# Patient Record
Sex: MALE | Race: WHITE | NOT HISPANIC OR LATINO | Employment: OTHER | URBAN - METROPOLITAN AREA
[De-identification: names, ages, dates, MRNs, and addresses within clinical notes are randomized per-mention and may not be internally consistent; named-entity substitution may affect disease eponyms.]

---

## 2017-11-02 ENCOUNTER — OFFICE VISIT (OUTPATIENT)
Dept: FAMILY MEDICINE CLINIC | Facility: CLINIC | Age: 62
End: 2017-11-02

## 2017-11-02 VITALS
HEIGHT: 71 IN | OXYGEN SATURATION: 98 % | RESPIRATION RATE: 16 BRPM | BODY MASS INDEX: 35.74 KG/M2 | TEMPERATURE: 98 F | WEIGHT: 255.3 LBS | SYSTOLIC BLOOD PRESSURE: 150 MMHG | DIASTOLIC BLOOD PRESSURE: 82 MMHG | HEART RATE: 68 BPM

## 2017-11-02 DIAGNOSIS — K42.9 UMBILICAL HERNIA WITHOUT OBSTRUCTION AND WITHOUT GANGRENE: ICD-10-CM

## 2017-11-02 DIAGNOSIS — Z13.220 SCREENING FOR HYPERLIPIDEMIA: ICD-10-CM

## 2017-11-02 DIAGNOSIS — I10 ESSENTIAL HYPERTENSION: ICD-10-CM

## 2017-11-02 DIAGNOSIS — F32.A DEPRESSION, UNSPECIFIED DEPRESSION TYPE: ICD-10-CM

## 2017-11-02 DIAGNOSIS — Z11.59 ENCOUNTER FOR HEPATITIS C SCREENING TEST FOR LOW RISK PATIENT: ICD-10-CM

## 2017-11-02 DIAGNOSIS — Z12.11 SCREENING FOR COLON CANCER: ICD-10-CM

## 2017-11-02 DIAGNOSIS — R39.15 URINARY URGENCY: ICD-10-CM

## 2017-11-02 DIAGNOSIS — IMO0001 CLASS 2 OBESITY WITH SERIOUS COMORBIDITY AND BODY MASS INDEX (BMI) OF 35.0 TO 35.9 IN ADULT, UNSPECIFIED OBESITY TYPE: Primary | ICD-10-CM

## 2017-11-02 DIAGNOSIS — R01.1 HEART MURMUR, SYSTOLIC: ICD-10-CM

## 2017-11-02 DIAGNOSIS — Z13.1 SCREENING FOR DIABETES MELLITUS: ICD-10-CM

## 2017-11-02 PROCEDURE — 99214 OFFICE O/P EST MOD 30 MIN: CPT | Performed by: FAMILY MEDICINE

## 2017-11-02 RX ORDER — CITALOPRAM 20 MG/1
20 TABLET ORAL DAILY
Qty: 30 TABLET | Refills: 5 | Status: SHIPPED | OUTPATIENT
Start: 2017-11-02 | End: 2019-03-06 | Stop reason: SDUPTHER

## 2017-11-02 RX ORDER — ATENOLOL 50 MG/1
50 TABLET ORAL DAILY
Qty: 30 TABLET | Refills: 5 | Status: SHIPPED | OUTPATIENT
Start: 2017-11-02 | End: 2019-02-10 | Stop reason: HOSPADM

## 2017-11-02 RX ORDER — AMLODIPINE AND OLMESARTAN MEDOXOMIL 10; 40 MG/1; MG/1
1 TABLET ORAL DAILY
COMMUNITY
End: 2017-11-02 | Stop reason: SDUPTHER

## 2017-11-02 RX ORDER — QUINAPRIL 20 MG/1
20 TABLET ORAL DAILY
Qty: 30 TABLET | Refills: 0 | Status: SHIPPED | OUTPATIENT
Start: 2017-11-02 | End: 2018-01-05 | Stop reason: SDUPTHER

## 2017-11-02 RX ORDER — FUROSEMIDE 20 MG/1
TABLET ORAL
COMMUNITY
Start: 2014-04-21 | End: 2017-11-02 | Stop reason: SINTOL

## 2017-11-02 RX ORDER — CITALOPRAM 20 MG/1
TABLET ORAL
COMMUNITY
Start: 2014-04-21 | End: 2017-11-02 | Stop reason: SDUPTHER

## 2017-11-02 RX ORDER — QUINAPRIL 20 MG/1
TABLET ORAL
COMMUNITY
Start: 2014-04-21 | End: 2017-11-02

## 2017-11-02 RX ORDER — AMLODIPINE AND OLMESARTAN MEDOXOMIL 10; 40 MG/1; MG/1
1 TABLET ORAL DAILY
Qty: 30 TABLET | Refills: 5 | Status: SHIPPED | OUTPATIENT
Start: 2017-11-02 | End: 2018-01-05

## 2017-11-02 RX ORDER — ATENOLOL 50 MG/1
TABLET ORAL
COMMUNITY
Start: 2014-04-21 | End: 2017-11-02 | Stop reason: SDUPTHER

## 2017-11-02 NOTE — PROGRESS NOTES
"Subjective   Flo Manzo is a 62 y.o. male.  He presented to the office to establish care, was patient of Dr. Morales.  He presented to the office alone.  He has concerns regarding urinary urgency and is in need of refills of all of his medications.    Chief Complaint   Patient presents with   • Establish Care     New Patient   • Hypertension        History of Present Illness    Hypertension  Patient has had hypertension for years.  It has been difficult to control.  He has been on his current regimen including amlodipine 10, olmesartan 40, atenolol 50, quinapril 20, furosemide 20 for the last about 2-3 years.  He has tolerated these medications.  He does not complaining of chest pain, shortness of breath, headaches, changes in his vision.  He has been out of his medications for a couple of weeks.    Urinary urgency  This has been ongoing for the last 2-3 years.  He describes that he has the urge to urinate and will even have some leaking.  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  He does wear pads during the day because of the leaking.  He has urinary frequency overnight related to how much she's had a drink before bed.  He reports on nights where he is careful and doesn't have fluids before going to bed he may only get up once but other times he'll get up 2-3 times.    Depression  Patient reported that he has also run out of his citalopram.  Yesterday he got very upset when someone was speaking with him and he couldn't understand.  The specific situation was related to a coworker who speaks [place]ron[place] and because he could not understand with coworker was saying the patient reported he got very angry and \"blew up\" yelling at his coworker.  He feels as though his medication helps keep him more mellow so these things don't bother him.  He has been on citalopram for years and does not notice any side effects.      The following portions of the patient's history were " "reviewed and updated as appropriate: allergies, current medications, past family history, past medical history, past social history, past surgical history and problem list.          Review of Systems   Constitutional: Negative for activity change, appetite change, fatigue and unexpected weight change.   HENT: Negative for congestion, ear pain, postnasal drip, rhinorrhea, sinus pain and sore throat.    Eyes: Positive for redness. Negative for visual disturbance.   Respiratory: Negative for cough and shortness of breath.    Cardiovascular: Positive for leg swelling. Negative for chest pain and palpitations.   Gastrointestinal: Negative for abdominal pain, blood in stool, constipation, diarrhea, nausea and vomiting.   Genitourinary: Positive for difficulty urinating, frequency and urgency. Negative for decreased urine volume and dysuria.   Musculoskeletal: Negative for gait problem and joint swelling.        Right shoulder pain   Neurological: Negative for dizziness, weakness and headaches.   Psychiatric/Behavioral: Positive for dysphoric mood. Negative for sleep disturbance. The patient is not nervous/anxious.        Objective   Blood pressure 150/82, pulse 68, temperature 98 °F (36.7 °C), temperature source Oral, resp. rate 16, height 71\" (180.3 cm), weight 255 lb 4.8 oz (116 kg), SpO2 (!) 68 %.  Physical Exam   Constitutional: He is oriented to person, place, and time. He appears well-nourished. No distress.   Obese   HENT:   Right Ear: External ear normal.   Left Ear: External ear normal.   Nose: Nose normal.   Mouth/Throat: Oropharynx is clear and moist. No oropharyngeal exudate.   Bilateral ear canals and tympanic membranes are normal.   Eyes: Conjunctivae and EOM are normal. Right eye exhibits no discharge. Left eye exhibits no discharge. No scleral icterus.   Neck: Neck supple. No thyromegaly present.   Cardiovascular: Normal rate and regular rhythm.  Exam reveals no gallop and no friction rub.    Murmur " heard.  2/6 systolic murmur best heard over left upper and lower sternal border   Pulmonary/Chest: Effort normal and breath sounds normal. No respiratory distress. He has no wheezes. He has no rales.   Abdominal: Soft. Bowel sounds are normal. He exhibits no distension and no mass. There is no tenderness. There is no guarding.   Patient with 2 cm bulge at the umbilicus, reducible, no discoloration or tenderness.   Genitourinary: Prostate normal.   Musculoskeletal: He exhibits edema. He exhibits no tenderness or deformity.   Trace edema bilateral lower extremities   Lymphadenopathy:     He has no cervical adenopathy.   Neurological: He is alert and oriented to person, place, and time. He exhibits normal muscle tone. Coordination normal.   Skin: Skin is warm and dry.   Psychiatric: He has a normal mood and affect. His behavior is normal.   Vitals reviewed.        Assessment/Plan   Flo was seen today for establish care and hypertension.    Diagnoses and all orders for this visit:    Class 2 obesity with serious comorbidity and body mass index (BMI) of 35.0 to 35.9 in adult, unspecified obesity type  -     Comprehensive Metabolic Panel  -     Lipid Panel    Screening for hyperlipidemia  -     Lipid Panel    Screening for diabetes mellitus  -     Comprehensive Metabolic Panel    Encounter for hepatitis C screening test for low risk patient  -     Hepatitis C Antibody    Essential hypertension  -     Comprehensive Metabolic Panel  -     Lipid Panel    Urinary urgency    Depression, unspecified depression type    Umbilical hernia without obstruction and without gangrene  -     Ambulatory Referral to General Surgery    Heart murmur, systolic  -     Adult Transthoracic Echo Limited W/ Cont if Necessary Per Protocol; Future    Other orders  -     amlodipine-olmesartan (SULEMA) 10-40 MG per tablet; Take 1 tablet by mouth Daily.  -     atenolol (TENORMIN) 50 MG tablet; Take 1 tablet by mouth Daily.  -     citalopram (CeleXA)  20 MG tablet; Take 1 tablet by mouth Daily.  -     quinapril (ACCUPRIL) 20 MG tablet; Take 1 tablet by mouth Daily.      Heart murmur  #1 patient has never been told he has a heart murmur  #2 will evaluate with echocardiogram, agent has long-standing history of hypertension that was difficult to control giving the number of agents he's on    Hypertension  #1 patient has previously tolerated his antihypertensives, will restart all except for furosemide given his urinary complaints  #2 patient is on an ARB and an ACE inhibitor, we will reevaluate in 2 weeks for blood pressure control, we'll likely adjust medications at that time  #3 potential plan to have patient on amlodipine 10, atenolol 50, quinapril dose may increase with discontinuation of the only certain, labs today but long-term concern for kidney injury    Urinary urgency  #1 no appreciation of enlarged prostate on exam  #2 discontinue furosemide and will check for improvement of symptoms at next visit  #3 if patient still having considerable urinary urgency and complaints of affecting his lifestyle will refer to urology    Umbilical hernia  #1 gave patient precautions regarding not being able to reduce the herniated components  #2 also discussed that given his abdominal obesity he may not be a good candidate for surgery  #3 patient does do significant lifting and has some associated discomfort with the hernia and so will refer to general surgery for further discussion on repair    Patient reported he was due for colonoscopy 1 year ago so will order for a screening colonoscopy.

## 2017-11-03 LAB
ALBUMIN SERPL-MCNC: 4.5 G/DL (ref 3.5–5.2)
ALBUMIN/GLOB SERPL: 1.5 G/DL
ALP SERPL-CCNC: 101 U/L (ref 39–117)
ALT SERPL-CCNC: 18 U/L (ref 1–41)
AST SERPL-CCNC: 15 U/L (ref 1–40)
BILIRUB SERPL-MCNC: 0.7 MG/DL (ref 0.1–1.2)
BUN SERPL-MCNC: 16 MG/DL (ref 8–23)
BUN/CREAT SERPL: 20.5 (ref 7–25)
CALCIUM SERPL-MCNC: 9.9 MG/DL (ref 8.6–10.5)
CHLORIDE SERPL-SCNC: 101 MMOL/L (ref 98–107)
CHOLEST SERPL-MCNC: 145 MG/DL (ref 0–200)
CO2 SERPL-SCNC: 29 MMOL/L (ref 22–29)
CREAT SERPL-MCNC: 0.78 MG/DL (ref 0.76–1.27)
GFR SERPLBLD CREATININE-BSD FMLA CKD-EPI: 101 ML/MIN/1.73
GFR SERPLBLD CREATININE-BSD FMLA CKD-EPI: 123 ML/MIN/1.73
GLOBULIN SER CALC-MCNC: 3 GM/DL
GLUCOSE SERPL-MCNC: 80 MG/DL (ref 65–99)
HCV AB S/CO SERPL IA: 0.1 S/CO RATIO (ref 0–0.9)
HDLC SERPL-MCNC: 61 MG/DL (ref 40–60)
LDLC SERPL CALC-MCNC: 74 MG/DL (ref 0–100)
POTASSIUM SERPL-SCNC: 4.2 MMOL/L (ref 3.5–5.2)
PROT SERPL-MCNC: 7.5 G/DL (ref 6–8.5)
SODIUM SERPL-SCNC: 142 MMOL/L (ref 136–145)
TRIGL SERPL-MCNC: 50 MG/DL (ref 0–150)
VLDLC SERPL CALC-MCNC: 10 MG/DL (ref 5–40)

## 2017-11-13 ENCOUNTER — OFFICE VISIT (OUTPATIENT)
Dept: SURGERY | Facility: CLINIC | Age: 62
End: 2017-11-13

## 2017-11-13 VITALS — BODY MASS INDEX: 35.7 KG/M2 | HEIGHT: 71 IN | HEART RATE: 64 BPM | WEIGHT: 255 LBS | OXYGEN SATURATION: 97 %

## 2017-11-13 DIAGNOSIS — K42.9 UMBILICAL HERNIA WITHOUT OBSTRUCTION AND WITHOUT GANGRENE: Primary | ICD-10-CM

## 2017-11-13 PROCEDURE — 99203 OFFICE O/P NEW LOW 30 MIN: CPT | Performed by: SURGERY

## 2017-11-13 RX ORDER — CEFAZOLIN SODIUM 2 G/100ML
2 INJECTION, SOLUTION INTRAVENOUS ONCE
Status: CANCELLED | OUTPATIENT
Start: 2018-01-23 | End: 2018-01-23

## 2017-11-13 NOTE — PROGRESS NOTES
Cc: Umbilical hernia    History of presenting illness:   This is a nice, 62-year-old gentleman who says that over the past about 3 years he's noticed a slowly enlarging mass at the umbilicus which is generally partially reducible.  More recently has been associated with some mild pain which seems to be worse with straining or lifting.  It has grown larger over time.  He has never had an operation through an incision at this point.    In addition, the patient has never had a colonoscopy.  He was scheduled for one several years ago, but ended up being pushed out of town for work and never had it rescheduled.    Past Medical History: Hypertension, depression, arthritis    Past Surgical History: Appendectomy at the age of 17.  Bilateral knee surgery, eye surgery, hand surgery    Medications: Atenolol, citalopram, quinapril, amlodipine, meloxicam    Allergies: None known    Social History: Patient is employed as an  which occasionally involves some heavy lifting and straining.  He does not smoke.  He uses alcohol on a social basis.    Family History: Positive for lung cancer in her father, breast cancer in a sister    Review of Systems:  Constitutional: Negative for change in activity, fever or fatigue  Neck: no swollen glands or dysphagia or odynophagia  Respiratory: negative for SOB, cough, hemoptysis or wheezing  Cardiovascular: negative for chest pain, palpitations or peripheral edema  Gastrointestinal: Positive for occasional periumbilical abdominal pain, negative for change in bowel habits, bloody stools or acid reflux      Physical Exam:    General: alert and oriented, appropriate, no acute distress  Neck: Supple without lymphadenopathy or thyromegaly, trachea is in the midline  Respiratory: Lungs are clear bilaterally without wheezing, no use of accessory muscles is noted  Cardiovascular: Regular rate and rhythm without murmur, no peripheral edema  Gastrointestinal: Soft, benign, approximately 2-1/2 or  3 cm periumbilical hernia which is partially reducible.  Moderate tenderness to palpation.  Bowel sounds positive.    Laboratory data: None    Imaging data: None      Assessment and plan:   1.  Periumbilical ventral hernia  I recommended repair of this.  Options for repair were discussed with the patient and he has opted for a laparoscopic robotically assisted ventral hernia repair.  Attempts will be made for primary closure with preperitoneal placement of mesh.  He understands that if we are not able to accomplish the procedure this weight conversion to open may be necessary.  He understands that the risks include bleeding, infection, recurrence, pain, injury to intra-abdominal structures as well as other potential problems and is willing to proceed.    2.  Encounter for screening colonoscopy  We will plan for colonoscopy to be done prior to his hernia repair.  I think this makes the most sense in case we were to run in to a finding which would require an operation which could violate his hernia.    These will be scheduled in conjunction with patient planned for surgery on his hand for carpal tunnel syndrome.  He is going to be off work for some time it anyways at that time.      Ten Solitario MD, FACS  General, Minimally Invasive and Endoscopic Surgery  [facility]    [address], [address]  [place], [place], [address]  P: [phone]  F: [phone]

## 2017-11-15 ENCOUNTER — TELEPHONE (OUTPATIENT)
Dept: FAMILY MEDICINE CLINIC | Facility: CLINIC | Age: 62
End: 2017-11-15

## 2017-11-15 ENCOUNTER — HOSPITAL ENCOUNTER (OUTPATIENT)
Dept: CARDIOLOGY | Facility: HOSPITAL | Age: 62
Discharge: HOME | End: 2017-11-15
Admitting: FAMILY MEDICINE

## 2017-11-15 VITALS
HEIGHT: 71 IN | BODY MASS INDEX: 35.7 KG/M2 | HEART RATE: 55 BPM | SYSTOLIC BLOOD PRESSURE: 164 MMHG | DIASTOLIC BLOOD PRESSURE: 104 MMHG | WEIGHT: 255 LBS

## 2017-11-15 DIAGNOSIS — R01.1 HEART MURMUR, SYSTOLIC: ICD-10-CM

## 2017-11-15 LAB
ASCENDING AORTA: 2.6 CM
BH CV ECHO MEAS - ACS: 1.4 CM
BH CV ECHO MEAS - AI DEC SLOPE: 89.1 CM/SEC^2
BH CV ECHO MEAS - AI MAX PG: 44.1 MMHG
BH CV ECHO MEAS - AI MAX VEL: 332 CM/SEC
BH CV ECHO MEAS - AI P1/2T: 1091 MSEC
BH CV ECHO MEAS - AO MAX PG (FULL): 11.7 MMHG
BH CV ECHO MEAS - AO MAX PG: 17.8 MMHG
BH CV ECHO MEAS - AO MEAN PG (FULL): 8 MMHG
BH CV ECHO MEAS - AO MEAN PG: 11 MMHG
BH CV ECHO MEAS - AO ROOT AREA (BSA CORRECTED): 1.6
BH CV ECHO MEAS - AO ROOT AREA: 10.8 CM^2
BH CV ECHO MEAS - AO ROOT DIAM: 3.7 CM
BH CV ECHO MEAS - AO V2 MAX: 211 CM/SEC
BH CV ECHO MEAS - AO V2 MEAN: 161 CM/SEC
BH CV ECHO MEAS - AO V2 VTI: 45.9 CM
BH CV ECHO MEAS - AVA(I,A): 2.1 CM^2
BH CV ECHO MEAS - AVA(I,D): 2.1 CM^2
BH CV ECHO MEAS - AVA(V,A): 2.2 CM^2
BH CV ECHO MEAS - AVA(V,D): 2.2 CM^2
BH CV ECHO MEAS - BSA(HAYCOCK): 2.4 M^2
BH CV ECHO MEAS - BSA: 2.3 M^2
BH CV ECHO MEAS - BZI_BMI: 35.6 KILOGRAMS/M^2
BH CV ECHO MEAS - BZI_METRIC_HEIGHT: 180.3 CM
BH CV ECHO MEAS - BZI_METRIC_WEIGHT: 115.7 KG
BH CV ECHO MEAS - CONTRAST EF (2CH): 52.1 ML/M^2
BH CV ECHO MEAS - CONTRAST EF 4CH: 51.4 ML/M^2
BH CV ECHO MEAS - EDV(MOD-SP2): 142 ML
BH CV ECHO MEAS - EDV(MOD-SP4): 148 ML
BH CV ECHO MEAS - EDV(TEICH): 205.6 ML
BH CV ECHO MEAS - EF(CUBED): 63.9 %
BH CV ECHO MEAS - EF(MOD-SP2): 52.1 %
BH CV ECHO MEAS - EF(MOD-SP4): 52 %
BH CV ECHO MEAS - EF(TEICH): 54.3 %
BH CV ECHO MEAS - ESV(MOD-SP2): 68 ML
BH CV ECHO MEAS - ESV(MOD-SP4): 72 ML
BH CV ECHO MEAS - ESV(TEICH): 93.9 ML
BH CV ECHO MEAS - FS: 28.8 %
BH CV ECHO MEAS - IVS/LVPW: 1
BH CV ECHO MEAS - IVSD: 1.2 CM
BH CV ECHO MEAS - LAT PEAK E' VEL: 20 CM/SEC
BH CV ECHO MEAS - LV DIASTOLIC VOL/BSA (35-75): 63.3 ML/M^2
BH CV ECHO MEAS - LV MASS(C)D: 349.7 GRAMS
BH CV ECHO MEAS - LV MASS(C)DI: 149.5 GRAMS/M^2
BH CV ECHO MEAS - LV MAX PG: 6.2 MMHG
BH CV ECHO MEAS - LV MEAN PG: 3 MMHG
BH CV ECHO MEAS - LV SYSTOLIC VOL/BSA (12-30): 30.8 ML/M^2
BH CV ECHO MEAS - LV V1 MAX: 124 CM/SEC
BH CV ECHO MEAS - LV V1 MEAN: 84 CM/SEC
BH CV ECHO MEAS - LV V1 VTI: 25 CM
BH CV ECHO MEAS - LVIDD: 6.4 CM
BH CV ECHO MEAS - LVIDS: 4.5 CM
BH CV ECHO MEAS - LVLD AP2: 8.6 CM
BH CV ECHO MEAS - LVLD AP4: 8.5 CM
BH CV ECHO MEAS - LVLS AP2: 8 CM
BH CV ECHO MEAS - LVLS AP4: 7.4 CM
BH CV ECHO MEAS - LVOT AREA (M): 3.8 CM^2
BH CV ECHO MEAS - LVOT AREA: 3.8 CM^2
BH CV ECHO MEAS - LVOT DIAM: 2.2 CM
BH CV ECHO MEAS - LVPWD: 1.2 CM
BH CV ECHO MEAS - MED PEAK E' VEL: 22 CM/SEC
BH CV ECHO MEAS - MR MAX PG: 92.5 MMHG
BH CV ECHO MEAS - MR MAX VEL: 481 CM/SEC
BH CV ECHO MEAS - MV A DUR: 0.17 SEC
BH CV ECHO MEAS - MV A MAX VEL: 81.6 CM/SEC
BH CV ECHO MEAS - MV DEC SLOPE: 587 CM/SEC^2
BH CV ECHO MEAS - MV DEC TIME: 0.22 SEC
BH CV ECHO MEAS - MV E MAX VEL: 125 CM/SEC
BH CV ECHO MEAS - MV E/A: 1.5
BH CV ECHO MEAS - MV MAX PG: 5.2 MMHG
BH CV ECHO MEAS - MV MEAN PG: 2 MMHG
BH CV ECHO MEAS - MV P1/2T MAX VEL: 125 CM/SEC
BH CV ECHO MEAS - MV P1/2T: 62.4 MSEC
BH CV ECHO MEAS - MV V2 MAX: 114 CM/SEC
BH CV ECHO MEAS - MV V2 MEAN: 60.1 CM/SEC
BH CV ECHO MEAS - MV V2 VTI: 43.2 CM
BH CV ECHO MEAS - MVA P1/2T LCG: 1.8 CM^2
BH CV ECHO MEAS - MVA(P1/2T): 3.5 CM^2
BH CV ECHO MEAS - MVA(VTI): 2.2 CM^2
BH CV ECHO MEAS - PA MAX PG (FULL): 5.6 MMHG
BH CV ECHO MEAS - PA MAX PG: 7 MMHG
BH CV ECHO MEAS - PA V2 MAX: 132 CM/SEC
BH CV ECHO MEAS - PULM A REVS DUR: 0.08 SEC
BH CV ECHO MEAS - PULM A REVS VEL: 27.7 CM/SEC
BH CV ECHO MEAS - PULM DIAS VEL: 32.5 CM/SEC
BH CV ECHO MEAS - PULM S/D: 2
BH CV ECHO MEAS - PULM SYS VEL: 65 CM/SEC
BH CV ECHO MEAS - PVA(V,A): 1.4 CM^2
BH CV ECHO MEAS - PVA(V,D): 1.4 CM^2
BH CV ECHO MEAS - QP/QS: 0.51
BH CV ECHO MEAS - RAP SYSTOLE: 8 MMHG
BH CV ECHO MEAS - RV MAX PG: 1.3 MMHG
BH CV ECHO MEAS - RV MEAN PG: 1 MMHG
BH CV ECHO MEAS - RV V1 MAX: 57.9 CM/SEC
BH CV ECHO MEAS - RV V1 MEAN: 40.5 CM/SEC
BH CV ECHO MEAS - RV V1 VTI: 15.3 CM
BH CV ECHO MEAS - RVOT AREA: 3.1 CM^2
BH CV ECHO MEAS - RVOT DIAM: 2 CM
BH CV ECHO MEAS - RVSP: 37 MMHG
BH CV ECHO MEAS - SI(AO): 211.1 ML/M^2
BH CV ECHO MEAS - SI(CUBED): 70.3 ML/M^2
BH CV ECHO MEAS - SI(LVOT): 40.6 ML/M^2
BH CV ECHO MEAS - SI(MOD-SP2): 31.6 ML/M^2
BH CV ECHO MEAS - SI(MOD-SP4): 32.5 ML/M^2
BH CV ECHO MEAS - SI(TEICH): 47.8 ML/M^2
BH CV ECHO MEAS - SUP REN AO DIAM: 2.5 CM
BH CV ECHO MEAS - SV(AO): 493.5 ML
BH CV ECHO MEAS - SV(CUBED): 164.3 ML
BH CV ECHO MEAS - SV(LVOT): 95 ML
BH CV ECHO MEAS - SV(MOD-SP2): 74 ML
BH CV ECHO MEAS - SV(MOD-SP4): 76 ML
BH CV ECHO MEAS - SV(RVOT): 48.1 ML
BH CV ECHO MEAS - SV(TEICH): 111.7 ML
BH CV ECHO MEAS - TAPSE (>1.6): 2.8 CM2
BH CV ECHO MEAS - TR MAX VEL: 271 CM/SEC
BH CV XLRA - RV BASE: 4.1 CM
BH CV XLRA - TDI S': 12 CM/SEC
E/E' RATIO: 21
LEFT ATRIUM VOLUME INDEX: 34 ML/M2
LV EF 2D ECHO EST: 52 %
SINUS: 3.3 CM
STJ: 2.7 CM

## 2017-11-15 PROCEDURE — 93306 TTE W/DOPPLER COMPLETE: CPT

## 2017-11-15 PROCEDURE — 93306 TTE W/DOPPLER COMPLETE: CPT | Performed by: INTERNAL MEDICINE

## 2017-11-15 NOTE — TELEPHONE ENCOUNTER
Tammy from [facility] called and said that they dont have an authorization for the echo they have scheduled for this pt. Please call her at. [phone]

## 2017-12-11 PROBLEM — K42.9 UMBILICAL HERNIA WITHOUT OBSTRUCTION AND WITHOUT GANGRENE: Status: ACTIVE | Noted: 2017-12-11

## 2017-12-28 ENCOUNTER — OFFICE VISIT (OUTPATIENT)
Dept: FAMILY MEDICINE CLINIC | Facility: CLINIC | Age: 62
End: 2017-12-28

## 2017-12-28 VITALS
DIASTOLIC BLOOD PRESSURE: 80 MMHG | SYSTOLIC BLOOD PRESSURE: 130 MMHG | HEART RATE: 56 BPM | BODY MASS INDEX: 35.78 KG/M2 | HEIGHT: 71 IN | TEMPERATURE: 98.3 F | RESPIRATION RATE: 18 BRPM | WEIGHT: 255.6 LBS | OXYGEN SATURATION: 96 %

## 2017-12-28 DIAGNOSIS — B97.89 VIRAL RESPIRATORY ILLNESS: Primary | ICD-10-CM

## 2017-12-28 DIAGNOSIS — J98.8 VIRAL RESPIRATORY ILLNESS: Primary | ICD-10-CM

## 2017-12-28 PROCEDURE — 99213 OFFICE O/P EST LOW 20 MIN: CPT | Performed by: FAMILY MEDICINE

## 2017-12-28 RX ORDER — IPRATROPIUM BROMIDE 42 UG/1
2 SPRAY, METERED NASAL 4 TIMES DAILY
Qty: 15 ML | Refills: 1 | Status: SHIPPED | OUTPATIENT
Start: 2017-12-28 | End: 2019-01-24

## 2017-12-28 RX ORDER — FLUTICASONE PROPIONATE 220 UG/1
1 AEROSOL, METERED RESPIRATORY (INHALATION)
Qty: 12 G | Refills: 1 | Status: SHIPPED | OUTPATIENT
Start: 2017-12-28 | End: 2019-04-08

## 2017-12-28 NOTE — PROGRESS NOTES
Subjective   Flo Manzo is a 62 y.o. male.  Patient is presenting today with complaints of cough and nasal congestion.    Chief Complaint   Patient presents with   • Cough     since tuesday    • Nasal Congestion     since tuesday        History of Present Illness  Cough and nasal congestion  This started 4 days ago with cough, now feels like in his chest. Hurts to cough, stomach hurts to cough. Has been staying in bed. Intermittently productive of green phlegm, no blood but black flakes thinks it is from work.Taking some OTC cough syrup with benefits. Wakes a couple times at night with cough but not too bad and able to get back to sleep. No sick contacts but did have a lot of family members over for two days with holiday parties. Felt feverish but did not take temperature. Also had diarrhea at start of illness but this has resolved. Has been drinking mostly apple cider, non-alcoholic, but not really eating much. Also having nasal congestion that comes and goes no headaches but some sinus pressure. No n/v, some sore throat from coughing. Pt only smoked briefly at age 17.    Pt having colonoscopy next week, Hernia surgery after that.    The following portions of the patient's history were reviewed and updated as appropriate: allergies, current medications and problem list.      Review of Systems   Constitutional: Positive for activity change, appetite change and fever.   HENT: Positive for congestion, postnasal drip, rhinorrhea, sinus pressure and sore throat. Negative for ear pain, nosebleeds and sinus pain.    Respiratory: Positive for cough and shortness of breath.    Cardiovascular: Positive for chest pain.   Gastrointestinal: Positive for abdominal pain and diarrhea. Negative for blood in stool, nausea and vomiting.   Neurological: Negative for headaches.   Psychiatric/Behavioral: Positive for sleep disturbance.       Objective   Blood pressure 130/80, pulse 56, temperature 98.3 °F (36.8 °C), temperature  "source Oral, resp. rate 18, height 180.3 cm (71\"), weight 116 kg (255 lb 9.6 oz), SpO2 96 %.  Physical Exam   Constitutional: He is oriented to person, place, and time. He appears well-nourished. No distress.   HENT:   Right Ear: External ear normal.   Left Ear: External ear normal.   Mouth/Throat: Oropharynx is clear and moist. No oropharyngeal exudate.   Nasal discharge present, erythematous turbinates. No oropharynx erythema or exudate.   Eyes: Conjunctivae are normal. Right eye exhibits no discharge. Left eye exhibits no discharge.   Neck: Neck supple. No thyromegaly present.   Cardiovascular: Normal rate, regular rhythm and intact distal pulses.  Exam reveals no gallop and no friction rub.    Murmur heard.  Pulmonary/Chest: Effort normal and breath sounds normal. No respiratory distress. He has no wheezes. He has no rales. He exhibits tenderness.   Lymphadenopathy:     He has no cervical adenopathy.   Neurological: He is alert and oriented to person, place, and time.   Psychiatric: He has a normal mood and affect. His behavior is normal.   Vitals reviewed.      Assessment/Plan   Flo was seen today for cough and nasal congestion.    Diagnoses and all orders for this visit:    Viral respiratory illness    Other orders  -     ipratropium (ATROVENT) 0.06 % nasal spray; 2 sprays into each nostril 4 (Four) Times a Day.  -     fluticasone (FLOVENT HFA) 220 MCG/ACT inhaler; Inhale 1 puff 2 (Two) Times a Day.  -     Spacer/Aero Chamber Mouthpiece misc; Use with inhaler for each treatment      Pt to expect residual cough for weeks but should improve  If has worsening of symptoms he should call the office       "

## 2018-01-04 ENCOUNTER — HOSPITAL ENCOUNTER (OUTPATIENT)
Facility: HOSPITAL | Age: 63
Setting detail: HOSPITAL OUTPATIENT SURGERY
Discharge: HOME | End: 2018-01-04
Attending: SURGERY | Admitting: SURGERY

## 2018-01-04 ENCOUNTER — ANESTHESIA (OUTPATIENT)
Dept: GASTROENTEROLOGY | Facility: HOSPITAL | Age: 63
End: 2018-01-04

## 2018-01-04 ENCOUNTER — ANESTHESIA EVENT (OUTPATIENT)
Dept: GASTROENTEROLOGY | Facility: HOSPITAL | Age: 63
End: 2018-01-04

## 2018-01-04 VITALS
BODY MASS INDEX: 35 KG/M2 | OXYGEN SATURATION: 94 % | DIASTOLIC BLOOD PRESSURE: 115 MMHG | HEIGHT: 71 IN | HEART RATE: 59 BPM | TEMPERATURE: 97.6 F | RESPIRATION RATE: 16 BRPM | WEIGHT: 250 LBS | SYSTOLIC BLOOD PRESSURE: 169 MMHG

## 2018-01-04 DIAGNOSIS — K42.9 UMBILICAL HERNIA WITHOUT OBSTRUCTION AND WITHOUT GANGRENE: ICD-10-CM

## 2018-01-04 PROCEDURE — 25010000002 PROPOFOL 10 MG/ML EMULSION: Performed by: ANESTHESIOLOGY

## 2018-01-04 PROCEDURE — S0260 H&P FOR SURGERY: HCPCS | Performed by: SURGERY

## 2018-01-04 PROCEDURE — 45380 COLONOSCOPY AND BIOPSY: CPT | Performed by: SURGERY

## 2018-01-04 PROCEDURE — 88305 TISSUE EXAM BY PATHOLOGIST: CPT | Performed by: SURGERY

## 2018-01-04 PROCEDURE — 45385 COLONOSCOPY W/LESION REMOVAL: CPT | Performed by: SURGERY

## 2018-01-04 RX ORDER — SODIUM CHLORIDE 0.9 % (FLUSH) 0.9 %
1-10 SYRINGE (ML) INJECTION AS NEEDED
Status: DISCONTINUED | OUTPATIENT
Start: 2018-01-04 | End: 2018-01-04 | Stop reason: HOSPADM

## 2018-01-04 RX ORDER — PROPOFOL 10 MG/ML
VIAL (ML) INTRAVENOUS CONTINUOUS PRN
Status: DISCONTINUED | OUTPATIENT
Start: 2018-01-04 | End: 2018-01-04 | Stop reason: SURG

## 2018-01-04 RX ORDER — SODIUM CHLORIDE, SODIUM LACTATE, POTASSIUM CHLORIDE, CALCIUM CHLORIDE 600; 310; 30; 20 MG/100ML; MG/100ML; MG/100ML; MG/100ML
30 INJECTION, SOLUTION INTRAVENOUS CONTINUOUS PRN
Status: DISCONTINUED | OUTPATIENT
Start: 2018-01-04 | End: 2018-01-04 | Stop reason: HOSPADM

## 2018-01-04 RX ORDER — LIDOCAINE HYDROCHLORIDE 20 MG/ML
INJECTION, SOLUTION INFILTRATION; PERINEURAL AS NEEDED
Status: DISCONTINUED | OUTPATIENT
Start: 2018-01-04 | End: 2018-01-04 | Stop reason: SURG

## 2018-01-04 RX ORDER — PROPOFOL 10 MG/ML
VIAL (ML) INTRAVENOUS AS NEEDED
Status: DISCONTINUED | OUTPATIENT
Start: 2018-01-04 | End: 2018-01-04 | Stop reason: SURG

## 2018-01-04 RX ADMIN — SODIUM CHLORIDE, POTASSIUM CHLORIDE, SODIUM LACTATE AND CALCIUM CHLORIDE 30 ML/HR: 600; 310; 30; 20 INJECTION, SOLUTION INTRAVENOUS at 08:50

## 2018-01-04 RX ADMIN — SODIUM CHLORIDE, POTASSIUM CHLORIDE, SODIUM LACTATE AND CALCIUM CHLORIDE: 600; 310; 30; 20 INJECTION, SOLUTION INTRAVENOUS at 09:25

## 2018-01-04 RX ADMIN — PROPOFOL 100 MG: 10 INJECTION, EMULSION INTRAVENOUS at 09:25

## 2018-01-04 RX ADMIN — PROPOFOL 100 MCG/KG/MIN: 10 INJECTION, EMULSION INTRAVENOUS at 09:25

## 2018-01-04 RX ADMIN — LIDOCAINE HYDROCHLORIDE 60 MG: 20 INJECTION, SOLUTION INFILTRATION; PERINEURAL at 09:25

## 2018-01-04 NOTE — H&P
Cc: Encounter for screening colonoscopy     History of presenting illness:   62-year-old gentleman for screening colonoscopy.  No complaints.  No prior colonoscopy.     Past Medical History: Hypertension, depression, arthritis     Past Surgical History: Appendectomy at the age of 17.  Bilateral knee surgery, eye surgery, hand surgery     Medications: Atenolol, citalopram, quinapril, amlodipine, meloxicam     Allergies: None known     Social History: Patient is employed as an  which occasionally involves some heavy lifting and straining.  He does not smoke.  He uses alcohol on a social basis.     Family History: Positive for lung cancer in her father, breast cancer in a sister     Review of Systems:  Constitutional: Negative for change in activity, fever or fatigue  Neck: no swollen glands or dysphagia or odynophagia  Respiratory: negative for SOB, cough, hemoptysis or wheezing  Cardiovascular: negative for chest pain, palpitations or peripheral edema  Gastrointestinal: Positive for occasional periumbilical abdominal pain, negative for change in bowel habits, bloody stools or acid reflux        Physical Exam:   General: alert and oriented, appropriate, no acute distress  Neck: Supple without lymphadenopathy or thyromegaly, trachea is in the midline  Respiratory: Lungs are clear bilaterally without wheezing, no use of accessory muscles is noted  Cardiovascular: Regular rate and rhythm without murmur, no peripheral edema  Gastrointestinal: Soft, benign, approximately 2-1/2 or 3 cm periumbilical hernia which is partially reducible.  Moderate tenderness to palpation.  Bowel sounds positive.     Laboratory data: None     Imaging data: None        Assessment and plan:   Encounter for screening colonoscopy  We will plan for colonoscopy to be done prior to his hernia repair.  I think this makes the most sense in case we were to run in to a finding which would require an operation which could violate his  hernia.     These will be scheduled in conjunction with patient planned for surgery on his hand for carpal tunnel syndrome.  He is going to be off work for some time it anyways at that time.        Ten Solitario MD, FACS  General, Minimally Invasive and Endoscopic Surgery  [facility]     [address], [address]  [place], [place], [address]  P: [phone]  F: [phone]

## 2018-01-04 NOTE — OP NOTE
Operative Note :   MD Flo Ramirez SAUL Danielleremberto  1955    Procedure Date: 01/04/18    Pre-op Diagnosis:  · Screening colonoscopy    Post-op Diagnosis:  · Colon polyps    Procedure:   · Colonoscopy to cecum with cold forceps polypectomy and hot snare polypectomy    Surgeon: Ten Solitario MD      Anesthesia: MAC    Indications:  · 62 year old man for initial screening colonoscopy.  No problems.    Findings:   · Benign appearing colon polyps ×3    Recommendations:   · To be based on polyp pathology, but likely repeat colonoscopy in 3 years    Description of procedure:    After obtaining informed consent, patient was brought to the endoscopy suite and was sedated.  Digital rectal exam was undertaken and was unremarkable.  The flexible Olympus endoscope was then inserted into the rectum and passed around with minimal difficulty to the level of the cecum.  The colonoscope was then slowly withdrawn, carefully visualizing all mucosal surfaces.  The cecum was unremarkable.  The ascending colon was unremarkable.  Around the level of the hepatic flexure there was a small benign-appearing polyp which was removed with the polypectomy forceps.  Transverse colon was unremarkable.  At approximately the level of the splenic flexure there was another small polyp on a fold which was again removed with the polypectomy forceps.  Descending colon was unremarkable.  In the proximal sigmoid colon there was a slightly larger again benign-appearing polyp which was removed with the hot snare device and then suctioned through the scope.  The site appeared in good order.  The remaining sigmoid colon was unremarkable.  Rectum was normal.  There were no diverticula seen.  Scope was removed completely.    Ten Solitario MD  General and Endoscopic Surgery  [facility]    [address], [address]  [place], [place], [address]  P: [phone]  F: [phone]

## 2018-01-04 NOTE — PLAN OF CARE
Problem: Patient Care Overview (Adult)  Goal: Plan of Care Review  Outcome: Ongoing (interventions implemented as appropriate)   01/04/18 0829   Coping/Psychosocial Response Interventions   Plan Of Care Reviewed With patient     Goal: Adult Individualization and Mutuality  Outcome: Ongoing (interventions implemented as appropriate)    Goal: Discharge Needs Assessment  Outcome: Ongoing (interventions implemented as appropriate)   01/04/18 0829   Discharge Needs Assessment   Concerns To Be Addressed no discharge needs identified   Discharge Disposition home or self-care   Living Environment   Transportation Available car;family or friend will provide       Problem: GI Endoscopy (Adult)  Goal: Signs and Symptoms of Listed Potential Problems Will be Absent or Manageable (GI Endoscopy)  Outcome: Ongoing (interventions implemented as appropriate)   01/04/18 0829   GI Endoscopy   Problems Assessed (GI Endoscopy) all   Problems Present (GI Endoscopy) none

## 2018-01-04 NOTE — ANESTHESIA POSTPROCEDURE EVALUATION
"Patient: Flo Manzo    Procedure Summary     Date Anesthesia Start Anesthesia Stop Room / Location    01/04/18 0926 1003  [facility] /  [facility]       Procedure Diagnosis Surgeon Provider    COLONOSCOPY with cold polypectomy and hot snare polypectomy (N/A ) Umbilical hernia without obstruction and without gangrene  (Umbilical hernia without obstruction and without gangrene [K42.9]) MD Genaro Carmona MD          Anesthesia Type: MAC  Last vitals  BP   (!) 162/106 (01/04/18 1013)   Temp   36.4 °C (97.6 °F) (01/04/18 1003)   Pulse   59 (01/04/18 1013)   Resp   16 (01/04/18 1013)     SpO2   94 % (01/04/18 1013)     Post Anesthesia Care and Evaluation    Patient location during evaluation: PACU  Patient participation: complete - patient participated  Level of consciousness: awake  Pain score: 0  Pain management: adequate  Airway patency: patent  Anesthetic complications: No anesthetic complications    Cardiovascular status: acceptable  Respiratory status: acceptable  Hydration status: acceptable    Comments: Blood pressure (!) 162/106, pulse 59, temperature 36.4 °C (97.6 °F), temperature source Oral, resp. rate 16, height 180.3 cm (71\"), weight 113 kg (250 lb), SpO2 94 %.        "

## 2018-01-04 NOTE — ANESTHESIA PREPROCEDURE EVALUATION
Anesthesia Evaluation     Patient summary reviewed and Nursing notes reviewed   NPO Solid Status: > 8 hours  NPO Liquid Status: > 6 hours     Airway   Mallampati: I  TM distance: <3 FB  Neck ROM: full  no difficulty expected  Dental - normal exam     Pulmonary - normal exam   (+) sleep apnea,   Cardiovascular - normal exam  Exercise tolerance: poor (<4 METS)    (+) hypertension,       Neuro/Psych- negative ROS  GI/Hepatic/Renal/Endo - negative ROS     Musculoskeletal     Abdominal  - normal exam    Bowel sounds: normal.   Substance History - negative use     OB/GYN negative ob/gyn ROS         Other   (+) arthritis                                           Anesthesia Plan    ASA 3     MAC     Anesthetic plan and risks discussed with patient.

## 2018-01-05 ENCOUNTER — OFFICE VISIT (OUTPATIENT)
Dept: FAMILY MEDICINE CLINIC | Facility: CLINIC | Age: 63
End: 2018-01-05

## 2018-01-05 VITALS
HEART RATE: 70 BPM | OXYGEN SATURATION: 100 % | TEMPERATURE: 98 F | BODY MASS INDEX: 35.98 KG/M2 | HEIGHT: 71 IN | SYSTOLIC BLOOD PRESSURE: 152 MMHG | WEIGHT: 257 LBS | DIASTOLIC BLOOD PRESSURE: 88 MMHG

## 2018-01-05 DIAGNOSIS — I10 ESSENTIAL HYPERTENSION: Primary | ICD-10-CM

## 2018-01-05 DIAGNOSIS — M75.82 ROTATOR CUFF TENDINITIS, LEFT: ICD-10-CM

## 2018-01-05 PROBLEM — F32.A DEPRESSION: Status: ACTIVE | Noted: 2018-01-05

## 2018-01-05 LAB
CYTO UR: NORMAL
LAB AP CASE REPORT: NORMAL
Lab: NORMAL
PATH REPORT.FINAL DX SPEC: NORMAL
PATH REPORT.GROSS SPEC: NORMAL

## 2018-01-05 PROCEDURE — 99214 OFFICE O/P EST MOD 30 MIN: CPT | Performed by: FAMILY MEDICINE

## 2018-01-05 PROCEDURE — 20610 DRAIN/INJ JOINT/BURSA W/O US: CPT | Performed by: FAMILY MEDICINE

## 2018-01-05 RX ORDER — QUINAPRIL 40 MG/1
40 TABLET ORAL DAILY
Qty: 90 TABLET | Refills: 1 | Status: SHIPPED | OUTPATIENT
Start: 2018-01-05 | End: 2019-02-10 | Stop reason: HOSPADM

## 2018-01-05 RX ORDER — TRIAMCINOLONE ACETONIDE 40 MG/ML
40 INJECTION, SUSPENSION INTRA-ARTICULAR; INTRAMUSCULAR ONCE
Status: COMPLETED | OUTPATIENT
Start: 2018-01-05 | End: 2018-01-05

## 2018-01-05 RX ORDER — AMLODIPINE BESYLATE 10 MG/1
10 TABLET ORAL DAILY
Qty: 90 TABLET | Refills: 1 | Status: SHIPPED | OUTPATIENT
Start: 2018-01-05 | End: 2019-02-10 | Stop reason: HOSPADM

## 2018-01-05 RX ADMIN — TRIAMCINOLONE ACETONIDE 40 MG: 40 INJECTION, SUSPENSION INTRA-ARTICULAR; INTRAMUSCULAR at 15:38

## 2018-01-05 RX ADMIN — TRIAMCINOLONE ACETONIDE 40 MG: 40 INJECTION, SUSPENSION INTRA-ARTICULAR; INTRAMUSCULAR at 15:37

## 2018-01-05 NOTE — PROGRESS NOTES
"Subjective   Flo Manzo is a 62 y.o. male. Pt here to follow up on left shoulder pain. Also with elevated BP.    Chief Complaint   Patient presents with   • Follow-up     follow up on b/p and shoulder        History of Present Illness    HTN  Elevated yesterday before his colonoscopy, doctor was nervous said he needed to address this. He did not take any of his medications prior to the colonoscopy because he was supposed to be nothing by mouth. He is taking the atenolol-olmesartan 10-40 mg, atenolol 50 mg, quinapril 20 mg daily. He has been on the regimen for long time. Does not have cuff at home. Has been on current regimen for years. No diabetes, no kidney disease on lab review, 2014 and 11/2017.    Shoulder pain  Left shoulder pain without injury. Has been hurting for over one year. Now has limited range of motion related to his pain. Still has good strength. He can work as an  but with a lot of overhead work will have to figure out how to get some help holding things because the shoulder is limiting. Has not previously been evaluated or treated.      The following portions of the patient's history were reviewed and updated as appropriate: allergies, current medications, past medical history and problem list.      Review of Systems   Respiratory: Negative for shortness of breath.    Cardiovascular: Negative for chest pain.   Musculoskeletal: Positive for arthralgias. Negative for joint swelling and myalgias.   Neurological: Negative for weakness and headaches.       Objective   Blood pressure 152/88, pulse 70, temperature 98 °F (36.7 °C), temperature source Oral, height 180.3 cm (71\"), weight 117 kg (257 lb), SpO2 100 %.  Physical Exam   Constitutional: He appears well-nourished. No distress.   obese   Pulmonary/Chest: Effort normal. No respiratory distress.   Musculoskeletal:   Limited range of motion of LUE beyond 90 degrees 2/2 pain, can move to 135 degrees, and full range passively but has " pain. Can lower arm with good control. 5/5 strength BUE. Pain with internal rotation, pain on empty can and adduction but no abduction.   Vitals reviewed.      Procedure   Orthopedic Injury Treatment  Date/Time: 1/7/2018 10:52 AM  Performed by: YASMINE FALCON  Authorized by: YASMINE FALCON   Consent: Verbal consent obtained.  Risks and benefits: risks, benefits and alternatives were discussed  Consent given by: patient  Patient understanding: patient states understanding of the procedure being performed  Patient consent: the patient's understanding of the procedure matches consent given  Site marked: the operative site was marked  Injury location: shoulder  Location details: left shoulder  Pre-procedure distal perfusion: normal  Pre-procedure neurological function: normal  Pre-procedure range of motion: reduced    Anesthesia:  Local anesthesia used: no    Sedation:  Patient sedated: no  Post-procedure distal perfusion: normal  Post-procedure neurological function: normal  Post-procedure range of motion: unchanged  Patient tolerance: Patient tolerated the procedure well with no immediate complications  Comments: 3 mL of 1% lidocaine without epi and 2 mL of 40 mg/mL kenalog was injected into the glenohumoral space using the posterior approach.      The patient is asked to continue to rest the shoulder for a few more days before resuming regular activities.  It may be more painful for the first 1-2 days.  Watch for fever, or increased swelling or persistent pain in shoulder. Call or return to clinic prn if such symptoms occur or the shoulder fails to improve as anticipated.      Assessment/Plan   Flo was seen today for follow-up.    Diagnoses and all orders for this visit:    Essential hypertension    Rotator cuff tendinitis, left    Other orders  -     quinapril (ACCUPRIL) 40 MG tablet; Take 1 tablet by mouth Daily.  -     amLODIPine (NORVASC) 10 MG tablet; Take 1 tablet by mouth Daily.      Discussed with the pt  plan to discontinue the olmesartan, keep amlodipine and increase quinapril, can also continue the atenolol  Pt was able to repeat back the instructions  He was given a written prescription for a blood pressure cuff, he is to log BPs at home and call in. We can increase quinapril further, and choose an alternative beta blocker or add a diuretic in order to achieve better control  To return for nurse visit BP check in 1-2 weeks, will be in the building around that time for hand evaluation

## 2018-01-18 ENCOUNTER — APPOINTMENT (OUTPATIENT)
Dept: PREADMISSION TESTING | Facility: HOSPITAL | Age: 63
End: 2018-01-18

## 2018-01-18 VITALS
HEIGHT: 70 IN | WEIGHT: 251 LBS | OXYGEN SATURATION: 96 % | TEMPERATURE: 97.9 F | HEART RATE: 52 BPM | DIASTOLIC BLOOD PRESSURE: 94 MMHG | BODY MASS INDEX: 35.93 KG/M2 | SYSTOLIC BLOOD PRESSURE: 156 MMHG | RESPIRATION RATE: 20 BRPM

## 2018-01-18 DIAGNOSIS — K42.9 UMBILICAL HERNIA WITHOUT OBSTRUCTION AND WITHOUT GANGRENE: ICD-10-CM

## 2018-01-18 LAB
ANION GAP SERPL CALCULATED.3IONS-SCNC: 12.7 MMOL/L
BASOPHILS # BLD AUTO: 0.02 10*3/MM3 (ref 0–0.2)
BASOPHILS NFR BLD AUTO: 0.2 % (ref 0–1.5)
BUN BLD-MCNC: 15 MG/DL (ref 8–23)
BUN/CREAT SERPL: 17 (ref 7–25)
CALCIUM SPEC-SCNC: 9.6 MG/DL (ref 8.6–10.5)
CHLORIDE SERPL-SCNC: 99 MMOL/L (ref 98–107)
CO2 SERPL-SCNC: 25.3 MMOL/L (ref 22–29)
CREAT BLD-MCNC: 0.88 MG/DL (ref 0.76–1.27)
DEPRECATED RDW RBC AUTO: 45.9 FL (ref 37–54)
EOSINOPHIL # BLD AUTO: 0.1 10*3/MM3 (ref 0–0.7)
EOSINOPHIL NFR BLD AUTO: 1 % (ref 0.3–6.2)
ERYTHROCYTE [DISTWIDTH] IN BLOOD BY AUTOMATED COUNT: 14.1 % (ref 11.5–14.5)
GFR SERPL CREATININE-BSD FRML MDRD: 88 ML/MIN/1.73
GLUCOSE BLD-MCNC: 85 MG/DL (ref 65–99)
HCT VFR BLD AUTO: 47.5 % (ref 40.4–52.2)
HGB BLD-MCNC: 15.3 G/DL (ref 13.7–17.6)
IMM GRANULOCYTES # BLD: 0.02 10*3/MM3 (ref 0–0.03)
IMM GRANULOCYTES NFR BLD: 0.2 % (ref 0–0.5)
LYMPHOCYTES # BLD AUTO: 2.06 10*3/MM3 (ref 0.9–4.8)
LYMPHOCYTES NFR BLD AUTO: 19.7 % (ref 19.6–45.3)
MCH RBC QN AUTO: 28.8 PG (ref 27–32.7)
MCHC RBC AUTO-ENTMCNC: 32.2 G/DL (ref 32.6–36.4)
MCV RBC AUTO: 89.5 FL (ref 79.8–96.2)
MONOCYTES # BLD AUTO: 0.94 10*3/MM3 (ref 0.2–1.2)
MONOCYTES NFR BLD AUTO: 9 % (ref 5–12)
NEUTROPHILS # BLD AUTO: 7.33 10*3/MM3 (ref 1.9–8.1)
NEUTROPHILS NFR BLD AUTO: 69.9 % (ref 42.7–76)
PLATELET # BLD AUTO: 271 10*3/MM3 (ref 140–500)
PMV BLD AUTO: 11.1 FL (ref 6–12)
POTASSIUM BLD-SCNC: 3.9 MMOL/L (ref 3.5–5.2)
RBC # BLD AUTO: 5.31 10*6/MM3 (ref 4.6–6)
SODIUM BLD-SCNC: 137 MMOL/L (ref 136–145)
WBC NRBC COR # BLD: 10.47 10*3/MM3 (ref 4.5–10.7)

## 2018-01-18 PROCEDURE — 93010 ELECTROCARDIOGRAM REPORT: CPT | Performed by: INTERNAL MEDICINE

## 2018-01-18 PROCEDURE — 36415 COLL VENOUS BLD VENIPUNCTURE: CPT

## 2018-01-18 PROCEDURE — 80048 BASIC METABOLIC PNL TOTAL CA: CPT | Performed by: SURGERY

## 2018-01-18 PROCEDURE — 85025 COMPLETE CBC W/AUTO DIFF WBC: CPT | Performed by: SURGERY

## 2018-01-18 PROCEDURE — 93005 ELECTROCARDIOGRAM TRACING: CPT

## 2018-01-18 NOTE — DISCHARGE INSTRUCTIONS
Take the following medications the morning of surgery with a small sip of water:    CITALOPRAM, ATENOLOL AND AMLODIPINE.  BRING IN ALL OF YOUR MEDICINE MORNING OF SURGERY  ARRIVE AT 6:00    General Instructions:  • Do not eat solid food after midnight the night before surgery.  • You may drink clear liquids day of surgery but must stop at least one hour before your hospital arrival time.  • It is beneficial for you to have a clear drink that contains carbohydrates the day of surgery.  We suggest a 12 to 20 ounce bottle of Gatorade or Powerade for non-diabetic patients or a 12 to 20 ounce bottle of G2 or Powerade Zero for diabetic patients. (Pediatric patients, are not advised to drink a 12 to 20 ounce carbohydrate drink)    Clear liquids are liquids you can see through.  Nothing red in color.     Plain water                               Sports drinks  Sodas                                   Gelatin (Jell-O)  Fruit juices without pulp such as white grape juice and apple juice  Popsicles that contain no fruit or yogurt  Tea or coffee (no cream or milk added)  Gatorade / Powerade  G2 / Powerade Zero    • Infants may have breast milk up to four hours before surgery.  • Infants drinking formula may drink formula up to six hours before surgery.   • Patients who avoid smoking, chewing tobacco and alcohol for 4 weeks prior to surgery have a reduced risk of post-operative complications.  Quit smoking as many days before surgery as you can.  • Do not smoke, use chewing tobacco or drink alcohol the day of surgery.   • If applicable bring your C-PAP/ BI-PAP machine.  • Bring any papers given to you in the doctor’s office.  • Wear clean comfortable clothes and socks.  • Do not wear contact lenses or make-up.  Bring a case for your glasses.   • Bring crutches or walker if applicable.  • Remove all piercings.  Leave jewelry and any other valuables at home.  • Hair extensions with metal clips must be removed prior to  surgery.  • The Pre-Admission Testing nurse will instruct you to bring medications if unable to obtain an accurate list in Pre-Admission Testing.        If you were given a blood bank ID arm band remember to bring it with you the day of surgery.    Preventing a Surgical Site Infection:  • For 2 to 3 days before surgery, avoid shaving with a razor because the razor can irritate skin and make it easier to develop an infection.  • The night prior to surgery sleep in a clean bed with clean clothing.  Do not allow pets to sleep with you.  • Shower on the morning of surgery using a fresh bar of anti-bacterial soap (such as Dial) and clean washcloth.  Dry with a clean towel and dress in clean clothing.  • Ask your surgeon if you will be receiving antibiotics prior to surgery.  • Make sure you, your family, and all healthcare providers clean their hands with soap and water or an alcohol based hand  before caring for you or your wound.    Day of surgery:  Upon arrival, a Pre-op nurse and Anesthesiologist will review your health history, obtain vital signs, and answer questions you may have.  The only belongings needed at this time will be your home medications and if applicable your C-PAP/BI-PAP machine.  If you are staying overnight your family can leave the rest of your belongings in the car and bring them to your room later.  A Pre-op nurse will start an IV and you may receive medication in preparation for surgery, including something to help you relax.  Your family will be able to see you in the Pre-op area.  While you are in surgery your family should notify the waiting room  if they leave the waiting room area and provide a contact phone number.    Please be aware that surgery does come with discomfort.  We want to make every effort to control your discomfort so please discuss any uncontrolled symptoms with your nurse.   Your doctor will most likely have prescribed pain medications.      If you are  going home after surgery you will receive individualized written care instructions before being discharged.  A responsible adult must drive you to and from the hospital on the day of your surgery and stay with you for 24 hours.    If you are staying overnight following surgery, you will be transported to your hospital room following the recovery period.  [facility] has all private rooms.    If you have any questions please call Pre-Admission Testing at [phone].  Deductibles and co-payments are collected on the day of service. Please be prepared to pay the required co-pay, deductible or deposit on the day of service as defined by your plan.

## 2018-01-19 ENCOUNTER — CLINICAL SUPPORT (OUTPATIENT)
Dept: FAMILY MEDICINE CLINIC | Facility: CLINIC | Age: 63
End: 2018-01-19

## 2018-01-19 VITALS — SYSTOLIC BLOOD PRESSURE: 158 MMHG | DIASTOLIC BLOOD PRESSURE: 100 MMHG

## 2018-01-23 ENCOUNTER — ANESTHESIA EVENT (OUTPATIENT)
Dept: PERIOP | Facility: HOSPITAL | Age: 63
End: 2018-01-23

## 2018-01-23 ENCOUNTER — HOSPITAL ENCOUNTER (OUTPATIENT)
Facility: HOSPITAL | Age: 63
Setting detail: HOSPITAL OUTPATIENT SURGERY
Discharge: HOME | End: 2018-01-23
Attending: SURGERY | Admitting: SURGERY

## 2018-01-23 ENCOUNTER — ANESTHESIA (OUTPATIENT)
Dept: PERIOP | Facility: HOSPITAL | Age: 63
End: 2018-01-23

## 2018-01-23 VITALS
HEART RATE: 60 BPM | RESPIRATION RATE: 16 BRPM | SYSTOLIC BLOOD PRESSURE: 148 MMHG | DIASTOLIC BLOOD PRESSURE: 77 MMHG | OXYGEN SATURATION: 93 % | BODY MASS INDEX: 36.12 KG/M2 | WEIGHT: 252.31 LBS | HEIGHT: 70 IN | TEMPERATURE: 97.8 F

## 2018-01-23 DIAGNOSIS — K42.9 UMBILICAL HERNIA WITHOUT OBSTRUCTION AND WITHOUT GANGRENE: ICD-10-CM

## 2018-01-23 PROCEDURE — 25010000002 DEXAMETHASONE PER 1 MG: Performed by: NURSE ANESTHETIST, CERTIFIED REGISTERED

## 2018-01-23 PROCEDURE — 25010000002 SUCCINYLCHOLINE PER 20 MG: Performed by: ANESTHESIOLOGY

## 2018-01-23 PROCEDURE — 25010000002 ONDANSETRON PER 1 MG: Performed by: NURSE ANESTHETIST, CERTIFIED REGISTERED

## 2018-01-23 PROCEDURE — S2900 ROBOTIC SURGICAL SYSTEM: HCPCS | Performed by: SURGERY

## 2018-01-23 PROCEDURE — 49653 PR LAP, VENTRAL HERNIA REPAIR,INCARCERATED: CPT | Performed by: PHYSICIAN ASSISTANT

## 2018-01-23 PROCEDURE — 49653 PR LAP, VENTRAL HERNIA REPAIR,INCARCERATED: CPT | Performed by: SURGERY

## 2018-01-23 PROCEDURE — 25010000002 HYDROMORPHONE PER 4 MG: Performed by: ANESTHESIOLOGY

## 2018-01-23 PROCEDURE — 25010000003 CEFAZOLIN IN DEXTROSE 2-4 GM/100ML-% SOLUTION: Performed by: SURGERY

## 2018-01-23 PROCEDURE — 25010000002 FENTANYL CITRATE (PF) 100 MCG/2ML SOLUTION: Performed by: ANESTHESIOLOGY

## 2018-01-23 PROCEDURE — 25010000002 HYDROMORPHONE PER 4 MG: Performed by: NURSE ANESTHETIST, CERTIFIED REGISTERED

## 2018-01-23 PROCEDURE — 25010000002 MIDAZOLAM PER 1 MG: Performed by: ANESTHESIOLOGY

## 2018-01-23 PROCEDURE — C1781 MESH (IMPLANTABLE): HCPCS | Performed by: SURGERY

## 2018-01-23 PROCEDURE — 25010000002 PROPOFOL 10 MG/ML EMULSION: Performed by: ANESTHESIOLOGY

## 2018-01-23 DEVICE — BARD MESH
Type: IMPLANTABLE DEVICE | Site: ABDOMEN | Status: FUNCTIONAL
Brand: BARD MESH

## 2018-01-23 RX ORDER — LABETALOL HYDROCHLORIDE 5 MG/ML
5 INJECTION, SOLUTION INTRAVENOUS
Status: CANCELLED | OUTPATIENT
Start: 2018-01-23

## 2018-01-23 RX ORDER — FAMOTIDINE 10 MG/ML
20 INJECTION, SOLUTION INTRAVENOUS ONCE
Status: COMPLETED | OUTPATIENT
Start: 2018-01-23 | End: 2018-01-23

## 2018-01-23 RX ORDER — LIDOCAINE HYDROCHLORIDE 10 MG/ML
0.5 INJECTION, SOLUTION EPIDURAL; INFILTRATION; INTRACAUDAL; PERINEURAL ONCE AS NEEDED
Status: DISCONTINUED | OUTPATIENT
Start: 2018-01-23 | End: 2018-01-23 | Stop reason: HOSPADM

## 2018-01-23 RX ORDER — HYDROCODONE BITARTRATE AND ACETAMINOPHEN 7.5; 325 MG/1; MG/1
1 TABLET ORAL ONCE AS NEEDED
Status: CANCELLED | OUTPATIENT
Start: 2018-01-23

## 2018-01-23 RX ORDER — PROMETHAZINE HYDROCHLORIDE 25 MG/1
25 TABLET ORAL ONCE AS NEEDED
Status: DISCONTINUED | OUTPATIENT
Start: 2018-01-23 | End: 2018-01-23 | Stop reason: HOSPADM

## 2018-01-23 RX ORDER — SODIUM CHLORIDE 0.9 % (FLUSH) 0.9 %
1-10 SYRINGE (ML) INJECTION AS NEEDED
Status: DISCONTINUED | OUTPATIENT
Start: 2018-01-23 | End: 2018-01-23 | Stop reason: HOSPADM

## 2018-01-23 RX ORDER — FENTANYL CITRATE 50 UG/ML
100 INJECTION, SOLUTION INTRAMUSCULAR; INTRAVENOUS
Status: DISCONTINUED | OUTPATIENT
Start: 2018-01-23 | End: 2018-01-23 | Stop reason: HOSPADM

## 2018-01-23 RX ORDER — OXYCODONE HYDROCHLORIDE AND ACETAMINOPHEN 5; 325 MG/1; MG/1
2 TABLET ORAL ONCE AS NEEDED
Status: CANCELLED | OUTPATIENT
Start: 2018-01-23

## 2018-01-23 RX ORDER — SUCCINYLCHOLINE CHLORIDE 20 MG/ML
INJECTION INTRAMUSCULAR; INTRAVENOUS AS NEEDED
Status: DISCONTINUED | OUTPATIENT
Start: 2018-01-23 | End: 2018-01-23 | Stop reason: SURG

## 2018-01-23 RX ORDER — HYDROCODONE BITARTRATE AND ACETAMINOPHEN 7.5; 325 MG/1; MG/1
1 TABLET ORAL ONCE AS NEEDED
Status: COMPLETED | OUTPATIENT
Start: 2018-01-23 | End: 2018-01-23

## 2018-01-23 RX ORDER — FLUMAZENIL 0.1 MG/ML
0.2 INJECTION INTRAVENOUS AS NEEDED
Status: DISCONTINUED | OUTPATIENT
Start: 2018-01-23 | End: 2018-01-23 | Stop reason: HOSPADM

## 2018-01-23 RX ORDER — ROCURONIUM BROMIDE 10 MG/ML
INJECTION, SOLUTION INTRAVENOUS AS NEEDED
Status: DISCONTINUED | OUTPATIENT
Start: 2018-01-23 | End: 2018-01-23 | Stop reason: SURG

## 2018-01-23 RX ORDER — MIDAZOLAM HYDROCHLORIDE 1 MG/ML
2 INJECTION INTRAMUSCULAR; INTRAVENOUS
Status: DISCONTINUED | OUTPATIENT
Start: 2018-01-23 | End: 2018-01-23 | Stop reason: HOSPADM

## 2018-01-23 RX ORDER — ATROPINE SULFATE 0.4 MG/ML
AMPUL (ML) INJECTION AS NEEDED
Status: DISCONTINUED | OUTPATIENT
Start: 2018-01-23 | End: 2018-01-23 | Stop reason: SURG

## 2018-01-23 RX ORDER — HYDROMORPHONE HCL 110MG/55ML
0.5 PATIENT CONTROLLED ANALGESIA SYRINGE INTRAVENOUS
Status: DISCONTINUED | OUTPATIENT
Start: 2018-01-23 | End: 2018-01-23 | Stop reason: HOSPADM

## 2018-01-23 RX ORDER — GLYCOPYRROLATE 0.2 MG/ML
0.2 INJECTION INTRAMUSCULAR; INTRAVENOUS
Status: DISCONTINUED | OUTPATIENT
Start: 2018-01-23 | End: 2018-01-23 | Stop reason: HOSPADM

## 2018-01-23 RX ORDER — PROMETHAZINE HYDROCHLORIDE 25 MG/1
25 SUPPOSITORY RECTAL ONCE AS NEEDED
Status: DISCONTINUED | OUTPATIENT
Start: 2018-01-23 | End: 2018-01-23 | Stop reason: HOSPADM

## 2018-01-23 RX ORDER — FENTANYL CITRATE 50 UG/ML
50 INJECTION, SOLUTION INTRAMUSCULAR; INTRAVENOUS
Status: CANCELLED | OUTPATIENT
Start: 2018-01-23

## 2018-01-23 RX ORDER — PROMETHAZINE HYDROCHLORIDE 25 MG/1
12.5 TABLET ORAL ONCE AS NEEDED
Status: DISCONTINUED | OUTPATIENT
Start: 2018-01-23 | End: 2018-01-23 | Stop reason: HOSPADM

## 2018-01-23 RX ORDER — PROMETHAZINE HYDROCHLORIDE 25 MG/1
25 SUPPOSITORY RECTAL ONCE AS NEEDED
Status: CANCELLED | OUTPATIENT
Start: 2018-01-23

## 2018-01-23 RX ORDER — FENTANYL CITRATE 50 UG/ML
50 INJECTION, SOLUTION INTRAMUSCULAR; INTRAVENOUS
Status: DISCONTINUED | OUTPATIENT
Start: 2018-01-23 | End: 2018-01-23 | Stop reason: HOSPADM

## 2018-01-23 RX ORDER — PROPOFOL 10 MG/ML
VIAL (ML) INTRAVENOUS AS NEEDED
Status: DISCONTINUED | OUTPATIENT
Start: 2018-01-23 | End: 2018-01-23 | Stop reason: SURG

## 2018-01-23 RX ORDER — ONDANSETRON 2 MG/ML
INJECTION INTRAMUSCULAR; INTRAVENOUS AS NEEDED
Status: DISCONTINUED | OUTPATIENT
Start: 2018-01-23 | End: 2018-01-23 | Stop reason: SURG

## 2018-01-23 RX ORDER — FENTANYL CITRATE 50 UG/ML
INJECTION, SOLUTION INTRAMUSCULAR; INTRAVENOUS AS NEEDED
Status: DISCONTINUED | OUTPATIENT
Start: 2018-01-23 | End: 2018-01-23 | Stop reason: SURG

## 2018-01-23 RX ORDER — HYDROCODONE BITARTRATE AND ACETAMINOPHEN 7.5; 325 MG/1; MG/1
1 TABLET ORAL EVERY 6 HOURS PRN
Qty: 30 TABLET | Refills: 0 | Status: SHIPPED | OUTPATIENT
Start: 2018-01-23 | End: 2018-01-31

## 2018-01-23 RX ORDER — MIDAZOLAM HYDROCHLORIDE 1 MG/ML
1 INJECTION INTRAMUSCULAR; INTRAVENOUS
Status: DISCONTINUED | OUTPATIENT
Start: 2018-01-23 | End: 2018-01-23 | Stop reason: HOSPADM

## 2018-01-23 RX ORDER — HYDROMORPHONE HCL 110MG/55ML
PATIENT CONTROLLED ANALGESIA SYRINGE INTRAVENOUS AS NEEDED
Status: DISCONTINUED | OUTPATIENT
Start: 2018-01-23 | End: 2018-01-23 | Stop reason: SURG

## 2018-01-23 RX ORDER — NALOXONE HCL 0.4 MG/ML
0.2 VIAL (ML) INJECTION AS NEEDED
Status: DISCONTINUED | OUTPATIENT
Start: 2018-01-23 | End: 2018-01-23 | Stop reason: HOSPADM

## 2018-01-23 RX ORDER — EPHEDRINE SULFATE 50 MG/ML
5 INJECTION, SOLUTION INTRAVENOUS ONCE AS NEEDED
Status: DISCONTINUED | OUTPATIENT
Start: 2018-01-23 | End: 2018-01-23 | Stop reason: HOSPADM

## 2018-01-23 RX ORDER — OXYCODONE AND ACETAMINOPHEN 7.5; 325 MG/1; MG/1
1 TABLET ORAL ONCE AS NEEDED
Status: DISCONTINUED | OUTPATIENT
Start: 2018-01-23 | End: 2018-01-23 | Stop reason: HOSPADM

## 2018-01-23 RX ORDER — DIPHENHYDRAMINE HYDROCHLORIDE 50 MG/ML
6.25 INJECTION INTRAMUSCULAR; INTRAVENOUS
Status: CANCELLED | OUTPATIENT
Start: 2018-01-23

## 2018-01-23 RX ORDER — LABETALOL HYDROCHLORIDE 5 MG/ML
5 INJECTION, SOLUTION INTRAVENOUS
Status: DISCONTINUED | OUTPATIENT
Start: 2018-01-23 | End: 2018-01-23 | Stop reason: HOSPADM

## 2018-01-23 RX ORDER — BUPIVACAINE HYDROCHLORIDE AND EPINEPHRINE 5; 5 MG/ML; UG/ML
INJECTION, SOLUTION PERINEURAL AS NEEDED
Status: DISCONTINUED | OUTPATIENT
Start: 2018-01-23 | End: 2018-01-23 | Stop reason: HOSPADM

## 2018-01-23 RX ORDER — SODIUM CHLORIDE, SODIUM LACTATE, POTASSIUM CHLORIDE, CALCIUM CHLORIDE 600; 310; 30; 20 MG/100ML; MG/100ML; MG/100ML; MG/100ML
INJECTION, SOLUTION INTRAVENOUS CONTINUOUS PRN
Status: DISCONTINUED | OUTPATIENT
Start: 2018-01-23 | End: 2018-01-23 | Stop reason: SURG

## 2018-01-23 RX ORDER — HYDRALAZINE HYDROCHLORIDE 20 MG/ML
5 INJECTION INTRAMUSCULAR; INTRAVENOUS
Status: CANCELLED | OUTPATIENT
Start: 2018-01-23

## 2018-01-23 RX ORDER — DIPHENHYDRAMINE HYDROCHLORIDE 50 MG/ML
12.5 INJECTION INTRAMUSCULAR; INTRAVENOUS
Status: DISCONTINUED | OUTPATIENT
Start: 2018-01-23 | End: 2018-01-23 | Stop reason: HOSPADM

## 2018-01-23 RX ORDER — ONDANSETRON 2 MG/ML
4 INJECTION INTRAMUSCULAR; INTRAVENOUS ONCE AS NEEDED
Status: DISCONTINUED | OUTPATIENT
Start: 2018-01-23 | End: 2018-01-23 | Stop reason: HOSPADM

## 2018-01-23 RX ORDER — ONDANSETRON 2 MG/ML
4 INJECTION INTRAMUSCULAR; INTRAVENOUS ONCE AS NEEDED
Status: CANCELLED | OUTPATIENT
Start: 2018-01-23

## 2018-01-23 RX ORDER — PROMETHAZINE HYDROCHLORIDE 25 MG/ML
6.25 INJECTION, SOLUTION INTRAMUSCULAR; INTRAVENOUS ONCE AS NEEDED
Status: CANCELLED | OUTPATIENT
Start: 2018-01-23

## 2018-01-23 RX ORDER — MAGNESIUM HYDROXIDE 1200 MG/15ML
LIQUID ORAL AS NEEDED
Status: DISCONTINUED | OUTPATIENT
Start: 2018-01-23 | End: 2018-01-23 | Stop reason: HOSPADM

## 2018-01-23 RX ORDER — EPHEDRINE SULFATE 50 MG/ML
5 INJECTION, SOLUTION INTRAVENOUS ONCE AS NEEDED
Status: CANCELLED | OUTPATIENT
Start: 2018-01-23

## 2018-01-23 RX ORDER — HYDRALAZINE HYDROCHLORIDE 20 MG/ML
5 INJECTION INTRAMUSCULAR; INTRAVENOUS
Status: DISCONTINUED | OUTPATIENT
Start: 2018-01-23 | End: 2018-01-23 | Stop reason: HOSPADM

## 2018-01-23 RX ORDER — PROMETHAZINE HYDROCHLORIDE 25 MG/1
25 TABLET ORAL ONCE AS NEEDED
Status: CANCELLED | OUTPATIENT
Start: 2018-01-23

## 2018-01-23 RX ORDER — FLUMAZENIL 0.1 MG/ML
0.2 INJECTION INTRAVENOUS AS NEEDED
Status: CANCELLED | OUTPATIENT
Start: 2018-01-23

## 2018-01-23 RX ORDER — SODIUM CHLORIDE, SODIUM LACTATE, POTASSIUM CHLORIDE, CALCIUM CHLORIDE 600; 310; 30; 20 MG/100ML; MG/100ML; MG/100ML; MG/100ML
9 INJECTION, SOLUTION INTRAVENOUS CONTINUOUS
Status: DISCONTINUED | OUTPATIENT
Start: 2018-01-23 | End: 2018-01-23 | Stop reason: HOSPADM

## 2018-01-23 RX ORDER — CEFAZOLIN SODIUM 2 G/100ML
2 INJECTION, SOLUTION INTRAVENOUS ONCE
Status: COMPLETED | OUTPATIENT
Start: 2018-01-23 | End: 2018-01-23

## 2018-01-23 RX ORDER — PROMETHAZINE HYDROCHLORIDE 25 MG/ML
12.5 INJECTION, SOLUTION INTRAMUSCULAR; INTRAVENOUS ONCE AS NEEDED
Status: DISCONTINUED | OUTPATIENT
Start: 2018-01-23 | End: 2018-01-23 | Stop reason: HOSPADM

## 2018-01-23 RX ORDER — HYDROMORPHONE HCL 110MG/55ML
0.5 PATIENT CONTROLLED ANALGESIA SYRINGE INTRAVENOUS
Status: CANCELLED | OUTPATIENT
Start: 2018-01-23

## 2018-01-23 RX ORDER — DEXAMETHASONE SODIUM PHOSPHATE 10 MG/ML
INJECTION INTRAMUSCULAR; INTRAVENOUS AS NEEDED
Status: DISCONTINUED | OUTPATIENT
Start: 2018-01-23 | End: 2018-01-23 | Stop reason: SURG

## 2018-01-23 RX ADMIN — DEXAMETHASONE SODIUM PHOSPHATE 8 MG: 10 INJECTION INTRAMUSCULAR; INTRAVENOUS at 09:36

## 2018-01-23 RX ADMIN — ROCURONIUM BROMIDE 10 MG: 10 INJECTION INTRAVENOUS at 08:16

## 2018-01-23 RX ADMIN — HYDROMORPHONE HYDROCHLORIDE 0.5 MG: 2 INJECTION INTRAMUSCULAR; INTRAVENOUS; SUBCUTANEOUS at 10:49

## 2018-01-23 RX ADMIN — HYDROCODONE BITARTRATE AND ACETAMINOPHEN 1 TABLET: 7.5; 325 TABLET ORAL at 12:17

## 2018-01-23 RX ADMIN — FENTANYL CITRATE 50 MCG: 50 INJECTION INTRAMUSCULAR; INTRAVENOUS at 08:52

## 2018-01-23 RX ADMIN — GLYCOPYRROLATE 0.2 MG: 0.2 INJECTION, SOLUTION INTRAMUSCULAR; INTRAVENOUS at 07:36

## 2018-01-23 RX ADMIN — FENTANYL CITRATE 50 MCG: 50 INJECTION INTRAMUSCULAR; INTRAVENOUS at 09:00

## 2018-01-23 RX ADMIN — FAMOTIDINE 20 MG: 10 INJECTION, SOLUTION INTRAVENOUS at 07:36

## 2018-01-23 RX ADMIN — SUGAMMADEX 460 MG: 100 INJECTION, SOLUTION INTRAVENOUS at 10:45

## 2018-01-23 RX ADMIN — MIDAZOLAM 1 MG: 1 INJECTION INTRAMUSCULAR; INTRAVENOUS at 07:36

## 2018-01-23 RX ADMIN — SUCCINYLCHOLINE CHLORIDE 100 MG: 20 INJECTION, SOLUTION INTRAMUSCULAR; INTRAVENOUS; PARENTERAL at 08:16

## 2018-01-23 RX ADMIN — ROCURONIUM BROMIDE 20 MG: 10 INJECTION INTRAVENOUS at 09:29

## 2018-01-23 RX ADMIN — FENTANYL CITRATE 50 MCG: 50 INJECTION INTRAMUSCULAR; INTRAVENOUS at 10:47

## 2018-01-23 RX ADMIN — ATROPINE SULFATE 1 MG: 0.4 INJECTION, SOLUTION INTRAMUSCULAR; INTRAVENOUS; SUBCUTANEOUS at 08:32

## 2018-01-23 RX ADMIN — SODIUM CHLORIDE, POTASSIUM CHLORIDE, SODIUM LACTATE AND CALCIUM CHLORIDE: 600; 310; 30; 20 INJECTION, SOLUTION INTRAVENOUS at 09:41

## 2018-01-23 RX ADMIN — ROCURONIUM BROMIDE 10 MG: 10 INJECTION INTRAVENOUS at 10:12

## 2018-01-23 RX ADMIN — CEFAZOLIN SODIUM 2 G: 2 INJECTION, SOLUTION INTRAVENOUS at 08:51

## 2018-01-23 RX ADMIN — SODIUM CHLORIDE, POTASSIUM CHLORIDE, SODIUM LACTATE AND CALCIUM CHLORIDE: 600; 310; 30; 20 INJECTION, SOLUTION INTRAVENOUS at 08:15

## 2018-01-23 RX ADMIN — ONDANSETRON 4 MG: 2 INJECTION INTRAMUSCULAR; INTRAVENOUS at 10:33

## 2018-01-23 RX ADMIN — PROPOFOL 200 MG: 10 INJECTION, EMULSION INTRAVENOUS at 08:16

## 2018-01-23 RX ADMIN — HYDROMORPHONE HYDROCHLORIDE 0.5 MG: 2 INJECTION INTRAMUSCULAR; INTRAVENOUS; SUBCUTANEOUS at 11:31

## 2018-01-23 NOTE — PLAN OF CARE
Problem: Patient Care Overview (Adult)  Goal: Plan of Care Review  Outcome: Ongoing (interventions implemented as appropriate)   01/23/18 0623   Coping/Psychosocial Response Interventions   Plan Of Care Reviewed With patient   Patient Care Overview   Progress no change     Goal: Adult Individualization and Mutuality  Outcome: Ongoing (interventions implemented as appropriate)   01/23/18 0623   Individualization   Patient Specific Preferences GOES DORON     Goal: Discharge Needs Assessment  Outcome: Ongoing (interventions implemented as appropriate)   01/23/18 0623   Discharge Needs Assessment   Concerns To Be Addressed denies needs/concerns at this time       Problem: Perioperative Period (Adult)  Goal: Signs and Symptoms of Listed Potential Problems Will be Absent or Manageable (Perioperative Period)  Outcome: Ongoing (interventions implemented as appropriate)   01/23/18 0623   Perioperative Period   Problems Assessed (Perioperative Period) pain;hypoxia/hypoxemia   Problems Present (Perioperative Period) other (see comments)  (VENTRAL HERNIA )

## 2018-01-23 NOTE — ANESTHESIA PREPROCEDURE EVALUATION
Anesthesia Evaluation     Patient summary reviewed and Nursing notes reviewed   NPO Solid Status: > 8 hours       Airway   Mallampati: II  TM distance: >3 FB  Neck ROM: full  no difficulty expected  Dental - normal exam     Pulmonary - normal exam   (+) sleep apnea,   Cardiovascular - normal exam    (+) hypertension,       Neuro/Psych- negative ROS  GI/Hepatic/Renal/Endo    (+) obesity,      Musculoskeletal (-) negative ROS    Abdominal  - normal exam   Substance History - negative use     OB/GYN negative ob/gyn ROS         Other                                                Anesthesia Plan    ASA 3     general     intravenous induction   Anesthetic plan and risks discussed with patient.    Plan discussed with CRNA.

## 2018-01-23 NOTE — OP NOTE
Operative Note :   Ten Solitario MD    Flo Danielleremberto  1955    Procedure Date: 01/23/18    Pre-op Diagnosis:  Umbilical hernia with incarcerated omentum    Post-Op Diagnosis:  Same    Procedure:   · Laparoscopic umbilical hernia repair with da Mira robot assistance    Surgeon: Ten Solitario MD    Assistant: Ayana JOHNSON    Anesthesia:  General (general endotracheal tube)    EBL:   minimal    Specimens:   None    Indications:  · 62-year-old obese gentleman with a symptomatic incarcerated umbilical hernia    Findings:   · Large amount of omentum incarcerated  · Size of defect approximately 3 cm    Recommendations:   · Routine postoperative care  · No lifting over 10 pounds until released by me    Description of procedure:    After obtaining informed consent, patient brought to the operating room and was placed under a general anesthetic.  Patient's abdomen was sterilely prepped and draped after Walsh catheter had been placed.  The bed was cracked to give us a little more space in the abdomen.  I then gained access to the peritoneal cavity with a 0° scope with a 5 mm trocar in the left upper quadrant.  We inspected and saw no evidence of intra-abdominal injury.  I then placed a 12 mm trocar at about the level of the umbilicus as far lateral as I could get it.  An 8 mm robotic trocar was placed in the left lower quadrant and then the initial 5 mm trocar was exchanged for an 8 mm robotic trocar.  The robot was then brought up on the patient's right side in the perpendicular fashion and was docked.  The camera was inserted in the 30° up orientation and then the Cadiere forceps were inserted on the left and the bipolar grasper on the right.  I began peeling the omentum out of the fascial defect.  There was an extensive amount of omentum and it took a while to get this out.  Some cautery was used to take down some of the attachments between the omentum and the peritoneum.  The entirety of the omentum had been  taken down and inspected to make sure there was no bleeding and it appeared to be in good order.  The size of the defect was then measured and it was approximately 3 cm.  I then measured out approximately 5 cm from either side both medially and laterally from the hernia defect and then incised the peritoneum at this level.  I then carried a preperitoneal dissection extending proximally 6 cm superiorly and inferiorly and staying in the preperitoneal space.  I dissected around the hernia sac and then began peeling it out of the defect until the entirety of the hernia sac and been peeled down.  I then continued my dissection working my way further laterally to the contralateral side until I had dissected at least 5 cm on that side.  I then again measured the defect and measured my space and ensured we had enough space to place a nice piece of mesh.  The pressure was then taken down and then the fascia was closed with a running 0 barbed PDS suture incorporating the superficial aspect of the skin at the umbilicus to try to close some of the space that it formed where the hernia sac and been removed.  Once the fascia was closed I introduced a 13 x 13 cm Bard flat mesh and oriented it appropriately.  We had placed a Prolene suture in the middle and a suture passer was used to grasp this and lifted up and the appropriate way.  The mesh was tacked in the cardinal positions with 2-0 Vicryl suture to the posterior rectus fascia superiorly and to the transversalis fascia inferiorly.  We ensured that the mesh was well opposed.  I then closed the peritoneal defect using a running barbed 2-0 Monocryl suture.  There was a rent in the peritoneum and this was closed with an additional running barbed 2-0 Monocryl suture.  The mesh was completely covered in the preperitoneal space.  We then undocked the robot and retrieved all needles.  The 12 mm left lateral fascial defect was closed with a 0 Vicryl suture.  The skin incisions were  closed with 4-0 Monocryl.  Sterile dressings were applied.  Patient tolerated the procedure well.    Ten Solitario MD  General and Endoscopic Surgery  [facility]    [address], [address]  [place], [place], [address]  P: [phone]  F: [phone]

## 2018-01-23 NOTE — ANESTHESIA POSTPROCEDURE EVALUATION
Patient: Flo Manzo    Procedure Summary     Date Anesthesia Start Anesthesia Stop Room / Location    01/23/18 0811 1113  [facility] OR 08 /  [facility]       Procedure Diagnosis Surgeon Provider    VENTRAL HERNIA REPAIR LAPAROSCOPIC WITH DAVINCI ROBOT AND MESH (N/A Abdomen) Umbilical hernia without obstruction and without gangrene  (Umbilical hernia without obstruction and without gangrene [K42.9]) MD Bryant Carmona MD          Anesthesia Type: general  Last vitals  BP   (!) 173/103 (01/23/18 1215)   Temp   36.4 °C (97.6 °F) (01/23/18 1107)   Pulse   65 (01/23/18 1215)   Resp   20 (01/23/18 1215)     SpO2   93 % (01/23/18 1215)     Post Anesthesia Care and Evaluation    Patient location during evaluation: PACU  Patient participation: complete - patient participated  Level of consciousness: awake and alert  Pain management: adequate  Airway patency: patent  Anesthetic complications: No anesthetic complications    Cardiovascular status: acceptable  Respiratory status: acceptable  Hydration status: acceptable    Comments: ---------------------            01/23/18 1215      ---------------------   BP:     (!) 173/103   Pulse:      65        Resp:       20        Temp:                 SpO2:       93%      ---------------------

## 2018-01-23 NOTE — H&P (VIEW-ONLY)
Cc: Encounter for screening colonoscopy     History of presenting illness:   62-year-old gentleman for screening colonoscopy.  No complaints.  No prior colonoscopy.     Past Medical History: Hypertension, depression, arthritis     Past Surgical History: Appendectomy at the age of 17.  Bilateral knee surgery, eye surgery, hand surgery     Medications: Atenolol, citalopram, quinapril, amlodipine, meloxicam     Allergies: None known     Social History: Patient is employed as an  which occasionally involves some heavy lifting and straining.  He does not smoke.  He uses alcohol on a social basis.     Family History: Positive for lung cancer in her father, breast cancer in a sister     Review of Systems:  Constitutional: Negative for change in activity, fever or fatigue  Neck: no swollen glands or dysphagia or odynophagia  Respiratory: negative for SOB, cough, hemoptysis or wheezing  Cardiovascular: negative for chest pain, palpitations or peripheral edema  Gastrointestinal: Positive for occasional periumbilical abdominal pain, negative for change in bowel habits, bloody stools or acid reflux        Physical Exam:   General: alert and oriented, appropriate, no acute distress  Neck: Supple without lymphadenopathy or thyromegaly, trachea is in the midline  Respiratory: Lungs are clear bilaterally without wheezing, no use of accessory muscles is noted  Cardiovascular: Regular rate and rhythm without murmur, no peripheral edema  Gastrointestinal: Soft, benign, approximately 2-1/2 or 3 cm periumbilical hernia which is partially reducible.  Moderate tenderness to palpation.  Bowel sounds positive.     Laboratory data: None     Imaging data: None        Assessment and plan:   Encounter for screening colonoscopy  We will plan for colonoscopy to be done prior to his hernia repair.  I think this makes the most sense in case we were to run in to a finding which would require an operation which could violate his  hernia.     These will be scheduled in conjunction with patient planned for surgery on his hand for carpal tunnel syndrome.  He is going to be off work for some time it anyways at that time.        Ten Solitario MD, FACS  General, Minimally Invasive and Endoscopic Surgery  [facility]     [address], [address]  [place], [place], [address]  P: [phone]  F: [phone]

## 2018-01-23 NOTE — PLAN OF CARE
Problem: Patient Care Overview (Adult)  Goal: Plan of Care Review  Outcome: Outcome(s) achieved Date Met: 01/23/18    Goal: Adult Individualization and Mutuality  Outcome: Outcome(s) achieved Date Met: 01/23/18    Goal: Discharge Needs Assessment  Outcome: Outcome(s) achieved Date Met: 01/23/18      Problem: Perioperative Period (Adult)  Goal: Signs and Symptoms of Listed Potential Problems Will be Absent or Manageable (Perioperative Period)  Outcome: Outcome(s) achieved Date Met: 01/23/18

## 2018-01-23 NOTE — PLAN OF CARE
Problem: Perioperative Period (Adult)  Goal: Signs and Symptoms of Listed Potential Problems Will be Absent or Manageable (Perioperative Period)  Outcome: Ongoing (interventions implemented as appropriate)   01/23/18 1253   Perioperative Period   Problems Assessed (Perioperative Period) all   Problems Present (Perioperative Period) pain

## 2018-01-23 NOTE — ANESTHESIA PROCEDURE NOTES
Airway  Urgency: elective    Date/Time: 1/23/2018 8:18 AM  End Time:1/23/2018 8:22 AM  Difficult airway    General Information and Staff    Patient location during procedure: OR  Anesthesiologist: REZA CRAIN    Indications and Patient Condition  Indications for airway management: airway protection    Preoxygenated: yes  MILS not maintained throughout  Mask difficulty assessment: 1 - vent by mask    Final Airway Details  Final airway type: endotracheal airway      Successful airway: ETT  Cuffed: yes   Successful intubation technique: direct laryngoscopy  Facilitating devices/methods: Bougie and cricoid pressure  Blade: Heart  Blade size: #2  ETT size: 8.0 mm  Placement verified by: chest auscultation and capnometry   Measured from: teeth  ETT to teeth (cm): 21  Number of attempts at approach: 2    Additional Comments  Lip abrasion  Possible chip tooth   No neck flex or extension  Full beard  First pass epiglottis only  Retry with cricoid teasing able to see glottis and using blue bougie access airway

## 2018-01-31 ENCOUNTER — OFFICE VISIT (OUTPATIENT)
Dept: SURGERY | Facility: CLINIC | Age: 63
End: 2018-01-31

## 2018-01-31 VITALS — BODY MASS INDEX: 34.44 KG/M2 | WEIGHT: 240 LBS

## 2018-01-31 DIAGNOSIS — K42.9 UMBILICAL HERNIA WITHOUT OBSTRUCTION AND WITHOUT GANGRENE: Primary | ICD-10-CM

## 2018-01-31 PROCEDURE — 99024 POSTOP FOLLOW-UP VISIT: CPT | Performed by: SURGERY

## 2018-01-31 RX ORDER — HYDROCODONE BITARTRATE AND ACETAMINOPHEN 7.5; 325 MG/1; MG/1
1 TABLET ORAL EVERY 6 HOURS PRN
Qty: 30 TABLET | Refills: 0 | Status: SHIPPED | OUTPATIENT
Start: 2018-01-31 | End: 2019-01-24

## 2018-01-31 NOTE — PROGRESS NOTES
Follow-up da Mira robotic-assisted large incarcerated umbilical hernia    Subjective:  Overall doing well but complains of some burning type pain with certain movements in the left lower quadrant.  Bowels are functioning.  Eating well.    Objective:  Abdomen is soft and benign.  Umbilical site is clean and no evidence of significant seroma accumulation.  Trocar sites healing well.    Assessment and plan:  Status post da Mira robot assisted repair of large incarcerated umbilical hernia.  Continue weight restrictions.  Follow-up in 2 weeks to reassess pain.  Pain medications refilled today and care with regard to their use was discussed with the patient.    Ten Solitario MD  General and Endoscopic Surgery  [facility]    [address], [address]  [place], [place], [address]  P: [phone]  F: [phone]

## 2018-02-14 ENCOUNTER — OFFICE VISIT (OUTPATIENT)
Dept: SURGERY | Facility: CLINIC | Age: 63
End: 2018-02-14

## 2018-02-14 DIAGNOSIS — K42.9 UMBILICAL HERNIA WITHOUT OBSTRUCTION AND WITHOUT GANGRENE: Primary | ICD-10-CM

## 2018-02-14 PROCEDURE — 99024 POSTOP FOLLOW-UP VISIT: CPT | Performed by: SURGERY

## 2018-02-14 NOTE — PROGRESS NOTES
Postop visit #2 for da Mira robot assisted umbilical hernia repair    Subjective:  Patient is now about 3-1/2 weeks out from his operation and is doing considerably better.  He still has some discomfort in the left lower aspect of his hernia repair, but it is substantially improved.  There is still some mild burning there.  No evidence of hernia recurrence.  The periumbilical hernia site looks quite good.  His incisions have healed nicely.  He is eating well.    Objective:  Abdomen is soft and benign, no evidence for hernia recurrence.  Mild left lower quadrant tenderness.    Assessment and plan:  Status post umbilical hernia repair.  His comfort is gradually improving.  Return to work at about 8 weeks postop.  Follow up here as needed.    Ten Solitario MD  General and Endoscopic Surgery  [facility]    [address], [address]  [place], [place], [address]  P: [phone]  F: [phone]

## 2019-01-24 ENCOUNTER — APPOINTMENT (OUTPATIENT)
Dept: CT IMAGING | Facility: HOSPITAL | Age: 64
End: 2019-01-24

## 2019-01-24 ENCOUNTER — HOSPITAL ENCOUNTER (INPATIENT)
Facility: HOSPITAL | Age: 64
LOS: 17 days | Discharge: NURSING, REHAB OR LONG-TERM CARE | End: 2019-02-10
Attending: EMERGENCY MEDICINE | Admitting: HOSPITALIST

## 2019-01-24 DIAGNOSIS — E04.0 GOITER DIFFUSE: ICD-10-CM

## 2019-01-24 DIAGNOSIS — I20.8 ANGINA AT REST (HCC): ICD-10-CM

## 2019-01-24 DIAGNOSIS — I48.91 NEW ONSET ATRIAL FIBRILLATION (HCC): Primary | ICD-10-CM

## 2019-01-24 DIAGNOSIS — R53.1 GENERALIZED WEAKNESS: ICD-10-CM

## 2019-01-24 DIAGNOSIS — I48.91 NEW ONSET ATRIAL FIBRILLATION (HCC): ICD-10-CM

## 2019-01-24 DIAGNOSIS — I10 ESSENTIAL HYPERTENSION: ICD-10-CM

## 2019-01-24 PROBLEM — I50.9 CHF (CONGESTIVE HEART FAILURE): Status: ACTIVE | Noted: 2019-01-24

## 2019-01-24 PROBLEM — E04.9 GOITER: Status: ACTIVE | Noted: 2019-01-24

## 2019-01-24 PROBLEM — G47.30 SLEEP APNEA: Status: ACTIVE | Noted: 2019-01-24

## 2019-01-24 PROBLEM — R06.00 DYSPNEA: Status: ACTIVE | Noted: 2019-01-24

## 2019-01-24 LAB
ALBUMIN SERPL-MCNC: 3.9 G/DL (ref 3.5–5.2)
ALBUMIN/GLOB SERPL: 1.2 G/DL
ALP SERPL-CCNC: 70 U/L (ref 39–117)
ALT SERPL W P-5'-P-CCNC: 35 U/L (ref 1–41)
ANION GAP SERPL CALCULATED.3IONS-SCNC: 10.3 MMOL/L
AST SERPL-CCNC: 18 U/L (ref 1–40)
B PARAPERT DNA SPEC QL NAA+PROBE: NOT DETECTED
B PERT DNA SPEC QL NAA+PROBE: NOT DETECTED
BASOPHILS # BLD AUTO: 0.02 10*3/MM3 (ref 0–0.2)
BASOPHILS NFR BLD AUTO: 0.3 % (ref 0–1.5)
BILIRUB SERPL-MCNC: 0.5 MG/DL (ref 0.1–1.2)
BUN BLD-MCNC: 21 MG/DL (ref 8–23)
BUN/CREAT SERPL: 27.3 (ref 7–25)
C PNEUM DNA NPH QL NAA+NON-PROBE: NOT DETECTED
CALCIUM SPEC-SCNC: 9.2 MG/DL (ref 8.6–10.5)
CHLORIDE SERPL-SCNC: 104 MMOL/L (ref 98–107)
CO2 SERPL-SCNC: 26.7 MMOL/L (ref 22–29)
CREAT BLD-MCNC: 0.77 MG/DL (ref 0.76–1.27)
D DIMER PPP FEU-MCNC: 0.73 MCGFEU/ML (ref 0–0.49)
DEPRECATED RDW RBC AUTO: 44.1 FL (ref 37–54)
EOSINOPHIL # BLD AUTO: 0.13 10*3/MM3 (ref 0–0.7)
EOSINOPHIL NFR BLD AUTO: 2.1 % (ref 0.3–6.2)
ERYTHROCYTE [DISTWIDTH] IN BLOOD BY AUTOMATED COUNT: 13.8 % (ref 11.5–14.5)
FLUAV H1 2009 PAND RNA NPH QL NAA+PROBE: NOT DETECTED
FLUAV H1 HA GENE NPH QL NAA+PROBE: NOT DETECTED
FLUAV H3 RNA NPH QL NAA+PROBE: NOT DETECTED
FLUAV SUBTYP SPEC NAA+PROBE: NOT DETECTED
FLUBV RNA ISLT QL NAA+PROBE: NOT DETECTED
GFR SERPL CREATININE-BSD FRML MDRD: 102 ML/MIN/1.73
GLOBULIN UR ELPH-MCNC: 3.2 GM/DL
GLUCOSE BLD-MCNC: 102 MG/DL (ref 65–99)
HADV DNA SPEC NAA+PROBE: NOT DETECTED
HCOV 229E RNA SPEC QL NAA+PROBE: NOT DETECTED
HCOV HKU1 RNA SPEC QL NAA+PROBE: NOT DETECTED
HCOV NL63 RNA SPEC QL NAA+PROBE: NOT DETECTED
HCOV OC43 RNA SPEC QL NAA+PROBE: NOT DETECTED
HCT VFR BLD AUTO: 46.2 % (ref 40.4–52.2)
HGB BLD-MCNC: 15.1 G/DL (ref 13.7–17.6)
HMPV RNA NPH QL NAA+NON-PROBE: NOT DETECTED
HOLD SPECIMEN: NORMAL
HOLD SPECIMEN: NORMAL
HPIV1 RNA SPEC QL NAA+PROBE: NOT DETECTED
HPIV2 RNA SPEC QL NAA+PROBE: NOT DETECTED
HPIV3 RNA NPH QL NAA+PROBE: NOT DETECTED
HPIV4 P GENE NPH QL NAA+PROBE: NOT DETECTED
IMM GRANULOCYTES # BLD AUTO: 0.02 10*3/MM3 (ref 0–0.03)
IMM GRANULOCYTES NFR BLD AUTO: 0.3 % (ref 0–0.5)
LYMPHOCYTES # BLD AUTO: 1.59 10*3/MM3 (ref 0.9–4.8)
LYMPHOCYTES NFR BLD AUTO: 25.2 % (ref 19.6–45.3)
M PNEUMO IGG SER IA-ACNC: NOT DETECTED
MCH RBC QN AUTO: 28.7 PG (ref 27–32.7)
MCHC RBC AUTO-ENTMCNC: 32.7 G/DL (ref 32.6–36.4)
MCV RBC AUTO: 87.7 FL (ref 79.8–96.2)
MONOCYTES # BLD AUTO: 0.69 10*3/MM3 (ref 0.2–1.2)
MONOCYTES NFR BLD AUTO: 11 % (ref 5–12)
NEUTROPHILS # BLD AUTO: 3.87 10*3/MM3 (ref 1.9–8.1)
NEUTROPHILS NFR BLD AUTO: 61.4 % (ref 42.7–76)
NT-PROBNP SERPL-MCNC: 1246 PG/ML (ref 0–900)
PLATELET # BLD AUTO: 243 10*3/MM3 (ref 140–500)
PMV BLD AUTO: 11.1 FL (ref 6–12)
POTASSIUM BLD-SCNC: 4 MMOL/L (ref 3.5–5.2)
PROT SERPL-MCNC: 7.1 G/DL (ref 6–8.5)
RBC # BLD AUTO: 5.27 10*6/MM3 (ref 4.6–6)
RHINOVIRUS RNA SPEC NAA+PROBE: NOT DETECTED
RSV RNA NPH QL NAA+NON-PROBE: NOT DETECTED
SODIUM BLD-SCNC: 141 MMOL/L (ref 136–145)
T3FREE SERPL-MCNC: 3.56 PG/ML (ref 2–4.4)
T4 FREE SERPL-MCNC: 1.25 NG/DL (ref 0.93–1.7)
TROPONIN T SERPL-MCNC: <0.01 NG/ML (ref 0–0.03)
TSH SERPL DL<=0.05 MIU/L-ACNC: 0.27 MIU/ML (ref 0.27–4.2)
WBC NRBC COR # BLD: 6.3 10*3/MM3 (ref 4.5–10.7)
WHOLE BLOOD HOLD SPECIMEN: NORMAL
WHOLE BLOOD HOLD SPECIMEN: NORMAL

## 2019-01-24 PROCEDURE — 0 IOPAMIDOL PER 1 ML: Performed by: EMERGENCY MEDICINE

## 2019-01-24 PROCEDURE — 84484 ASSAY OF TROPONIN QUANT: CPT | Performed by: NURSE PRACTITIONER

## 2019-01-24 PROCEDURE — 94799 UNLISTED PULMONARY SVC/PX: CPT

## 2019-01-24 PROCEDURE — 87798 DETECT AGENT NOS DNA AMP: CPT | Performed by: EMERGENCY MEDICINE

## 2019-01-24 PROCEDURE — 87633 RESP VIRUS 12-25 TARGETS: CPT | Performed by: EMERGENCY MEDICINE

## 2019-01-24 PROCEDURE — 99285 EMERGENCY DEPT VISIT HI MDM: CPT

## 2019-01-24 PROCEDURE — 71275 CT ANGIOGRAPHY CHEST: CPT

## 2019-01-24 PROCEDURE — 84481 FREE ASSAY (FT-3): CPT | Performed by: EMERGENCY MEDICINE

## 2019-01-24 PROCEDURE — 84439 ASSAY OF FREE THYROXINE: CPT | Performed by: EMERGENCY MEDICINE

## 2019-01-24 PROCEDURE — G0378 HOSPITAL OBSERVATION PER HR: HCPCS

## 2019-01-24 PROCEDURE — 36415 COLL VENOUS BLD VENIPUNCTURE: CPT

## 2019-01-24 PROCEDURE — 80053 COMPREHEN METABOLIC PANEL: CPT | Performed by: NURSE PRACTITIONER

## 2019-01-24 PROCEDURE — 87581 M.PNEUMON DNA AMP PROBE: CPT | Performed by: EMERGENCY MEDICINE

## 2019-01-24 PROCEDURE — 25010000002 FUROSEMIDE PER 20 MG: Performed by: NURSE PRACTITIONER

## 2019-01-24 PROCEDURE — 87486 CHLMYD PNEUM DNA AMP PROBE: CPT | Performed by: EMERGENCY MEDICINE

## 2019-01-24 PROCEDURE — 94640 AIRWAY INHALATION TREATMENT: CPT

## 2019-01-24 PROCEDURE — 83880 ASSAY OF NATRIURETIC PEPTIDE: CPT | Performed by: NURSE PRACTITIONER

## 2019-01-24 PROCEDURE — 93010 ELECTROCARDIOGRAM REPORT: CPT | Performed by: INTERNAL MEDICINE

## 2019-01-24 PROCEDURE — 25010000002 ENOXAPARIN PER 10 MG: Performed by: INTERNAL MEDICINE

## 2019-01-24 PROCEDURE — 85025 COMPLETE CBC W/AUTO DIFF WBC: CPT | Performed by: NURSE PRACTITIONER

## 2019-01-24 PROCEDURE — 25010000002 METHYLPREDNISOLONE PER 125 MG: Performed by: NURSE PRACTITIONER

## 2019-01-24 PROCEDURE — 84443 ASSAY THYROID STIM HORMONE: CPT | Performed by: EMERGENCY MEDICINE

## 2019-01-24 PROCEDURE — 85379 FIBRIN DEGRADATION QUANT: CPT | Performed by: NURSE PRACTITIONER

## 2019-01-24 PROCEDURE — 93005 ELECTROCARDIOGRAM TRACING: CPT | Performed by: NURSE PRACTITIONER

## 2019-01-24 RX ORDER — SODIUM CHLORIDE 0.9 % (FLUSH) 0.9 %
3-10 SYRINGE (ML) INJECTION AS NEEDED
Status: DISCONTINUED | OUTPATIENT
Start: 2019-01-24 | End: 2019-02-10 | Stop reason: HOSPADM

## 2019-01-24 RX ORDER — METHYLPREDNISOLONE SODIUM SUCCINATE 125 MG/2ML
125 INJECTION, POWDER, LYOPHILIZED, FOR SOLUTION INTRAMUSCULAR; INTRAVENOUS ONCE
Status: COMPLETED | OUTPATIENT
Start: 2019-01-24 | End: 2019-01-24

## 2019-01-24 RX ORDER — ONDANSETRON 2 MG/ML
4 INJECTION INTRAMUSCULAR; INTRAVENOUS EVERY 6 HOURS PRN
Status: DISCONTINUED | OUTPATIENT
Start: 2019-01-24 | End: 2019-02-10 | Stop reason: HOSPADM

## 2019-01-24 RX ORDER — SODIUM CHLORIDE 0.9 % (FLUSH) 0.9 %
3 SYRINGE (ML) INJECTION EVERY 12 HOURS SCHEDULED
Status: DISCONTINUED | OUTPATIENT
Start: 2019-01-24 | End: 2019-02-10 | Stop reason: HOSPADM

## 2019-01-24 RX ORDER — ACETAMINOPHEN 325 MG/1
650 TABLET ORAL EVERY 4 HOURS PRN
Status: DISCONTINUED | OUTPATIENT
Start: 2019-01-24 | End: 2019-02-10 | Stop reason: HOSPADM

## 2019-01-24 RX ORDER — FUROSEMIDE 10 MG/ML
40 INJECTION INTRAMUSCULAR; INTRAVENOUS ONCE
Status: COMPLETED | OUTPATIENT
Start: 2019-01-24 | End: 2019-01-24

## 2019-01-24 RX ORDER — SODIUM CHLORIDE 0.9 % (FLUSH) 0.9 %
10 SYRINGE (ML) INJECTION AS NEEDED
Status: DISCONTINUED | OUTPATIENT
Start: 2019-01-24 | End: 2019-02-10 | Stop reason: HOSPADM

## 2019-01-24 RX ORDER — ATENOLOL 50 MG/1
50 TABLET ORAL DAILY
Status: DISCONTINUED | OUTPATIENT
Start: 2019-01-24 | End: 2019-02-01

## 2019-01-24 RX ORDER — CITALOPRAM 20 MG/1
20 TABLET ORAL DAILY
Status: DISCONTINUED | OUTPATIENT
Start: 2019-01-24 | End: 2019-02-10 | Stop reason: HOSPADM

## 2019-01-24 RX ORDER — ALBUTEROL SULFATE 2.5 MG/3ML
2.5 SOLUTION RESPIRATORY (INHALATION)
Status: COMPLETED | OUTPATIENT
Start: 2019-01-24 | End: 2019-01-24

## 2019-01-24 RX ORDER — NITROGLYCERIN 0.4 MG/1
0.4 TABLET SUBLINGUAL
Status: DISCONTINUED | OUTPATIENT
Start: 2019-01-24 | End: 2019-02-10 | Stop reason: HOSPADM

## 2019-01-24 RX ADMIN — ENOXAPARIN SODIUM 110 MG: 120 INJECTION SUBCUTANEOUS at 21:05

## 2019-01-24 RX ADMIN — IOPAMIDOL 100 ML: 755 INJECTION, SOLUTION INTRAVENOUS at 15:29

## 2019-01-24 RX ADMIN — ALBUTEROL SULFATE 2.5 MG: 2.5 SOLUTION RESPIRATORY (INHALATION) at 13:54

## 2019-01-24 RX ADMIN — METHYLPREDNISOLONE SODIUM SUCCINATE 125 MG: 125 INJECTION, POWDER, FOR SOLUTION INTRAMUSCULAR; INTRAVENOUS at 14:25

## 2019-01-24 RX ADMIN — METOPROLOL TARTRATE 5 MG: 1 INJECTION, SOLUTION INTRAVENOUS at 17:57

## 2019-01-24 RX ADMIN — FUROSEMIDE 40 MG: 10 INJECTION, SOLUTION INTRAVENOUS at 16:53

## 2019-01-24 RX ADMIN — ALBUTEROL SULFATE 2.5 MG: 2.5 SOLUTION RESPIRATORY (INHALATION) at 14:14

## 2019-01-24 RX ADMIN — SODIUM CHLORIDE, PRESERVATIVE FREE 3 ML: 5 INJECTION INTRAVENOUS at 21:06

## 2019-01-24 RX ADMIN — ATENOLOL 50 MG: 50 TABLET ORAL at 21:04

## 2019-01-24 RX ADMIN — CITALOPRAM 20 MG: 20 TABLET, FILM COATED ORAL at 21:03

## 2019-01-24 RX ADMIN — ALBUTEROL SULFATE 2.5 MG: 2.5 SOLUTION RESPIRATORY (INHALATION) at 15:05

## 2019-01-24 RX ADMIN — METOPROLOL TARTRATE 5 MG: 1 INJECTION, SOLUTION INTRAVENOUS at 16:52

## 2019-01-24 RX ADMIN — DILTIAZEM HYDROCHLORIDE 5 MG/HR: 5 INJECTION, SOLUTION INTRAVENOUS at 21:06

## 2019-01-25 ENCOUNTER — APPOINTMENT (OUTPATIENT)
Dept: CARDIOLOGY | Facility: HOSPITAL | Age: 64
End: 2019-01-25
Attending: INTERNAL MEDICINE

## 2019-01-25 DIAGNOSIS — E04.9 SUBSTERNAL GOITER: Primary | ICD-10-CM

## 2019-01-25 LAB
ALBUMIN SERPL-MCNC: 4.2 G/DL (ref 3.5–5.2)
ALBUMIN/GLOB SERPL: 1.1 G/DL
ALP SERPL-CCNC: 79 U/L (ref 39–117)
ALT SERPL W P-5'-P-CCNC: 32 U/L (ref 1–41)
ANION GAP SERPL CALCULATED.3IONS-SCNC: 18.6 MMOL/L
AORTIC DIMENSIONLESS INDEX: 0.6 (DI)
AST SERPL-CCNC: 17 U/L (ref 1–40)
BASOPHILS # BLD AUTO: 0 10*3/MM3 (ref 0–0.2)
BASOPHILS NFR BLD AUTO: 0 % (ref 0–1.5)
BH CV ECHO MEAS - ACS: 1.4 CM
BH CV ECHO MEAS - AO MAX PG: 28 MMHG
BH CV ECHO MEAS - AO MEAN PG (FULL): 13.5 MMHG
BH CV ECHO MEAS - AO MEAN PG: 16 MMHG
BH CV ECHO MEAS - AO ROOT AREA (BSA CORRECTED): 1.5
BH CV ECHO MEAS - AO ROOT AREA: 8.6 CM^2
BH CV ECHO MEAS - AO ROOT DIAM: 3.3 CM
BH CV ECHO MEAS - AO V2 MEAN: 188 CM/SEC
BH CV ECHO MEAS - AO V2 VTI: 49.4 CM
BH CV ECHO MEAS - ASC AORTA: 3.4 CM
BH CV ECHO MEAS - AVA(I,A): 1.5 CM^2
BH CV ECHO MEAS - AVA(I,D): 2.2 CM^2
BH CV ECHO MEAS - BSA(HAYCOCK): 2.3 M^2
BH CV ECHO MEAS - BSA: 2.2 M^2
BH CV ECHO MEAS - BZI_BMI: 33.9 KILOGRAMS/M^2
BH CV ECHO MEAS - BZI_METRIC_HEIGHT: 177.8 CM
BH CV ECHO MEAS - BZI_METRIC_WEIGHT: 107.1 KG
BH CV ECHO MEAS - EDV(CUBED): 195.1 ML
BH CV ECHO MEAS - EDV(MOD-SP2): 149 ML
BH CV ECHO MEAS - EDV(MOD-SP4): 166 ML
BH CV ECHO MEAS - EDV(TEICH): 166.6 ML
BH CV ECHO MEAS - EF(CUBED): 67.2 %
BH CV ECHO MEAS - EF(MOD-BP): 53 %
BH CV ECHO MEAS - EF(MOD-SP2): 52.3 %
BH CV ECHO MEAS - EF(MOD-SP4): 54.2 %
BH CV ECHO MEAS - EF(TEICH): 58 %
BH CV ECHO MEAS - ESV(CUBED): 64 ML
BH CV ECHO MEAS - ESV(MOD-SP2): 71 ML
BH CV ECHO MEAS - ESV(MOD-SP4): 76 ML
BH CV ECHO MEAS - ESV(TEICH): 70 ML
BH CV ECHO MEAS - FS: 31 %
BH CV ECHO MEAS - IVS/LVPW: 1
BH CV ECHO MEAS - IVSD: 1.3 CM
BH CV ECHO MEAS - LAT PEAK E' VEL: 7 CM/SEC
BH CV ECHO MEAS - LV DIASTOLIC VOL/BSA (35-75): 74.1 ML/M^2
BH CV ECHO MEAS - LV MASS(C)D: 331.4 GRAMS
BH CV ECHO MEAS - LV MASS(C)DI: 148 GRAMS/M^2
BH CV ECHO MEAS - LV MEAN PG: 2.5 MMHG
BH CV ECHO MEAS - LV SYSTOLIC VOL/BSA (12-30): 33.9 ML/M^2
BH CV ECHO MEAS - LV V1 MEAN: 74.4 CM/SEC
BH CV ECHO MEAS - LV V1 VTI: 19 CM
BH CV ECHO MEAS - LVIDD: 5.8 CM
BH CV ECHO MEAS - LVIDS: 4 CM
BH CV ECHO MEAS - LVLD AP2: 9.4 CM
BH CV ECHO MEAS - LVLD AP4: 9.4 CM
BH CV ECHO MEAS - LVLS AP2: 8.1 CM
BH CV ECHO MEAS - LVLS AP4: 8.3 CM
BH CV ECHO MEAS - LVOT AREA (M): 3.8 CM^2
BH CV ECHO MEAS - LVOT AREA: 3.8 CM^2
BH CV ECHO MEAS - LVOT DIAM: 2.2 CM
BH CV ECHO MEAS - LVPWD: 1.3 CM
BH CV ECHO MEAS - MED PEAK E' VEL: 7 CM/SEC
BH CV ECHO MEAS - MR MAX PG: 90.3 MMHG
BH CV ECHO MEAS - MR MAX VEL: 475 CM/SEC
BH CV ECHO MEAS - MV DEC SLOPE: 664 CM/SEC^2
BH CV ECHO MEAS - MV DEC TIME: 0.19 SEC
BH CV ECHO MEAS - MV E MAX VEL: 153 CM/SEC
BH CV ECHO MEAS - MV MEAN PG: 4 MMHG
BH CV ECHO MEAS - MV P1/2T MAX VEL: 167 CM/SEC
BH CV ECHO MEAS - MV P1/2T: 73.7 MSEC
BH CV ECHO MEAS - MV V2 MEAN: 88.2 CM/SEC
BH CV ECHO MEAS - MV V2 VTI: 41.9 CM
BH CV ECHO MEAS - MVA P1/2T LCG: 1.3 CM^2
BH CV ECHO MEAS - MVA(P1/2T): 3 CM^2
BH CV ECHO MEAS - MVA(VTI): 1.7 CM^2
BH CV ECHO MEAS - PA ACC SLOPE: 2253 CM/SEC^2
BH CV ECHO MEAS - PA ACC TIME: 0.04 SEC
BH CV ECHO MEAS - PA MAX PG: 3.3 MMHG
BH CV ECHO MEAS - PA PR(ACCEL): 61.5 MMHG
BH CV ECHO MEAS - PA V2 MAX: 90.2 CM/SEC
BH CV ECHO MEAS - RAP SYSTOLE: 8 MMHG
BH CV ECHO MEAS - RV MEAN PG: 1 MMHG
BH CV ECHO MEAS - RV V1 MEAN: 46.6 CM/SEC
BH CV ECHO MEAS - RV V1 VTI: 14.1 CM
BH CV ECHO MEAS - RVSP: 31 MMHG
BH CV ECHO MEAS - SI(AO): 188.7 ML/M^2
BH CV ECHO MEAS - SI(CUBED): 58.5 ML/M^2
BH CV ECHO MEAS - SI(LVOT): 32.2 ML/M^2
BH CV ECHO MEAS - SI(MOD-SP2): 34.8 ML/M^2
BH CV ECHO MEAS - SI(MOD-SP4): 40.2 ML/M^2
BH CV ECHO MEAS - SI(TEICH): 43.1 ML/M^2
BH CV ECHO MEAS - SV(AO): 422.5 ML
BH CV ECHO MEAS - SV(CUBED): 131.1 ML
BH CV ECHO MEAS - SV(LVOT): 72 ML
BH CV ECHO MEAS - SV(MOD-SP2): 78 ML
BH CV ECHO MEAS - SV(MOD-SP4): 90 ML
BH CV ECHO MEAS - SV(TEICH): 96.6 ML
BH CV ECHO MEAS - TAPSE (>1.6): 2.1 CM2
BH CV ECHO MEAS - TR MAX VEL: 239 CM/SEC
BH CV ECHO MEASUREMENTS AVERAGE E/E' RATIO: 21.86
BH CV VAS BP RIGHT ARM: NORMAL MMHG
BH CV XLRA - RV BASE: 4 CM
BH CV XLRA - TDI S': 11 CM/SEC
BILIRUB SERPL-MCNC: 0.3 MG/DL (ref 0.1–1.2)
BUN BLD-MCNC: 21 MG/DL (ref 8–23)
BUN/CREAT SERPL: 22.6 (ref 7–25)
CALCIUM SPEC-SCNC: 9.4 MG/DL (ref 8.6–10.5)
CHLORIDE SERPL-SCNC: 102 MMOL/L (ref 98–107)
CHOLEST SERPL-MCNC: 175 MG/DL (ref 0–200)
CO2 SERPL-SCNC: 21.4 MMOL/L (ref 22–29)
CREAT BLD-MCNC: 0.93 MG/DL (ref 0.76–1.27)
DEPRECATED RDW RBC AUTO: 44.8 FL (ref 37–54)
EOSINOPHIL # BLD AUTO: 0 10*3/MM3 (ref 0–0.7)
EOSINOPHIL NFR BLD AUTO: 0 % (ref 0.3–6.2)
ERYTHROCYTE [DISTWIDTH] IN BLOOD BY AUTOMATED COUNT: 14 % (ref 11.5–14.5)
GFR SERPL CREATININE-BSD FRML MDRD: 82 ML/MIN/1.73
GLOBULIN UR ELPH-MCNC: 3.8 GM/DL
GLUCOSE BLD-MCNC: 182 MG/DL (ref 65–99)
HCT VFR BLD AUTO: 47.9 % (ref 40.4–52.2)
HDLC SERPL-MCNC: 63 MG/DL (ref 40–60)
HGB BLD-MCNC: 15.1 G/DL (ref 13.7–17.6)
IMM GRANULOCYTES # BLD AUTO: 0.02 10*3/MM3 (ref 0–0.03)
IMM GRANULOCYTES NFR BLD AUTO: 0.3 % (ref 0–0.5)
LDLC SERPL CALC-MCNC: 105 MG/DL (ref 0–100)
LDLC/HDLC SERPL: 1.66 {RATIO}
LEFT ATRIUM VOLUME INDEX: 45 ML/M2
LV EF 2D ECHO EST: 53 %
LYMPHOCYTES # BLD AUTO: 0.97 10*3/MM3 (ref 0.9–4.8)
LYMPHOCYTES NFR BLD AUTO: 12.2 % (ref 19.6–45.3)
MAGNESIUM SERPL-MCNC: 2.2 MG/DL (ref 1.6–2.4)
MCH RBC QN AUTO: 28.1 PG (ref 27–32.7)
MCHC RBC AUTO-ENTMCNC: 31.5 G/DL (ref 32.6–36.4)
MCV RBC AUTO: 89.2 FL (ref 79.8–96.2)
MONOCYTES # BLD AUTO: 0.05 10*3/MM3 (ref 0.2–1.2)
MONOCYTES NFR BLD AUTO: 0.6 % (ref 5–12)
NEUTROPHILS # BLD AUTO: 6.93 10*3/MM3 (ref 1.9–8.1)
NEUTROPHILS NFR BLD AUTO: 86.9 % (ref 42.7–76)
PLATELET # BLD AUTO: 268 10*3/MM3 (ref 140–500)
PMV BLD AUTO: 10.5 FL (ref 6–12)
POTASSIUM BLD-SCNC: 3.9 MMOL/L (ref 3.5–5.2)
PROT SERPL-MCNC: 8 G/DL (ref 6–8.5)
RBC # BLD AUTO: 5.37 10*6/MM3 (ref 4.6–6)
SODIUM BLD-SCNC: 142 MMOL/L (ref 136–145)
TRIGL SERPL-MCNC: 36 MG/DL (ref 0–150)
TROPONIN T SERPL-MCNC: <0.01 NG/ML (ref 0–0.03)
VLDLC SERPL-MCNC: 7.2 MG/DL (ref 5–40)
WBC NRBC COR # BLD: 7.97 10*3/MM3 (ref 4.5–10.7)

## 2019-01-25 PROCEDURE — 25010000002 FUROSEMIDE PER 20 MG: Performed by: NURSE PRACTITIONER

## 2019-01-25 PROCEDURE — G0378 HOSPITAL OBSERVATION PER HR: HCPCS

## 2019-01-25 PROCEDURE — 85025 COMPLETE CBC W/AUTO DIFF WBC: CPT | Performed by: INTERNAL MEDICINE

## 2019-01-25 PROCEDURE — 80061 LIPID PANEL: CPT | Performed by: INTERNAL MEDICINE

## 2019-01-25 PROCEDURE — 90661 CCIIV3 VAC ABX FR 0.5 ML IM: CPT | Performed by: INTERNAL MEDICINE

## 2019-01-25 PROCEDURE — 93306 TTE W/DOPPLER COMPLETE: CPT | Performed by: INTERNAL MEDICINE

## 2019-01-25 PROCEDURE — 99204 OFFICE O/P NEW MOD 45 MIN: CPT | Performed by: THORACIC SURGERY (CARDIOTHORACIC VASCULAR SURGERY)

## 2019-01-25 PROCEDURE — 25010000002 INFLUENZA VAC SUBUNIT QUAD 0.5 ML SUSPENSION PREFILLED SYRINGE: Performed by: INTERNAL MEDICINE

## 2019-01-25 PROCEDURE — 25010000002 ENOXAPARIN PER 10 MG: Performed by: INTERNAL MEDICINE

## 2019-01-25 PROCEDURE — 84484 ASSAY OF TROPONIN QUANT: CPT | Performed by: INTERNAL MEDICINE

## 2019-01-25 PROCEDURE — 80053 COMPREHEN METABOLIC PANEL: CPT | Performed by: INTERNAL MEDICINE

## 2019-01-25 PROCEDURE — 99214 OFFICE O/P EST MOD 30 MIN: CPT | Performed by: NURSE PRACTITIONER

## 2019-01-25 PROCEDURE — 93306 TTE W/DOPPLER COMPLETE: CPT

## 2019-01-25 PROCEDURE — G0008 ADMIN INFLUENZA VIRUS VAC: HCPCS | Performed by: INTERNAL MEDICINE

## 2019-01-25 PROCEDURE — 83735 ASSAY OF MAGNESIUM: CPT | Performed by: NURSE PRACTITIONER

## 2019-01-25 RX ORDER — FUROSEMIDE 10 MG/ML
40 INJECTION INTRAMUSCULAR; INTRAVENOUS
Status: DISCONTINUED | OUTPATIENT
Start: 2019-01-25 | End: 2019-01-26

## 2019-01-25 RX ORDER — FUROSEMIDE 10 MG/ML
40 INJECTION INTRAMUSCULAR; INTRAVENOUS ONCE
Status: COMPLETED | OUTPATIENT
Start: 2019-01-25 | End: 2019-01-25

## 2019-01-25 RX ADMIN — ATENOLOL 50 MG: 50 TABLET ORAL at 08:54

## 2019-01-25 RX ADMIN — CITALOPRAM 20 MG: 20 TABLET, FILM COATED ORAL at 08:54

## 2019-01-25 RX ADMIN — FUROSEMIDE 40 MG: 10 INJECTION, SOLUTION INTRAMUSCULAR; INTRAVENOUS at 16:32

## 2019-01-25 RX ADMIN — ENOXAPARIN SODIUM 110 MG: 120 INJECTION SUBCUTANEOUS at 21:05

## 2019-01-25 RX ADMIN — INFLUENZA A VIRUS A/SINGAPORE/GP1908/2015 IVR-180 (H1N1) ANTIGEN (MDCK CELL DERIVED, PROPIOLACTONE INACTIVATED), INFLUENZA A VIRUS A/NORTH CAROLINA/04/2016 (H3N2) HEMAGGLUTININ ANTIGEN (MDCK CELL DERIVED, PROPIOLACTONE INACTIVATED), INFLUENZA B VIRUS B/IOWA/06/2017 HEMAGGLUTININ ANTIGEN (MDCK CELL DERIVED, PROPIOLACTONE INACTIVATED), INFLUENZA B VIRUS B/SINGAPORE/INFTT-16-0610/2016 HEMAGGLUTININ ANTIGEN (MDCK CELL DERIVED, PROPIOLACTONE INACTIVATED) 0.5 ML: 15; 15; 15; 15 INJECTION, SUSPENSION INTRAMUSCULAR at 16:34

## 2019-01-25 RX ADMIN — ENOXAPARIN SODIUM 110 MG: 120 INJECTION SUBCUTANEOUS at 08:54

## 2019-01-25 RX ADMIN — SODIUM CHLORIDE, PRESERVATIVE FREE 3 ML: 5 INJECTION INTRAVENOUS at 21:05

## 2019-01-25 RX ADMIN — SODIUM CHLORIDE, PRESERVATIVE FREE 3 ML: 5 INJECTION INTRAVENOUS at 10:30

## 2019-01-26 ENCOUNTER — APPOINTMENT (OUTPATIENT)
Dept: NUCLEAR MEDICINE | Facility: HOSPITAL | Age: 64
End: 2019-01-26

## 2019-01-26 LAB
ANION GAP SERPL CALCULATED.3IONS-SCNC: 11.3 MMOL/L
BH CV NUCLEAR PRIOR STUDY: 3
BH CV STRESS COMMENTS STAGE 1: NORMAL
BH CV STRESS DOSE REGADENOSON STAGE 1: 0.4
BH CV STRESS DURATION MIN STAGE 1: 0
BH CV STRESS DURATION SEC STAGE 1: 10
BH CV STRESS PROTOCOL 1: NORMAL
BH CV STRESS RECOVERY BP: NORMAL MMHG
BH CV STRESS RECOVERY HR: 68 BPM
BH CV STRESS STAGE 1: 1
BUN BLD-MCNC: 24 MG/DL (ref 8–23)
BUN/CREAT SERPL: 28.6 (ref 7–25)
CALCIUM SPEC-SCNC: 9.2 MG/DL (ref 8.6–10.5)
CHLORIDE SERPL-SCNC: 101 MMOL/L (ref 98–107)
CO2 SERPL-SCNC: 26.7 MMOL/L (ref 22–29)
CREAT BLD-MCNC: 0.84 MG/DL (ref 0.76–1.27)
GFR SERPL CREATININE-BSD FRML MDRD: 93 ML/MIN/1.73
GLUCOSE BLD-MCNC: 93 MG/DL (ref 65–99)
GLUCOSE BLDC GLUCOMTR-MCNC: 121 MG/DL (ref 70–130)
LV EF NUC BP: 40 %
MAGNESIUM SERPL-MCNC: 2.3 MG/DL (ref 1.6–2.4)
MAGNESIUM SERPL-MCNC: 2.4 MG/DL (ref 1.6–2.4)
MAXIMAL PREDICTED HEART RATE: 158 BPM
NT-PROBNP SERPL-MCNC: 1928 PG/ML (ref 5–900)
PERCENT MAX PREDICTED HR: 49.37 %
POTASSIUM BLD-SCNC: 3.7 MMOL/L (ref 3.5–5.2)
POTASSIUM BLD-SCNC: 4 MMOL/L (ref 3.5–5.2)
SODIUM BLD-SCNC: 139 MMOL/L (ref 136–145)
STRESS BASELINE BP: NORMAL MMHG
STRESS BASELINE HR: 63 BPM
STRESS PERCENT HR: 58 %
STRESS POST PEAK BP: NORMAL MMHG
STRESS POST PEAK HR: 78 BPM
STRESS TARGET HR: 134 BPM
TROPONIN T SERPL-MCNC: <0.01 NG/ML (ref 0–0.03)

## 2019-01-26 PROCEDURE — 78452 HT MUSCLE IMAGE SPECT MULT: CPT | Performed by: INTERNAL MEDICINE

## 2019-01-26 PROCEDURE — 82962 GLUCOSE BLOOD TEST: CPT

## 2019-01-26 PROCEDURE — 83880 ASSAY OF NATRIURETIC PEPTIDE: CPT | Performed by: NURSE PRACTITIONER

## 2019-01-26 PROCEDURE — 25010000002 REGADENOSON 0.4 MG/5ML SOLUTION: Performed by: INTERNAL MEDICINE

## 2019-01-26 PROCEDURE — 93010 ELECTROCARDIOGRAM REPORT: CPT | Performed by: INTERNAL MEDICINE

## 2019-01-26 PROCEDURE — 84132 ASSAY OF SERUM POTASSIUM: CPT | Performed by: INTERNAL MEDICINE

## 2019-01-26 PROCEDURE — 0 TECHNETIUM SESTAMIBI: Performed by: INTERNAL MEDICINE

## 2019-01-26 PROCEDURE — G0378 HOSPITAL OBSERVATION PER HR: HCPCS

## 2019-01-26 PROCEDURE — 83735 ASSAY OF MAGNESIUM: CPT | Performed by: NURSE PRACTITIONER

## 2019-01-26 PROCEDURE — 93018 CV STRESS TEST I&R ONLY: CPT | Performed by: INTERNAL MEDICINE

## 2019-01-26 PROCEDURE — A9500 TC99M SESTAMIBI: HCPCS | Performed by: INTERNAL MEDICINE

## 2019-01-26 PROCEDURE — 25010000002 ENOXAPARIN PER 10 MG: Performed by: INTERNAL MEDICINE

## 2019-01-26 PROCEDURE — 84484 ASSAY OF TROPONIN QUANT: CPT | Performed by: INTERNAL MEDICINE

## 2019-01-26 PROCEDURE — 93005 ELECTROCARDIOGRAM TRACING: CPT | Performed by: INTERNAL MEDICINE

## 2019-01-26 PROCEDURE — 93017 CV STRESS TEST TRACING ONLY: CPT

## 2019-01-26 PROCEDURE — 25010000002 FUROSEMIDE PER 20 MG: Performed by: NURSE PRACTITIONER

## 2019-01-26 PROCEDURE — 78452 HT MUSCLE IMAGE SPECT MULT: CPT

## 2019-01-26 PROCEDURE — 99213 OFFICE O/P EST LOW 20 MIN: CPT | Performed by: NURSE PRACTITIONER

## 2019-01-26 PROCEDURE — 83735 ASSAY OF MAGNESIUM: CPT | Performed by: INTERNAL MEDICINE

## 2019-01-26 PROCEDURE — 25010000002 ONDANSETRON PER 1 MG: Performed by: INTERNAL MEDICINE

## 2019-01-26 PROCEDURE — 80048 BASIC METABOLIC PNL TOTAL CA: CPT | Performed by: NURSE PRACTITIONER

## 2019-01-26 RX ORDER — FUROSEMIDE 40 MG/1
40 TABLET ORAL
Status: DISCONTINUED | OUTPATIENT
Start: 2019-01-26 | End: 2019-01-27

## 2019-01-26 RX ORDER — CLONIDINE HYDROCHLORIDE 0.1 MG/1
0.1 TABLET ORAL ONCE
Status: COMPLETED | OUTPATIENT
Start: 2019-01-26 | End: 2019-01-26

## 2019-01-26 RX ADMIN — ENOXAPARIN SODIUM 110 MG: 120 INJECTION SUBCUTANEOUS at 20:12

## 2019-01-26 RX ADMIN — SODIUM CHLORIDE, PRESERVATIVE FREE 3 ML: 5 INJECTION INTRAVENOUS at 07:55

## 2019-01-26 RX ADMIN — FUROSEMIDE 40 MG: 40 TABLET ORAL at 17:31

## 2019-01-26 RX ADMIN — FUROSEMIDE 40 MG: 10 INJECTION, SOLUTION INTRAMUSCULAR; INTRAVENOUS at 07:54

## 2019-01-26 RX ADMIN — ATENOLOL 50 MG: 50 TABLET ORAL at 07:54

## 2019-01-26 RX ADMIN — ONDANSETRON 4 MG: 2 INJECTION INTRAMUSCULAR; INTRAVENOUS at 21:21

## 2019-01-26 RX ADMIN — SODIUM CHLORIDE, PRESERVATIVE FREE 3 ML: 5 INJECTION INTRAVENOUS at 21:53

## 2019-01-26 RX ADMIN — CLONIDINE HYDROCHLORIDE 0.1 MG: 0.1 TABLET ORAL at 22:34

## 2019-01-26 RX ADMIN — TECHNETIUM TC 99M SESTAMIBI 1 DOSE: 1 INJECTION INTRAVENOUS at 08:10

## 2019-01-26 RX ADMIN — ENOXAPARIN SODIUM 110 MG: 120 INJECTION SUBCUTANEOUS at 07:53

## 2019-01-26 RX ADMIN — REGADENOSON 0.4 MG: 0.08 INJECTION, SOLUTION INTRAVENOUS at 11:45

## 2019-01-26 RX ADMIN — TECHNETIUM TC 99M SESTAMIBI 1 DOSE: 1 INJECTION INTRAVENOUS at 11:45

## 2019-01-26 RX ADMIN — CITALOPRAM 20 MG: 20 TABLET, FILM COATED ORAL at 07:53

## 2019-01-27 ENCOUNTER — APPOINTMENT (OUTPATIENT)
Dept: GENERAL RADIOLOGY | Facility: HOSPITAL | Age: 64
End: 2019-01-27

## 2019-01-27 LAB
ALBUMIN SERPL-MCNC: 3.7 G/DL (ref 3.5–5.2)
ALBUMIN/GLOB SERPL: 1.1 G/DL
ALP SERPL-CCNC: 73 U/L (ref 39–117)
ALT SERPL W P-5'-P-CCNC: 28 U/L (ref 1–41)
ANION GAP SERPL CALCULATED.3IONS-SCNC: 13.1 MMOL/L
AST SERPL-CCNC: 13 U/L (ref 1–40)
BASOPHILS # BLD AUTO: 0.01 10*3/MM3 (ref 0–0.2)
BASOPHILS NFR BLD AUTO: 0.1 % (ref 0–1.5)
BILIRUB SERPL-MCNC: 0.5 MG/DL (ref 0.1–1.2)
BUN BLD-MCNC: 26 MG/DL (ref 8–23)
BUN/CREAT SERPL: 34.2 (ref 7–25)
CALCIUM SPEC-SCNC: 8.8 MG/DL (ref 8.6–10.5)
CHLORIDE SERPL-SCNC: 102 MMOL/L (ref 98–107)
CO2 SERPL-SCNC: 25.9 MMOL/L (ref 22–29)
CREAT BLD-MCNC: 0.76 MG/DL (ref 0.76–1.27)
DEPRECATED RDW RBC AUTO: 45.8 FL (ref 37–54)
EOSINOPHIL # BLD AUTO: 0.01 10*3/MM3 (ref 0–0.7)
EOSINOPHIL NFR BLD AUTO: 0.1 % (ref 0.3–6.2)
ERYTHROCYTE [DISTWIDTH] IN BLOOD BY AUTOMATED COUNT: 14.1 % (ref 11.5–14.5)
GFR SERPL CREATININE-BSD FRML MDRD: 104 ML/MIN/1.73
GLOBULIN UR ELPH-MCNC: 3.5 GM/DL
GLUCOSE BLD-MCNC: 126 MG/DL (ref 65–99)
GLUCOSE BLDC GLUCOMTR-MCNC: 117 MG/DL (ref 70–130)
GLUCOSE BLDC GLUCOMTR-MCNC: 133 MG/DL (ref 70–130)
HCT VFR BLD AUTO: 48.8 % (ref 40.4–52.2)
HGB BLD-MCNC: 15.1 G/DL (ref 13.7–17.6)
IMM GRANULOCYTES # BLD AUTO: 0.04 10*3/MM3 (ref 0–0.03)
IMM GRANULOCYTES NFR BLD AUTO: 0.4 % (ref 0–0.5)
LYMPHOCYTES # BLD AUTO: 1.15 10*3/MM3 (ref 0.9–4.8)
LYMPHOCYTES NFR BLD AUTO: 10.5 % (ref 19.6–45.3)
MCH RBC QN AUTO: 27.9 PG (ref 27–32.7)
MCHC RBC AUTO-ENTMCNC: 30.9 G/DL (ref 32.6–36.4)
MCV RBC AUTO: 90.2 FL (ref 79.8–96.2)
MONOCYTES # BLD AUTO: 0.51 10*3/MM3 (ref 0.2–1.2)
MONOCYTES NFR BLD AUTO: 4.6 % (ref 5–12)
NEUTROPHILS # BLD AUTO: 9.25 10*3/MM3 (ref 1.9–8.1)
NEUTROPHILS NFR BLD AUTO: 84.3 % (ref 42.7–76)
PLATELET # BLD AUTO: 231 10*3/MM3 (ref 140–500)
PMV BLD AUTO: 10.4 FL (ref 6–12)
POTASSIUM BLD-SCNC: 4 MMOL/L (ref 3.5–5.2)
PROT SERPL-MCNC: 7.2 G/DL (ref 6–8.5)
RBC # BLD AUTO: 5.41 10*6/MM3 (ref 4.6–6)
SODIUM BLD-SCNC: 141 MMOL/L (ref 136–145)
TROPONIN T SERPL-MCNC: <0.01 NG/ML (ref 0–0.03)
WBC NRBC COR # BLD: 10.97 10*3/MM3 (ref 4.5–10.7)

## 2019-01-27 PROCEDURE — 74019 RADEX ABDOMEN 2 VIEWS: CPT

## 2019-01-27 PROCEDURE — 25010000002 ENOXAPARIN PER 10 MG: Performed by: INTERNAL MEDICINE

## 2019-01-27 PROCEDURE — G0378 HOSPITAL OBSERVATION PER HR: HCPCS

## 2019-01-27 PROCEDURE — 25010000002 ONDANSETRON PER 1 MG: Performed by: INTERNAL MEDICINE

## 2019-01-27 PROCEDURE — 85025 COMPLETE CBC W/AUTO DIFF WBC: CPT | Performed by: HOSPITALIST

## 2019-01-27 PROCEDURE — 84484 ASSAY OF TROPONIN QUANT: CPT | Performed by: INTERNAL MEDICINE

## 2019-01-27 PROCEDURE — 80053 COMPREHEN METABOLIC PANEL: CPT | Performed by: HOSPITALIST

## 2019-01-27 PROCEDURE — 71045 X-RAY EXAM CHEST 1 VIEW: CPT

## 2019-01-27 PROCEDURE — 82962 GLUCOSE BLOOD TEST: CPT

## 2019-01-27 RX ADMIN — ATENOLOL 50 MG: 50 TABLET ORAL at 09:35

## 2019-01-27 RX ADMIN — NITROGLYCERIN 1 INCH: 20 OINTMENT TOPICAL at 23:21

## 2019-01-27 RX ADMIN — SODIUM CHLORIDE, PRESERVATIVE FREE 3 ML: 5 INJECTION INTRAVENOUS at 22:05

## 2019-01-27 RX ADMIN — SODIUM CHLORIDE, PRESERVATIVE FREE 3 ML: 5 INJECTION INTRAVENOUS at 09:35

## 2019-01-27 RX ADMIN — CITALOPRAM 20 MG: 20 TABLET, FILM COATED ORAL at 09:35

## 2019-01-27 RX ADMIN — ENOXAPARIN SODIUM 110 MG: 120 INJECTION SUBCUTANEOUS at 07:01

## 2019-01-27 RX ADMIN — ENOXAPARIN SODIUM 100 MG: 100 INJECTION SUBCUTANEOUS at 22:05

## 2019-01-27 RX ADMIN — ONDANSETRON 4 MG: 2 INJECTION INTRAMUSCULAR; INTRAVENOUS at 07:01

## 2019-01-27 RX ADMIN — ONDANSETRON 4 MG: 2 INJECTION INTRAMUSCULAR; INTRAVENOUS at 14:10

## 2019-01-27 RX ADMIN — FUROSEMIDE 40 MG: 40 TABLET ORAL at 09:35

## 2019-01-28 ENCOUNTER — APPOINTMENT (OUTPATIENT)
Dept: GENERAL RADIOLOGY | Facility: HOSPITAL | Age: 64
End: 2019-01-28
Attending: INTERNAL MEDICINE

## 2019-01-28 LAB
ANION GAP SERPL CALCULATED.3IONS-SCNC: 8.1 MMOL/L
BASOPHILS # BLD AUTO: 0.01 10*3/MM3 (ref 0–0.2)
BASOPHILS NFR BLD AUTO: 0.1 % (ref 0–1.5)
BUN BLD-MCNC: 24 MG/DL (ref 8–23)
BUN/CREAT SERPL: 26.4 (ref 7–25)
CALCIUM SPEC-SCNC: 8.8 MG/DL (ref 8.6–10.5)
CHLORIDE SERPL-SCNC: 104 MMOL/L (ref 98–107)
CO2 SERPL-SCNC: 29.9 MMOL/L (ref 22–29)
CREAT BLD-MCNC: 0.91 MG/DL (ref 0.76–1.27)
DEPRECATED RDW RBC AUTO: 45.5 FL (ref 37–54)
EOSINOPHIL # BLD AUTO: 0.02 10*3/MM3 (ref 0–0.7)
EOSINOPHIL NFR BLD AUTO: 0.2 % (ref 0.3–6.2)
ERYTHROCYTE [DISTWIDTH] IN BLOOD BY AUTOMATED COUNT: 13.9 % (ref 11.5–14.5)
GFR SERPL CREATININE-BSD FRML MDRD: 84 ML/MIN/1.73
GLUCOSE BLD-MCNC: 103 MG/DL (ref 65–99)
HCT VFR BLD AUTO: 45.7 % (ref 40.4–52.2)
HGB BLD-MCNC: 14.7 G/DL (ref 13.7–17.6)
IMM GRANULOCYTES # BLD AUTO: 0.02 10*3/MM3 (ref 0–0.03)
IMM GRANULOCYTES NFR BLD AUTO: 0.2 % (ref 0–0.5)
LYMPHOCYTES # BLD AUTO: 1.86 10*3/MM3 (ref 0.9–4.8)
LYMPHOCYTES NFR BLD AUTO: 20.7 % (ref 19.6–45.3)
MCH RBC QN AUTO: 28.8 PG (ref 27–32.7)
MCHC RBC AUTO-ENTMCNC: 32.2 G/DL (ref 32.6–36.4)
MCV RBC AUTO: 89.6 FL (ref 79.8–96.2)
MONOCYTES # BLD AUTO: 0.81 10*3/MM3 (ref 0.2–1.2)
MONOCYTES NFR BLD AUTO: 9 % (ref 5–12)
NEUTROPHILS # BLD AUTO: 6.27 10*3/MM3 (ref 1.9–8.1)
NEUTROPHILS NFR BLD AUTO: 70 % (ref 42.7–76)
PLATELET # BLD AUTO: 271 10*3/MM3 (ref 140–500)
PMV BLD AUTO: 10.8 FL (ref 6–12)
POTASSIUM BLD-SCNC: 3.6 MMOL/L (ref 3.5–5.2)
RBC # BLD AUTO: 5.1 10*6/MM3 (ref 4.6–6)
SODIUM BLD-SCNC: 142 MMOL/L (ref 136–145)
WBC NRBC COR # BLD: 8.97 10*3/MM3 (ref 4.5–10.7)

## 2019-01-28 PROCEDURE — 93010 ELECTROCARDIOGRAM REPORT: CPT | Performed by: INTERNAL MEDICINE

## 2019-01-28 PROCEDURE — 71046 X-RAY EXAM CHEST 2 VIEWS: CPT

## 2019-01-28 PROCEDURE — 25010000002 ENOXAPARIN PER 10 MG: Performed by: INTERNAL MEDICINE

## 2019-01-28 PROCEDURE — 93005 ELECTROCARDIOGRAM TRACING: CPT | Performed by: NURSE PRACTITIONER

## 2019-01-28 PROCEDURE — 85025 COMPLETE CBC W/AUTO DIFF WBC: CPT | Performed by: INTERNAL MEDICINE

## 2019-01-28 PROCEDURE — 99232 SBSQ HOSP IP/OBS MODERATE 35: CPT | Performed by: INTERNAL MEDICINE

## 2019-01-28 PROCEDURE — 80048 BASIC METABOLIC PNL TOTAL CA: CPT | Performed by: INTERNAL MEDICINE

## 2019-01-28 PROCEDURE — 25010000002 ENOXAPARIN PER 10 MG: Performed by: NURSE PRACTITIONER

## 2019-01-28 RX ORDER — ATORVASTATIN CALCIUM 10 MG/1
10 TABLET, FILM COATED ORAL NIGHTLY
Status: DISCONTINUED | OUTPATIENT
Start: 2019-01-28 | End: 2019-02-10 | Stop reason: HOSPADM

## 2019-01-28 RX ORDER — ASPIRIN 81 MG/1
81 TABLET ORAL DAILY
Status: DISCONTINUED | OUTPATIENT
Start: 2019-01-28 | End: 2019-01-30

## 2019-01-28 RX ORDER — AMLODIPINE BESYLATE 5 MG/1
5 TABLET ORAL
Status: DISCONTINUED | OUTPATIENT
Start: 2019-01-28 | End: 2019-02-01

## 2019-01-28 RX ADMIN — SODIUM CHLORIDE, PRESERVATIVE FREE 3 ML: 5 INJECTION INTRAVENOUS at 20:02

## 2019-01-28 RX ADMIN — CITALOPRAM 20 MG: 20 TABLET, FILM COATED ORAL at 09:11

## 2019-01-28 RX ADMIN — ASPIRIN 81 MG: 81 TABLET, DELAYED RELEASE ORAL at 19:54

## 2019-01-28 RX ADMIN — ACETAMINOPHEN 650 MG: 325 TABLET, FILM COATED ORAL at 09:19

## 2019-01-28 RX ADMIN — NITROGLYCERIN 0.5 INCH: 20 OINTMENT TOPICAL at 14:15

## 2019-01-28 RX ADMIN — SODIUM CHLORIDE, PRESERVATIVE FREE 3 ML: 5 INJECTION INTRAVENOUS at 09:11

## 2019-01-28 RX ADMIN — ATENOLOL 50 MG: 50 TABLET ORAL at 09:11

## 2019-01-28 RX ADMIN — ENOXAPARIN SODIUM 100 MG: 100 INJECTION SUBCUTANEOUS at 09:11

## 2019-01-28 RX ADMIN — ACETAMINOPHEN 650 MG: 325 TABLET, FILM COATED ORAL at 23:45

## 2019-01-28 RX ADMIN — ENOXAPARIN SODIUM 100 MG: 100 INJECTION SUBCUTANEOUS at 20:02

## 2019-01-28 RX ADMIN — ACETAMINOPHEN 650 MG: 325 TABLET, FILM COATED ORAL at 15:56

## 2019-01-28 RX ADMIN — NITROGLYCERIN 0.5 INCH: 20 OINTMENT TOPICAL at 19:55

## 2019-01-28 RX ADMIN — ATORVASTATIN CALCIUM 10 MG: 10 TABLET, FILM COATED ORAL at 20:02

## 2019-01-28 RX ADMIN — AMLODIPINE BESYLATE 5 MG: 5 TABLET ORAL at 19:59

## 2019-01-29 PROBLEM — I20.89 ANGINA AT REST: Status: ACTIVE | Noted: 2019-01-24

## 2019-01-29 PROBLEM — I20.8 ANGINA AT REST (HCC): Status: ACTIVE | Noted: 2019-01-24

## 2019-01-29 LAB
ANION GAP SERPL CALCULATED.3IONS-SCNC: 11.4 MMOL/L
BUN BLD-MCNC: 25 MG/DL (ref 8–23)
BUN/CREAT SERPL: 31.3 (ref 7–25)
CALCIUM SPEC-SCNC: 9.5 MG/DL (ref 8.6–10.5)
CHLORIDE SERPL-SCNC: 100 MMOL/L (ref 98–107)
CO2 SERPL-SCNC: 28.6 MMOL/L (ref 22–29)
CREAT BLD-MCNC: 0.8 MG/DL (ref 0.76–1.27)
DEPRECATED RDW RBC AUTO: 46.4 FL (ref 37–54)
ERYTHROCYTE [DISTWIDTH] IN BLOOD BY AUTOMATED COUNT: 14.1 % (ref 11.5–14.5)
GFR SERPL CREATININE-BSD FRML MDRD: 98 ML/MIN/1.73
GLUCOSE BLD-MCNC: 86 MG/DL (ref 65–99)
HCT VFR BLD AUTO: 49 % (ref 40.4–52.2)
HGB BLD-MCNC: 15.1 G/DL (ref 13.7–17.6)
MCH RBC QN AUTO: 28.1 PG (ref 27–32.7)
MCHC RBC AUTO-ENTMCNC: 30.8 G/DL (ref 32.6–36.4)
MCV RBC AUTO: 91.2 FL (ref 79.8–96.2)
PLATELET # BLD AUTO: 256 10*3/MM3 (ref 140–500)
PMV BLD AUTO: 10.5 FL (ref 6–12)
POTASSIUM BLD-SCNC: 3.9 MMOL/L (ref 3.5–5.2)
RBC # BLD AUTO: 5.37 10*6/MM3 (ref 4.6–6)
SODIUM BLD-SCNC: 140 MMOL/L (ref 136–145)
WBC NRBC COR # BLD: 8.67 10*3/MM3 (ref 4.5–10.7)

## 2019-01-29 PROCEDURE — B2151ZZ FLUOROSCOPY OF LEFT HEART USING LOW OSMOLAR CONTRAST: ICD-10-PCS | Performed by: INTERNAL MEDICINE

## 2019-01-29 PROCEDURE — C1769 GUIDE WIRE: HCPCS | Performed by: INTERNAL MEDICINE

## 2019-01-29 PROCEDURE — 25010000002 MIDAZOLAM PER 1 MG: Performed by: INTERNAL MEDICINE

## 2019-01-29 PROCEDURE — C1894 INTRO/SHEATH, NON-LASER: HCPCS | Performed by: INTERNAL MEDICINE

## 2019-01-29 PROCEDURE — B2111ZZ FLUOROSCOPY OF MULTIPLE CORONARY ARTERIES USING LOW OSMOLAR CONTRAST: ICD-10-PCS | Performed by: INTERNAL MEDICINE

## 2019-01-29 PROCEDURE — 25010000002 ENOXAPARIN PER 10 MG: Performed by: NURSE PRACTITIONER

## 2019-01-29 PROCEDURE — 99153 MOD SED SAME PHYS/QHP EA: CPT | Performed by: INTERNAL MEDICINE

## 2019-01-29 PROCEDURE — 93458 L HRT ARTERY/VENTRICLE ANGIO: CPT | Performed by: INTERNAL MEDICINE

## 2019-01-29 PROCEDURE — 25010000002 HYDRALAZINE PER 20 MG: Performed by: INTERNAL MEDICINE

## 2019-01-29 PROCEDURE — 80048 BASIC METABOLIC PNL TOTAL CA: CPT | Performed by: INTERNAL MEDICINE

## 2019-01-29 PROCEDURE — 0 IOPAMIDOL PER 1 ML: Performed by: INTERNAL MEDICINE

## 2019-01-29 PROCEDURE — 4A023N7 MEASUREMENT OF CARDIAC SAMPLING AND PRESSURE, LEFT HEART, PERCUTANEOUS APPROACH: ICD-10-PCS | Performed by: INTERNAL MEDICINE

## 2019-01-29 PROCEDURE — 25010000002 FENTANYL CITRATE (PF) 100 MCG/2ML SOLUTION: Performed by: INTERNAL MEDICINE

## 2019-01-29 PROCEDURE — 25010000002 HEPARIN (PORCINE) PER 1000 UNITS: Performed by: INTERNAL MEDICINE

## 2019-01-29 PROCEDURE — 99152 MOD SED SAME PHYS/QHP 5/>YRS: CPT | Performed by: INTERNAL MEDICINE

## 2019-01-29 PROCEDURE — 85027 COMPLETE CBC AUTOMATED: CPT | Performed by: INTERNAL MEDICINE

## 2019-01-29 RX ORDER — FENTANYL CITRATE 50 UG/ML
INJECTION, SOLUTION INTRAMUSCULAR; INTRAVENOUS AS NEEDED
Status: DISCONTINUED | OUTPATIENT
Start: 2019-01-29 | End: 2019-01-29 | Stop reason: HOSPADM

## 2019-01-29 RX ORDER — LIDOCAINE HYDROCHLORIDE 20 MG/ML
INJECTION, SOLUTION INFILTRATION; PERINEURAL AS NEEDED
Status: DISCONTINUED | OUTPATIENT
Start: 2019-01-29 | End: 2019-01-29 | Stop reason: HOSPADM

## 2019-01-29 RX ORDER — HYDRALAZINE HYDROCHLORIDE 20 MG/ML
10 INJECTION INTRAMUSCULAR; INTRAVENOUS ONCE
Status: COMPLETED | OUTPATIENT
Start: 2019-01-29 | End: 2019-01-29

## 2019-01-29 RX ORDER — SODIUM CHLORIDE 9 MG/ML
INJECTION, SOLUTION INTRAVENOUS CONTINUOUS PRN
Status: COMPLETED | OUTPATIENT
Start: 2019-01-29 | End: 2019-01-29

## 2019-01-29 RX ORDER — MIDAZOLAM HYDROCHLORIDE 1 MG/ML
INJECTION INTRAMUSCULAR; INTRAVENOUS AS NEEDED
Status: DISCONTINUED | OUTPATIENT
Start: 2019-01-29 | End: 2019-01-29 | Stop reason: HOSPADM

## 2019-01-29 RX ORDER — BISACODYL 10 MG
10 SUPPOSITORY, RECTAL RECTAL DAILY PRN
Status: DISCONTINUED | OUTPATIENT
Start: 2019-01-29 | End: 2019-02-10 | Stop reason: HOSPADM

## 2019-01-29 RX ORDER — SODIUM CHLORIDE 9 MG/ML
250 INJECTION, SOLUTION INTRAVENOUS ONCE AS NEEDED
Status: DISCONTINUED | OUTPATIENT
Start: 2019-01-29 | End: 2019-02-10 | Stop reason: HOSPADM

## 2019-01-29 RX ORDER — SODIUM CHLORIDE 9 MG/ML
100 INJECTION, SOLUTION INTRAVENOUS CONTINUOUS
Status: DISCONTINUED | OUTPATIENT
Start: 2019-01-29 | End: 2019-01-30

## 2019-01-29 RX ADMIN — ASPIRIN 81 MG: 81 TABLET, DELAYED RELEASE ORAL at 08:21

## 2019-01-29 RX ADMIN — NITROGLYCERIN 0.5 INCH: 20 OINTMENT TOPICAL at 17:47

## 2019-01-29 RX ADMIN — SODIUM CHLORIDE, PRESERVATIVE FREE 3 ML: 5 INJECTION INTRAVENOUS at 08:30

## 2019-01-29 RX ADMIN — POLYETHYLENE GLYCOL 3350 17 G: 17 POWDER, FOR SOLUTION ORAL at 17:47

## 2019-01-29 RX ADMIN — HYDRALAZINE HYDROCHLORIDE 10 MG: 20 INJECTION INTRAMUSCULAR; INTRAVENOUS at 15:37

## 2019-01-29 RX ADMIN — CITALOPRAM 20 MG: 20 TABLET, FILM COATED ORAL at 08:20

## 2019-01-29 RX ADMIN — NITROGLYCERIN 0.5 INCH: 20 OINTMENT TOPICAL at 06:48

## 2019-01-29 RX ADMIN — ENOXAPARIN SODIUM 100 MG: 100 INJECTION SUBCUTANEOUS at 20:17

## 2019-01-29 RX ADMIN — ATENOLOL 50 MG: 50 TABLET ORAL at 08:21

## 2019-01-29 RX ADMIN — NITROGLYCERIN 0.5 INCH: 20 OINTMENT TOPICAL at 12:54

## 2019-01-29 RX ADMIN — NITROGLYCERIN 0.5 INCH: 20 OINTMENT TOPICAL at 23:14

## 2019-01-29 RX ADMIN — ACETAMINOPHEN 650 MG: 325 TABLET, FILM COATED ORAL at 23:35

## 2019-01-29 RX ADMIN — ATORVASTATIN CALCIUM 10 MG: 10 TABLET, FILM COATED ORAL at 20:17

## 2019-01-29 RX ADMIN — SODIUM CHLORIDE, PRESERVATIVE FREE 3 ML: 5 INJECTION INTRAVENOUS at 20:18

## 2019-01-29 RX ADMIN — SODIUM CHLORIDE 100 ML/HR: 9 INJECTION, SOLUTION INTRAVENOUS at 17:47

## 2019-01-29 RX ADMIN — AMLODIPINE BESYLATE 5 MG: 5 TABLET ORAL at 08:20

## 2019-01-30 ENCOUNTER — APPOINTMENT (OUTPATIENT)
Dept: MRI IMAGING | Facility: HOSPITAL | Age: 64
End: 2019-01-30

## 2019-01-30 LAB
ANION GAP SERPL CALCULATED.3IONS-SCNC: 11.2 MMOL/L
BASOPHILS # BLD AUTO: 0.01 10*3/MM3 (ref 0–0.2)
BASOPHILS NFR BLD AUTO: 0.1 % (ref 0–1.5)
BUN BLD-MCNC: 18 MG/DL (ref 8–23)
BUN/CREAT SERPL: 25.4 (ref 7–25)
CALCIUM SPEC-SCNC: 8.9 MG/DL (ref 8.6–10.5)
CHLORIDE SERPL-SCNC: 104 MMOL/L (ref 98–107)
CO2 SERPL-SCNC: 27.8 MMOL/L (ref 22–29)
CREAT BLD-MCNC: 0.71 MG/DL (ref 0.76–1.27)
DEPRECATED RDW RBC AUTO: 45.3 FL (ref 37–54)
EOSINOPHIL # BLD AUTO: 0.04 10*3/MM3 (ref 0–0.7)
EOSINOPHIL NFR BLD AUTO: 0.5 % (ref 0.3–6.2)
ERYTHROCYTE [DISTWIDTH] IN BLOOD BY AUTOMATED COUNT: 13.8 % (ref 11.5–14.5)
GFR SERPL CREATININE-BSD FRML MDRD: 112 ML/MIN/1.73
GLUCOSE BLD-MCNC: 85 MG/DL (ref 65–99)
GLUCOSE BLDC GLUCOMTR-MCNC: 100 MG/DL (ref 70–130)
GLUCOSE BLDC GLUCOMTR-MCNC: 92 MG/DL (ref 70–130)
HCT VFR BLD AUTO: 46.1 % (ref 40.4–52.2)
HGB BLD-MCNC: 14.1 G/DL (ref 13.7–17.6)
IMM GRANULOCYTES # BLD AUTO: 0.02 10*3/MM3 (ref 0–0.03)
IMM GRANULOCYTES NFR BLD AUTO: 0.3 % (ref 0–0.5)
INR PPP: 1.11 (ref 0.9–1.1)
LYMPHOCYTES # BLD AUTO: 1.95 10*3/MM3 (ref 0.9–4.8)
LYMPHOCYTES NFR BLD AUTO: 24.8 % (ref 19.6–45.3)
MCH RBC QN AUTO: 27.5 PG (ref 27–32.7)
MCHC RBC AUTO-ENTMCNC: 30.6 G/DL (ref 32.6–36.4)
MCV RBC AUTO: 90 FL (ref 79.8–96.2)
MONOCYTES # BLD AUTO: 0.77 10*3/MM3 (ref 0.2–1.2)
MONOCYTES NFR BLD AUTO: 9.8 % (ref 5–12)
NEUTROPHILS # BLD AUTO: 5.07 10*3/MM3 (ref 1.9–8.1)
NEUTROPHILS NFR BLD AUTO: 64.5 % (ref 42.7–76)
PLATELET # BLD AUTO: 248 10*3/MM3 (ref 140–500)
PMV BLD AUTO: 10.6 FL (ref 6–12)
POTASSIUM BLD-SCNC: 3.4 MMOL/L (ref 3.5–5.2)
PROTHROMBIN TIME: 14.1 SECONDS (ref 11.7–14.2)
RBC # BLD AUTO: 5.12 10*6/MM3 (ref 4.6–6)
SODIUM BLD-SCNC: 143 MMOL/L (ref 136–145)
WBC NRBC COR # BLD: 7.86 10*3/MM3 (ref 4.5–10.7)

## 2019-01-30 PROCEDURE — 80048 BASIC METABOLIC PNL TOTAL CA: CPT | Performed by: INTERNAL MEDICINE

## 2019-01-30 PROCEDURE — 82962 GLUCOSE BLOOD TEST: CPT

## 2019-01-30 PROCEDURE — 97110 THERAPEUTIC EXERCISES: CPT

## 2019-01-30 PROCEDURE — 97162 PT EVAL MOD COMPLEX 30 MIN: CPT

## 2019-01-30 PROCEDURE — A9577 INJ MULTIHANCE: HCPCS | Performed by: INTERNAL MEDICINE

## 2019-01-30 PROCEDURE — 85025 COMPLETE CBC W/AUTO DIFF WBC: CPT | Performed by: INTERNAL MEDICINE

## 2019-01-30 PROCEDURE — 70553 MRI BRAIN STEM W/O & W/DYE: CPT

## 2019-01-30 PROCEDURE — 0 GADOBENATE DIMEGLUMINE 529 MG/ML SOLUTION: Performed by: INTERNAL MEDICINE

## 2019-01-30 PROCEDURE — 85610 PROTHROMBIN TIME: CPT | Performed by: INTERNAL MEDICINE

## 2019-01-30 RX ORDER — HYDROCHLOROTHIAZIDE 12.5 MG/1
12.5 CAPSULE, GELATIN COATED ORAL DAILY
Status: DISCONTINUED | OUTPATIENT
Start: 2019-01-30 | End: 2019-01-30

## 2019-01-30 RX ORDER — POTASSIUM CHLORIDE 750 MG/1
40 CAPSULE, EXTENDED RELEASE ORAL ONCE
Status: COMPLETED | OUTPATIENT
Start: 2019-01-30 | End: 2019-01-30

## 2019-01-30 RX ORDER — LORAZEPAM 0.5 MG/1
0.5 TABLET ORAL ONCE
Status: COMPLETED | OUTPATIENT
Start: 2019-01-30 | End: 2019-01-30

## 2019-01-30 RX ADMIN — NITROGLYCERIN 0.5 INCH: 20 OINTMENT TOPICAL at 11:52

## 2019-01-30 RX ADMIN — ASPIRIN 81 MG: 81 TABLET, DELAYED RELEASE ORAL at 08:45

## 2019-01-30 RX ADMIN — APIXABAN 5 MG: 5 TABLET, FILM COATED ORAL at 11:53

## 2019-01-30 RX ADMIN — POTASSIUM CHLORIDE 40 MEQ: 750 CAPSULE, EXTENDED RELEASE ORAL at 18:19

## 2019-01-30 RX ADMIN — NITROGLYCERIN 0.5 INCH: 20 OINTMENT TOPICAL at 05:48

## 2019-01-30 RX ADMIN — SODIUM CHLORIDE 5 MG/HR: 9 INJECTION, SOLUTION INTRAVENOUS at 21:21

## 2019-01-30 RX ADMIN — LORAZEPAM 0.5 MG: 0.5 TABLET ORAL at 18:42

## 2019-01-30 RX ADMIN — ACETAMINOPHEN 650 MG: 325 TABLET, FILM COATED ORAL at 09:55

## 2019-01-30 RX ADMIN — ACETAMINOPHEN 650 MG: 325 TABLET, FILM COATED ORAL at 22:06

## 2019-01-30 RX ADMIN — SODIUM CHLORIDE 100 ML/HR: 9 INJECTION, SOLUTION INTRAVENOUS at 05:48

## 2019-01-30 RX ADMIN — ATENOLOL 50 MG: 50 TABLET ORAL at 08:45

## 2019-01-30 RX ADMIN — AMLODIPINE BESYLATE 5 MG: 5 TABLET ORAL at 08:45

## 2019-01-30 RX ADMIN — POLYETHYLENE GLYCOL 3350 17 G: 17 POWDER, FOR SOLUTION ORAL at 11:20

## 2019-01-30 RX ADMIN — NITROGLYCERIN 0.5 INCH: 20 OINTMENT TOPICAL at 18:18

## 2019-01-30 RX ADMIN — CITALOPRAM 20 MG: 20 TABLET, FILM COATED ORAL at 08:45

## 2019-01-30 RX ADMIN — GADOBENATE DIMEGLUMINE 20 ML: 529 INJECTION, SOLUTION INTRAVENOUS at 19:33

## 2019-01-31 ENCOUNTER — APPOINTMENT (OUTPATIENT)
Dept: CT IMAGING | Facility: HOSPITAL | Age: 64
End: 2019-01-31

## 2019-01-31 LAB
ANION GAP SERPL CALCULATED.3IONS-SCNC: 13.2 MMOL/L
BASOPHILS # BLD AUTO: 0.01 10*3/MM3 (ref 0–0.2)
BASOPHILS NFR BLD AUTO: 0.1 % (ref 0–1.5)
BUN BLD-MCNC: 12 MG/DL (ref 8–23)
BUN/CREAT SERPL: 18.5 (ref 7–25)
CALCIUM SPEC-SCNC: 9.7 MG/DL (ref 8.6–10.5)
CHLORIDE SERPL-SCNC: 103 MMOL/L (ref 98–107)
CO2 SERPL-SCNC: 23.8 MMOL/L (ref 22–29)
CREAT BLD-MCNC: 0.65 MG/DL (ref 0.76–1.27)
DEPRECATED RDW RBC AUTO: 44.1 FL (ref 37–54)
EOSINOPHIL # BLD AUTO: 0.08 10*3/MM3 (ref 0–0.7)
EOSINOPHIL NFR BLD AUTO: 1 % (ref 0.3–6.2)
ERYTHROCYTE [DISTWIDTH] IN BLOOD BY AUTOMATED COUNT: 13.7 % (ref 11.5–14.5)
GFR SERPL CREATININE-BSD FRML MDRD: 124 ML/MIN/1.73
GLUCOSE BLD-MCNC: 93 MG/DL (ref 65–99)
GLUCOSE BLDC GLUCOMTR-MCNC: 89 MG/DL (ref 70–130)
HCT VFR BLD AUTO: 49.1 % (ref 40.4–52.2)
HGB BLD-MCNC: 16.2 G/DL (ref 13.7–17.6)
IMM GRANULOCYTES # BLD AUTO: 0.02 10*3/MM3 (ref 0–0.03)
IMM GRANULOCYTES NFR BLD AUTO: 0.3 % (ref 0–0.5)
LYMPHOCYTES # BLD AUTO: 1.64 10*3/MM3 (ref 0.9–4.8)
LYMPHOCYTES NFR BLD AUTO: 21.1 % (ref 19.6–45.3)
MCH RBC QN AUTO: 28.9 PG (ref 27–32.7)
MCHC RBC AUTO-ENTMCNC: 33 G/DL (ref 32.6–36.4)
MCV RBC AUTO: 87.5 FL (ref 79.8–96.2)
MONOCYTES # BLD AUTO: 0.77 10*3/MM3 (ref 0.2–1.2)
MONOCYTES NFR BLD AUTO: 9.9 % (ref 5–12)
NEUTROPHILS # BLD AUTO: 5.29 10*3/MM3 (ref 1.9–8.1)
NEUTROPHILS NFR BLD AUTO: 67.9 % (ref 42.7–76)
PLATELET # BLD AUTO: 250 10*3/MM3 (ref 140–500)
PMV BLD AUTO: 10.7 FL (ref 6–12)
POTASSIUM BLD-SCNC: 3.6 MMOL/L (ref 3.5–5.2)
RBC # BLD AUTO: 5.61 10*6/MM3 (ref 4.6–6)
SODIUM BLD-SCNC: 140 MMOL/L (ref 136–145)
WBC NRBC COR # BLD: 7.79 10*3/MM3 (ref 4.5–10.7)

## 2019-01-31 PROCEDURE — 70450 CT HEAD/BRAIN W/O DYE: CPT

## 2019-01-31 PROCEDURE — 97110 THERAPEUTIC EXERCISES: CPT

## 2019-01-31 PROCEDURE — 99252 IP/OBS CONSLTJ NEW/EST SF 35: CPT | Performed by: NEUROLOGICAL SURGERY

## 2019-01-31 PROCEDURE — 99232 SBSQ HOSP IP/OBS MODERATE 35: CPT | Performed by: NURSE PRACTITIONER

## 2019-01-31 PROCEDURE — 85025 COMPLETE CBC W/AUTO DIFF WBC: CPT | Performed by: INTERNAL MEDICINE

## 2019-01-31 PROCEDURE — 82962 GLUCOSE BLOOD TEST: CPT

## 2019-01-31 PROCEDURE — 80048 BASIC METABOLIC PNL TOTAL CA: CPT | Performed by: INTERNAL MEDICINE

## 2019-01-31 RX ADMIN — ATORVASTATIN CALCIUM 10 MG: 10 TABLET, FILM COATED ORAL at 21:04

## 2019-01-31 RX ADMIN — SODIUM CHLORIDE, PRESERVATIVE FREE 3 ML: 5 INJECTION INTRAVENOUS at 00:07

## 2019-01-31 RX ADMIN — ATORVASTATIN CALCIUM 10 MG: 10 TABLET, FILM COATED ORAL at 00:07

## 2019-01-31 RX ADMIN — SODIUM CHLORIDE, PRESERVATIVE FREE 3 ML: 5 INJECTION INTRAVENOUS at 21:04

## 2019-01-31 RX ADMIN — NITROGLYCERIN 0.5 INCH: 20 OINTMENT TOPICAL at 12:36

## 2019-01-31 RX ADMIN — ATENOLOL 50 MG: 50 TABLET ORAL at 08:14

## 2019-01-31 RX ADMIN — SODIUM CHLORIDE 2.5 MG/HR: 9 INJECTION, SOLUTION INTRAVENOUS at 01:57

## 2019-01-31 RX ADMIN — CITALOPRAM 20 MG: 20 TABLET, FILM COATED ORAL at 08:14

## 2019-01-31 RX ADMIN — SODIUM CHLORIDE, PRESERVATIVE FREE 3 ML: 5 INJECTION INTRAVENOUS at 08:14

## 2019-01-31 RX ADMIN — AMLODIPINE BESYLATE 5 MG: 5 TABLET ORAL at 08:14

## 2019-01-31 RX ADMIN — NITROGLYCERIN 0.5 INCH: 20 OINTMENT TOPICAL at 22:23

## 2019-02-01 PROBLEM — I61.9 INTRAPARENCHYMAL HEMORRHAGE OF BRAIN: Status: ACTIVE | Noted: 2019-02-01

## 2019-02-01 PROBLEM — I50.32 DIASTOLIC CHF, CHRONIC (HCC): Status: ACTIVE | Noted: 2019-02-01

## 2019-02-01 PROBLEM — R06.00 DYSPNEA: Status: RESOLVED | Noted: 2019-01-24 | Resolved: 2019-02-01

## 2019-02-01 LAB
ALBUMIN SERPL-MCNC: 3.8 G/DL (ref 3.5–5.2)
ALBUMIN/GLOB SERPL: 1.2 G/DL
ALP SERPL-CCNC: 76 U/L (ref 39–117)
ALT SERPL W P-5'-P-CCNC: 37 U/L (ref 1–41)
ANION GAP SERPL CALCULATED.3IONS-SCNC: 14.8 MMOL/L
AST SERPL-CCNC: 16 U/L (ref 1–40)
BILIRUB SERPL-MCNC: 0.8 MG/DL (ref 0.1–1.2)
BUN BLD-MCNC: 23 MG/DL (ref 8–23)
BUN/CREAT SERPL: 29.1 (ref 7–25)
CALCIUM SPEC-SCNC: 9.7 MG/DL (ref 8.6–10.5)
CHLORIDE SERPL-SCNC: 102 MMOL/L (ref 98–107)
CO2 SERPL-SCNC: 24.2 MMOL/L (ref 22–29)
CREAT BLD-MCNC: 0.79 MG/DL (ref 0.76–1.27)
DEPRECATED RDW RBC AUTO: 45.1 FL (ref 37–54)
ERYTHROCYTE [DISTWIDTH] IN BLOOD BY AUTOMATED COUNT: 14 % (ref 11.5–14.5)
GFR SERPL CREATININE-BSD FRML MDRD: 99 ML/MIN/1.73
GLOBULIN UR ELPH-MCNC: 3.3 GM/DL
GLUCOSE BLD-MCNC: 89 MG/DL (ref 65–99)
HCT VFR BLD AUTO: 50.3 % (ref 40.4–52.2)
HGB BLD-MCNC: 16.1 G/DL (ref 13.7–17.6)
MAGNESIUM SERPL-MCNC: 2.4 MG/DL (ref 1.6–2.4)
MCH RBC QN AUTO: 28.6 PG (ref 27–32.7)
MCHC RBC AUTO-ENTMCNC: 32 G/DL (ref 32.6–36.4)
MCV RBC AUTO: 89.5 FL (ref 79.8–96.2)
PLATELET # BLD AUTO: 257 10*3/MM3 (ref 140–500)
PMV BLD AUTO: 10.8 FL (ref 6–12)
POTASSIUM BLD-SCNC: 3.7 MMOL/L (ref 3.5–5.2)
PROT SERPL-MCNC: 7.1 G/DL (ref 6–8.5)
RBC # BLD AUTO: 5.62 10*6/MM3 (ref 4.6–6)
SODIUM BLD-SCNC: 141 MMOL/L (ref 136–145)
WBC NRBC COR # BLD: 10.12 10*3/MM3 (ref 4.5–10.7)

## 2019-02-01 PROCEDURE — 80053 COMPREHEN METABOLIC PANEL: CPT | Performed by: INTERNAL MEDICINE

## 2019-02-01 PROCEDURE — 99232 SBSQ HOSP IP/OBS MODERATE 35: CPT | Performed by: INTERNAL MEDICINE

## 2019-02-01 PROCEDURE — 83735 ASSAY OF MAGNESIUM: CPT | Performed by: INTERNAL MEDICINE

## 2019-02-01 PROCEDURE — 97110 THERAPEUTIC EXERCISES: CPT

## 2019-02-01 PROCEDURE — 85027 COMPLETE CBC AUTOMATED: CPT | Performed by: INTERNAL MEDICINE

## 2019-02-01 RX ORDER — CARVEDILOL 6.25 MG/1
6.25 TABLET ORAL 2 TIMES DAILY WITH MEALS
Status: DISCONTINUED | OUTPATIENT
Start: 2019-02-01 | End: 2019-02-01

## 2019-02-01 RX ORDER — LISINOPRIL 20 MG/1
20 TABLET ORAL EVERY 12 HOURS SCHEDULED
Status: DISCONTINUED | OUTPATIENT
Start: 2019-02-01 | End: 2019-02-02

## 2019-02-01 RX ORDER — HYDROCHLOROTHIAZIDE 12.5 MG/1
12.5 CAPSULE, GELATIN COATED ORAL DAILY
Status: DISCONTINUED | OUTPATIENT
Start: 2019-02-01 | End: 2019-02-01

## 2019-02-01 RX ORDER — AMLODIPINE BESYLATE 10 MG/1
10 TABLET ORAL
Status: DISCONTINUED | OUTPATIENT
Start: 2019-02-01 | End: 2019-02-02

## 2019-02-01 RX ORDER — POTASSIUM CHLORIDE 750 MG/1
20 CAPSULE, EXTENDED RELEASE ORAL DAILY
Status: DISCONTINUED | OUTPATIENT
Start: 2019-02-01 | End: 2019-02-10 | Stop reason: HOSPADM

## 2019-02-01 RX ORDER — LISINOPRIL 20 MG/1
20 TABLET ORAL
Status: DISCONTINUED | OUTPATIENT
Start: 2019-02-01 | End: 2019-02-01

## 2019-02-01 RX ORDER — HYDROCHLOROTHIAZIDE 25 MG/1
25 TABLET ORAL DAILY
Status: DISCONTINUED | OUTPATIENT
Start: 2019-02-02 | End: 2019-02-01

## 2019-02-01 RX ORDER — HYDROCHLOROTHIAZIDE 12.5 MG/1
12.5 CAPSULE, GELATIN COATED ORAL ONCE
Status: DISCONTINUED | OUTPATIENT
Start: 2019-02-01 | End: 2019-02-01

## 2019-02-01 RX ORDER — FUROSEMIDE 40 MG/1
40 TABLET ORAL DAILY
Status: DISCONTINUED | OUTPATIENT
Start: 2019-02-01 | End: 2019-02-10 | Stop reason: HOSPADM

## 2019-02-01 RX ADMIN — ATENOLOL 50 MG: 50 TABLET ORAL at 11:06

## 2019-02-01 RX ADMIN — CITALOPRAM 20 MG: 20 TABLET, FILM COATED ORAL at 11:06

## 2019-02-01 RX ADMIN — ATORVASTATIN CALCIUM 10 MG: 10 TABLET, FILM COATED ORAL at 20:03

## 2019-02-01 RX ADMIN — HYDROCHLOROTHIAZIDE 12.5 MG: 12.5 CAPSULE ORAL at 16:43

## 2019-02-01 RX ADMIN — AMLODIPINE BESYLATE 10 MG: 10 TABLET ORAL at 05:02

## 2019-02-01 RX ADMIN — NITROGLYCERIN 0.5 INCH: 20 OINTMENT TOPICAL at 19:38

## 2019-02-01 RX ADMIN — LISINOPRIL 20 MG: 20 TABLET ORAL at 16:43

## 2019-02-01 RX ADMIN — LISINOPRIL 20 MG: 20 TABLET ORAL at 20:03

## 2019-02-01 RX ADMIN — NITROGLYCERIN 0.5 INCH: 20 OINTMENT TOPICAL at 11:07

## 2019-02-01 RX ADMIN — FUROSEMIDE 40 MG: 40 TABLET ORAL at 16:44

## 2019-02-01 RX ADMIN — POTASSIUM CHLORIDE 20 MEQ: 750 CAPSULE, EXTENDED RELEASE ORAL at 16:44

## 2019-02-01 RX ADMIN — SODIUM CHLORIDE, PRESERVATIVE FREE 3 ML: 5 INJECTION INTRAVENOUS at 11:11

## 2019-02-01 RX ADMIN — HYDROCHLOROTHIAZIDE 12.5 MG: 12.5 CAPSULE ORAL at 14:09

## 2019-02-01 RX ADMIN — METOPROLOL TARTRATE 25 MG: 25 TABLET ORAL at 22:15

## 2019-02-01 RX ADMIN — SODIUM CHLORIDE, PRESERVATIVE FREE 3 ML: 5 INJECTION INTRAVENOUS at 22:16

## 2019-02-01 NOTE — PLAN OF CARE
Problem: Patient Care Overview  Goal: Plan of Care Review  Outcome: Ongoing (interventions implemented as appropriate)   02/01/19 0607   Coping/Psychosocial   Plan of Care Reviewed With patient   Plan of Care Review   Progress improving   OTHER   Outcome Summary DBP remains elevated, otherwise VSS. Afib on monitor, HR 60's-80's. A&O x4. Will continue to monitor.

## 2019-02-01 NOTE — PROGRESS NOTES
"    DAILY PROGRESS NOTE  [facility]    Patient Identification:  Name: Flo Manzo  Age: 63 y.o.  Sex: male  :  1955  MRN: [id number]         Primary Care Physician: Cathleen Corona MD    Subjective:  Interval History: Continues to full much better clinically.  He denies any headache nausea vomiting chest pain or shortness of breath.    Objective: No family at bedside - discussed with RN    Scheduled Meds:    amLODIPine 10 mg Oral Q24H   atorvastatin 10 mg Oral Nightly   citalopram 20 mg Oral Daily   furosemide 40 mg Oral Daily   lisinopril 20 mg Oral Q12H   metoprolol tartrate 25 mg Oral Q12H   nitroglycerin 0.5 inch Topical Q6H   polyethylene glycol 17 g Oral Daily   potassium chloride 20 mEq Oral Daily   sodium chloride 3 mL Intravenous Q12H     Continuous Infusions:     Vital signs in last 24 hours:  Temp:  [97.9 °F (36.6 °C)-98.2 °F (36.8 °C)] 98.2 °F (36.8 °C)  Heart Rate:  [67-98] 86  Resp:  [17-18] 18  BP: (126-158)/() 147/116    Intake/Output:    Intake/Output Summary (Last 24 hours) at 2019 1510  Last data filed at 2019 0858  Gross per 24 hour   Intake 840 ml   Output 500 ml   Net 340 ml       Exam:  BP (!) 147/116   Pulse 86   Temp 98.2 °F (36.8 °C) (Oral)   Resp 18   Ht 180.3 cm (71\")   Wt 106 kg (232 lb 12.9 oz)   SpO2 96%   BMI 32.47 kg/m²     General Appearance:    Alert, cooperative, no distress, AAOx3, fluent speech, not toxic appearing                          Head:    Normocephalic, without obvious abnormality, atraumatic                        Throat:   Lips, tongue, gums normal; oral mucosa pink and moist                           Neck:   No JVD                         Lungs:    Clear to auscultation bilaterally, respirations unlabored                          Heart:    Regular rate and rhythm, S1 and S2 normal                  Abdomen:     Soft, non-tender, bowel sounds active                 Extremities:   Moving all, no cyanosis or edema          "               Pulses:   Pulses palpable in all extremities                  Neurologic:   CNII-XII intact, moving all without focal deficits noted     Data Review:  Labs in chart were reviewed.    Assessment:  Active Hospital Problems    Diagnosis Date Noted   • **Essential hypertension [I10] 01/05/2018   • Intraparenchymal hemorrhage of brain (CMS/HCC) [I61.9] 02/01/2019   • Diastolic CHF, chronic (CMS/HCC) [I50.32] 02/01/2019   • New onset atrial fibrillation (CMS/[facility]) - RVR [I48.91] 01/24/2019   • Sleep apnea [G47.30] 01/24/2019   • Goiter [E04.9] 01/24/2019   • CHF (congestive heart failure) (CMS/[facility]) [I50.9] 01/24/2019   • Angina at rest (CMS/[facility]) [I20.8] 01/24/2019      Resolved Hospital Problems    Diagnosis Date Noted Date Resolved   • Dyspnea [R06.00] 01/24/2019 02/01/2019       Plan:    HTN - BP not at goal per BARTOLO recommendations   -Will transition 50 mg of atenolol to metoprolol 25 mg twice daily   -Agree with need for diuretic but will change 12.5 mg hydrochlorothiazide to 40 mg lasix   -Agree with Norvasc   -Add lisinopril 20 mg now and make bid    -Monitor response definitely not as good as it was when patient was previously on Cardene drip   -I suspect sleep apnea is driving force for refractory hypertension   -Change diet to cardiac/low sodium   -still w/ nitropaste to chest - further need per Cards    Grade 2 diastolic dysfunction - secondary to GÓMEZ/HTN    Afib - new onset - per Cards     Goiter - no intervention per TTS    IPH secondary to HTN and Xarelto - await recs on when safe to restart but BP not controlled just yet either     Mehul Merida MD  2/1/2019  3:10 PM

## 2019-02-01 NOTE — PROGRESS NOTES
[facility]  Cardiology Progress Note    Patient Identification:  Name: Flo Manzo  Age: 63 y.o.  Sex: male  :  1955  MRN: [id number]                 Follow Up / Chief Complaint: afib    Interval History:  Troponin negative x3.  Heart cath  no obstructive CAD.  Transitioned from heparin sq to eliquis  first dose given this am. MRI has been ordered by Dr. Corey d/t slurring of speech noted by wife after patient was awoken.  MRI showed IPH and neurosurgery has seen. Cardene gtt has been weaned.  Transferred out of unit .     Subjective: denies chest pain or pressure or shoulder pain. Walked in hallway and did better today. Some dizziness remains.  No syncope or near syncope.         Objective:    BP: (126-158)/() 147/116   Flow (L/min):  [3] 3   Resp:  [17-18] 18   Heart Rate:  [67-98] 86   Temp:  [97.9 °F (36.6 °C)-98.2 °F (36.8 °C)] 98.2 °F (36.8 °C)   SpO2:  [93 %-98 %] 96 %    Past Medical History:  Past Medical History:   Diagnosis Date   • Arthritis    • Colon polyps 2018    Hepatic flexure: tubular adenoma, only low-grade dysplasia; splenic flexure: tubular adenoma, only low-grade dysplasia; sigmoid colon: tubular adenoma, multiple fragments only low-grade dysplasia   • Depression    • Hypertension    • Sleep apnea     previously diagnosed with sleep apnea, had T&A done and it has since resolved    • Ventral hernia      Past Surgical History:  Past Surgical History:   Procedure Laterality Date   • APPENDECTOMY N/A    • CARDIAC CATHETERIZATION N/A 2004    Cath left ventriculography, coronary angiography and left heart catheterization, Normal results-Dr. Vadim Mancuso   • CARDIAC CATHETERIZATION N/A 2019    Procedure: Left Heart Cath;  Surgeon: Eren Bruce MD;  Location: [facility] CATH INVASIVE LOCATION;  Service: Cardiology   • CARDIAC CATHETERIZATION N/A 2019    Procedure: Left ventriculography;  Surgeon: Eren Bruce MD;   Location:  [facility] CATH INVASIVE LOCATION;  Service: Cardiology   • CARDIAC CATHETERIZATION N/A 1/29/2019    Procedure: Coronary angiography;  Surgeon: Eren Bruce MD;  Location[facility]ATH [facility]AS[facility];  Service: Cardiology   • CARPAL TUNNEL RELEASE Right 2013   • CARPAL TUNNEL RELEASE Bilateral    • COLONOSCOPY N/A 1/4/2018    Procedure: COLONOSCOPY with cold polypectomy and hot snare polypectomy;  Surgeon: Ten Solitario MD;  [facility];  Service:    • EYE LENS IMPLANT SECONDARY Bilateral 2007, 2008   • KNEE CARTILAGE SURGERY Right 1979   • TONSILLECTOMY AND ADENOIDECTOMY Bilateral 2005   • TOTAL KNEE ARTHROPLASTY Left 03/14/2016    Left total knee arthroplasty with Amberly component, size 11 femur, G tibial baseplate with a 10 polyethylene insert and a 38 patellar button-Dr. Pablo Hairston   • TOTAL KNEE ARTHROPLASTY Right 03/02/2015    Right total knee arthroplasty with Amberly component size G femur, 11 tibial base plate with an 11 polyethylene insert and a 38 patellar button-Dr. Pablo Hairston   • UVULOPALATOPHARYNGOPLASTY N/A 2005    part of soft palate, and uvula   • VENTRAL HERNIA REPAIR N/A 1/23/2018    Procedure: VENTRAL HERNIA REPAIR LAPAROSCOPIC WITH DAVINCI ROBOT AND MESH;  Surgeon: Ten Solitario MD;  Location: [facility] MAIN OR;  Service:         Social History:   Social History     Tobacco Use   • Smoking status: Never Smoker   • Smokeless tobacco: Never Used   Substance Use Topics   • Alcohol use: Yes     Comment: social, maybe a drink a month      Family History:  Family History   Problem Relation Age of Onset   • Hypertension Mother    • Kidney failure Mother    • Coronary artery disease Father    • Lung cancer Father         positive smoker   • Heart attack Father    • Breast cancer Sister 60   • Prostate cancer Brother    • Colon polyps Brother    • Kidney nephrosis Brother    • Malig Hyperthermia Neg Hx           Allergies:  Allergies   Allergen Reactions   •  Poison Ivy Extract Hives, Itching, Rash and Other (See Comments)     ITCHY BLISTERS     Scheduled Meds:    amLODIPine 10 mg Q24H   atenolol 50 mg Daily   atorvastatin 10 mg Nightly   citalopram 20 mg Daily   Hydrochlorothiazide Oral 12.5 mg Daily   nitroglycerin 0.5 inch Q6H   polyethylene glycol 17 g Daily   sodium chloride 3 mL Q12H       I have reviewed the patient's history, home, and current medications.     INTAKE AND OUTPUT:    Intake/Output Summary (Last 24 hours) at 2/1/2019 1228  Last data filed at 2/1/2019 0858  Gross per 24 hour   Intake 840 ml   Output 500 ml   Net 340 ml         Review of Systems:   GI: denies n/v or abd pain  Cardiac: denies chest pain, or pressure; orthopnea improved;   Pulmonary: nonproductive cough but better, MCGREGOR noted but improved.     Constitutional:  Temp:  [97.9 °F (36.6 °C)-98.2 °F (36.8 °C)] 98.2 °F (36.8 °C)  Heart Rate:  [67-98] 86  Resp:  [17-18] 18  BP: (126-158)/() 147/116   SpO2:  [93 %-98 %] 96 %    Physical Exam:  General:  Appears in no acute distress, lying in bed, speech stronger today. Less lethargic.   Eyes: non icteric. No conjunctival drainage  HEENT:  No JVD. Thyroid not visibly enlarged. No mucosal pallor or cyanosis  Respiratory: Respirations regular and unlabored at rest. BBS with good air entry. No crackles, rubs or wheezes auscultated anteriorly and laterally  Cardiovascular: S1S2 irregular rate and rhythm. Soft TREMAINE LSB. no rub or gallop. No carotid bruits. DP/PT pulses  2+. No pretibial pitting edema  Gastrointestinal: Abdomen soft, obese, non tender. Bowel sounds present.  Musculoskeletal: ROCKWELL x4. No abnormal movements  Extremities: No digital clubbing or cyanosis; Right radial cath site without hematoma.   Non tender, no swelling.  Right fem site open to air with no palpable hematoma. Less TTP. No thrill, no bruit.  Radial and ulnar + 2 + and DP/PT 2+.    Skin: Color pink. Skin warm and dry to touch. No rashes    Neuro: AAO x3 CN II-XII grossly  intact; left side  improved today.   Psych: Mood and affect normal, pleasant and cooperative    Cardiographics    Telemetry:   HR trends 76-90             EC/26/19 atrial fibrillation with controlled VR        19  AFib with controlled VR.                Previous EKG 19 SR                 I have personally reviewed and interpreted ECG and telemetry.         Echocardiogram:       Interpretation Summary 19    · Left ventricular wall thickness is consistent with mild concentric hypertrophy.  · Estimated EF = 53%.  · Left ventricular systolic function is normal.  · Mild mitral valve regurgitation is present  · Left atrial cavity size is moderately dilated.  · There is moderate calcification of the aortic valve.  · Mild aortic valve stenosis is present.  · Aortic valve maximum pressure gradient is 28.0 mmHg.  · Aortic valve mean pressure gradient is 16.0 mmHg.  · Mild tricuspid valve regurgitation is present.  · Calculated right ventricular systolic pressure from tricuspid regurgitation is 31 mmHg.         Interpretation Summary echo 11/15/17     · Left ventricular wall thickness is consistent with mild concentric hypertrophy.  · Left ventricular systolic function is normal. Estimated EF = 52%.  · Left ventricular diastolic dysfunction (grade II) consistent with pseudonormalization.  · Mild tricuspid valve regurgitation is present.  · Calculated right ventricular systolic pressure from tricuspid regurgitation is 37 mmHg.               Echo 2/19/15 with Tay  Conclusions:  The study quality is good.   There is normal left ventricular systolic function.  Global left ventricular wall motion and contractility are within normal  limits.  The estimated ejection fraction is 60-65%.   Mild to moderate concentric left ventricular hypertrophy is observed.  The left atrium is moderately enlarged.  There is aortic annular calcification.  There is mild mitral annular calcification.  There is trivial to  mild mitral regurgitation observed.  There is mild tricuspid regurgitation.  The right ventricular systolic pressure is estimated to be 30-35 mmHg.         CTA of chest 1/24/19    IMPRESSION:  There is no CT evidence of pulmonary embolism or other acute  process within the chest. There is mild cardiomegaly. A moderately large  goiter extending into the superior mediastinum produces approximately  50% narrowing of the tracheal lumen.    Stress 1/26/19    Interpretation Summary     · Findings consistent with an equivocal ECG stress test.  · Left ventricular ejection fraction is mildly reduced (Calculated EF = 40%).  · Myocardial perfusion imaging indicates a normal myocardial perfusion study with no evidence of ischemia.  · Impressions are consistent with an intermediate risk study.  · Non ischemic cardiomyopathy       Interpretation Summary Nuc Stresss 1/26/19    · Findings consistent with an equivocal ECG stress test.  · Left ventricular ejection fraction is mildly reduced (Calculated EF = 40%).  · Myocardial perfusion imaging indicates a normal myocardial perfusion study with no evidence of ischemia.  · Impressions are consistent with an intermediate risk study.  · Non ischemic cardiomyopathy          Nuc 1/30/19 MRI of brain  IMPRESSION:  There are findings compatible with an acute intraparenchymal  hematoma within the medial portion of the left cerebellar hemisphere  with extension into the fourth ventricle. Intraventricular hemorrhage is  identified within the dependent aspects of the lateral ventricles. The  etiology of the hemorrhage is uncertain. There are multiple punctate  areas of susceptibility artifact identified within cortical medullary  interfaces of both cerebral hemispheres as well as within the thalami  and the left aspect of the ching. As such, this hemorrhage could be due  to amyloid angiopathy or a hypertensive hemorrhage. Of note, there are  punctate areas of diffusion-weighted hyperintensity  within the medial  portion of the right occipital lobe and the posterior aspect left  occipital lobe or sulci adjacent to the occipital cortex. An additional  similar focus is seen within the or adjacent to the cortex of the  superior aspect of the left cerebellar hemisphere. These could be due to  small foci of acute infarction in which case the hemorrhage within the  left cerebellar hemisphere could be hemorrhagic transformation of an  infarct. Alternatively, these could be representative of areas of  subarachnoid hemorrhage. Finally, I cannot exclude the possibility of an  underlying mass or vascular malformation within the left cerebellar  hemisphere. I would recommend performing a CT scan of the head at this  time to establish a baseline of this hemorrhage. These findings and  recommendations were discussed with Dr. Sebastian on 01/30/2019  approximately 8:20 p.m.       Conclusion 1/29/19       · Successful right and left coronary angiogram and LV gram  · Normal coronary arteries  · And normal LV gram           Lab Review   Results from last 7 days   Lab Units 01/27/19  0422 01/26/19  2221   TROPONIN T ng/mL <0.010 <0.010     Results from last 7 days   Lab Units 02/01/19  0331   MAGNESIUM mg/dL 2.4     Results from last 7 days   Lab Units 02/01/19  0331   SODIUM mmol/L 141   POTASSIUM mmol/L 3.7   BUN mg/dL 23   CREATININE mg/dL 0.79   CALCIUM mg/dL 9.7       Results from last 7 days   Lab Units 02/01/19  0331 01/31/19  0424 01/30/19  0508   WBC 10*3/mm3 10.12 7.79 7.86   HEMOGLOBIN g/dL 16.1 16.2 14.1   HEMATOCRIT % 50.3 49.1 46.1   PLATELETS 10*3/mm3 257 250 248     Results from last 7 days   Lab Units 01/30/19  2133   INR  1.11*            Estimated Creatinine Clearance: 120.1 mL/min (by C-G formula based on SCr of 0.79 mg/dL).      Assessment:      Afib new onset: rate is controlled in the 60-70's.  CHADs-VASc 2.   Was transitioned to Eliquis 5 mg with first dose 1/30 am. MRI 1/31/19 showed IP hemorrhage.    "Eliquis and ASA on hold. TSH WNL.  1/25/19 echo EF 53% with concentric LVH showing Mild AS, Mild TR, and Mild MR.   Continue atenolol 50 mg.      Abnormal EKG with angina:  AFib with possible Anterior MI pattern on admit.  trops neg times 3.  Hx of cath > 10 years ok \"normal\".  1/26 Nuc stress normal perfusion but intermediate study. Pt had shoulder pain, radiating 1/28.    Cath 1/29 showing normal Coronary arteries.  BMP stable post cath.      Hx of Diastolic dysfunction (grade II) on echo 11/15/17:  Elevated proBNP on admit,  appears euvolemic and able to lay semi flat comfortably.      Hx of GÓMEZ:  uvoloplasty, and pharyngeal plasty 2005.  + snorer and high risk for sleep d/o breathing. Recommend repeat outpt sleep study to reevaluate.   RVSP 31 mmHg by echo, stable from prior echos.      Elevated D Dimer: CTA of chest neg for PE. Noted goiter, patient will follow up with Dr. Burciaga after discharge.      Goiter: noted on CT.    A moderately large goiter extending into the superior mediastinum with 50% narrowing of the tracheal lumen. Thoracic surgery has been consulted.  plans on outpt f/u once CV status stabilized.     Dyslipidemia: , goal 70 or less; 10 year ASCVD 12.9%.  Is on atorvastatin.     Hypokalemia: K+ normal 1/31 and 2/1 after replacement.      HTN:   Amlodipine 5 mg started 1/28. Is also on atenolol 50 mg.   BP trending back up today and amlodipine increased to 10 mg today by IM.  We will add HCTZ to assist with BP control will add a little KCL he has had issues with hypokalemia.    Neurosurgery is recommending tight BP control.       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 neurosurgery has seen and feels likely related to HTN and AC. noted recommendations for tight BP control and holding AC for at least 48 hours. No surgical intervention recommended.  CT head 1/31/19 early am stable.  Consider watchman procedure as outpt if deemed to be a non candidate for long term " AC.  He will need to be cleared from neurologic standpoint for warfarin use for at least 45 days post watchman.     Dysarthria, brief:  MRI of brain showed para vermian IPH. Neurosurgery has seen.  Eliquis on hold for Para Vermian IPH.  Neurology following.      Hydrochlorothiazide has been added    Eren Bruce MD  Cardiology service.

## 2019-02-01 NOTE — PLAN OF CARE
Problem: Patient Care Overview  Goal: Plan of Care Review  Outcome: Ongoing (interventions implemented as appropriate)   02/01/19 9379   Coping/Psychosocial   Plan of Care Reviewed With patient   OTHER   Outcome Summary Pt demonstrates improved gait compared to previous PT session as he required less assistance with gait. PT will continue to follow to address strength, mobility, and gait.

## 2019-02-02 LAB — GLUCOSE BLDC GLUCOMTR-MCNC: 135 MG/DL (ref 70–130)

## 2019-02-02 PROCEDURE — 93010 ELECTROCARDIOGRAM REPORT: CPT | Performed by: INTERNAL MEDICINE

## 2019-02-02 PROCEDURE — 97110 THERAPEUTIC EXERCISES: CPT

## 2019-02-02 PROCEDURE — 82962 GLUCOSE BLOOD TEST: CPT

## 2019-02-02 PROCEDURE — 93005 ELECTROCARDIOGRAM TRACING: CPT | Performed by: INTERNAL MEDICINE

## 2019-02-02 PROCEDURE — 99233 SBSQ HOSP IP/OBS HIGH 50: CPT | Performed by: INTERNAL MEDICINE

## 2019-02-02 RX ORDER — LISINOPRIL 5 MG/1
5 TABLET ORAL EVERY 12 HOURS SCHEDULED
Status: DISCONTINUED | OUTPATIENT
Start: 2019-02-02 | End: 2019-02-10 | Stop reason: HOSPADM

## 2019-02-02 RX ORDER — LISINOPRIL 5 MG/1
5 TABLET ORAL
Status: DISCONTINUED | OUTPATIENT
Start: 2019-02-02 | End: 2019-02-03

## 2019-02-02 RX ADMIN — ATORVASTATIN CALCIUM 10 MG: 10 TABLET, FILM COATED ORAL at 20:05

## 2019-02-02 RX ADMIN — LISINOPRIL 5 MG: 5 TABLET ORAL at 13:13

## 2019-02-02 RX ADMIN — METOPROLOL TARTRATE 25 MG: 25 TABLET ORAL at 10:29

## 2019-02-02 RX ADMIN — NITROGLYCERIN 0.5 INCH: 20 OINTMENT TOPICAL at 05:23

## 2019-02-02 RX ADMIN — CITALOPRAM 20 MG: 20 TABLET, FILM COATED ORAL at 10:29

## 2019-02-02 RX ADMIN — SODIUM CHLORIDE, PRESERVATIVE FREE 3 ML: 5 INJECTION INTRAVENOUS at 10:29

## 2019-02-02 RX ADMIN — NITROGLYCERIN 0.5 INCH: 20 OINTMENT TOPICAL at 00:27

## 2019-02-02 RX ADMIN — POTASSIUM CHLORIDE 20 MEQ: 750 CAPSULE, EXTENDED RELEASE ORAL at 10:28

## 2019-02-02 RX ADMIN — LISINOPRIL 5 MG: 5 TABLET ORAL at 20:05

## 2019-02-02 RX ADMIN — METOPROLOL TARTRATE 25 MG: 25 TABLET ORAL at 20:05

## 2019-02-02 RX ADMIN — FUROSEMIDE 40 MG: 40 TABLET ORAL at 10:29

## 2019-02-02 RX ADMIN — SODIUM CHLORIDE, PRESERVATIVE FREE 3 ML: 5 INJECTION INTRAVENOUS at 20:07

## 2019-02-02 NOTE — THERAPY TREATMENT NOTE
Acute Care - Physical Therapy Treatment Note  [facility]     Patient Name: Flo Manzo  : 1955  MRN: [id number]  Today's Date: 2019  Onset of Illness/Injury or Date of Surgery: 19          Admit Date: 2019    Visit Dx:    ICD-10-CM ICD-9-CM   1. New onset atrial fibrillation (CMS/HCC) I48.91 427.31   2. Goiter diffuse E04.9 240.9   3. Angina at rest (CMS/HCC) I20.8 413.9   4. Essential hypertension I10 401.9   5. New onset atrial fibrillation (CMS/HCC) - RVR I48.91 427.31   6. Generalized weakness R53.1 780.79     Patient Active Problem List   Diagnosis   • Umbilical hernia without obstruction and without gangrene   • Essential hypertension   • Arthritis of left knee   • Depression   • Obesity (BMI 30-39.9)   • New onset atrial fibrillation (CMS/HCC) - RVR   • Sleep apnea   • Goiter   • CHF (congestive heart failure) (CMS/HCC)   • Angina at rest (CMS/HCC)   • Intraparenchymal hemorrhage of brain (CMS/HCC)   • Diastolic CHF, chronic (CMS/HCC)       Therapy Treatment    Rehabilitation Treatment Summary     Row Name 19 1430             Treatment Time/Intention    Discipline  physical therapy assistant  -      Document Type  therapy note (daily note)  -SM      Subjective Information  complains of;fatigue;dizziness  -SM      Mode of Treatment  physical therapy  -SM      Patient Effort  adequate  -SM      Existing Precautions/Restrictions  fall  -SM      Treatment Considerations/Comments  rapid early AM, low BP  -SM      Recorded by [SM] Padmini Heart PTA 19 1447      Row Name 19 1430             Vital Signs    Pre Systolic BP Rehab  121  -SM      Pre Treatment Diastolic BP  112  -SM      Intra Systolic BP Rehab  122  -SM      Intra Treatment Diastolic BP  108  -SM      Pre Patient Position  Sitting  -SM      Intra Patient Position  Standing  -SM      Recorded by [SM] Padmini Heart PTA 19 1447      Row Name 19 1430             Cognitive  Assessment/Intervention- PT/OT    Orientation Status (Cognition)  oriented x 4  -SM      Follows Commands (Cognition)  WNL  -SM      Personal Safety Interventions  fall prevention program maintained;gait belt;nonskid shoes/slippers when out of bed  -      Recorded by [SM] Padmini Heart, [facility] 02/02/19 1447      Row Name 02/02/19 1430             Bed Mobility Assessment/Treatment    Bed Mobility Assessment/Treatment  supine-sit;sit-supine  -SM      Supine-Sit [place] (Bed Mobility)  contact guard  -SM      Sit-Supine [place] (Bed Mobility)  contact guard  -      Assistive Device (Bed Mobility)  bed rails;head of bed elevated  -      Recorded by [SM] Padmini Heart, PTA 02/02/19 1447      Row Name 02/02/19 1430             Transfer Assessment/Treatment    Transfer Assessment/Treatment  sit-stand transfer;stand-sit transfer;toilet transfer  -      Recorded by [SM] Padmini Heart, [facility] 02/02/19 1447      Row Name 02/02/19 1430             Sit-Stand Transfer    Sit-Stand [place] (Transfers)  minimum assist (75% patient effort)  -      Assistive Device (Sit-Stand Transfers)  walker, front-wheeled  -      Recorded by [SM] Padmini Heart, PTA 02/02/19 1447      Row Name 02/02/19 1430             Stand-Sit Transfer    Stand-Sit [place] (Transfers)  contact guard  -      Assistive Device (Stand-Sit Transfers)  walker, front-wheeled  -      Recorded by [SM] Padmini Heart, PTA 02/02/19 1447      Row Name 02/02/19 1430             Toilet Transfer    Type (Toilet Transfer)  sit-stand;stand-sit  -      [place] Level (Toilet Transfer)  minimum assist (75% patient effort)  -      Assistive Device (Toilet Transfer)  commode;grab bars/safety frame  -      Recorded by [SM] Padmini Heart, PTA 02/02/19 1447      Row Name 02/02/19 1430             Gait/Stairs Assessment/Training    [place] Level (Gait)  contact guard  -      Assistive Device (Gait)   walker, front-wheeled  -SM      Distance in Feet (Gait)  25  -SM      Pattern (Gait)  step-through  -SM      Deviations/Abnormal Patterns (Gait)  davon decreased;stride length decreased  -SM      Comment (Gait/Stairs)  limited d/t BP  -SM      Recorded by [] Padmini Heart, [facility] 02/02/19 1447      Row Name 02/02/19 1430             Positioning and Restraints    Pre-Treatment Position  in bed  -SM      Post Treatment Position  bed  -SM      In Bed  supine;call light within reach;encouraged to call for assist;exit alarm on  -SM      Recorded by [] Padmini Heart, [facility] 02/02/19 1447      Row Name 02/02/19 1430             Pain Scale: Numbers Pre/Post-Treatment    Pain Scale: Numbers, Pretreatment  0/10 - no pain  -SM      Pain Scale: Numbers, Post-Treatment  0/10 - no pain  -SM      Recorded by [] Padmini Heart, [facility] 02/02/19 1447        User Key  (r) = Recorded By, (t) = Taken By, (c) = Cosigned By    Initials Name Effective Dates Discipline     Padmini Heart, [facility] 03/07/18 -  PT                   Physical Therapy Education     Title: PT OT SLP Therapies (In Progress)     Topic: Physical Therapy (In Progress)     Point: Mobility training (Done)     Learning Progress Summary           Patient Acceptance, E,TB, VU by  at 2/2/2019  2:48 PM    Acceptance, E,TB,D, VU,NR by  at 2/1/2019  4:12 PM    Acceptance, E,TB,D, VU,NR by  at 1/31/2019  4:10 PM    Acceptance, E, NR by  at 1/30/2019  2:57 PM                   Point: Body mechanics (Done)     Learning Progress Summary           Patient Acceptance, E,TB, VU by  at 2/2/2019  2:48 PM    Acceptance, E,TB,D, VU,NR by  at 2/1/2019  4:12 PM    Acceptance, E,TB,D, VU,NR by  at 1/31/2019  4:10 PM                   Point: Precautions (Done)     Learning Progress Summary           Patient Acceptance, E,TB, VU by  at 2/2/2019  2:48 PM    Acceptance, E,TB,D, VU,NR by  at 2/1/2019  4:12 PM                               User Key      Initials Effective Dates Name Provider Type Discipline     04/03/18 -  Harmony Edwards, PT Physical Therapist PT    EM 04/03/18 -  Vira Borrero PT Physical Therapist PT     03/07/18 -  Padmini Heart PTA Physical Therapy Assistant PT                PT Recommendation and Plan     Plan of Care Reviewed With: patient  Progress: improving  Outcome Summary: Pt tolerated treatment fair this date. BP taken after sitting, 122/112, then standing, 121/108. Pt c/o slight dizziness. Ambulated to BR and back to bed w/ CGA and Rw. PT will continue to address functional mobility deficits as tolerated.  Outcome Measures     Row Name 02/02/19 1400 02/01/19 1600 01/31/19 1600       How much help from another person do you currently need...    Turning from your back to your side while in flat bed without using bedrails?  4  -SM  4  -CH  4  -CH    Moving from lying on back to sitting on the side of a flat bed without bedrails?  4  -SM  4  -CH  4  -CH    Moving to and from a bed to a chair (including a wheelchair)?  3  -SM  3  -CH  3  -CH    Standing up from a chair using your arms (e.g., wheelchair, bedside chair)?  3  -SM  3  -CH  3  -CH    Climbing 3-5 steps with a railing?  2  -SM  3  -CH  3  -CH    To walk in hospital room?  3  -SM  3  -CH  3  -CH    AM-PAC 6 Clicks Score  19  -SM  20  -CH  20  -CH       Functional Assessment    Outcome Measure Options  AM-PAC 6 Clicks Basic Mobility (PT)  -  AM-PAC 6 Clicks Basic Mobility (PT)  -CH  AM-PAC 6 Clicks Basic Mobility (PT)  -CH      User Key  (r) = Recorded By, (t) = Taken By, (c) = Cosigned By    Initials Name Provider Type     Harmony Edwards, PT Physical Therapist     Padmini Heart, [facility] Physical Therapy Assistant         Time Calculation:   PT Charges     Row Name 02/02/19 1458             Time Calculation    Start Time  1428  -      Stop Time  1444  -SM      Time Calculation (min)  16 min  -      PT Received On  02/02/19  -      PT - Next  Appointment  02/03/19  -PEPPER        User Key  (r) = Recorded By, (t) = Taken By, (c) = Cosigned By    Initials Name Provider Type    Padmini Lizama PTA Physical Therapy Assistant        Therapy Suggested Charges     Code   Minutes Charges    None           Therapy Charges for Today     Code Description Service Date Service Provider Modifiers Qty    50505644583 HC PT THER PROC EA 15 MIN 2/2/2019 Padmini Heart PTA GP 1          PT G-Codes  Outcome Measure Options: AM-PAC 6 Clicks Basic Mobility (PT)  AM-PAC 6 Clicks Score: 19    Padmini Heart PTA  2/2/2019

## 2019-02-02 NOTE — CODE DOCUMENTATION
12 lead ekg shows some mild changes in V2 & V3 per RN assessment, not significant enough to call a team C. Dr. Garcia's nurse Jeanette updated with ekg changes and symptoms.   Dr. Merida updated with patient's assessment and ekg. Orders to hold all am blood pressure medications and he will come see the patient.  Yisel, RN at bedside, updated her and returned primary care to her. Encouraged to call for any changes in patient condition.

## 2019-02-02 NOTE — PLAN OF CARE
Problem: Patient Care Overview  Goal: Plan of Care Review  Outcome: Ongoing (interventions implemented as appropriate)   02/02/19 6382   Coping/Psychosocial   Plan of Care Reviewed With patient   Plan of Care Review   Progress improving   OTHER   Outcome Summary Pt tolerated treatment fair this date. BP taken after sitting, 122/112, then standing, 121/108. Pt c/o slight dizziness. Ambulated to BR and back to bed w/ CGA and Rw. PT will continue to address functional mobility deficits as tolerated.

## 2019-02-02 NOTE — PLAN OF CARE
Problem: Patient Care Overview  Goal: Plan of Care Review  Outcome: Ongoing (interventions implemented as appropriate)   02/02/19 5113   Coping/Psychosocial   Plan of Care Reviewed With patient;significant other   Plan of Care Review   Progress improving   OTHER   Outcome Summary VSS. Pt tolerating all BP meds despite episode this morning. New admin parameters noted. A&Ox4 and free of falls. Will CTM.

## 2019-02-02 NOTE — PROGRESS NOTES
"    DAILY PROGRESS NOTE  [facility]    Patient Identification:  Name: Flo Manzo  Age: 63 y.o.  Sex: male  :  1955  MRN: [id number]         Primary Care Physician: Cathleen Corona MD    Subjective:  Interval History: RR called for hypotension - d/w RR team and Dr Marquis - Nitro removed and symptoms and hypotension resolving - currently feels better - denies cp/sob      Objective:no fm     Scheduled Meds:  atorvastatin 10 mg Oral Nightly   citalopram 20 mg Oral Daily   furosemide 40 mg Oral Daily   lisinopril 5 mg Oral Q12H   lisinopril 5 mg Oral Q24H   metoprolol tartrate 25 mg Oral Q12H   polyethylene glycol 17 g Oral Daily   potassium chloride 20 mEq Oral Daily   sodium chloride 3 mL Intravenous Q12H     Continuous Infusions:     Vital signs in last 24 hours:  Temp:  [97.7 °F (36.5 °C)-98.3 °F (36.8 °C)] 97.7 °F (36.5 °C)  Heart Rate:  [60-87] 75  Resp:  [16-18] 16  BP: ()/() 126/99    Intake/Output:    Intake/Output Summary (Last 24 hours) at 2019 1032  Last data filed at 2019 0145  Gross per 24 hour   Intake 480 ml   Output 900 ml   Net -420 ml       Exam:  /99   Pulse 75   Temp 97.7 °F (36.5 °C) (Oral)   Resp 16   Ht 180.3 cm (71\")   Wt 104 kg (228 lb 14.4 oz)   SpO2 95%   BMI 31.93 kg/m²     General Appearance:    Alert, cooperative, AAOx3                          Head:    Normocephalic, without obvious abnormality, atraumatic                         Throat:   Lips, tongue, gums normal; oral mucosa pink and moist                           Neck:   No JVD                         Lungs:    Clear to auscultation bilaterally, respirations unlabored                          Heart:    Regular rate and rhythm, S1 and S2 normal                 Abdomen:     Soft, non-tender, bowel sounds active                 Extremities:   Moving all, no cyanosis or edema                        Pulses:   Pulses palpable in lower extremities                  Neurologic:   " CNII-XII intact     Data Review:  Labs in chart were reviewed.    Assessment:  Active Hospital Problems    Diagnosis Date Noted   • **Essential hypertension [I10] 01/05/2018   • Intraparenchymal hemorrhage of brain (CMS/HCC) [I61.9] 02/01/2019   • Diastolic CHF, chronic (CMS/HCC) [I50.32] 02/01/2019   • New onset atrial fibrillation (CMS/[facility]) - RVR [I48.91] 01/24/2019   • Sleep apnea [G47.30] 01/24/2019   • Goiter [E04.9] 01/24/2019   • CHF (congestive heart failure) (CMS/[facility]) [I50.9] 01/24/2019   • Angina at rest (CMS/[facility]) [I20.8] 01/24/2019      Resolved Hospital Problems    Diagnosis Date Noted Date Resolved   • Dyspnea [R06.00] 01/24/2019 02/01/2019       Plan:    RR called for N/V/Hypotension - Nitro paste removed and symptoms resolving  -d/w Dr Marquis regarding BP control       HTN - BP has not been at goal per BARTOLO recommendations but hypotensive overnight. Current regimen below written w/ parameters               -Metoprolol 25 mg twice daily              -40 mg lasix              -DC Norvasc              -change lisinopril to 5mg bid               -I suspect sleep apnea is driving force for refractory hypertension                  Grade 2 diastolic dysfunction - secondary to GÓMEZ/HTN     Afib - new onset - per Cards      Goiter - no intervention per TTS     IPH secondary to HTN and Xarelto - await recs on when safe to restart but BP not controlled just yet either     >30min coordinating care    Mehul Merida MD  2/2/2019  10:32 AM

## 2019-02-02 NOTE — PROGRESS NOTES
"    Patient Name: Flo Manzo  :1955  63 y.o.      Patient Care Team:  Cathleen Corona MD as PCP - General (Family Medicine)    Chief Complaint: AFIB    Interval History: rapid called this AM- hypotensive in chair. BP 78/33, pt diaphoretic.  Now in bed, denies CP/SOB       Objective   Vital Signs  Temp:  [97.7 °F (36.5 °C)-98.3 °F (36.8 °C)] 97.7 °F (36.5 °C)  Heart Rate:  [63-87] 70  Resp:  [16-18] 16  BP: ()/() 87/57    Intake/Output Summary (Last 24 hours) at 2019 0943  Last data filed at 2019 0145  Gross per 24 hour   Intake 480 ml   Output 900 ml   Net -420 ml     Flowsheet Rows      First Filed Value   Admission Height  177.8 cm (70\") Documented at 2019 1053   Admission Weight  112 kg (247 lb) Documented at 2019 1120          Physical Exam:   General Appearance:    Alert, cooperative, in no acute distress   Lungs:     Clear to auscultation.  Normal respiratory effort and rate.      Heart:    Regular rhythm and normal rate, normal S1 and S2, no murmurs, gallops or rubs.     Chest Wall:    No abnormalities observed   Abdomen:     Soft, nontender, positive bowel sounds.     Extremities:   no cyanosis, clubbing or edema.  No marked joint deformities.  Adequate musculoskeletal strength.       Results Review:    Results from last 7 days   Lab Units 19  0331   SODIUM mmol/L 141   POTASSIUM mmol/L 3.7   CHLORIDE mmol/L 102   CO2 mmol/L 24.2   BUN mg/dL 23   CREATININE mg/dL 0.79   GLUCOSE mg/dL 89   CALCIUM mg/dL 9.7     Results from last 7 days   Lab Units 19  0422 19  2221   TROPONIN T ng/mL <0.010 <0.010     Results from last 7 days   Lab Units 19  0331   WBC 10*3/mm3 10.12   HEMOGLOBIN g/dL 16.1   HEMATOCRIT % 50.3   PLATELETS 10*3/mm3 257     Results from last 7 days   Lab Units 19  2133   INR  1.11*     Results from last 7 days   Lab Units 19  0331   MAGNESIUM mg/dL 2.4                   Medication Review:     atorvastatin 10 mg Oral " "Nightly   citalopram 20 mg Oral Daily   furosemide 40 mg Oral Daily   lisinopril 5 mg Oral Q12H   lisinopril 5 mg Oral Q24H   metoprolol tartrate 25 mg Oral Q12H   polyethylene glycol 17 g Oral Daily   potassium chloride 20 mEq Oral Daily   sodium chloride 3 mL Intravenous Q12H             Assessment/Plan   Syncope this AM- appears secondary to hypotension.  I suspect that NTP and meds were responsible. Will d/c NTP (no longer necessary). Stop amlodipine for now and decrease dose of lisinopril.  will then follow BP. If BP rises can either increase dose of lisinopril (probably best first step) or resume amlodipine at a lower dose.    Afib new onset: rate is controlled in the 60-70's.  CHADs-VASc 2.   Was transitioned to Eliquis 5 mg with first dose 1/30 am. MRI 1/31/19 showed IP hemorrhage.   Eliquis and ASA on hold. TSH WNL.  1/25/19 echo EF 53% with concentric LVH showing Mild AS, Mild TR, and Mild MR.   On metoprolol      Abnormal EKG with angina:  AFib with possible Anterior MI pattern on admit.  trops neg times 3.  Hx of cath > 10 years ok \"normal\".  1/26 Nuc stress normal perfusion but intermediate study. Pt had shoulder pain, radiating 1/28.    Cath 1/29 showing normal Coronary arteries.  D/C NTP. BMP stable post cath.      Hx of Diastolic dysfunction (grade II) on echo 11/15/17:  Elevated proBNP on admit,  appears euvolemic and able to lay semi flat comfortably.      Hx of GÓMEZ:  uvoloplasty, and pharyngeal plasty 2005.  + snorer and high risk for sleep d/o breathing. Recommend repeat outpt sleep study to reevaluate.   RVSP 31 mmHg by echo, stable from prior echos.      Elevated D Dimer: CTA of chest neg for PE. Noted goiter, patient will follow up with Dr. Burciaga after discharge.      Goiter: noted on CT.    A moderately large goiter extending into the superior mediastinum with 50% narrowing of the tracheal lumen. Thoracic surgery has been consulted.  plans on outpt f/u once CV status " stabilized.      Dyslipidemia: , goal 70 or less; 10 year ASCVD 12.9%.  Is on atorvastatin.      Hypokalemia: K+ normal 1/31 and 2/1 after replacement.       HTN:   Hypotensive this AM with syncope as above, follow on current adjusted medical regimen.  Neurosurgery is recommending tight BP control.        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 neurosurgery has seen and feels likely related to HTN and AC. noted recommendations for tight BP control and holding AC for at least 48 hours. No surgical intervention recommended.  CT head 1/31/19 early am stable.  Consider watchman procedure as outpt if deemed to be a non candidate for long term AC.  He will need to be cleared from neurologic standpoint for warfarin use for at least 45 days post watchman.      Dysarthria, brief:  MRI of brain showed para vermian IPH. Neurosurgery has seen.  Eliquis on hold for Para Vermian IPH.  Neurology following.        Jose Marquis III, MD  [facility]  02/02/19  9:43 AM

## 2019-02-02 NOTE — PLAN OF CARE
Problem: Patient Care Overview  Goal: Plan of Care Review  Outcome: Ongoing (interventions implemented as appropriate)   02/02/19 0538   Coping/Psychosocial   Plan of Care Reviewed With patient   Plan of Care Review   Progress improving   OTHER   Outcome Summary Awake periodically all night, requested to walk during the night and slept in the chair for short periods during the night. Denies any chest pain or soa.       Problem: Fall Risk (Adult)  Goal: Absence of Fall  Outcome: Ongoing (interventions implemented as appropriate)   02/02/19 0521   Fall Risk (Adult)   Absence of Fall making progress toward outcome

## 2019-02-02 NOTE — NURSING NOTE
Pt found sitting in the chair drooped over in chair. Not responding to verbal stimuli and eyes rolled back. Transferred to the bed from the chair, blood pressure dropped. Blood sugar was 135 and pt was sweaty and clammy. Rapid called and pt became aroused and was able to answer questions.

## 2019-02-03 LAB — GLUCOSE BLDC GLUCOMTR-MCNC: 94 MG/DL (ref 70–130)

## 2019-02-03 PROCEDURE — 82962 GLUCOSE BLOOD TEST: CPT

## 2019-02-03 PROCEDURE — 97116 GAIT TRAINING THERAPY: CPT | Performed by: PHYSICAL THERAPIST

## 2019-02-03 PROCEDURE — 99233 SBSQ HOSP IP/OBS HIGH 50: CPT | Performed by: INTERNAL MEDICINE

## 2019-02-03 RX ADMIN — METOPROLOL TARTRATE 25 MG: 25 TABLET ORAL at 09:37

## 2019-02-03 RX ADMIN — POTASSIUM CHLORIDE 20 MEQ: 750 CAPSULE, EXTENDED RELEASE ORAL at 09:37

## 2019-02-03 RX ADMIN — ATORVASTATIN CALCIUM 10 MG: 10 TABLET, FILM COATED ORAL at 20:15

## 2019-02-03 RX ADMIN — FUROSEMIDE 40 MG: 40 TABLET ORAL at 09:37

## 2019-02-03 RX ADMIN — CITALOPRAM 20 MG: 20 TABLET, FILM COATED ORAL at 09:37

## 2019-02-03 RX ADMIN — SODIUM CHLORIDE, PRESERVATIVE FREE 3 ML: 5 INJECTION INTRAVENOUS at 20:16

## 2019-02-03 RX ADMIN — LISINOPRIL 5 MG: 5 TABLET ORAL at 20:15

## 2019-02-03 RX ADMIN — LISINOPRIL 5 MG: 5 TABLET ORAL at 09:37

## 2019-02-03 RX ADMIN — SODIUM CHLORIDE, PRESERVATIVE FREE 3 ML: 5 INJECTION INTRAVENOUS at 09:38

## 2019-02-03 RX ADMIN — METOPROLOL TARTRATE 25 MG: 25 TABLET ORAL at 20:15

## 2019-02-03 NOTE — THERAPY TREATMENT NOTE
Acute Care - Physical Therapy Treatment Note  [facility]     Patient Name: Flo Manzo  : 1955  MRN: [id number]  Today's Date: 2/3/2019  Onset of Illness/Injury or Date of Surgery: 19          Admit Date: 2019    Visit Dx:    ICD-10-CM ICD-9-CM   1. New onset atrial fibrillation (CMS/HCC) I48.91 427.31   2. Goiter diffuse E04.9 240.9   3. Angina at rest (CMS/HCC) I20.8 413.9   4. Essential hypertension I10 401.9   5. New onset atrial fibrillation (CMS/HCC) - RVR I48.91 427.31   6. Generalized weakness R53.1 780.79     Patient Active Problem List   Diagnosis   • Umbilical hernia without obstruction and without gangrene   • Essential hypertension   • Arthritis of left knee   • Depression   • Obesity (BMI 30-39.9)   • New onset atrial fibrillation (CMS/HCC) - RVR   • Sleep apnea   • Goiter   • CHF (congestive heart failure) (CMS/HCC)   • Angina at rest (CMS/HCC)   • Intraparenchymal hemorrhage of brain (CMS/HCC)   • Diastolic CHF, chronic (CMS/HCC)       Therapy Treatment    Rehabilitation Treatment Summary     Row Name 19 1000             Treatment Time/Intention    Discipline  physical therapist  -      Document Type  therapy note (daily note)  -      Subjective Information  complains of;weakness;fatigue  -      Mode of Treatment  physical therapy  -      Patient/Family Observations  awake, alert fowlers  -      Patient Effort  excellent  -      Existing Precautions/Restrictions  fall  -LC      Recorded by [LC] Arash Hong, PT DPT 19 1022      Row Name 19 1000             Vital Signs    O2 Delivery Pre Treatment  room air  -LC      Recorded by [LC] Arash Hong PT DPT 19 1022      Row Name 19 1000             Cognitive Assessment/Intervention- PT/OT    Orientation Status (Cognition)  oriented x 4  -LC      Follows Commands (Cognition)  WNL  -LC      Personal Safety Interventions  fall prevention program maintained;gait belt;muscle  strengthening facilitated;nonskid shoes/slippers when out of bed  -LC      Recorded by [LC] Arash Hong, PT DPT 02/03/19 1022      Row Name 02/03/19 1000             Bed Mobility Assessment/Treatment    Supine-Sit Ponce (Bed Mobility)  supervision  -LC      Sit-Supine Ponce (Bed Mobility)  supervision  -LC      Assistive Device (Bed Mobility)  bed rails;head of bed elevated  -LC      Recorded by [LC] Arash Hong, PT DPT 02/03/19 1022      Row Name 02/03/19 1000             Sit-Stand Transfer    Sit-Stand Ponce (Transfers)  contact guard  -LC      Assistive Device (Sit-Stand Transfers)  walker, front-wheeled  -LC      Recorded by [LC] Arash Hong PT DPT 02/03/19 1022      Row Name 02/03/19 1000             Stand-Sit Transfer    Stand-Sit Ponce (Transfers)  contact guard  -LC      Assistive Device (Stand-Sit Transfers)  walker, front-wheeled  -LC      Recorded by [LC] Arash Hong, PT DPT 02/03/19 1022      Row Name 02/03/19 1000             Gait/Stairs Assessment/Training    53489 - Gait Training Minutes   15  -LC      Ponce Level (Gait)  contact guard  -LC      Assistive Device (Gait)  walker, front-wheeled  -LC      Distance in Feet (Gait)  200  -LC      Pattern (Gait)  step-through  -      Deviations/Abnormal Patterns (Gait)  davon decreased;stride length decreased  -LC      Bilateral Gait Deviations  foot drop/toe drag;heel strike decreased;leans right  -LC      Recorded by [LC] Arash Hong PT DPT 02/03/19 1022      Row Name 02/03/19 1000             Positioning and Restraints    Pre-Treatment Position  in bed  -LC      Post Treatment Position  chair  -LC      In Chair  notified nsg;reclined;call light within reach;encouraged to call for assist;legs elevated  -LC      Recorded by [LC] Arash Hong, PT DPT 02/03/19 1022        User Key  (r) = Recorded By, (t) = Taken By, (c) = Cosigned By    Initials Name Effective Dates Discipline    Arash Miller  M, PT DPT 08/02/16 -  PT                   Physical Therapy Education     Title: PT OT SLP Therapies (Done)     Topic: Physical Therapy (Done)     Point: Mobility training (Done)     Learning Progress Summary           Patient Acceptance, E,D, VU,DU by  at 2/3/2019 10:22 AM    Comment:  safety during ambulation, need for assistance    Acceptance, E,TB, VU by  at 2/2/2019  2:48 PM    Acceptance, E,TB,D, VU,NR by  at 2/1/2019  4:12 PM    Acceptance, E,TB,D, VU,NR by  at 1/31/2019  4:10 PM    Acceptance, E, NR by  at 1/30/2019  2:57 PM                   Point: Home exercise program (Done)     Learning Progress Summary           Patient Acceptance, E,D, VU,DU by  at 2/3/2019 10:22 AM    Comment:  safety during ambulation, need for assistance                   Point: Body mechanics (Done)     Learning Progress Summary           Patient Acceptance, E,D, VU,DU by  at 2/3/2019 10:22 AM    Comment:  safety during ambulation, need for assistance    Acceptance, E,TB, VU by  at 2/2/2019  2:48 PM    Acceptance, E,TB,D, VU,NR by  at 2/1/2019  4:12 PM    Acceptance, E,TB,D, VU,NR by  at 1/31/2019  4:10 PM                   Point: Precautions (Done)     Learning Progress Summary           Patient Acceptance, E,D, VU,DU by  at 2/3/2019 10:22 AM    Comment:  safety during ambulation, need for assistance    Acceptance, E,TB, VU by  at 2/2/2019  2:48 PM    Acceptance, E,TB,D, VU,NR by  at 2/1/2019  4:12 PM                               User Key     Initials Effective Dates Name Provider Type Discipline     04/03/18 -  Harmony Edwards, PT Physical Therapist PT    EM 04/03/18 -  Vira Borrero, PT Physical Therapist PT     03/07/18 -  Padmini Heart PTA Physical Therapy Assistant PT     08/02/16 -  Arash Hong, PT DPT Physical Therapist PT                PT Recommendation and Plan     Plan of Care Reviewed With: patient  Progress: improving  Outcome Summary: Pt transferred supine to sit  with supervision and use of bed rails. Pt performed sit to stand with CGA x 1 and RW. Pt ambulated 200 ft with RW and CGA x 1. Pt returned to sit in recliner.  Outcome Measures     Row Name 02/03/19 1000 02/02/19 1400 02/01/19 1600       How much help from another person do you currently need...    Turning from your back to your side while in flat bed without using bedrails?  4  -LC  4  -SM  4  -CH    Moving from lying on back to sitting on the side of a flat bed without bedrails?  4  -LC  4  -SM  4  -CH    Moving to and from a bed to a chair (including a wheelchair)?  3  -LC  3  -SM  3  -CH    Standing up from a chair using your arms (e.g., wheelchair, bedside chair)?  3  -LC  3  -SM  3  -CH    Climbing 3-5 steps with a railing?  3  -LC  2  -SM  3  -CH    To walk in hospital room?  3  -LC  3  -SM  3  -CH    AM-PAC 6 Clicks Score  20  -LC  19  -SM  20  -CH       Functional Assessment    Outcome Measure Options  AM-PAC 6 Clicks Basic Mobility (PT)  -LC  AM-PAC 6 Clicks Basic Mobility (PT)  -SM  AM-PAC 6 Clicks Basic Mobility (PT)  -CH    Row Name 01/31/19 1600             How much help from another person do you currently need...    Turning from your back to your side while in flat bed without using bedrails?  4  -CH      Moving from lying on back to sitting on the side of a flat bed without bedrails?  4  -CH      Moving to and from a bed to a chair (including a wheelchair)?  3  -CH      Standing up from a chair using your arms (e.g., wheelchair, bedside chair)?  3  -CH      Climbing 3-5 steps with a railing?  3  -CH      To walk in hospital room?  3  -CH      AM-PAC 6 Clicks Score  20  -CH         Functional Assessment    Outcome Measure Options  AM-PAC 6 Clicks Basic Mobility (PT)  -CH        User Key  (r) = Recorded By, (t) = Taken By, (c) = Cosigned By    Initials Name Provider Type    CH Harmony Edwards, PT Physical Therapist    Padmini Lizama, PTA Physical Therapy Assistant    Arash Mliler, PT  DPT Physical Therapist         Time Calculation:   PT Charges     Row Name 02/03/19 1000             Time Calculation    Start Time  1000  -LC      Stop Time  1015  -LC      Time Calculation (min)  15 min  -LC      PT Received On  02/03/19  -LC      PT - Next Appointment  02/04/19  -LC         Time Calculation- PT    Total Timed Code Minutes- PT  15 minute(s)  -LC         Timed Charges    12736 - Gait Training Minutes   15  -LC        User Key  (r) = Recorded By, (t) = Taken By, (c) = Cosigned By    Initials Name Provider Type    Arash Miller, PT DPT Physical Therapist        Therapy Suggested Charges     Code   Minutes Charges    51432 (CPT®) Hc Pt Neuromusc Re Education Ea 15 Min      80833 (CPT®) Hc Pt Ther Proc Ea 15 Min      66448 (CPT®) Hc Gait Training Ea 15 Min 15 1    84096 (CPT®) Hc Pt Therapeutic Act Ea 15 Min      07013 (CPT®) Hc Pt Manual Therapy Ea 15 Min      41847 (CPT®) Hc Pt Iontophoresis Ea 15 Min      61621 (CPT®) Hc Pt Elec Stim Ea-Per 15 Min      19515 (CPT®) Hc Pt Ultrasound Ea 15 Min      12437 (CPT®) Hc Pt Self Care/Mgmt/Train Ea 15 Min      34797 (CPT®) Hc Pt Prosthetic (S) Train Initial Encounter, Each 15 Min      37464 (CPT®) Hc Pt Orthotic(S)/Prosthetic(S) Encounter, Each 15 Min      13105 (CPT®) Hc Orthotic(S) Mgmt/Train Initial Encounter, Each 15min      Total  15 1        Therapy Charges for Today     Code Description Service Date Service Provider Modifiers Qty    27199014116 HC GAIT TRAINING EA 15 MIN 2/3/2019 Arash Hong, PT DPT GP 1          PT G-Codes  Outcome Measure Options: AM-PAC 6 Clicks Basic Mobility (PT)  AM-PAC 6 Clicks Score: 20    Arash Hong PT DPT  2/3/2019

## 2019-02-03 NOTE — PLAN OF CARE
Problem: Patient Care Overview  Goal: Plan of Care Review  Outcome: Ongoing (interventions implemented as appropriate)   02/03/19 1022   Coping/Psychosocial   Plan of Care Reviewed With patient   Plan of Care Review   Progress improving   OTHER   Outcome Summary Pt transferred supine to sit with supervision and use of bed rails. Pt performed sit to stand with CGA x 1 and RW. Pt ambulated 200 ft with RW and CGA x 1. Pt returned to sit in recliner.

## 2019-02-03 NOTE — PROGRESS NOTES
"    DAILY PROGRESS NOTE  [facility]    Patient Identification:  Name: Flo Manzo  Age: 63 y.o.  Sex: male  :  1955  MRN: [id number]         Primary Care Physician: Cathleen Corona MD    Subjective:  Interval History: Feeling much better.  He claims he hasn't felt this good in some time.  No further issues with headache nausea vomiting.  He does have some dizziness with exertion but no syncope.  No chest pain or troubles breathing.    Objective: No family at bedside.  Patient sitting in chair and interactive and conversational    Scheduled Meds:  atorvastatin 10 mg Oral Nightly   citalopram 20 mg Oral Daily   furosemide 40 mg Oral Daily   lisinopril 5 mg Oral Q12H   metoprolol tartrate 25 mg Oral Q12H   polyethylene glycol 17 g Oral Daily   potassium chloride 20 mEq Oral Daily   sodium chloride 3 mL Intravenous Q12H     Continuous Infusions:     Vital signs in last 24 hours:  Temp:  [97.6 °F (36.4 °C)-98.4 °F (36.9 °C)] 98 °F (36.7 °C)  Heart Rate:  [71-96] 86  Resp:  [16] 16  BP: (104-128)/(81-96) 125/96    Intake/Output:  No intake or output data in the 24 hours ending 19 1109    Exam:  /96   Pulse 86   Temp 98 °F (36.7 °C) (Oral)   Resp 16   Ht 180.3 cm (71\")   Wt 103 kg (227 lb 1.6 oz)   SpO2 96%   BMI 31.67 kg/m²     General Appearance:    Alert, cooperative, no distress, AAOx3                          Head:    Normocephalic, without obvious abnormality, atraumatic                         Throat:   Lips, tongue, gums normal; oral mucosa pink and moist                           Neck:   Supple, symmetrical, trachea midline, no JVD                         Lungs:    Clear to auscultation bilaterally, respirations unlabored                          Heart:    Regular rate and rhythm, S1 and S2 normal                  Abdomen:     Soft, non-tender, bowel sounds active                 Extremities:   No cyanosis or edema                        Pulses:   Pulses palpable in " lower extremities                           Data Review:  Labs in chart were reviewed.    Assessment:  Active Hospital Problems    Diagnosis Date Noted   • **Essential hypertension [I10] 01/05/2018   • Intraparenchymal hemorrhage of brain (CMS/HCC) [I61.9] 02/01/2019   • Diastolic CHF, chronic (CMS/HCC) [I50.32] 02/01/2019   • New onset atrial fibrillation (CMS/HCC) - RVR [I48.91] 01/24/2019   • Sleep apnea [G47.30] 01/24/2019   • Goiter [E04.9] 01/24/2019   • CHF (congestive heart failure) (CMS/[facility]) [I50.9] 01/24/2019   • Angina at rest (CMS/[facility]) [I20.8] 01/24/2019      Resolved Hospital Problems    Diagnosis Date Noted Date Resolved   • Dyspnea [R06.00] 01/24/2019 02/01/2019       Plan:  HTN - BP now improving to reach goal per BARTOLO recommendations   Current regimen below written w/ parameters:              -Metoprolol 25 mg twice daily              -40 mg lasix - check am BMP              -DC Norvasc              -change lisinopril to 5mg bid               -I suspect sleep apnea is driving force for refractory hypertension           Grade 2 diastolic dysfunction - secondary to GÓMEZ/HTN     Afib - new onset - per Cards      Goiter - no intervention per TTS     IPH secondary to HTN and Xarelto - await recs on when safe to restart      >30min coordinating care        Mehul Merida MD  2/3/2019  11:09 AM

## 2019-02-03 NOTE — PLAN OF CARE
Problem: Patient Care Overview  Goal: Plan of Care Review  Outcome: Ongoing (interventions implemented as appropriate)   02/03/19 0606   Coping/Psychosocial   Plan of Care Reviewed With patient   Plan of Care Review   Progress improving   OTHER   Outcome Summary Pt admitted with essential HTN expectedly brought on by sleep apnea, new onset A-fib, B/P medications modified after yesterday mornings hypotensive episode and B/P closely mx and stable. Pt awake most of the night.

## 2019-02-03 NOTE — PROGRESS NOTES
"    Patient Name: Flo Manzo  :1955  63 y.o.      Patient Care Team:  Cathleen Corona MD as PCP - General (Family Medicine)    Chief Complaint: AFIB    Interval History: feels much better, nor problems overnight       Objective   Vital Signs  Temp:  [97.6 °F (36.4 °C)-98.4 °F (36.9 °C)] 98 °F (36.7 °C)  Heart Rate:  [60-96] 86  Resp:  [16] 16  BP: (100-128)/(72-99) 104/81  No intake or output data in the 24 hours ending 19 0847  Flowsheet Rows      First Filed Value   Admission Height  177.8 cm (70\") Documented at 2019 1053   Admission Weight  112 kg (247 lb) Documented at 2019 1120          Physical Exam:   General Appearance:    Alert, cooperative, in no acute distress   Lungs:     Clear to auscultation.  Normal respiratory effort and rate.      Heart:    Regular rhythm and normal rate, normal S1 and S2, no murmurs, gallops or rubs.     Chest Wall:    No abnormalities observed   Abdomen:     Soft, nontender, positive bowel sounds.     Extremities:   no cyanosis, clubbing or edema.  No marked joint deformities.  Adequate musculoskeletal strength.       Results Review:    Results from last 7 days   Lab Units 19  0331   SODIUM mmol/L 141   POTASSIUM mmol/L 3.7   CHLORIDE mmol/L 102   CO2 mmol/L 24.2   BUN mg/dL 23   CREATININE mg/dL 0.79   GLUCOSE mg/dL 89   CALCIUM mg/dL 9.7         Results from last 7 days   Lab Units 19  0331   WBC 10*3/mm3 10.12   HEMOGLOBIN g/dL 16.1   HEMATOCRIT % 50.3   PLATELETS 10*3/mm3 257     Results from last 7 days   Lab Units 19  2133   INR  1.11*     Results from last 7 days   Lab Units 19  0331   MAGNESIUM mg/dL 2.4                   Medication Review:     atorvastatin 10 mg Oral Nightly   citalopram 20 mg Oral Daily   furosemide 40 mg Oral Daily   lisinopril 5 mg Oral Q12H   metoprolol tartrate 25 mg Oral Q12H   polyethylene glycol 17 g Oral Daily   potassium chloride 20 mEq Oral Daily   sodium chloride 3 mL Intravenous Q12H    " "         Assessment/Plan   Syncope yesterday- appears secondary to hypotension.  We d/c'd his NTP and amlodipine, adjusted dose of lisinopril. Doing well on current regimen with appropriate BP control.  If BP rises can either increase dose of lisinopril (probably best first step) or resume amlodipine at a lower dose.    Afib new onset: rate is controlled in the 60-70's.  CHADs-VASc 2.   Was transitioned to Eliquis 5 mg with first dose 1/30 am. MRI 1/31/19 showed IP hemorrhage.   Eliquis and ASA on hold. TSH WNL.  1/25/19 echo EF 53% with concentric LVH showing Mild AS, Mild TR, and Mild MR.   On metoprolol      Abnormal EKG with angina:  AFib with possible Anterior MI pattern on admit.  trops neg times 3.  Hx of cath > 10 years ok \"normal\".  1/26 Nuc stress normal perfusion but intermediate study. Pt had shoulder pain, radiating 1/28.    Cath 1/29 showing normal Coronary arteries.  D/C NTP. BMP stable post cath.      Hx of Diastolic dysfunction (grade II) on echo 11/15/17:  Elevated proBNP on admit,  appears euvolemic and able to lay semi flat comfortably.      Hx of GÓMEZ:  uvoloplasty, and pharyngeal plasty 2005.  + snorer and high risk for sleep d/o breathing. Recommend repeat outpt sleep study to reevaluate.   RVSP 31 mmHg by echo, stable from prior echos.      Elevated D Dimer: CTA of chest neg for PE. Noted goiter, patient will follow up with Dr. Burciaga after discharge.      Goiter: noted on CT.    A moderately large goiter extending into the superior mediastinum with 50% narrowing of the tracheal lumen. Thoracic surgery has been consulted.  plans on outpt f/u once CV status stabilized.      Dyslipidemia: , goal 70 or less; 10 year ASCVD 12.9%.  Is on atorvastatin.      Hypokalemia: K+ normal 1/31 and 2/1 after replacement.       HTN:   Hypotensive this AM with syncope as above, follow on current adjusted medical regimen.  Neurosurgery is recommending tight BP control.        [facility] " noted on MRI 1/30 1943 IPH 2.2 x 1.3. neurosurgery has seen and feels likely related to HTN and AC. noted recommendations for tight BP control and holding AC for at least 48 hours. No surgical intervention recommended.  CT head 1/31/19 early am stable.  Consider watchman procedure as outpt if deemed to be a non candidate for long term AC.  He will need to be cleared from neurologic standpoint for warfarin use for at least 45 days post watchman.      Dysarthria, brief:  MRI of brain showed para vermian IPH. Neurosurgery has seen.  Eliquis on hold for Para Vermian IPH.  Neurology following.            Jose Marquis III, MD  [facility]  02/03/19  8:47 AM

## 2019-02-04 LAB
ANION GAP SERPL CALCULATED.3IONS-SCNC: 11.6 MMOL/L
BUN BLD-MCNC: 32 MG/DL (ref 8–23)
BUN/CREAT SERPL: 39.5 (ref 7–25)
CALCIUM SPEC-SCNC: 9.9 MG/DL (ref 8.6–10.5)
CHLORIDE SERPL-SCNC: 104 MMOL/L (ref 98–107)
CO2 SERPL-SCNC: 25.4 MMOL/L (ref 22–29)
CREAT BLD-MCNC: 0.81 MG/DL (ref 0.76–1.27)
GFR SERPL CREATININE-BSD FRML MDRD: 97 ML/MIN/1.73
GLUCOSE BLD-MCNC: 101 MG/DL (ref 65–99)
POTASSIUM BLD-SCNC: 4 MMOL/L (ref 3.5–5.2)
SODIUM BLD-SCNC: 141 MMOL/L (ref 136–145)

## 2019-02-04 PROCEDURE — 97110 THERAPEUTIC EXERCISES: CPT

## 2019-02-04 PROCEDURE — 99231 SBSQ HOSP IP/OBS SF/LOW 25: CPT | Performed by: NURSE PRACTITIONER

## 2019-02-04 PROCEDURE — 80048 BASIC METABOLIC PNL TOTAL CA: CPT | Performed by: HOSPITALIST

## 2019-02-04 RX ADMIN — ATORVASTATIN CALCIUM 10 MG: 10 TABLET, FILM COATED ORAL at 20:47

## 2019-02-04 RX ADMIN — POTASSIUM CHLORIDE 20 MEQ: 750 CAPSULE, EXTENDED RELEASE ORAL at 09:44

## 2019-02-04 RX ADMIN — LISINOPRIL 5 MG: 5 TABLET ORAL at 09:43

## 2019-02-04 RX ADMIN — METOPROLOL TARTRATE 25 MG: 25 TABLET ORAL at 09:44

## 2019-02-04 RX ADMIN — FUROSEMIDE 40 MG: 40 TABLET ORAL at 09:44

## 2019-02-04 RX ADMIN — SODIUM CHLORIDE, PRESERVATIVE FREE 3 ML: 5 INJECTION INTRAVENOUS at 09:45

## 2019-02-04 RX ADMIN — METOPROLOL TARTRATE 25 MG: 25 TABLET ORAL at 20:47

## 2019-02-04 RX ADMIN — SODIUM CHLORIDE, PRESERVATIVE FREE 3 ML: 5 INJECTION INTRAVENOUS at 20:47

## 2019-02-04 RX ADMIN — CITALOPRAM 20 MG: 20 TABLET, FILM COATED ORAL at 09:44

## 2019-02-04 RX ADMIN — LISINOPRIL 5 MG: 5 TABLET ORAL at 20:47

## 2019-02-04 NOTE — PROGRESS NOTES
"    DAILY PROGRESS NOTE  [facility]    Patient Identification:  Name: Flo Manzo  Age: 63 y.o.  Sex: male  :  1955  MRN: [id number]         Primary Care Physician: Cathleen Corona MD    Subjective:  Interval History: Gets dizzy with ambulation and had to actually stop secondary to the dizziness.  There was no confusion slurring of speech or any focal loss of function or consciousness associated with this.  He has no aspects of chest pain and clinically continues to feel better    Objective: No family at bedside.  Discussed case with RN    Scheduled Meds:  atorvastatin 10 mg Oral Nightly   citalopram 20 mg Oral Daily   furosemide 40 mg Oral Daily   lisinopril 5 mg Oral Q12H   metoprolol tartrate 25 mg Oral Q12H   polyethylene glycol 17 g Oral Daily   potassium chloride 20 mEq Oral Daily   sodium chloride 3 mL Intravenous Q12H     Continuous Infusions:     Vital signs in last 24 hours:  Temp:  [97.7 °F (36.5 °C)-98.3 °F (36.8 °C)] 98.3 °F (36.8 °C)  Heart Rate:  [78-94] 78  Resp:  [18] 18  BP: ()/(42-86) 104/76    Intake/Output:    Intake/Output Summary (Last 24 hours) at 2019 1126  Last data filed at 2/3/2019 1848  Gross per 24 hour   Intake 120 ml   Output --   Net 120 ml       Exam:  /76 (BP Location: Right arm, Patient Position: Lying)   Pulse 78   Temp 98.3 °F (36.8 °C) (Oral)   Resp 18   Ht 180.3 cm (71\")   Wt 103 kg (226 lb 10.1 oz)   SpO2 96%   BMI 31.61 kg/m²     General Appearance:    Alert, cooperative, no distress, AAOx3, continues to clinically look better                          Head:    Normocephalic, without obvious abnormality, atraumatic                           Eyes:    PERRL, conjunctiva/corneas clear, EOM's intact, both eyes                         Throat:   Lips, tongue, gums normal; oral mucosa pink and moist                           Neck:   Supple, symmetrical, trachea midline, no JVD                         Lungs:    Clear to auscultation " bilaterally, respirations unlabored                          Heart:    Regular rate and rhythm, S1 and S2 normal                  Abdomen:     Soft, non-tender, bowel sounds active                 Extremities:   No cyanosis or edema                        Pulses:   Pulses palpable in lower extremities                              Data Review:  Labs in chart were reviewed.    Assessment:  Active Hospital Problems    Diagnosis Date Noted   • **Essential hypertension [I10] 01/05/2018   • Intraparenchymal hemorrhage of brain (CMS/HCC) [I61.9] 02/01/2019   • Diastolic CHF, chronic (CMS/[facility]) [I50.32] 02/01/2019   • New onset atrial fibrillation (CMS/[facility]) - RVR [I48.91] 01/24/2019   • Sleep apnea [G47.30] 01/24/2019   • Goiter [E04.9] 01/24/2019   • CHF (congestive heart failure) (CMS/[facility]) [I50.9] 01/24/2019   • Angina at rest (CMS/[facility]) [I20.8] 01/24/2019      Resolved Hospital Problems    Diagnosis Date Noted Date Resolved   • Dyspnea [R06.00] 01/24/2019 02/01/2019       Plan:  BP continues to be controlled - patient has some associated dizziness with exertion but no syncopal episodes confusion or any loss of function.  This is probably secondary to establishing a new baseline as he has lived at high blood pressure levels for so long   -No changes to current regimen - no need to increase ACE or reinstate Norvasc    Since BP has been controlled now for the last 24-48 hours, last neurosurgery to reassess him when they feel AC can and is safe to resume    Dispo - hopeful for disposition in the next couple days.  I would prefer rehabilitation if he would qualify and CCP will help address disposition needs.    Mehul Merida MD  2/4/2019  11:26 AM

## 2019-02-04 NOTE — PROGRESS NOTES
Continued Stay Note  [facility]     Patient Name: Flo Manzo  MRN: [id number]  Today's Date: 2/4/2019    Admit Date: 1/24/2019    Discharge Plan     Row Name 02/04/19 1632       Plan    Plan  Plans are to skilled rehab.  Pt would like [facility] for rehab, they will started precert on 2/4.     Plan Comments  CCP spoke with pt @ bedside to discuss dc planning.  Pt agrees that he will need rehab.  He states he has been to [facility] in the past and would like to return there.  They have approved and will start precert. Chantal Blanco RN        Discharge Codes    No documentation.             Chantal Blanco RN

## 2019-02-04 NOTE — PROGRESS NOTES
Pt is now 4 days out from two stable scans. He is cleared to restart anticoagulants from a Neurosurgery standpoint. Obtain STAT CT and consult BARTOLO if change in mental status after restarting.

## 2019-02-04 NOTE — THERAPY TREATMENT NOTE
Acute Care - Physical Therapy Treatment Note  [facility]     Patient Name: Flo Manzo  : 1955  MRN: [id number]  Today's Date: 2019  Onset of Illness/Injury or Date of Surgery: 19          Admit Date: 2019    Visit Dx:    ICD-10-CM ICD-9-CM   1. New onset atrial fibrillation (CMS/HCC) I48.91 427.31   2. Goiter diffuse E04.9 240.9   3. Angina at rest (CMS/HCC) I20.8 413.9   4. Essential hypertension I10 401.9   5. New onset atrial fibrillation (CMS/HCC) - RVR I48.91 427.31   6. Generalized weakness R53.1 780.79     Patient Active Problem List   Diagnosis   • Umbilical hernia without obstruction and without gangrene   • Essential hypertension   • Arthritis of left knee   • Depression   • Obesity (BMI 30-39.9)   • New onset atrial fibrillation (CMS/HCC) - RVR   • Sleep apnea   • Goiter   • CHF (congestive heart failure) (CMS/HCC)   • Angina at rest (CMS/HCC)   • Intraparenchymal hemorrhage of brain (CMS/HCC)   • Diastolic CHF, chronic (CMS/HCC)       Therapy Treatment    Rehabilitation Treatment Summary     Row Name 19 1018             Treatment Time/Intention    Discipline  physical therapist  -PC      Document Type  therapy note (daily note)  -PC      Subjective Information  no complaints;fatigue;dizziness  -PC      Mode of Treatment  physical therapy  -PC      Patient/Family Observations  pt supine in bed, asleep upon arrival, aroused easily, but was lethargic throughout  -PC      Therapy Frequency (PT Clinical Impression)  daily  -PC      Patient Effort  good  -PC      Comment  c/o being lightheaded  -PC      Existing Precautions/Restrictions  fall  -PC      Recorded by [PC] Arlette Tellez, PT 19 1021      Row Name 19 1018             Cognitive Assessment/Intervention- PT/OT    Orientation Status (Cognition)  oriented x 4  -PC      Follows Commands (Cognition)  WNL  -PC      Recorded by [PC] Arlette Tellez, PT 19 1021      Row Name 19 1018              Bed Mobility Assessment/Treatment    Supine-Sit [place] (Bed Mobility)  supervision  -PC      Sit-Supine [place] (Bed Mobility)  supervision  -PC      Assistive Device (Bed Mobility)  bed rails;head of bed elevated  -PC      Recorded by [PC] Arlette Tellez, PT 02/04/19 1021      Row Name 02/04/19 1018             Sit-Stand Transfer    Sit-Stand [place] (Transfers)  contact guard  -PC      Assistive Device (Sit-Stand Transfers)  walker, front-wheeled  -PC      Recorded by [PC] Arlette Tellez, PT 02/04/19 1021      Row Name 02/04/19 1018             Stand-Sit Transfer    Stand-Sit [place] (Transfers)  contact guard  -PC      Assistive Device (Stand-Sit Transfers)  walker, front-wheeled  -PC      Recorded by [PC] Arlette Tellez, PT 02/04/19 1021      Row Name 02/04/19 1018             Gait/Stairs Assessment/Training    [place] Level (Gait)  contact guard  -PC      Assistive Device (Gait)  walker, front-wheeled  -PC      Distance in Feet (Gait)  80 ft x 2 with seated rest  -PC      Pattern (Gait)  step-through  -PC      Deviations/Abnormal Patterns (Gait)  davon decreased;stride length decreased  -PC      Bilateral Gait Deviations  forward flexed posture  -PC      Comment (Gait/Stairs)  pt needed to sit in chair in hallway due to fatigue and being light headed  -PC      Recorded by [PC] Arlette Tellez, PT 02/04/19 1021      Row Name 02/04/19 1018             Positioning and Restraints    Pre-Treatment Position  in bed  -PC      Post Treatment Position  chair  -PC      In Chair  reclined;call light within reach;encouraged to call for assist  -PC      Recorded by [PC] Arlette Tellez, PT 02/04/19 1021      Row Name 02/04/19 1018             Plan of Care Review    Plan of Care Reviewed With  patient  -PC      Recorded by [PC] Arlette Tellez, PT 02/04/19 1021        User Key  (r) = Recorded By, (t) = Taken By, (c) = Cosigned By    Initials Name Effective Dates Discipline    PC Arlette Tellez  BRENDA PT 04/03/18 -  PT                   Physical Therapy Education     Title: PT OT SLP Therapies (In Progress)     Topic: Physical Therapy (In Progress)     Point: Mobility training (In Progress)     Learning Progress Summary           Patient Acceptance, E,D, NR by PC at 2/4/2019 10:21 AM    Acceptance, E, VU by ML at 2/4/2019  4:50 AM    Acceptance, E,D, VU,DU by  at 2/3/2019 10:22 AM    Comment:  safety during ambulation, need for assistance    Acceptance, E,TB, VU by SM at 2/2/2019  2:48 PM    Acceptance, E,TB,D, VU,NR by CH at 2/1/2019  4:12 PM    Acceptance, E,TB,D, VU,NR by  at 1/31/2019  4:10 PM    Acceptance, E, NR by  at 1/30/2019  2:57 PM                   Point: Home exercise program (In Progress)     Learning Progress Summary           Patient Acceptance, E,D, NR by PC at 2/4/2019 10:21 AM    Acceptance, E, VU by ML at 2/4/2019  4:50 AM    Acceptance, E,D, VU,DU by  at 2/3/2019 10:22 AM    Comment:  safety during ambulation, need for assistance                   Point: Body mechanics (In Progress)     Learning Progress Summary           Patient Acceptance, E,D, NR by PC at 2/4/2019 10:21 AM    Acceptance, E, VU by ML at 2/4/2019  4:50 AM    Acceptance, E,D, VU,DU by  at 2/3/2019 10:22 AM    Comment:  safety during ambulation, need for assistance    Acceptance, E,TB, VU by SM at 2/2/2019  2:48 PM    Acceptance, E,TB,D, VU,NR by  at 2/1/2019  4:12 PM    Acceptance, E,TB,D, VU,NR by  at 1/31/2019  4:10 PM                   Point: Precautions (In Progress)     Learning Progress Summary           Patient Acceptance, E,D, NR by PC at 2/4/2019 10:21 AM    Acceptance, E, VU by ML at 2/4/2019  4:50 AM    Acceptance, E,D, VU,DU by LC at 2/3/2019 10:22 AM    Comment:  safety during ambulation, need for assistance    Acceptance, E,TB, VU by SM at 2/2/2019  2:48 PM    Acceptance, E,TB,D, VU,NR by  at 2/1/2019  4:12 PM                               User Key     Initials Effective Dates Name Provider  Type Discipline    CH 04/03/18 -  Harmony Edwards, PT Physical Therapist PT    PC 04/03/18 -  Arlette Tellez, PT Physical Therapist PT    EM 04/03/18 -  Vira Borrero, PT Physical Therapist PT     03/07/18 -  Padmini Heart, ZAC Physical Therapy Assistant PT    ML 06/16/16 -  Nuvia Lawson RN Registered Nurse Nurse     08/02/16 -  Arash Hong, PT DPT Physical Therapist PT                PT Recommendation and Plan  Therapy Frequency (PT Clinical Impression): daily  Plan of Care Reviewed With: patient  Outcome Summary: pt lethargic this morning, fatigued quicky requiring a seated rest, but overall quality of gait is improved, pt still relying on rwx during ambulation  Outcome Measures     Row Name 02/04/19 1000 02/03/19 1000 02/02/19 1400       How much help from another person do you currently need...    Turning from your back to your side while in flat bed without using bedrails?  4  -PC  4  -LC  4  -SM    Moving from lying on back to sitting on the side of a flat bed without bedrails?  4  -PC  4  -LC  4  -SM    Moving to and from a bed to a chair (including a wheelchair)?  3  -PC  3  -LC  3  -SM    Standing up from a chair using your arms (e.g., wheelchair, bedside chair)?  3  -PC  3  -LC  3  -SM    Climbing 3-5 steps with a railing?  3  -PC  3  -LC  2  -SM    To walk in hospital room?  3  -PC  3  -LC  3  -SM    AM-PAC 6 Clicks Score  20  -PC  20  -LC  19  -SM       Functional Assessment    Outcome Measure Options  --  AM-PAC 6 Clicks Basic Mobility (PT)  -  AM-PAC 6 Clicks Basic Mobility (PT)  -SM    Row Name 02/01/19 1600             How much help from another person do you currently need...    Turning from your back to your side while in flat bed without using bedrails?  4  -CH      Moving from lying on back to sitting on the side of a flat bed without bedrails?  4  -CH      Moving to and from a bed to a chair (including a wheelchair)?  3  -CH      Standing up from a chair using your  arms (e.g., wheelchair, bedside chair)?  3  -CH      Climbing 3-5 steps with a railing?  3  -CH      To walk in hospital room?  3  -CH      AM-PAC 6 Clicks Score  20  -CH         Functional Assessment    Outcome Measure Options  AM-PAC 6 Clicks Basic Mobility (PT)  -CH        User Key  (r) = Recorded By, (t) = Taken By, (c) = Cosigned By    Initials Name Provider Type     Harmony Edwards, PT Physical Therapist    PC Arlette Tellez, PT Physical Therapist    Padmini Lizama, PTA Physical Therapy Assistant    Arash Miller, PT DPT Physical Therapist         Time Calculation:   PT Charges     Row Name 02/04/19 1023             Time Calculation    Start Time  0936  -PC      Stop Time  0959  -PC      Time Calculation (min)  23 min  -PC      PT Received On  02/04/19  -PC      PT - Next Appointment  02/05/19  -PC        User Key  (r) = Recorded By, (t) = Taken By, (c) = Cosigned By    Initials Name Provider Type    PC Arlette Tellez, PT Physical Therapist        Therapy Suggested Charges     Code   Minutes Charges    08804 (CPT®) Hc Pt Neuromusc Re Education Ea 15 Min      98496 (CPT®) Hc Pt Ther Proc Ea 15 Min      17576 (CPT®) Hc Gait Training Ea 15 Min 15 1    76883 (CPT®) Hc Pt Therapeutic Act Ea 15 Min      62692 (CPT®) Hc Pt Manual Therapy Ea 15 Min      24296 (CPT®) Hc Pt Iontophoresis Ea 15 Min      92108 (CPT®) Hc Pt Elec Stim Ea-Per 15 Min      71649 (CPT®) Hc Pt Ultrasound Ea 15 Min      30718 (CPT®) Hc Pt Self Care/Mgmt/Train Ea 15 Min      13286 (CPT®) Hc Pt Prosthetic (S) Train Initial Encounter, Each 15 Min      56951 (CPT®) Hc Pt Orthotic(S)/Prosthetic(S) Encounter, Each 15 Min      82049 (CPT®) Hc Orthotic(S) Mgmt/Train Initial Encounter, Each 15min      Total  15 1        Therapy Charges for Today     Code Description Service Date Service Provider Modifiers Qty    20275066148 HC PT THER PROC EA 15 MIN 2/4/2019 Arlette Tellez, PT GP 2          PT G-Codes  Outcome Measure Options: AM-PAC  6 Clicks Basic Mobility (PT)  AM-PAC 6 Clicks Score: 20    Arlette Tellez, PT  2/4/2019

## 2019-02-04 NOTE — PLAN OF CARE
Problem: Patient Care Overview  Goal: Plan of Care Review  Outcome: Ongoing (interventions implemented as appropriate)   02/04/19 1021   Coping/Psychosocial   Plan of Care Reviewed With patient   OTHER   Outcome Summary pt lethargic this morning, fatigued quicky requiring a seated rest, but overall quality of gait is improved, pt still relying on rwx during ambulation

## 2019-02-04 NOTE — DISCHARGE PLACEMENT REQUEST
"Omkar Marquez (63 y.o. Male)     Date of Birth Social Security Number Address Home Phone MRN    1955  [address] [address] [phone] [id number]    [place] Marital Status          None        Admission Date Admission Type Admitting Provider Attending Provider Department, Room/Bed    1/24/19 Emergency [facility] [place] MD Masden, Troy Andrew, MD [address], [address]    Discharge Date Discharge Disposition Discharge Destination                       Attending Provider:  Mehul Merida MD    Allergies:  Poison Ivy Extract    Isolation:  None   Infection:  None   Code Status:  CPR    Ht:  180.3 cm (71\")   Wt:  103 kg (226 lb 10.1 oz)    Admission Cmt:  None   Principal Problem:  Essential hypertension [I10]                 Active Insurance as of 1/24/2019     Primary Coverage     Payor Plan Insurance Group Employer/Plan Group    [employer]UE [employer] PPO [id number]     Payor Plan Address Payor Plan Phone Number Payor Plan Fax Number Effective Dates    [address] [phone]  12/1/2014 - None Entered    [facility] [id number]       Subscriber Name Subscriber Birth Date Member ID       OMKAR MARQUEZ 1955 [id number]                 Emergency Contacts      (Rel.) Home Phone Work Phone Mobile Phone    Bhargavi Parker (Spouse) [phone] -- [phone]              "

## 2019-02-04 NOTE — PROGRESS NOTES
[facility]  Cardiology Progress Note    Patient Identification:  Name: Flo Manzo  Age: 63 y.o.  Sex: male  :  1955  MRN: [id number]                 Follow Up / Chief Complaint: afib    Interval History:  Troponin negative x3.  Heart cath  no obstructive CAD.  Transitioned from heparin sq to eliquis  first dose given this am. MRI revealed IPH thought to be related to hypertension and Xarelto.  Neurosurgery saw pt and recommended holding AC at least 24-48 hours, but did not recommend surgical intervention.     Subjective: Denies chest pain, shortness of breath, syncope and near syncope, states he is slightly dizzy at baseline, and sometimes feels palpitations.     Objective:    BP: ()/(42-86) 104/76       Resp:  [18] 18   Heart Rate:  [78-94] 78   Temp:  [97.7 °F (36.5 °C)-98.3 °F (36.8 °C)] 98.3 °F (36.8 °C)   SpO2:  [94 %-98 %] 96 %    Past Medical History:  Past Medical History:   Diagnosis Date   • Arthritis    • Colon polyps 2018    Hepatic flexure: tubular adenoma, only low-grade dysplasia; splenic flexure: tubular adenoma, only low-grade dysplasia; sigmoid colon: tubular adenoma, multiple fragments only low-grade dysplasia   • Depression    • Hypertension    • Sleep apnea     previously diagnosed with sleep apnea, had T&A done and it has since resolved    • Ventral hernia      Past Surgical History:  Past Surgical History:   Procedure Laterality Date   • APPENDECTOMY N/A    • CARDIAC CATHETERIZATION N/A 2004    Cath left ventriculography, coronary angiography and left heart catheterization, Normal results-Dr. Vadim Mancuso   • CARDIAC CATHETERIZATION N/A 2019    Procedure: Left Heart Cath;  Surgeon: Eren Bruce MD;  Location: [facility] CATH INVASIVE LOCATION;  Service: Cardiology   • CARDIAC CATHETERIZATION N/A 2019    Procedure: Left ventriculography;  Surgeon: Eren Bruce MD;  Location: [facility] CATH INVASIVE LOCATION;   Service: Cardiology   • CARDIAC CATHETERIZATION N/A 1/29/2019    Procedure: Coronary angiography;  Surgeon: Eren Bruce MD;  Location: [facility] CATH INVASIVE LOCATION;  Service: Cardiology   • CARPAL TUNNEL RELEASE Right 2013   • CARPAL TUNNEL RELEASE Bilateral    • COLONOSCOPY N/A 1/4/2018    Procedure: COLONOSCOPY with cold polypectomy and hot snare polypectomy;  Surgeon: Ten Solitario MD;  Location: [facility] ENDOSCOPY;  Service:    • EYE LENS IMPLANT SECONDARY Bilateral 2007, 2008   • KNEE CARTILAGE SURGERY Right 1979   • TONSILLECTOMY AND ADENOIDECTOMY Bilateral 2005   • TOTAL KNEE ARTHROPLASTY Left 03/14/2016    Left total knee arthroplasty with Amberly component, size 11 femur, G tibial baseplate with a 10 polyethylene insert and a 38 patellar button-Dr. Pablo Hairston   • TOTAL KNEE ARTHROPLASTY Right 03/02/2015    Right total knee arthroplasty with Amberly component size G femur, 11 tibial base plate with an 11 polyethylene insert and a 38 patellar button-Dr. Pablo Hairston   • UVULOPALATOPHARYNGOPLASTY N/A 2005    part of soft palate, and uvula   • VENTRAL HERNIA REPAIR N/A 1/23/2018    Procedure: VENTRAL HERNIA REPAIR LAPAROSCOPIC WITH DAVINCI ROBOT AND MESH;  Surgeon: Ten Solitario MD;  Location: [facility] MAIN OR;  Service:         Social History:   Social History     Tobacco Use   • Smoking status: Never Smoker   • Smokeless tobacco: Never Used   Substance Use Topics   • Alcohol use: Yes     Comment: social, maybe a drink a month      Family History:  Family History   Problem Relation Age of Onset   • Hypertension Mother    • Kidney failure Mother    • Coronary artery disease Father    • Lung cancer Father         positive smoker   • Heart attack Father    • Breast cancer Sister 60   • Prostate cancer Brother    • Colon polyps Brother    • Kidney nephrosis Brother    • Malig Hyperthermia Neg Hx           Allergies:  Allergies   Allergen Reactions   • Poison Ivy Extract Hives, Itching, Rash and  "Other (See Comments)     ITCHY BLISTERS     Scheduled Meds:    atorvastatin 10 mg Nightly   citalopram 20 mg Daily   furosemide 40 mg Daily   lisinopril 5 mg Q12H   metoprolol tartrate 25 mg Q12H   polyethylene glycol 17 g Daily   potassium chloride 20 mEq Daily   sodium chloride 3 mL Q12H       I have reviewed the patient's history, home, and current medications.     INTAKE AND OUTPUT:    Intake/Output Summary (Last 24 hours) at 2/4/2019 1220  Last data filed at 2/3/2019 1848  Gross per 24 hour   Intake 120 ml   Output --   Net 120 ml         Review of Systems:   GI: denies n/v or abd pain  Cardiac: denies chest pain, or pressure; orthopnea improved; positive for intermittent palpitations  Pulmonary: nonproductive cough but better, MCGREGOR noted but improved.   Neurological:  Denies syncope and near syncope; denies weakness; positive for mild dizziness in all positions    Constitutional:  Temp:  [97.7 °F (36.5 °C)-98.3 °F (36.8 °C)] 98.3 °F (36.8 °C)  Heart Rate:  [78-94] 78  Resp:  [18] 18  BP: ()/(42-86) 104/76   SpO2:  [94 %-98 %] 96 %           Physical Exam  BP 98/81 (BP Location: Left arm, Patient Position: Sitting)   Pulse 67   Temp 97.6 °F (36.4 °C) (Oral)   Resp 18   Ht 180.3 cm (71\")   Wt 103 kg (226 lb 10.1 oz)   SpO2 92%   BMI 31.61 kg/m²     General appearance: NAD, conversant   Eyes: anicteric sclerae, moist conjunctivae; no lid-lag; PERRLA   HENT: Atraumatic; oropharynx clear with moist mucous membranes and no mucosal ulcerations;  normal hard and soft palate   Neck: Trachea midline; FROM, supple, no thyromegaly or lymphadenopathy   Lungs: CTA, with normal respiratory effort and no intercostal retractions   CV: S1-S2 riregular, no murmurs, no rub, no gallop   Abdomen: Soft, non-tender; no masses or HSM   Extremities: No peripheral edema or extremity lymphadenopathy  Skin: Normal temperature, turgor and texture; no rash, ulcers or subcutaneous nodules   Psych: Appropriate affect, alert and " oriented to person, place and time       Cardiographics    Telemetry:      19 atrial fibrillation rate 70's          EC/2/19 atrial fibrillation rate 80's          19 atrial fibrillation with controlled VR        19  AFib with controlled VR.                Previous EKG 19 SR                 I have personally reviewed and interpreted ECG and telemetry.         Echocardiogram:       Interpretation Summary 19    · Left ventricular wall thickness is consistent with mild concentric hypertrophy.  · Estimated EF = 53%.  · Left ventricular systolic function is normal.  · Mild mitral valve regurgitation is present  · Left atrial cavity size is moderately dilated.  · There is moderate calcification of the aortic valve.  · Mild aortic valve stenosis is present.  · Aortic valve maximum pressure gradient is 28.0 mmHg.  · Aortic valve mean pressure gradient is 16.0 mmHg.  · Mild tricuspid valve regurgitation is present.  · Calculated right ventricular systolic pressure from tricuspid regurgitation is 31 mmHg.         Interpretation Summary echo 11/15/17     · Left ventricular wall thickness is consistent with mild concentric hypertrophy.  · Left ventricular systolic function is normal. Estimated EF = 52%.  · Left ventricular diastolic dysfunction (grade II) consistent with pseudonormalization.  · Mild tricuspid valve regurgitation is present.  · Calculated right ventricular systolic pressure from tricuspid regurgitation is 37 mmHg.               Echo 2/19/15 with Tay  Conclusions:  The study quality is good.   There is normal left ventricular systolic function.  Global left ventricular wall motion and contractility are within normal  limits.  The estimated ejection fraction is 60-65%.   Mild to moderate concentric left ventricular hypertrophy is observed.  The left atrium is moderately enlarged.  There is aortic annular calcification.  There is mild mitral annular calcification.  There is  trivial to mild mitral regurgitation observed.  There is mild tricuspid regurgitation.  The right ventricular systolic pressure is estimated to be 30-35 mmHg.         CTA of chest 1/24/19    IMPRESSION:  There is no CT evidence of pulmonary embolism or other acute  process within the chest. There is mild cardiomegaly. A moderately large  goiter extending into the superior mediastinum produces approximately  50% narrowing of the tracheal lumen.    Stress 1/26/19    Interpretation Summary     · Findings consistent with an equivocal ECG stress test.  · Left ventricular ejection fraction is mildly reduced (Calculated EF = 40%).  · Myocardial perfusion imaging indicates a normal myocardial perfusion study with no evidence of ischemia.  · Impressions are consistent with an intermediate risk study.  · Non ischemic cardiomyopathy       Interpretation Summary Nuc Stress 1/26/19    · Findings consistent with an equivocal ECG stress test.  · Left ventricular ejection fraction is mildly reduced (Calculated EF = 40%).  · Myocardial perfusion imaging indicates a normal myocardial perfusion study with no evidence of ischemia.  · Impressions are consistent with an intermediate risk study.  · Non ischemic cardiomyopathy          Nuc 1/30/19 MRI of brain  IMPRESSION:  There are findings compatible with an acute intraparenchymal  hematoma within the medial portion of the left cerebellar hemisphere  with extension into the fourth ventricle. Intraventricular hemorrhage is  identified within the dependent aspects of the lateral ventricles. The  etiology of the hemorrhage is uncertain. There are multiple punctate  areas of susceptibility artifact identified within cortical medullary  interfaces of both cerebral hemispheres as well as within the thalami  and the left aspect of the ching. As such, this hemorrhage could be due  to amyloid angiopathy or a hypertensive hemorrhage. Of note, there are  punctate areas of diffusion-weighted  hyperintensity within the medial  portion of the right occipital lobe and the posterior aspect left  occipital lobe or sulci adjacent to the occipital cortex. An additional  similar focus is seen within the or adjacent to the cortex of the  superior aspect of the left cerebellar hemisphere. These could be due to  small foci of acute infarction in which case the hemorrhage within the  left cerebellar hemisphere could be hemorrhagic transformation of an  infarct. Alternatively, these could be representative of areas of  subarachnoid hemorrhage. Finally, I cannot exclude the possibility of an  underlying mass or vascular malformation within the left cerebellar  hemisphere. I would recommend performing a CT scan of the head at this  time to establish a baseline of this hemorrhage. These findings and  recommendations were discussed with Dr. Sebastian on 01/30/2019  approximately 8:20 p.m.       Conclusion 1/29/19       · Successful right and left coronary angiogram and LV gram  · Normal coronary arteries  · And normal LV gram           Lab Review       Results from last 7 days   Lab Units 02/01/19  0331   MAGNESIUM mg/dL 2.4     Results from last 7 days   Lab Units 02/04/19  0540   SODIUM mmol/L 141   POTASSIUM mmol/L 4.0   BUN mg/dL 32*   CREATININE mg/dL 0.81   CALCIUM mg/dL 9.9       Results from last 7 days   Lab Units 02/01/19  0331 01/31/19  0424 01/30/19  0508   WBC 10*3/mm3 10.12 7.79 7.86   HEMOGLOBIN g/dL 16.1 16.2 14.1   HEMATOCRIT % 50.3 49.1 46.1   PLATELETS 10*3/mm3 257 250 248     Results from last 7 days   Lab Units 01/30/19  2133   INR  1.11*            Estimated Creatinine Clearance: 115.6 mL/min (by C-G formula based on SCr of 0.81 mg/dL).      Assessment/plan     Afib new onset: rate is controlled in the 60-70's.  CHADs-VASc 2.   Was transitioned to Eliquis 5 mg with first dose 1/30 am. MRI 1/31/19 showed IP hemorrhage.   Eliquis and ASA on hold. TSH WNL 1/24.  1/25/19 echo EF 53% with concentric LVH  "showing Mild AS, Mild TR, and Mild MR.   Continue atenolol 50 mg.      Abnormal EKG with angina:  AFib with possible Anterior MI pattern on admit.  trops neg times 3.  Hx of cath > 10 years ok \"normal\".  1/26 Nuc stress normal perfusion but intermediate study. Pt had shoulder pain, radiating 1/28.    Cath 1/29 showing normal Coronary arteries.  BMP stable post cath.      Hx of Diastolic dysfunction (grade II) on echo 11/15/17:  Elevated proBNP on admit,  appears euvolemic and able to lay semi flat comfortably.      Hx of GÓMEZ:  uvoloplasty, and pharyngeal plasty 2005.  + snorer and high risk for sleep d/o breathing. Recommend repeat outpt sleep study to reevaluate.   RVSP 31 mmHg by echo, stable from prior echos.      Elevated D Dimer: CTA of chest neg for PE. Noted goiter, patient will follow up with Dr. Burciaga after discharge.      Goiter: noted on CT.    A moderately large goiter extending into the superior mediastinum with 50% narrowing of the tracheal lumen. Thoracic surgery has been consulted. Dr. Burciaga plans on outpt f/u once CV status stabilized.     Dyslipidemia: , goal 70 or less; 10 year ASCVD 12.9%.  Is on atorvastatin.     Hypokalemia: K+ 4.0 2/4    HTN:   Amlodipine 5 mg started 1/28, then discontinued 2/2 d/t syncopal episode thought to be r/t hypotension. Is also on metoprolol 25 mg BID, which was started 2/1 when atenolol was d/c'd.  BP trend /42-96.  Neurosurgery is recommending tight BP control.       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 neurosurgery has seen and feels likely related to HTN and AC. noted recommendations for tight BP control and holding AC for at least 48 hours. No surgical intervention recommended.  CT head 1/31/19 early am stable.  Consider watchman procedure as outpt if deemed to be a non candidate for long term AC.   has asked neurosurgery to reassess for candidacy for AC.  He will need to be cleared from neurologic standpoint for warfarin use " for at least 45 days post watchman.  Will await recommendations of neurosurgery    Dysarthria, brief:  MRI of brain showed para vermian IPH. Neurosurgery has seen.  Eliquis on hold for Para Vermian IPH.  Neurology following.        Continue current antihypertensive regimen.  Await neurosurgery recommendations regarding AC.  Patient will require at least 45 days warfarin therapy s/p Watchman.          Eren Bruce MD  2/4/19 12:34pm

## 2019-02-04 NOTE — PLAN OF CARE
Problem: Patient Care Overview  Goal: Plan of Care Review  Outcome: Ongoing (interventions implemented as appropriate)   02/04/19 6428   Coping/Psychosocial   Plan of Care Reviewed With patient   Plan of Care Review   Progress improving   OTHER   Outcome Summary VSS with no c/o of pain or discomfort, he has slept most of the night with one bout of incontinent diarrhea, will CTM.

## 2019-02-05 PROCEDURE — 99232 SBSQ HOSP IP/OBS MODERATE 35: CPT | Performed by: NURSE PRACTITIONER

## 2019-02-05 PROCEDURE — 97110 THERAPEUTIC EXERCISES: CPT

## 2019-02-05 RX ADMIN — APIXABAN 5 MG: 5 TABLET, FILM COATED ORAL at 14:52

## 2019-02-05 RX ADMIN — LISINOPRIL 5 MG: 5 TABLET ORAL at 22:53

## 2019-02-05 RX ADMIN — ATORVASTATIN CALCIUM 10 MG: 10 TABLET, FILM COATED ORAL at 22:53

## 2019-02-05 RX ADMIN — FUROSEMIDE 40 MG: 40 TABLET ORAL at 09:42

## 2019-02-05 RX ADMIN — CITALOPRAM 20 MG: 20 TABLET, FILM COATED ORAL at 09:42

## 2019-02-05 RX ADMIN — METOPROLOL TARTRATE 25 MG: 25 TABLET ORAL at 20:55

## 2019-02-05 RX ADMIN — LISINOPRIL 5 MG: 5 TABLET ORAL at 09:42

## 2019-02-05 RX ADMIN — METOPROLOL TARTRATE 25 MG: 25 TABLET ORAL at 09:42

## 2019-02-05 RX ADMIN — SODIUM CHLORIDE, PRESERVATIVE FREE 3 ML: 5 INJECTION INTRAVENOUS at 20:55

## 2019-02-05 RX ADMIN — APIXABAN 5 MG: 5 TABLET, FILM COATED ORAL at 20:55

## 2019-02-05 RX ADMIN — POTASSIUM CHLORIDE 20 MEQ: 750 CAPSULE, EXTENDED RELEASE ORAL at 09:42

## 2019-02-05 NOTE — PLAN OF CARE
Problem: Patient Care Overview  Goal: Plan of Care Review  Outcome: Ongoing (interventions implemented as appropriate)   02/05/19 0520   Coping/Psychosocial   Plan of Care Reviewed With patient   Plan of Care Review   Progress improving   OTHER   Outcome Summary BP under control and MD anicipating to restart anticoagulants and DC today or tomorrow. Out pt thoratic surgery consult.

## 2019-02-05 NOTE — PROGRESS NOTES
"    DAILY PROGRESS NOTE  [facility]    Patient Identification:  Name: Flo Manzo  Age: 63 y.o.  Sex: male  :  1955  MRN: [id number]         Primary Care Physician: Cathleen Corona MD    Subjective:  Interval History: No new issues.  Continues to improve.  Denies headache vision changes chest pain shortness of breath nausea or vomiting.  Tolerating good oral intake. Continue w/ mild dizziness w/ exertion w/o syncope     Objective: Mother bedside.  Case discussed in multidisciplinary Rounds    Scheduled Meds:  apixaban 5 mg Oral Q12H   atorvastatin 10 mg Oral Nightly   citalopram 20 mg Oral Daily   furosemide 40 mg Oral Daily   lisinopril 5 mg Oral Q12H   metoprolol tartrate 25 mg Oral Q12H   polyethylene glycol 17 g Oral Daily   potassium chloride 20 mEq Oral Daily   sodium chloride 3 mL Intravenous Q12H     Continuous Infusions:     Vital signs in last 24 hours:  Temp:  [97.3 °F (36.3 °C)-98 °F (36.7 °C)] 97.9 °F (36.6 °C)  Heart Rate:  [69-93] 78  Resp:  [18] 18  BP: (118-137)/() 118/84    Intake/Output:    Intake/Output Summary (Last 24 hours) at 2019 1603  Last data filed at 2019 1342  Gross per 24 hour   Intake 840 ml   Output --   Net 840 ml       Exam:  /84 (BP Location: Right arm, Patient Position: Lying)   Pulse 78   Temp 97.9 °F (36.6 °C) (Oral)   Resp 18   Ht 180.3 cm (71\")   Wt 101 kg (223 lb 8 oz)   SpO2 100%   BMI 31.17 kg/m²     General Appearance:    Alert, cooperative, no distress, AAOx3, continues to clinically improve and pretty much appears near baseline                          Head:    Normocephalic, without obvious abnormality, atraumatic                           Eyes:    PERRL, conjunctiva/corneas clear, EOM's intact, both eyes                         Throat:   Lips, tongue, gums normal; oral mucosa pink and moist                         Lungs:    Clear to auscultation bilaterally, respirations unlabored                          Heart:    " Regular rate and rhythm, S1 and S2 normal                  Abdomen:     Soft, non-tender, bowel sounds active                   Extremities:   No cyanosis or edema                        Pulses:   Pulses palpable in lower extremities                  Neurologic:   CNII-XII intact, no focal deficits noted     Data Review:  Labs in chart were reviewed.    Assessment:  Active Hospital Problems    Diagnosis Date Noted   • **Essential hypertension [I10] 01/05/2018   • Intraparenchymal hemorrhage of brain (CMS/HCC) [I61.9] 02/01/2019   • Diastolic CHF, chronic (CMS/[facility]) [I50.32] 02/01/2019   • New onset atrial fibrillation (CMS/[facility]) - RVR [I48.91] 01/24/2019   • Sleep apnea [G47.30] 01/24/2019   • Goiter [E04.9] 01/24/2019   • CHF (congestive heart failure) (CMS/[facility]) [I50.9] 01/24/2019   • Angina at rest (CMS/[facility]) [I20.8] 01/24/2019      Resolved Hospital Problems    Diagnosis Date Noted Date Resolved   • Dyspnea [R06.00] 01/24/2019 02/01/2019       Plan:  BP controlled     BARTOLO ok w/ AC - Cards started Eliquis     With the initiation of anticoagulation and recent intraparenchymal hemorrhage requiring ICU, I plan to monitor this patient for the next 48 hours before ready to DC    Dispo - CCP coordinating either rehab vs HH - PT following      Mehul Merida MD  2/5/2019  4:03 PM

## 2019-02-05 NOTE — PROGRESS NOTES
[facility]  Cardiology Progress Note    Patient Identification:  Name: Flo Manzo  Age: 63 y.o.  Sex: male  :  1955  MRN: [id number]                 Follow Up / Chief Complaint: afib    Interval History:  Troponin negative x3.  Heart cath  no obstructive CAD.  Transitioned from heparin sq to eliquis  first dose given this am. MRI revealed IPH thought to be related to hypertension and Xarelto.      .  Neurosurgery has cleared for resumption of AC.  Spoke with Dr. Bruce will start back eliquis at 5 mg BID.        Subjective: Denies chest pain, shortness of breath, syncope, near syncope.  Some continued  Dizziness but is at baseline.   He denies any  palpitations.     Objective:    BP: ()/() 137/71       Resp:  [18] 18   Heart Rate:  [67-93] 89   Temp:  [97.3 °F (36.3 °C)-98 °F (36.7 °C)] 97.3 °F (36.3 °C)   SpO2:  [92 %-100 %] 100 %    Past Medical History:  Past Medical History:   Diagnosis Date   • Arthritis    • Colon polyps 2018    Hepatic flexure: tubular adenoma, only low-grade dysplasia; splenic flexure: tubular adenoma, only low-grade dysplasia; sigmoid colon: tubular adenoma, multiple fragments only low-grade dysplasia   • Depression    • Hypertension    • Sleep apnea     previously diagnosed with sleep apnea, had T&A done and it has since resolved    • Ventral hernia      Past Surgical History:  Past Surgical History:   Procedure Laterality Date   • APPENDECTOMY N/A    • CARDIAC CATHETERIZATION N/A 2004    Cath left ventriculography, coronary angiography and left heart catheterization, Normal results-Dr. Vadim Mancuso   • CARDIAC CATHETERIZATION N/A 2019    Procedure: Left Heart Cath;  Surgeon: Eren Bruce MD;  Location: [facility] CATH INVASIVE LOCATION;  Service: Cardiology   • CARDIAC CATHETERIZATION N/A 2019    Procedure: Left ventriculography;  Surgeon: Eren Bruce MD;  Location:  [facility] CATH INVASIVE  LOCATION;  Service: Cardiology   • CARDIAC CATHETERIZATION N/A 1/29/2019    Procedure: Coronary angiography;  Surgeon: Eren Bruce MD;  Location: [facility] CATH INVASIVE LOCATION;  Service: Cardiology   • CARPAL TUNNEL RELEASE Right 2013   • CARPAL TUNNEL RELEASE Bilateral    • COLONOSCOPY N/A 1/4/2018    Procedure: COLONOSCOPY with cold polypectomy and hot snare polypectomy;  Surgeon: Ten Solitario MD;  [facility];  Service:    • EYE LENS IMPLANT SECONDARY Bilateral 2007, 2008   • KNEE CARTILAGE SURGERY Right 1979   • TONSILLECTOMY AND ADENOIDECTOMY Bilateral 2005   • TOTAL KNEE ARTHROPLASTY Left 03/14/2016    Left total knee arthroplasty with Amberly component, size 11 femur, G tibial baseplate with a 10 polyethylene insert and a 38 patellar button-Dr. Pablo Hairston   • TOTAL KNEE ARTHROPLASTY Right 03/02/2015    Right total knee arthroplasty with Amberly component size G femur, 11 tibial base plate with an 11 polyethylene insert and a 38 patellar button-Dr. Pablo Hairston   • UVULOPALATOPHARYNGOPLASTY N/A 2005    part of soft palate, and uvula   • VENTRAL HERNIA REPAIR N/A 1/23/2018    Procedure: VENTRAL HERNIA REPAIR LAPAROSCOPIC WITH DAVINCI ROBOT AND MESH;  Surgeon: Ten Solitario MD;  Location: [facility] MAIN OR;  Service:         Social History:   Social History     Tobacco Use   • Smoking status: Never Smoker   • Smokeless tobacco: Never Used   Substance Use Topics   • Alcohol use: Yes     Comment: social, maybe a drink a month      Family History:  Family History   Problem Relation Age of Onset   • Hypertension Mother    • Kidney failure Mother    • Coronary artery disease Father    • Lung cancer Father         positive smoker   • Heart attack Father    • Breast cancer Sister 60   • Prostate cancer Brother    • Colon polyps Brother    • Kidney nephrosis Brother    • Malig Hyperthermia Neg Hx           Allergies:  Allergies   Allergen Reactions   • Poison Ivy Extract Hives, Itching,  Rash and Other (See Comments)     ITCHY BLISTERS     Scheduled Meds:    atorvastatin 10 mg Nightly   citalopram 20 mg Daily   furosemide 40 mg Daily   lisinopril 5 mg Q12H   metoprolol tartrate 25 mg Q12H   polyethylene glycol 17 g Daily   potassium chloride 20 mEq Daily   sodium chloride 3 mL Q12H       I have reviewed the patient's history, home, and current medications.     INTAKE AND OUTPUT:    Intake/Output Summary (Last 24 hours) at 2019 1136  Last data filed at 2019 0900  Gross per 24 hour   Intake 480 ml   Output 200 ml   Net 280 ml         Review of Systems:   GI: denies n/v or abd pain  Cardiac: denies chest pain, or pressure; orthopnea improved;   Pulmonary: nonproductive cough but better, MCGREGOR noted but improved.     Constitutional:  Temp:  [97.3 °F (36.3 °C)-98 °F (36.7 °C)] 97.3 °F (36.3 °C)  Heart Rate:  [67-93] 89  Resp:  [18] 18  BP: ()/() 137/71   SpO2:  [92 %-100 %] 100 %     Exam:   General appearance: NAD, conversant   Eyes:  Non icteric sclerae, moist conjunctivae; no lid-lag; PERRLA   HENT: Atraumatic; oropharynx clear with moist mucous membranes;  Neck: Trachea midline; supple, no  lymphadenopathy   Lungs: CTA, with normal respiratory effort and no intercostal retractions   CV: S1-S2 irregularly irregular with controlled rate. Soft Systolic murmurs LSB; no rub, no gallop   Abdomen: Soft, non-tender; no masses or HSM   Extremities: No peripheral edema or extremity lymphadenopathy  Skin: Normal temperature, turgor and texture; no rash, ulcers or subcutaneous nodules   Psych: Appropriate affect, alert and oriented to person, place and time   Neuro: CN II and XII grossly intact.      Cardiographics    Telemetry:      AFib rate controlled           AFib with 2.4 seconds while sleeping.           Afib with 2.4 second pause while sleeping.         EC/2/19 atrial fibrillation rate 80's          19 atrial fibrillation with controlled VR        19  AFib with  controlled VR.                Previous EKG 1/18/19 SR                 I have personally reviewed and interpreted ECG and telemetry.         Echocardiogram:       Interpretation Summary 1/25/19    · Left ventricular wall thickness is consistent with mild concentric hypertrophy.  · Estimated EF = 53%.  · Left ventricular systolic function is normal.  · Mild mitral valve regurgitation is present  · Left atrial cavity size is moderately dilated.  · There is moderate calcification of the aortic valve.  · Mild aortic valve stenosis is present.  · Aortic valve maximum pressure gradient is 28.0 mmHg.  · Aortic valve mean pressure gradient is 16.0 mmHg.  · Mild tricuspid valve regurgitation is present.  · Calculated right ventricular systolic pressure from tricuspid regurgitation is 31 mmHg.         Interpretation Summary echo 11/15/17     · Left ventricular wall thickness is consistent with mild concentric hypertrophy.  · Left ventricular systolic function is normal. Estimated EF = 52%.  · Left ventricular diastolic dysfunction (grade II) consistent with pseudonormalization.  · Mild tricuspid valve regurgitation is present.  · Calculated right ventricular systolic pressure from tricuspid regurgitation is 37 mmHg.               Echo 2/19/15 with Tay  Conclusions:  The study quality is good.   There is normal left ventricular systolic function.  Global left ventricular wall motion and contractility are within normal  limits.  The estimated ejection fraction is 60-65%.   Mild to moderate concentric left ventricular hypertrophy is observed.  The left atrium is moderately enlarged.  There is aortic annular calcification.  There is mild mitral annular calcification.  There is trivial to mild mitral regurgitation observed.  There is mild tricuspid regurgitation.  The right ventricular systolic pressure is estimated to be 30-35 mmHg.         CTA of chest 1/24/19    IMPRESSION:  There is no CT evidence of pulmonary embolism or  other acute  process within the chest. There is mild cardiomegaly. A moderately large  goiter extending into the superior mediastinum produces approximately  50% narrowing of the tracheal lumen.    Stress 1/26/19    Interpretation Summary     · Findings consistent with an equivocal ECG stress test.  · Left ventricular ejection fraction is mildly reduced (Calculated EF = 40%).  · Myocardial perfusion imaging indicates a normal myocardial perfusion study with no evidence of ischemia.  · Impressions are consistent with an intermediate risk study.  · Non ischemic cardiomyopathy       Interpretation Summary Nuc Stress 1/26/19    · Findings consistent with an equivocal ECG stress test.  · Left ventricular ejection fraction is mildly reduced (Calculated EF = 40%).  · Myocardial perfusion imaging indicates a normal myocardial perfusion study with no evidence of ischemia.  · Impressions are consistent with an intermediate risk study.  · Non ischemic cardiomyopathy          Nuc 1/30/19 MRI of brain  IMPRESSION:  There are findings compatible with an acute intraparenchymal  hematoma within the medial portion of the left cerebellar hemisphere  with extension into the fourth ventricle. Intraventricular hemorrhage is  identified within the dependent aspects of the lateral ventricles. The  etiology of the hemorrhage is uncertain. There are multiple punctate  areas of susceptibility artifact identified within cortical medullary  interfaces of both cerebral hemispheres as well as within the thalami  and the left aspect of the ching. As such, this hemorrhage could be due  to amyloid angiopathy or a hypertensive hemorrhage. Of note, there are  punctate areas of diffusion-weighted hyperintensity within the medial  portion of the right occipital lobe and the posterior aspect left  occipital lobe or sulci adjacent to the occipital cortex. An additional  similar focus is seen within the or adjacent to the cortex of the  superior aspect of  the left cerebellar hemisphere. These could be due to  small foci of acute infarction in which case the hemorrhage within the  left cerebellar hemisphere could be hemorrhagic transformation of an  infarct. Alternatively, these could be representative of areas of  subarachnoid hemorrhage. Finally, I cannot exclude the possibility of an  underlying mass or vascular malformation within the left cerebellar  hemisphere. I would recommend performing a CT scan of the head at this  time to establish a baseline of this hemorrhage. These findings and  recommendations were discussed with Dr. Sebastian on 01/30/2019  approximately 8:20 p.m.       Conclusion 1/29/19       · Successful right and left coronary angiogram and LV gram  · Normal coronary arteries  · And normal LV gram           Lab Review       Results from last 7 days   Lab Units 02/01/19  0331   MAGNESIUM mg/dL 2.4     Results from last 7 days   Lab Units 02/04/19  0540   SODIUM mmol/L 141   POTASSIUM mmol/L 4.0   BUN mg/dL 32*   CREATININE mg/dL 0.81   CALCIUM mg/dL 9.9       Results from last 7 days   Lab Units 02/01/19  0331 01/31/19  0424 01/30/19  0508   WBC 10*3/mm3 10.12 7.79 7.86   HEMOGLOBIN g/dL 16.1 16.2 14.1   HEMATOCRIT % 50.3 49.1 46.1   PLATELETS 10*3/mm3 257 250 248     Results from last 7 days   Lab Units 01/30/19  2133   INR  1.11*            Estimated Creatinine Clearance: 114.5 mL/min (by C-G formula based on SCr of 0.81 mg/dL).      Assessment/plan     Afib new onset: rate is controlled in the 60-70's.  TSH WNL 1/24. 2/5 Occasional pause up to 2.4 seconds while sleeping   CHADs-VASc 2.   Was transitioned to Eliquis 5 mg with first dose 1/30 am. MRI 1/31/19 showed IP hemorrhage.   Eliquis and ASA place on hold. 1/25/19 echo EF 53% with concentric LVH showing Mild AS, Mild TR, and Mild MR.   Is on metoprolol 25 mg BID.  Continue.  Recommend placement of Zios 14 day holter monitor to be placed while at rehab.  Pauses likely related to untreated sleep  "d/o breathing.     Abnormal EKG with angina:  AFib with possible Anterior MI pattern on admit.  trops neg times 3.  Hx of cath > 10 years ok \"normal\".  1/26 Nuc stress normal perfusion but intermediate study. Pt had shoulder pain, radiating 1/28.    Cath 1/29 showing normal Coronary arteries. Right groin cath site with 1 cm x 0.5 cm soft hematoma.  Mild TTP.  No bruit, no thrill.       Hx of Diastolic dysfunction (grade II) on echo 11/15/17:  Elevated proBNP on admit,  appears euvolemic and able to lay  flat comfortably.      Hx of GÓMEZ:  uvoloplasty, and pharyngeal plasty 2005 but unable to remember if follow up sleep study obtained post surgery.  + snorer and high risk for sleep d/o breathing. Recommend repeat outpt sleep study to reevaluate.   RVSP 31 mmHg by echo, stable from prior echos.      Elevated D Dimer: CTA of chest neg for PE. Noted goiter, patient will follow up with Dr. Burciaga after discharge.      Goiter: noted on CT.    A moderately large goiter extending into the superior mediastinum with 50% narrowing of the tracheal lumen. Thoracic surgery has been consulted. Dr. Burciaga plans on outpt f/u once CV status stabilized.     Dyslipidemia: , goal 70 or less; 10 year ASCVD 12.9%.  Is on atorvastatin.     Hypokalemia: K+ 4.0 2/4    HTN: Adequate control on metoprolol and lisinopril.   Amlodipine has been stopped discontinued 2/2 d/t syncopal episode thought to be r/t hypotension.  Neurosurgery is recommending tight BP control.       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 neurosurgery has seen and feels likely related to HTN and AC. noted recommendations for tight BP control.  2/5 per neurosurgery note has okd resumption of anticoagulation.  and holding AC for at least 48 hours. Consider watchman procedure as outpt if deemed to be a non candidate for long term AC candidate if he is unable to tolerate restart of AC for long term CVA prophylaxis. 2/5 " Spoke with Dr. Bruce and will start back eliquis at 5 mg.      In the setting of atrial fibrillation, I have discussed with the patient and then his wife Bhargavi via phone regarding the risks and benefits of anticoagulation therapy which include increased risk of bleeding and decreased risk of systemic embolization such as stroke. Patient and wife both verbalize understanding and agree with treatment plan.    Continue current antihypertensive regimen. Resume AC with Eliquis 5 mg BID first dose now and monitor.  Stress the importance of medication adherence.     He has follow up with Dr. Bruce 2/28/19 at 1115 at  [place] office.     Yessenia Go, APRN  2/4/19 12:34pm

## 2019-02-05 NOTE — THERAPY TREATMENT NOTE
Acute Care - Physical Therapy Treatment Note  [facility]     Patient Name: Flo Manzo  : 1955  MRN: [id number]  Today's Date: 2019  Onset of Illness/Injury or Date of Surgery: 19          Admit Date: 2019    Visit Dx:    ICD-10-CM ICD-9-CM   1. New onset atrial fibrillation (CMS/HCC) I48.91 427.31   2. Goiter diffuse E04.9 240.9   3. Angina at rest (CMS/HCC) I20.8 413.9   4. Essential hypertension I10 401.9   5. New onset atrial fibrillation (CMS/HCC) - RVR I48.91 427.31   6. Generalized weakness R53.1 780.79     Patient Active Problem List   Diagnosis   • Umbilical hernia without obstruction and without gangrene   • Essential hypertension   • Arthritis of left knee   • Depression   • Obesity (BMI 30-39.9)   • New onset atrial fibrillation (CMS/HCC) - RVR   • Sleep apnea   • Goiter   • CHF (congestive heart failure) (CMS/HCC)   • Angina at rest (CMS/HCC)   • Intraparenchymal hemorrhage of brain (CMS/HCC)   • Diastolic CHF, chronic (CMS/HCC)       Therapy Treatment    Rehabilitation Treatment Summary     Row Name 19 1400             Treatment Time/Intention    Discipline  physical therapy assistant  -      Document Type  therapy note (daily note)  -      Subjective Information  complains of being light headed  -      Mode of Treatment  physical therapy  -      Patient/Family Observations  pt sidelying left side in bed  -      Care Plan Review  patient/other agree to care plan  -      Therapy Frequency (PT Clinical Impression)  daily  -EH      Patient Effort  good  -      Existing Precautions/Restrictions  fall  -EH      Recorded by [] Dora Pete PTA 19 1412      Row Name 19 1400             Cognitive Assessment/Intervention- PT/OT    Orientation Status (Cognition)  oriented x 4  -EH      Follows Commands (Cognition)  WNL  -EH      Personal Safety Interventions  fall prevention program maintained;gait belt;nonskid shoes/slippers when out of bed  -EH       Recorded by [] Dora Pete, [facility] 02/05/19 1412      Row Name 02/05/19 1400             Bed Mobility Assessment/Treatment    Supine-Sit Williams (Bed Mobility)  independent  -EH      Sit-Supine Williams (Bed Mobility)  independent  -EH      Assistive Device (Bed Mobility)  bed rails  -EH      Recorded by [] Dora Pete, [facility] 02/05/19 1412      Row Name 02/05/19 1400             Sit-Stand Transfer    Sit-Stand Williams (Transfers)  contact guard  -      Assistive Device (Sit-Stand Transfers)  walker, front-wheeled  -EH      Recorded by [] Dora Pete [facility] 02/05/19 1412      Row Name 02/05/19 1400             Stand-Sit Transfer    Stand-Sit Williams (Transfers)  contact guard  -EH      Assistive Device (Stand-Sit Transfers)  walker, front-wheeled  -EH      Recorded by [] Dora Pete, [facility] 02/05/19 1412      Row Name 02/05/19 1400             Gait/Stairs Assessment/Training    Williams Level (Gait)  contact guard  -EH      Assistive Device (Gait)  walker, front-wheeled  -EH      Distance in Feet (Gait)  125  -EH      Pattern (Gait)  step-through  -EH      Deviations/Abnormal Patterns (Gait)  right sided deviations;stride length decreased  -EH      Bilateral Gait Deviations  forward flexed posture  -EH      Recorded by [] Dora Pete [facility] 02/05/19 1412      Row Name 02/05/19 1400             Positioning and Restraints    Pre-Treatment Position  in bed  -EH      Post Treatment Position  chair  -EH      In Chair  reclined;call light within reach;encouraged to call for assist  -EH      Recorded by [] Dora Pete, [facility] 02/05/19 1412        User Key  (r) = Recorded By, (t) = Taken By, (c) = Cosigned By    Initials Name Effective Dates Discipline     Dora Pete, [facility] 08/19/18 -  PT                   Physical Therapy Education     Title: PT OT SLP Therapies (In Progress)     Topic: Physical Therapy (In Progress)     Point: Mobility training (In Progress)     Learning Progress  Summary           Patient Acceptance, E,D, NR by PC at 2/4/2019 10:21 AM    Acceptance, E, VU by ML at 2/4/2019  4:50 AM    Acceptance, E,D, VU,DU by LC at 2/3/2019 10:22 AM    Comment:  safety during ambulation, need for assistance    Acceptance, E,TB, VU by SM at 2/2/2019  2:48 PM    Acceptance, E,TB,D, VU,NR by  at 2/1/2019  4:12 PM    Acceptance, E,TB,D, VU,NR by  at 1/31/2019  4:10 PM    Acceptance, E, NR by  at 1/30/2019  2:57 PM                   Point: Home exercise program (In Progress)     Learning Progress Summary           Patient Acceptance, E,D, NR by PC at 2/4/2019 10:21 AM    Acceptance, E, VU by ML at 2/4/2019  4:50 AM    Acceptance, E,D, VU,DU by LC at 2/3/2019 10:22 AM    Comment:  safety during ambulation, need for assistance                   Point: Body mechanics (In Progress)     Learning Progress Summary           Patient Acceptance, E,D, NR by PC at 2/4/2019 10:21 AM    Acceptance, E, VU by ML at 2/4/2019  4:50 AM    Acceptance, E,D, VU,DU by LC at 2/3/2019 10:22 AM    Comment:  safety during ambulation, need for assistance    Acceptance, E,TB, VU by  at 2/2/2019  2:48 PM    Acceptance, E,TB,D, VU,NR by  at 2/1/2019  4:12 PM    Acceptance, E,TB,D, VU,NR by  at 1/31/2019  4:10 PM                   Point: Precautions (In Progress)     Learning Progress Summary           Patient Acceptance, E,D, NR by PC at 2/4/2019 10:21 AM    Acceptance, E, VU by ML at 2/4/2019  4:50 AM    Acceptance, E,D, VU,DU by LC at 2/3/2019 10:22 AM    Comment:  safety during ambulation, need for assistance    Acceptance, E,TB, VU by  at 2/2/2019  2:48 PM    Acceptance, E,TB,D, VU,NR by  at 2/1/2019  4:12 PM                               User Key     Initials Effective Dates Name Provider Type Discipline     04/03/18 -  Harmony Edwards, PT Physical Therapist PT    PC 04/03/18 -  Arlette Tellez, PT Physical Therapist PT    EM 04/03/18 -  Vira Borrero, PT Physical Therapist PT     03/07/18  -  Padmini Heart, ZAC Physical Therapy Assistant PT     06/16/16 -  Nuvia Lawson RN Registered Nurse Nurse     08/02/16 -  Arash Hong, PT DPT Physical Therapist PT                PT Recommendation and Plan  Therapy Frequency (PT Clinical Impression): daily  Plan of Care Reviewed With: patient  Progress: improving  Outcome Summary: pt tolerated treatment with minimal c/o light headedness. Pt ambulated 150 feet with rwx, CGA. pt improving with transfers requiring CGA.   Outcome Measures     Row Name 02/05/19 1400 02/04/19 1000 02/03/19 1000       How much help from another person do you currently need...    Turning from your back to your side while in flat bed without using bedrails?  4  -  4  -PC  4  -LC    Moving from lying on back to sitting on the side of a flat bed without bedrails?  4  -  4  -PC  4  -LC    Moving to and from a bed to a chair (including a wheelchair)?  3  -  3  -PC  3  -LC    Standing up from a chair using your arms (e.g., wheelchair, bedside chair)?  3  -  3  -PC  3  -LC    Climbing 3-5 steps with a railing?  3  -  3  -PC  3  -LC    To walk in hospital room?  3  -  3  -PC  3  -LC    AM-PAC 6 Clicks Score  20  -  20  -PC  20  -LC       Functional Assessment    Outcome Measure Options  --  --  AM-PAC 6 Clicks Basic Mobility (PT)  -      User Key  (r) = Recorded By, (t) = Taken By, (c) = Cosigned By    Initials Name Provider Type    Arlette Cintron, PT Physical Therapist     Arash Hong, PT DPT Physical Therapist     Dora Pete PTA Physical Therapy Assistant         Time Calculation:   PT Charges     Row Name 02/05/19 1409             Time Calculation    Start Time  1344  -      Stop Time  1352  -      Time Calculation (min)  8 min  -      PT Received On  02/05/19  -      PT - Next Appointment  02/06/19  -         Time Calculation- PT    Total Timed Code Minutes- PT  8 minute(s)  -        User Key  (r) = Recorded By, (t) = Taken By, (c) =  Cosigned By    Initials Name Provider Type     Dora Pete PTA Physical Therapy Assistant        Therapy Suggested Charges     Code   Minutes Charges    53783 (CPT®) Hc Pt Neuromusc Re Education Ea 15 Min      27389 (CPT®) Hc Pt Ther Proc Ea 15 Min      79464 (CPT®) Hc Gait Training Ea 15 Min 15 1    45646 (CPT®) Hc Pt Therapeutic Act Ea 15 Min      47931 (CPT®) Hc Pt Manual Therapy Ea 15 Min      14443 (CPT®) Hc Pt Iontophoresis Ea 15 Min      49516 (CPT®) Hc Pt Elec Stim Ea-Per 15 Min      93933 (CPT®) Hc Pt Ultrasound Ea 15 Min      08476 (CPT®) Hc Pt Self Care/Mgmt/Train Ea 15 Min      31805 (CPT®) Hc Pt Prosthetic (S) Train Initial Encounter, Each 15 Min      94618 (CPT®) Hc Pt Orthotic(S)/Prosthetic(S) Encounter, Each 15 Min      86627 (CPT®) Hc Orthotic(S) Mgmt/Train Initial Encounter, Each 15min      Total  15 1        Therapy Charges for Today     Code Description Service Date Service Provider Modifiers Qty    [id number] HC PT THER PROC EA 15 MIN 2/5/2019 Dora Pete PTA GP 1          PT G-Codes  Outcome Measure Options: AM-PAC 6 Clicks Basic Mobility (PT)  AM-PAC 6 Clicks Score: 20    Dora Pete PTA  2/5/2019

## 2019-02-05 NOTE — PLAN OF CARE
Problem: Patient Care Overview  Goal: Plan of Care Review  Outcome: Ongoing (interventions implemented as appropriate)   02/05/19 1412   Coping/Psychosocial   Plan of Care Reviewed With patient   Plan of Care Review   Progress improving   OTHER   Outcome Summary pt tolerated treatment with minimal c/o light headedness. Pt ambulated 150 feet with rwx, CGA. pt improving with transfers requiring CGA.

## 2019-02-06 PROBLEM — I61.9 INTRAPARENCHYMAL HEMORRHAGE OF BRAIN: Status: RESOLVED | Noted: 2019-02-01 | Resolved: 2019-02-06

## 2019-02-06 PROBLEM — I20.89 ANGINA AT REST: Status: RESOLVED | Noted: 2019-01-24 | Resolved: 2019-02-06

## 2019-02-06 PROBLEM — I20.8 ANGINA AT REST: Status: RESOLVED | Noted: 2019-01-24 | Resolved: 2019-02-06

## 2019-02-06 PROCEDURE — 99231 SBSQ HOSP IP/OBS SF/LOW 25: CPT | Performed by: NURSE PRACTITIONER

## 2019-02-06 PROCEDURE — 97110 THERAPEUTIC EXERCISES: CPT

## 2019-02-06 RX ORDER — ACETAMINOPHEN 325 MG/1
650 TABLET ORAL EVERY 4 HOURS PRN
Start: 2019-02-06 | End: 2019-04-08

## 2019-02-06 RX ORDER — ATORVASTATIN CALCIUM 10 MG/1
10 TABLET, FILM COATED ORAL NIGHTLY
Start: 2019-02-06 | End: 2019-03-06 | Stop reason: SDUPTHER

## 2019-02-06 RX ORDER — LISINOPRIL 5 MG/1
5 TABLET ORAL EVERY 12 HOURS SCHEDULED
Start: 2019-02-06 | End: 2019-02-21 | Stop reason: ALTCHOICE

## 2019-02-06 RX ORDER — POTASSIUM CHLORIDE 750 MG/1
10 CAPSULE, EXTENDED RELEASE ORAL DAILY
Start: 2019-02-07 | End: 2019-03-06 | Stop reason: SDUPTHER

## 2019-02-06 RX ORDER — FUROSEMIDE 40 MG/1
40 TABLET ORAL DAILY
Start: 2019-02-07 | End: 2019-03-06 | Stop reason: SDUPTHER

## 2019-02-06 RX ADMIN — LISINOPRIL 5 MG: 5 TABLET ORAL at 20:08

## 2019-02-06 RX ADMIN — CITALOPRAM 20 MG: 20 TABLET, FILM COATED ORAL at 08:45

## 2019-02-06 RX ADMIN — ATORVASTATIN CALCIUM 10 MG: 10 TABLET, FILM COATED ORAL at 20:08

## 2019-02-06 RX ADMIN — METOPROLOL TARTRATE 25 MG: 25 TABLET ORAL at 20:08

## 2019-02-06 RX ADMIN — APIXABAN 5 MG: 5 TABLET, FILM COATED ORAL at 20:08

## 2019-02-06 RX ADMIN — POTASSIUM CHLORIDE 20 MEQ: 750 CAPSULE, EXTENDED RELEASE ORAL at 08:45

## 2019-02-06 RX ADMIN — LISINOPRIL 5 MG: 5 TABLET ORAL at 08:45

## 2019-02-06 RX ADMIN — METOPROLOL TARTRATE 25 MG: 25 TABLET ORAL at 08:45

## 2019-02-06 RX ADMIN — SODIUM CHLORIDE, PRESERVATIVE FREE 3 ML: 5 INJECTION INTRAVENOUS at 20:10

## 2019-02-06 RX ADMIN — APIXABAN 5 MG: 5 TABLET, FILM COATED ORAL at 08:45

## 2019-02-06 RX ADMIN — SODIUM CHLORIDE, PRESERVATIVE FREE 3 ML: 5 INJECTION INTRAVENOUS at 08:46

## 2019-02-06 RX ADMIN — FUROSEMIDE 40 MG: 40 TABLET ORAL at 08:45

## 2019-02-06 NOTE — THERAPY TREATMENT NOTE
Acute Care - Physical Therapy Treatment Note  [facility]     Patient Name: Flo Manzo  : 1955  MRN: [id number]  Today's Date: 2019  Onset of Illness/Injury or Date of Surgery: 19          Admit Date: 2019    Visit Dx:    ICD-10-CM ICD-9-CM   1. New onset atrial fibrillation (CMS/HCC) I48.91 427.31   2. Goiter diffuse E04.9 240.9   3. Angina at rest (CMS/HCC) I20.8 413.9   4. Essential hypertension I10 401.9   5. New onset atrial fibrillation (CMS/HCC) - RVR I48.91 427.31   6. Generalized weakness R53.1 780.79     Patient Active Problem List   Diagnosis   • Umbilical hernia without obstruction and without gangrene   • Essential hypertension   • Arthritis of left knee   • Depression   • Obesity (BMI 30-39.9)   • New onset atrial fibrillation (CMS/HCC) - RVR   • Sleep apnea   • Goiter   • CHF (congestive heart failure) (CMS/HCC)   • Diastolic CHF, chronic (CMS/HCC)       Therapy Treatment    Rehabilitation Treatment Summary     Row Name 19 1518             Treatment Time/Intention    Discipline  physical therapist  (Pended)   -RM      Document Type  therapy note (daily note)  (Pended)   -RM      Subjective Information  no complaints  (Pended)   -RM      Mode of Treatment  physical therapy  (Pended)   -RM      Patient/Family Observations  Pt sitting in chair, friend present  (Pended)   -RM      Patient Effort  good  (Pended)   -RM      Existing Precautions/Restrictions  fall  (Pended)   -RM      Recorded by [RM] Reyna Villaseñor, PT Student 19 1548      Row Name 19 1518             Cognitive Assessment/Intervention- PT/OT    Orientation Status (Cognition)  oriented x 4  (Pended)   -RM      Follows Commands (Cognition)  WNL  (Pended)   -RM      Personal Safety Interventions  fall prevention program maintained;gait belt;nonskid shoes/slippers when out of bed;supervised activity  (Pended)   -RM      Recorded by [RM] Reyna Villaseñor, PT Student 19 1548      Row Name  02/06/19 1518             Bed Mobility Assessment/Treatment    Supine-Sit [place] (Bed Mobility)  not tested  (Pended)   -RM      Sit-Supine [place] (Bed Mobility)  not tested  (Pended)   -RM      Recorded by [RM] Reyna Villaseñor, PT Student 02/06/19 1548      Row Name 02/06/19 1518             Sit-Stand Transfer    Sit-Stand [place] (Transfers)  contact guard;verbal cues  (Pended)   -RM      Assistive Device (Sit-Stand Transfers)  walker, front-wheeled  (Pended)   -RM      Recorded by [RM] Reyna Villaseñor, PT Student 02/06/19 1548      Row Name 02/06/19 1518             Stand-Sit Transfer    Stand-Sit [place] (Transfers)  contact guard;verbal cues  (Pended)   -RM      Assistive Device (Stand-Sit Transfers)  walker, front-wheeled  (Pended)   -RM      Recorded by [RM] Reyna Villaseñor, PT Student 02/06/19 1548      Row Name 02/06/19 1518             Gait/Stairs Assessment/Training    [place] Level (Gait)  contact guard;verbal cues  (Pended)   -RM      Assistive Device (Gait)  walker, front-wheeled  (Pended)   -RM      Distance in Feet (Gait)  150  (Pended)   -RM      Pattern (Gait)  step-through  (Pended)   -RM      Deviations/Abnormal Patterns (Gait)  gait speed decreased;stride length decreased  (Pended)   -RM      Bilateral Gait Deviations  forward flexed posture  (Pended)   -RM      Comment (Gait/Stairs)  Pt reports no lightheadedness with sit to stand or amb. Pt still demo decrease gait speed and step length.  (Pended)   -RM      Recorded by [RM] Reyna Villaseñor, PT Student 02/06/19 1548      Row Name 02/06/19 1518             Positioning and Restraints    Pre-Treatment Position  sitting in chair/recliner  (Pended)   -RM      Post Treatment Position  chair  (Pended)   -RM      In Chair  sitting;call light within reach;encouraged to call for assist;with family/caregiver  (Pended)   -RM      Recorded by [RM] Reyna Villaseñor, PT Student 02/06/19 1548      Row Name 02/06/19 1518              Pain Scale: Numbers Pre/Post-Treatment    Pain Scale: Numbers, Pretreatment  0/10 - no pain  (Pended)   -RM      Pain Scale: Numbers, Post-Treatment  0/10 - no pain  (Pended)   -RM      Recorded by [] Reyna Villaseñor, PT Student 02/06/19 1548      Row Name 02/06/19 1518             Plan of Care Review    Plan of Care Reviewed With  patient  (Pended)   -RM      Recorded by [] Reyna Villaseñor, PT Student 02/06/19 1548        User Key  (r) = Recorded By, (t) = Taken By, (c) = Cosigned By    Initials Name Effective Dates Discipline     Reyna Villaseñor, PT Student 01/04/19 -  PT                   Physical Therapy Education     Title: PT OT SLP Therapies (In Progress)     Topic: Physical Therapy (In Progress)     Point: Mobility training (Done)     Learning Progress Summary           Patient Acceptance, E,TB, VU by  at 2/6/2019  3:49 PM    Comment:  verbal cues for amb    Acceptance, E, VU by  at 2/6/2019  4:42 AM    Acceptance, E,D, NR by PC at 2/4/2019 10:21 AM    Acceptance, E, VU by ML at 2/4/2019  4:50 AM    Acceptance, E,D, VU,DU by TERI at 2/3/2019 10:22 AM    Comment:  safety during ambulation, need for assistance    Acceptance, E,TB, VU by  at 2/2/2019  2:48 PM    Acceptance, E,TB,D, VU,NR by  at 2/1/2019  4:12 PM    Acceptance, E,TB,D, VU,NR by  at 1/31/2019  4:10 PM    Acceptance, E, NR by EM at 1/30/2019  2:57 PM                   Point: Home exercise program (Done)     Learning Progress Summary           Patient Acceptance, E, VU by ML at 2/6/2019  4:42 AM    Acceptance, E,D, NR by PC at 2/4/2019 10:21 AM    Acceptance, E, VU by ML at 2/4/2019  4:50 AM    Acceptance, E,D, VU,DU by LC at 2/3/2019 10:22 AM    Comment:  safety during ambulation, need for assistance                   Point: Body mechanics (Done)     Learning Progress Summary           Patient Acceptance, E,TB, VU by  at 2/6/2019  3:49 PM    Comment:  verbal cues for amb    Acceptance, SHIELA GROSS, NR by PC at 2/4/2019 10:21 AM     Acceptance, E, VU by  at 2/4/2019  4:50 AM    Acceptance, E,D, VU,DU by  at 2/3/2019 10:22 AM    Comment:  safety during ambulation, need for assistance    Acceptance, E,TB, VU by  at 2/2/2019  2:48 PM    Acceptance, E,TB,D, VU,NR by  at 2/1/2019  4:12 PM    Acceptance, E,TB,D, VU,NR by  at 1/31/2019  4:10 PM                   Point: Precautions (In Progress)     Learning Progress Summary           Patient Acceptance, E,D, NR by PC at 2/4/2019 10:21 AM    Acceptance, E, VU by  at 2/4/2019  4:50 AM    Acceptance, E,D, VU,DU by  at 2/3/2019 10:22 AM    Comment:  safety during ambulation, need for assistance    Acceptance, E,TB, VU by  at 2/2/2019  2:48 PM    Acceptance, E,TB,D, VU,NR by  at 2/1/2019  4:12 PM                               User Key     Initials Effective Dates Name Provider Type Discipline     04/03/18 -  Harmony Edwards, PT Physical Therapist PT     04/03/18 -  Arlette Tellez, PT Physical Therapist PT    EM 04/03/18 -  Vira Borrero, PT Physical Therapist PT     03/07/18 -  Padmini Heart, ZAC Physical Therapy Assistant PT     06/16/16 -  Nuvia Lawson RN Registered Nurse Nurse     08/02/16 -  Arash Hong, PT DPT Physical Therapist PT     01/04/19 -  Reyna Villaseñor, CHYNA Student PT Student PT                PT Recommendation and Plan     Plan of Care Reviewed With: (P) patient  Progress: (P) improving  Outcome Summary: (P) Pt agreeable to therapy this pm. Pt reports no dizzines with standing or amb. Pt was able to increase amb distance and talk with his friend the whole time. Will continue to progress as tolerated.  Outcome Measures     Row Name 02/06/19 1500 02/05/19 1400 02/04/19 1000       How much help from another person do you currently need...    Turning from your back to your side while in flat bed without using bedrails?  4  (Pended)   -  4  -  4  -PC    Moving from lying on back to sitting on the side of a flat bed without bedrails?  4   (Pended)   -RM  4  -EH  4  -PC    Moving to and from a bed to a chair (including a wheelchair)?  3  (Pended)   -RM  3  -EH  3  -PC    Standing up from a chair using your arms (e.g., wheelchair, bedside chair)?  3  (Pended)   -RM  3  -EH  3  -PC    Climbing 3-5 steps with a railing?  3  (Pended)   -RM  3  -EH  3  -PC    To walk in hospital room?  3  (Pended)   -RM  3  -EH  3  -PC    AM-PAC 6 Clicks Score  20  (Pended)   -RM  20  -EH  20  -PC       Functional Assessment    Outcome Measure Options  AM-PAC 6 Clicks Basic Mobility (PT)  (Pended)   -RM  --  --      User Key  (r) = Recorded By, (t) = Taken By, (c) = Cosigned By    Initials Name Provider Type    PC Arlette Tellez, PT Physical Therapist     Dora Pete, PTA Physical Therapy Assistant     Reyna Villaseñor, PT Student PT Student         Time Calculation:   PT Charges     Row Name 02/06/19 1551             Time Calculation    Start Time  1519  (Pended)   -RM      Stop Time  1538  (Pended)   -RM      Time Calculation (min)  19 min  (Pended)   -RM      PT Received On  02/06/19  (Pended)   -RM      PT - Next Appointment  02/07/19  (Pended)   -RM        User Key  (r) = Recorded By, (t) = Taken By, (c) = Cosigned By    Initials Name Provider Type     Reyna Villaseñor, PT Student PT Student        Therapy Suggested Charges     Code   Minutes Charges    28897 (CPT®) Hc Pt Neuromusc Re Education Ea 15 Min      97717 (CPT®) Hc Pt Ther Proc Ea 15 Min      42152 (CPT®) Hc Gait Training Ea 15 Min 15 1    36472 (CPT®) Hc Pt Therapeutic Act Ea 15 Min      92204 (CPT®) Hc Pt Manual Therapy Ea 15 Min      86465 (CPT®) Hc Pt Iontophoresis Ea 15 Min      25129 (CPT®) Hc Pt Elec Stim Ea-Per 15 Min      69324 (CPT®) Hc Pt Ultrasound Ea 15 Min      61850 (CPT®) Hc Pt Self Care/Mgmt/Train Ea 15 Min      54596 (CPT®) Hc Pt Prosthetic (S) Train Initial Encounter, Each 15 Min      34044 (CPT®) Hc Pt Orthotic(S)/Prosthetic(S) Encounter, Each 15 Min      19940 (CPT®) Hc  Orthotic(S) Mgmt/Train Initial Encounter, Each 15min      Total  15 1        Therapy Charges for Today     Code Description Service Date Service Provider Modifiers Qty    61521874093 HC PT THER PROC EA 15 MIN 2/6/2019 Reyna Villaseñor, PT Student GP 1          PT G-Codes  Outcome Measure Options: (P) AM-PAC 6 Clicks Basic Mobility (PT)  AM-PAC 6 Clicks Score: (P) 20    Reyna Villaseñor, PT Student  2/6/2019

## 2019-02-06 NOTE — PROGRESS NOTES
Continued Stay Note  [facility]     Patient Name: Flo Manzo  MRN: [id number]  Today's Date: 2/6/2019    Admit Date: 1/24/2019    Discharge Plan     Row Name 02/06/19 1306       Plan    Plan  Plan is skilled bed at [facility] pending Catarino precert.      Plan Comments  Pt has been started on Eliquis.  He has commercial [employer] insurance.  $10 copay card given to pt.  Await Catarino sanchez for rehab at [facility].         Discharge Codes    No documentation.       Expected Discharge Date and Time     Expected Discharge Date Expected Discharge Time    Feb 7, 2019             Dona Benavidez RN

## 2019-02-06 NOTE — PLAN OF CARE
Problem: Patient Care Overview  Goal: Plan of Care Review  Outcome: Ongoing (interventions implemented as appropriate)   02/06/19 3793   Coping/Psychosocial   Plan of Care Reviewed With patient   Plan of Care Review   Progress improving   OTHER   Outcome Summary VSS with no c/o pain or discomfort, anticoagulant ELIQUIS started yesterday-Dr Merida wanting to keep pt 48 hours given recent intraparenchymal hemorrac, DC to [facility] with 2wk Zio patch, will c/o mx

## 2019-02-06 NOTE — DISCHARGE SUMMARY
[facility]    Date of Discharge:  2/6/2019    PCP: Cathleen Corona MD    Discharge Diagnosis:   Active Hospital Problems    Diagnosis Date Noted   • **New onset atrial fibrillation (CMS/[facility]) - RVR [I48.91] 01/24/2019   • Diastolic CHF, chronic (CMS/HCC) [I50.32] 02/01/2019   • Sleep apnea [G47.30] 01/24/2019   • Goiter [E04.9] 01/24/2019   • CHF (congestive heart failure) (CMS/[facility]) [I50.9] 01/24/2019   • Essential hypertension [I10] 01/05/2018      Resolved Hospital Problems    Diagnosis Date Noted Date Resolved   • Intraparenchymal hemorrhage of brain (CMS/[facility]) [I61.9] 02/01/2019 02/06/2019   • Dyspnea [R06.00] 01/24/2019 02/01/2019   • Angina at rest (CMS/[facility]) [I20.8] 01/24/2019 02/06/2019     Procedures Performed  Procedure(s):  Left Heart Cath  Left ventriculography  Coronary angiography     Consults     Date and Time Order Name Status Description    1/30/2019 2051 Inpatient Neurosurgery Consult Completed     1/30/2019 2047 Inpatient Neurosurgery Consult      1/30/2019 2023 Inpatient Pulmonology Consult Completed     1/25/2019 0628 Inpatient Thoracic Surgery Consult Completed     1/24/2019 1830 Inpatient Cardiology Consult Completed     1/24/2019 1734 [facility] (on-call MD unless specified) Completed         Hospital Course  Please see history and physical for details. Patient is a 63 y.o. male was initially admitted for shortness of breath.  Patient has had a prolonged hospitalization and I will further summarize.  Patient was found to be a new onset atrial fibrillation and cardiology was consulted.  Ultimately patient's blood pressures were quite uncontrolled and he developed nausea and vomiting as well.  He underwent cardiac catheterization which demonstrated nonobstructive coronary artery disease and no stents were placed.  He then had dizziness and ended up getting an MRI which demonstrated intracerebral bleed with intraventricular extension.  Neurosurgery  was consulted and patient was transferred to the ICU.  I appreciate the assistance of the intensivist while patient was in the ICU.  He stabilized quickly and did not require any intervention.  Anticoagulation for atrial fibrillation was put on hold until further directed by neurosurgery and given the okay.  Once transferred out of the ICU neurosurgery wanted strict blood pressure control as they felt this was the etiology for the above bleed.  We were able to get this patient's blood pressure control by changing his regimen.  His atenolol has been discontinued and he has been transitioned over to metoprolol 25 mg twice daily.  Norvasc has been discontinued and lisinopril has been started at 5 mg twice daily.  He is also been started on Lasix daily.  Ultimately I feel this patient has further underlying sleep apnea possibly compounded by his large goiter even though he has a past history of uvuloplasty in 2005.  He was seen in consultation by Dr. Burciaga with thoracic surgery who recommends outpatient follow-up and will defer to PCP to further coordinate that once the above issues quiet down.  I have recommended on many occasions for this patient follow-up this PCP and I would further suggest coordination of outpatient sleep study as he will likely need further treatment such as CPAP.  Since being transferred out of the ICU he has remained stable and there's been no neurological problems.  Anticoagulation was started with Eliquis as of 2/5/2018 and thus far the patient has had 3 doses with no aspects of headache nausea vomiting or any new neurological problems.  [facility] is coordinating additional coverage for cost coverage for Eliquis.  At this juncture he is medically stable to transition to rehabilitation.  I am hopeful that insurance will pre-approve his certification to get to rehabilitation.  I will okay discharge to rehabilitation in the a.m. if there are no new neurological issues and the patient continues to  tolerate a diet without aspects of headache or nausea or vomiting.  He still has some mild dizziness with exertion and a lot of this is secondary to his newly control blood pressure and re-equilibration to a new baseline.  He has remained alert and oriented for days interactive and pleasant with fluent speech.  If everything stays stable overnight that I feel he is safe to discharge to rehabilitation in the a.m.  I discussed case with CCP to try to ensure a smooth transition as well.  All questions answered to the patient prior to display when he is very much amenable to the above plan.          Condition on Discharge: Improved.     Temp:  [97.6 °F (36.4 °C)-98.6 °F (37 °C)] 98.6 °F (37 °C)  Heart Rate:  [69-94] 79  Resp:  [18] 18  BP: ()/() 101/87  Body mass index is 30.75 kg/m².    Physical Exam   Constitutional: He is oriented to person, place, and time. He appears well-developed and well-nourished.   HENT:   Head: Normocephalic and atraumatic.   Eyes: EOM are normal. Pupils are equal, round, and reactive to light.   Neck: Neck supple. No JVD present.   Goiter   Cardiovascular: Normal rate.   Irregularly irregular   Pulmonary/Chest: Effort normal and breath sounds normal. No respiratory distress.   Abdominal: Soft. Bowel sounds are normal. He exhibits no distension. There is no tenderness. There is no guarding.   Musculoskeletal: He exhibits no edema.   Neurological: He is alert and oriented to person, place, and time. No cranial nerve deficit. Coordination normal.   Psychiatric: He has a normal mood and affect. His behavior is normal. Thought content normal.        Discharge Medications      New Medications      Instructions Start Date   acetaminophen 325 MG tablet  Commonly known as:  TYLENOL   650 mg, Oral, Every 4 Hours PRN      apixaban 5 MG tablet tablet  Commonly known as:  ELIQUIS   5 mg, Oral, Every 12 Hours Scheduled      atorvastatin 10 MG tablet  Commonly known as:  LIPITOR   10 mg, Oral,  Nightly      furosemide 40 MG tablet  Commonly known as:  LASIX   40 mg, Oral, Daily      lisinopril 5 MG tablet  Commonly known as:  PRINIVIL,ZESTRIL   5 mg, Oral, Every 12 Hours Scheduled      metoprolol tartrate 25 MG tablet  Commonly known as:  LOPRESSOR   25 mg, Oral, Every 12 Hours Scheduled      polyethylene glycol pack packet  Commonly known as:  MIRALAX   17 g, Oral, Daily PRN      potassium chloride 10 MEQ CR capsule  Commonly known as:  MICRO-K   10 mEq, Oral, Daily         Continue These Medications      Instructions Start Date   citalopram 20 MG tablet  Commonly known as:  CeleXA   20 mg, Oral, Daily      fluticasone 220 MCG/ACT inhaler  Commonly known as:  FLOVENT HFA   1 puff, Inhalation, 2 Times Daily - RT         Stop These Medications    amLODIPine 10 MG tablet  Commonly known as:  NORVASC     atenolol 50 MG tablet  Commonly known as:  TENORMIN     quinapril 40 MG tablet  Commonly known as:  ACCUPRIL             Additional Instructions for the Follow-ups that You Need to Schedule     Discharge Follow-up with PCP   As directed       Currently Documented PCP:    Cathleen Corona MD    PCP Phone Number:    [phone]     Follow Up Details:  pcp 2-3 weeks - cards per their recs           Follow-up Information     Cathleen Corona MD .    Specialty:  Family Medicine  Why:  pcp 2-3 weeks - cards per their recs  Contact information:  [address] [facility]  [address] [place] [address]  [phone]                 Test Results Pending at Discharge     Mehul Merida MD  02/06/19  10:56 AM    Discharge time spent greater than 30 minutes.

## 2019-02-06 NOTE — PLAN OF CARE
Problem: Patient Care Overview  Goal: Plan of Care Review  Outcome: Ongoing (interventions implemented as appropriate)   02/06/19 2835   Coping/Psychosocial   Plan of Care Reviewed With patient   Plan of Care Review   Progress improving   OTHER   Outcome Summary Pt agreeable to therapy this pm. Pt reports no dizzines with standing or amb. Pt was able to increase amb distance to 150 feet CGA and RW and talk with his friend the whole time. Will continue to progress as tolerated.

## 2019-02-06 NOTE — PLAN OF CARE
Problem: Patient Care Overview  Goal: Plan of Care Review  Outcome: Ongoing (interventions implemented as appropriate)   02/06/19 1252   Coping/Psychosocial   Plan of Care Reviewed With patient   Plan of Care Review   Progress improving   OTHER   Outcome Summary plan to discharge to rehab tommorrow; eliquis restarted on 2/5 no s/s of AMS; VSS hypotensive at time scardiologist aware; heart rate irregular 60 - 70's; no c/o pain; AAOx4; voids per BRP; afib on cardiac monitor, zio patch to be placed prior to discharge       Problem: Arrhythmia/Dysrhythmia (Symptomatic) (Adult)  Goal: Signs and Symptoms of Listed Potential Problems Will be Absent, Minimized or Managed (Arrhythmia/Dysrhythmia)  Outcome: Ongoing (interventions implemented as appropriate)   02/06/19 1252   Goal/Outcome Evaluation   Problems Assessed (Arrhythmia/Dysrhythmia) all   Problems Present (Dysrhythmia) electrophysiologic conduction defect;situational response       Problem: Fall Risk (Adult)  Goal: Absence of Fall  Outcome: Ongoing (interventions implemented as appropriate)   02/06/19 1252   Fall Risk (Adult)   Absence of Fall making progress toward outcome       Problem: Cardiac: Heart Failure (Adult)  Goal: Signs and Symptoms of Listed Potential Problems Will be Absent, Minimized or Managed (Cardiac: Heart Failure)  Outcome: Ongoing (interventions implemented as appropriate)   02/06/19 1252   Goal/Outcome Evaluation   Problems Assessed (Heart Failure) all   Problems Present (Heart Failure) cardiac pump dysfunction;respiratory compromise;functional decline/self-care deficit;situational response       Problem: Skin Injury Risk (Adult)  Goal: Identify Related Risk Factors and Signs and Symptoms  Outcome: Ongoing (interventions implemented as appropriate)   02/06/19 1252   Skin Injury Risk (Adult)   Related Risk Factors (Skin Injury Risk) advanced age;mobility impaired     Goal: Skin Health and Integrity  Outcome: Ongoing (interventions implemented as  appropriate)   02/06/19 1252   Skin Injury Risk (Adult)   Skin Health and Integrity making progress toward outcome

## 2019-02-06 NOTE — PROGRESS NOTES
[facility]  Cardiology Progress Note    Patient Identification:  Name: Flo Manzo  Age: 63 y.o.  Sex: male  :  1955  MRN: [id number]                 Follow Up / Chief Complaint: afib    Interval History:  Troponin negative x3.  Heart cath  no obstructive CAD.  Transitioned from heparin sq to eliquis  first dose given this am. MRI revealed IPH thought to be related to hypertension and AC.      .-   Neurosurgery cleared for resumption of AC and he was started back on  eliquis at 5 mg BID 19 by our service.        Subjective: Denies chest pain, shortness of breath, syncope, near syncope.  Some continued  Dizziness but feels this is getting better. He denies any  palpitations.  Has been using walker.     Objective:    BP: ()/() 101/87       Resp:  [18] 18   Heart Rate:  [69-94] 79   Temp:  [97.6 °F (36.4 °C)-98.6 °F (37 °C)] 98.6 °F (37 °C)   SpO2:  [93 %-100 %] 93 %    Past Medical History:  Past Medical History:   Diagnosis Date   • Arthritis    • Colon polyps 2018    Hepatic flexure: tubular adenoma, only low-grade dysplasia; splenic flexure: tubular adenoma, only low-grade dysplasia; sigmoid colon: tubular adenoma, multiple fragments only low-grade dysplasia   • Depression    • Hypertension    • Sleep apnea     previously diagnosed with sleep apnea, had T&A done and it has since resolved    • Ventral hernia      Past Surgical History:  Past Surgical History:   Procedure Laterality Date   • APPENDECTOMY N/A    • CARDIAC CATHETERIZATION N/A 2004    Cath left ventriculography, coronary angiography and left heart catheterization, Normal results-Dr. Vadim Mancuso   • CARDIAC CATHETERIZATION N/A 2019    Procedure: Left Heart Cath;  Surgeon: Eren Bruce MD;  Location: [facility] INVASIVE LOCATION;  Service: Cardiology   • CARDIAC CATHETERIZATION N/A 2019    Procedure: Left ventriculography;  Surgeon: Eren Bruce MD;   Location:  [facility] CATH INVASIVE LOCATION;  Service: Cardiology   • CARDIAC CATHETERIZATION N/A 1/29/2019    Procedure: Coronary angiography;  Surgeon: Eren Bruce MD;  Location: [facility] CATH INVASIVE LOCATION;  Service: Cardiology   • CARPAL TUNNEL RELEASE Right 2013   • CARPAL TUNNEL RELEASE Bilateral    • COLONOSCOPY N/A 1/4/2018    Procedure: COLONOSCOPY with cold polypectomy and hot snare polypectomy;  Surgeon: Ten Solitario MD;  Location: [facility] ENDOSCOPY;  Service:    • EYE LENS IMPLANT SECONDARY Bilateral 2007, 2008   • KNEE CARTILAGE SURGERY Right 1979   • TONSILLECTOMY AND ADENOIDECTOMY Bilateral 2005   • TOTAL KNEE ARTHROPLASTY Left 03/14/2016    Left total knee arthroplasty with Amberly component, size 11 femur, G tibial baseplate with a 10 polyethylene insert and a 38 patellar button-Dr. Pablo Hairston   • TOTAL KNEE ARTHROPLASTY Right 03/02/2015    Right total knee arthroplasty with Amberly component size G femur, 11 tibial base plate with an 11 polyethylene insert and a 38 patellar button-Dr. Pablo Hairston   • UVULOPALATOPHARYNGOPLASTY N/A 2005    part of soft palate, and uvula   • VENTRAL HERNIA REPAIR N/A 1/23/2018    Procedure: VENTRAL HERNIA REPAIR LAPAROSCOPIC WITH DAVINCI ROBOT AND MESH;  Surgeon: Ten Solitario MD;  Location: [facility] MAIN OR;  Service:         Social History:   Social History     Tobacco Use   • Smoking status: Never Smoker   • Smokeless tobacco: Never Used   Substance Use Topics   • Alcohol use: Yes     Comment: social, maybe a drink a month      Family History:  Family History   Problem Relation Age of Onset   • Hypertension Mother    • Kidney failure Mother    • Coronary artery disease Father    • Lung cancer Father         positive smoker   • Heart attack Father    • Breast cancer Sister 60   • Prostate cancer Brother    • Colon polyps Brother    • Kidney nephrosis Brother    • Malig Hyperthermia Neg Hx           Allergies:  Allergies   Allergen Reactions   •  Poison Ivy Extract Hives, Itching, Rash and Other (See Comments)     ITCHY BLISTERS     Scheduled Meds:    apixaban 5 mg Q12H   atorvastatin 10 mg Nightly   citalopram 20 mg Daily   furosemide 40 mg Daily   lisinopril 5 mg Q12H   metoprolol tartrate 25 mg Q12H   polyethylene glycol 17 g Daily   potassium chloride 20 mEq Daily   sodium chloride 3 mL Q12H       I have reviewed the patient's history, home, and current medications.     INTAKE AND OUTPUT:    Intake/Output Summary (Last 24 hours) at 2019 1135  Last data filed at 2019 1758  Gross per 24 hour   Intake 840 ml   Output --   Net 840 ml         Review of Systems:   GI: denies n/v or abd pain  Cardiac: denies chest pain, or pressure; orthopnea improved;   Pulmonary: nonproductive cough but better, MCGREGOR noted but improved.     Constitutional:  Temp:  [97.6 °F (36.4 °C)-98.6 °F (37 °C)] 98.6 °F (37 °C)  Heart Rate:  [69-94] 79  Resp:  [18] 18  BP: ()/() 101/87   SpO2:  [93 %-100 %] 93 %     Exam:   General appearance: NAD, conversant   Eyes:  Non icteric sclerae, moist conjunctivae; no lid-lag; PERRLA   HENT: Atraumatic; oropharynx clear with moist mucous membranes;  Neck: Trachea midline; supple, no  lymphadenopathy   Lungs: CTA, with normal respiratory effort and no intercostal retractions   CV: S1-S2 irregularly irregular with controlled rate. Soft Systolic murmurs LSB; no rub, no gallop   Abdomen: Soft, non-tender; no masses or HSM   Extremities: No peripheral edema or extremity lymphadenopathy; right fem cath site with soft hematoma - stable. Improving. Right radial site no hematoma, bruit, or thrill.   Skin: Normal temperature, turgor and texture; no rash, ulcers or subcutaneous nodules   Psych: Appropriate affect, alert and oriented to person, place and time   Neuro: CN II and XII grossly intact.      Cardiographics    Telemetry:      AFib rate controlled         AFib with 1.8 seconds while sleeping.               EC/2/19 atrial  fibrillation rate 80's          1/26/19 atrial fibrillation with controlled VR        1/24/19  AFib with controlled VR.                Previous EKG 1/18/19 SR             I have personally reviewed and interpreted ECG and telemetry.         Echocardiogram:       Interpretation Summary 1/25/19    · Left ventricular wall thickness is consistent with mild concentric hypertrophy.  · Estimated EF = 53%.  · Left ventricular systolic function is normal.  · Mild mitral valve regurgitation is present  · Left atrial cavity size is moderately dilated.  · There is moderate calcification of the aortic valve.  · Mild aortic valve stenosis is present.  · Aortic valve maximum pressure gradient is 28.0 mmHg.  · Aortic valve mean pressure gradient is 16.0 mmHg.  · Mild tricuspid valve regurgitation is present.  · Calculated right ventricular systolic pressure from tricuspid regurgitation is 31 mmHg.         Interpretation Summary echo 11/15/17     · Left ventricular wall thickness is consistent with mild concentric hypertrophy.  · Left ventricular systolic function is normal. Estimated EF = 52%.  · Left ventricular diastolic dysfunction (grade II) consistent with pseudonormalization.  · Mild tricuspid valve regurgitation is present.  · Calculated right ventricular systolic pressure from tricuspid regurgitation is 37 mmHg.               Echo 2/19/15 with Tay  Conclusions:  The study quality is good.   There is normal left ventricular systolic function.  Global left ventricular wall motion and contractility are within normal  limits.  The estimated ejection fraction is 60-65%.   Mild to moderate concentric left ventricular hypertrophy is observed.  The left atrium is moderately enlarged.  There is aortic annular calcification.  There is mild mitral annular calcification.  There is trivial to mild mitral regurgitation observed.  There is mild tricuspid regurgitation.  The right ventricular systolic pressure is estimated to be 30-35  mmHg.         CTA of chest 1/24/19    IMPRESSION:  There is no CT evidence of pulmonary embolism or other acute  process within the chest. There is mild cardiomegaly. A moderately large  goiter extending into the superior mediastinum produces approximately  50% narrowing of the tracheal lumen.    Stress 1/26/19    Interpretation Summary     · Findings consistent with an equivocal ECG stress test.  · Left ventricular ejection fraction is mildly reduced (Calculated EF = 40%).  · Myocardial perfusion imaging indicates a normal myocardial perfusion study with no evidence of ischemia.  · Impressions are consistent with an intermediate risk study.  · Non ischemic cardiomyopathy       Interpretation Summary Nuc Stress 1/26/19    · Findings consistent with an equivocal ECG stress test.  · Left ventricular ejection fraction is mildly reduced (Calculated EF = 40%).  · Myocardial perfusion imaging indicates a normal myocardial perfusion study with no evidence of ischemia.  · Impressions are consistent with an intermediate risk study.  · Non ischemic cardiomyopathy          Nuc 1/30/19 MRI of brain  IMPRESSION:  There are findings compatible with an acute intraparenchymal  hematoma within the medial portion of the left cerebellar hemisphere  with extension into the fourth ventricle. Intraventricular hemorrhage is  identified within the dependent aspects of the lateral ventricles. The  etiology of the hemorrhage is uncertain. There are multiple punctate  areas of susceptibility artifact identified within cortical medullary  interfaces of both cerebral hemispheres as well as within the thalami  and the left aspect of the ching. As such, this hemorrhage could be due  to amyloid angiopathy or a hypertensive hemorrhage. Of note, there are  punctate areas of diffusion-weighted hyperintensity within the medial  portion of the right occipital lobe and the posterior aspect left  occipital lobe or sulci adjacent to the occipital cortex.  An additional  similar focus is seen within the or adjacent to the cortex of the  superior aspect of the left cerebellar hemisphere. These could be due to  small foci of acute infarction in which case the hemorrhage within the  left cerebellar hemisphere could be hemorrhagic transformation of an  infarct. Alternatively, these could be representative of areas of  subarachnoid hemorrhage. Finally, I cannot exclude the possibility of an  underlying mass or vascular malformation within the left cerebellar  hemisphere. I would recommend performing a CT scan of the head at this  time to establish a baseline of this hemorrhage. These findings and  recommendations were discussed with Dr. Sebastian on 01/30/2019  approximately 8:20 p.m.       Conclusion 1/29/19       · Successful right and left coronary angiogram and LV gram  · Normal coronary arteries  · And normal LV gram           Lab Review       Results from last 7 days   Lab Units 02/01/19  0331   MAGNESIUM mg/dL 2.4     Results from last 7 days   Lab Units 02/04/19  0540   SODIUM mmol/L 141   POTASSIUM mmol/L 4.0   BUN mg/dL 32*   CREATININE mg/dL 0.81   CALCIUM mg/dL 9.9       Results from last 7 days   Lab Units 02/01/19  0331 01/31/19  0424   WBC 10*3/mm3 10.12 7.79   HEMOGLOBIN g/dL 16.1 16.2   HEMATOCRIT % 50.3 49.1   PLATELETS 10*3/mm3 257 250     Results from last 7 days   Lab Units 01/30/19  2133   INR  1.11*            Estimated Creatinine Clearance: 114 mL/min (by C-G formula based on SCr of 0.81 mg/dL).      Assessment/plan     Afib new onset: rate is controlled in the 60-70's. Some brief 1.8- 2.4 sec pauses while sleeping.   TSH WNL 1/24.     CHADs-VASc 2.   Was transitioned to Eliquis 5 mg with first dose 1/30 am. MRI 1/31/19 showed IP hemorrhage.   Eliquis and ASA were placed on hold. 1/25/19 echo EF 53% with concentric LVH showing Mild AS, Mild TR, and Mild MR.   Is on metoprolol 25 mg BID.  Continue.  He was started back on Eliquis after Neurosurgery  "clearance 2/5. Prior to dc to rehab will need placement of Zios 14 day holter monitor.  Pauses likely related to untreated sleep d/o breathing, Pt otherwise has appropriate HR rate response while awake and with activity. No syncope or near syncope.    Abnormal EKG with angina:  AFib with possible Anterior MI pattern on admit.  trops neg times 3.  Hx of cath > 10 years ok \"normal\".  1/26 Nuc stress normal perfusion but intermediate study. Pt had shoulder pain, radiating 1/28.    Cath 1/29 showing normal Coronary arteries. Right groin cath site with 1 cm x 0.5 cm soft hematoma.  Mild TTP.  No bruit, no thrill.       Hx of Diastolic dysfunction (grade II) on echo 11/15/17:  Elevated proBNP on admit,  appears euvolemic and able to lay  flat comfortably.      Hx of GÓMEZ:  uvoloplasty, and pharyngeal plasty 2005 but unable to remember if follow up sleep study obtained post surgery.  + snorer and high risk for sleep d/o breathing. Recommend repeat outpt sleep study to reevaluate.   RVSP 31 mmHg by echo, stable from prior echos.      Elevated D Dimer: CTA of chest neg for PE. Noted goiter, patient will follow up with Dr. Burciaga after discharge.      Goiter: noted on CT.    A moderately large goiter extending into the superior mediastinum with 50% narrowing of the tracheal lumen. Thoracic surgery has been consulted. Dr. Burciaga plans on outpt f/u once CV status stabilized.     Dyslipidemia: , goal 70 or less; 10 year ASCVD 12.9%.  Is on atorvastatin.     Hypokalemia: 2/4 normal. K+     HTN: Adequate control on metoprolol and lisinopril.   Amlodipine has been stopped discontinued 2/2 d/t syncopal episode thought to be r/t hypotension.  Neurosurgery is recommending tight BP control.       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 neurosurgery has seen and feels likely related to HTN and AC. noted recommendations for tight BP control.  2/5 per neurosurgery note has okd " resumption of anticoagulation.  and holding AC for at least 48 hours. Consider watchman procedure as outpt if deemed to be a non candidate for long term AC candidate if he is unable to tolerate restart of AC for long term CVA prophylaxis. 2/5 Spoke with Dr. Bruce and will start back eliquis at 5 mg.      In the setting of atrial fibrillation, I have discussed with the patient and then his wife Bhargavi via phone regarding the risks and benefits of anticoagulation therapy which include increased risk of bleeding and decreased risk of systemic embolization such as stroke. Patient and wife both verbalize understanding and agree with treatment plan.    Continue current antihypertensive regimen.  Eliquis 5 mg BID resumed 2/5. STable from neuro standpoint thus far on AC.   Stress the importance of medication adherence.     He has follow up with Dr. Bruce 2/28/19 at 1115 at  [place] office to review Zios 14 day holter and to discuss candidacy for cardioversion. Appt card given to patient.     Yessenia Go, APRN  2/6/19

## 2019-02-07 PROCEDURE — 99231 SBSQ HOSP IP/OBS SF/LOW 25: CPT | Performed by: NURSE PRACTITIONER

## 2019-02-07 PROCEDURE — 97110 THERAPEUTIC EXERCISES: CPT

## 2019-02-07 RX ADMIN — ATORVASTATIN CALCIUM 10 MG: 10 TABLET, FILM COATED ORAL at 20:23

## 2019-02-07 RX ADMIN — LISINOPRIL 5 MG: 5 TABLET ORAL at 20:23

## 2019-02-07 RX ADMIN — APIXABAN 5 MG: 5 TABLET, FILM COATED ORAL at 09:53

## 2019-02-07 RX ADMIN — POTASSIUM CHLORIDE 20 MEQ: 750 CAPSULE, EXTENDED RELEASE ORAL at 09:53

## 2019-02-07 RX ADMIN — FUROSEMIDE 40 MG: 40 TABLET ORAL at 09:53

## 2019-02-07 RX ADMIN — SODIUM CHLORIDE, PRESERVATIVE FREE 3 ML: 5 INJECTION INTRAVENOUS at 09:56

## 2019-02-07 RX ADMIN — METOPROLOL TARTRATE 25 MG: 25 TABLET ORAL at 20:23

## 2019-02-07 RX ADMIN — LISINOPRIL 5 MG: 5 TABLET ORAL at 09:53

## 2019-02-07 RX ADMIN — METOPROLOL TARTRATE 25 MG: 25 TABLET ORAL at 09:53

## 2019-02-07 RX ADMIN — SODIUM CHLORIDE, PRESERVATIVE FREE 3 ML: 5 INJECTION INTRAVENOUS at 20:23

## 2019-02-07 RX ADMIN — APIXABAN 5 MG: 5 TABLET, FILM COATED ORAL at 20:23

## 2019-02-07 RX ADMIN — CITALOPRAM 20 MG: 20 TABLET, FILM COATED ORAL at 09:53

## 2019-02-07 NOTE — PLAN OF CARE
Problem: Patient Care Overview  Goal: Plan of Care Review  Outcome: Ongoing (interventions implemented as appropriate)   02/07/19 0326   Coping/Psychosocial   Plan of Care Reviewed With patient   Plan of Care Review   Progress improving   OTHER   Outcome Summary Pt VSS, BP controlled. Pt remains in rate controlled afib. Pt is up with stand by assist and walker and ambulating well. Pt has no c/o and is eager to get to rehab. Plans for d/c to [facility] today. Will need Zio Patch prior to d/c .      Goal: Individualization and Mutuality  Outcome: Ongoing (interventions implemented as appropriate)    Goal: Discharge Needs Assessment  Outcome: Ongoing (interventions implemented as appropriate)    Goal: Interprofessional Rounds/Family Conf  Outcome: Ongoing (interventions implemented as appropriate)      Problem: Arrhythmia/Dysrhythmia (Symptomatic) (Adult)  Goal: Signs and Symptoms of Listed Potential Problems Will be Absent, Minimized or Managed (Arrhythmia/Dysrhythmia)  Outcome: Ongoing (interventions implemented as appropriate)      Problem: Fall Risk (Adult)  Goal: Absence of Fall  Outcome: Ongoing (interventions implemented as appropriate)      Problem: Cardiac: Heart Failure (Adult)  Goal: Signs and Symptoms of Listed Potential Problems Will be Absent, Minimized or Managed (Cardiac: Heart Failure)  Outcome: Ongoing (interventions implemented as appropriate)      Problem: Skin Injury Risk (Adult)  Goal: Identify Related Risk Factors and Signs and Symptoms  Outcome: Ongoing (interventions implemented as appropriate)    Goal: Skin Health and Integrity  Outcome: Ongoing (interventions implemented as appropriate)

## 2019-02-07 NOTE — THERAPY TREATMENT NOTE
Acute Care - Physical Therapy Treatment Note  [facility]     Patient Name: Flo Manzo  : 1955  MRN: [id number]  Today's Date: 2019  Onset of Illness/Injury or Date of Surgery: 19          Admit Date: 2019    Visit Dx:    ICD-10-CM ICD-9-CM   1. New onset atrial fibrillation (CMS/HCC) I48.91 427.31   2. Goiter diffuse E04.9 240.9   3. Angina at rest (CMS/HCC) I20.8 413.9   4. Essential hypertension I10 401.9   5. New onset atrial fibrillation (CMS/HCC) - RVR I48.91 427.31   6. Generalized weakness R53.1 780.79     Patient Active Problem List   Diagnosis   • Umbilical hernia without obstruction and without gangrene   • Essential hypertension   • Arthritis of left knee   • Depression   • Obesity (BMI 30-39.9)   • New onset atrial fibrillation (CMS/HCC) - RVR   • Sleep apnea   • Goiter   • CHF (congestive heart failure) (CMS/HCC)   • Diastolic CHF, chronic (CMS/HCC)       Therapy Treatment    Rehabilitation Treatment Summary     Row Name 19 1625             Treatment Time/Intention    Discipline  physical therapist  -MA      Document Type  therapy note (daily note)  -MA      Subjective Information  no complaints  -MA      Mode of Treatment  physical therapy;individual therapy  -MA      Patient/Family Observations  sitting up in chair, no acute distress  -MA      Patient Effort  good  -MA      Existing Precautions/Restrictions  fall  -MA      Recorded by [MA] Jaki Alcaraz, PT 19 1627      Row Name 19 1625             Vital Signs    O2 Delivery Pre Treatment  room air  -MA      Recorded by [MA] Jaki Alcaraz, PT 19 1627      Row Name 19 1625             Cognitive Assessment/Intervention- PT/OT    Affect/Mental Status (Cognitive)  WNL  -MA      Orientation Status (Cognition)  oriented x 4  -MA      Follows Commands (Cognition)  WNL  -MA      Personal Safety Interventions  fall prevention program maintained;muscle strengthening facilitated;gait belt;nonskid  shoes/slippers when out of bed;supervised activity  -MA      Recorded by [MA] Jaki Alcaraz, PT 02/07/19 1627      Row Name 02/07/19 1625             Bed Mobility Assessment/Treatment    Comment (Bed Mobility)  NT- UIC  -MA      Recorded by [MA] Jaki Alcaraz, PT 02/07/19 1627      Row Name 02/07/19 1625             Sit-Stand Transfer    Sit-Stand [place] (Transfers)  contact guard  -MA      Assistive Device (Sit-Stand Transfers)  walker, front-wheeled  -MA      Recorded by [MA] Jaki Alcaraz, PT 02/07/19 1627      Row Name 02/07/19 1625             Stand-Sit Transfer    Stand-Sit [place] (Transfers)  minimum assist (75% patient effort) no AD  -MA      Recorded by [MA] Jaki Alcaraz, PT 02/07/19 1627      Row Name 02/07/19 1625             Gait/Stairs Assessment/Training    [place] Level (Gait)  contact guard  -MA      Assistive Device (Gait)  walker, front-wheeled  -MA      Distance in Feet (Gait)  100  -MA      Pattern (Gait)  step-through  -MA      Deviations/Abnormal Patterns (Gait)  davon decreased;stride length decreased  -MA      Bilateral Gait Deviations  forward flexed posture  -MA      Comment (Gait/Stairs)  No lightheadedness. 100ft with Rwx CGA. Progressed to no AD 50ft Alvaro (decreased L foot clearance, 3 LOB requiring min-modA to regain).   -MA      Recorded by [MA] Jaki Alcaraz, PT 02/07/19 1627      Row Name 02/07/19 1625             Positioning and Restraints    Pre-Treatment Position  sitting in chair/recliner  -MA      Post Treatment Position  chair  -MA      In Chair  reclined;call light within reach;encouraged to call for assist;with family/caregiver no alarm on when entering room   -MA      Recorded by [MA] Jaki Alcaraz, PT 02/07/19 1627      Row Name 02/07/19 1625             Pain Scale: Numbers Pre/Post-Treatment    Pain Scale: Numbers, Pretreatment  0/10 - no pain  -MA      Pain Scale: Numbers, Post-Treatment  0/10 - no pain  -MA      Recorded by [MA] Jaki Alcaraz, PT 02/07/19  1627        User Key  (r) = Recorded By, (t) = Taken By, (c) = Cosigned By    Initials Name Effective Dates Discipline    Jaki Ahumada, PT 10/19/18 -  PT               Rehab Goal Summary     Row Name 02/07/19 1600             Physical Therapy Goals    Transfer Goal Selection (PT)  transfer, PT goal 1  -MA      Gait Training Goal Selection (PT)  gait training, PT goal 1  -MA         Transfer Goal 1 (PT)    Activity/Assistive Device (Transfer Goal 1, PT)  transfers, all  -MA      [place] Level/Cues Needed (Transfer Goal 1, PT)  independent  -MA      Time Frame (Transfer Goal 1, PT)  1 week  -MA         Gait Training Goal 1 (PT)    Activity/Assistive Device (Gait Training Goal 1, PT)  gait (walking locomotion)  -MA      [place] Level (Gait Training Goal 1, PT)  independent  -MA      Distance (Gait Goal 1, PT)  250  -MA      Time Frame (Gait Training Goal 1, PT)  1 week  -MA        User Key  (r) = Recorded By, (t) = Taken By, (c) = Cosigned By    Initials Name Provider Type Discipline    Jaki Ahumada, PT Physical Therapist PT          Physical Therapy Education     Title: PT OT SLP Therapies (Done)     Topic: Physical Therapy (Done)     Point: Mobility training (Done)     Learning Progress Summary           Patient Acceptance, E, VU,NR by MA at 2/7/2019  4:27 PM    Acceptance, E,TB, VU by RM at 2/6/2019  3:49 PM    Comment:  verbal cues for amb    Acceptance, E, VU by ML at 2/6/2019  4:42 AM    Acceptance, E,D, NR by PC at 2/4/2019 10:21 AM    Acceptance, E, VU by ML at 2/4/2019  4:50 AM    Acceptance, E,D, VU,DU by LC at 2/3/2019 10:22 AM    Comment:  safety during ambulation, need for assistance    Acceptance, E,TB, VU by SM at 2/2/2019  2:48 PM    Acceptance, E,TB,D, VU,NR by CH at 2/1/2019  4:12 PM    Acceptance, E,TB,D, VU,NR by  at 1/31/2019  4:10 PM    Acceptance, E, NR by EM at 1/30/2019  2:57 PM                   Point: Home exercise program (Done)     Learning Progress Summary            Patient Acceptance, E, VU by ML at 2/6/2019  4:42 AM    Acceptance, E,D, NR by PC at 2/4/2019 10:21 AM    Acceptance, E, VU by ML at 2/4/2019  4:50 AM    Acceptance, E,D, VU,DU by  at 2/3/2019 10:22 AM    Comment:  safety during ambulation, need for assistance                   Point: Body mechanics (Done)     Learning Progress Summary           Patient Acceptance, E, VU,NR by MA at 2/7/2019  4:27 PM    Acceptance, E,TB, VU by  at 2/6/2019  3:49 PM    Comment:  verbal cues for amb    Acceptance, E,D, NR by PC at 2/4/2019 10:21 AM    Acceptance, E, VU by ML at 2/4/2019  4:50 AM    Acceptance, E,D, VU,DU by  at 2/3/2019 10:22 AM    Comment:  safety during ambulation, need for assistance    Acceptance, E,TB, VU by  at 2/2/2019  2:48 PM    Acceptance, E,TB,D, VU,NR by  at 2/1/2019  4:12 PM    Acceptance, E,TB,D, VU,NR by  at 1/31/2019  4:10 PM                   Point: Precautions (Done)     Learning Progress Summary           Patient Acceptance, E, VU,NR by MA at 2/7/2019  4:27 PM    Acceptance, E,D, NR by PC at 2/4/2019 10:21 AM    Acceptance, E, VU by  at 2/4/2019  4:50 AM    Acceptance, E,D, VU,DU by  at 2/3/2019 10:22 AM    Comment:  safety during ambulation, need for assistance    Acceptance, E,TB, VU by  at 2/2/2019  2:48 PM    Acceptance, E,TB,D, VU,NR by  at 2/1/2019  4:12 PM                               User Key     Initials Effective Dates Name Provider Type Discipline     04/03/18 -  Harmony Edwards, PT Physical Therapist PT    PC 04/03/18 -  Arlette Tellez, PT Physical Therapist PT    EM 04/03/18 -  Vira Borrero, PT Physical Therapist PT    SM 03/07/18 -  Padmini Heart, ZAC Physical Therapy Assistant PT     06/16/16 -  Nuvia Lawson, RN Registered Nurse Nurse     08/02/16 -  Arash Hong, PT DPT Physical Therapist PT    MA 10/19/18 -  Jaki Alcaraz, PT Physical Therapist PT    RM 01/04/19 -  Reyna Villaseñor, CHYNA Student PT Student PT                PT  Recommendation and Plan     Outcome Summary: Pt progressed to ambulation without AD (as he did not require a Rwx prior to admit). Pt requires Alvaro to maintain balance without Rwx. Generalized weakness (specifically L LE/foot weakness) and decreased balance noted. Will continue to progress as tolerated.    Outcome Measures     Row Name 02/07/19 1600 02/06/19 1500 02/05/19 1400       How much help from another person do you currently need...    Turning from your back to your side while in flat bed without using bedrails?  4  -MA  4  -EJ (r) RM (t) EJ (c)  4  -EH    Moving from lying on back to sitting on the side of a flat bed without bedrails?  4  -MA  4  -EJ (r) RM (t) EJ (c)  4  -EH    Moving to and from a bed to a chair (including a wheelchair)?  3  -MA  3  -EJ (r) RM (t) EJ (c)  3  -EH    Standing up from a chair using your arms (e.g., wheelchair, bedside chair)?  3  -MA  3  -EJ (r) RM (t) EJ (c)  3  -EH    Climbing 3-5 steps with a railing?  3  -MA  3  -EJ (r) RM (t) EJ (c)  3  -EH    To walk in hospital room?  3  -MA  3  -EJ (r) RM (t) EJ (c)  3  -EH    AM-PAC 6 Clicks Score  20  -MA  20  -EJ (r) RM (t)  20  -EH       Functional Assessment    Outcome Measure Options  AM-PAC 6 Clicks Basic Mobility (PT)  -MA  AM-PAC 6 Clicks Basic Mobility (PT)  -EJ (r) RM (t) EJ (c)  --      User Key  (r) = Recorded By, (t) = Taken By, (c) = Cosigned By    Initials Name Provider Type    Kayce Jeffries, PT Physical Therapist    EH Dora Pete, PTA Physical Therapy Assistant    Jaki Ahumada, PT Physical Therapist    RM Reyna Villaseñor, PT Student PT Student         Time Calculation:   PT Charges     Row Name 02/07/19 1629             Time Calculation    Start Time  1611  -MA      Stop Time  1623  -MA      Time Calculation (min)  12 min  -MA      PT Received On  02/07/19  -MA      PT - Next Appointment  02/08/19  -MA      PT Goal Re-Cert Due Date  02/14/19  -MA         Time Calculation- PT    Total Timed Code Minutes-  PT  12 minute(s)  -MA        User Key  (r) = Recorded By, (t) = Taken By, (c) = Cosigned By    Initials Name Provider Type    Jaki Ahumada, PT Physical Therapist        Therapy Suggested Charges     Code   Minutes Charges    88939 (CPT®) Hc Pt Neuromusc Re Education Ea 15 Min      75365 (CPT®) Hc Pt Ther Proc Ea 15 Min      16418 (CPT®) Hc Gait Training Ea 15 Min 15 1    76120 (CPT®) Hc Pt Therapeutic Act Ea 15 Min      20153 (CPT®) Hc Pt Manual Therapy Ea 15 Min      03468 (CPT®) Hc Pt Iontophoresis Ea 15 Min      88885 (CPT®) Hc Pt Elec Stim Ea-Per 15 Min      80181 (CPT®) Hc Pt Ultrasound Ea 15 Min      88669 (CPT®) Hc Pt Self Care/Mgmt/Train Ea 15 Min      98785 (CPT®) Hc Pt Prosthetic (S) Train Initial Encounter, Each 15 Min      51477 (CPT®) Hc Pt Orthotic(S)/Prosthetic(S) Encounter, Each 15 Min      52623 (CPT®) Hc Orthotic(S) Mgmt/Train Initial Encounter, Each 15min      Total  15 1        Therapy Charges for Today     Code Description Service Date Service Provider Modifiers Qty    27796394399 HC PT THER PROC EA 15 MIN 2/7/2019 Jaki Alcaraz, PT GP 1          PT G-Codes  Outcome Measure Options: AM-PAC 6 Clicks Basic Mobility (PT)  AM-PAC 6 Clicks Score: 20    Jaki Alcaraz, PT  2/7/2019

## 2019-02-07 NOTE — PROGRESS NOTES
[facility]  Cardiology Progress Note    Patient Identification:  Name: Flo Manzo  Age: 63 y.o.  Sex: male  :  1955  MRN: [id number]                 Follow Up / Chief Complaint: afib    Interval History:  Troponin negative x3.  Heart cath  no obstructive CAD.  Transitioned from heparin sq to eliquis  first dose given this am. MRI revealed IPH thought to be related to hypertension and AC.      .-   Neurosurgery cleared for resumption of AC and he was started back on  eliquis at 5 mg BID 19 by our service.        Subjective: Denies chest pain, shortness of breath, syncope, near syncope.   Dizziness better.  He walked with PT with Stand by assist and did well.  Denies any numbness or slurred speech.      Objective:    BP: (106-110)/(78-94) 106/78       Resp:  [16-18] 16   Heart Rate:  [] 69   Temp:  [97.8 °F (36.6 °C)-98.3 °F (36.8 °C)] 98.2 °F (36.8 °C)   SpO2:  [91 %-96 %] 94 %    Past Medical History:  Past Medical History:   Diagnosis Date   • Arthritis    • Colon polyps 2018    Hepatic flexure: tubular adenoma, only low-grade dysplasia; splenic flexure: tubular adenoma, only low-grade dysplasia; sigmoid colon: tubular adenoma, multiple fragments only low-grade dysplasia   • Depression    • Hypertension    • Sleep apnea     previously diagnosed with sleep apnea, had T&A done and it has since resolved    • Ventral hernia      Past Surgical History:  Past Surgical History:   Procedure Laterality Date   • APPENDECTOMY N/A    • CARDIAC CATHETERIZATION N/A 2004    Cath left ventriculography, coronary angiography and left heart catheterization, Normal results-Dr. Vadim Mancuso   • CARDIAC CATHETERIZATION N/A 2019    Procedure: Left Heart Cath;  Surgeon: Eren Bruce MD;  Location: [facility] CATH INVASIVE LOCATION;  Service: Cardiology   • CARDIAC CATHETERIZATION N/A 2019    Procedure: Left ventriculography;  Surgeon: Eren Bruce,  MD;  Location: [facility] CATH INVASIVE LOCATION;  Service: Cardiology   • CARDIAC CATHETERIZATION N/A 1/29/2019    Procedure: Coronary angiography;  Surgeon: Eren Bruce MD;  Location: [facility] CATH INVASIVE LOCATION;  Service: Cardiology   • CARPAL TUNNEL RELEASE Right 2013   • CARPAL TUNNEL RELEASE Bilateral    • COLONOSCOPY N/A 1/4/2018    Procedure: COLONOSCOPY with cold polypectomy and hot snare polypectomy;  Surgeon: Ten Solitario MD;  Location: [facility] ENDOSCOPY;  Service:    • EYE LENS IMPLANT SECONDARY Bilateral 2007, 2008   • KNEE CARTILAGE SURGERY Right 1979   • TONSILLECTOMY AND ADENOIDECTOMY Bilateral 2005   • TOTAL KNEE ARTHROPLASTY Left 03/14/2016    Left total knee arthroplasty with Amberly component, size 11 femur, G tibial baseplate with a 10 polyethylene insert and a 38 patellar button-Dr. Pablo Hairston   • TOTAL KNEE ARTHROPLASTY Right 03/02/2015    Right total knee arthroplasty with Amberly component size G femur, 11 tibial base plate with an 11 polyethylene insert and a 38 patellar button-Dr. Pablo Hairston   • UVULOPALATOPHARYNGOPLASTY N/A 2005    part of soft palate, and uvula   • VENTRAL HERNIA REPAIR N/A 1/23/2018    Procedure: VENTRAL HERNIA REPAIR LAPAROSCOPIC WITH DAVINCI ROBOT AND MESH;  Surgeon: Ten Solitario MD;  Location: [facility] OR;  Service:         Social History:   Social History     Tobacco Use   • Smoking status: Never Smoker   • Smokeless tobacco: Never Used   Substance Use Topics   • Alcohol use: Yes     Comment: social, maybe a drink a month      Family History:  Family History   Problem Relation Age of Onset   • Hypertension Mother    • Kidney failure Mother    • Coronary artery disease Father    • Lung cancer Father         positive smoker   • Heart attack Father    • Breast cancer Sister 60   • Prostate cancer Brother    • Colon polyps Brother    • Kidney nephrosis Brother    • Malig Hyperthermia Neg Hx           Allergies:  Allergies   Allergen Reactions    • Poison Ivy Extract Hives, Itching, Rash and Other (See Comments)     ITCHY BLISTERS     Scheduled Meds:    apixaban 5 mg Q12H   atorvastatin 10 mg Nightly   citalopram 20 mg Daily   furosemide 40 mg Daily   lisinopril 5 mg Q12H   metoprolol tartrate 25 mg Q12H   polyethylene glycol 17 g Daily   potassium chloride 20 mEq Daily   sodium chloride 3 mL Q12H       I have reviewed the patient's history, home, and current medications.     INTAKE AND OUTPUT:    Intake/Output Summary (Last 24 hours) at 2/7/2019 1647  Last data filed at 2/7/2019 1328  Gross per 24 hour   Intake 360 ml   Output --   Net 360 ml         Review of Systems:   GI: denies n/v or abd pain  Cardiac: denies chest pain, or pressure; orthopnea improved;   Pulmonary: nonproductive cough but better, MCGREGOR noted but improved.     Constitutional:  Temp:  [97.8 °F (36.6 °C)-98.3 °F (36.8 °C)] 98.2 °F (36.8 °C)  Heart Rate:  [] 69  Resp:  [16-18] 16  BP: (106-110)/(78-94) 106/78   SpO2:  [91 %-96 %] 94 %     Exam:   General appearance: NAD, conversant   Eyes:  Non icteric sclerae, moist conjunctivae; no lid-lag; PERRLA   HENT: Atraumatic; oropharynx clear with moist mucous membranes;  Neck: Trachea midline; supple, no  lymphadenopathy   Lungs: CTA, with normal respiratory effort and no intercostal retractions   CV: S1-S2 irregularly irregular with controlled rate. Soft Systolic murmurs LSB; no rub, no gallop   Abdomen: Soft, non-tender; no masses or HSM   Extremities: No peripheral edema or extremity lymphadenopathy; right fem cath site with soft hematoma - stable. Improving. Right radial site no hematoma, bruit, or thrill.   Skin: Normal temperature, turgor and texture; no rash, ulcers or subcutaneous nodules   Psych: Appropriate affect, alert and oriented to person, place and time   Neuro: CN II and XII grossly intact.      Cardiographics    Telemetry:      AFib rate controlled         AFib with 1.8 seconds while sleeping.               ECG:      2/2/19 atrial fibrillation rate 80's          1/26/19 atrial fibrillation with controlled VR        1/24/19  AFib with controlled VR.                Previous EKG 1/18/19 SR             I have personally reviewed and interpreted ECG and telemetry.         Echocardiogram:       Interpretation Summary 1/25/19    · Left ventricular wall thickness is consistent with mild concentric hypertrophy.  · Estimated EF = 53%.  · Left ventricular systolic function is normal.  · Mild mitral valve regurgitation is present  · Left atrial cavity size is moderately dilated.  · There is moderate calcification of the aortic valve.  · Mild aortic valve stenosis is present.  · Aortic valve maximum pressure gradient is 28.0 mmHg.  · Aortic valve mean pressure gradient is 16.0 mmHg.  · Mild tricuspid valve regurgitation is present.  · Calculated right ventricular systolic pressure from tricuspid regurgitation is 31 mmHg.         Interpretation Summary echo 11/15/17     · Left ventricular wall thickness is consistent with mild concentric hypertrophy.  · Left ventricular systolic function is normal. Estimated EF = 52%.  · Left ventricular diastolic dysfunction (grade II) consistent with pseudonormalization.  · Mild tricuspid valve regurgitation is present.  · Calculated right ventricular systolic pressure from tricuspid regurgitation is 37 mmHg.               Echo 2/19/15 with Tay  Conclusions:  The study quality is good.   There is normal left ventricular systolic function.  Global left ventricular wall motion and contractility are within normal  limits.  The estimated ejection fraction is 60-65%.   Mild to moderate concentric left ventricular hypertrophy is observed.  The left atrium is moderately enlarged.  There is aortic annular calcification.  There is mild mitral annular calcification.  There is trivial to mild mitral regurgitation observed.  There is mild tricuspid regurgitation.  The right ventricular systolic pressure is  estimated to be 30-35 mmHg.         CTA of chest 1/24/19    IMPRESSION:  There is no CT evidence of pulmonary embolism or other acute  process within the chest. There is mild cardiomegaly. A moderately large  goiter extending into the superior mediastinum produces approximately  50% narrowing of the tracheal lumen.    Stress 1/26/19    Interpretation Summary     · Findings consistent with an equivocal ECG stress test.  · Left ventricular ejection fraction is mildly reduced (Calculated EF = 40%).  · Myocardial perfusion imaging indicates a normal myocardial perfusion study with no evidence of ischemia.  · Impressions are consistent with an intermediate risk study.  · Non ischemic cardiomyopathy       Interpretation Summary Nuc Stress 1/26/19    · Findings consistent with an equivocal ECG stress test.  · Left ventricular ejection fraction is mildly reduced (Calculated EF = 40%).  · Myocardial perfusion imaging indicates a normal myocardial perfusion study with no evidence of ischemia.  · Impressions are consistent with an intermediate risk study.  · Non ischemic cardiomyopathy          Nuc 1/30/19 MRI of brain  IMPRESSION:  There are findings compatible with an acute intraparenchymal  hematoma within the medial portion of the left cerebellar hemisphere  with extension into the fourth ventricle. Intraventricular hemorrhage is  identified within the dependent aspects of the lateral ventricles. The  etiology of the hemorrhage is uncertain. There are multiple punctate  areas of susceptibility artifact identified within cortical medullary  interfaces of both cerebral hemispheres as well as within the thalami  and the left aspect of the ching. As such, this hemorrhage could be due  to amyloid angiopathy or a hypertensive hemorrhage. Of note, there are  punctate areas of diffusion-weighted hyperintensity within the medial  portion of the right occipital lobe and the posterior aspect left  occipital lobe or sulci adjacent to  the occipital cortex. An additional  similar focus is seen within the or adjacent to the cortex of the  superior aspect of the left cerebellar hemisphere. These could be due to  small foci of acute infarction in which case the hemorrhage within the  left cerebellar hemisphere could be hemorrhagic transformation of an  infarct. Alternatively, these could be representative of areas of  subarachnoid hemorrhage. Finally, I cannot exclude the possibility of an  underlying mass or vascular malformation within the left cerebellar  hemisphere. I would recommend performing a CT scan of the head at this  time to establish a baseline of this hemorrhage. These findings and  recommendations were discussed with Dr. Sebastian on 01/30/2019  approximately 8:20 p.m.       Conclusion 1/29/19       · Successful right and left coronary angiogram and LV gram  · Normal coronary arteries  · And normal LV gram           Lab Review       Results from last 7 days   Lab Units 02/01/19  0331   MAGNESIUM mg/dL 2.4     Results from last 7 days   Lab Units 02/04/19  0540   SODIUM mmol/L 141   POTASSIUM mmol/L 4.0   BUN mg/dL 32*   CREATININE mg/dL 0.81   CALCIUM mg/dL 9.9       Results from last 7 days   Lab Units 02/01/19  0331   WBC 10*3/mm3 10.12   HEMOGLOBIN g/dL 16.1   HEMATOCRIT % 50.3   PLATELETS 10*3/mm3 257                Estimated Creatinine Clearance: 114 mL/min (by C-G formula based on SCr of 0.81 mg/dL).      Assessment/plan     Afib new onset: rate is controlled in the 60-70's. Some brief 1.8- 2.4 sec pauses while sleeping.   TSH WNL 1/24.     CHADs-VASc 2.   Was transitioned to Eliquis 5 mg with first dose 1/30 am. MRI 1/31/19 showed IP hemorrhage.   Eliquis and ASA were placed on hold. 1/25/19 echo EF 53% with concentric LVH showing Mild AS, Mild TR, and Mild MR.   Is on metoprolol 25 mg BID.  Continue.  He was started back on Eliquis after Neurosurgery clearance 2/5. Prior to dc to rehab will need placement of Zios 14 day holter  "monitor.  Pauses likely related to untreated sleep d/o breathing, Pt otherwise has appropriate HR rate response while awake and with activity. No syncope or near syncope.    Abnormal EKG with angina:  AFib with possible Anterior MI pattern on admit.  trops neg times 3.  Hx of cath > 10 years ok \"normal\".  1/26 Nuc stress normal perfusion but intermediate study. Pt had shoulder pain, radiating 1/28.    Cath 1/29 showing normal Coronary arteries. Right groin cath site with 1 cm x 0.5 cm soft hematoma.  Mild TTP.  No bruit, no thrill.       Hx of Diastolic dysfunction (grade II) on echo 11/15/17:  Elevated proBNP on admit,  appears euvolemic and able to lay  flat comfortably.      Hx of GÓMEZ:  uvoloplasty, and pharyngeal plasty 2005 but unable to remember if follow up sleep study obtained post surgery.  + snorer and high risk for sleep d/o breathing. Recommend repeat outpt sleep study to reevaluate.   RVSP 31 mmHg by echo, stable from prior echos.      Elevated D Dimer: CTA of chest neg for PE. Noted goiter, patient will follow up with Dr. Burciaga after discharge.      Goiter: noted on CT.    A moderately large goiter extending into the superior mediastinum with 50% narrowing of the tracheal lumen. Thoracic surgery has been consulted. Dr. Burciaga plans on outpt f/u once CV status stabilized.     Dyslipidemia: , goal 70 or less; 10 year ASCVD 12.9%.  Is on atorvastatin.     Hypokalemia: 2/4 normal. K+     HTN: Adequate control on metoprolol and lisinopril.   Amlodipine has been stopped discontinued 2/2 d/t syncopal episode thought to be r/t hypotension.  Neurosurgery is recommending tight BP control.       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 neurosurgery has seen and feels likely related to HTN and AC. noted recommendations for tight BP control.  2/5 per neurosurgery note has okd resumption of anticoagulation.  and holding AC for at least 48 hours. Consider " watchman procedure as outpt if deemed to be a non candidate for long term AC candidate if he is unable to tolerate restart of AC for long term CVA prophylaxis. 2/5 Spoke with Dr. Bruce and will start back eliquis at 5 mg.     2/5  In the setting of atrial fibrillation, I  discussed with the patient and then his wife Bhargavi via phone regarding the risks and benefits of anticoagulation therapy which include increased risk of bleeding and decreased risk of systemic embolization such as stroke. Patient and wife both verbalize understanding and agree with treatment plan.    Continue current antihypertensive regimen.  Eliquis 5 mg BID resumed 2/5. STable from neuro standpoint thus far on AC.   Stress the importance of medication adherence.     He has follow up with Dr. Bruce 2/28/19 at 1115 at  [place] office to review Zios 14 day holter and to discuss candidacy for cardioversion. Appt card given to patient.     Yessenia Go, APRN  2/7/2019

## 2019-02-07 NOTE — PLAN OF CARE
Problem: Patient Care Overview  Goal: Plan of Care Review   02/07/19 3707   OTHER   Outcome Summary Pt progressed to ambulation without AD (as he did not require a Rwx prior to admit). Pt requires Alvaro to maintain balance without Rwx. Generalized weakness (specifically L LE/foot weakness) and decreased balance noted. Will continue to progress as tolerated.

## 2019-02-07 NOTE — PROGRESS NOTES
" LOS: 10 days     Name: Flo Manzo  Age: 63 y.o.  Sex: male  :  1955  MRN: [id number]         Primary Care Physician: Cathleen Corona MD    Subjective   Subjective  No new complaints today.  Anxious for discharge.    Objective   Vital Signs  Temp:  [97.8 °F (36.6 °C)-98.3 °F (36.8 °C)] 97.8 °F (36.6 °C)  Heart Rate:  [] 76  Resp:  [16-18] 16  BP: (109-112)/(81-94) 110/94  Body mass index is 30.75 kg/m².    Objective:  General Appearance:  Comfortable and in no acute distress.    Vital signs: (most recent): Blood pressure 110/94, pulse 76, temperature 97.8 °F (36.6 °C), temperature source Oral, resp. rate 16, height 180.3 cm (71\"), weight 100 kg (220 lb 7.4 oz), SpO2 96 %.    Lungs:  Normal effort and normal respiratory rate.    Heart: Normal rate.  Regular rhythm.    Abdomen: Abdomen is soft.  Bowel sounds are normal.   There is no abdominal tenderness.     Extremities: There is no dependent edema or local swelling.    Neurological: Patient is alert and oriented to person, place and time.    Skin:  Warm and dry.              Results Review:       I reviewed the patient's new clinical results.    Results from last 7 days   Lab Units 19  0331   WBC 10*3/mm3 10.12   HEMOGLOBIN g/dL 16.1   PLATELETS 10*3/mm3 257     Results from last 7 days   Lab Units 19  0540 19  0331   SODIUM mmol/L 141 141   POTASSIUM mmol/L 4.0 3.7   CHLORIDE mmol/L 104 102   CO2 mmol/L 25.4 24.2   BUN mg/dL 32* 23   CREATININE mg/dL 0.81 0.79   CALCIUM mg/dL 9.9 9.7   GLUCOSE mg/dL 101* 89                 Scheduled Meds:     apixaban 5 mg Oral Q12H   atorvastatin 10 mg Oral Nightly   citalopram 20 mg Oral Daily   furosemide 40 mg Oral Daily   lisinopril 5 mg Oral Q12H   metoprolol tartrate 25 mg Oral Q12H   polyethylene glycol 17 g Oral Daily   potassium chloride 20 mEq Oral Daily   sodium chloride 3 mL Intravenous Q12H     PRN Meds:   •  acetaminophen  •  atropine  •  bisacodyl  •  nitroglycerin  •  " ondansetron  •  [COMPLETED] Insert peripheral IV **AND** sodium chloride  •  sodium chloride  •  sodium chloride  Continuous Infusions:       Assessment/Plan   Active Hospital Problems    Diagnosis Date Noted   • **New onset atrial fibrillation (CMS/[facility]) - RVR [I48.91] 01/24/2019   • Diastolic CHF, chronic (CMS/HCC) [I50.32] 02/01/2019   • Sleep apnea [G47.30] 01/24/2019   • Goiter [E04.9] 01/24/2019   • CHF (congestive heart failure) (CMS/[facility]) [I50.9] 01/24/2019   • Essential hypertension [I10] 01/05/2018      Resolved Hospital Problems    Diagnosis Date Noted Date Resolved   • Intraparenchymal hemorrhage of brain (CMS/[facility]) [I61.9] 02/01/2019 02/06/2019   • Dyspnea [R06.00] 01/24/2019 02/01/2019   • Angina at rest (CMS/[facility]) [I20.8] 01/24/2019 02/06/2019       Assessment & Plan    - Discharge orders were written for yesterday by Dr. Merida however still awaiting precertification for rehabilitation  - He appears stable today.  He is back on anticoagulation without any new issues    Dispo  Await word from discharge planning    Flo Corey MD  [facility]  02/07/19  12:40 PM

## 2019-02-08 PROCEDURE — 99231 SBSQ HOSP IP/OBS SF/LOW 25: CPT | Performed by: NURSE PRACTITIONER

## 2019-02-08 PROCEDURE — 97110 THERAPEUTIC EXERCISES: CPT

## 2019-02-08 RX ADMIN — SODIUM CHLORIDE, PRESERVATIVE FREE 3 ML: 5 INJECTION INTRAVENOUS at 21:47

## 2019-02-08 RX ADMIN — APIXABAN 5 MG: 5 TABLET, FILM COATED ORAL at 21:47

## 2019-02-08 RX ADMIN — METOPROLOL TARTRATE 25 MG: 25 TABLET ORAL at 09:13

## 2019-02-08 RX ADMIN — CITALOPRAM 20 MG: 20 TABLET, FILM COATED ORAL at 09:13

## 2019-02-08 RX ADMIN — LISINOPRIL 5 MG: 5 TABLET ORAL at 21:54

## 2019-02-08 RX ADMIN — LISINOPRIL 5 MG: 5 TABLET ORAL at 09:13

## 2019-02-08 RX ADMIN — METOPROLOL TARTRATE 25 MG: 25 TABLET ORAL at 21:54

## 2019-02-08 RX ADMIN — FUROSEMIDE 40 MG: 40 TABLET ORAL at 09:13

## 2019-02-08 RX ADMIN — SODIUM CHLORIDE, PRESERVATIVE FREE 3 ML: 5 INJECTION INTRAVENOUS at 09:14

## 2019-02-08 RX ADMIN — ATORVASTATIN CALCIUM 10 MG: 10 TABLET, FILM COATED ORAL at 21:47

## 2019-02-08 RX ADMIN — APIXABAN 5 MG: 5 TABLET, FILM COATED ORAL at 09:13

## 2019-02-08 RX ADMIN — POTASSIUM CHLORIDE 20 MEQ: 750 CAPSULE, EXTENDED RELEASE ORAL at 09:13

## 2019-02-08 NOTE — PLAN OF CARE
Problem: Patient Care Overview  Goal: Plan of Care Review  Outcome: Ongoing (interventions implemented as appropriate)   02/08/19 1644   Coping/Psychosocial   Plan of Care Reviewed With patient   Plan of Care Review   Progress improving   OTHER   Outcome Summary Pt ambulates with walker with SB assist. Pt anxious for discharge to rehab.      Goal: Individualization and Mutuality  Outcome: Ongoing (interventions implemented as appropriate)      Problem: Arrhythmia/Dysrhythmia (Symptomatic) (Adult)  Goal: Signs and Symptoms of Listed Potential Problems Will be Absent, Minimized or Managed (Arrhythmia/Dysrhythmia)  Outcome: Ongoing (interventions implemented as appropriate)      Problem: Fall Risk (Adult)  Goal: Absence of Fall  Outcome: Ongoing (interventions implemented as appropriate)      Problem: Cardiac: Heart Failure (Adult)  Goal: Signs and Symptoms of Listed Potential Problems Will be Absent, Minimized or Managed (Cardiac: Heart Failure)  Outcome: Ongoing (interventions implemented as appropriate)      Problem: Skin Injury Risk (Adult)  Goal: Identify Related Risk Factors and Signs and Symptoms  Outcome: Outcome(s) achieved Date Met: 02/08/19    Goal: Skin Health and Integrity  Outcome: Outcome(s) achieved Date Met: 02/08/19

## 2019-02-08 NOTE — PROGRESS NOTES
Continued Stay Note  [facility]     Patient Name: Flo Manzo  MRN: [id number]  Today's Date: 2/8/2019    Admit Date: 1/24/2019    Discharge Plan     Row Name 02/08/19 1744       Plan    Plan  Plan is skilled bed at [facility] PENDING Catarino sanchez.     Plan Comments  Spoke with Mehnaz/ Sujata who states precert is still pending.  She states the facility has called multiple times.  Pt notified and he states he feels he needs to go to rehab upon DC because he still feels unsteady.  He ambulated 100 feet w/ PT today - min assist with 2 losses of balance.  He called his insurance company.  He and CCP spoke with Aide/ Catarino ([phone]) who states she will call [facility] to request clinicals.  She gave an attending auth #: [id number].  Per her request, CCP faxed clinicals (fax: [phone]).  No determination as of 1745 on Friday.        Discharge Codes    No documentation.       Expected Discharge Date and Time     Expected Discharge Date Expected Discharge Time    Feb 7, 2019             Dona Benavidez RN

## 2019-02-08 NOTE — PROGRESS NOTES
" LOS: 11 days     Name: Flo Manzo  Age: 63 y.o.  Sex: male  :  1955  MRN: [id number]         Primary Care Physician: Cathleen Corona MD    Subjective   Subjective  No new complaints    Objective   Vital Signs  Temp:  [97.2 °F (36.2 °C)-98.2 °F (36.8 °C)] 97.2 °F (36.2 °C)  Heart Rate:  [69-97] 75  Resp:  [16-18] 18  BP: (106-126)/(78-99) 122/99  Body mass index is 31.91 kg/m².    Objective:  General Appearance:  Comfortable and in no acute distress.    Vital signs: (most recent): Blood pressure 122/99, pulse 75, temperature 97.2 °F (36.2 °C), temperature source Oral, resp. rate 18, height 180.3 cm (71\"), weight 104 kg (228 lb 12.8 oz), SpO2 96 %.    Lungs:  Normal effort and normal respiratory rate.    Heart: Normal rate.  Regular rhythm.    Abdomen: Abdomen is soft.  Bowel sounds are normal.   There is no abdominal tenderness.     Extremities: There is no dependent edema or local swelling.    Neurological: Patient is alert and oriented to person, place and time.    Skin:  Warm and dry.              Results Review:       I reviewed the patient's new clinical results.        Results from last 7 days   Lab Units 19  0540   SODIUM mmol/L 141   POTASSIUM mmol/L 4.0   CHLORIDE mmol/L 104   CO2 mmol/L 25.4   BUN mg/dL 32*   CREATININE mg/dL 0.81   CALCIUM mg/dL 9.9   GLUCOSE mg/dL 101*                 Scheduled Meds:     apixaban 5 mg Oral Q12H   atorvastatin 10 mg Oral Nightly   citalopram 20 mg Oral Daily   furosemide 40 mg Oral Daily   lisinopril 5 mg Oral Q12H   metoprolol tartrate 25 mg Oral Q12H   polyethylene glycol 17 g Oral Daily   potassium chloride 20 mEq Oral Daily   sodium chloride 3 mL Intravenous Q12H     PRN Meds:   •  acetaminophen  •  atropine  •  bisacodyl  •  nitroglycerin  •  ondansetron  •  [COMPLETED] Insert peripheral IV **AND** sodium chloride  •  sodium chloride  •  sodium chloride  Continuous Infusions:       Assessment/Plan   Active Hospital Problems    Diagnosis Date Noted "   • **New onset atrial fibrillation (CMS/HCC) - RVR [I48.91] 01/24/2019   • Diastolic CHF, chronic (CMS/HCC) [I50.32] 02/01/2019   • Sleep apnea [G47.30] 01/24/2019   • Goiter [E04.9] 01/24/2019   • CHF (congestive heart failure) (CMS/HCC) [I50.9] 01/24/2019   • Essential hypertension [I10] 01/05/2018      Resolved Hospital Problems    Diagnosis Date Noted Date Resolved   • Intraparenchymal hemorrhage of brain (CMS/HCC) [I61.9] 02/01/2019 02/06/2019   • Dyspnea [R06.00] 01/24/2019 02/01/2019   • Angina at rest (CMS/HCC) [I20.8] 01/24/2019 02/06/2019       Assessment & Plan    - Awaiting precertification for rehabilitation.  If denied he will go home with home health.  - He appears stable today.  He is back on anticoagulation without any new issues     Dispo  Await word from discharge planning          Flo Corey MD  [facility]  02/08/19  11:21 AM

## 2019-02-08 NOTE — PROGRESS NOTES
[facility]  Cardiology Progress Note    Patient Identification:  Name: Flo Manzo  Age: 63 y.o.  Sex: male  :  1955  MRN: [id number]                 Follow Up / Chief Complaint: afib    Interval History:  Troponin negative x3.  Heart cath  no obstructive CAD.  Transitioned from heparin sq to eliquis  first dose given this am. MRI revealed IPH thought to be related to hypertension and AC.      .-   Neurosurgery cleared for resumption of AC and he was started back on  eliquis at 5 mg BID 19 by our service.        Subjective: Denies chest pain, shortness of breath, syncope, near syncope.   Dizziness better.    Denies any numbness or slurred speech.  Awaiting pre cert to rehab.     Objective:    BP: (111-126)/(95-99) 122/99       Resp:  [16-18] 18   Heart Rate:  [69-97] 75   Temp:  [97.2 °F (36.2 °C)-97.7 °F (36.5 °C)] 97.2 °F (36.2 °C)   SpO2:  [92 %-97 %] 96 %    Past Medical History:  Past Medical History:   Diagnosis Date   • Arthritis    • Colon polyps 2018    Hepatic flexure: tubular adenoma, only low-grade dysplasia; splenic flexure: tubular adenoma, only low-grade dysplasia; sigmoid colon: tubular adenoma, multiple fragments only low-grade dysplasia   • Depression    • Hypertension    • Sleep apnea     previously diagnosed with sleep apnea, had T&A done and it has since resolved    • Ventral hernia      Past Surgical History:  Past Surgical History:   Procedure Laterality Date   • APPENDECTOMY N/A    • CARDIAC CATHETERIZATION N/A 2004    Cath left ventriculography, coronary angiography and left heart catheterization, Normal results-Dr. Vadim Mancuso   • CARDIAC CATHETERIZATION N/A 2019    Procedure: Left Heart Cath;  Surgeon: Eren Bruce MD;  Location: [facility] INVASIVE LOCATION;  Service: Cardiology   • CARDIAC CATHETERIZATION N/A 2019    Procedure: Left ventriculography;  Surgeon: Eren Bruce MD;  Location: [facility] CATH  INVASIVE LOCATION;  Service: Cardiology   • CARDIAC CATHETERIZATION N/A 1/29/2019    Procedure: Coronary angiography;  Surgeon: Eren Bruec MD;  Location:  [facility] CATH INVASIVE LOCATION;  Service: Cardiology   • CARPAL TUNNEL RELEASE Right 2013   • CARPAL TUNNEL RELEASE Bilateral    • COLONOSCOPY N/A 1/4/2018    Procedure: COLONOSCOPY with cold polypectomy and hot snare polypectomy;  Surgeon: Ten Solitario MD;  Location: [facility] ENDOSCOPY;  Service:    • EYE LENS IMPLANT SECONDARY Bilateral 2007, 2008   • KNEE CARTILAGE SURGERY Right 1979   • TONSILLECTOMY AND ADENOIDECTOMY Bilateral 2005   • TOTAL KNEE ARTHROPLASTY Left 03/14/2016    Left total knee arthroplasty with Amberly component, size 11 femur, G tibial baseplate with a 10 polyethylene insert and a 38 patellar button-Dr. Pablo Hairston   • TOTAL KNEE ARTHROPLASTY Right 03/02/2015    Right total knee arthroplasty with Amberly component size G femur, 11 tibial base plate with an 11 polyethylene insert and a 38 patellar button-Dr. Pablo Hairston   • UVULOPALATOPHARYNGOPLASTY N/A 2005    part of soft palate, and uvula   • VENTRAL HERNIA REPAIR N/A 1/23/2018    Procedure: VENTRAL HERNIA REPAIR LAPAROSCOPIC WITH DAVINCI ROBOT AND MESH;  Surgeon: Ten Solitario MD;  Location: [facility] MAIN OR;  Service:         Social History:   Social History     Tobacco Use   • Smoking status: Never Smoker   • Smokeless tobacco: Never Used   Substance Use Topics   • Alcohol use: Yes     Comment: social, maybe a drink a month      Family History:  Family History   Problem Relation Age of Onset   • Hypertension Mother    • Kidney failure Mother    • Coronary artery disease Father    • Lung cancer Father         positive smoker   • Heart attack Father    • Breast cancer Sister 60   • Prostate cancer Brother    • Colon polyps Brother    • Kidney nephrosis Brother    • Malig Hyperthermia Neg Hx           Allergies:  Allergies   Allergen Reactions   • Poison Ivy Extract Hives,  Itching, Rash and Other (See Comments)     ITCHY BLISTERS     Scheduled Meds:    apixaban 5 mg Q12H   atorvastatin 10 mg Nightly   citalopram 20 mg Daily   furosemide 40 mg Daily   lisinopril 5 mg Q12H   metoprolol tartrate 25 mg Q12H   polyethylene glycol 17 g Daily   potassium chloride 20 mEq Daily   sodium chloride 3 mL Q12H       I have reviewed the patient's history, home, and current medications.     INTAKE AND OUTPUT:    Intake/Output Summary (Last 24 hours) at 2/8/2019 1350  Last data filed at 2/8/2019 0900  Gross per 24 hour   Intake 960 ml   Output --   Net 960 ml         Review of Systems:   GI: denies n/v or abd pain  Cardiac: denies chest pain, or pressure; orthopnea improved;   Pulmonary: nonproductive cough but better, MGCREGOR mild but improved.     Constitutional:  Temp:  [97.2 °F (36.2 °C)-97.7 °F (36.5 °C)] 97.2 °F (36.2 °C)  Heart Rate:  [69-97] 75  Resp:  [16-18] 18  BP: (111-126)/(95-99) 122/99   SpO2:  [92 %-97 %] 96 %     Exam:   General appearance: NAD, conversant   Eyes:  Non icteric sclerae, moist conjunctivae; no lid-lag; PERRLA   HENT: Atraumatic; oropharynx clear with moist mucous membranes;  Neck: Trachea midline; supple, no  lymphadenopathy   Lungs: CTA, with normal respiratory effort and no intercostal retractions   CV: S1-S2 irregularly irregular with controlled rate. Soft Systolic murmurs LSB; no rub, no gallop   Abdomen: Soft, non-tender; no masses or HSM   Extremities: No peripheral edema or extremity lymphadenopathy; right fem cath site with resolution of hematoma.  No TTP.  Right radial site no hematoma,.   Skin: Normal temperature, turgor and texture; no rash, ulcers or subcutaneous nodules   Psych: Appropriate affect, alert and oriented to person, place and time   Neuro: CN II and XII grossly intact.      Cardiographics    Telemetry:      AFib rate controlled HR trends 60-80's.           Afib 2/7 with 2.4 second pause while sleeping.           AFib 2/6 with 1.8 seconds while  sleeping.               EC/2/19 atrial fibrillation rate 80's          19 atrial fibrillation with controlled VR        19  AFib with controlled VR.                Previous EKG 19 SR             I have personally reviewed and interpreted ECG and telemetry.         Echocardiogram:       Interpretation Summary 19    · Left ventricular wall thickness is consistent with mild concentric hypertrophy.  · Estimated EF = 53%.  · Left ventricular systolic function is normal.  · Mild mitral valve regurgitation is present  · Left atrial cavity size is moderately dilated.  · There is moderate calcification of the aortic valve.  · Mild aortic valve stenosis is present.  · Aortic valve maximum pressure gradient is 28.0 mmHg.  · Aortic valve mean pressure gradient is 16.0 mmHg.  · Mild tricuspid valve regurgitation is present.  · Calculated right ventricular systolic pressure from tricuspid regurgitation is 31 mmHg.         Interpretation Summary echo 11/15/17     · Left ventricular wall thickness is consistent with mild concentric hypertrophy.  · Left ventricular systolic function is normal. Estimated EF = 52%.  · Left ventricular diastolic dysfunction (grade II) consistent with pseudonormalization.  · Mild tricuspid valve regurgitation is present.  · Calculated right ventricular systolic pressure from tricuspid regurgitation is 37 mmHg.               Echo 2/19/15 with Tay  Conclusions:  The study quality is good.   There is normal left ventricular systolic function.  Global left ventricular wall motion and contractility are within normal  limits.  The estimated ejection fraction is 60-65%.   Mild to moderate concentric left ventricular hypertrophy is observed.  The left atrium is moderately enlarged.  There is aortic annular calcification.  There is mild mitral annular calcification.  There is trivial to mild mitral regurgitation observed.  There is mild tricuspid regurgitation.  The right  ventricular systolic pressure is estimated to be 30-35 mmHg.         CTA of chest 1/24/19    IMPRESSION:  There is no CT evidence of pulmonary embolism or other acute  process within the chest. There is mild cardiomegaly. A moderately large  goiter extending into the superior mediastinum produces approximately  50% narrowing of the tracheal lumen.    Stress 1/26/19    Interpretation Summary     · Findings consistent with an equivocal ECG stress test.  · Left ventricular ejection fraction is mildly reduced (Calculated EF = 40%).  · Myocardial perfusion imaging indicates a normal myocardial perfusion study with no evidence of ischemia.  · Impressions are consistent with an intermediate risk study.  · Non ischemic cardiomyopathy       Interpretation Summary Nuc Stress 1/26/19    · Findings consistent with an equivocal ECG stress test.  · Left ventricular ejection fraction is mildly reduced (Calculated EF = 40%).  · Myocardial perfusion imaging indicates a normal myocardial perfusion study with no evidence of ischemia.  · Impressions are consistent with an intermediate risk study.  · Non ischemic cardiomyopathy          Nuc 1/30/19 MRI of brain  IMPRESSION:  There are findings compatible with an acute intraparenchymal  hematoma within the medial portion of the left cerebellar hemisphere  with extension into the fourth ventricle. Intraventricular hemorrhage is  identified within the dependent aspects of the lateral ventricles. The  etiology of the hemorrhage is uncertain. There are multiple punctate  areas of susceptibility artifact identified within cortical medullary  interfaces of both cerebral hemispheres as well as within the thalami  and the left aspect of the ching. As such, this hemorrhage could be due  to amyloid angiopathy or a hypertensive hemorrhage. Of note, there are  punctate areas of diffusion-weighted hyperintensity within the medial  portion of the right occipital lobe and the posterior aspect  left  occipital lobe or sulci adjacent to the occipital cortex. An additional  similar focus is seen within the or adjacent to the cortex of the  superior aspect of the left cerebellar hemisphere. These could be due to  small foci of acute infarction in which case the hemorrhage within the  left cerebellar hemisphere could be hemorrhagic transformation of an  infarct. Alternatively, these could be representative of areas of  subarachnoid hemorrhage. Finally, I cannot exclude the possibility of an  underlying mass or vascular malformation within the left cerebellar  hemisphere. I would recommend performing a CT scan of the head at this  time to establish a baseline of this hemorrhage. These findings and  recommendations were discussed with Dr. Sebastian on 01/30/2019  approximately 8:20 p.m.       Conclusion 1/29/19       · Successful right and left coronary angiogram and LV gram  · Normal coronary arteries  · And normal LV gram           Lab Review           Results from last 7 days   Lab Units 02/04/19  0540   SODIUM mmol/L 141   POTASSIUM mmol/L 4.0   BUN mg/dL 32*   CREATININE mg/dL 0.81   CALCIUM mg/dL 9.9                      Estimated Creatinine Clearance: 116.1 mL/min (by C-G formula based on SCr of 0.81 mg/dL).      Assessment/plan     Afib new onset: rate is controlled in the 60-70's. Some brief 1.8- 2.4 sec pauses while sleeping.   TSH WNL 1/24.     CHADs-VASc 2.   transitioned to Eliquis 5 mg with first dose 1/30 am. MRI 1/31/19 showed IP hemorrhage.   Eliquis and ASA were placed on hold. 1/25/19 echo EF 53% with concentric LVH showing Mild AS, Mild TR, and Mild MR.   Is on metoprolol 25 mg BID.  Continue.  He was started back on Eliquis after Neurosurgery clearance 2/5. Prior to dc to rehab will need placement of Zios 14 day holter monitor.  Pauses likely related to untreated sleep d/o breathing, Pt otherwise has appropriate HR rate response while awake and with activity. No syncope or near syncope.  "    Abnormal EKG with angina:  AFib with possible Anterior MI pattern on admit.  trops neg times 3.  Hx of cath > 10 years ok \"normal\".  1/26 Nuc stress normal perfusion but intermediate study. Pt had shoulder pain, radiating 1/28.    Cath 1/29 showing normal Coronary arteries. Right groin cath site  Hematoma has now resolved.       Hx of Diastolic dysfunction (grade II) on echo 11/15/17:  Elevated proBNP on admit,  appears euvolemic and able to lay  flat comfortably.      Hx of GÓMEZ:  uvoloplasty, and pharyngeal plasty 2005 but unable to remember if follow up sleep study obtained post surgery.  + snorer and high risk for sleep d/o breathing. Recommend repeat outpt sleep study to reevaluate.   RVSP 31 mmHg by echo, stable from prior echos.      Elevated D Dimer: CTA of chest neg for PE. Noted goiter, patient will follow up with Dr. Burciaga after discharge.      Goiter: noted on CT.    A moderately large goiter extending into the superior mediastinum with 50% narrowing of the tracheal lumen. Thoracic surgery has been consulted. Dr. Burciaga plans on outpt f/u once CV status stabilized.     Dyslipidemia: , goal 70 or less; 10 year ASCVD 12.9%.  Is on atorvastatin.     Hypokalemia: 2/4 normal. K+     HTN: Adequate control on metoprolol and lisinopril.   Amlodipine has been stopped discontinued 2/2 d/t syncopal episode thought to be r/t hypotension.  Neurosurgery is recommending tight BP control.       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 neurosurgery has seen and feels likely related to HTN and AC. noted recommendations for tight BP control.  2/5 per neurosurgery note has okd resumption of anticoagulation.  and holding AC for at least 48 hours. Consider watchman procedure as outpt if deemed to be a non candidate for long term AC candidate if he is unable to tolerate restart of AC for long term CVA prophylaxis. 2/5 Spoke with Dr. Bruce and will start back " eliquis at 5 mg.     2/5  In the setting of atrial fibrillation, I  discussed with the patient and then his wife Bhargavi via phone regarding the risks and benefits of anticoagulation therapy which include increased risk of bleeding and decreased risk of systemic embolization such as stroke. Patient and wife both verbalize understanding and agree with treatment plan.    Continue current antihypertensive regimen.  Eliquis 5 mg BID resumed 2/5. STable from neuro standpoint thus far on AC.   Stress the importance of medication adherence.  Zios 14 day has been ordered for placement prior to dc.      His follow up with has been changed to later date to allow for Zios holter evaluation to be completed.  His new appt with  Dr. Bruce Thursday 3/7/19 at 1030 at  [facility], [place] office to review Zios 14 day holter and to discuss candidacy for cardioversion. Appt card given to patient.     Yessenia Go, APRN  2/7/2019

## 2019-02-08 NOTE — PLAN OF CARE
Problem: Patient Care Overview  Goal: Plan of Care Review   02/08/19 0741   OTHER   Outcome Summary Session completed without walker this date. Decreased balance and increased fall risk. At current level, pt is not safe to discharge home due to high fall risk. Educated on exercises to complete on own to improve LE strength/endurance. WIll progress towards goals as tolerated.

## 2019-02-08 NOTE — THERAPY TREATMENT NOTE
Acute Care - Physical Therapy Treatment Note  [facility]     Patient Name: Flo Manzo  : 1955  MRN: [id number]  Today's Date: 2019  Onset of Illness/Injury or Date of Surgery: 19          Admit Date: 2019    Visit Dx:    ICD-10-CM ICD-9-CM   1. New onset atrial fibrillation (CMS/HCC) I48.91 427.31   2. Goiter diffuse E04.9 240.9   3. Angina at rest (CMS/HCC) I20.8 413.9   4. Essential hypertension I10 401.9   5. New onset atrial fibrillation (CMS/HCC) - RVR I48.91 427.31   6. Generalized weakness R53.1 780.79     Patient Active Problem List   Diagnosis   • Umbilical hernia without obstruction and without gangrene   • Essential hypertension   • Arthritis of left knee   • Depression   • Obesity (BMI 30-39.9)   • New onset atrial fibrillation (CMS/HCC) - RVR   • Sleep apnea   • Goiter   • CHF (congestive heart failure) (CMS/HCC)   • Diastolic CHF, chronic (CMS/HCC)       Therapy Treatment    Rehabilitation Treatment Summary     Row Name 19 1618             Treatment Time/Intention    Discipline  physical therapist  -MA      Document Type  therapy note (daily note)  -MA      Subjective Information  no complaints  -MA      Mode of Treatment  physical therapy;individual therapy  -MA      Patient/Family Observations  sitting up in chair, no acute distress  -MA      Patient Effort  good  -MA      Existing Precautions/Restrictions  fall  -MA      Recorded by [MA] Jaki Alcaraz, PT 19 1618      Row Name 19 1618             Vital Signs    O2 Delivery Pre Treatment  room air  -MA      Recorded by [MA] Jaki Alcaraz, PT 19 1618      Row Name 19 1618             Cognitive Assessment/Intervention- PT/OT    Affect/Mental Status (Cognitive)  WNL  -MA      Orientation Status (Cognition)  oriented x 4  -MA      Follows Commands (Cognition)  WNL  -MA      Personal Safety Interventions  fall prevention program maintained;gait belt;muscle strengthening facilitated;nonskid  shoes/slippers when out of bed;supervised activity  -MA      Recorded by [MA] Jaki Alcaraz, PT 02/08/19 1618      Row Name 02/08/19 1618             Sit-Stand Transfer    Sit-Stand [place] (Transfers)  contact guard  -MA      Assistive Device (Sit-Stand Transfers)  -- no AD  -MA      Recorded by [MA] Jaki Alcaraz, PT 02/08/19 1618      Row Name 02/08/19 1618             Stand-Sit Transfer    Stand-Sit [place] (Transfers)  contact guard  -MA      Assistive Device (Stand-Sit Transfers)  -- no AD  -MA      Recorded by [MA] Jaki Alcaraz, PT 02/08/19 1618      Row Name 02/08/19 1618             Gait/Stairs Assessment/Training    [place] Level (Gait)  contact guard;minimum assist (75% patient effort)  -MA      Assistive Device (Gait)  -- no AD  -MA      Distance in Feet (Gait)  100  -MA      Pattern (Gait)  step-through  -MA      Deviations/Abnormal Patterns (Gait)  davon decreased;stride length decreased  -MA      Comment (Gait/Stairs)  Cues for increased L foot clearance and increased L step length. 2 LOB requiring Alvaro to regain. 1 standing rest break. 2 episodes of LE scissoring causing minor loss of balance.    -MA      Recorded by [MA] Jaki Alcaraz, PT 02/08/19 1622      Row Name 02/08/19 1618             Motor Skills Assessment/Interventions    Additional Documentation  Therapeutic Exercise Interventions (Group)  -MA      Recorded by [MA] Jaki Alcaraz, PT 02/08/19 1622      Row Name 02/08/19 1618             Therapeutic Exercise    Lower Extremity (Therapeutic Exercise)  SLR (straight leg raise), bilateral;heel slides, bilateral  -MA      Recorded by [MA] Jaki Alcaraz, PT 02/08/19 1622      Row Name 02/08/19 1618             Positioning and Restraints    Pre-Treatment Position  sitting in chair/recliner  -MA      Post Treatment Position  chair  -MA      In Chair  reclined;call light within reach;encouraged to call for assist;notified nsg  -MA      Recorded by [MA] Jaki Alcaraz, PT 02/08/19 1622       Row Name 02/08/19 1618             Pain Scale: Numbers Pre/Post-Treatment    Pain Scale: Numbers, Pretreatment  0/10 - no pain  -MA      Pain Scale: Numbers, Post-Treatment  0/10 - no pain  -MA      Recorded by [MA] Jaki Alcaraz, PT 02/08/19 1622        User Key  (r) = Recorded By, (t) = Taken By, (c) = Cosigned By    Initials Name Effective Dates Discipline    Jaki Ahumada, PT 10/19/18 -  PT                   Physical Therapy Education     Title: PT OT SLP Therapies (Done)     Topic: Physical Therapy (Done)     Point: Mobility training (Done)     Learning Progress Summary           Patient Acceptance, E, VU,NR by MA at 2/8/2019  4:23 PM    Acceptance, E, VU,NR by MA at 2/7/2019  4:27 PM    Acceptance, E,TB, VU by  at 2/6/2019  3:49 PM    Comment:  verbal cues for amb    Acceptance, E, VU by ML at 2/6/2019  4:42 AM    Acceptance, E,D, NR by PC at 2/4/2019 10:21 AM    Acceptance, E, VU by ML at 2/4/2019  4:50 AM    Acceptance, E,D, VU,DU by TERI at 2/3/2019 10:22 AM    Comment:  safety during ambulation, need for assistance    Acceptance, E,TB, VU by  at 2/2/2019  2:48 PM    Acceptance, E,TB,D, VU,NR by  at 2/1/2019  4:12 PM    Acceptance, E,TB,D, VU,NR by CH at 1/31/2019  4:10 PM    Acceptance, E, NR by EM at 1/30/2019  2:57 PM                   Point: Home exercise program (Done)     Learning Progress Summary           Patient Acceptance, E, VU,NR by MA at 2/8/2019  4:23 PM    Acceptance, E, VU by ML at 2/6/2019  4:42 AM    Acceptance, E,D, NR by PC at 2/4/2019 10:21 AM    Acceptance, E, VU by ML at 2/4/2019  4:50 AM    Acceptance, E,D, VU,DU by LC at 2/3/2019 10:22 AM    Comment:  safety during ambulation, need for assistance                   Point: Body mechanics (Done)     Learning Progress Summary           Patient Acceptance, E, VU,NR by MA at 2/8/2019  4:23 PM    Acceptance, HELEN GROSS NR by MA at 2/7/2019  4:27 PM    Acceptance, NARINDER GROSS VU by ЕКАТЕРИНА at 2/6/2019  3:49 PM    Comment:  verbal cues for amb     Acceptance, E,D, NR by PC at 2/4/2019 10:21 AM    Acceptance, E, VU by  at 2/4/2019  4:50 AM    Acceptance, E,D, VU,DU by  at 2/3/2019 10:22 AM    Comment:  safety during ambulation, need for assistance    Acceptance, E,TB, VU by  at 2/2/2019  2:48 PM    Acceptance, E,TB,D, VU,NR by  at 2/1/2019  4:12 PM    Acceptance, E,TB,D, VU,NR by  at 1/31/2019  4:10 PM                   Point: Precautions (Done)     Learning Progress Summary           Patient Acceptance, E, VU,NR by MA at 2/8/2019  4:23 PM    Acceptance, E, VU,NR by MA at 2/7/2019  4:27 PM    Acceptance, E,D, NR by PC at 2/4/2019 10:21 AM    Acceptance, E, VU by  at 2/4/2019  4:50 AM    Acceptance, E,D, VU,DU by  at 2/3/2019 10:22 AM    Comment:  safety during ambulation, need for assistance    Acceptance, E,TB, VU by  at 2/2/2019  2:48 PM    Acceptance, E,TB,D, VU,NR by  at 2/1/2019  4:12 PM                               User Key     Initials Effective Dates Name Provider Type Discipline     04/03/18 -  Harmony Edwards, PT Physical Therapist PT    PC 04/03/18 -  Arlette Tellez, PT Physical Therapist PT    EM 04/03/18 -  Vira Borrero, PT Physical Therapist PT     03/07/18 -  Padmini Heart, PTA Physical Therapy Assistant PT     06/16/16 -  Nuvia Lawson, RN Registered Nurse Nurse     08/02/16 -  Arash Hong, PT DPT Physical Therapist PT    MA 10/19/18 -  Jaki Alcaraz, PT Physical Therapist PT     01/04/19 -  Reyna Villaseñor, PT Student PT Student PT                PT Recommendation and Plan     Outcome Summary: Session completed without walker this date. Decreased balance and increased fall risk. At current level, pt is not safe to discharge home due to high fall risk. Educated on exercises to complete on own to improve LE strength/endurance. WIll progress towards goals as tolerated.   Outcome Measures     Row Name 02/08/19 1600 02/07/19 1600 02/06/19 1500       How much help from another person do you currently  need...    Turning from your back to your side while in flat bed without using bedrails?  4  -MA  4  -MA  4  -EJ (r) RM (t) EJ (c)    Moving from lying on back to sitting on the side of a flat bed without bedrails?  4  -MA  4  -MA  4  -EJ (r) RM (t) EJ (c)    Moving to and from a bed to a chair (including a wheelchair)?  3  -MA  3  -MA  3  -EJ (r) RM (t) EJ (c)    Standing up from a chair using your arms (e.g., wheelchair, bedside chair)?  3  -MA  3  -MA  3  -EJ (r) RM (t) EJ (c)    Climbing 3-5 steps with a railing?  3  -MA  3  -MA  3  -EJ (r) RM (t) EJ (c)    To walk in hospital room?  3  -MA  3  -MA  3  -EJ (r) RM (t) EJ (c)    AM-PAC 6 Clicks Score  20  -MA  20  -MA  20  -EJ (r) RM (t)       Functional Assessment    Outcome Measure Options  AM-PAC 6 Clicks Basic Mobility (PT)  -MA  AM-PAC 6 Clicks Basic Mobility (PT)  -MA  AM-PAC 6 Clicks Basic Mobility (PT)  -EJ (r) RM (t) EJ (c)      User Key  (r) = Recorded By, (t) = Taken By, (c) = Cosigned By    Initials Name Provider Type    Kayce Jeffries, PT Physical Therapist    Jaki Ahumada, PT Physical Therapist    RM Reyna Villaseñor, PT Student PT Student         Time Calculation:   PT Charges     Row Name 02/08/19 1625             Time Calculation    Start Time  1610  -MA      Stop Time  1618  -MA      Time Calculation (min)  8 min  -MA      PT Received On  02/08/19  -MA      PT - Next Appointment  02/09/19  -MA         Time Calculation- PT    Total Timed Code Minutes- PT  8 minute(s)  -MA        User Key  (r) = Recorded By, (t) = Taken By, (c) = Cosigned By    Initials Name Provider Type    Jaki Ahumada, PT Physical Therapist        Therapy Suggested Charges     Code   Minutes Charges    96110 (CPT®) Hc Pt Neuromusc Re Education Ea 15 Min      05795 (CPT®) Hc Pt Ther Proc Ea 15 Min      00540 (CPT®) Hc Gait Training Ea 15 Min 15 1    28499 (CPT®) Hc Pt Therapeutic Act Ea 15 Min      32941 (CPT®) Hc Pt Manual Therapy Ea 15 Min      80239 (CPT®) Hc Pt  Iontophoresis Ea 15 Min      20993 (CPT®) Hc Pt Elec Stim Ea-Per 15 Min      86534 (CPT®) Hc Pt Ultrasound Ea 15 Min      15529 (CPT®) Hc Pt Self Care/Mgmt/Train Ea 15 Min      86603 (CPT®) Hc Pt Prosthetic (S) Train Initial Encounter, Each 15 Min      75331 (CPT®) Hc Pt Orthotic(S)/Prosthetic(S) Encounter, Each 15 Min      87198 (CPT®) Hc Orthotic(S) Mgmt/Train Initial Encounter, Each 15min      Total  15 1        Therapy Charges for Today     Code Description Service Date Service Provider Modifiers Qty    02067034677 HC PT THER PROC EA 15 MIN 2/7/2019 Jaki Alcaraz, PT GP 1    98980397691 HC PT THER PROC EA 15 MIN 2/8/2019 Jaki Alcaraz, PT GP 1          PT G-Codes  Outcome Measure Options: AM-PAC 6 Clicks Basic Mobility (PT)  AM-PAC 6 Clicks Score: 20    Jaki Alcaraz, PT  2/8/2019

## 2019-02-09 PROCEDURE — 97110 THERAPEUTIC EXERCISES: CPT

## 2019-02-09 RX ADMIN — LISINOPRIL 5 MG: 5 TABLET ORAL at 21:04

## 2019-02-09 RX ADMIN — APIXABAN 5 MG: 5 TABLET, FILM COATED ORAL at 21:04

## 2019-02-09 RX ADMIN — CITALOPRAM 20 MG: 20 TABLET, FILM COATED ORAL at 09:29

## 2019-02-09 RX ADMIN — FUROSEMIDE 40 MG: 40 TABLET ORAL at 09:30

## 2019-02-09 RX ADMIN — LISINOPRIL 5 MG: 5 TABLET ORAL at 09:30

## 2019-02-09 RX ADMIN — POTASSIUM CHLORIDE 20 MEQ: 750 CAPSULE, EXTENDED RELEASE ORAL at 09:29

## 2019-02-09 RX ADMIN — APIXABAN 5 MG: 5 TABLET, FILM COATED ORAL at 09:29

## 2019-02-09 RX ADMIN — METOPROLOL TARTRATE 25 MG: 25 TABLET ORAL at 21:04

## 2019-02-09 RX ADMIN — SODIUM CHLORIDE, PRESERVATIVE FREE 3 ML: 5 INJECTION INTRAVENOUS at 09:30

## 2019-02-09 RX ADMIN — METOPROLOL TARTRATE 25 MG: 25 TABLET ORAL at 09:29

## 2019-02-09 RX ADMIN — ATORVASTATIN CALCIUM 10 MG: 10 TABLET, FILM COATED ORAL at 21:04

## 2019-02-09 RX ADMIN — SODIUM CHLORIDE, PRESERVATIVE FREE 3 ML: 5 INJECTION INTRAVENOUS at 21:04

## 2019-02-09 NOTE — PROGRESS NOTES
Continued Stay Note  [facility]     Patient Name: Flo Manzo  MRN: [id number]  Today's Date: 2/9/2019    Admit Date: 1/24/2019    Discharge Plan     Row Name 02/09/19 1458       Plan    Plan  [facility]- skilled level---  Awaiting [facility] Precert     Plan Comments  Incoming call from MARCO ANTONIO Tapia stating patient would like to speak with CCP.  Spoke with patient via phone and he was inquiring if [facility] had received the approval from [facility] for rehab.  Spoke with Mehnaz with [facility] at [phone] and she states they have not received the approval from [facility] for patient's rehab yet and it will be on Monday before they will receive the authorization.  Informed MARCO ANTONIO Tapia that [facility] has not received the authorization for his rehab yet and they will not hear anything till Monday and she states she will inform patient...CCP will follow and assist as needed..... Shelbi Gary RN,CCP         Discharge Codes    No documentation.       Expected Discharge Date and Time     Expected Discharge Date Expected Discharge Time    Feb 7, 2019             Shelbi Gary RN

## 2019-02-09 NOTE — PLAN OF CARE
Problem: Patient Care Overview  Goal: Plan of Care Review  Outcome: Ongoing (interventions implemented as appropriate)   02/09/19 0531   Coping/Psychosocial   Plan of Care Reviewed With patient   Plan of Care Review   Progress no change   OTHER   Outcome Summary Cont on NIH(1) Pt denies any pain or discomfort.Pt is continent of B&B.Stand by assist going to the bathroom.Waiting for precert at [facility].       Problem: Arrhythmia/Dysrhythmia (Symptomatic) (Adult)  Goal: Signs and Symptoms of Listed Potential Problems Will be Absent, Minimized or Managed (Arrhythmia/Dysrhythmia)  Outcome: Ongoing (interventions implemented as appropriate)      Problem: Fall Risk (Adult)  Goal: Absence of Fall  Outcome: Ongoing (interventions implemented as appropriate)      Problem: Cardiac: Heart Failure (Adult)  Goal: Signs and Symptoms of Listed Potential Problems Will be Absent, Minimized or Managed (Cardiac: Heart Failure)  Outcome: Ongoing (interventions implemented as appropriate)

## 2019-02-09 NOTE — PROGRESS NOTES
" LOS: 12 days     Name: Flo Manzo  Age: 63 y.o.  Sex: male  :  1955  MRN: [id number]         Primary Care Physician: Cathleen Corona MD    Subjective   Subjective  No new complaints.  No overnight events.    Objective   Vital Signs  Temp:  [97.4 °F (36.3 °C)-97.7 °F (36.5 °C)] 97.4 °F (36.3 °C)  Heart Rate:  [84-94] 94  Resp:  [16-18] 18  BP: (106-126)/(79-99) 106/86  Body mass index is 32.13 kg/m².    Objective:  General Appearance:  Comfortable and in no acute distress.    Vital signs: (most recent): Blood pressure 106/86, pulse 94, temperature 97.4 °F (36.3 °C), temperature source Oral, resp. rate 18, height 180.3 cm (71\"), weight 105 kg (230 lb 6.4 oz), SpO2 96 %.    Lungs:  Normal effort and normal respiratory rate.    Heart: Normal rate.  Regular rhythm.    Abdomen: Abdomen is soft.  Bowel sounds are normal.   There is no abdominal tenderness.     Extremities: There is no dependent edema or local swelling.    Neurological: Patient is alert and oriented to person, place and time.    Skin:  Warm and dry.              Results Review:       I reviewed the patient's new clinical results.        Results from last 7 days   Lab Units 19  0540   SODIUM mmol/L 141   POTASSIUM mmol/L 4.0   CHLORIDE mmol/L 104   CO2 mmol/L 25.4   BUN mg/dL 32*   CREATININE mg/dL 0.81   CALCIUM mg/dL 9.9   GLUCOSE mg/dL 101*                 Scheduled Meds:     apixaban 5 mg Oral Q12H   atorvastatin 10 mg Oral Nightly   citalopram 20 mg Oral Daily   furosemide 40 mg Oral Daily   lisinopril 5 mg Oral Q12H   metoprolol tartrate 25 mg Oral Q12H   polyethylene glycol 17 g Oral Daily   potassium chloride 20 mEq Oral Daily   sodium chloride 3 mL Intravenous Q12H     PRN Meds:   •  acetaminophen  •  atropine  •  bisacodyl  •  nitroglycerin  •  ondansetron  •  [COMPLETED] Insert peripheral IV **AND** sodium chloride  •  sodium chloride  •  sodium chloride  Continuous Infusions:       Assessment/Plan   Active Hospital Problems    " Diagnosis Date Noted   • **New onset atrial fibrillation (CMS/HCC) - RVR [I48.91] 01/24/2019   • Diastolic CHF, chronic (CMS/HCC) [I50.32] 02/01/2019   • Sleep apnea [G47.30] 01/24/2019   • Goiter [E04.9] 01/24/2019   • CHF (congestive heart failure) (CMS/HCC) [I50.9] 01/24/2019   • Essential hypertension [I10] 01/05/2018      Resolved Hospital Problems    Diagnosis Date Noted Date Resolved   • Intraparenchymal hemorrhage of brain (CMS/HCC) [I61.9] 02/01/2019 02/06/2019   • Dyspnea [R06.00] 01/24/2019 02/01/2019   • Angina at rest (CMS/HCC) [I20.8] 01/24/2019 02/06/2019       Assessment & Plan    - Awaiting precertification for rehabilitation.  If denied he will go home with home health.  - He appears stable today.  He is back on anticoagulation without any new issues  - Zio patch to be placed at discharge and follow-up with Dr. Bruce on 3/7/19    Dispo  Await word from discharge planning          Flo Corey MD  [facility]  02/09/19  10:14 AM

## 2019-02-09 NOTE — PLAN OF CARE
Problem: Patient Care Overview  Goal: Plan of Care Review  Outcome: Ongoing (interventions implemented as appropriate)   02/09/19 1124   Coping/Psychosocial   Plan of Care Reviewed With patient   Plan of Care Review   Progress improving   OTHER   Outcome Summary Patient participated with skilled PT. Increased ambulation distance this visit with rolling walker. Will continue to focus on gait distance and erect posture. Will continue to progress patient towards goals.

## 2019-02-09 NOTE — THERAPY TREATMENT NOTE
Acute Care - Physical Therapy Treatment Note  [facility]     Patient Name: Flo Manzo  : 1955  MRN: [id number]  Today's Date: 2019  Onset of Illness/Injury or Date of Surgery: 19          Admit Date: 2019    Visit Dx:    ICD-10-CM ICD-9-CM   1. New onset atrial fibrillation (CMS/HCC) I48.91 427.31   2. Goiter diffuse E04.9 240.9   3. Angina at rest (CMS/HCC) I20.8 413.9   4. Essential hypertension I10 401.9   5. New onset atrial fibrillation (CMS/HCC) - RVR I48.91 427.31   6. Generalized weakness R53.1 780.79     Patient Active Problem List   Diagnosis   • Umbilical hernia without obstruction and without gangrene   • Essential hypertension   • Arthritis of left knee   • Depression   • Obesity (BMI 30-39.9)   • New onset atrial fibrillation (CMS/HCC) - RVR   • Sleep apnea   • Goiter   • CHF (congestive heart failure) (CMS/HCC)   • Diastolic CHF, chronic (CMS/HCC)       Therapy Treatment    Rehabilitation Treatment Summary     Row Name 19 1108             Treatment Time/Intention    Discipline  physical therapist  -AL      Document Type  therapy note (daily note)  -AL      Subjective Information  no complaints  -AL      Mode of Treatment  physical therapy  -AL      Patient/Family Observations  Sitting up in chair.  -AL      Therapy Frequency (PT Clinical Impression)  daily  -AL      Patient Effort  good  -AL      Existing Precautions/Restrictions  fall  -AL      Recorded by [AL] Nirmala Sanford, PT 19 1124      Row Name 19 1108             Cognitive Assessment/Intervention- PT/OT    Affect/Mental Status (Cognitive)  WFL  -AL      Orientation Status (Cognition)  oriented x 4  -AL      Follows Commands (Cognition)  WFL  -AL      Personal Safety Interventions  fall prevention program maintained;gait belt;nonskid shoes/slippers when out of bed  -AL      Recorded by [AL] Nirmala Sanford, PT 19 1124      Row Name 19 110             Cognitive Assessment Intervention-  SLP    Cognitive Function (Cognition)  WFL  -AL      Recorded by [AL] Nirmala Sanford, PT 02/09/19 1124      Row Name 02/09/19 1108             Bed Mobility Assessment/Treatment    Bed Mobility Assessment/Treatment  supine-sit;sit-supine  -AL      Supine-Sit [place] (Bed Mobility)  not tested up in chair  -AL      Sit-Supine [place] (Bed Mobility)  not tested up in chair  -AL      Recorded by [AL] Nirmala Sanford, PT 02/09/19 1124      Row Name 02/09/19 1108             Transfer Assessment/Treatment    Transfer Assessment/Treatment  sit-stand transfer;stand-sit transfer  -AL      Maintains Weight-bearing Status (Transfers)  able to maintain  -AL      Recorded by [AL] Nirmala Sanford, PT 02/09/19 1124      Row Name 02/09/19 1108             Sit-Stand Transfer    Sit-Stand [place] (Transfers)  supervision;verbal cues  -AL      Assistive Device (Sit-Stand Transfers)  walker, front-wheeled  -AL      Recorded by [AL] Nirmala Sanford, PT 02/09/19 1124      Row Name 02/09/19 1108             Stand-Sit Transfer    Stand-Sit [place] (Transfers)  supervision;verbal cues  -AL      Assistive Device (Stand-Sit Transfers)  walker, front-wheeled  -AL      Recorded by [AL] Nirmala Sanford, PT 02/09/19 1124      Row Name 02/09/19 1108             Gait/Stairs Assessment/Training    Gait/Stairs Assessment/Training  gait/ambulation independence  -AL      [place] Level (Gait)  contact guard;verbal cues;nonverbal cues (demo/gesture)  -AL      Assistive Device (Gait)  walker, front-wheeled  -AL      Distance in Feet (Gait)  150  -AL      Pattern (Gait)  step-through  -AL      Deviations/Abnormal Patterns (Gait)  davon decreased;gait speed decreased;stride length decreased  -AL      Bilateral Gait Deviations  forward flexed posture  -AL      Recorded by [AL] Nirmala Sanford, PT 02/09/19 1124      Row Name 02/09/19 1108             Positioning and Restraints    Pre-Treatment Position  sitting in chair/recliner  -AL      Post  Treatment Position  chair  -AL      In Chair  reclined;sitting;call light within reach  -AL      Recorded by [AL] Nirmala Sanford, PT 02/09/19 1124      Row Name 02/09/19 1108             Pain Assessment    Additional Documentation  Pain Scale: Numbers Pre/Post-Treatment (Group)  -AL      Recorded by [AL] Nirmala Sanford, PT 02/09/19 1124      Row Name 02/09/19 1108             Pain Scale: Numbers Pre/Post-Treatment    Pain Scale: Numbers, Pretreatment  0/10 - no pain  -AL      Pain Scale: Numbers, Post-Treatment  0/10 - no pain  -AL      Recorded by [AL] Nirmala Sanford, PT 02/09/19 1124      Row Name 02/09/19 1108             Plan of Care Review    Plan of Care Reviewed With  patient  -AL      Recorded by [AL] SanfordNirmala FELISA, PT 02/09/19 1124      Row Name 02/09/19 1108             Outcome Summary/Treatment Plan (PT)    Daily Summary of Progress (PT)  progress toward functional goals is good  -AL      Plan for Continued Treatment (PT)  Continue to focus on gait distance.  -AL      Recorded by [AL] Claribel Nirmala ROBERTO, PT 02/09/19 1124        User Key  (r) = Recorded By, (t) = Taken By, (c) = Cosigned By    Initials Name Effective Dates Discipline    AL Nirmala Sanford, PT 04/03/18 -  PT                   Physical Therapy Education     Title: PT OT SLP Therapies (Done)     Topic: Physical Therapy (Done)     Point: Mobility training (Done)     Learning Progress Summary           Patient Acceptance, E, VU by AL at 2/9/2019 11:24 AM    Acceptance, E, VU,NR by MA at 2/8/2019  4:23 PM    Acceptance, E, VU,NR by MA at 2/7/2019  4:27 PM    Acceptance, E,TB, VU by RM at 2/6/2019  3:49 PM    Comment:  verbal cues for amb    Acceptance, E, VU by ML at 2/6/2019  4:42 AM    Acceptance, E,D, NR by PC at 2/4/2019 10:21 AM    Acceptance, E, VU by ML at 2/4/2019  4:50 AM    Acceptance, E,D, VU,DU by LC at 2/3/2019 10:22 AM    Comment:  safety during ambulation, need for assistance    Acceptance, E,TB, VU by SM at 2/2/2019  2:48 PM    Acceptance,  E,TB,D, VU,NR by CH at 2/1/2019  4:12 PM    Acceptance, E,TB,D, VU,NR by CH at 1/31/2019  4:10 PM    Acceptance, E, NR by EM at 1/30/2019  2:57 PM                   Point: Home exercise program (Done)     Learning Progress Summary           Patient Acceptance, E, VU,NR by MA at 2/8/2019  4:23 PM    Acceptance, E, VU by ML at 2/6/2019  4:42 AM    Acceptance, E,D, NR by PC at 2/4/2019 10:21 AM    Acceptance, E, VU by ML at 2/4/2019  4:50 AM    Acceptance, E,D, VU,DU by LC at 2/3/2019 10:22 AM    Comment:  safety during ambulation, need for assistance                   Point: Body mechanics (Done)     Learning Progress Summary           Patient Acceptance, E, VU by AL at 2/9/2019 11:24 AM    Acceptance, E, VU,NR by MA at 2/8/2019  4:23 PM    Acceptance, E, VU,NR by MA at 2/7/2019  4:27 PM    Acceptance, E,TB, VU by  at 2/6/2019  3:49 PM    Comment:  verbal cues for amb    Acceptance, E,D, NR by PC at 2/4/2019 10:21 AM    Acceptance, E, VU by ML at 2/4/2019  4:50 AM    Acceptance, E,D, VU,DU by LC at 2/3/2019 10:22 AM    Comment:  safety during ambulation, need for assistance    Acceptance, E,TB, VU by  at 2/2/2019  2:48 PM    Acceptance, E,TB,D, VU,NR by CH at 2/1/2019  4:12 PM    Acceptance, E,TB,D, VU,NR by  at 1/31/2019  4:10 PM                   Point: Precautions (Done)     Learning Progress Summary           Patient Acceptance, E, VU by AL at 2/9/2019 11:24 AM    Acceptance, E, VU,NR by MA at 2/8/2019  4:23 PM    Acceptance, E, VU,NR by MA at 2/7/2019  4:27 PM    Acceptance, E,D, NR by PC at 2/4/2019 10:21 AM    Acceptance, E, VU by ML at 2/4/2019  4:50 AM    Acceptance, E,D, VU,DU by TERI at 2/3/2019 10:22 AM    Comment:  safety during ambulation, need for assistance    Acceptance, NARINDER GROSS VU by  at 2/2/2019  2:48 PM    Acceptance, NARINDER GROSS D, VU,NR by  at 2/1/2019  4:12 PM                               User Key     Initials Effective Dates Name Provider Type Discipline     04/03/18 -  Harmony Edwards, PT  Physical Therapist PT    PC 04/03/18 -  Arlette Tellez, PT Physical Therapist PT    AL 04/03/18 -  Nirmala Sanford, PT Physical Therapist PT    EM 04/03/18 -  Vira Borrero, PT Physical Therapist PT     03/07/18 -  Padmini Heart, PTA Physical Therapy Assistant PT    ML 06/16/16 -  Nuvia Lawson RN Registered Nurse Nurse    LC 08/02/16 -  Arash Hong, PT DPT Physical Therapist PT    MA 10/19/18 -  Jaki Alcaraz, PT Physical Therapist PT     01/04/19 -  Reyna Villaseñor, CHYNA Student PT Student PT                PT Recommendation and Plan  Therapy Frequency (PT Clinical Impression): daily  Outcome Summary/Treatment Plan (PT)  Daily Summary of Progress (PT): progress toward functional goals is good  Plan for Continued Treatment (PT): Continue to focus on gait distance.  Plan of Care Reviewed With: patient  Progress: improving  Outcome Summary: Patient participated with skilled PT. Increased ambulation distance this visit with rolling walker. Will continue to focus on gait distance and erect posture. Will continue to progress patient towards goals.  Outcome Measures     Row Name 02/09/19 1100 02/08/19 1600 02/07/19 1600       How much help from another person do you currently need...    Turning from your back to your side while in flat bed without using bedrails?  4  -AL  4  -MA  4  -MA    Moving from lying on back to sitting on the side of a flat bed without bedrails?  4  -AL  4  -MA  4  -MA    Moving to and from a bed to a chair (including a wheelchair)?  3  -AL  3  -MA  3  -MA    Standing up from a chair using your arms (e.g., wheelchair, bedside chair)?  3  -AL  3  -MA  3  -MA    Climbing 3-5 steps with a railing?  3  -AL  3  -MA  3  -MA    To walk in hospital room?  3  -AL  3  -MA  3  -MA    AM-PAC 6 Clicks Score  20  -AL  20  -MA  20  -MA       Functional Assessment    Outcome Measure Options  AM-PAC 6 Clicks Basic Mobility (PT)  -AL  AM-PAC 6 Clicks Basic Mobility (PT)  -MA  AM-PAC 6 Clicks Basic  Mobility (PT)  -MA    Row Name 02/06/19 1500             How much help from another person do you currently need...    Turning from your back to your side while in flat bed without using bedrails?  4  -EJ (r) RM (t) EJ (c)      Moving from lying on back to sitting on the side of a flat bed without bedrails?  4  -EJ (r) RM (t) EJ (c)      Moving to and from a bed to a chair (including a wheelchair)?  3  -EJ (r) RM (t) EJ (c)      Standing up from a chair using your arms (e.g., wheelchair, bedside chair)?  3  -EJ (r) RM (t) EJ (c)      Climbing 3-5 steps with a railing?  3  -EJ (r) RM (t) EJ (c)      To walk in hospital room?  3  -EJ (r) RM (t) EJ (c)      AM-PAC 6 Clicks Score  20  -EJ (r) RM (t)         Functional Assessment    Outcome Measure Options  AM-[facility] 6 Clicks Basic Mobility (PT)  -EJ (r) RM (t) EJ (c)        User Key  (r) = Recorded By, (t) = Taken By, (c) = Cosigned By    Initials Name Provider Type    Nirmala Tse, PT Physical Therapist    Kayce Jeffries, PT Physical Therapist    Jaki Ahumada, PT Physical Therapist    Reyna Alcantara, PT Student PT Student         Time Calculation:   PT Charges     Row Name 02/09/19 1126             Time Calculation    Start Time  1108  -AL      Stop Time  1118  -AL      Time Calculation (min)  10 min  -AL      PT Received On  02/09/19  -AL      PT - Next Appointment  02/10/19  -AL        User Key  (r) = Recorded By, (t) = Taken By, (c) = Cosigned By    Initials Name Provider Type    Nirmala Tse, PT Physical Therapist        Therapy Suggested Charges     Code   Minutes Charges    84448 (CPT®) Hc Pt Neuromusc Re Education Ea 15 Min      94719 (CPT®) Hc Pt Ther Proc Ea 15 Min      90764 (CPT®) Hc Gait Training Ea 15 Min 15 1    77675 (CPT®) Hc Pt Therapeutic Act Ea 15 Min      75892 (CPT®) Hc Pt Manual Therapy Ea 15 Min      18225 (CPT®) Hc Pt Iontophoresis Ea 15 Min      67614 (CPT®) Hc Pt Elec Stim Ea-Per 15 Min      19883 (CPT®) Hc Pt Ultrasound Ea  15 Min      61124 (CPT®) Hc Pt Self Care/Mgmt/Train Ea 15 Min      98285 (CPT®) Hc Pt Prosthetic (S) Train Initial Encounter, Each 15 Min      16675 (CPT®) Hc Pt Orthotic(S)/Prosthetic(S) Encounter, Each 15 Min      51761 (CPT®) Hc Orthotic(S) Mgmt/Train Initial Encounter, Each 15min      Total  15 1        Therapy Charges for Today     Code Description Service Date Service Provider Modifiers Qty    77907051486 HC PT THER PROC EA 15 MIN 2/9/2019 Nirmala Sanford, PT GP 1          PT G-Codes  Outcome Measure Options: AM-PAC 6 Clicks Basic Mobility (PT)  AM-PAC 6 Clicks Score: 20    Nirmala Sanford, PT  2/9/2019

## 2019-02-10 ENCOUNTER — APPOINTMENT (OUTPATIENT)
Dept: CARDIOLOGY | Facility: HOSPITAL | Age: 64
End: 2019-02-10

## 2019-02-10 VITALS
DIASTOLIC BLOOD PRESSURE: 92 MMHG | BODY MASS INDEX: 32.28 KG/M2 | OXYGEN SATURATION: 91 % | HEIGHT: 71 IN | WEIGHT: 230.6 LBS | TEMPERATURE: 98.1 F | SYSTOLIC BLOOD PRESSURE: 126 MMHG | HEART RATE: 69 BPM | RESPIRATION RATE: 18 BRPM

## 2019-02-10 PROCEDURE — 99232 SBSQ HOSP IP/OBS MODERATE 35: CPT | Performed by: NURSE PRACTITIONER

## 2019-02-10 PROCEDURE — 0296T HC EXT ECG > 48HR TO 21 DAY RCRD W/CONECT INTL RCRD: CPT

## 2019-02-10 PROCEDURE — 97110 THERAPEUTIC EXERCISES: CPT

## 2019-02-10 RX ADMIN — APIXABAN 5 MG: 5 TABLET, FILM COATED ORAL at 09:50

## 2019-02-10 RX ADMIN — LISINOPRIL 5 MG: 5 TABLET ORAL at 09:49

## 2019-02-10 RX ADMIN — SODIUM CHLORIDE, PRESERVATIVE FREE 3 ML: 5 INJECTION INTRAVENOUS at 09:50

## 2019-02-10 RX ADMIN — POTASSIUM CHLORIDE 20 MEQ: 750 CAPSULE, EXTENDED RELEASE ORAL at 09:50

## 2019-02-10 RX ADMIN — METOPROLOL TARTRATE 25 MG: 25 TABLET ORAL at 09:50

## 2019-02-10 RX ADMIN — FUROSEMIDE 40 MG: 40 TABLET ORAL at 09:50

## 2019-02-10 RX ADMIN — CITALOPRAM 20 MG: 20 TABLET, FILM COATED ORAL at 09:50

## 2019-02-10 NOTE — THERAPY TREATMENT NOTE
Acute Care - Physical Therapy Treatment Note  [facility]     Patient Name: Flo Manzo  : 1955  MRN: [id number]  Today's Date: 2/10/2019  Onset of Illness/Injury or Date of Surgery: 19          Admit Date: 2019    Visit Dx:    ICD-10-CM ICD-9-CM   1. New onset atrial fibrillation (CMS/HCC) I48.91 427.31   2. Goiter diffuse E04.9 240.9   3. Angina at rest (CMS/HCC) I20.8 413.9   4. Essential hypertension I10 401.9   5. New onset atrial fibrillation (CMS/HCC) - RVR I48.91 427.31   6. Generalized weakness R53.1 780.79     Patient Active Problem List   Diagnosis   • Umbilical hernia without obstruction and without gangrene   • Essential hypertension   • Arthritis of left knee   • Depression   • Obesity (BMI 30-39.9)   • New onset atrial fibrillation (CMS/HCC) - RVR   • Sleep apnea   • Goiter   • CHF (congestive heart failure) (CMS/HCC)   • Diastolic CHF, chronic (CMS/HCC)       Therapy Treatment    Rehabilitation Treatment Summary     Row Name 02/10/19 1005             Treatment Time/Intention    Discipline  physical therapist  -AL      Document Type  therapy note (daily note)  -AL      Subjective Information  no complaints  -AL      Mode of Treatment  physical therapy  -AL      Patient/Family Observations  Sitting up in chair.  -AL      Therapy Frequency (PT Clinical Impression)  daily  -AL      Patient Effort  good  -AL      Recorded by [AL] Nirmala Sanford, PT 02/10/19 1043      Row Name 02/10/19 1005             Cognitive Assessment/Intervention- PT/OT    Affect/Mental Status (Cognitive)  WFL  -AL      Orientation Status (Cognition)  oriented x 4  -AL      Follows Commands (Cognition)  WFL  -AL      Personal Safety Interventions  fall prevention program maintained;gait belt;nonskid shoes/slippers when out of bed  -AL      Recorded by [AL] Nirmala Sanford, PT 02/10/19 1043      Row Name 02/10/19 1005             Bed Mobility Assessment/Treatment    Bed Mobility Assessment/Treatment   supine-sit;sit-supine  -AL      Supine-Sit [place] (Bed Mobility)  not tested up in chair  -AL      Sit-Supine [place] (Bed Mobility)  not tested up in chair  -AL      Recorded by [AL] Nirmala Sanford, PT 02/10/19 1043      Row Name 02/10/19 1005             Transfer Assessment/Treatment    Transfer Assessment/Treatment  sit-stand transfer;stand-sit transfer  -AL      Maintains Weight-bearing Status (Transfers)  able to maintain  -AL      Recorded by [AL] Nirmala Sanford, PT 02/10/19 1043      Row Name 02/10/19 1005             Sit-Stand Transfer    Sit-Stand [place] (Transfers)  supervision  -AL      Assistive Device (Sit-Stand Transfers)  walker, front-wheeled  -AL      Recorded by [AL] Nirmala Sanford, PT 02/10/19 1043      Row Name 02/10/19 1005             Stand-Sit Transfer    Stand-Sit [place] (Transfers)  supervision  -AL      Assistive Device (Stand-Sit Transfers)  walker, front-wheeled  -AL      Recorded by [AL] Nirmala Sanford, PT 02/10/19 1043      Row Name 02/10/19 1005             Gait/Stairs Assessment/Training    Gait/Stairs Assessment/Training  gait/ambulation independence  -AL      [place] Level (Gait)  supervision;verbal cues  -AL      Assistive Device (Gait)  walker, front-wheeled  -AL      Distance in Feet (Gait)  200  -AL      Pattern (Gait)  step-through  -AL      Deviations/Abnormal Patterns (Gait)  davon decreased;gait speed decreased;stride length decreased  -AL      Recorded by [AL] Nirmala Sanford, PT 02/10/19 1043      Row Name 02/10/19 1005             Positioning and Restraints    Pre-Treatment Position  sitting in chair/recliner  -AL      Post Treatment Position  chair  -AL      In Chair  reclined;sitting;call light within reach  -AL      Recorded by [AL] Nirmala Sanford, PT 02/10/19 1043      Row Name 02/10/19 1005             Pain Assessment    Additional Documentation  Pain Scale: Numbers Pre/Post-Treatment (Group)  -AL      Recorded by [AL] Nirmala Sanford, PT 02/10/19 1043       Row Name 02/10/19 1005             Pain Scale: Numbers Pre/Post-Treatment    Pain Scale: Numbers, Pretreatment  0/10 - no pain  -AL      Pain Scale: Numbers, Post-Treatment  0/10 - no pain  -AL      Recorded by [AL] Nirmala Sanford, PT 02/10/19 1043      Row Name 02/10/19 1005             Plan of Care Review    Plan of Care Reviewed With  patient  -AL      Recorded by [AL] Nirmala Sanford, PT 02/10/19 1043      Row Name 02/10/19 1005             Outcome Summary/Treatment Plan (PT)    Daily Summary of Progress (PT)  progress toward functional goals is good  -AL      Plan for Continued Treatment (PT)  Focus on ambulation distance.  -AL      Anticipated Discharge Disposition (PT)  home with home health;home with assist  -AL      Recorded by [AL] Nirmala Sanford, PT 02/10/19 1043        User Key  (r) = Recorded By, (t) = Taken By, (c) = Cosigned By    Initials Name Effective Dates Discipline    AL Nirmala Sanford, PT 04/03/18 -  PT                   Physical Therapy Education     Title: PT OT SLP Therapies (Done)     Topic: Physical Therapy (Done)     Point: Mobility training (Done)     Learning Progress Summary           Patient Acceptance, E, VU by AL at 2/10/2019 10:43 AM    Acceptance, E, VU by AL at 2/9/2019 11:24 AM    Acceptance, E, VU,NR by MA at 2/8/2019  4:23 PM    Acceptance, E, VU,NR by MA at 2/7/2019  4:27 PM    Acceptance, E,TB, VU by  at 2/6/2019  3:49 PM    Comment:  verbal cues for amb    Acceptance, E, VU by ML at 2/6/2019  4:42 AM    Acceptance, E,D, NR by PC at 2/4/2019 10:21 AM    Acceptance, E, VU by ML at 2/4/2019  4:50 AM    Acceptance, E,D, VU,DU by LC at 2/3/2019 10:22 AM    Comment:  safety during ambulation, need for assistance    Acceptance, E,TB, VU by  at 2/2/2019  2:48 PM    Acceptance, E,TB,D, VU,NR by  at 2/1/2019  4:12 PM    Acceptance, E,TB,D, VU,NR by  at 1/31/2019  4:10 PM    Acceptance, E, NR by EM at 1/30/2019  2:57 PM                   Point: Home exercise program (Done)      Learning Progress Summary           Patient Acceptance, E, VU,NR by MA at 2/8/2019  4:23 PM    Acceptance, E, VU by ML at 2/6/2019  4:42 AM    Acceptance, E,D, NR by PC at 2/4/2019 10:21 AM    Acceptance, E, VU by ML at 2/4/2019  4:50 AM    Acceptance, E,D, VU,DU by LC at 2/3/2019 10:22 AM    Comment:  safety during ambulation, need for assistance                   Point: Body mechanics (Done)     Learning Progress Summary           Patient Acceptance, E, VU by AL at 2/10/2019 10:43 AM    Acceptance, E, VU by AL at 2/9/2019 11:24 AM    Acceptance, E, VU,NR by MA at 2/8/2019  4:23 PM    Acceptance, E, VU,NR by MA at 2/7/2019  4:27 PM    Acceptance, E,TB, VU by  at 2/6/2019  3:49 PM    Comment:  verbal cues for amb    Acceptance, E,D, NR by PC at 2/4/2019 10:21 AM    Acceptance, E, VU by ML at 2/4/2019  4:50 AM    Acceptance, E,D, VU,DU by LC at 2/3/2019 10:22 AM    Comment:  safety during ambulation, need for assistance    Acceptance, E,TB, VU by  at 2/2/2019  2:48 PM    Acceptance, E,TB,D, VU,NR by  at 2/1/2019  4:12 PM    Acceptance, E,TB,D, VU,NR by  at 1/31/2019  4:10 PM                   Point: Precautions (Done)     Learning Progress Summary           Patient Acceptance, E, VU by AL at 2/10/2019 10:43 AM    Acceptance, E, VU by AL at 2/9/2019 11:24 AM    Acceptance, E, VU,NR by MA at 2/8/2019  4:23 PM    Acceptance, E, VU,NR by MA at 2/7/2019  4:27 PM    Acceptance, E,D, NR by PC at 2/4/2019 10:21 AM    Acceptance, E, VU by ML at 2/4/2019  4:50 AM    Acceptance, E,D, VU,DU by LC at 2/3/2019 10:22 AM    Comment:  safety during ambulation, need for assistance    Acceptance, E,TB, VU by SM at 2/2/2019  2:48 PM    Acceptance, E,NARINDER,SHIELA, HELEN,NR by  at 2/1/2019  4:12 PM                               User Key     Initials Effective Dates Name Provider Type Discipline     04/03/18 -  Harmony Edwards, PT Physical Therapist PT    PC 04/03/18 -  Arlette Tellez, PT Physical Therapist PT    AL 04/03/18 -  Claribel  Nirmala ROBERTO, PT Physical Therapist PT    EM 04/03/18 -  Vira Borrero, PT Physical Therapist PT    SM 03/07/18 -  Padmini Heart, PTA Physical Therapy Assistant PT    ML 06/16/16 -  Nuvia aLwson RN Registered Nurse Nurse     08/02/16 -  Arash Hong, PT DPT Physical Therapist PT    MA 10/19/18 -  Jaki Alcaraz, PT Physical Therapist PT     01/04/19 -  Reyna Villaseñor, CHYNA Student PT Student PT                PT Recommendation and Plan  Anticipated Discharge Disposition (PT): home with home health, home with assist  Therapy Frequency (PT Clinical Impression): daily  Outcome Summary/Treatment Plan (PT)  Daily Summary of Progress (PT): progress toward functional goals is good  Plan for Continued Treatment (PT): Focus on ambulation distance.  Anticipated Discharge Disposition (PT): home with home health, home with assist  Plan of Care Reviewed With: patient  Progress: improving  Outcome Summary: Patient participated well with skilled PT. Increased ambulation distance this visit. Will continue to progress patient towards goals.  Outcome Measures     Row Name 02/10/19 1000 02/09/19 1100 02/08/19 1600       How much help from another person do you currently need...    Turning from your back to your side while in flat bed without using bedrails?  4  -AL  4  -AL  4  -MA    Moving from lying on back to sitting on the side of a flat bed without bedrails?  4  -AL  4  -AL  4  -MA    Moving to and from a bed to a chair (including a wheelchair)?  3  -AL  3  -AL  3  -MA    Standing up from a chair using your arms (e.g., wheelchair, bedside chair)?  3  -AL  3  -AL  3  -MA    Climbing 3-5 steps with a railing?  3  -AL  3  -AL  3  -MA    To walk in hospital room?  3  -AL  3  -AL  3  -MA    AM-PAC 6 Clicks Score  20  -AL  20  -AL  20  -MA       Functional Assessment    Outcome Measure Options  AM-PAC 6 Clicks Basic Mobility (PT)  -AL  AM-PAC 6 Clicks Basic Mobility (PT)  -AL  AM-PAC 6 Clicks Basic Mobility (PT)  -MA    Brown  Name 02/07/19 1600             How much help from another person do you currently need...    Turning from your back to your side while in flat bed without using bedrails?  4  -MA      Moving from lying on back to sitting on the side of a flat bed without bedrails?  4  -MA      Moving to and from a bed to a chair (including a wheelchair)?  3  -MA      Standing up from a chair using your arms (e.g., wheelchair, bedside chair)?  3  -MA      Climbing 3-5 steps with a railing?  3  -MA      To walk in hospital room?  3  -MA      AM-PAC 6 Clicks Score  20  -MA         Functional Assessment    Outcome Measure Options  AM-PAC 6 Clicks Basic Mobility (PT)  -MA        User Key  (r) = Recorded By, (t) = Taken By, (c) = Cosigned By    Initials Name Provider Type    Nirmala Tse, PT Physical Therapist    Jaki Ahumada, CHYNA Physical Therapist         Time Calculation:   PT Charges     Row Name 02/10/19 1045             Time Calculation    Start Time  1005  -AL      Stop Time  1016  -AL      Time Calculation (min)  11 min  -AL      PT Received On  02/10/19  -AL      PT - Next Appointment  02/11/19  -AL        User Key  (r) = Recorded By, (t) = Taken By, (c) = Cosigned By    Initials Name Provider Type    Nirmala Tse, PT Physical Therapist        Therapy Suggested Charges     Code   Minutes Charges    94969 (CPT®) Hc Pt Neuromusc Re Education Ea 15 Min      61591 (CPT®) Hc Pt Ther Proc Ea 15 Min      06708 (CPT®) Hc Gait Training Ea 15 Min 15 1    63036 (CPT®) Hc Pt Therapeutic Act Ea 15 Min      81646 (CPT®) Hc Pt Manual Therapy Ea 15 Min      42248 (CPT®) Hc Pt Iontophoresis Ea 15 Min      76215 (CPT®) Hc Pt Elec Stim Ea-Per 15 Min      63015 (CPT®) Hc Pt Ultrasound Ea 15 Min      80378 (CPT®) Hc Pt Self Care/Mgmt/Train Ea 15 Min      36082 (CPT®) Hc Pt Prosthetic (S) Train Initial Encounter, Each 15 Min      77386 (CPT®) Hc Pt Orthotic(S)/Prosthetic(S) Encounter, Each 15 Min      88774 (CPT®) Hc Orthotic(S) Mgmt/Train  Initial Encounter, Each 15min      Total  15 1        Therapy Charges for Today     Code Description Service Date Service Provider Modifiers Qty    61292719570 HC PT THER PROC EA 15 MIN 2/9/2019 Nirmala Sanford, PT GP 1    78527947707 HC PT THER PROC EA 15 MIN 2/10/2019 Nirmala Sanford, PT GP 1          PT G-Codes  Outcome Measure Options: AM-PAC 6 Clicks Basic Mobility (PT)  AM-PAC 6 Clicks Score: 20    Nirmala Sanford, PT  2/10/2019

## 2019-02-10 NOTE — DISCHARGE SUMMARY
Date of Admission: 1/24/2019  Date of Discharge:  2/10/2019  Primary Care Physician: Cathleen Corona MD     Discharge Diagnosis:  Active Hospital Problems    Diagnosis Date Noted   • **New onset atrial fibrillation (CMS/HCC) - RVR [I48.91] 01/24/2019   • Diastolic CHF, chronic (CMS/HCC) [I50.32] 02/01/2019   • Sleep apnea [G47.30] 01/24/2019   • Goiter [E04.9] 01/24/2019   • CHF (congestive heart failure) (CMS/HCC) [I50.9] 01/24/2019   • Essential hypertension [I10] 01/05/2018      Resolved Hospital Problems    Diagnosis Date Noted Date Resolved   • Intraparenchymal hemorrhage of brain (CMS/HCC) [I61.9] 02/01/2019 02/06/2019   • Dyspnea [R06.00] 01/24/2019 02/01/2019   • Angina at rest (CMS/[facility]) [I20.8] 01/24/2019 02/06/2019       DETAILS OF HOSPITAL STAY     Procedures Performed  Procedure(s):  Left Heart Cath  Left ventriculography  Coronary angiography    Hospital Course  Please see history and physical for details. Patient is a 63 y.o. male was initially admitted for shortness of breath.  Patient has had a prolonged hospitalization and I will further summarize.  Patient was found to be a new onset atrial fibrillation and cardiology was consulted.  Ultimately patient's blood pressures were quite uncontrolled and he developed nausea and vomiting as well.  He underwent cardiac catheterization which demonstrated nonobstructive coronary artery disease and no stents were placed.  He then had dizziness and ended up getting an MRI which demonstrated intracerebral bleed with intraventricular extension.  Neurosurgery was consulted and patient was transferred to the ICU.  I appreciate the assistance of the intensivist while patient was in the ICU.  He stabilized quickly and did not require any intervention.  Anticoagulation for atrial fibrillation was put on hold until further directed by neurosurgery and given the okay.  Once transferred out of the ICU neurosurgery wanted strict blood pressure control as they felt this  was the etiology for the above bleed.  We were able to get this patient's blood pressure control by changing his regimen.  His atenolol has been discontinued and he has been transitioned over to metoprolol 25 mg twice daily.  Norvasc has been discontinued and lisinopril has been started at 5 mg twice daily.  He is also been started on Lasix daily.  Ultimately I feel this patient has further underlying sleep apnea possibly compounded by his large goiter even though he has a past history of uvuloplasty in 2005.  He was seen in consultation by Dr. Burciaga with thoracic surgery who recommends outpatient follow-up and will defer to PCP to further coordinate that once the above issues quiet down.  I have recommended on many occasions for this patient follow-up this PCP and I would further suggest coordination of outpatient sleep study as he will likely need further treatment such as CPAP.  Since being transferred out of the ICU he has remained stable and there's been no neurological problems.  Anticoagulation was started with Eliquis as of 2/5/2018 and he has had no aspects of headache nausea vomiting or any new neurological problems.  At this juncture he is medically stable to transition to rehabilitation.  He has remained alert and oriented for days interactive and pleasant with fluent speech. All questions answered to the patient prior to display when he is very much amenable to the above plan.    Physical Exam at Discharge:  General: No acute distress, AAOx3  HEENT: EOMI, PERRL  Cardiovascular: +s1 and s2, RRR  Lungs: No rhonchi or wheezing  Abdomen: soft, nontender    Consults:   Consults     Date and Time Order Name Status Description    1/30/2019 2051 Inpatient Neurosurgery Consult Completed     1/30/2019 2047 Inpatient Neurosurgery Consult      1/30/2019 2023 Inpatient Pulmonology Consult Completed     1/25/2019 0628 Inpatient Thoracic Surgery Consult Completed     1/24/2019 1830 Inpatient Cardiology Consult  Completed     1/24/2019 1074 [facility] (on-call MD unless specified) Completed             Condition on Discharge: Stable, improved    Discharge Disposition  Skilled Nursing Facility (DC - External)    Discharge Medications     Discharge Medications      New Medications      Instructions Start Date   acetaminophen 325 MG tablet  Commonly known as:  TYLENOL   650 mg, Oral, Every 4 Hours PRN      apixaban 5 MG tablet tablet  Commonly known as:  ELIQUIS   5 mg, Oral, Every 12 Hours Scheduled      atorvastatin 10 MG tablet  Commonly known as:  LIPITOR   10 mg, Oral, Nightly      furosemide 40 MG tablet  Commonly known as:  LASIX   40 mg, Oral, Daily      lisinopril 5 MG tablet  Commonly known as:  PRINIVIL,ZESTRIL   5 mg, Oral, Every 12 Hours Scheduled      metoprolol tartrate 25 MG tablet  Commonly known as:  LOPRESSOR   25 mg, Oral, Every 12 Hours Scheduled      polyethylene glycol pack packet  Commonly known as:  MIRALAX   17 g, Oral, Daily PRN      potassium chloride 10 MEQ CR capsule  Commonly known as:  MICRO-K   10 mEq, Oral, Daily         Continue These Medications      Instructions Start Date   citalopram 20 MG tablet  Commonly known as:  CeleXA   20 mg, Oral, Daily      fluticasone 220 MCG/ACT inhaler  Commonly known as:  FLOVENT HFA   1 puff, Inhalation, 2 Times Daily - RT         Stop These Medications    amLODIPine 10 MG tablet  Commonly known as:  NORVASC     atenolol 50 MG tablet  Commonly known as:  TENORMIN     quinapril 40 MG tablet  Commonly known as:  ACCUPRIL            Discharge Diet: Regular    Activity at Discharge: as tolerated    Follow-up Appointments  Future Appointments   Date Time Provider Department Center   2/26/2019  1:45 PM Flo Burciaga III, MD [facility] None   3/7/2019 10:30 AM Eren Bruce MD [facility] None     Additional Instructions for the Follow-ups that You Need to Schedule     Discharge Follow-up with PCP   As directed       Currently Documented PCP:    Cathleen Corona  MD [facility]    PCP Phone Number:    [phone]     Follow Up Details:  pcp 2-3 weeks - cards per their recs             I have examined and discussed discharge planning with the patient today.     Flo Corey MD  02/10/19  3:35 PM    Time: Discharge greater than 30 min

## 2019-02-10 NOTE — PROGRESS NOTES
Continued Stay Note  [facility]     Patient Name: Flo Manzo  MRN: [id number]  Today's Date: 2/10/2019    Admit Date: 1/24/2019    Discharge Plan     Row Name 02/10/19 1529       Plan    Plan  [facility]- skilled level- Catarino sanchez obtained    Plan Comments  Incoming call from Mehnaz at [facility] at [phone] and she states they have received [place] precert today for patient and bed is available today.  Informed Dr. Corey that [place] precert has been recevied and bed available today at [facility] and he states he plans to discharge patient today.  Informed MARCO ANTONIO Tapia that [place] precert has been obtained for patient at [facility] and bed available today and MD plans to discharge patient today and she states she has packet for patient..... Shelbi Gary RN,[facility] [place]         Discharge Codes    No documentation.       Expected Discharge Date and Time     Expected Discharge Date Expected Discharge Time    Feb 7, 2019             Shelbi Gary RN

## 2019-02-10 NOTE — PLAN OF CARE
Problem: Patient Care Overview  Goal: Plan of Care Review  Outcome: Ongoing (interventions implemented as appropriate)   02/10/19 1043   Coping/Psychosocial   Plan of Care Reviewed With patient   Plan of Care Review   Progress improving   OTHER   Outcome Summary Patient participated well with skilled PT. Increased ambulation distance this visit. Will continue to progress patient towards goals.

## 2019-02-10 NOTE — PROGRESS NOTES
" LOS: 13 days     Name: Flo Manzo  Age: 63 y.o.  Sex: male  :  1955  MRN: [id number]         Primary Care Physician: Cathleen Corona MD    Subjective   Subjective  No new complaints today.  No overnight events.  Feels well today.  Anxious for discharge.    Objective   Vital Signs  Temp:  [97.6 °F (36.4 °C)-97.8 °F (36.6 °C)] 97.7 °F (36.5 °C)  Heart Rate:  [70-89] 89  Resp:  [18] 18  BP: (126-128)/(87-88) 128/88  Body mass index is 32.16 kg/m².    Objective:  General Appearance:  Comfortable and in no acute distress.    Vital signs: (most recent): Blood pressure 128/88, pulse 89, temperature 97.7 °F (36.5 °C), temperature source Oral, resp. rate 18, height 180.3 cm (71\"), weight 105 kg (230 lb 9.6 oz), SpO2 96 %.    Lungs:  Normal effort and normal respiratory rate.    Heart: Normal rate.  Regular rhythm.    Abdomen: Abdomen is soft.  Bowel sounds are normal.   There is no abdominal tenderness.     Extremities: There is no dependent edema or local swelling.    Neurological: Patient is alert and oriented to person, place and time.    Skin:  Warm and dry.              Results Review:       I reviewed the patient's new clinical results.        Results from last 7 days   Lab Units 19  0540   SODIUM mmol/L 141   POTASSIUM mmol/L 4.0   CHLORIDE mmol/L 104   CO2 mmol/L 25.4   BUN mg/dL 32*   CREATININE mg/dL 0.81   CALCIUM mg/dL 9.9   GLUCOSE mg/dL 101*                 Scheduled Meds:     apixaban 5 mg Oral Q12H   atorvastatin 10 mg Oral Nightly   citalopram 20 mg Oral Daily   furosemide 40 mg Oral Daily   lisinopril 5 mg Oral Q12H   metoprolol tartrate 25 mg Oral Q12H   polyethylene glycol 17 g Oral Daily   potassium chloride 20 mEq Oral Daily   sodium chloride 3 mL Intravenous Q12H     PRN Meds:   •  acetaminophen  •  atropine  •  bisacodyl  •  nitroglycerin  •  ondansetron  •  [COMPLETED] Insert peripheral IV **AND** sodium chloride  •  sodium chloride  •  sodium chloride  Continuous Infusions:   "     Assessment/Plan   Active Hospital Problems    Diagnosis Date Noted   • **New onset atrial fibrillation (CMS/[facility]) - RVR [I48.91] 01/24/2019   • Diastolic CHF, chronic (CMS/HCC) [I50.32] 02/01/2019   • Sleep apnea [G47.30] 01/24/2019   • Goiter [E04.9] 01/24/2019   • CHF (congestive heart failure) (CMS/[facility]) [I50.9] 01/24/2019   • Essential hypertension [I10] 01/05/2018      Resolved Hospital Problems    Diagnosis Date Noted Date Resolved   • Intraparenchymal hemorrhage of brain (CMS/[facility]) [I61.9] 02/01/2019 02/06/2019   • Dyspnea [R06.00] 01/24/2019 02/01/2019   • Angina at rest (CMS/[facility]) [I20.8] 01/24/2019 02/06/2019       Assessment & Plan    - Awaiting precertification for rehabilitation.  If denied he will go home with home health.  - He appears stable today.  He is back on anticoagulation without any new issues  - Zio patch to be placed at discharge and follow-up with Dr. Bruce on 3/7/19     Dispo  Await word from discharge planning.  Hopefully can be discharged tomorrow.          Flo Corey MD  [facility]  02/10/19  12:46 PM

## 2019-02-10 NOTE — PLAN OF CARE
Problem: Patient Care Overview  Goal: Plan of Care Review  Outcome: Ongoing (interventions implemented as appropriate)   02/10/19 0519   Coping/Psychosocial   Plan of Care Reviewed With patient   Plan of Care Review   Progress improving   OTHER   Outcome Summary Pt denies any pain or discomfort.VS stable.Waiting for precert.Possible d/c tomorrow.       Problem: Arrhythmia/Dysrhythmia (Symptomatic) (Adult)  Goal: Signs and Symptoms of Listed Potential Problems Will be Absent, Minimized or Managed (Arrhythmia/Dysrhythmia)  Outcome: Ongoing (interventions implemented as appropriate)      Problem: Fall Risk (Adult)  Goal: Absence of Fall  Outcome: Ongoing (interventions implemented as appropriate)      Problem: Cardiac: Heart Failure (Adult)  Goal: Signs and Symptoms of Listed Potential Problems Will be Absent, Minimized or Managed (Cardiac: Heart Failure)  Outcome: Ongoing (interventions implemented as appropriate)

## 2019-02-11 NOTE — PROGRESS NOTES
Case Management Discharge Note    Final Note: [place] was [place] [facility] [place]ination - Selection Complete      Service Provider Request Status Selected Services Address Phone Number Fax Number    [facility] Selected Skilled Nursing [phone] [phone]       Chantal Blanco RN 2/4/2019 1632    Pending precert.                  Durable Medical Equipment      No service has been selected for the patient.      Dialysis/Infusion      No service has been selected for the patient.      Home Medical Care      No service has been selected for the patient.      Community Resources      No service has been selected for the patient.        Other: Other    Final Discharge Disposition Code: 03 - skilled nursing facility (SNF)

## 2019-02-21 ENCOUNTER — OFFICE VISIT (OUTPATIENT)
Dept: CARDIOLOGY | Facility: CLINIC | Age: 64
End: 2019-02-21

## 2019-02-21 VITALS
HEART RATE: 88 BPM | WEIGHT: 242 LBS | DIASTOLIC BLOOD PRESSURE: 95 MMHG | BODY MASS INDEX: 33.88 KG/M2 | SYSTOLIC BLOOD PRESSURE: 173 MMHG | HEIGHT: 71 IN

## 2019-02-21 DIAGNOSIS — I48.91 NEW ONSET ATRIAL FIBRILLATION (HCC): Primary | ICD-10-CM

## 2019-02-21 DIAGNOSIS — I10 ESSENTIAL HYPERTENSION: ICD-10-CM

## 2019-02-21 DIAGNOSIS — E66.9 OBESITY (BMI 30-39.9): ICD-10-CM

## 2019-02-21 PROCEDURE — 99214 OFFICE O/P EST MOD 30 MIN: CPT | Performed by: INTERNAL MEDICINE

## 2019-02-21 RX ORDER — AMLODIPINE BESYLATE AND BENAZEPRIL HYDROCHLORIDE 5; 10 MG/1; MG/1
1 CAPSULE ORAL DAILY
Qty: 30 CAPSULE | Refills: 5 | Status: SHIPPED | OUTPATIENT
Start: 2019-02-21 | End: 2019-02-28 | Stop reason: DRUGHIGH

## 2019-02-25 PROCEDURE — 0298T HOLTER MONITOR - 72 HOUR UP TO 21 DAY: CPT | Performed by: INTERNAL MEDICINE

## 2019-02-26 ENCOUNTER — OFFICE VISIT (OUTPATIENT)
Dept: SURGERY | Facility: CLINIC | Age: 64
End: 2019-02-26

## 2019-02-26 ENCOUNTER — HOSPITAL ENCOUNTER (OUTPATIENT)
Dept: GENERAL RADIOLOGY | Facility: HOSPITAL | Age: 64
Discharge: HOME | End: 2019-02-26
Admitting: THORACIC SURGERY (CARDIOTHORACIC VASCULAR SURGERY)

## 2019-02-26 VITALS
DIASTOLIC BLOOD PRESSURE: 92 MMHG | HEART RATE: 68 BPM | OXYGEN SATURATION: 98 % | WEIGHT: 243 LBS | HEIGHT: 71 IN | BODY MASS INDEX: 34.02 KG/M2 | SYSTOLIC BLOOD PRESSURE: 142 MMHG

## 2019-02-26 DIAGNOSIS — E04.9 SUBSTERNAL GOITER: ICD-10-CM

## 2019-02-26 DIAGNOSIS — E04.9 SUBSTERNAL THYROID GOITER: Primary | ICD-10-CM

## 2019-02-26 PROCEDURE — 99213 OFFICE O/P EST LOW 20 MIN: CPT | Performed by: THORACIC SURGERY (CARDIOTHORACIC VASCULAR SURGERY)

## 2019-02-26 PROCEDURE — 71046 X-RAY EXAM CHEST 2 VIEWS: CPT

## 2019-02-26 NOTE — PROGRESS NOTES
Subjective   Patient ID: Flo Manzo is a 63 y.o. male is here today for follow-up.    History of Present Illness  Dear Colleague,  Flo Manzo was seen in our office today for further follow-up and evaluation of a substernal thyroid goiter.  Patient was originally seen in the hospital when he was admitted with a stroke.  He has had issues with his heart and hypertension in the past.  During that hospitalization substernal thyroid goiter was identified.  We felt that he needed to recover from his stroke before we address the goiter.  He is here today for further follow-up.    Left sided weakness is almost completely resolved.  He has arm and hand are back to normal.  He is graduated from wheelchair to walker.  Hopefully in the next few days he will progress to cane.  He does get short of breath with moderate exertion.  He reports no wheezing or stridor.    The following portions of the patient's history were reviewed and updated as appropriate: allergies, current medications, past family history, past medical history, past social history, past surgical history and problem list.  Review of Systems   Constitution: Negative.   HENT: Negative.    Eyes: Negative.    Cardiovascular: Negative.    Respiratory: Positive for shortness of breath.    Endocrine: Negative.    Hematologic/Lymphatic: Negative.    Skin: Negative.    Musculoskeletal: Negative.    Gastrointestinal: Negative.    Genitourinary: Negative.    Neurological: Negative.    Psychiatric/Behavioral: Negative.    Allergic/Immunologic: Negative.      Patient Active Problem List   Diagnosis   • Umbilical hernia without obstruction and without gangrene   • Essential hypertension   • Arthritis of left knee   • Depression   • Obesity (BMI 30-39.9)   • New onset atrial fibrillation (CMS/HCC) - RVR   • Sleep apnea   • Substernal thyroid goiter   • CHF (congestive heart failure) (CMS/HCC)   • Diastolic CHF, chronic (CMS/HCC)     Past Medical History:    Diagnosis Date   • Arthritis    • Colon polyps 01/04/2018    Hepatic flexure: tubular adenoma, only low-grade dysplasia; splenic flexure: tubular adenoma, only low-grade dysplasia; sigmoid colon: tubular adenoma, multiple fragments only low-grade dysplasia   • Depression    • Hypertension    • Sleep apnea     previously diagnosed with sleep apnea, had T&A done and it has since resolved    • Ventral hernia      Past Surgical History:   Procedure Laterality Date   • APPENDECTOMY N/A 1972   • CARDIAC CATHETERIZATION N/A 07/20/2004    Cath left ventriculography, coronary angiography and left heart catheterization, Normal results-Dr. Vadim Mancuso   • CARDIAC CATHETERIZATION N/A 1/29/2019    Procedure: Left Heart Cath;  Surgeon: Eren Bruce MD;  Location: [facility] CATH INVASIVE LOCATION;  Service: Cardiology   • CARDIAC CATHETERIZATION N/A 1/29/2019    Procedure: Left ventriculography;  Surgeon: Eren Bruce MD;  Location: [facility] CATH INVASIVE LOCATION;  Service: Cardiology   • CARDIAC CATHETERIZATION N/A 1/29/2019    Procedure: Coronary angiography;  Surgeon: Eren Bruce MD;  Location[facility] CATH [facility]VASIVE LOC[facility];  Service: Cardiology   • CARPAL TUNNEL RELEASE Right 2013   • CARPAL TUNNEL RELEASE Bilateral    • COLONOSCOPY N/A 1/4/2018    Procedure: COLONOSCOPY with cold polypectomy and hot snare polypectomy;  Surgeon: Ten Solitario MD;  [facility];  Service:    • EYE LENS IMPLANT SECONDARY Bilateral 2007, 2008   • KNEE CARTILAGE SURGERY Right 1979   • TONSILLECTOMY AND ADENOIDECTOMY Bilateral 2005   • TOTAL KNEE ARTHROPLASTY Left 03/14/2016    Left total knee arthroplasty with Amberly component, size 11 femur, G tibial baseplate with a 10 polyethylene insert and a 38 patellar button-Dr. Pablo Hairston   • TOTAL KNEE ARTHROPLASTY Right 03/02/2015    Right total knee arthroplasty with Amberly component size G femur, 11 tibial base plate with an 11 polyethylene insert and a  38 patellar button-Dr. Pablo Hairston   • UVULOPALATOPHARYNGOPLASTY N/A 2005    part of soft palate, and uvula   • VENTRAL HERNIA REPAIR N/A 1/23/2018    Procedure: VENTRAL HERNIA REPAIR LAPAROSCOPIC WITH DAVINCI ROBOT AND MESH;  Surgeon: Ten Solitario MD;  Location: [facility];  Service:      Family History   Problem Relation Age of Onset   • Hypertension Mother    • Kidney failure Mother    • Coronary artery disease Father    • Lung cancer Father         positive smoker   • Heart attack Father    • Breast cancer Sister 60   • Prostate cancer Brother    • Colon polyps Brother    • Kidney nephrosis Brother    • Malig Hyperthermia Neg Hx      Social History     Socioeconomic History   • Marital status:      Spouse name: Not on file   • Number of children: Not on file   • Years of education: Not on file   • Highest education level: Not on file   Social Needs   • Financial resource strain: Not on file   • Food insecurity - worry: Not on file   • Food insecurity - inability: Not on file   • Transportation needs - medical: Not on file   • Transportation needs - non-medical: Not on file   Occupational History   • Occupation:    Tobacco Use   • Smoking status: Never Smoker   • Smokeless tobacco: Never Used   Substance and Sexual Activity   • Alcohol use: Yes     Comment: social, maybe a drink a month   • Drug use: No     Comment: previously smoked marijuana    • Sexual activity: Defer   Other Topics Concern   • Not on file   Social History Narrative   • Not on file       Current Outpatient Medications:   •  acetaminophen (TYLENOL) 325 MG tablet, Take 2 tablets by mouth Every 4 (Four) Hours As Needed for Mild Pain ., Disp: , Rfl:   •  amLODIPine-benazepril (LOTREL) 5-10 MG per capsule, Take 1 capsule by mouth Daily., Disp: 30 capsule, Rfl: 5  •  apixaban (ELIQUIS) 5 MG tablet tablet, Take 1 tablet by mouth Every 12 (Twelve) Hours., Disp: 60 tablet, Rfl:   •  atorvastatin (LIPITOR) 10 MG tablet, Take  1 tablet by mouth Every Night., Disp: , Rfl:   •  citalopram (CeleXA) 20 MG tablet, Take 1 tablet by mouth Daily., Disp: 30 tablet, Rfl: 5  •  fluticasone (FLOVENT HFA) 220 MCG/ACT inhaler, Inhale 1 puff 2 (Two) Times a Day., Disp: 12 g, Rfl: 1  •  furosemide (LASIX) 40 MG tablet, Take 1 tablet by mouth Daily., Disp: , Rfl:   •  metoprolol tartrate (LOPRESSOR) 25 MG tablet, Take 1 tablet by mouth Every 12 (Twelve) Hours., Disp: , Rfl:   •  potassium chloride (MICRO-K) 10 MEQ CR capsule, Take 1 capsule by mouth Daily., Disp: , Rfl:   •  polyethylene glycol (MIRALAX) pack packet, Take 17 g by mouth Daily As Needed (constipation)., Disp: , Rfl:   Allergies   Allergen Reactions   • Poison Ivy Extract Hives, Itching, Rash and Other (See Comments)     ITCHY BLISTERS        Objective   Vitals:    02/26/19 1051   BP: 142/92   Pulse: 68   SpO2: 98%     Physical Exam   Constitutional: He is oriented to person, place, and time. He appears well-developed and well-nourished.   HENT:   Head: Normocephalic.   Eyes: Conjunctivae, EOM and lids are normal. Pupils are equal, round, and reactive to light.   Neck: Trachea normal and normal range of motion. Neck supple. No hepatojugular reflux and no JVD present. Carotid bruit is not present. No thyroid mass and no thyromegaly present.   Cardiovascular: Normal rate, regular rhythm, S1 normal, S2 normal, normal heart sounds and normal pulses.  No extrasystoles are present. PMI is not displaced.   Pulmonary/Chest: Effort normal and breath sounds normal.   Abdominal: Soft. Normal appearance and bowel sounds are normal. He exhibits no mass. There is no hepatosplenomegaly. There is no tenderness. No hernia.   Musculoskeletal: Normal range of motion.   Neurological: He is alert and oriented to person, place, and time. He has normal strength and normal reflexes. No cranial nerve deficit or sensory deficit. He displays a negative Romberg sign.   Skin: Skin is warm, dry and intact.   Psychiatric:  He has a normal mood and affect. His speech is normal and behavior is normal. Judgment and thought content normal. Cognition and memory are normal.     Independent Review of Radiographic Studies:    Chest x-ray performed today was independently reviewed and compared to previous x-rays and CT scan performed January 24, 2019.  There has been no significant change in the mediastinal mass.  There are no new pulmonary infiltrates nodules or masses.  There is no pleural effusion.      Assessment/Plan     Substernal thyroid goiter narrows the trachea approximately 50%.  Patient is experiencing no symptoms from this goiter.  He is continuing to recover from his stroke.  I would like for him to be completely recovered from the stroke before we attempt resecting this goiter.  I have asked him to return here in 3 months with a repeat CT of the chest.  I will be glad to see him sooner if you think it is necessary.  Thank you for allowing me to participate in the care of Mr. Manzo.    Diagnoses and all orders for this visit:    Substernal thyroid goiter  -     CT Chest With Contrast; Future

## 2019-02-28 ENCOUNTER — OFFICE VISIT (OUTPATIENT)
Dept: FAMILY MEDICINE CLINIC | Facility: CLINIC | Age: 64
End: 2019-02-28

## 2019-02-28 VITALS
HEART RATE: 98 BPM | OXYGEN SATURATION: 97 % | HEIGHT: 71 IN | TEMPERATURE: 97.9 F | DIASTOLIC BLOOD PRESSURE: 90 MMHG | BODY MASS INDEX: 33.02 KG/M2 | WEIGHT: 235.9 LBS | SYSTOLIC BLOOD PRESSURE: 152 MMHG | RESPIRATION RATE: 20 BRPM

## 2019-02-28 DIAGNOSIS — I10 ESSENTIAL HYPERTENSION: Primary | ICD-10-CM

## 2019-02-28 DIAGNOSIS — Z86.69 HISTORY OF SLEEP APNEA: ICD-10-CM

## 2019-02-28 DIAGNOSIS — I48.91 NEW ONSET ATRIAL FIBRILLATION (HCC): ICD-10-CM

## 2019-02-28 PROBLEM — I61.9 HEMORRHAGIC STROKE (HCC): Status: ACTIVE | Noted: 2019-02-28

## 2019-02-28 LAB
BUN SERPL-MCNC: 19 MG/DL (ref 8–23)
BUN/CREAT SERPL: 19.8 (ref 7–25)
CALCIUM SERPL-MCNC: 10.3 MG/DL (ref 8.6–10.5)
CHLORIDE SERPL-SCNC: 104 MMOL/L (ref 98–107)
CO2 SERPL-SCNC: 25.8 MMOL/L (ref 22–29)
CREAT SERPL-MCNC: 0.96 MG/DL (ref 0.76–1.27)
GLUCOSE SERPL-MCNC: 75 MG/DL (ref 65–99)
POTASSIUM SERPL-SCNC: 4.2 MMOL/L (ref 3.5–5.2)
SODIUM SERPL-SCNC: 143 MMOL/L (ref 136–145)

## 2019-02-28 PROCEDURE — 99214 OFFICE O/P EST MOD 30 MIN: CPT | Performed by: FAMILY MEDICINE

## 2019-02-28 RX ORDER — AMLODIPINE BESYLATE AND BENAZEPRIL HYDROCHLORIDE 5; 10 MG/1; MG/1
2 CAPSULE ORAL DAILY
COMMUNITY
End: 2019-03-06 | Stop reason: SDUPTHER

## 2019-02-28 NOTE — PROGRESS NOTES
Chief Complaint   Patient presents with   • Follow-up     after hospital encounter   • Hypertension     f/u        Subjective   Flo Manzo is a 63 y.o. male.     History of Present Illness   Patient here for hospital follow-up.  He was admitted 1/24-2/10/19 for new onset atrial fibrillation with rapid ventricular rate.  He has diastolic CHF that is chronic, sleep apnea and hypertension.  He was found to have a intraparenchymal hemorrhage.  He presented for shortness of breath had been worsening.  He was found to be in atrial fibrillation.  During this hospitalization he underwent cardiac cath that showed nonobstructive coronary artery disease.  No stenting.  Related to the development of dizziness he had MRI of the brain which showed an intracranial bleed with intraventricular extension.  ICH was thought to be related to poor blood pressure control.  He was evaluated by neurosurgery and transferred to the ICU.  Anticoagulation for the A. fib was held related to the ICH.  During hospitalization his atenolol was discontinued and he was started on metoprolol tartrate 25 mg twice daily and his amlodipine was discontinued and transition to lisinopril 5 mg twice daily.  Furosemide was started daily.  Still a big concern for underlying sleep apnea given all of these conditions treated inpatient.  He needs a sleep study as outpatient.  He was started on Eliquis prior to discharge for anticoagulation.  He was discharged to rehab and is going home in 5 days. Put him on the walker yesterday, out of the wheelchair, going to try the cane tomorrow. He still feels lightheaded most of the time this has been happening ever since he woke up from the stroke. He did have some left hemiplegia and this is getting better.     Seen by cardiology last week 2/21 and d/c lisinopril and started amlodipine-benazepril 5-10. Had to adjust his BP parameters for PT and since he is able to do more he feels a lot better. Felt like since the  "change in the BP meds he has less lightheadedness. His repeat blood pressure in the office today was the same. Was 170/90s at cardiology last week. There are no BP target range listed in his d/c summary.    Also seen for by cardiothoracic surgeon Dr. Burciaga for his substernal thyroid goiter.  His thyroid panel in the hospital was normal.  He does have a large substernal thyroid goiter with compression of the trachea.  The thyroid goiter was not thought to cause any of his shortness of breath.  Dr. Marcano wanted him to recover from his new cardiac issues and stroke prior to being seen as an outpatient to schedule for addressing the goiter surgically.  She says Paty has a follow-up appointment with Dr. Burciaga.    The following portions of the patient's history were reviewed and updated as appropriate: allergies, current medications, past family history, past medical history, past social history, past surgical history and problem list.    Review of Systems   Respiratory: Negative.    Cardiovascular: Negative.    Neurological: Positive for light-headedness.   Psychiatric/Behavioral: Negative.        Vitals:    02/28/19 1013   BP: 152/90   BP Location: Left arm   Patient Position: Sitting   Cuff Size: Adult   Pulse: 98   Resp: 20   Temp: 97.9 °F (36.6 °C)   TempSrc: Oral   SpO2: 97%   Weight: 107 kg (235 lb 14.4 oz)   Height: 180.3 cm (71\")       Objective   Physical Exam   Constitutional: He is oriented to person, place, and time. He appears well-nourished. No distress.   Eyes: Conjunctivae are normal. Right eye exhibits no discharge. Left eye exhibits no discharge. No scleral icterus.   Cardiovascular: Normal rate, normal heart sounds and intact distal pulses. Exam reveals no gallop and no friction rub.   No murmur heard.  Irregularly irregular rhythm.    Pulmonary/Chest: Effort normal and breath sounds normal. No respiratory distress. He has no wheezes.   Musculoskeletal: He exhibits no edema.   Neurological: He is " alert and oriented to person, place, and time.   Walking with walker, moving well. Independent down from the exam table.    Psychiatric: He has a normal mood and affect. His behavior is normal.   Vitals reviewed.      Assessment/Plan   Flo was seen today for follow-up and hypertension.    Diagnoses and all orders for this visit:    Essential hypertension  -     Ambulatory Referral to Sleep Medicine  -     Basic Metabolic Panel    New onset atrial fibrillation (CMS/HCC) - RVR  -     Ambulatory Referral to Sleep Medicine    History of sleep apnea  -     Ambulatory Referral to Sleep Medicine    Other orders  -     apixaban (ELIQUIS) 5 MG tablet tablet; Take 1 tablet by mouth Every 12 (Twelve) Hours.      Filled out temporary disability for his hemorrhagic stroke. Return to work date 4/1/19 but subject to change. He will follow up in 3 months. Return to rehab. Son is driving him today.     Current outpatient and discharge medications have been reconciled for the patient.  Reviewed by: Cathleen Corona MD

## 2019-03-04 ENCOUNTER — TELEPHONE (OUTPATIENT)
Dept: FAMILY MEDICINE CLINIC | Facility: CLINIC | Age: 64
End: 2019-03-04

## 2019-03-04 NOTE — TELEPHONE ENCOUNTER
Marie from [facility] called inquiring about an order from Cj for this patient to have home health. She states that he needs PT and nursing care at home.This nurse gave verbal to proceed forward. Gun informed.

## 2019-03-06 DIAGNOSIS — I20.8 ANGINA AT REST (HCC): ICD-10-CM

## 2019-03-06 DIAGNOSIS — I10 ESSENTIAL HYPERTENSION: ICD-10-CM

## 2019-03-06 RX ORDER — AMLODIPINE BESYLATE AND BENAZEPRIL HYDROCHLORIDE 5; 10 MG/1; MG/1
2 CAPSULE ORAL DAILY
Qty: 60 CAPSULE | Refills: 2 | Status: SHIPPED | OUTPATIENT
Start: 2019-03-06 | End: 2019-04-08 | Stop reason: SDUPTHER

## 2019-03-06 RX ORDER — ATORVASTATIN CALCIUM 10 MG/1
10 TABLET, FILM COATED ORAL NIGHTLY
Start: 2019-03-06 | End: 2019-03-08 | Stop reason: SDUPTHER

## 2019-03-06 RX ORDER — FUROSEMIDE 40 MG/1
40 TABLET ORAL DAILY
Start: 2019-03-06 | End: 2019-03-08 | Stop reason: SDUPTHER

## 2019-03-06 RX ORDER — POTASSIUM CHLORIDE 750 MG/1
10 CAPSULE, EXTENDED RELEASE ORAL DAILY
Start: 2019-03-06 | End: 2019-03-08 | Stop reason: SDUPTHER

## 2019-03-06 RX ORDER — CITALOPRAM 20 MG/1
20 TABLET ORAL DAILY
Qty: 30 TABLET | Refills: 5 | Status: SHIPPED | OUTPATIENT
Start: 2019-03-06 | End: 2019-04-22 | Stop reason: SDUPTHER

## 2019-03-06 NOTE — TELEPHONE ENCOUNTER
Noemi called office requesting refill on behalf this patent. She is nurse for [employer]. Med sent to pharm.

## 2019-03-08 DIAGNOSIS — I10 ESSENTIAL HYPERTENSION: ICD-10-CM

## 2019-03-08 DIAGNOSIS — I20.8 ANGINA AT REST (HCC): ICD-10-CM

## 2019-03-08 RX ORDER — ATORVASTATIN CALCIUM 10 MG/1
10 TABLET, FILM COATED ORAL NIGHTLY
Start: 2019-03-08 | End: 2019-04-08 | Stop reason: SDUPTHER

## 2019-03-08 RX ORDER — FUROSEMIDE 40 MG/1
40 TABLET ORAL DAILY
Start: 2019-03-08 | End: 2019-04-08 | Stop reason: SDUPTHER

## 2019-03-08 RX ORDER — POTASSIUM CHLORIDE 750 MG/1
10 CAPSULE, EXTENDED RELEASE ORAL DAILY
Start: 2019-03-08 | End: 2019-05-05 | Stop reason: SDUPTHER

## 2019-03-11 ENCOUNTER — TELEPHONE (OUTPATIENT)
Dept: FAMILY MEDICINE CLINIC | Facility: CLINIC | Age: 64
End: 2019-03-11

## 2019-03-11 NOTE — TELEPHONE ENCOUNTER
FYI pt wife called upset because he still didn't have 4 scripts from 3/8/19    I called them in with 1 refill. Atovorstatin, metoprolol, lasix and micro k

## 2019-03-13 NOTE — TELEPHONE ENCOUNTER
If you look at the chart from 3/8/19 all four of those were approved. Do we have the right pharmacy???

## 2019-03-21 ENCOUNTER — OFFICE VISIT (OUTPATIENT)
Dept: CARDIOLOGY | Facility: CLINIC | Age: 64
End: 2019-03-21

## 2019-03-21 VITALS
HEIGHT: 70 IN | DIASTOLIC BLOOD PRESSURE: 81 MMHG | BODY MASS INDEX: 34.07 KG/M2 | HEART RATE: 77 BPM | WEIGHT: 238 LBS | SYSTOLIC BLOOD PRESSURE: 124 MMHG

## 2019-03-21 DIAGNOSIS — I10 ESSENTIAL HYPERTENSION: Primary | ICD-10-CM

## 2019-03-21 PROCEDURE — 99212 OFFICE O/P EST SF 10 MIN: CPT | Performed by: INTERNAL MEDICINE

## 2019-03-21 PROCEDURE — 93000 ELECTROCARDIOGRAM COMPLETE: CPT | Performed by: INTERNAL MEDICINE

## 2019-03-21 NOTE — PROGRESS NOTES
Subjective:        Flo Manzo is a 63 y.o. male who here for follow up    CC  FOLLOW UP AFTER LOTREL  HPI  64-year-old male with known history of the benign essential arterial hypertension here for the follow-up after the patient started on the Lotrel, patient seems to be tolerating medication well and the blood pressures are markedly improved     Problem List Items Addressed This Visit        Cardiovascular and Mediastinum    Essential hypertension - Primary        .    The following portions of the patient's history were reviewed and updated as appropriate: allergies, current medications, past family history, past medical history, past social history, past surgical history and problem list.    Past Medical History:   Diagnosis Date   • Arthritis    • Atrial fibrillation with RVR (CMS/HCC) 01/24/2019   • Colon polyps 01/04/2018    Hepatic flexure: tubular adenoma, only low-grade dysplasia; splenic flexure: tubular adenoma, only low-grade dysplasia; sigmoid colon: tubular adenoma, multiple fragments only low-grade dysplasia   • Depression    • Hemorrhagic stroke (CMS/HCC) 01/29/2019   • Hypertension    • Sleep apnea     previously diagnosed with sleep apnea, had T&A done and it has since resolved    • Uncontrolled hypertension    • Ventral hernia      reports that he has never smoked. He has never used smokeless tobacco. He reports that he drinks alcohol. He reports that he does not use drugs.   Family History   Problem Relation Age of Onset   • Hypertension Mother    • Kidney failure Mother    • Coronary artery disease Father    • Lung cancer Father         positive smoker   • Heart attack Father    • Breast cancer Sister 60   • Prostate cancer Brother    • Colon polyps Brother    • Kidney nephrosis Brother    • Malig Hyperthermia Neg Hx        Review of Systems  Constitutional: No wt loss, fever, fatigue  Gastrointestinal: No nausea, abdominal pain  Behavioral/Psych: No insomnia or anxiety   Cardiovascular  "no chest pains or tightness in the chest  Objective:       Physical Exam  /81   Pulse 77   Ht 177.8 cm (70\")   Wt 108 kg (238 lb)   BMI 34.15 kg/m²   General appearance: No acute changes   Neck: Trachea midline; NECK, supple, no thyromegaly or lymphadenopathy   Lungs: Normal size and shape, normal breath sounds, equal distribution of air, no rales and rhonchi   CV: S1-S2 regular, no murmurs, no rub, no gallop   Abdomen: Soft, non-tender; no masses , no abnormal abdominal sounds   Extremities: No deformity , normal color , no peripheral edema   Skin: Normal temperature, turgor and texture; no rash, ulcers            ECG 12 Lead  Date/Time: 3/21/2019 11:19 AM  Performed by: Eren Bruce MD  Authorized by: Eren Bruce MD   Comparison: compared with previous ECG   Similar to previous ECG  Rhythm: atrial fibrillation    Clinical impression: abnormal EKG              Echocardiogram:        Current Outpatient Medications:   •  acetaminophen (TYLENOL) 325 MG tablet, Take 2 tablets by mouth Every 4 (Four) Hours As Needed for Mild Pain ., Disp: , Rfl:   •  amLODIPine-benazepril (LOTREL 5-10) 5-10 MG per capsule, Take 2 capsules by mouth Daily., Disp: 60 capsule, Rfl: 2  •  apixaban (ELIQUIS) 5 MG tablet tablet, Take 1 tablet by mouth Every 12 (Twelve) Hours., Disp: 60 tablet, Rfl: 2  •  atorvastatin (LIPITOR) 10 MG tablet, Take 1 tablet by mouth Every Night., Disp: , Rfl:   •  citalopram (CeleXA) 20 MG tablet, Take 1 tablet by mouth Daily., Disp: 30 tablet, Rfl: 5  •  fluticasone (FLOVENT HFA) 220 MCG/ACT inhaler, Inhale 1 puff 2 (Two) Times a Day., Disp: 12 g, Rfl: 1  •  furosemide (LASIX) 40 MG tablet, Take 1 tablet by mouth Daily., Disp: , Rfl:   •  metoprolol tartrate (LOPRESSOR) 25 MG tablet, Take 1 tablet by mouth Every 12 (Twelve) Hours., Disp: , Rfl:   •  polyethylene glycol (MIRALAX) pack packet, Take 17 g by mouth Daily As Needed (constipation)., Disp: , Rfl:   •  potassium chloride " (MICRO-K) 10 MEQ CR capsule, Take 1 capsule by mouth Daily., Disp: , Rfl:    Assessment:        Patient Active Problem List   Diagnosis   • Umbilical hernia without obstruction and without gangrene   • Essential hypertension   • Arthritis of left knee   • Depression   • Obesity (BMI 30-39.9)   • New onset atrial fibrillation (CMS/HCC) - RVR   • Sleep apnea   • Substernal thyroid goiter   • CHF (congestive heart failure) (CMS/HCC)   • Diastolic CHF, chronic (CMS/HCC)   • Hemorrhagic stroke (CMS/HCC)               Plan:            ICD-10-CM ICD-9-CM   1. Essential hypertension I10 401.9     1. Essential hypertension    BP MUCH BETTER WITH LOTREL    SEE IN 6 MONTHS  COUNSELING:    Flo Rodriguez was given to patient for the following topics: diagnostic results, risk factor reductions, impressions, risks and benefits of treatment options and importance of treatment compliance .       SMOKING COUNSELING:    Counseling given: Not Answered      Dictated using Dragon dictation

## 2019-04-05 ENCOUNTER — TELEPHONE (OUTPATIENT)
Dept: FAMILY MEDICINE CLINIC | Facility: CLINIC | Age: 64
End: 2019-04-05

## 2019-04-05 NOTE — TELEPHONE ENCOUNTER
Wife called office stating the  is sleep all the time and is concern for him. Booked him an appt on the 8th of this month to discuss this matter. That was the soonest available appt. Wife states that he has been taking her sleep aid at night and not sure if the dose is the same.

## 2019-04-08 ENCOUNTER — OFFICE VISIT (OUTPATIENT)
Dept: FAMILY MEDICINE CLINIC | Facility: CLINIC | Age: 64
End: 2019-04-08

## 2019-04-08 VITALS
TEMPERATURE: 98.4 F | SYSTOLIC BLOOD PRESSURE: 128 MMHG | HEART RATE: 69 BPM | WEIGHT: 244.4 LBS | BODY MASS INDEX: 34.99 KG/M2 | RESPIRATION RATE: 17 BRPM | DIASTOLIC BLOOD PRESSURE: 92 MMHG | OXYGEN SATURATION: 96 % | HEIGHT: 70 IN

## 2019-04-08 DIAGNOSIS — I10 ESSENTIAL HYPERTENSION: ICD-10-CM

## 2019-04-08 DIAGNOSIS — R53.83 FATIGUE, UNSPECIFIED TYPE: Primary | ICD-10-CM

## 2019-04-08 DIAGNOSIS — F32.9 REACTIVE DEPRESSION: ICD-10-CM

## 2019-04-08 DIAGNOSIS — I20.8 ANGINA AT REST (HCC): ICD-10-CM

## 2019-04-08 PROCEDURE — 99214 OFFICE O/P EST MOD 30 MIN: CPT | Performed by: FAMILY MEDICINE

## 2019-04-08 RX ORDER — ATORVASTATIN CALCIUM 10 MG/1
10 TABLET, FILM COATED ORAL NIGHTLY
Qty: 90 TABLET | Refills: 1 | Status: SHIPPED | OUTPATIENT
Start: 2019-04-08 | End: 2019-09-20 | Stop reason: SDUPTHER

## 2019-04-08 RX ORDER — AMLODIPINE BESYLATE AND BENAZEPRIL HYDROCHLORIDE 5; 10 MG/1; MG/1
2 CAPSULE ORAL DAILY
Qty: 180 CAPSULE | Refills: 1 | Status: SHIPPED | OUTPATIENT
Start: 2019-04-08 | End: 2019-09-20 | Stop reason: SDUPTHER

## 2019-04-08 RX ORDER — FUROSEMIDE 40 MG/1
40 TABLET ORAL DAILY
Qty: 90 TABLET | Refills: 1 | Status: SHIPPED | OUTPATIENT
Start: 2019-04-08 | End: 2019-09-20 | Stop reason: SDUPTHER

## 2019-04-08 NOTE — PROGRESS NOTES
Chief Complaint   Patient presents with   • Fatigue     sleeping too much   • Med Management     needs clarificaton on BP meds   • Depression     PHQ-2 score 13       Subjective   Flo Manzo is a 64 y.o. male.     History of Present Illness   Hypersomnia/fatigue  He goes to bed aruond 11:30, takes anywhere between 15 min to one hour to fall asleep. He gets up frequently at night to go to the bathroom or because he can't sleep watches TV, then goes back to bed after 30-60 min. He gets up about 3-4 times overnight to go urinate. He takes his lasix first thing in the morning. Urinates 6-8 times during the day. Usually up for the day getting up 11:30-noon since he has been back home from rehab. That is when he takes his medication but sometimes he gets up 8:30-9, weighs self, takes meds and then goes back to bed. During the day he mostly sits, watches kids or get on the computer. Feels a little lightheaded every now and again usually when sitting for a while and then getting up. But otherwise feels good, strong, walking without assistance.   Has job offer that is very close to home, less activity. Wife feels he is always sleepy, inactive, not in a good mood with her which is unlike him. She is worried about his mood.   Pt says he has job offer that will be about 2 blocks from the house and is a less active position, more overseeing.    Hx of stroke  Pt was walking over a mile at home after rehab but has since been a lot less active. Sitting most of the day. Wife says he was not even on the computer last week. Says he looks good today but sometimes looks like death warmed over.     The following portions of the patient's history were reviewed and updated as appropriate: allergies, current medications, past medical history, past social history and problem list.    Review of Systems   Constitutional: Positive for activity change, appetite change and fatigue.   Respiratory: Negative.    Cardiovascular: Negative.   "  Neurological: Positive for dizziness and light-headedness. Negative for headaches.   Psychiatric/Behavioral: Positive for sleep disturbance.       Vitals:    04/08/19 0858   BP: 128/92   BP Location: Left arm   Patient Position: Sitting   Cuff Size: Adult   Pulse: 69   Resp: 17   Temp: 98.4 °F (36.9 °C)   TempSrc: Oral   SpO2: 96%   Weight: 111 kg (244 lb 6.4 oz)   Height: 177.8 cm (70\")       Objective   Physical Exam   Constitutional: He is oriented to person, place, and time. He appears well-nourished. No distress.   Eyes: Conjunctivae are normal. Right eye exhibits no discharge. Left eye exhibits no discharge. No scleral icterus.   Cardiovascular: Normal rate, regular rhythm, normal heart sounds and intact distal pulses. Exam reveals no gallop and no friction rub.   No murmur heard.  Pulmonary/Chest: Effort normal and breath sounds normal. No respiratory distress. He has no wheezes.   Musculoskeletal: He exhibits no edema.   Neurological: He is alert and oriented to person, place, and time.   Psychiatric: He has a normal mood and affect. His behavior is normal.   Vitals reviewed.      Assessment/Plan   Flo was seen today for fatigue, med management and depression.    Diagnoses and all orders for this visit:    Fatigue, unspecified type    Angina at rest (CMS/HCC)  -     atorvastatin (LIPITOR) 10 MG tablet; Take 1 tablet by mouth Every Night.    Essential hypertension  -     atorvastatin (LIPITOR) 10 MG tablet; Take 1 tablet by mouth Every Night.    Reactive depression    Other orders  -     amLODIPine-benazepril (LOTREL 5-10) 5-10 MG per capsule; Take 2 capsules by mouth Daily.  -     furosemide (LASIX) 40 MG tablet; Take 1 tablet by mouth Daily.  -     metoprolol tartrate (LOPRESSOR) 25 MG tablet; Take 1 tablet by mouth Every 12 (Twelve) Hours.      We will put him on a two week plan with walking right now. Log walking. Take time, pay attention to strength and endurance. Will likely be able to return to " work after this time. He is also to increase his citalopram to 30 mg daily, so take 1.5 tabs of his current dose. Voiced understanding.   He has upcoming sleep appt for scheduling sleep study. He is aware.     Follow up in 2 weeks for work clearance.

## 2019-04-22 ENCOUNTER — OFFICE VISIT (OUTPATIENT)
Dept: FAMILY MEDICINE CLINIC | Facility: CLINIC | Age: 64
End: 2019-04-22

## 2019-04-22 VITALS
DIASTOLIC BLOOD PRESSURE: 86 MMHG | WEIGHT: 244.6 LBS | BODY MASS INDEX: 35.02 KG/M2 | HEART RATE: 69 BPM | HEIGHT: 70 IN | SYSTOLIC BLOOD PRESSURE: 124 MMHG | TEMPERATURE: 97.9 F | OXYGEN SATURATION: 97 % | RESPIRATION RATE: 18 BRPM

## 2019-04-22 DIAGNOSIS — I10 ESSENTIAL HYPERTENSION: ICD-10-CM

## 2019-04-22 DIAGNOSIS — F32.9 REACTIVE DEPRESSION: ICD-10-CM

## 2019-04-22 DIAGNOSIS — I61.9 HEMORRHAGIC STROKE (HCC): Primary | ICD-10-CM

## 2019-04-22 PROCEDURE — 99214 OFFICE O/P EST MOD 30 MIN: CPT | Performed by: FAMILY MEDICINE

## 2019-04-22 RX ORDER — CITALOPRAM 40 MG/1
40 TABLET ORAL DAILY
Qty: 90 TABLET | Refills: 1 | Status: SHIPPED | OUTPATIENT
Start: 2019-04-22 | End: 2019-09-20 | Stop reason: SDUPTHER

## 2019-04-22 NOTE — PROGRESS NOTES
"Chief Complaint   Patient presents with   • Fatigue     2 wk follow    • Med Refill       Subjective   Flo Manzo is a 64 y.o. male.     History of Present Illness   Breathing is good.  Mood has been down but getting better. Thinks the increase in the citalopram is getting better.   They joined a gym. Wanting to go with his wife. He has been walking 1.5 miles most day. He does the stairs in the basement. 3-4 flights per day. No CP, increased SOB or diaphoresis.   Sleep is still longer hours. Not getting up as frequently at night, about 2-3 times now. Able to get back to sleep fairly well.   As far as work goes he feels ready to return to work. This job is not as physical and not hard labor as before.   Dizziness when he gets up, time to time, he is drinking lots of water. 10-12 glasses per day. Doing less coffee and soft drinks.     The following portions of the patient's history were reviewed and updated as appropriate: allergies, current medications, past medical history, past social history and problem list.    Review of Systems   Respiratory: Negative.    Cardiovascular: Negative.    Neurological: Positive for dizziness.       Vitals:    04/22/19 1058   BP: 124/86   BP Location: Left arm   Patient Position: Sitting   Cuff Size: Adult   Pulse: 69   Resp: 18   Temp: 97.9 °F (36.6 °C)   TempSrc: Oral   SpO2: 97%   Weight: 111 kg (244 lb 9.6 oz)   Height: 177.8 cm (70\")       Objective   Physical Exam   Constitutional: He is oriented to person, place, and time. He appears well-nourished. No distress.   Eyes: Conjunctivae are normal. Right eye exhibits no discharge. Left eye exhibits no discharge. No scleral icterus.   Cardiovascular: Normal rate, regular rhythm, normal heart sounds and intact distal pulses. Exam reveals no gallop and no friction rub.   No murmur heard.  Pulmonary/Chest: Effort normal and breath sounds normal. No respiratory distress. He has no wheezes.   Musculoskeletal: He exhibits no edema. "   Neurological: He is alert and oriented to person, place, and time.   Psychiatric: He has a normal mood and affect. His behavior is normal.   Vitals reviewed.      Assessment/Plan   Flo was seen today for fatigue and med refill.    Diagnoses and all orders for this visit:    Hemorrhagic stroke (CMS/HCC)    Reactive depression    Essential hypertension  -     CBC & Differential  -     Basic Metabolic Panel    Other orders  -     citalopram (CeleXA) 40 MG tablet; Take 1 tablet by mouth Daily.

## 2019-04-23 LAB
BASOPHILS # BLD AUTO: 0.03 10*3/MM3 (ref 0–0.2)
BASOPHILS NFR BLD AUTO: 0.4 % (ref 0–1.5)
BUN SERPL-MCNC: 14 MG/DL (ref 8–23)
BUN/CREAT SERPL: 15.6 (ref 7–25)
CALCIUM SERPL-MCNC: 9.7 MG/DL (ref 8.6–10.5)
CHLORIDE SERPL-SCNC: 104 MMOL/L (ref 98–107)
CO2 SERPL-SCNC: 26.3 MMOL/L (ref 22–29)
CREAT SERPL-MCNC: 0.9 MG/DL (ref 0.76–1.27)
EOSINOPHIL # BLD AUTO: 0.13 10*3/MM3 (ref 0–0.4)
EOSINOPHIL NFR BLD AUTO: 1.6 % (ref 0.3–6.2)
ERYTHROCYTE [DISTWIDTH] IN BLOOD BY AUTOMATED COUNT: 14.7 % (ref 12.3–15.4)
GLUCOSE SERPL-MCNC: 97 MG/DL (ref 65–99)
HCT VFR BLD AUTO: 49.1 % (ref 37.5–51)
HGB BLD-MCNC: 15.1 G/DL (ref 13–17.7)
IMM GRANULOCYTES # BLD AUTO: 0.03 10*3/MM3 (ref 0–0.05)
IMM GRANULOCYTES NFR BLD AUTO: 0.4 % (ref 0–0.5)
LYMPHOCYTES # BLD AUTO: 1.92 10*3/MM3 (ref 0.7–3.1)
LYMPHOCYTES NFR BLD AUTO: 23.1 % (ref 19.6–45.3)
MCH RBC QN AUTO: 28.1 PG (ref 26.6–33)
MCHC RBC AUTO-ENTMCNC: 30.8 G/DL (ref 31.5–35.7)
MCV RBC AUTO: 91.4 FL (ref 79–97)
MONOCYTES # BLD AUTO: 0.62 10*3/MM3 (ref 0.1–0.9)
MONOCYTES NFR BLD AUTO: 7.5 % (ref 5–12)
NEUTROPHILS # BLD AUTO: 5.58 10*3/MM3 (ref 1.7–7)
NEUTROPHILS NFR BLD AUTO: 67 % (ref 42.7–76)
NRBC BLD AUTO-RTO: 0 /100 WBC (ref 0–0.2)
PLATELET # BLD AUTO: 237 10*3/MM3 (ref 140–450)
POTASSIUM SERPL-SCNC: 4.3 MMOL/L (ref 3.5–5.2)
RBC # BLD AUTO: 5.37 10*6/MM3 (ref 4.14–5.8)
SODIUM SERPL-SCNC: 143 MMOL/L (ref 136–145)
WBC # BLD AUTO: 8.31 10*3/MM3 (ref 3.4–10.8)

## 2019-04-25 ENCOUNTER — OFFICE VISIT (OUTPATIENT)
Dept: SLEEP MEDICINE | Facility: HOSPITAL | Age: 64
End: 2019-04-25

## 2019-04-25 VITALS — BODY MASS INDEX: 34.67 KG/M2 | WEIGHT: 242.2 LBS | HEIGHT: 70 IN | OXYGEN SATURATION: 97 % | HEART RATE: 59 BPM

## 2019-04-25 DIAGNOSIS — G47.10 HYPERSOMNIA DISORDER RELATED TO A KNOWN ORGANIC FACTOR: ICD-10-CM

## 2019-04-25 DIAGNOSIS — G47.30 SLEEP-DISORDERED BREATHING: Primary | ICD-10-CM

## 2019-04-25 DIAGNOSIS — Z86.69 HISTORY OF SLEEP APNEA: ICD-10-CM

## 2019-04-25 PROCEDURE — 99244 OFF/OP CNSLTJ NEW/EST MOD 40: CPT | Performed by: INTERNAL MEDICINE

## 2019-04-25 PROCEDURE — G0463 HOSPITAL OUTPT CLINIC VISIT: HCPCS

## 2019-05-05 PROBLEM — G47.10 HYPERSOMNIA DISORDER RELATED TO A KNOWN ORGANIC FACTOR: Status: ACTIVE | Noted: 2019-05-05

## 2019-05-05 PROBLEM — G47.30 SLEEP-DISORDERED BREATHING: Status: ACTIVE | Noted: 2019-05-05

## 2019-05-06 RX ORDER — POTASSIUM CHLORIDE 750 MG/1
CAPSULE, EXTENDED RELEASE ORAL
Qty: 30 CAPSULE | Refills: 0 | Status: SHIPPED | OUTPATIENT
Start: 2019-05-06 | End: 2019-06-22 | Stop reason: SDUPTHER

## 2019-05-20 ENCOUNTER — OFFICE VISIT (OUTPATIENT)
Dept: FAMILY MEDICINE CLINIC | Facility: CLINIC | Age: 64
End: 2019-05-20

## 2019-05-20 VITALS
OXYGEN SATURATION: 99 % | RESPIRATION RATE: 18 BRPM | TEMPERATURE: 98 F | HEIGHT: 70 IN | HEART RATE: 66 BPM | WEIGHT: 240.4 LBS | BODY MASS INDEX: 34.41 KG/M2 | DIASTOLIC BLOOD PRESSURE: 80 MMHG | SYSTOLIC BLOOD PRESSURE: 116 MMHG

## 2019-05-20 DIAGNOSIS — R25.2 MUSCLE CRAMPS: ICD-10-CM

## 2019-05-20 DIAGNOSIS — I61.9 HEMORRHAGIC STROKE (HCC): Primary | ICD-10-CM

## 2019-05-20 DIAGNOSIS — I95.1 ORTHOSTATIC HYPOTENSION: ICD-10-CM

## 2019-05-20 DIAGNOSIS — F32.9 REACTIVE DEPRESSION: ICD-10-CM

## 2019-05-20 PROCEDURE — 99214 OFFICE O/P EST MOD 30 MIN: CPT | Performed by: FAMILY MEDICINE

## 2019-05-20 NOTE — PROGRESS NOTES
"Chief Complaint   Patient presents with   • Cerebrovascular Accident     follow up, starting back to work wed       Subjective   Flo Manzo is a 64 y.o. male.     History of Present Illness   Mood/depression  Good days and bad days, look like he is walking around in a daze. This is from his wife's report. Pt does not feel that way. Son agrees that he looks like he is staring off. He is looking forward to going back to work. He feels his mood is good. He is going to be [employer] at [employer].    orthostasis  He says some days he is dizzy describes as standing up too quick and gets lightheaded. He drinks about 6 glasses of water per day and a couple of soft drinks coke or big red.   Needs to go to the dentist and wondering what to take about tooth pain.    Muscle cramping happens about twice per week. Intermittent. It happens when he gets into the bed.     He will return to [facility] 6/4/19 in regards to the substernal thyroid goiter.     The following portions of the patient's history were reviewed and updated as appropriate: allergies, current medications, past medical history, past social history and problem list.    Review of Systems   Respiratory: Negative.    Cardiovascular: Negative.    Neurological: Positive for light-headedness. Negative for dizziness.       Vitals:    05/20/19 1203   BP: 116/80   BP Location: Left arm   Patient Position: Sitting   Cuff Size: Adult   Pulse: 66   Resp: 18   Temp: 98 °F (36.7 °C)   TempSrc: Oral   SpO2: 99%   Weight: 109 kg (240 lb 6.4 oz)   Height: 177.8 cm (70\")       Objective   Physical Exam   Constitutional: He is oriented to person, place, and time. He appears well-nourished. No distress.   Eyes: Conjunctivae are normal. Right eye exhibits no discharge. Left eye exhibits no discharge. No scleral icterus.   Cardiovascular: Normal rate, regular rhythm, normal heart sounds and intact distal pulses. Exam reveals no gallop and no friction rub.   No murmur " heard.  Pulmonary/Chest: Effort normal and breath sounds normal. No respiratory distress. He has no wheezes.   Musculoskeletal: He exhibits no edema.   Neurological: He is alert and oriented to person, place, and time.   Psychiatric: He has a normal mood and affect. His behavior is normal.   Vitals reviewed.      Assessment/Plan   Flo was seen today for cerebrovascular accident.    Diagnoses and all orders for this visit:    Hemorrhagic stroke (CMS/HCC)    Reactive depression    Muscle cramps  -     Basic Metabolic Panel    Orthostatic hypotension      Be active, drink more water, avoid sodas and caffeine and alcohol.     He is cleared to start work this week. Encouraged him to be more active. He is most concerned about being lightheaded and not being able to tolerate the needs of the job because of this. His symptoms sound orthostatic. His BP is on the low end of normal. We reduced his furosemide to 20 mg and I encouraged him to drink 8 bottles of water instead of 6 bottles of water and to cut out the big red and coke. He voiced understanding. He will follow closely in the next week for blood pressure check with nurse visit. He will also be seeing cardiology soon. Told him to continue to take the potassium supplementation. He voiced understanding.     Sounds like he is looking forward to getting out of the house because there is issues with spending too much time around his wife and their moods effecting each other. He does not feel depressed. Feels he is overall doing well as far as his mood.

## 2019-05-21 LAB
BUN SERPL-MCNC: 20 MG/DL (ref 8–23)
BUN/CREAT SERPL: 20.6 (ref 7–25)
CALCIUM SERPL-MCNC: 10.2 MG/DL (ref 8.6–10.5)
CHLORIDE SERPL-SCNC: 105 MMOL/L (ref 98–107)
CO2 SERPL-SCNC: 25.6 MMOL/L (ref 22–29)
CREAT SERPL-MCNC: 0.97 MG/DL (ref 0.76–1.27)
GLUCOSE SERPL-MCNC: 90 MG/DL (ref 65–99)
POTASSIUM SERPL-SCNC: 4.3 MMOL/L (ref 3.5–5.2)
SODIUM SERPL-SCNC: 141 MMOL/L (ref 136–145)

## 2019-05-29 ENCOUNTER — HOSPITAL ENCOUNTER (OUTPATIENT)
Dept: CT IMAGING | Facility: HOSPITAL | Age: 64
Discharge: HOME | End: 2019-05-29
Admitting: THORACIC SURGERY (CARDIOTHORACIC VASCULAR SURGERY)

## 2019-05-29 DIAGNOSIS — E04.9 SUBSTERNAL THYROID GOITER: ICD-10-CM

## 2019-05-29 LAB — CREAT BLDA-MCNC: 0.8 MG/DL (ref 0.6–1.3)

## 2019-05-29 PROCEDURE — 82565 ASSAY OF CREATININE: CPT

## 2019-05-29 PROCEDURE — 25010000002 IOPAMIDOL 61 % SOLUTION: Performed by: THORACIC SURGERY (CARDIOTHORACIC VASCULAR SURGERY)

## 2019-05-29 PROCEDURE — 71260 CT THORAX DX C+: CPT

## 2019-05-29 RX ADMIN — IOPAMIDOL 75 ML: 612 INJECTION, SOLUTION INTRAVENOUS at 10:35

## 2019-06-04 ENCOUNTER — OFFICE VISIT (OUTPATIENT)
Dept: SURGERY | Facility: CLINIC | Age: 64
End: 2019-06-04

## 2019-06-04 VITALS
DIASTOLIC BLOOD PRESSURE: 80 MMHG | HEART RATE: 80 BPM | OXYGEN SATURATION: 98 % | SYSTOLIC BLOOD PRESSURE: 126 MMHG | WEIGHT: 240 LBS | HEIGHT: 70 IN | BODY MASS INDEX: 34.36 KG/M2

## 2019-06-04 DIAGNOSIS — E04.9 SUBSTERNAL THYROID GOITER: Primary | ICD-10-CM

## 2019-06-04 PROCEDURE — 99213 OFFICE O/P EST LOW 20 MIN: CPT | Performed by: THORACIC SURGERY (CARDIOTHORACIC VASCULAR SURGERY)

## 2019-06-04 NOTE — PROGRESS NOTES
Subjective   Patient ID: Flo Manzo is a 64 y.o. male is here today for follow-up.    History of Present Illness  Dear Colleague,  Flo Manzo was seen in our office today for further follow-up of a substernal thyroid goiter.  Since his last visit here he is returned to work.  He works as an .  They have him on some limited duties which he seems to be tolerating.  He is not allowed to climb or be on any heights.  He reports some shortness of breath with exertion.  He has no wheezing or stridor.  He has no difficulty swallowing.  He has intentionally lost some weight.    The following portions of the patient's history were reviewed and updated as appropriate: allergies, current medications, past family history, past medical history, past social history, past surgical history and problem list.  Review of Systems   Constitution: Negative.   HENT: Negative.    Eyes: Negative.    Cardiovascular: Negative.    Respiratory: Positive for cough and shortness of breath.    Endocrine: Negative.    Hematologic/Lymphatic: Negative.    Skin: Negative.    Musculoskeletal: Negative.    Gastrointestinal: Negative.    Genitourinary: Negative.    Neurological: Negative.    Psychiatric/Behavioral: Negative.    Allergic/Immunologic: Negative.      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     Past Medical History:   Diagnosis Date   • Arthritis    • Atrial fibrillation with RVR (CMS/HCC) 01/24/2019   • Colon polyps 01/04/2018    Hepatic flexure: tubular adenoma, only  low-grade dysplasia; splenic flexure: tubular adenoma, only low-grade dysplasia; sigmoid colon: tubular adenoma, multiple fragments only low-grade dysplasia   • Depression    • Hemorrhagic stroke (CMS/HCC) 01/29/2019   • Hypertension    • Sleep apnea     previously diagnosed with sleep apnea, had T&A done and it has since resolved    • Uncontrolled hypertension    • Ventral hernia      Past Surgical History:   Procedure Laterality Date   • APPENDECTOMY N/A 1972   • CARDIAC CATHETERIZATION N/A 07/20/2004    Cath left ventriculography, coronary angiography and left heart catheterization, Normal results-Dr. Vadim Mancuso   • CARDIAC CATHETERIZATION N/A 1/29/2019    Procedure: Left Heart Cath;  Surgeon: Eren Bruce MD;  Location: [facility] CATH INVASIVE LOCATION;  Service: Cardiology   • CARDIAC CATHETERIZATION N/A 1/29/2019    Procedure: Left ventriculography;  Surgeon: Eren Bruce MD;  Location: [facility] CATH INVASIVE LOCATION;  Service: Cardiology   • CARDIAC CATHETERIZATION N/A 1/29/2019    Procedure: Coronary angiography;  Surgeon: Eren Bruce MD;  Location: [facility] CATH INVASIVE LOCATION;  Service: Cardiology   • CARPAL TUNNEL RELEASE Right 2013   • CARPAL TUNNEL RELEASE Bilateral    • COLONOSCOPY N/A 1/4/2018    Procedure: COLONOSCOPY with cold polypectomy and hot snare polypectomy;  Surgeon: Ten Solitario MD;  Location: [facility] ENDOSCOPY;  Service:    • EYE LENS IMPLANT SECONDARY Bilateral 2007, 2008   • KNEE CARTILAGE SURGERY Right 1979   • TONSILLECTOMY AND ADENOIDECTOMY Bilateral 2005   • TOTAL KNEE ARTHROPLASTY Left 03/14/2016    Left total knee arthroplasty with Amberly component, size 11 femur, G tibial baseplate with a 10 polyethylene insert and a 38 patellar button-Dr. Pablo Hairston   • TOTAL KNEE ARTHROPLASTY Right 03/02/2015    Right total knee arthroplasty with Amberly component size G femur, 11 tibial base plate with an 11 polyethylene insert and a 38 patellar button-  Pablo Hairston   • UVULOPALATOPHARYNGOPLASTY N/A 2005    part of soft palate, and uvula   • VENTRAL HERNIA REPAIR N/A 1/23/2018    Procedure: VENTRAL HERNIA REPAIR LAPAROSCOPIC WITH DAVINCI ROBOT AND MESH;  Surgeon: Ten Solitario MD;  Location: [facility];  Service:      Family History   Problem Relation Age of Onset   • Hypertension Mother    • Kidney failure Mother    • Coronary artery disease Father    • Lung cancer Father         positive smoker   • Heart attack Father    • Breast cancer Sister 60   • Prostate cancer Brother    • Colon polyps Brother    • Kidney nephrosis Brother    • Malig Hyperthermia Neg Hx      Social History     Socioeconomic History   • Marital status:      Spouse name: Not on file   • Number of children: Not on file   • Years of education: Not on file   • Highest education level: Not on file   Occupational History   • Occupation:    Tobacco Use   • Smoking status: Never Smoker   • Smokeless tobacco: Never Used   Substance and Sexual Activity   • Alcohol use: Yes     Comment: social, maybe a drink a month   • Drug use: No     Comment: previously smoked marijuana    • Sexual activity: Defer       Current Outpatient Medications:   •  amLODIPine-benazepril (LOTREL 5-10) 5-10 MG per capsule, Take 2 capsules by mouth Daily., Disp: 180 capsule, Rfl: 1  •  apixaban (ELIQUIS) 5 MG tablet tablet, Take 1 tablet by mouth Every 12 (Twelve) Hours., Disp: 60 tablet, Rfl: 2  •  atorvastatin (LIPITOR) 10 MG tablet, Take 1 tablet by mouth Every Night., Disp: 90 tablet, Rfl: 1  •  citalopram (CeleXA) 40 MG tablet, Take 1 tablet by mouth Daily., Disp: 90 tablet, Rfl: 1  •  furosemide (LASIX) 40 MG tablet, Take 1 tablet by mouth Daily., Disp: 90 tablet, Rfl: 1  •  Melatonin 3 MG capsule, Take 3 mg by mouth., Disp: , Rfl:   •  metoprolol tartrate (LOPRESSOR) 25 MG tablet, Take 1 tablet by mouth Every 12 (Twelve) Hours., Disp: 180 tablet, Rfl: 1  •  polyethylene glycol (MIRALAX) pack  packet, Take 17 g by mouth Daily As Needed (constipation)., Disp: , Rfl:   •  potassium chloride (MICRO-K) 10 MEQ CR capsule, TAKE ONE CAPSULE BY MOUTH EVERY DAY, Disp: 30 capsule, Rfl: 0  Allergies   Allergen Reactions   • Poison Ivy Extract Hives, Itching, Rash and Other (See Comments)     ITCHY BLISTERS        Objective   Vitals:    06/04/19 1325   BP: 126/80   Pulse: 80   SpO2: 98%     Physical Exam   Constitutional: He is oriented to person, place, and time. He appears well-developed and well-nourished.   HENT:   Head: Normocephalic.   Eyes: Conjunctivae, EOM and lids are normal. Pupils are equal, round, and reactive to light.   Neck: Trachea normal and normal range of motion. Neck supple. No hepatojugular reflux and no JVD present. Carotid bruit is not present. No thyroid mass and no thyromegaly present.   Cardiovascular: Normal rate, regular rhythm, S1 normal, S2 normal, normal heart sounds and normal pulses.  No extrasystoles are present. PMI is not displaced.   Pulmonary/Chest: Effort normal and breath sounds normal.   Abdominal: Soft. Normal appearance and bowel sounds are normal. He exhibits no mass. There is no hepatosplenomegaly. There is no tenderness. No hernia.   Musculoskeletal: Normal range of motion.   Neurological: He is alert and oriented to person, place, and time. He has normal strength and normal reflexes. No cranial nerve deficit or sensory deficit. He displays a negative Romberg sign.   Skin: Skin is warm, dry and intact.   Psychiatric: He has a normal mood and affect. His speech is normal and behavior is normal. Judgment and thought content normal. Cognition and memory are normal.     Independent Review of Radiographic Studies:    CT of the chest performed 5/29/2019 was independently reviewed and compared to previous x-rays.  There has been no significant change in the substernal thyroid goiter.  There is some compromise of the lumen of the trachea down to about 50% of normal.  There is no  suspicious adenopathy.  The lungs are clear.    Assessment/Plan   Assessment: Substernal thyroid goiter with no increase in size and no physiologic impairment to respiratory function.  Patient still recovering from his stroke and wishes to defer any surgery for a while yet.  Feel that we are safe and just watching this since he is having no obvious airway compromise.      Plan: Patient will return to see me in the office in 6 months with a CT of the chest with contrast.  I will be glad to see him sooner if you think it is necessary.    Diagnoses and all orders for this visit:    Substernal thyroid goiter  -     CT Chest With Contrast; Future

## 2019-06-06 ENCOUNTER — HOSPITAL ENCOUNTER (OUTPATIENT)
Dept: SLEEP MEDICINE | Facility: HOSPITAL | Age: 64
Discharge: HOME | End: 2019-06-06
Admitting: INTERNAL MEDICINE

## 2019-06-06 DIAGNOSIS — G47.30 SLEEP-DISORDERED BREATHING: ICD-10-CM

## 2019-06-06 DIAGNOSIS — Z86.69 HISTORY OF SLEEP APNEA: ICD-10-CM

## 2019-06-06 PROCEDURE — 95806 SLEEP STUDY UNATT&RESP EFFT: CPT

## 2019-06-06 PROCEDURE — 95806 SLEEP STUDY UNATT&RESP EFFT: CPT | Performed by: INTERNAL MEDICINE

## 2019-06-10 ENCOUNTER — TELEPHONE (OUTPATIENT)
Dept: SLEEP MEDICINE | Facility: HOSPITAL | Age: 64
End: 2019-06-10

## 2019-06-10 NOTE — TELEPHONE ENCOUNTER
Tech contacted pt to discuss HST results.  Pt voiced understanding of all.  Pt confirmed DME choice of [facility] and compliance appt date of 9/4/2019 at 0915. [facility]. wes

## 2019-06-12 ENCOUNTER — TELEPHONE (OUTPATIENT)
Dept: FAMILY MEDICINE CLINIC | Facility: CLINIC | Age: 64
End: 2019-06-12

## 2019-06-12 NOTE — TELEPHONE ENCOUNTER
Pt states that he is having leg cramps and pain and its keeping him up at night. Please advise what he can take.

## 2019-06-13 NOTE — TELEPHONE ENCOUNTER
Make sure he is drinking eight 8 ounces of water. He should stretch his legs and soak them in the evening. He can take 250 mg of magnesium from over the counter prior to bed and let us know how he is doing. If not improved with these measures he should make an appointment. Thanks.

## 2019-06-24 RX ORDER — POTASSIUM CHLORIDE 750 MG/1
CAPSULE, EXTENDED RELEASE ORAL
Qty: 30 CAPSULE | Refills: 0 | Status: SHIPPED | OUTPATIENT
Start: 2019-06-24 | End: 2019-07-22 | Stop reason: SDUPTHER

## 2019-06-24 RX ORDER — APIXABAN 5 MG/1
TABLET, FILM COATED ORAL
Qty: 60 TABLET | Refills: 0 | Status: SHIPPED | OUTPATIENT
Start: 2019-06-24 | End: 2019-07-22 | Stop reason: SDUPTHER

## 2019-06-27 ENCOUNTER — OFFICE VISIT (OUTPATIENT)
Dept: FAMILY MEDICINE CLINIC | Facility: CLINIC | Age: 64
End: 2019-06-27

## 2019-06-27 VITALS
HEART RATE: 60 BPM | TEMPERATURE: 98.3 F | OXYGEN SATURATION: 97 % | DIASTOLIC BLOOD PRESSURE: 64 MMHG | SYSTOLIC BLOOD PRESSURE: 124 MMHG | BODY MASS INDEX: 33.76 KG/M2 | WEIGHT: 235.8 LBS | RESPIRATION RATE: 16 BRPM | HEIGHT: 70 IN

## 2019-06-27 DIAGNOSIS — R06.02 SOB (SHORTNESS OF BREATH): ICD-10-CM

## 2019-06-27 DIAGNOSIS — G47.33 OBSTRUCTIVE SLEEP APNEA SYNDROME: ICD-10-CM

## 2019-06-27 DIAGNOSIS — E04.9 SUBSTERNAL THYROID GOITER: ICD-10-CM

## 2019-06-27 DIAGNOSIS — R42 LIGHTHEADEDNESS: Primary | ICD-10-CM

## 2019-06-27 PROCEDURE — 99214 OFFICE O/P EST MOD 30 MIN: CPT | Performed by: FAMILY MEDICINE

## 2019-06-27 NOTE — PROGRESS NOTES
Chief Complaint   Patient presents with   • Dizziness     has been lightheaded since stroke this week has gotten worse, no visual disturbance       Subjective   Flo Manzo is a 64 y.o. male.     History of Present Illness   He is back at work for couple of months now.   When he gets lightheaded he feels like he is in a fog. When he bends to pick something up he has to be holding onto something.   He feels lightheaded when he bends forward and when he stands up quickly.   Drinking water. Somers bring him water bottles to him at work. Says he drinks about 7-8 16oz water bottles per day. He does put flavor in his water.   He has bent forward and just kept going, lost balance and caught himself. He has not hit his head. Some days this happens quite a bit.   He has concerns that the goiter identified is affecting his breathing. If he talks for long periods of time he has trouble with the breathing and needing to take a rest and also when doing the steps at work. He has been doing steps at work for a couple of months and would have expected to be more conditioned by now. He is walking at the end of the day as well. Doing about 1.5-2 miles per day. He does not get CP. He does not get SOB with his evening walks, slower pace. Pt does not feel wheezing. Feels like he is running out of breath and he needs to stop and take another breath.   Using his CPAP over the last week. Uses it most of the night, sometimes it slips off and then does not put back on. Often making it to 4 am with it still on. He is on 7 and feels most comfortable to go to sleep.     Using tonic water about 4 oz nighlty for the quinine for his muscle cramps    Has broken 3 teeth since been on all the new medicine.     The following portions of the patient's history were reviewed and updated as appropriate: allergies, current medications, past medical history, past social history and problem list.    Review of Systems   Neurological: Positive for  "light-headedness.       Vitals:    06/27/19 1132   BP: 124/64   BP Location: Left arm   Patient Position: Sitting   Cuff Size: Adult   Pulse: 60   Resp: 16   Temp: 98.3 °F (36.8 °C)   TempSrc: Oral   SpO2: 97%   Weight: 107 kg (235 lb 12.8 oz)   Height: 177.8 cm (70\")       Objective   Physical Exam   Constitutional: He is oriented to person, place, and time. He appears well-nourished. No distress.   Eyes: Conjunctivae are normal. Right eye exhibits no discharge. Left eye exhibits no discharge. No scleral icterus.   Cardiovascular: Normal rate, normal heart sounds and intact distal pulses. Exam reveals no gallop and no friction rub.   No murmur heard.  Irregularly, irregular   Pulmonary/Chest: Effort normal and breath sounds normal. No stridor. No respiratory distress. He has no wheezes.   He is not respiratory distress but he does seem to pause for deep breaths.    Neurological: He is alert and oriented to person, place, and time.   Psychiatric: He has a normal mood and affect. His behavior is normal.   Vitals reviewed.      Assessment/Plan   Flo was seen today for dizziness.    Diagnoses and all orders for this visit:    Lightheadedness  -     CBC (No Diff)  -     Basic Metabolic Panel  -     TSH  -     T4    SOB (shortness of breath)  -     CBC (No Diff)  -     Basic Metabolic Panel    Substernal thyroid goiter  -     TSH  -     T4    Obstructive sleep apnea syndrome    his lightheadedness is positional and he sounds to be drinking pretty good volume as far as water goes. His BP runs on low end of normal and with positional change could be demonstrating some orthostasis.    His SOB occurs with stairs, 2 flights at work, that he has been doing since he started working there end of April so he thought he would tolerate this exertion better. He also noted when he is going to explain something that requires a bit of time to explain he is SOB and pauses when speaking longer time to catch his breath. He can tolerate " his walks in the evening well. Just says it is a slower pace and that is why.   He is nervous about his substernal goiter growing and causing the SOB. He was told to look out for wheezing and he denies any wheezing occurring. He is going to see Dr. Burciaga in December with a scan prior to but is nervous to wait that long. I will send Dr. Burciaga a note but feel the conditional SOB is related to still some deconditioning, anxiety, body habitus.   We are going to focus efforts on relieving the lightheadedness he voices has been a problem since his admission for new a fib and stroke. Concern for orthostasis and low BPs. Given his issues with urinary frequency and urgency we will decrease the lasix. We will have him take 20 mg of furosemide. He will give us an update on 7/1. Pt voiced understanding.     GÓMEZ is being treated starting this month with CPAP. He is doing well tolerating it per report to start. He can fall asleep and stay asleep. When he wakes up he does take it off and does not put it back on. Encouraged to continue use it can really improve a fib.

## 2019-06-27 NOTE — PATIENT INSTRUCTIONS
Take furosemide 20 mg per day. Let me know if you can't cut the pills.   Let me know how things are going on Monday.

## 2019-06-28 LAB
BUN SERPL-MCNC: 14 MG/DL (ref 8–23)
BUN/CREAT SERPL: 16.5 (ref 7–25)
CALCIUM SERPL-MCNC: 9.9 MG/DL (ref 8.6–10.5)
CHLORIDE SERPL-SCNC: 104 MMOL/L (ref 98–107)
CO2 SERPL-SCNC: 27.2 MMOL/L (ref 22–29)
CREAT SERPL-MCNC: 0.85 MG/DL (ref 0.76–1.27)
ERYTHROCYTE [DISTWIDTH] IN BLOOD BY AUTOMATED COUNT: 13.2 % (ref 12.3–15.4)
GLUCOSE SERPL-MCNC: 84 MG/DL (ref 65–99)
HCT VFR BLD AUTO: 46.9 % (ref 37.5–51)
HGB BLD-MCNC: 14.7 G/DL (ref 13–17.7)
MCH RBC QN AUTO: 28.5 PG (ref 26.6–33)
MCHC RBC AUTO-ENTMCNC: 31.3 G/DL (ref 31.5–35.7)
MCV RBC AUTO: 90.9 FL (ref 79–97)
PLATELET # BLD AUTO: 261 10*3/MM3 (ref 140–450)
POTASSIUM SERPL-SCNC: 4.7 MMOL/L (ref 3.5–5.2)
RBC # BLD AUTO: 5.16 10*6/MM3 (ref 4.14–5.8)
SODIUM SERPL-SCNC: 141 MMOL/L (ref 136–145)
T4 SERPL-MCNC: 6.72 MCG/DL (ref 4.5–11.7)
TSH SERPL DL<=0.005 MIU/L-ACNC: <0.005 MIU/ML (ref 0.27–4.2)
WBC # BLD AUTO: 7.62 10*3/MM3 (ref 3.4–10.8)

## 2019-07-01 ENCOUNTER — TELEPHONE (OUTPATIENT)
Dept: FAMILY MEDICINE CLINIC | Facility: CLINIC | Age: 64
End: 2019-07-01

## 2019-07-01 NOTE — TELEPHONE ENCOUNTER
Please clarify. We reduced his furosemide to half tablet. Hoping this would help with his lightheadedness. Clarify he can cut them in half easily and his lightheadedness is better. Also please ask about any fluid retention, if he is having more trouble with LE swelling. Thanks.   You can let him know that I did get a message to Dr. Burciaga with regards to his SOB. I think it is related to other things and not the thyroid goiter. I think it could be related to continued conditioning, even some anxiety or nerves and feel this can improve the more he is active, at his new job, and with treating his sleep apnea. Dr. Burciaga would see him if I thought pt needed to be seen now but at this point I do not think pt needs to have a sooner appointment. Again let me know if any noisy breathing develops. Thanks.

## 2019-07-01 NOTE — TELEPHONE ENCOUNTER
Fyi..Patient calling to let you know the medication that you switch him to he is doing fine on it.

## 2019-07-02 NOTE — TELEPHONE ENCOUNTER
Spoke with patient in detail. He states that he is taking 1/2 tablet of lasix and the new combo BP is working better for him.He states lightheaded ness has decreased to a minimal and he is feeling much better.

## 2019-07-11 ENCOUNTER — OFFICE VISIT (OUTPATIENT)
Dept: FAMILY MEDICINE CLINIC | Facility: CLINIC | Age: 64
End: 2019-07-11

## 2019-07-11 VITALS
OXYGEN SATURATION: 97 % | HEART RATE: 85 BPM | DIASTOLIC BLOOD PRESSURE: 68 MMHG | WEIGHT: 233.1 LBS | BODY MASS INDEX: 33.37 KG/M2 | RESPIRATION RATE: 17 BRPM | SYSTOLIC BLOOD PRESSURE: 138 MMHG | TEMPERATURE: 97.9 F | HEIGHT: 70 IN

## 2019-07-11 DIAGNOSIS — R42 LIGHTHEADED: Primary | ICD-10-CM

## 2019-07-11 DIAGNOSIS — R25.2 LEG CRAMPS: ICD-10-CM

## 2019-07-11 DIAGNOSIS — R79.89 LOW TSH LEVEL: ICD-10-CM

## 2019-07-11 DIAGNOSIS — R26.89 BALANCE PROBLEM: ICD-10-CM

## 2019-07-11 PROCEDURE — 99214 OFFICE O/P EST MOD 30 MIN: CPT | Performed by: FAMILY MEDICINE

## 2019-07-11 NOTE — PROGRESS NOTES
"Chief Complaint   Patient presents with   • Extremity Weakness     cramping started with feet and goes up to to knee   • Dizziness     pt states that he is having both lightheadedness and dizziness       Subjective   Flo Manzo is a 64 y.o. male.     History of Present Illness   Dizzy and lightheaded.   He says it is more persistent. He is sweating a lot. He is drinking more water. Still taking 20 mg of lasix still. He had small improvement then seems like it is worse again. Taking mag, K and eating bananas and oranges for leg cramps that become intense. Could happen all day. Yesterday too much pain and he could not even put shoes on.   He did not drive today, does not feel comfortable driving with his symptoms. With his urination now he goes without being able to hold any urine at all.   This has been going on since he had the stroke, no warning or sense of control. He can't stop going once he starts to urinate.   He has worn guards prior to this but it this is worse then when he started using the guards. He has not fallen. Not exerting much at work. No spinning. He is lightheaded with getting up to quick and today lightheaded with walking. Going down hallway and out to truck and feels like he is drunk. He did not drive.      The following portions of the patient's history were reviewed and updated as appropriate: allergies, current medications, past medical history, past social history and problem list.    Review of Systems   Respiratory: Positive for cough.    Neurological: Positive for dizziness, weakness and light-headedness.     Lots of dust in the work setting. Thinks cough is related to this.     Vitals:    07/11/19 1401   BP: 138/68   BP Location: Left arm   Patient Position: Sitting   Cuff Size: Adult   Pulse: 85   Resp: 17   Temp: 97.9 °F (36.6 °C)   TempSrc: Oral   SpO2: 97%   Weight: 106 kg (233 lb 1.6 oz)   Height: 177.8 cm (70\")       Objective   Physical Exam   Constitutional: He is oriented to " person, place, and time. He appears well-nourished. No distress.   Eyes: Conjunctivae are normal. Right eye exhibits no discharge. Left eye exhibits no discharge. No scleral icterus.   Cardiovascular: Normal rate, regular rhythm, normal heart sounds and intact distal pulses. Exam reveals no gallop and no friction rub.   No murmur heard.  Pulmonary/Chest: Effort normal and breath sounds normal. No respiratory distress. He has no wheezes.   Musculoskeletal: He exhibits no edema.   Neurological: He is alert and oriented to person, place, and time.   Psychiatric: He has a normal mood and affect. His behavior is normal.   Vitals reviewed.      Assessment/Plan   Flo was seen today for extremity weakness and dizziness.    Diagnoses and all orders for this visit:    Lightheaded  -     CT Head Without Contrast; Future  -     TSH  -     T4    Balance problem  -     CT Head Without Contrast; Future  -     TSH  -     T4    Leg cramps  -     TSH  -     T4    Low TSH level  -     TSH  -     T4    Other orders  -     triamcinolone (KENALOG) 0.1 % ointment; Apply  topically to the appropriate area as directed 2 (Two) Times a Day.        He did better briefly with the change to the BP meds. Now his symptoms seem more persistent/worse, still occurring a lot with position change but now having dizziness/lightheadedness while walking around. It is affecting how much he can do at work. Says he is sleeping and eating/drinking well. He wears CPAP in bed. Pt feels he has several residual symptoms from the stroke. He has some left leg weakness, some of the dizziness has been hanging on since the stroke but seems worse now. He did have significantly low TSH on last check with normal T4, a little early for repeat but given how low the TSH was we will go ahead and check that again. At this point with more persistent symptoms will also get CT head.

## 2019-07-12 ENCOUNTER — HOSPITAL ENCOUNTER (OUTPATIENT)
Dept: CT IMAGING | Facility: HOSPITAL | Age: 64
Discharge: HOME | End: 2019-07-12
Admitting: FAMILY MEDICINE

## 2019-07-12 ENCOUNTER — TELEPHONE (OUTPATIENT)
Dept: FAMILY MEDICINE CLINIC | Facility: CLINIC | Age: 64
End: 2019-07-12

## 2019-07-12 DIAGNOSIS — R26.89 BALANCE PROBLEM: ICD-10-CM

## 2019-07-12 DIAGNOSIS — R42 LIGHTHEADED: ICD-10-CM

## 2019-07-12 LAB
T4 SERPL-MCNC: 6.78 MCG/DL (ref 4.5–11.7)
TSH SERPL DL<=0.005 MIU/L-ACNC: 0.01 MIU/ML (ref 0.27–4.2)

## 2019-07-12 PROCEDURE — 70450 CT HEAD/BRAIN W/O DYE: CPT

## 2019-07-13 ENCOUNTER — TELEPHONE (OUTPATIENT)
Dept: FAMILY MEDICINE CLINIC | Facility: CLINIC | Age: 64
End: 2019-07-13

## 2019-07-13 NOTE — TELEPHONE ENCOUNTER
Pt's personal VM left message with reassuring findings of the CT head. No new findings and the cerebellar hematoma is resolved. No new or expanding stroke on imaging. Old findings are stable. We need to discuss on Monday what to do next for evaluation/treatment. Since symptoms are from after the stroke and somewhat worsening could consider going to neurology for evaluation. Evaluation with ENT may be warranted. We could try meclizine to calm symptoms. His BP is coming back up again so no more decrease in the meds here. He did have slight improvement of the TSH though still significantly decreased from normal range with a stable and normal T4. Wouldn't anticipate that this would have impact and cause his symptoms but can look into this. Other labs have been looking good with these symptoms.

## 2019-07-17 DIAGNOSIS — R26.9 GAIT DISTURBANCE, POST-STROKE: ICD-10-CM

## 2019-07-17 DIAGNOSIS — R42 LIGHTHEADED: ICD-10-CM

## 2019-07-17 DIAGNOSIS — I61.9 HEMORRHAGIC STROKE (HCC): Primary | ICD-10-CM

## 2019-07-17 DIAGNOSIS — I69.398 GAIT DISTURBANCE, POST-STROKE: ICD-10-CM

## 2019-07-17 DIAGNOSIS — R42 DIZZINESS: ICD-10-CM

## 2019-07-17 RX ORDER — MECLIZINE HYDROCHLORIDE 25 MG/1
25 TABLET ORAL 3 TIMES DAILY PRN
Qty: 10 TABLET | Refills: 0 | Status: SHIPPED | OUTPATIENT
Start: 2019-07-17 | End: 2019-10-24

## 2019-07-17 NOTE — TELEPHONE ENCOUNTER
I had left voicemail on patient's cell phone and relayed that to the wife.  I spoke to the wife about his CT head results and reassuring findings there.  I discussed next best options and we will go ahead and put in a referral for neurology as well as try meclizine for his symptoms.  Patient can give a call for any further clarification or to turn down any of these next steps.  If the meclizine is not helpful he does not need to take it but potentially worth a trial for relief of his symptoms.  Wife voiced understanding and patient will call with further questions.

## 2019-07-22 RX ORDER — APIXABAN 5 MG/1
TABLET, FILM COATED ORAL
Qty: 60 TABLET | Refills: 0 | Status: SHIPPED | OUTPATIENT
Start: 2019-07-22 | End: 2019-08-21 | Stop reason: SDUPTHER

## 2019-07-22 RX ORDER — POTASSIUM CHLORIDE 750 MG/1
CAPSULE, EXTENDED RELEASE ORAL
Qty: 30 CAPSULE | Refills: 0 | Status: SHIPPED | OUTPATIENT
Start: 2019-07-22 | End: 2019-08-21 | Stop reason: SDUPTHER

## 2019-08-21 DIAGNOSIS — R26.9 GAIT ABNORMALITY: ICD-10-CM

## 2019-08-21 DIAGNOSIS — R42 DIZZINESS: ICD-10-CM

## 2019-08-21 DIAGNOSIS — R42 LIGHTHEADEDNESS: Primary | ICD-10-CM

## 2019-08-21 DIAGNOSIS — I61.9 HEMORRHAGIC STROKE (HCC): ICD-10-CM

## 2019-08-21 RX ORDER — POTASSIUM CHLORIDE 750 MG/1
CAPSULE, EXTENDED RELEASE ORAL
Qty: 90 CAPSULE | Refills: 0 | Status: SHIPPED | OUTPATIENT
Start: 2019-08-21 | End: 2019-10-24

## 2019-08-21 RX ORDER — APIXABAN 5 MG/1
TABLET, FILM COATED ORAL
Qty: 180 TABLET | Refills: 0 | Status: SHIPPED | OUTPATIENT
Start: 2019-08-21 | End: 2020-01-20

## 2019-08-23 ENCOUNTER — OFFICE VISIT (OUTPATIENT)
Dept: FAMILY MEDICINE CLINIC | Facility: CLINIC | Age: 64
End: 2019-08-23

## 2019-08-23 VITALS
RESPIRATION RATE: 15 BRPM | OXYGEN SATURATION: 98 % | WEIGHT: 235.4 LBS | HEART RATE: 71 BPM | SYSTOLIC BLOOD PRESSURE: 120 MMHG | DIASTOLIC BLOOD PRESSURE: 72 MMHG | TEMPERATURE: 98.3 F | BODY MASS INDEX: 33.7 KG/M2 | HEIGHT: 70 IN

## 2019-08-23 DIAGNOSIS — Z79.899 MEDICATION MANAGEMENT: ICD-10-CM

## 2019-08-23 DIAGNOSIS — F33.0 MILD EPISODE OF RECURRENT MAJOR DEPRESSIVE DISORDER (HCC): Primary | ICD-10-CM

## 2019-08-23 PROCEDURE — 99213 OFFICE O/P EST LOW 20 MIN: CPT | Performed by: FAMILY MEDICINE

## 2019-08-23 RX ORDER — DULOXETIN HYDROCHLORIDE 30 MG/1
30 CAPSULE, DELAYED RELEASE ORAL DAILY
Qty: 90 CAPSULE | Refills: 0 | Status: SHIPPED | OUTPATIENT
Start: 2019-08-23 | End: 2019-10-11

## 2019-08-29 ENCOUNTER — TELEPHONE (OUTPATIENT)
Dept: FAMILY MEDICINE CLINIC | Facility: CLINIC | Age: 64
End: 2019-08-29

## 2019-08-29 NOTE — TELEPHONE ENCOUNTER
Pt called office and stated that he was suppose to contact us if he had not heard from a doctor he was being referred out to. He though it was cardiology but per chart it is neuro.He also asked for the name of the disability dr that you knew could help him. The first part of the message I took care of and notify wife of the dr and gave number from him to schedule. Please advise.

## 2019-09-04 ENCOUNTER — OFFICE VISIT (OUTPATIENT)
Dept: SLEEP MEDICINE | Facility: HOSPITAL | Age: 64
End: 2019-09-04

## 2019-09-04 VITALS — BODY MASS INDEX: 33.07 KG/M2 | HEIGHT: 70 IN | WEIGHT: 231 LBS

## 2019-09-04 DIAGNOSIS — G47.33 OSA ON CPAP: Primary | ICD-10-CM

## 2019-09-04 DIAGNOSIS — G47.14 HYPERSOMNIA DUE TO MEDICAL CONDITION: ICD-10-CM

## 2019-09-04 DIAGNOSIS — Z99.89 OSA ON CPAP: Primary | ICD-10-CM

## 2019-09-04 PROCEDURE — 99213 OFFICE O/P EST LOW 20 MIN: CPT | Performed by: INTERNAL MEDICINE

## 2019-09-04 PROCEDURE — G0463 HOSPITAL OUTPT CLINIC VISIT: HCPCS

## 2019-09-04 NOTE — PROGRESS NOTES
"Follow Up [facility] Disorders [facility] Note     Chief Complaint:  GÓMEZ     Primary Care Physician: Cathleen Croona MD    Interval History:   I last saw the patient in April.  A home sleep study was performed 6/6/2019 and moderate severity GÓMEZ with sleep-related hypoxia noted.  Auto CPAP initiated.  Patient comes in today stating he is improved.  He will use the bathroom during the nighttime.  [place] Sleepiness Scale is within normal limits.    Review of Systems:    A complete review of systems was done and all were negative with the exception of depression    Social History:    Social History     Socioeconomic History   • Marital status:      Spouse name: Not on file   • Number of children: Not on file   • Years of education: Not on file   • Highest education level: Not on file   Occupational History   • Occupation:    Tobacco Use   • Smoking status: Never Smoker   • Smokeless tobacco: Never Used   Substance and Sexual Activity   • Alcohol use: Yes     Comment: social, maybe a drink a month   • Drug use: No     Comment: previously smoked marijuana    • Sexual activity: Defer       Allergies:  Poison ivy extract     Medication Review:  Reviewed.      Vital Signs:    Vitals:    09/04/19 0900   Weight: 105 kg (231 lb)   Height: 177.8 cm (70\")     Body mass index is 33.15 kg/m².    Physical Exam:    Constitutional:  Well developed 64 y.o. male that appears in no apparent distress.  Awake & oriented times 3.  Normal mood with normal recent and remote memory and normal judgement.  Eyes:  Conjunctivae normal.  Oropharynx:  moist mucous membranes without exudate and a borderline enlarged tongue with previous UPPP and moderate narrowing of the posterior pharyngeal opening and class II-3 MP airway     Results Review:  DME is [facility] and he uses a nasal interface.  Downloads demonstrate only 3 days of usage.  Average usage is 132 minutes.  Average AHI is mildly abnormal at 8.9 without leak.  Average AutoCPAP pressure " is 8.2 and his auto CPAP is 7-20.       Impression:   Moderate severity obstructive sleep apnea with sleep-related hypoxia nearly adequately treated with auto CPAP with somecomplaints of hypersomnolence.    Plan:  Good sleep hygiene measures should be maintained.  Weight loss would be beneficial in this patient who is obese by BMI.  The patient is benefiting from the treatment being provided.     I reviewed all with the patient.  I reviewed his home sleep study and I reviewed downloads.  The patient is to use auto CPAP every night.    The patient will call for any problems and will follow up in 6 weeks.      Dane Dixon MD  Sleep Medicine  09/04/19  9:32 AM

## 2019-09-05 DIAGNOSIS — Z79.899 MEDICATION MANAGEMENT: ICD-10-CM

## 2019-09-08 PROBLEM — Z99.89 OSA ON CPAP: Status: ACTIVE | Noted: 2019-09-08

## 2019-09-08 PROBLEM — G47.33 OSA ON CPAP: Status: ACTIVE | Noted: 2019-09-08

## 2019-09-08 PROBLEM — G47.14 HYPERSOMNIA DUE TO MEDICAL CONDITION: Status: ACTIVE | Noted: 2019-09-08

## 2019-09-20 DIAGNOSIS — I20.8 ANGINA AT REST (HCC): ICD-10-CM

## 2019-09-20 DIAGNOSIS — I10 ESSENTIAL HYPERTENSION: ICD-10-CM

## 2019-09-20 RX ORDER — FUROSEMIDE 40 MG/1
40 TABLET ORAL DAILY
Qty: 90 TABLET | Refills: 0 | Status: SHIPPED | OUTPATIENT
Start: 2019-09-20 | End: 2019-10-24

## 2019-09-20 RX ORDER — CITALOPRAM 40 MG/1
40 TABLET ORAL DAILY
Qty: 90 TABLET | Refills: 0 | Status: SHIPPED | OUTPATIENT
Start: 2019-09-20 | End: 2019-11-21 | Stop reason: DRUGHIGH

## 2019-09-20 RX ORDER — AMLODIPINE BESYLATE AND BENAZEPRIL HYDROCHLORIDE 5; 10 MG/1; MG/1
2 CAPSULE ORAL DAILY
Qty: 180 CAPSULE | Refills: 0 | Status: SHIPPED | OUTPATIENT
Start: 2019-09-20 | End: 2020-03-18

## 2019-09-20 RX ORDER — ATORVASTATIN CALCIUM 10 MG/1
10 TABLET, FILM COATED ORAL NIGHTLY
Qty: 90 TABLET | Refills: 0 | Status: SHIPPED | OUTPATIENT
Start: 2019-09-20 | End: 2020-03-18

## 2019-10-11 ENCOUNTER — OFFICE VISIT (OUTPATIENT)
Dept: FAMILY MEDICINE CLINIC | Facility: CLINIC | Age: 64
End: 2019-10-11

## 2019-10-11 VITALS
BODY MASS INDEX: 34.06 KG/M2 | WEIGHT: 237.9 LBS | DIASTOLIC BLOOD PRESSURE: 92 MMHG | HEIGHT: 70 IN | HEART RATE: 90 BPM | SYSTOLIC BLOOD PRESSURE: 132 MMHG | TEMPERATURE: 98.3 F | RESPIRATION RATE: 15 BRPM | OXYGEN SATURATION: 98 %

## 2019-10-11 DIAGNOSIS — R06.02 SOB (SHORTNESS OF BREATH): ICD-10-CM

## 2019-10-11 DIAGNOSIS — F32.9 REACTIVE DEPRESSION: Primary | ICD-10-CM

## 2019-10-11 PROCEDURE — 99214 OFFICE O/P EST MOD 30 MIN: CPT | Performed by: FAMILY MEDICINE

## 2019-10-11 RX ORDER — ALBUTEROL SULFATE 90 UG/1
2 AEROSOL, METERED RESPIRATORY (INHALATION) EVERY 4 HOURS PRN
Qty: 18 G | Refills: 1 | Status: SHIPPED | OUTPATIENT
Start: 2019-10-11 | End: 2020-06-19 | Stop reason: SDUPTHER

## 2019-10-11 RX ORDER — DULOXETIN HYDROCHLORIDE 30 MG/1
60 CAPSULE, DELAYED RELEASE ORAL DAILY
Qty: 90 CAPSULE | Refills: 0 | Status: SHIPPED | OUTPATIENT
Start: 2019-10-11 | End: 2019-10-24 | Stop reason: SDUPTHER
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
## 2019-10-14 ENCOUNTER — DOCUMENTATION (OUTPATIENT)
Dept: NEUROSURGERY | Facility: CLINIC | Age: 64
End: 2019-10-14

## 2019-10-14 DIAGNOSIS — I61.9 HEMORRHAGIC STROKE (HCC): Primary | ICD-10-CM

## 2019-10-14 NOTE — PROGRESS NOTES
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  We did see the patient back in February when he is in the hospital, he did not require any follow-up with our service.  We did review the CT scan that was done in July and the patient does not need our neurosurgery service at this time.  However he does need evaluation by a neurologist.  We waived his visit today and will send a message to his primary care provider and place a referral to see Dr. Jax Wilkins, a neurologist in [place], [place].

## 2019-10-18 ENCOUNTER — TELEPHONE (OUTPATIENT)
Dept: FAMILY MEDICINE CLINIC | Facility: CLINIC | Age: 64
End: 2019-10-18

## 2019-10-18 NOTE — TELEPHONE ENCOUNTER
PT HAVING SORE THROAT, COUGHING, CONGESTION AND TROUBLE BREATHING.    HE SAID THE INHALOR YOU GAVE HIM DOESN'T HELP MUCH.    HE WANTS TO KNOW WHAT HE CAN TAKE TO GET SOME RELIEF.  SAID YOU CAN SPEAK WITH HIS WIFE

## 2019-10-23 ENCOUNTER — OFFICE VISIT (OUTPATIENT)
Dept: SLEEP MEDICINE | Facility: HOSPITAL | Age: 64
End: 2019-10-23

## 2019-10-23 VITALS — BODY MASS INDEX: 33.5 KG/M2 | HEIGHT: 70 IN | HEART RATE: 71 BPM | OXYGEN SATURATION: 94 % | WEIGHT: 234 LBS

## 2019-10-23 DIAGNOSIS — G47.14 HYPERSOMNIA DUE TO MEDICAL CONDITION: ICD-10-CM

## 2019-10-23 DIAGNOSIS — G47.33 OSA ON CPAP: Primary | ICD-10-CM

## 2019-10-23 DIAGNOSIS — IMO0002 SLEEP-RELATED HYPOVENTILATION: ICD-10-CM

## 2019-10-23 DIAGNOSIS — Z99.89 OSA ON CPAP: Primary | ICD-10-CM

## 2019-10-23 PROCEDURE — 99213 OFFICE O/P EST LOW 20 MIN: CPT | Performed by: INTERNAL MEDICINE

## 2019-10-23 PROCEDURE — G0463 HOSPITAL OUTPT CLINIC VISIT: HCPCS

## 2019-10-23 NOTE — PROGRESS NOTES
"Follow Up Sleep Disorders Center Note     Chief Complaint:  GÓMEZ     Primary Care Physician: Cathleen Corona MD    Interval History:   I last saw the patient 9/4/2019.  He states he is doing well but he is having problems with allergies.  He goes to bed at 11:30 PM and awakens at 6 AM.  [place] Sleepiness Scale is abnormal at 9.    Review of Systems:    A complete review of systems was done and all were negative with the exception of nasal congestion, MCGREGOR, and depression    Social History:    Social History     Socioeconomic History   • Marital status:      Spouse name: Not on file   • Number of children: Not on file   • Years of education: Not on file   • Highest education level: Not on file   Occupational History   • Occupation:    Tobacco Use   • Smoking status: Never Smoker   • Smokeless tobacco: Never Used   Substance and Sexual Activity   • Alcohol use: Yes     Comment: social, maybe a drink a month   • Drug use: No     Comment: previously smoked marijuana    • Sexual activity: Defer       Allergies:  Poison ivy extract     Medication Review:  Reviewed.      Vital Signs:    Vitals:    10/23/19 0900   Pulse: 71   SpO2: 94%   Weight: 106 kg (234 lb)   Height: 177.8 cm (70\")     Body mass index is 33.58 kg/m².    Physical Exam:    Constitutional:  Well developed 64 y.o. male that appears in no apparent distress.  Awake & oriented times 3.  Normal mood with normal recent and remote memory and normal judgement.  Eyes:  Conjunctivae normal.  Oropharynx:  moist mucous membranes without exudate and a borderline enlarged tongue and previous UPPP and moderate narrowing of the posterior pharyngeal opening and class II-3 MP airway     Results Review:  DME is Naps and he uses nasal pillows.  Downloads between 26 and 10/22/2019 compliance greater than 50%.  Average usage is 2 hours and 18 minutes.  Average AHI is mildly abnormal at 8 without leak.  Average AutoCPAP pressure is 8.1 and his auto CPAP is " 7-20.       Impression:   Moderate severity obstructive sleep apnea with sleep-related hypoxia nearly adequately treated with auto CPAP with good compliance and usage and persistent complaints of hypersomnolence.    Plan:  Good sleep hygiene measures should be maintained.  Weight loss would be beneficial in this patient who is obese by BMI.  The patient is benefiting from the treatment being provided.     I again reviewed all with the patient.  CPAP pressures will be changed to 6-12.  Humidity was changed to fix where he could adjusted.    The patient will call for any problems and will follow up in 6 weeks.      Dane Dixon MD  Sleep Medicine  10/23/19  9:15 AM

## 2019-10-24 ENCOUNTER — OFFICE VISIT (OUTPATIENT)
Dept: FAMILY MEDICINE CLINIC | Facility: CLINIC | Age: 64
End: 2019-10-24

## 2019-10-24 VITALS
RESPIRATION RATE: 16 BRPM | DIASTOLIC BLOOD PRESSURE: 90 MMHG | TEMPERATURE: 98.3 F | SYSTOLIC BLOOD PRESSURE: 124 MMHG | WEIGHT: 233.3 LBS | HEIGHT: 70 IN | OXYGEN SATURATION: 94 % | HEART RATE: 82 BPM | BODY MASS INDEX: 33.4 KG/M2

## 2019-10-24 DIAGNOSIS — F32.9 REACTIVE DEPRESSION: Primary | ICD-10-CM

## 2019-10-24 PROCEDURE — 99213 OFFICE O/P EST LOW 20 MIN: CPT | Performed by: FAMILY MEDICINE

## 2019-10-24 RX ORDER — DULOXETIN HYDROCHLORIDE 60 MG/1
60 CAPSULE, DELAYED RELEASE ORAL DAILY
Qty: 90 CAPSULE | Refills: 1 | Status: SHIPPED | OUTPATIENT
Start: 2019-10-24 | End: 2020-06-10

## 2019-10-24 RX ORDER — MECLIZINE HYDROCHLORIDE 25 MG/1
25 TABLET ORAL 3 TIMES DAILY PRN
Qty: 30 TABLET | Refills: 1 | Status: SHIPPED | OUTPATIENT
Start: 2019-10-24 | End: 2021-10-22 | Stop reason: ALTCHOICE

## 2019-10-24 NOTE — PROGRESS NOTES
"Chief Complaint   Patient presents with   • Depression       Subjective   Flo Manzo is a 64 y.o. male.     History of Present Illness   Following up on increase in the duloxetine for the depression. He says she has not taken the last couple of days but thinks this has helped with the increase. His wife is back in the hospital so this is stressful.     The following portions of the patient's history were reviewed and updated as appropriate: allergies, current medications, past medical history, past social history and problem list.    Review of Systems   Respiratory: Negative.    Cardiovascular: Negative.    Neurological: Negative.        Vitals:    10/24/19 1052   BP: 124/90   BP Location: Left arm   Patient Position: Sitting   Cuff Size: Adult   Pulse: 82   Resp: 16   Temp: 98.3 °F (36.8 °C)   TempSrc: Oral   SpO2: 94%   Weight: 106 kg (233 lb 4.8 oz)   Height: 177.8 cm (70\")       Objective   Physical Exam   Constitutional: He is oriented to person, place, and time. He appears well-nourished. No distress.   Eyes: Conjunctivae are normal. Right eye exhibits no discharge. Left eye exhibits no discharge. No scleral icterus.   Cardiovascular: Normal rate, regular rhythm, normal heart sounds and intact distal pulses. Exam reveals no gallop and no friction rub.   No murmur heard.  Pulmonary/Chest: Effort normal and breath sounds normal. No respiratory distress. He has no wheezes.   Musculoskeletal: He exhibits no edema.   Neurological: He is alert and oriented to person, place, and time.   Psychiatric: He has a normal mood and affect. His behavior is normal.   Vitals reviewed.      Assessment/Plan   Flo was seen today for depression.    Diagnoses and all orders for this visit:    Reactive depression    Other orders  -     meclizine (ANTIVERT) 25 MG tablet; Take 1 tablet by mouth 3 (Three) Times a Day As Needed for dizziness.  -     DULoxetine (CYMBALTA) 60 MG capsule; Take 1 capsule by mouth Daily.    better " with the dose, did not have bottle for couple of days due to wife relocating it then she was admitted to the hospital. He has again. Will finish his 30 mg dosing with 60 mg nightly and I will send more.     He missed his neurology appointment but said he did not know when the appointment was scheduled for. Will try to get him rescheduled.

## 2019-10-26 PROBLEM — IMO0002 SLEEP-RELATED HYPOVENTILATION: Status: ACTIVE | Noted: 2019-10-26

## 2019-11-21 ENCOUNTER — OFFICE VISIT (OUTPATIENT)
Dept: FAMILY MEDICINE CLINIC | Facility: CLINIC | Age: 64
End: 2019-11-21

## 2019-11-21 VITALS
WEIGHT: 234.6 LBS | BODY MASS INDEX: 33.58 KG/M2 | HEIGHT: 70 IN | SYSTOLIC BLOOD PRESSURE: 132 MMHG | DIASTOLIC BLOOD PRESSURE: 98 MMHG | OXYGEN SATURATION: 95 % | RESPIRATION RATE: 14 BRPM | TEMPERATURE: 98 F | HEART RATE: 83 BPM

## 2019-11-21 DIAGNOSIS — F32.89 OTHER DEPRESSION: ICD-10-CM

## 2019-11-21 DIAGNOSIS — J40 BRONCHITIS: ICD-10-CM

## 2019-11-21 DIAGNOSIS — R39.15 URINARY URGENCY: Primary | ICD-10-CM

## 2019-11-21 PROCEDURE — 81003 URINALYSIS AUTO W/O SCOPE: CPT | Performed by: FAMILY MEDICINE

## 2019-11-21 PROCEDURE — 99213 OFFICE O/P EST LOW 20 MIN: CPT | Performed by: FAMILY MEDICINE

## 2019-11-21 RX ORDER — CITALOPRAM 20 MG/1
20 TABLET ORAL DAILY
COMMUNITY
End: 2019-12-20

## 2019-11-21 RX ORDER — AZITHROMYCIN 250 MG/1
TABLET, FILM COATED ORAL
Qty: 6 TABLET | Refills: 0 | Status: SHIPPED | OUTPATIENT
Start: 2019-11-21 | End: 2019-12-20

## 2019-11-21 RX ORDER — QUETIAPINE FUMARATE 25 MG/1
25 TABLET, FILM COATED ORAL NIGHTLY
Qty: 30 TABLET | Refills: 0 | Status: SHIPPED | OUTPATIENT
Start: 2019-11-21 | End: 2020-03-19 | Stop reason: SDUPTHER

## 2019-11-25 ENCOUNTER — TELEPHONE (OUTPATIENT)
Dept: FAMILY MEDICINE CLINIC | Facility: CLINIC | Age: 64
End: 2019-11-25

## 2019-11-25 LAB
BILIRUB BLD-MCNC: NEGATIVE MG/DL
CLARITY, POC: CLEAR
COLOR UR: YELLOW
GLUCOSE UR STRIP-MCNC: NEGATIVE MG/DL
KETONES UR QL: NEGATIVE
LEUKOCYTE EST, POC: NEGATIVE
NITRITE UR-MCNC: NEGATIVE MG/ML
PH UR: 7.5 [PH] (ref 5–8)
PROT UR STRIP-MCNC: ABNORMAL MG/DL
RBC # UR STRIP: NEGATIVE /UL
SP GR UR: 1.02 (ref 1–1.03)
UROBILINOGEN UR QL: NORMAL

## 2019-11-25 NOTE — TELEPHONE ENCOUNTER
YES!!! Please call this patient and adivse him to come back to office so we can test his urine. He left Friday without leaving a sample. Pt was contacted and told to come in on Monday.

## 2019-12-04 ENCOUNTER — HOSPITAL ENCOUNTER (OUTPATIENT)
Dept: CT IMAGING | Facility: HOSPITAL | Age: 64
Discharge: HOME | End: 2019-12-04
Admitting: THORACIC SURGERY (CARDIOTHORACIC VASCULAR SURGERY)

## 2019-12-04 DIAGNOSIS — E04.9 SUBSTERNAL THYROID GOITER: ICD-10-CM

## 2019-12-04 LAB — CREAT BLDA-MCNC: 1 MG/DL (ref 0.6–1.3)

## 2019-12-04 PROCEDURE — 82565 ASSAY OF CREATININE: CPT

## 2019-12-04 PROCEDURE — 71260 CT THORAX DX C+: CPT

## 2019-12-04 PROCEDURE — 25010000002 IOPAMIDOL 61 % SOLUTION: Performed by: THORACIC SURGERY (CARDIOTHORACIC VASCULAR SURGERY)

## 2019-12-04 RX ADMIN — IOPAMIDOL 75 ML: 612 INJECTION, SOLUTION INTRAVENOUS at 09:47

## 2019-12-10 ENCOUNTER — OFFICE VISIT (OUTPATIENT)
Dept: SURGERY | Facility: CLINIC | Age: 64
End: 2019-12-10

## 2019-12-10 VITALS
OXYGEN SATURATION: 98 % | DIASTOLIC BLOOD PRESSURE: 82 MMHG | HEART RATE: 62 BPM | HEIGHT: 70 IN | WEIGHT: 235 LBS | SYSTOLIC BLOOD PRESSURE: 130 MMHG | BODY MASS INDEX: 33.64 KG/M2

## 2019-12-10 DIAGNOSIS — E04.9 SUBSTERNAL THYROID GOITER: Primary | ICD-10-CM

## 2019-12-10 PROCEDURE — 99213 OFFICE O/P EST LOW 20 MIN: CPT | Performed by: THORACIC SURGERY (CARDIOTHORACIC VASCULAR SURGERY)

## 2019-12-10 NOTE — PROGRESS NOTES
Subjective   Patient ID: Flo Manzo is a 64 y.o. male is here today for follow-up.    History of Present Illness  Dear Colleague,  Flo Manzo was seen in our office today for further follow-up of substernal thyroid goiter first identified in January 2019.  Since his last visit here he has been doing well.  He is trying to get disability because of issues with dizziness.  He works as an  and cannot really climb a ladder any longer because of the dizziness.  He is experiencing shortness of breath with moderate exertion.  He has no wheezing.  He has no difficulty swallowing his food.    The following portions of the patient's history were reviewed and updated as appropriate: allergies, current medications, past family history, past medical history, past social history, past surgical history and problem list.  Review of Systems   Constitution: Negative.   HENT: Negative.    Eyes: Negative.    Cardiovascular: Negative.    Respiratory: Positive for shortness of breath.    Endocrine: Negative.    Hematologic/Lymphatic: Negative.    Skin: Negative.    Musculoskeletal: Negative.    Gastrointestinal: Negative.    Genitourinary: Negative.    Neurological: Negative.    Psychiatric/Behavioral: Negative.    Allergic/Immunologic: Negative.      Patient Active Problem List   Diagnosis   • Umbilical hernia without obstruction and without gangrene   • Essential hypertension   • Arthritis of left knee   • Depression   • Obesity (BMI 30-39.9)   • New onset atrial fibrillation (CMS/HCC) - RVR   • Substernal thyroid goiter   • CHF (congestive heart failure) (CMS/HCC)   • Diastolic CHF, chronic (CMS/HCC)   • Hemorrhagic stroke (CMS/HCC)   • GÓMEZ on auto CPAP   • Hypersomnia due to medical condition   • Sleep-related hypoxia     Past Medical History:   Diagnosis Date   • Arthritis    • Atrial fibrillation with RVR (CMS/HCC) 01/24/2019   • Colon polyps 01/04/2018    Hepatic flexure: tubular adenoma, only low-grade  dysplasia; splenic flexure: tubular adenoma, only low-grade dysplasia; sigmoid colon: tubular adenoma, multiple fragments only low-grade dysplasia   • Depression    • Hemorrhagic stroke (CMS/HCC) 01/29/2019   • Hypertension    • GÓMEZ (obstructive sleep apnea) 06/06/2019    Home sleep study with moderate severity GÓMEZ, AHI 20 events per hour.  Sleep-related hypoxia with low O2 saturation 84% for 14 minutes.   • Sleep apnea     previously diagnosed with sleep apnea, had T&A done and it has since resolved    • Uncontrolled hypertension    • Ventral hernia      Past Surgical History:   Procedure Laterality Date   • APPENDECTOMY N/A 1972   • CARDIAC CATHETERIZATION N/A 07/20/2004    Cath left ventriculography, coronary angiography and left heart catheterization, Normal results-Dr. Vadim Mancuso   • CARDIAC CATHETERIZATION N/A 1/29/2019    Procedure: Left Heart Cath;  Surgeon: Eren Bruce MD;  Location: [facility] CATH INVASIVE LOCATION;  Service: Cardiology   • CARDIAC CATHETERIZATION N/A 1/29/2019    Procedure: Left ventriculography;  Surgeon: Eren Burce MD;  Location: [facility] CATH INVASIVE LOCATION;  Service: Cardiology   • CARDIAC CATHETERIZATION N/A 1/29/2019    Procedure: Coronary angiography;  Surgeon: Eren Bruce MD;  Location: [facility] CATH INVASIVE LOCATION;  Service: Cardiology   • CARPAL TUNNEL RELEASE Right 2013   • CARPAL TUNNEL RELEASE Bilateral    • COLONOSCOPY N/A 1/4/2018    Procedure: COLONOSCOPY with cold polypectomy and hot snare polypectomy;  Surgeon: Ten Solitario MD;  Location: [facility] ENDOSCOPY;  Service:    • EYE LENS IMPLANT SECONDARY Bilateral 2007, 2008   • KNEE CARTILAGE SURGERY Right 1979   • TONSILLECTOMY AND ADENOIDECTOMY Bilateral 2005   • TOTAL KNEE ARTHROPLASTY Left 03/14/2016    Left total knee arthroplasty with Amberly component, size 11 femur, G tibial baseplate with a 10 polyethylene insert and a 38 patellar button-Dr. Pablo Hairston   • TOTAL KNEE ARTHROPLASTY  Right 03/02/2015    Right total knee arthroplasty with Amberly component size G femur, 11 tibial base plate with an 11 polyethylene insert and a 38 patellar button-Dr. Pablo Hairston   • UVULOPALATOPHARYNGOPLASTY N/A 2005    part of soft palate, and uvula   • VENTRAL HERNIA REPAIR N/A 1/23/2018    Procedure: VENTRAL HERNIA REPAIR LAPAROSCOPIC WITH DAVINCI ROBOT AND MESH;  Surgeon: Ten Solitario MD;  Location: [facility] OR;  Service:      Family History   Problem Relation Age of Onset   • Hypertension Mother    • Kidney failure Mother    • Coronary artery disease Father    • Lung cancer Father         positive smoker   • Heart attack Father    • Breast cancer Sister 60   • Prostate cancer Brother    • Colon polyps Brother    • Kidney nephrosis Brother    • Malig Hyperthermia Neg Hx      Social History     Socioeconomic History   • Marital status:      Spouse name: Not on file   • Number of children: Not on file   • Years of education: Not on file   • Highest education level: Not on file   Occupational History   • Occupation:    Tobacco Use   • Smoking status: Never Smoker   • Smokeless tobacco: Never Used   Substance and Sexual Activity   • Alcohol use: Yes     Comment: social, maybe a drink a month   • Drug use: No     Comment: previously smoked marijuana    • Sexual activity: Defer       Current Outpatient Medications:   •  albuterol sulfate  (90 Base) MCG/ACT inhaler, Inhale 2 puffs Every 4 (Four) Hours As Needed for Wheezing., Disp: 18 g, Rfl: 1  •  amLODIPine-benazepril (LOTREL 5-10) 5-10 MG per capsule, TAKE 2 CAPSULES BY MOUTH DAILY, Disp: 180 capsule, Rfl: 0  •  atorvastatin (LIPITOR) 10 MG tablet, TAKE 1 TABLET BY MOUTH EVERY NIGHT, Disp: 90 tablet, Rfl: 0  •  azithromycin (ZITHROMAX) 250 MG tablet, Take 2 tablets the first day, then 1 tablet daily for 4 days., Disp: 6 tablet, Rfl: 0  •  citalopram (CeleXA) 20 MG tablet, Take 20 mg by mouth Daily. For two weeks, Disp: , Rfl:   •   DULoxetine (CYMBALTA) 60 MG capsule, Take 1 capsule by mouth Daily., Disp: 90 capsule, Rfl: 1  •  ELIQUIS 5 MG tablet tablet, TAKE 1 TABLET BY MOUTH EVERY 12 HOURS, Disp: 180 tablet, Rfl: 0  •  meclizine (ANTIVERT) 25 MG tablet, Take 1 tablet by mouth 3 (Three) Times a Day As Needed for dizziness., Disp: 30 tablet, Rfl: 1  •  Melatonin 3 MG capsule, Take 3 mg by mouth., Disp: , Rfl:   •  metoprolol tartrate (LOPRESSOR) 25 MG tablet, TAKE 1 TABLET BY MOUTH EVERY 12 HOURS, Disp: 180 tablet, Rfl: 0  •  QUEtiapine (SEROquel) 25 MG tablet, Take 1 tablet by mouth Every Night., Disp: 30 tablet, Rfl: 0  •  triamcinolone (KENALOG) 0.1 % ointment, Apply  topically to the appropriate area as directed 2 (Two) Times a Day., Disp: 30 g, Rfl: 0  Allergies   Allergen Reactions   • Poison Ivy Extract Hives, Itching and Rash     ITCHY BLISTERS        Objective   Vitals:    12/10/19 1314   BP: 130/82   Pulse: 62   SpO2: 98%     Physical Exam   Constitutional: He is oriented to person, place, and time. He appears well-developed and well-nourished.   HENT:   Head: Normocephalic.   Eyes: Pupils are equal, round, and reactive to light. Conjunctivae, EOM and lids are normal.   Neck: Trachea normal and normal range of motion. Neck supple. No hepatojugular reflux and no JVD present. Carotid bruit is not present. Thyromegaly present. No thyroid mass present.   Cardiovascular: Normal rate, regular rhythm, S1 normal, S2 normal, normal heart sounds and normal pulses.  No extrasystoles are present. PMI is not displaced.   Pulmonary/Chest: Effort normal and breath sounds normal.   Abdominal: Soft. Normal appearance and bowel sounds are normal. He exhibits no mass. There is no hepatosplenomegaly. There is no tenderness. No hernia.   Musculoskeletal: Normal range of motion.   Neurological: He is alert and oriented to person, place, and time. He has normal strength and normal reflexes. No cranial nerve deficit or sensory deficit. He displays a  negative Romberg sign.   Skin: Skin is warm, dry and intact.   Psychiatric: He has a normal mood and affect. His speech is normal and behavior is normal. Judgment and thought content normal. Cognition and memory are normal.     Independent Review of Radiographic Studies:    CT scan of the chest performed December 4, 2019 was independently reviewed and compared to previous CT scans dating back to January 2019.  There is diffuse enlargement of the thyroid with extension into the superior mediastinum.  This is unchanged from the scan in January of this year.  There is approximately 50% narrowing of the trachea due to this thyroid enlargement.  There is a 6 mm pulmonary nodule in the right middle lobe which is also unchanged.  There is no suspicious hilar or mediastinal adenopathy.  There are no pleural effusions.      Assessment/Plan   Assessment: Stable thyroid goiter with extension into the superior mediastinum.  Patient is having no compromise of his airway or esophagus.  I discussed with him surgery for removal of the goiter.  He wishes to defer surgery at this time.      Plan: Patient will return to see me in 6 months with a CT of the chest with contrast.  I will be glad to see him sooner if you think it is necessary.    Diagnoses and all orders for this visit:    Substernal thyroid goiter  -     CT Chest With Contrast; Future

## 2019-12-11 ENCOUNTER — OFFICE VISIT (OUTPATIENT)
Dept: SLEEP MEDICINE | Facility: HOSPITAL | Age: 64
End: 2019-12-11

## 2019-12-11 VITALS — HEIGHT: 70 IN | OXYGEN SATURATION: 97 % | BODY MASS INDEX: 34.22 KG/M2 | WEIGHT: 239 LBS | HEART RATE: 88 BPM

## 2019-12-11 DIAGNOSIS — Z99.89 OSA ON CPAP: ICD-10-CM

## 2019-12-11 DIAGNOSIS — IMO0002 SLEEP-RELATED HYPOVENTILATION: ICD-10-CM

## 2019-12-11 DIAGNOSIS — G47.33 OSA ON CPAP: ICD-10-CM

## 2019-12-11 DIAGNOSIS — G47.14 HYPERSOMNIA DUE TO MEDICAL CONDITION: Primary | ICD-10-CM

## 2019-12-11 PROCEDURE — 99213 OFFICE O/P EST LOW 20 MIN: CPT | Performed by: INTERNAL MEDICINE

## 2019-12-11 PROCEDURE — G0463 HOSPITAL OUTPT CLINIC VISIT: HCPCS

## 2019-12-11 NOTE — PROGRESS NOTES
"Follow Up [facility] Note     Chief Complaint:  GÓMEZ     Primary Care Physician: Cathleen Corona MD    Interval History:   I last saw the patient in October.  Pressures were adjusted and humidity was also adjusted.  Patient states he is unchanged.  He goes to bed at 11:45 PM and awakens at 6:10 AM.  He will use the restroom.  [place] Sleepiness Scale is borderline normal at 7    Review of Systems:    A complete review of systems was done and all were negative with the exception of depression    Social History:    Social History     Socioeconomic History   • Marital status:      Spouse name: Not on file   • Number of children: Not on file   • Years of education: Not on file   • Highest education level: Not on file   Occupational History   • Occupation:    Tobacco Use   • Smoking status: Never Smoker   • Smokeless tobacco: Never Used   Substance and Sexual Activity   • Alcohol use: Yes     Comment: social, maybe a drink a month   • Drug use: No     Comment: previously smoked marijuana    • Sexual activity: Defer       Allergies:  Poison ivy extract     Medication Review:  Reviewed.      Vital Signs:    Vitals:    12/11/19 0900   Pulse: 88   SpO2: 97%   Weight: 108 kg (239 lb)   Height: 177.8 cm (70\")     Body mass index is 34.29 kg/m².    Physical Exam:    Constitutional:  Well developed 64 y.o. male that appears in no apparent distress.  Awake & oriented times 3.  Normal mood with normal recent and remote memory and normal judgement.  Eyes:  Conjunctivae normal.  Oropharynx:  moist mucous membranes without exudate and a borderline enlarged tongue and previous UPPP and moderate narrowing of the posterior pharyngeal opening and class II-3 MP airway     Results Review:  DME is [employer] and he uses nasal pillows.  Downloads between 10/23 and 12/9/2019 demonstrated compliance 34 out of 49 days.  Average usage is 3 hours and 4 minutes.  Average AHI is mildly abnormal at 11.8, mainly hypopneas and no " significant leak.  Average AutoCPAP pressure is 8.1 and his auto CPAP at 6-12.       Impression:   Moderate obstructive sleep apnea with sleep related hypoxia nearly adequately treated with auto CPAP with good compliance and suboptimal usage and some persistent no complaints of hypersomnolence.    Plan:  Good sleep hygiene measures should be maintained.  Weight loss would be beneficial in this patient who is obese by BMI.  The patient is benefiting from the treatment being provided.     I reviewed all with the patient.  He states he is doing better but his headgear is slipping and he will be going to his DME to get a different one or to have it adjusted.    The patient will call for any problems and will follow up in 3-4 months.      Dane Dixon MD  Sleep Medicine  12/11/19  9:35 AM

## 2019-12-20 ENCOUNTER — OFFICE VISIT (OUTPATIENT)
Dept: FAMILY MEDICINE CLINIC | Facility: CLINIC | Age: 64
End: 2019-12-20

## 2019-12-20 VITALS
BODY MASS INDEX: 35.13 KG/M2 | WEIGHT: 245.4 LBS | SYSTOLIC BLOOD PRESSURE: 160 MMHG | DIASTOLIC BLOOD PRESSURE: 110 MMHG | TEMPERATURE: 98.1 F | OXYGEN SATURATION: 95 % | HEART RATE: 81 BPM | HEIGHT: 70 IN

## 2019-12-20 DIAGNOSIS — F32.89 OTHER DEPRESSION: ICD-10-CM

## 2019-12-20 DIAGNOSIS — I10 ESSENTIAL HYPERTENSION: ICD-10-CM

## 2019-12-20 DIAGNOSIS — Z23 NEED FOR VACCINATION: ICD-10-CM

## 2019-12-20 DIAGNOSIS — E04.9 SUBSTERNAL THYROID GOITER: ICD-10-CM

## 2019-12-20 DIAGNOSIS — Z79.899 MEDICATION MANAGEMENT: Primary | ICD-10-CM

## 2019-12-20 PROCEDURE — 99214 OFFICE O/P EST MOD 30 MIN: CPT | Performed by: FAMILY MEDICINE

## 2019-12-20 PROCEDURE — 90471 IMMUNIZATION ADMIN: CPT | Performed by: FAMILY MEDICINE

## 2019-12-20 PROCEDURE — 90686 IIV4 VACC NO PRSV 0.5 ML IM: CPT | Performed by: FAMILY MEDICINE

## 2019-12-20 NOTE — PROGRESS NOTES
"Chief Complaint   Patient presents with   • Depression       Subjective   Flo Manzo is a 64 y.o. male.     History of Present Illness   Depression   Says he feels he has been losing his temper less. He did lose his temper twice this week. Has to defend his wife. Gets upset when people take her for granted and she won't stand up for herself.   He also is not driving anymore. Feels lightheaded and dizzy not feeling to safe about driving. He is ok sometimes during the day but not at night.   Says he is still sleeping a lot. He will get up in the AM and depending on he feels mood wise and energy level sometimes just go right back to bed and sleep potentially the whole day. He did get his disability exam last week and is going soon for his psychological evaluation.   He did wean off the citalopram. He takes most all his medications about 45 min before bed, bed time usually 11:30 pm. Says he thinks the quetiapine makes him sleepy.     HTN  Blood pressure is elevated. He is unsure but thinks his BP meds just taking one per day instead of 2 per day. Of the lotrel.     The following portions of the patient's history were reviewed and updated as appropriate: allergies, current medications, past medical history, past social history and problem list.    Review of Systems   Respiratory: Negative.    Cardiovascular: Negative.    Neurological: Positive for light-headedness.       BP (!) 160/110 (BP Location: Left arm, Patient Position: Sitting, Cuff Size: Adult)   Pulse 81   Temp 98.1 °F (36.7 °C) (Oral)   Ht 177.8 cm (70\")   Wt 111 kg (245 lb 6.4 oz)   SpO2 95%   BMI 35.21 kg/m²       Objective   Physical Exam   Constitutional: He is oriented to person, place, and time. He appears well-nourished. No distress.   Eyes: Conjunctivae are normal. Right eye exhibits no discharge. Left eye exhibits no discharge. No scleral icterus.   Cardiovascular: Normal rate. Exam reveals no gallop and no friction rub.   Murmur " heard.  Irregularly irregular   Pulmonary/Chest: Effort normal and breath sounds normal. No respiratory distress. He has no wheezes.   Neurological: He is alert and oriented to person, place, and time.   Psychiatric: He has a normal mood and affect. His behavior is normal.   Vitals reviewed.      Assessment/Plan   Flo was seen today for depression.    Diagnoses and all orders for this visit:    Medication management  -     Basic Metabolic Panel    Other depression    Substernal thyroid goiter  -     TSH  -     T4    Need for vaccination  -     Fluarix/Fluzone/Afluria/FluLaval ([id number])    Essential hypertension      We will check in on labs related to medications.   He is going to call in and tell us if our medication list matches what he is doing.  Repeat BP was 138/100. He should be taking 2 of the lotrel daily and taking the metoprolol 25 mg BID.   He was seen by CT surg Dr. Burciaga. His goiter is stable. It dose cause about 50% compression on the trachea. Did not recommend surgery at this time.   He does have wavering in his voice and a staccato type inhalation with deep inhalation, I'm wondering if he has a tremor. He does not have stridor.   Check the thyroid labs, TSH has been low but improved in the interval, check again today.   Schedule him for 3 months sooner prn  Given date and time of the neurology appt.

## 2019-12-21 LAB
BUN SERPL-MCNC: 16 MG/DL (ref 8–23)
BUN/CREAT SERPL: 17.2 (ref 7–25)
CALCIUM SERPL-MCNC: 9.8 MG/DL (ref 8.6–10.5)
CHLORIDE SERPL-SCNC: 102 MMOL/L (ref 98–107)
CO2 SERPL-SCNC: 24 MMOL/L (ref 22–29)
CREAT SERPL-MCNC: 0.93 MG/DL (ref 0.76–1.27)
GLUCOSE SERPL-MCNC: 93 MG/DL (ref 65–99)
POTASSIUM SERPL-SCNC: 4.1 MMOL/L (ref 3.5–5.2)
SODIUM SERPL-SCNC: 143 MMOL/L (ref 136–145)
T4 SERPL-MCNC: 6.25 MCG/DL (ref 4.5–11.7)
TSH SERPL DL<=0.005 MIU/L-ACNC: 0.17 UIU/ML (ref 0.27–4.2)

## 2020-01-20 RX ORDER — APIXABAN 5 MG/1
TABLET, FILM COATED ORAL
Qty: 180 TABLET | Refills: 0 | Status: SHIPPED | OUTPATIENT
Start: 2020-01-20 | End: 2020-09-18 | Stop reason: SDUPTHER

## 2020-02-17 RX ORDER — POTASSIUM CHLORIDE 750 MG/1
CAPSULE, EXTENDED RELEASE ORAL
Qty: 90 CAPSULE | Refills: 0 | OUTPATIENT
Start: 2020-02-17

## 2020-02-27 ENCOUNTER — OFFICE VISIT (OUTPATIENT)
Dept: NEUROLOGY | Facility: CLINIC | Age: 65
End: 2020-02-27

## 2020-02-27 VITALS — WEIGHT: 244 LBS | HEART RATE: 95 BPM | BODY MASS INDEX: 34.93 KG/M2 | HEIGHT: 70 IN | OXYGEN SATURATION: 98 %

## 2020-02-27 DIAGNOSIS — R42 DIZZINESS: Primary | ICD-10-CM

## 2020-02-27 DIAGNOSIS — Z86.79 HISTORY OF INTRACRANIAL HEMORRHAGE: ICD-10-CM

## 2020-02-27 DIAGNOSIS — Z86.73 HISTORY OF STROKE: ICD-10-CM

## 2020-02-27 PROCEDURE — 99244 OFF/OP CNSLTJ NEW/EST MOD 40: CPT | Performed by: PSYCHIATRY & NEUROLOGY

## 2020-02-27 RX ORDER — CITALOPRAM 40 MG/1
40 TABLET ORAL DAILY
COMMUNITY
End: 2020-03-18

## 2020-02-27 RX ORDER — FUROSEMIDE 40 MG/1
40 TABLET ORAL DAILY
COMMUNITY
End: 2020-06-10 | Stop reason: SINTOL

## 2020-02-27 NOTE — PROGRESS NOTES
Chief Complaint   Patient presents with   • Stroke       Patient ID: Flo Manzo is a 64 y.o. male.    HPI: I thank you for referring your patient Flo to see us in the neurology clinic this afternoon.  As you may know he is a 37-year-old gentleman referred to us by Dr. Corona with a history of stroke.  Apparently he had been experiencing some headaches that were progressively worsening over the course of a day or so.  He states that he had some exposure to the flu as well so he was not feeling well.  Along with that he had recently discontinued a blood pressure medication that he had been prescribed.  He went to emergent care and was found to be severely hypertensive at the time.  Arrangements were made for his admission at that time.  He had a neuro work-up because of his headaches.  He was found to have a left cerebellar intraparenchymal hemorrhage.  There are also what appeared to be multiple punctate areas of stroke in the thalami and lateral aspect of the ching.  There are also areas of diffusion-weighted hyperintensity within the medial portion of the right occipital lobe and posterior aspect of the left occipital lobe.  He did have follow-up CT scans the last one being in July of last year.  This did show interval resolution of the hematoma of the left cerebellum.  Flo states that for the past year, since his stroke, he has continued to experience symptoms of dizziness.  He describes the dizziness as a room spinning-like sensation that worsens with changes of position.  He says that it feels as if he is going in circles.  He notes that bending or other changes of position will exacerbate the symptoms.  Being still will improve the symptoms.  He denies any focal weakness or numbness with his symptoms.  They usually last about 10 to 15 minutes.  No nausea or vomiting.    The following portions of the patient's history were reviewed and updated as appropriate: allergies, current medications, past family  history, past medical history, past social history, past surgical history and problem list.    Review of Systems   Constitutional: Positive for activity change, appetite change, diaphoresis, fatigue, fever and unexpected weight change.   HENT: Positive for dental problem, drooling, hearing loss and voice change.    Eyes: Negative for photophobia, redness and visual disturbance.   Respiratory: Positive for apnea, cough and shortness of breath.    Cardiovascular: Negative for chest pain, palpitations and leg swelling.   Gastrointestinal: Negative for abdominal pain, nausea and vomiting.   Endocrine: Negative for cold intolerance, heat intolerance and polydipsia.   Musculoskeletal: Negative for back pain, gait problem and neck pain.   Skin: Negative for color change, rash and wound.   Allergic/Immunologic: Negative for environmental allergies, food allergies and immunocompromised state.   Neurological: Positive for dizziness (pt states this has been going on since his stroke. almost a year) and light-headedness. Negative for tremors, seizures, syncope, facial asymmetry, speech difficulty, weakness, numbness and headaches.   Hematological: Negative for adenopathy. Does not bruise/bleed easily.   Psychiatric/Behavioral: Positive for decreased concentration. Negative for agitation, behavioral problems, confusion, dysphoric mood, hallucinations, self-injury, sleep disturbance and suicidal ideas. The patient is not nervous/anxious and is not hyperactive.       I reviewed and agree with the above review of systems completed by the medical assistant.    Vitals:    02/27/20 0751   Pulse: 95   SpO2: 98%       Neurologic Exam     Mental Status   Oriented to person, place, and time.   Registration: recalls 3 of 3 objects. Follows 3 step commands.   Attention: normal. Concentration: normal.   Speech: speech is normal   Level of consciousness: alert  Knowledge: consistent with education (No deficits found.).   Normal comprehension.      Cranial Nerves     CN II   Visual fields full to confrontation.     CN III, IV, VI   Pupils are equal, round, and reactive to light.  Extraocular motions are normal.   CN III: no CN III palsy  CN VI: no CN VI palsy  Nystagmus: none   Diplopia: none    CN V   Facial sensation intact.     CN VII   Facial expression full, symmetric.     CN VIII   CN VIII normal.     CN IX, X   CN IX normal.   CN X normal.     CN XI   CN XI normal.     CN XII   CN XII normal.     Motor Exam   Muscle bulk: normal  Right arm tone: normal  Left arm tone: normal  Right leg tone: normal  Left leg tone: normal    Strength   Right neck flexion: 5/5  Left neck flexion: 5/5  Right neck extension: 5/5  Left neck extension: 5/5  Right deltoid: 5/5  Left deltoid: 5/5  Right biceps: 5/5  Left biceps: 5/5  Right triceps: 5/5  Left triceps: 5/5  Right wrist flexion: 5/5  Left wrist flexion: 5/5  Right wrist extension: 5/5  Left wrist extension: 5/5  Right interossei: 5/5  Left interossei: 5/5  Right abdominals: 5/5  Left abdominals: 5/5  Right iliopsoas: 5/5  Left iliopsoas: 5/5  Right quadriceps: 5/5  Left quadriceps: 5/5  Right hamstrin/5  Left hamstrin/5  Right glutei: 5/5  Left glutei: 5/5  Right anterior tibial: 5/5  Left anterior tibial: 5/5  Right posterior tibial: 5/5  Left posterior tibial: 5/5  Right peroneal: 5/5  Left peroneal: 5/5  Right gastroc: 5/5  Left gastroc: 5/5    Sensory Exam   Light touch normal.   Vibration normal.   Proprioception normal.   Pinprick normal.     Gait, Coordination, and Reflexes     Gait  Gait: normal    Coordination   Romberg: negative    Tremor   Resting tremor: absent  Intention tremor: absent    Reflexes   Right brachioradialis: 2+  Left brachioradialis: 2+  Right biceps: 2+  Left biceps: 2+  Right triceps: 2+  Left triceps: 2+  Right patellar: 2+  Left patellar: 2+  Right achilles: 2+  Left achilles: 2+  Right : 2+  Left : 2+Station is normal.       Physical Exam   Constitutional: He is  oriented to person, place, and time. He appears well-developed. No distress.   HENT:   Head: Normocephalic and atraumatic.   Eyes: Pupils are equal, round, and reactive to light. EOM are normal.   Neck: Normal range of motion.   Cardiovascular: Normal rate, regular rhythm and normal heart sounds.   Pulmonary/Chest: Effort normal and breath sounds normal. No respiratory distress.   Abdominal: Soft. Bowel sounds are normal. He exhibits no distension. There is no tenderness.   Musculoskeletal: He exhibits no edema or deformity.   Neurological: He is oriented to person, place, and time. He has a normal Romberg Test. Gait normal.   Reflex Scores:       Tricep reflexes are 2+ on the right side and 2+ on the left side.       Bicep reflexes are 2+ on the right side and 2+ on the left side.       Brachioradialis reflexes are 2+ on the right side and 2+ on the left side.       Patellar reflexes are 2+ on the right side and 2+ on the left side.       Achilles reflexes are 2+ on the right side and 2+ on the left side.  Skin: Skin is warm. No rash noted.   Psychiatric: He has a normal mood and affect. His speech is normal. Judgment normal.   Vitals reviewed.      Procedures    Assessment/Plan: Due to the dizziness and history of stroke as well as intraparenchymal hemorrhages I would like to schedule him for an MRI of his brain.  We will get this with and without contrast.  As well we will schedule him for a visit to the [facility] for a vestibular assessment.  He will return to clinic after this evaluation.       Flo was seen today for stroke.    Diagnoses and all orders for this visit:    Dizziness  -     MRI Brain With & Without Contrast; Future  -     Basic Vestibular Evaluation; Future    History of stroke  -     MRI Brain With & Without Contrast; Future    History of intracranial hemorrhage  -     MRI Brain With & Without Contrast; Future           Jax Wilkins II, MD

## 2020-03-06 ENCOUNTER — TELEPHONE (OUTPATIENT)
Dept: NEUROLOGY | Facility: CLINIC | Age: 65
End: 2020-03-06

## 2020-03-06 NOTE — TELEPHONE ENCOUNTER
PATIENT CALLED TO LET DR AVALOS KNOW THAT HE HAS HIS MRI SCHEDULED FOR 3/11/20 @ 11:00. DR AVALOS INFORMED HIM TO LET THE OFFICE KNOW WHEN HE GOT IT SCHEDULED.     [phone]

## 2020-03-18 DIAGNOSIS — I10 ESSENTIAL HYPERTENSION: ICD-10-CM

## 2020-03-18 DIAGNOSIS — I20.8 ANGINA AT REST (HCC): ICD-10-CM

## 2020-03-18 RX ORDER — AMLODIPINE BESYLATE AND BENAZEPRIL HYDROCHLORIDE 5; 10 MG/1; MG/1
1 CAPSULE ORAL DAILY
Qty: 90 CAPSULE | Refills: 1 | Status: SHIPPED | OUTPATIENT
Start: 2020-03-18 | End: 2020-06-10 | Stop reason: DRUGHIGH

## 2020-03-18 RX ORDER — ATORVASTATIN CALCIUM 10 MG/1
10 TABLET, FILM COATED ORAL NIGHTLY
Qty: 90 TABLET | Refills: 1 | Status: SHIPPED | OUTPATIENT
Start: 2020-03-18 | End: 2020-09-15

## 2020-03-18 RX ORDER — CITALOPRAM 40 MG/1
TABLET ORAL
Qty: 90 TABLET | Refills: 1 | Status: SHIPPED | OUTPATIENT
Start: 2020-03-18 | End: 2020-09-15

## 2020-03-19 ENCOUNTER — OFFICE VISIT (OUTPATIENT)
Dept: FAMILY MEDICINE CLINIC | Facility: CLINIC | Age: 65
End: 2020-03-19

## 2020-03-19 VITALS
BODY MASS INDEX: 35.19 KG/M2 | HEIGHT: 70 IN | HEART RATE: 92 BPM | DIASTOLIC BLOOD PRESSURE: 68 MMHG | OXYGEN SATURATION: 98 % | WEIGHT: 245.8 LBS | RESPIRATION RATE: 15 BRPM | TEMPERATURE: 98 F | SYSTOLIC BLOOD PRESSURE: 132 MMHG

## 2020-03-19 DIAGNOSIS — E04.9 SUBSTERNAL THYROID GOITER: ICD-10-CM

## 2020-03-19 DIAGNOSIS — I48.20 CHRONIC ATRIAL FIBRILLATION (HCC): ICD-10-CM

## 2020-03-19 DIAGNOSIS — I10 ESSENTIAL HYPERTENSION: Primary | ICD-10-CM

## 2020-03-19 DIAGNOSIS — J40 BRONCHITIS: ICD-10-CM

## 2020-03-19 PROBLEM — I48.91 NEW ONSET ATRIAL FIBRILLATION: Status: RESOLVED | Noted: 2019-01-24 | Resolved: 2020-03-19

## 2020-03-19 LAB
ALBUMIN SERPL-MCNC: 4.1 G/DL (ref 3.5–5.2)
ALBUMIN/GLOB SERPL: 1.3 G/DL
ALP SERPL-CCNC: 96 U/L (ref 39–117)
ALT SERPL-CCNC: 15 U/L (ref 1–41)
AST SERPL-CCNC: 10 U/L (ref 1–40)
BASOPHILS # BLD AUTO: 0.03 10*3/MM3 (ref 0–0.2)
BASOPHILS NFR BLD AUTO: 0.4 % (ref 0–1.5)
BILIRUB SERPL-MCNC: 0.6 MG/DL (ref 0.2–1.2)
BUN SERPL-MCNC: 17 MG/DL (ref 8–23)
BUN/CREAT SERPL: 17.7 (ref 7–25)
CALCIUM SERPL-MCNC: 9.7 MG/DL (ref 8.6–10.5)
CHLORIDE SERPL-SCNC: 102 MMOL/L (ref 98–107)
CO2 SERPL-SCNC: 28.9 MMOL/L (ref 22–29)
CREAT SERPL-MCNC: 0.96 MG/DL (ref 0.76–1.27)
EOSINOPHIL # BLD AUTO: 0.13 10*3/MM3 (ref 0–0.4)
EOSINOPHIL NFR BLD AUTO: 1.5 % (ref 0.3–6.2)
ERYTHROCYTE [DISTWIDTH] IN BLOOD BY AUTOMATED COUNT: 12.9 % (ref 12.3–15.4)
GLOBULIN SER CALC-MCNC: 3.2 GM/DL
GLUCOSE SERPL-MCNC: 96 MG/DL (ref 65–99)
HCT VFR BLD AUTO: 47.8 % (ref 37.5–51)
HGB BLD-MCNC: 15.9 G/DL (ref 13–17.7)
IMM GRANULOCYTES # BLD AUTO: 0.02 10*3/MM3 (ref 0–0.05)
IMM GRANULOCYTES NFR BLD AUTO: 0.2 % (ref 0–0.5)
LYMPHOCYTES # BLD AUTO: 2.02 10*3/MM3 (ref 0.7–3.1)
LYMPHOCYTES NFR BLD AUTO: 24 % (ref 19.6–45.3)
MCH RBC QN AUTO: 28.1 PG (ref 26.6–33)
MCHC RBC AUTO-ENTMCNC: 33.3 G/DL (ref 31.5–35.7)
MCV RBC AUTO: 84.6 FL (ref 79–97)
MONOCYTES # BLD AUTO: 0.74 10*3/MM3 (ref 0.1–0.9)
MONOCYTES NFR BLD AUTO: 8.8 % (ref 5–12)
NEUTROPHILS # BLD AUTO: 5.49 10*3/MM3 (ref 1.7–7)
NEUTROPHILS NFR BLD AUTO: 65.1 % (ref 42.7–76)
NRBC BLD AUTO-RTO: 0 /100 WBC (ref 0–0.2)
PLATELET # BLD AUTO: 243 10*3/MM3 (ref 140–450)
POTASSIUM SERPL-SCNC: 4.2 MMOL/L (ref 3.5–5.2)
PROT SERPL-MCNC: 7.3 G/DL (ref 6–8.5)
RBC # BLD AUTO: 5.65 10*6/MM3 (ref 4.14–5.8)
SODIUM SERPL-SCNC: 141 MMOL/L (ref 136–145)
T4 SERPL-MCNC: 6.35 MCG/DL (ref 4.5–11.7)
TSH SERPL DL<=0.005 MIU/L-ACNC: 0.05 UIU/ML (ref 0.27–4.2)
WBC # BLD AUTO: 8.43 10*3/MM3 (ref 3.4–10.8)

## 2020-03-19 PROCEDURE — 99214 OFFICE O/P EST MOD 30 MIN: CPT | Performed by: FAMILY MEDICINE

## 2020-03-19 RX ORDER — QUETIAPINE FUMARATE 25 MG/1
25 TABLET, FILM COATED ORAL NIGHTLY
Qty: 90 TABLET | Refills: 1 | Status: SHIPPED | OUTPATIENT
Start: 2020-03-19 | End: 2020-09-18 | Stop reason: SDUPTHER

## 2020-03-19 RX ORDER — AZITHROMYCIN 250 MG/1
TABLET, FILM COATED ORAL
Qty: 6 TABLET | Refills: 0 | Status: SHIPPED | OUTPATIENT
Start: 2020-03-19 | End: 2020-06-10

## 2020-03-19 RX ORDER — QUETIAPINE FUMARATE 25 MG/1
25 TABLET, FILM COATED ORAL NIGHTLY
Qty: 30 TABLET | Refills: 0 | Status: CANCELLED | OUTPATIENT
Start: 2020-03-19

## 2020-03-19 RX ORDER — PREDNISONE 20 MG/1
40 TABLET ORAL DAILY
Qty: 10 TABLET | Refills: 0 | Status: SHIPPED | OUTPATIENT
Start: 2020-03-19 | End: 2020-06-10

## 2020-03-19 NOTE — PROGRESS NOTES
Chief Complaint   Patient presents with   • Depression       Subjective   Flo Manzo is a 64 y.o. male.     History of Present Illness   Lightheadedness, loss of balance   Was seen by neurology. He had MRI at [facility]. He was very upset with the confinement of the MRI.  Is supposed to go to [facility] for vestibular assessment.  Working on the dizziness.  He says he has loss of balance, not as much spinning or dizziness, he can almost run into the wall     A fib  Needs an appointment with cardiology. Call his wife with the appointment.     Depression  He thinks that his mood has been overall good. He still has moodiness though at home. Stress at home, reduction in income. His wife's  was recommended to close. They are going to be seeing three children less than half as usual. Things he doesn't like include that his wife is always asking so many questions, feels like she is telling him what to do, and always seems to be upset with the way he is doing things.   Working out the money has been an obstacle. He is on disability, but his wife manages the money and so he does not have any most of the time. Has to ask for money and wife wants to know what it is for. He appreciates that she has his and their best interest in mind but still challenging to accept that he does not have any money to get things he may want.     Cough, loose, productive. SOB with this, waxes and wanes from baseline, sometimes feels the albuterol helps. Used inhaler couple of days ago but did not think he had much improvement.     The following portions of the patient's history were reviewed and updated as appropriate: allergies, current medications, past medical history, past social history and problem list.    Review of Systems   Respiratory: Negative.    Cardiovascular: Negative.    Neurological: Positive for light-headedness.       /68 (BP Location: Left arm, Patient Position: Sitting, Cuff Size: Adult)   Pulse  "92   Temp 98 °F (36.7 °C) (Oral)   Resp 15   Ht 177.8 cm (70\")   Wt 111 kg (245 lb 12.8 oz)   SpO2 98%   BMI 35.27 kg/m²       Objective   Physical Exam   Constitutional: He is oriented to person, place, and time. He appears well-nourished. No distress.   Eyes: Conjunctivae are normal. Right eye exhibits no discharge. Left eye exhibits no discharge. No scleral icterus.   Cardiovascular: Normal rate, regular rhythm, normal heart sounds and intact distal pulses. Exam reveals no gallop and no friction rub.   No murmur heard.  Pulmonary/Chest: Effort normal. No respiratory distress. He has no wheezes.   Coarse sounds in bilateral bases, no crackles and no wheeze.    Musculoskeletal: He exhibits no edema.   Neurological: He is alert and oriented to person, place, and time.   Psychiatric: He has a normal mood and affect. His behavior is normal.   Vitals reviewed.      Assessment/Plan   Flo was seen today for depression.    Diagnoses and all orders for this visit:    Essential hypertension  -     CBC & Differential  -     Comprehensive Metabolic Panel    Chronic atrial fibrillation  -     CBC & Differential  -     Comprehensive Metabolic Panel  -     T4  -     TSH    Bronchitis    Substernal thyroid goiter  -     T4  -     TSH    Other orders  -     QUEtiapine (SEROquel) 25 MG tablet; Take 1 tablet by mouth Every Night.  -     predniSONE (DELTASONE) 20 MG tablet; Take 2 tablets by mouth Daily. Take with breakfast.  -     azithromycin (ZITHROMAX) 250 MG tablet; Take 2 tablets the first day, then 1 tablet daily for 4 days.        Needs appointment with cardiology. I will send a message to the APRN he is supposed to see.     He is due for labs today. No medication changes. We discussed some communication tactics and approach to manage the money at the level of the bank that he and wife agree on.   I will request the MRI results. Not scanned in to date.   He has appointment with [facility] 4/16.    He catches his breath a " little more often today than prior, he has good O2 sats and is speaking in full sentences, but lung exam with abnormal breath sounds bilateral bases. Will treat for bronchitis. He should let me know how he is feeling and if he has any new or worsening symptoms despite treatment. He voiced understanding.

## 2020-03-25 ENCOUNTER — APPOINTMENT (OUTPATIENT)
Dept: SLEEP MEDICINE | Facility: HOSPITAL | Age: 65
End: 2020-03-25

## 2020-04-02 DIAGNOSIS — Z86.73 HISTORY OF STROKE: ICD-10-CM

## 2020-04-02 DIAGNOSIS — Z86.79 HISTORY OF INTRACRANIAL HEMORRHAGE: ICD-10-CM

## 2020-04-02 DIAGNOSIS — R42 DIZZINESS: ICD-10-CM

## 2020-06-09 ENCOUNTER — HOSPITAL ENCOUNTER (OUTPATIENT)
Dept: CT IMAGING | Facility: HOSPITAL | Age: 65
Discharge: HOME | End: 2020-06-09
Admitting: THORACIC SURGERY (CARDIOTHORACIC VASCULAR SURGERY)

## 2020-06-09 DIAGNOSIS — E04.9 SUBSTERNAL THYROID GOITER: ICD-10-CM

## 2020-06-09 LAB — CREAT BLDA-MCNC: 0.9 MG/DL (ref 0.6–1.3)

## 2020-06-09 PROCEDURE — 71260 CT THORAX DX C+: CPT

## 2020-06-09 PROCEDURE — 82565 ASSAY OF CREATININE: CPT

## 2020-06-09 PROCEDURE — 25010000002 IOPAMIDOL 61 % SOLUTION: Performed by: THORACIC SURGERY (CARDIOTHORACIC VASCULAR SURGERY)

## 2020-06-09 RX ADMIN — IOPAMIDOL 75 ML: 612 INJECTION, SOLUTION INTRAVENOUS at 13:12

## 2020-06-10 ENCOUNTER — OFFICE VISIT (OUTPATIENT)
Dept: FAMILY MEDICINE CLINIC | Facility: CLINIC | Age: 65
End: 2020-06-10

## 2020-06-10 VITALS
HEART RATE: 98 BPM | HEIGHT: 70 IN | TEMPERATURE: 98.2 F | DIASTOLIC BLOOD PRESSURE: 138 MMHG | WEIGHT: 234 LBS | BODY MASS INDEX: 33.5 KG/M2 | OXYGEN SATURATION: 98 % | SYSTOLIC BLOOD PRESSURE: 198 MMHG | RESPIRATION RATE: 18 BRPM

## 2020-06-10 DIAGNOSIS — I10 ESSENTIAL HYPERTENSION: Primary | ICD-10-CM

## 2020-06-10 PROCEDURE — 99213 OFFICE O/P EST LOW 20 MIN: CPT | Performed by: NURSE PRACTITIONER

## 2020-06-10 RX ORDER — AMLODIPINE BESYLATE AND BENAZEPRIL HYDROCHLORIDE 10; 20 MG/1; MG/1
1 CAPSULE ORAL DAILY
Qty: 30 CAPSULE | Refills: 2 | Status: SHIPPED | OUTPATIENT
Start: 2020-06-10 | End: 2020-12-11 | Stop reason: SDUPTHER

## 2020-06-17 ENCOUNTER — OFFICE VISIT (OUTPATIENT)
Dept: SURGERY | Facility: CLINIC | Age: 65
End: 2020-06-17

## 2020-06-17 DIAGNOSIS — E04.9 SUBSTERNAL THYROID GOITER: Primary | ICD-10-CM

## 2020-06-17 PROCEDURE — 99442 PR PHYS/QHP TELEPHONE EVALUATION 11-20 MIN: CPT | Performed by: NURSE PRACTITIONER

## 2020-06-17 NOTE — PROGRESS NOTES
Subjective   Patient ID: Flo Mnazo is a 65 y.o. male presents today for follow-up. You have chosen to receive care through a telephone visit. Do you consent to use a telephone visit for your medical care today? Yes    History of Present Illness  Dear Colleague,  Flo Manzo was seen in our office today for continued follow up and surveillance of a substernal goiter first identified in January 2019.  He has been stable since his last visit in our office and has been undergoing therapy for vertigo.  He denies any complaints of fever, chills, cough, hemoptysis, pleuritic chest pain, night sweats, hoarseness, or unintentional weight loss.  He complains of moderate dyspnea with exertion which is stable since he was last seen in our office.      The following portions of the patient's history were reviewed and updated as appropriate: allergies, current medications, past family history, past medical history, past social history, past surgical history and problem list.  Review of Systems   Constitution: Positive for malaise/fatigue. Negative for chills, fever and night sweats.   HENT: Negative for congestion.    Cardiovascular: Positive for dyspnea on exertion.   Respiratory: Positive for shortness of breath. Negative for hemoptysis and sleep disturbances due to breathing.    Gastrointestinal: Negative for diarrhea, dysphagia, nausea and vomiting.   Neurological: Positive for dizziness.   All other systems reviewed and are negative.    Patient Active Problem List   Diagnosis   • Umbilical hernia without obstruction and without gangrene   • Essential hypertension   • Arthritis of left knee   • Depression   • Obesity (BMI 30-39.9)   • Substernal thyroid goiter   • CHF (congestive heart failure) (CMS/HCC)   • Diastolic CHF, chronic (CMS/HCC)   • Hemorrhagic stroke (CMS/HCC)   • GÓMEZ on auto CPAP   • Hypersomnia due to medical condition   • Sleep-related hypoxia   • Chronic atrial fibrillation (CMS/HCC)     Past  Medical History:   Diagnosis Date   • Arthritis    • Atrial fibrillation with RVR (CMS/HCC) 01/24/2019   • Colon polyps 01/04/2018    Hepatic flexure: tubular adenoma, only low-grade dysplasia; splenic flexure: tubular adenoma, only low-grade dysplasia; sigmoid colon: tubular adenoma, multiple fragments only low-grade dysplasia   • Depression    • Hemorrhagic stroke (CMS/HCC) 01/29/2019   • Hypertension    • New onset atrial fibrillation (CMS/HCC) - RVR 1/24/2019   • GÓMEZ (obstructive sleep apnea) 06/06/2019    Home sleep study with moderate severity GÓMEZ, AHI 20 events per hour.  Sleep-related hypoxia with low O2 saturation 84% for 14 minutes.   • Sleep apnea     previously diagnosed with sleep apnea, had T&A done and it has since resolved    • Uncontrolled hypertension    • Ventral hernia      Past Surgical History:   Procedure Laterality Date   • APPENDECTOMY N/A 1972   • CARDIAC CATHETERIZATION N/A 07/20/2004    Cath left ventriculography, coronary angiography and left heart catheterization, Normal results-Dr. Vadim Mancuso   • CARDIAC CATHETERIZATION N/A 1/29/2019    Procedure: Left Heart Cath;  Surgeon: Eren Bruce MD;  Location: [facility] CATH INVASIVE LOCATION;  Service: Cardiology   • CARDIAC CATHETERIZATION N/A 1/29/2019    Procedure: Left ventriculography;  Surgeon: Eren Bruce MD;  Location: [facility] CATH INVASIVE LOCATION;  Service: Cardiology   • CARDIAC CATHETERIZATION N/A 1/29/2019    Procedure: Coronary angiography;  Surgeon: Eren Bruce MD;  Location: [facility] CATH INVASIVE LOCATION;  Service: Cardiology   • CARPAL TUNNEL RELEASE Right 2013   • CARPAL TUNNEL RELEASE Bilateral    • COLONOSCOPY N/A 1/4/2018    Procedure: COLONOSCOPY with cold polypectomy and hot snare polypectomy;  Surgeon: Ten Solitario MD;  Location: [facility] ENDOSCOPY;  Service:    • EYE LENS IMPLANT SECONDARY Bilateral 2007, 2008   • KNEE CARTILAGE SURGERY Right 1979   • TONSILLECTOMY AND ADENOIDECTOMY  Bilateral 2005   • TOTAL KNEE ARTHROPLASTY Left 03/14/2016    Left total knee arthroplasty with Amberly component, size 11 femur, G tibial baseplate with a 10 polyethylene insert and a 38 patellar button-Dr. Pablo Hairston   • TOTAL KNEE ARTHROPLASTY Right 03/02/2015    Right total knee arthroplasty with Amberly component size G femur, 11 tibial base plate with an 11 polyethylene insert and a 38 patellar button-Dr. Pablo Hairston   • UVULOPALATOPHARYNGOPLASTY N/A 2005    part of soft palate, and uvula   • VENTRAL HERNIA REPAIR N/A 1/23/2018    Procedure: VENTRAL HERNIA REPAIR LAPAROSCOPIC WITH DAVINCI ROBOT AND MESH;  Surgeon: Ten Solitario MD;  Location: [facility] OR;  Service:      Family History   Problem Relation Age of Onset   • Hypertension Mother    • Kidney failure Mother    • Coronary artery disease Father    • Lung cancer Father         positive smoker   • Heart attack Father    • Breast cancer Sister 60   • Prostate cancer Brother    • Colon polyps Brother    • Kidney nephrosis Brother    • Malig Hyperthermia Neg Hx      Social History     Socioeconomic History   • Marital status:      Spouse name: Not on file   • Number of children: Not on file   • Years of education: Not on file   • Highest education level: Not on file   Occupational History   • Occupation:    Tobacco Use   • Smoking status: Never Smoker   • Smokeless tobacco: Never Used   Substance and Sexual Activity   • Alcohol use: Yes     Comment: social, maybe a drink a month   • Drug use: No     Comment: previously smoked marijuana    • Sexual activity: Defer       Current Outpatient Medications:   •  albuterol sulfate  (90 Base) MCG/ACT inhaler, Inhale 2 puffs Every 4 (Four) Hours As Needed for Wheezing., Disp: 18 g, Rfl: 1  •  amLODIPine-benazepril (Lotrel) 10-20 MG per capsule, Take 1 capsule by mouth Daily., Disp: 30 capsule, Rfl: 2  •  atorvastatin (LIPITOR) 10 MG tablet, TAKE 1 TABLET BY MOUTH EVERY NIGHT, Disp:  90 tablet, Rfl: 1  •  citalopram (CeleXA) 40 MG tablet, TAKE 1 TABLET BY MOUTH DAILY, Disp: 90 tablet, Rfl: 1  •  ELIQUIS 5 MG tablet tablet, TAKE 1 TABLET BY MOUTH EVERY 12 HOURS, Disp: 180 tablet, Rfl: 0  •  meclizine (ANTIVERT) 25 MG tablet, Take 1 tablet by mouth 3 (Three) Times a Day As Needed for dizziness., Disp: 30 tablet, Rfl: 1  •  Melatonin 3 MG capsule, Take 3 mg by mouth., Disp: , Rfl:   •  metoprolol tartrate (LOPRESSOR) 25 MG tablet, TAKE 1 TABLET BY MOUTH EVERY 12 HOURS, Disp: 180 tablet, Rfl: 0  •  QUEtiapine (SEROquel) 25 MG tablet, Take 1 tablet by mouth Every Night., Disp: 90 tablet, Rfl: 1  •  triamcinolone (KENALOG) 0.1 % ointment, Apply  topically to the appropriate area as directed 2 (Two) Times a Day., Disp: 30 g, Rfl: 0  Allergies   Allergen Reactions   • Poison Ivy Extract Hives, Itching and Rash     ITCHY BLISTERS        Objective   There were no vitals filed for this visit.  Physical Exam   Vital signs not assessed and physical exam unable to be performed due to telemedicine visit.    Independent Review of Radiographic Studies: I personally reviewed the CT of the chest performed with contrast on 6/9/2020.  The enlarged thyroid gland measures approximately 7 cm transverse by 4 cm AP and extend 7 cm in height.  There is extension into the superior mediastinum greater on the left than the right with some stable tracheal narrowing.  There is no change in the size or effect of this goiter.  No mediastinal, hilar or axillary adenopathy.  There is a 4 mm pulmonary nodule that is stable within the right middle lobe along the undersurface of the minor fissure.  There is a 3 mm noncalcified nodule in the right upper lobe that is also stable.  A posterior right upper lobe pleural-based nodule measures 3 mm which is without change compared to the December imaging.    Assessment/Plan   Assessment: Mr. Manzo is doing well.  He does continue to have some moderate shortness of air which is unchanged.   He denies any difficulty with swallowing.  CT of the chest identifies the large substernal goiter which is stable from previous imaging.    Plan: We will plan to continue our surveillance with another CT of the chest and follow-up in our office in 6 months.  Of course we are happy to see the patient sooner, should the need arise.  Thank you for allowing us to follow in the care of Flo Manzo.    Diagnoses and all orders for this visit:    Substernal thyroid goiter  -     CT Chest With Contrast; Future      Approximately 15 minutes was spent on this telemedicine visit today including chart and imaging review.

## 2020-06-19 ENCOUNTER — OFFICE VISIT (OUTPATIENT)
Dept: FAMILY MEDICINE CLINIC | Facility: CLINIC | Age: 65
End: 2020-06-19

## 2020-06-19 VITALS
OXYGEN SATURATION: 97 % | HEART RATE: 82 BPM | TEMPERATURE: 97.7 F | HEIGHT: 70 IN | BODY MASS INDEX: 34.52 KG/M2 | SYSTOLIC BLOOD PRESSURE: 140 MMHG | WEIGHT: 241.1 LBS | DIASTOLIC BLOOD PRESSURE: 88 MMHG

## 2020-06-19 DIAGNOSIS — R06.02 SOB (SHORTNESS OF BREATH): Primary | ICD-10-CM

## 2020-06-19 DIAGNOSIS — R79.89 ABNORMAL TSH: ICD-10-CM

## 2020-06-19 DIAGNOSIS — E04.9 SUBSTERNAL THYROID GOITER: ICD-10-CM

## 2020-06-19 DIAGNOSIS — I10 ESSENTIAL HYPERTENSION: ICD-10-CM

## 2020-06-19 PROBLEM — R42 VERTIGO: Status: ACTIVE | Noted: 2020-06-19

## 2020-06-19 PROCEDURE — 99214 OFFICE O/P EST MOD 30 MIN: CPT | Performed by: FAMILY MEDICINE

## 2020-06-19 RX ORDER — ALBUTEROL SULFATE 90 UG/1
2 AEROSOL, METERED RESPIRATORY (INHALATION) EVERY 4 HOURS PRN
Qty: 18 G | Refills: 1 | Status: SHIPPED | OUTPATIENT
Start: 2020-06-19 | End: 2022-04-28 | Stop reason: SDUPTHER

## 2020-06-19 RX ORDER — TAMSULOSIN HYDROCHLORIDE 0.4 MG/1
1 CAPSULE ORAL NIGHTLY
Qty: 30 CAPSULE | Refills: 1 | Status: SHIPPED | OUTPATIENT
Start: 2020-06-19 | End: 2020-08-13

## 2020-06-19 NOTE — PROGRESS NOTES
Subjective   Flo Manzo is a 65 y.o. male.     Chief Complaint   Patient presents with   • Hypertension     follow up         History of Present Illness    Dizzy  Said he went to therapy yesterday and he was dizzy after and fell down.   Been trying to walk around the block at home. He was going down small grade and couldn't stop. Fell into the tree, each time tried to get up he fell and had to call his son to come drive him home. He is walking some with walker but mostly with cane.  Referred by neuro for vestibular PT. He is going to [facility]. Went well. They told him after testing that he had something wrong with his ear, vertigo. Now he is doing therapy at [facility] for the vertigo. Says he has been about 6-7 times. Says intermittently better but still has bad days.     His temper is bad right now. Had fights at home. Family dynamics are not good with people's roles at home. He is aggravated. He is going to seek counseling to discuss and hoping his wife will go with him. Their son is taking advantage of them. Son with COPD and games the wife because he can go cut grass for friends to get extra money, doesn't offer money to help the pt and his wife.     SOB  Worse when he is at PT.  Uses albuterol feels it helps  Rattling in his chest.   Coughs up dark phlegm, says does not look like blood, looks like charcoal.   He has never been a smoker.   Just had CT chest with thoracic surgery.     The following portions of the patient's history were reviewed and updated as appropriate: allergies, current medications, past medical history, past social history and problem list.    Review of Systems   Constitutional: Negative for activity change and fatigue.   Eyes: Negative for visual disturbance.   Respiratory: Negative for shortness of breath.    Cardiovascular: Negative for chest pain, palpitations and leg swelling.   Neurological: Positive for dizziness. Negative for weakness and headaches.       Objective  "  /88   Pulse 82   Temp 97.7 °F (36.5 °C) (Tympanic)   Ht 177.8 cm (70\")   Wt 109 kg (241 lb 1.6 oz)   SpO2 97%   BMI 34.59 kg/m²   Physical Exam   Constitutional: He is oriented to person, place, and time. He appears well-nourished. No distress.   Eyes: Conjunctivae are normal. Right eye exhibits no discharge. Left eye exhibits no discharge. No scleral icterus.   Cardiovascular: Normal rate, regular rhythm, normal heart sounds and intact distal pulses. Exam reveals no gallop and no friction rub.   No murmur heard.  Pulmonary/Chest: Effort normal and breath sounds normal. No respiratory distress. He has no wheezes.   Musculoskeletal: He exhibits no edema.   Neurological: He is alert and oriented to person, place, and time.   Psychiatric: He has a normal mood and affect. His behavior is normal.   Vitals reviewed.      Assessment/Plan   Flo was seen today for hypertension.    Diagnoses and all orders for this visit:    SOB (shortness of breath)  -     Full Pulmonary Function Test With Bronchodilator; Future  -     Basic Metabolic Panel  -     CBC (No Diff)  -     BNP ([facility] Only)    Essential hypertension  -     Basic Metabolic Panel  -     CBC (No Diff)    Substernal thyroid goiter  -     TSH  -     T4    Abnormal TSH  -     TSH  -     T4    Other orders  -     albuterol sulfate  (90 Base) MCG/ACT inhaler; Inhale 2 puffs Every 4 (Four) Hours As Needed for Wheezing.  -     tamsulosin (FLOMAX) 0.4 MG capsule 24 hr capsule; Take 1 capsule by mouth Every Night.        He needs follow up with cardiology and neurology  staccato breathing when coached sounded better and did better with smooth breathing and clear breath sounds.    He has had recent chest CT, reviewed.   Will test PFTs, consider referral to pulm. He does feel some improvement with bronchodilator.        "

## 2020-06-20 LAB
BNP SERPL-MCNC: 102.7 PG/ML (ref 0–100)
BUN SERPL-MCNC: 19 MG/DL (ref 8–23)
BUN/CREAT SERPL: 20 (ref 7–25)
CALCIUM SERPL-MCNC: 10.3 MG/DL (ref 8.6–10.5)
CHLORIDE SERPL-SCNC: 101 MMOL/L (ref 98–107)
CO2 SERPL-SCNC: 26.5 MMOL/L (ref 22–29)
CREAT SERPL-MCNC: 0.95 MG/DL (ref 0.76–1.27)
ERYTHROCYTE [DISTWIDTH] IN BLOOD BY AUTOMATED COUNT: 13.7 % (ref 12.3–15.4)
GLUCOSE SERPL-MCNC: 105 MG/DL (ref 65–99)
HCT VFR BLD AUTO: 48.9 % (ref 37.5–51)
HGB BLD-MCNC: 16.1 G/DL (ref 13–17.7)
MCH RBC QN AUTO: 28.3 PG (ref 26.6–33)
MCHC RBC AUTO-ENTMCNC: 32.9 G/DL (ref 31.5–35.7)
MCV RBC AUTO: 86.1 FL (ref 79–97)
PLATELET # BLD AUTO: 258 10*3/MM3 (ref 140–450)
POTASSIUM SERPL-SCNC: 4.3 MMOL/L (ref 3.5–5.2)
RBC # BLD AUTO: 5.68 10*6/MM3 (ref 4.14–5.8)
SODIUM SERPL-SCNC: 138 MMOL/L (ref 136–145)
T4 SERPL-MCNC: 6.34 MCG/DL (ref 4.5–11.7)
TSH SERPL DL<=0.005 MIU/L-ACNC: 0.09 UIU/ML (ref 0.27–4.2)
WBC # BLD AUTO: 6.63 10*3/MM3 (ref 3.4–10.8)

## 2020-06-25 ENCOUNTER — APPOINTMENT (OUTPATIENT)
Dept: SLEEP MEDICINE | Facility: HOSPITAL | Age: 65
End: 2020-06-25

## 2020-07-02 ENCOUNTER — TRANSCRIBE ORDERS (OUTPATIENT)
Dept: SLEEP MEDICINE | Facility: HOSPITAL | Age: 65
End: 2020-07-02

## 2020-07-02 DIAGNOSIS — Z01.818 OTHER SPECIFIED PRE-OPERATIVE EXAMINATION: Primary | ICD-10-CM

## 2020-07-07 ENCOUNTER — LAB (OUTPATIENT)
Dept: LAB | Facility: HOSPITAL | Age: 65
End: 2020-07-07

## 2020-07-07 DIAGNOSIS — Z01.818 OTHER SPECIFIED PRE-OPERATIVE EXAMINATION: ICD-10-CM

## 2020-07-07 PROCEDURE — C9803 HOPD COVID-19 SPEC COLLECT: HCPCS

## 2020-07-07 PROCEDURE — U0004 COV-19 TEST NON-CDC HGH THRU: HCPCS

## 2020-07-08 LAB
REF LAB TEST METHOD: NORMAL
SARS-COV-2 RNA RESP QL NAA+PROBE: NOT DETECTED

## 2020-07-09 ENCOUNTER — HOSPITAL ENCOUNTER (OUTPATIENT)
Dept: RESPIRATORY THERAPY | Facility: HOSPITAL | Age: 65
Discharge: HOME | End: 2020-07-09
Admitting: FAMILY MEDICINE

## 2020-07-09 DIAGNOSIS — R06.02 SOB (SHORTNESS OF BREATH): ICD-10-CM

## 2020-07-09 PROCEDURE — 94640 AIRWAY INHALATION TREATMENT: CPT

## 2020-07-09 PROCEDURE — 94726 PLETHYSMOGRAPHY LUNG VOLUMES: CPT

## 2020-07-09 PROCEDURE — 94060 EVALUATION OF WHEEZING: CPT

## 2020-07-09 PROCEDURE — 94729 DIFFUSING CAPACITY: CPT

## 2020-07-09 RX ORDER — ALBUTEROL SULFATE 2.5 MG/3ML
2.5 SOLUTION RESPIRATORY (INHALATION) ONCE
Status: COMPLETED | OUTPATIENT
Start: 2020-07-09 | End: 2020-07-09

## 2020-07-09 RX ADMIN — ALBUTEROL SULFATE 2.5 MG: 2.5 SOLUTION RESPIRATORY (INHALATION) at 08:26

## 2020-07-27 ENCOUNTER — OFFICE VISIT (OUTPATIENT)
Dept: CARDIOLOGY | Facility: CLINIC | Age: 65
End: 2020-07-27

## 2020-07-27 VITALS
SYSTOLIC BLOOD PRESSURE: 150 MMHG | HEART RATE: 108 BPM | BODY MASS INDEX: 36.53 KG/M2 | WEIGHT: 241 LBS | DIASTOLIC BLOOD PRESSURE: 109 MMHG | HEIGHT: 68 IN

## 2020-07-27 DIAGNOSIS — I50.32 DIASTOLIC CHF, CHRONIC (HCC): ICD-10-CM

## 2020-07-27 DIAGNOSIS — I48.20 CHRONIC ATRIAL FIBRILLATION (HCC): Primary | ICD-10-CM

## 2020-07-27 DIAGNOSIS — I10 ESSENTIAL HYPERTENSION: ICD-10-CM

## 2020-07-27 PROCEDURE — 99213 OFFICE O/P EST LOW 20 MIN: CPT | Performed by: INTERNAL MEDICINE

## 2020-07-27 PROCEDURE — 93000 ELECTROCARDIOGRAM COMPLETE: CPT | Performed by: INTERNAL MEDICINE

## 2020-08-04 NOTE — INTERVAL H&P NOTE
H&P updated. The patient was examined and the following changes are noted:  Recent colonoscopy was unremarkable.  No change in hernia.  Plan is for robotic ventral hernia repair today. Risks and benefits discussed and patient aggrees to proceed.       1

## 2020-08-13 RX ORDER — TAMSULOSIN HYDROCHLORIDE 0.4 MG/1
1 CAPSULE ORAL NIGHTLY
Qty: 90 CAPSULE | Refills: 1 | Status: SHIPPED | OUTPATIENT
Start: 2020-08-13 | End: 2020-12-11 | Stop reason: SDUPTHER

## 2020-09-12 DIAGNOSIS — I10 ESSENTIAL HYPERTENSION: ICD-10-CM

## 2020-09-12 DIAGNOSIS — I20.8 ANGINA AT REST (HCC): ICD-10-CM

## 2020-09-15 RX ORDER — CITALOPRAM 40 MG/1
TABLET ORAL
Qty: 90 TABLET | Refills: 1 | Status: SHIPPED | OUTPATIENT
Start: 2020-09-15 | End: 2021-11-09

## 2020-09-15 RX ORDER — ATORVASTATIN CALCIUM 10 MG/1
10 TABLET, FILM COATED ORAL NIGHTLY
Qty: 90 TABLET | Refills: 1 | Status: SHIPPED | OUTPATIENT
Start: 2020-09-15 | End: 2022-05-06 | Stop reason: SDUPTHER

## 2020-09-18 ENCOUNTER — OFFICE VISIT (OUTPATIENT)
Dept: FAMILY MEDICINE CLINIC | Facility: CLINIC | Age: 65
End: 2020-09-18

## 2020-09-18 VITALS
TEMPERATURE: 96.8 F | DIASTOLIC BLOOD PRESSURE: 72 MMHG | HEART RATE: 65 BPM | WEIGHT: 240.1 LBS | RESPIRATION RATE: 15 BRPM | HEIGHT: 70 IN | BODY MASS INDEX: 34.37 KG/M2 | SYSTOLIC BLOOD PRESSURE: 128 MMHG | OXYGEN SATURATION: 97 %

## 2020-09-18 DIAGNOSIS — R42 VERTIGO: ICD-10-CM

## 2020-09-18 DIAGNOSIS — I10 ESSENTIAL HYPERTENSION: Primary | ICD-10-CM

## 2020-09-18 DIAGNOSIS — F32.89 OTHER DEPRESSION: ICD-10-CM

## 2020-09-18 DIAGNOSIS — E04.9 SUBSTERNAL THYROID GOITER: ICD-10-CM

## 2020-09-18 PROCEDURE — 99214 OFFICE O/P EST MOD 30 MIN: CPT | Performed by: FAMILY MEDICINE

## 2020-09-18 PROCEDURE — 90694 VACC AIIV4 NO PRSRV 0.5ML IM: CPT | Performed by: FAMILY MEDICINE

## 2020-09-18 PROCEDURE — 90471 IMMUNIZATION ADMIN: CPT | Performed by: FAMILY MEDICINE

## 2020-09-18 RX ORDER — QUETIAPINE FUMARATE 25 MG/1
25 TABLET, FILM COATED ORAL NIGHTLY
Qty: 90 TABLET | Refills: 1 | Status: SHIPPED | OUTPATIENT
Start: 2020-09-18 | End: 2021-04-26 | Stop reason: SINTOL

## 2020-09-19 LAB
T4 SERPL-MCNC: 6.11 MCG/DL (ref 4.5–11.7)
TSH SERPL DL<=0.005 MIU/L-ACNC: 0.08 UIU/ML (ref 0.27–4.2)

## 2020-10-02 ENCOUNTER — TELEPHONE (OUTPATIENT)
Dept: FAMILY MEDICINE CLINIC | Facility: CLINIC | Age: 65
End: 2020-10-02

## 2020-10-12 ENCOUNTER — OFFICE VISIT (OUTPATIENT)
Dept: NEUROLOGY | Facility: CLINIC | Age: 65
End: 2020-10-12

## 2020-10-12 VITALS
WEIGHT: 240 LBS | OXYGEN SATURATION: 96 % | DIASTOLIC BLOOD PRESSURE: 100 MMHG | HEART RATE: 78 BPM | HEIGHT: 70 IN | BODY MASS INDEX: 34.36 KG/M2 | SYSTOLIC BLOOD PRESSURE: 138 MMHG

## 2020-10-12 DIAGNOSIS — R42 DIZZINESS: ICD-10-CM

## 2020-10-12 DIAGNOSIS — Z86.79 HISTORY OF INTRACRANIAL HEMORRHAGE: ICD-10-CM

## 2020-10-12 DIAGNOSIS — Z86.73 HISTORY OF STROKE: Primary | ICD-10-CM

## 2020-10-12 PROCEDURE — 99213 OFFICE O/P EST LOW 20 MIN: CPT | Performed by: PSYCHIATRY & NEUROLOGY

## 2020-10-12 RX ORDER — DULOXETIN HYDROCHLORIDE 60 MG/1
CAPSULE, DELAYED RELEASE ORAL
COMMUNITY
Start: 2020-09-25 | End: 2020-11-16 | Stop reason: SDUPTHER

## 2020-10-12 NOTE — PROGRESS NOTES
"Chief Complaint   Patient presents with   • Dizziness       Patient ID: Flo Manzo is a 65 y.o. male.    HPI: I had the pleasure of seeing Flo today in the neurology clinic.  As you may know he is a 65-year-old gentleman with history of stroke.  We will schedule him for a follow-up MRI of his brain.  This did show atrophy that is significantly greater than expected for age group with extensive sequelae of small vessel disease.  Also noted were areas of susceptibility consistent with old blood product deposition.  It seems to be either multiple small chronic hypertensive hemorrhages versus amyloid angiopathy or both.  They question a small subacute infarct in the right paramedian ching.  He does have a history of atrial fibrillation and has been taking Eliquis.  We did discuss the results of the MRI in their entirety today.  He also has a history of dizziness.  He was seen by the [facility] and referred for vestibular therapy however he says that when referred to the therapy clinic for vestibular therapy there was not much done and \"did not help\".  He has been scheduled for further assessment here at [facility].  No new onset focal weakness or numbness.  No slurred speech.  No vision changes.  No recent falls.    The following portions of the patient's history were reviewed and updated as appropriate: allergies, current medications, past family history, past medical history, past social history, past surgical history and problem list.    Review of Systems   Constitutional: Negative for activity change, appetite change and fatigue.   Eyes: Negative for pain, redness and itching.   Respiratory: Negative for cough, choking and shortness of breath.    Cardiovascular: Negative for chest pain and leg swelling.   Gastrointestinal: Negative for abdominal pain, nausea and vomiting.   Allergic/Immunologic: Negative for environmental allergies and food allergies.   Neurological: Positive for dizziness and " light-headedness. Negative for tremors, seizures, syncope, facial asymmetry, speech difficulty, weakness, numbness and headaches.   Psychiatric/Behavioral: Negative for agitation, behavioral problems, confusion, decreased concentration, dysphoric mood, hallucinations, self-injury, sleep disturbance and suicidal ideas. The patient is not nervous/anxious and is not hyperactive.       I have reviewed the review of systems above performed by my medical assistant.      Vitals:    10/12/20 0914   BP: 138/100   Pulse: 78   SpO2: 96%       Neurologic Exam     Mental Status   Oriented to person, place, and time.   Concentration: normal.   Level of consciousness: alert  Knowledge: consistent with education (No deficits found.).     Cranial Nerves     CN II   Visual fields full to confrontation.     CN III, IV, VI   Pupils are equal, round, and reactive to light.  Extraocular motions are normal.   CN III: no CN III palsy  CN VI: no CN VI palsy    CN V   Facial sensation intact.     CN VII   Facial expression full, symmetric.     CN VIII   CN VIII normal.     CN IX, X   CN IX normal.   CN X normal.     CN XI   CN XI normal.     CN XII   CN XII normal.     Motor Exam     Strength   Right neck flexion: 5/5  Left neck flexion: 5/5  Right neck extension: 5/5  Left neck extension: 5/5  Right deltoid: 5/5  Left deltoid: 5/5  Right biceps: 5/5  Left biceps: 5/5  Right triceps: 5/5  Left triceps: 5/5  Right wrist flexion: 5/5  Left wrist flexion: 5/5  Right wrist extension: 5/5  Left wrist extension: 5/5  Right interossei: 5/5  Left interossei: 5/5  Right abdominals: 5/5  Left abdominals: 5/5  Right iliopsoas: 5/5  Left iliopsoas: 5/5  Right quadriceps: 5/5  Left quadriceps: 5/5  Right hamstrin/5  Left hamstrin/5  Right glutei: 5/5  Left glutei: 5/5  Right anterior tibial: 5/5  Left anterior tibial: 5/5  Right posterior tibial: 5/5  Left posterior tibial: 5/5  Right peroneal: 5/5  Left peroneal: 5/5  Right gastroc: 5/5  Left  gastroc: 5/5    Sensory Exam   Light touch normal.   Vibration normal.     Gait, Coordination, and Reflexes     Gait  Gait: normal    Reflexes   Right brachioradialis: 2+  Left brachioradialis: 2+  Right biceps: 2+  Left biceps: 2+  Right triceps: 2+  Left triceps: 2+  Right patellar: 2+  Left patellar: 2+  Right achilles: 2+  Left achilles: 2+  Right : 2+  Left : 2+Station is normal.       Physical Exam  Vitals signs reviewed.   Constitutional:       Appearance: He is well-developed.   HENT:      Head: Normocephalic and atraumatic.   Eyes:      Extraocular Movements: EOM normal.      Pupils: Pupils are equal, round, and reactive to light.   Cardiovascular:      Rate and Rhythm: Normal rate and regular rhythm.   Pulmonary:      Breath sounds: Normal breath sounds.   Musculoskeletal: Normal range of motion.   Skin:     General: Skin is warm.   Neurological:      Mental Status: He is oriented to person, place, and time.      Gait: Gait is intact.      Deep Tendon Reflexes:      Reflex Scores:       Tricep reflexes are 2+ on the right side and 2+ on the left side.       Bicep reflexes are 2+ on the right side and 2+ on the left side.       Brachioradialis reflexes are 2+ on the right side and 2+ on the left side.       Patellar reflexes are 2+ on the right side and 2+ on the left side.       Achilles reflexes are 2+ on the right side and 2+ on the left side.        Procedures    Assessment/Plan: We will see him back in about 4 months or so.  We will likely schedule another follow-up MRI to gather more information about the amyloid angiopathy issue and evaluate progression of the small microhemorrhages.  We did talk about stroke symptoms.  If he is to experience acute onset of neuro change I advised him to seek emergent medical attention.  He will continue vestibular therapy and see us back in 4 months.  A total of 15 minutes was spent face-to-face with the patient today.  Of that greater than 50% of this time  was spent discussing signs and symptoms of stroke, MRI results, dizziness, patient education, plan of care and prognosis.       Flo was seen today for dizziness.    Diagnoses and all orders for this visit:    History of stroke    History of intracranial hemorrhage    Dizziness           Jax Wilkins II, MD

## 2020-11-16 RX ORDER — DULOXETIN HYDROCHLORIDE 60 MG/1
60 CAPSULE, DELAYED RELEASE ORAL DAILY
Qty: 90 CAPSULE | Refills: 1 | Status: SHIPPED | OUTPATIENT
Start: 2020-11-16 | End: 2021-08-27

## 2020-11-20 ENCOUNTER — TELEPHONE (OUTPATIENT)
Dept: FAMILY MEDICINE CLINIC | Facility: CLINIC | Age: 65
End: 2020-11-20

## 2020-11-20 RX ORDER — DULOXETIN HYDROCHLORIDE 60 MG/1
60 CAPSULE, DELAYED RELEASE ORAL DAILY
Qty: 90 CAPSULE | Refills: 1 | Status: CANCELLED | OUTPATIENT
Start: 2020-11-20

## 2020-11-20 NOTE — TELEPHONE ENCOUNTER
Spoke with the pharmacy and patient received 90 tablets of the 60 mg at the beginning of October. He is not due for a refill just. Profiled it until he is ready. Spoke with this patient and advised him of this. Voiced understanding.

## 2020-11-20 NOTE — TELEPHONE ENCOUNTER
Caller: Flo Manzo    Relationship: Self    Best call back number: [phone]     Medication needed:   Requested Prescriptions     Pending Prescriptions Disp Refills   • DULoxetine (CYMBALTA) 60 MG capsule 90 capsule 1     Sig: Take 1 capsule by mouth Daily.       When do you need the refill by: asap     What details did the patient provide when requesting the medication: patient has 1 left   Does the patient have less than a 3 day supply:  [x] Yes  [] No    What is the patient's preferred pharmacy:  [id number] - [place], [place] - [address] [address] AT [facility] [address]  - [phone] [facility] [phone]   [phone]

## 2020-12-12 RX ORDER — TAMSULOSIN HYDROCHLORIDE 0.4 MG/1
1 CAPSULE ORAL NIGHTLY
Qty: 90 CAPSULE | Refills: 1 | Status: SHIPPED | OUTPATIENT
Start: 2020-12-12 | End: 2020-12-21

## 2020-12-12 RX ORDER — AMLODIPINE BESYLATE AND BENAZEPRIL HYDROCHLORIDE 10; 20 MG/1; MG/1
1 CAPSULE ORAL DAILY
Qty: 90 CAPSULE | Refills: 1 | Status: SHIPPED | OUTPATIENT
Start: 2020-12-12 | End: 2021-06-15 | Stop reason: SDUPTHER

## 2020-12-21 ENCOUNTER — OFFICE VISIT (OUTPATIENT)
Dept: FAMILY MEDICINE CLINIC | Facility: CLINIC | Age: 65
End: 2020-12-21

## 2020-12-21 VITALS
HEIGHT: 70 IN | WEIGHT: 246.7 LBS | DIASTOLIC BLOOD PRESSURE: 92 MMHG | BODY MASS INDEX: 35.32 KG/M2 | OXYGEN SATURATION: 94 % | TEMPERATURE: 96.8 F | HEART RATE: 69 BPM | RESPIRATION RATE: 16 BRPM | SYSTOLIC BLOOD PRESSURE: 138 MMHG

## 2020-12-21 DIAGNOSIS — R42 VERTIGO: ICD-10-CM

## 2020-12-21 DIAGNOSIS — I10 ESSENTIAL HYPERTENSION: ICD-10-CM

## 2020-12-21 DIAGNOSIS — E78.5 HYPERLIPIDEMIA, UNSPECIFIED HYPERLIPIDEMIA TYPE: ICD-10-CM

## 2020-12-21 DIAGNOSIS — Z00.00 ANNUAL PHYSICAL EXAM: Primary | ICD-10-CM

## 2020-12-21 DIAGNOSIS — R79.89 LOW TSH LEVEL: ICD-10-CM

## 2020-12-21 DIAGNOSIS — E04.9 SUBSTERNAL THYROID GOITER: ICD-10-CM

## 2020-12-21 PROCEDURE — 99396 PREV VISIT EST AGE 40-64: CPT | Performed by: FAMILY MEDICINE

## 2020-12-21 RX ORDER — CETIRIZINE HYDROCHLORIDE 10 MG/1
10 TABLET ORAL NIGHTLY
COMMUNITY
End: 2021-08-27

## 2020-12-21 RX ORDER — TAMSULOSIN HYDROCHLORIDE 0.4 MG/1
2 CAPSULE ORAL NIGHTLY
Qty: 90 CAPSULE | Refills: 1
Start: 2020-12-21 | End: 2021-04-26

## 2020-12-21 NOTE — PROGRESS NOTES
"Chief Complaint   Patient presents with   • Annual Exam       Subjective   Flo Manzo is a 65 y.o. male.     History of Present Illness   Annual exam  Diet and exercise  He has not been exercising. He had a fall about three months ago. He lost balance going down the stairs, grabbed rail and spun around but didn't really fall all the way down.   He has his CPAP again and has gotten used to it again. He thought it would make him stay awake more during the day but it does not.   Colonoscopy will be due 2023.     Says he has throat sensation or something in the throat when he lies down at night. Drinking some water helps the most, albuterol helps too but it comes back. He does not usually eat too close to lying down. Says milk makes the sensation worse.   Says happens when he lies down and happened today right before I came in the room. Does during the day but almost always at night.     Mood has stayed pretty good.     Vertigo   He did not get to go to the PT. Says if he bends forward he feels like he is going to fall all the way to the ground and like the weight is pushing him down. He said he has not fallen like this.     He is taking the tamsulosin, trying not to drink late ad that helps a lot. Sometimes doesn't matter. He takes med on at bed time.   Says sometimes dribbling urine, urine is more often a problem than it isn't, an annoyance for him. Disruptive.   Can have dark urine.     The following portions of the patient's history were reviewed and updated as appropriate: allergies, current medications, past family history, past medical history, past social history, past surgical history and problem list.    Review of Systems   Respiratory: Negative.    Cardiovascular: Negative.    Neurological: Positive for headaches.       /92 (BP Location: Left arm, Patient Position: Sitting, Cuff Size: Adult)   Pulse 69   Temp 96.8 °F (36 °C) (Infrared)   Resp 16   Ht 177.8 cm (70\")   Wt 112 kg (246 lb 11.2 " oz)   SpO2 94%   BMI 35.40 kg/m²       Objective   Physical Exam  Vitals signs reviewed.   Constitutional:       General: He is not in acute distress.     Appearance: Normal appearance. He is not ill-appearing.   HENT:      Right Ear: Tympanic membrane, ear canal and external ear normal. There is no impacted cerumen.      Left Ear: Tympanic membrane, ear canal and external ear normal. There is no impacted cerumen.      Nose: Nose normal.      Mouth/Throat:      Pharynx: No oropharyngeal exudate.   Eyes:      General: No scleral icterus.        Right eye: No discharge.         Left eye: No discharge.      Conjunctiva/sclera: Conjunctivae normal.   Neck:      Musculoskeletal: Neck supple.      Thyroid: No thyromegaly.      Vascular: No carotid bruit.   Cardiovascular:      Rate and Rhythm: Normal rate and regular rhythm.      Heart sounds: Normal heart sounds. No murmur. No friction rub. No gallop.    Pulmonary:      Effort: Pulmonary effort is normal. No respiratory distress.      Breath sounds: Normal breath sounds. No wheezing or rales.   Chest:      Chest wall: No tenderness.   Abdominal:      General: Bowel sounds are normal. There is no distension.      Palpations: Abdomen is soft. There is no mass.      Tenderness: There is no abdominal tenderness. There is no guarding.   Musculoskeletal:         General: No swelling or deformity.      Right lower leg: No edema.      Left lower leg: No edema.   Lymphadenopathy:      Cervical: No cervical adenopathy.   Skin:     General: Skin is warm and dry.   Neurological:      Mental Status: He is alert and oriented to person, place, and time.      Motor: No abnormal muscle tone.      Coordination: Coordination normal.   Psychiatric:         Mood and Affect: Mood normal.         Behavior: Behavior normal.         Assessment/Plan   Diagnoses and all orders for this visit:    1. Annual physical exam (Primary)    2. Essential hypertension  -     Comprehensive Metabolic  Panel  -     CBC & Differential    3. Vertigo    4. Substernal thyroid goiter  -     TSH  -     T4    5. Low TSH level  -     TSH  -     T4    6. Hyperlipidemia, unspecified hyperlipidemia type  -     Lipid Panel    Other orders  -     tamsulosin (FLOMAX) 0.4 MG capsule 24 hr capsule; Take 2 capsules by mouth Every Night.  Dispense: 90 capsule; Refill: 1    Preventive counseling  We discussed the importance of regular physical activity.  Cardiovascular activity for the equivalent of 30 minutes 5 days per week.  We also discussed the importance of healthy diet to avoid weight gain and sugar intolerance.  I recommend predominantly filling the diet with vegetables and lean meats followed by fruits and whole grains.  I also recommend overall avoiding processed foods.    Due for labs.   Going to see CT surgery soon and have imaging for his substernal thyroid. Due for labs. They have bee stable with elevated TSH.    We are going to try a higher dose of the tamsulosin. He is going to let me know if this is helping.

## 2020-12-22 ENCOUNTER — HOSPITAL ENCOUNTER (OUTPATIENT)
Dept: CT IMAGING | Facility: HOSPITAL | Age: 65
Discharge: HOME | End: 2020-12-22
Admitting: NURSE PRACTITIONER

## 2020-12-22 DIAGNOSIS — E04.9 SUBSTERNAL THYROID GOITER: ICD-10-CM

## 2020-12-22 LAB
ALBUMIN SERPL-MCNC: 3.8 G/DL (ref 3.5–5.2)
ALBUMIN/GLOB SERPL: 1.1 G/DL
ALP SERPL-CCNC: 108 U/L (ref 39–117)
ALT SERPL-CCNC: 18 U/L (ref 1–41)
AST SERPL-CCNC: 13 U/L (ref 1–40)
BASOPHILS # BLD AUTO: 0.04 10*3/MM3 (ref 0–0.2)
BASOPHILS NFR BLD AUTO: 0.5 % (ref 0–1.5)
BILIRUB SERPL-MCNC: 0.4 MG/DL (ref 0–1.2)
BUN SERPL-MCNC: 21 MG/DL (ref 8–23)
BUN/CREAT SERPL: 15.4 (ref 7–25)
CALCIUM SERPL-MCNC: 9.6 MG/DL (ref 8.6–10.5)
CHLORIDE SERPL-SCNC: 100 MMOL/L (ref 98–107)
CHOLEST SERPL-MCNC: 125 MG/DL (ref 0–200)
CO2 SERPL-SCNC: 28.9 MMOL/L (ref 22–29)
CREAT BLDA-MCNC: 0.9 MG/DL (ref 0.6–1.3)
CREAT SERPL-MCNC: 1.36 MG/DL (ref 0.76–1.27)
EOSINOPHIL # BLD AUTO: 0.17 10*3/MM3 (ref 0–0.4)
EOSINOPHIL NFR BLD AUTO: 2 % (ref 0.3–6.2)
ERYTHROCYTE [DISTWIDTH] IN BLOOD BY AUTOMATED COUNT: 13 % (ref 12.3–15.4)
GLOBULIN SER CALC-MCNC: 3.6 GM/DL
GLUCOSE SERPL-MCNC: 90 MG/DL (ref 65–99)
HCT VFR BLD AUTO: 46.8 % (ref 37.5–51)
HDLC SERPL-MCNC: 55 MG/DL (ref 40–60)
HGB BLD-MCNC: 15.8 G/DL (ref 13–17.7)
IMM GRANULOCYTES # BLD AUTO: 0.02 10*3/MM3 (ref 0–0.05)
IMM GRANULOCYTES NFR BLD AUTO: 0.2 % (ref 0–0.5)
LDLC SERPL CALC-MCNC: 55 MG/DL (ref 0–100)
LYMPHOCYTES # BLD AUTO: 1.86 10*3/MM3 (ref 0.7–3.1)
LYMPHOCYTES NFR BLD AUTO: 22.1 % (ref 19.6–45.3)
MCH RBC QN AUTO: 29.4 PG (ref 26.6–33)
MCHC RBC AUTO-ENTMCNC: 33.8 G/DL (ref 31.5–35.7)
MCV RBC AUTO: 87.2 FL (ref 79–97)
MONOCYTES # BLD AUTO: 0.63 10*3/MM3 (ref 0.1–0.9)
MONOCYTES NFR BLD AUTO: 7.5 % (ref 5–12)
NEUTROPHILS # BLD AUTO: 5.68 10*3/MM3 (ref 1.7–7)
NEUTROPHILS NFR BLD AUTO: 67.7 % (ref 42.7–76)
NRBC BLD AUTO-RTO: 0 /100 WBC (ref 0–0.2)
PLATELET # BLD AUTO: 237 10*3/MM3 (ref 140–450)
POTASSIUM SERPL-SCNC: 4.3 MMOL/L (ref 3.5–5.2)
PROT SERPL-MCNC: 7.4 G/DL (ref 6–8.5)
RBC # BLD AUTO: 5.37 10*6/MM3 (ref 4.14–5.8)
SODIUM SERPL-SCNC: 136 MMOL/L (ref 136–145)
T4 SERPL-MCNC: 5.47 MCG/DL (ref 4.5–11.7)
TRIGL SERPL-MCNC: 77 MG/DL (ref 0–150)
TSH SERPL DL<=0.005 MIU/L-ACNC: 0.04 UIU/ML (ref 0.27–4.2)
VLDLC SERPL CALC-MCNC: 15 MG/DL (ref 5–40)
WBC # BLD AUTO: 8.4 10*3/MM3 (ref 3.4–10.8)

## 2020-12-22 PROCEDURE — 71260 CT THORAX DX C+: CPT

## 2020-12-22 PROCEDURE — 25010000002 IOPAMIDOL 61 % SOLUTION: Performed by: NURSE PRACTITIONER

## 2020-12-22 PROCEDURE — 82565 ASSAY OF CREATININE: CPT

## 2020-12-22 RX ADMIN — IOPAMIDOL 85 ML: 612 INJECTION, SOLUTION INTRAVENOUS at 10:53

## 2020-12-29 ENCOUNTER — TELEPHONE (OUTPATIENT)
Dept: FAMILY MEDICINE CLINIC | Facility: CLINIC | Age: 65
End: 2020-12-29

## 2020-12-29 NOTE — TELEPHONE ENCOUNTER
Caller: Bhargavi Parker    Relationship to patient: Emergency Contact    Best call back number: [phone]     Patient is needing: Bhargavi called in and wanted to verify the dosage instructions for tamsulosin (FLOMAX) 0.4 MG capsule 24 hr capsule  . All it says is take one capsule .  She wants to make sure and verify its correct.

## 2020-12-29 NOTE — TELEPHONE ENCOUNTER
In the chart it says that he is to take two capsules every night which does not match with what the message says.  Please clarify instructions.

## 2020-12-30 ENCOUNTER — OFFICE VISIT (OUTPATIENT)
Dept: SURGERY | Facility: CLINIC | Age: 65
End: 2020-12-30

## 2020-12-30 DIAGNOSIS — E04.9 SUBSTERNAL THYROID GOITER: Primary | ICD-10-CM

## 2020-12-30 PROCEDURE — 99441 PR PHYS/QHP TELEPHONE EVALUATION 5-10 MIN: CPT | Performed by: NURSE PRACTITIONER

## 2020-12-30 NOTE — TELEPHONE ENCOUNTER
Ok I could not find that. I have a few gentleman on two per night.   He can take two nightly for two weeks and then let us know how it is working with a call into the office thanks.

## 2020-12-30 NOTE — TELEPHONE ENCOUNTER
I don't know why it says 2 capsules. That looks like an error. Just take the one capsule in the evening. Thanks.

## 2020-12-30 NOTE — TELEPHONE ENCOUNTER
Spoke to patient and informed him to take it once at night.  He expressed understanding but he wanted me to make sure because he was certain that at his last visit you told him to take two capsules.  I informed him that I sent the previous message to you, but he wanted me to double check.  Please advise.

## 2020-12-31 NOTE — PROGRESS NOTES
Subjective   Patient ID: Flo Manzo is a 65 y.o. male presented today for follow-up. You have chosen to receive care through a telephone visit. Do you consent to use a telephone visit for your medical care today? Yes    History of Present Illness  Dear Colleague,  Flo Manzo was presented for a telemedicine visit today for continued follow up and surveillance of a substernal goiter first identified on radiographic imaging in January 2019.   denies any significant complaints today.  He reports no difficulty with swallowing, no shortness of air or cough.   He denies any complaints of fever, chills, cough, hemoptysis, pleuritic chest pain, night sweats, hoarseness, or unintentional weight loss. Underlying medical conditions including sleep apnea and atrial fibrillation remain stable.  He has no other somatic complaints or alleviating or exacerbating factors aside from those mentioned above.    The following portions of the patient's history were reviewed and updated as appropriate: allergies, current medications, past family history, past medical history, past social history, past surgical history and problem list.  Review of Systems   Constitution: Negative for chills, diaphoresis, fever, night sweats and weight loss.   HENT: Negative for odynophagia.    Cardiovascular: Negative for chest pain and dyspnea on exertion.   Respiratory: Negative for cough and shortness of breath.    Gastrointestinal: Negative for dysphagia, heartburn and nausea.   All other systems reviewed and are negative.    Patient Active Problem List   Diagnosis   • Umbilical hernia without obstruction and without gangrene   • Essential hypertension   • Arthritis of left knee   • Depression   • Obesity (BMI 30-39.9)   • Substernal thyroid goiter   • CHF (congestive heart failure) (CMS/HCC)   • Diastolic CHF, chronic (CMS/HCC)   • Hemorrhagic stroke (CMS/HCC)   • GÓMEZ on auto CPAP   • Hypersomnia due to medical condition   •  Sleep-related hypoxia   • Chronic atrial fibrillation (CMS/HCC)   • Vertigo     Past Medical History:   Diagnosis Date   • Arthritis    • Atrial fibrillation with RVR (CMS/HCC) 01/24/2019   • Colon polyps 01/04/2018    Hepatic flexure: tubular adenoma, only low-grade dysplasia; splenic flexure: tubular adenoma, only low-grade dysplasia; sigmoid colon: tubular adenoma, multiple fragments only low-grade dysplasia   • Depression    • Heart murmur    • Hemorrhagic stroke (CMS/HCC) 01/29/2019   • Hypertension    • New onset atrial fibrillation (CMS/HCC) - RVR 1/24/2019   • GÓMEZ (obstructive sleep apnea) 06/06/2019    Home sleep study with moderate severity GÓMEZ, AHI 20 events per hour.  Sleep-related hypoxia with low O2 saturation 84% for 14 minutes.   • Sleep apnea     previously diagnosed with sleep apnea, had T&A done and it has since resolved    • Uncontrolled hypertension    • Ventral hernia      Past Surgical History:   Procedure Laterality Date   • APPENDECTOMY N/A 1972   • CARDIAC CATHETERIZATION N/A 07/20/2004    Cath left ventriculography, coronary angiography and left heart catheterization, Normal results-Dr. Vadim Mancuso   • CARDIAC CATHETERIZATION N/A 1/29/2019    Procedure: Left Heart Cath;  Surgeon: Eren Bruce MD;  Location: [facility] CATH INVASIVE LOCATION;  Service: Cardiology   • CARDIAC CATHETERIZATION N/A 1/29/2019    Procedure: Left ventriculography;  Surgeon: Eren Bruce MD;  Location: [facility] CATH INVASIVE LOCATION;  Service: Cardiology   • CARDIAC CATHETERIZATION N/A 1/29/2019    Procedure: Coronary angiography;  Surgeon: Eren Bruec MD;  Location: [facility] CATH INVASIVE LOCATION;  Service: Cardiology   • CARPAL TUNNEL RELEASE Right 2013   • CARPAL TUNNEL RELEASE Bilateral    • COLONOSCOPY N/A 1/4/2018    Procedure: COLONOSCOPY with cold polypectomy and hot snare polypectomy;  Surgeon: Ten Solitario MD;  Location: [facility] ENDOSCOPY;  Service:    • EYE LENS IMPLANT  SECONDARY Bilateral 2007, 2008   • KNEE CARTILAGE SURGERY Right 1979   • TONSILLECTOMY AND ADENOIDECTOMY Bilateral 2005   • TOTAL KNEE ARTHROPLASTY Left 03/14/2016    Left total knee arthroplasty with Amberly component, size 11 femur, G tibial baseplate with a 10 polyethylene insert and a 38 patellar button-Dr. Pablo Hairston   • TOTAL KNEE ARTHROPLASTY Right 03/02/2015    Right total knee arthroplasty with Amberly component size G femur, 11 tibial base plate with an 11 polyethylene insert and a 38 patellar button-Dr. Pablo Hairston   • UVULOPALATOPHARYNGOPLASTY N/A 2005    part of soft palate, and uvula   • VENTRAL HERNIA REPAIR N/A 1/23/2018    Procedure: VENTRAL HERNIA REPAIR LAPAROSCOPIC WITH DAVINCI ROBOT AND MESH;  Surgeon: Ten Solitario MD;  Location: [facility];  Service:      Family History   Problem Relation Age of Onset   • Hypertension Mother    • Kidney failure Mother    • Coronary artery disease Father    • Lung cancer Father         positive smoker   • Heart attack Father    • Breast cancer Sister 60   • Prostate cancer Brother    • Colon polyps Brother    • Kidney nephrosis Brother    • Malig Hyperthermia Neg Hx      Social History     Socioeconomic History   • Marital status:      Spouse name: Not on file   • Number of children: Not on file   • Years of education: Not on file   • Highest education level: Not on file   Occupational History   • Occupation:    Tobacco Use   • Smoking status: Never Smoker   • Smokeless tobacco: Never Used   Substance and Sexual Activity   • Alcohol use: Yes     Comment: social, maybe a drink a month   • Drug use: No     Comment: previously smoked marijuana    • Sexual activity: Defer       Current Outpatient Medications:   •  albuterol sulfate  (90 Base) MCG/ACT inhaler, Inhale 2 puffs Every 4 (Four) Hours As Needed for Wheezing., Disp: 18 g, Rfl: 1  •  amLODIPine-benazepril (Lotrel) 10-20 MG per capsule, Take 1 capsule by mouth Daily.,  Disp: 90 capsule, Rfl: 1  •  apixaban (Eliquis) 5 MG tablet tablet, Take 1 tablet by mouth Every 12 (Twelve) Hours., Disp: 180 tablet, Rfl: 1  •  atorvastatin (LIPITOR) 10 MG tablet, TAKE 1 TABLET BY MOUTH EVERY NIGHT, Disp: 90 tablet, Rfl: 1  •  cetirizine (zyrTEC) 10 MG tablet, Take 10 mg by mouth Every Night., Disp: , Rfl:   •  citalopram (CeleXA) 40 MG tablet, TAKE 1 TABLET BY MOUTH DAILY, Disp: 90 tablet, Rfl: 1  •  DULoxetine (CYMBALTA) 60 MG capsule, Take 1 capsule by mouth Daily., Disp: 90 capsule, Rfl: 1  •  meclizine (ANTIVERT) 25 MG tablet, Take 1 tablet by mouth 3 (Three) Times a Day As Needed for dizziness., Disp: 30 tablet, Rfl: 1  •  Melatonin 3 MG capsule, Take 3 mg by mouth., Disp: , Rfl:   •  metoprolol tartrate (LOPRESSOR) 25 MG tablet, Take 1 tablet by mouth Every 12 (Twelve) Hours., Disp: 180 tablet, Rfl: 1  •  QUEtiapine (SEROquel) 25 MG tablet, Take 1 tablet by mouth Every Night., Disp: 90 tablet, Rfl: 1  •  tamsulosin (FLOMAX) 0.4 MG capsule 24 hr capsule, Take 2 capsules by mouth Every Night., Disp: 90 capsule, Rfl: 1  Allergies   Allergen Reactions   • Poison Ivy Extract Hives, Itching and Rash     ITCHY BLISTERS        Objective   There were no vitals filed for this visit.  Physical Exam   No vital signs assessed or physical examination performed secondary to telemedicine visit.  Independent Review of Radiographic Studies: I reviewed the CT of the chest performed with contrast on 12/22/2020.  The multinodular thyroid goiter is stable in size.  There is no evidence of lymphadenopathy.  There is no pleural effusion.  There is a stable micronodule in the right middle lobe near the minor fissure.  There is also a stable calcified granuloma in the left upper lobe with calcified mediastinal and left hilar lymph nodes.  Assessment/Plan     Assessment: Mr. Manzo is doing well and denies any symptoms from his substernal goiter.  He is reporting no shortness of air or difficulty swallowing or  eating.  CT of the chest is stable.    Plan: We will plan for continued our surveillance with another CT of the chest in 6 months and have him follow-up in our office at that time.  Thank you for allowing us to participate in the care of Flo Manzo.  We will continue to keep you informed of his progress.    Diagnoses and all orders for this visit:    Substernal thyroid goiter  -     CT Chest With Contrast; Future    I spent 10 minutes reviewing the patient's chart, radiographic imaging and discussing her plan of care with him by phone today.

## 2021-02-16 ENCOUNTER — OFFICE VISIT (OUTPATIENT)
Dept: NEUROLOGY | Facility: CLINIC | Age: 66
End: 2021-02-16

## 2021-02-16 DIAGNOSIS — Z86.79 HISTORY OF INTRACRANIAL HEMORRHAGE: ICD-10-CM

## 2021-02-16 DIAGNOSIS — R42 DIZZINESS: Primary | ICD-10-CM

## 2021-02-16 DIAGNOSIS — Z86.73 HISTORY OF STROKE: ICD-10-CM

## 2021-02-16 PROCEDURE — 99442 PR PHYS/QHP TELEPHONE EVALUATION 11-20 MIN: CPT | Performed by: PSYCHIATRY & NEUROLOGY

## 2021-02-16 NOTE — PROGRESS NOTES
"Chief Complaint   Patient presents with   • Dizziness   • Hx of Stroke       Patient ID: Flo Manzo is a 65 y.o. male.    HPI:You have chosen to receive care through a telephone visit. Do you consent to use a telephone visit for your medical care today?   \"Yes\".  Patient is at home and I am at my place of work.  I had the pleasure of speaking with Flo today.  As you may know he is a 65-year-old gentleman with a history of stroke.  He also has had some vestibular dysfunction.  However he says that has gotten better.  He is currently not receiving any therapy.  He denies any new stroke symptoms since his last follow-up with us.  No new onset focal weakness or numbness.  No double vision or loss of vision.  No slurred speech.  No new onset difficulty getting words.    The following portions of the patient's history were reviewed and updated as appropriate: allergies, current medications, past family history, past medical history, past social history, past surgical history and problem list.    Review of Systems   Constitutional: Negative for activity change, appetite change and fatigue.   HENT: Negative for facial swelling, hearing loss and trouble swallowing.    Musculoskeletal: Negative for back pain, gait problem and neck pain.   Neurological: Negative for dizziness, tremors, seizures, syncope, facial asymmetry, speech difficulty, weakness, light-headedness, numbness and headaches.   Hematological: Negative for adenopathy. Does not bruise/bleed easily.   Psychiatric/Behavioral: Negative for agitation, behavioral problems, confusion, decreased concentration, dysphoric mood, hallucinations, self-injury, sleep disturbance and suicidal ideas. The patient is not nervous/anxious and is not hyperactive.       I have reviewed the review of systems above performed by my medical assistant.      There were no vitals filed for this visit.    Neurologic Exam    Physical Exam    Procedures    Assessment/Plan: At this " point we will continue the Eliquis and he will see us here in 6 months or sooner if needed.  We again discussed stroke symptoms in great detail and he does state understanding about seeking emergent medical attention if any new stroke symptoms were to occur.  With respect to the vestibular dysfunction we will continue therapy for that as needed.  A total of 15 minutes was spent discussing signs and symptoms of stroke, dizziness, patient education, plan of care and prognosis.       Diagnoses and all orders for this visit:    1. Dizziness (Primary)    2. History of intracranial hemorrhage    3. History of stroke           Jax Wilkins II, MD

## 2021-04-26 ENCOUNTER — OFFICE VISIT (OUTPATIENT)
Dept: FAMILY MEDICINE CLINIC | Facility: CLINIC | Age: 66
End: 2021-04-26

## 2021-04-26 VITALS
SYSTOLIC BLOOD PRESSURE: 118 MMHG | OXYGEN SATURATION: 95 % | DIASTOLIC BLOOD PRESSURE: 84 MMHG | BODY MASS INDEX: 34.36 KG/M2 | HEART RATE: 61 BPM | HEIGHT: 70 IN | TEMPERATURE: 97.3 F | RESPIRATION RATE: 15 BRPM | WEIGHT: 240 LBS

## 2021-04-26 DIAGNOSIS — R42 VERTIGO: ICD-10-CM

## 2021-04-26 DIAGNOSIS — I10 ESSENTIAL HYPERTENSION: ICD-10-CM

## 2021-04-26 DIAGNOSIS — Z00.00 MEDICARE ANNUAL WELLNESS VISIT, INITIAL: Primary | ICD-10-CM

## 2021-04-26 DIAGNOSIS — E04.9 SUBSTERNAL THYROID GOITER: ICD-10-CM

## 2021-04-26 PROCEDURE — G0402 INITIAL PREVENTIVE EXAM: HCPCS | Performed by: FAMILY MEDICINE

## 2021-04-26 NOTE — PROGRESS NOTES
The ABCs of the Annual Wellness Visit  Subsequent Medicare Wellness Visit    Chief Complaint   Patient presents with   • Welcome To Medicare       Subjective   History of Present Illness:  Flo Manzo is a 66 y.o. male who presents for a Subsequent Medicare Wellness Visit.    HEALTH RISK ASSESSMENT    Recent Hospitalizations:  No hospitalization(s) within the last year.    Current Medical Providers:  Patient Care Team:  Cathleen Corona MD as PCP - General (Family Medicine)  Eren Bruce MD as Consulting Physician (Cardiology)    Smoking Status:  Social History     Tobacco Use   Smoking Status Never Smoker   Smokeless Tobacco Never Used       Alcohol Consumption:  Social History     Substance and Sexual Activity   Alcohol Use Not Currently    Comment: social, maybe a drink a month, not since stroke       Depression Screen:   PHQ-2/PHQ-9 Depression Screening 4/26/2021   Little interest or pleasure in doing things 0   Feeling down, depressed, or hopeless 1   Trouble falling or staying asleep, or sleeping too much -   Feeling tired or having little energy -   Poor appetite or overeating -   Feeling bad about yourself - or that you are a failure or have let yourself or your family down -   Trouble concentrating on things, such as reading the newspaper or watching television -   Moving or speaking so slowly that other people could have noticed. Or the opposite - being so fidgety or restless that you have been moving around a lot more than usual -   Thoughts that you would be better off dead, or of hurting yourself in some way -   Total Score 1   If you checked off any problems, how difficult have these problems made it for you to do your work, take care of things at home, or get along with other people? -       Fall Risk Screen:  STEADI Fall Risk Assessment was completed, and patient is at HIGH risk for falls. Assessment completed on:4/26/2021    Health Habits and Functional and Cognitive  Screening:  Functional & Cognitive Status 4/26/2021   Do you have difficulty preparing food and eating? No   Do you have difficulty bathing yourself, getting dressed or grooming yourself? No   Do you have difficulty using the toilet? No   Do you have difficulty moving around from place to place? No   Do you have trouble with steps or getting out of a bed or a chair? No   Current Diet Low Carb Diet   Dental Exam Not up to date   Eye Exam Not up to date   Exercise (times per week) 7 times per week   Current Exercise Activities Include No Regular Exercise   Do you need help using the phone?  No   Are you deaf or do you have serious difficulty hearing?  Yes   Do you need help with transportation? No   Do you need help shopping? No   Do you need help preparing meals?  No   Do you need help with housework?  No   Do you need help with laundry? No   Do you need help taking your medications? No   Do you need help managing money? No   Do you ever drive or ride in a car without wearing a seat belt? No   Have you felt unusual stress, anger or loneliness in the last month? No   Who do you live with? Spouse   If you need help, do you have trouble finding someone available to you? No   Have you been bothered in the last four weeks by sexual problems? No   Do you have difficulty concentrating, remembering or making decisions? Yes         Does the patient have evidence of cognitive impairment? No    Asprin use counseling:Does not need ASA (and currently is not on it)    Age-appropriate Screening Schedule:  Refer to the list below for future screening recommendations based on patient's age, sex and/or medical conditions. Orders for these recommended tests are listed in the plan section. The patient has been provided with a written plan.    Health Maintenance   Topic Date Due   • TDAP/TD VACCINES (2 - Tdap) 03/10/2007   • INFLUENZA VACCINE  08/01/2021   • LIPID PANEL  12/21/2021   • COLONOSCOPY  01/04/2028   • ZOSTER VACCINE   Addressed          The following portions of the patient's history were reviewed and updated as appropriate: allergies, current medications, past family history, past medical history, past social history, past surgical history and problem list.    Outpatient Medications Prior to Visit   Medication Sig Dispense Refill   • albuterol sulfate  (90 Base) MCG/ACT inhaler Inhale 2 puffs Every 4 (Four) Hours As Needed for Wheezing. 18 g 1   • amLODIPine-benazepril (Lotrel) 10-20 MG per capsule Take 1 capsule by mouth Daily. 90 capsule 1   • apixaban (Eliquis) 5 MG tablet tablet Take 1 tablet by mouth Every 12 (Twelve) Hours. 180 tablet 1   • atorvastatin (LIPITOR) 10 MG tablet TAKE 1 TABLET BY MOUTH EVERY NIGHT 90 tablet 1   • citalopram (CeleXA) 40 MG tablet TAKE 1 TABLET BY MOUTH DAILY 90 tablet 1   • DULoxetine (CYMBALTA) 60 MG capsule Take 1 capsule by mouth Daily. 90 capsule 1   • metoprolol tartrate (LOPRESSOR) 25 MG tablet Take 1 tablet by mouth Every 12 (Twelve) Hours. 180 tablet 1   • QUEtiapine (SEROquel) 25 MG tablet Take 1 tablet by mouth Every Night. 90 tablet 1   • tamsulosin (FLOMAX) 0.4 MG capsule 24 hr capsule Take 2 capsules by mouth Every Night. 90 capsule 1   • cetirizine (zyrTEC) 10 MG tablet Take 10 mg by mouth Every Night.     • meclizine (ANTIVERT) 25 MG tablet Take 1 tablet by mouth 3 (Three) Times a Day As Needed for dizziness. 30 tablet 1   • Melatonin 3 MG capsule Take 3 mg by mouth.       No facility-administered medications prior to visit.       Patient Active Problem List   Diagnosis   • Umbilical hernia without obstruction and without gangrene   • Essential hypertension   • Arthritis of left knee   • Depression   • Obesity (BMI 30-39.9)   • Substernal thyroid goiter   • CHF (congestive heart failure) (CMS/HCC)   • Diastolic CHF, chronic (CMS/HCC)   • Hemorrhagic stroke (CMS/HCC)   • GÓMEZ on auto CPAP   • Hypersomnia due to medical condition   • Sleep-related hypoxia   • Chronic atrial  "fibrillation (CMS/[facility])   • Vertigo       Advanced Care Planning:  ACP discussion was held with the patient during this visit. Patient does not have an advance directive, information provided.    Review of Systems   Cardiovascular: Negative for leg swelling.   Gastrointestinal: Negative for constipation and diarrhea.   Genitourinary: Positive for decreased urine volume.        Says he was having a lot of urinary frequency but this is better. He stopped taking the tamsulosin because read about so many problems with it. He says not drinking so much before bed and doing well.    Neurological: Positive for dizziness.        Not as bad as prior states still occurs but he is not taking his meclizine for this anymore. Would still like to do the PT.    Psychiatric/Behavioral: Positive for dysphoric mood and sleep disturbance.       Compared to one year ago, the patient feels his physical health is better.  Compared to one year ago, the patient feels his mental health is the same.    Reviewed chart for potential of high risk medication in the elderly: yes  Reviewed chart for potential of harmful drug interactions in the elderly:yes    Objective         Vitals:    04/26/21 1512   BP: 118/84   BP Location: Right arm   Patient Position: Sitting   Cuff Size: Adult   Pulse: 61   Resp: 15   Temp: 97.3 °F (36.3 °C)   TempSrc: Infrared   SpO2: 95%   Weight: 109 kg (240 lb)   Height: 177.8 cm (70\")   PainSc: 0-No pain       Body mass index is 34.44 kg/m².  Discussed the patient's BMI with him. The BMI is above average; BMI management plan is completed.    Physical Exam  Vitals reviewed.   Constitutional:       General: He is not in acute distress.  Eyes:      General: No scleral icterus.        Right eye: No discharge.         Left eye: No discharge.      Conjunctiva/sclera: Conjunctivae normal.   Cardiovascular:      Rate and Rhythm: Normal rate. Rhythm irregular.      Heart sounds: Normal heart sounds. No murmur heard.        " Comments: irreg irreg rhythm.   Pulmonary:      Effort: Pulmonary effort is normal. No respiratory distress.      Breath sounds: Normal breath sounds. No wheezing.   Musculoskeletal:      Cervical back: Neck supple. No muscular tenderness.      Right lower leg: No edema.      Left lower leg: No edema.   Lymphadenopathy:      Cervical: No cervical adenopathy.   Neurological:      Mental Status: He is oriented to person, place, and time.      Motor: No abnormal muscle tone.      Comments: Walks with cane. Takes two rocks and pushing off the wall a lot to stand from chair.    Psychiatric:         Behavior: Behavior normal.               Assessment/Plan   Medicare Risks and Personalized Health Plan  CMS Preventative Services Quick Reference  Advance Directive Discussion  Cardiovascular risk  Depression/Dysphoria  Immunizations Discussed/Encouraged (specific immunizations; Shingrix and COVID19 )    The above risks/problems have been discussed with the patient.  Pertinent information has been shared with the patient in the After Visit Summary.  Follow up plans and orders are seen below in the Assessment/Plan Section.    Diagnoses and all orders for this visit:    1. Medicare annual wellness visit, initial (Primary)    2. Essential hypertension  -     TSH  -     T4  -     Basic Metabolic Panel    3. Substernal thyroid goiter  -     TSH  -     T4    4. Vertigo  -     Ambulatory Referral to Physical Therapy Evaluate and treat, Vestibular      Follow Up:  No follow-ups on file.     An After Visit Summary and PPPS were given to the patient.

## 2021-04-27 LAB
BUN SERPL-MCNC: 19 MG/DL (ref 8–23)
BUN/CREAT SERPL: 18.1 (ref 7–25)
CALCIUM SERPL-MCNC: 10.6 MG/DL (ref 8.6–10.5)
CHLORIDE SERPL-SCNC: 103 MMOL/L (ref 98–107)
CO2 SERPL-SCNC: 30.4 MMOL/L (ref 22–29)
CREAT SERPL-MCNC: 1.05 MG/DL (ref 0.76–1.27)
GLUCOSE SERPL-MCNC: 97 MG/DL (ref 65–99)
POTASSIUM SERPL-SCNC: 4.4 MMOL/L (ref 3.5–5.2)
SODIUM SERPL-SCNC: 142 MMOL/L (ref 136–145)
T4 SERPL-MCNC: 6.56 MCG/DL (ref 4.5–11.7)
TSH SERPL DL<=0.005 MIU/L-ACNC: 0.02 UIU/ML (ref 0.27–4.2)

## 2021-05-20 RX ORDER — QUETIAPINE FUMARATE 25 MG/1
25 TABLET, FILM COATED ORAL NIGHTLY
Qty: 90 TABLET | Refills: 1 | OUTPATIENT
Start: 2021-05-20

## 2021-05-26 RX ORDER — APIXABAN 5 MG/1
TABLET, FILM COATED ORAL
Qty: 180 TABLET | Refills: 1 | Status: SHIPPED | OUTPATIENT
Start: 2021-05-26 | End: 2021-08-27 | Stop reason: SDUPTHER

## 2021-05-27 ENCOUNTER — TREATMENT (OUTPATIENT)
Dept: PHYSICAL THERAPY | Facility: CLINIC | Age: 66
End: 2021-05-27

## 2021-05-27 DIAGNOSIS — H81.12 BPPV (BENIGN PAROXYSMAL POSITIONAL VERTIGO), LEFT: Primary | ICD-10-CM

## 2021-05-27 DIAGNOSIS — R42 DIZZINESS: ICD-10-CM

## 2021-05-27 PROCEDURE — 97161 PT EVAL LOW COMPLEX 20 MIN: CPT | Performed by: PHYSICAL THERAPIST

## 2021-05-27 PROCEDURE — 95992 CANALITH REPOSITIONING PROC: CPT | Performed by: PHYSICAL THERAPIST

## 2021-05-27 NOTE — PROGRESS NOTES
Physical Therapy Initial Evaluation-  Vestibular Therapy    Patient Name: Flo Manzo       Patient MRN: [id number]  : 1955  Physician:Cathleen Corona MD  Date: 2021  Encounter Diagnoses   Name Primary?   • BPPV (benign paroxysmal positional vertigo), left Yes   • Dizziness      Objective Testing:  Positional Testing  Positional Testing: With infrared goggles  Vertebrobasilar Artery Screen - Right: Negative  Vertebrobasilar Artery Screen - Left: Negative  Ridgeway-Hallpike Right: No nystagmus  Ridgeway-Hallpike Left: No nystagmus  Horizontal Roll Test Right: Right-beating  Horizontal Roll Test Left: Left-beating (stronger)  Horizontal Roll Test Left Onset Time : immediate, duration 60 sec     Occulomotor Exam Fixation Present  Spontaneous Nystagmus: Absent  VOR with Occulomotor Exam Fixation Absent   Spontaneous Nystagmus: Absent    THERAPY ASSESSMENT: Patient is a 66 y.o. male. Patient presents to physical therapy due to complaints of episodes of dizziness and imbalance. He reports 2 falls in the past 6 months. Patient has not received treatment for symptoms in the past.  Signs and symptoms are consistent with L LC canalithiasis BPPV. Testing of balance was deferred to treat BPPV with CRP. Tolerated well. No signs of central vestibular dysfunction. Patient is at risk for falls due to vestibular dysfunction.  Patient is a good candidate for physical therapy to address the following:    Functional Limitations: Walking, Difficulty moving, Decreased ability to perform ADL's   Length of Therapy: 1 month   PT Frequency: PT 2x week   PT Interventions: Balance Training, Home Exercise Program, CRP   Patient Agrees with Plan of Care: Yes    REHAB POTENTIAL: good            SHORT TERM GOALS: To be met in 2 weeks:  1. Patient is independent with HEP.  2. Patient will report at least 30% improvement in overall condition.  LONG TERM GOALS:To be met in 4 weeks:  1. Patient will report decreased disequilibrium/dizziness  by at least 90%.  2. Patient will report no loss of balance with ADLs to demonstrate improved functional balance.  3. Patient denies dizziness with daily activity.  4. DHI score is less than 10.     Other Procedure CPT 12661 minutes 0    Timed Code Treatment: 0   Minutes     Total Treatment Time: 60      Minutes      PT SIGNATURE: Neeta Macario PT, DPT, RUMA MIRAMONTES   [place] license #[id number]  DATE TREATMENT INITIATED: 5/27/2021     Medicare Initial Certification  Certification Period: 8/25/2021  I certify that the therapy services are furnished while this patient is under my care.  The services outlined above are required by this patient, and will be reviewed every 90 days.     PHYSICIAN: Cathleen Corona MD      DATE:     Please sign and return via fax to [phone].. Thank you, [facility].

## 2021-06-01 RX ORDER — TAMSULOSIN HYDROCHLORIDE 0.4 MG/1
1 CAPSULE ORAL NIGHTLY
Qty: 90 CAPSULE | Refills: 1 | OUTPATIENT
Start: 2021-06-01

## 2021-06-04 ENCOUNTER — TELEPHONE (OUTPATIENT)
Dept: FAMILY MEDICINE CLINIC | Facility: CLINIC | Age: 66
End: 2021-06-04

## 2021-06-04 NOTE — TELEPHONE ENCOUNTER
PATIENT IS CALLING IN HE STATES HE NEEDS TO GET A REFERRAL FOR HIS SECOND SHINGLES SHOT. HE TRIED TO GET IT TODAY BUT RENATA STATES HE NEEDS TO HAVE REFERRAL FOR SECOND ONE FOR INSURANCE PURPOSES.      PLEASE ADVISE    CALLBACK NUMBER IS  [phone]

## 2021-06-11 RX ORDER — AMLODIPINE BESYLATE AND BENAZEPRIL HYDROCHLORIDE 5; 10 MG/1; MG/1
2 CAPSULE ORAL DAILY
Qty: 180 CAPSULE | Refills: 0 | OUTPATIENT
Start: 2021-06-11

## 2021-06-14 RX ORDER — AMLODIPINE BESYLATE AND BENAZEPRIL HYDROCHLORIDE 5; 10 MG/1; MG/1
1 CAPSULE ORAL DAILY
Qty: 90 CAPSULE | Refills: 1 | OUTPATIENT
Start: 2021-06-14

## 2021-06-15 ENCOUNTER — HOSPITAL ENCOUNTER (OUTPATIENT)
Dept: CT IMAGING | Facility: HOSPITAL | Age: 66
Discharge: HOME | End: 2021-06-15
Admitting: NURSE PRACTITIONER

## 2021-06-15 DIAGNOSIS — E04.9 SUBSTERNAL THYROID GOITER: ICD-10-CM

## 2021-06-15 LAB — CREAT BLDA-MCNC: 0.9 MG/DL (ref 0.6–1.3)

## 2021-06-15 PROCEDURE — 82565 ASSAY OF CREATININE: CPT

## 2021-06-15 PROCEDURE — 71260 CT THORAX DX C+: CPT

## 2021-06-15 PROCEDURE — 25010000002 IOPAMIDOL 61 % SOLUTION: Performed by: NURSE PRACTITIONER

## 2021-06-15 RX ORDER — AMLODIPINE BESYLATE AND BENAZEPRIL HYDROCHLORIDE 10; 20 MG/1; MG/1
1 CAPSULE ORAL DAILY
Qty: 90 CAPSULE | Refills: 1 | Status: SHIPPED | OUTPATIENT
Start: 2021-06-15 | End: 2021-08-27 | Stop reason: SDUPTHER

## 2021-06-15 RX ADMIN — IOPAMIDOL 75 ML: 612 INJECTION, SOLUTION INTRAVENOUS at 12:44

## 2021-06-21 ENCOUNTER — TREATMENT (OUTPATIENT)
Dept: PHYSICAL THERAPY | Facility: CLINIC | Age: 66
End: 2021-06-21

## 2021-06-21 DIAGNOSIS — H81.12 BPPV (BENIGN PAROXYSMAL POSITIONAL VERTIGO), LEFT: Primary | ICD-10-CM

## 2021-06-21 DIAGNOSIS — R42 DIZZINESS: ICD-10-CM

## 2021-06-21 PROCEDURE — 95992 CANALITH REPOSITIONING PROC: CPT | Performed by: PHYSICAL THERAPIST

## 2021-06-21 NOTE — PROGRESS NOTES
Vestibular Daily Progress Note    Visit#:2  Subjective   I think the first treatment helped but I am still having a lot of dizziness. Last visit I was too dizzy to get to therapy. I have been doing the BBQ roll for the L every morning. I still feel off balance.   Objective         Positional Testing  Positional Testing: With infrared goggles  Horizontal Roll Test Right: Right-beating  Horizontal Roll Test Left: Right-beating          PROCEDURES AND MODALITIES:  Paraffin:    Moist Heat:    Ice:    E-Stim:    Ultrasound:    Ionto:   Traction:    See Exercise, Manual, and Modality Logs for complete treatment.   Other Procedure CPT 51079 minutes 0       Timed Code Treatment: 0 Minutes  Total Treatment Time: 30 Minutes    Assessment/Plan   Patient reports compliance with HEP and little improvement. Today roll test revealed a stronger R response. Thus, R side LC was treated with CRP. Encouraged R BBQ roll at home.     Progress per Plan of Care    Neeta Macario, PT, DPT, CHT, CIDN  Physical Therapist

## 2021-06-22 ENCOUNTER — OFFICE VISIT (OUTPATIENT)
Dept: SURGERY | Facility: CLINIC | Age: 66
End: 2021-06-22

## 2021-06-22 VITALS
OXYGEN SATURATION: 97 % | SYSTOLIC BLOOD PRESSURE: 136 MMHG | DIASTOLIC BLOOD PRESSURE: 96 MMHG | HEART RATE: 82 BPM | RESPIRATION RATE: 16 BRPM

## 2021-06-22 DIAGNOSIS — E04.9 SUBSTERNAL THYROID GOITER: Primary | ICD-10-CM

## 2021-06-22 PROCEDURE — 99212 OFFICE O/P EST SF 10 MIN: CPT | Performed by: THORACIC SURGERY (CARDIOTHORACIC VASCULAR SURGERY)

## 2021-06-22 NOTE — PROGRESS NOTES
Chief Complaint  No chief complaint on file.     Substernal thyroid goiter    Subjective          Fol Manzo presents to [facility] for a 6 month CT chest follow up.  History of Present Illness     66-year-old  male returns today for further follow-up of a substernal thyroid goiter first identified in January 2019.  Since his last visit he has done well.  He has some shortness of breath with moderate exertion but this is at his baseline.  He has no wheezing.  He has no dysphagia.  He has had no unexplained weight loss.    Objective   Vital Signs:   /96 (BP Location: Right arm, Patient Position: Sitting, Cuff Size: Adult)   Pulse 82   Resp 16   SpO2 97%     Physical Exam  Constitutional:       Appearance: Normal appearance. He is well-developed.   HENT:      Head: Normocephalic.   Eyes:      General: Lids are normal.      Conjunctiva/sclera: Conjunctivae normal.      Pupils: Pupils are equal, round, and reactive to light.   Neck:      Thyroid: No thyroid mass or thyromegaly.      Vascular: No carotid bruit, hepatojugular reflux or JVD.      Trachea: Trachea normal.      Comments: No palpable masses  Cardiovascular:      Rate and Rhythm: Normal rate and regular rhythm.  No extrasystoles are present.     Chest Wall: PMI is not displaced.      Pulses: Normal pulses.      Heart sounds: Normal heart sounds, S1 normal and S2 normal.   Pulmonary:      Effort: Pulmonary effort is normal.      Breath sounds: Normal breath sounds.   Abdominal:      General: Bowel sounds are normal.      Palpations: Abdomen is soft. There is no mass.      Tenderness: There is no abdominal tenderness.      Hernia: No hernia is present.   Musculoskeletal:         General: Normal range of motion.      Cervical back: Normal range of motion and neck supple.   Skin:     General: Skin is warm and dry.   Neurological:      Mental Status: He is alert and oriented to person, place, and time.       Cranial Nerves: No cranial nerve deficit.      Sensory: No sensory deficit.      Deep Tendon Reflexes: Reflexes are normal and symmetric.   Psychiatric:         Speech: Speech normal.         Behavior: Behavior normal.         Thought Content: Thought content normal.         Judgment: Judgment normal.        Result Review :   The following data was reviewed by: Flo Burciaga III, MD on 06/22/2021:    CT scan of the chest with contrast performed Lauren 15, 2021 was independently reviewed and compared to previous CT scans.  There has been no change in the size or appearance of the multinodular goiter.  There is narrowing of the trachea.  There is a sub-6 mm nodule in the right minor fissure.  There are no new infiltrates nodules or masses.  No suspicious hilar or mediastinal adenopathy.  There are multiple calcified hilar lymph nodes.  No pleural effusions no pericardial effusions.  Upper abdomen is unremarkable.         Assessment and Plan    Diagnoses and all orders for this visit:    1. Substernal thyroid goiter (Primary)  -     CT Chest With Contrast; Future      I spent 15 minutes caring for Flo on this date of service. This time includes time spent by me in the following activities:preparing for the visit, reviewing tests, performing a medically appropriate examination and/or evaluation , counseling and educating the patient/family/caregiver and documenting information in the medical record  Follow Up     Patient is doing well.  Substernal thyroid goiter remained stable.  Once again spoke with the patient about surgical resection.  Discussed the pros and cons of resection versus watching with serial CT scans.  I have answered all of his questions.  Patient wishes to continue conservative follow-up with serial CT scans.  He will return to see me in 6 months with a CT of the chest with contrast.  I will keep you informed of his progress.  Thank you for allowing us to participate in the care of   Jovany    Return in about 6 months (around 12/22/2021) for Recheck.  Patient was given instructions and counseling regarding his condition or for health maintenance advice. Please see specific information pulled into the AVS if appropriate.

## 2021-06-24 ENCOUNTER — TREATMENT (OUTPATIENT)
Dept: PHYSICAL THERAPY | Facility: CLINIC | Age: 66
End: 2021-06-24

## 2021-06-24 DIAGNOSIS — H81.12 BPPV (BENIGN PAROXYSMAL POSITIONAL VERTIGO), LEFT: Primary | ICD-10-CM

## 2021-06-24 DIAGNOSIS — R42 DIZZINESS: ICD-10-CM

## 2021-06-24 PROCEDURE — 95992 CANALITH REPOSITIONING PROC: CPT | Performed by: PHYSICAL THERAPIST

## 2021-06-24 NOTE — PROGRESS NOTES
Vestibular Daily Progress Note    Visit#:3  Subjective   I am a little better but still having dizziness.   Objective         Positional Testing  Positional Testing: With infrared goggles  Horizontal Roll Test Right: Right-beating  Horizontal Roll Test Right Onset Time : immediate, 30 sec duration  Horizontal Roll Test Left: Right-beating  Horizontal Roll Test Left Onset Time : immediate, 30 sec duration          PROCEDURES AND MODALITIES:  Paraffin:    Moist Heat:    Ice:    E-Stim:    Ultrasound:    Ionto:   Traction:    See Exercise, Manual, and Modality Logs for complete treatment.   Other Procedure CPT 27531 minutes 0       Timed Code Treatment: 0 Minutes  Total Treatment Time: 30 Minutes    Assessment/Plan   Patient continues to present with R LC canalithiasis BPPV. BBQ was repeated today. There is not a significantly stronger response with either L or R roll test. He does not have symptoms with either test. Will continue R sided treatment unless there is no improvement.       Progress per Plan of Care    Neeta Macario, PT, DPT, CHT, CIDN  Physical Therapist

## 2021-06-29 ENCOUNTER — TELEPHONE (OUTPATIENT)
Dept: PHYSICAL THERAPY | Facility: CLINIC | Age: 66
End: 2021-06-29

## 2021-06-29 NOTE — TELEPHONE ENCOUNTER
PATIENT WANTED TO APOLOGIZE FOR MISSING HIS APPT THIS MORNING- HE GOT SICK ON [facility] R[facility] OVER THIS MORNING AND COULDN'T MAKE IT IN - I GOT HIM RESCHEDULED FOR 8/2 AND ADDED TO WAITLIST - IF SOMETHING BECOMES AVAILABLE SOONER PLEASE GIVE HIM A CALL

## 2021-07-23 ENCOUNTER — TREATMENT (OUTPATIENT)
Dept: PHYSICAL THERAPY | Facility: CLINIC | Age: 66
End: 2021-07-23

## 2021-07-23 PROCEDURE — 95992 CANALITH REPOSITIONING PROC: CPT | Performed by: PHYSICAL THERAPIST

## 2021-07-23 NOTE — PROGRESS NOTES
Vestibular Daily Progress Note    Visit#:4  Subjective   I have been feeling ok but I am feeling woozy today.   Objective         Positional Testing  Positional Testing: With infrared goggles  Horizontal Roll Test Right: Right-beating  Horizontal Roll Test Right Onset Time : immediate, 30 sec duration  Horizontal Roll Test Left: Left-beating  Horizontal Roll Test Left Onset Time : immediate, 30 sec duration          PROCEDURES AND MODALITIES:  Paraffin:    Moist Heat:    Ice:    E-Stim:    Ultrasound:    Ionto:   Traction:    See Exercise, Manual, and Modality Logs for complete treatment.   Other Procedure CPT 02774 minutes 0       Timed Code Treatment: 0 Minutes  Total Treatment Time: 30 Minutes    Assessment/Plan   Patient continues to have R LC canalithiasis BPPV. Symptoms and nystagmus during testing is minimal. Alternate treatment attempted today. Gave as HEP.    Progress per Plan of Care    Neeta Macario, PT, DPT, CHT, CIDN  Physical Therapist

## 2021-07-27 ENCOUNTER — TREATMENT (OUTPATIENT)
Dept: PHYSICAL THERAPY | Facility: CLINIC | Age: 66
End: 2021-07-27

## 2021-07-27 DIAGNOSIS — H81.12 BPPV (BENIGN PAROXYSMAL POSITIONAL VERTIGO), LEFT: Primary | ICD-10-CM

## 2021-07-27 DIAGNOSIS — R42 DIZZINESS: ICD-10-CM

## 2021-07-27 PROCEDURE — 95992 CANALITH REPOSITIONING PROC: CPT | Performed by: PHYSICAL THERAPIST

## 2021-07-27 NOTE — PROGRESS NOTES
Vestibular Daily Progress Note    Visit#:5  Subjective   I have been feeling lightheaded since yesterday. Balance is doing ok. No dizziness.  Objective         Positional Testing  Horizontal Roll Test Right: No nystagmus  Horizontal Roll Test Left: Left-beating  Horizontal Roll Test Left Onset Time : immediate, 30 sec duration          PROCEDURES AND MODALITIES:  Paraffin:    Moist Heat:    Ice:    E-Stim:    Ultrasound:    Ionto:   Traction:    See Exercise, Manual, and Modality Logs for complete treatment.   Other Procedure CPT 56282 minutes 0       Timed Code Treatment: 0 Minutes  Total Treatment Time: 30 Minutes    Assessment/Plan   Patient has had resolution of R LC canalithiasis BPPV. He tested mildly positive for R LC canalithiasis. Since last treatment helped with R used same CRP for L today and gave as HEP.  This should clear BPPV.     Progress per Plan of Care    Neeta Macario, PT, DPT, CHT, CIDN  Physical Therapist

## 2021-08-02 ENCOUNTER — TREATMENT (OUTPATIENT)
Dept: PHYSICAL THERAPY | Facility: CLINIC | Age: 66
End: 2021-08-02

## 2021-08-02 DIAGNOSIS — R42 DIZZINESS: ICD-10-CM

## 2021-08-02 DIAGNOSIS — H81.12 BPPV (BENIGN PAROXYSMAL POSITIONAL VERTIGO), LEFT: Primary | ICD-10-CM

## 2021-08-02 PROCEDURE — 95992 CANALITH REPOSITIONING PROC: CPT | Performed by: PHYSICAL THERAPIST

## 2021-08-02 NOTE — PROGRESS NOTES
Vestibular Daily Progress Note    Visit#:6  Subjective   I have been feeling good. I did not have any light headedness until Saturday. Symptoms are off and on. I am doing the new exercise because I think it worked better than the BBQ roll. No vertigo or loss of balance.   Objective         Positional Testing  Positional Testing: With infrared goggles  Arnold-Hallpike Left: No nystagmus  Horizontal Roll Test Right: No nystagmus  Horizontal Roll Test Left: No nystagmus          PROCEDURES AND MODALITIES:  Paraffin:    Moist Heat:    Ice:    E-Stim:    Ultrasound:    Ionto:   Traction:    See Exercise, Manual, and Modality Logs for complete treatment.   Other Procedure CPT 68471 minutes 0       Timed Code Treatment: 0 Minutes  Total Treatment Time: 30 Minutes    Assessment/Plan   Patient did not demonstrate nystagmus today. However he is still having intermittent lightheadedness. No symptoms of vertigo reported for the past several visits. At this time will hold chart open for 30 days before DC. Patient is to continue HEP to resolve lightheadedness. If no further issues DC from PT with all goals met.    Progress per Plan of Care    Neeta Macario, PT, DPT, CHT, CIDN  Physical Therapist

## 2021-08-22 NOTE — PROGRESS NOTES
"Subjective   Flo Manzo is a 65 y.o. male   who presents for   Chief Complaint   Patient presents with   • Hypertension     Went to rehab at [facility] for dizziness, was found to have hypertension, was unable to complete PT, recommended follow up in office  Dizziness has been ongoing for three weeks, went to audiologist at [facility], was then referred to PT, this is his second visit, states at first visit, bp was elevated, but not to this extent. Asymptomatic, denies chest pain or headaches. No shortness of breath.     Has been off lasix for at least two months, states was running to the bathroom all the time    Denies alcohol use. States quit drinking over a year ago, after his stroke.  Took his BP medication at 10,       BP (!) 198/138   Pulse 98   Temp 98.2 °F (36.8 °C)   Resp 18   Ht 177.8 cm (70\")   Wt 106 kg (234 lb)   SpO2 98%   BMI 33.58 kg/m²       History of Present Illness   Hypertension   This is a chronic problem. The current episode started more than 1 year ago. The problem has been rapidly worsening since onset. The problem is uncontrolled. Pertinent negatives include no anxiety, blurred vision, chest pain, headaches, malaise/fatigue, neck pain, orthopnea, palpitations, peripheral edema, PND, shortness of breath or sweats. There are no associated agents to hypertension. Risk factors for coronary artery disease include dyslipidemia. Past treatments include calcium channel blockers, ACE inhibitors, beta blockers and diuretics. Current antihypertension treatment includes calcium channel blockers, beta blockers and ACE inhibitors. The current treatment provides mild improvement. Compliance problems include medication side effects (stopped lasix).        The following portions of the patient's history were reviewed and updated as appropriate: allergies, current medications, past family history, past medical history, past social history, past surgical history and problem list.    Review of " Systems  Review of Systems   Constitutional: Negative for malaise/fatigue.   Eyes: Negative for blurred vision.   Respiratory: Negative for shortness of breath.    Cardiovascular: Negative for chest pain, palpitations, orthopnea and PND.   Musculoskeletal: Negative for neck pain.   Neurological: Negative for headaches.       Objective   Physical Exam  Physical Exam   Constitutional: He is oriented to person, place, and time. He appears well-developed and well-nourished. No distress.   Cardiovascular: Normal rate, regular rhythm and normal heart sounds. Exam reveals no gallop and no friction rub.   No murmur heard.  Pulmonary/Chest: Effort normal and breath sounds normal. No stridor. No respiratory distress. He has no wheezes.   Neurological: He is alert and oriented to person, place, and time.   Skin: Skin is warm and dry. He is not diaphoretic.   Psychiatric: He has a normal mood and affect.   Vitals reviewed.        Assessment/Plan   Flo was seen today for hypertension.    Diagnoses and all orders for this visit:    Essential hypertension    Other orders  -     amLODIPine-benazepril (Lotrel) 10-20 MG per capsule; Take 1 capsule by mouth Daily.    stopped lasix about two months ago due to side effects, no other bp changes at that time  Recommend increasing lotrel from 5/10 to 10/20, instructed to go home, take another 5/10 pill today  Then start new dose in am  Follow up scheduled in 9 days with PCP  Labs completed in March  He is currently asymptomatic, no shortness of breath, headache or chest pain  Instructed to monitor bp at home, for any acute changes in symptoms, go to ED or call 911  Verbalized understanding  Also discussed if bp is still > 170/110, can try clonidine as needed to help manage bp until medication takes affect     [facility]

## 2021-08-27 ENCOUNTER — OFFICE VISIT (OUTPATIENT)
Dept: FAMILY MEDICINE CLINIC | Facility: CLINIC | Age: 66
End: 2021-08-27

## 2021-08-27 VITALS
HEIGHT: 70 IN | SYSTOLIC BLOOD PRESSURE: 118 MMHG | HEART RATE: 83 BPM | WEIGHT: 243.5 LBS | RESPIRATION RATE: 17 BRPM | DIASTOLIC BLOOD PRESSURE: 64 MMHG | TEMPERATURE: 97.5 F | OXYGEN SATURATION: 98 % | BODY MASS INDEX: 34.86 KG/M2

## 2021-08-27 DIAGNOSIS — R39.198 DECREASED URINE STREAM: ICD-10-CM

## 2021-08-27 DIAGNOSIS — R05.9 COUGH: ICD-10-CM

## 2021-08-27 DIAGNOSIS — G47.33 OSA ON CPAP: ICD-10-CM

## 2021-08-27 DIAGNOSIS — G47.10 EXCESSIVE SLEEPINESS: ICD-10-CM

## 2021-08-27 DIAGNOSIS — Z99.89 OSA ON CPAP: ICD-10-CM

## 2021-08-27 DIAGNOSIS — E04.9 SUBSTERNAL THYROID GOITER: ICD-10-CM

## 2021-08-27 DIAGNOSIS — I10 ESSENTIAL HYPERTENSION: Primary | ICD-10-CM

## 2021-08-27 PROCEDURE — 90732 PPSV23 VACC 2 YRS+ SUBQ/IM: CPT | Performed by: FAMILY MEDICINE

## 2021-08-27 PROCEDURE — G0009 ADMIN PNEUMOCOCCAL VACCINE: HCPCS | Performed by: FAMILY MEDICINE

## 2021-08-27 PROCEDURE — 99214 OFFICE O/P EST MOD 30 MIN: CPT | Performed by: FAMILY MEDICINE

## 2021-08-27 RX ORDER — OMEPRAZOLE 20 MG/1
20 CAPSULE, DELAYED RELEASE ORAL DAILY
Qty: 60 CAPSULE | Refills: 0 | Status: SHIPPED | OUTPATIENT
Start: 2021-08-27 | End: 2021-10-22 | Stop reason: HOSPADM

## 2021-08-27 RX ORDER — DULOXETIN HYDROCHLORIDE 30 MG/1
60 CAPSULE, DELAYED RELEASE ORAL NIGHTLY
Qty: 90 CAPSULE | Refills: 0 | Status: SHIPPED | OUTPATIENT
Start: 2021-08-27 | End: 2021-11-08 | Stop reason: SDUPTHER

## 2021-08-27 RX ORDER — AMLODIPINE BESYLATE AND BENAZEPRIL HYDROCHLORIDE 10; 20 MG/1; MG/1
1 CAPSULE ORAL DAILY
Qty: 90 CAPSULE | Refills: 1 | Status: SHIPPED | OUTPATIENT
Start: 2021-08-27 | End: 2022-01-19 | Stop reason: SDUPTHER

## 2021-08-28 LAB
BASOPHILS # BLD AUTO: 0.04 10*3/MM3 (ref 0–0.2)
BASOPHILS NFR BLD AUTO: 0.4 % (ref 0–1.5)
BUN SERPL-MCNC: 23 MG/DL (ref 8–23)
BUN/CREAT SERPL: 25.6 (ref 7–25)
CALCIUM SERPL-MCNC: 10.2 MG/DL (ref 8.6–10.5)
CHLORIDE SERPL-SCNC: 104 MMOL/L (ref 98–107)
CO2 SERPL-SCNC: 27.9 MMOL/L (ref 22–29)
CREAT SERPL-MCNC: 0.9 MG/DL (ref 0.76–1.27)
EOSINOPHIL # BLD AUTO: 0.15 10*3/MM3 (ref 0–0.4)
EOSINOPHIL NFR BLD AUTO: 1.4 % (ref 0.3–6.2)
ERYTHROCYTE [DISTWIDTH] IN BLOOD BY AUTOMATED COUNT: 12.9 % (ref 12.3–15.4)
GLUCOSE SERPL-MCNC: 91 MG/DL (ref 65–99)
HCT VFR BLD AUTO: 51.3 % (ref 37.5–51)
HGB BLD-MCNC: 16.3 G/DL (ref 13–17.7)
IMM GRANULOCYTES # BLD AUTO: 0.06 10*3/MM3 (ref 0–0.05)
IMM GRANULOCYTES NFR BLD AUTO: 0.6 % (ref 0–0.5)
LYMPHOCYTES # BLD AUTO: 2.96 10*3/MM3 (ref 0.7–3.1)
LYMPHOCYTES NFR BLD AUTO: 27.4 % (ref 19.6–45.3)
MCH RBC QN AUTO: 28.6 PG (ref 26.6–33)
MCHC RBC AUTO-ENTMCNC: 31.8 G/DL (ref 31.5–35.7)
MCV RBC AUTO: 90 FL (ref 79–97)
MONOCYTES # BLD AUTO: 0.96 10*3/MM3 (ref 0.1–0.9)
MONOCYTES NFR BLD AUTO: 8.9 % (ref 5–12)
NEUTROPHILS # BLD AUTO: 6.64 10*3/MM3 (ref 1.7–7)
NEUTROPHILS NFR BLD AUTO: 61.3 % (ref 42.7–76)
NRBC BLD AUTO-RTO: 0.1 /100 WBC (ref 0–0.2)
PLATELET # BLD AUTO: 248 10*3/MM3 (ref 140–450)
POTASSIUM SERPL-SCNC: 4.7 MMOL/L (ref 3.5–5.2)
RBC # BLD AUTO: 5.7 10*6/MM3 (ref 4.14–5.8)
SODIUM SERPL-SCNC: 146 MMOL/L (ref 136–145)
T4 SERPL-MCNC: 7.22 MCG/DL (ref 4.5–11.7)
TSH SERPL DL<=0.005 MIU/L-ACNC: <0.005 UIU/ML (ref 0.27–4.2)
WBC # BLD AUTO: 10.81 10*3/MM3 (ref 3.4–10.8)

## 2021-10-08 ENCOUNTER — TELEPHONE (OUTPATIENT)
Dept: FAMILY MEDICINE CLINIC | Facility: CLINIC | Age: 66
End: 2021-10-08

## 2021-10-08 NOTE — TELEPHONE ENCOUNTER
DELETE AFTER REVIEWING: Telephone encounter to be sent to the clinical pool     Caller: Flo Manzo    Relationship: Self    Best call back number: [phone] (M)    What medications are you currently taking:   Current Outpatient Medications on File Prior to Visit   Medication Sig Dispense Refill   • albuterol sulfate  (90 Base) MCG/ACT inhaler Inhale 2 puffs Every 4 (Four) Hours As Needed for Wheezing. 18 g 1   • amLODIPine-benazepril (Lotrel) 10-20 MG per capsule Take 1 capsule by mouth Daily. 90 capsule 1   • apixaban (Eliquis) 5 MG tablet tablet Take 1 tablet by mouth Every 12 (Twelve) Hours. 180 tablet 1   • atorvastatin (LIPITOR) 10 MG tablet TAKE 1 TABLET BY MOUTH EVERY NIGHT 90 tablet 1   • citalopram (CeleXA) 40 MG tablet TAKE 1 TABLET BY MOUTH DAILY 90 tablet 1   • DULoxetine (CYMBALTA) 30 MG capsule Take 2 capsules by mouth Every Night. 90 capsule 0   • meclizine (ANTIVERT) 25 MG tablet Take 1 tablet by mouth 3 (Three) Times a Day As Needed for dizziness. 30 tablet 1   • metoprolol tartrate (LOPRESSOR) 25 MG tablet Take 1 tablet by mouth Every 12 (Twelve) Hours. 180 tablet 1   • omeprazole (priLOSEC) 20 MG capsule Take 1 capsule by mouth Daily. 60 capsule 0     No current facility-administered medications on file prior to visit.        Which medication are you concerned about: CRISPIN    Who prescribed you this medication: YASMINE FALCON    What are your concerns: PATIENT HAS SURGERY NEXT Friday AND HIS SURGEON TOLD YOU TO STOP TAKING YOUR ELIQUIS 3 DAYS PRIOR TO SURGERY. WANTS TO TALK TO DR FALCON ABOUT THIS MEDICATION. HAS QUESTIONS.

## 2021-10-12 NOTE — TELEPHONE ENCOUNTER
It is ok to stop taking the eliquis for the 3 days requested by the surgeon. Does he have any other questions? Thanks.

## 2021-10-22 ENCOUNTER — OFFICE VISIT (OUTPATIENT)
Dept: CARDIOLOGY | Facility: CLINIC | Age: 66
End: 2021-10-22

## 2021-10-22 VITALS
HEART RATE: 83 BPM | WEIGHT: 235 LBS | BODY MASS INDEX: 34.8 KG/M2 | DIASTOLIC BLOOD PRESSURE: 91 MMHG | SYSTOLIC BLOOD PRESSURE: 124 MMHG | HEIGHT: 69 IN

## 2021-10-22 DIAGNOSIS — R42 DIZZINESS: ICD-10-CM

## 2021-10-22 DIAGNOSIS — G47.33 OSA (OBSTRUCTIVE SLEEP APNEA): ICD-10-CM

## 2021-10-22 DIAGNOSIS — I48.21 PERMANENT ATRIAL FIBRILLATION (HCC): Primary | ICD-10-CM

## 2021-10-22 DIAGNOSIS — I10 PRIMARY HYPERTENSION: ICD-10-CM

## 2021-10-22 DIAGNOSIS — I50.32 CHRONIC DIASTOLIC CONGESTIVE HEART FAILURE (HCC): ICD-10-CM

## 2021-10-22 DIAGNOSIS — Z01.818 PRE-OP TESTING: ICD-10-CM

## 2021-10-22 PROCEDURE — 99214 OFFICE O/P EST MOD 30 MIN: CPT

## 2021-10-22 PROCEDURE — 93000 ELECTROCARDIOGRAM COMPLETE: CPT

## 2021-10-22 NOTE — PROGRESS NOTES
Subjective:        Flo Manzo is a 66 y.o. male who here for follow up    No chief complaint on file.    Hypertension, atrial fibrillation  HPI    This is a 66-year-old male, who is new to me.  He has a history of A. fib, GÓMEZ, hypertension, chronic diastolic CHF, hemorrhagic stroke due to uncontrolled hypertension, heart murmur.    Cardiac cath in February 2019 normal coronary arteries.  He had a Holter monitor in February 2019 that showed chronic A. fib with significant rapid heart rates and severe bradycardia with pauses up to 3.2 seconds.  Echocardiogram in January 2019 EF 53%, normal LV systolic function, mild AV stenosis, mild MV and TV regurg.    He follows up today for hypertension and Afib on chronic anticoagulation. He denies any chest pain.  He states that he has been seeing Dr Corona for dizziness and was diagnosed with BPPV and has been getting vestibulocochlear therapy and dizziness has resolved.       The following portions of the patient's history were reviewed and updated as appropriate: allergies, current medications, past family history, past medical history, past social history, past surgical history and problem list.    Past Medical History:   Diagnosis Date   • Arthritis    • Atrial fibrillation with RVR (CMS/[facility]) 01/24/2019   • Colon polyps 01/04/2018    Hepatic flexure: tubular adenoma, only low-grade dysplasia; splenic flexure: tubular adenoma, only low-grade dysplasia; sigmoid colon: tubular adenoma, multiple fragments only low-grade dysplasia   • Depression    • Heart murmur    • Hemorrhagic stroke (CMS/HCC) 01/29/2019   • Hypertension    • New onset atrial fibrillation (CMS/[facility]) - RVR 1/24/2019   • GÓMEZ (obstructive sleep apnea) 06/06/2019    Home sleep study with moderate severity GÓMEZ, AHI 20 events per hour.  Sleep-related hypoxia with low O2 saturation 84% for 14 minutes.   • Sleep apnea     previously diagnosed with sleep apnea, had T&A done and it has since resolved    •  Uncontrolled hypertension    • Ventral hernia          reports that he has never smoked. He has never used smokeless tobacco. He reports previous alcohol use. He reports that he does not use drugs.     Family History   Problem Relation Age of Onset   • Hypertension Mother    • Kidney failure Mother    • Coronary artery disease Father    • Lung cancer Father         positive smoker   • Heart attack Father    • Breast cancer Sister 60   • Prostate cancer Brother    • Colon polyps Brother    • Kidney nephrosis Brother    • Malig Hyperthermia Neg Hx        ROS     Review of Systems  Constitutional: No wt loss, fever, fatigue  Gastrointestinal: No nausea, abdominal pain  Behavioral/Psych: No insomnia or anxiety  Cardiovascular denies chest pain or palpitations +duzziness      Objective:           Vitals and nursing note reviewed.   Constitutional:       Appearance: Well-developed.   HENT:      Head: Normocephalic.      Right Ear: External ear normal.      Left Ear: External ear normal.   Neck:      Vascular: No JVD.   Pulmonary:      Effort: Pulmonary effort is normal. No respiratory distress.      Breath sounds: Normal breath sounds. No stridor. No rales.   Cardiovascular:      Normal rate. Irregularly irregular rhythm.      No gallop.   Pulses:     Intact distal pulses.   Abdominal:      General: Bowel sounds are normal. There is no distension.      Palpations: Abdomen is soft.      Tenderness: There is no abdominal tenderness. There is no guarding.   Musculoskeletal: Normal range of motion.         General: No tenderness.      Cervical back: Normal range of motion. Skin:     General: Skin is warm.   Neurological:      Mental Status: Alert and oriented to person, place, and time.      Deep Tendon Reflexes: Reflexes are normal and symmetric.   Psychiatric:         Judgment: Judgment normal.           ECG 12 Lead    Date/Time: 10/22/2021 10:27 AM  Performed by: Elaine Kaba APRN  Authorized by: Elaine Kaba,  APRN   Comparison: compared with previous ECG from 7/27/2020  Similar to previous ECG  Rhythm: atrial fibrillation  Rate: normal  BPM: 75    Clinical impression: abnormal EKG              Current Outpatient Medications:   •  albuterol sulfate  (90 Base) MCG/ACT inhaler, Inhale 2 puffs Every 4 (Four) Hours As Needed for Wheezing., Disp: 18 g, Rfl: 1  •  amLODIPine-benazepril (Lotrel) 10-20 MG per capsule, Take 1 capsule by mouth Daily., Disp: 90 capsule, Rfl: 1  •  apixaban (Eliquis) 5 MG tablet tablet, Take 1 tablet by mouth Every 12 (Twelve) Hours., Disp: 180 tablet, Rfl: 1  •  atorvastatin (LIPITOR) 10 MG tablet, TAKE 1 TABLET BY MOUTH EVERY NIGHT, Disp: 90 tablet, Rfl: 1  •  citalopram (CeleXA) 40 MG tablet, TAKE 1 TABLET BY MOUTH DAILY, Disp: 90 tablet, Rfl: 1  •  DULoxetine (CYMBALTA) 30 MG capsule, Take 2 capsules by mouth Every Night., Disp: 90 capsule, Rfl: 0  •  meclizine (ANTIVERT) 25 MG tablet, Take 1 tablet by mouth 3 (Three) Times a Day As Needed for dizziness., Disp: 30 tablet, Rfl: 1  •  metoprolol tartrate (LOPRESSOR) 25 MG tablet, Take 1 tablet by mouth Every 12 (Twelve) Hours., Disp: 180 tablet, Rfl: 1  •  omeprazole (priLOSEC) 20 MG capsule, Take 1 capsule by mouth Daily., Disp: 60 capsule, Rfl: 0     Assessment:        Patient Active Problem List   Diagnosis   • Umbilical hernia without obstruction and without gangrene   • Essential hypertension   • Arthritis of left knee   • Depression   • Obesity (BMI 30-39.9)   • Substernal thyroid goiter   • CHF (congestive heart failure) (HCC)   • Diastolic CHF, chronic (HCC)   • Hemorrhagic stroke (HCC)   • GÓMEZ on auto CPAP   • Hypersomnia due to medical condition   • Sleep-related hypoxia   • Chronic atrial fibrillation (HCC)   • Vertigo               Plan:   1.  Hypertension: BP controlled. Continue current treatment.    Importance of controlling hypertension and blood pressure  checkup on the regular basis has been explained. Hypertension as a  silent killer has been discussed. Risk reduction of the weight and regular exercises to control the hypertension has been explained.    2.  Atrial fibrillation: rate controlled. Continue beta blockade. Anticoagulated on eliquis.     Pros and cons as well as indication of the anticoagulation has been explained to the patient in detail. There are no obvious complications at this stage. Risk of  the bleedings has been explained. Need for the regular blood workup and adjust the dose has been explained. Need for proper follow-up on anticoagulation also has been explained.     3.  Chronic diastolic CHF: denies any new or worsening shortness of breath. No edema or rales on exam. Continue acei, beta blockade.     4.  GÓMEZ: uses CPAP at home most of the time.     5.  Dizziness: Previous abnormal Holter monitor as above.  He states that he has seen Dr. Corona recently for BPPV with vestibulocochlear therapy.  He states that has nearly resolved all of his dizziness however has had a couple episodes since.  He correlates them to bending over sometimes.  Given his abnormal Holter monitor previously.  I will repeat 2-week Holter monitor.             No diagnosis found.    There are no diagnoses linked to this encounter.    COUNSELING: Zeenat Rodriguez was given to patient for the following topics: diagnostic results, risk factor reductions, impressions, risks and benefits of treatment options and importance of treatment compliance .       SMOKING COUNSELING:denies    He will wear 2-week Holter monitor and follow-up for results.    Sincerely,   ASHANTI Trinidad  [facility]  10/22/21  10:29 EDT    EMR Dragon/Transcription disclaimer:   Much of this encounter note is an electronic transcription/translation of spoken language to printed text. The electronic translation of spoken language may permit erroneous, or at times, nonsensical words or phrases to be inadvertently transcribed; Although I have  reviewed the note for such errors, some may still exist.

## 2021-10-26 ENCOUNTER — TELEPHONE (OUTPATIENT)
Dept: CARDIOLOGY | Facility: CLINIC | Age: 66
End: 2021-10-26

## 2021-11-08 RX ORDER — DULOXETIN HYDROCHLORIDE 60 MG/1
60 CAPSULE, DELAYED RELEASE ORAL DAILY
Qty: 90 CAPSULE | Refills: 1 | OUTPATIENT
Start: 2021-11-08

## 2021-11-08 NOTE — TELEPHONE ENCOUNTER
Rx Refill Note  Requested Prescriptions     Pending Prescriptions Disp Refills   • citalopram (CeleXA) 40 MG tablet [Pharmacy Med Name: CITALOPRAM 40MG TABLETS] 90 tablet 1     Sig: TAKE 1 TABLET BY MOUTH DAILY   • DULoxetine (CYMBALTA) 30 MG capsule 90 capsule 0     Sig: Take 2 capsules by mouth Every Night.     Refused Prescriptions Disp Refills   • DULoxetine (CYMBALTA) 60 MG capsule [Pharmacy Med Name: DULOXETINE DR 60MG CAPSULES] 90 capsule 1     Sig: TAKE 1 CAPSULE BY MOUTH DAILY     Refused By: LAURA GODWIN     Reason for Refusal: Refill not appropriate      Last office visit with prescribing clinician: 8/27/2021      Next office visit with prescribing clinician: 11/30/2021            Laura Godwin LPN  11/08/21, 11:07 EST

## 2021-11-09 RX ORDER — CITALOPRAM 40 MG/1
TABLET ORAL
Qty: 90 TABLET | Refills: 1 | Status: SHIPPED | OUTPATIENT
Start: 2021-11-09 | End: 2022-10-25

## 2021-11-09 RX ORDER — DULOXETIN HYDROCHLORIDE 30 MG/1
30 CAPSULE, DELAYED RELEASE ORAL NIGHTLY
Qty: 90 CAPSULE | Refills: 0 | Status: SHIPPED | OUTPATIENT
Start: 2021-11-09 | End: 2022-07-07

## 2021-11-12 RX ORDER — OMEPRAZOLE 20 MG/1
20 CAPSULE, DELAYED RELEASE ORAL DAILY
Qty: 90 CAPSULE | Refills: 1 | Status: SHIPPED | OUTPATIENT
Start: 2021-11-12 | End: 2022-07-01 | Stop reason: SDUPTHER

## 2021-11-12 NOTE — TELEPHONE ENCOUNTER
Caller: Flo Manzo    Relationship to patient: Self    Best call back number: [phone]    Patient is needing: PT IS CALLING IN REGARDS TO THE REFILL REQUEST ON OMEPRAZOLE. HE STATED THAT HE HAS BEEN OUT OF MEDICATION SINCE TODAY

## 2021-11-24 ENCOUNTER — TELEPHONE (OUTPATIENT)
Dept: CARDIOLOGY | Facility: CLINIC | Age: 66
End: 2021-11-24

## 2021-11-30 ENCOUNTER — OFFICE VISIT (OUTPATIENT)
Dept: FAMILY MEDICINE CLINIC | Facility: CLINIC | Age: 66
End: 2021-11-30

## 2021-11-30 ENCOUNTER — OFFICE VISIT (OUTPATIENT)
Dept: CARDIOLOGY | Facility: CLINIC | Age: 66
End: 2021-11-30

## 2021-11-30 VITALS
HEIGHT: 69 IN | DIASTOLIC BLOOD PRESSURE: 68 MMHG | HEART RATE: 76 BPM | OXYGEN SATURATION: 98 % | BODY MASS INDEX: 36.58 KG/M2 | RESPIRATION RATE: 17 BRPM | WEIGHT: 247 LBS | SYSTOLIC BLOOD PRESSURE: 114 MMHG | TEMPERATURE: 97.6 F

## 2021-11-30 VITALS
BODY MASS INDEX: 36.58 KG/M2 | WEIGHT: 247 LBS | SYSTOLIC BLOOD PRESSURE: 142 MMHG | DIASTOLIC BLOOD PRESSURE: 97 MMHG | HEART RATE: 80 BPM | HEIGHT: 69 IN

## 2021-11-30 DIAGNOSIS — G47.33 OSA ON CPAP: Primary | ICD-10-CM

## 2021-11-30 DIAGNOSIS — I48.20 CHRONIC ATRIAL FIBRILLATION (HCC): ICD-10-CM

## 2021-11-30 DIAGNOSIS — I10 PRIMARY HYPERTENSION: ICD-10-CM

## 2021-11-30 DIAGNOSIS — I49.5 SSS (SICK SINUS SYNDROME) (HCC): Primary | ICD-10-CM

## 2021-11-30 DIAGNOSIS — I50.32 CHRONIC DIASTOLIC CONGESTIVE HEART FAILURE (HCC): ICD-10-CM

## 2021-11-30 DIAGNOSIS — G47.33 OSA (OBSTRUCTIVE SLEEP APNEA): ICD-10-CM

## 2021-11-30 DIAGNOSIS — Z99.89 OSA ON CPAP: Primary | ICD-10-CM

## 2021-11-30 DIAGNOSIS — F32.89 OTHER DEPRESSION: ICD-10-CM

## 2021-11-30 PROCEDURE — 99214 OFFICE O/P EST MOD 30 MIN: CPT

## 2021-11-30 PROCEDURE — 99213 OFFICE O/P EST LOW 20 MIN: CPT | Performed by: FAMILY MEDICINE

## 2021-12-06 ENCOUNTER — HOSPITAL ENCOUNTER (OUTPATIENT)
Dept: CARDIOLOGY | Facility: HOSPITAL | Age: 66
Discharge: HOME | End: 2021-12-06

## 2021-12-06 VITALS
HEART RATE: 90 BPM | DIASTOLIC BLOOD PRESSURE: 64 MMHG | BODY MASS INDEX: 36.58 KG/M2 | HEIGHT: 69 IN | SYSTOLIC BLOOD PRESSURE: 128 MMHG | WEIGHT: 247 LBS

## 2021-12-06 LAB
AORTIC ARCH: 3.5 CM
AORTIC DIMENSIONLESS INDEX: 0.4 (DI)
BH CV ECHO MEAS - ACS: 1.1 CM
BH CV ECHO MEAS - AI DEC SLOPE: 163.1 CM/SEC^2
BH CV ECHO MEAS - AI MAX PG: 34.1 MMHG
BH CV ECHO MEAS - AI MAX VEL: 291.8 CM/SEC
BH CV ECHO MEAS - AI P1/2T: 524 MSEC
BH CV ECHO MEAS - AO ARCH DIAM (PROXIMAL TRANS.): 3.5 CM
BH CV ECHO MEAS - AO MAX PG (FULL): 26.1 MMHG
BH CV ECHO MEAS - AO MAX PG: 30.1 MMHG
BH CV ECHO MEAS - AO MEAN PG (FULL): 16.2 MMHG
BH CV ECHO MEAS - AO MEAN PG: 18.6 MMHG
BH CV ECHO MEAS - AO ROOT AREA (BSA CORRECTED): 1.6
BH CV ECHO MEAS - AO ROOT AREA: 10.2 CM^2
BH CV ECHO MEAS - AO ROOT DIAM: 3.6 CM
BH CV ECHO MEAS - AO V2 MAX: 274.2 CM/SEC
BH CV ECHO MEAS - AO V2 MEAN: 203.5 CM/SEC
BH CV ECHO MEAS - AO V2 VTI: 52.1 CM
BH CV ECHO MEAS - ASC AORTA: 3.3 CM
BH CV ECHO MEAS - AVA(I,A): 1.3 CM^2
BH CV ECHO MEAS - AVA(I,D): 1.3 CM^2
BH CV ECHO MEAS - AVA(V,A): 1.3 CM^2
BH CV ECHO MEAS - AVA(V,D): 1.3 CM^2
BH CV ECHO MEAS - BSA(HAYCOCK): 2.4 M^2
BH CV ECHO MEAS - BSA: 2.3 M^2
BH CV ECHO MEAS - BZI_BMI: 36.5 KILOGRAMS/M^2
BH CV ECHO MEAS - BZI_METRIC_HEIGHT: 175.3 CM
BH CV ECHO MEAS - BZI_METRIC_WEIGHT: 112 KG
BH CV ECHO MEAS - EDV(CUBED): 125.7 ML
BH CV ECHO MEAS - EDV(MOD-SP2): 83 ML
BH CV ECHO MEAS - EDV(MOD-SP4): 87 ML
BH CV ECHO MEAS - EDV(TEICH): 118.8 ML
BH CV ECHO MEAS - EF(CUBED): 75.6 %
BH CV ECHO MEAS - EF(MOD-BP): 55.6 %
BH CV ECHO MEAS - EF(MOD-SP2): 56.6 %
BH CV ECHO MEAS - EF(MOD-SP4): 52.9 %
BH CV ECHO MEAS - EF(TEICH): 67.3 %
BH CV ECHO MEAS - ESV(CUBED): 30.7 ML
BH CV ECHO MEAS - ESV(MOD-SP2): 36 ML
BH CV ECHO MEAS - ESV(MOD-SP4): 41 ML
BH CV ECHO MEAS - ESV(TEICH): 38.8 ML
BH CV ECHO MEAS - FS: 37.5 %
BH CV ECHO MEAS - IVS/LVPW: 1.2
BH CV ECHO MEAS - IVSD: 1.2 CM
BH CV ECHO MEAS - LAT PEAK E' VEL: 10.3 CM/SEC
BH CV ECHO MEAS - LV DIASTOLIC VOL/BSA (35-75): 38.5 ML/M^2
BH CV ECHO MEAS - LV MASS(C)D: 218.6 GRAMS
BH CV ECHO MEAS - LV MASS(C)DI: 96.7 GRAMS/M^2
BH CV ECHO MEAS - LV MAX PG: 4 MMHG
BH CV ECHO MEAS - LV MEAN PG: 2.4 MMHG
BH CV ECHO MEAS - LV SYSTOLIC VOL/BSA (12-30): 18.1 ML/M^2
BH CV ECHO MEAS - LV V1 MAX: 99.4 CM/SEC
BH CV ECHO MEAS - LV V1 MEAN: 72.9 CM/SEC
BH CV ECHO MEAS - LV V1 VTI: 18.3 CM
BH CV ECHO MEAS - LVIDD: 5 CM
BH CV ECHO MEAS - LVIDS: 3.1 CM
BH CV ECHO MEAS - LVLD AP2: 7 CM
BH CV ECHO MEAS - LVLD AP4: 7.3 CM
BH CV ECHO MEAS - LVLS AP2: 6.5 CM
BH CV ECHO MEAS - LVLS AP4: 6.5 CM
BH CV ECHO MEAS - LVOT AREA (M): 3.8 CM^2
BH CV ECHO MEAS - LVOT AREA: 3.7 CM^2
BH CV ECHO MEAS - LVOT DIAM: 2.2 CM
BH CV ECHO MEAS - LVPWD: 1.1 CM
BH CV ECHO MEAS - MED PEAK E' VEL: 8.5 CM/SEC
BH CV ECHO MEAS - MR MAX PG: 34.4 MMHG
BH CV ECHO MEAS - MR MAX VEL: 293.2 CM/SEC
BH CV ECHO MEAS - MV DEC SLOPE: 570.5 CM/SEC^2
BH CV ECHO MEAS - MV DEC TIME: 166 SEC
BH CV ECHO MEAS - MV E MAX VEL: 135.8 CM/SEC
BH CV ECHO MEAS - MV MAX PG: 8.8 MMHG
BH CV ECHO MEAS - MV MEAN PG: 2.6 MMHG
BH CV ECHO MEAS - MV P1/2T MAX VEL: 153.4 CM/SEC
BH CV ECHO MEAS - MV P1/2T: 78.7 MSEC
BH CV ECHO MEAS - MV V2 MAX: 148 CM/SEC
BH CV ECHO MEAS - MV V2 MEAN: 69.5 CM/SEC
BH CV ECHO MEAS - MV V2 VTI: 36 CM
BH CV ECHO MEAS - MVA P1/2T LCG: 1.4 CM^2
BH CV ECHO MEAS - MVA(P1/2T): 2.8 CM^2
BH CV ECHO MEAS - MVA(VTI): 1.9 CM^2
BH CV ECHO MEAS - PA ACC TIME: 0.05 SEC
BH CV ECHO MEAS - PA MAX PG (FULL): 3.7 MMHG
BH CV ECHO MEAS - PA MAX PG: 5.7 MMHG
BH CV ECHO MEAS - PA PR(ACCEL): 54.6 MMHG
BH CV ECHO MEAS - PA V2 MAX: 119.4 CM/SEC
BH CV ECHO MEAS - PULM DIAS VEL: 57.4 CM/SEC
BH CV ECHO MEAS - PULM S/D: 0.54
BH CV ECHO MEAS - PULM SYS VEL: 30.8 CM/SEC
BH CV ECHO MEAS - PVA(V,A): 1.8 CM^2
BH CV ECHO MEAS - PVA(V,D): 1.8 CM^2
BH CV ECHO MEAS - QP/QS: 0.63
BH CV ECHO MEAS - RAP SYSTOLE: 3 MMHG
BH CV ECHO MEAS - RV MAX PG: 2 MMHG
BH CV ECHO MEAS - RV MEAN PG: 0.93 MMHG
BH CV ECHO MEAS - RV V1 MAX: 71.3 CM/SEC
BH CV ECHO MEAS - RV V1 MEAN: 43.8 CM/SEC
BH CV ECHO MEAS - RV V1 VTI: 14.2 CM
BH CV ECHO MEAS - RVOT AREA: 3 CM^2
BH CV ECHO MEAS - RVOT DIAM: 1.9 CM
BH CV ECHO MEAS - RVSP: 26 MMHG
BH CV ECHO MEAS - SI(AO): 234.2 ML/M^2
BH CV ECHO MEAS - SI(CUBED): 42.1 ML/M^2
BH CV ECHO MEAS - SI(LVOT): 29.6 ML/M^2
BH CV ECHO MEAS - SI(MOD-SP2): 20.8 ML/M^2
BH CV ECHO MEAS - SI(MOD-SP4): 20.4 ML/M^2
BH CV ECHO MEAS - SI(TEICH): 35.4 ML/M^2
BH CV ECHO MEAS - SV(AO): 529.2 ML
BH CV ECHO MEAS - SV(CUBED): 95 ML
BH CV ECHO MEAS - SV(LVOT): 66.8 ML
BH CV ECHO MEAS - SV(MOD-SP2): 47 ML
BH CV ECHO MEAS - SV(MOD-SP4): 46 ML
BH CV ECHO MEAS - SV(RVOT): 42 ML
BH CV ECHO MEAS - SV(TEICH): 79.9 ML
BH CV ECHO MEAS - TAPSE (>1.6): 1.9 CM
BH CV ECHO MEAS - TR MAX PG: 23 MMHG
BH CV ECHO MEAS - TR MAX VEL: 264.7 CM/SEC
BH CV ECHO MEASUREMENTS AVERAGE E/E' RATIO: 14.45
BH CV XLRA - RV BASE: 2.5 CM
BH CV XLRA - RV LENGTH: 6.1 CM
BH CV XLRA - RV MID: 2.4 CM
BH CV XLRA - TDI S': 10.9 CM/SEC
LEFT ATRIUM VOLUME INDEX: 31.2 ML/M2
MAXIMAL PREDICTED HEART RATE: 155 BPM
STRESS TARGET HR: 132 BPM

## 2021-12-06 PROCEDURE — 93306 TTE W/DOPPLER COMPLETE: CPT

## 2021-12-06 PROCEDURE — 93306 TTE W/DOPPLER COMPLETE: CPT | Performed by: INTERNAL MEDICINE

## 2021-12-07 ENCOUNTER — HOSPITAL ENCOUNTER (OUTPATIENT)
Dept: CT IMAGING | Facility: HOSPITAL | Age: 66
Discharge: HOME | End: 2021-12-07
Admitting: THORACIC SURGERY (CARDIOTHORACIC VASCULAR SURGERY)

## 2021-12-07 DIAGNOSIS — E04.9 SUBSTERNAL THYROID GOITER: ICD-10-CM

## 2021-12-07 LAB — CREAT BLDA-MCNC: 0.9 MG/DL (ref 0.6–1.3)

## 2021-12-07 PROCEDURE — 82565 ASSAY OF CREATININE: CPT

## 2021-12-07 PROCEDURE — 71260 CT THORAX DX C+: CPT

## 2021-12-07 PROCEDURE — 25010000002 IOPAMIDOL 61 % SOLUTION: Performed by: THORACIC SURGERY (CARDIOTHORACIC VASCULAR SURGERY)

## 2021-12-07 RX ADMIN — IOPAMIDOL 80 ML: 612 INJECTION, SOLUTION INTRAVENOUS at 10:18

## 2021-12-15 ENCOUNTER — OFFICE VISIT (OUTPATIENT)
Dept: SURGERY | Facility: CLINIC | Age: 66
End: 2021-12-15

## 2021-12-15 VITALS
BODY MASS INDEX: 33.64 KG/M2 | DIASTOLIC BLOOD PRESSURE: 82 MMHG | SYSTOLIC BLOOD PRESSURE: 122 MMHG | OXYGEN SATURATION: 91 % | WEIGHT: 235 LBS | HEIGHT: 70 IN | HEART RATE: 53 BPM

## 2021-12-15 DIAGNOSIS — E04.9 SUBSTERNAL THYROID GOITER: Primary | ICD-10-CM

## 2021-12-15 PROCEDURE — 99213 OFFICE O/P EST LOW 20 MIN: CPT | Performed by: NURSE PRACTITIONER

## 2021-12-15 NOTE — PROGRESS NOTES
"Chief Complaint  Substernal goiter, follow-up with CT chest    Subjective          Flo Mnazo presents to [facility] for continued follow-up and surveillance.    History of Present Illness     Flo Manzo is a pleasant 66-year-old gentleman who we have been following concerning a substernal goiter.  His first identified on imaging in January 2019. Mr. Manzo has been doing well since we last saw him in the office.  He denies any significant shortness of breath.  He is having no difficulty with swallowing, fever, chills, night sweats or unexpected weight loss.  He is scheduled to undergo pacemaker insertion by Dr. Bruce later this month.    Objective   Vital Signs:   /82 (BP Location: Right arm, Patient Position: Sitting, Cuff Size: Adult)   Pulse 53   Ht 177.8 cm (70\")   Wt 107 kg (235 lb)   SpO2 91%   BMI 33.72 kg/m²     Physical Exam  Vitals and nursing note reviewed.   Constitutional:       Appearance: He is obese.   HENT:      Head: Normocephalic and atraumatic.      Nose: Nose normal.      Mouth/Throat:      Pharynx: Oropharynx is clear.   Eyes:      General: No scleral icterus.     Conjunctiva/sclera: Conjunctivae normal.   Cardiovascular:      Rate and Rhythm: Normal rate. Rhythm irregular.      Pulses: Normal pulses.      Heart sounds: Normal heart sounds.   Pulmonary:      Effort: Pulmonary effort is normal.      Breath sounds: Normal breath sounds. No wheezing, rhonchi or rales.   Chest:      Chest wall: No tenderness.   Abdominal:      General: Bowel sounds are normal.      Palpations: Abdomen is soft.   Musculoskeletal:         General: Normal range of motion.   Skin:     General: Skin is warm.   Neurological:      General: No focal deficit present.      Mental Status: He is alert. Mental status is at baseline.   Psychiatric:         Mood and Affect: Mood normal.         Thought Content: Thought content normal.        Result Review :   The " following data was reviewed by: ASHANTI Suárez on 12/15/2021:    Data reviewed: Radiologic studies To the chest performed on 12/7/2021 was independently reviewed by me.  There is no significant change in the size appearance of the multinodular goiter with substernal extension.  There is some associated tracheal narrowing due to mass-effect from the goiter but this is unchanged compared to previous imaging.  No lymphadenopathy or pleural effusion.          Assessment and Plan      CT of the chest is stable and patient is doing well.  Continue surveillance with a follow-up CT of the chest without contrast in 6 months.  Of course we are happy to see the patient sooner, should the need arise.  Thank you for allowing us to participate in the care of Flo Manzo.  We will continue to keep you informed of his progress.    Diagnoses and all orders for this visit:    1. Substernal thyroid goiter (Primary)  -     CT Chest Without Contrast Diagnostic; Future      I spent 23 minutes caring for Flo on this date of service. This time includes time spent by me in the following activities:preparing for the visit, reviewing tests, obtaining and/or reviewing a separately obtained history, performing a medically appropriate examination and/or evaluation , counseling and educating the patient/family/caregiver, ordering medications, tests, or procedures, referring and communicating with other health care professionals , documenting information in the medical record, independently interpreting results and communicating that information with the patient/family/caregiver and care coordination.    Follow Up   Return in about 6 months (around 6/15/2022) for Next scheduled follow up with CT chest.  Patient was given instructions and counseling regarding his condition or for health maintenance advice. Please see specific information pulled into the AVS if appropriate.

## 2021-12-20 ENCOUNTER — HOSPITAL ENCOUNTER (OUTPATIENT)
Dept: CARDIOLOGY | Facility: HOSPITAL | Age: 66
Discharge: HOME | End: 2021-12-20

## 2021-12-20 VITALS — HEART RATE: 80 BPM | SYSTOLIC BLOOD PRESSURE: 120 MMHG | DIASTOLIC BLOOD PRESSURE: 60 MMHG

## 2021-12-20 LAB
BH CV REST NUCLEAR ISOTOPE DOSE: 10.9 MCI
BH CV STRESS BP STAGE 1: NORMAL
BH CV STRESS COMMENTS STAGE 1: NORMAL
BH CV STRESS DOSE REGADENOSON STAGE 1: 0.4
BH CV STRESS DURATION MIN STAGE 1: 2
BH CV STRESS DURATION SEC STAGE 1: 9
BH CV STRESS HR STAGE 1: 126
BH CV STRESS NUCLEAR ISOTOPE DOSE: 31.1 MCI
BH CV STRESS PROTOCOL 1: NORMAL
BH CV STRESS RECOVERY BP: NORMAL MMHG
BH CV STRESS RECOVERY HR: 102 BPM
BH CV STRESS STAGE 1: 1
LV EF NUC BP: 54 %
MAXIMAL PREDICTED HEART RATE: 155 BPM
PERCENT MAX PREDICTED HR: 81.29 %
STRESS BASELINE BP: NORMAL MMHG
STRESS BASELINE HR: 80 BPM
STRESS PERCENT HR: 96 %
STRESS POST ESTIMATED WORKLOAD: 1.7 METS
STRESS POST EXERCISE DUR MIN: 2 MIN
STRESS POST EXERCISE DUR SEC: 9 SEC
STRESS POST PEAK BP: NORMAL MMHG
STRESS POST PEAK HR: 126 BPM
STRESS TARGET HR: 132 BPM

## 2021-12-20 PROCEDURE — 93018 CV STRESS TEST I&R ONLY: CPT | Performed by: INTERNAL MEDICINE

## 2021-12-20 PROCEDURE — 93017 CV STRESS TEST TRACING ONLY: CPT

## 2021-12-20 PROCEDURE — 93016 CV STRESS TEST SUPVJ ONLY: CPT

## 2021-12-20 PROCEDURE — 78452 HT MUSCLE IMAGE SPECT MULT: CPT

## 2021-12-20 PROCEDURE — A9500 TC99M SESTAMIBI: HCPCS | Performed by: INTERNAL MEDICINE

## 2021-12-20 PROCEDURE — 78452 HT MUSCLE IMAGE SPECT MULT: CPT | Performed by: INTERNAL MEDICINE

## 2021-12-20 PROCEDURE — 0 TECHNETIUM SESTAMIBI: Performed by: INTERNAL MEDICINE

## 2021-12-20 PROCEDURE — 25010000002 REGADENOSON 0.4 MG/5ML SOLUTION: Performed by: INTERNAL MEDICINE

## 2021-12-20 RX ADMIN — TECHNETIUM TC 99M SESTAMIBI 1 DOSE: 1 INJECTION INTRAVENOUS at 10:26

## 2021-12-20 RX ADMIN — REGADENOSON 0.4 MG: 0.08 INJECTION, SOLUTION INTRAVENOUS at 10:26

## 2021-12-20 RX ADMIN — TECHNETIUM TC 99M SESTAMIBI 1 DOSE: 1 INJECTION INTRAVENOUS at 08:45

## 2021-12-23 ENCOUNTER — OFFICE VISIT (OUTPATIENT)
Dept: CARDIOLOGY | Facility: CLINIC | Age: 66
End: 2021-12-23

## 2021-12-23 VITALS
BODY MASS INDEX: 33.64 KG/M2 | HEART RATE: 69 BPM | SYSTOLIC BLOOD PRESSURE: 129 MMHG | DIASTOLIC BLOOD PRESSURE: 90 MMHG | WEIGHT: 235 LBS | HEIGHT: 70 IN

## 2021-12-23 DIAGNOSIS — I10 ESSENTIAL HYPERTENSION: ICD-10-CM

## 2021-12-23 DIAGNOSIS — I35.0 AORTIC STENOSIS, MODERATE: Primary | ICD-10-CM

## 2021-12-23 DIAGNOSIS — I49.5 SSS (SICK SINUS SYNDROME): ICD-10-CM

## 2021-12-23 DIAGNOSIS — I48.20 CHRONIC ATRIAL FIBRILLATION: ICD-10-CM

## 2021-12-23 PROCEDURE — 99213 OFFICE O/P EST LOW 20 MIN: CPT | Performed by: INTERNAL MEDICINE

## 2022-01-06 PROBLEM — I49.5 SSS (SICK SINUS SYNDROME): Status: ACTIVE | Noted: 2022-01-06

## 2022-01-19 RX ORDER — AMLODIPINE BESYLATE AND BENAZEPRIL HYDROCHLORIDE 10; 20 MG/1; MG/1
1 CAPSULE ORAL DAILY
Qty: 90 CAPSULE | Refills: 1 | Status: SHIPPED | OUTPATIENT
Start: 2022-01-19 | End: 2022-08-03 | Stop reason: HOSPADM

## 2022-01-19 NOTE — TELEPHONE ENCOUNTER
Rx Refill Note  Requested Prescriptions     Pending Prescriptions Disp Refills   • amLODIPine-benazepril (LOTREL) 10-20 MG per capsule [Pharmacy Med Name: AMLODIPINE-BENAZ 10/20MG CAPSULES] 90 capsule 1     Sig: TAKE 1 CAPSULE BY MOUTH DAILY      Last office visit with prescribing clinician: 11/30/2021      Next office visit with prescribing clinician: 4/28/2022            Laura Ellis LPN  01/19/22, 11:23 EST

## 2022-02-03 ENCOUNTER — OFFICE VISIT (OUTPATIENT)
Dept: CARDIOLOGY | Facility: CLINIC | Age: 67
End: 2022-02-03

## 2022-02-03 VITALS
WEIGHT: 249 LBS | HEIGHT: 70 IN | DIASTOLIC BLOOD PRESSURE: 100 MMHG | SYSTOLIC BLOOD PRESSURE: 134 MMHG | HEART RATE: 71 BPM | BODY MASS INDEX: 35.65 KG/M2

## 2022-02-03 DIAGNOSIS — I48.20 CHRONIC ATRIAL FIBRILLATION: ICD-10-CM

## 2022-02-03 DIAGNOSIS — I50.32 DIASTOLIC CHF, CHRONIC: ICD-10-CM

## 2022-02-03 DIAGNOSIS — Z51.81 ENCOUNTER FOR THERAPEUTIC DRUG MONITORING: Primary | ICD-10-CM

## 2022-02-03 DIAGNOSIS — I10 ESSENTIAL HYPERTENSION: ICD-10-CM

## 2022-02-03 DIAGNOSIS — I49.5 SSS (SICK SINUS SYNDROME): ICD-10-CM

## 2022-02-03 PROCEDURE — 93000 ELECTROCARDIOGRAM COMPLETE: CPT | Performed by: INTERNAL MEDICINE

## 2022-02-03 PROCEDURE — 99214 OFFICE O/P EST MOD 30 MIN: CPT | Performed by: INTERNAL MEDICINE

## 2022-02-03 NOTE — PROGRESS NOTES
6 WK FOLLOW UP POSSIBLE PCM    Subjective:        Flo Manzo is a 66 y.o. male who here for follow up    CC  SSS  HPI  66-year-old with chronic atrial fibrillation on chronic anticoagulation has abnormal Holter monitor continues to complains of weakness dizziness and lightheadedness     Problems Addressed this Visit        Cardiac and Vasculature    Essential hypertension    Diastolic CHF, chronic (HCC)    Chronic atrial fibrillation (HCC)    SSS (sick sinus syndrome) (HCC)    Relevant Orders    Case Request Cath Lab: Pacemaker SC new BIOTRONIK (Completed)    CBC & Differential    Basic Metabolic Panel    COVID PRE-OP / PRE-PROCEDURE SCREENING ORDER (NO ISOLATION) - Swab, Nasopharynx    aPTT    Protime-INR      Other Visit Diagnoses     Encounter for therapeutic drug monitoring    -  Primary    Relevant Orders    aPTT    Protime-INR      Diagnoses       Codes Comments    Encounter for therapeutic drug monitoring    -  Primary ICD-10-CM: Z51.81  ICD-9-CM: V58.83     SSS (sick sinus syndrome) (HCC)     ICD-10-CM: I49.5  ICD-9-CM: 427.81     Chronic atrial fibrillation (HCC)     ICD-10-CM: I48.20  ICD-9-CM: 427.31     Essential hypertension     ICD-10-CM: I10  ICD-9-CM: 401.9     Diastolic CHF, chronic (HCC)     ICD-10-CM: I50.32  ICD-9-CM: 428.32, 428.0         .    The following portions of the patient's history were reviewed and updated as appropriate: allergies, current medications, past family history, past medical history, past social history, past surgical history and problem list.    Past Medical History:   Diagnosis Date   • Arthritis    • Atrial fibrillation with RVR (HCC) 01/24/2019   • Colon polyps 01/04/2018    Hepatic flexure: tubular adenoma, only low-grade dysplasia; splenic flexure: tubular adenoma, only low-grade dysplasia; sigmoid colon: tubular adenoma, multiple fragments only low-grade dysplasia   • Depression    • Heart murmur    • Hemorrhagic stroke (HCC) 01/29/2019   • Hypertension    •  "New onset atrial fibrillation (CMS/HCC) - RVR 1/24/2019   • GÓMEZ (obstructive sleep apnea) 06/06/2019    Home sleep study with moderate severity GÓMEZ, AHI 20 events per hour.  Sleep-related hypoxia with low O2 saturation 84% for 14 minutes.   • Sleep apnea     previously diagnosed with sleep apnea, had T&A done and it has since resolved    • Uncontrolled hypertension    • Ventral hernia      reports that he has never smoked. He has never used smokeless tobacco. He reports previous alcohol use. He reports that he does not use drugs.   Family History   Problem Relation Age of Onset   • Hypertension Mother    • Kidney failure Mother    • Coronary artery disease Father    • Lung cancer Father         positive smoker   • Heart attack Father    • Breast cancer Sister 60   • Prostate cancer Brother    • Colon polyps Brother    • Kidney nephrosis Brother    • Malig Hyperthermia Neg Hx        Review of Systems  Constitutional: No wt loss, fever, fatigue  Gastrointestinal: No nausea, abdominal pain  Behavioral/Psych: No insomnia or anxiety   Cardiovascular weakness dizziness and lightheadedness  Objective:       Physical Exam  /100   Pulse 71   Ht 177.8 cm (70\")   Wt 113 kg (249 lb)   BMI 35.73 kg/m²   General appearance: No acute changes   Neck: Trachea midline; NECK, supple, no thyromegaly or lymphadenopathy   Lungs: Normal size and shape, normal breath sounds, equal distribution of air, no rales and rhonchi   CV: S1-S2 irregular, no murmurs, no rub, no gallop   Abdomen: Soft, nontender; no masses , no abnormal abdominal sounds   Extremities: No deformity , normal color , no peripheral edema   Skin: Normal temperature, turgor and texture; no rash, ulcers            ECG 12 Lead    Date/Time: 2/3/2022 10:58 AM  Performed by: Eren Bruce MD  Authorized by: Eren Bruce MD   Comparison: compared with previous ECG   Similar to previous ECG  Rhythm: atrial fibrillation    Clinical impression: abnormal " EKG              Echocardiogram:        Current Outpatient Medications:   •  albuterol sulfate  (90 Base) MCG/ACT inhaler, Inhale 2 puffs Every 4 (Four) Hours As Needed for Wheezing., Disp: 18 g, Rfl: 1  •  amLODIPine-benazepril (LOTREL) 10-20 MG per capsule, TAKE 1 CAPSULE BY MOUTH DAILY, Disp: 90 capsule, Rfl: 1  •  apixaban (Eliquis) 5 MG tablet tablet, Take 1 tablet by mouth Every 12 (Twelve) Hours., Disp: 180 tablet, Rfl: 1  •  atorvastatin (LIPITOR) 10 MG tablet, TAKE 1 TABLET BY MOUTH EVERY NIGHT, Disp: 90 tablet, Rfl: 1  •  citalopram (CeleXA) 40 MG tablet, TAKE 1 TABLET BY MOUTH DAILY, Disp: 90 tablet, Rfl: 1  •  DULoxetine (CYMBALTA) 30 MG capsule, Take 1 capsule by mouth Every Night., Disp: 90 capsule, Rfl: 0  •  metoprolol tartrate (LOPRESSOR) 25 MG tablet, Take 1 tablet by mouth Every 12 (Twelve) Hours. (Patient taking differently: Take 25 mg by mouth Every 12 (Twelve) Hours. States taking daily), Disp: 180 tablet, Rfl: 1  •  omeprazole (priLOSEC) 20 MG capsule, TAKE 1 CAPSULE BY MOUTH DAILY, Disp: 90 capsule, Rfl: 1   Assessment:        Patient Active Problem List   Diagnosis   • Umbilical hernia without obstruction and without gangrene   • Essential hypertension   • Arthritis of left knee   • Depression   • Obesity (BMI 30-39.9)   • Substernal thyroid goiter   • CHF (congestive heart failure) (HCC)   • Diastolic CHF, chronic (HCC)   • Hemorrhagic stroke (HCC)   • GÓMEZ on auto CPAP   • Hypersomnia due to medical condition   • Sleep-related hypoxia   • Chronic atrial fibrillation (HCC)   • Vertigo   • Aortic stenosis, moderate   • SSS (sick sinus syndrome) ([facility])               Plan:            ICD-10-CM ICD-9-CM   1. Encounter for therapeutic drug monitoring  Z51.81 V58.83   2. SSS (sick sinus syndrome) (HCC)  I49.5 427.81   3. Chronic atrial fibrillation (HCC)  I48.20 427.31   4. Essential hypertension  I10 401.9   5. Diastolic CHF, chronic (HCC)  I50.32 428.32     428.0     1. SSS (sick sinus  syndrome) (HCC)  Patient will need a permanent pacemaker implantation procedure risk and options have been explained  - Case Request Cath Lab: Pacemaker SC new BIOTRONIK  - CBC & Differential; Future  - Basic Metabolic Panel  - COVID PRE-OP / PRE-PROCEDURE SCREENING ORDER (NO ISOLATION) - Swab, Nasopharynx; Future  - aPTT; Future  - Protime-INR; Future    2. Encounter for therapeutic drug monitoring    - aPTT; Future  - Protime-INR; Future    3. Chronic atrial fibrillation (HCC)  Significant pauses    4. Essential hypertension  Blood pressure under control    5. Diastolic CHF, chronic (HCC)  Compensated     NEEDS SINGLE CHAMBER PACER  PROCEDURE, RISK EXPLAINED    COUNSELING:    Flo Rodriguez was given to patient for the following topics: diagnostic results, risk factor reductions, impressions, risks and benefits of treatment options and importance of treatment compliance .       SMOKING COUNSELING:    [unfilled]    Dictated using Dragon dictation

## 2022-02-09 ENCOUNTER — TRANSCRIBE ORDERS (OUTPATIENT)
Dept: ADMINISTRATIVE | Facility: HOSPITAL | Age: 67
End: 2022-02-09

## 2022-02-09 ENCOUNTER — HOSPITAL ENCOUNTER (OUTPATIENT)
Dept: CARDIOLOGY | Facility: HOSPITAL | Age: 67
Discharge: HOME | End: 2022-02-09
Admitting: UROLOGY

## 2022-02-09 DIAGNOSIS — Z01.818 PREOP TESTING: Primary | ICD-10-CM

## 2022-02-09 DIAGNOSIS — Z01.818 PREOP TESTING: ICD-10-CM

## 2022-02-09 LAB — QT INTERVAL: 405 MS

## 2022-02-09 PROCEDURE — 93010 ELECTROCARDIOGRAM REPORT: CPT | Performed by: INTERNAL MEDICINE

## 2022-02-09 PROCEDURE — 93005 ELECTROCARDIOGRAM TRACING: CPT | Performed by: UROLOGY

## 2022-03-01 ENCOUNTER — LAB (OUTPATIENT)
Dept: LAB | Facility: HOSPITAL | Age: 67
End: 2022-03-01

## 2022-03-01 DIAGNOSIS — Z51.81 ENCOUNTER FOR THERAPEUTIC DRUG MONITORING: ICD-10-CM

## 2022-03-01 DIAGNOSIS — I49.5 SSS (SICK SINUS SYNDROME): ICD-10-CM

## 2022-03-01 LAB
ANION GAP SERPL CALCULATED.3IONS-SCNC: 9.5 MMOL/L (ref 5–15)
APTT PPP: 27.9 SECONDS (ref 22.7–35.4)
BASOPHILS # BLD AUTO: 0.03 10*3/MM3 (ref 0–0.2)
BASOPHILS NFR BLD AUTO: 0.4 % (ref 0–1.5)
BUN SERPL-MCNC: 21 MG/DL (ref 8–23)
BUN/CREAT SERPL: 21.9 (ref 7–25)
CALCIUM SPEC-SCNC: 9.9 MG/DL (ref 8.6–10.5)
CHLORIDE SERPL-SCNC: 104 MMOL/L (ref 98–107)
CO2 SERPL-SCNC: 27.5 MMOL/L (ref 22–29)
CREAT SERPL-MCNC: 0.96 MG/DL (ref 0.76–1.27)
DEPRECATED RDW RBC AUTO: 41.3 FL (ref 37–54)
EGFRCR SERPLBLD CKD-EPI 2021: 87.7 ML/MIN/1.73
EOSINOPHIL # BLD AUTO: 0.14 10*3/MM3 (ref 0–0.4)
EOSINOPHIL NFR BLD AUTO: 1.7 % (ref 0.3–6.2)
ERYTHROCYTE [DISTWIDTH] IN BLOOD BY AUTOMATED COUNT: 12.9 % (ref 12.3–15.4)
GLUCOSE SERPL-MCNC: 110 MG/DL (ref 65–99)
HCT VFR BLD AUTO: 46.8 % (ref 37.5–51)
HGB BLD-MCNC: 15.1 G/DL (ref 13–17.7)
IMM GRANULOCYTES # BLD AUTO: 0.03 10*3/MM3 (ref 0–0.05)
IMM GRANULOCYTES NFR BLD AUTO: 0.4 % (ref 0–0.5)
INR PPP: 1 (ref 0.9–1.1)
LYMPHOCYTES # BLD AUTO: 2.82 10*3/MM3 (ref 0.7–3.1)
LYMPHOCYTES NFR BLD AUTO: 33.5 % (ref 19.6–45.3)
MCH RBC QN AUTO: 28.4 PG (ref 26.6–33)
MCHC RBC AUTO-ENTMCNC: 32.3 G/DL (ref 31.5–35.7)
MCV RBC AUTO: 88.1 FL (ref 79–97)
MONOCYTES # BLD AUTO: 0.76 10*3/MM3 (ref 0.1–0.9)
MONOCYTES NFR BLD AUTO: 9 % (ref 5–12)
NEUTROPHILS NFR BLD AUTO: 4.64 10*3/MM3 (ref 1.7–7)
NEUTROPHILS NFR BLD AUTO: 55 % (ref 42.7–76)
NRBC BLD AUTO-RTO: 0 /100 WBC (ref 0–0.2)
PLATELET # BLD AUTO: 239 10*3/MM3 (ref 140–450)
PMV BLD AUTO: 9.9 FL (ref 6–12)
POTASSIUM SERPL-SCNC: 4.4 MMOL/L (ref 3.5–5.2)
PROTHROMBIN TIME: 13.1 SECONDS (ref 11.7–14.2)
RBC # BLD AUTO: 5.31 10*6/MM3 (ref 4.14–5.8)
SODIUM SERPL-SCNC: 141 MMOL/L (ref 136–145)
WBC NRBC COR # BLD: 8.42 10*3/MM3 (ref 3.4–10.8)

## 2022-03-01 PROCEDURE — 85730 THROMBOPLASTIN TIME PARTIAL: CPT

## 2022-03-01 PROCEDURE — 85610 PROTHROMBIN TIME: CPT

## 2022-03-01 PROCEDURE — 36415 COLL VENOUS BLD VENIPUNCTURE: CPT | Performed by: INTERNAL MEDICINE

## 2022-03-01 PROCEDURE — 80048 BASIC METABOLIC PNL TOTAL CA: CPT | Performed by: INTERNAL MEDICINE

## 2022-03-01 PROCEDURE — 85025 COMPLETE CBC W/AUTO DIFF WBC: CPT

## 2022-03-02 ENCOUNTER — APPOINTMENT (OUTPATIENT)
Dept: VACCINE CLINIC | Facility: HOSPITAL | Age: 67
End: 2022-03-02

## 2022-03-02 ENCOUNTER — LAB (OUTPATIENT)
Dept: LAB | Facility: HOSPITAL | Age: 67
End: 2022-03-02

## 2022-03-02 DIAGNOSIS — I49.5 SSS (SICK SINUS SYNDROME): ICD-10-CM

## 2022-03-02 LAB — SARS-COV-2 ORF1AB RESP QL NAA+PROBE: NOT DETECTED

## 2022-03-02 PROCEDURE — U0004 COV-19 TEST NON-CDC HGH THRU: HCPCS

## 2022-03-02 PROCEDURE — C9803 HOPD COVID-19 SPEC COLLECT: HCPCS

## 2022-03-02 PROCEDURE — U0005 INFEC AGEN DETEC AMPLI PROBE: HCPCS

## 2022-03-04 ENCOUNTER — HOSPITAL ENCOUNTER (OUTPATIENT)
Facility: HOSPITAL | Age: 67
Discharge: HOME | End: 2022-03-06
Attending: INTERNAL MEDICINE | Admitting: INTERNAL MEDICINE

## 2022-03-04 ENCOUNTER — APPOINTMENT (OUTPATIENT)
Dept: GENERAL RADIOLOGY | Facility: HOSPITAL | Age: 67
End: 2022-03-04

## 2022-03-04 DIAGNOSIS — I49.5 SSS (SICK SINUS SYNDROME): ICD-10-CM

## 2022-03-04 LAB
ANION GAP SERPL CALCULATED.3IONS-SCNC: 9 MMOL/L (ref 5–15)
ARTERIAL PATENCY WRIST A: ABNORMAL
ARTERIAL PATENCY WRIST A: ABNORMAL
ATMOSPHERIC PRESS: 763.3 MMHG
ATMOSPHERIC PRESS: 763.7 MMHG
BASE EXCESS BLDA CALC-SCNC: -2 MMOL/L (ref 0–2)
BASE EXCESS BLDA CALC-SCNC: 0.1 MMOL/L (ref 0–2)
BASOPHILS # BLD AUTO: 0.03 10*3/MM3 (ref 0–0.2)
BASOPHILS NFR BLD AUTO: 0.3 % (ref 0–1.5)
BDY SITE: ABNORMAL
BDY SITE: ABNORMAL
BUN SERPL-MCNC: 24 MG/DL (ref 8–23)
BUN/CREAT SERPL: 23.3 (ref 7–25)
CALCIUM SPEC-SCNC: 9 MG/DL (ref 8.6–10.5)
CHLORIDE SERPL-SCNC: 106 MMOL/L (ref 98–107)
CO2 SERPL-SCNC: 26 MMOL/L (ref 22–29)
CREAT SERPL-MCNC: 1.03 MG/DL (ref 0.76–1.27)
DEPRECATED RDW RBC AUTO: 44 FL (ref 37–54)
EGFRCR SERPLBLD CKD-EPI 2021: 80.6 ML/MIN/1.73
EOSINOPHIL # BLD AUTO: 0.12 10*3/MM3 (ref 0–0.4)
EOSINOPHIL NFR BLD AUTO: 1.1 % (ref 0.3–6.2)
ERYTHROCYTE [DISTWIDTH] IN BLOOD BY AUTOMATED COUNT: 13.3 % (ref 12.3–15.4)
GAS FLOW AIRWAY: 2 LPM
GAS FLOW AIRWAY: 3 LPM
GLUCOSE BLDC GLUCOMTR-MCNC: 127 MG/DL (ref 70–130)
GLUCOSE SERPL-MCNC: 105 MG/DL (ref 65–99)
HCO3 BLDA-SCNC: 26.3 MMOL/L (ref 22–28)
HCO3 BLDA-SCNC: 26.8 MMOL/L (ref 22–28)
HCT VFR BLD AUTO: 45.3 % (ref 37.5–51)
HGB BLD-MCNC: 14.6 G/DL (ref 13–17.7)
IMM GRANULOCYTES # BLD AUTO: 0.04 10*3/MM3 (ref 0–0.05)
IMM GRANULOCYTES NFR BLD AUTO: 0.4 % (ref 0–0.5)
LYMPHOCYTES # BLD AUTO: 1.98 10*3/MM3 (ref 0.7–3.1)
LYMPHOCYTES NFR BLD AUTO: 17.5 % (ref 19.6–45.3)
MCH RBC QN AUTO: 28.8 PG (ref 26.6–33)
MCHC RBC AUTO-ENTMCNC: 32.2 G/DL (ref 31.5–35.7)
MCV RBC AUTO: 89.3 FL (ref 79–97)
MODALITY: ABNORMAL
MODALITY: ABNORMAL
MONOCYTES # BLD AUTO: 0.8 10*3/MM3 (ref 0.1–0.9)
MONOCYTES NFR BLD AUTO: 7.1 % (ref 5–12)
NEUTROPHILS NFR BLD AUTO: 73.6 % (ref 42.7–76)
NEUTROPHILS NFR BLD AUTO: 8.35 10*3/MM3 (ref 1.7–7)
NRBC BLD AUTO-RTO: 0 /100 WBC (ref 0–0.2)
PCO2 BLDA: 50 MM HG (ref 35–45)
PCO2 BLDA: 58 MM HG (ref 35–45)
PH BLDA: 7.26 PH UNITS (ref 7.35–7.45)
PH BLDA: 7.34 PH UNITS (ref 7.35–7.45)
PLATELET # BLD AUTO: 243 10*3/MM3 (ref 140–450)
PMV BLD AUTO: 10.7 FL (ref 6–12)
PO2 BLDA: 59.5 MM HG (ref 80–100)
PO2 BLDA: 65.8 MM HG (ref 80–100)
POTASSIUM SERPL-SCNC: 4.1 MMOL/L (ref 3.5–5.2)
RBC # BLD AUTO: 5.07 10*6/MM3 (ref 4.14–5.8)
SAO2 % BLDCOA: 85.7 % (ref 92–99)
SAO2 % BLDCOA: 91 % (ref 92–99)
SET MECH RESP RATE: 18
SET MECH RESP RATE: 18
SODIUM SERPL-SCNC: 141 MMOL/L (ref 136–145)
WBC NRBC COR # BLD: 11.32 10*3/MM3 (ref 3.4–10.8)

## 2022-03-04 PROCEDURE — C1898 LEAD, PMKR, OTHER THAN TRANS: HCPCS | Performed by: INTERNAL MEDICINE

## 2022-03-04 PROCEDURE — 71045 X-RAY EXAM CHEST 1 VIEW: CPT

## 2022-03-04 PROCEDURE — 25010000002 VANCOMYCIN 10 G RECONSTITUTED SOLUTION: Performed by: INTERNAL MEDICINE

## 2022-03-04 PROCEDURE — 0 LIDOCAINE 1 % SOLUTION: Performed by: INTERNAL MEDICINE

## 2022-03-04 PROCEDURE — 33207 INSERT HEART PM VENTRICULAR: CPT | Performed by: INTERNAL MEDICINE

## 2022-03-04 PROCEDURE — 85025 COMPLETE CBC W/AUTO DIFF WBC: CPT

## 2022-03-04 PROCEDURE — 25010000002 FENTANYL CITRATE (PF) 50 MCG/ML SOLUTION: Performed by: INTERNAL MEDICINE

## 2022-03-04 PROCEDURE — C1889 IMPLANT/INSERT DEVICE, NOC: HCPCS | Performed by: INTERNAL MEDICINE

## 2022-03-04 PROCEDURE — 25010000002 CEFAZOLIN IN DEXTROSE 2-4 GM/100ML-% SOLUTION

## 2022-03-04 PROCEDURE — 0 IOPAMIDOL PER 1 ML: Performed by: INTERNAL MEDICINE

## 2022-03-04 PROCEDURE — 80048 BASIC METABOLIC PNL TOTAL CA: CPT

## 2022-03-04 PROCEDURE — C1786 PMKR, SINGLE, RATE-RESP: HCPCS | Performed by: INTERNAL MEDICINE

## 2022-03-04 PROCEDURE — C1894 INTRO/SHEATH, NON-LASER: HCPCS | Performed by: INTERNAL MEDICINE

## 2022-03-04 PROCEDURE — 25010000002 FUROSEMIDE PER 20 MG: Performed by: INTERNAL MEDICINE

## 2022-03-04 PROCEDURE — 25010000002 MIDAZOLAM PER 1 MG: Performed by: INTERNAL MEDICINE

## 2022-03-04 PROCEDURE — 82803 BLOOD GASES ANY COMBINATION: CPT

## 2022-03-04 PROCEDURE — 99153 MOD SED SAME PHYS/QHP EA: CPT | Performed by: INTERNAL MEDICINE

## 2022-03-04 PROCEDURE — 82962 GLUCOSE BLOOD TEST: CPT

## 2022-03-04 PROCEDURE — 25010000002 CEFAZOLIN IN DEXTROSE 2-4 GM/100ML-% SOLUTION: Performed by: INTERNAL MEDICINE

## 2022-03-04 PROCEDURE — G0378 HOSPITAL OBSERVATION PER HR: HCPCS

## 2022-03-04 PROCEDURE — 99152 MOD SED SAME PHYS/QHP 5/>YRS: CPT | Performed by: INTERNAL MEDICINE

## 2022-03-04 PROCEDURE — 36600 WITHDRAWAL OF ARTERIAL BLOOD: CPT

## 2022-03-04 DEVICE — IMPLANTABLE DEVICE
Type: IMPLANTABLE DEVICE | Status: FUNCTIONAL
Brand: EDORA 8 SR-T

## 2022-03-04 DEVICE — LD PM SOLIA S 53: Type: IMPLANTABLE DEVICE | Status: FUNCTIONAL

## 2022-03-04 DEVICE — FLOSEAL HEMOSTATIC MATRIX, 10ML
Type: IMPLANTABLE DEVICE | Status: FUNCTIONAL
Brand: FLOSEAL HEMOSTATIC MATRIX

## 2022-03-04 RX ORDER — CEFAZOLIN SODIUM 2 G/100ML
2 INJECTION, SOLUTION INTRAVENOUS ONCE
Status: COMPLETED | OUTPATIENT
Start: 2022-03-04 | End: 2022-03-04

## 2022-03-04 RX ORDER — LIDOCAINE HYDROCHLORIDE 10 MG/ML
INJECTION, SOLUTION INFILTRATION; PERINEURAL AS NEEDED
Status: DISCONTINUED | OUTPATIENT
Start: 2022-03-04 | End: 2022-03-04 | Stop reason: HOSPADM

## 2022-03-04 RX ORDER — ALBUTEROL SULFATE 2.5 MG/3ML
2.5 SOLUTION RESPIRATORY (INHALATION) EVERY 6 HOURS PRN
Status: DISCONTINUED | OUTPATIENT
Start: 2022-03-04 | End: 2022-03-06 | Stop reason: HOSPADM

## 2022-03-04 RX ORDER — FUROSEMIDE 10 MG/ML
40 INJECTION INTRAMUSCULAR; INTRAVENOUS ONCE
Status: COMPLETED | OUTPATIENT
Start: 2022-03-04 | End: 2022-03-04

## 2022-03-04 RX ORDER — FENTANYL CITRATE 50 UG/ML
INJECTION, SOLUTION INTRAMUSCULAR; INTRAVENOUS AS NEEDED
Status: DISCONTINUED | OUTPATIENT
Start: 2022-03-04 | End: 2022-03-04 | Stop reason: HOSPADM

## 2022-03-04 RX ORDER — ATORVASTATIN CALCIUM 20 MG/1
10 TABLET, FILM COATED ORAL NIGHTLY
Status: DISCONTINUED | OUTPATIENT
Start: 2022-03-04 | End: 2022-03-06 | Stop reason: HOSPADM

## 2022-03-04 RX ORDER — SODIUM CHLORIDE 0.9 % (FLUSH) 0.9 %
10 SYRINGE (ML) INJECTION EVERY 12 HOURS SCHEDULED
Status: DISCONTINUED | OUTPATIENT
Start: 2022-03-04 | End: 2022-03-06 | Stop reason: HOSPADM

## 2022-03-04 RX ORDER — CITALOPRAM 40 MG/1
40 TABLET ORAL DAILY
Status: DISCONTINUED | OUTPATIENT
Start: 2022-03-04 | End: 2022-03-06 | Stop reason: HOSPADM

## 2022-03-04 RX ORDER — SODIUM CHLORIDE 0.9 % (FLUSH) 0.9 %
10 SYRINGE (ML) INJECTION AS NEEDED
Status: DISCONTINUED | OUTPATIENT
Start: 2022-03-04 | End: 2022-03-06 | Stop reason: HOSPADM

## 2022-03-04 RX ORDER — MIDAZOLAM HYDROCHLORIDE 1 MG/ML
INJECTION INTRAMUSCULAR; INTRAVENOUS AS NEEDED
Status: DISCONTINUED | OUTPATIENT
Start: 2022-03-04 | End: 2022-03-04 | Stop reason: HOSPADM

## 2022-03-04 RX ORDER — CEFAZOLIN SODIUM 2 G/100ML
INJECTION, SOLUTION INTRAVENOUS CONTINUOUS PRN
Status: COMPLETED | OUTPATIENT
Start: 2022-03-04 | End: 2022-03-04

## 2022-03-04 RX ORDER — SODIUM CHLORIDE 0.9 % (FLUSH) 0.9 %
10 SYRINGE (ML) INJECTION EVERY 12 HOURS SCHEDULED
Status: DISCONTINUED | OUTPATIENT
Start: 2022-03-04 | End: 2022-03-04

## 2022-03-04 RX ORDER — PANTOPRAZOLE SODIUM 40 MG/1
40 TABLET, DELAYED RELEASE ORAL EVERY MORNING
Refills: 1 | Status: DISCONTINUED | OUTPATIENT
Start: 2022-03-05 | End: 2022-03-06 | Stop reason: HOSPADM

## 2022-03-04 RX ORDER — METOPROLOL TARTRATE 5 MG/5ML
INJECTION INTRAVENOUS AS NEEDED
Status: DISCONTINUED | OUTPATIENT
Start: 2022-03-04 | End: 2022-03-04 | Stop reason: HOSPADM

## 2022-03-04 RX ORDER — SODIUM CHLORIDE 9 MG/ML
75 INJECTION, SOLUTION INTRAVENOUS CONTINUOUS
Status: DISCONTINUED | OUTPATIENT
Start: 2022-03-04 | End: 2022-03-04

## 2022-03-04 RX ORDER — NITROGLYCERIN 0.4 MG/1
0.4 TABLET SUBLINGUAL
Status: DISCONTINUED | OUTPATIENT
Start: 2022-03-04 | End: 2022-03-06 | Stop reason: HOSPADM

## 2022-03-04 RX ORDER — DULOXETIN HYDROCHLORIDE 30 MG/1
30 CAPSULE, DELAYED RELEASE ORAL NIGHTLY
Status: DISCONTINUED | OUTPATIENT
Start: 2022-03-04 | End: 2022-03-06 | Stop reason: HOSPADM

## 2022-03-04 RX ORDER — SODIUM CHLORIDE 0.9 % (FLUSH) 0.9 %
10 SYRINGE (ML) INJECTION AS NEEDED
Status: DISCONTINUED | OUTPATIENT
Start: 2022-03-04 | End: 2022-03-04

## 2022-03-04 RX ORDER — SODIUM CHLORIDE 0.9 % (FLUSH) 0.9 %
3 SYRINGE (ML) INJECTION EVERY 12 HOURS SCHEDULED
Status: DISCONTINUED | OUTPATIENT
Start: 2022-03-04 | End: 2022-03-06 | Stop reason: HOSPADM

## 2022-03-04 RX ADMIN — Medication 3 ML: at 21:10

## 2022-03-04 RX ADMIN — Medication 10 ML: at 21:10

## 2022-03-04 RX ADMIN — VANCOMYCIN HYDROCHLORIDE 1500 MG: 10 INJECTION, POWDER, LYOPHILIZED, FOR SOLUTION INTRAVENOUS at 07:54

## 2022-03-04 RX ADMIN — DULOXETINE HYDROCHLORIDE 30 MG: 30 CAPSULE, DELAYED RELEASE ORAL at 21:09

## 2022-03-04 RX ADMIN — ATORVASTATIN CALCIUM 10 MG: 20 TABLET, FILM COATED ORAL at 21:09

## 2022-03-04 RX ADMIN — FUROSEMIDE 40 MG: 10 INJECTION, SOLUTION INTRAMUSCULAR; INTRAVENOUS at 09:30

## 2022-03-04 RX ADMIN — METOPROLOL TARTRATE 25 MG: 25 TABLET, FILM COATED ORAL at 21:09

## 2022-03-04 RX ADMIN — Medication 3 ML: at 09:45

## 2022-03-04 RX ADMIN — CITALOPRAM 40 MG: 40 TABLET, FILM COATED ORAL at 18:13

## 2022-03-04 RX ADMIN — CEFAZOLIN SODIUM 2 G: 2 INJECTION, SOLUTION INTRAVENOUS at 18:13

## 2022-03-04 NOTE — CONSULTS
[facility]    Reason for consult: acute hypercapnic respiratory failure    HPI:  Mr. Villegas is a 65yo wm with pat and tracheal compression from a large goiter.  He had a pacemaker implantation today.  He had a blood gas obtained showing some hypercapnia and a repeat gas is improved.  He currently feels well, he is not sleepy and reports normal mentation.  He is not short of breath. He has a home pap to use    Past Medical History:   Diagnosis Date   • Arthritis    • Atrial fibrillation with RVR (HCC) 01/24/2019   • Colon polyps 01/04/2018    Hepatic flexure: tubular adenoma, only low-grade dysplasia; splenic flexure: tubular adenoma, only low-grade dysplasia; sigmoid colon: tubular adenoma, multiple fragments only low-grade dysplasia   • Depression    • Heart murmur    • Hemorrhagic stroke (HCC) 01/29/2019   • Hypertension    • New onset atrial fibrillation (CMS/HCC) - RVR 1/24/2019   • PAT (obstructive sleep apnea) 06/06/2019    Home sleep study with moderate severity PAT, AHI 20 events per hour.  Sleep-related hypoxia with low O2 saturation 84% for 14 minutes.   • Sleep apnea     previously diagnosed with sleep apnea, had T&A done and it has since resolved    • Stroke (HCC)    • Uncontrolled hypertension    • Ventral hernia      Social History     Socioeconomic History   • Marital status:    Tobacco Use   • Smoking status: Never Smoker   • Smokeless tobacco: Never Used   Vaping Use   • Vaping Use: Never used   Substance and Sexual Activity   • Alcohol use: Not Currently     Comment: social, maybe a drink a month, not since stroke   • Drug use: No     Comment: previously smoked marijuana    • Sexual activity: Defer     Family History   Problem Relation Age of Onset   • Hypertension Mother    • Kidney failure Mother    • Coronary artery disease Father    • Lung cancer Father         positive smoker   • Heart attack Father    • Breast cancer Sister 60   • Prostate cancer Brother    • Colon  polyps Brother    • Kidney nephrosis Brother    • Malig Hyperthermia Neg Hx      MEDS: reviewd as per chart  AlL: poison ivy extract  ROS: 12 point negative except for some mild discomfort at site of insertion    Vital Sign Min/Max for last 24 hours  Temp  Min: 96.9 °F (36.1 °C)  Max: 98.3 °F (36.8 °C)   BP  Min: 95/74  Max: 143/102   Pulse  Min: 77  Max: 114   Resp  Min: 9  Max: 24   SpO2  Min: 90 %  Max: 97 %   Flow (L/min)  Min: 2  Max: 5   Weight  Min: 107 kg (235 lb)  Max: 107 kg (235 lb)     GEN:   nad ill, obese, AxOx3  HEENT: PERRL, EOMI, no icterus, mmm, no jvd, trachea midline, neck supple  CHEST: good ae bilat, no wheezes, no crackles, no use of accessory muscles  CV: RRR, no m/g/r  ABD: soft, nt, nd +bs, no hepatosplenomegaly  EXT: no c/c/e  SKIN: no rashes, no xanthomas, nl turgor, warm, dry  LYMPH: no palpable cervical or supraclavicular lymphadenopathy  NEURO: CN 2-12 intact and symmetric bilaterally  PSYCH: nl affect, nl orientation, nl judgement, nl mood  MSK: No kyphoscoliosis, 5/5 strength ue and le bilaterally    Labs: 3/4: reviewed  Cr 1.03  bicrab 26  Wbc 11.3  hgb 14.6  plts 243  7.36/58/59 --->7.337/50/67    CXR: 3/4: reviewed no acute disease    A/P:  1. Acute hypercapnic respiratory failure -- better already suspect due to procedure with underlying GÓMEZ and tracheal compression from goiter -- no further eval needed  2. GÓMEZ _- home pap  3. 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

## 2022-03-05 LAB
ANION GAP SERPL CALCULATED.3IONS-SCNC: 8 MMOL/L (ref 5–15)
BASOPHILS # BLD AUTO: 0.03 10*3/MM3 (ref 0–0.2)
BASOPHILS NFR BLD AUTO: 0.3 % (ref 0–1.5)
BUN SERPL-MCNC: 23 MG/DL (ref 8–23)
BUN/CREAT SERPL: 22.8 (ref 7–25)
CALCIUM SPEC-SCNC: 8.8 MG/DL (ref 8.6–10.5)
CHLORIDE SERPL-SCNC: 104 MMOL/L (ref 98–107)
CO2 SERPL-SCNC: 26 MMOL/L (ref 22–29)
CREAT SERPL-MCNC: 1.01 MG/DL (ref 0.76–1.27)
DEPRECATED RDW RBC AUTO: 42.4 FL (ref 37–54)
EGFRCR SERPLBLD CKD-EPI 2021: 82.5 ML/MIN/1.73
EOSINOPHIL # BLD AUTO: 0.14 10*3/MM3 (ref 0–0.4)
EOSINOPHIL NFR BLD AUTO: 1.5 % (ref 0.3–6.2)
ERYTHROCYTE [DISTWIDTH] IN BLOOD BY AUTOMATED COUNT: 13.2 % (ref 12.3–15.4)
GLUCOSE SERPL-MCNC: 107 MG/DL (ref 65–99)
HCT VFR BLD AUTO: 43.6 % (ref 37.5–51)
HGB BLD-MCNC: 14.1 G/DL (ref 13–17.7)
IMM GRANULOCYTES # BLD AUTO: 0.03 10*3/MM3 (ref 0–0.05)
IMM GRANULOCYTES NFR BLD AUTO: 0.3 % (ref 0–0.5)
LYMPHOCYTES # BLD AUTO: 1.74 10*3/MM3 (ref 0.7–3.1)
LYMPHOCYTES NFR BLD AUTO: 18.4 % (ref 19.6–45.3)
MCH RBC QN AUTO: 28.4 PG (ref 26.6–33)
MCHC RBC AUTO-ENTMCNC: 32.3 G/DL (ref 31.5–35.7)
MCV RBC AUTO: 87.7 FL (ref 79–97)
MONOCYTES # BLD AUTO: 0.86 10*3/MM3 (ref 0.1–0.9)
MONOCYTES NFR BLD AUTO: 9.1 % (ref 5–12)
NEUTROPHILS NFR BLD AUTO: 6.65 10*3/MM3 (ref 1.7–7)
NEUTROPHILS NFR BLD AUTO: 70.4 % (ref 42.7–76)
NRBC BLD AUTO-RTO: 0 /100 WBC (ref 0–0.2)
NT-PROBNP SERPL-MCNC: 551 PG/ML (ref 0–900)
PLATELET # BLD AUTO: 227 10*3/MM3 (ref 140–450)
PMV BLD AUTO: 10.4 FL (ref 6–12)
POTASSIUM SERPL-SCNC: 4.1 MMOL/L (ref 3.5–5.2)
RBC # BLD AUTO: 4.97 10*6/MM3 (ref 4.14–5.8)
SODIUM SERPL-SCNC: 138 MMOL/L (ref 136–145)
WBC NRBC COR # BLD: 9.45 10*3/MM3 (ref 3.4–10.8)

## 2022-03-05 PROCEDURE — 83880 ASSAY OF NATRIURETIC PEPTIDE: CPT

## 2022-03-05 PROCEDURE — 80048 BASIC METABOLIC PNL TOTAL CA: CPT

## 2022-03-05 PROCEDURE — G0378 HOSPITAL OBSERVATION PER HR: HCPCS

## 2022-03-05 PROCEDURE — 85025 COMPLETE CBC W/AUTO DIFF WBC: CPT

## 2022-03-05 PROCEDURE — 99214 OFFICE O/P EST MOD 30 MIN: CPT | Performed by: NURSE PRACTITIONER

## 2022-03-05 RX ORDER — HYDROCODONE BITARTRATE AND ACETAMINOPHEN 5; 325 MG/1; MG/1
1 TABLET ORAL EVERY 6 HOURS PRN
Status: DISCONTINUED | OUTPATIENT
Start: 2022-03-05 | End: 2022-03-06 | Stop reason: HOSPADM

## 2022-03-05 RX ADMIN — HYDROCODONE BITARTRATE AND ACETAMINOPHEN 1 TABLET: 5; 325 TABLET ORAL at 13:17

## 2022-03-05 RX ADMIN — Medication 3 ML: at 09:41

## 2022-03-05 RX ADMIN — ATORVASTATIN CALCIUM 10 MG: 20 TABLET, FILM COATED ORAL at 21:28

## 2022-03-05 RX ADMIN — Medication 10 ML: at 21:28

## 2022-03-05 RX ADMIN — DULOXETINE HYDROCHLORIDE 30 MG: 30 CAPSULE, DELAYED RELEASE ORAL at 21:28

## 2022-03-05 RX ADMIN — CITALOPRAM 40 MG: 40 TABLET, FILM COATED ORAL at 09:40

## 2022-03-05 RX ADMIN — METOPROLOL TARTRATE 25 MG: 25 TABLET, FILM COATED ORAL at 09:40

## 2022-03-05 RX ADMIN — HYDROCODONE BITARTRATE AND ACETAMINOPHEN 1 TABLET: 5; 325 TABLET ORAL at 19:37

## 2022-03-05 RX ADMIN — Medication 10 ML: at 09:41

## 2022-03-05 RX ADMIN — AMLODIPINE BESYLATE: 10 TABLET ORAL at 09:40

## 2022-03-05 RX ADMIN — PANTOPRAZOLE SODIUM 40 MG: 40 TABLET, DELAYED RELEASE ORAL at 08:05

## 2022-03-05 RX ADMIN — Medication 3 ML: at 21:29

## 2022-03-05 RX ADMIN — METOPROLOL TARTRATE 25 MG: 25 TABLET, FILM COATED ORAL at 21:28

## 2022-03-05 NOTE — PROGRESS NOTES
"      [facility]         Dr Forrester Sayied   LOS: 1 day   Patient Care Team:  Cathleen Corona MD as PCP - General (Family Medicine)  Eren Bruce MD as Consulting Physician (Cardiology)    Chief Complaint: Acute respiratory failure with GÓMEZ tracheal compression due to goiter    Interval History: He sitting up in a chair complains of pain postop from his pacemaker placement.    REVIEW OF SYSTEMS:   CARDIOVASCULAR: No chest pain, chest pressure or chest discomfort. No palpitations or edema.   RESPIRATORY: No shortness of breath, cough or sputum.   GASTROINTESTINAL: No anorexia, nausea, vomiting or diarrhea. No abdominal pain or blood.   HEMATOLOGIC: No bleeding or bruising.     Ventilator/Non-Invasive Ventilation Settings (From admission, onward)            None            Vital Signs  Temp:  [97.6 °F (36.4 °C)-98 °F (36.7 °C)] 97.8 °F (36.6 °C)  Heart Rate:  [80-91] 82  Resp:  [18-20] 18  BP: (113-144)/(82-99) 116/82    Intake/Output Summary (Last 24 hours) at 3/5/2022 1332  Last data filed at 3/5/2022 1204  Gross per 24 hour   Intake 490 ml   Output 850 ml   Net -360 ml     Flowsheet Rows    Flowsheet Row First Filed Value   Admission Height 177.8 cm (70\") Documented at 03/04/2022 0741   Admission Weight 107 kg (235 lb) Documented at 03/04/2022 0741          Physical Exam:  Patient is examined using the personal protective equipment as per guidelines from infection control for this particular patient as enacted.  Hand hygiene was performed before and after patient interaction.   General Appearance:    Alert, cooperative, in no acute distress.  Following simple commands  ENT Mallampati between 3 and 4 no nasal congestion  Neck midline trachea, no thyromegaly   Lungs:     Clear to auscultation, respirations regular, even and                  unlabored    Heart:    Regular rhythm and normal rate, normal S1 and S2, no            murmur, no gallop, no rub, no click   Chest Wall:    No abnormalities " observed   Abdomen:     Normal bowel sounds, no masses, no organomegaly, soft        nontender, nondistended, no guarding, no rebound                tenderness   Extremities:   Moves all extremities well, no edema, no cyanosis, no             redness  CNS no focal neurological deficits normal sensory exam  Skin no rashes no nodules  Musculoskeletal no cyanosis no clubbing normal range of motion     Results Review:        Results from last 7 days   Lab Units 03/05/22  0343 03/04/22  1438 03/01/22  0803   SODIUM mmol/L 138 141 141   POTASSIUM mmol/L 4.1 4.1 4.4   CHLORIDE mmol/L 104 106 104   CO2 mmol/L 26.0 26.0 27.5   BUN mg/dL 23 24* 21   CREATININE mg/dL 1.01 1.03 0.96   GLUCOSE mg/dL 107* 105* 110*   CALCIUM mg/dL 8.8 9.0 9.9         Results from last 7 days   Lab Units 03/05/22  0343 03/04/22  1438 03/01/22  0803   WBC 10*3/mm3 9.45 11.32* 8.42   HEMOGLOBIN g/dL 14.1 14.6 15.1   HEMATOCRIT % 43.6 45.3 46.8   PLATELETS 10*3/mm3 227 243 239     Results from last 7 days   Lab Units 03/01/22  0803   INR  1.00   APTT seconds 27.9                 Results from last 7 days   Lab Units 03/04/22  1129   PH, ARTERIAL pH units 7.337*   PO2 ART mm Hg 65.8*   PCO2, ARTERIAL mm Hg 50.0*   HCO3 ART mmol/L 26.8       I reviewed the patient's new clinical results.  I personally viewed and interpreted the patient's chest x-ray.        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

## 2022-03-05 NOTE — PLAN OF CARE
Goal Outcome Evaluation:  Plan of Care Reviewed With: patient        Progress: no change  Outcome Summary: Drainage to dressing on left chest started as small amount bloody drainage then saturated with light clear drainage with tinge of pink. Dressing removed incision approximated with steri strips replaced dressing with gauze and tegaderm. Patient re-educated on left arm restrictions but continues to forget and using arm when entering room. HR increased with up 120's. alternates v-paced and a-fib. Monitor vital signs, labs, dressing and telemetry.

## 2022-03-05 NOTE — PROGRESS NOTES
HOSPITAL PROGRESS NOTE    Date of Service: 22  LOS:  LOS: 1 day   Patient Name: Flo Manzo  Age/Sex: 66 y.o. male  : 1955  MRN: [id number]  Primary Cardiologist: Dr. Eren Bruce    Subjective:     Chief Complaint/Follow up:   Chronic Atrial Fibrillation, Weakness, Lightheadedness, Sick Sinus Syndrome, status post pacemaker    Interval History:   POD #1.  Reports 7 out of 10 left sided chest pain.  Incision oozing red blood.  Denies chest pain, shortness of breath, lightheadedness, or palpitations.  Wearing oxygen 2 L of oxygen nasal cannula.    Objective:     Objective:  Temp:  [97.6 °F (36.4 °C)-98 °F (36.7 °C)] 97.8 °F (36.6 °C)  Heart Rate:  [80-88] 82  Resp:  [18-20] 18  BP: (116-144)/(82-99) 116/82  Body mass index is 33.72 kg/m².    Intake/Output Summary (Last 24 hours) at 3/5/2022 1355  Last data filed at 3/5/2022 1204  Gross per 24 hour   Intake 490 ml   Output 850 ml   Net -360 ml         22  0741   Weight: 107 kg (235 lb)     Weight change:     Physical Exam:   General Appearance: Alert, cooperative, in no acute distress. AAOx4.  Obese.  HEENT: Normocephalic.  Neck: Supple. No JVD. No carotid bruit. No thyromegaly  Lungs: Clear. Normal respiratory effort and rate.  Heart: Irregular.  Normal S1 and S2, no murmurs, gallops or rubs.  Abdomen: Soft, nontender, nondistended. Positive bowel sounds  Extremities: Warm, no cyanosis, or clubbing. No edema.  Pacemaker: Oozing of red blood.  Red around the site and warm to touch.  No white exudate.    Lab Review:   Results from last 7 days   Lab Units 22  0343 22  1438   SODIUM mmol/L 138 141   POTASSIUM mmol/L 4.1 4.1   CHLORIDE mmol/L 104 106   CO2 mmol/L 26.0 26.0   BUN mg/dL 23 24*   CREATININE mg/dL 1.01 1.03   GLUCOSE mg/dL 107* 105*   CALCIUM mg/dL 8.8 9.0         Results from last 7 days   Lab Units 22  0343 22  1438   WBC 10*3/mm3 9.45 11.32*   HEMOGLOBIN g/dL 14.1 14.6   HEMATOCRIT % 43.6  45.3   PLATELETS 10*3/mm3 227 243     Results from last 7 days   Lab Units 03/01/22  0803   INR  1.00   APTT seconds 27.9             Results from last 7 days   Lab Units 03/05/22  0343   PROBNP pg/mL 551.0           Results for orders placed in visit on 10/22/21    Adult Transthoracic Echo Complete W/ Cont if Necessary Per Protocol    Interpretation Summary  · Estimated right ventricular systolic pressure from tricuspid regurgitation is normal (<35 mmHg).  · Calculated left ventricular EF = 55.6% Estimated left ventricular EF was in agreement with the calculated left ventricular EF.  · Left ventricular diastolic function was normal.  · Moderate aortic valve stenosis is present.  · There is no evidence of pericardial effusion.    I reviewed the patient's new clinical results.  I personally viewed and interpreted the patient's EKG/Telemetry data/Labs/Test Results.     Current Medications:   Scheduled Meds:amLODIPine-lisinopril 10-20 mg combo dose, , Oral, Q24H  [START ON 3/7/2022] apixaban, 5 mg, Oral, Q12H  atorvastatin, 10 mg, Oral, Nightly  citalopram, 40 mg, Oral, Daily  DULoxetine, 30 mg, Oral, Nightly  metoprolol tartrate, 25 mg, Oral, Q12H  pantoprazole, 40 mg, Oral, QAM  sodium chloride, 10 mL, Intravenous, Q12H  sodium chloride, 3 mL, Intravenous, Q12H        Allergies:  Allergies   Allergen Reactions   • Poison Ivy Extract Hives, Itching and Rash     ITCHY BLISTERS       Assessment:       SSS (sick sinus syndrome) ([facility])        Plan:   Assessment/Plan       1.  Sick Sinus Syndrome: Significant pauses. Status post pacemaker placement.  Red around the site and he is having 7/10 pain.  Mild drainage.  Prescribed hydrocodone orally.  No fevers.  Watch closely.  2.  Aortic Stenosis: Moderate per echocardiogram December 2021.  3.  Permanent Atrial fibrillation: Controlled.  Warfarin on hold.  Order placed to start apixaban 5 mg twice per day on 3/7.  4.  Diastolic Heart Failure: Denies shortness of breath but  still on oxygen.  Try to wean.  5.  Hypertension: Blood pressure stable.  6.  Cardiology will continue to follow.    ADDENDUM:  Dr. Greer also went to see the pacemaker incisional site.  He said it stable.  He recommended ice and patient home tomorrow.    ASHANTI Burton  [facility]   03/05/22  13:55 EST

## 2022-03-06 ENCOUNTER — READMISSION MANAGEMENT (OUTPATIENT)
Dept: CALL CENTER | Facility: HOSPITAL | Age: 67
End: 2022-03-06

## 2022-03-06 VITALS
TEMPERATURE: 97.9 F | SYSTOLIC BLOOD PRESSURE: 119 MMHG | DIASTOLIC BLOOD PRESSURE: 86 MMHG | BODY MASS INDEX: 33.64 KG/M2 | HEART RATE: 84 BPM | HEIGHT: 70 IN | RESPIRATION RATE: 18 BRPM | OXYGEN SATURATION: 92 % | WEIGHT: 235 LBS

## 2022-03-06 PROBLEM — I45.5 SINUS PAUSE: Status: ACTIVE | Noted: 2022-03-06

## 2022-03-06 PROBLEM — Z95.810 S/P ICD (INTERNAL CARDIAC DEFIBRILLATOR) PROCEDURE: Status: ACTIVE | Noted: 2022-03-06

## 2022-03-06 LAB
ANION GAP SERPL CALCULATED.3IONS-SCNC: 7 MMOL/L (ref 5–15)
BASOPHILS # BLD AUTO: 0.03 10*3/MM3 (ref 0–0.2)
BASOPHILS NFR BLD AUTO: 0.3 % (ref 0–1.5)
BUN SERPL-MCNC: 22 MG/DL (ref 8–23)
BUN/CREAT SERPL: 26.2 (ref 7–25)
CALCIUM SPEC-SCNC: 9.4 MG/DL (ref 8.6–10.5)
CHLORIDE SERPL-SCNC: 101 MMOL/L (ref 98–107)
CO2 SERPL-SCNC: 30 MMOL/L (ref 22–29)
CREAT SERPL-MCNC: 0.84 MG/DL (ref 0.76–1.27)
DEPRECATED RDW RBC AUTO: 41.9 FL (ref 37–54)
EGFRCR SERPLBLD CKD-EPI 2021: 96.8 ML/MIN/1.73
EOSINOPHIL # BLD AUTO: 0.2 10*3/MM3 (ref 0–0.4)
EOSINOPHIL NFR BLD AUTO: 2.2 % (ref 0.3–6.2)
ERYTHROCYTE [DISTWIDTH] IN BLOOD BY AUTOMATED COUNT: 13.1 % (ref 12.3–15.4)
GLUCOSE SERPL-MCNC: 105 MG/DL (ref 65–99)
HCT VFR BLD AUTO: 45 % (ref 37.5–51)
HGB BLD-MCNC: 14.4 G/DL (ref 13–17.7)
IMM GRANULOCYTES # BLD AUTO: 0.04 10*3/MM3 (ref 0–0.05)
IMM GRANULOCYTES NFR BLD AUTO: 0.4 % (ref 0–0.5)
LYMPHOCYTES # BLD AUTO: 2.37 10*3/MM3 (ref 0.7–3.1)
LYMPHOCYTES NFR BLD AUTO: 25.9 % (ref 19.6–45.3)
MCH RBC QN AUTO: 28.1 PG (ref 26.6–33)
MCHC RBC AUTO-ENTMCNC: 32 G/DL (ref 31.5–35.7)
MCV RBC AUTO: 87.9 FL (ref 79–97)
MONOCYTES # BLD AUTO: 0.95 10*3/MM3 (ref 0.1–0.9)
MONOCYTES NFR BLD AUTO: 10.4 % (ref 5–12)
NEUTROPHILS NFR BLD AUTO: 5.56 10*3/MM3 (ref 1.7–7)
NEUTROPHILS NFR BLD AUTO: 60.8 % (ref 42.7–76)
NRBC BLD AUTO-RTO: 0 /100 WBC (ref 0–0.2)
PLATELET # BLD AUTO: 225 10*3/MM3 (ref 140–450)
PMV BLD AUTO: 10.2 FL (ref 6–12)
POTASSIUM SERPL-SCNC: 4 MMOL/L (ref 3.5–5.2)
RBC # BLD AUTO: 5.12 10*6/MM3 (ref 4.14–5.8)
SODIUM SERPL-SCNC: 138 MMOL/L (ref 136–145)
WBC NRBC COR # BLD: 9.15 10*3/MM3 (ref 3.4–10.8)

## 2022-03-06 PROCEDURE — G0378 HOSPITAL OBSERVATION PER HR: HCPCS

## 2022-03-06 PROCEDURE — 80048 BASIC METABOLIC PNL TOTAL CA: CPT

## 2022-03-06 PROCEDURE — 99217 PR OBSERVATION CARE DISCHARGE MANAGEMENT: CPT | Performed by: NURSE PRACTITIONER

## 2022-03-06 PROCEDURE — 85025 COMPLETE CBC W/AUTO DIFF WBC: CPT

## 2022-03-06 RX ORDER — METOPROLOL SUCCINATE 25 MG/1
25 TABLET, EXTENDED RELEASE ORAL DAILY
Qty: 30 TABLET | Refills: 1 | Status: SHIPPED | OUTPATIENT
Start: 2022-03-06 | End: 2022-03-06 | Stop reason: SDUPTHER

## 2022-03-06 RX ORDER — METOPROLOL SUCCINATE 25 MG/1
25 TABLET, EXTENDED RELEASE ORAL DAILY
Qty: 30 TABLET | Refills: 1 | Status: SHIPPED | OUTPATIENT
Start: 2022-03-06 | End: 2022-03-08 | Stop reason: SDUPTHER

## 2022-03-06 RX ADMIN — METOPROLOL TARTRATE 25 MG: 25 TABLET, FILM COATED ORAL at 08:16

## 2022-03-06 RX ADMIN — AMLODIPINE BESYLATE: 10 TABLET ORAL at 08:16

## 2022-03-06 RX ADMIN — CITALOPRAM 40 MG: 40 TABLET, FILM COATED ORAL at 08:16

## 2022-03-06 RX ADMIN — Medication 3 ML: at 08:17

## 2022-03-06 RX ADMIN — Medication 10 ML: at 08:17

## 2022-03-06 RX ADMIN — APIXABAN 5 MG: 5 TABLET, FILM COATED ORAL at 11:19

## 2022-03-06 RX ADMIN — HYDROCODONE BITARTRATE AND ACETAMINOPHEN 1 TABLET: 5; 325 TABLET ORAL at 08:16

## 2022-03-06 RX ADMIN — PANTOPRAZOLE SODIUM 40 MG: 40 TABLET, DELAYED RELEASE ORAL at 06:10

## 2022-03-06 NOTE — EXTERNAL PATIENT INSTRUCTIONS
Patient Education   Table of Contents       Metoprolol Extended-Release Tablets       Pacemaker Implantation, Adult, Care After       Sick Sinus Syndrome     To view videos and all your education online visit,   [email or web address]   or scan this QR code with your smartphone.                  Metoprolol Extended-Release Tablets     What is this medicine?   METOPROLOL (me TOE proe lole) is a beta blocker. It decreases the amount of work your heart has to do and helps your heart beat regularly. It treats high blood pressure and/or prevent chest pain (also called angina). It also treats heart failure.   This medicine may be used for other purposes; ask your health care provider or pharmacist if you have questions.   COMMON BRAND NAME(S): toprol, Toprol XL   What should I tell my health care provider before I take this medicine?   They need to know if you have any of these conditions:         diabetes       heart or vessel disease like slow heart rate, worsening heart failure, heart block, sick sinus syndrome or Raynaud's disease       kidney disease       liver disease       lung or breathing disease, like asthma or emphysema       pheochromocytoma       thyroid disease       an unusual or allergic reaction to metoprolol, other beta-blockers, medicines, foods, dyes, or preservatives       pregnant or trying to get pregnant       breast-feeding     How should I use this medicine?   Take this drug by mouth. Take it as directed on the prescription label at the same time every day. Take it with food. You may cut the tablet in half if it is scored (has a line in the middle of it). This may help you swallow the tablet if the whole tablet is too big. Be sure to take both halves. Do not take just one-half of the tablet. Keep taking it unless your health care provider tells you to stop.   Talk to your health care provider about the use of this drug in children. While it may be prescribed for children as young as 6  for selected conditions, precautions do apply.   Overdosage: If you think you have taken too much of this medicine contact a poison control center or emergency room at once.   NOTE: This medicine is only for you. Do not share this medicine with others.   What if I miss a dose?   If you miss a dose, take it as soon as you can. If it is almost time for your next dose, take only that dose. Do not take double or extra doses.   What may interact with this medicine?   This medicine may interact with the following medications:         certain medicines for blood pressure, heart disease, irregular heart beat       certain medicines for depression, like monoamine oxidase (MAO) inhibitors, fluoxetine, or paroxetine       clonidine       dobutamine       epinephrine       isoproterenol       reserpine     This list may not describe all possible interactions. Give your health care provider a list of all the medicines, herbs, non-prescription drugs, or dietary supplements you use. Also tell them if you smoke, drink alcohol, or use illegal drugs. Some items may interact with your medicine.   What should I watch for while using this medicine?   Visit your doctor or health care professional for regular check ups. Contact your doctor right away if your symptoms worsen. Check your blood pressure and pulse rate regularly. Ask your health care professional what your blood pressure and pulse rate should be, and when you should contact them.   You may get drowsy or dizzy. Do not drive, use machinery, or do anything that needs mental alertness until you know how this medicine affects you. Do not sit or stand up quickly, especially if you are an older patient. This reduces the risk of dizzy or fainting spells. Contact your doctor if these symptoms continue. Alcohol may interfere with the effect of this medicine. Avoid alcoholic drinks.   This medicine may increase blood sugar. Ask your healthcare provider if changes in diet or medicines  are needed if you have diabetes.   What side effects may I notice from receiving this medicine?   Side effects that you should report to your doctor or health care professional as soon as possible:         allergic reactions like skin rash, itching or hives       cold or numb hands or feet       depression       difficulty breathing       faint       fever with sore throat       irregular heartbeat, chest pain       rapid weight gain       signs and symptoms of high blood sugar such as being more thirsty or hungry or having to urinate more than normal. You may also feel very tired or have blurry vision.       swollen legs or ankles     Side effects that usually do not require medical attention (report to your doctor or health care professional if they continue or are bothersome):         anxiety or nervousness       change in sex drive or performance       dry skin       headache       nightmares or trouble sleeping       short term memory loss       stomach upset or diarrhea     This list may not describe all possible side effects. Call your doctor for medical advice about side effects. You may report side effects to FDA at 1-406-VET-0906.   Where should I keep my medicine?   Keep out of the reach of children and pets.   Store at room temperature between 20 and 25 degrees C (68 and 77 degrees F). Throw away any unused drug after the expiration date.   NOTE: This sheet is a summary. It may not cover all possible information. If you have questions about this medicine, talk to your doctor, pharmacist, or health care provider.     ? 2021 Elsevier/Gold Standard (2020-07-30 18:23:00)         Pacemaker Implantation, Adult, Care After     This sheet gives you information about how to care for yourself after your procedure. Your health care provider may also give you more specific instructions. If you have problems or questions, contact your health care provider.   What can I expect after the procedure?    After the  procedure, it is common to have:       Mild pain.       Slight bruising.       Some swelling over the incisions.       A slight bump over the skin where the device was placed (if it was implanted in the upper chest area). Sometimes, it is possible to feel the device under the skin. This is normal.     Follow these instructions at home:   Medicines         Take over-the-counter and prescription medicines only as told by your health care provider.       If you were prescribed an antibiotic medicine, take it as told by your health care provider. Do not  stop taking the antibiotic even if you start to feel better.       Ask your health care provider if the medicine prescribed to you requires you to avoid driving or using machinery.     Incision site care           Do not  remove the bandage (dressing) on your chest until you are told to do so by your health care provider.       After your dressing is removed, you may see pieces of tape called skin adhesive strips over the incision site. Let the strips fall off on their own.      Check your incision area every day for signs of infection. Check for:       More redness, swelling, or pain.       Fluid or blood.       Warmth.       Pus or a bad smell.      Do not  use lotions or ointments near the incision site unless you are told to do so.       Keep the incision area clean and dry for 2?3 days after the procedure or as told by your health care provider. It takes several weeks for the incision site to completely heal.       Women may want to place a small pad over the incision site to protect it from their bra strap.      Do not  take baths, swim, or use a hot tub for 7?10 days or until your health care provider approves. Ask your health care provider if you may take showers. You may only be allowed to take sponge baths.     Activity            If you were given a medicine to help you relax (sedative) during the procedure, it can affect you for several hours. Do not  drive  or operate machinery until your health care provider says that it is safe.       Return to your normal activities as told by your health care provider. Ask your health care provider what activities are safe for you.      Do not  lift anything that is heavier than 10 lb (4.5 kg), or the limit that you are told, until your health care provider says that it is safe.       Avoid sudden jerking, pulling, or chopping movements that pull your upper arm far away from your body. Avoid these movements for at least 6 weeks or as long as told by your health care provider.      Do not  lift your upper arm above your shoulders for at least 6 weeks or as long as told by your health care provider. This includes activities like tennis, golf, or swimming.       You may go back to work when your health care provider says it is okay.       Electricity and magnetic fields        Avoid places or objects that have a strong electric or magnetic field, including:       Airport security checkpoints. When at the airport, let officials know that you have a pacemaker. Carry your pacemaker ID card.       Metal detectors. If you must pass through a metal detector, walk through it quickly. Do not  stop under the detector or stand near it.       Power plants.       Large electrical generators.       Radiofrequency transmission towers, such as mobile phone and radio towers.      Do not  use amateur radio equipment or electric welding torches. If you are unsure of whether something is safe to use, ask your health care provider. Some devices may be safe to use if you hold them at least 1 ft (0.3 m) from your pacemaker. These devices may include power tools, lawn mowers, and speakers.       When using your mobile phone, hold it to the ear opposite the pacemaker. Do not  leave your mobile phone in a pocket over the pacemaker.     Long-term care         You may be shown how to transfer data from your pacemaker through the phone to your health care  provider.       Always let all health care providers, including dentists, know about your pacemaker before you have any medical procedures or tests.       Wear a medical ID bracelet or necklace stating that you have a pacemaker.       Carry a pacemaker ID card with you at all times.       Your pacemaker battery will last for 5?15 years. Your health care provider will do routine checks to know when the battery is starting to run down. When this happens, the pacemaker will need to be replaced.     General instructions        Do not  use any products that contain nicotine or tobacco, such as cigarettes, e-cigarettes, and chewing tobacco. These can delay incision healing after surgery. If you need help quitting, ask your health care provider.       Follow instructions from your health care provider about eating or drinking restrictions.       Weigh yourself every day. If you suddenly gain weight, fluid may be building up in your body.       Keep all follow-up visits as told by your health care provider. This is important. During follow-up visits, your pacemaker will be checked and reprogrammed if necessary.       Contact a health care provider if:         You gain weight suddenly.       Your legs or feet swell.       It feels like your heart is fluttering or skipping beats (you have heart palpitations).      You have any of these signs of infection:       More redness, swelling, or pain around an incision.       Fluid or blood coming from an incision.       Warmth coming from an incision.       Pus or a bad smell coming from an incision.       A fever or chills.     Get help right away if:         You have chest pain.       You have trouble breathing or are short of breath.       You become extremely tired.       You are light-headed or you faint.     These symptoms may represent a serious problem that is an emergency. Do not wait to see if the symptoms will go away. Get medical help right away. Call your local  emergency services (911 in the [place] Do not drive yourself to the hospital.   Summary         After the procedure, it is common to have mild pain, swelling, or bruising over the incision.       Take over-the-counter and prescription medicines only as told by your health care provider.      Do not  raise your arm above your shoulder or lift anything that is heavier than 10 lb (4.5 kg), or the limit that you are told, until your health care provider says that it is safe.       Carry a pacemaker ID card with you at all times.     This information is not intended to replace advice given to you by your health care provider. Make sure you discuss any questions you have with your health care provider.     Document Released: 07/07/2006Document Revised: 11/18/2020Document Reviewed: 11/18/2020     Elsevier Patient Education ? 2021 Else[employer] Inc.         Sick Sinus Syndrome     Sick sinus syndrome is a group of conditions that affect heart rhythm and the rate at which the heart beats (heart rate). When you have sick sinus syndrome, your heart rate may be too fast (tachycardia) or too slow (bradycardia), or it may switch between fast and slow.   What are the causes?      Your heartbeat is controlled by a structure in the heart called the sinoatrial (SA) node. Sick sinus syndrome happens when the SA node does not work properly (SA node dysfunction). Damage to the SA node is the most common cause of sick sinus syndrome.     What increases the risk?    You are more likely to develop this condition if you:       Are 65 years of age or older.       Have a family history of sick sinus syndrome.       Have had a heart attack or heart surgery.       Have sleep apnea.       Have coronary artery disease.       Have inflammation of the heart muscle (myocarditis) or the sac that surrounds the heart (pericarditis).       Use medicines such as beta blockers, some calcium channel blockers, or digoxin.       Have an abnormal level of thyroid  hormone or electrolytes.       Have high blood pressure (hypertension).       Have had infections that affect the heart, like rheumatic fever or Lyme's disease.       Were born with heart defects (congenital heart disease).     What are the signs or symptoms?    Symptoms of this condition include:       Fainting or feeling like you are going to faint.       Chest pain.       Shortness of breath.       Dizziness.       Feeling like your heart skips beats.       Feeling like your heart beats very quickly.       Tiredness.       Fatigue.       Confusion.     Some people with this condition do not have any symptoms. Most people have few symptoms or very mild symptoms.   How is this diagnosed?    This condition may be diagnosed with:       A test that measures electrical activity in the heart (electrocardiogram, orECG).       A test in which you wear a device called a Holter monitor for 1?2 days. The device will record your heart's electrical signals.       A device that records the electrical activity of your heart over a longer period of time (implantable loop recorder).       A test to look at the electrical system of your heart (electrophysiology study).       Blood tests.     How is this treated?    This condition may be treated by:       Having surgery to put a small, electrical device in your chest to correct your heartbeat (pacemaker).       Taking medicines to keep your heart from beating too quickly.       Stopping your use of certain medicines as told by your health care provider.       Having treatment for any underlying conditions.     If the syndrome is not causing any symptoms, you may not need treatment.   Follow these instructions at home:   Eating and drinking            Eat a heart-healthy diet that includes fruits, vegetables, whole grains, low-fat dairy products, and lean proteins like poultry and eggs.      Do not  drink alcohol if:       Your health care provider tells you not to drink.       You  are pregnant, may be pregnant, or are planning to become pregnant.      If you drink alcohol:      Limit how much you use to:       0?1 drink a day for women.       0?2 drinks a day for men.       Be aware of how much alcohol is in your drink. In the [place] one drink equals one 12 oz bottle of beer (355 mL), one 5 oz glass of wine (148 mL), or one 1? oz glass of hard liquor (44 mL).     General instructions         Take over-the-counter and prescription medicines only as told by your health care provider.       Stay active and stay at a healthy weight. Ask your health care provider what level of activity is safe for you.      Do not  use any products that contain nicotine or tobacco, such as cigarettes, e-cigarettes, and chewing tobacco. If you need help quitting, ask your health care provider.       Keep all follow-up visits as told by your health care provider. This is important.       Contact a health care provider if you:         Have a cough that does not go away.       Have swelling in your feet or ankles.       Feel short of breath with activity.       Feel fatigued.     Get help right away if you:         Have chest pain or difficulty breathing.       Suddenly have weakness, numbness, or loss of movement on one side of your body.       Suddenly become very confused.       Suddenly lose the ability to speak or understand speech.       Feel like your heart is skipping beats.       Feel like your heart is beating very quickly.       Faint or feel like you are going to faint.     These symptoms may represent a serious problem that is an emergency. Do not wait to see if the symptoms will go away. Get medical help right away. Call your local emergency services (911 in the [place] Do not drive yourself to the hospital.   Summary         When you have sick sinus syndrome, your heart rate may be too fast (tachycardia) or too slow (bradycardia), or it may switch between fast and slow.       Treatment for this syndrome  may include taking medicines and having a pacemaker placed in your chest.       Stay active and stay at a healthy weight. Ask your health care provider what level of activity is safe for you.       If you have chest pain or difficulty breathing, get help right away. Do not  drive yourself to the hospital.     This information is not intended to replace advice given to you by your health care provider. Make sure you discuss any questions you have with your health care provider.     Document Released: 12/06/2010Document Revised: 04/10/2020Document Reviewed: 09/12/2019     [employer] Patient Education ? 2021 [employer]

## 2022-03-06 NOTE — OUTREACH NOTE
Prep Survey    Flowsheet Row Responses   [facility] patient discharged from? [place]   Is LACE score < 7 ? Yes   Emergency Room discharge w/ pulse ox? No   Eligibility [facility]   Date of Admission 03/04/22   Date of Discharge 03/06/22   Discharge Disposition Home or Self Care   Discharge diagnosis SSS (sick sinus syndrome),   Essential hypertension,   single-chamber permanent pacemaker implantation   Does the patient have one of the following disease processes/diagnoses(primary or secondary)? Other   Does the patient have Home health ordered? No   Is there a DME ordered? No   Prep survey completed? Yes          OANH CAICEDO - Registered Nurse

## 2022-03-06 NOTE — NURSING NOTE
Discharge paperwork provided and reviewed . Pt educated on pacemaker site and restrictions  With verbalized understanding. All belonging sent home with pt ritu fox

## 2022-03-06 NOTE — DISCHARGE SUMMARY
Date of Discharge:  3/6/2022  Date of Admit: 3/4/2022    Discharge Diagnosis:  SSS (sick sinus syndrome) (HCC)    Essential hypertension    Diastolic CHF, chronic (HCC)    Chronic atrial fibrillation (HCC)    Aortic stenosis, moderate    S/P ICD (internal cardiac defibrillator) procedure    Sinus pause      Hospital Course:     Mr. Manzo is a 66-year-old male who has been diagnosed with hypertension and obstructive sleep apnea.  He is an established patient of Dr. Eren Bruce.  He has been diagnosed with chronic atrial fibrillation, aortic stenosis, and sick sinus syndrome.    In November 2021, he wore a Holter monitor for 10 days which showed atrial fibrillation predominantly and a pause of 3.6 seconds.  In December 2021, echocardiogram showed an EF of 55% and moderate aortic valve stenosis.  In December 2021, he had a normal myocardial perfusion study.    On 3/4/2022 he was electively admitted for single-chamber permanent pacemaker implantation for sick sinus syndrome.  Yesterday he had discomfort to the incision area and mild bleeding.  He was wearing oxygen.    Today he sitting up in the chair and feels great.  His oxygen has been weaned.  His incision site is clean dry and intact and he denies pain.  He denies chest pain, shortness of breath, palpitations, or lightheadedness.      Physical Exam    General Appearance: Alert, cooperative, in no acute distress. AAOx4.  Obese.  HEENT: Normocephalic.  Neck: Supple. No JVD. No carotid bruit. No thyromegaly  Lungs: Clear. Normal respiratory effort and rate.  Heart: Irregular.  Normal S1 and S2, no murmurs, gallops or rubs.  Abdomen: Soft, nontender, nondistended. Positive bowel sounds  Extremities: Warm, no cyanosis, or clubbing. No edema.  Pacemaker:  Dressing clean dry and intact.      Condition on Discharge: stable    Discharge Medications     Discharge Medications      New Medications      Instructions Start Date   metoprolol succinate XL 25 MG 24  hr tablet  Commonly known as: TOPROL-XL   25 mg, Oral, Daily         Continue These Medications      Instructions Start Date   albuterol sulfate  (90 Base) MCG/ACT inhaler  Commonly known as: PROVENTIL HFA;VENTOLIN HFA;PROAIR HFA   2 puffs, Inhalation, Every 4 Hours PRN      amLODIPine-benazepril 10-20 MG per capsule  Commonly known as: LOTREL   1 capsule, Oral, Daily      apixaban 5 MG tablet tablet  Commonly known as: Eliquis   5 mg, Oral, Every 12 Hours      atorvastatin 10 MG tablet  Commonly known as: LIPITOR   10 mg, Oral, Nightly      citalopram 40 MG tablet  Commonly known as: CeleXA   TAKE 1 TABLET BY MOUTH DAILY      DULoxetine 30 MG capsule  Commonly known as: CYMBALTA   30 mg, Oral, Nightly      omeprazole 20 MG capsule  Commonly known as: priLOSEC   20 mg, Oral, Daily         Stop These Medications    metoprolol tartrate 25 MG tablet  Commonly known as: LOPRESSOR            Discharge Diet:     Activity at Discharge:   Activity Instructions     Activity as Tolerated            Discharge disposition: home    Follow-up Appointments  Future Appointments   Date Time Provider Department Center   4/28/2022 10:15 AM Cathleen Corona MD [facility]   6/22/2022 10:45 AM [facility] 2 [facility]   6/29/2022  1:15 PM Padmini Camara APRN [facility]   12/22/2022 11:45 AM Eren Bruce MD [facility]     Additional Instructions for the Follow-ups that You Need to Schedule     Discharge Follow-up with PCP   As directed       Currently Documented PCP:    Cathleen Corona MD    PCP Phone Number:    [phone]     Follow Up Details: within 1 month - call office for appointment         Discharge Follow-up with Specified Provider: Dr. Bruce office - the office will call you for an appointment; 1 Week   As directed      To: Dr. Bruce office - the office will call you for an appointment    Follow Up: 1 Week             1.  Sick Sinus Syndrome: Status post pacemaker placement.   Dressing clean dry and intact and he denies pain.  RN will provide pacemaker care instructions upon discharge.  2.  Diastolic Heart Failure: Well compensated today.  I change his metoprolol tartrate to metoprolol succinate 25 mg 1 tablet daily.  3.  Atrial Fibrillation: Resume apixaban today.  4.  Aortic Stenosis: Moderate per echocardiogram in December 2021.  5.  Hypertension: Blood pressure normal.  6.  I told him he will receive a call from Dr. CAICEDO's office this week for an appointment.  He is to call with any concerns.      ASHANTI Burton  03/06/22  09:22 EST    Time: Discharge greater than 30 min

## 2022-03-06 NOTE — NURSING NOTE
Call placed to [facility] cardiology requesting med reconciliation to be completed for pt discharge

## 2022-03-07 ENCOUNTER — TRANSITIONAL CARE MANAGEMENT TELEPHONE ENCOUNTER (OUTPATIENT)
Dept: CALL CENTER | Facility: HOSPITAL | Age: 67
End: 2022-03-07

## 2022-03-07 NOTE — CASE MANAGEMENT/SOCIAL WORK
Case Management Discharge Note      Final Note: Discharge home with family support. GER         Selected Continued Care - Discharged on 3/6/2022 Admission date: 3/4/2022 - Discharge disposition: Home or Self Care    Destination    No services have been selected for the patient.              Durable Medical Equipment    No services have been selected for the patient.              Dialysis/Infusion    No services have been selected for the patient.              Home Medical Care    No services have been selected for the patient.              Therapy    No services have been selected for the patient.              Community Resources    No services have been selected for the patient.              Community & DME    No services have been selected for the patient.                  Transportation Services  Private: Car    Final Discharge Disposition Code: 01 - home or self-care

## 2022-03-07 NOTE — OUTREACH NOTE
Call Center TCM Note    Flowsheet Row Responses   [facility] patient discharged from? [place]   Does the patient have one of the following disease processes/diagnoses(primary or secondary)? Other   TCM attempt successful? Yes   Call start time 0912   Call end time 0915   Discharge diagnosis SSS (sick sinus syndrome),   Essential hypertension,   single-chamber permanent pacemaker implantation   Meds reviewed with patient/caregiver? Yes   Is the patient having any side effects they believe may be caused by any medication additions or changes? No   Does the patient have all medications ordered at discharge? Yes   Is the patient taking all medications as directed (includes completed medication regime)? Yes   Does the patient have a primary care provider?  Yes   Does the patient have an appointment with their PCP within 7 days of discharge? Yes   Comments regarding PCP 3/8/22 at 2:15pm   Has the patient kept scheduled appointments due by today? N/A   Has home health visited the patient within 72 hours of discharge? N/A   Psychosocial issues? No   Did the patient receive a copy of their discharge instructions? Yes   Nursing interventions Reviewed instructions with patient   What is the patient's perception of their health status since discharge? Improving   Is the patient/caregiver able to teach back signs and symptoms related to disease process for when to call PCP? Yes   Is the patient/caregiver able to teach back signs and symptoms related to disease process for when to call 911? Yes   Is the patient/caregiver able to teach back the hierarchy of who to call/visit for symptoms/problems? PCP, Specialist, Home health nurse, Urgent Care, ED, 911 Yes   If the patient is a current smoker, are they able to teach back resources for cessation? Not a smoker   TCM call completed? Yes          Lety Denney RN    3/7/2022, 09:16 EST

## 2022-03-08 ENCOUNTER — OFFICE VISIT (OUTPATIENT)
Dept: FAMILY MEDICINE CLINIC | Facility: CLINIC | Age: 67
End: 2022-03-08

## 2022-03-08 VITALS
WEIGHT: 253.1 LBS | TEMPERATURE: 97.1 F | SYSTOLIC BLOOD PRESSURE: 136 MMHG | BODY MASS INDEX: 36.23 KG/M2 | DIASTOLIC BLOOD PRESSURE: 94 MMHG | HEIGHT: 70 IN | OXYGEN SATURATION: 96 % | HEART RATE: 88 BPM

## 2022-03-08 DIAGNOSIS — I10 ESSENTIAL HYPERTENSION: ICD-10-CM

## 2022-03-08 DIAGNOSIS — I10 ESSENTIAL HYPERTENSION: Primary | ICD-10-CM

## 2022-03-08 PROCEDURE — 1111F DSCHRG MED/CURRENT MED MERGE: CPT | Performed by: NURSE PRACTITIONER

## 2022-03-08 PROCEDURE — 99496 TRANSJ CARE MGMT HIGH F2F 7D: CPT | Performed by: NURSE PRACTITIONER

## 2022-03-08 RX ORDER — METOPROLOL SUCCINATE 25 MG/1
25 TABLET, EXTENDED RELEASE ORAL DAILY
Qty: 90 TABLET | OUTPATIENT
Start: 2022-03-08

## 2022-03-08 RX ORDER — METOPROLOL SUCCINATE 25 MG/1
25 TABLET, EXTENDED RELEASE ORAL DAILY
Qty: 30 TABLET | Refills: 0 | Status: ON HOLD | OUTPATIENT
Start: 2022-03-08 | End: 2022-07-30

## 2022-03-08 NOTE — PROGRESS NOTES
"Chief Complaint  Hospital Follow Up Visit (Hospital f/u for pace maker )    Subjective          Flo Manzo presents to [facility]  History of Present Illness new pt to me.  Here for hospital d/c follow up.  Admitted on 3/4/22 and d/c on 3/6/22 for pacer placement.  Tolerated procedure well.  Taking and tolerating medications.  He has PMH chronic Afib, SSS.  Medications have been adjusted.  Pt is not having significant pain from insertion site.  Drsg intact.  He has noted some mild b/l leg swelling.  Has been on diuretics in past, not sure why he is no longer on them.  Has not worsened since leaving hospital.      He denies chest pain, palpitations, dizziness, HA, cough, worsening dyspnea, no abd pain, constipation.  Is steady to ambulate.  Urinating well.  No melena. No worsening fatigue, fever, chills.     Objective   Vital Signs:   /94   Pulse 88   Temp 97.1 °F (36.2 °C)   Ht 177.8 cm (70\")   Wt 115 kg (253 lb 1.6 oz)   SpO2 96%   BMI 36.32 kg/m²     Physical Exam  Vitals and nursing note reviewed.   Constitutional:       General: He is not in acute distress.     Appearance: He is well-developed. He is ill-appearing (chronically ill appearing). He is not diaphoretic.   HENT:      Head: Normocephalic and atraumatic.   Eyes:      General:         Right eye: No discharge.         Left eye: No discharge.      Conjunctiva/sclera: Conjunctivae normal.   Cardiovascular:      Rate and Rhythm: Normal rate. Rhythm regularly irregular.      Heart sounds: Murmur heard.   Pulmonary:      Effort: Pulmonary effort is normal.      Comments: Little decreased on right side, no adventitious BS b/l  Abdominal:      General: Bowel sounds are normal.      Palpations: Abdomen is soft.      Tenderness: There is no abdominal tenderness.   Musculoskeletal:         General: No deformity.      Right lower leg: Edema (1+) present.      Left lower leg: Edema (1+) present.      Comments: Gait " steady with cane to ambulate  Initially a little unsteady when changed from sitting to standing   Skin:     General: Skin is warm and dry.      Comments: drsg C,D, I to right upper chest with surrounding bruising   Neurological:      Mental Status: He is alert and oriented to person, place, and time.      Comments: Weak voice secondary to PMH CVA   Psychiatric:         Mood and Affect: Mood normal.         Behavior: Behavior normal.        Result Review :                 Assessment and Plan    Diagnoses and all orders for this visit:    1. Essential hypertension (Primary)  -     metoprolol succinate XL (TOPROL-XL) 25 MG 24 hr tablet; Take 1 tablet by mouth Daily.  Dispense: 30 tablet; Refill: 0    Reviewed d/c summary and upcoming appts with patient.  We have given him a list on AVS also of upcoming appts.  Medications reconciled-discussed change to metoprolol from tartrate to succinate and have asked him to make sure he does not take both.  Side effects reviewed.  He will run out prior to cardiology f/u so I have refilled for him.  He does not think he needs assistance at home.  Will have him elevate legs for now and contact cardiology with worsening leg swelling.      Has good understanding of tx plan and needed upcoming appts.               Follow Up   Return if symptoms worsen or fail to improve.  Patient was given instructions and counseling regarding his condition or for health maintenance advice. Please see specific information pulled into the AVS if appropriate.

## 2022-03-23 ENCOUNTER — CLINICAL SUPPORT NO REQUIREMENTS (OUTPATIENT)
Dept: CARDIOLOGY | Facility: CLINIC | Age: 67
End: 2022-03-23

## 2022-03-23 DIAGNOSIS — Z95.0 PACEMAKER: Primary | ICD-10-CM

## 2022-03-23 PROCEDURE — 93279 PRGRMG DEV EVAL PM/LDLS PM: CPT | Performed by: INTERNAL MEDICINE

## 2022-03-25 ENCOUNTER — OFFICE VISIT (OUTPATIENT)
Dept: CARDIOLOGY | Facility: CLINIC | Age: 67
End: 2022-03-25

## 2022-03-25 VITALS
DIASTOLIC BLOOD PRESSURE: 86 MMHG | BODY MASS INDEX: 34.79 KG/M2 | HEIGHT: 70 IN | SYSTOLIC BLOOD PRESSURE: 114 MMHG | WEIGHT: 243 LBS | HEART RATE: 90 BPM

## 2022-03-25 DIAGNOSIS — I10 PRIMARY HYPERTENSION: Primary | ICD-10-CM

## 2022-03-25 DIAGNOSIS — I35.0 AORTIC STENOSIS, MODERATE: ICD-10-CM

## 2022-03-25 DIAGNOSIS — I48.20 CHRONIC ATRIAL FIBRILLATION: ICD-10-CM

## 2022-03-25 DIAGNOSIS — I10 ESSENTIAL HYPERTENSION: ICD-10-CM

## 2022-03-25 DIAGNOSIS — I50.32 DIASTOLIC CHF, CHRONIC: ICD-10-CM

## 2022-03-25 DIAGNOSIS — G47.33 OSA (OBSTRUCTIVE SLEEP APNEA): ICD-10-CM

## 2022-03-25 PROCEDURE — 99214 OFFICE O/P EST MOD 30 MIN: CPT | Performed by: NURSE PRACTITIONER

## 2022-03-25 NOTE — PROGRESS NOTES
Subjective:        Flo Manzo is a 67 y.o. male who here for follow up    No chief complaint on file.  Is post pacemaker implant    HPI    This is a 67-year-old male, who is new to me.  He has a history to include hypertension, CAD, obesity, chronic diastolic congestive heart failure, aortic stenosis, SSS with Biotronik pacemaker and chronic atrial fibrillation.    Lipid panel on December 2020 revealed , HDL 55, LDL 55 and triglycerides 77.  Stress test December 2021 indicated a normal Jean cardial perfusion study with no evidence of ischemia.  Echo December 2021 revealed EF 55.6%, estimated RV systolic pressure from TV regurgitation is normal, LV diastolic function was normal, moderate AV stenosis noted.  No evidence of pericardial effusion.  He is Holter showed SSS with rapid heart rate and pauses.  Cath in 2019 revealed normal coronary arteries.      The following portions of the patient's history were reviewed and updated as appropriate: allergies, current medications, past family history, past medical history, past social history, past surgical history and problem list.    Past Medical History:   Diagnosis Date   • Arthritis    • Atrial fibrillation with RVR (HCC) 01/24/2019   • Colon polyps 01/04/2018    Hepatic flexure: tubular adenoma, only low-grade dysplasia; splenic flexure: tubular adenoma, only low-grade dysplasia; sigmoid colon: tubular adenoma, multiple fragments only low-grade dysplasia   • Depression    • Heart murmur    • Hemorrhagic stroke (HCC) 01/29/2019   • Hypertension    • New onset atrial fibrillation (CMS/HCC) - RVR 1/24/2019   • GÓMEZ (obstructive sleep apnea) 06/06/2019    Home sleep study with moderate severity GÓMEZ, AHI 20 events per hour.  Sleep-related hypoxia with low O2 saturation 84% for 14 minutes.   • Sleep apnea     previously diagnosed with sleep apnea, had T&A done and it has since resolved    • Stroke (HCC)    • Uncontrolled hypertension    • Ventral hernia           reports that he has never smoked. He has never used smokeless tobacco. He reports previous alcohol use. He reports that he does not use drugs.     Family History   Problem Relation Age of Onset   • Hypertension Mother    • Kidney failure Mother    • Coronary artery disease Father    • Lung cancer Father         positive smoker   • Heart attack Father    • Breast cancer Sister 60   • Prostate cancer Brother    • Colon polyps Brother    • Kidney nephrosis Brother    • Malig Hyperthermia Neg Hx        ROS     Review of Systems  Constitutional: No wt loss, fever, fatigue  Gastrointestinal: No nausea, abdominal pain  Behavioral/Psych: No insomnia or anxiety  Cardiovascular: Denies chest pain, and shortness of breath.      Objective:           Vitals and nursing note reviewed.   Constitutional:       Appearance: Well-developed.   HENT:      Head: Normocephalic.      Right Ear: External ear normal.      Left Ear: External ear normal.   Neck:      Vascular: No JVD.   Pulmonary:      Effort: Pulmonary effort is normal. No respiratory distress.      Breath sounds: Normal breath sounds. No stridor. No rales.   Cardiovascular:      Normal rate. Regularly irregular rhythm.      No gallop.      Comments: + murmur. Pacemaker site shows no signs or symptoms of infection or hematoma.  Pulses:     Intact distal pulses.   Abdominal:      General: Bowel sounds are normal. There is no distension.      Palpations: Abdomen is soft.      Tenderness: There is no abdominal tenderness. There is no guarding.   Musculoskeletal: Normal range of motion.         General: No tenderness.      Cervical back: Normal range of motion. Skin:     General: Skin is warm.   Neurological:      Mental Status: Alert and oriented to person, place, and time.      Deep Tendon Reflexes: Reflexes are normal and symmetric.   Psychiatric:         Judgment: Judgment normal.         Procedures    2/3/2022  Conclusion       · Successful single-chamber pacemaker  implantation     12/2021  Interpretation Summary       · Findings consistent with a normal ECG stress test.  · Left ventricular ejection fraction is normal. (Calculated EF = 54%).  · Myocardial perfusion imaging indicates a normal myocardial perfusion study with no evidence of ischemia.  · Impressions are consistent with a low risk study.     Asymptomatic for chest pain. ECG is negative for ischemia.   Ectopy: none  Afib through entirety of stress test  Hypotensive BP after lexiscan administration 104/66. BP at 1min recovery 90/60, BP at 2min recovery 100/74   Supervised by:  Elaine BURRELL.         Interpretation Summary    · Estimated right ventricular systolic pressure from tricuspid regurgitation is normal (<35 mmHg).  · Calculated left ventricular EF = 55.6% Estimated left ventricular EF was in agreement with the calculated left ventricular EF.  · Left ventricular diastolic function was normal.  · Moderate aortic valve stenosis is present.  · There is no evidence of pericardial effusion.       10/2021  Interpretation Summary    · Flo Manzo monitored for 10d 11h starting on 11/1/2021 Primary rhythm was AFIB. The average heart rate, excluding ectopy, was 86 BPM with a minumim of 52 BPM on Day 5 and a maximum of 196 BPM on Day 4. Atrial Fibrillation or Flutter: [place] was 100%, Longest event 10d 11h on day 1 fastest rate 196 bpm on day 1 SVE: [place] was 0% , max count per 24 hours 0 Pause: 383 event , longest pause 3.6s on day 5 PVC: burden was 0.02%, max count per 24 hours 23,1 disparate morphologies REPORT URGENT CRITERIA: New on set to AFIB, PAUSES up to 3.6 seconds on day 5  · An abnormal monitor study.  · DINA SSS, WITH RAPID HR AND PAUSES       2019  Impression:      1. Normal coronary arteries  2. Normal LV gram     Recommendations:      1. Medical management            I sincerely appreciate the opportunity to participate in your patient's care. Please feel free to contact me anytime if I can be  of assistance in this or any other way.     Eren Bruce MD  2/1/2019  9:20 AM         Current Outpatient Medications:   •  albuterol sulfate  (90 Base) MCG/ACT inhaler, Inhale 2 puffs Every 4 (Four) Hours As Needed for Wheezing., Disp: 18 g, Rfl: 1  •  amLODIPine-benazepril (LOTREL) 10-20 MG per capsule, TAKE 1 CAPSULE BY MOUTH DAILY, Disp: 90 capsule, Rfl: 1  •  apixaban (Eliquis) 5 MG tablet tablet, Take 1 tablet by mouth Every 12 (Twelve) Hours., Disp: 180 tablet, Rfl: 1  •  atorvastatin (LIPITOR) 10 MG tablet, TAKE 1 TABLET BY MOUTH EVERY NIGHT, Disp: 90 tablet, Rfl: 1  •  citalopram (CeleXA) 40 MG tablet, TAKE 1 TABLET BY MOUTH DAILY, Disp: 90 tablet, Rfl: 1  •  DULoxetine (CYMBALTA) 30 MG capsule, Take 1 capsule by mouth Every Night., Disp: 90 capsule, Rfl: 0  •  metoprolol succinate XL (TOPROL-XL) 25 MG 24 hr tablet, Take 1 tablet by mouth Daily., Disp: 30 tablet, Rfl: 0  •  omeprazole (priLOSEC) 20 MG capsule, TAKE 1 CAPSULE BY MOUTH DAILY, Disp: 90 capsule, Rfl: 1     Assessment:        Patient Active Problem List   Diagnosis   • Umbilical hernia without obstruction and without gangrene   • Essential hypertension   • Arthritis of left knee   • Depression   • Obesity (BMI 30-39.9)   • Substernal thyroid goiter   • CHF (congestive heart failure) ([facility])   • Diastolic CHF, chronic ([facility])   • Hemorrhagic stroke ([facility])   • GÓMEZ on auto CPAP   • Hypersomnia due to medical condition   • Sleep-related hypoxia   • Chronic atrial fibrillation ([facility])   • Vertigo   • Aortic stenosis, moderate   • SSS (sick sinus syndrome) ([facility])   • S/P ICD (internal cardiac defibrillator) procedure   • Sinus pause               Plan:   1.  SSS with status post single-chamber Biotronik pacemaker: Site shows no signs or symptoms of infection or hematoma noted. Pacemaker functioning normally.    2.  Chronic atrial fibrillation: Continue beta-blockade and Eliquis.  Pros and cons as well as indication of the anticoagulation has  been explained to the patient in detail. There are no obvious complications at this stage. Risk of  the bleedings has been explained. Need for the regular blood workup and adjust the dose has been explained. Need for proper follow-up on anticoagulation also has been explained.     3.  Hypertension: Today in the office his blood pressure is 114/86 and heart rate is 90.    Educated patient on exercising for at least 30 minutes a day for 2 to 3 days a week. Importance of controlling hypertension and blood pressure checkup on the regular basis has been explained. Hypertension as a silent killer has been discussed. Risk reduction of the weight and regular exercises to control the hypertension has been explained.    4.  Chronic diastolic congestive heart failure: Compensated.  Euvolemic on exam. No swelling or rales noted.    Please monitor and stay on a low sodium (<2000mg/day) diet and 2000 ml of fluid. Exercise 30 minutes a day for 2-3 times a week.  Please weigh yourself daily if you gains more than 2 pounds in 1 day or 5 pounds in 1 week. Check for swelling in your feet, ankles, legs, and stomach.  Monitor for signs of fatigue, shortness of breath or cough.  Call the office for recommendations.  He verbalized understanding.    5. GÓMEZ: cpap compliant    6. Moderate aortic stenosis: compensated. RIKY 1.3cm 2               No diagnosis found.    There are no diagnoses linked to this encounter.    COUNSELING:gerald Rodriguez was given to patient for the following topics: diagnostic results, risk factor reductions, impressions, risks and benefits of treatment options and importance of treatment compliance .       SMOKING COUNSELING: denies    He will follow up in 3 months, unless he needs to be seen sooner.     Sincerely,   ASHANTI Recinos  [facility]  03/25/22  09:02 EDT    EMR Dragon/Transcription disclaimer:   Much of this encounter note is an electronic  transcription/translation of spoken language to printed text. The electronic translation of spoken language may permit erroneous, or at times, nonsensical words or phrases to be inadvertently transcribed; Although I have reviewed the note for such errors, some may still exist.

## 2022-04-14 RX ORDER — APIXABAN 5 MG/1
TABLET, FILM COATED ORAL
Qty: 180 TABLET | Refills: 1 | Status: SHIPPED | OUTPATIENT
Start: 2022-04-14

## 2022-04-14 NOTE — TELEPHONE ENCOUNTER
Rx Refill Note  Requested Prescriptions     Pending Prescriptions Disp Refills   • Eliquis 5 MG tablet tablet [Pharmacy Med Name: ELIQUIS 5MG TABLETS] 180 tablet 1     Sig: TAKE 1 TABLET BY MOUTH EVERY 12 HOURS      Last office visit with prescribing clinician: 11/30/2021      Next office visit with prescribing clinician: 4/28/2022            Lauar Ellis LPN  04/14/22, 11:32 EDT

## 2022-04-25 PROBLEM — Z95.0 PACEMAKER: Status: ACTIVE | Noted: 2022-04-25

## 2022-04-28 ENCOUNTER — OFFICE VISIT (OUTPATIENT)
Dept: FAMILY MEDICINE CLINIC | Facility: CLINIC | Age: 67
End: 2022-04-28

## 2022-04-28 VITALS
OXYGEN SATURATION: 98 % | WEIGHT: 238 LBS | RESPIRATION RATE: 18 BRPM | SYSTOLIC BLOOD PRESSURE: 122 MMHG | HEIGHT: 70 IN | HEART RATE: 80 BPM | TEMPERATURE: 98.3 F | DIASTOLIC BLOOD PRESSURE: 86 MMHG | BODY MASS INDEX: 34.07 KG/M2

## 2022-04-28 DIAGNOSIS — Z00.00 MEDICARE ANNUAL WELLNESS VISIT, SUBSEQUENT: Primary | ICD-10-CM

## 2022-04-28 DIAGNOSIS — E04.9 SUBSTERNAL THYROID GOITER: ICD-10-CM

## 2022-04-28 DIAGNOSIS — Z23 NEED FOR VACCINATION: ICD-10-CM

## 2022-04-28 DIAGNOSIS — E78.2 MIXED HYPERLIPIDEMIA: ICD-10-CM

## 2022-04-28 DIAGNOSIS — I10 ESSENTIAL HYPERTENSION: ICD-10-CM

## 2022-04-28 DIAGNOSIS — R79.89 LOW TSH LEVEL: ICD-10-CM

## 2022-04-28 PROCEDURE — 1170F FXNL STATUS ASSESSED: CPT | Performed by: FAMILY MEDICINE

## 2022-04-28 PROCEDURE — 1160F RVW MEDS BY RX/DR IN RCRD: CPT | Performed by: FAMILY MEDICINE

## 2022-04-28 PROCEDURE — G0439 PPPS, SUBSEQ VISIT: HCPCS | Performed by: FAMILY MEDICINE

## 2022-04-28 PROCEDURE — 91305 COVID-19 (PFIZER) 12+ YRS: CPT | Performed by: FAMILY MEDICINE

## 2022-04-28 PROCEDURE — 0051A COVID-19 (PFIZER) 12+ YRS: CPT | Performed by: FAMILY MEDICINE

## 2022-04-28 RX ORDER — QUETIAPINE FUMARATE 25 MG/1
25 TABLET, FILM COATED ORAL NIGHTLY
Qty: 30 TABLET | Refills: 0 | Status: SHIPPED | OUTPATIENT
Start: 2022-04-28 | End: 2022-05-13 | Stop reason: SDUPTHER

## 2022-04-28 RX ORDER — ALBUTEROL SULFATE 90 UG/1
2 AEROSOL, METERED RESPIRATORY (INHALATION) EVERY 4 HOURS PRN
Qty: 18 G | Refills: 3 | Status: SHIPPED | OUTPATIENT
Start: 2022-04-28

## 2022-04-29 LAB
CHOLEST SERPL-MCNC: 198 MG/DL (ref 100–199)
HDLC SERPL-MCNC: 56 MG/DL
LDLC SERPL CALC-MCNC: 126 MG/DL (ref 0–99)
T4 SERPL-MCNC: 7.3 UG/DL (ref 4.5–12)
TRIGL SERPL-MCNC: 89 MG/DL (ref 0–149)
TSH SERPL DL<=0.005 MIU/L-ACNC: 0.01 UIU/ML (ref 0.45–4.5)
VLDLC SERPL CALC-MCNC: 16 MG/DL (ref 5–40)

## 2022-05-06 DIAGNOSIS — I20.8 ANGINA AT REST: ICD-10-CM

## 2022-05-06 DIAGNOSIS — I10 ESSENTIAL HYPERTENSION: ICD-10-CM

## 2022-05-06 RX ORDER — ATORVASTATIN CALCIUM 20 MG/1
20 TABLET, FILM COATED ORAL NIGHTLY
Qty: 90 TABLET | Refills: 3 | Status: SHIPPED | OUTPATIENT
Start: 2022-05-06 | End: 2022-06-23 | Stop reason: SDUPTHER

## 2022-05-10 ENCOUNTER — TELEPHONE (OUTPATIENT)
Dept: FAMILY MEDICINE CLINIC | Facility: CLINIC | Age: 67
End: 2022-05-10

## 2022-05-10 RX ORDER — QUETIAPINE FUMARATE 25 MG/1
25 TABLET, FILM COATED ORAL NIGHTLY
Qty: 30 TABLET | Refills: 0 | Status: CANCELLED | OUTPATIENT
Start: 2022-05-10

## 2022-05-10 NOTE — TELEPHONE ENCOUNTER
Caller: Flo Manzo    Relationship: Self    Best call back number: [phone]    Requested Prescriptions:   Requested Prescriptions     Pending Prescriptions Disp Refills   • QUEtiapine (SEROquel) 25 MG tablet 30 tablet 0     Sig: Take 1 tablet by mouth Every Night. Take 30 min before bed        Pharmacy where request should be sent: [id number] - [place], [place] - [address] [address] [place] [facility] [place] [address] [address]  - [phone] [facility] [phone] FX     Additional details provided by patient: PHARMACY STATED THEY NEVER RECEIVED THE MEDICATION REQUEST.    PATIENT CALLED TO GET NEW MEDICATION REQUEST.    PATIENT REQUESTING A CALL IF DR FALCON OR MA HAS TIME    Does the patient have less than a 3 day supply:  [] Yes  [] No    Zuly Butler Rep   05/10/22 12:18 EDT

## 2022-05-12 ENCOUNTER — TELEPHONE (OUTPATIENT)
Dept: FAMILY MEDICINE CLINIC | Facility: CLINIC | Age: 67
End: 2022-05-12

## 2022-05-12 NOTE — TELEPHONE ENCOUNTER
Pt called and stated the pharmacy does not have the seroquel-    Pt also had questions about lipitor rx and asked if he should take it once or twice a day    Please advise

## 2022-05-12 NOTE — TELEPHONE ENCOUNTER
Rx Refill Note  Requested Prescriptions      No prescriptions requested or ordered in this encounter      Last office visit with prescribing clinician: 4/28/2022      Next office visit with prescribing clinician: 11/1/2022            Zuly Tong MA  05/12/22, 13:27 EDT

## 2022-05-13 RX ORDER — QUETIAPINE FUMARATE 25 MG/1
25 TABLET, FILM COATED ORAL NIGHTLY
Qty: 30 TABLET | Refills: 0 | Status: ON HOLD | OUTPATIENT
Start: 2022-05-13 | End: 2022-07-30

## 2022-05-13 NOTE — TELEPHONE ENCOUNTER
I sent the seroquel again. I want him to take two 10 mg atorvastatin pills at night. When he runs out I had sent a 20 mg pill to the pharmacy so when he picks it up he will maintain the 20 mg dose but only need one pill per night. Thanks.

## 2022-06-22 ENCOUNTER — HOSPITAL ENCOUNTER (OUTPATIENT)
Dept: CT IMAGING | Facility: HOSPITAL | Age: 67
Discharge: HOME | End: 2022-06-22
Admitting: NURSE PRACTITIONER

## 2022-06-22 DIAGNOSIS — E04.9 SUBSTERNAL THYROID GOITER: ICD-10-CM

## 2022-06-22 PROCEDURE — 71250 CT THORAX DX C-: CPT

## 2022-06-23 ENCOUNTER — OFFICE VISIT (OUTPATIENT)
Dept: CARDIOLOGY | Facility: CLINIC | Age: 67
End: 2022-06-23

## 2022-06-23 VITALS
HEIGHT: 70 IN | WEIGHT: 239 LBS | SYSTOLIC BLOOD PRESSURE: 117 MMHG | BODY MASS INDEX: 34.22 KG/M2 | DIASTOLIC BLOOD PRESSURE: 85 MMHG | HEART RATE: 83 BPM

## 2022-06-23 DIAGNOSIS — Z79.01 ANTICOAGULATED: ICD-10-CM

## 2022-06-23 DIAGNOSIS — I10 ESSENTIAL HYPERTENSION: ICD-10-CM

## 2022-06-23 DIAGNOSIS — I20.8 ANGINA AT REST: ICD-10-CM

## 2022-06-23 DIAGNOSIS — Z95.0 PACEMAKER: ICD-10-CM

## 2022-06-23 DIAGNOSIS — I48.20 CHRONIC ATRIAL FIBRILLATION: ICD-10-CM

## 2022-06-23 DIAGNOSIS — I49.5 SSS (SICK SINUS SYNDROME): Primary | ICD-10-CM

## 2022-06-23 PROCEDURE — 93000 ELECTROCARDIOGRAM COMPLETE: CPT | Performed by: INTERNAL MEDICINE

## 2022-06-23 PROCEDURE — 99214 OFFICE O/P EST MOD 30 MIN: CPT | Performed by: INTERNAL MEDICINE

## 2022-06-23 RX ORDER — ATORVASTATIN CALCIUM 40 MG/1
40 TABLET, FILM COATED ORAL NIGHTLY
Qty: 90 TABLET | Refills: 3 | Status: SHIPPED | OUTPATIENT
Start: 2022-06-23

## 2022-06-23 NOTE — PROGRESS NOTES
3 MO FOLLOW UP   Subjective:        Flo Manzo is a 67 y.o. male who here for follow up    CC  Sick sinus syndrome pacemaker  HPI  67 years old male with history of sick sinus syndrome, pacemaker, benign essential arterial hypertension here for the follow-up     Problems Addressed this Visit        Cardiac and Vasculature    Essential hypertension    Chronic atrial fibrillation (HCC)    Pacemaker    RESOLVED: SSS (sick sinus syndrome) (HCC) - Primary       Coag and Thromboembolic    Anticoagulated      Diagnoses       Codes Comments    SSS (sick sinus syndrome) (HCC)    -  Primary ICD-10-CM: I49.5  ICD-9-CM: 427.81     Pacemaker     ICD-10-CM: Z95.0  ICD-9-CM: V45.01     Essential hypertension     ICD-10-CM: I10  ICD-9-CM: 401.9     Chronic atrial fibrillation (HCC)     ICD-10-CM: I48.20  ICD-9-CM: 427.31     Anticoagulated     ICD-10-CM: Z79.01  ICD-9-CM: V58.61         .    The following portions of the patient's history were reviewed and updated as appropriate: allergies, current medications, past family history, past medical history, past social history, past surgical history and problem list.    Past Medical History:   Diagnosis Date   • Arthritis    • Atrial fibrillation with RVR (HCC) 01/24/2019   • Colon polyps 01/04/2018    Hepatic flexure: tubular adenoma, only low-grade dysplasia; splenic flexure: tubular adenoma, only low-grade dysplasia; sigmoid colon: tubular adenoma, multiple fragments only low-grade dysplasia   • Depression    • Heart murmur    • Hemorrhagic stroke (HCC) 01/29/2019   • Hypertension    • New onset atrial fibrillation (CMS/HCC) - RVR 1/24/2019   • GÓMEZ (obstructive sleep apnea) 06/06/2019    Home sleep study with moderate severity GÓMEZ, AHI 20 events per hour.  Sleep-related hypoxia with low O2 saturation 84% for 14 minutes.   • Sleep apnea     previously diagnosed with sleep apnea, had T&A done and it has since resolved    • Stroke (HCC)    • Uncontrolled hypertension    • Ventral  "hernia      reports that he has never smoked. He has never used smokeless tobacco. He reports previous alcohol use. He reports that he does not use drugs.   Family History   Problem Relation Age of Onset   • Hypertension Mother    • Kidney failure Mother    • Coronary artery disease Father    • Lung cancer Father         positive smoker   • Heart attack Father    • Breast cancer Sister 60   • Prostate cancer Brother    • Colon polyps Brother    • Kidney nephrosis Brother    • Malig Hyperthermia Neg Hx        Review of Systems  Constitutional: No wt loss, fever, fatigue  Gastrointestinal: No nausea, abdominal pain  Behavioral/Psych: No insomnia or anxiety   Cardiovascular no chest pains or tightness in the chest  Objective:       Physical Exam  /85   Pulse 83   Ht 177.8 cm (70\")   Wt 108 kg (239 lb)   BMI 34.29 kg/m²   General appearance: No acute changes   Neck: Trachea midline; NECK, supple, no thyromegaly or lymphadenopathy   Lungs: Normal size and shape, normal breath sounds, equal distribution of air, no rales and rhonchi   CV: S1-S2 regular, no murmurs, no rub, no gallop   Abdomen: Soft, nontender; no masses , no abnormal abdominal sounds   Extremities: No deformity , normal color , no peripheral edema   Skin: Normal temperature, turgor and texture; no rash, ulcers            ECG 12 Lead    Date/Time: 6/23/2022 10:17 AM  Performed by: Eren Bruce MD  Authorized by: Eren Bruce MD   Comparison: compared with previous ECG   Similar to previous ECG  Rhythm: atrial fibrillation    Clinical impression: abnormal EKG              Echocardiogram:        Current Outpatient Medications:   •  albuterol sulfate  (90 Base) MCG/ACT inhaler, Inhale 2 puffs Every 4 (Four) Hours As Needed for Wheezing., Disp: 18 g, Rfl: 3  •  amLODIPine-benazepril (LOTREL) 10-20 MG per capsule, TAKE 1 CAPSULE BY MOUTH DAILY, Disp: 90 capsule, Rfl: 1  •  atorvastatin (LIPITOR) 20 MG tablet, Take 1 tablet by " mouth Every Night., Disp: 90 tablet, Rfl: 3  •  citalopram (CeleXA) 40 MG tablet, TAKE 1 TABLET BY MOUTH DAILY, Disp: 90 tablet, Rfl: 1  •  DULoxetine (CYMBALTA) 30 MG capsule, Take 1 capsule by mouth Every Night., Disp: 90 capsule, Rfl: 0  •  Eliquis 5 MG tablet tablet, TAKE 1 TABLET BY MOUTH EVERY 12 HOURS, Disp: 180 tablet, Rfl: 1  •  metoprolol succinate XL (TOPROL-XL) 25 MG 24 hr tablet, Take 1 tablet by mouth Daily., Disp: 30 tablet, Rfl: 0  •  omeprazole (priLOSEC) 20 MG capsule, TAKE 1 CAPSULE BY MOUTH DAILY, Disp: 90 capsule, Rfl: 1  •  QUEtiapine (SEROquel) 25 MG tablet, Take 1 tablet by mouth Every Night. Take 30 min before bed, Disp: 30 tablet, Rfl: 0   Assessment:        Patient Active Problem List   Diagnosis   • Umbilical hernia without obstruction and without gangrene   • Essential hypertension   • Arthritis of left knee   • Depression   • Obesity (BMI 30-39.9)   • Substernal thyroid goiter   • CHF (congestive heart failure) (HCC)   • Diastolic CHF, chronic (HCC)   • Hemorrhagic stroke ([facility])   • GÓMEZ on auto CPAP   • Hypersomnia due to medical condition   • Sleep-related hypoxia   • Chronic atrial fibrillation ([facility])   • Vertigo   • Aortic stenosis, moderate   • SSS (sick sinus syndrome) ([facility])   • S/P ICD (internal cardiac defibrillator) procedure   • Sinus pause   • Pacemaker               Plan:            ICD-10-CM ICD-9-CM   1. SSS (sick sinus syndrome) ([facility])  I49.5 427.81   2. Pacemaker  Z95.0 V45.01   3. Essential hypertension  I10 401.9   4. Chronic atrial fibrillation (HCC)  I48.20 427.31   5. Anticoagulated  Z79.01 V58.61     1. SSS (sick sinus syndrome) (HCC)  Resolved with pacemaker    2. Pacemaker  Pacemaker functioning normally    3. Essential hypertension  Blood pressure under control    4. Chronic atrial fibrillation (HCC)  Under control    5. Anticoagulated  Pros and cons as well as indication of the anticoagulation has been explained to the patient in detail    There are no obvious  complications at this stage    Risk of  the bleedings has been explained    Need for the regular blood workup and adjust the dose has been explained    Need for proper follow-up on anticoagulation also has been explained         1 YR  COUNSELING:    Flo Rodriguez was given to patient for the following topics: diagnostic results, risk factor reductions, impressions, risks and benefits of treatment options and importance of treatment compliance .       SMOKING COUNSELING:    [unfilled]    Dictated using Dragon dictation

## 2022-06-24 PROBLEM — I48.20 CHRONIC ATRIAL FIBRILLATION: Status: RESOLVED | Noted: 2020-03-19 | Resolved: 2022-06-24

## 2022-06-24 PROBLEM — Z79.01 ANTICOAGULATED: Status: ACTIVE | Noted: 2022-06-24

## 2022-06-24 PROBLEM — I49.5 SSS (SICK SINUS SYNDROME): Status: RESOLVED | Noted: 2022-01-06 | Resolved: 2022-06-24

## 2022-06-28 NOTE — PROGRESS NOTES
"You have chosen to receive care through a telephone visit. Do you consent to use a telephone visit for your medical care today? Yes    Chief Complaint  Substernal goiter, follow-up    Subjective        Flo Manzo presents to [facility] for follow-up with CT chest.    History of Present Illness    Mr. Manzo is a pleasant 67-year-old gentleman who has been following with our service on a regular basis for substernal goiter.  The patient has elected to avoid surgery and has instead allowed us to follow this with continued surveillance imaging.  He presents today for a telemedicine visit.  The patient denies any difficulty with swallowing.  He has chronic shortness of air with exertion which is stable.  He does have an occasional cough with sputum production but denies any hemoptysis change in voice or weight loss.  He recently underwent pacemaker insertion by Dr. Bruce for sick sinus syndrome.    No vital signs assessed or physical examination performed secondary to telemedicine visit.    Objective   Vital Signs:  There were no vitals taken for this visit.  Estimated body mass index is 34.29 kg/m² as calculated from the following:    Height as of 6/23/22: 177.8 cm (70\").    Weight as of 6/23/22: 108 kg (239 lb).          Physical Exam   Result Review :{Labs  Result Review  Imaging  Med Tab  Media  Procedures  :23}  The following data was reviewed by: ASHANTI Suárez on 06/29/2022:    Data reviewed: Radiologic studies CT of the chest performed without contrast on 6/22/2022 was independently reviewed.  Diffuse enlargement of the thyroid with the left lobe extending to the level of the aortic arch and right lobe extending to the level of the great vessels appear stable.  Thyromegaly is causing mass-effect with narrowing of the mid thoracic trachea at the level of the aortic arch similar to previous imaging.  Calcified and noncalcified sub-6 mm pulmonary nodules " are stable.  No new consolidations, effusions or pneumothorax.  No evidence of acute pulmonary process.  Chronic liver disease appears similar compared to previous imaging.  Small hiatal hernia with thickening of the distal esophagus is also stable.          Assessment and Plan        Diagnoses and all orders for this visit:    1. Substernal thyroid goiter (Primary)  -     CT Chest Without Contrast; Future    Stable appearance of CT of the chest with substernal goiter causing mass-effect of the trachea.  This is stable compared to previous imaging.  Patient has elected to continue surveillance and is not interested in undergoing surgical resection at this time.  Continue surveillance with repeat CT of the chest and follow-up with us in 1 year.  Patient may need to be seen sooner if he develops any change in symptoms which I discussed at length with him today.  Enck you for allowing us to participate in the care of Flo Manzo.  We will continue to follow and keep you informed of his progress.       I spent 16 minutes caring for Flo on this date of service. This time includes time spent by me in the following activities:preparing for the visit, reviewing tests, obtaining and/or reviewing a separately obtained history, counseling and educating the patient/family/caregiver, ordering medications, tests, or procedures, referring and communicating with other health care professionals , documenting information in the medical record, independently interpreting results and communicating that information with the patient/family/caregiver and care coordination  Follow Up   Return for 1 year with CT chest.  Patient was given instructions and counseling regarding his condition or for health maintenance advice. Please see specific information pulled into the AVS if appropriate.

## 2022-06-29 ENCOUNTER — TELEPHONE (OUTPATIENT)
Dept: SURGERY | Facility: CLINIC | Age: 67
End: 2022-06-29

## 2022-06-29 ENCOUNTER — OFFICE VISIT (OUTPATIENT)
Dept: SURGERY | Facility: CLINIC | Age: 67
End: 2022-06-29

## 2022-06-29 DIAGNOSIS — E04.9 SUBSTERNAL THYROID GOITER: Primary | ICD-10-CM

## 2022-06-29 PROCEDURE — 99442 PR PHYS/QHP TELEPHONE EVALUATION 11-20 MIN: CPT | Performed by: NURSE PRACTITIONER

## 2022-06-29 NOTE — TELEPHONE ENCOUNTER
6/29/22 We called and left a message for patient to call back regarding his missed appt for today.  He can have a telephone visit if he would like, or we can r/s the appt to another day.

## 2022-07-01 RX ORDER — OMEPRAZOLE 20 MG/1
20 CAPSULE, DELAYED RELEASE ORAL DAILY
Qty: 90 CAPSULE | Refills: 1 | Status: SHIPPED | OUTPATIENT
Start: 2022-07-01 | End: 2023-04-03

## 2022-07-07 RX ORDER — DULOXETIN HYDROCHLORIDE 30 MG/1
30 CAPSULE, DELAYED RELEASE ORAL NIGHTLY
Qty: 90 CAPSULE | Refills: 1 | Status: ON HOLD | OUTPATIENT
Start: 2022-07-07 | End: 2022-07-30

## 2022-07-07 NOTE — TELEPHONE ENCOUNTER
Rx Refill Note  Requested Prescriptions     Pending Prescriptions Disp Refills   • DULoxetine (CYMBALTA) 30 MG capsule [Pharmacy Med Name: DULOXETINE DR 30MG CAPSULES] 90 capsule 0     Sig: TAKE 1 CAPSULE BY MOUTH EVERY NIGHT      Last office visit with prescribing clinician: 4/28/2022      Next office visit with prescribing clinician: 11/1/2022            Ga Cordon MA  07/07/22, 09:37 EDT

## 2022-07-29 ENCOUNTER — APPOINTMENT (OUTPATIENT)
Dept: GENERAL RADIOLOGY | Facility: HOSPITAL | Age: 67
End: 2022-07-29

## 2022-07-29 ENCOUNTER — HOSPITAL ENCOUNTER (INPATIENT)
Facility: HOSPITAL | Age: 67
LOS: 3 days | Discharge: NURSING, REHAB OR LONG-TERM CARE | End: 2022-08-03
Attending: EMERGENCY MEDICINE | Admitting: INTERNAL MEDICINE

## 2022-07-29 DIAGNOSIS — R26.2 INABILITY TO WALK: ICD-10-CM

## 2022-07-29 DIAGNOSIS — R53.1 GENERALIZED WEAKNESS: Primary | ICD-10-CM

## 2022-07-29 DIAGNOSIS — I10 ELEVATED BLOOD PRESSURE READING IN OFFICE WITH DIAGNOSIS OF HYPERTENSION: ICD-10-CM

## 2022-07-29 DIAGNOSIS — I49.3 SYMPTOMATIC PVCS: ICD-10-CM

## 2022-07-29 DIAGNOSIS — I48.91 ATRIAL FIBRILLATION WITH RVR: ICD-10-CM

## 2022-07-29 LAB
ALBUMIN SERPL-MCNC: 4.4 G/DL (ref 3.5–5.2)
ALBUMIN/GLOB SERPL: 1.6 G/DL
ALP SERPL-CCNC: 123 U/L (ref 39–117)
ALT SERPL W P-5'-P-CCNC: 23 U/L (ref 1–41)
AMPHET+METHAMPHET UR QL: NEGATIVE
ANION GAP SERPL CALCULATED.3IONS-SCNC: 8.6 MMOL/L (ref 5–15)
APTT PPP: 28.8 SECONDS (ref 22.7–35.4)
AST SERPL-CCNC: 17 U/L (ref 1–40)
BACTERIA UR QL AUTO: NORMAL /HPF
BARBITURATES UR QL SCN: NEGATIVE
BASOPHILS # BLD AUTO: 0.04 10*3/MM3 (ref 0–0.2)
BASOPHILS NFR BLD AUTO: 0.4 % (ref 0–1.5)
BENZODIAZ UR QL SCN: NEGATIVE
BILIRUB SERPL-MCNC: 0.6 MG/DL (ref 0–1.2)
BILIRUB UR QL STRIP: NEGATIVE
BUN SERPL-MCNC: 14 MG/DL (ref 8–23)
BUN/CREAT SERPL: 16.1 (ref 7–25)
CALCIUM SPEC-SCNC: 9.4 MG/DL (ref 8.6–10.5)
CANNABINOIDS SERPL QL: NEGATIVE
CHLORIDE SERPL-SCNC: 103 MMOL/L (ref 98–107)
CLARITY UR: CLEAR
CO2 SERPL-SCNC: 26.4 MMOL/L (ref 22–29)
COCAINE UR QL: NEGATIVE
COLOR UR: YELLOW
CREAT SERPL-MCNC: 0.87 MG/DL (ref 0.76–1.27)
D-LACTATE SERPL-SCNC: 1.7 MMOL/L (ref 0.5–2)
DEPRECATED RDW RBC AUTO: 38.1 FL (ref 37–54)
EGFRCR SERPLBLD CKD-EPI 2021: 95.2 ML/MIN/1.73
EOSINOPHIL # BLD AUTO: 0.07 10*3/MM3 (ref 0–0.4)
EOSINOPHIL NFR BLD AUTO: 0.7 % (ref 0.3–6.2)
ERYTHROCYTE [DISTWIDTH] IN BLOOD BY AUTOMATED COUNT: 12.7 % (ref 12.3–15.4)
ETHANOL BLD-MCNC: <10 MG/DL (ref 0–10)
ETHANOL UR QL: <0.01 %
GLOBULIN UR ELPH-MCNC: 2.8 GM/DL
GLUCOSE SERPL-MCNC: 105 MG/DL (ref 65–99)
GLUCOSE UR STRIP-MCNC: NEGATIVE MG/DL
HCT VFR BLD AUTO: 47.2 % (ref 37.5–51)
HGB BLD-MCNC: 15.9 G/DL (ref 13–17.7)
HGB UR QL STRIP.AUTO: NEGATIVE
HYALINE CASTS UR QL AUTO: NORMAL /LPF
IMM GRANULOCYTES # BLD AUTO: 0.11 10*3/MM3 (ref 0–0.05)
IMM GRANULOCYTES NFR BLD AUTO: 1.1 % (ref 0–0.5)
INR PPP: 1.08 (ref 0.9–1.1)
KETONES UR QL STRIP: NEGATIVE
LEUKOCYTE ESTERASE UR QL STRIP.AUTO: NEGATIVE
LYMPHOCYTES # BLD AUTO: 1.35 10*3/MM3 (ref 0.7–3.1)
LYMPHOCYTES NFR BLD AUTO: 13.3 % (ref 19.6–45.3)
MAGNESIUM SERPL-MCNC: 2.1 MG/DL (ref 1.6–2.4)
MCH RBC QN AUTO: 28.3 PG (ref 26.6–33)
MCHC RBC AUTO-ENTMCNC: 33.7 G/DL (ref 31.5–35.7)
MCV RBC AUTO: 84.1 FL (ref 79–97)
METHADONE UR QL SCN: NEGATIVE
MONOCYTES # BLD AUTO: 0.43 10*3/MM3 (ref 0.1–0.9)
MONOCYTES NFR BLD AUTO: 4.2 % (ref 5–12)
NEUTROPHILS NFR BLD AUTO: 8.14 10*3/MM3 (ref 1.7–7)
NEUTROPHILS NFR BLD AUTO: 80.3 % (ref 42.7–76)
NITRITE UR QL STRIP: NEGATIVE
NRBC BLD AUTO-RTO: 0 /100 WBC (ref 0–0.2)
NT-PROBNP SERPL-MCNC: 1264 PG/ML (ref 0–900)
OPIATES UR QL: NEGATIVE
OXYCODONE UR QL SCN: NEGATIVE
PH UR STRIP.AUTO: 7 [PH] (ref 5–8)
PLATELET # BLD AUTO: 241 10*3/MM3 (ref 140–450)
PMV BLD AUTO: 10.1 FL (ref 6–12)
POTASSIUM SERPL-SCNC: 4 MMOL/L (ref 3.5–5.2)
PROT SERPL-MCNC: 7.2 G/DL (ref 6–8.5)
PROT UR QL STRIP: ABNORMAL
PROTHROMBIN TIME: 13.9 SECONDS (ref 11.7–14.2)
QT INTERVAL: 344 MS
RBC # BLD AUTO: 5.61 10*6/MM3 (ref 4.14–5.8)
RBC # UR STRIP: NORMAL /HPF
REF LAB TEST METHOD: NORMAL
SARS-COV-2 RNA PNL SPEC NAA+PROBE: NOT DETECTED
SODIUM SERPL-SCNC: 138 MMOL/L (ref 136–145)
SP GR UR STRIP: 1.01 (ref 1–1.03)
SQUAMOUS #/AREA URNS HPF: NORMAL /HPF
TROPONIN T SERPL-MCNC: <0.01 NG/ML (ref 0–0.03)
TROPONIN T SERPL-MCNC: <0.01 NG/ML (ref 0–0.03)
UROBILINOGEN UR QL STRIP: ABNORMAL
WBC # UR STRIP: NORMAL /HPF
WBC NRBC COR # BLD: 10.14 10*3/MM3 (ref 3.4–10.8)

## 2022-07-29 PROCEDURE — 87635 SARS-COV-2 COVID-19 AMP PRB: CPT | Performed by: EMERGENCY MEDICINE

## 2022-07-29 PROCEDURE — 82077 ASSAY SPEC XCP UR&BREATH IA: CPT | Performed by: EMERGENCY MEDICINE

## 2022-07-29 PROCEDURE — 85610 PROTHROMBIN TIME: CPT | Performed by: EMERGENCY MEDICINE

## 2022-07-29 PROCEDURE — 80307 DRUG TEST PRSMV CHEM ANLYZR: CPT | Performed by: EMERGENCY MEDICINE

## 2022-07-29 PROCEDURE — 83880 ASSAY OF NATRIURETIC PEPTIDE: CPT | Performed by: EMERGENCY MEDICINE

## 2022-07-29 PROCEDURE — 85025 COMPLETE CBC W/AUTO DIFF WBC: CPT | Performed by: EMERGENCY MEDICINE

## 2022-07-29 PROCEDURE — 99285 EMERGENCY DEPT VISIT HI MDM: CPT

## 2022-07-29 PROCEDURE — 81001 URINALYSIS AUTO W/SCOPE: CPT | Performed by: EMERGENCY MEDICINE

## 2022-07-29 PROCEDURE — 71045 X-RAY EXAM CHEST 1 VIEW: CPT

## 2022-07-29 PROCEDURE — 84484 ASSAY OF TROPONIN QUANT: CPT | Performed by: EMERGENCY MEDICINE

## 2022-07-29 PROCEDURE — 93005 ELECTROCARDIOGRAM TRACING: CPT | Performed by: EMERGENCY MEDICINE

## 2022-07-29 PROCEDURE — 93010 ELECTROCARDIOGRAM REPORT: CPT | Performed by: INTERNAL MEDICINE

## 2022-07-29 PROCEDURE — 83735 ASSAY OF MAGNESIUM: CPT | Performed by: EMERGENCY MEDICINE

## 2022-07-29 PROCEDURE — G0378 HOSPITAL OBSERVATION PER HR: HCPCS

## 2022-07-29 PROCEDURE — 83605 ASSAY OF LACTIC ACID: CPT | Performed by: EMERGENCY MEDICINE

## 2022-07-29 PROCEDURE — 85730 THROMBOPLASTIN TIME PARTIAL: CPT | Performed by: EMERGENCY MEDICINE

## 2022-07-29 PROCEDURE — 80053 COMPREHEN METABOLIC PANEL: CPT | Performed by: EMERGENCY MEDICINE

## 2022-07-29 PROCEDURE — 25010000002 AMIODARONE IN DEXTROSE 5% 360-4.14 MG/200ML-% SOLUTION: Performed by: EMERGENCY MEDICINE

## 2022-07-29 RX ORDER — ONDANSETRON 2 MG/ML
4 INJECTION INTRAMUSCULAR; INTRAVENOUS EVERY 6 HOURS PRN
Status: DISCONTINUED | OUTPATIENT
Start: 2022-07-29 | End: 2022-08-03 | Stop reason: HOSPADM

## 2022-07-29 RX ORDER — FUROSEMIDE 10 MG/ML
40 INJECTION INTRAMUSCULAR; INTRAVENOUS ONCE
Status: COMPLETED | OUTPATIENT
Start: 2022-07-30 | End: 2022-07-29

## 2022-07-29 RX ORDER — SODIUM CHLORIDE 0.9 % (FLUSH) 0.9 %
10 SYRINGE (ML) INJECTION EVERY 12 HOURS SCHEDULED
Status: DISCONTINUED | OUTPATIENT
Start: 2022-07-29 | End: 2022-08-03 | Stop reason: HOSPADM

## 2022-07-29 RX ORDER — TAMSULOSIN HYDROCHLORIDE 0.4 MG/1
1 CAPSULE ORAL DAILY
COMMUNITY
End: 2023-01-10

## 2022-07-29 RX ORDER — ACETAMINOPHEN 325 MG/1
650 TABLET ORAL EVERY 4 HOURS PRN
Status: DISCONTINUED | OUTPATIENT
Start: 2022-07-29 | End: 2022-08-03 | Stop reason: HOSPADM

## 2022-07-29 RX ORDER — ACETAMINOPHEN 650 MG/1
650 SUPPOSITORY RECTAL EVERY 4 HOURS PRN
Status: DISCONTINUED | OUTPATIENT
Start: 2022-07-29 | End: 2022-08-03 | Stop reason: HOSPADM

## 2022-07-29 RX ORDER — NITROGLYCERIN 0.4 MG/1
0.4 TABLET SUBLINGUAL
Status: DISCONTINUED | OUTPATIENT
Start: 2022-07-29 | End: 2022-08-03 | Stop reason: HOSPADM

## 2022-07-29 RX ORDER — SODIUM CHLORIDE 0.9 % (FLUSH) 0.9 %
10 SYRINGE (ML) INJECTION AS NEEDED
Status: DISCONTINUED | OUTPATIENT
Start: 2022-07-29 | End: 2022-08-03 | Stop reason: HOSPADM

## 2022-07-29 RX ORDER — ACETAMINOPHEN 160 MG/5ML
650 SOLUTION ORAL EVERY 4 HOURS PRN
Status: DISCONTINUED | OUTPATIENT
Start: 2022-07-29 | End: 2022-08-03 | Stop reason: HOSPADM

## 2022-07-29 RX ORDER — METOPROLOL TARTRATE 25 MG/1
25 TABLET, FILM COATED ORAL 2 TIMES DAILY
COMMUNITY
End: 2022-08-03 | Stop reason: HOSPADM

## 2022-07-29 RX ADMIN — METOPROLOL TARTRATE 5 MG: 1 INJECTION, SOLUTION INTRAVENOUS at 18:56

## 2022-07-29 RX ADMIN — FUROSEMIDE 40 MG: 10 INJECTION, SOLUTION INTRAMUSCULAR; INTRAVENOUS at 23:53

## 2022-07-29 RX ADMIN — AMIODARONE HYDROCHLORIDE 1 MG/MIN: 1.8 INJECTION, SOLUTION INTRAVENOUS at 16:00

## 2022-07-29 RX ADMIN — Medication 10 ML: at 23:59

## 2022-07-29 RX ADMIN — METOPROLOL TARTRATE 25 MG: 25 TABLET, FILM COATED ORAL at 18:56

## 2022-07-30 LAB
ALBUMIN SERPL-MCNC: 4.2 G/DL (ref 3.5–5.2)
ALBUMIN/GLOB SERPL: 1.3 G/DL
ALP SERPL-CCNC: 118 U/L (ref 39–117)
ALT SERPL W P-5'-P-CCNC: 21 U/L (ref 1–41)
ANION GAP SERPL CALCULATED.3IONS-SCNC: 12.9 MMOL/L (ref 5–15)
AST SERPL-CCNC: 19 U/L (ref 1–40)
BASOPHILS # BLD AUTO: 0.03 10*3/MM3 (ref 0–0.2)
BASOPHILS NFR BLD AUTO: 0.3 % (ref 0–1.5)
BILIRUB SERPL-MCNC: 0.8 MG/DL (ref 0–1.2)
BUN SERPL-MCNC: 15 MG/DL (ref 8–23)
BUN/CREAT SERPL: 19.2 (ref 7–25)
CALCIUM SPEC-SCNC: 9.6 MG/DL (ref 8.6–10.5)
CHLORIDE SERPL-SCNC: 101 MMOL/L (ref 98–107)
CK SERPL-CCNC: 233 U/L (ref 20–200)
CO2 SERPL-SCNC: 26.1 MMOL/L (ref 22–29)
CREAT SERPL-MCNC: 0.78 MG/DL (ref 0.76–1.27)
DEPRECATED RDW RBC AUTO: 38.6 FL (ref 37–54)
EGFRCR SERPLBLD CKD-EPI 2021: 98.4 ML/MIN/1.73
EOSINOPHIL # BLD AUTO: 0.08 10*3/MM3 (ref 0–0.4)
EOSINOPHIL NFR BLD AUTO: 0.8 % (ref 0.3–6.2)
ERYTHROCYTE [DISTWIDTH] IN BLOOD BY AUTOMATED COUNT: 12.8 % (ref 12.3–15.4)
GLOBULIN UR ELPH-MCNC: 3.2 GM/DL
GLUCOSE BLDC GLUCOMTR-MCNC: 111 MG/DL (ref 70–130)
GLUCOSE SERPL-MCNC: 111 MG/DL (ref 65–99)
HCT VFR BLD AUTO: 48.2 % (ref 37.5–51)
HGB BLD-MCNC: 16 G/DL (ref 13–17.7)
IMM GRANULOCYTES # BLD AUTO: 0.04 10*3/MM3 (ref 0–0.05)
IMM GRANULOCYTES NFR BLD AUTO: 0.4 % (ref 0–0.5)
INR PPP: 1.08 (ref 0.9–1.1)
LYMPHOCYTES # BLD AUTO: 1.65 10*3/MM3 (ref 0.7–3.1)
LYMPHOCYTES NFR BLD AUTO: 17.2 % (ref 19.6–45.3)
MCH RBC QN AUTO: 28.1 PG (ref 26.6–33)
MCHC RBC AUTO-ENTMCNC: 33.2 G/DL (ref 31.5–35.7)
MCV RBC AUTO: 84.7 FL (ref 79–97)
MONOCYTES # BLD AUTO: 0.68 10*3/MM3 (ref 0.1–0.9)
MONOCYTES NFR BLD AUTO: 7.1 % (ref 5–12)
NEUTROPHILS NFR BLD AUTO: 7.1 10*3/MM3 (ref 1.7–7)
NEUTROPHILS NFR BLD AUTO: 74.2 % (ref 42.7–76)
NRBC BLD AUTO-RTO: 0 /100 WBC (ref 0–0.2)
PLATELET # BLD AUTO: 215 10*3/MM3 (ref 140–450)
PMV BLD AUTO: 10.5 FL (ref 6–12)
POTASSIUM SERPL-SCNC: 3.7 MMOL/L (ref 3.5–5.2)
PROT SERPL-MCNC: 7.4 G/DL (ref 6–8.5)
PROTHROMBIN TIME: 13.9 SECONDS (ref 11.7–14.2)
RBC # BLD AUTO: 5.69 10*6/MM3 (ref 4.14–5.8)
SODIUM SERPL-SCNC: 140 MMOL/L (ref 136–145)
T4 FREE SERPL-MCNC: 1.22 NG/DL (ref 0.93–1.7)
TSH SERPL DL<=0.05 MIU/L-ACNC: 0.02 UIU/ML (ref 0.27–4.2)
WBC NRBC COR # BLD: 9.58 10*3/MM3 (ref 3.4–10.8)

## 2022-07-30 PROCEDURE — 85610 PROTHROMBIN TIME: CPT | Performed by: NURSE PRACTITIONER

## 2022-07-30 PROCEDURE — 84439 ASSAY OF FREE THYROXINE: CPT | Performed by: INTERNAL MEDICINE

## 2022-07-30 PROCEDURE — 99214 OFFICE O/P EST MOD 30 MIN: CPT | Performed by: INTERNAL MEDICINE

## 2022-07-30 PROCEDURE — 25010000002 FUROSEMIDE PER 20 MG: Performed by: INTERNAL MEDICINE

## 2022-07-30 PROCEDURE — 97530 THERAPEUTIC ACTIVITIES: CPT

## 2022-07-30 PROCEDURE — 82550 ASSAY OF CK (CPK): CPT | Performed by: INTERNAL MEDICINE

## 2022-07-30 PROCEDURE — 82962 GLUCOSE BLOOD TEST: CPT

## 2022-07-30 PROCEDURE — G0378 HOSPITAL OBSERVATION PER HR: HCPCS

## 2022-07-30 PROCEDURE — 94799 UNLISTED PULMONARY SVC/PX: CPT

## 2022-07-30 PROCEDURE — 36415 COLL VENOUS BLD VENIPUNCTURE: CPT | Performed by: NURSE PRACTITIONER

## 2022-07-30 PROCEDURE — 85025 COMPLETE CBC W/AUTO DIFF WBC: CPT | Performed by: NURSE PRACTITIONER

## 2022-07-30 PROCEDURE — 97166 OT EVAL MOD COMPLEX 45 MIN: CPT | Performed by: OCCUPATIONAL THERAPIST

## 2022-07-30 PROCEDURE — 84443 ASSAY THYROID STIM HORMONE: CPT | Performed by: INTERNAL MEDICINE

## 2022-07-30 PROCEDURE — 97162 PT EVAL MOD COMPLEX 30 MIN: CPT

## 2022-07-30 PROCEDURE — 80053 COMPREHEN METABOLIC PANEL: CPT | Performed by: INTERNAL MEDICINE

## 2022-07-30 PROCEDURE — 87040 BLOOD CULTURE FOR BACTERIA: CPT | Performed by: INTERNAL MEDICINE

## 2022-07-30 RX ORDER — ALBUTEROL SULFATE 2.5 MG/3ML
2.5 SOLUTION RESPIRATORY (INHALATION) EVERY 6 HOURS PRN
Refills: 3 | Status: DISCONTINUED | OUTPATIENT
Start: 2022-07-30 | End: 2022-08-03 | Stop reason: HOSPADM

## 2022-07-30 RX ORDER — PANTOPRAZOLE SODIUM 40 MG/1
40 TABLET, DELAYED RELEASE ORAL EVERY MORNING
Refills: 1 | Status: DISCONTINUED | OUTPATIENT
Start: 2022-07-30 | End: 2022-08-03 | Stop reason: HOSPADM

## 2022-07-30 RX ORDER — ATORVASTATIN CALCIUM 20 MG/1
40 TABLET, FILM COATED ORAL NIGHTLY
Status: DISCONTINUED | OUTPATIENT
Start: 2022-07-30 | End: 2022-08-03 | Stop reason: HOSPADM

## 2022-07-30 RX ORDER — TAMSULOSIN HYDROCHLORIDE 0.4 MG/1
0.4 CAPSULE ORAL DAILY
Status: DISCONTINUED | OUTPATIENT
Start: 2022-07-30 | End: 2022-08-03 | Stop reason: HOSPADM

## 2022-07-30 RX ADMIN — TAMSULOSIN HYDROCHLORIDE 0.4 MG: 0.4 CAPSULE ORAL at 08:20

## 2022-07-30 RX ADMIN — APIXABAN 5 MG: 5 TABLET, FILM COATED ORAL at 08:20

## 2022-07-30 RX ADMIN — PANTOPRAZOLE SODIUM 40 MG: 40 TABLET, DELAYED RELEASE ORAL at 08:20

## 2022-07-30 RX ADMIN — APIXABAN 5 MG: 5 TABLET, FILM COATED ORAL at 20:47

## 2022-07-30 RX ADMIN — ATORVASTATIN CALCIUM 40 MG: 20 TABLET, FILM COATED ORAL at 20:47

## 2022-07-30 RX ADMIN — METOPROLOL TARTRATE 25 MG: 25 TABLET, FILM COATED ORAL at 08:20

## 2022-07-30 RX ADMIN — Medication 10 ML: at 08:20

## 2022-07-30 RX ADMIN — METOPROLOL TARTRATE 25 MG: 25 TABLET, FILM COATED ORAL at 20:47

## 2022-07-31 ENCOUNTER — APPOINTMENT (OUTPATIENT)
Dept: CARDIOLOGY | Facility: HOSPITAL | Age: 67
End: 2022-07-31

## 2022-07-31 LAB
ANION GAP SERPL CALCULATED.3IONS-SCNC: 12.4 MMOL/L (ref 5–15)
AORTIC DIMENSIONLESS INDEX: 0.4 (DI)
BH CV ECHO MEAS - ACS: 1.31 CM
BH CV ECHO MEAS - AO MAX PG: 31.3 MMHG
BH CV ECHO MEAS - AO MEAN PG: 18.9 MMHG
BH CV ECHO MEAS - AO V2 MAX: 279.6 CM/SEC
BH CV ECHO MEAS - AO V2 VTI: 43.7 CM
BH CV ECHO MEAS - AVA(I,D): 1.4 CM2
BH CV ECHO MEAS - CONTRAST EF 4CH: 55 CM2
BH CV ECHO MEAS - EDV(CUBED): 105.5 ML
BH CV ECHO MEAS - EDV(MOD-SP2): 91 ML
BH CV ECHO MEAS - EDV(MOD-SP4): 94 ML
BH CV ECHO MEAS - EF(MOD-BP): 54.7 %
BH CV ECHO MEAS - EF(MOD-SP2): 53.8 %
BH CV ECHO MEAS - EF(MOD-SP4): 55.3 %
BH CV ECHO MEAS - ESV(CUBED): 54.7 ML
BH CV ECHO MEAS - ESV(MOD-SP2): 42 ML
BH CV ECHO MEAS - ESV(MOD-SP4): 42 ML
BH CV ECHO MEAS - FS: 19.7 %
BH CV ECHO MEAS - IVS/LVPW: 0.98 CM
BH CV ECHO MEAS - IVSD: 1.46 CM
BH CV ECHO MEAS - LAT PEAK E' VEL: 10.2 CM/SEC
BH CV ECHO MEAS - LV DIASTOLIC VOL/BSA (35-75): 42.4 CM2
BH CV ECHO MEAS - LV MASS(C)D: 286.9 GRAMS
BH CV ECHO MEAS - LV MAX PG: 4.3 MMHG
BH CV ECHO MEAS - LV MEAN PG: 2.21 MMHG
BH CV ECHO MEAS - LV SYSTOLIC VOL/BSA (12-30): 19 CM2
BH CV ECHO MEAS - LV V1 MAX: 103.4 CM/SEC
BH CV ECHO MEAS - LV V1 VTI: 17.1 CM
BH CV ECHO MEAS - LVIDD: 4.7 CM
BH CV ECHO MEAS - LVIDS: 3.8 CM
BH CV ECHO MEAS - LVOT AREA: 4.2 CM2
BH CV ECHO MEAS - LVOT DIAM: 2.3 CM
BH CV ECHO MEAS - LVPWD: 1.48 CM
BH CV ECHO MEAS - MED PEAK E' VEL: 9.9 CM/SEC
BH CV ECHO MEAS - MV DEC SLOPE: 412.2 CM/SEC2
BH CV ECHO MEAS - MV DEC TIME: 280 MSEC
BH CV ECHO MEAS - MV E MAX VEL: 103.4 CM/SEC
BH CV ECHO MEAS - MV MAX PG: 4.5 MMHG
BH CV ECHO MEAS - MV MEAN PG: 1.78 MMHG
BH CV ECHO MEAS - MV P1/2T: 76.2 MSEC
BH CV ECHO MEAS - MV V2 VTI: 23.8 CM
BH CV ECHO MEAS - MVA(P1/2T): 2.9 CM2
BH CV ECHO MEAS - MVA(VTI): 3 CM2
BH CV ECHO MEAS - PA ACC TIME: 0.07 SEC
BH CV ECHO MEAS - PA PR(ACCEL): 46.9 MMHG
BH CV ECHO MEAS - PA V2 MAX: 92.3 CM/SEC
BH CV ECHO MEAS - PULM DIAS VEL: 30.3 CM/SEC
BH CV ECHO MEAS - PULM S/D: 0.82
BH CV ECHO MEAS - PULM SYS VEL: 24.9 CM/SEC
BH CV ECHO MEAS - RAP SYSTOLE: 3 MMHG
BH CV ECHO MEAS - RV MAX PG: 1.54 MMHG
BH CV ECHO MEAS - RV V1 MAX: 62.1 CM/SEC
BH CV ECHO MEAS - RV V1 VTI: 9.2 CM
BH CV ECHO MEAS - RVSP: 27 MMHG
BH CV ECHO MEAS - SI(MOD-SP2): 22.1 ML/M2
BH CV ECHO MEAS - SI(MOD-SP4): 23.5 ML/M2
BH CV ECHO MEAS - SV(LVOT): 70.9 ML
BH CV ECHO MEAS - SV(MOD-SP2): 49 ML
BH CV ECHO MEAS - SV(MOD-SP4): 52 ML
BH CV ECHO MEAS - TAPSE (>1.6): 1.63 CM
BH CV ECHO MEAS - TR MAX PG: 24.1 MMHG
BH CV ECHO MEAS - TR MAX VEL: 245.7 CM/SEC
BH CV ECHO MEASUREMENTS AVERAGE E/E' RATIO: 10.29
BH CV XLRA - RV BASE: 3 CM
BH CV XLRA - RV LENGTH: 7.9 CM
BH CV XLRA - RV MID: 2.28 CM
BH CV XLRA - TDI S': 10.7 CM/SEC
BUN SERPL-MCNC: 19 MG/DL (ref 8–23)
BUN/CREAT SERPL: 20.7 (ref 7–25)
CALCIUM SPEC-SCNC: 9.5 MG/DL (ref 8.6–10.5)
CHLORIDE SERPL-SCNC: 101 MMOL/L (ref 98–107)
CO2 SERPL-SCNC: 23.6 MMOL/L (ref 22–29)
CREAT SERPL-MCNC: 0.92 MG/DL (ref 0.76–1.27)
DEPRECATED RDW RBC AUTO: 40.9 FL (ref 37–54)
EGFRCR SERPLBLD CKD-EPI 2021: 91.7 ML/MIN/1.73
ERYTHROCYTE [DISTWIDTH] IN BLOOD BY AUTOMATED COUNT: 13 % (ref 12.3–15.4)
GLUCOSE SERPL-MCNC: 137 MG/DL (ref 65–99)
HBA1C MFR BLD: 5.7 % (ref 4.8–5.6)
HCT VFR BLD AUTO: 45.8 % (ref 37.5–51)
HGB BLD-MCNC: 14.9 G/DL (ref 13–17.7)
LEFT ATRIUM VOLUME INDEX: 34.5 ML/M2
LV EF 2D ECHO EST: 55 %
MAGNESIUM SERPL-MCNC: 2.2 MG/DL (ref 1.6–2.4)
MAXIMAL PREDICTED HEART RATE: 154 BPM
MCH RBC QN AUTO: 28.2 PG (ref 26.6–33)
MCHC RBC AUTO-ENTMCNC: 32.5 G/DL (ref 31.5–35.7)
MCV RBC AUTO: 86.6 FL (ref 79–97)
PLATELET # BLD AUTO: 241 10*3/MM3 (ref 140–450)
PMV BLD AUTO: 10.1 FL (ref 6–12)
POTASSIUM SERPL-SCNC: 3.6 MMOL/L (ref 3.5–5.2)
RBC # BLD AUTO: 5.29 10*6/MM3 (ref 4.14–5.8)
SINUS: 3.3 CM
SODIUM SERPL-SCNC: 137 MMOL/L (ref 136–145)
STRESS TARGET HR: 131 BPM
WBC NRBC COR # BLD: 9.51 10*3/MM3 (ref 3.4–10.8)

## 2022-07-31 PROCEDURE — 83036 HEMOGLOBIN GLYCOSYLATED A1C: CPT | Performed by: INTERNAL MEDICINE

## 2022-07-31 PROCEDURE — 99222 1ST HOSP IP/OBS MODERATE 55: CPT | Performed by: PSYCHIATRY & NEUROLOGY

## 2022-07-31 PROCEDURE — 25010000002 PERFLUTREN (DEFINITY) 8.476 MG IN SODIUM CHLORIDE (PF) 0.9 % 10 ML INJECTION: Performed by: INTERNAL MEDICINE

## 2022-07-31 PROCEDURE — 93306 TTE W/DOPPLER COMPLETE: CPT

## 2022-07-31 PROCEDURE — 83735 ASSAY OF MAGNESIUM: CPT | Performed by: INTERNAL MEDICINE

## 2022-07-31 PROCEDURE — 85027 COMPLETE CBC AUTOMATED: CPT | Performed by: STUDENT IN AN ORGANIZED HEALTH CARE EDUCATION/TRAINING PROGRAM

## 2022-07-31 PROCEDURE — 99232 SBSQ HOSP IP/OBS MODERATE 35: CPT | Performed by: NURSE PRACTITIONER

## 2022-07-31 PROCEDURE — 93306 TTE W/DOPPLER COMPLETE: CPT | Performed by: INTERNAL MEDICINE

## 2022-07-31 PROCEDURE — 80048 BASIC METABOLIC PNL TOTAL CA: CPT | Performed by: STUDENT IN AN ORGANIZED HEALTH CARE EDUCATION/TRAINING PROGRAM

## 2022-07-31 RX ORDER — AMLODIPINE BESYLATE 5 MG/1
10 TABLET ORAL
Status: DISCONTINUED | OUTPATIENT
Start: 2022-07-31 | End: 2022-08-03 | Stop reason: HOSPADM

## 2022-07-31 RX ORDER — LISINOPRIL 20 MG/1
20 TABLET ORAL
Status: DISCONTINUED | OUTPATIENT
Start: 2022-07-31 | End: 2022-08-03 | Stop reason: HOSPADM

## 2022-07-31 RX ADMIN — METOPROLOL TARTRATE 5 MG: 1 INJECTION, SOLUTION INTRAVENOUS at 18:54

## 2022-07-31 RX ADMIN — LISINOPRIL 20 MG: 20 TABLET ORAL at 12:00

## 2022-07-31 RX ADMIN — Medication 10 ML: at 08:37

## 2022-07-31 RX ADMIN — ATORVASTATIN CALCIUM 40 MG: 20 TABLET, FILM COATED ORAL at 20:38

## 2022-07-31 RX ADMIN — PANTOPRAZOLE SODIUM 40 MG: 40 TABLET, DELAYED RELEASE ORAL at 06:38

## 2022-07-31 RX ADMIN — METOPROLOL TARTRATE 25 MG: 25 TABLET, FILM COATED ORAL at 20:38

## 2022-07-31 RX ADMIN — APIXABAN 5 MG: 5 TABLET, FILM COATED ORAL at 20:38

## 2022-07-31 RX ADMIN — AMLODIPINE BESYLATE 10 MG: 5 TABLET ORAL at 12:00

## 2022-07-31 RX ADMIN — TAMSULOSIN HYDROCHLORIDE 0.4 MG: 0.4 CAPSULE ORAL at 08:37

## 2022-07-31 RX ADMIN — APIXABAN 5 MG: 5 TABLET, FILM COATED ORAL at 08:37

## 2022-07-31 RX ADMIN — METOPROLOL TARTRATE 25 MG: 25 TABLET, FILM COATED ORAL at 08:37

## 2022-07-31 RX ADMIN — PERFLUTREN 2 ML: 6.52 INJECTION, SUSPENSION INTRAVENOUS at 09:22

## 2022-07-31 RX ADMIN — Medication 10 ML: at 20:32

## 2022-08-01 LAB
ANION GAP SERPL CALCULATED.3IONS-SCNC: 11.3 MMOL/L (ref 5–15)
BASOPHILS # BLD AUTO: 0.03 10*3/MM3 (ref 0–0.2)
BASOPHILS NFR BLD AUTO: 0.3 % (ref 0–1.5)
BUN SERPL-MCNC: 19 MG/DL (ref 8–23)
BUN/CREAT SERPL: 21.1 (ref 7–25)
CALCIUM SPEC-SCNC: 9.3 MG/DL (ref 8.6–10.5)
CHLORIDE SERPL-SCNC: 100 MMOL/L (ref 98–107)
CO2 SERPL-SCNC: 24.7 MMOL/L (ref 22–29)
CREAT SERPL-MCNC: 0.9 MG/DL (ref 0.76–1.27)
DEPRECATED RDW RBC AUTO: 39.4 FL (ref 37–54)
EGFRCR SERPLBLD CKD-EPI 2021: 94.2 ML/MIN/1.73
EOSINOPHIL # BLD AUTO: 0.2 10*3/MM3 (ref 0–0.4)
EOSINOPHIL NFR BLD AUTO: 2.3 % (ref 0.3–6.2)
ERYTHROCYTE [DISTWIDTH] IN BLOOD BY AUTOMATED COUNT: 12.8 % (ref 12.3–15.4)
GLUCOSE SERPL-MCNC: 99 MG/DL (ref 65–99)
HCT VFR BLD AUTO: 43.6 % (ref 37.5–51)
HGB BLD-MCNC: 14.5 G/DL (ref 13–17.7)
IMM GRANULOCYTES # BLD AUTO: 0.03 10*3/MM3 (ref 0–0.05)
IMM GRANULOCYTES NFR BLD AUTO: 0.3 % (ref 0–0.5)
LYMPHOCYTES # BLD AUTO: 2.26 10*3/MM3 (ref 0.7–3.1)
LYMPHOCYTES NFR BLD AUTO: 26.1 % (ref 19.6–45.3)
MCH RBC QN AUTO: 28.6 PG (ref 26.6–33)
MCHC RBC AUTO-ENTMCNC: 33.3 G/DL (ref 31.5–35.7)
MCV RBC AUTO: 86 FL (ref 79–97)
MONOCYTES # BLD AUTO: 0.84 10*3/MM3 (ref 0.1–0.9)
MONOCYTES NFR BLD AUTO: 9.7 % (ref 5–12)
NEUTROPHILS NFR BLD AUTO: 5.29 10*3/MM3 (ref 1.7–7)
NEUTROPHILS NFR BLD AUTO: 61.3 % (ref 42.7–76)
NRBC BLD AUTO-RTO: 0 /100 WBC (ref 0–0.2)
PLATELET # BLD AUTO: 218 10*3/MM3 (ref 140–450)
PMV BLD AUTO: 10 FL (ref 6–12)
POTASSIUM SERPL-SCNC: 3.6 MMOL/L (ref 3.5–5.2)
RBC # BLD AUTO: 5.07 10*6/MM3 (ref 4.14–5.8)
SODIUM SERPL-SCNC: 136 MMOL/L (ref 136–145)
WBC NRBC COR # BLD: 8.65 10*3/MM3 (ref 3.4–10.8)

## 2022-08-01 PROCEDURE — 99232 SBSQ HOSP IP/OBS MODERATE 35: CPT | Performed by: INTERNAL MEDICINE

## 2022-08-01 PROCEDURE — 85025 COMPLETE CBC W/AUTO DIFF WBC: CPT | Performed by: INTERNAL MEDICINE

## 2022-08-01 PROCEDURE — 84481 FREE ASSAY (FT-3): CPT | Performed by: INTERNAL MEDICINE

## 2022-08-01 PROCEDURE — 97530 THERAPEUTIC ACTIVITIES: CPT

## 2022-08-01 PROCEDURE — 80048 BASIC METABOLIC PNL TOTAL CA: CPT | Performed by: INTERNAL MEDICINE

## 2022-08-01 RX ADMIN — LISINOPRIL 20 MG: 20 TABLET ORAL at 10:30

## 2022-08-01 RX ADMIN — APIXABAN 5 MG: 5 TABLET, FILM COATED ORAL at 21:40

## 2022-08-01 RX ADMIN — PANTOPRAZOLE SODIUM 40 MG: 40 TABLET, DELAYED RELEASE ORAL at 06:50

## 2022-08-01 RX ADMIN — APIXABAN 5 MG: 5 TABLET, FILM COATED ORAL at 10:30

## 2022-08-01 RX ADMIN — ATORVASTATIN CALCIUM 40 MG: 20 TABLET, FILM COATED ORAL at 21:40

## 2022-08-01 RX ADMIN — AMLODIPINE BESYLATE 10 MG: 5 TABLET ORAL at 12:23

## 2022-08-01 RX ADMIN — ACETAMINOPHEN 650 MG: 325 TABLET, FILM COATED ORAL at 04:31

## 2022-08-01 RX ADMIN — METOPROLOL TARTRATE 25 MG: 25 TABLET, FILM COATED ORAL at 10:31

## 2022-08-01 RX ADMIN — METOPROLOL TARTRATE 25 MG: 25 TABLET, FILM COATED ORAL at 21:40

## 2022-08-01 RX ADMIN — TAMSULOSIN HYDROCHLORIDE 0.4 MG: 0.4 CAPSULE ORAL at 10:30

## 2022-08-01 RX ADMIN — Medication 10 ML: at 10:31

## 2022-08-01 NOTE — PLAN OF CARE
Goal Outcome Evaluation:  Plan of Care Reviewed With: patient           Outcome Evaluation: Pt has had no complaints of pain orany discomforts. Tolerating all meds. Pt remains in a-fib, but controlled and pacemaker in place. Pacemaker checked today at bedside. Pt heavy assist x's 2 with gait belt and walker to stand side of bed. Q2 turns. VSS. Will continue to monitor.

## 2022-08-01 NOTE — DISCHARGE PLACEMENT REQUEST
"Omkar Marquez (67 y.o. Male)             Date of Birth   1955    Social Security Number       Address   [address] [place] [address]    Home Phone   [phone]    MRN   [id number]       Yarsanism   Patient Refused    Marital Status                               Admission Date   7/29/22    Admission Type   Emergency    Admitting Provider   Irineo Medina MD    Attending Provider   Serena Patel MD    Department, Room/Bed   [address], S615/1       Discharge Date       Discharge Disposition       Discharge Destination                               Attending Provider: Serena Patel MD    Allergies: Poison Ivy Extract    Isolation: None   Infection: None   Code Status: CPR   Advance Care Planning Activity    Ht: 177.8 cm (70\")   Wt: 104 kg (230 lb)    Admission Cmt: None   Principal Problem: Atrial fibrillation with RVR (HCC) [I48.91]                 Active Insurance as of 7/29/2022     Primary Coverage     Payor Plan Insurance Group Employer/Plan Group    MEDICARE MEDICARE A & B      Payor Plan Address Payor Plan Phone Number Payor Plan Fax Number Effective Dates    [address] [phone]  3/1/2021 - None Entered    [facility] [address]       Subscriber Name Subscriber Birth Date Member ID       OMKAR MARQUEZ 1955 [id number]           Secondary Coverage     Payor Plan Insurance Group Employer/Plan Group    [employer]HEM BLUE CROSS ANTHEM BLUE CROSS BLUE SHIELD PPO [id number]     Payor Plan Address Payor Plan Phone Number Payor Plan Fax Number Effective Dates    [address] [phone]  3/1/2021 - None Entered    [facility] [id number]       Subscriber Name Subscriber Birth Date Member ID       OMKAR MARQUEZ 1955 [id number]                 Emergency Contacts      (Rel.) Home Phone Work Phone Mobile Phone    Bhargavi Parker (Spouse) [phone] -- [phone]        "

## 2022-08-01 NOTE — PLAN OF CARE
Goal Outcome Evaluation:      Patient alert and oriented, calls out regularly with requests. Assist to turn and also weight shifting. Tylenol given for back pain. [facility] notified for hypertension, orders given to continue to monitor. All needs met.

## 2022-08-01 NOTE — THERAPY TREATMENT NOTE
Patient Name: Flo Manzo  : 1955    MRN: [id number]                              Today's Date: 2022       Admit Date: 2022    Visit Dx:     ICD-10-CM ICD-9-CM   1. Generalized weakness  R53.1 780.79   2. Inability to walk  R26.2 719.7   3. Symptomatic PVCs  I49.3 427.69   4. Atrial fibrillation with RVR (HCC)  I48.91 427.31   5. Elevated blood pressure reading in office with diagnosis of hypertension  I10 401.9     Patient Active Problem List   Diagnosis   • Umbilical hernia without obstruction and without gangrene   • Essential hypertension   • Arthritis of left knee   • Depression   • Obesity (BMI 30-39.9)   • Atrial fibrillation with RVR (HCC)   • Substernal thyroid goiter   • CHF (congestive heart failure) (HCC)   • Diastolic CHF, chronic (HCC)   • Hemorrhagic stroke (HCC)   • GÓMEZ on auto CPAP   • Hypersomnia due to medical condition   • Sleep-related hypoxia   • Chronic atrial fibrillation (HCC)   • Vertigo   • Aortic stenosis, moderate   • S/P ICD (internal cardiac defibrillator) procedure   • Sinus pause   • Pacemaker   • Anticoagulated   • Generalized weakness     Past Medical History:   Diagnosis Date   • Arthritis    • Atrial fibrillation with RVR (HCC) 2019   • Colon polyps 2018    Hepatic flexure: tubular adenoma, only low-grade dysplasia; splenic flexure: tubular adenoma, only low-grade dysplasia; sigmoid colon: tubular adenoma, multiple fragments only low-grade dysplasia   • Depression    • Heart murmur    • Hemorrhagic stroke (HCC) 2019   • Hypertension    • New onset atrial fibrillation (CMS/HCC) - RVR 2019   • GÓMEZ (obstructive sleep apnea) 2019    Home sleep study with moderate severity GÓMEZ, AHI 20 events per hour.  Sleep-related hypoxia with low O2 saturation 84% for 14 minutes.   • Sleep apnea     previously diagnosed with sleep apnea, had T&A done and it has since resolved    • Stroke (HCC)    • Uncontrolled hypertension    • Ventral hernia       Past Surgical History:   Procedure Laterality Date   • APPENDECTOMY N/A 1972   • BACK SURGERY      BLADDER STIMULATOR IMPLANT L LOWER BACK   • CARDIAC CATHETERIZATION N/A 07/20/2004    Cath left ventriculography, coronary angiography and left heart catheterization, Normal results-Dr. Vadim Mancuso   • CARDIAC CATHETERIZATION N/A 1/29/2019    Procedure: Left Heart Cath;  Surgeon: Eren Bruce MD;  Location: [facility] CATH INVASIVE LOCATION;  Service: Cardiology   • CARDIAC CATHETERIZATION N/A 1/29/2019    Procedure: Left ventriculography;  Surgeon: Eren Bruce MD;  Location: [facility] CATH INVASIVE LOCATION;  Service: Cardiology   • CARDIAC CATHETERIZATION N/A 1/29/2019    Procedure: Coronary angiography;  Surgeon: Eren Bruce MD;  Location:  [facility] CATH INVASIVE LOCATION;  Service: Cardiology   • CARDIAC ELECTROPHYSIOLOGY PROCEDURE N/A 3/4/2022    Procedure: Pacemaker SC new BIOTRONIK;  Surgeon: Eren Bruce MD;  Location: [facility] CATH INVASIVE LOCATION;  Service: Cardiology;  Laterality: N/A;   • CARPAL TUNNEL RELEASE Right 2013   • CARPAL TUNNEL RELEASE Left    • COLONOSCOPY N/A 1/4/2018    Procedure: COLONOSCOPY with cold polypectomy and hot snare polypectomy;  Surgeon: Ten Solitario MD;  Location: [facility] ENDOSCOPY;  Service:    • EYE LENS IMPLANT SECONDARY Bilateral 2007, 2008   • KNEE CARTILAGE SURGERY Right 1979   • TONSILLECTOMY AND ADENOIDECTOMY Bilateral 2005   • TOTAL KNEE ARTHROPLASTY Left 03/14/2016    Left total knee arthroplasty with Amberly component, size 11 femur, G tibial baseplate with a 10 polyethylene insert and a 38 patellar button-Dr. Pablo Hairston   • TOTAL KNEE ARTHROPLASTY Right 03/02/2015    Right total knee arthroplasty with Amberly component size G femur, 11 tibial base plate with an 11 polyethylene insert and a 38 patellar button-Dr. Pablo Hairston   • UVULOPALATOPHARYNGOPLASTY N/A 2005    part of soft palate, and uvula   • VENTRAL HERNIA REPAIR N/A  1/23/2018    Procedure: VENTRAL HERNIA REPAIR LAPAROSCOPIC WITH DAVINCI ROBOT AND MESH;  Surgeon: Ten Solitario MD;  Location: [facility] OR;  Service:       General Information     Row Name 08/01/22 0927          Physical Therapy Time and Intention    Document Type therapy note (daily note)  -PC (r) GS (t) PC (c)     Mode of Treatment individual therapy;physical therapy  -PC (r) GS (t) PC (c)     Row Name 08/01/22 0927          General Information    Patient Profile Reviewed yes  -PC (r) GS (t) PC (c)     Existing Precautions/Restrictions fall  -PC (r) GS (t) PC (c)     Row Name 08/01/22 0927          Cognition    Orientation Status (Cognition) oriented to;person  -PC (r) GS (t) PC (c)     Row Name 08/01/22 0927          Safety Issues, Functional Mobility    Safety Issues Affecting Function (Mobility) judgment;safety precaution awareness;insight into deficits/self-awareness  -PC (r) GS (t) PC (c)     Impairments Affecting Function (Mobility) balance;endurance/activity tolerance;range of motion (ROM);strength;pain;postural/trunk control  -PC (r) GS (t) PC (c)           User Key  (r) = Recorded By, (t) = Taken By, (c) = Cosigned By    Initials Name Provider Type    PC Arlette Tellez, PT Physical Therapist    Bk Ellington, PT Student PT Student               Mobility     Row Name 08/01/22 0928          Bed Mobility    Bed Mobility scooting/bridging;supine-sit;sit-supine  -PC (r) GS (t) PC (c)     Scooting/Bridging Terrell (Bed Mobility) maximum assist (25% patient effort);2 person assist;verbal cues;nonverbal cues (demo/gesture)  -PC (r) GS (t) PC (c)     Supine-Sit Terrell (Bed Mobility) maximum assist (25% patient effort);2 person assist;verbal cues;nonverbal cues (demo/gesture)  -PC (r) GS (t) PC (c)     Sit-Supine Terrell (Bed Mobility) maximum assist (25% patient effort);2 person assist;verbal cues;nonverbal cues (demo/gesture)  -PC (r) GS (t) PC (c)     Assistive Device (Bed Mobility)  bed rails;draw sheet;head of bed elevated  -PC (r) GS (t) PC (c)     Row Name 08/01/22 0928          Transfers    Comment, (Transfers) Due to balance deficits and patient being sleepy, PT declares unsafe to perform.  -PC (r) GS (t) PC (c)           User Key  (r) = Recorded By, (t) = Taken By, (c) = Cosigned By    Initials Name Provider Type    PC Arlette Tellez, PT Physical Therapist    Bk Ellington, PT Student PT Student               Obj/Interventions     Row Name 08/01/22 0930          Motor Skills    Therapeutic Exercise knee  LAQ: 6 reps BL  -PC (r) GS (t) PC (c)     Row Name 08/01/22 0930          Balance    Balance Assessment sitting static balance;sitting dynamic balance  -PC (r) GS (t) PC (c)     Static Sitting Balance moderate assist;maximum assist;2-person assist  -PC (r) GS (t) PC (c)     Dynamic Sitting Balance moderate assist;maximum assist  -PC (r) GS (t) PC (c)     Position, Sitting Balance unsupported;sitting edge of bed  -PC (r) GS (t) PC (c)     Comment, Balance Pt displays poterior/ R lateral Lean in sitting at EOB. Pt able to tolerate sitting for approx 5 min. Pt needing stimulus to remain alert.  -PC (r) GS (t) PC (c)           User Key  (r) = Recorded By, (t) = Taken By, (c) = Cosigned By    Initials Name Provider Type    PC Arlette Tellez, PT Physical Therapist    Bk Ellington, PT Student PT Student               Goals/Plan    No documentation.                Clinical Impression     Row Name 08/01/22 0932          Plan of Care Review    Plan of Care Reviewed With patient  -PC (r) GS (t) PC (c)     Progress no change  -PC (r) GS (t) PC (c)     Outcome Evaluation Pt seen this AM. Pt appears very sleepy and only oreinted to self. Pt completed all bed mobility with max A of 2. Attempted ther ex on EOB, but pt could not remain awake and alert without stimulus. Pt displays posterior/R lateral lean with sitting balance that requires Mod-Max A for correction. PT recommending SNF upon d/c.   -PC (r) GS (t) PC (c)     Row Name 08/01/22 0932          Vital Signs    O2 Delivery Pre Treatment nasal cannula  2 L  -PC (r) GS (t) PC (c)     O2 Delivery Intra Treatment nasal cannula  -PC (r) GS (t) PC (c)     O2 Delivery Post Treatment nasal cannula  -PC (r) GS (t) PC (c)     Row Name 08/01/22 0932          Positioning and Restraints    Pre-Treatment Position in bed  -PC (r) GS (t) PC (c)     Post Treatment Position bed  -PC (r) GS (t) PC (c)     In Bed notified nsg;fowlers;call light within reach;encouraged to call for assist;exit alarm on;legs elevated  -PC (r) GS (t) PC (c)           User Key  (r) = Recorded By, (t) = Taken By, (c) = Cosigned By    Initials Name Provider Type    PC Arlette Tellez, PT Physical Therapist    Bk Ellington, CHYNA Student PT Student               Outcome Measures     Row Name 08/01/22 0937          How much help from another person do you currently need...    Turning from your back to your side while in flat bed without using bedrails? 2  -PC (r) GS (t) PC (c)     Moving from lying on back to sitting on the side of a flat bed without bedrails? 2  -PC (r) GS (t) PC (c)     Moving to and from a bed to a chair (including a wheelchair)? 1  -PC (r) GS (t) PC (c)     Standing up from a chair using your arms (e.g., wheelchair, bedside chair)? 2  -PC (r) GS (t) PC (c)     Climbing 3-5 steps with a railing? 1  -PC (r) GS (t) PC (c)     To walk in hospital room? 1  -PC (r) GS (t) PC (c)     AM-PAC 6 Clicks Score (PT) 9  -PC (r) GS (t)     Highest level of mobility 3 --> Sat at edge of bed  -PC (r) GS (t)     Row Name 08/01/22 0937          Functional Assessment    Outcome Measure Options AM-PAC 6 Clicks Basic Mobility (PT)  -PC (r) GS (t) PC (c)           User Key  (r) = Recorded By, (t) = Taken By, (c) = Cosigned By    Initials Name Provider Type    Arlette Cintron, PT Physical Therapist    Bk Ellington, PT Student PT Student                             Physical Therapy Education                  Title: PT OT SLP Therapies (In Progress)     Topic: Physical Therapy (In Progress)     Point: Mobility training (In Progress)     Learning Progress Summary           Patient Acceptance, E, NR by  at 8/1/2022 0937    Acceptance, E, VU,NR by  at 7/30/2022 1206                   Point: Home exercise program (In Progress)     Learning Progress Summary           Patient Acceptance, E, NR by  at 8/1/2022 0937                   Point: Body mechanics (In Progress)     Learning Progress Summary           Patient Acceptance, E, NR by  at 8/1/2022 0937    Acceptance, E, VU,NR by DJ at 7/30/2022 1206                   Point: Precautions (In Progress)     Learning Progress Summary           Patient Acceptance, E, NR by  at 8/1/2022 0937    Acceptance, E, VU,NR by YANG at 7/30/2022 1206                               User Key     Initials Effective Dates Name Provider Type Discipline     10/25/19 -  Jazzmine Diaz, PT Physical Therapist PT     07/01/22 -  Bk Porter, PT Student PT Student PT              PT Recommendation and Plan     Plan of Care Reviewed With: patient  Progress: no change  Outcome Evaluation: Pt seen this AM. Pt appears very sleepy and only oreinted to self. Pt completed all bed mobility with max A of 2. Attempted ther ex on EOB, but pt could not remain awake and alert without stimulus. Pt displays posterior/R lateral lean with sitting balance that requires Mod-Max A for correction. PT recommending SNF upon d/c.     Time Calculation:    PT Charges     Row Name 08/01/22 0938             Time Calculation    Start Time 0914  -PC (r) GS (t) PC (c)      Stop Time 0924  -PC (r) GS (t) PC (c)      Time Calculation (min) 10 min  -PC (r) GS (t)      PT Received On 08/01/22  -PC (r) GS (t) PC (c)      PT - Next Appointment 08/02/22  -PC (r) GS (t) PC (c)              Timed Charges    54942 - PT Therapeutic Activity Minutes 10  -PC (r) GS (t) PC (c)              Total Minutes    Timed Charges  Total Minutes 10  -PC (r) GS (t)       Total Minutes 10  -PC (r) GS (t)            User Key  (r) = Recorded By, (t) = Taken By, (c) = Cosigned By    Initials Name Provider Type    Arlette Cintron, PT Physical Therapist    Bk Ellington PT Student PT Student              Therapy Charges for Today     Code Description Service Date Service Provider Modifiers Qty    18021452916 HC PT THERAPEUTIC ACT EA 15 MIN 8/1/2022 Bk Porter, PT Student GP 1    33596094837 HC PT THER SUPP EA 15 MIN 8/1/2022 Bk Porter, PT Student GP 1          PT G-Codes  Outcome Measure Options: AM-PAC 6 Clicks Basic Mobility (PT)  AM-PAC 6 Clicks Score (PT): 9  AM-PAC 6 Clicks Score (OT): 13    Bk Porter PT Student  8/1/2022

## 2022-08-01 NOTE — CASE MANAGEMENT/SOCIAL WORK
Discharge Planning Assessment  Psychiatric     Patient Name: Flo Manzo  MRN: [id number]  Today's Date: 8/1/2022    Admit Date: 7/29/2022     Discharge Needs Assessment     Row Name 08/01/22 1313       Living Environment    People in Home child(roland), adult;spouse    Name(s) of People in Home Bhargavi    Current Living Arrangements home    Primary Care Provided by self;spouse/significant other    Provides Primary Care For no one    Family Caregiver if Needed spouse;child(roland), adult    Family Caregiver Names Bhargavi [phone]    Quality of Family Relationships helpful;involved    Able to Return to Prior Arrangements yes       Resource/Environmental Concerns    Resource/Environmental Concerns none    Transportation Concerns none       Transition Planning    Patient/Family Anticipates Transition to home with family    Patient/Family Anticipated Services at Transition skilled nursing    Transportation Anticipated family or friend will provide       Discharge Needs Assessment    Readmission Within the Last 30 Days no previous admission in last 30 days    Equipment Currently Used at Home cpap;walker, standard    Concerns to be Addressed discharge planning    Anticipated Changes Related to Illness inability to care for self    Equipment Needed After Discharge none    Discharge Facility/Level of Care Needs nursing facility, skilled               Discharge Plan     Row Name 08/01/22 6875       Plan    Plan Therapy recommending SNF. Referral to [facility]- pending.    Patient/Family in Agreement with Plan yes    Plan Comments IMM checked. Met with patient at bedside, introduced self and explained CCP role. Verified facesheet and PCP. Patient lives at home with spouse-Bhargavi and son. Patient wishes to go to SNF at [place] and requested referral be sent to [facility] as he has been there in the past. Pt denies H/o HH. Pt has CPAP (did not know which company provides) and a walker. Patient uses [employer] [facility] in  [place]. Stated family could transport pending functional mobility. Referral placed in Epic to [facility] and [facility] called-Referral pending. CCP to follow. Chicho MCKOY              Continued Care and Services - Admitted Since 7/29/2022     Destination     Service Provider Request Status Selected Services Address Phone Fax Patient Preferred    [facility]  Pending - Request Sent N/A [id number] [id number] [phone] --              Expected Discharge Date and Time     Expected Discharge Date Expected Discharge Time    Aug 5, 2022          Demographic Summary     Row Name 08/01/22 1312       General Information    Admission Type inpatient    Referral Source admission list    Preferred Language English               Functional Status     Row Name 08/01/22 1312       Functional Status    Usual Activity Tolerance moderate    Current Activity Tolerance moderate       Functional Status, IADL    Medications independent    Meal Preparation assistive person    Housekeeping assistive person    Laundry assistive person    Shopping assistive person       Mental Status Summary    Recent Changes in Mental Status/Cognitive Functioning no changes               Psychosocial    No documentation.                Abuse/Neglect    No documentation.                Legal    No documentation.                Substance Abuse    No documentation.                Patient Forms    No documentation.                   Chicho Naylor RN

## 2022-08-01 NOTE — PROGRESS NOTES
Name: Flo Manzo ADMIT: 2022   : 1955  PCP: Cathleen Corona MD    MRN: [id number] LOS: 1 days   AGE/SEX: 67 y.o. male  ROOM: [facility]     Subjective   Subjective   No shortness of breath.  No chest pain.  No palpitation.  No PND orthopnea.  No cough.  No wheeze.  No hemoptysis.  No ankle edema.  Continues to have fatigue and weakness.      Review of Systems  GI.  No abdominal pain or nausea or vomiting.  .  No dysuria or hematuria.     Objective   Objective   Vital Signs  Temp:  [98 °F (36.7 °C)-98.5 °F (36.9 °C)] 98.5 °F (36.9 °C)  Heart Rate:  [] 91  Resp:  [18-20] 18  BP: (114-126)/() 120/86  SpO2:  [92 %-95 %] 92 %  on  Flow (L/min):  [2] 2;   Device (Oxygen Therapy): nasal cannula    Intake/Output Summary (Last 24 hours) at 2022 1636  Last data filed at 2022 0722  Gross per 24 hour   Intake --   Output 0 ml   Net 0 ml     Body mass index is 33 kg/m².      22  2231 22  0848   Weight: 105 kg (230 lb 13.2 oz) 104 kg (230 lb)     Physical Exam    General.  Middle-aged gentleman.  Appears older than stated age.  He is alert and oriented x3.  In no respiratory distress or pain.  Eyes.  Pupils equal round and reactive.  Intact extraocular musculature.  No pallor or jaundice.  Normal trajectory and lids.  Oral cavity.  Moist mucous membrane.  Neck.  Supple.  No JVD.  No lymphadenopathy or thyromegaly.  Cardiovascular.  Regular rate and rhythm.  Grade 3 systolic murmur.  Occasional ectopic beats  Chest.  Poor bilateral air entry with no added sounds.  Abdomen.  Soft lax.  No tenderness.  No organomegaly.  No guarding or rebound.  Extremities.  No clubbing cyanosis or edema.  CNS.  No acute focal neurological deficits.    Results Review:      Results from last 7 days   Lab Units 22  0956 22  1110 22  0727 22  1529   SODIUM mmol/L 136 137 140 138   POTASSIUM mmol/L 3.6 3.6 3.7 4.0   CHLORIDE mmol/L 100 101 101 103   CO2 mmol/L 24.7 23.6 26.1  26.4   BUN mg/dL 19 19 15 14   CREATININE mg/dL 0.90 0.92 0.78 0.87   GLUCOSE mg/dL 99 137* 111* 105*   CALCIUM mg/dL 9.3 9.5 9.6 9.4   AST (SGOT) U/L  --   --  19 17   ALT (SGPT) U/L  --   --  21 23     Estimated Creatinine Clearance: 97.5 mL/min (by C-G formula based on SCr of 0.9 mg/dL).  Results from last 7 days   Lab Units 07/31/22  1110   HEMOGLOBIN A1C % 5.70*     Results from last 7 days   Lab Units 07/30/22  2014   GLUCOSE mg/dL 111     Results from last 7 days   Lab Units 07/30/22  0727 07/29/22  1935 07/29/22  1529   CK TOTAL U/L 233*  --   --    TROPONIN T ng/mL  --  <0.010 <0.010     Results from last 7 days   Lab Units 07/29/22  1529   PROBNP pg/mL 1,264.0*     Results from last 7 days   Lab Units 07/30/22  0727   TSH uIU/mL 0.023*     Results from last 7 days   Lab Units 07/31/22  1110 07/29/22  1529   MAGNESIUM mg/dL 2.2 2.1           Invalid input(s): LDLCALC  Results from last 7 days   Lab Units 08/01/22  0956 07/31/22  1110 07/30/22  0727 07/29/22  1529   WBC 10*3/mm3 8.65 9.51 9.58 10.14   HEMOGLOBIN g/dL 14.5 14.9 16.0 15.9   HEMATOCRIT % 43.6 45.8 48.2 47.2   PLATELETS 10*3/mm3 218 241 215 241   MCV fL 86.0 86.6 84.7 84.1   MCH pg 28.6 28.2 28.1 28.3   MCHC g/dL 33.3 32.5 33.2 33.7   RDW % 12.8 13.0 12.8 12.7   RDW-SD fl 39.4 40.9 38.6 38.1   MPV fL 10.0 10.1 10.5 10.1   NEUTROPHIL % % 61.3  --  74.2 80.3*   LYMPHOCYTE % % 26.1  --  17.2* 13.3*   MONOCYTES % % 9.7  --  7.1 4.2*   EOSINOPHIL % % 2.3  --  0.8 0.7   BASOPHIL % % 0.3  --  0.3 0.4   IMM GRAN % % 0.3  --  0.4 1.1*   NEUTROS ABS 10*3/mm3 5.29  --  7.10* 8.14*   LYMPHS ABS 10*3/mm3 2.26  --  1.65 1.35   MONOS ABS 10*3/mm3 0.84  --  0.68 0.43   EOS ABS 10*3/mm3 0.20  --  0.08 0.07   BASOS ABS 10*3/mm3 0.03  --  0.03 0.04   IMMATURE GRANS (ABS) 10*3/mm3 0.03  --  0.04 0.11*   NRBC /100 WBC 0.0  --  0.0 0.0     Results from last 7 days   Lab Units 07/30/22  0727 07/29/22  1529   INR  1.08 1.08   APTT seconds  --  28.8         Results from  last 7 days   Lab Units 07/29/22  1529   LACTATE mmol/L 1.7             Results from last 7 days   Lab Units 07/30/22  2151   BLOODCX  No growth at 24 hours  No growth at 24 hours         Results from last 7 days   Lab Units 07/29/22  1623   NITRITE UA  Negative   WBC UA /HPF 0-2   BACTERIA UA /HPF None Seen   SQUAM EPITHEL UA /HPF 0-2           Imaging:  Imaging Results (Last 24 Hours)     ** No results found for the last 24 hours. **             I reviewed the patient's new clinical results / labs / tests / procedures      Assessment/Plan     Active Hospital Problems    Diagnosis  POA   • **Atrial fibrillation with RVR (HCC) [I48.91]  Yes   • Generalized weakness [R53.1]  Yes   • S/P ICD (internal cardiac defibrillator) procedure [Z95.810]  Yes   • GÓMEZ on auto CPAP [G47.33, Z99.89]  Not Applicable   • Diastolic CHF, chronic (HCC) [I50.32]  Yes   • Essential hypertension [I10]  Yes      Resolved Hospital Problems   No resolved problems to display.           · Intermittent rapid atrial fibrillation with acute on chronic diastolic congestive heart failure in a patient with a history of sick sinus syndrome/moderate AS/chronic diastolic congestive heart failure/hypertension.  Improved symptomatology.  Status post diuresis and amiodarone IV.  Rate and blood pressure is much better controlled at this time.  There is no clinical evidence of congestive heart failure at this time.  No angina.  We will continue the patient on Lopressor/Lotrel and Eliquis.  Status post cardiology consult will monitor.  · Elevated TSH.  Normal free T4.  Awaiting T3.  Follow-up as outpatient.  · Glucose intolerance A1c mildly elevated at 5.7  · History of CVA.  No acute CNS deficits.  Continue Lipitor and anticoagulation.  · Obstructive sleep apnea.      · Discussed my findings and plan of treatment with the patient.  · Disposition.  Physical therapy suggested SNF.  We will ask  to evaluate.      Serena Patel MD  [facility]  08/01/22  16:36 EDT

## 2022-08-01 NOTE — PLAN OF CARE
Goal Outcome Evaluation:  Plan of Care Reviewed With: (P) patient        Progress: (P) no change  Outcome Evaluation: (P) Pt seen this AM. Pt appears very sleepy and only oreinted to self. Pt completed all bed mobility with max A of 2. Attempted ther ex on EOB, but pt could not remain awake and alert without stimulus. Pt displays posterior/R lateral lean with sitting balance that requires Mod-Max A for correction. PT recommending SNF upon d/c.

## 2022-08-01 NOTE — PROGRESS NOTES
[facility]  Cardiology Progress Note    Patient Identification:  Name: Flo Manzo  Age: 67 y.o.  Sex: male  :  1955  MRN: [id number]                 Follow Up / Chief Complaint: atrial fibrillation with rve    Interval History: Per neuro refusing of MRI to rule out stroke.  Remains on 2 L oxygen        Subjective: He states he is still weak.  He denies any chest pain or palpitations.      Objective:    Past Medical History:  Past Medical History:   Diagnosis Date    Arthritis     Atrial fibrillation with RVR (HCC) 2019    Colon polyps 2018    Hepatic flexure: tubular adenoma, only low-grade dysplasia; splenic flexure: tubular adenoma, only low-grade dysplasia; sigmoid colon: tubular adenoma, multiple fragments only low-grade dysplasia    Depression     Heart murmur     Hemorrhagic stroke (HCC) 2019    Hypertension     New onset atrial fibrillation (CMS/HCC) - RVR 2019    GÓMEZ (obstructive sleep apnea) 2019    Home sleep study with moderate severity GÓMEZ, AHI 20 events per hour.  Sleep-related hypoxia with low O2 saturation 84% for 14 minutes.    Sleep apnea     previously diagnosed with sleep apnea, had T&A done and it has since resolved     Stroke (HCC)     Uncontrolled hypertension     Ventral hernia      Past Surgical History:  Past Surgical History:   Procedure Laterality Date    APPENDECTOMY N/A     BACK SURGERY      BLADDER STIMULATOR IMPLANT L LOWER BACK    CARDIAC CATHETERIZATION N/A 2004    Cath left ventriculography, coronary angiography and left heart catheterization, Normal results-Dr. Fierro     CARDIAC CATHETERIZATION N/A 2019    Procedure: Left Heart Cath;  Surgeon: Eren Bruce MD;  Location: [facility] CATH INVASIVE LOCATION;  Service: Cardiology    CARDIAC CATHETERIZATION N/A 2019    Procedure: Left ventriculography;  Surgeon: Eren Bruce MD;  Location: [facility] CATH INVASIVE LOCATION;  Service: Cardiology     CARDIAC CATHETERIZATION N/A 1/29/2019    Procedure: Coronary angiography;  Surgeon: Eren Bruce MD;  Location:  [facility] CATH INVASIVE LOCATION;  Service: Cardiology    CARDIAC ELECTROPHYSIOLOGY PROCEDURE N/A 3/4/2022    Procedure: Pacemaker SC new BIOTRONIK;  Surgeon: Eren Bruce MD;  Location:  [facility] CATH INVASIVE LOCATION;  Service: Cardiology;  Laterality: N/A;    CARPAL TUNNEL RELEASE Right 2013    CARPAL TUNNEL RELEASE Left     COLONOSCOPY N/A 1/4/2018    Procedure: COLONOSCOPY with cold polypectomy and hot snare polypectomy;  Surgeon: Ten Solitario MD;  Location: [facility] ENDOSCOPY;  Service:     EYE LENS IMPLANT SECONDARY Bilateral 2007, 2008    KNEE CARTILAGE SURGERY Right 1979    TONSILLECTOMY AND ADENOIDECTOMY Bilateral 2005    TOTAL KNEE ARTHROPLASTY Left 03/14/2016    Left total knee arthroplasty with Amberly component, size 11 femur, G tibial baseplate with a 10 polyethylene insert and a 38 patellar button-Dr. Pablo Hairston    TOTAL KNEE ARTHROPLASTY Right 03/02/2015    Right total knee arthroplasty with Amberly component size G femur, 11 tibial base plate with an 11 polyethylene insert and a 38 patellar button-Dr. Pablo Hairston    UVULOPALATOPHARYNGOPLASTY N/A 2005    part of soft palate, and uvula    VENTRAL HERNIA REPAIR N/A 1/23/2018    Procedure: VENTRAL HERNIA REPAIR LAPAROSCOPIC WITH DAVINCI ROBOT AND MESH;  Surgeon: Ten Solitario MD;  Location: [facility] MAIN OR;  Service:         Social History:   Social History     Tobacco Use    Smoking status: Never Smoker    Smokeless tobacco: Never Used   Substance Use Topics    Alcohol use: Not Currently     Comment: social, maybe a drink a month, not since stroke      Family History:  Family History   Problem Relation Age of Onset    Hypertension Mother     Kidney failure Mother     Coronary artery disease Father     Lung cancer Father         positive smoker    Heart attack Father     Breast cancer Sister 60    Prostate cancer  "Brother     Colon polyps Brother     Kidney nephrosis Brother     Malig Hyperthermia Neg Hx           Allergies:  Allergies   Allergen Reactions    Poison Ivy Extract Hives, Itching and Rash     ITCHY BLISTERS     Scheduled Meds:  amLODIPine, 10 mg, Q24H  apixaban, 5 mg, Q12H  atorvastatin, 40 mg, Nightly  lisinopril, 20 mg, Q24H  metoprolol tartrate, 25 mg, BID  pantoprazole, 40 mg, QAM  sodium chloride, 10 mL, Q12H  tamsulosin, 0.4 mg, Daily            INTAKE AND OUTPUT:    Intake/Output Summary (Last 24 hours) at 8/1/2022 1146  Last data filed at 8/1/2022 0722  Gross per 24 hour   Intake --   Output 0 ml   Net 0 ml     ROS  Constitutional: Awake and alert, no fever. No nosebleeds  Abdomen           no abdominal pain   Cardiac              no chest pain  Pulmonary          no shortness of breath      /86 (BP Location: Right arm, Patient Position: Sitting)   Pulse 91   Temp 98.5 °F (36.9 °C) (Oral)   Resp 18   Ht 177.8 cm (70\")   Wt 104 kg (230 lb)   SpO2 92%   BMI 33.00 kg/m²   General appearance: No acute changes   Neck: Trachea midline; NECK, supple, no thyromegaly or lymphadenopathy   Lungs: Normal size and shape, normal breath sounds, equal distribution of air, no rales or rhonchi   CV: S1-S2 regular, no murmurs, no rub, no gallop   Abdomen: Soft, nontender; no masses , no abnormal abdominal sounds   Extremities: No deformity , normal color , no peripheral edema   Skin: Normal temperature, turgor and texture; no rash, ulcers            I reviewed the patient's new clinical results, and personally reviewed and interpreted the patient's ECG and telemetry data from the last 24 hours    Cardiographics  Telemetry:   Paced. afib    ECG:     Echocardiogram:   Interpretation Summary    Calculated left ventricular EF = 54.7% Estimated left ventricular EF = 55% Estimated left ventricular EF was in agreement with the calculated left ventricular EF. Left ventricular systolic function is normal. Normal left " ventricular cavity size noted. Left ventricular wall thickness is consistent with moderate concentric hypertrophy. All left ventricular wall segments contract normally. Left ventricular diastolic function was indeterminate.  Left atrial volume is mildly increased.  An electronic lead is present in the right atrium.  The aortic valve is abnormal in structure. There is severe calcification of the aortic valve. Mild aortic valve regurgitation is present. Mild aortic valve stenosis is present. Aortic valve area is 1.4 cm2. Aortic valve mean pressure gradient is 18.9 mmHg. Aortic valve dimensionless index is 0.40 .  Severe mitral annular calcification is present. Trace mitral valve regurgitation is present.  Trace tricuspid valve regurgitation is present. Estimated right ventricular systolic pressure from tricuspid regurgitation is normal (<35 mmHg). Calculated right ventricular systolic pressure from tricuspid regurgitation is 27 mmHg.      2021  Interpretation Summary       Findings consistent with a normal ECG stress test.  Left ventricular ejection fraction is normal. (Calculated EF = 54%).  Myocardial perfusion imaging indicates a normal myocardial perfusion study with no evidence of ischemia.  Impressions are consistent with a low risk study.     Asymptomatic for chest pain. ECG is negative for ischemia.   Ectopy: none  Afib through entirety of stress test  Hypotensive BP after lexiscan administration 104/66. BP at 1min recovery 90/60, BP at 2min recovery 100/74   Supervised by:  Elaine BURRELL.       2022    Conclusion       Successful single-chamber pacemaker implantation       2019  Impression:      Normal coronary arteries  Normal LV gram     Recommendations:      Medical management            I sincerely appreciate the opportunity to participate in your patient's care. Please feel free to contact me anytime if I can be of assistance in this or any other way.     Eren Bruce MD  2/1/2019  9:20  AM           Lab Review   Results from last 7 days   Lab Units 07/30/22  0727 07/29/22  1935 07/29/22  1529   CK TOTAL U/L 233*  --   --    TROPONIN T ng/mL  --  <0.010 <0.010     Results from last 7 days   Lab Units 07/31/22  1110   MAGNESIUM mg/dL 2.2     Results from last 7 days   Lab Units 08/01/22  0956   SODIUM mmol/L 136   POTASSIUM mmol/L 3.6   BUN mg/dL 19   CREATININE mg/dL 0.90   CALCIUM mg/dL 9.3        Results from last 7 days   Lab Units 08/01/22  0956 07/31/22  1110 07/30/22  0727   WBC 10*3/mm3 8.65 9.51 9.58   HEMOGLOBIN g/dL 14.5 14.9 16.0   HEMATOCRIT % 43.6 45.8 48.2   PLATELETS 10*3/mm3 218 241 215     Results from last 7 days   Lab Units 07/30/22  0727 07/29/22  1529   INR  1.08 1.08   APTT seconds  --  28.8       CHADS-VASc Risk Assessment              5 Total Score    1 CHF    1 Hypertension    2 PRIOR STROKE/TIA/THROMBO    1 Age 65-74        Criteria that do not apply:    Age >/= 75    DM    Vascular Disease    Sex: Female          The following medical decision was discussed in detail with Dr. Bruce      Assessment:  1. Atrial fibrillation with RVR: HR controlled.  TSH 0.023, defer to attending.  Continue anticoagulation and beta-blockade.  2.  Essential hypertension: Controlled.  Continue amlodipine, lisinopril and beta-blockade.  3.  Diastolic congestive heart failure, chronic: At home on Lotrel.  Started on lisinopril yesterday.  Euvolemic on exam.  Continue Lisinopril, and beta-blockade.  Creatinine and GFR stable.  4. GÓMEZ on CPAP  5. SSS with ICD  6. Weakness  7.  Nonrheumatic aortic valve: As seen on previous echo with a EF of 55%.  See full report as above.     Plan:  Blood pressure better controlled.  Heart rate controlled with underlying rhythm on the monitor atrial fibrillation.  No events noted overnight.  Echo revealed EF 55% with near mitral annular calcification, severe calcification of the aortic valve with mild aortic valve regurgitation.  RIKY 1.4 cm 2.  Noted  "possibly awaiting bed for SNF.  No further work up needed at this time. Continue Eliquis, beta-blockade, lisinopril and amlodipine.      Labs/tests ordered for am: bmp, mag    )8/1/2022  ASHANTI Recinos      Patient personally interviewed and above subjective findings personally confirmed during a face to face contact with patient today  All findings of physical examination confirmed  All pertinent and performed labs, cardiac procedures ,  radiographs of the last 24 hours personally reviewed  Impression and plans discussed/elaborated and implemented jointly as described above     Eren Bruce MD        EMR Dragon/Transcription:   \"Dictated utilizing Dragon dictation\".     "

## 2022-08-02 LAB
ANION GAP SERPL CALCULATED.3IONS-SCNC: 9 MMOL/L (ref 5–15)
BASOPHILS # BLD AUTO: 0.04 10*3/MM3 (ref 0–0.2)
BASOPHILS NFR BLD AUTO: 0.4 % (ref 0–1.5)
BUN SERPL-MCNC: 21 MG/DL (ref 8–23)
BUN/CREAT SERPL: 24.4 (ref 7–25)
CALCIUM SPEC-SCNC: 9.4 MG/DL (ref 8.6–10.5)
CHLORIDE SERPL-SCNC: 101 MMOL/L (ref 98–107)
CO2 SERPL-SCNC: 26 MMOL/L (ref 22–29)
CREAT SERPL-MCNC: 0.86 MG/DL (ref 0.76–1.27)
DEPRECATED RDW RBC AUTO: 40.6 FL (ref 37–54)
EGFRCR SERPLBLD CKD-EPI 2021: 95.5 ML/MIN/1.73
EOSINOPHIL # BLD AUTO: 0.32 10*3/MM3 (ref 0–0.4)
EOSINOPHIL NFR BLD AUTO: 3.5 % (ref 0.3–6.2)
ERYTHROCYTE [DISTWIDTH] IN BLOOD BY AUTOMATED COUNT: 12.8 % (ref 12.3–15.4)
GLUCOSE SERPL-MCNC: 115 MG/DL (ref 65–99)
HCT VFR BLD AUTO: 46.2 % (ref 37.5–51)
HGB BLD-MCNC: 14.7 G/DL (ref 13–17.7)
IMM GRANULOCYTES # BLD AUTO: 0.03 10*3/MM3 (ref 0–0.05)
IMM GRANULOCYTES NFR BLD AUTO: 0.3 % (ref 0–0.5)
LYMPHOCYTES # BLD AUTO: 1.72 10*3/MM3 (ref 0.7–3.1)
LYMPHOCYTES NFR BLD AUTO: 18.8 % (ref 19.6–45.3)
MCH RBC QN AUTO: 27.8 PG (ref 26.6–33)
MCHC RBC AUTO-ENTMCNC: 31.8 G/DL (ref 31.5–35.7)
MCV RBC AUTO: 87.5 FL (ref 79–97)
MONOCYTES # BLD AUTO: 0.74 10*3/MM3 (ref 0.1–0.9)
MONOCYTES NFR BLD AUTO: 8.1 % (ref 5–12)
NEUTROPHILS NFR BLD AUTO: 6.29 10*3/MM3 (ref 1.7–7)
NEUTROPHILS NFR BLD AUTO: 68.9 % (ref 42.7–76)
NRBC BLD AUTO-RTO: 0 /100 WBC (ref 0–0.2)
PLATELET # BLD AUTO: 228 10*3/MM3 (ref 140–450)
PMV BLD AUTO: 9.9 FL (ref 6–12)
POTASSIUM SERPL-SCNC: 4 MMOL/L (ref 3.5–5.2)
RBC # BLD AUTO: 5.28 10*6/MM3 (ref 4.14–5.8)
SODIUM SERPL-SCNC: 136 MMOL/L (ref 136–145)
T3 SERPL-MCNC: 80 NG/DL (ref 71–180)
T3FREE SERPL-MCNC: 2.7 PG/ML (ref 2–4.4)
WBC NRBC COR # BLD: 9.14 10*3/MM3 (ref 3.4–10.8)

## 2022-08-02 PROCEDURE — 99232 SBSQ HOSP IP/OBS MODERATE 35: CPT | Performed by: NURSE PRACTITIONER

## 2022-08-02 PROCEDURE — 80048 BASIC METABOLIC PNL TOTAL CA: CPT | Performed by: INTERNAL MEDICINE

## 2022-08-02 PROCEDURE — 85025 COMPLETE CBC W/AUTO DIFF WBC: CPT | Performed by: INTERNAL MEDICINE

## 2022-08-02 RX ADMIN — Medication 10 ML: at 23:56

## 2022-08-02 RX ADMIN — APIXABAN 5 MG: 5 TABLET, FILM COATED ORAL at 20:38

## 2022-08-02 RX ADMIN — ATORVASTATIN CALCIUM 40 MG: 20 TABLET, FILM COATED ORAL at 20:38

## 2022-08-02 RX ADMIN — METOPROLOL TARTRATE 25 MG: 25 TABLET, FILM COATED ORAL at 20:38

## 2022-08-02 RX ADMIN — AMLODIPINE BESYLATE 10 MG: 5 TABLET ORAL at 09:44

## 2022-08-02 RX ADMIN — Medication 10 ML: at 09:44

## 2022-08-02 RX ADMIN — APIXABAN 5 MG: 5 TABLET, FILM COATED ORAL at 09:44

## 2022-08-02 RX ADMIN — LISINOPRIL 20 MG: 20 TABLET ORAL at 09:44

## 2022-08-02 RX ADMIN — METOPROLOL TARTRATE 25 MG: 25 TABLET, FILM COATED ORAL at 09:44

## 2022-08-02 RX ADMIN — TAMSULOSIN HYDROCHLORIDE 0.4 MG: 0.4 CAPSULE ORAL at 09:44

## 2022-08-02 RX ADMIN — PANTOPRAZOLE SODIUM 40 MG: 40 TABLET, DELAYED RELEASE ORAL at 06:21

## 2022-08-02 NOTE — PROGRESS NOTES
Name: Flo Manzo ADMIT: 2022   : 1955  PCP: Cathleen Corona MD    MRN: [id number] LOS: 2 days   AGE/SEX: 67 y.o. male  ROOM: [facility]     Subjective   Subjective   No shortness of breath.  No chest pain.  No palpitation.  No PND orthopnea.    No wheeze.  No hemoptysis.  No ankle edema.  Positive occasional dry cough.Continues to have fatigue and weakness.      Review of Systems  GI.  No abdominal pain or nausea or vomiting.  .  No dysuria or hematuria.     Objective   Objective   Vital Signs  Temp:  [98.1 °F (36.7 °C)-98.5 °F (36.9 °C)] 98.5 °F (36.9 °C)  Heart Rate:  [] 80  Resp:  [16-20] 16  BP: (104-121)/(84-89) 109/85  SpO2:  [91 %-95 %] 91 %  on  Flow (L/min):  [2-3] 3;   Device (Oxygen Therapy): nasal cannula    Intake/Output Summary (Last 24 hours) at 2022 0953  Last data filed at 2022 0924  Gross per 24 hour   Intake 210 ml   Output 1 ml   Net 209 ml     Body mass index is 33 kg/m².      22  2231 22  0848   Weight: 105 kg (230 lb 13.2 oz) 104 kg (230 lb)     Physical Exam    General.  Middle-aged gentleman.  Appears older than stated age.  He is alert and oriented x3.  In no respiratory distress or pain.  Eyes.  Pupils equal round and reactive.  Intact extraocular musculature.  No pallor or jaundice.  Normal trajectory and lids.  Oral cavity.  Moist mucous membrane.  Neck.  Supple.  No JVD.  No lymphadenopathy or thyromegaly.  Cardiovascular.  Regular rate and rhythm.  Grade 3 systolic murmur.  Occasional ectopic beats  Chest.  Poor bilateral air entry with no added sounds.  Abdomen.  Soft lax.  No tenderness.  No organomegaly.  No guarding or rebound.  Extremities.  No clubbing cyanosis or edema.  CNS.  No acute focal neurological deficits.    Results Review:      Results from last 7 days   Lab Units 22  0956 22  1110 22  0727 22  1529   SODIUM mmol/L 136 137 140 138   POTASSIUM mmol/L 3.6 3.6 3.7 4.0   CHLORIDE mmol/L 100 101 101 103    CO2 mmol/L 24.7 23.6 26.1 26.4   BUN mg/dL 19 19 15 14   CREATININE mg/dL 0.90 0.92 0.78 0.87   GLUCOSE mg/dL 99 137* 111* 105*   CALCIUM mg/dL 9.3 9.5 9.6 9.4   AST (SGOT) U/L  --   --  19 17   ALT (SGPT) U/L  --   --  21 23     Estimated Creatinine Clearance: 97.5 mL/min (by C-G formula based on SCr of 0.9 mg/dL).  Results from last 7 days   Lab Units 07/31/22  1110   HEMOGLOBIN A1C % 5.70*     Results from last 7 days   Lab Units 07/30/22  2014   GLUCOSE mg/dL 111     Results from last 7 days   Lab Units 07/30/22  0727 07/29/22  1935 07/29/22  1529   CK TOTAL U/L 233*  --   --    TROPONIN T ng/mL  --  <0.010 <0.010     Results from last 7 days   Lab Units 07/29/22  1529   PROBNP pg/mL 1,264.0*     Results from last 7 days   Lab Units 07/30/22  0727   TSH uIU/mL 0.023*     Results from last 7 days   Lab Units 07/31/22  1110 07/29/22  1529   MAGNESIUM mg/dL 2.2 2.1           Invalid input(s): LDLCALC  Results from last 7 days   Lab Units 08/01/22  0956 07/31/22  1110 07/30/22  0727 07/29/22  1529   WBC 10*3/mm3 8.65 9.51 9.58 10.14   HEMOGLOBIN g/dL 14.5 14.9 16.0 15.9   HEMATOCRIT % 43.6 45.8 48.2 47.2   PLATELETS 10*3/mm3 218 241 215 241   MCV fL 86.0 86.6 84.7 84.1   MCH pg 28.6 28.2 28.1 28.3   MCHC g/dL 33.3 32.5 33.2 33.7   RDW % 12.8 13.0 12.8 12.7   RDW-SD fl 39.4 40.9 38.6 38.1   MPV fL 10.0 10.1 10.5 10.1   NEUTROPHIL % % 61.3  --  74.2 80.3*   LYMPHOCYTE % % 26.1  --  17.2* 13.3*   MONOCYTES % % 9.7  --  7.1 4.2*   EOSINOPHIL % % 2.3  --  0.8 0.7   BASOPHIL % % 0.3  --  0.3 0.4   IMM GRAN % % 0.3  --  0.4 1.1*   NEUTROS ABS 10*3/mm3 5.29  --  7.10* 8.14*   LYMPHS ABS 10*3/mm3 2.26  --  1.65 1.35   MONOS ABS 10*3/mm3 0.84  --  0.68 0.43   EOS ABS 10*3/mm3 0.20  --  0.08 0.07   BASOS ABS 10*3/mm3 0.03  --  0.03 0.04   IMMATURE GRANS (ABS) 10*3/mm3 0.03  --  0.04 0.11*   NRBC /100 WBC 0.0  --  0.0 0.0     Results from last 7 days   Lab Units 07/30/22  0727 07/29/22  1529   INR  1.08 1.08   APTT seconds   --  28.8         Results from last 7 days   Lab Units 07/29/22  1529   LACTATE mmol/L 1.7             Results from last 7 days   Lab Units 07/30/22  2151   BLOODCX  No growth at 2 days  No growth at 2 days         Results from last 7 days   Lab Units 07/29/22  1623   NITRITE UA  Negative   WBC UA /HPF 0-2   BACTERIA UA /HPF None Seen   SQUAM EPITHEL UA /HPF 0-2           Imaging:  Imaging Results (Last 24 Hours)     ** No results found for the last 24 hours. **             I reviewed the patient's new clinical results / labs / tests / procedures      Assessment/Plan     Active Hospital Problems    Diagnosis  POA   • **Atrial fibrillation with RVR (HCC) [I48.91]  Yes   • Generalized weakness [R53.1]  Yes   • S/P ICD (internal cardiac defibrillator) procedure [Z95.810]  Yes   • GÓMEZ on auto CPAP [G47.33, Z99.89]  Not Applicable   • Diastolic CHF, chronic (HCC) [I50.32]  Yes   • Essential hypertension [I10]  Yes      Resolved Hospital Problems   No resolved problems to display.           · Intermittent rapid atrial fibrillation with acute on chronic diastolic congestive heart failure in a patient with a history of sick sinus syndrome/moderate AS/chronic diastolic congestive heart failure/hypertension.  Improved symptomatology.  Status post diuresis and amiodarone IV.  Rate and blood pressure well controlled at this time.  There is no clinical evidence of congestive heart failure at this time.  No angina.  We will continue the patient on Lopressor/Lotrel and Eliquis.  Status post cardiology consult will monitor.  · Elevated TSH.  Normal free T4 and total T3.  Possibly sick thyroid syndrome.  Follow-up as outpatient.  · Glucose intolerance A1c mildly elevated at 5.7  · History of CVA.  No acute CNS deficits.  Continue Lipitor and anticoagulation.  · Obstructive sleep apnea.      · Discussed my findings and plan of treatment with the patient/nurse.  · Disposition.  Physical therapy suggested SNF.  Awaiting placement in  SNF      Serena Patel MD  [facility]  08/02/22  09:53 EDT

## 2022-08-02 NOTE — PLAN OF CARE
Goal Outcome Evaluation:         VSS, no complaints of pain, q2 turns, incontinence care provided. 2L NC worn throughout shift. All needs met.

## 2022-08-02 NOTE — CASE MANAGEMENT/SOCIAL WORK
Continued Stay Note  [facility]     Patient Name: Flo Manzo  MRN: [id number]  Today's Date: 8/2/2022    Admit Date: 7/29/2022     Discharge Plan     Row Name 08/02/22 1303       Plan    Plan [facility]    Patient/Family in Agreement with Plan yes    Plan Comments Anticipate dc tomorrow.  Spoke with Mehnaz and [facility] will have a bed tomorrow.  Spoke with patient at bedside and he is agreeable. [facility] [place] booked for 8/3 @ 1500 ( confirmation number [id number]).  Wife, Bhargavi, updated by telephone.  She is aware that patient will likely dc tomorrow and is agreeable to [facility].  She would like to speak with MD prior to dc.  Sticky note left. Ama Bermudez RN               Discharge Codes    No documentation.               Expected Discharge Date and Time     Expected Discharge Date Expected Discharge Time    Aug 3, 2022             Ama Bermudez RN

## 2022-08-02 NOTE — PROGRESS NOTES
[facility]  Cardiology Progress Note    Patient Identification:  Name: Flo Manzo  Age: 67 y.o.  Sex: male  :  1955  MRN: [id number]                 Follow Up / Chief Complaint: atrial fibrillation with rvr    Interval History: Per neuro refusing of MRI to rule out stroke.   Rate controlled on monitor. Blood pressure stable.  Awaiting bed availability.     Subjective: He states his weakness has improved. He denies any chest pain or palpitations.        Objective:    Past Medical History:  Past Medical History:   Diagnosis Date   • Arthritis    • Atrial fibrillation with RVR (HCC) 2019   • Colon polyps 2018    Hepatic flexure: tubular adenoma, only low-grade dysplasia; splenic flexure: tubular adenoma, only low-grade dysplasia; sigmoid colon: tubular adenoma, multiple fragments only low-grade dysplasia   • Depression    • Heart murmur    • Hemorrhagic stroke (HCC) 2019   • Hypertension    • New onset atrial fibrillation (CMS/HCC) - RVR 2019   • GÓMEZ (obstructive sleep apnea) 2019    Home sleep study with moderate severity GÓMEZ, AHI 20 events per hour.  Sleep-related hypoxia with low O2 saturation 84% for 14 minutes.   • Sleep apnea     previously diagnosed with sleep apnea, had T&A done and it has since resolved    • Stroke (HCC)    • Uncontrolled hypertension    • Ventral hernia      Past Surgical History:  Past Surgical History:   Procedure Laterality Date   • APPENDECTOMY N/A    • BACK SURGERY      BLADDER STIMULATOR IMPLANT L LOWER BACK   • CARDIAC CATHETERIZATION N/A 2004    Cath left ventriculography, coronary angiography and left heart catheterization, Normal results-Dr. Vadim Mancuso   • CARDIAC CATHETERIZATION N/A 2019    Procedure: Left Heart Cath;  Surgeon: Eren Bruce MD;  Location: [facility] CATH INVASIVE LOCATION;  Service: Cardiology   • CARDIAC CATHETERIZATION N/A 2019    Procedure: Left ventriculography;  Surgeon:  Eren Bruce MD;  Location:  [facility] CATH INVASIVE LOCATION;  Service: Cardiology   • CARDIAC CATHETERIZATION N/A 1/29/2019    Procedure: Coronary angiography;  Surgeon: Eren Bruce MD;  [facility];  Service: Cardiology   • CARDIAC ELECTROPHYSIOLOGY PROCEDURE N/A 3/4/2022    Procedure: Pacemaker SC new BIOTRONIK;  Surgeon: Eren Bruce MD;  Location:  [facility] CATH INVASIVE LOCATION;  Service: Cardiology;  Laterality: N/A;   • CARPAL TUNNEL RELEASE Right 2013   • CARPAL TUNNEL RELEASE Left    • COLONOSCOPY N/A 1/4/2018    Procedure: COLONOSCOPY with cold polypectomy and hot snare polypectomy;  Surgeon: Ten Solitario MD;  Location: [facility] ENDOSCOPY;  Service:    • EYE LENS IMPLANT SECONDARY Bilateral 2007, 2008   • KNEE CARTILAGE SURGERY Right 1979   • TONSILLECTOMY AND ADENOIDECTOMY Bilateral 2005   • TOTAL KNEE ARTHROPLASTY Left 03/14/2016    Left total knee arthroplasty with Amberly component, size 11 femur, G tibial baseplate with a 10 polyethylene insert and a 38 patellar button-Dr. Pablo Hairston   • TOTAL KNEE ARTHROPLASTY Right 03/02/2015    Right total knee arthroplasty with Amberly component size G femur, 11 tibial base plate with an 11 polyethylene insert and a 38 patellar button-Dr. Pablo Hairston   • UVULOPALATOPHARYNGOPLASTY N/A 2005    part of soft palate, and uvula   • VENTRAL HERNIA REPAIR N/A 1/23/2018    Procedure: VENTRAL HERNIA REPAIR LAPAROSCOPIC WITH DAVINCI ROBOT AND MESH;  Surgeon: Ten Solitario MD;  Location: [facility] MAIN OR;  Service:         Social History:   Social History     Tobacco Use   • Smoking status: Never Smoker   • Smokeless tobacco: Never Used   Substance Use Topics   • Alcohol use: Not Currently     Comment: social, maybe a drink a month, not since stroke      Family History:  Family History   Problem Relation Age of Onset   • Hypertension Mother    • Kidney failure Mother    • Coronary artery disease Father    • Lung  "cancer Father         positive smoker   • Heart attack Father    • Breast cancer Sister 60   • Prostate cancer Brother    • Colon polyps Brother    • Kidney nephrosis Brother    • Malig Hyperthermia Neg Hx           Allergies:  Allergies   Allergen Reactions   • Poison Ivy Extract Hives, Itching and Rash     ITCHY BLISTERS     Scheduled Meds:  amLODIPine, 10 mg, Q24H  apixaban, 5 mg, Q12H  atorvastatin, 40 mg, Nightly  lisinopril, 20 mg, Q24H  metoprolol tartrate, 25 mg, BID  pantoprazole, 40 mg, QAM  sodium chloride, 10 mL, Q12H  tamsulosin, 0.4 mg, Daily            INTAKE AND OUTPUT:    Intake/Output Summary (Last 24 hours) at 8/2/2022 0916  Last data filed at 8/1/2022 1700  Gross per 24 hour   Intake --   Output 1 ml   Net -1 ml     ROS  Constitutional: awake and alert, no fever.  No nosebleeds  Abdomen        no abdominal pain  Cardiac           no chest pain  Pulmonary      no shortness of breath      /85 (BP Location: Right arm, Patient Position: Lying)   Pulse 80   Temp 98.5 °F (36.9 °C) (Oral)   Resp 16   Ht 177.8 cm (70\")   Wt 104 kg (230 lb)   SpO2 91%   BMI 33.00 kg/m²           Physical Exam:  General:  Appears in no acute distress, resting in bed eating breakfast.  Eyes: eom normal and no conjunctival drainage  HEENT:  No JVD. Thyroid not visibly enlarged. No mucosal pallor or cyanosis  Respiratory: Respirations regular and unlabored at rest. BBS with good air entry in all fields. No crackles, rubs or wheezes auscultated  Cardiovascular: S1S2 Regular rate and rhythm. No murmur, rub or gallop. No carotid bruits. DP/PT pulses     . No pretibial pitting edema  Gastrointestinal: Abdomen soft, flat, non tender. Bowel sounds present. No hepatosplenomegaly. No ascites  Skin:   Skin warm and dry to touch. No rashes    Neuro: AAO x3 CN II-XII grossly intact  Psych: Mood and affect normal, pleasant and cooperative          I reviewed the patient's new clinical results, and personally reviewed and " interpreted the patient's ECG and telemetry data from the last 24 hours        Cardiographics  Telemetry:     paced      Paced. afib    ECG:     Echocardiogram:   Interpretation Summary    · Calculated left ventricular EF = 54.7% Estimated left ventricular EF = 55% Estimated left ventricular EF was in agreement with the calculated left ventricular EF. Left ventricular systolic function is normal. Normal left ventricular cavity size noted. Left ventricular wall thickness is consistent with moderate concentric hypertrophy. All left ventricular wall segments contract normally. Left ventricular diastolic function was indeterminate.  · Left atrial volume is mildly increased.  · An electronic lead is present in the right atrium.  · The aortic valve is abnormal in structure. There is severe calcification of the aortic valve. Mild aortic valve regurgitation is present. Mild aortic valve stenosis is present. Aortic valve area is 1.4 cm2. Aortic valve mean pressure gradient is 18.9 mmHg. Aortic valve dimensionless index is 0.40 .  · Severe mitral annular calcification is present. Trace mitral valve regurgitation is present.  · Trace tricuspid valve regurgitation is present. Estimated right ventricular systolic pressure from tricuspid regurgitation is normal (<35 mmHg). Calculated right ventricular systolic pressure from tricuspid regurgitation is 27 mmHg.      2021  Interpretation Summary       · Findings consistent with a normal ECG stress test.  · Left ventricular ejection fraction is normal. (Calculated EF = 54%).  · Myocardial perfusion imaging indicates a normal myocardial perfusion study with no evidence of ischemia.  · Impressions are consistent with a low risk study.     Asymptomatic for chest pain. ECG is negative for ischemia.   Ectopy: none  Afib through entirety of stress test  Hypotensive BP after lexiscan administration 104/66. BP at 1min recovery 90/60, BP at 2min recovery 100/74   Supervised by:  Elaine Kaba  APRN.       2022    Conclusion       · Successful single-chamber pacemaker implantation       2019  Impression:      1. Normal coronary arteries  2. Normal LV gram     Recommendations:      1. Medical management            I sincerely appreciate the opportunity to participate in your patient's care. Please feel free to contact me anytime if I can be of assistance in this or any other way.     Eren Bruce MD  2/1/2019  9:20 AM           Lab Review   Results from last 7 days   Lab Units 07/30/22  0727 07/29/22  1935 07/29/22  1529   CK TOTAL U/L 233*  --   --    TROPONIN T ng/mL  --  <0.010 <0.010     Results from last 7 days   Lab Units 07/31/22  1110   MAGNESIUM mg/dL 2.2     Results from last 7 days   Lab Units 08/01/22  0956   SODIUM mmol/L 136   POTASSIUM mmol/L 3.6   BUN mg/dL 19   CREATININE mg/dL 0.90   CALCIUM mg/dL 9.3        Results from last 7 days   Lab Units 08/01/22  0956 07/31/22  1110 07/30/22  0727   WBC 10*3/mm3 8.65 9.51 9.58   HEMOGLOBIN g/dL 14.5 14.9 16.0   HEMATOCRIT % 43.6 45.8 48.2   PLATELETS 10*3/mm3 218 241 215     Results from last 7 days   Lab Units 07/30/22  0727 07/29/22  1529   INR  1.08 1.08   APTT seconds  --  28.8       CHADS-VASc Risk Assessment              5 Total Score    1 CHF    1 Hypertension    2 PRIOR STROKE/TIA/THROMBO    1 Age 65-74        Criteria that do not apply:    Age >/= 75    DM    Vascular Disease    Sex: Female          The following medical decision was discussed in detail with Dr. Bruce      Assessment:  1. Chronic atrial fibrillation: Noted with RVR on admission: Now with controlled rate.   TSH 0.023, defer to attending.  Continue anticoagulation and beta-blockade.  2.  Essential hypertension: Better controlled.  Continue amlodipine, lisinopril and beta-blockade.  3.  Diastolic congestive heart failure, chronic: At home on Lotrel.  Continue lisinopril and beta-blockade.  Euvolemic on exam.    Creatinine and GFR stable.    4. GÓMEZ on CPAP  5.  "SSS with ICD  6. Weakness  7.  Nonrheumatic aortic valve: EF 55%, see full report as above.      Plan:  Blood pressure stable.  Heart rate controlled.  Echo revealed EF 55%, see full report as above.  No further work up at this time. Discussed with nurse. Continue Eliquis, statin, beta-blockade, lisinopril and amlodipine.    Labs/tests ordered for am: BMP and mag    )8/2/2022  ASHANTI Recinos/Transcription:   \"Dictated utilizing Dragon dictation\".     "

## 2022-08-03 VITALS
DIASTOLIC BLOOD PRESSURE: 79 MMHG | TEMPERATURE: 98.4 F | RESPIRATION RATE: 20 BRPM | HEART RATE: 79 BPM | HEIGHT: 70 IN | WEIGHT: 230 LBS | OXYGEN SATURATION: 95 % | BODY MASS INDEX: 32.93 KG/M2 | SYSTOLIC BLOOD PRESSURE: 113 MMHG

## 2022-08-03 LAB
ANION GAP SERPL CALCULATED.3IONS-SCNC: 9 MMOL/L (ref 5–15)
BASOPHILS # BLD AUTO: 0.04 10*3/MM3 (ref 0–0.2)
BASOPHILS NFR BLD AUTO: 0.4 % (ref 0–1.5)
BUN SERPL-MCNC: 20 MG/DL (ref 8–23)
BUN/CREAT SERPL: 24.4 (ref 7–25)
CALCIUM SPEC-SCNC: 9.3 MG/DL (ref 8.6–10.5)
CHLORIDE SERPL-SCNC: 100 MMOL/L (ref 98–107)
CO2 SERPL-SCNC: 27 MMOL/L (ref 22–29)
CREAT SERPL-MCNC: 0.82 MG/DL (ref 0.76–1.27)
DEPRECATED RDW RBC AUTO: 41 FL (ref 37–54)
EGFRCR SERPLBLD CKD-EPI 2021: 96.9 ML/MIN/1.73
EOSINOPHIL # BLD AUTO: 0.29 10*3/MM3 (ref 0–0.4)
EOSINOPHIL NFR BLD AUTO: 3.2 % (ref 0.3–6.2)
ERYTHROCYTE [DISTWIDTH] IN BLOOD BY AUTOMATED COUNT: 12.7 % (ref 12.3–15.4)
GLUCOSE SERPL-MCNC: 86 MG/DL (ref 65–99)
HCT VFR BLD AUTO: 46.8 % (ref 37.5–51)
HGB BLD-MCNC: 15 G/DL (ref 13–17.7)
IMM GRANULOCYTES # BLD AUTO: 0.03 10*3/MM3 (ref 0–0.05)
IMM GRANULOCYTES NFR BLD AUTO: 0.3 % (ref 0–0.5)
LYMPHOCYTES # BLD AUTO: 2 10*3/MM3 (ref 0.7–3.1)
LYMPHOCYTES NFR BLD AUTO: 22.3 % (ref 19.6–45.3)
MAGNESIUM SERPL-MCNC: 2.7 MG/DL (ref 1.6–2.4)
MCH RBC QN AUTO: 28.2 PG (ref 26.6–33)
MCHC RBC AUTO-ENTMCNC: 32.1 G/DL (ref 31.5–35.7)
MCV RBC AUTO: 88 FL (ref 79–97)
MONOCYTES # BLD AUTO: 0.94 10*3/MM3 (ref 0.1–0.9)
MONOCYTES NFR BLD AUTO: 10.5 % (ref 5–12)
NEUTROPHILS NFR BLD AUTO: 5.67 10*3/MM3 (ref 1.7–7)
NEUTROPHILS NFR BLD AUTO: 63.3 % (ref 42.7–76)
NRBC BLD AUTO-RTO: 0 /100 WBC (ref 0–0.2)
PLATELET # BLD AUTO: 200 10*3/MM3 (ref 140–450)
PMV BLD AUTO: 10.5 FL (ref 6–12)
POTASSIUM SERPL-SCNC: 3.6 MMOL/L (ref 3.5–5.2)
RBC # BLD AUTO: 5.32 10*6/MM3 (ref 4.14–5.8)
SODIUM SERPL-SCNC: 136 MMOL/L (ref 136–145)
WBC NRBC COR # BLD: 8.97 10*3/MM3 (ref 3.4–10.8)

## 2022-08-03 PROCEDURE — 80048 BASIC METABOLIC PNL TOTAL CA: CPT | Performed by: INTERNAL MEDICINE

## 2022-08-03 PROCEDURE — 99232 SBSQ HOSP IP/OBS MODERATE 35: CPT | Performed by: NURSE PRACTITIONER

## 2022-08-03 PROCEDURE — 83735 ASSAY OF MAGNESIUM: CPT | Performed by: NURSE PRACTITIONER

## 2022-08-03 PROCEDURE — 85025 COMPLETE CBC W/AUTO DIFF WBC: CPT | Performed by: INTERNAL MEDICINE

## 2022-08-03 RX ORDER — LISINOPRIL 20 MG/1
20 TABLET ORAL
Qty: 30 TABLET | Refills: 3 | Status: SHIPPED | OUTPATIENT
Start: 2022-08-04 | End: 2022-10-21 | Stop reason: SDUPTHER

## 2022-08-03 RX ORDER — AMLODIPINE BESYLATE 10 MG/1
10 TABLET ORAL
Qty: 30 TABLET | Refills: 3 | Status: SHIPPED | OUTPATIENT
Start: 2022-08-04 | End: 2022-10-21 | Stop reason: SDUPTHER

## 2022-08-03 RX ORDER — ACETAMINOPHEN 325 MG/1
650 TABLET ORAL EVERY 4 HOURS PRN
Qty: 120 TABLET | Refills: 3 | Status: SHIPPED | OUTPATIENT
Start: 2022-08-03

## 2022-08-03 RX ADMIN — AMLODIPINE BESYLATE 10 MG: 5 TABLET ORAL at 08:54

## 2022-08-03 RX ADMIN — Medication 10 ML: at 08:54

## 2022-08-03 RX ADMIN — PANTOPRAZOLE SODIUM 40 MG: 40 TABLET, DELAYED RELEASE ORAL at 06:08

## 2022-08-03 RX ADMIN — LISINOPRIL 20 MG: 20 TABLET ORAL at 08:54

## 2022-08-03 RX ADMIN — APIXABAN 5 MG: 5 TABLET, FILM COATED ORAL at 08:54

## 2022-08-03 RX ADMIN — METOPROLOL TARTRATE 25 MG: 25 TABLET, FILM COATED ORAL at 08:53

## 2022-08-03 RX ADMIN — TAMSULOSIN HYDROCHLORIDE 0.4 MG: 0.4 CAPSULE ORAL at 08:54

## 2022-08-03 NOTE — PLAN OF CARE
Goal Outcome Evaluation:  Plan of Care Reviewed With: patient        Progress: improving  Outcome Evaluation: Pt has had no complaints of pain or any discomforts. Tolerating all oral meds. Remains on RA during the day. O2 when sleeping. Q2 turns. VSS. D/c today to rehab, W/C van at 3pm. Will continue to monitor.

## 2022-08-03 NOTE — PLAN OF CARE
Goal Outcome Evaluation:         Patient alert and oriented, q2 turns, no complaints of pain, 1L NC worn at night. Moderate urine output and one bowel movement. All needs met.

## 2022-08-03 NOTE — CASE MANAGEMENT/SOCIAL WORK
"Case Management Discharge Note      Final Note: DC\"d [place] skilled bed [place] [facility]. Ama Bermudez RN         Selected Continued Care - Discharged on 8/3/2022 Admission date: 7/29/2022 - Discharge disposition: Skilled Nursing Facility (DC - External)    Destination Coordination complete.    Service Provider Selected Services Address Phone Fax Patient Preferred    [facility]  Skilled Nursing [address] [address] [phone] --          Durable Medical Equipment    No services have been selected for the patient.              Dialysis/Infusion    No services have been selected for the patient.              Home Medical Care    No services have been selected for the patient.              Therapy    No services have been selected for the patient.              Community Resources    No services have been selected for the patient.              Community & DME    No services have been selected for the patient.                  Transportation Services  [facility]    Final Discharge Disposition Code: 03 - skilled nursing facility (SNF)  "

## 2022-08-03 NOTE — CASE MANAGEMENT/SOCIAL WORK
Continued Stay Note  [facility]     Patient Name: Flo Manzo  MRN: [id number]  Today's Date: 8/3/2022    Admit Date: 7/29/2022     Discharge Plan     Row Name 08/03/22 1229       Plan    Plan [facility]    Patient/Family in Agreement with Plan yes    Plan Comments DC orders in [facility].  [place] [facility] and they can accept today.  Transfer packet updated and dc summary faxed.  Patient updated at bedside and wife updated by telephone.  [employer] stretcher arranged for 1500.  Patient will dc to skilled bed at [facility] via [facility] stretch. Ama Bermudez RN               Discharge Codes    No documentation.               Expected Discharge Date and Time     Expected Discharge Date Expected Discharge Time    Aug 3, 2022             Ama Bermudez RN

## 2022-08-03 NOTE — DISCHARGE SUMMARY
Patient Name: Flo Manzo  : 1955  MRN: [id number]    Date of Admission: 2022  Date of Discharge:  8/3/2022  Primary Care Physician: Cathleen Corona MD      Discharge Diagnoses     Active Hospital Problems    Diagnosis  POA   • **Atrial fibrillation with RVR (HCC) [I48.91]  Yes   • Generalized weakness [R53.1]  Yes   • S/P ICD (internal cardiac defibrillator) procedure [Z95.810]  Yes   • GÓMEZ on auto CPAP [G47.33, Z99.89]  Not Applicable   • Diastolic CHF, chronic (HCC) [I50.32]  Yes   • Essential hypertension [I10]  Yes      Resolved Hospital Problems   No resolved problems to display.        Hospital Course     Brief admission history and physical.  Please refer to the H&P for full details.  A pleasant 67 years old white gentleman with a past history of atrial fibrillation/sick sinus syndrome s/p pacer/aortic stenosis/chronic diastolic congestive heart failure who presented to the hospital with progressive weakness and fatigue.  He did describe history of shortness of breath otherwise no other symptomatology.  Physical examination on admission significant for a temperature of 98.4 a pulse of 74 respiratory rate of 20 and blood pressure 147/114 and O2 sats of 95% on 2 L the rest of the examination is remarkable for obesity/chronically ill-appearing gentleman/irregular heart rate and tachycardia/poor bilateral air entry with scattered bilateral rhonchi  Hospital course.  She will ER evaluation included CBC that was normal.  proBNP was elevated at 1264.  Troponin was negative.  Lactic acid was normal.  Ethanol level was negative.  Magnesium was normal.  PTT was normal.  INR was normal.  CMP normal except an alkaline phosphatase of 123 and random blood sugar of 105.  COVID was negative.  UA without UTI.  Urine tox screen negative.  CK mildly elevated at 233.  TSH was low at 0.023.  Chest x-ray with cardiomegaly and no acute disease.  EKG revealed atrial fibrillation with an old inferior MI (no  acute changes).  In the emergency department the patient was found to be in rapid A. fib with frequent PVCs.  Patient was admitted with progressive weakness and fatigue and dyspnea secondary to intermittent rapid atrial fibrillation and an acute on chronic diastolic congestive heart failure in a patient with a history of sick sinus syndrome status post pacer, moderate AAS, chronic diastolic congestive heart failure, hypertension.  The patient was started on IV amiodarone and IV diuresis.  His A. fib rate has improved and it was controlled by the time of discharge and his IV amiodarone has been stopped and his IV Lasix has stopped after improvement of his shortness of breath.  A 2D echo revealed a normal ejection fraction and a moderate AS.  Cardiology was consulted.  Patient medications were adjusted.  At the time discharge she was on Lopressor/Lotrel/Eliquis with good blood pressure and rate control.  His TSH was noted to be low and T3 and T4 were normal this is probably sick sinus syndrome and he will be referred to endocrinology as an outpatient.  He was noted to have mildly elevated A1c at 5.7 with mild hyperglycemia and diagnosed with glucose intolerance.  He had a previous history of CVA there was no focal neurological deficits and we continued his Lipitor and anticoagulation.  He has a history of obstructive sleep apnea that also contribute to his weakness and he will be referred to pulmonary as an outpatient can use oxygen as needed meanwhile.  As far as his weakness his magnesium was normal his potassium was normal CK was mildly elevated does not indicate rhabdo.  Given the normal T3 and T4 I do not think that thyroid disease as a cause of his weakness.  He was diagnosed with deconditioning.  Physical therapy saw the patient and recommended skilled facility where the patient is being discharged home.  At the time of discharge he was hemodynamically stable he will be following up with his primary care  physician/cardiology/pulmonary/endocrinology.  I have tried to call the wife to update her about the patient medical condition prior to discharge however she was not available and I have left a message and will try to call her back tonight  Consultants     Consult Orders (all) (From admission, onward)     Start     Ordered    07/31/22 1412  Inpatient Case Management  Consult  Once        Provider:  (Not yet assigned)    07/31/22 1412    07/30/22 2036  Inpatient Neurology Consult Stroke  Once        Specialty:  Neurology  Provider:  Gaetano Angel MD    07/30/22 2037 07/30/22 0021  Inpatient Cardiology Consult  Once        Specialty:  Cardiology  Provider:  Eren Bruce MD    07/30/22 0021    07/29/22 2241  Inpatient Case Management  Consult  Once        Provider:  (Not yet assigned)    07/29/22 2241 07/29/22 1845  [facility] (on-call MD unless specified) Details  Once        Specialty:  Hospitalist  Provider:  (Not yet assigned)    07/29/22 1844    07/29/22 1540  Cardiology (on-call MD unless specified)  Once        Specialty:  Cardiology  Provider:  Eren Bruce MD    07/29/22 1539              Procedures     Imaging Results (All)     Procedure Component Value Units Date/Time    XR Chest 1 View [[id number]] Collected: 07/29/22 1552     Updated: 07/29/22 1556    Narrative:      XR CHEST 1 VW-     Clinical: Weakness     COMPARISON CT chest 6/22/2022, chest radiograph 3/4/2022     FINDINGS: There is cardiac enlargement as before. Enlargement of the  thyroid, unchanged. No pleural effusion, vascular congestion nor acute  airspace disease is demonstrated. Transvenous pacemaker in place as  before.     CONCLUSION: Cardiomegaly, thyromegaly, no acute pulmonary process is  demonstrated.     This report was finalized on 7/29/2022 3:53 PM by Dr. Bandar Fuentes M.D.             Pertinent Labs     Results from last 7 days   Lab Units 08/03/22  0726 08/02/22  1427  08/01/22  0956 07/31/22  1110   WBC 10*3/mm3 8.97 9.14 8.65 9.51   HEMOGLOBIN g/dL 15.0 14.7 14.5 14.9   PLATELETS 10*3/mm3 200 228 218 241     Results from last 7 days   Lab Units 08/03/22  0726 08/02/22  1425 08/01/22  0956 07/31/22  1110   SODIUM mmol/L 136 136 136 137   POTASSIUM mmol/L 3.6 4.0 3.6 3.6   CHLORIDE mmol/L 100 101 100 101   CO2 mmol/L 27.0 26.0 24.7 23.6   BUN mg/dL 20 21 19 19   CREATININE mg/dL 0.82 0.86 0.90 0.92   GLUCOSE mg/dL 86 115* 99 137*   Estimated Creatinine Clearance: 107 mL/min (by C-G formula based on SCr of 0.82 mg/dL).  Results from last 7 days   Lab Units 07/30/22  0727 07/29/22  1529   ALBUMIN g/dL 4.20 4.40   BILIRUBIN mg/dL 0.8 0.6   ALK PHOS U/L 118* 123*   AST (SGOT) U/L 19 17   ALT (SGPT) U/L 21 23     Results from last 7 days   Lab Units 08/03/22  0726 08/02/22  1425 08/01/22  0956 07/31/22  1110 07/30/22  0727 07/29/22  1529   CALCIUM mg/dL 9.3 9.4 9.3 9.5 9.6 9.4   ALBUMIN g/dL  --   --   --   --  4.20 4.40   MAGNESIUM mg/dL 2.7*  --   --  2.2  --  2.1       Results from last 7 days   Lab Units 07/30/22  0727 07/29/22  1935 07/29/22  1529   CK TOTAL U/L 233*  --   --    TROPONIN T ng/mL  --  <0.010 <0.010   PROBNP pg/mL  --   --  1,264.0*           Invalid input(s): LDLCALC  Results from last 7 days   Lab Units 07/30/22  2151   BLOODCX  No growth at 3 days  No growth at 3 days     Imaging Results (Last 24 Hours)     ** No results found for the last 24 hours. **          Test Results Pending at Discharge     Pending Labs     Order Current Status    Blood Culture - Blood, Arm, Right Preliminary result    Blood Culture - Blood, Hand, Right Preliminary result            Discharge Exam   Physical Exam  Vitals.  Temperature 97.5 a pulse of 80 respiratory rate of 20 and blood pressure 113/93 and O2 sats of 93% on room air.  General.  Middle-aged gentleman.  Appears older than stated age.  He is alert and oriented x3.  In no respiratory distress or pain.  Obese.  Eyes.  Pupils  equal round and reactive.  Intact extraocular musculature.  No pallor or jaundice.  Normal trajectory and lids.  Oral cavity.  Moist mucous membrane.  Neck.  Supple.  No JVD.  No lymphadenopathy or thyromegaly.  Cardiovascular.  Controlled rate atrial fibrillation.  Grade 3 systolic murmur.    Chest.  Poor bilateral air entry with no added sounds.  Abdomen.  Soft lax.  No tenderness.  No organomegaly.  No guarding or rebound.  Extremities.  No clubbing cyanosis or edema.  CNS.  No acute focal neurological deficits.     Discharge Details        Discharge Medications      New Medications      Instructions Start Date   acetaminophen 325 MG tablet  Commonly known as: TYLENOL   650 mg, Oral, Every 4 Hours PRN      amLODIPine 10 MG tablet  Commonly known as: NORVASC   10 mg, Oral, Every 24 Hours Scheduled   Start Date: August 4, 2022     lisinopril 20 MG tablet  Commonly known as: PRINIVIL,ZESTRIL   20 mg, Oral, Every 24 Hours Scheduled   Start Date: August 4, 2022        Changes to Medications      Instructions Start Date   metoprolol tartrate 25 MG tablet  Commonly known as: LOPRESSOR  What changed: medication strength   25 mg, Oral, 2 Times Daily         Continue These Medications      Instructions Start Date   albuterol sulfate  (90 Base) MCG/ACT inhaler  Commonly known as: PROVENTIL HFA;VENTOLIN HFA;PROAIR HFA   2 puffs, Inhalation, Every 4 Hours PRN      atorvastatin 40 MG tablet  Commonly known as: LIPITOR   40 mg, Oral, Nightly      citalopram 40 MG tablet  Commonly known as: CeleXA   TAKE 1 TABLET BY MOUTH DAILY      Eliquis 5 MG tablet tablet  Generic drug: apixaban   TAKE 1 TABLET BY MOUTH EVERY 12 HOURS      omeprazole 20 MG capsule  Commonly known as: priLOSEC   20 mg, Oral, Daily      tamsulosin 0.4 MG capsule 24 hr capsule  Commonly known as: FLOMAX   1 capsule, Oral, Daily         Stop These Medications    amLODIPine-benazepril 10-20 MG per capsule  Commonly known as: LOTREL            Allergies    Allergen Reactions   • Poison Ivy Extract Hives, Itching and Rash     ITCHY BLISTERS         Discharge Disposition:  Condition: Stable    Diet:   Diet Order   Procedures   • Diet Regular; Cardiac       Activity:   Activity Instructions     Activity as Tolerated      Other Activity Instructions      Activity Instructions: PT/OT to evaluate and treat    Up WIth Assist            Counseling : No nonsteroidal anti-inflammatory medications    CODE STATUS:    Code Status and Medical Interventions:   Ordered at: 07/29/22 2152     Code Status (Patient has no pulse and is not breathing):    CPR (Attempt to Resuscitate)     Medical Interventions (Patient has pulse or is breathing):    Full Support       Future Appointments   Date Time Provider Department Center   9/28/2022  9:00 AM [facility] S[facility] DEVICE CHECK [facility]   11/1/2022 11:15 AM Cathleen Corona MD [facility]   5/2/2023 12:15 PM Cathleen Corona MD [facility]   6/20/2023  9:30 AM Elaine Kaba APRN [facility]     Additional Instructions for the Follow-ups that You Need to Schedule     Call MD With Problems / Concerns   As directed      Instructions: Call MD or return to ER if chest pain/palpitations/dizziness/loss of consciousness/focal neurological symptoms/dyspnea/fever or chills    Order Comments: Instructions: Call MD or return to ER if chest pain/palpitations/dizziness/loss of consciousness/focal neurological symptoms/dyspnea/fever or chills          Discharge Follow-up with PCP   As directed       Currently Documented PCP:    Cathleen Corona MD    PCP Phone Number:    [phone]     Follow Up Details: Primary MD at the [facility] facility in 3 days for sick sinus syndrome/A. fib/acute on chronic diastolic congestive heart failure/AAS/hypertension/abnormal thyroid function test/history of CVA/obstructive sleep apnea         Discharge Follow-up with Specified Provider: Cardiology; 2 Weeks   As directed      To: Cardiology    Follow  Up: 2 Weeks    Follow Up Details: A. fib/sick sinus syndrome/acute on chronic diastolic congestive heart failure/moderate AAS/hypertension         Discharge Follow-up with Specified Provider: Endocrinology; 2 Weeks   As directed      To: Endocrinology    Follow Up: 2 Weeks    Follow Up Details: Abnormal thyroid function test probably sick thyroid syndrome         Discharge Follow-up with Specified Provider: Pulmonary; 2 Weeks   As directed      To: Pulmonary    Follow Up: 2 Weeks    Follow Up Details: Obstructive sleep apnea            Contact information for follow-up providers     Cathleen Corona MD .    Specialty: Family Medicine  Why: Primary MD at the skilled facility in 3 days for sick sinus syndrome/A. fib/acute on chronic diastolic congestive heart failure/AAS/hypertension/abnormal thyroid function test/history of CVA/obstructive sleep apnea  Contact information:  [address]  [address]  [facility] [id number] [phone]                   Contact information for after-discharge care     Destination     [facility] .    Service: Skilled Nursing  Contact information:  [address] [facility] [address] [phone]                               Time Spent on Discharge:  Greater than 30 minutes      Serena Patel MD  [facility]  08/03/22  11:28 EDT

## 2022-08-04 LAB
BACTERIA SPEC AEROBE CULT: NORMAL
BACTERIA SPEC AEROBE CULT: NORMAL

## 2022-08-29 NOTE — PROGRESS NOTES
Subjective:        Flo Manzo is a 67 y.o. male who here for follow up    No chief complaint on file.    Hospital follow up    HPI    This is a 67-year-old male, who is well-known to our service.  He has a history to include hypertension, atrial fib with RVR, chronic diastolic CHF,  moderate aortic stenosis, SSS status post ICD, and obesity.  He is here today due to recent hospitalization for atrial fibrillation with RVR, status post ICD, GÓMEZ, hypertension and diastolic CHF.  He presented to [facility] with ongoing progressive weakness, shortness of breath, and fatigue.  His EKG on admission showed atrial fibrillation.  He was started on IV amiodarone and IV Lasix.  It was determined he did not any further work-up.  Amiodarone was stopped and he diuresed well.  His echo revealed normal ejection fraction and moderate AS.  He was hemodynamically stable at discharge.    the following portions of the patient's history were reviewed and updated as appropriate: allergies, current medications, past family history, past medical history, past social history, past surgical history and problem list.    Past Medical History:   Diagnosis Date   • Arthritis    • Atrial fibrillation with RVR (HCC) 01/24/2019   • Colon polyps 01/04/2018    Hepatic flexure: tubular adenoma, only low-grade dysplasia; splenic flexure: tubular adenoma, only low-grade dysplasia; sigmoid colon: tubular adenoma, multiple fragments only low-grade dysplasia   • Depression    • Heart murmur    • Hemorrhagic stroke (HCC) 01/29/2019   • Hypertension    • New onset atrial fibrillation (CMS/HCC) - RVR 1/24/2019   • GÓMEZ (obstructive sleep apnea) 06/06/2019    Home sleep study with moderate severity GÓMEZ, AHI 20 events per hour.  Sleep-related hypoxia with low O2 saturation 84% for 14 minutes.   • Sleep apnea     previously diagnosed with sleep apnea, had T&A done and it has since resolved    • Stroke (HCC)    • Uncontrolled hypertension     • Ventral hernia          reports that he has never smoked. He has never used smokeless tobacco. He reports previous alcohol use. He reports that he does not use drugs.     Family History   Problem Relation Age of Onset   • Hypertension Mother    • Kidney failure Mother    • Coronary artery disease Father    • Lung cancer Father         positive smoker   • Heart attack Father    • Breast cancer Sister 60   • Prostate cancer Brother    • Colon polyps Brother    • Kidney nephrosis Brother    • Malig Hyperthermia Neg Hx        ROS     Review of Systems  Constitutional: No wt loss, fever, fatigue  Gastrointestinal: No nausea, abdominal pain  Behavioral/Psych: No insomnia or anxiety  Cardiovascular: Denies chest pain and shortness of breath      Objective:           Vitals and nursing note reviewed.   Constitutional:       Appearance: Well-developed.   HENT:      Head: Normocephalic.      Right Ear: External ear normal.      Left Ear: External ear normal.   Neck:      Vascular: No JVD.   Pulmonary:      Effort: Pulmonary effort is normal. No respiratory distress.      Breath sounds: Normal breath sounds. No stridor. No rales.   Cardiovascular:      Normal rate. Regular rhythm.      No gallop.   Pulses:     Intact distal pulses.   Abdominal:      General: Bowel sounds are normal. There is no distension.      Palpations: Abdomen is soft.      Tenderness: There is no abdominal tenderness. There is no guarding.   Musculoskeletal: Normal range of motion.         General: No tenderness.      Cervical back: Normal range of motion. Skin:     General: Skin is warm.   Neurological:      Mental Status: Alert and oriented to person, place, and time.      Deep Tendon Reflexes: Reflexes are normal and symmetric.   Psychiatric:         Judgment: Judgment normal.           ECG 12 Lead    Date/Time: 8/31/2022 9:38 AM  Performed by: Vira Lynch APRN  Authorized by: Vira Lynch APRN   Comparison: compared with previous  ECG from 7/29/2022  Similar to previous ECG  Rhythm: atrial fibrillation  Rate: normal  BPM: 74    Clinical impression: abnormal EKG          7/2022  Interpretation Summary    · Calculated left ventricular EF = 54.7% Estimated left ventricular EF = 55% Estimated left ventricular EF was in agreement with the calculated left ventricular EF. Left ventricular systolic function is normal. Normal left ventricular cavity size noted. Left ventricular wall thickness is consistent with moderate concentric hypertrophy. All left ventricular wall segments contract normally. Left ventricular diastolic function was indeterminate.  · Left atrial volume is mildly increased.  · An electronic lead is present in the right atrium.  · The aortic valve is abnormal in structure. There is severe calcification of the aortic valve. Mild aortic valve regurgitation is present. Mild aortic valve stenosis is present. Aortic valve area is 1.4 cm2. Aortic valve mean pressure gradient is 18.9 mmHg. Aortic valve dimensionless index is 0.40 .  · Severe mitral annular calcification is present. Trace mitral valve regurgitation is present.  · Trace tricuspid valve regurgitation is present. Estimated right ventricular systolic pressure from tricuspid regurgitation is normal (<35 mmHg). Calculated right ventricular systolic pressure from tricuspid regurgitation is 27 mmHg.        Current Outpatient Medications:   •  acetaminophen (TYLENOL) 325 MG tablet, Take 2 tablets by mouth Every 4 (Four) Hours As Needed for Mild Pain ., Disp: 120 tablet, Rfl: 3  •  albuterol sulfate  (90 Base) MCG/ACT inhaler, Inhale 2 puffs Every 4 (Four) Hours As Needed for Wheezing., Disp: 18 g, Rfl: 3  •  amLODIPine (NORVASC) 10 MG tablet, Take 1 tablet by mouth Daily., Disp: 30 tablet, Rfl: 3  •  atorvastatin (LIPITOR) 40 MG tablet, Take 1 tablet by mouth Every Night., Disp: 90 tablet, Rfl: 3  •  citalopram (CeleXA) 40 MG tablet, TAKE 1 TABLET BY MOUTH DAILY, Disp: 90  tablet, Rfl: 1  •  Eliquis 5 MG tablet tablet, TAKE 1 TABLET BY MOUTH EVERY 12 HOURS, Disp: 180 tablet, Rfl: 1  •  lisinopril (PRINIVIL,ZESTRIL) 20 MG tablet, Take 1 tablet by mouth Daily., Disp: 30 tablet, Rfl: 3  •  metoprolol tartrate (LOPRESSOR) 25 MG tablet, Take 1 tablet by mouth 2 (Two) Times a Day., Disp: 60 tablet, Rfl: 3  •  omeprazole (priLOSEC) 20 MG capsule, TAKE 1 CAPSULE BY MOUTH DAILY, Disp: 90 capsule, Rfl: 1  •  tamsulosin (FLOMAX) 0.4 MG capsule 24 hr capsule, Take 1 capsule by mouth Daily., Disp: , Rfl:      Assessment:        Patient Active Problem List   Diagnosis   • Umbilical hernia without obstruction and without gangrene   • Essential hypertension   • Arthritis of left knee   • Depression   • Obesity (BMI 30-39.9)   • Atrial fibrillation with RVR (HCC)   • Substernal thyroid goiter   • CHF (congestive heart failure) (HCC)   • Diastolic CHF, chronic (HCC)   • Hemorrhagic stroke (HCC)   • GÓMEZ on auto CPAP   • Hypersomnia due to medical condition   • Sleep-related hypoxia   • Chronic atrial fibrillation (HCC)   • Vertigo   • Aortic stenosis, moderate   • S/P ICD (internal cardiac defibrillator) procedure   • Sinus pause   • Pacemaker   • Anticoagulated   • Generalized weakness               Plan:   1.  Hospital follow-up for chronic atrial fibrillation and congestive heart failure.  EKG shows A. fib flutter.  He was started on IV amiodarone and IV Lasix.  Amiodarone was discontinued while in the hospital.    2. Hypertension: Today his blood pressure is 116/83 and his HR is 84. He will continue current medications.     Educated patient on exercising for at least 30 minutes a day for 2 to 3 days a week. Importance of controlling hypertension and blood pressure checkup on the regular basis has been explained. Hypertension as a silent killer has been discussed. Risk reduction of the weight and regular exercises to control the hypertension has been explained.    3. Moderate AS: monitor. He denies  shortness of breath and dizziness.    4.  chronic diastolic CHF: He denies shortness of breath. He is euvolemic on exam.    Please monitor and stay on a low sodium (<2000mg/day) diet and 2000ml of fluid. Exercise 30 minutes a day for 2-3 times a week.  Please weigh yourself daily if you gains more than 2 pounds in 1 day or 5 pounds in 1 week. Check for swelling in your feet, ankles, legs, and stomach.  Monitor for signs of fatigue, shortness of breath or cough.  Call the office for recommendations.      5. SSS with pacemaker: functioning    6. GÓMEZ: CPAP compliant      7. Obesity: BMI 31.85    Counseled on need to work toward healthy BMI, diet and exercise modifications and strategies that may work for him.  Health consequences of high BMI discussed. Low fat, lower carb diet reviewed.  I recommended 30 minutes of aerobic exercise 5 times a week.   I recommended healthy diet with lower unhealthy fats, higher healthy fats and lower in carbs and higher in protein with adequate hydration           No diagnosis found.    There are no diagnoses linked to this encounter.    COUNSELING: gerald Rodriguez was given to patient for the following topics: diagnostic results, risk factor reductions, impressions, risks and benefits of treatment options and importance of treatment compliance .       SMOKING COUNSELING:    He odessa follow up in 6 months with ilya briseno and cmp.    Sincerely,   ASHANTI Recinos  [facility]  08/31/22  09:33 EDT    EMR Dragon/Transcription disclaimer:   Much of this encounter note is an electronic transcription/translation of spoken language to printed text. The electronic translation of spoken language may permit erroneous, or at times, nonsensical words or phrases to be inadvertently transcribed; Although I have reviewed the note for such errors, some may still exist.

## 2022-08-31 ENCOUNTER — OFFICE VISIT (OUTPATIENT)
Dept: CARDIOLOGY | Facility: CLINIC | Age: 67
End: 2022-08-31

## 2022-08-31 VITALS
HEIGHT: 70 IN | BODY MASS INDEX: 31.78 KG/M2 | SYSTOLIC BLOOD PRESSURE: 116 MMHG | WEIGHT: 222 LBS | HEART RATE: 84 BPM | DIASTOLIC BLOOD PRESSURE: 83 MMHG

## 2022-08-31 DIAGNOSIS — I10 ESSENTIAL HYPERTENSION: ICD-10-CM

## 2022-08-31 DIAGNOSIS — I49.5 SSS (SICK SINUS SYNDROME): ICD-10-CM

## 2022-08-31 DIAGNOSIS — I35.0 AORTIC STENOSIS, MODERATE: ICD-10-CM

## 2022-08-31 DIAGNOSIS — I50.32 DIASTOLIC CHF, CHRONIC: Primary | ICD-10-CM

## 2022-08-31 DIAGNOSIS — Z95.0 PACEMAKER: ICD-10-CM

## 2022-08-31 PROCEDURE — 99214 OFFICE O/P EST MOD 30 MIN: CPT | Performed by: NURSE PRACTITIONER

## 2022-08-31 PROCEDURE — 93000 ELECTROCARDIOGRAM COMPLETE: CPT | Performed by: NURSE PRACTITIONER

## 2022-10-19 ENCOUNTER — TELEPHONE (OUTPATIENT)
Dept: FAMILY MEDICINE CLINIC | Facility: CLINIC | Age: 67
End: 2022-10-19

## 2022-10-20 NOTE — TELEPHONE ENCOUNTER
I would just call the pt and if he gives verbal that it is ok to discuss his medications and care with the friend that is good. I would not just call her.

## 2022-10-21 RX ORDER — LISINOPRIL 20 MG/1
20 TABLET ORAL
Qty: 30 TABLET | Refills: 0 | Status: SHIPPED | OUTPATIENT
Start: 2022-10-21 | End: 2022-10-25

## 2022-10-21 RX ORDER — AMLODIPINE BESYLATE 10 MG/1
10 TABLET ORAL
Qty: 30 TABLET | Refills: 0 | Status: SHIPPED | OUTPATIENT
Start: 2022-10-21 | End: 2022-10-25

## 2022-10-21 NOTE — TELEPHONE ENCOUNTER
Called patient to see if we could get a verbal to speak with the friend about his medical chart. Was not able to speak with this patient. Left him detailed VM asking him to call the office back.

## 2022-10-24 NOTE — TELEPHONE ENCOUNTER
Rx Refill Note  Requested Prescriptions     Pending Prescriptions Disp Refills   • lisinopril (PRINIVIL,ZESTRIL) 20 MG tablet [Pharmacy Med Name: LISINOPRIL 20MG TABLETS] 90 tablet      Sig: TAKE 1 TABLET BY MOUTH DAILY   • amLODIPine (NORVASC) 10 MG tablet [Pharmacy Med Name: AMLODIPINE BESYLATE 10MG TABLETS] 90 tablet      Sig: TAKE 1 TABLET BY MOUTH DAILY      Last office visit with prescribing clinician: Visit date not found      Next office visit with prescribing clinician: Visit date not found            Mariana Desir MA  10/24/22, 12:10 EDT

## 2022-10-25 RX ORDER — CITALOPRAM 20 MG/1
30 TABLET ORAL DAILY
Qty: 135 TABLET | Refills: 1 | Status: SHIPPED | OUTPATIENT
Start: 2022-10-25 | End: 2023-03-27 | Stop reason: SDUPTHER

## 2022-10-25 RX ORDER — LISINOPRIL 20 MG/1
20 TABLET ORAL
Qty: 90 TABLET | Refills: 1 | Status: SHIPPED | OUTPATIENT
Start: 2022-10-25

## 2022-10-25 RX ORDER — BUSPIRONE HYDROCHLORIDE 10 MG/1
10 TABLET ORAL 2 TIMES DAILY
Qty: 180 TABLET | Refills: 1 | Status: SHIPPED | OUTPATIENT
Start: 2022-10-25 | End: 2023-03-27 | Stop reason: SDUPTHER

## 2022-10-25 RX ORDER — AMLODIPINE BESYLATE 10 MG/1
10 TABLET ORAL
Qty: 90 TABLET | Refills: 1 | Status: SHIPPED | OUTPATIENT
Start: 2022-10-25 | End: 2022-12-27

## 2022-10-25 NOTE — TELEPHONE ENCOUNTER
Things often change in the acute care setting hospital and rehab. I agree with the 30 mg of citalopram and we can continue the buspirone. I think these are both good medication changes. I can fill most meds as primary care doctor even if another specialty filled before like cardiology. I would have him continue off the seroquel and duloxetine.

## 2022-10-28 PROCEDURE — 93296 REM INTERROG EVL PM/IDS: CPT | Performed by: INTERNAL MEDICINE

## 2022-10-28 PROCEDURE — 93294 REM INTERROG EVL PM/LDLS PM: CPT | Performed by: INTERNAL MEDICINE

## 2022-11-01 ENCOUNTER — OFFICE VISIT (OUTPATIENT)
Dept: FAMILY MEDICINE CLINIC | Facility: CLINIC | Age: 67
End: 2022-11-01

## 2022-11-01 VITALS
SYSTOLIC BLOOD PRESSURE: 102 MMHG | DIASTOLIC BLOOD PRESSURE: 82 MMHG | TEMPERATURE: 98 F | HEART RATE: 103 BPM | HEIGHT: 70 IN | OXYGEN SATURATION: 94 % | WEIGHT: 227.8 LBS | BODY MASS INDEX: 32.61 KG/M2 | RESPIRATION RATE: 22 BRPM

## 2022-11-01 DIAGNOSIS — R19.7 DIARRHEA OF PRESUMED INFECTIOUS ORIGIN: ICD-10-CM

## 2022-11-01 DIAGNOSIS — Z86.73 HISTORY OF STROKE: ICD-10-CM

## 2022-11-01 DIAGNOSIS — M15.9 PRIMARY OSTEOARTHRITIS INVOLVING MULTIPLE JOINTS: ICD-10-CM

## 2022-11-01 DIAGNOSIS — G47.33 OSA ON CPAP: ICD-10-CM

## 2022-11-01 DIAGNOSIS — R53.1 GENERALIZED WEAKNESS: ICD-10-CM

## 2022-11-01 DIAGNOSIS — I10 ESSENTIAL HYPERTENSION: Primary | ICD-10-CM

## 2022-11-01 DIAGNOSIS — R53.81 PHYSICAL DECONDITIONING: ICD-10-CM

## 2022-11-01 DIAGNOSIS — Z99.89 OSA ON CPAP: ICD-10-CM

## 2022-11-01 PROCEDURE — G0008 ADMIN INFLUENZA VIRUS VAC: HCPCS | Performed by: FAMILY MEDICINE

## 2022-11-01 PROCEDURE — 99214 OFFICE O/P EST MOD 30 MIN: CPT | Performed by: FAMILY MEDICINE

## 2022-11-01 PROCEDURE — 90662 IIV NO PRSV INCREASED AG IM: CPT | Performed by: FAMILY MEDICINE

## 2022-11-01 NOTE — PROGRESS NOTES
"Chief Complaint  Hypertension (6 MFU) and Balance Issues    Subjective        Flo Manzo presents to [facility] PRIMARY CARE  History of Present Illness  He was admitted to hospital end of July and from there was discharged to subacute rehab.  He presented with mild shortness of air and on exam he had irregular heart rhythm, tachycardia, abnormal lung exam.  He was found to be in A. fib with RVR.  He had developed progressive weakness, fatigue, and shortness of breath associated with his A. fib with RVR and developed acute on chronic diastolic CHF exacerbation.  Says breathing has been pretty good but sometimes he has harder breathing when he gets up and moves around but not every time.   D/c summary says he should be on oxygen as an outpatient prn. Says he was supposed to have a CPAP but he could not use related to contamination. He has a new CPAP machine. He just got this yesterday.   He has not used it.   Prior to the hospital he was walking well with a cane. Now says he is so-so with the walker. Says he gets by with it in the house.   He came in today on his walker to the office. Said it went ok.   Step son tells me pt has lots of diarrhea since being home and goes a number of times per day. The smell is bad.     Objective   Vital Signs:  /82 (BP Location: Right arm, Patient Position: Sitting, Cuff Size: Adult)   Pulse 103   Temp 98 °F (36.7 °C) (Infrared)   Resp 22   Ht 177.8 cm (70\")   Wt 103 kg (227 lb 12.8 oz)   SpO2 94%   BMI 32.69 kg/m²   Estimated body mass index is 32.69 kg/m² as calculated from the following:    Height as of this encounter: 177.8 cm (70\").    Weight as of this encounter: 103 kg (227 lb 12.8 oz).    BMI is >= 30 and <35. (Class 1 Obesity). The following options were offered after discussion;: exercise counseling/recommendations      Physical Exam  Vitals reviewed.   Constitutional:       General: He is not in acute distress.     Appearance: Normal " appearance. He is not ill-appearing or toxic-appearing.   Eyes:      General: No scleral icterus.        Right eye: No discharge.         Left eye: No discharge.      Conjunctiva/sclera: Conjunctivae normal.   Cardiovascular:      Rate and Rhythm: Normal rate. Rhythm irregular.      Heart sounds: Normal heart sounds. No murmur heard.  Pulmonary:      Effort: Pulmonary effort is normal. No respiratory distress.      Breath sounds: Normal breath sounds. No wheezing.      Comments: Stable intermittent exaggerated breaths.   Musculoskeletal:      Cervical back: Neck supple. No muscular tenderness.      Right lower leg: No edema.      Left lower leg: No edema.   Lymphadenopathy:      Cervical: No cervical adenopathy.   Neurological:      Mental Status: He is alert and oriented to person, place, and time.      Motor: No abnormal muscle tone.   Psychiatric:         Behavior: Behavior normal.        Result Review :                Assessment and Plan   Diagnoses and all orders for this visit:    1. Essential hypertension (Primary)  -     Cancel: CBC & Differential  -     Cancel: Comprehensive Metabolic Panel  -     CBC & Differential  -     Comprehensive Metabolic Panel    2. GÓMEZ on auto CPAP  -     Cancel: CBC & Differential  -     Cancel: Comprehensive Metabolic Panel  -     CBC & Differential    3. Generalized weakness  -     Ambulatory Referral to Physical Therapy Evaluate and treat    4. Diarrhea of presumed infectious origin  -     Cancel: CBC & Differential  -     Cancel: Comprehensive Metabolic Panel  -     Cancel: Clostridioides difficile Toxin, PCR - Stool, Per Rectum  -     CBC & Differential  -     Comprehensive Metabolic Panel  -     Clostridioides difficile Toxin, PCR - Stool, Per Rectum; Future    5. Primary osteoarthritis involving multiple joints  -     Ambulatory Referral to Physical Therapy Evaluate and treat    6. History of stroke  -     Ambulatory Referral to Physical Therapy Evaluate and treat    7.  Physical deconditioning  -     Ambulatory Referral to Physical Therapy Evaluate and treat    Other orders  -     Fluzone High-Dose 65+yrs ([id number])             Follow Up   No follow-ups on file.  Patient was given instructions and counseling regarding his condition or for health maintenance advice. Please see specific information pulled into the AVS if appropriate.     I reviewed the procedure notes, notes, reports, labs, and imaging studies from pt's recent admission.   Current outpatient and discharge medications have been reconciled for the patient.  Reviewed by: Cathleen Corona MD   Discussed the amlodipine and lisinopril. He is taking. Discussed the metoprolol. Change in dose of citalopram, lower, this is good.   He is taking.     Recheck on labs recommended.   He is coming out of rehab this past week after admission for a fib with RVR and this was thought to be the cause of his fatigue and generalized weakness. He has improved. Energy some better. Moving around a little better but still with some weakness and I think he will benefit from out patient PT to keep him strengthening and working on gait.   Related to low TSH recommended to follow up with endocrinology. This has been stable with normal thyroid hormone levels.   Referred to pulm for his GÓMEZ, thought to also be contributing. He has CPAP.

## 2022-11-02 LAB
ALBUMIN SERPL-MCNC: 4.6 G/DL (ref 3.5–5.2)
ALBUMIN/GLOB SERPL: 1.8 G/DL
ALP SERPL-CCNC: 123 U/L (ref 39–117)
ALT SERPL-CCNC: 14 U/L (ref 1–41)
AST SERPL-CCNC: 13 U/L (ref 1–40)
BASOPHILS # BLD AUTO: 0.04 10*3/MM3 (ref 0–0.2)
BASOPHILS NFR BLD AUTO: 0.5 % (ref 0–1.5)
BILIRUB SERPL-MCNC: 0.5 MG/DL (ref 0–1.2)
BUN SERPL-MCNC: 17 MG/DL (ref 8–23)
BUN/CREAT SERPL: 18.1 (ref 7–25)
CALCIUM SERPL-MCNC: 10.1 MG/DL (ref 8.6–10.5)
CHLORIDE SERPL-SCNC: 104 MMOL/L (ref 98–107)
CO2 SERPL-SCNC: 28 MMOL/L (ref 22–29)
CREAT SERPL-MCNC: 0.94 MG/DL (ref 0.76–1.27)
EGFRCR SERPLBLD CKD-EPI 2021: 89.4 ML/MIN/1.73
EOSINOPHIL # BLD AUTO: 0.18 10*3/MM3 (ref 0–0.4)
EOSINOPHIL NFR BLD AUTO: 2.1 % (ref 0.3–6.2)
ERYTHROCYTE [DISTWIDTH] IN BLOOD BY AUTOMATED COUNT: 12.9 % (ref 12.3–15.4)
GLOBULIN SER CALC-MCNC: 2.6 GM/DL
GLUCOSE SERPL-MCNC: 94 MG/DL (ref 65–99)
HCT VFR BLD AUTO: 46.4 % (ref 37.5–51)
HGB BLD-MCNC: 15 G/DL (ref 13–17.7)
IMM GRANULOCYTES # BLD AUTO: 0.02 10*3/MM3 (ref 0–0.05)
IMM GRANULOCYTES NFR BLD AUTO: 0.2 % (ref 0–0.5)
LYMPHOCYTES # BLD AUTO: 2.52 10*3/MM3 (ref 0.7–3.1)
LYMPHOCYTES NFR BLD AUTO: 29.6 % (ref 19.6–45.3)
MCH RBC QN AUTO: 28.2 PG (ref 26.6–33)
MCHC RBC AUTO-ENTMCNC: 32.3 G/DL (ref 31.5–35.7)
MCV RBC AUTO: 87.4 FL (ref 79–97)
MONOCYTES # BLD AUTO: 0.79 10*3/MM3 (ref 0.1–0.9)
MONOCYTES NFR BLD AUTO: 9.3 % (ref 5–12)
NEUTROPHILS # BLD AUTO: 4.96 10*3/MM3 (ref 1.7–7)
NEUTROPHILS NFR BLD AUTO: 58.3 % (ref 42.7–76)
NRBC BLD AUTO-RTO: 0 /100 WBC (ref 0–0.2)
PLATELET # BLD AUTO: 223 10*3/MM3 (ref 140–450)
POTASSIUM SERPL-SCNC: 4.1 MMOL/L (ref 3.5–5.2)
PROT SERPL-MCNC: 7.2 G/DL (ref 6–8.5)
RBC # BLD AUTO: 5.31 10*6/MM3 (ref 4.14–5.8)
SODIUM SERPL-SCNC: 143 MMOL/L (ref 136–145)
WBC # BLD AUTO: 8.51 10*3/MM3 (ref 3.4–10.8)

## 2022-12-05 ENCOUNTER — HOSPITAL ENCOUNTER (OUTPATIENT)
Dept: PHYSICAL THERAPY | Facility: HOSPITAL | Age: 67
Setting detail: THERAPIES SERIES
Discharge: HOME | End: 2022-12-05

## 2022-12-05 DIAGNOSIS — R26.89 BALANCE PROBLEM: ICD-10-CM

## 2022-12-05 DIAGNOSIS — R53.1 GENERAL WEAKNESS: Primary | ICD-10-CM

## 2022-12-05 DIAGNOSIS — M25.511 ACUTE PAIN OF RIGHT SHOULDER: ICD-10-CM

## 2022-12-05 DIAGNOSIS — R53.81 PHYSICAL DECONDITIONING: ICD-10-CM

## 2022-12-05 DIAGNOSIS — R26.9 GAIT DIFFICULTY: ICD-10-CM

## 2022-12-05 PROCEDURE — 97110 THERAPEUTIC EXERCISES: CPT

## 2022-12-05 PROCEDURE — 97161 PT EVAL LOW COMPLEX 20 MIN: CPT
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
## 2022-12-07 ENCOUNTER — HOSPITAL ENCOUNTER (OUTPATIENT)
Dept: PHYSICAL THERAPY | Facility: HOSPITAL | Age: 67
Setting detail: THERAPIES SERIES
Discharge: HOME | End: 2022-12-07

## 2022-12-07 DIAGNOSIS — R26.89 BALANCE PROBLEM: ICD-10-CM

## 2022-12-07 DIAGNOSIS — R26.9 GAIT DIFFICULTY: ICD-10-CM

## 2022-12-07 DIAGNOSIS — R53.81 PHYSICAL DECONDITIONING: ICD-10-CM

## 2022-12-07 DIAGNOSIS — R53.1 GENERAL WEAKNESS: Primary | ICD-10-CM

## 2022-12-07 PROCEDURE — 97110 THERAPEUTIC EXERCISES: CPT

## 2022-12-07 NOTE — THERAPY TREATMENT NOTE
Outpatient Physical Therapy Ortho Treatment Note  [facility]     Patient Name: Flo Manzo  : 1955  MRN: [id number]  Today's Date: 2022      Visit Date: 2022    Visit Dx:    ICD-10-CM ICD-9-CM   1. General weakness  R53.1 780.79   2. Balance problem  R26.89 781.99   3. Gait difficulty  R26.9 781.2   4. Physical deconditioning  R53.81 799.3       Patient Active Problem List   Diagnosis   • Umbilical hernia without obstruction and without gangrene   • Essential hypertension   • Arthritis of left knee   • Depression   • Obesity (BMI 30-39.9)   • Atrial fibrillation with RVR (HCC)   • Substernal thyroid goiter   • CHF (congestive heart failure) (HCC)   • Diastolic CHF, chronic (HCC)   • Hemorrhagic stroke (HCC)   • GÓMEZ on auto CPAP   • Hypersomnia due to medical condition   • Sleep-related hypoxia   • Chronic atrial fibrillation (HCC)   • Vertigo   • Aortic stenosis, moderate   • S/P ICD (internal cardiac defibrillator) procedure   • Sinus pause   • Pacemaker   • Anticoagulated   • Generalized weakness        Past Medical History:   Diagnosis Date   • Arthritis    • Atrial fibrillation with RVR (HCC) 2019   • Colon polyps 2018    Hepatic flexure: tubular adenoma, only low-grade dysplasia; splenic flexure: tubular adenoma, only low-grade dysplasia; sigmoid colon: tubular adenoma, multiple fragments only low-grade dysplasia   • Depression    • Heart murmur    • Hemorrhagic stroke (HCC) 2019   • Hypertension    • New onset atrial fibrillation (CMS/HCC) - RVR 2019   • GÓMEZ (obstructive sleep apnea) 2019    Home sleep study with moderate severity GÓMEZ, AHI 20 events per hour.  Sleep-related hypoxia with low O2 saturation 84% for 14 minutes.   • Sleep apnea     previously diagnosed with sleep apnea, had T&A done and it has since resolved    • Stroke (HCC)    • Uncontrolled hypertension    • Ventral hernia         Past Surgical History:   Procedure Laterality Date   •  APPENDECTOMY N/A 1972   • BACK SURGERY      BLADDER STIMULATOR IMPLANT L LOWER BACK   • CARDIAC CATHETERIZATION N/A 07/20/2004    Cath left ventriculography, coronary angiography and left heart catheterization, Normal results-Dr. Vadim Mancuso   • CARDIAC CATHETERIZATION N/A 1/29/2019    Procedure: Left Heart Cath;  Surgeon: Eren Bruce MD;  Location: [facility] CATH INVASIVE LOCATION;  Service: Cardiology   • CARDIAC CATHETERIZATION N/A 1/29/2019    Procedure: Left ventriculography;  Surgeon: Eren Bruce MD;  Location:  [facility] CATH INVASIVE LOCATION;  Service: Cardiology   • CARDIAC CATHETERIZATION N/A 1/29/2019    Procedure: Coronary angiography;  Surgeon: Eren Bruce MD;  Location:  [facility] CATH INVASIVE LOCATION;  Service: Cardiology   • CARDIAC ELECTROPHYSIOLOGY PROCEDURE N/A 3/4/2022    Procedure: Pacemaker SC new BIOTRONIK;  Surgeon: Eren Bruce MD;  Location:  [facility] CATH INVASIVE LOCATION;  Service: Cardiology;  Laterality: N/A;   • CARPAL TUNNEL RELEASE Right 2013   • CARPAL TUNNEL RELEASE Left    • COLONOSCOPY N/A 1/4/2018    Procedure: COLONOSCOPY with cold polypectomy and hot snare polypectomy;  Surgeon: Ten Solitario MD;  Location: [facility] ENDOSCOPY;  Service:    • EYE LENS IMPLANT SECONDARY Bilateral 2007, 2008   • KNEE CARTILAGE SURGERY Right 1979   • TONSILLECTOMY AND ADENOIDECTOMY Bilateral 2005   • TOTAL KNEE ARTHROPLASTY Left 03/14/2016    Left total knee arthroplasty with Amberly component, size 11 femur, G tibial baseplate with a 10 polyethylene insert and a 38 patellar button-Dr. Pablo Hairston   • TOTAL KNEE ARTHROPLASTY Right 03/02/2015    Right total knee arthroplasty with Amberly component size G femur, 11 tibial base plate with an 11 polyethylene insert and a 38 patellar button-Dr. Pablo Hairston   • UVULOPALATOPHARYNGOPLASTY N/A 2005    part of soft palate, and uvula   • VENTRAL HERNIA REPAIR N/A 1/23/2018    Procedure: VENTRAL HERNIA REPAIR LAPAROSCOPIC  WITH DAVINCI ROBOT AND MESH;  Surgeon: Ten Solitario MD;  Location: [facility];  Service:                         PT Assessment/Plan     Row Name 12/07/22 0800          PT Assessment    Assessment Comments Mr. Manzo returns for first follow up session after initial eval reporting no new changes. Continued with current therex focused on improved LE strength aand functional mobility. Added HL clamshell, HL glut set, nustep,  heel raise,review of log roll and increased repetition during STS, side steps. he requires cues for technique during Sts, specifically to avoid strong posterior lean/push back on table with LE and requires RW in front for safety. Encouraged continued compliance with HEP and provided copy of PT schedule, pt reports understanding.  -RS        PT Plan    PT Plan Comments Consider rows, wall wash, marching  -RS           User Key  (r) = Recorded By, (t) = Taken By, (c) = Cosigned By    Initials Name Provider Type    RS Doris Gonzalez, PT Physical Therapist                   OP Exercises     Row Name 12/07/22 0800             Subjective Comments    Subjective Comments pt reports no pain currently, no new changes since eval  -RS         Total Minutes    84247 - PT Therapeutic Exercise Minutes 41  -RS         Exercise 1    Exercise Name 1 nustep  -RS      Time 1 5 min  -RS         Exercise 2    Exercise Name 2 LAQ  -RS      Sets 2 2  -RS      Reps 2 10  -RS      Additional Comments 2#  -RS         Exercise 3    Exercise Name 3 seated march  -RS      Sets 3 2  -RS      Reps 3 10  -RS         Exercise 4    Exercise Name 4 STS with UEs, scoot to the edge of chair  -RS      Sets 4 2  -RS      Reps 4 5  -RS         Exercise 5    Exercise Name 5 side stepping at counter  -RS      Reps 5 5 laps of 5'  -RS         Exercise 6    Exercise Name 6 post shoulder roll  -RS      Reps 6 15  -RS         Exercise 7    Exercise Name 7 HL glut set  -RS      Cueing 7 Verbal;Demo  -RS      Reps 7 15  -RS       Time 7 3s  -RS         Exercise 8    Exercise Name 8 HL clamshell  -RS      Cueing 8 Verbal;Demo  -RS      Reps 8 10ea  -RS      Time 8 B, uni  -RS      Additional Comments RTB  -RS         Exercise 9    Exercise Name 9 review of log roll  -RS      Time 9 2 min  -RS         Exercise 10    Exercise Name 10 heel raise  -RS      Cueing 10 Verbal;Demo  -RS      Reps 10 15  -RS            User Key  (r) = Recorded By, (t) = Taken By, (c) = Cosigned By    Initials Name Provider Type    RS Doris Gonzalez PT Physical Therapist                              PT OP Goals     Row Name 12/07/22 0800          PT Short Term Goals    STG Date to Achieve 01/05/23  -RS     STG 1 Pt will be independent with initial HEP.  -RS     STG 1 Progress Ongoing  -RS     STG 2 Pt will ambulate 150' with normal heel strike to toe off with symmetrical stride and davon with his RWX without momentary LOB.  -RS     STG 2 Progress Ongoing  -RS     STG 3 Pt will demonstrate FTSS in 15 seconds or less indicating reduced risk of falls.  -RS     STG 3 Progress Ongoing  -RS        Long Term Goals    LTG Date to Achieve 02/04/23  -RS     LTG 1 Pt will be independent with advanced HEP for strength and mobility.  -RS     LTG 1 Progress Ongoing  -RS     LTG 2 Pt will tolerate 30 minutes of activity with 3 brief rest breaks demonstrating improved functional strength.  -RS     LTG 2 Progress Ongoing  -RS     LTG 3 Pt will demonstrate increased strength LEs to 4/5 or better.  -RS     LTG 3 Progress Ongoing  -RS     LTG 4 Pt will ambulate 500' or more with normalized gait without momentary LOB with least restrictive assistive device.  -RS     LTG 4 Progress Ongoing  -RS           User Key  (r) = Recorded By, (t) = Taken By, (c) = Cosigned By    Initials Name Provider Type    RS Doris Gonzalez PT Physical Therapist                Therapy Education  Given: HEP, Posture/body mechanics, Fall prevention and home safety  Program: Reinforced  How Provided:  Verbal, Demonstration  Provided to: Patient  Level of Understanding: Verbalized              Time Calculation:   Start Time: 0830  Stop Time: 0913  Time Calculation (min): 43 min  Timed Charges  29900 - PT Therapeutic Exercise Minutes: 41  Total Minutes  Timed Charges Total Minutes: 41   Total Minutes: 41  Therapy Charges for Today     Code Description Service Date Service Provider Modifiers Qty    78336224037 HC PT THER PROC EA 15 MIN 12/7/2022 Doris Gonzalez, PT GP 3                    Doris Gonzalez, PT  12/7/2022

## 2022-12-14 ENCOUNTER — HOSPITAL ENCOUNTER (OUTPATIENT)
Dept: PHYSICAL THERAPY | Facility: HOSPITAL | Age: 67
Setting detail: THERAPIES SERIES
Discharge: HOME | End: 2022-12-14

## 2022-12-14 DIAGNOSIS — R53.1 GENERAL WEAKNESS: Primary | ICD-10-CM

## 2022-12-14 DIAGNOSIS — R53.81 PHYSICAL DECONDITIONING: ICD-10-CM

## 2022-12-14 DIAGNOSIS — R26.9 GAIT DIFFICULTY: ICD-10-CM

## 2022-12-14 DIAGNOSIS — R26.89 BALANCE PROBLEM: ICD-10-CM

## 2022-12-14 PROCEDURE — 97110 THERAPEUTIC EXERCISES: CPT

## 2022-12-14 NOTE — THERAPY TREATMENT NOTE
Outpatient Physical Therapy Ortho Treatment Note  [facility]     Patient Name: Flo Manzo  : 1955  MRN: [id number]  Today's Date: 2022      Visit Date: 2022    Visit Dx:    ICD-10-CM ICD-9-CM   1. General weakness  R53.1 780.79   2. Gait difficulty  R26.9 781.2   3. Balance problem  R26.89 781.99   4. Physical deconditioning  R53.81 799.3       Patient Active Problem List   Diagnosis   • Umbilical hernia without obstruction and without gangrene   • Essential hypertension   • Arthritis of left knee   • Depression   • Obesity (BMI 30-39.9)   • Atrial fibrillation with RVR (HCC)   • Substernal thyroid goiter   • CHF (congestive heart failure) (HCC)   • Diastolic CHF, chronic (HCC)   • Hemorrhagic stroke (HCC)   • GÓMEZ on auto CPAP   • Hypersomnia due to medical condition   • Sleep-related hypoxia   • Chronic atrial fibrillation (HCC)   • Vertigo   • Aortic stenosis, moderate   • S/P ICD (internal cardiac defibrillator) procedure   • Sinus pause   • Pacemaker   • Anticoagulated   • Generalized weakness        Past Medical History:   Diagnosis Date   • Arthritis    • Atrial fibrillation with RVR (HCC) 2019   • Colon polyps 2018    Hepatic flexure: tubular adenoma, only low-grade dysplasia; splenic flexure: tubular adenoma, only low-grade dysplasia; sigmoid colon: tubular adenoma, multiple fragments only low-grade dysplasia   • Depression    • Heart murmur    • Hemorrhagic stroke (HCC) 2019   • Hypertension    • New onset atrial fibrillation (CMS/HCC) - RVR 2019   • GÓMEZ (obstructive sleep apnea) 2019    Home sleep study with moderate severity GÓMEZ, AHI 20 events per hour.  Sleep-related hypoxia with low O2 saturation 84% for 14 minutes.   • Sleep apnea     previously diagnosed with sleep apnea, had T&A done and it has since resolved    • Stroke (HCC)    • Uncontrolled hypertension    • Ventral hernia         Past Surgical History:   Procedure Laterality Date   •  APPENDECTOMY N/A 1972   • BACK SURGERY      BLADDER STIMULATOR IMPLANT L LOWER BACK   • CARDIAC CATHETERIZATION N/A 07/20/2004    Cath left ventriculography, coronary angiography and left heart catheterization, Normal results-Dr. Vadim Mancuso   • CARDIAC CATHETERIZATION N/A 1/29/2019    Procedure: Left Heart Cath;  Surgeon: Eren Bruce MD;  Location: [facility] CATH INVASIVE LOCATION;  Service: Cardiology   • CARDIAC CATHETERIZATION N/A 1/29/2019    Procedure: Left ventriculography;  Surgeon: Eren Bruce MD;  Location:  [facility] CATH INVASIVE LOCATION;  Service: Cardiology   • CARDIAC CATHETERIZATION N/A 1/29/2019    Procedure: Coronary angiography;  Surgeon: Eren Bruce MD;  Location:  [facility] CATH INVASIVE LOCATION;  Service: Cardiology   • CARDIAC ELECTROPHYSIOLOGY PROCEDURE N/A 3/4/2022    Procedure: Pacemaker SC new BIOTRONIK;  Surgeon: Eren Bruce MD;  Location:  [facility] CATH INVASIVE LOCATION;  Service: Cardiology;  Laterality: N/A;   • CARPAL TUNNEL RELEASE Right 2013   • CARPAL TUNNEL RELEASE Left    • COLONOSCOPY N/A 1/4/2018    Procedure: COLONOSCOPY with cold polypectomy and hot snare polypectomy;  Surgeon: Ten Solitario MD;  Location: [facility] ENDOSCOPY;  Service:    • EYE LENS IMPLANT SECONDARY Bilateral 2007, 2008   • KNEE CARTILAGE SURGERY Right 1979   • TONSILLECTOMY AND ADENOIDECTOMY Bilateral 2005   • TOTAL KNEE ARTHROPLASTY Left 03/14/2016    Left total knee arthroplasty with Amberly component, size 11 femur, G tibial baseplate with a 10 polyethylene insert and a 38 patellar button-Dr. Pablo Hairston   • TOTAL KNEE ARTHROPLASTY Right 03/02/2015    Right total knee arthroplasty with Amberly component size G femur, 11 tibial base plate with an 11 polyethylene insert and a 38 patellar button-Dr. Pablo Hairston   • UVULOPALATOPHARYNGOPLASTY N/A 2005    part of soft palate, and uvula   • VENTRAL HERNIA REPAIR N/A 1/23/2018    Procedure: VENTRAL HERNIA REPAIR LAPAROSCOPIC  WITH DAVINCI ROBOT AND MESH;  Surgeon: Ten Solitario MD;  Location: [facility];  Service:                         PT Assessment/Plan     Row Name 12/14/22 1500          PT Assessment    Assessment Comments Mr. Manzo reports good tolerance for previous session with poor compliance with HEP due to a pipe breaking at home. Continued with current program and progressed to include standing marching and standing rows, pt requires close guard for safety and cues for upright posture throughout. Updated HEP to include heel raise with his walker or aat the counter and glut set in hooklying. pt reports understanding of HEP and remains appropriate for skilled PT.  -RS        PT Plan    PT Plan Comments Consider wall wash, continue gait train  -RS           User Key  (r) = Recorded By, (t) = Taken By, (c) = Cosigned By    Initials Name Provider Type    RS Doris Gonzalez, PT Physical Therapist                   OP Exercises     Row Name 12/14/22 1400             Subjective Comments    Subjective Comments Pt reports no soreness after last session, he did not do his HEP because his main  pipe broke, he denies any pain currently. Denies any recent falls.  -RS         Total Minutes    52374 - PT Therapeutic Exercise Minutes 40  -RS         Exercise 1    Exercise Name 1 nustep  -RS      Time 1 5 min  -RS         Exercise 2    Exercise Name 2 LAQ  -RS      Sets 2 2  -RS      Reps 2 10  -RS      Additional Comments 3#  -RS         Exercise 3    Exercise Name 3 seated march  -RS      Sets 3 2  -RS      Reps 3 10  -RS      Additional Comments cues for chest up  -RS         Exercise 4    Exercise Name 4 STS with UEs, scoot to the edge of chair  -RS      Sets 4 2  -RS      Reps 4 6  -RS         Exercise 5    Exercise Name 5 side stepping at counter  -RS      Reps 5 3 laps // bar  -RS         Exercise 6    Exercise Name 6 marching  -RS      Cueing 6 Verbal;Demo  -RS      Reps 6 2 laps  -RS      Time 6 fwd- normal gait retro   -RS         Exercise 7    Exercise Name 7 HL glut set  -RS      Cueing 7 Verbal;Demo  -RS      Sets 7 2  -RS      Reps 7 10  -RS      Time 7 3s  -RS      Additional Comments small lift  -RS         Exercise 8    Exercise Name 8 HL clamshell  -RS      Cueing 8 Verbal;Demo  -RS      Reps 8 20  -RS      Time 8 GTB  -RS         Exercise 9    Exercise Name 9 review of log roll  -RS      Time 9 2 min  -RS         Exercise 10    Exercise Name 10 heel raise  -RS      Cueing 10 Verbal;Demo  -RS      Reps 10 15  -RS      Additional Comments standing  -RS         Exercise 11    Exercise Name 11 rows  -RS      Sets 11 15  -RS      Reps 11 RTB  -RS      Time 11 chair behind walker in front  -RS         Exercise 12    Exercise Name 12 gait in clinic- cues for RW navigation  -RS            User Key  (r) = Recorded By, (t) = Taken By, (c) = Cosigned By    Initials Name Provider Type    RS Doris Gonzalez, PT Physical Therapist                                 Therapy Education  Given: HEP  Program: Reinforced, Progressed, Modified  How Provided: Verbal, Demonstration, Written  Provided to: Patient  Level of Understanding: Verbalized              Time Calculation:   Start Time: 1442  Stop Time: 1524  Time Calculation (min): 42 min  Timed Charges  66355 - PT Therapeutic Exercise Minutes: 40  Total Minutes  Timed Charges Total Minutes: 40   Total Minutes: 40  Therapy Charges for Today     Code Description Service Date Service Provider Modifiers Qty    11255446357 HC PT THER PROC EA 15 MIN 12/14/2022 Doris Gonzalez, PT GP 3                    Doris Gonzalez, PT  12/14/2022

## 2022-12-21 ENCOUNTER — HOSPITAL ENCOUNTER (OUTPATIENT)
Dept: PHYSICAL THERAPY | Facility: HOSPITAL | Age: 67
Setting detail: THERAPIES SERIES
Discharge: HOME | End: 2022-12-21

## 2022-12-21 DIAGNOSIS — R26.9 GAIT DIFFICULTY: ICD-10-CM

## 2022-12-21 DIAGNOSIS — R53.1 GENERAL WEAKNESS: Primary | ICD-10-CM

## 2022-12-21 DIAGNOSIS — R26.89 BALANCE PROBLEM: ICD-10-CM

## 2022-12-21 DIAGNOSIS — R53.81 PHYSICAL DECONDITIONING: ICD-10-CM

## 2022-12-21 PROCEDURE — 97110 THERAPEUTIC EXERCISES: CPT

## 2022-12-21 NOTE — THERAPY TREATMENT NOTE
Outpatient Physical Therapy Ortho Treatment Note  [facility]     Patient Name: Flo Manzo  : 1955  MRN: [id number]  Today's Date: 2022      Visit Date: 2022    Visit Dx:    ICD-10-CM ICD-9-CM   1. General weakness  R53.1 780.79   2. Gait difficulty  R26.9 781.2   3. Balance problem  R26.89 781.99   4. Physical deconditioning  R53.81 799.3       Patient Active Problem List   Diagnosis   • Umbilical hernia without obstruction and without gangrene   • Essential hypertension   • Arthritis of left knee   • Depression   • Obesity (BMI 30-39.9)   • Atrial fibrillation with RVR (HCC)   • Substernal thyroid goiter   • CHF (congestive heart failure) (HCC)   • Diastolic CHF, chronic (HCC)   • Hemorrhagic stroke (HCC)   • GÓMEZ on auto CPAP   • Hypersomnia due to medical condition   • Sleep-related hypoxia   • Chronic atrial fibrillation (HCC)   • Vertigo   • Aortic stenosis, moderate   • S/P ICD (internal cardiac defibrillator) procedure   • Sinus pause   • Pacemaker   • Anticoagulated   • Generalized weakness        Past Medical History:   Diagnosis Date   • Arthritis    • Atrial fibrillation with RVR (HCC) 2019   • Colon polyps 2018    Hepatic flexure: tubular adenoma, only low-grade dysplasia; splenic flexure: tubular adenoma, only low-grade dysplasia; sigmoid colon: tubular adenoma, multiple fragments only low-grade dysplasia   • Depression    • Heart murmur    • Hemorrhagic stroke (HCC) 2019   • Hypertension    • New onset atrial fibrillation (CMS/HCC) - RVR 2019   • GÓMEZ (obstructive sleep apnea) 2019    Home sleep study with moderate severity GÓMEZ, AHI 20 events per hour.  Sleep-related hypoxia with low O2 saturation 84% for 14 minutes.   • Sleep apnea     previously diagnosed with sleep apnea, had T&A done and it has since resolved    • Stroke (HCC)    • Uncontrolled hypertension    • Ventral hernia         Past Surgical History:   Procedure Laterality Date   •  APPENDECTOMY N/A 1972   • BACK SURGERY      BLADDER STIMULATOR IMPLANT L LOWER BACK   • CARDIAC CATHETERIZATION N/A 07/20/2004    Cath left ventriculography, coronary angiography and left heart catheterization, Normal results-Dr. Vadim Mancuso   • CARDIAC CATHETERIZATION N/A 1/29/2019    Procedure: Left Heart Cath;  Surgeon: Eren Bruce MD;  Location: [facility] CATH INVASIVE LOCATION;  Service: Cardiology   • CARDIAC CATHETERIZATION N/A 1/29/2019    Procedure: Left ventriculography;  Surgeon: Eren Bruce MD;  Location:  [facility] CATH INVASIVE LOCATION;  Service: Cardiology   • CARDIAC CATHETERIZATION N/A 1/29/2019    Procedure: Coronary angiography;  Surgeon: Eren Bruce MD;  Location:  [facility] CATH INVASIVE LOCATION;  Service: Cardiology   • CARDIAC ELECTROPHYSIOLOGY PROCEDURE N/A 3/4/2022    Procedure: Pacemaker SC new BIOTRONIK;  Surgeon: Eren Bruce MD;  Location:  [facility] CATH INVASIVE LOCATION;  Service: Cardiology;  Laterality: N/A;   • CARPAL TUNNEL RELEASE Right 2013   • CARPAL TUNNEL RELEASE Left    • COLONOSCOPY N/A 1/4/2018    Procedure: COLONOSCOPY with cold polypectomy and hot snare polypectomy;  Surgeon: Ten Solitario MD;  Location: [facility] ENDOSCOPY;  Service:    • EYE LENS IMPLANT SECONDARY Bilateral 2007, 2008   • KNEE CARTILAGE SURGERY Right 1979   • TONSILLECTOMY AND ADENOIDECTOMY Bilateral 2005   • TOTAL KNEE ARTHROPLASTY Left 03/14/2016    Left total knee arthroplasty with Amberly component, size 11 femur, G tibial baseplate with a 10 polyethylene insert and a 38 patellar button-Dr. Pablo Hairston   • TOTAL KNEE ARTHROPLASTY Right 03/02/2015    Right total knee arthroplasty with Amberly component size G femur, 11 tibial base plate with an 11 polyethylene insert and a 38 patellar button-Dr. Pablo Hairston   • UVULOPALATOPHARYNGOPLASTY N/A 2005    part of soft palate, and uvula   • VENTRAL HERNIA REPAIR N/A 1/23/2018    Procedure: VENTRAL HERNIA REPAIR LAPAROSCOPIC  WITH DAVINCI ROBOT AND MESH;  Surgeon: Ten Solitario MD;  Location: [facility];  Service:                         PT Assessment/Plan     Row Name 12/21/22 1400          PT Assessment    Assessment Comments Mr. Manzo reports improvement in compliance with HEP, he has done 1x per day since last session. He denies any pain or recent falls. Continued with current program focused on improved LE strength and standing activity tolerance. Added gait in clinic between seated/standing activities, pt requires 1 short seated rest break due to fatigue following STS and ambulation however tolerance for standing activities is improved compared to first session. Cues for appropriate gait pattern and foot clearance were provided thorughout session. He remains appropriate for PT to address limitations in functional activity tolerance and strength.  -RS        PT Plan    PT Plan Comments consier circuit style training with mix of standing and seated activities.  -RS           User Key  (r) = Recorded By, (t) = Taken By, (c) = Cosigned By    Initials Name Provider Type    RS Doris Gonzalez, PT Physical Therapist                   OP Exercises     Row Name 12/21/22 1400             Subjective Comments    Subjective Comments Pt reports no pain, he has done his HEP daily  -RS         Total Minutes    48888 - PT Therapeutic Exercise Minutes 45  -RS         Exercise 1    Exercise Name 1 nustep  -RS      Time 1 5 min  -RS         Exercise 2    Exercise Name 2 LAQ  -RS      Sets 2 2  -RS      Reps 2 10  -RS      Additional Comments 3#  -RS         Exercise 3    Exercise Name 3 seated march  -RS      Sets 3 2  -RS      Reps 3 10  -RS      Additional Comments 3#- chest up  -RS         Exercise 4    Exercise Name 4 STS with UEs  -RS      Sets 4 2  -RS      Reps 4 7  -RS      Additional Comments foam  -RS         Exercise 5    Exercise Name 5 side stepping at counter  -RS      Reps 5 2 laps // bar  -RS         Exercise 6    Exercise  Name 6 marching  -RS      Cueing 6 Verbal;Demo  -RS      Reps 6 2 laps  -RS      Time 6 --  -RS         Exercise 7    Exercise Name 7 small bridge with AD ball  -RS      Cueing 7 Verbal;Demo  -RS      Sets 7 2  -RS      Reps 7 10  -RS      Time 7 3s  -RS         Exercise 8    Exercise Name 8 HL clamshell  -RS      Cueing 8 Verbal;Demo  -RS      Sets 8 2  -RS      Reps 8 15  -RS      Time 8 GTB  -RS         Exercise 9    Exercise Name 9 review of log roll  -RS      Time 9 during transitions  -RS         Exercise 10    Exercise Name 10 heel raise  -RS      Cueing 10 Verbal;Demo  -RS      Reps 10 15  -RS         Exercise 11    Exercise Name 11 1 long hallway lap- cues for chest up, walker close  -RS      Sets 11 --  -RS      Reps 11 --  -RS      Time 11 --  -RS         Exercise 12    Exercise Name 12 --  -RS            User Key  (r) = Recorded By, (t) = Taken By, (c) = Cosigned By    Initials Name Provider Type    RS Doris Gonzalez, PT Physical Therapist                              PT OP Goals     Row Name 12/21/22 1400          PT Short Term Goals    STG Date to Achieve 01/05/23  -RS     STG 1 Pt will be independent with initial HEP.  -RS     STG 1 Progress Met  -RS     STG 2 Pt will ambulate 150' with normal heel strike to toe off with symmetrical stride and davon with his RWX without momentary LOB.  -RS     STG 2 Progress Ongoing  -RS     STG 3 Pt will demonstrate FTSS in 15 seconds or less indicating reduced risk of falls.  -RS     STG 3 Progress Ongoing  -RS        Long Term Goals    LTG Date to Achieve 02/04/23  -RS     LTG 1 Pt will be independent with advanced HEP for strength and mobility.  -RS     LTG 1 Progress Ongoing  -RS     LTG 2 Pt will tolerate 30 minutes of activity with 3 brief rest breaks demonstrating improved functional strength.  -RS     LTG 2 Progress Ongoing  -RS     LTG 3 Pt will demonstrate increased strength LEs to 4/5 or better.  -RS     LTG 3 Progress Ongoing  -RS     LTG 4 Pt will  ambulate 500' or more with normalized gait without momentary LOB with least restrictive assistive device.  -RS     LTG 4 Progress Ongoing  -RS           User Key  (r) = Recorded By, (t) = Taken By, (c) = Cosigned By    Initials Name Provider Type    Doirs Rodriguez, PT Physical Therapist                Therapy Education  Given: HEP, Mobility training, Fall prevention and home safety  Program: Reinforced  How Provided: Verbal, Demonstration  Provided to: Patient  Level of Understanding: Verbalized              Time Calculation:   Start Time: 1400  Stop Time: 1448  Time Calculation (min): 48 min  Timed Charges  64161 - PT Therapeutic Exercise Minutes: 45  Total Minutes  Timed Charges Total Minutes: 45   Total Minutes: 45  Therapy Charges for Today     Code Description Service Date Service Provider Modifiers Qty    44952917828 HC PT THER PROC EA 15 MIN 12/21/2022 Doris Gonzalez, PT GP 3                    Doris Gonzalez PT  12/21/2022

## 2022-12-27 RX ORDER — AMLODIPINE BESYLATE 10 MG/1
10 TABLET ORAL
Qty: 90 TABLET | Refills: 1 | Status: SHIPPED | OUTPATIENT
Start: 2022-12-27

## 2023-01-04 ENCOUNTER — HOSPITAL ENCOUNTER (OUTPATIENT)
Dept: PHYSICAL THERAPY | Facility: HOSPITAL | Age: 68
Setting detail: THERAPIES SERIES
Discharge: HOME | End: 2023-01-04
Payer: MEDICARE

## 2023-01-04 DIAGNOSIS — R53.81 PHYSICAL DECONDITIONING: ICD-10-CM

## 2023-01-04 DIAGNOSIS — R53.1 GENERAL WEAKNESS: Primary | ICD-10-CM

## 2023-01-04 DIAGNOSIS — R26.89 BALANCE PROBLEM: ICD-10-CM

## 2023-01-04 DIAGNOSIS — R26.9 GAIT DIFFICULTY: ICD-10-CM

## 2023-01-04 DIAGNOSIS — M25.511 ACUTE PAIN OF RIGHT SHOULDER: ICD-10-CM

## 2023-01-04 PROCEDURE — 97110 THERAPEUTIC EXERCISES: CPT

## 2023-01-04 NOTE — THERAPY PROGRESS REPORT/RE-CERT
Outpatient Physical Therapy Ortho Progress Note  [facility]     Patient Name: Flo Manzo  : 1955  MRN: [id number]  Today's Date: 2023      Visit Date: 2023    Visit Dx:    ICD-10-CM ICD-9-CM   1. General weakness  R53.1 780.79   2. Gait difficulty  R26.9 781.2   3. Balance problem  R26.89 781.99   4. Physical deconditioning  R53.81 799.3   5. Acute pain of right shoulder  M25.511 719.41       Patient Active Problem List   Diagnosis   • Umbilical hernia without obstruction and without gangrene   • Essential hypertension   • Arthritis of left knee   • Depression   • Obesity (BMI 30-39.9)   • Atrial fibrillation with RVR (HCC)   • Substernal thyroid goiter   • CHF (congestive heart failure) (HCC)   • Diastolic CHF, chronic (HCC)   • Hemorrhagic stroke (HCC)   • GÓMEZ on auto CPAP   • Hypersomnia due to medical condition   • Sleep-related hypoxia   • Chronic atrial fibrillation (HCC)   • Vertigo   • Aortic stenosis, moderate   • S/P ICD (internal cardiac defibrillator) procedure   • Sinus pause   • Pacemaker   • Anticoagulated   • Generalized weakness        Past Medical History:   Diagnosis Date   • Arthritis    • Atrial fibrillation with RVR (HCC) 2019   • Colon polyps 2018    Hepatic flexure: tubular adenoma, only low-grade dysplasia; splenic flexure: tubular adenoma, only low-grade dysplasia; sigmoid colon: tubular adenoma, multiple fragments only low-grade dysplasia   • Depression    • Heart murmur    • Hemorrhagic stroke (HCC) 2019   • Hypertension    • New onset atrial fibrillation (CMS/HCC) - RVR 2019   • GÓMEZ (obstructive sleep apnea) 2019    Home sleep study with moderate severity GÓMEZ, AHI 20 events per hour.  Sleep-related hypoxia with low O2 saturation 84% for 14 minutes.   • Sleep apnea     previously diagnosed with sleep apnea, had T&A done and it has since resolved    • Stroke (HCC)    • Uncontrolled hypertension    • Ventral hernia         Past  Surgical History:   Procedure Laterality Date   • APPENDECTOMY N/A 1972   • BACK SURGERY      BLADDER STIMULATOR IMPLANT L LOWER BACK   • CARDIAC CATHETERIZATION N/A 07/20/2004    Cath left ventriculography, coronary angiography and left heart catheterization, Normal results-Dr. Vadim Mancuso   • CARDIAC CATHETERIZATION N/A 1/29/2019    Procedure: Left Heart Cath;  Surgeon: Eren Bruce MD;  Location: [facility] CATH INVASIVE LOCATION;  Service: Cardiology   • CARDIAC CATHETERIZATION N/A 1/29/2019    Procedure: Left ventriculography;  Surgeon: Eren Bruce MD;  Location:  [facility] CATH INVASIVE LOCATION;  Service: Cardiology   • CARDIAC CATHETERIZATION N/A 1/29/2019    Procedure: Coronary angiography;  Surgeon: Eren Bruce MD;  Location:  [facility] CATH INVASIVE LOCATION;  Service: Cardiology   • CARDIAC ELECTROPHYSIOLOGY PROCEDURE N/A 3/4/2022    Procedure: Pacemaker SC new BIOTRONIK;  Surgeon: Eren Bruce MD;  [facility];  Service: Cardiology;  Laterality: N/A;   • CARPAL TUNNEL RELEASE Right 2013   • CARPAL TUNNEL RELEASE Left    • COLONOSCOPY N/A 1/4/2018    Procedure: COLONOSCOPY with cold polypectomy and hot snare polypectomy;  Surgeon: Ten Solitario MD;  Location: [facility] ENDOSCOPY;  Service:    • EYE LENS IMPLANT SECONDARY Bilateral 2007, 2008   • KNEE CARTILAGE SURGERY Right 1979   • TONSILLECTOMY AND ADENOIDECTOMY Bilateral 2005   • TOTAL KNEE ARTHROPLASTY Left 03/14/2016    Left total knee arthroplasty with Amberly component, size 11 femur, G tibial baseplate with a 10 polyethylene insert and a 38 patellar button-Dr. Pablo Hairston   • TOTAL KNEE ARTHROPLASTY Right 03/02/2015    Right total knee arthroplasty with Amberly component size G femur, 11 tibial base plate with an 11 polyethylene insert and a 38 patellar button-Dr. Pablo Hairston   • UVULOPALATOPHARYNGOPLASTY N/A 2005    part of soft palate, and uvula   • VENTRAL HERNIA REPAIR N/A 1/23/2018     Procedure: VENTRAL HERNIA REPAIR LAPAROSCOPIC WITH DAVINCI ROBOT AND MESH;  Surgeon: Ten Solitario MD;  Location: [facility] OR;  Service:         PT Ortho     Row Name 01/04/23 1000       Myotomal Screen- Lower Quarter Clearing    Hip flexion (L2) Right:;4- (Good -);Left:;3+ (Fair +)  -JA    Knee extension (L3) Right:;3+ (Fair +);Left:;4- (Good -)  -JA    Ankle DF (L4) Bilateral:;3+ (Fair +);4- (Good -)  -JA    Ankle PF (S1) Bilateral:;3+ (Fair +);4 (Good)  -JA    Knee flexion (S2) Right:;4- (Good -);Left:;3+ (Fair +)  -MAXINE          User Key  (r) = Recorded By, (t) = Taken By, (c) = Cosigned By    Initials Name Provider Type    Christiana Cardona, PT Physical Therapist                             PT Assessment/Plan     Row Name 01/04/23 1500          PT Assessment    Assessment Comments Flo Manzo has been seen for 5 visits for treatment of generalized weakness, OA in multiple joints, physical deconditioning, hx of CVA begun a month ago. He demonstrated decreased ROM LE/trunk, weakness mukund LE, difficulty with transitional movements and impaired functional mobility. He also noted 2 falls between his discharge from rehab facility and initial outpatient PT appt. Currently he demonstrates some improvement with LE strength and he has met 1/3 STGs, remaining STG/LTGs ongoing. Recommend continuing skilled therapy services to work on gait and balance now that strength has begun to increase.  -MAXINE        PT Plan    PT Plan Comments assess response to last visit (progressed gait, balance ex's and had difficulty with stepping/jessica-jessica coordination)  -MAXINE           User Key  (r) = Recorded By, (t) = Taken By, (c) = Cosigned By    Initials Name Provider Type    Christiana Cardona, PT Physical Therapist                   OP Exercises     Row Name 01/04/23 0900             Subjective Comments    Subjective Comments R shoulder hurts mainly at night and tries to lie on R side, sometimes when leaning on it in the  recliner.  -JA         Total Minutes    11138 - PT Therapeutic Exercise Minutes 44  -JA         Exercise 1    Exercise Name 1 nustep  -JA      Time 1 5 min  -JA         Exercise 2    Exercise Name 2 LAQ  -JA      Sets 2 2  -JA      Reps 2 10  -JA      Additional Comments 3#  -JA         Exercise 3    Exercise Name 3 seated march  -JA      Sets 3 2  -JA      Reps 3 10  -JA      Additional Comments 3#, didn't require posture cues today  -JA         Exercise 4    Exercise Name 4 STS with UEs  -JA      Sets 4 2  -JA      Reps 4 7  -JA         Exercise 5    Exercise Name 5 side stepping at counter  -JA      Reps 5 2 laps // bar  -JA         Exercise 6    Exercise Name 6 marching  -JA      Cueing 6 Verbal;Demo  -JA      Reps 6 2 laps  -JA         Exercise 7    Exercise Name 7 small bridge with AD ball  -JA      Cueing 7 Verbal;Demo  -JA      Sets 7 2  -JA      Reps 7 10  -JA      Time 7 3s  -JA         Exercise 8    Exercise Name 8 HL clamshell  -JA      Cueing 8 Verbal;Demo  -JA      Sets 8 2  -JA      Reps 8 15  -JA      Time 8 GTB  -JA         Exercise 9    Exercise Name 9 practiced log roll  -JA      Reps 9 2  -JA      Time 9 --  -JA      Additional Comments pt notes [place] has [place] size bed that was formerly a water bed and now has a mattress however the rails of the bed are slightly higher than the mattress which makes getting out especially challenging.  -JA         Exercise 10    Exercise Name 10 heel raise  -JA      Cueing 10 Verbal;Demo  -JA      Reps 10 15  -JA         Exercise 11    Exercise Name 11 175' with RWX  -JA      Time 11 did not require cues for posture today; he does lean more heavily on L side of walker and shifts left during gait, not normalized heel strike however stride is improving and davon is steadier  -JA         Exercise 12    Exercise Name 12 FW/BW gait in //bars  -JA      Cueing 12 Verbal;Demo  -JA      Time 12 3 laps  -JA         Exercise 13    Exercise Name 13 attempted  jessica-jessica type mobility work however pt had difficulty with coordination, particularly when adding reach  -MAXINE      Reps 13 10 ea  -MAXINE      Additional Comments try side step/lunge with reach?  -MAXINE            User Key  (r) = Recorded By, (t) = Taken By, (c) = Cosigned By    Initials Name Provider Type    Christiana Cardona, PT Physical Therapist                              PT OP Goals     Row Name 01/04/23 1000          PT Short Term Goals    STG Date to Achieve 01/05/23  -MAXINE     STG 1 Pt will be independent with initial HEP.  -MAXINE     STG 1 Progress Met  -MAXINE     STG 2 Pt will ambulate 150' with normal heel strike to toe off with symmetrical stride and davon with his RWX without momentary LOB.  -MAXINE     STG 2 Progress Progressing  -MAXINE     STG 2 Progress Comments davon and stride improving however he leans heavily L on walker and has lateral weight shift L  -MAXINE     STG 3 Pt will demonstrate FTSS in 15 seconds or less indicating reduced risk of falls.  -MAXINE     STG 3 Progress Ongoing  -MAXINE     STG 3 Progress Comments 35 sec  -MAXINE        Long Term Goals    LTG Date to Achieve 02/04/23  -MAXINE     LTG 1 Pt will be independent with advanced HEP for strength and mobility.  -MAXINE     LTG 1 Progress Ongoing  -MAXINE     LTG 2 Pt will tolerate 30 minutes of activity with 3 brief rest breaks demonstrating improved functional strength.  -MAXINE     LTG 2 Progress Ongoing  -MAXINE     LTG 3 Pt will demonstrate increased strength LEs to 4/5 or better.  -MAXINE     LTG 3 Progress Ongoing  -MAXINE     LTG 4 Pt will ambulate 500' or more with normalized gait without momentary LOB with least restrictive assistive device.  -MAXINE     LTG 4 Progress Ongoing  -MAXINE           User Key  (r) = Recorded By, (t) = Taken By, (c) = Cosigned By    Initials Name Provider Type    Christiana Cardona, PT Physical Therapist                Therapy Education  Education Details: Discussed continuing PT to further improve gait/balance now that his strength is starting to  increase.    Outcome Measure Options: Lower Extremity Functional Scale (LEFS)         Time Calculation:   Start Time: 0935  Stop Time: 1020  Time Calculation (min): 45 min  Timed Charges  10892 - PT Therapeutic Exercise Minutes: 44  Total Minutes  Timed Charges Total Minutes: 44   Total Minutes: 44  Therapy Charges for Today     Code Description Service Date Service Provider Modifiers Qty    01700527862  PT THER PROC EA 15 MIN 1/4/2023 Christiana Mathew, PT GP 3          PT G-Codes  Outcome Measure Options: Lower Extremity Functional Scale (LEFS)         Christiana Mathew, PT  1/4/2023

## 2023-01-06 ENCOUNTER — HOSPITAL ENCOUNTER (OUTPATIENT)
Dept: PHYSICAL THERAPY | Facility: HOSPITAL | Age: 68
Setting detail: THERAPIES SERIES
Discharge: HOME | End: 2023-01-06
Payer: MEDICARE

## 2023-01-06 DIAGNOSIS — R53.1 GENERAL WEAKNESS: Primary | ICD-10-CM

## 2023-01-06 DIAGNOSIS — R26.9 GAIT DIFFICULTY: ICD-10-CM

## 2023-01-06 DIAGNOSIS — R26.89 BALANCE PROBLEM: ICD-10-CM

## 2023-01-06 DIAGNOSIS — R53.81 PHYSICAL DECONDITIONING: ICD-10-CM

## 2023-01-06 PROCEDURE — 97110 THERAPEUTIC EXERCISES: CPT

## 2023-01-06 NOTE — THERAPY TREATMENT NOTE
Outpatient Physical Therapy Ortho Treatment Note  [facility]     Patient Name: Flo Manzo  : 1955  MRN: [id number]  Today's Date: 2023      Visit Date: 2023    Visit Dx:    ICD-10-CM ICD-9-CM   1. General weakness  R53.1 780.79   2. Gait difficulty  R26.9 781.2   3. Balance problem  R26.89 781.99   4. Physical deconditioning  R53.81 799.3       Patient Active Problem List   Diagnosis   • Umbilical hernia without obstruction and without gangrene   • Essential hypertension   • Arthritis of left knee   • Depression   • Obesity (BMI 30-39.9)   • Atrial fibrillation with RVR (HCC)   • Substernal thyroid goiter   • CHF (congestive heart failure) (HCC)   • Diastolic CHF, chronic (HCC)   • Hemorrhagic stroke (HCC)   • GÓMEZ on auto CPAP   • Hypersomnia due to medical condition   • Sleep-related hypoxia   • Chronic atrial fibrillation (HCC)   • Vertigo   • Aortic stenosis, moderate   • S/P ICD (internal cardiac defibrillator) procedure   • Sinus pause   • Pacemaker   • Anticoagulated   • Generalized weakness        Past Medical History:   Diagnosis Date   • Arthritis    • Atrial fibrillation with RVR (HCC) 2019   • Colon polyps 2018    Hepatic flexure: tubular adenoma, only low-grade dysplasia; splenic flexure: tubular adenoma, only low-grade dysplasia; sigmoid colon: tubular adenoma, multiple fragments only low-grade dysplasia   • Depression    • Heart murmur    • Hemorrhagic stroke (HCC) 2019   • Hypertension    • New onset atrial fibrillation (CMS/HCC) - RVR 2019   • GÓMEZ (obstructive sleep apnea) 2019    Home sleep study with moderate severity GÓMEZ, AHI 20 events per hour.  Sleep-related hypoxia with low O2 saturation 84% for 14 minutes.   • Sleep apnea     previously diagnosed with sleep apnea, had T&A done and it has since resolved    • Stroke (HCC)    • Uncontrolled hypertension    • Ventral hernia         Past Surgical History:   Procedure Laterality Date   •  APPENDECTOMY N/A 1972   • BACK SURGERY      BLADDER STIMULATOR IMPLANT L LOWER BACK   • CARDIAC CATHETERIZATION N/A 07/20/2004    Cath left ventriculography, coronary angiography and left heart catheterization, Normal results-Dr. Vadim Mancuso   • CARDIAC CATHETERIZATION N/A 1/29/2019    Procedure: Left Heart Cath;  Surgeon: Eren Bruce MD;  Location: [facility] CATH INVASIVE LOCATION;  Service: Cardiology   • CARDIAC CATHETERIZATION N/A 1/29/2019    Procedure: Left ventriculography;  Surgeon: Eren Bruce MD;  Location:  [facility] CATH INVASIVE LOCATION;  Service: Cardiology   • CARDIAC CATHETERIZATION N/A 1/29/2019    Procedure: Coronary angiography;  Surgeon: Eren Bruce MD;  Location:  [facility] CATH INVASIVE LOCATION;  Service: Cardiology   • CARDIAC ELECTROPHYSIOLOGY PROCEDURE N/A 3/4/2022    Procedure: Pacemaker SC new BIOTRONIK;  Surgeon: Eren Bruce MD;  Location:  [facility] CATH INVASIVE LOCATION;  Service: Cardiology;  Laterality: N/A;   • CARPAL TUNNEL RELEASE Right 2013   • CARPAL TUNNEL RELEASE Left    • COLONOSCOPY N/A 1/4/2018    Procedure: COLONOSCOPY with cold polypectomy and hot snare polypectomy;  Surgeon: Ten Solitario MD;  Location: [facility] ENDOSCOPY;  Service:    • EYE LENS IMPLANT SECONDARY Bilateral 2007, 2008   • KNEE CARTILAGE SURGERY Right 1979   • TONSILLECTOMY AND ADENOIDECTOMY Bilateral 2005   • TOTAL KNEE ARTHROPLASTY Left 03/14/2016    Left total knee arthroplasty with Amberly component, size 11 femur, G tibial baseplate with a 10 polyethylene insert and a 38 patellar button-Dr. Pablo Hairston   • TOTAL KNEE ARTHROPLASTY Right 03/02/2015    Right total knee arthroplasty with Amberly component size G femur, 11 tibial base plate with an 11 polyethylene insert and a 38 patellar button-Dr. Pablo Hairston   • UVULOPALATOPHARYNGOPLASTY N/A 2005    part of soft palate, and uvula   • VENTRAL HERNIA REPAIR N/A 1/23/2018    Procedure: VENTRAL HERNIA REPAIR LAPAROSCOPIC  WITH DAVINCI ROBOT AND MESH;  Surgeon: Ten Solitario MD;  Location: [facility];  Service:                         PT Assessment/Plan     Row Name 01/06/23 1300          PT Assessment    Assessment Comments Mr. Manzo reports he has noticed improvement in strength since starting PT and he has improved compliance with HEP. He denies any falls sine starting PT. Continued to focus on functional activity tolerance, increased ambulation distance with RW nd pt reqiored min to mod verbal cues for RW navigation. Pt with one LOB during today's session while attempting STS, he recovered by sitting back in the chair and education was provided regarding safety.Provided pt with updated schedule and providd LYFT info/pt signed consent. He remains appropriate for skilled PT and is motivated to participate.  -RS        PT Plan    PT Plan Comments Continue focus on standing activities, next session progress to circuit style (2 standing and 1 sitting activivty)  -RS           User Key  (r) = Recorded By, (t) = Taken By, (c) = Cosigned By    Initials Name Provider Type    RS Doris Gonzalez, PT Physical Therapist                   OP Exercises     Row Name 01/06/23 1300             Subjective Comments    Subjective Comments Pt feels he is geetting stronger overall and has been doing his HEP  -RS         Subjective Pain    Able to rate subjective pain? yes  -RS      Pre-Treatment Pain Level 0  -RS      Subjective Pain Comment pt arrival time 1245, appt time 1230  -RS         Total Minutes    94395 - PT Therapeutic Exercise Minutes 40  -RS         Exercise 1    Exercise Name 1 nustep  -RS      Time 1 5 min  -RS         Exercise 2    Exercise Name 2 LAQ  -RS      Sets 2 2  -RS      Reps 2 10  -RS      Additional Comments 3#  -RS         Exercise 3    Exercise Name 3 seated march  -RS      Sets 3 2  -RS      Reps 3 10  -RS      Additional Comments 3#  -RS         Exercise 4    Exercise Name 4 STS with UEs  -RS      Sets 4 2  -RS       Reps 4 7  -RS         Exercise 5    Exercise Name 5 side stepping at counter  -RS      Reps 5 2 laps // bar  -RS         Exercise 6    Exercise Name 6 marching  -RS      Cueing 6 Verbal;Demo  -RS      Reps 6 2 laps  -RS         Exercise 7    Exercise Name 7 small bridge with AD ball  -RS      Cueing 7 Verbal;Demo  -RS      Sets 7 2  -RS      Reps 7 10  -RS      Time 7 3s  -RS         Exercise 8    Exercise Name 8 HL clamshell  -RS      Cueing 8 Verbal;Demo  -RS      Sets 8 2  -RS      Reps 8 15  -RS      Time 8 BTB  -RS         Exercise 9    Exercise Name 9 practiced log roll  -RS      Reps 9 1  -RS         Exercise 10    Exercise Name 10 heel raise  -RS      Cueing 10 Verbal;Demo  -RS      Reps 10 15  -RS         Exercise 11    Exercise Name 11 232' with RWX  -RS      Time 11 cues for attention to L side  -RS         Exercise 12    Exercise Name 12 --  -RS      Cueing 12 --  -RS      Time 12 --  -RS         Exercise 13    Exercise Name 13 --  -RS      Reps 13 --  -RS            User Key  (r) = Recorded By, (t) = Taken By, (c) = Cosigned By    Initials Name Provider Type    RS Doris Gonzalez, PT Physical Therapist                                 Therapy Education  Education Details: LYFT, HEP, safety during STS  Given: HEP  Program: Reinforced  How Provided: Verbal, Demonstration  Provided to: Patient  Level of Understanding: Verbalized              Time Calculation:   Start Time: 1246  Stop Time: 1329  Time Calculation (min): 43 min  Timed Charges  32852 - PT Therapeutic Exercise Minutes: 40  Total Minutes  Timed Charges Total Minutes: 40   Total Minutes: 40  Therapy Charges for Today     Code Description Service Date Service Provider Modifiers Qty    45802567604 HC PT THER PROC EA 15 MIN 1/6/2023 Doris Gonzalez, PT GP 3                    Doris Gonzalez PT  1/6/2023

## 2023-01-09 ENCOUNTER — HOSPITAL ENCOUNTER (OUTPATIENT)
Dept: PHYSICAL THERAPY | Facility: HOSPITAL | Age: 68
Setting detail: THERAPIES SERIES
Discharge: HOME | End: 2023-01-09
Payer: MEDICARE

## 2023-01-09 DIAGNOSIS — R53.1 GENERAL WEAKNESS: Primary | ICD-10-CM

## 2023-01-09 DIAGNOSIS — R26.9 GAIT DIFFICULTY: ICD-10-CM

## 2023-01-09 DIAGNOSIS — R53.81 PHYSICAL DECONDITIONING: ICD-10-CM

## 2023-01-09 DIAGNOSIS — R26.89 BALANCE PROBLEM: ICD-10-CM

## 2023-01-09 PROCEDURE — 97110 THERAPEUTIC EXERCISES: CPT

## 2023-01-09 NOTE — THERAPY TREATMENT NOTE
Outpatient Physical Therapy Ortho Treatment Note  [facility]     Patient Name: Flo Manzo  : 1955  MRN: [id number]  Today's Date: 2023      Visit Date: 2023    Visit Dx:    ICD-10-CM ICD-9-CM   1. General weakness  R53.1 780.79   2. Gait difficulty  R26.9 781.2   3. Balance problem  R26.89 781.99   4. Physical deconditioning  R53.81 799.3       Patient Active Problem List   Diagnosis   • Umbilical hernia without obstruction and without gangrene   • Essential hypertension   • Arthritis of left knee   • Depression   • Obesity (BMI 30-39.9)   • Atrial fibrillation with RVR (HCC)   • Substernal thyroid goiter   • CHF (congestive heart failure) (HCC)   • Diastolic CHF, chronic (HCC)   • Hemorrhagic stroke (HCC)   • GÓMEZ on auto CPAP   • Hypersomnia due to medical condition   • Sleep-related hypoxia   • Chronic atrial fibrillation (HCC)   • Vertigo   • Aortic stenosis, moderate   • S/P ICD (internal cardiac defibrillator) procedure   • Sinus pause   • Pacemaker   • Anticoagulated   • Generalized weakness        Past Medical History:   Diagnosis Date   • Arthritis    • Atrial fibrillation with RVR (HCC) 2019   • Colon polyps 2018    Hepatic flexure: tubular adenoma, only low-grade dysplasia; splenic flexure: tubular adenoma, only low-grade dysplasia; sigmoid colon: tubular adenoma, multiple fragments only low-grade dysplasia   • Depression    • Heart murmur    • Hemorrhagic stroke (HCC) 2019   • Hypertension    • New onset atrial fibrillation (CMS/HCC) - RVR 2019   • GÓMEZ (obstructive sleep apnea) 2019    Home sleep study with moderate severity GÓMEZ, AHI 20 events per hour.  Sleep-related hypoxia with low O2 saturation 84% for 14 minutes.   • Sleep apnea     previously diagnosed with sleep apnea, had T&A done and it has since resolved    • Stroke (HCC)    • Uncontrolled hypertension    • Ventral hernia         Past Surgical History:   Procedure Laterality Date   •  APPENDECTOMY N/A 1972   • BACK SURGERY      BLADDER STIMULATOR IMPLANT L LOWER BACK   • CARDIAC CATHETERIZATION N/A 07/20/2004    Cath left ventriculography, coronary angiography and left heart catheterization, Normal results-Dr. Vadim Mancuso   • CARDIAC CATHETERIZATION N/A 1/29/2019    Procedure: Left Heart Cath;  Surgeon: Eren Bruce MD;  Location: [facility] CATH INVASIVE LOCATION;  Service: Cardiology   • CARDIAC CATHETERIZATION N/A 1/29/2019    Procedure: Left ventriculography;  Surgeon: Eren Bruce MD;  Location:  [facility] CATH INVASIVE LOCATION;  Service: Cardiology   • CARDIAC CATHETERIZATION N/A 1/29/2019    Procedure: Coronary angiography;  Surgeon: Eren Bruce MD;  Location:  [facility] CATH INVASIVE LOCATION;  Service: Cardiology   • CARDIAC ELECTROPHYSIOLOGY PROCEDURE N/A 3/4/2022    Procedure: Pacemaker SC new BIOTRONIK;  Surgeon: Eren Bruce MD;  Location:  [facility] CATH INVASIVE LOCATION;  Service: Cardiology;  Laterality: N/A;   • CARPAL TUNNEL RELEASE Right 2013   • CARPAL TUNNEL RELEASE Left    • COLONOSCOPY N/A 1/4/2018    Procedure: COLONOSCOPY with cold polypectomy and hot snare polypectomy;  Surgeon: Ten Solitario MD;  Location: [facility] ENDOSCOPY;  Service:    • EYE LENS IMPLANT SECONDARY Bilateral 2007, 2008   • KNEE CARTILAGE SURGERY Right 1979   • TONSILLECTOMY AND ADENOIDECTOMY Bilateral 2005   • TOTAL KNEE ARTHROPLASTY Left 03/14/2016    Left total knee arthroplasty with Amberly component, size 11 femur, G tibial baseplate with a 10 polyethylene insert and a 38 patellar button-Dr. Pablo Hairston   • TOTAL KNEE ARTHROPLASTY Right 03/02/2015    Right total knee arthroplasty with Amberly component size G femur, 11 tibial base plate with an 11 polyethylene insert and a 38 patellar button-Dr. Pablo Hairston   • UVULOPALATOPHARYNGOPLASTY N/A 2005    part of soft palate, and uvula   • VENTRAL HERNIA REPAIR N/A 1/23/2018    Procedure: VENTRAL HERNIA REPAIR LAPAROSCOPIC  WITH DAVINCI ROBOT AND MESH;  Surgeon: Ten Solitario MD;  Location: [facility] OR;  Service:                         PT Assessment/Plan     Row Name 01/09/23 1500          PT Assessment    Assessment Comments Daynel reports small improvement in ease of getting out of bed. Tolerated change to circuit-type strength and mobility rehab. Stand-by supervision with intermittent CGA with more challenging small side lunges at [place]. Pt had one LOB during STS and had to sit quickly. He will benefit from continuing skilled therapy services.  -JA        PT Plan    PT Plan Comments cont circuit training with format of 2:1 standing:sitting activiity  -JA           User Key  (r) = Recorded By, (t) = Taken By, (c) = Cosigned By    Initials Name Provider Type    Christiana Cardona, PT Physical Therapist                   OP Exercises     Row Name 01/09/23 0900             Subjective Comments    Subjective Comments It's getting a little easier to get out of bed.  -JA         Subjective Pain    Able to rate subjective pain? yes  -JA      Pre-Treatment Pain Level 0  -JA         Total Minutes    16420 - PT Therapeutic Exercise Minutes 40  -JA         Exercise 1    Exercise Name 1 nustep  -JA      Time 1 5 min  -JA      Additional Comments L5, seat 9  -JA         Exercise 2    Exercise Name 2 LTR w/arms at 90 abd  -JA      Cueing 2 Verbal;Tactile  -JA      Sets 2 1 ea  -JA      Reps 2 10  -JA      Time 2 3 sec  -JA      Additional Comments stretch warm up  -JA         Exercise 3    Exercise Name 3 Bridge  -JA      Cueing 3 Verbal;Tactile  -JA      Sets 3 2  -JA      Reps 3 10  -JA      Additional Comments supine strength  -JA         Ex[facility]    [facility]      [facility] 15  -JA      Time 4 w/3#  -JA      Additional Comments Circuit 1  -JA         Exercise 5    Ex[facility]      Reps 5 10  -JA      Time 5 w/3#  -JA      Additional Comments Circuit 1  -JA         Exercise 6     Exercise Name 6 LAQ  -JA      Cueing 6 Verbal;Demo  -JA      Reps 6 15 ea  -JA      Time 6 w/3#  -JA      Additional Comments Circuit 1: repeat Circuit  -JA         Exercise 7    Exercise Name 7 side step or side lunge with reach  -JA      Reps 7 10  -JA      Additional Comments Circuit 2  -JA         Exercise 8    Exercise Name 8 jessica-jessica  -JA      Reps 8 10 ea  -JA      Additional Comments Circuit 2  -JA         Exercise 9    Exercise Name 9 STS from elevated seat with mukund shld flexion  -JA      Reps 9 5  -JA      Additional Comments Circuit 2: no repeat for Circuit 2 yet  -JA            User Key  (r) = Recorded By, (t) = Taken By, (c) = Cosigned By    Initials Name Provider Type    Christiana Cardona, PT Physical Therapist                                                Time Calculation:   Start Time: 0930  Stop Time: 1015  Time Calculation (min): 45 min  Timed Charges  39942 - PT Therapeutic Exercise Minutes: 40  Total Minutes  Timed Charges Total Minutes: 40   Total Minutes: 40  Therapy Charges for Today     Code Description Service Date Service Provider Modifiers Qty    13042995959 HC PT THER PROC EA 15 MIN 1/9/2023 Christiana Mathew, PT GP 3                    Christiana Mathew PT  1/9/2023

## 2023-01-10 RX ORDER — TAMSULOSIN HYDROCHLORIDE 0.4 MG/1
CAPSULE ORAL
Qty: 90 CAPSULE | Refills: 1 | Status: SHIPPED | OUTPATIENT
Start: 2023-01-10 | End: 2023-03-27 | Stop reason: SDUPTHER

## 2023-01-11 ENCOUNTER — HOSPITAL ENCOUNTER (OUTPATIENT)
Dept: PHYSICAL THERAPY | Facility: HOSPITAL | Age: 68
Setting detail: THERAPIES SERIES
Discharge: HOME | End: 2023-01-11
Payer: MEDICARE

## 2023-01-11 DIAGNOSIS — R26.9 GAIT DIFFICULTY: ICD-10-CM

## 2023-01-11 DIAGNOSIS — R26.89 BALANCE PROBLEM: ICD-10-CM

## 2023-01-11 DIAGNOSIS — R53.81 PHYSICAL DECONDITIONING: ICD-10-CM

## 2023-01-11 DIAGNOSIS — R53.1 GENERAL WEAKNESS: Primary | ICD-10-CM

## 2023-01-11 PROCEDURE — 97110 THERAPEUTIC EXERCISES: CPT

## 2023-01-11 NOTE — THERAPY TREATMENT NOTE
Outpatient Physical Therapy Ortho Treatment Note  [facility]     Patient Name: Flo Manzo  : 1955  MRN: [id number]  Today's Date: 2023      Visit Date: 2023    Visit Dx:    ICD-10-CM ICD-9-CM   1. General weakness  R53.1 780.79   2. Gait difficulty  R26.9 781.2   3. Balance problem  R26.89 781.99   4. Physical deconditioning  R53.81 799.3       Patient Active Problem List   Diagnosis   • Umbilical hernia without obstruction and without gangrene   • Essential hypertension   • Arthritis of left knee   • Depression   • Obesity (BMI 30-39.9)   • Atrial fibrillation with RVR (HCC)   • Substernal thyroid goiter   • CHF (congestive heart failure) (HCC)   • Diastolic CHF, chronic (HCC)   • Hemorrhagic stroke (HCC)   • GÓMEZ on auto CPAP   • Hypersomnia due to medical condition   • Sleep-related hypoxia   • Chronic atrial fibrillation (HCC)   • Vertigo   • Aortic stenosis, moderate   • S/P ICD (internal cardiac defibrillator) procedure   • Sinus pause   • Pacemaker   • Anticoagulated   • Generalized weakness        Past Medical History:   Diagnosis Date   • Arthritis    • Atrial fibrillation with RVR (HCC) 2019   • Colon polyps 2018    Hepatic flexure: tubular adenoma, only low-grade dysplasia; splenic flexure: tubular adenoma, only low-grade dysplasia; sigmoid colon: tubular adenoma, multiple fragments only low-grade dysplasia   • Depression    • Heart murmur    • Hemorrhagic stroke (HCC) 2019   • Hypertension    • New onset atrial fibrillation (CMS/HCC) - RVR 2019   • GÓMEZ (obstructive sleep apnea) 2019    Home sleep study with moderate severity GÓMEZ, AHI 20 events per hour.  Sleep-related hypoxia with low O2 saturation 84% for 14 minutes.   • Sleep apnea     previously diagnosed with sleep apnea, had T&A done and it has since resolved    • Stroke (HCC)    • Uncontrolled hypertension    • Ventral hernia         Past Surgical History:   Procedure Laterality Date   •  APPENDECTOMY N/A 1972   • BACK SURGERY      BLADDER STIMULATOR IMPLANT L LOWER BACK   • CARDIAC CATHETERIZATION N/A 07/20/2004    Cath left ventriculography, coronary angiography and left heart catheterization, Normal results-Dr. Vadim Mancuso   • CARDIAC CATHETERIZATION N/A 1/29/2019    Procedure: Left Heart Cath;  Surgeon: Eren Bruce MD;  Location: [facility] CATH INVASIVE LOCATION;  Service: Cardiology   • CARDIAC CATHETERIZATION N/A 1/29/2019    Procedure: Left ventriculography;  Surgeon: Eren Bruce MD;  Location:  [facility] CATH INVASIVE LOCATION;  Service: Cardiology   • CARDIAC CATHETERIZATION N/A 1/29/2019    Procedure: Coronary angiography;  Surgeon: Eren Bruce MD;  Location:  [facility] CATH INVASIVE LOCATION;  Service: Cardiology   • CARDIAC ELECTROPHYSIOLOGY PROCEDURE N/A 3/4/2022    Procedure: Pacemaker SC new BIOTRONIK;  Surgeon: Eren Bruce MD;  Location:  [facility] CATH INVASIVE LOCATION;  Service: Cardiology;  Laterality: N/A;   • CARPAL TUNNEL RELEASE Right 2013   • CARPAL TUNNEL RELEASE Left    • COLONOSCOPY N/A 1/4/2018    Procedure: COLONOSCOPY with cold polypectomy and hot snare polypectomy;  Surgeon: Ten Solitario MD;  Location: [facility] ENDOSCOPY;  Service:    • EYE LENS IMPLANT SECONDARY Bilateral 2007, 2008   • KNEE CARTILAGE SURGERY Right 1979   • TONSILLECTOMY AND ADENOIDECTOMY Bilateral 2005   • TOTAL KNEE ARTHROPLASTY Left 03/14/2016    Left total knee arthroplasty with Amberly component, size 11 femur, G tibial baseplate with a 10 polyethylene insert and a 38 patellar button-Dr. Pablo Hairston   • TOTAL KNEE ARTHROPLASTY Right 03/02/2015    Right total knee arthroplasty with Amberly component size G femur, 11 tibial base plate with an 11 polyethylene insert and a 38 patellar button-Dr. Pablo Hairston   • UVULOPALATOPHARYNGOPLASTY N/A 2005    part of soft palate, and uvula   • VENTRAL HERNIA REPAIR N/A 1/23/2018    Procedure: VENTRAL HERNIA REPAIR LAPAROSCOPIC  WITH DAVINCI ROBOT AND MESH;  Surgeon: Ten Solitario MD;  Location: [facility];  Service:                         PT Assessment/Plan     Row Name 01/11/23 1000          PT Assessment    Assessment Comments Danyel returns to clinic with no new complaints, he continues to benefit from intermittent cueing to maintain feet inside walker. Continued with circuit style treatment with increased repetitions on STS. He did not require any formal seated rest break but observed appropriate fatigue throughout session. He will continue to benefit from skilled PT.  -        PT Plan    PT Plan Comments increase circuit 2 to 2 rounds  -           User Key  (r) = Recorded By, (t) = Taken By, (c) = Cosigned By    Initials Name Provider Type     Sylvia Santana, PT Physical Therapist                   OP Exercises     Row Name 01/11/23 0900             Subjective Comments    Subjective Comments no new complaints  -         Subjective Pain    Subjective Pain Comment arrival time 930 and requires increased time to enter into clinic  -         Total Minutes    88386 - PT Therapeutic Exercise Minutes 38  -MH         Exercise 1    Exercise Name 1 nustep  -      Cueing 1 Verbal  -      Time 1 5 min  -         Exercise 4    Exercise Name 4 HR at [facility]  -      Cueing 4 Verbal  -MH      Sets 4 2  -MH      Reps 4 20  -MH      Time 4 w/3#  -MH      Additional Comments circuit 1  -         Exercise 5    Exercise Name 5 March in place at [facility]  [facility]      Cueing 5 Verbal  -MH      Sets 5 2  -MH      Reps 5 10  -MH      Time 5 w/3#  -MH      Additional Comments circuit 1  -         Exercise 6    Exercise Name 6 LAQ  -MH      Cueing 6 Verbal;Demo  -MH      Sets 6 2  -MH      Reps 6 10ea  -MH      Time 6 w/3#  -      Additional Comments circuit 1  -         Exercise 7    Exercise Name 7 side steps  -MH      Cueing 7 Verbal;Demo  -MH      Reps 7 1 laps // bars  -MH      Time 7 circuit 2  -MH         Exercise 8    Exercise  Name 8 jessica-jessica  -      Cueing 8 Verbal;Demo  -      Reps 8 10 ea  -      Additional Comments circuit 2  -         Exercise 9    Exercise Name 9 STS from elevated seat with mukund shld flexion  -      Cueing 9 Verbal  -      Reps 9 10  -      Time 9 circuit 2  -            User Key  (r) = Recorded By, (t) = Taken By, (c) = Cosigned By    Initials Name Provider Type     Sylvia Santana, PT Physical Therapist                              PT OP Goals     Row Name 01/11/23 1000          PT Short Term Goals    STG Date to Achieve 01/05/23  -     STG 1 Pt will be independent with initial HEP.  -     STG 1 Progress Met  -     STG 2 Pt will ambulate 150' with normal heel strike to toe off with symmetrical stride and davon with his RWX without momentary LOB.  -     STG 2 Progress Progressing  -     STG 3 Pt will demonstrate FTSS in 15 seconds or less indicating reduced risk of falls.  -     STG 3 Progress Ongoing  -        Long Term Goals    LTG Date to Achieve 02/04/23  -     LTG 1 Pt will be independent with advanced HEP for strength and mobility.  -     LTG 1 Progress Ongoing  -     LTG 2 Pt will tolerate 30 minutes of activity with 3 brief rest breaks demonstrating improved functional strength.  -     LTG 2 Progress Ongoing  -     LTG 3 Pt will demonstrate increased strength LEs to 4/5 or better.  -     LTG 3 Progress Ongoing  -     LTG 4 Pt will ambulate 500' or more with normalized gait without momentary LOB with least restrictive assistive device.  -     LTG 4 Progress Ongoing  -           User Key  (r) = Recorded By, (t) = Taken By, (c) = Cosigned By    Initials Name Provider Type    Sylvia Luis, PT Physical Therapist                Therapy Education  Given: Symptoms/condition management  Program: Reinforced  How Provided: Verbal  Provided to: Patient  Level of Understanding: Verbalized              Time Calculation:   Start Time: 0920  Stop Time: 0959  Time  Calculation (min): 39 min  Total Timed Code Minutes- PT: 38 minute(s)  Timed Charges  38385 - PT Therapeutic Exercise Minutes: 38  Total Minutes  Timed Charges Total Minutes: 38   Total Minutes: 38  Therapy Charges for Today     Code Description Service Date Service Provider Modifiers Qty    30419445995 HC PT THER PROC EA 15 MIN 1/11/2023 Sylvia Santana, PT GP 3                    Sylvia Santana, PT  1/11/2023

## 2023-01-16 ENCOUNTER — HOSPITAL ENCOUNTER (OUTPATIENT)
Dept: PHYSICAL THERAPY | Facility: HOSPITAL | Age: 68
Setting detail: THERAPIES SERIES
Discharge: HOME | End: 2023-01-16
Payer: MEDICARE

## 2023-01-16 DIAGNOSIS — R26.9 GAIT DIFFICULTY: ICD-10-CM

## 2023-01-16 DIAGNOSIS — R26.89 BALANCE PROBLEM: ICD-10-CM

## 2023-01-16 DIAGNOSIS — R53.81 PHYSICAL DECONDITIONING: ICD-10-CM

## 2023-01-16 DIAGNOSIS — R53.1 GENERAL WEAKNESS: Primary | ICD-10-CM

## 2023-01-16 PROCEDURE — 97110 THERAPEUTIC EXERCISES: CPT

## 2023-01-16 NOTE — THERAPY TREATMENT NOTE
Outpatient Physical Therapy Ortho Treatment Note  [facility]     Patient Name: Flo Manzo  : 1955  MRN: [id number]  Today's Date: 2023      Visit Date: 2023    Visit Dx:    ICD-10-CM ICD-9-CM   1. General weakness  R53.1 780.79   2. Gait difficulty  R26.9 781.2   3. Balance problem  R26.89 781.99   4. Physical deconditioning  R53.81 799.3       Patient Active Problem List   Diagnosis   • Umbilical hernia without obstruction and without gangrene   • Essential hypertension   • Arthritis of left knee   • Depression   • Obesity (BMI 30-39.9)   • Atrial fibrillation with RVR (HCC)   • Substernal thyroid goiter   • CHF (congestive heart failure) (HCC)   • Diastolic CHF, chronic (HCC)   • Hemorrhagic stroke (HCC)   • GÓMEZ on auto CPAP   • Hypersomnia due to medical condition   • Sleep-related hypoxia   • Chronic atrial fibrillation (HCC)   • Vertigo   • Aortic stenosis, moderate   • S/P ICD (internal cardiac defibrillator) procedure   • Sinus pause   • Pacemaker   • Anticoagulated   • Generalized weakness        Past Medical History:   Diagnosis Date   • Arthritis    • Atrial fibrillation with RVR (HCC) 2019   • Colon polyps 2018    Hepatic flexure: tubular adenoma, only low-grade dysplasia; splenic flexure: tubular adenoma, only low-grade dysplasia; sigmoid colon: tubular adenoma, multiple fragments only low-grade dysplasia   • Depression    • Heart murmur    • Hemorrhagic stroke (HCC) 2019   • Hypertension    • New onset atrial fibrillation (CMS/HCC) - RVR 2019   • GÓMEZ (obstructive sleep apnea) 2019    Home sleep study with moderate severity GÓMEZ, AHI 20 events per hour.  Sleep-related hypoxia with low O2 saturation 84% for 14 minutes.   • Sleep apnea     previously diagnosed with sleep apnea, had T&A done and it has since resolved    • Stroke (HCC)    • Uncontrolled hypertension    • Ventral hernia         Past Surgical History:   Procedure Laterality Date   •  APPENDECTOMY N/A 1972   • BACK SURGERY      BLADDER STIMULATOR IMPLANT L LOWER BACK   • CARDIAC CATHETERIZATION N/A 07/20/2004    Cath left ventriculography, coronary angiography and left heart catheterization, Normal results-Dr. Vadim Mancuso   • CARDIAC CATHETERIZATION N/A 1/29/2019    Procedure: Left Heart Cath;  Surgeon: Eren Bruce MD;  Location: [facility] CATH INVASIVE LOCATION;  Service: Cardiology   • CARDIAC CATHETERIZATION N/A 1/29/2019    Procedure: Left ventriculography;  Surgeon: Eren Bruce MD;  Location:  [facility] CATH INVASIVE LOCATION;  Service: Cardiology   • CARDIAC CATHETERIZATION N/A 1/29/2019    Procedure: Coronary angiography;  Surgeon: Eren Bruce MD;  Location:  [facility] CATH INVASIVE LOCATION;  Service: Cardiology   • CARDIAC ELECTROPHYSIOLOGY PROCEDURE N/A 3/4/2022    Procedure: Pacemaker SC new BIOTRONIK;  Surgeon: Eren Bruce MD;  Location:  [facility] CATH INVASIVE LOCATION;  Service: Cardiology;  Laterality: N/A;   • CARPAL TUNNEL RELEASE Right 2013   • CARPAL TUNNEL RELEASE Left    • COLONOSCOPY N/A 1/4/2018    Procedure: COLONOSCOPY with cold polypectomy and hot snare polypectomy;  Surgeon: Ten Solitario MD;  Location: [facility] ENDOSCOPY;  Service:    • EYE LENS IMPLANT SECONDARY Bilateral 2007, 2008   • KNEE CARTILAGE SURGERY Right 1979   • TONSILLECTOMY AND ADENOIDECTOMY Bilateral 2005   • TOTAL KNEE ARTHROPLASTY Left 03/14/2016    Left total knee arthroplasty with Amberly component, size 11 femur, G tibial baseplate with a 10 polyethylene insert and a 38 patellar button-Dr. Pablo Hairston   • TOTAL KNEE ARTHROPLASTY Right 03/02/2015    Right total knee arthroplasty with Amberly component size G femur, 11 tibial base plate with an 11 polyethylene insert and a 38 patellar button-Dr. Pablo Hairston   • UVULOPALATOPHARYNGOPLASTY N/A 2005    part of soft palate, and uvula   • VENTRAL HERNIA REPAIR N/A 1/23/2018    Procedure: VENTRAL HERNIA REPAIR LAPAROSCOPIC  WITH DAVINCI ROBOT AND MESH;  Surgeon: Ten Solitario MD;  Location: [facility];  Service:                         PT Assessment/Plan     Row Name 01/16/23 0900          PT Assessment    Assessment Comments Danyel reports improving ease of getting in/out of bed at home. Ana with gait is less halting however he continues to require mukund UE support of RWX. His mobility in general is slow and that factored into inability to repeat Circuit 2 today. Plan to reduce reps to 15 to work on repeating eaach twice. No improvement in STS time yet. He will benefti from [facility] skilled therapy services.  -JA        PT Plan    PT Plan Comments decrease repa  -JA           User Key  (r) = Recorded By, (t) = Taken By, (c) = Cosigned By    Initials Name Provider Type    Christiana Cardona, PT Physical Therapist                   OP Exercises     Row Name 01/16/23 0900             Subjective Comments    Subjective Comments No c/o.  -JA         Subjective Pain    Able to rate subjective pain? yes  -JA      Pre-Treatment Pain Level 0  -JA         Total Minutes    30948 - PT Therapeutic Exercise Minutes 44  -JA         Exercise 1    Exercise Name 1 nustep  -JA      Cueing 1 Verbal  -JA      Time 1 5 min  -JA         Exercise 2    Exercise Name 2 LTR w/arms at 90 abd  -JA      Cueing 2 Verbal;Tactile  -JA      Sets 2 1 ea  -JA      Reps 2 10  -JA      Time 2 3 sec  -JA      Additional Comments stretch warm up  -JA         Exercise 3    Exercise Name 3 Bridge  -JA      Cueing 3 Verbal;Tactile  -JA      Sets 3 2  -JA      Reps 3 10  -JA         Exercise 4    Exercise Name 4 HR at barre  -JA      Cueing 4 Verbal  -JA      Sets 4 2  -JA      Reps 4 20  -JA      Time 4 w/3#  -JA      Additional Comments Circuit 1  -JA         Exercise 5    Exercise Name 5 March in place at barre  -JA      Cueing 5 Verbal  -JA      Sets 5 2  -JA      Reps 5 10  -JA      Time 5 w/3#  -JA      Additional Comments Circuit 1  -JA         Exercise 6     Exercise Name 6 LAQ  -JA      Cueing 6 Verbal;Demo  -JA      Sets 6 2  -JA      Reps 6 10ea  -JA      Time 6 w/3#  -JA      Additional Comments Circuit 1: repeat Circuit  -JA         Exercise 7    Exercise Name 7 side steps  -JA      Cueing 7 Verbal;Demo  -JA      Reps 7 2 laps // bars  -JA      Time 7 Circuit 2  -JA         Exercise 8    Exercise Name 8 jessica-jessica  -JA      Cueing 8 Verbal;Demo  -JA      Reps 8 10 ea  -JA      Time 8 Circuit 2  -JA         Exercise 9    Exercise Name 9 STS from elevated seat with muknud shld flexion  -JA      Cueing 9 Verbal  -JA      Reps 9 10  -JA      Time 9 Circuit 2: unable to repeat Circuit due to slow mobility  -            User Key  (r) = Recorded By, (t) = Taken By, (c) = Cosigned By    Initials Name Provider Type    Christiana Cardona, PT Physical Therapist                              PT OP Goals     Row Name 01/16/23 0900          PT Short Term Goals    STG Date to Achieve 01/05/23  -MAXINE     STG 1 Pt will be independent with initial HEP.  -     STG 1 Progress Met  -     STG 2 Pt will ambulate 150' with normal heel strike to toe off with symmetrical stride and davon with his RWX without momentary LOB.  -     STG 2 Progress Progressing  -     STG 3 Pt will demonstrate FTSS in 15 seconds or less indicating reduced risk of falls.  -     STG 3 Progress Ongoing  -     STG 3 Progress Comments 37 sec (he was more distracted today)  -        Long Term Goals    LTG Date to Achieve 02/04/23  -MAXINE     LTG 1 Pt will be independent with advanced HEP for strength and mobility.  -     LTG 1 Progress Progressing  -     LTG 2 Pt will tolerate 30 minutes of activity with 3 brief rest breaks demonstrating improved functional strength.  -     LTG 2 Progress Progressing  -     LTG 3 Pt will demonstrate increased strength LEs to 4/5 or better.  -     LTG 3 Progress Ongoing  -     LTG 4 Pt will ambulate 500' or more with normalized gait without momentary LOB with  least restrictive assistive device.  -MAXINE     LTG 4 Progress Ongoing  -MAXINE           User Key  (r) = Recorded By, (t) = Taken By, (c) = Cosigned By    Initials Name Provider Type    Christiana Cardona, PT Physical Therapist                               Time Calculation:   Start Time: 0930  Stop Time: 1014  Time Calculation (min): 44 min  Timed Charges  38609 - PT Therapeutic Exercise Minutes: 44  Total Minutes  Timed Charges Total Minutes: 44   Total Minutes: 44  Therapy Charges for Today     Code Description Service Date Service Provider Modifiers Qty    05613758233  PT THER PROC EA 15 MIN 1/16/2023 Christiana Mathew, PT GP 3                    Christiana Mathew PT  1/16/2023

## 2023-01-18 ENCOUNTER — HOSPITAL ENCOUNTER (OUTPATIENT)
Dept: PHYSICAL THERAPY | Facility: HOSPITAL | Age: 68
Setting detail: THERAPIES SERIES
Discharge: HOME | End: 2023-01-18
Payer: MEDICARE

## 2023-01-18 DIAGNOSIS — R26.89 BALANCE PROBLEM: ICD-10-CM

## 2023-01-18 DIAGNOSIS — R53.1 GENERAL WEAKNESS: Primary | ICD-10-CM

## 2023-01-18 DIAGNOSIS — R26.9 GAIT DIFFICULTY: ICD-10-CM

## 2023-01-18 DIAGNOSIS — R53.81 PHYSICAL DECONDITIONING: ICD-10-CM

## 2023-01-18 PROCEDURE — 97110 THERAPEUTIC EXERCISES: CPT

## 2023-01-18 NOTE — THERAPY TREATMENT NOTE
Outpatient Physical Therapy Ortho Treatment Note  [facility]     Patient Name: Flo Manzo  : 1955  MRN: [id number]  Today's Date: 2023      Visit Date: 2023    Visit Dx:    ICD-10-CM ICD-9-CM   1. General weakness  R53.1 780.79   2. Gait difficulty  R26.9 781.2   3. Balance problem  R26.89 781.99   4. Physical deconditioning  R53.81 799.3       Patient Active Problem List   Diagnosis   • Umbilical hernia without obstruction and without gangrene   • Essential hypertension   • Arthritis of left knee   • Depression   • Obesity (BMI 30-39.9)   • Atrial fibrillation with RVR (HCC)   • Substernal thyroid goiter   • CHF (congestive heart failure) (HCC)   • Diastolic CHF, chronic (HCC)   • Hemorrhagic stroke (HCC)   • GÓMEZ on auto CPAP   • Hypersomnia due to medical condition   • Sleep-related hypoxia   • Chronic atrial fibrillation (HCC)   • Vertigo   • Aortic stenosis, moderate   • S/P ICD (internal cardiac defibrillator) procedure   • Sinus pause   • Pacemaker   • Anticoagulated   • Generalized weakness        Past Medical History:   Diagnosis Date   • Arthritis    • Atrial fibrillation with RVR (HCC) 2019   • Colon polyps 2018    Hepatic flexure: tubular adenoma, only low-grade dysplasia; splenic flexure: tubular adenoma, only low-grade dysplasia; sigmoid colon: tubular adenoma, multiple fragments only low-grade dysplasia   • Depression    • Heart murmur    • Hemorrhagic stroke (HCC) 2019   • Hypertension    • New onset atrial fibrillation (CMS/HCC) - RVR 2019   • GÓMEZ (obstructive sleep apnea) 2019    Home sleep study with moderate severity GÓMEZ, AHI 20 events per hour.  Sleep-related hypoxia with low O2 saturation 84% for 14 minutes.   • Sleep apnea     previously diagnosed with sleep apnea, had T&A done and it has since resolved    • Stroke (HCC)    • Uncontrolled hypertension    • Ventral hernia         Past Surgical History:   Procedure Laterality Date   •  APPENDECTOMY N/A 1972   • BACK SURGERY      BLADDER STIMULATOR IMPLANT L LOWER BACK   • CARDIAC CATHETERIZATION N/A 07/20/2004    Cath left ventriculography, coronary angiography and left heart catheterization, Normal results-Dr. Vadim Mancuso   • CARDIAC CATHETERIZATION N/A 1/29/2019    Procedure: Left Heart Cath;  Surgeon: Eren Bruce MD;  Location: [facility] CATH INVASIVE LOCATION;  Service: Cardiology   • CARDIAC CATHETERIZATION N/A 1/29/2019    Procedure: Left ventriculography;  Surgeon: Eren Bruce MD;  Location:  [facility] CATH INVASIVE LOCATION;  Service: Cardiology   • CARDIAC CATHETERIZATION N/A 1/29/2019    Procedure: Coronary angiography;  Surgeon: Eren Bruce MD;  Location:  [facility] CATH INVASIVE LOCATION;  Service: Cardiology   • CARDIAC ELECTROPHYSIOLOGY PROCEDURE N/A 3/4/2022    Procedure: Pacemaker SC new BIOTRONIK;  Surgeon: Eren Bruce MD;  Location:  [facility] CATH INVASIVE LOCATION;  Service: Cardiology;  Laterality: N/A;   • CARPAL TUNNEL RELEASE Right 2013   • CARPAL TUNNEL RELEASE Left    • COLONOSCOPY N/A 1/4/2018    Procedure: COLONOSCOPY with cold polypectomy and hot snare polypectomy;  Surgeon: Ten Solitario MD;  Location: [facility] ENDOSCOPY;  Service:    • EYE LENS IMPLANT SECONDARY Bilateral 2007, 2008   • KNEE CARTILAGE SURGERY Right 1979   • TONSILLECTOMY AND ADENOIDECTOMY Bilateral 2005   • TOTAL KNEE ARTHROPLASTY Left 03/14/2016    Left total knee arthroplasty with Amberly component, size 11 femur, G tibial baseplate with a 10 polyethylene insert and a 38 patellar button-Dr. Pablo Hairston   • TOTAL KNEE ARTHROPLASTY Right 03/02/2015    Right total knee arthroplasty with Amberly component size G femur, 11 tibial base plate with an 11 polyethylene insert and a 38 patellar button-Dr. Pablo Hairston   • UVULOPALATOPHARYNGOPLASTY N/A 2005    part of soft palate, and uvula   • VENTRAL HERNIA REPAIR N/A 1/23/2018    Procedure: VENTRAL HERNIA REPAIR LAPAROSCOPIC  WITH DAVINCI ROBOT AND MESH;  Surgeon: Ten Solitario MD;  Location: [facility] OR;  Service:                         PT Assessment/Plan     Row Name 01/18/23 0900          PT Assessment    Assessment Comments Danyel arrives to clinic with no new complaints, seems to have improved pacing and appropriate distancing from walker when ambulating into clinic. Started session with circuit ther ex with increased resistance to 4# with marches and HR. Able to complete 2 rounds of circuit 2 but did demonstrate fatigue at end with 1 LOB secondary to foot catching but pt. was able to self recover without assist.  -        PT Plan    PT Plan Comments try beginning circuit 3: step taps, wall wash? update HEP  -           User Key  (r) = Recorded By, (t) = Taken By, (c) = Cosigned By    Initials Name Provider Type     Sylvia Santana, PT Physical Therapist                   OP Exercises     Row Name 01/18/23 0900             Subjective Comments    Subjective Comments no complaints  -         Subjective Pain    Able to rate subjective pain? yes  -      Pre-Treatment Pain Level 0  -         Total Minutes    58181 - PT Therapeutic Exercise Minutes 39  -MH         Exercise 1    Exercise Name 1 nustep  -      Additional Comments all occupied  -MH         Exercise 4    Exercise Name 4 HR at barre  -      Cueing 4 Verbal  -MH      Sets 4 2  -MH      Reps 4 20  -MH      Time 4 w/ 4#  -MH      Additional Comments circuit 1  -MH         Exercise 5    Exercise Name 5 March in place at barre  -      Cueing 5 Verbal  -MH      Sets 5 2  -MH      Reps 5 10  -MH      Time 5 w/ 4#  -MH      Additional Comments circuit 1  -MH         Exercise 6    Exercise Name 6 LAQ  -MH      Cueing 6 Verbal;Demo  -MH      Sets 6 2  -MH      Reps 6 10ea  -MH      Time 6 w/ 4#  -MH      Additional Comments circuit 1: repeat x2  -MH         Exercise 7    Exercise Name 7 side steps  -MH      Cueing 7 Verbal;Demo  -MH      Sets 7 2  -MH      Reps 7  2 laps // bars  -MH      Time 7 Circuit 2  -MH         Exercise 8    Exercise Name 8 jessica-jessica  -MH      Cueing 8 Verbal;Demo  -MH      Sets 8 2  -MH      Reps 8 10 ea  -MH      Time 8 Circuit 2  -MH         Exercise 9    Exercise Name 9 STS from chair with 1 UE assist on bar  -MH      Cueing 9 Verbal  -MH      Sets 9 2  -MH      Reps 9 10  -MH      Time 9 Circuit 2: repeat x2  -MH         Exercise 10    Exercise Name 10 seated rest after first round of circuit 2  -MH         Exercise 11    Exercise Name 11 seated HS stretch  -MH      Cueing 11 Verbal;Demo  -MH      Reps 11 3ea  -MH      Time 11 20sec  -            User Key  (r) = Recorded By, (t) = Taken By, (c) = Cosigned By    Initials Name Provider Type    Sylvia Luis, PT Physical Therapist                              PT OP Goals     Row Name 01/18/23 0900          PT Short Term Goals    STG Date to Achieve 01/05/23  -     STG 1 Pt will be independent with initial HEP.  -     STG 1 Progress Met  -     STG 2 Pt will ambulate 150' with normal heel strike to toe off with symmetrical stride and davon with his RWX without momentary LOB.  -     STG 2 Progress Progressing  -     STG 3 Pt will demonstrate FTSS in 15 seconds or less indicating reduced risk of falls.  -     STG 3 Progress Ongoing  -        Long Term Goals    LTG Date to Achieve 02/04/23  -     LTG 1 Pt will be independent with advanced HEP for strength and mobility.  -     LTG 1 Progress Progressing  -     LTG 2 Pt will tolerate 30 minutes of activity with 3 brief rest breaks demonstrating improved functional strength.  -     LTG 2 Progress Progressing  -     LTG 3 Pt will demonstrate increased strength LEs to 4/5 or better.  -     LTG 3 Progress Ongoing  -     LTG 4 Pt will ambulate 500' or more with normalized gait without momentary LOB with least restrictive assistive device.  -     LTG 4 Progress Ongoing  -           User Key  (r) = Recorded By, (t) = Taken  By, (c) = Cosigned By    Initials Name Provider Type     Sylvia Santana, PT Physical Therapist                Therapy Education  Given: Symptoms/condition management  How Provided: Verbal  Provided to: Patient  Level of Understanding: Verbalized              Time Calculation:   Start Time: 0915  Stop Time: 0954  Time Calculation (min): 39 min  Total Timed Code Minutes- PT: 39 minute(s)  Timed Charges  45616 - PT Therapeutic Exercise Minutes: 39  Total Minutes  Timed Charges Total Minutes: 39   Total Minutes: 39  Therapy Charges for Today     Code Description Service Date Service Provider Modifiers Qty    92445162425 HC PT THER PROC EA 15 MIN 1/18/2023 Sylvia Santana, PT GP 3                    Sylvia Santana PT  1/18/2023

## 2023-01-27 PROCEDURE — 93296 REM INTERROG EVL PM/IDS: CPT | Performed by: INTERNAL MEDICINE

## 2023-01-27 PROCEDURE — 93294 REM INTERROG EVL PM/LDLS PM: CPT | Performed by: INTERNAL MEDICINE

## 2023-02-01 ENCOUNTER — HOSPITAL ENCOUNTER (OUTPATIENT)
Dept: PHYSICAL THERAPY | Facility: HOSPITAL | Age: 68
Setting detail: THERAPIES SERIES
Discharge: HOME | End: 2023-02-01
Payer: MEDICARE

## 2023-02-01 DIAGNOSIS — R26.9 GAIT DIFFICULTY: ICD-10-CM

## 2023-02-01 DIAGNOSIS — R53.1 GENERAL WEAKNESS: Primary | ICD-10-CM

## 2023-02-01 DIAGNOSIS — R53.81 PHYSICAL DECONDITIONING: ICD-10-CM

## 2023-02-01 DIAGNOSIS — R26.89 BALANCE PROBLEM: ICD-10-CM

## 2023-02-01 PROCEDURE — 97110 THERAPEUTIC EXERCISES: CPT

## 2023-02-01 NOTE — THERAPY TREATMENT NOTE
Outpatient Physical Therapy Ortho Treatment Note  [facility]     Patient Name: Flo Manzo  : 1955  MRN: [id number]  Today's Date: 2023      Visit Date: 2023    Visit Dx:    ICD-10-CM ICD-9-CM   1. General weakness  R53.1 780.79   2. Gait difficulty  R26.9 781.2   3. Balance problem  R26.89 781.99   4. Physical deconditioning  R53.81 799.3       Patient Active Problem List   Diagnosis   • Umbilical hernia without obstruction and without gangrene   • Essential hypertension   • Arthritis of left knee   • Depression   • Obesity (BMI 30-39.9)   • Atrial fibrillation with RVR (HCC)   • Substernal thyroid goiter   • CHF (congestive heart failure) (HCC)   • Diastolic CHF, chronic (HCC)   • Hemorrhagic stroke (HCC)   • GÓMEZ on auto CPAP   • Hypersomnia due to medical condition   • Sleep-related hypoxia   • Chronic atrial fibrillation (HCC)   • Vertigo   • Aortic stenosis, moderate   • S/P ICD (internal cardiac defibrillator) procedure   • Sinus pause   • Pacemaker   • Anticoagulated   • Generalized weakness        Past Medical History:   Diagnosis Date   • Arthritis    • Atrial fibrillation with RVR (HCC) 2019   • Colon polyps 2018    Hepatic flexure: tubular adenoma, only low-grade dysplasia; splenic flexure: tubular adenoma, only low-grade dysplasia; sigmoid colon: tubular adenoma, multiple fragments only low-grade dysplasia   • Depression    • Heart murmur    • Hemorrhagic stroke (HCC) 2019   • Hypertension    • New onset atrial fibrillation (CMS/HCC) - RVR 2019   • GÓMEZ (obstructive sleep apnea) 2019    Home sleep study with moderate severity GÓMEZ, AHI 20 events per hour.  Sleep-related hypoxia with low O2 saturation 84% for 14 minutes.   • Sleep apnea     previously diagnosed with sleep apnea, had T&A done and it has since resolved    • Stroke (HCC)    • Uncontrolled hypertension    • Ventral hernia         Past Surgical History:   Procedure Laterality Date   •  APPENDECTOMY N/A 1972   • BACK SURGERY      BLADDER STIMULATOR IMPLANT L LOWER BACK   • CARDIAC CATHETERIZATION N/A 07/20/2004    Cath left ventriculography, coronary angiography and left heart catheterization, Normal results-Dr. Vadim Mancuso   • CARDIAC CATHETERIZATION N/A 1/29/2019    Procedure: Left Heart Cath;  Surgeon: Eren Bruce MD;  Location: [facility] CATH INVASIVE LOCATION;  Service: Cardiology   • CARDIAC CATHETERIZATION N/A 1/29/2019    Procedure: Left ventriculography;  Surgeon: Eren Bruce MD;  Location:  [facility] CATH INVASIVE LOCATION;  Service: Cardiology   • CARDIAC CATHETERIZATION N/A 1/29/2019    Procedure: Coronary angiography;  Surgeon: Eren Bruce MD;  Location:  [facility] CATH INVASIVE LOCATION;  Service: Cardiology   • CARDIAC ELECTROPHYSIOLOGY PROCEDURE N/A 3/4/2022    Procedure: Pacemaker SC new BIOTRONIK;  Surgeon: Eren Bruce MD;  Location:  [facility] CATH INVASIVE LOCATION;  Service: Cardiology;  Laterality: N/A;   • CARPAL TUNNEL RELEASE Right 2013   • CARPAL TUNNEL RELEASE Left    • COLONOSCOPY N/A 1/4/2018    Procedure: COLONOSCOPY with cold polypectomy and hot snare polypectomy;  Surgeon: Ten Solitario MD;  Location: [facility] ENDOSCOPY;  Service:    • EYE LENS IMPLANT SECONDARY Bilateral 2007, 2008   • KNEE CARTILAGE SURGERY Right 1979   • TONSILLECTOMY AND ADENOIDECTOMY Bilateral 2005   • TOTAL KNEE ARTHROPLASTY Left 03/14/2016    Left total knee arthroplasty with Amberly component, size 11 femur, G tibial baseplate with a 10 polyethylene insert and a 38 patellar button-Dr. Pablo Hairston   • TOTAL KNEE ARTHROPLASTY Right 03/02/2015    Right total knee arthroplasty with Amberly component size G femur, 11 tibial base plate with an 11 polyethylene insert and a 38 patellar button-Dr. Pablo Hairston   • UVULOPALATOPHARYNGOPLASTY N/A 2005    part of soft palate, and uvula   • VENTRAL HERNIA REPAIR N/A 1/23/2018    Procedure: VENTRAL HERNIA REPAIR LAPAROSCOPIC  WITH DAVINCI ROBOT AND MESH;  Surgeon: Ten Solitario MD;  Location: [facility] OR;  Service:                         PT Assessment/Plan     Row Name 02/01/23 1400          PT Assessment    Assessment Comments Danyel returns to clinic without complaint, continued to work with circuit style session. Noted 2 instances of posterior LOB and unable to maintain eccentric control with return to sit. Began integrateing balance challenges with high knee march for SLS stability and semi-tandem stance with close CGA-supervision. He will continue to benefit from skilled PT.  -        PT Plan    PT Plan Comments step taps and wall wash?  -           User Key  (r) = Recorded By, (t) = Taken By, (c) = Cosigned By    Initials Name Provider Type     Sylvia Santana, PT Physical Therapist                   OP Exercises     Row Name 02/01/23 1300             Subjective Comments    Subjective Comments no complaints  -         Subjective Pain    Able to rate subjective pain? yes  -      Pre-Treatment Pain Level 0  -MH         Total Minutes    87847 - PT Therapeutic Exercise Minutes 38  -MH         Exercise 1    Exercise Name 1 nustep  -MH      Cueing 1 Verbal  -MH      Time 1 5 min  -MH         Exercise 7    Exercise Name 7 LAQ  -MH      Cueing 7 Verbal;Demo  -MH      Sets 7 2  -MH      Reps 7 10ea  -MH      Time 7 Circuit 2  -MH         Exercise 8    Exercise Name 8 HR  -MH      Cueing 8 Verbal;Demo  -MH      Sets 8 2  -MH      Reps 8 20  -MH      Time 8 Circuit 2  -MH         Exercise 9    Exercise Name 9 STS from chair with B UE  -MH      Cueing 9 Verbal  -MH      Sets 9 2  -MH      Reps 9 10  -MH      Time 9 Circuit 2: repeat x2  -MH      Additional Comments 2 instances of posterior loss of balance and inability to control eccentric lower  -MH         Exercise 10    Exercise Name 10 side-steps  -MH      Cueing 10 Verbal;Demo  -MH      Sets 10 2  -MH      Reps 10 2 laps // bars  -MH      Additional Comments circuit 3  -MH          Exercise 11    Exercise Name 11 semi-tandem stance  -      Cueing 11 Verbal;Demo  -      Sets 11 2  -      Reps 11 1ea  -      Time 11 20sec  -      Additional Comments circuit 3  -         Exercise 12    Exercise Name 12 marches // bars  -      Cueing 12 Verbal;Demo  -MH      Reps 12 2  -MH      Time 12 2 laps  -      Additional Comments circuit 3  -         Exercise 13    Exercise Name 13 walking in clinic  -      Additional Comments cues for heel strike and knee flexion  -            User Key  (r) = Recorded By, (t) = Taken By, (c) = Cosigned By    Initials Name Provider Type     Sylvia Santana, PT Physical Therapist                              PT OP Goals     Row Name 02/01/23 1400          PT Short Term Goals    STG Date to Achieve 01/05/23  -     STG 1 Pt will be independent with initial HEP.  -     STG 1 Progress Met  -     STG 2 Pt will ambulate 150' with normal heel strike to toe off with symmetrical stride and davon with his RWX without momentary LOB.  -     STG 2 Progress Progressing  -     STG 3 Pt will demonstrate FTSS in 15 seconds or less indicating reduced risk of falls.  -     STG 3 Progress Ongoing  -        Long Term Goals    LTG Date to Achieve 02/04/23  -     LTG 1 Pt will be independent with advanced HEP for strength and mobility.  -     LTG 1 Progress Progressing  -     LTG 2 Pt will tolerate 30 minutes of activity with 3 brief rest breaks demonstrating improved functional strength.  -     LTG 2 Progress Progressing  -     LTG 3 Pt will demonstrate increased strength LEs to 4/5 or better.  -     LTG 3 Progress Ongoing  -     LTG 4 Pt will ambulate 500' or more with normalized gait without momentary LOB with least restrictive assistive device.  -     LTG 4 Progress Ongoing  -           User Key  (r) = Recorded By, (t) = Taken By, (c) = Cosigned By    Initials Name Provider Type    Sylvia Luis, PT Physical Therapist                 Therapy Education  Given: Symptoms/condition management  Program: Reinforced  How Provided: Verbal  Provided to: Patient  Level of Understanding: Verbalized              Time Calculation:   Start Time: 1400  Stop Time: 1438  Time Calculation (min): 38 min  Total Timed Code Minutes- PT: 38 minute(s)  Timed Charges  84984 - PT Therapeutic Exercise Minutes: 38  Total Minutes  Timed Charges Total Minutes: 38   Total Minutes: 38  Therapy Charges for Today     Code Description Service Date Service Provider Modifiers Qty    94643294018 HC PT THER PROC EA 15 MIN 2/1/2023 Sylvia Santana, PT GP 3                    Sylvia Santana, PT  2/1/2023

## 2023-02-03 ENCOUNTER — OFFICE VISIT (OUTPATIENT)
Dept: FAMILY MEDICINE CLINIC | Facility: CLINIC | Age: 68
End: 2023-02-03
Payer: MEDICARE

## 2023-02-03 ENCOUNTER — APPOINTMENT (OUTPATIENT)
Dept: PHYSICAL THERAPY | Facility: HOSPITAL | Age: 68
End: 2023-02-03
Payer: MEDICARE

## 2023-02-03 ENCOUNTER — TELEPHONE (OUTPATIENT)
Dept: FAMILY MEDICINE CLINIC | Facility: CLINIC | Age: 68
End: 2023-02-03

## 2023-02-03 VITALS
WEIGHT: 227 LBS | DIASTOLIC BLOOD PRESSURE: 86 MMHG | RESPIRATION RATE: 19 BRPM | TEMPERATURE: 97.7 F | OXYGEN SATURATION: 94 % | SYSTOLIC BLOOD PRESSURE: 118 MMHG | HEART RATE: 90 BPM | HEIGHT: 70 IN | BODY MASS INDEX: 32.5 KG/M2

## 2023-02-03 DIAGNOSIS — H10.9 CONJUNCTIVITIS OF BOTH EYES, UNSPECIFIED CONJUNCTIVITIS TYPE: ICD-10-CM

## 2023-02-03 DIAGNOSIS — L60.2 LONG TOENAIL: ICD-10-CM

## 2023-02-03 DIAGNOSIS — I10 ESSENTIAL HYPERTENSION: Primary | ICD-10-CM

## 2023-02-03 DIAGNOSIS — G47.33 OSA ON CPAP: ICD-10-CM

## 2023-02-03 DIAGNOSIS — R73.03 PREDIABETES: ICD-10-CM

## 2023-02-03 DIAGNOSIS — E04.9 SUBSTERNAL THYROID GOITER: ICD-10-CM

## 2023-02-03 DIAGNOSIS — M25.511 ACUTE PAIN OF RIGHT SHOULDER: ICD-10-CM

## 2023-02-03 DIAGNOSIS — F34.1 PERSISTENT DEPRESSIVE DISORDER: ICD-10-CM

## 2023-02-03 DIAGNOSIS — Z99.89 OSA ON CPAP: ICD-10-CM

## 2023-02-03 PROCEDURE — G0009 ADMIN PNEUMOCOCCAL VACCINE: HCPCS | Performed by: FAMILY MEDICINE

## 2023-02-03 PROCEDURE — 90677 PCV20 VACCINE IM: CPT | Performed by: FAMILY MEDICINE

## 2023-02-03 PROCEDURE — 99214 OFFICE O/P EST MOD 30 MIN: CPT | Performed by: FAMILY MEDICINE

## 2023-02-03 RX ORDER — POLYMYXIN B SULFATE AND TRIMETHOPRIM 1; 10000 MG/ML; [USP'U]/ML
1 SOLUTION OPHTHALMIC EVERY 6 HOURS
Qty: 10 ML | Refills: 0 | Status: SHIPPED | OUTPATIENT
Start: 2023-02-03

## 2023-02-03 NOTE — TELEPHONE ENCOUNTER
Caller: Flo Manzo    Relationship: Self    Best call back number: [phone]    What is the best time to reach you: ANY     Who are you requesting to speak with (clinical staff, provider,  specific staff member): DR FALCON     What was the call regarding: JUST SAW DR FALCON AND WAS TO CALL BACK WITH MEDICATIONS HE DID NOT KNOW THE NAME OF.  ASTROVASTATIN 40 MG, TAMSULOSIN     Do you require a callback: YES

## 2023-02-03 NOTE — PROGRESS NOTES
"Chief Complaint  Hypertension (3 MFU)    Subjective        Flo Manzo presents to [facility]  History of Present Illness  Right shoulder pain  Injured a couple of weeks ago.   Bothered him more about a week later.   Says bothers him when he goes to sleep.   Says he usually sleeps on the right.   He was falling and grabbed door frame and held himself up. Didn't fall but pulled his arm.  He is not having any trouble with strength.   He can lift things.   He is walking ok with his walker to put pressure on his right hand and through his arm.     HTN  Well controlled.   He keeps his pills in bottles. He has them lined up and then uses their location on the counter to help him organize when he takes each.   Less active, not going up and down the stairs at home anymore.   He is doing vertigo PT.   He is wearing his CPAP at night. Has new machine and says can't figure out how to do the ramp.     Depression  Says things are not going too bad.     Has crust under the right eye for the last three weeks. He says just overnight not during the day.   No eye pain and no change in vision.     He says he takes two medications at night. One is the atorvastatin and not sure the other one.   He is having some urinary incontinence. Has bladder stim but not sure it is on. It had to be turned off when in the rehab.   Last time he was with urology said it was off. Said NP did something and things improved. But then with rehab they did not want him to have it on said risk of fire with his remote.   Going to contact urology on this. Says he is able to do this.   Objective   Vital Signs:  /86 (BP Location: Right arm, Patient Position: Sitting, Cuff Size: Adult)   Pulse 90   Temp 97.7 °F (36.5 °C) (Infrared)   Resp 19   Ht 177.8 cm (70\")   Wt 103 kg (227 lb)   SpO2 94%   BMI 32.57 kg/m²   Estimated body mass index is 32.57 kg/m² as calculated from the following:    Height as of this encounter: " "177.8 cm (70\").    Weight as of this encounter: 103 kg (227 lb).         Physical Exam  Vitals reviewed.   Constitutional:       General: He is not in acute distress.     Appearance: Normal appearance. He is not ill-appearing or toxic-appearing.   Eyes:      General: No scleral icterus.        Right eye: No discharge.         Left eye: No discharge.      Conjunctiva/sclera: Conjunctivae normal.   Neck:      Thyroid: No thyroid mass, thyromegaly or thyroid tenderness.   Cardiovascular:      Rate and Rhythm: Normal rate and regular rhythm.      Heart sounds: Normal heart sounds. No murmur heard.  Pulmonary:      Effort: Pulmonary effort is normal. No respiratory distress.      Breath sounds: Normal breath sounds. No wheezing.      Comments: Breathing at baseline. Lungs are clear but has some staccato, stuttered breathing periodically.   Musculoskeletal:         General: No tenderness or deformity.      Cervical back: Neck supple. No muscular tenderness.      Right lower leg: No edema.      Left lower leg: No edema.      Comments: He has equal and normal BUE ROM.   There is no pain or weakness with empty can, abduction or adduction. He does have positive scarf on the right. No biceps tendon tenderness.    Lymphadenopathy:      Cervical: No cervical adenopathy.   Neurological:      Mental Status: He is alert and oriented to person, place, and time.      Motor: No abnormal muscle tone.   Psychiatric:         Behavior: Behavior normal.        Result Review :                   Assessment and Plan   Diagnoses and all orders for this visit:    1. Essential hypertension (Primary)  -     Basic Metabolic Panel  -     CBC (No Diff)    2. GÓMEZ on auto CPAP    3. Persistent depressive disorder    4. Acute pain of right shoulder  -     Ambulatory Referral to Physical Therapy Evaluate and treat    5. Prediabetes  -     Hemoglobin A1c    6. Substernal thyroid goiter  -     TSH  -     T4    7. Long toenail  -     Ambulatory Referral to " Podiatry    8. Conjunctivitis of both eyes, unspecified conjunctivitis type    Other orders  -     Pneumococcal Conjugate Vaccine 20-Valent (PCV20)  -     Diclofenac Sodium (Voltaren) 1 % gel gel; Apply 4 g topically to the appropriate area as directed 4 (Four) Times a Day.  Dispense: 50 g; Refill: 0  -     trimethoprim-polymyxin b (POLYTRIM) 38138-9.1 UNIT/ML-% ophthalmic solution; Administer 1 drop to both eyes Every 6 (Six) Hours.  Dispense: 10 mL; Refill: 0             Follow Up   No follow-ups on file.  Patient was given instructions and counseling regarding his condition or for health maintenance advice. Please see specific information pulled into the AVS if appropriate.     His BP is well controlled due for labs.     Prediabetes the A1c is due.   He is due for pneumonia booster and will do today    Seems to have some kind of conjunctivitis, no concerning symptoms. Drops today. His lids are a little swollen L > R, instructed to do cold compress. No pain with EOM.     Told him to look on the back of his CPAP for company number to call to get directions for changing settings.     Shoulder injury but two weeks ago. He had ROM and normal strength, or equal strength bilaterally. Will do PT.

## 2023-02-04 LAB
BUN SERPL-MCNC: 13 MG/DL (ref 8–23)
BUN/CREAT SERPL: 13.8 (ref 7–25)
CALCIUM SERPL-MCNC: 9.9 MG/DL (ref 8.6–10.5)
CHLORIDE SERPL-SCNC: 105 MMOL/L (ref 98–107)
CO2 SERPL-SCNC: 29 MMOL/L (ref 22–29)
CREAT SERPL-MCNC: 0.94 MG/DL (ref 0.76–1.27)
EGFRCR SERPLBLD CKD-EPI 2021: 89.4 ML/MIN/1.73
ERYTHROCYTE [DISTWIDTH] IN BLOOD BY AUTOMATED COUNT: 12.8 % (ref 12.3–15.4)
GLUCOSE SERPL-MCNC: 88 MG/DL (ref 65–99)
HBA1C MFR BLD: 5.6 % (ref 4.8–5.6)
HCT VFR BLD AUTO: 48.2 % (ref 37.5–51)
HGB BLD-MCNC: 15.5 G/DL (ref 13–17.7)
MCH RBC QN AUTO: 28.1 PG (ref 26.6–33)
MCHC RBC AUTO-ENTMCNC: 32.2 G/DL (ref 31.5–35.7)
MCV RBC AUTO: 87.3 FL (ref 79–97)
PLATELET # BLD AUTO: 235 10*3/MM3 (ref 140–450)
POTASSIUM SERPL-SCNC: 4.1 MMOL/L (ref 3.5–5.2)
RBC # BLD AUTO: 5.52 10*6/MM3 (ref 4.14–5.8)
SODIUM SERPL-SCNC: 144 MMOL/L (ref 136–145)
T4 SERPL-MCNC: 6.51 MCG/DL (ref 4.5–11.7)
TSH SERPL DL<=0.005 MIU/L-ACNC: 0.01 UIU/ML (ref 0.27–4.2)
WBC # BLD AUTO: 8.65 10*3/MM3 (ref 3.4–10.8)

## 2023-02-08 ENCOUNTER — HOSPITAL ENCOUNTER (OUTPATIENT)
Dept: PHYSICAL THERAPY | Facility: HOSPITAL | Age: 68
Setting detail: THERAPIES SERIES
Discharge: HOME | End: 2023-02-08
Payer: MEDICARE

## 2023-02-08 DIAGNOSIS — R26.9 GAIT DIFFICULTY: ICD-10-CM

## 2023-02-08 DIAGNOSIS — R53.1 GENERAL WEAKNESS: Primary | ICD-10-CM

## 2023-02-08 DIAGNOSIS — R26.89 BALANCE PROBLEM: ICD-10-CM

## 2023-02-08 DIAGNOSIS — R53.81 PHYSICAL DECONDITIONING: ICD-10-CM

## 2023-02-08 PROCEDURE — 97110 THERAPEUTIC EXERCISES: CPT

## 2023-02-08 NOTE — THERAPY PROGRESS REPORT/RE-CERT
Outpatient Physical Therapy Ortho Progress Note  [facility]     Patient Name: Flo Manzo  : 1955  MRN: [id number]  Today's Date: 2023      Visit Date: 2023    Patient Active Problem List   Diagnosis   • Umbilical hernia without obstruction and without gangrene   • Essential hypertension   • Arthritis of left knee   • Depression   • Obesity (BMI 30-39.9)   • Atrial fibrillation with RVR (HCC)   • Substernal thyroid goiter   • CHF (congestive heart failure) (HCC)   • Diastolic CHF, chronic (HCC)   • Hemorrhagic stroke (HCC)   • GÓMEZ on auto CPAP   • Hypersomnia due to medical condition   • Sleep-related hypoxia   • Chronic atrial fibrillation (HCC)   • Vertigo   • Aortic stenosis, moderate   • S/P ICD (internal cardiac defibrillator) procedure   • Sinus pause   • Pacemaker   • Anticoagulated   • Generalized weakness   • Prediabetes        Past Medical History:   Diagnosis Date   • Arthritis    • Atrial fibrillation with RVR (HCC) 2019   • Colon polyps 2018    Hepatic flexure: tubular adenoma, only low-grade dysplasia; splenic flexure: tubular adenoma, only low-grade dysplasia; sigmoid colon: tubular adenoma, multiple fragments only low-grade dysplasia   • Depression    • Heart murmur    • Hemorrhagic stroke (HCC) 2019   • Hypertension    • New onset atrial fibrillation (CMS/HCC) - RVR 2019   • GÓMEZ (obstructive sleep apnea) 2019    Home sleep study with moderate severity GÓMEZ, AHI 20 events per hour.  Sleep-related hypoxia with low O2 saturation 84% for 14 minutes.   • Sleep apnea     previously diagnosed with sleep apnea, had T&A done and it has since resolved    • Stroke (HCC)    • Uncontrolled hypertension    • Ventral hernia         Past Surgical History:   Procedure Laterality Date   • APPENDECTOMY N/A    • BACK SURGERY      BLADDER STIMULATOR IMPLANT L LOWER BACK   • CARDIAC CATHETERIZATION N/A 2004    Cath left ventriculography, coronary  angiography and left heart catheterization, Normal results-Dr. Vadim Mancuso   • CARDIAC CATHETERIZATION N/A 1/29/2019    Procedure: Left Heart Cath;  Surgeon: Eren Bruce MD;  Location: [facility] CATH INVASIVE LOCATION;  Service: Cardiology   • CARDIAC CATHETERIZATION N/A 1/29/2019    Procedure: Left ventriculography;  Surgeon: rEen Bruce MD;  Location: [facility] CATH INVASIVE LOCATION;  Service: Cardiology   • CARDIAC CATHETERIZATION N/A 1/29/2019    Procedure: Coronary angiography;  Surgeon: Eren Bruce MD;  Location: [facility] CATH INVASIVE LOCATION;  Service: Cardiology   • CARDIAC ELECTROPHYSIOLOGY PROCEDURE N/A 3/4/2022    Procedure: Pacemaker SC new BIOTRONIK;  Surgeon: Eren Bruce MD;  Location: [facility] CATH INVASIVE LOCATION;  Service: Cardiology;  Laterality: N/A;   • CARPAL TUNNEL RELEASE Right 2013   • CARPAL TUNNEL RELEASE Left    • COLONOSCOPY N/A 1/4/2018    Procedure: COLONOSCOPY with cold polypectomy and hot snare polypectomy;  Surgeon: Ten Solitario MD;  Location: [facility] ENDOSCOPY;  Service:    • EYE LENS IMPLANT SECONDARY Bilateral 2007, 2008   • KNEE CARTILAGE SURGERY Right 1979   • TONSILLECTOMY AND ADENOIDECTOMY Bilateral 2005   • TOTAL KNEE ARTHROPLASTY Left 03/14/2016    Left total knee arthroplasty with Amberly component, size 11 femur, G tibial baseplate with a 10 polyethylene insert and a 38 patellar button-Dr. Pablo Hairston   • TOTAL KNEE ARTHROPLASTY Right 03/02/2015    Right total knee arthroplasty with Amberly component size G femur, 11 tibial base plate with an 11 polyethylene insert and a 38 patellar button-Dr. Pablo Hairston   • UVULOPALATOPHARYNGOPLASTY N/A 2005    part of soft palate, and uvula   • VENTRAL HERNIA REPAIR N/A 1/23/2018    Procedure: VENTRAL HERNIA REPAIR LAPAROSCOPIC WITH DAVINCI ROBOT AND MESH;  Surgeon: Ten Solitario MD;  Location: [facility] MAIN OR;  Service:        Visit Dx:     ICD-10-CM ICD-9-CM   1. General weakness  R53.1  780.79   2. Gait difficulty  R26.9 781.2   3. Balance problem  R26.89 781.99   4. Physical deconditioning  R53.81 799.3              PT Ortho     Row Name 02/08/23 1000       Myotomal Screen- Lower Quarter Clearing    Hip flexion (L2) Left:;4- (Good -);Right:;4 (Good)  -JA    Knee extension (L3) Left:;4 (Good);Right:;4- (Good -)  -JA    Ankle DF (L4) Bilateral:;4- (Good -);4 (Good)  -JA    Ankle PF (S1) 4 (Good);4+ (Good +)  -JA    Knee flexion (S2) Left:;4+ (Good +);Right:;4 (Good)  -MAXINE          User Key  (r) = Recorded By, (t) = Taken By, (c) = Cosigned By    Initials Name Provider Type    Christiana Cardona, PT Physical Therapist                                   PT OP Goals     Row Name 02/08/23 1000          PT Short Term Goals    STG Date to Achieve 01/05/23  -     STG 1 Pt will be independent with initial HEP.  -     STG 1 Progress Met  -     STG 2 Pt will ambulate 150' with normal heel strike to toe off with symmetrical stride and davon with his RWX without momentary LOB.  -     STG 2 Progress Progressing  -     STG 2 Progress Comments demonstrates some improvment in symmetry of stride and increasing davon  -     STG 3 Pt will demonstrate FTSS in 15 seconds or less indicating reduced risk of falls.  -     STG 3 Progress Ongoing  -     STG 3 Progress Comments 35 sec from low blue chair with UE assist  -        Long Term Goals    LTG Date to Achieve 02/04/23  -     LTG 1 Pt will be independent with advanced HEP for strength and mobility.  -     LTG 1 Progress Progressing  -     LTG 2 Pt will tolerate 30 minutes of activity with 3 brief rest breaks demonstrating improved functional strength.  -     LTG 2 Progress Progressing  -     LTG 3 Pt will demonstrate increased strength LEs to 4/5 or better.  -     LTG 3 Progress Partially Met  -     LTG 3 Progress Comments mukund LE strength increasing from 3+/5 - 4-/5 to 4-/5 - 4/5  -     LTG 4 Pt will ambulate 500' or more with  normalized gait without momentary LOB with least restrictive assistive device.  -MAXINE     LTG 4 Progress Progressing  -MAXINE           User Key  (r) = Recorded By, (t) = Taken By, (c) = Cosigned By    Initials Name Provider Type    Christiana Cardona, PT Physical Therapist                 PT Assessment/Plan     Row Name 02/08/23 1300          PT Assessment    Assessment Comments Flo Lauren has been seen for a total of 12 visits evaluation and treatment of generalized weakness, OA in multiple joints, physical deconditioning, hx of CVA. He reports improving ease of mobility and demonstrates increasing LE strength. Progress is slow with 1/3 STGs met, 1/4 LTGs partially met. He presents today with new order to address R shoulder pain and he will be evaluated for that next visit then will be seen for both or alternating between issues going forward if appropriate. He will benefit from continuing skilled therapy services.  -MAXINE        PT Plan    PT Plan Comments eval R shoulder pain; consider addressing each issue on alternating visits unles appropriate to perform both - for example UE/UB strengthening along with LE/Lower body-it could allow for more progress in LEs  -MAXINE           User Key  (r) = Recorded By, (t) = Taken By, (c) = Cosigned By    Initials Name Provider Type    Christiana Cardona, PT Physical Therapist                   OP Exercises     Row Name 02/08/23 1000             Subjective Comments    Subjective Comments It's easier to get up now.  -JA         Subjective Pain    Able to rate subjective pain? yes  -JA      Pre-Treatment Pain Level 0  -JA         Total Minutes    43569 - PT Therapeutic Exercise Minutes 40  -JA         Exercise 1    Exercise Name 1 nustep  -MAXINE      Cueing 1 Verbal  -JA      Time 1 5 min  -JA         Exercise 7    Exercise Name 7 LAQ  -JA      Cueing 7 Verbal;Demo  -JA      Sets 7 2  -JA      Reps 7 10ea  -JA      Time 7 Circuit 2  -JA         Exercise 8    Exercise Name 8 HR   -JA      Cueing 8 Verbal;Demo  -JA      Sets 8 2  -JA      Reps 8 20  -JA      Time 8 Circuit 2  -JA         Exercise 9    Exercise Name 9 STS from chair with B UE  -JA      Cueing 9 Verbal  -JA      Sets 9 2  -JA      Reps 9 10  -JA      Time 9 Circuit 2: repeat x2  -JA         Exercise 10    Exercise Name 10 side-steps  -JA      Cueing 10 Verbal;Demo  -JA      Sets 10 2  -JA      Reps 10 2 laps // bars  -JA      Additional Comments circuit 3 x 2  -JA         Exercise 11    Exercise Name 11 semi-tandem stance  -JA      Cueing 11 Verbal;Demo  -JA      Sets 11 2  -JA      Reps 11 1ea  -JA      Time 11 20sec  -JA      Additional Comments circuit 3 x 2  -JA         Exercise 12    Exercise Name 12 marches // bars  -JA      Cueing 12 Verbal;Demo  -JA      Reps 12 2  -JA      Time 12 2 laps  -JA      Additional Comments circuit 3 x 2  -JA         Exercise 13    Exercise Name 13 walking in clinic  -JA      Reps 13 175', slow davon, no LOB  -JA            User Key  (r) = Recorded By, (t) = Taken By, (c) = Cosigned By    Initials Name Provider Type    Christiana Cardona, PT Physical Therapist                              Outcome Measure Options: 5x Sit to Stand  5 Times Sit to Stand  5 Times Sit to Stand (seconds): 35 seconds  5 Times Sit to Stand Comments: low blue hair, mukund UEs on armrests         Time Calculation:     Start Time: 1020  Stop Time: 1105  Time Calculation (min): 45 min  Timed Charges  79055 - PT Therapeutic Exercise Minutes: 40  Total Minutes  Timed Charges Total Minutes: 40   Total Minutes: 40     Therapy Charges for Today     Code Description Service Date Service Provider Modifiers Qty    49785673336 HC PT THER PROC EA 15 MIN 2/8/2023 Christiana Mathew, PT GP 3          PT G-Codes  Outcome Measure Options: 5x Sit to Stand         Christiana Mathew PT  2/8/2023

## 2023-02-10 ENCOUNTER — HOSPITAL ENCOUNTER (OUTPATIENT)
Dept: PHYSICAL THERAPY | Facility: HOSPITAL | Age: 68
Setting detail: THERAPIES SERIES
Discharge: HOME | End: 2023-02-10
Payer: MEDICARE

## 2023-02-10 DIAGNOSIS — R26.89 BALANCE PROBLEM: ICD-10-CM

## 2023-02-10 DIAGNOSIS — M25.611 IMPAIRED RANGE OF MOTION OF RIGHT SHOULDER: ICD-10-CM

## 2023-02-10 DIAGNOSIS — M25.511 CHRONIC RIGHT SHOULDER PAIN: ICD-10-CM

## 2023-02-10 DIAGNOSIS — R26.9 GAIT DIFFICULTY: ICD-10-CM

## 2023-02-10 DIAGNOSIS — R53.1 GENERAL WEAKNESS: Primary | ICD-10-CM

## 2023-02-10 DIAGNOSIS — R53.81 PHYSICAL DECONDITIONING: ICD-10-CM

## 2023-02-10 DIAGNOSIS — G89.29 CHRONIC RIGHT SHOULDER PAIN: ICD-10-CM

## 2023-02-10 PROCEDURE — 97164 PT RE-EVAL EST PLAN CARE: CPT

## 2023-02-10 PROCEDURE — 97110 THERAPEUTIC EXERCISES: CPT

## 2023-02-10 NOTE — THERAPY RE-EVALUATION
Outpatient Physical Therapy Ortho Re-Evaluation  [facility]     Patient Name: Flo Manzo  : 1955  MRN: [id number]  Today's Date: 2/10/2023      Visit Date: 02/10/2023    Patient Active Problem List   Diagnosis   • Umbilical hernia without obstruction and without gangrene   • Essential hypertension   • Arthritis of left knee   • Depression   • Obesity (BMI 30-39.9)   • Atrial fibrillation with RVR (HCC)   • Substernal thyroid goiter   • CHF (congestive heart failure) (HCC)   • Diastolic CHF, chronic (HCC)   • Hemorrhagic stroke (HCC)   • GÓMEZ on auto CPAP   • Hypersomnia due to medical condition   • Sleep-related hypoxia   • Chronic atrial fibrillation (HCC)   • Vertigo   • Aortic stenosis, moderate   • S/P ICD (internal cardiac defibrillator) procedure   • Sinus pause   • Pacemaker   • Anticoagulated   • Generalized weakness   • Prediabetes        Past Medical History:   Diagnosis Date   • Arthritis    • Atrial fibrillation with RVR (HCC) 2019   • Colon polyps 2018    Hepatic flexure: tubular adenoma, only low-grade dysplasia; splenic flexure: tubular adenoma, only low-grade dysplasia; sigmoid colon: tubular adenoma, multiple fragments only low-grade dysplasia   • Depression    • Heart murmur    • Hemorrhagic stroke (HCC) 2019   • Hypertension    • New onset atrial fibrillation (CMS/HCC) - RVR 2019   • GÓMEZ (obstructive sleep apnea) 2019    Home sleep study with moderate severity GÓMEZ, AHI 20 events per hour.  Sleep-related hypoxia with low O2 saturation 84% for 14 minutes.   • Sleep apnea     previously diagnosed with sleep apnea, had T&A done and it has since resolved    • Stroke (HCC)    • Uncontrolled hypertension    • Ventral hernia         Past Surgical History:   Procedure Laterality Date   • APPENDECTOMY N/A    • BACK SURGERY      BLADDER STIMULATOR IMPLANT L LOWER BACK   • CARDIAC CATHETERIZATION N/A 2004    Cath left ventriculography, coronary  angiography and left heart catheterization, Normal results-Dr. Vadim Mancuso   • CARDIAC CATHETERIZATION N/A 1/29/2019    Procedure: Left Heart Cath;  Surgeon: Eren Bruce MD;  Location: [facility] CATH INVASIVE LOCATION;  Service: Cardiology   • CARDIAC CATHETERIZATION N/A 1/29/2019    Procedure: Left ventriculography;  Surgeon: Eren Bruce MD;  Location: [facility] CATH INVASIVE LOCATION;  Service: Cardiology   • CARDIAC CATHETERIZATION N/A 1/29/2019    Procedure: Coronary angiography;  Surgeon: Eren Bruce MD;  Location: [facility] CATH INVASIVE LOCATION;  Service: Cardiology   • CARDIAC ELECTROPHYSIOLOGY PROCEDURE N/A 3/4/2022    Procedure: Pacemaker SC new BIOTRONIK;  Surgeon: Eren Bruce MD;  Location: [facility] CATH INVASIVE LOCATION;  Service: Cardiology;  Laterality: N/A;   • CARPAL TUNNEL RELEASE Right 2013   • CARPAL TUNNEL RELEASE Left    • COLONOSCOPY N/A 1/4/2018    Procedure: COLONOSCOPY with cold polypectomy and hot snare polypectomy;  Surgeon: Ten Solitario MD;  Location: [facility] ENDOSCOPY;  Service:    • EYE LENS IMPLANT SECONDARY Bilateral 2007, 2008   • KNEE CARTILAGE SURGERY Right 1979   • TONSILLECTOMY AND ADENOIDECTOMY Bilateral 2005   • TOTAL KNEE ARTHROPLASTY Left 03/14/2016    Left total knee arthroplasty with Amberly component, size 11 femur, G tibial baseplate with a 10 polyethylene insert and a 38 patellar button-Dr. Pablo Hairston   • TOTAL KNEE ARTHROPLASTY Right 03/02/2015    Right total knee arthroplasty with Amberly component size G femur, 11 tibial base plate with an 11 polyethylene insert and a 38 patellar button-Dr. Pablo Hairston   • UVULOPALATOPHARYNGOPLASTY N/A 2005    part of soft palate, and uvula   • VENTRAL HERNIA REPAIR N/A 1/23/2018    Procedure: VENTRAL HERNIA REPAIR LAPAROSCOPIC WITH DAVINCI ROBOT AND MESH;  Surgeon: Ten Solitario MD;  Location: [facility] MAIN OR;  Service:        Visit Dx:     ICD-10-CM ICD-9-CM   1. General weakness  R53.1  780.79   2. Gait difficulty  R26.9 781.2   3. Balance problem  R26.89 781.99   4. Physical deconditioning  R53.81 799.3   5. Chronic right shoulder pain  M25.511 719.41    G89.29 338.29   6. Impaired range of motion of right shoulder  M25.611 719.51          Patient History     Row Name 02/10/23 1000             History    Chief Complaint Pain  -      Type of Pain Shoulder pain  -      Date Current Problem(s) Began 01/20/23  ~3 weeks ago  -      Brief Description of Current Complaint Pt. presents this date for evaluation and treatment of R shoulder pain. He is current in clinic getting treatment for generalized weakness and deconditioning. He continues to ambulate with rwx and demmonstrate impaired gait and mobility though is improving. He reports the shoulder pain when he lost his balance going down the steps and started to fall. He reached and grabbed onto the door frame to prevent falling down. The pain is primarily at night when he is trying to sleep. No aggravating factors. Not using any ice/heat before bed. He does have cream to put on shoulder which has been helpful. During the day he denies any pain, can raise and lift his shoulder. His only complaint is pain at night.  -      Hand Dominance right-handed  -         Pain     Is your sleep disturbed? Yes  -      What position do you sleep in? Right sidelying;Left sidelying  prefers R but can't due to pain  -         Fall Risk Assessment    Any falls in the past year: Yes  -      Number of falls reported in the last 12 months 2  -      Factors that contributed to the fall: Lost balance  -      Does patient have a fear of falling Yes (comment)  almost fell down steps injuring his shouler  -         Daily Activities    Primary Language English  -      Are you able to read Yes  -      Are you able to write Yes  -      How does patient learn best? Listening;Reading;Demonstration  -      Does patient have problems with the following?  None  -      Pt Participated in POC and Goals Yes  -            User Key  (r) = Recorded By, (t) = Taken By, (c) = Cosigned By    Initials Name Provider Type    Sylvia Luis, CHYNA Physical Therapist                 PT Ortho     Row Name 02/10/23 1000       Posture/Observations    Forward Head Moderate;Increased  -       Special Tests/Palpation    Special Tests/Palpation Shoulder  -       Shoulder Impingement/Rotator Cuff Special Tests    Bermudez-Edgardo Test (RC Lesion vs. Bursitis) Right:;Positive  -    Neer Impingement Test (RC Lesion vs. Bursitis) Right:;Positive  -    Empty Can Test (RC Lesion) Right:;Negative  very painful, grimacing  -    Drop Arm Test (Full Thickness RC Lesion) Right:;Negative  -       Shoulder Girdle Palpation    Shoulder Girdle Palpation? Yes  -    Supraspinatus Insertion Right:;Tender  -    Deltoid Right:;Tender  -       General ROM    RT Upper Ext Rt Shoulder ABduction;Rt Shoulder Flexion;Rt Shoulder Internal Rotation;Rt Shoulder External Rotation  -    LT Upper Ext Lt Shoulder ABduction;Lt Shoulder Flexion;Lt Shoulder External Rotation;Lt Shoulder Internal Rotation  -       Right Upper Ext    Rt Shoulder Abduction AROM 0-125  -    Rt Shoulder Flexion AROM 0-125  -    Rt Shoulder External Rotation AROM ANDREA to C7  -    Rt Shoulder Internal Rotation AROM FIR to L5  -MH    Rt Upper Extremity Comments  AROM seated, pain with flexion  -       Left Upper Ext    Lt Shoulder Abduction AROM 0-140  -    Lt Shoulder Flexion AROM 0-150  -MH    Lt Shoulder External Rotation AROM ANDREA to C7  -    Lt Shoulder Internal Rotation AROM FIR to L5  -MH    Lt Upper Extremity Comments  AROM seated  -       MMT (Manual Muscle Testing)    Rt Upper Ext Rt Shoulder Flexion;Rt Shoulder ABduction;Rt Shoulder Internal Rotation;Rt Shoulder External Rotation;Rt Elbow Flexion;Rt Elbow Extension  -    Lt Upper Ext Lt Shoulder Flexion;Lt Shoulder ABduction;Lt Shoulder Internal  Rotation;Lt Shoulder External Rotation;Lt Elbow Extension;Lt Elbow Flexion  -MH       MMT Right Upper Ext    Rt Shoulder Flexion MMT, Gross Movement (3+/5) fair plus  -MH    Rt Shoulder ABduction MMT, Gross Movement (3/5) fair  -MH    Rt Shoulder Internal Rotation MMT, Gross Movement (4/5) good  -MH    Rt Shoulder External Rotation MMT, Gross Movement (4-/5) good minus  -MH    Rt Elbow Flexion MMT, Gross Movement: (4+/5) good plus  -MH    Rt Elbow Extension MMT, Gross Movement: (4+/5) good plus  -MH       MMT Left Upper Ext    Lt Shoulder Flexion MMT, Gross Movement (4/5) good  -MH    Lt Shoulder ABduction MMT, Gross Movement (4/5) good  -MH    Lt Shoulder Internal Rotation MMT, Gross Movement (4/5) good  -MH    Lt Shoulder External Rotation MMT, Gross Movement (4-/5) good minus  -MH    Lt Elbow Flexion MMT, Gross Movement (4+/5) good plus  -MH    Lt Elbow Extension MMT, Gross Movement (4+/5) good plus  -MH       Sensation    Sensation WNL? WNL  -          User Key  (r) = Recorded By, (t) = Taken By, (c) = Cosigned By    Initials Name Provider Type     Sylvia Santana, PT Physical Therapist                            Therapy Education  Education Details: Educated on PT role and POC; discussed expectations/timeframe for healing. Objective findings. Provided HEP, Access Code: [email or web address]  Given: HEP, Symptoms/condition management, Posture/body mechanics  Program: New  How Provided: Verbal, Demonstration, Written  Provided to: Patient  Level of Understanding: Verbalized, Demonstrated      PT OP Goals     Row Name 02/10/23 1100          PT Short Term Goals    STG Date to Achieve 01/05/23  -     STG 1 Pt will be independent with initial HEP.  -     STG 1 Progress Met  -     STG 2 Pt will ambulate 150' with normal heel strike to toe off with symmetrical stride and davon with his RWX without momentary LOB.  -     STG 2 Progress Progressing  -     STG 3 Pt will demonstrate FTSS in 15 seconds or less indicating  reduced risk of falls.  -     STG 3 Progress Ongoing  -     STG 4 Pt. will report 50% improvement in ability to fall asleep to improve QOL.  -     STG 4 Progress New  -        Long Term Goals    LTG Date to Achieve 02/04/23  -     LTG 1 Pt will be independent with advanced HEP for strength and mobility.  -     LTG 1 Progress Progressing  -     LTG 2 Pt will tolerate 30 minutes of activity with 3 brief rest breaks demonstrating improved functional strength.  -     LTG 2 Progress Progressing  -     LTG 3 Pt will demonstrate increased strength LEs to 4/5 or better.  -     LTG 3 Progress Partially Met  -     LTG 4 Pt will ambulate 500' or more with normalized gait without momentary LOB with least restrictive assistive device.  -     LTG 4 Progress Progressing  -     LTG 5 Pt. will report ability to fall asleep on R side without exacerbation of pain to improve sleep quality.  -     LTG 5 Progress New  -     LTG 6 Pt. will improve R shoulder flexion/abduction MMT >/= 4/5 to improve ability to perform household chores.  -     LTG 6 Progress New  -     LTG 7 Pt. Will score </= 20 on QuickDASH from 36.36 at initial evaluation to indicate reduced perception of disability.  -     LTG 7 Progress New  -           User Key  (r) = Recorded By, (t) = Taken By, (c) = Cosigned By    Initials Name Provider Type     Sylvia Santana, PT Physical Therapist                 PT Assessment/Plan     Row Name 02/10/23 2330          PT Assessment    Functional Limitations Decreased safety during functional activities;Impaired gait;Limitation in home management;Limitations in community activities;Limitations in functional capacity and performance;Performance in leisure activities;Performance in self-care ADL  -     Impairments Muscle strength;Range of motion;Posture;Poor body mechanics;Pain;Balance;Gait;Endurance  -     Assessment Comments Flo Manzo has been seen for physical therapy for  generalized weakness, OA in multiple joints, physical deconditioning, hx of CVA. While he reports his overall mobility is improving he has more recently had complaints of R shoulder pain after catching himself to prevent falling down steps. He arrives today with referral for evaluation and treatment of R shoulder pain. Progress may be impacted secondary to remaining deficits from CVA, he does recall some mild pain in R shoulder following stroke. He demonstrated slight limitations in R shoulder AROM with pain into flexion and generally more difficulty with moving through range compared to L, decreased strength R shoulder into flexion and abduction, (+) Bermudez Edgardo and Neers, (-) empty can and drop arm test though painful with resistance to empty can, and L shoulder elevated in sitting/standing. Signs and symptoms are consistent with referring diagnosis. He is appropriate for skilled therapy services at this time to address deficits and improve ease with ADLs and sleep quality.  -     Please refer to paper survey for additional self-reported information No  -MH     Rehab Potential Good  -     Patient/caregiver participated in establishment of treatment plan and goals Yes  -     Patient would benefit from skilled therapy intervention Yes  -MH        PT Plan    PT Frequency 2x/week;1x/week  -     Predicted Duration of Therapy Intervention (PT) 10 visits addressing shoulder and general mobility  -     Planned CPT's? PT RE-EVAL: 92694;PT THER PROC EA 15 MIN: 06146;PT THER ACT EA 15 MIN: 98285;PT MANUAL THERAPY EA 15 MIN: 73258;PT NEUROMUSC RE-EDUCATION EA 15 MIN: 38544;PT GAIT TRAINING EA 15 MIN: 50282;PT SELF CARE/HOME MGMT/TRAIN EA 15: 27450;PT HOT OR COLD PACK TREAT MCARE  -     PT Plan Comments begin treatment for both shoulder and continue with B LE strength/endurance. For shoulder consider isometrics, rhythmic stabilization, rows, shoulder ext? maybe set up circuit for 1-2 shoulder and 1-2 LE?  -            User Key  (r) = Recorded By, (t) = Taken By, (c) = Cosigned By    Initials Name Provider Type     Sylvia Santana, PT Physical Therapist                   OP Exercises     Row Name 02/10/23 1100             Total Minutes    71474 - PT Therapeutic Exercise Minutes 26  -MH         Exercise 2    Exercise Name 2 pulleys  -MH      Cueing 2 Verbal;Demo  -MH      Time 2 3 min  -MH         Exercise 3    Exercise Name 3 posterior shoulder rolls  -MH      Cueing 3 Verbal;Demo  -MH      Reps 3 20  -MH         Exercise 4    Exercise Name 4 seated scap retract  -MH      Cueing 4 Verbal;Demo;Tactile  -MH      Reps 4 10  -MH      Time 4 5  -MH      Additional Comments heavy cueing  -MH         Exercise 5    Exercise Name 5 supine flex AAROM  -      Cueing 5 Verbal;Demo  -MH      Reps 5 10  -MH      Time 5 5sec  -MH      Additional Comments hands clasped  -MH         Exercise 6    Exercise Name 6 supine ABC  -MH      Cueing 6 Verbal;Demo  -MH      Sets 6 1  -MH      Reps 6 A-Z  -MH         Exercise 7    Exercise Name 7 supine beach chair pose  -MH      Cueing 7 Verbal;Demo  -MH      Reps 7 2  -MH      Time 7 20sec  -MH            User Key  (r) = Recorded By, (t) = Taken By, (c) = Cosigned By    Initials Name Provider Type     Sylvia Santana, PT Physical Therapist                              Outcome Measure Options: Quick DASH  Quick DASH  Open a tight or new jar.: Mild Difficulty  Do heavy household chores (e.g., wash walls, wash floors): Moderate Difficulty  Carry a shopping bag or briefcase: Moderate Difficulty  Wash your back: Moderate Difficulty  Use a knife to cut food: No Difficulty  Recreational activities in which you take some force or impact through your arm, should or hand (e.g. golf, hammering, tennis, etc.): Severe Difficulty  During the past week, to what extent has your arm, shoulder, or hand problem interfered with your normal social activites with family, friends, neighbors or groups?:  Slightly  During the past week, were you limited in your work or other regular daily activities as a result of your arm, shoulder or hand problem?: Slightly Limited  Arm, Shoulder, or hand pain: Moderate  Tingling (pins and needles) in your arm, shoulder, or hand: None  During the past week, how much difficulty have you had sleeping because of the pain in your arm, shoulder or hand?: Moderate Difficiculty  Number of Questions Answered: 11  Quick DASH Score: 36.36         Time Calculation:     Start Time: 1048  Stop Time: 1130  Time Calculation (min): 42 min  Total Timed Code Minutes- PT: 26 minute(s)  Timed Charges  75578 - PT Therapeutic Exercise Minutes: 26  Untimed Charges  PT Eval/Re-eval Minutes: 16  Total Minutes  Timed Charges Total Minutes: 26  Untimed Charges Total Minutes: 16   Total Minutes: 42     Therapy Charges for Today     Code Description Service Date Service Provider Modifiers Qty    10089617502 HC PT THER PROC EA 15 MIN 2/10/2023 Sylvia Santana, PT GP 2    88411510736 HC PT RE-EVAL ESTABLISHED PLAN 2 2/10/2023 Sylvia Santana, PT GP 1          PT G-Codes  Outcome Measure Options: Quick DASH  Quick DASH Score: 36.36         Sylvia Santana PT  2/10/2023

## 2023-02-20 ENCOUNTER — APPOINTMENT (OUTPATIENT)
Dept: PHYSICAL THERAPY | Facility: HOSPITAL | Age: 68
End: 2023-02-20
Payer: MEDICARE

## 2023-02-27 ENCOUNTER — TELEPHONE (OUTPATIENT)
Dept: SURGERY | Facility: CLINIC | Age: 68
End: 2023-02-27

## 2023-02-27 NOTE — TELEPHONE ENCOUNTER
Caller: Flo Manoz    Relationship to patient: Self    Best call back number: [phone]    Chief complaint: RECEIVED A LETTER THAT DR. LUX IS RETIRED     Type of visit: FOLLOW UP     Requested date: NEXT AVAILABLE     Additional notes: PATIENT WOULD LIKE A CALL BACK .

## 2023-03-06 ENCOUNTER — HOSPITAL ENCOUNTER (OUTPATIENT)
Dept: PHYSICAL THERAPY | Facility: HOSPITAL | Age: 68
Setting detail: THERAPIES SERIES
Discharge: HOME | End: 2023-03-06
Payer: MEDICARE

## 2023-03-06 DIAGNOSIS — R53.81 PHYSICAL DECONDITIONING: ICD-10-CM

## 2023-03-06 DIAGNOSIS — R26.89 BALANCE PROBLEM: ICD-10-CM

## 2023-03-06 DIAGNOSIS — M25.511 CHRONIC RIGHT SHOULDER PAIN: ICD-10-CM

## 2023-03-06 DIAGNOSIS — R26.9 GAIT DIFFICULTY: ICD-10-CM

## 2023-03-06 DIAGNOSIS — M25.611 IMPAIRED RANGE OF MOTION OF RIGHT SHOULDER: ICD-10-CM

## 2023-03-06 DIAGNOSIS — R53.1 GENERAL WEAKNESS: Primary | ICD-10-CM

## 2023-03-06 DIAGNOSIS — G89.29 CHRONIC RIGHT SHOULDER PAIN: ICD-10-CM

## 2023-03-06 PROCEDURE — 97110 THERAPEUTIC EXERCISES: CPT | Performed by: PHYSICAL THERAPIST

## 2023-03-06 NOTE — THERAPY TREATMENT NOTE
Outpatient Physical Therapy Ortho Treatment Note  [facility]     Patient Name: Flo Manzo  : 1955  MRN: [id number]  Today's Date: 3/6/2023      Visit Date: 2023    Visit Dx:    ICD-10-CM ICD-9-CM   1. General weakness  R53.1 780.79   2. Gait difficulty  R26.9 781.2   3. Balance problem  R26.89 781.99   4. Physical deconditioning  R53.81 799.3   5. Chronic right shoulder pain  M25.511 719.41    G89.29 338.29   6. Impaired range of motion of right shoulder  M25.611 719.51       Patient Active Problem List   Diagnosis   • Umbilical hernia without obstruction and without gangrene   • Essential hypertension   • Arthritis of left knee   • Depression   • Obesity (BMI 30-39.9)   • Atrial fibrillation with RVR (HCC)   • Substernal thyroid goiter   • CHF (congestive heart failure) (HCC)   • Diastolic CHF, chronic (HCC)   • Hemorrhagic stroke (HCC)   • GÓMEZ on auto CPAP   • Hypersomnia due to medical condition   • Sleep-related hypoxia   • Chronic atrial fibrillation (HCC)   • Vertigo   • Aortic stenosis, moderate   • S/P ICD (internal cardiac defibrillator) procedure   • Sinus pause   • Pacemaker   • Anticoagulated   • Generalized weakness   • Prediabetes        Past Medical History:   Diagnosis Date   • Arthritis    • Atrial fibrillation with RVR (HCC) 2019   • Colon polyps 2018    Hepatic flexure: tubular adenoma, only low-grade dysplasia; splenic flexure: tubular adenoma, only low-grade dysplasia; sigmoid colon: tubular adenoma, multiple fragments only low-grade dysplasia   • Depression    • Heart murmur    • Hemorrhagic stroke (HCC) 2019   • Hypertension    • New onset atrial fibrillation (CMS/HCC) - RVR 2019   • GÓMEZ (obstructive sleep apnea) 2019    Home sleep study with moderate severity GÓMEZ, AHI 20 events per hour.  Sleep-related hypoxia with low O2 saturation 84% for 14 minutes.   • Sleep apnea     previously diagnosed with sleep apnea, had T&A done and it has  since resolved    • Stroke (HCC)    • Uncontrolled hypertension    • Ventral hernia         Past Surgical History:   Procedure Laterality Date   • APPENDECTOMY N/A 1972   • BACK SURGERY      BLADDER STIMULATOR IMPLANT L LOWER BACK   • CARDIAC CATHETERIZATION N/A 07/20/2004    Cath left ventriculography, coronary angiography and left heart catheterization, Normal results-Dr. Vadim Mancuso   • CARDIAC CATHETERIZATION N/A 1/29/2019    Procedure: Left Heart Cath;  Surgeon: Eren Bruce MD;  Location: [facility] CATH INVASIVE LOCATION;  Service: Cardiology   • CARDIAC CATHETERIZATION N/A 1/29/2019    Procedure: Left ventriculography;  Surgeon: Eren Bruce MD;  Location:  [facility] CATH INVASIVE LOCATION;  Service: Cardiology   • CARDIAC CATHETERIZATION N/A 1/29/2019    Procedure: Coronary angiography;  Surgeon: Eren Bruce MD;  Location:  [facility] CATH INVASIVE LOCATION;  Service: Cardiology   • CARDIAC ELECTROPHYSIOLOGY PROCEDURE N/A 3/4/2022    Procedure: Pacemaker SC new BIOTRONIK;  Surgeon: Eren Bruce MD;  Location: [facility] CATH INVASIVE LOCATION;  Service: Cardiology;  Laterality: N/A;   • CARPAL TUNNEL RELEASE Right 2013   • CARPAL TUNNEL RELEASE Left    • COLONOSCOPY N/A 1/4/2018    Procedure: COLONOSCOPY with cold polypectomy and hot snare polypectomy;  Surgeon: Ten Solitario MD;  Location: [facility] ENDOSCOPY;  Service:    • EYE LENS IMPLANT SECONDARY Bilateral 2007, 2008   • KNEE CARTILAGE SURGERY Right 1979   • TONSILLECTOMY AND ADENOIDECTOMY Bilateral 2005   • TOTAL KNEE ARTHROPLASTY Left 03/14/2016    Left total knee arthroplasty with Amberly component, size 11 femur, G tibial baseplate with a 10 polyethylene insert and a 38 patellar button-Dr. Pablo Hairston   • TOTAL KNEE ARTHROPLASTY Right 03/02/2015    Right total knee arthroplasty with Amberly component size G femur, 11 tibial base plate with an 11 polyethylene insert and a 38 patellar button-Dr. Pablo Hairston   •  UVULOPALATOPHARYNGOPLASTY N/A 2005    part of soft palate, and uvula   • VENTRAL HERNIA REPAIR N/A 1/23/2018    Procedure: VENTRAL HERNIA REPAIR LAPAROSCOPIC WITH DAVINCI ROBOT AND MESH;  Surgeon: Ten Solitario MD;  Location: [facility];  Service:                         PT Assessment/Plan     Row Name 03/06/23 1145          PT Assessment    Assessment Comments Pt presents to PT ambulating with rolling walker, arriving 15 min late though able to accomodate pt with full appointment. Treatment performed as a circuit, incorporating R shoulder exercises and LE strengthening & balance. Each circuit performed 2 times. Walking for endurance at end of treatment session, with cueing for heel toe gait pattern and symmtetrical step length.  -JS        PT Plan    PT Plan Comments Continue treatment focusing on R shoulder & LE strength/gait/balance. Resume standing balance/gait exercises in // bars next visit.  -JS           User Key  (r) = Recorded By, (t) = Taken By, (c) = Cosigned By    Initials Name Provider Type    Tawana Hernandez, PT Physical Therapist                   OP Exercises     Row Name 03/06/23 114             Subjective Comments    Subjective Comments R shoulder pain at night. Reports compliance with use of walker.  -JS         Subjective Pain    Able to rate subjective pain? yes  -JS      Pre-Treatment Pain Level 0  -JS         Total Minutes    28285 - PT Therapeutic Exercise Minutes 45  -JS         Exercise 2    Exercise Name 2 pulleys  -JS      Cueing 2 Verbal;Demo  -JS      Time 2 3 min  -JS         Exercise 3    Exercise Name 3 posterior shoulder rolls  -JS      Cueing 3 Verbal;Demo  -JS      Sets 3 --  -JS      Reps 3 20  -JS      Additional Comments Circuit 1x2  -JS         Exercise 4    Exercise Name 4 Rows  -JS      Cueing 4 Verbal;Demo;Tactile  -JS      Reps 4 10  -JS      Time 4 RTB  -JS      Additional Comments Circuit 1x2  -JS         Exercise 5    Exercise Name 5 Standing marching  -JS       Cueing 5 Verbal;Demo  -JS      Reps 5 10  -JS      Time 5 at barre  -JS      Additional Comments Circuit 1x2  -JS         Exercise 6    Exercise Name 6 Seated scapular retraction  -JS      Cueing 6 Verbal;Demo  -JS      Sets 6 1  -JS      Reps 6 10  -JS      Time 6 cues  -JS      Additional Comments Circuit 2x2  -JS         Exercise 7    Exercise Name 7 Sit to Stand from chair  -JS      Cueing 7 Verbal;Demo  -JS      Reps 7 5  -JS      Time 7 B UE support at arm rests- walker placed in front  -JS      Additional Comments Circuit 2x2  -JS         Exercise 8    Exercise Name 8 HR  -JS      Cueing 8 Verbal;Demo  -JS      Reps 8 1  -JS      Time 8 10  -JS      Additional Comments Circuit 2x2  -JS         Exercise 9    Exercise Name 9 Supine shoulder flex- arms clasped  -JS      Cueing 9 Verbal  -JS      Sets 9 1  -JS      Reps 9 10  -JS      Time 9 5 sec  -JS      Additional Comments Circuit 3x2  -JS         Exercise 10    Exercise Name 10 Supine ABC  -JS      Cueing 10 Verbal;Demo  -JS      Sets 10 1  -JS      Reps 10 10  -JS      Time 10 2#  -JS      Additional Comments Circuit 3x2  -JS         Exercise 11    Exercise Name 11 Supine bridges  -JS      Cueing 11 Verbal;Demo  -JS      Sets 11 1  -JS      Reps 11 10  -JS      Time 11 3 sec hold  -JS      Additional Comments Circuit 3x2  -JS         Exercise 12    Exercise Name 12 Supine butterfly stretch  -JS      Cueing 12 Verbal;Demo  -JS      Reps 12 10  -JS      Time 12 5 sec  -JS      Additional Comments Circuit 3  -JS         Exercise 13    Exercise Name 13 walking in clinic  -JS      Reps 13 1 lap, slow davno with rolling walker  -JS      Additional Comments cues for heel toe gait, symmetrical  step length  -JS         Exercise 14    Exercise Name 14 Sidestepping  -JS      Time 14 Resume as Circuit 4  -JS         Exercise 15    Exercise Name 15 Marching in // bars  -JS      Time 15 Resume as Circuit 4  -JS         Exercise 16    Exercise Name 16 Semi-Tandem  stance  -JS      Time 16 Resume as Circuit 4  -JS            User Key  (r) = Recorded By, (t) = Taken By, (c) = Cosigned By    Initials Name Provider Type    Tawana Hernandez, PT Physical Therapist                              PT OP Goals     Row Name 03/06/23 1145          PT Short Term Goals    STG Date to Achieve 01/05/23  -JS     STG 1 Pt will be independent with initial HEP.  -JS     STG 1 Progress Met  -JS     STG 2 Pt will ambulate 150' with normal heel strike to toe off with symmetrical stride and davon with his RWX without momentary LOB.  -JS     STG 2 Progress Progressing  -JS     STG 3 Pt will demonstrate FTSS in 15 seconds or less indicating reduced risk of falls.  -JS     STG 3 Progress Ongoing  -JS     STG 4 Pt. will report 50% improvement in ability to fall asleep to improve QOL.  -JS     STG 4 Progress Ongoing  -JS        Long Term Goals    LTG Date to Achieve 02/04/23  -JS     LTG 1 Pt will be independent with advanced HEP for strength and mobility.  -JS     LTG 1 Progress Progressing  -JS     LTG 2 Pt will tolerate 30 minutes of activity with 3 brief rest breaks demonstrating improved functional strength.  -JS     LTG 2 Progress Progressing  -JS     LTG 3 Pt will demonstrate increased strength LEs to 4/5 or better.  -JS     LTG 3 Progress Partially Met  -JS     LTG 4 Pt will ambulate 500' or more with normalized gait without momentary LOB with least restrictive assistive device.  -JS     LTG 4 Progress Progressing  -JS     LTG 5 Pt. will report ability to fall asleep on R side without exacerbation of pain to improve sleep quality.  -JS     LTG 5 Progress Ongoing  -JS     LTG 6 Pt. will improve R shoulder flexion/abduction MMT >/= 4/5 to improve ability to perform household chores.  -JS     LTG 6 Progress Ongoing  -JS     LTG 7 Pt. Will score </= 20 on QuickDASH from 36.36 at initial evaluation to indicate reduced perception of disability.  -JS     LTG 7 Progress Ongoing  -JS           User Key  (r)  = Recorded By, (t) = Taken By, (c) = Cosigned By    Initials Name Provider Type    JS Tawana Roberts, CHYNA Physical Therapist                               Time Calculation:   Start Time: 1145  Stop Time: 1230  Time Calculation (min): 45 min  Timed Charges  63474 - PT Therapeutic Exercise Minutes: 45  Total Minutes  Timed Charges Total Minutes: 45   Total Minutes: 45  Therapy Charges for Today     Code Description Service Date Service Provider Modifiers Qty    27618117001 HC PT THER PROC EA 15 MIN 3/6/2023 Tawana Roberts, CHYNA GP 3                    Tawana Roberts PT  3/6/2023

## 2023-03-10 ENCOUNTER — HOSPITAL ENCOUNTER (OUTPATIENT)
Dept: PHYSICAL THERAPY | Facility: HOSPITAL | Age: 68
Setting detail: THERAPIES SERIES
Discharge: HOME | End: 2023-03-10
Payer: MEDICARE

## 2023-03-10 DIAGNOSIS — R26.89 BALANCE PROBLEM: ICD-10-CM

## 2023-03-10 DIAGNOSIS — R53.81 PHYSICAL DECONDITIONING: ICD-10-CM

## 2023-03-10 DIAGNOSIS — M25.611 IMPAIRED RANGE OF MOTION OF RIGHT SHOULDER: ICD-10-CM

## 2023-03-10 DIAGNOSIS — M25.511 CHRONIC RIGHT SHOULDER PAIN: ICD-10-CM

## 2023-03-10 DIAGNOSIS — R26.9 GAIT DIFFICULTY: ICD-10-CM

## 2023-03-10 DIAGNOSIS — M25.511 ACUTE PAIN OF RIGHT SHOULDER: ICD-10-CM

## 2023-03-10 DIAGNOSIS — R53.1 GENERAL WEAKNESS: Primary | ICD-10-CM

## 2023-03-10 DIAGNOSIS — G89.29 CHRONIC RIGHT SHOULDER PAIN: ICD-10-CM

## 2023-03-10 PROCEDURE — 97110 THERAPEUTIC EXERCISES: CPT

## 2023-03-10 NOTE — THERAPY TREATMENT NOTE
Outpatient Physical Therapy Ortho Treatment Note  [facility]     Patient Name: Flo Manzo  : 1955  MRN: [id number]  Today's Date: 3/10/2023      Visit Date: 03/10/2023    Visit Dx:    ICD-10-CM ICD-9-CM   1. General weakness  R53.1 780.79   2. Gait difficulty  R26.9 781.2   3. Balance problem  R26.89 781.99   4. Physical deconditioning  R53.81 799.3   5. Chronic right shoulder pain  M25.511 719.41    G89.29 338.29   6. Impaired range of motion of right shoulder  M25.611 719.51   7. Acute pain of right shoulder  M25.511 719.41       Patient Active Problem List   Diagnosis   • Umbilical hernia without obstruction and without gangrene   • Essential hypertension   • Arthritis of left knee   • Depression   • Obesity (BMI 30-39.9)   • Atrial fibrillation with RVR (HCC)   • Substernal thyroid goiter   • CHF (congestive heart failure) (HCC)   • Diastolic CHF, chronic (HCC)   • Hemorrhagic stroke (HCC)   • GÓMEZ on auto CPAP   • Hypersomnia due to medical condition   • Sleep-related hypoxia   • Chronic atrial fibrillation (HCC)   • Vertigo   • Aortic stenosis, moderate   • S/P ICD (internal cardiac defibrillator) procedure   • Sinus pause   • Pacemaker   • Anticoagulated   • Generalized weakness   • Prediabetes        Past Medical History:   Diagnosis Date   • Arthritis    • Atrial fibrillation with RVR (HCC) 2019   • Colon polyps 2018    Hepatic flexure: tubular adenoma, only low-grade dysplasia; splenic flexure: tubular adenoma, only low-grade dysplasia; sigmoid colon: tubular adenoma, multiple fragments only low-grade dysplasia   • Depression    • Heart murmur    • Hemorrhagic stroke (HCC) 2019   • Hypertension    • New onset atrial fibrillation (CMS/HCC) - RVR 2019   • GÓMEZ (obstructive sleep apnea) 2019    Home sleep study with moderate severity GÓMEZ, AHI 20 events per hour.  Sleep-related hypoxia with low O2 saturation 84% for 14 minutes.   • Sleep apnea     previously  diagnosed with sleep apnea, had T&A done and it has since resolved    • Stroke (HCC)    • Uncontrolled hypertension    • Ventral hernia         Past Surgical History:   Procedure Laterality Date   • APPENDECTOMY N/A 1972   • BACK SURGERY      BLADDER STIMULATOR IMPLANT L LOWER BACK   • CARDIAC CATHETERIZATION N/A 07/20/2004    Cath left ventriculography, coronary angiography and left heart catheterization, Normal results-Dr. Vadim Mancuso   • CARDIAC CATHETERIZATION N/A 1/29/2019    Procedure: Left Heart Cath;  Surgeon: Eren Bruce MD;  Location: [facility] CATH INVASIVE LOCATION;  Service: Cardiology   • CARDIAC CATHETERIZATION N/A 1/29/2019    Procedure: Left ventriculography;  Surgeon: Eren Bruce MD;  Location: [facility] CATH INVASIVE LOCATION;  Service: Cardiology   • CARDIAC CATHETERIZATION N/A 1/29/2019    Procedure: Coronary angiography;  Surgeon: Eren Bruce MD;  Location: [facility] CATH INVASIVE LOCATION;  Service: Cardiology   • CARDIAC ELECTROPHYSIOLOGY PROCEDURE N/A 3/4/2022    Procedure: Pacemaker SC new BIOTRONIK;  Surgeon: Eren Bruce MD;  Location: [facility] CATH INVASIVE LOCATION;  Service: Cardiology;  Laterality: N/A;   • CARPAL TUNNEL RELEASE Right 2013   • CARPAL TUNNEL RELEASE Left    • COLONOSCOPY N/A 1/4/2018    Procedure: COLONOSCOPY with cold polypectomy and hot snare polypectomy;  Surgeon: Ten Solitario MD;  Location: [facility] ENDOSCOPY;  Service:    • EYE LENS IMPLANT SECONDARY Bilateral 2007, 2008   • KNEE CARTILAGE SURGERY Right 1979   • TONSILLECTOMY AND ADENOIDECTOMY Bilateral 2005   • TOTAL KNEE ARTHROPLASTY Left 03/14/2016    Left total knee arthroplasty with Amberly component, size 11 femur, G tibial baseplate with a 10 polyethylene insert and a 38 patellar button-Dr. Pablo Hairston   • TOTAL KNEE ARTHROPLASTY Right 03/02/2015    Right total knee arthroplasty with Amberly component size G femur, 11 tibial base plate with an 11 polyethylene insert and a 38  patellar button-Dr. Pablo Hairston   • UVULOPALATOPHARYNGOPLASTY N/A 2005    part of soft palate, and uvula   • VENTRAL HERNIA REPAIR N/A 1/23/2018    Procedure: VENTRAL HERNIA REPAIR LAPAROSCOPIC WITH DAVINCI ROBOT AND MESH;  Surgeon: Ten Solitario MD;  Location: [facility] OR;  Service:                         PT Assessment/Plan     Row Name 03/10/23 1300          PT Assessment    Assessment Comments Danyel reports no significant r shoulder pain with dressing ADLs however he has pain at night in bed that disrupts his sleep. Added gentle RC strengthening without increased pain. Continued LE/trunk strengtening/stability and balance work. He will benefit from continuing skilled therapy services.  -JA        PT Plan    PT Plan Comments assess response to resistaed mukund ER, cont with R shoulder ROM/strength and LE/trunk strength/stbility/balance trainng  -MAIXNE           User Key  (r) = Recorded By, (t) = Taken By, (c) = Cosigned By    Initials Name Provider Type    Christiana Cardona, PT Physical Therapist                   OP Exercises     Row Name 03/10/23 1100             Subjective Comments    Subjective Comments My shoulder pain wakes me at night. No pain getting dressed.  -JA         Subjective Pain    Able to rate subjective pain? yes  -JA      Pre-Treatment Pain Level 0  -JA         Total Minutes    43616 - PT Therapeutic Exercise Minutes 40  -JA         Exercise 2    Exercise Name 2 pulleys  -JA      Cueing 2 Verbal;Demo  -JA      Time 2 3 min  -JA         Exercise 3    Exercise Name 3 posterior shoulder rolls  -JA      Cueing 3 Verbal;Demo  -JA      Reps 3 20  -JA      Additional Comments Circuit 1x2  -JA         Exercise 4    Exercise Name 4 Rows  -JA      Cueing 4 Verbal;Demo;Tactile  -JA      Reps 4 10  -JA      Time 4 RTB  -JA      Additional Comments Circuit 1x2  -JA         Exercise 5    Exercise Name 5 Standing marching  -JA      Cueing 5 Verbal;Demo  -JA      Reps 5 10  -JA      Time 5 at barre  -JA       Additional Comments Circuit 1x2  -JA         Exercise 6    Exercise Name 6 Seated scapular retraction  -JA      Cueing 6 Verbal;Demo  -JA      Sets 6 1  -JA      Reps 6 10  -JA      Time 6 cues  -JA      Additional Comments Circuit 2x2  -JA         Exercise 7    Exercise Name 7 Sit to Stand from chair  -JA      Cueing 7 Verbal;Demo  -JA      Reps 7 5  -JA      Time 7 B UE support at arm rests- walker placed in front  -JA      Additional Comments Circuit 2x2  -JA         Exercise 8    Exercise Name 8 HR  -JA      Cueing 8 Verbal;Demo  -JA      Reps 8 1  -JA      Time 8 10  -JA      Additional Comments Circuit 2x2  -JA         Exercise 9    Exercise Name 9 Supine shoulder flex- arms clasped  -JA      Cueing 9 Verbal  -JA      Sets 9 1  -JA      Reps 9 10  -JA      Time 9 5 sec  -JA      Additional Comments Circuit 3x2  -JA         Exercise 10    Exercise Name 10 Supine ABC  -JA      Cueing 10 Verbal;Demo  -JA      Sets 10 1  -JA      Reps 10 10  -JA      Time 10 2#  -JA      Additional Comments Circuit 3  -JA         Exercise 11    Exercise Name 11 Supine bridges  -JA      Cueing 11 Verbal;Demo  -JA      Sets 11 1  -JA      Reps 11 10  -JA      Time 11 3 sec hold  -JA      Additional Comments Circuit 3  -JA         Exercise 12    Exercise Name 12 Supine butterfly stretch  -JA      Cueing 12 Verbal;Demo  -JA      Reps 12 10  -JA      Time 12 5 sec  -JA      Additional Comments Circuit 3  -JA         Exercise 13    Exercise Name 13 walking in clinic  -JA      Reps 13 1 lap, slow davon with rolling walker  -JA      Time 13 1 lap  -JA         Exercise 14    Exercise Name 14 Sidestepping  -JA      Reps 14 3 laps  -JA      Time 14 no resistance, may add next visit?  -JA         Exercise 15    Exercise Name 15 Marching in // bars  -JA      Time 15 3 laps  -JA         Exercise 16    Exercise Name 16 Semi-Tandem stance  -JA      Time 16 Resume as Circuit 4  -JA         Exercise 17    Exercise Name 17 seated Alex shld ER  w/YTB  -MAXINE      Reps 17 10  -JA      Time 17 stay in comfortable range  -MAXINE            User Key  (r) = Recorded By, (t) = Taken By, (c) = Cosigned By    Initials Name Provider Type    Christiana Cardona, PT Physical Therapist                                 Therapy Education  Education Details: Added mukund ER w/resistance.  Given: Symptoms/condition management, Pain management, Posture/body mechanics, Mobility training  Program: Reinforced, Progressed  How Provided: Verbal, Demonstration  Provided to: Patient  Level of Understanding: Verbalized, Demonstrated              Time Calculation:   Start Time: 1106  Stop Time: 1148  Time Calculation (min): 42 min  Timed Charges  98886 - PT Therapeutic Exercise Minutes: 40  Total Minutes  Timed Charges Total Minutes: 40   Total Minutes: 40  Therapy Charges for Today     Code Description Service Date Service Provider Modifiers Qty    01369360053 HC PT THER PROC EA 15 MIN 3/10/2023 Christiana Mathew, PT GP 3                    Christiana Mathew PT  3/10/2023

## 2023-03-14 ENCOUNTER — HOSPITAL ENCOUNTER (OUTPATIENT)
Dept: PHYSICAL THERAPY | Facility: HOSPITAL | Age: 68
Setting detail: THERAPIES SERIES
Discharge: HOME | End: 2023-03-14
Payer: MEDICARE

## 2023-03-14 DIAGNOSIS — R53.1 GENERAL WEAKNESS: Primary | ICD-10-CM

## 2023-03-14 DIAGNOSIS — M25.511 ACUTE PAIN OF RIGHT SHOULDER: ICD-10-CM

## 2023-03-14 DIAGNOSIS — R26.89 BALANCE PROBLEM: ICD-10-CM

## 2023-03-14 DIAGNOSIS — G89.29 CHRONIC RIGHT SHOULDER PAIN: ICD-10-CM

## 2023-03-14 DIAGNOSIS — M25.511 CHRONIC RIGHT SHOULDER PAIN: ICD-10-CM

## 2023-03-14 DIAGNOSIS — R26.9 GAIT DIFFICULTY: ICD-10-CM

## 2023-03-14 DIAGNOSIS — R53.81 PHYSICAL DECONDITIONING: ICD-10-CM

## 2023-03-14 DIAGNOSIS — M25.611 IMPAIRED RANGE OF MOTION OF RIGHT SHOULDER: ICD-10-CM

## 2023-03-14 PROCEDURE — 97110 THERAPEUTIC EXERCISES: CPT

## 2023-03-14 NOTE — THERAPY PROGRESS REPORT/RE-CERT
Outpatient Physical Therapy Ortho Treatment Note  [facility]     Patient Name: Flo Manzo  : 1955  MRN: [id number]  Today's Date: 3/14/2023      Visit Date: 2023    Visit Dx:    ICD-10-CM ICD-9-CM   1. General weakness  R53.1 780.79   2. Balance problem  R26.89 781.99   3. Gait difficulty  R26.9 781.2   4. Physical deconditioning  R53.81 799.3   5. Chronic right shoulder pain  M25.511 719.41    G89.29 338.29   6. Impaired range of motion of right shoulder  M25.611 719.51   7. Acute pain of right shoulder  M25.511 719.41       Patient Active Problem List   Diagnosis   • Umbilical hernia without obstruction and without gangrene   • Essential hypertension   • Arthritis of left knee   • Depression   • Obesity (BMI 30-39.9)   • Atrial fibrillation with RVR (HCC)   • Substernal thyroid goiter   • CHF (congestive heart failure) (HCC)   • Diastolic CHF, chronic (HCC)   • Hemorrhagic stroke (HCC)   • GÓMEZ on auto CPAP   • Hypersomnia due to medical condition   • Sleep-related hypoxia   • Chronic atrial fibrillation (HCC)   • Vertigo   • Aortic stenosis, moderate   • S/P ICD (internal cardiac defibrillator) procedure   • Sinus pause   • Pacemaker   • Anticoagulated   • Generalized weakness   • Prediabetes        Past Medical History:   Diagnosis Date   • Arthritis    • Atrial fibrillation with RVR (HCC) 2019   • Colon polyps 2018    Hepatic flexure: tubular adenoma, only low-grade dysplasia; splenic flexure: tubular adenoma, only low-grade dysplasia; sigmoid colon: tubular adenoma, multiple fragments only low-grade dysplasia   • Depression    • Heart murmur    • Hemorrhagic stroke (HCC) 2019   • Hypertension    • New onset atrial fibrillation (CMS/HCC) - RVR 2019   • GÓMEZ (obstructive sleep apnea) 2019    Home sleep study with moderate severity GÓMEZ, AHI 20 events per hour.  Sleep-related hypoxia with low O2 saturation 84% for 14 minutes.   • Sleep apnea     previously  diagnosed with sleep apnea, had T&A done and it has since resolved    • Stroke (HCC)    • Uncontrolled hypertension    • Ventral hernia         Past Surgical History:   Procedure Laterality Date   • APPENDECTOMY N/A 1972   • BACK SURGERY      BLADDER STIMULATOR IMPLANT L LOWER BACK   • CARDIAC CATHETERIZATION N/A 07/20/2004    Cath left ventriculography, coronary angiography and left heart catheterization, Normal results-Dr. Vadim Mancuso   • CARDIAC CATHETERIZATION N/A 1/29/2019    Procedure: Left Heart Cath;  Surgeon: Eren Bruce MD;  Location: [facility] CATH INVASIVE LOCATION;  Service: Cardiology   • CARDIAC CATHETERIZATION N/A 1/29/2019    Procedure: Left ventriculography;  Surgeon: Eren Bruce MD;  Location: [facility] CATH INVASIVE LOCATION;  Service: Cardiology   • CARDIAC CATHETERIZATION N/A 1/29/2019    Procedure: Coronary angiography;  Surgeon: Eren Bruce MD;  Location: [facility] CATH INVASIVE LOCATION;  Service: Cardiology   • CARDIAC ELECTROPHYSIOLOGY PROCEDURE N/A 3/4/2022    Procedure: Pacemaker SC new BIOTRONIK;  Surgeon: Eren Bruce MD;  Location: [facility] CATH INVASIVE LOCATION;  Service: Cardiology;  Laterality: N/A;   • CARPAL TUNNEL RELEASE Right 2013   • CARPAL TUNNEL RELEASE Left    • COLONOSCOPY N/A 1/4/2018    Procedure: COLONOSCOPY with cold polypectomy and hot snare polypectomy;  Surgeon: Ten Solitario MD;  Location: [facility] ENDOSCOPY;  Service:    • EYE LENS IMPLANT SECONDARY Bilateral 2007, 2008   • KNEE CARTILAGE SURGERY Right 1979   • TONSILLECTOMY AND ADENOIDECTOMY Bilateral 2005   • TOTAL KNEE ARTHROPLASTY Left 03/14/2016    Left total knee arthroplasty with Amberly component, size 11 femur, G tibial baseplate with a 10 polyethylene insert and a 38 patellar button-Dr. Pablo Hairston   • TOTAL KNEE ARTHROPLASTY Right 03/02/2015    Right total knee arthroplasty with Amberly component size G femur, 11 tibial base plate with an 11 polyethylene insert and a 38  patellar button-Dr. Pablo Hairston   • UVULOPALATOPHARYNGOPLASTY N/A 2005    part of soft palate, and uvula   • VENTRAL HERNIA REPAIR N/A 1/23/2018    Procedure: VENTRAL HERNIA REPAIR LAPAROSCOPIC WITH DAVINCI ROBOT AND MESH;  Surgeon: Ten Solitario MD;  Location: [facility] OR;  Service:         PT Ortho     Row Name 03/14/23 1100       Myotomal Screen- Lower Quarter Clearing    Hip flexion (L2) Right:;4+ (Good +);Left:;4 (Good)  -MH    Knee extension (L3) Bilateral:;4 (Good)  -MH    Knee flexion (S2) Bilateral:;4+ (Good +)  -MH       MMT Right Upper Ext    Rt Shoulder Flexion MMT, Gross Movement (4-/5) good minus  -MH    Rt Shoulder ABduction MMT, Gross Movement (4-/5) good minus  -MH          User Key  (r) = Recorded By, (t) = Taken By, (c) = Cosigned By    Initials Name Provider Type     Sylvia Santana, PT Physical Therapist                             PT Assessment/Plan     Row Name 03/14/23 1100          PT Assessment    Functional Limitations Decreased safety during functional activities;Impaired gait;Limitation in home management;Limitations in community activities;Limitations in functional capacity and performance;Performance in leisure activities;Performance in self-care ADL  -     Impairments Muscle strength;Range of motion;Posture;Poor body mechanics;Pain;Balance;Gait;Endurance  -     Assessment Comments Flo Manzo has been seen for 4 physical therapy sessions since re-evaluation for R shoulder pain, previously being seen for generalized weakness, OA in multiple joints, physical deconditioning, hx of CVA. He has continued to improve in his overall mobility and reports 50% improvement in ability to fall asleep since beginning therapy for shoulder. Working on both B UE/LE strength and mobility with circuit style sessoions. Treatment has included therapeutic exercise, therapeutic activity, neuro-muscular retraining  and patient education with home exercise program . Progress to physical  therapy goals is fair as pt. Has met 3/4 STGs, and 1/7 LTGs. He reports improved ability to dress without pain but nighttime pain persists. He demonstrates improving R UE strength 4-/5 into flexion and abduction as well as improving strength in B hip flexion, knee flexion/extension MMT. He will benefit from continued skilled physical therapy to address remaining impairments and functional limitations.  -     Please refer to paper survey for additional self-reported information No  -     Rehab Potential Fair  -     Patient/caregiver participated in establishment of treatment plan and goals Yes  -     Patient would benefit from skilled therapy intervention Yes  -        PT Plan    PT Frequency 1x/week;2x/week  -     Predicted Duration of Therapy Intervention (PT) 6 visits  -     Planned CPT's? PT RE-EVAL: 26398;PT THER PROC EA 15 MIN: 65774;PT MANUAL THERAPY EA 15 MIN: 99120;PT THER ACT EA 15 MIN: 77460;PT NEUROMUSC RE-EDUCATION EA 15 MIN: 53664;PT GAIT TRAINING EA 15 MIN: 53974;PT SELF CARE/HOME MGMT/TRAIN EA 15: 89616;PT HOT OR COLD PACK TREAT MCARE  -     PT Plan Comments consider H-A?  -           User Key  (r) = Recorded By, (t) = Taken By, (c) = Cosigned By    Initials Name Provider Type     Sylvia Santana, PT Physical Therapist                   OP Exercises     Row Name 03/14/23 1100             Subjective Comments    Subjective Comments Im doing good  -         Total Minutes    82782 - PT Therapeutic Exercise Minutes 41  -MH         Exercise 1    Exercise Name 1 nustep  -      Cueing 1 Verbal  -      Time 1 5 min  -         Exercise 2    Exercise Name 2 review of outcome measures/objective update  -         Exercise 6    Exercise Name 6 Seated scapular retraction  -      Cueing 6 Verbal;Demo  -MH      Sets 6 1  -MH      Reps 6 10  -MH      Time 6 RTB  -      Additional Comments circuit 2x2  -         Exercise 7    Exercise Name 7 Sit to Stand from mat  -      Cueing 7  Verbal;Demo  -MH      Reps 7 10  -MH      Time 7 elevated mat so no UE  -MH      Additional Comments circuit 2x2  -MH         Exercise 8    Exercise Name 8 HR  -MH      Cueing 8 Verbal;Demo  -MH      Reps 8 1  -MH      Time 8 20  -MH      Additional Comments circuit 2x2  -MH         Exercise 9    Exercise Name 9 Supine shoulder flex- arms clasped  -MH      Cueing 9 Verbal  -MH      Sets 9 1  -MH      Reps 9 10  -MH      Time 9 5 sec  -MH      Additional Comments Circuit 3  -MH         Exercise 10    Exercise Name 10 Supine ABC  -MH      Cueing 10 Verbal;Demo  -MH      Sets 10 1  -MH      Reps 10 10  -MH      Time 10 2#  -MH      Additional Comments Circuit 3  -MH         Exercise 11    Exercise Name 11 Supine bridges  -MH      Cueing 11 Verbal;Demo  -MH      Sets 11 1  -MH      Reps 11 10  -MH      Time 11 3 sec hold  -MH      Additional Comments Circuit 3x  -MH            User Key  (r) = Recorded By, (t) = Taken By, (c) = Cosigned By    Initials Name Provider Type     Sylvia Santana, PT Physical Therapist                              PT OP Goals     Row Name 03/14/23 1100          PT Short Term Goals    STG Date to Achieve 01/05/23  -     STG 1 Pt will be independent with initial HEP.  -     STG 1 Progress Met  -     STG 2 Pt will ambulate 150' with normal heel strike to toe off with symmetrical stride and davon with his RWX without momentary LOB.  -     STG 2 Progress Met  -     STG 2 Progress Comments using rwx, gait remains impaired but liekly at baseline, improving stride length with sanjuana through gait pattern  -     STG 3 Pt will demonstrate FTSS in 15 seconds or less indicating reduced risk of falls.  -     STG 3 Progress Ongoing  -     STG 4 Pt. will report 50% improvement in ability to fall asleep to improve QOL.  -     STG 4 Progress Met  -     STG 4 Progress Comments 50% better  -        Long Term Goals    LTG Date to Achieve 02/04/23  -     LTG 1 Pt will be independent with  advanced HEP for strength and mobility.  -     LTG 1 Progress Met  -     LTG 1 Progress Comments updating as appropriate over course of therapy  -     LTG 2 Pt will tolerate 30 minutes of activity with 3 brief rest breaks demonstrating improved functional strength.  -     LTG 2 Progress Progressing  -     LTG 3 Pt will demonstrate increased strength LEs to 4/5 or better.  -     LTG 3 Progress Partially Met  -     LTG 3 Progress Comments see ortho  -     LTG 4 Pt will ambulate 500' or more with normalized gait without momentary LOB with least restrictive assistive device.  -     LTG 4 Progress Progressing  -     LTG 5 Pt. will report ability to fall asleep on R side without exacerbation of pain to improve sleep quality.  -     LTG 5 Progress Ongoing  -     LTG 5 Progress Comments nighttime pain still present  -     LTG 6 Pt. will improve R shoulder flexion/abduction MMT >/= 4/5 to improve ability to perform household chores.  -     LTG 6 Progress Ongoing;[facility] Comments see [facility]  -     LTG 7 Pt. Will score </= 20 on QuickDASH from 36.36 at initial evaluation to indicate reduced perception of disability.  -     LTG 7 Progress Ongoing  -     LTG 7 Progress Comments 29.55  -           User Key  (r) = Recorded By, (t) = Taken By, (c) = Cosigned By    Initials Name Provider Type    Sylvia Luis, PT Physical Therapist                Therapy Education  Given: Symptoms/condition management  Program: Reinforced  How Provided: Verbal  Provided to: Patient  Level of Understanding: Verbalized    Outcome Measure Options: Quick DASH  Quick DASH  Open a tight or new jar.: Moderate Difficulty  Do heavy household chores (e.g., wash walls, wash floors): Moderate Difficulty  Carry a shopping bag or briefcase: Mild Difficulty  Wash your back: Severe Difficulty  Use a knife to cut food: No Difficulty  Recreational activities in which you take some force or impact  through your arm, should or hand (e.g. golf, hammering, tennis, etc.): No Difficulty  During the past week, to what extent has your arm, shoulder, or hand problem interfered with your normal social activites with family, friends, neighbors or groups?: Slightly  During the past week, were you limited in your work or other regular daily activities as a result of your arm, shoulder or hand problem?: Slightly Limited  Arm, Shoulder, or hand pain: Moderate  Tingling (pins and needles) in your arm, shoulder, or hand: None  During the past week, how much difficulty have you had sleeping because of the pain in your arm, shoulder or hand?: Mild Difficulty  Number of Questions Answered: 11  Quick DASH Score: 29.55         Time Calculation:   Start Time: 1115  Stop Time: 1156  Time Calculation (min): 41 min  Total Timed Code Minutes- PT: 41 minute(s)  Timed Charges  41914 - PT Therapeutic Exercise Minutes: 41  Total Minutes  Timed Charges Total Minutes: 41   Total Minutes: 41  Therapy Charges for Today     Code Description Service Date Service Provider Modifiers Qty    43036295743 HC PT THER PROC EA 15 MIN 3/14/2023 Sylvia Santana, PT GP 3          PT G-Codes  Outcome Measure Options: Quick DASH  Quick DASH Score: 29.55         Sylvia Santana, PT  3/14/2023

## 2023-03-17 ENCOUNTER — TELEPHONE (OUTPATIENT)
Dept: SURGERY | Facility: CLINIC | Age: 68
End: 2023-03-17
Payer: MEDICARE

## 2023-03-17 ENCOUNTER — HOSPITAL ENCOUNTER (OUTPATIENT)
Dept: CT IMAGING | Facility: HOSPITAL | Age: 68
Discharge: HOME | End: 2023-03-17
Admitting: NURSE PRACTITIONER
Payer: MEDICARE

## 2023-03-17 DIAGNOSIS — E04.9 SUBSTERNAL THYROID GOITER: ICD-10-CM

## 2023-03-17 PROCEDURE — 71250 CT THORAX DX C-: CPT

## 2023-03-21 ENCOUNTER — HOSPITAL ENCOUNTER (OUTPATIENT)
Dept: PHYSICAL THERAPY | Facility: HOSPITAL | Age: 68
Setting detail: THERAPIES SERIES
Discharge: HOME | End: 2023-03-21
Payer: MEDICARE

## 2023-03-21 DIAGNOSIS — R53.81 PHYSICAL DECONDITIONING: ICD-10-CM

## 2023-03-21 DIAGNOSIS — R26.9 GAIT DIFFICULTY: ICD-10-CM

## 2023-03-21 DIAGNOSIS — R53.1 GENERAL WEAKNESS: Primary | ICD-10-CM

## 2023-03-21 DIAGNOSIS — R26.89 BALANCE PROBLEM: ICD-10-CM

## 2023-03-21 PROCEDURE — 97110 THERAPEUTIC EXERCISES: CPT

## 2023-03-21 NOTE — PROGRESS NOTES
"Chief Complaint  Substernal goiter, follow-up with CT chest      Subjective        Flo Manzo presents to [facility] THORACIC [facility] for continued follow-up and surveillance.    History of Present Illness    Danyel Manzo is a pleasant 68-year-old gentleman who presents to our office today for continued follow-up and surveillance.  He has been following with our service regularly for a large substernal goiter.  The patient has elected to avoid surgery and instead has allowed us to follow with continued surveillance imaging.  The patient denies any difficulty with swallowing.  He has chronic shortness of air with exertion which is stable.  He has an occasional cough but denies hemoptysis.  He has had no voice changes or unexplained weight loss.  The patient presents utilizing a walker which he has needed since he suffered a stroke last year.  He is otherwise stable and has no new complaints.      Objective   Vital Signs:  /82 (BP Location: Left arm, Patient Position: Sitting, Cuff Size: Adult)   Pulse 91   Ht 177.8 cm (70\")   Wt 103 kg (227 lb 1.2 oz)   SpO2 95%   BMI 32.58 kg/m²   Estimated body mass index is 32.58 kg/m² as calculated from the following:    Height as of this encounter: 177.8 cm (70\").    Weight as of this encounter: 103 kg (227 lb 1.2 oz).             Physical Exam  Vitals and nursing note reviewed.   Constitutional:       Appearance: Normal appearance.      Comments: Uses a walker for ambulation   HENT:      Head: Normocephalic and atraumatic.   Cardiovascular:      Rate and Rhythm: Normal rate and regular rhythm.      Pulses: Normal pulses.      Heart sounds: Normal heart sounds.   Pulmonary:      Effort: Pulmonary effort is normal.      Breath sounds: Normal breath sounds. No wheezing, rhonchi or rales.   Chest:      Chest wall: No tenderness.   Abdominal:      General: Bowel sounds are normal.      Palpations: Abdomen is soft.   Musculoskeletal:      Cervical " back: Neck supple. No tenderness.   Skin:     General: Skin is warm and dry.   Neurological:      General: No focal deficit present.      Mental Status: He is alert. Mental status is at baseline.   Psychiatric:         Behavior: Behavior is cooperative.        Result Review :  The following data was reviewed by: ASHANTI Suárez on 03/22/2023:    Data reviewed: Radiologic studies CT of the chest performed on 3/17/2023 was independently reviewed.  The enlarged goiter with bilateral substernal extension and some associated mass effect appears stable in appearance compared to previous imaging.  There is stable calcified lymph nodes.  There is calcified coronary artery atherosclerosis.  Dilated appearance of both ascending and descending aorta appear similar but is difficult to fully assess due to motion artifact.  No evidence of significant pulmonary consolidation.  Small pulmonary nodules bilaterally are unchanged.             Assessment and Plan   Diagnoses and all orders for this visit:    1. Substernal thyroid goiter (Primary)  -     CT Chest Without Contrast Diagnostic; Future    Danyel very pleasant 68-year-old gentleman who presents for continued surveillance of a large substernal goiter.  Patient reports he is doing well with no issues with swallowing or breathing other than his chronic dyspnea with exertion.  Given his multiple comorbidities, he would not be a great candidate for surgical resection so we will plan to continue surveillance.  He can follow-up to see us in 1 year with another CT without contrast.    Of course, we are happy to see Mr. Manzo at any time, should the need arise.  Thank you for allowing us to participate in the care of Danyel Manzo.  We will continue to keep you informed of his progress.       I spent 31 minutes caring for Flo on this date of service. This time includes time spent by me in the following activities:preparing for the visit, reviewing tests, obtaining and/or  reviewing a separately obtained history, performing a medically appropriate examination and/or evaluation , counseling and educating the patient/family/caregiver, ordering medications, tests, or procedures, referring and communicating with other health care professionals , documenting information in the medical record, independently interpreting results and communicating that information with the patient/family/caregiver and care coordination  Follow Up   Return for Next scheduled follow up with CT chest.  Patient was given instructions and counseling regarding his condition or for health maintenance advice. Please see specific information pulled into the AVS if appropriate.

## 2023-03-22 ENCOUNTER — OFFICE VISIT (OUTPATIENT)
Dept: SURGERY | Facility: CLINIC | Age: 68
End: 2023-03-22
Payer: MEDICARE

## 2023-03-22 VITALS
DIASTOLIC BLOOD PRESSURE: 82 MMHG | BODY MASS INDEX: 32.51 KG/M2 | WEIGHT: 227.07 LBS | HEART RATE: 91 BPM | HEIGHT: 70 IN | OXYGEN SATURATION: 95 % | SYSTOLIC BLOOD PRESSURE: 124 MMHG

## 2023-03-22 DIAGNOSIS — E04.9 SUBSTERNAL THYROID GOITER: Primary | ICD-10-CM

## 2023-03-22 PROCEDURE — 99214 OFFICE O/P EST MOD 30 MIN: CPT | Performed by: NURSE PRACTITIONER

## 2023-03-22 PROCEDURE — 1159F MED LIST DOCD IN RCRD: CPT | Performed by: NURSE PRACTITIONER

## 2023-03-22 PROCEDURE — 3074F SYST BP LT 130 MM HG: CPT | Performed by: NURSE PRACTITIONER

## 2023-03-22 PROCEDURE — 3079F DIAST BP 80-89 MM HG: CPT | Performed by: NURSE PRACTITIONER

## 2023-03-22 PROCEDURE — 1160F RVW MEDS BY RX/DR IN RCRD: CPT | Performed by: NURSE PRACTITIONER

## 2023-03-22 NOTE — THERAPY TREATMENT NOTE
Outpatient Physical Therapy Ortho Treatment Note  [facility]     Patient Name: Flo Manzo  : 1955  MRN: [id number]  Today's Date: 3/21/2023      Visit Date: 2023    Visit Dx:    ICD-10-CM ICD-9-CM   1. General weakness  R53.1 780.79   2. Balance problem  R26.89 781.99   3. Gait difficulty  R26.9 781.2   4. Physical deconditioning  R53.81 799.3       Patient Active Problem List   Diagnosis   • Umbilical hernia without obstruction and without gangrene   • Essential hypertension   • Arthritis of left knee   • Depression   • Obesity (BMI 30-39.9)   • Atrial fibrillation with RVR (HCC)   • Substernal thyroid goiter   • CHF (congestive heart failure) (HCC)   • Diastolic CHF, chronic (HCC)   • Hemorrhagic stroke (HCC)   • GÓMEZ on auto CPAP   • Hypersomnia due to medical condition   • Sleep-related hypoxia   • Chronic atrial fibrillation (HCC)   • Vertigo   • Aortic stenosis, moderate   • S/P ICD (internal cardiac defibrillator) procedure   • Sinus pause   • Pacemaker   • Anticoagulated   • Generalized weakness   • Prediabetes        Past Medical History:   Diagnosis Date   • Arthritis    • Atrial fibrillation with RVR (HCC) 2019   • Colon polyps 2018    Hepatic flexure: tubular adenoma, only low-grade dysplasia; splenic flexure: tubular adenoma, only low-grade dysplasia; sigmoid colon: tubular adenoma, multiple fragments only low-grade dysplasia   • Depression    • Heart murmur    • Hemorrhagic stroke (HCC) 2019   • Hypertension    • New onset atrial fibrillation (CMS/HCC) - RVR 2019   • GÓMEZ (obstructive sleep apnea) 2019    Home sleep study with moderate severity GÓMEZ, AHI 20 events per hour.  Sleep-related hypoxia with low O2 saturation 84% for 14 minutes.   • Sleep apnea     previously diagnosed with sleep apnea, had T&A done and it has since resolved    • Stroke (HCC)    • Uncontrolled hypertension    • Ventral hernia         Past Surgical History:   Procedure  Laterality Date   • APPENDECTOMY N/A 1972   • BACK SURGERY      BLADDER STIMULATOR IMPLANT L LOWER BACK   • CARDIAC CATHETERIZATION N/A 07/20/2004    Cath left ventriculography, coronary angiography and left heart catheterization, Normal results-Dr. Vadim Mancuso   • CARDIAC CATHETERIZATION N/A 1/29/2019    Procedure: Left Heart Cath;  Surgeon: Eren Bruce MD;  Location:  [facility] CATH INVASIVE LOCATION;  Service: Cardiology   • CARDIAC CATHETERIZATION N/A 1/29/2019    Procedure: Left ventriculography;  Surgeon: Eren Bruce MD;  Location:  [facility] CATH INVASIVE LOCATION;  Service: Cardiology   • CARDIAC CATHETERIZATION N/A 1/29/2019    Procedure: Coronary angiography;  Surgeon: Eren Bruce MD;  Location:  [facility] CATH INVASIVE LOCATION;  Service: Cardiology   • CARDIAC ELECTROPHYSIOLOGY PROCEDURE N/A 3/4/2022    Procedure: Pacemaker SC new BIOTRONIK;  Surgeon: Eren Bruce MD;  Location:  [facility] CATH INVASIVE LOCATION;  Service: Cardiology;  Laterality: N/A;   • CARPAL TUNNEL RELEASE Right 2013   • CARPAL TUNNEL RELEASE Left    • COLONOSCOPY N/A 1/4/2018    Procedure: COLONOSCOPY with cold polypectomy and hot snare polypectomy;  Surgeon: Ten Solitario MD;  Location: [facility] ENDOSCOPY;  Service:    • EYE LENS IMPLANT SECONDARY Bilateral 2007, 2008   • KNEE CARTILAGE SURGERY Right 1979   • TONSILLECTOMY AND ADENOIDECTOMY Bilateral 2005   • TOTAL KNEE ARTHROPLASTY Left 03/14/2016    Left total knee arthroplasty with Amberly component, size 11 femur, G tibial baseplate with a 10 polyethylene insert and a 38 patellar button-Dr. Pablo Hairston   • TOTAL KNEE ARTHROPLASTY Right 03/02/2015    Right total knee arthroplasty with Amberly component size G femur, 11 tibial base plate with an 11 polyethylene insert and a 38 patellar button-Dr. Pablo Hairston   • UVULOPALATOPHARYNGOPLASTY N/A 2005    part of soft palate, and uvula   • VENTRAL HERNIA REPAIR N/A 1/23/2018    Procedure: VENTRAL HERNIA  REPAIR LAPAROSCOPIC WITH DAVINCI ROBOT AND MESH;  Surgeon: Ten Solitario MD;  Location: [facility];  Service:                         PT Assessment/Plan     Row Name 03/21/23 1600          PT Assessment    Assessment Comments Danyel reports his right shoulder is a little less painful now. Continued with circuit-style strength and mobility work with good response. He demonstrates improving control of transitional movements as well as his stability when not holding assistive device or bars. He has difficulty with taking a step without some UE support. He will benefit from continuing skilled therapy services.  -JA        PT Plan    PT Plan Comments how is functional use of R shoulder with ADLs, add supine HA  -JA           User Key  (r) = Recorded By, (t) = Taken By, (c) = Cosigned By    Initials Name Provider Type    Christiana Cardona, PT Physical Therapist                   OP Exercises     Row Name 03/21/23 1100             Subjective Comments    Subjective Comments I think I'm improving.  -JA         Subjective Pain    Able to rate subjective pain? yes  -JA      Pre-Treatment Pain Level 3  -JA      Subjective Pain Comment 2-3/10 pain in shoulder  -JA         Total Minutes    36404 - PT Therapeutic Exercise Minutes 40  -JA         Exercise 1    Exercise Name 1 nustep  -JA      Cueing 1 Verbal  -JA      Time 1 5 min  -JA         Exercise 2    Exercise Name 2 pulleys  -JA      Cueing 2 Verbal;Demo  -JA      Time 2 3 min  -JA         Exercise 6    Exercise Name 6 Seated scapular retraction  -JA      Cueing 6 Verbal;Demo  -JA      Sets 6 1  -JA      Reps 6 15  -JA      Time 6 RTB, copious cues to reduce UT substitution  -JA      Additional Comments Circuit 2 repeated  -JA         Exercise 7    Exercise Name 7 Sit to Stand from chair  -JA      Cueing 7 Verbal;Demo  -JA      Reps 7 5  -JA      Time 7 B UE support at arm rests- walker placed in front  -JA      Additional Comments Circuit 2 repeated  -JA          Exercise 8    Exercise Name 8 HR  -JA      Cueing 8 Verbal;Demo  -JA      Reps 8 1  -JA      Time 8 10  -JA      Additional Comments Circuit 2 repeated  -JA         Exercise 9    Exercise Name 9 Supine shoulder flex- arms clasped  -JA      Cueing 9 Verbal  -JA      Sets 9 1  -JA      Reps 9 10  -JA      Time 9 5 sec  -JA      Additional Comments Circuit 3 repeated  -JA         Exercise 10    Exercise Name 10 Supine ABC  -JA      Cueing 10 Verbal;Demo  -JA      Sets 10 1  -JA      Reps 10 10  -JA      Time 10 2#  -JA      Additional Comments Circuit 3 repeated  -JA         Exercise 11    Exercise Name 11 Supine bridges  -JA      Cueing 11 Verbal;Demo  -JA      Sets 11 1  -JA      Reps 11 10  -JA      Time 11 3 sec hold  -JA      Additional Comments Circuit 3 repeated  -JA         Exercise 12    Exercise Name 12 Supine butterfly stretch  -JA      Cueing 12 Verbal;Demo  -JA      Reps 12 10  -JA      Time 12 5 sec  -JA         Exercise 13    Exercise Name 13 walking in clinic  -JA      Reps 13 1 lap, slow davon with rolling walker  -JA      Time 13 1 lap  -JA         Exercise 14    Exercise Name 14 Sidestepping  -JA      Reps 14 3 laps  -JA      Time 14 no resistance, may add next visit?  -JA         Exercise 15    Exercise Name 15 Marching in // bars  -JA      Time 15 3 laps  -JA         Exercise 16    Exercise Name 16 Semi-Tandem stance  -JA      Time 16 Resume as Circuit 4  -JA         Exercise 17    Exercise Name 17 seated Alex shld ER w/YTB  -JA      Reps 17 10  -JA      Time 17 stay in comfortable range  -JA            User Key  (r) = Recorded By, (t) = Taken By, (c) = Cosigned By    Initials Name Provider Type    Christiana Cardona, PT Physical Therapist                                 Therapy Education  Education Details: Copious cuing for engaging muscles of scap ret v shld extensors and pt demonstrated good understanding.              Time Calculation:   Start Time: 1102  Stop Time: 1145  Time Calculation  (min): 43 min  Timed Charges  53642 - PT Therapeutic Exercise Minutes: 40  Total Minutes  Timed Charges Total Minutes: 40   Total Minutes: 40  Therapy Charges for Today     Code Description Service Date Service Provider Modifiers Qty    [id number]  PT THER PROC EA 15 MIN 3/21/2023 Christiana Mathew, PT GP 3                    Christiana Mathew, PT  3/21/2023

## 2023-03-24 ENCOUNTER — HOSPITAL ENCOUNTER (OUTPATIENT)
Dept: PHYSICAL THERAPY | Facility: HOSPITAL | Age: 68
Setting detail: THERAPIES SERIES
Discharge: HOME | End: 2023-03-24
Payer: MEDICARE

## 2023-03-24 DIAGNOSIS — M25.511 ACUTE PAIN OF RIGHT SHOULDER: ICD-10-CM

## 2023-03-24 DIAGNOSIS — G89.29 CHRONIC RIGHT SHOULDER PAIN: ICD-10-CM

## 2023-03-24 DIAGNOSIS — R53.81 PHYSICAL DECONDITIONING: ICD-10-CM

## 2023-03-24 DIAGNOSIS — M25.611 IMPAIRED RANGE OF MOTION OF RIGHT SHOULDER: ICD-10-CM

## 2023-03-24 DIAGNOSIS — R53.1 GENERAL WEAKNESS: Primary | ICD-10-CM

## 2023-03-24 DIAGNOSIS — R26.9 GAIT DIFFICULTY: ICD-10-CM

## 2023-03-24 DIAGNOSIS — R26.89 BALANCE PROBLEM: ICD-10-CM

## 2023-03-24 DIAGNOSIS — M25.511 CHRONIC RIGHT SHOULDER PAIN: ICD-10-CM

## 2023-03-24 PROCEDURE — 97110 THERAPEUTIC EXERCISES: CPT

## 2023-03-24 NOTE — THERAPY TREATMENT NOTE
Outpatient Physical Therapy Ortho Treatment Note  [facility]     Patient Name: Flo Manzo  : 1955  MRN: [id number]  Today's Date: 3/24/2023      Visit Date: 2023    Visit Dx:    ICD-10-CM ICD-9-CM   1. General weakness  R53.1 780.79   2. Balance problem  R26.89 781.99   3. Gait difficulty  R26.9 781.2   4. Physical deconditioning  R53.81 799.3   5. Chronic right shoulder pain  M25.511 719.41    G89.29 338.29   6. Impaired range of motion of right shoulder  M25.611 719.51   7. Acute pain of right shoulder  M25.511 719.41       Patient Active Problem List   Diagnosis   • Umbilical hernia without obstruction and without gangrene   • Essential hypertension   • Arthritis of left knee   • Depression   • Obesity (BMI 30-39.9)   • Atrial fibrillation with RVR (HCC)   • Substernal thyroid goiter   • CHF (congestive heart failure) (HCC)   • Diastolic CHF, chronic (HCC)   • Hemorrhagic stroke (HCC)   • GÓMEZ on auto CPAP   • Hypersomnia due to medical condition   • Sleep-related hypoxia   • Chronic atrial fibrillation (HCC)   • Vertigo   • Aortic stenosis, moderate   • S/P ICD (internal cardiac defibrillator) procedure   • Sinus pause   • Pacemaker   • Anticoagulated   • Generalized weakness   • Prediabetes        Past Medical History:   Diagnosis Date   • Arthritis    • Atrial fibrillation with RVR (HCC) 2019   • Colon polyps 2018    Hepatic flexure: tubular adenoma, only low-grade dysplasia; splenic flexure: tubular adenoma, only low-grade dysplasia; sigmoid colon: tubular adenoma, multiple fragments only low-grade dysplasia   • Depression    • Heart murmur    • Hemorrhagic stroke (HCC) 2019   • Hypertension    • New onset atrial fibrillation (CMS/HCC) - RVR 2019   • GÓMEZ (obstructive sleep apnea) 2019    Home sleep study with moderate severity GÓMEZ, AHI 20 events per hour.  Sleep-related hypoxia with low O2 saturation 84% for 14 minutes.   • Sleep apnea     previously  diagnosed with sleep apnea, had T&A done and it has since resolved    • Stroke (HCC)    • Uncontrolled hypertension    • Ventral hernia         Past Surgical History:   Procedure Laterality Date   • APPENDECTOMY N/A 1972   • BACK SURGERY      BLADDER STIMULATOR IMPLANT L LOWER BACK   • CARDIAC CATHETERIZATION N/A 07/20/2004    Cath left ventriculography, coronary angiography and left heart catheterization, Normal results-Dr. Vadim Mancuso   • CARDIAC CATHETERIZATION N/A 1/29/2019    Procedure: Left Heart Cath;  Surgeon: Eren Bruce MD;  Location: [facility] CATH INVASIVE LOCATION;  Service: Cardiology   • CARDIAC CATHETERIZATION N/A 1/29/2019    Procedure: Left ventriculography;  Surgeon: Eren Bruce MD;  Location: [facility] CATH INVASIVE LOCATION;  Service: Cardiology   • CARDIAC CATHETERIZATION N/A 1/29/2019    Procedure: Coronary angiography;  Surgeon: Eren Bruce MD;  Location: [facility] CATH INVASIVE LOCATION;  Service: Cardiology   • CARDIAC ELECTROPHYSIOLOGY PROCEDURE N/A 3/4/2022    Procedure: Pacemaker SC new BIOTRONIK;  Surgeon: Eren Bruce MD;  Location: [facility] CATH INVASIVE LOCATION;  Service: Cardiology;  Laterality: N/A;   • CARPAL TUNNEL RELEASE Right 2013   • CARPAL TUNNEL RELEASE Left    • COLONOSCOPY N/A 1/4/2018    Procedure: COLONOSCOPY with cold polypectomy and hot snare polypectomy;  Surgeon: Ten Solitario MD;  Location: [facility] ENDOSCOPY;  Service:    • EYE LENS IMPLANT SECONDARY Bilateral 2007, 2008   • KNEE CARTILAGE SURGERY Right 1979   • TONSILLECTOMY AND ADENOIDECTOMY Bilateral 2005   • TOTAL KNEE ARTHROPLASTY Left 03/14/2016    Left total knee arthroplasty with Amberly component, size 11 femur, G tibial baseplate with a 10 polyethylene insert and a 38 patellar button-Dr. Pablo Hairston   • TOTAL KNEE ARTHROPLASTY Right 03/02/2015    Right total knee arthroplasty with Amberly component size G femur, 11 tibial base plate with an 11 polyethylene insert and a 38  patellar button-Dr. Pablo Hairston   • UVULOPALATOPHARYNGOPLASTY N/A 2005    part of soft palate, and uvula   • VENTRAL HERNIA REPAIR N/A 1/23/2018    Procedure: VENTRAL HERNIA REPAIR LAPAROSCOPIC WITH DAVINCI ROBOT AND MESH;  Surgeon: Ten Solitario MD;  Location: [facility] OR;  Service:                         PT Assessment/Plan     Row Name 03/24/23 0900          PT Assessment    Assessment Comments Danyel arrives with primary complaint remaining shoulder pain at night but otherwise daily mobility improved. Focused on increased attention to shoulder trength with added band to supine flex from previous AAROM. He requires less dependance on UE to rise from chair and decreased ceuing on rows. Pt. remains good candidate for skilled PT.  -        PT Plan    PT Plan Comments circuit:  side steps, wall wash, AROM flexion to 90  -MH           User Key  (r) = Recorded By, (t) = Taken By, (c) = Cosigned By    Initials Name Provider Type     Sylvia Santana, PT Physical Therapist                   OP Exercises     Row Name 03/24/23 0900             Subjective Comments    Subjective Comments Shoulder is okay, just still hurts at night  -         Total Minutes    20368 - PT Therapeutic Exercise Minutes 40  -MH         Exercise 1    Exercise Name 1 nustep  -      Cueing 1 Verbal  -      Time 1 5 min  -MH         Exercise 2    Exercise Name 2 pulleys  -      Cueing 2 Verbal;Demo  -MH      Time 2 3 min  -MH         Exercise 6    Exercise Name 6 Seated scapular retraction  -      Cueing 6 Verbal;Demo  -MH      Sets 6 1  -MH      Reps 6 15  -MH      Time 6 RTB, copious cues to reduce UT substitution  -      Additional Comments circuit 2x2  -MH         Exercise 7    Exercise Name 7 Sit to Stand from chair  -      Cueing 7 Verbal;Demo  -MH      Reps 7 5  -MH      Time 7 B UE support at arm rests- walker placed in front  -      Additional Comments circuit 2x2  -MH         Exercise 8    Exercise Name 8 HR  -MH       Cueing 8 Verbal;Demo  -MH      Sets 8 2  -MH      Reps 8 10  -MH      Time 8 circuit 2x2  -MH         Exercise 9    Exercise Name 9 Supine shoulder flex-  -MH      Cueing 9 Verbal  -MH      Sets 9 1  -MH      Reps 9 10  -MH      Time 9 5 sec  -MH      Additional Comments Circuit 3x2  -MH         Exercise 10    Exercise Name 10 Supine ABC  -MH      Cueing 10 Verbal;Demo  -MH      Sets 10 1  -MH      Reps 10 A-Z  -MH      Time 10 2#  -MH      Additional Comments Circuit 3x2  -MH         Exercise 11    Exercise Name 11 Supine bridges  -MH      Cueing 11 Verbal;Demo  -MH      Sets 11 1  -MH      Reps 11 10  -MH      Time 11 3 sec hold  -MH      Additional Comments Circuit 3x2  -MH            User Key  (r) = Recorded By, (t) = Taken By, (c) = Cosigned By    Initials Name Provider Type     Sylvia Santana, PT Physical Therapist                              PT OP Goals     Row Name 03/24/23 0900          PT Short Term Goals    STG Date to Achieve 01/05/23  -     STG 1 Pt will be independent with initial HEP.  -     STG 1 Progress Met  -     STG 2 Pt will ambulate 150' with normal heel strike to toe off with symmetrical stride and davon with his RWX without momentary LOB.  -     STG 2 Progress Met  [facility]     STG 3 Pt will demonstrate FTSS in 15 seconds or less indicating reduced risk of falls.  -     STG 3 Progress Ongoing  -     STG 4 Pt. will report 50% improvement in ability to fall asleep to improve QOL.  -     STG 4 Progress Met  -        Long Term Goals    LTG Date to Achieve 02/04/23  -     LTG 1 Pt will be independent with advanced HEP for strength and mobility.  -     LTG 1 Progress Met  -     LTG 2 Pt will tolerate 30 minutes of activity with 3 brief rest breaks demonstrating improved functional strength.  -     LTG 2 Progress Progressing  -     LTG 3 Pt will demonstrate increased strength LEs to 4/5 or better.  -     LTG 3 Progress Partially Met  -     LTG 4 Pt will ambulate  500' or more with normalized gait without momentary LOB with least restrictive assistive device.  -     LTG 4 Progress Progressing  -     LTG 5 Pt. will report ability to fall asleep on R side without exacerbation of pain to improve sleep quality.  -     LTG 5 Progress Ongoing  -     LTG 6 Pt. will improve R shoulder flexion/abduction MMT >/= 4/5 to improve ability to perform household chores.  -     LTG 6 Progress Ongoing;Progressing  -     LTG 7 Pt. Will score </= 20 on QuickDASH from 36.36 at initial evaluation to indicate reduced perception of disability.  -     LTG 7 Progress Ongoing  -           User Key  (r) = Recorded By, (t) = Taken By, (c) = Cosigned By    Initials Name Provider Type     Sylvia Santana PT Physical Therapist                Therapy Education  Given: Symptoms/condition management  Program: Reinforced  How Provided: Verbal  Provided to: Patient  Level of Understanding: Verbalized              Time Calculation:   Start Time: 0919  Stop Time: 1001  Time Calculation (min): 42 min  Total Timed Code Minutes- PT: 40 minute(s)  Timed Charges  02638 - PT Therapeutic Exercise Minutes: 40  Total Minutes  Timed Charges Total Minutes: 40   Total Minutes: 40  Therapy Charges for Today     Code Description Service Date Service Provider Modifiers Qty    70763459455 HC PT THER PROC EA 15 MIN 3/24/2023 Sylvia Santana, PT GP 3                    Sylvia Santana PT  3/24/2023

## 2023-03-27 ENCOUNTER — OFFICE VISIT (OUTPATIENT)
Dept: INTERNAL MEDICINE | Age: 68
End: 2023-03-27
Payer: MEDICARE

## 2023-03-27 VITALS
TEMPERATURE: 97.3 F | SYSTOLIC BLOOD PRESSURE: 116 MMHG | BODY MASS INDEX: 32.73 KG/M2 | HEIGHT: 70 IN | HEART RATE: 86 BPM | WEIGHT: 228.6 LBS | DIASTOLIC BLOOD PRESSURE: 84 MMHG | OXYGEN SATURATION: 94 %

## 2023-03-27 DIAGNOSIS — E66.9 OBESITY (BMI 30-39.9): ICD-10-CM

## 2023-03-27 DIAGNOSIS — I50.32 DIASTOLIC CHF, CHRONIC: Primary | ICD-10-CM

## 2023-03-27 DIAGNOSIS — I10 ESSENTIAL HYPERTENSION: ICD-10-CM

## 2023-03-27 DIAGNOSIS — I61.9 HEMORRHAGIC STROKE: ICD-10-CM

## 2023-03-27 RX ORDER — CITALOPRAM 20 MG/1
20 TABLET ORAL DAILY
Qty: 135 TABLET | Refills: 1 | Status: SHIPPED | OUTPATIENT
Start: 2023-03-27

## 2023-03-27 RX ORDER — BUSPIRONE HYDROCHLORIDE 10 MG/1
10 TABLET ORAL 2 TIMES DAILY
Qty: 180 TABLET | Refills: 1 | Status: SHIPPED | OUTPATIENT
Start: 2023-03-27

## 2023-03-27 RX ORDER — TAMSULOSIN HYDROCHLORIDE 0.4 MG/1
1 CAPSULE ORAL NIGHTLY
Qty: 90 CAPSULE | Refills: 1 | Status: SHIPPED | OUTPATIENT
Start: 2023-03-27

## 2023-03-27 NOTE — PROGRESS NOTES
I N T E R N A L  M E D I C I N E  Joseline Bentley, ASHANTI    ENCOUNTER DATE:  03/27/2023    Flo Danielleremberto / 68 y.o. / male      CHIEF COMPLAINT / REASON FOR OFFICE VISIT     Establish Care      ASSESSMENT & PLAN     Diagnoses and all orders for this visit:    1. Diastolic CHF, chronic (HCC) (Primary)  Assessment & Plan:  Congestive heart failure is stable. Continues on metoprolol tartrate 25 mg twice daily. Weight remains stable at 228. Patient instructed on to continue weight loss measures, healthy food choices, daily weights and to report weight gain of 3 lbs or greater, instructed on low sodium diet, and exercise.       2. Essential hypertension  Assessment & Plan:  Hypertension is improving with treatment.  Continue current treatment regimen.  Dietary sodium restriction.  Weight loss.  Regular aerobic exercise.  Blood pressure will be reassessed at the next regular appointment. Continue Amlodipine 10 mg dailyh, Lisinopril 20 mg daily.       3. Obesity (BMI 30-39.9)  Assessment & Plan:  Patient's (Body mass index is 32.8 kg/m².) indicates that they are obese (BMI >30) with health conditions that include obstructive sleep apnea, hypertension, coronary heart disease and dyslipidemias . Weight is improving with treatment. BMI  is above average; BMI management plan is completed. We discussed low calorie, low carb based diet program, portion control, increasing exercise, joining a fitness center or start home based exercise program and Weight Watchers or other Commercial based weight reduction program.       4. Hemorrhagic stroke (HCC)  Assessment & Plan:  Stable. Continues on Eliquis 5 mg twice daily . Patient dependent ambulation with rollator walker- discussion on safety measures with walker. Patient current with Physical therapy      Other orders  -     Tdap Vaccine Greater Than or Equal To 6yo IM  -     Diclofenac Sodium (Voltaren) 1 % gel gel; Apply 4 g topically to the appropriate area as directed 4 (Four)  "Times a Day.  Dispense: 50 g; Refill: 0  -     metoprolol tartrate (LOPRESSOR) 25 MG tablet; Take 1 tablet by mouth 2 (Two) Times a Day.  Dispense: 60 tablet; Refill: 3  -     tamsulosin (FLOMAX) 0.4 MG capsule 24 hr capsule; Take 1 capsule by mouth Every Night.  Dispense: 90 capsule; Refill: 1  -     busPIRone (BUSPAR) 10 MG tablet; Take 1 tablet by mouth 2 (Two) Times a Day.  Dispense: 180 tablet; Refill: 1  -     citalopram (CeleXA) 20 MG tablet; Take 1 tablet by mouth Daily.  Dispense: 135 tablet; Refill: 1       SUMMARY/DISCUSSION  • Followed by Dr. Iraj Kerns, Thoracic surgery  • Followed by Dr Bruce, Cardiology  • Followed by Podiatry, Dr Kraus    Return in about 6 weeks (around 5/8/2023) for Next scheduled follow up.      VITAL SIGNS     Visit Vitals  /84 (BP Location: Right arm, Patient Position: Sitting, Cuff Size: Adult)   Pulse 86   Temp 97.3 °F (36.3 °C) (Temporal)   Ht 177.8 cm (70\")   Wt 104 kg (228 lb 9.6 oz)   SpO2 94%   BMI 32.80 kg/m²           BP Readings from Last 3 Encounters:   03/27/23 116/84   03/22/23 124/82   02/03/23 118/86     Wt Readings from Last 3 Encounters:   03/27/23 104 kg (228 lb 9.6 oz)   03/22/23 103 kg (227 lb 1.2 oz)   02/03/23 103 kg (227 lb)     Body mass index is 32.8 kg/m².    Blood pressure readings recorded on patient flowsheet:  No flowsheet data found.          MEDICATIONS AT THE TIME OF OFFICE VISIT     Current Outpatient Medications on File Prior to Visit   Medication Sig Dispense Refill   • albuterol sulfate  (90 Base) MCG/ACT inhaler Inhale 2 puffs Every 4 (Four) Hours As Needed for Wheezing. 18 g 3   • amLODIPine (NORVASC) 10 MG tablet TAKE 1 TABLET BY MOUTH DAILY 90 tablet 1   • atorvastatin (LIPITOR) 40 MG tablet Take 1 tablet by mouth Every Night. 90 tablet 3   • Eliquis 5 MG tablet tablet TAKE 1 TABLET BY MOUTH EVERY 12 HOURS 180 tablet 1   • lisinopril (PRINIVIL,ZESTRIL) 20 MG tablet TAKE 1 TABLET BY MOUTH DAILY 90 tablet 1   • omeprazole " (priLOSEC) 20 MG capsule TAKE 1 CAPSULE BY MOUTH DAILY 90 capsule 1   • trimethoprim-polymyxin b (POLYTRIM) 75820-8.1 UNIT/ML-% ophthalmic solution Administer 1 drop to both eyes Every 6 (Six) Hours. 10 mL 0   • [DISCONTINUED] busPIRone (BUSPAR) 10 MG tablet Take 1 tablet by mouth 2 (Two) Times a Day. 180 tablet 1   • [DISCONTINUED] citalopram (CeleXA) 20 MG tablet Take 1.5 tablets by mouth Daily. 135 tablet 1   • [DISCONTINUED] Diclofenac Sodium (Voltaren) 1 % gel gel Apply 4 g topically to the appropriate area as directed 4 (Four) Times a Day. 50 g 0   • [DISCONTINUED] metoprolol tartrate (LOPRESSOR) 25 MG tablet Take 1 tablet by mouth 2 (Two) Times a Day. 60 tablet 3   • acetaminophen (TYLENOL) 325 MG tablet Take 2 tablets by mouth Every 4 (Four) Hours As Needed for Mild Pain . (Patient not taking: Reported on 3/27/2023) 120 tablet 3   • [DISCONTINUED] DULoxetine (CYMBALTA) 30 MG capsule TAKE 1 CAPSULE BY MOUTH EVERY NIGHT 90 capsule 1   • [DISCONTINUED] metoprolol succinate XL (TOPROL-XL) 25 MG 24 hr tablet Take 1 tablet by mouth Daily. 30 tablet 0   • [DISCONTINUED] QUEtiapine (SEROquel) 25 MG tablet Take 1 tablet by mouth Every Night. Take 30 min before bed 30 tablet 0   • [DISCONTINUED] tamsulosin (FLOMAX) 0.4 MG capsule 24 hr capsule TAKE 1 CAPSULE BY MOUTH EVERY NIGHT (Patient not taking: Reported on 3/27/2023) 90 capsule 1     No current facility-administered medications on file prior to visit.        HISTORY OF PRESENT ILLNESS     68 year old male being seen today to establish care with new PCP, former patient of Dr Cathleen Corona.  PMH includes Substernal thyroid goiter, Essential hypertension, Atrial Fibrillation with Pacemaker, Diastolic CHF, GÓMEZ on auto Cpap, Depressive disorder, Prediabetes, History of Hemorrhagic stroke, Physical deconditioning and working with PT.     Diastolic CHF/A Fib: patient states has a pacemaker. States experiences shortness of breath with moderate exertion. States currently  working with Physical therapy at [facility]. Patient states that he continues to work on weight loss, low sodium diet, and making healthy food choices.     Hypertension: Patient blood pressure controlled with current medications. Patient states that he keeps his pills in bottles. He has them lined up and then uses their location on the counter to help him organize when he takes each. Patient states that he is less active, not going up and down the stairs at home anymore.   He is doing vertigo PT.   He is wearing his CPAP at night. Has new machine and states working fine.      Substernal thyroid goiter: Patient reports he is doing well with no issues.    Health Maintenance: patient is up to date on all of his vaccinations except COVID booster #4.     Patient Care Team:  Joseline Bentley APRN as PCP - General (Family Medicine)  Eren Bruce MD as Consulting Physician (Cardiology)    REVIEW OF SYSTEMS     Review of Systems   Constitutional: Negative for appetite change, chills, fatigue and fever.   HENT: Negative.    Eyes:        Wears glasses   Respiratory: Positive for shortness of breath. Negative for cough and wheezing.    Cardiovascular: Negative for chest pain and palpitations.   Gastrointestinal: Negative for abdominal distention, abdominal pain, constipation, diarrhea, nausea and vomiting.   Musculoskeletal: Positive for arthralgias.        Dependent ambulation with Rolator walker   Skin: Negative.    Neurological: Negative for dizziness and headaches.   Psychiatric/Behavioral: Negative.           PHYSICAL EXAMINATION     Physical Exam  Constitutional:       General: He is not in acute distress.  Cardiovascular:      Rate and Rhythm: Normal rate and regular rhythm.      Pulses: Normal pulses.      Heart sounds: Murmur heard.   Pulmonary:      Effort: Pulmonary effort is normal. No respiratory distress.      Breath sounds: Normal breath sounds. No wheezing, rhonchi or rales.   Chest:      Chest wall: No  tenderness.   Abdominal:      General: Bowel sounds are normal. There is no distension.      Palpations: Abdomen is soft.      Tenderness: There is no abdominal tenderness.   Musculoskeletal:         General: Normal range of motion.   Skin:     General: Skin is warm and dry.   Neurological:      General: No focal deficit present.      Mental Status: He is alert and oriented to person, place, and time. Mental status is at baseline.      Cranial Nerves: No cranial nerve deficit.      Sensory: No sensory deficit.      Gait: Gait normal.   Psychiatric:         Mood and Affect: Mood normal.         Behavior: Behavior normal.         Thought Content: Thought content normal.         Judgment: Judgment normal.             REVIEWED DATA     Labs:     Lab Results   Component Value Date     02/03/2023    K 4.1 02/03/2023    CALCIUM 9.9 02/03/2023    AST 13 11/01/2022    ALT 14 11/01/2022    BUN 13 02/03/2023    CREATININE 0.94 02/03/2023    CREATININE 0.94 11/01/2022    CREATININE 0.82 08/03/2022    EGFRIFNONA 85 08/27/2021    EGFRIFAFRI 103 08/27/2021       Lab Results   Component Value Date    HGBA1C 5.60 02/03/2023    HGBA1C 5.70 (H) 07/31/2022       Lab Results   Component Value Date     (H) 04/28/2022    LDL 55 12/21/2020     (H) 01/25/2019    HDL 56 04/28/2022    HDL 55 12/21/2020    TRIG 89 04/28/2022    TRIG 77 12/21/2020       Lab Results   Component Value Date    TSH 0.012 (L) 02/03/2023    TSH 0.023 (L) 07/30/2022    TSH 0.007 (L) 04/28/2022    FREET4 1.22 07/30/2022    FREET4 1.25 01/24/2019       Lab Results   Component Value Date    WBC 8.65 02/03/2023    HGB 15.5 02/03/2023     02/03/2023       No results found for: MALBCRERATIO       Imaging:           Medical Tests:           Summary of old records / correspondence / consultant report:           Request outside records:           ASHANTI Baez

## 2023-03-28 NOTE — ASSESSMENT & PLAN NOTE
Stable. Continues on Eliquis 5 mg twice daily . Patient dependent ambulation with rollator walker- discussion on safety measures with walker. Patient current with Physical therapy

## 2023-03-28 NOTE — ASSESSMENT & PLAN NOTE
Congestive heart failure is stable. Continues on metoprolol tartrate 25 mg twice daily. Weight remains stable at 228. Patient instructed on to continue weight loss measures, healthy food choices, daily weights and to report weight gain of 3 lbs or greater, instructed on low sodium diet, and exercise.

## 2023-03-28 NOTE — ASSESSMENT & PLAN NOTE
Hypertension is improving with treatment.  Continue current treatment regimen.  Dietary sodium restriction.  Weight loss.  Regular aerobic exercise.  Blood pressure will be reassessed at the next regular appointment. Continue Amlodipine 10 mg dailyh, Lisinopril 20 mg daily.

## 2023-03-28 NOTE — ASSESSMENT & PLAN NOTE
Patient's (Body mass index is 32.8 kg/m².) indicates that they are obese (BMI >30) with health conditions that include obstructive sleep apnea, hypertension, coronary heart disease and dyslipidemias . Weight is improving with treatment. BMI  is above average; BMI management plan is completed. We discussed low calorie, low carb based diet program, portion control, increasing exercise, joining a fitness center or start home based exercise program and Weight Watchers or other Commercial based weight reduction program.

## 2023-04-03 RX ORDER — OMEPRAZOLE 20 MG/1
20 CAPSULE, DELAYED RELEASE ORAL DAILY
Qty: 90 CAPSULE | Refills: 1 | Status: SHIPPED | OUTPATIENT
Start: 2023-04-03

## 2023-04-04 ENCOUNTER — HOSPITAL ENCOUNTER (OUTPATIENT)
Dept: PHYSICAL THERAPY | Facility: HOSPITAL | Age: 68
Setting detail: THERAPIES SERIES
Discharge: HOME | End: 2023-04-04
Payer: MEDICARE

## 2023-04-04 DIAGNOSIS — M25.611 IMPAIRED RANGE OF MOTION OF RIGHT SHOULDER: ICD-10-CM

## 2023-04-04 DIAGNOSIS — R53.1 GENERAL WEAKNESS: Primary | ICD-10-CM

## 2023-04-04 DIAGNOSIS — G89.29 CHRONIC RIGHT SHOULDER PAIN: ICD-10-CM

## 2023-04-04 DIAGNOSIS — R53.81 PHYSICAL DECONDITIONING: ICD-10-CM

## 2023-04-04 DIAGNOSIS — R26.9 GAIT DIFFICULTY: ICD-10-CM

## 2023-04-04 DIAGNOSIS — M25.511 ACUTE PAIN OF RIGHT SHOULDER: ICD-10-CM

## 2023-04-04 DIAGNOSIS — M25.511 CHRONIC RIGHT SHOULDER PAIN: ICD-10-CM

## 2023-04-04 DIAGNOSIS — R26.89 BALANCE PROBLEM: ICD-10-CM

## 2023-04-04 PROCEDURE — 97110 THERAPEUTIC EXERCISES: CPT

## 2023-04-05 NOTE — THERAPY TREATMENT NOTE
Outpatient Physical Therapy Ortho Treatment Note  [facility]     Patient Name: Flo Manzo  : 1955  MRN: [id number]  Today's Date: 2023      Visit Date: 2023    Visit Dx:    ICD-10-CM ICD-9-CM   1. General weakness  R53.1 780.79   2. Balance problem  R26.89 781.99   3. Gait difficulty  R26.9 781.2   4. Physical deconditioning  R53.81 799.3   5. Chronic right shoulder pain  M25.511 719.41    G89.29 338.29   6. Impaired range of motion of right shoulder  M25.611 719.51   7. Acute pain of right shoulder  M25.511 719.41       Patient Active Problem List   Diagnosis   • Umbilical hernia without obstruction and without gangrene   • Essential hypertension   • Arthritis of left knee   • Depression   • Obesity (BMI 30-39.9)   • Atrial fibrillation with RVR (HCC)   • Substernal thyroid goiter   • CHF (congestive heart failure)   • Diastolic CHF, chronic (HCC)   • Hemorrhagic stroke   • GÓMEZ on auto CPAP   • Hypersomnia due to medical condition   • Sleep-related hypoxia   • Chronic atrial fibrillation   • Vertigo   • Aortic stenosis, moderate   • S/P ICD (internal cardiac defibrillator) procedure   • Sinus pause   • Pacemaker   • Anticoagulated   • Generalized weakness   • Prediabetes        Past Medical History:   Diagnosis Date   • Arthritis    • Atrial fibrillation with RVR 2019   • Colon polyps 2018    Hepatic flexure: tubular adenoma, only low-grade dysplasia; splenic flexure: tubular adenoma, only low-grade dysplasia; sigmoid colon: tubular adenoma, multiple fragments only low-grade dysplasia   • Depression    • Heart murmur    • Hemorrhagic stroke 2019   • Hypertension    • New onset atrial fibrillation (CMS/HCC) - RVR 2019   • GÓMEZ (obstructive sleep apnea) 2019    Home sleep study with moderate severity GÓMEZ, AHI 20 events per hour.  Sleep-related hypoxia with low O2 saturation 84% for 14 minutes.   • Sleep apnea     previously diagnosed with sleep apnea, had T&A  done and it has since resolved    • Stroke    • Uncontrolled hypertension    • Ventral hernia         Past Surgical History:   Procedure Laterality Date   • APPENDECTOMY N/A 1972   • BACK SURGERY      BLADDER STIMULATOR IMPLANT L LOWER BACK   • CARDIAC CATHETERIZATION N/A 07/20/2004    Cath left ventriculography, coronary angiography and left heart catheterization, Normal results-Dr. Vadim Mancuso   • CARDIAC CATHETERIZATION N/A 1/29/2019    Procedure: Left Heart Cath;  Surgeon: Eren Bruce MD;  Location: [facility] CATH INVASIVE LOCATION;  Service: Cardiology   • CARDIAC CATHETERIZATION N/A 1/29/2019    Procedure: Left ventriculography;  Surgeon: Eren Bruce MD;  Location:  [facility] CATH INVASIVE LOCATION;  Service: Cardiology   • CARDIAC CATHETERIZATION N/A 1/29/2019    Procedure: Coronary angiography;  Surgeon: Eren Bruce MD;  Location:  [facility] CATH INVASIVE LOCATION;  Service: Cardiology   • CARDIAC ELECTROPHYSIOLOGY PROCEDURE N/A 3/4/2022    Procedure: Pacemaker SC new BIOTRONIK;  Surgeon: Eren Bruce MD;  [facility];  Service: Cardiology;  Laterality: N/A;   • CARPAL TUNNEL RELEASE Right 2013   • CARPAL TUNNEL RELEASE Left    • COLONOSCOPY N/A 1/4/2018    Procedure: COLONOSCOPY with cold polypectomy and hot snare polypectomy;  Surgeon: Ten Solitario MD;  Location: [facility] ENDOSCOPY;  Service:    • EYE LENS IMPLANT SECONDARY Bilateral 2007, 2008   • KNEE CARTILAGE SURGERY Right 1979   • TONSILLECTOMY AND ADENOIDECTOMY Bilateral 2005   • TOTAL KNEE ARTHROPLASTY Left 03/14/2016    Left total knee arthroplasty with Amberly component, size 11 femur, G tibial baseplate with a 10 polyethylene insert and a 38 patellar button-Dr. Pablo Hairston   • TOTAL KNEE ARTHROPLASTY Right 03/02/2015    Right total knee arthroplasty with Amberly component size G femur, 11 tibial base plate with an 11 polyethylene insert and a 38 patellar button-Dr. Pablo Hairston   •  UVULOPALATOPHARYNGOPLASTY N/A 2005    part of soft palate, and uvula   • VENTRAL HERNIA REPAIR N/A 1/23/2018    Procedure: VENTRAL HERNIA REPAIR LAPAROSCOPIC WITH DAVINCI ROBOT AND MESH;  Surgeon: Ten Solitario MD;  Location: [facility] OR;  Service:              04/04/23 1000   Subjective Comments   Subjective Comments Shoulder is doing some better, but hurts at night.   Total Minutes   65019 - PT Therapeutic Exercise Minutes 40   Exercise 1   Exercise Name 1 nustep   Cueing 1 Verbal   Time 1 5 min   Exercise 2   Exercise Name 2 pulleys   Cueing 2 Verbal;Demo   Time 2 3 min   Exercise 6   Exercise Name 6 Seated scapular retraction   Cueing 6 Verbal;Demo   Sets 6 1   Reps 6 15   Time 6 RTB, copious cues to reduce UT substitution   Additional Comments circuit 1 x 2   Exercise 7   Exercise Name 7 Sit to Stand from chair   Cueing 7 Verbal;Demo   Reps 7 5   Time 7 B UE support at arm rests- walker placed in front   Additional Comments circuit 1 x 2   Exercise 8   Exercise Name 8 HR   Cueing 8 Verbal;Demo   Sets 8 2   Reps 8 10   Time 8 circuit 2x2   Additional Comments circuit 1 x 2   Exercise 9   Exercise Name 9 supine mukund shld w/loop RTB around wrists   Cueing 9 Verbal   Sets 9 1   Reps 9 10   Time 9 5 sec   Additional Comments circuit 2 x 2   Exercise 10   Exercise Name 10 Supine ABC   Cueing 10 Verbal;Demo   Sets 10 1   Reps 10 A-Z   Time 10 2#   Additional Comments circuit 2 x 2   Exercise 11   Exercise Name 11 Supine bridges   Cueing 11 Verbal;Demo   Sets 11 1   Reps 11 10   Time 11 3 sec hold   Additional Comments circuit 2 x 2   Exercise 12   Exercise Name 12 side step   Cueing 12 Verbal;Demo   Reps 12 10' x2   Additional Comments circuit 3 1x   Exercise 13   Exercise Name 13 wall wash   Cueing 13 Verbal;Demo   Reps 13 10   Additional Comments circuit 3 1x   Exercise 14   Exercise Name 14 AROM flex 0-90   Cueing 14 Verbal;Demo   Reps 14 10   Additional Comments circuit 3 1x                  PT  Assessment/Plan     Row Name 04/04/23 1200          PT Assessment    Assessment Comments Worked on taking 1-2 steps forward with 1 UE support when moving from standing at chair to the barre instead of flexing forward and holding barre in front and taking small steps. He reports decreasing shoulder pain. Added a third circuit without repeat, no immediate adverse response.  -MAXINE        PT Plan    PT Plan Comments assess response to last visit  -MAXINE           User Key  (r) = Recorded By, (t) = Taken By, (c) = Cosigned By    Initials Name Provider Type    Christiana Cardona, PT Physical Therapist                                                      Time Calculation:   Timed Charges  75687 - PT Therapeutic Exercise Minutes: 40  Total Minutes  Timed Charges Total Minutes: 40   Total Minutes: 40                Christiana Mathew PT  4/4/2023

## 2023-04-06 ENCOUNTER — HOSPITAL ENCOUNTER (OUTPATIENT)
Dept: PHYSICAL THERAPY | Facility: HOSPITAL | Age: 68
Setting detail: THERAPIES SERIES
Discharge: HOME | End: 2023-04-06
Payer: MEDICARE

## 2023-04-06 DIAGNOSIS — R26.9 GAIT DIFFICULTY: ICD-10-CM

## 2023-04-06 DIAGNOSIS — R53.81 PHYSICAL DECONDITIONING: ICD-10-CM

## 2023-04-06 DIAGNOSIS — R53.1 GENERAL WEAKNESS: Primary | ICD-10-CM

## 2023-04-06 DIAGNOSIS — R26.89 BALANCE PROBLEM: ICD-10-CM

## 2023-04-06 PROCEDURE — 97110 THERAPEUTIC EXERCISES: CPT

## 2023-04-06 NOTE — THERAPY TREATMENT NOTE
Outpatient Physical Therapy Ortho Treatment Note  [facility]     Patient Name: Flo Manzo  : 1955  MRN: [id number]  Today's Date: 2023      Visit Date: 2023    Visit Dx:    ICD-10-CM ICD-9-CM   1. General weakness  R53.1 780.79   2. Balance problem  R26.89 781.99   3. Gait difficulty  R26.9 781.2   4. Physical deconditioning  R53.81 799.3       Patient Active Problem List   Diagnosis   • Umbilical hernia without obstruction and without gangrene   • Essential hypertension   • Arthritis of left knee   • Depression   • Obesity (BMI 30-39.9)   • Atrial fibrillation with RVR (HCC)   • Substernal thyroid goiter   • CHF (congestive heart failure)   • Diastolic CHF, chronic (HCC)   • Hemorrhagic stroke   • GÓMEZ on auto CPAP   • Hypersomnia due to medical condition   • Sleep-related hypoxia   • Chronic atrial fibrillation   • Vertigo   • Aortic stenosis, moderate   • S/P ICD (internal cardiac defibrillator) procedure   • Sinus pause   • Pacemaker   • Anticoagulated   • Generalized weakness   • Prediabetes        Past Medical History:   Diagnosis Date   • Arthritis    • Atrial fibrillation with RVR 2019   • Colon polyps 2018    Hepatic flexure: tubular adenoma, only low-grade dysplasia; splenic flexure: tubular adenoma, only low-grade dysplasia; sigmoid colon: tubular adenoma, multiple fragments only low-grade dysplasia   • Depression    • Heart murmur    • Hemorrhagic stroke 2019   • Hypertension    • New onset atrial fibrillation (CMS/HCC) - RVR 2019   • GÓMEZ (obstructive sleep apnea) 2019    Home sleep study with moderate severity GÓMEZ, AHI 20 events per hour.  Sleep-related hypoxia with low O2 saturation 84% for 14 minutes.   • Sleep apnea     previously diagnosed with sleep apnea, had T&A done and it has since resolved    • Stroke    • Uncontrolled hypertension    • Ventral hernia         Past Surgical History:   Procedure Laterality Date   • APPENDECTOMY N/A     • BACK SURGERY      BLADDER STIMULATOR IMPLANT L LOWER BACK   • CARDIAC CATHETERIZATION N/A 07/20/2004    Cath left ventriculography, coronary angiography and left heart catheterization, Normal results-Dr. Vadim Mancuso   • CARDIAC CATHETERIZATION N/A 1/29/2019    Procedure: Left Heart Cath;  Surgeon: Eren Bruce MD;  Location: [facility] CATH INVASIVE LOCATION;  Service: Cardiology   • CARDIAC CATHETERIZATION N/A 1/29/2019    Procedure: Left ventriculography;  Surgeon: Eren Bruce MD;  Location: [facility] CATH INVASIVE LOCATION;  Service: Cardiology   • CARDIAC CATHETERIZATION N/A 1/29/2019    Procedure: Coronary angiography;  Surgeon: Eren Bruce MD;  Location:  [facility] CATH INVASIVE LOCATION;  Service: Cardiology   • CARDIAC ELECTROPHYSIOLOGY PROCEDURE N/A 3/4/2022    Procedure: Pacemaker SC new BIOTRONIK;  Surgeon: Eren Bruce MD;  Location: [facility] CATH INVASIVE LOCATION;  Service: Cardiology;  Laterality: N/A;   • CARPAL TUNNEL RELEASE Right 2013   • CARPAL TUNNEL RELEASE Left    • COLONOSCOPY N/A 1/4/2018    Procedure: COLONOSCOPY with cold polypectomy and hot snare polypectomy;  Surgeon: Ten Solitario MD;  Location: [facility] ENDOSCOPY;  Service:    • EYE LENS IMPLANT SECONDARY Bilateral 2007, 2008   • KNEE CARTILAGE SURGERY Right 1979   • TONSILLECTOMY AND ADENOIDECTOMY Bilateral 2005   • TOTAL KNEE ARTHROPLASTY Left 03/14/2016    Left total knee arthroplasty with Amberly component, size 11 femur, G tibial baseplate with a 10 polyethylene insert and a 38 patellar button-Dr. Pablo Hairston   • TOTAL KNEE ARTHROPLASTY Right 03/02/2015    Right total knee arthroplasty with Amberly component size G femur, 11 tibial base plate with an 11 polyethylene insert and a 38 patellar button-Dr. Pablo Hairston   • UVULOPALATOPHARYNGOPLASTY N/A 2005    part of soft palate, and uvula   • VENTRAL HERNIA REPAIR N/A 1/23/2018    Procedure: VENTRAL HERNIA REPAIR LAPAROSCOPIC WITH [employer] ROBOT AND  MESH;  Surgeon: Ten Solitario MD;  Location: [facility] MAIN OR;  Service:                         PT Assessment/Plan     Row Name 04/06/23 1300          PT Assessment    Assessment Comments Danyel reports R shoulder pain is decreasing, lesss pain last night. No increased pain during session today. No adverse response to last visit. Worked on turning or small step forward during transitions without UE support from one exercise to another requiring some CGA. REcommended continuing skilled therapy for 3-4 more visits then D/C to HEP.  -JA        PT Plan    PT Plan Comments assess R shoulder pain, ROM, MMT, remaining goals, discuss upcoming transition to HEP at D/C  -JA           User Key  (r) = Recorded By, (t) = Taken By, (c) = Cosigned By    Initials Name Provider Type    Christiana Cardona, PT Physical Therapist                   OP Exercises     Row Name 04/06/23 1000             Subjective Comments    Subjective Comments Shoulder is doing pretty good this morning. Not bad last night, my hand bothered me more because I hit it on something yesterday.  -JA         Subjective Pain    Able to rate subjective pain? yes  -JA      Pre-Treatment Pain Level 2  -JA         Total Minutes    76066 - PT Therapeutic Exercise Minutes 40  -JA         Exercise 1    Exercise Name 1 nustep  -JA      Cueing 1 Verbal  -JA      Time 1 5 min  -JA         Exercise 2    Exercise Name 2 pulleys  -JA      Cueing 2 Verbal;Demo  -JA      Time 2 3 min  -JA         Exercise 6    Exercise Name 6 Seated scapular retraction  -JA      Cueing 6 Verbal;Demo  -JA      Sets 6 1  -JA      Reps 6 15  -JA      Time 6 RTB, copious cues to reduce UT substitution  -JA      Additional Comments circuit 1 x 2  -JA         Exercise 7    Exercise Name 7 Sit to Stand from chair  -JA      Cueing 7 Verbal;Demo  -JA      Reps 7 5  -JA      Time 7 hands on knees  -JA      Additional Comments circuit 1 x 2  -JA         Exercise 8    Exercise Name 8 HR  -JA      Cueing  8 Verbal;Demo  -JA      Sets 8 2  -JA      Reps 8 10  -JA      Time 8 circuit 2x2  -JA      Additional Comments circuit 1 x 2  -JA         Exercise 9    Exercise Name 9 supine mukund shld w/loop RTB around wrists  -JA      Cueing 9 Verbal  -JA      Sets 9 1  -JA      Reps 9 10  -JA      Time 9 RTB loop  -JA      Additional Comments circuit 2 x 2  -JA         Exercise 10    Exercise Name 10 Supine ABC  -JA      Cueing 10 Verbal;Demo  -JA      Sets 10 1  -JA      Reps 10 A-Z  -JA      Time 10 2#  -JA      Additional Comments circuit 2 x 2  -JA         Exercise 11    Exercise Name 11 Supine bridges  -JA      Cueing 11 Verbal;Demo  -JA      Sets 11 1  -JA      Reps 11 10  -JA      Time 11 3 sec hold  -JA      Additional Comments circuit 2 x 2  -JA         Exercise 12    Exercise Name 12 side step  -JA      Cueing 12 Verbal;Demo  -JA      Reps 12 10' x2  -JA      Additional Comments circuit 3 x1  -JA         Exercise 13    Exercise Name 13 wall slide w/small forward step, CGA  -JA      Cueing 13 Verbal;Demo  -JA      Reps 13 10  -JA      Time 13 CGA  -JA      Additional Comments circuit 3 x1  -JA         Exercise 14    Exercise Name 14 AROM flex 0-90  -JA      Cueing 14 Verbal;Demo  -JA      Reps 14 10  -JA      Additional Comments circuit 3 x1  -JA            User Key  (r) = Recorded By, (t) = Taken By, (c) = Cosigned By    Initials Name Provider Type    Christiana Cardona, PT Physical Therapist                              PT OP Goals     Row Name 04/06/23 1300          PT Short Term Goals    STG Date to Achieve 01/05/23  -MAXINE     STG 1 Pt will be independent with initial HEP.  -MAXINE     STG 1 Progress Met  -JA     STG 2 Pt will ambulate 150' with normal heel strike to toe off with symmetrical stride and davon with his RWX without momentary LOB.  -MAXINE     STG 2 Progress Met  -MAXINE     STG 3 Pt will demonstrate FTSS in 15 seconds or less indicating reduced risk of falls.  -JA     STG 3 Progress Ongoing  -JA     STG 4 Pt.  will report 50% improvement in ability to fall asleep to improve QOL.  -MAXINE     STG 4 Progress Met  -MAXINE        Long Term Goals    LTG Date to Achieve 02/04/23  -MAXINE     LTG 1 Pt will be independent with advanced HEP for strength and mobility.  -JA     LTG 1 Progress Met  -MAXINE     LTG 2 Pt will tolerate 30 minutes of activity with 3 brief rest breaks demonstrating improved functional strength.  -JA     LTG 2 Progress Progressing  -     LTG 3 Pt will demonstrate increased strength LEs to 4/5 or better.  -JA     LTG 3 Progress Partially Met  -JA     LTG 4 Pt will ambulate 500' or more with normalized gait without momentary LOB with least restrictive assistive device.  -JA     LTG 4 Progress Progressing  -     LTG 5 Pt. will report ability to fall asleep on R side without exacerbation of pain to improve sleep quality.  -     LTG 5 Progress Progressing  -     LTG 6 Pt. will improve R shoulder flexion/abduction MMT >/= 4/5 to improve ability to perform household chores.  -     LTG 6 Progress Ongoing;Progressing  -     LTG 7 Pt. Will score </= 20 on QuickDASH from 36.36 at initial evaluation to indicate reduced perception of disability.  -     LTG 7 Progress Ongoing  -MAXINE           User Key  (r) = Recorded By, (t) = Taken By, (c) = Cosigned By    Initials Name Provider Type    Christiana Cardona, PT Physical Therapist                               Time Calculation:   Start Time: 1015  Stop Time: 1100  Time Calculation (min): 45 min  Timed Charges  98012 - PT Therapeutic Exercise Minutes: 40  Total Minutes  Timed Charges Total Minutes: 40   Total Minutes: 40  Therapy Charges for Today     Code Description Service Date Service Provider Modifiers Qty    91911037016 HC PT THER PROC EA 15 MIN 4/6/2023 Christiana Mathew PT GP 3                    Christiana Mathew PT  4/6/2023

## 2023-04-13 ENCOUNTER — HOSPITAL ENCOUNTER (OUTPATIENT)
Dept: PHYSICAL THERAPY | Facility: HOSPITAL | Age: 68
Setting detail: THERAPIES SERIES
Discharge: HOME | End: 2023-04-13
Payer: MEDICARE

## 2023-04-13 DIAGNOSIS — G89.29 CHRONIC RIGHT SHOULDER PAIN: ICD-10-CM

## 2023-04-13 DIAGNOSIS — R26.9 GAIT DIFFICULTY: ICD-10-CM

## 2023-04-13 DIAGNOSIS — M25.511 ACUTE PAIN OF RIGHT SHOULDER: ICD-10-CM

## 2023-04-13 DIAGNOSIS — R53.1 GENERAL WEAKNESS: Primary | ICD-10-CM

## 2023-04-13 DIAGNOSIS — R53.81 PHYSICAL DECONDITIONING: ICD-10-CM

## 2023-04-13 DIAGNOSIS — M25.511 CHRONIC RIGHT SHOULDER PAIN: ICD-10-CM

## 2023-04-13 DIAGNOSIS — R26.89 BALANCE PROBLEM: ICD-10-CM

## 2023-04-13 DIAGNOSIS — M25.611 IMPAIRED RANGE OF MOTION OF RIGHT SHOULDER: ICD-10-CM

## 2023-04-13 PROCEDURE — 97110 THERAPEUTIC EXERCISES: CPT

## 2023-04-13 NOTE — THERAPY PROGRESS REPORT/RE-CERT
Outpatient Physical Therapy Ortho Progress Note  [facility]     Patient Name: Flo Manzo  : 1955  MRN: [id number]  Today's Date: 2023      Visit Date: 2023    Patient Active Problem List   Diagnosis   • Umbilical hernia without obstruction and without gangrene   • Essential hypertension   • Arthritis of left knee   • Depression   • Obesity (BMI 30-39.9)   • Atrial fibrillation with RVR (HCC)   • Substernal thyroid goiter   • CHF (congestive heart failure)   • Diastolic CHF, chronic (HCC)   • Hemorrhagic stroke   • GÓMEZ on auto CPAP   • Hypersomnia due to medical condition   • Sleep-related hypoxia   • Chronic atrial fibrillation   • Vertigo   • Aortic stenosis, moderate   • S/P ICD (internal cardiac defibrillator) procedure   • Sinus pause   • Pacemaker   • Anticoagulated   • Generalized weakness   • Prediabetes        Past Medical History:   Diagnosis Date   • Arthritis    • Atrial fibrillation with RVR 2019   • Colon polyps 2018    Hepatic flexure: tubular adenoma, only low-grade dysplasia; splenic flexure: tubular adenoma, only low-grade dysplasia; sigmoid colon: tubular adenoma, multiple fragments only low-grade dysplasia   • Depression    • Heart murmur    • Hemorrhagic stroke 2019   • Hypertension    • New onset atrial fibrillation (CMS/HCC) - RVR 2019   • GÓMEZ (obstructive sleep apnea) 2019    Home sleep study with moderate severity GÓMEZ, AHI 20 events per hour.  Sleep-related hypoxia with low O2 saturation 84% for 14 minutes.   • Sleep apnea     previously diagnosed with sleep apnea, had T&A done and it has since resolved    • Stroke    • Uncontrolled hypertension    • Ventral hernia         Past Surgical History:   Procedure Laterality Date   • APPENDECTOMY N/A    • BACK SURGERY      BLADDER STIMULATOR IMPLANT L LOWER BACK   • CARDIAC CATHETERIZATION N/A 2004    Cath left ventriculography, coronary angiography and left heart  catheterization, Normal results-Dr. Vadim Mancuso   • CARDIAC CATHETERIZATION N/A 1/29/2019    Procedure: Left Heart Cath;  Surgeon: Eren Bruce MD;  Location: [facility] CATH INVASIVE LOCATION;  Service: Cardiology   • CARDIAC CATHETERIZATION N/A 1/29/2019    Procedure: Left ventriculography;  Surgeon: Eren Bruce MD;  Location: [facility] CATH INVASIVE LOCATION;  Service: Cardiology   • CARDIAC CATHETERIZATION N/A 1/29/2019    Procedure: Coronary angiography;  Surgeon: Eren Bruce MD;  Location: [facility] CATH INVASIVE LOCATION;  Service: Cardiology   • CARDIAC ELECTROPHYSIOLOGY PROCEDURE N/A 3/4/2022    Procedure: Pacemaker SC new BIOTRONIK;  Surgeon: Eren Bruce MD;  Location: [facility] CATH INVASIVE LOCATION;  Service: Cardiology;  Laterality: N/A;   • CARPAL TUNNEL RELEASE Right 2013   • CARPAL TUNNEL RELEASE Left    • COLONOSCOPY N/A 1/4/2018    Procedure: COLONOSCOPY with cold polypectomy and hot snare polypectomy;  Surgeon: Ten Solitario MD;  Location: [facility] ENDOSCOPY;  Service:    • EYE LENS IMPLANT SECONDARY Bilateral 2007, 2008   • KNEE CARTILAGE SURGERY Right 1979   • TONSILLECTOMY AND ADENOIDECTOMY Bilateral 2005   • TOTAL KNEE ARTHROPLASTY Left 03/14/2016    Left total knee arthroplasty with Amberly component, size 11 femur, G tibial baseplate with a 10 polyethylene insert and a 38 patellar button-Dr. Pablo Hairston   • TOTAL KNEE ARTHROPLASTY Right 03/02/2015    Right total knee arthroplasty with Amberly component size G femur, 11 tibial base plate with an 11 polyethylene insert and a 38 patellar button-Dr. Pablo Hairston   • UVULOPALATOPHARYNGOPLASTY N/A 2005    part of soft palate, and uvula   • VENTRAL HERNIA REPAIR N/A 1/23/2018    Procedure: VENTRAL HERNIA REPAIR LAPAROSCOPIC WITH [employer] ROBOT AND MESH;  Surgeon: Ten Solitario MD;  Location: [facility] MAIN OR;  Service:        Visit Dx:     ICD-10-CM ICD-9-CM   1. General weakness  R53.1 780.79   2. Balance problem   R26.89 781.99   3. Gait difficulty  R26.9 781.2   4. Physical deconditioning  R53.81 799.3   5. Chronic right shoulder pain  M25.511 719.41    G89.29 338.29   6. Impaired range of motion of right shoulder  M25.611 719.51   7. Acute pain of right shoulder  M25.511 719.41              PT Ortho     Row Name 04/13/23 1700       Right Upper Ext    Rt Shoulder Abduction AROM 140  -JA    Rt Shoulder Flexion AROM 140  -JA    Rt Upper Extremity Comments  c/o popping but no pain  -JA       MMT Right Upper Ext    Rt Shoulder Flexion MMT, Gross Movement (4/5) good  -JA    Rt Shoulder ABduction MMT, Gross Movement (4+/5) good plus  -JA    Rt Shoulder Internal Rotation MMT, Gross Movement (4/5) good  -JA    Rt Shoulder External Rotation MMT, Gross Movement (4/5) good  -JA    Rt Elbow Flexion MMT, Gross Movement: (4+/5) good plus  -JA    Rt Elbow Extension MMT, Gross Movement: (4+/5) good plus  -JA          User Key  (r) = Recorded By, (t) = Taken By, (c) = Cosigned By    Initials Name Provider Type    Christiana Cardona, PT Physical Therapist                                   PT OP Goals     Row Name 04/13/23 1700          PT Short Term Goals    STG Date to Achieve 01/05/23  -MAXINE     STG 1 Pt will be independent with initial HEP.  -     STG 1 Progress Met  -     STG 2 Pt will ambulate 150' with normal heel strike to toe off with symmetrical stride and davon with his RWX without momentary LOB.  -     STG 2 Progress Met  -     STG 3 Pt will demonstrate FTSS in 15 seconds or less indicating reduced risk of falls.  -     STG 3 Progress Ongoing  -     STG 3 Progress Comments not reassessed today  -MAXINE     STG 4 Pt. will report 50% improvement in ability to fall asleep to improve QOL.  -MAXINE     STG 4 Progress Met  -        Long Term Goals    LTG Date to Achieve 02/04/23  -MAXINE     LTG 1 Pt will be independent with advanced HEP for strength and mobility.  -     LTG 1 Progress Met  -     LTG 2 Pt will tolerate 30  minutes of activity with 3 brief rest breaks demonstrating improved functional strength.  -JA     LTG 2 Progress Partially Met  -JA     LTG 3 Pt will demonstrate increased strength LEs to 4/5 or better.  -JA     LTG 3 Progress Partially Met  -JA     LTG 4 Pt will ambulate 500' or more with normalized gait without momentary LOB with least restrictive assistive device.  -JA     LTG 4 Progress Progressing  -JA     LTG 4 Progress Comments not reassessed today  -JA     LTG 5 Pt. will report ability to fall asleep on R side without exacerbation of pain to improve sleep quality.  -JA     LTG 5 Progress Met  -JA     LTG 5 Progress Comments improved  -JA     LTG 6 Pt. will improve R shoulder flexion/abduction MMT >/= 4/5 to improve ability to perform household chores.  -JA     LTG 6 Progress Partially Met  -JA     LTG 7 Pt. Will score </= 20 on QuickDASH from 36.36 at initial evaluation to indicate reduced perception of disability.  -MAXINE     LTG 7 Progress Ongoing  -           User Key  (r) = Recorded By, (t) = Taken By, (c) = Cosigned By    Initials Name Provider Type    Christiana Cardona, PT Physical Therapist                 PT Assessment/Plan     Row Name 04/13/23 1700          PT Assessment    Assessment Comments Flo Manzo has been seen for 8 visits for R shoulder pain, he has also been seen for generalized weakness, OA in multiple joints, physical deconditioning, hx of CVA. Danyel reports no longer having R shoulder pain at night however a couple days ago he experienced increased pain in his shoulder while sitting and not during any activity. We discussed shoulder-scapular positioning and how subtle strain from poor position can causes increased pain. He demonstrates good increase in AROM flexion without increased pain and increased strength; observed that he leaned R during some ex's but was able to correct with cue. Progress toward goals 3/4 STGs met, 4/6 LTGs met/partially met. We discussed plan to move  toward D/C in 3-4 visits, finalizing HEP that he will continue on his own after discharge.  -JA        PT Plan    PT Plan Comments assess response to last visit and how his R shoulder has been, reassess FTSS and assess 500' gait quality/tolerance  -JA           User Key  (r) = Recorded By, (t) = Taken By, (c) = Cosigned By    Initials Name Provider Type    Christiana Cardona, PT Physical Therapist                   OP Exercises     Row Name 04/13/23 1500             Subjective Comments    Subjective Comments I was on the waitlist and they called about today. My shoulder was doing good until the other day when it started hurting for no reason while I was watching TV.  -JA         Subjective Pain    Able to rate subjective pain? yes  -JA      Pre-Treatment Pain Level 0  -JA         Total Minutes    91675 - PT Therapeutic Exercise Minutes 43  -JA         Exercise 1    Exercise Name 1 nustep  -JA      Cueing 1 Verbal  -JA      Time 1 5 min  -JA         Exercise 2    Exercise Name 2 pulleys  -JA      Cueing 2 Verbal;Demo  -JA      Time 2 3 min  -JA         Exercise 6    Exercise Name 6 Seated scapular retraction  -JA      Cueing 6 Verbal;Demo  -JA      Sets 6 1  -JA      Reps 6 15  -JA      Time 6 RTB, minimal cuing req  -JA      Additional Comments circuit 1 x 2  -JA         Exercise 7    Exercise Name 7 Sit to Stand from chair  -JA      Cueing 7 Verbal;Demo  -JA      Reps 7 5  -JA      Time 7 hands on knees  -JA      Additional Comments circuit 1 x 2  -JA         Exercise 8    Exercise Name 8 HR  -JA      Cueing 8 Verbal;Demo  -JA      Sets 8 2  -JA      Reps 8 10  -JA      Time 8 circuit 2x2  -JA      Additional Comments circuit 1 x 2  -JA         Exercise 9    Exercise Name 9 supine mukund shld w/loop RTB around wrists  -JA      Cueing 9 Verbal  -JA      Sets 9 1  -JA      Reps 9 10  -JA      Time 9 RTB loop  -JA      Additional Comments circuit 2 x 2  -JA         Exercise 10    Exercise Name 10 Supine ABC  -JA       Cueing 10 Verbal;Demo  -JA      Sets 10 1  -JA      Reps 10 A-Z  -JA      Time 10 3#  -JA      Additional Comments 2X1  -JA         Exercise 11    Exercise Name 11 Supine bridges  -JA      Cueing 11 Verbal;Demo  -JA      Sets 11 1  -JA      Reps 11 10  -JA      Time 11 3 sec hold  -JA      Additional Comments 2X2  -JA         Exercise 12    Exercise Name 12 SUPINE ER UNI/HAKEEM  -JA      Cueing 12 Verbal;Demo  -JA      Reps 12 10 EA  -JA      Time 12 RTB  -JA      Additional Comments circuit 3 x1  -JA         Exercise 13    Exercise Name 13 side step at counter  -JA      Cueing 13 Verbal;Demo  -JA      Reps 13 10' X 2  -JA      Time 13 RTB  -JA      Additional Comments circuit 3 x2  -JA         Exercise 14    Exercise Name 14 AROM flex 0-140  -JA      Cueing 14 Verbal;Demo  -JA      Reps 14 10  -JA      Additional Comments circuit 3 x2  -JA         Exercise 15    Exercise Name 15 inbetween seated and standing ex's had pt walk w/o wx with SBA 10'  -JA      Reps 15 4  -JA            User Key  (r) = Recorded By, (t) = Taken By, (c) = Cosigned By    Initials Name Provider Type    Christiana Cardona, PT Physical Therapist                                        Time Calculation:     Start Time: 1500  Stop Time: 1545  Time Calculation (min): 45 min  Timed Charges  70849 - PT Therapeutic Exercise Minutes: 43  Total Minutes  Timed Charges Total Minutes: 43   Total Minutes: 43     Therapy Charges for Today     Code Description Service Date Service Provider Modifiers Qty    95971724229 HC PT THER PROC EA 15 MIN 4/13/2023 Christiana Mathew, PT GP 3                    Christiana Mathew PT  4/13/2023       no

## 2023-04-20 ENCOUNTER — HOSPITAL ENCOUNTER (OUTPATIENT)
Dept: PHYSICAL THERAPY | Facility: HOSPITAL | Age: 68
Setting detail: THERAPIES SERIES
Discharge: HOME | End: 2023-04-20
Payer: MEDICARE

## 2023-04-20 DIAGNOSIS — M25.611 IMPAIRED RANGE OF MOTION OF RIGHT SHOULDER: ICD-10-CM

## 2023-04-20 DIAGNOSIS — R53.1 GENERAL WEAKNESS: Primary | ICD-10-CM

## 2023-04-20 DIAGNOSIS — R53.81 PHYSICAL DECONDITIONING: ICD-10-CM

## 2023-04-20 DIAGNOSIS — R26.89 BALANCE PROBLEM: ICD-10-CM

## 2023-04-20 DIAGNOSIS — M25.511 CHRONIC RIGHT SHOULDER PAIN: ICD-10-CM

## 2023-04-20 DIAGNOSIS — M25.511 ACUTE PAIN OF RIGHT SHOULDER: ICD-10-CM

## 2023-04-20 DIAGNOSIS — R26.9 GAIT DIFFICULTY: ICD-10-CM

## 2023-04-20 DIAGNOSIS — G89.29 CHRONIC RIGHT SHOULDER PAIN: ICD-10-CM

## 2023-04-20 PROCEDURE — 97110 THERAPEUTIC EXERCISES: CPT

## 2023-04-20 NOTE — THERAPY TREATMENT NOTE
Outpatient Physical Therapy Ortho Treatment Note  [facility]     Patient Name: Flo Manzo  : 1955  MRN: [id number]  Today's Date: 2023      Visit Date: 2023    Visit Dx:    ICD-10-CM ICD-9-CM   1. General weakness  R53.1 780.79   2. Balance problem  R26.89 781.99   3. Gait difficulty  R26.9 781.2   4. Physical deconditioning  R53.81 799.3   5. Chronic right shoulder pain  M25.511 719.41    G89.29 338.29   6. Impaired range of motion of right shoulder  M25.611 719.51   7. Acute pain of right shoulder  M25.511 719.41       Patient Active Problem List   Diagnosis   • Umbilical hernia without obstruction and without gangrene   • Essential hypertension   • Arthritis of left knee   • Depression   • Obesity (BMI 30-39.9)   • Atrial fibrillation with RVR (HCC)   • Substernal thyroid goiter   • CHF (congestive heart failure)   • Diastolic CHF, chronic (HCC)   • Hemorrhagic stroke   • GÓMEZ on auto CPAP   • Hypersomnia due to medical condition   • Sleep-related hypoxia   • Chronic atrial fibrillation   • Vertigo   • Aortic stenosis, moderate   • S/P ICD (internal cardiac defibrillator) procedure   • Sinus pause   • Pacemaker   • Anticoagulated   • Generalized weakness   • Prediabetes        Past Medical History:   Diagnosis Date   • Arthritis    • Atrial fibrillation with RVR 2019   • Colon polyps 2018    Hepatic flexure: tubular adenoma, only low-grade dysplasia; splenic flexure: tubular adenoma, only low-grade dysplasia; sigmoid colon: tubular adenoma, multiple fragments only low-grade dysplasia   • Depression    • Heart murmur    • Hemorrhagic stroke 2019   • Hypertension    • New onset atrial fibrillation (CMS/HCC) - RVR 2019   • GÓMEZ (obstructive sleep apnea) 2019    Home sleep study with moderate severity GÓMEZ, AHI 20 events per hour.  Sleep-related hypoxia with low O2 saturation 84% for 14 minutes.   • Sleep apnea     previously diagnosed with sleep apnea, had  T&A done and it has since resolved    • Stroke    • Uncontrolled hypertension    • Ventral hernia         Past Surgical History:   Procedure Laterality Date   • APPENDECTOMY N/A 1972   • BACK SURGERY      BLADDER STIMULATOR IMPLANT L LOWER BACK   • CARDIAC CATHETERIZATION N/A 07/20/2004    Cath left ventriculography, coronary angiography and left heart catheterization, Normal results-Dr. Vadim Mancuso   • CARDIAC CATHETERIZATION N/A 1/29/2019    Procedure: Left Heart Cath;  Surgeon: Eren Bruce MD;  Location: [facility] CATH INVASIVE LOCATION;  Service: Cardiology   • CARDIAC CATHETERIZATION N/A 1/29/2019    Procedure: Left ventriculography;  Surgeon: Eren Bruce MD;  Location: [facility] CATH INVASIVE LOCATION;  Service: Cardiology   • CARDIAC CATHETERIZATION N/A 1/29/2019    Procedure: Coronary angiography;  Surgeon: Eren Bruce MD;  [facility];  Service: Cardiology   • CARDIAC ELECTROPHYSIOLOGY PROCEDURE N/A 3/4/2022    Procedure: Pacemaker SC new BIOTRONIK;  Surgeon: Eren Bruce MD;  Location: [facility] CATH INVASIVE LOCATION;  Service: Cardiology;  Laterality: N/A;   • CARPAL TUNNEL RELEASE Right 2013   • CARPAL TUNNEL RELEASE Left    • COLONOSCOPY N/A 1/4/2018    Procedure: COLONOSCOPY with cold polypectomy and hot snare polypectomy;  Surgeon: Ten Solitario MD;  Location: [facility] ENDOSCOPY;  Service:    • EYE LENS IMPLANT SECONDARY Bilateral 2007, 2008   • KNEE CARTILAGE SURGERY Right 1979   • TONSILLECTOMY AND ADENOIDECTOMY Bilateral 2005   • TOTAL KNEE ARTHROPLASTY Left 03/14/2016    Left total knee arthroplasty with Amberly component, size 11 femur, G tibial baseplate with a 10 polyethylene insert and a 38 patellar button-Dr. Pablo Hairston   • TOTAL KNEE ARTHROPLASTY Right 03/02/2015    Right total knee arthroplasty with Amberly component size G femur, 11 tibial base plate with an 11 polyethylene insert and a 38 patellar button-Dr. Pablo Hairston   •  UVULOPALATOPHARYNGOPLASTY N/A 2005    part of soft palate, and uvula   • VENTRAL HERNIA REPAIR N/A 1/23/2018    Procedure: VENTRAL HERNIA REPAIR LAPAROSCOPIC WITH DAVINCI ROBOT AND MESH;  Surgeon: Ten Solitario MD;  Location: [facility] OR;  Service:                         PT Assessment/Plan     Row Name 04/20/23 1219          PT Assessment    Assessment Comments Pt reporting soreness following last visit for several hours the next day. Continued with current circuit training with addition of step and reach with 1 UE and close guarding. Pt continues to be good candidate for skilled PT and plan to finalize HEP in next several visits.  -CN        PT Plan    PT Plan Comments Assess response to current circuit program and R shoulder soreness. Continue with dynamic stability with goal to D/C to I management in next 3-4 visits.  -CN           User Key  (r) = Recorded By, (t) = Taken By, (c) = Cosigned By    Initials Name Provider Type    Maryjane Hodge, PT Physical Therapist                   OP Exercises     Row Name 04/20/23 1100             Subjective Comments    Subjective Comments My shoulder is bothering me a little bit. It was sore for a few hours the day after I was here last time.  -CN         Subjective Pain    Able to rate subjective pain? yes  -CN         Total Minutes    22121 - PT Therapeutic Exercise Minutes 40  -CN         Exercise 1    Exercise Name 1 nustep  -CN      Cueing 1 Verbal  -CN      Time 1 5 min  -CN         Exercise 2    Exercise Name 2 pulleys  -CN      Cueing 2 Verbal;Demo  -CN      Time 2 3 min  -CN         Exercise 6    Exercise Name 6 Seated scapular retraction  -CN      Cueing 6 Verbal;Demo  -CN      Sets 6 1  -CN      Reps 6 15  -CN      Time 6 RTB, minimal cuing req  -CN      Additional Comments circuit 1 x 2  -CN         Exercise 7    Exercise Name 7 Sit to Stand from chair  -CN      Cueing 7 Verbal;Demo  -CN      Reps 7 5  -CN      Time 7 unable to perform hands on  knees, pushed from arm rest instead  -CN      Additional Comments circuit 1 x 2  -CN         Exercise 8    Exercise Name 8 HR  -CN      Cueing 8 Verbal;Demo  -CN      Sets 8 2  -CN      Reps 8 10  -CN      Time 8 circuit 2x2  -CN      Additional Comments circuit 1 x 2  -CN         Exercise 9    Exercise Name 9 supine hakeem shld w/loop RTB around wrists  -CN      Cueing 9 Verbal  -CN      Sets 9 1  -CN      Reps 9 10  -CN      Time 9 RTB loop  -CN      Additional Comments circuit 2 x 2  -CN         Exercise 10    Exercise Name 10 Supine ABC  -CN      Cueing 10 Verbal;Demo  -CN      Sets 10 1  -CN      Reps 10 A-Z  -CN      Time 10 3#  -CN      Additional Comments circuit 2 x 2  -CN         Exercise 11    Exercise Name 11 Supine bridges  -CN      Cueing 11 Verbal;Demo  -CN      Sets 11 1  -CN      Reps 11 10  -CN      Time 11 3 sec hold  -CN      Additional Comments 2X2  -CN         Exercise 12    Exercise Name 12 SUPINE ER UNI/HAKEEM  -CN      Cueing 12 Verbal;Demo  -CN      Reps 12 10 EA  -CN      Time 12 RTB  -CN      Additional Comments circuit 3 x1  -CN         Exercise 13    Exercise Name 13 side step at counter  -CN      Cueing 13 Verbal;Demo  -CN      Reps 13 10' X 2  -CN      Time 13 RTB  -CN      Additional Comments circuit 3 x2  -CN         Exercise 14    Exercise Name 14 AROM flex 0-140  -CN      Cueing 14 Verbal;Demo  -CN      Reps 14 10  -CN      Additional Comments circuit 3 x2  -CN         Exercise 16    Exercise Name 16 lateral step and reach at ballet bar  -CN      Cueing 16 Verbal;Demo  -CN      Reps 16 10 B  -CN      Additional Comments close guarding, 1 HR  -CN            User Key  (r) = Recorded By, (t) = Taken By, (c) = Cosigned By    Initials Name Provider Type    CN Maryjane Edge, PT Physical Therapist                              PT OP Goals     Row Name 04/20/23 1200          PT Short Term Goals    STG Date to Achieve 01/05/23  -CN     STG 1 Pt will be independent with initial HEP.  -CN      STG 1 Progress Met  -CN     STG 2 Pt will ambulate 150' with normal heel strike to toe off with symmetrical stride and davon with his RWX without momentary LOB.  -CN     STG 2 Progress Met  -CN     STG 3 Pt will demonstrate FTSS in 15 seconds or less indicating reduced risk of falls.  -CN     STG 3 Progress Ongoing  -CN     STG 4 Pt. will report 50% improvement in ability to fall asleep to improve QOL.  -CN     STG 4 Progress Met  -CN        Long Term Goals    LTG Date to Achieve 02/04/23  -CN     LTG 1 Pt will be independent with advanced HEP for strength and mobility.  -CN     LTG 1 Progress Met  -CN     LTG 2 Pt will tolerate 30 minutes of activity with 3 brief rest breaks demonstrating improved functional strength.  -CN     LTG 2 Progress Partially Met  -CN     LTG 3 Pt will demonstrate increased strength LEs to 4/5 or better.  -CN     LTG 3 Progress Partially Met  -CN     LTG 4 Pt will ambulate 500' or more with normalized gait without momentary LOB with least restrictive assistive device.  -CN     LTG 4 Progress Progressing  -CN     LTG 5 Pt. will report ability to fall asleep on R side without exacerbation of pain to improve sleep quality.  -CN     LTG 5 Progress Met  -CN     LTG 6 Pt. will improve R shoulder flexion/abduction MMT >/= 4/5 to improve ability to perform household chores.  -CN     LTG 6 Progress Partially Met  -CN     LTG 7 Pt. Will score </= 20 on QuickDASH from 36.36 at initial evaluation to indicate reduced perception of disability.  -CN     LTG 7 Progress Ongoing  -CN           User Key  (r) = Recorded By, (t) = Taken By, (c) = Cosigned By    Initials Name Provider Type    Maryjane Hodge, PT Physical Therapist                Therapy Education  Given: Symptoms/condition management  Program: Reinforced  How Provided: Verbal  Provided to: Patient  Level of Understanding: Verbalized              Time Calculation:   Start Time: 1132  Stop Time: 1212  Time Calculation (min): 40  min  Timed Charges  67517 - PT Therapeutic Exercise Minutes: 40  Total Minutes  Timed Charges Total Minutes: 40   Total Minutes: 40  Therapy Charges for Today     Code Description Service Date Service Provider Modifiers Qty    [id number]  PT THER PROC EA 15 MIN 4/20/2023 Maryjane Edge, PT GP 3                    Maryjane Edge, PT  4/20/2023

## 2023-05-01 ENCOUNTER — TELEPHONE (OUTPATIENT)
Dept: PHYSICAL THERAPY | Facility: HOSPITAL | Age: 68
End: 2023-05-01
Payer: COMMERCIAL

## 2023-05-01 DIAGNOSIS — R26.89 BALANCE PROBLEM: ICD-10-CM

## 2023-05-01 DIAGNOSIS — M25.511 ACUTE PAIN OF RIGHT SHOULDER: ICD-10-CM

## 2023-05-01 DIAGNOSIS — G89.29 CHRONIC RIGHT SHOULDER PAIN: ICD-10-CM

## 2023-05-01 DIAGNOSIS — M25.611 IMPAIRED RANGE OF MOTION OF RIGHT SHOULDER: ICD-10-CM

## 2023-05-01 DIAGNOSIS — R26.9 GAIT DIFFICULTY: ICD-10-CM

## 2023-05-01 DIAGNOSIS — R53.81 PHYSICAL DECONDITIONING: ICD-10-CM

## 2023-05-01 DIAGNOSIS — R53.1 GENERAL WEAKNESS: Primary | ICD-10-CM

## 2023-05-01 DIAGNOSIS — M25.511 CHRONIC RIGHT SHOULDER PAIN: ICD-10-CM

## 2023-05-01 NOTE — TELEPHONE ENCOUNTER
Spoke with patient re: no show, reports he had another appointment and was apologetic. Confirmed next visit 5/10 at 11:00.

## 2023-05-08 ENCOUNTER — OFFICE VISIT (OUTPATIENT)
Dept: INTERNAL MEDICINE | Age: 68
End: 2023-05-08
Payer: MEDICARE

## 2023-05-08 VITALS
BODY MASS INDEX: 33.47 KG/M2 | HEART RATE: 105 BPM | SYSTOLIC BLOOD PRESSURE: 128 MMHG | WEIGHT: 233.8 LBS | DIASTOLIC BLOOD PRESSURE: 88 MMHG | TEMPERATURE: 97.7 F | OXYGEN SATURATION: 93 % | HEIGHT: 70 IN

## 2023-05-08 DIAGNOSIS — E04.9 SUBSTERNAL THYROID GOITER: ICD-10-CM

## 2023-05-08 DIAGNOSIS — Z23 NEED FOR COVID-19 VACCINE: ICD-10-CM

## 2023-05-08 DIAGNOSIS — E78.00 PURE HYPERCHOLESTEROLEMIA: ICD-10-CM

## 2023-05-08 DIAGNOSIS — I10 ESSENTIAL HYPERTENSION: Primary | ICD-10-CM

## 2023-05-08 DIAGNOSIS — I50.33 ACUTE ON CHRONIC DIASTOLIC CONGESTIVE HEART FAILURE: ICD-10-CM

## 2023-05-08 NOTE — ASSESSMENT & PLAN NOTE
Continue Atorvastatin 40 mg daily.     Most recent labs:   Lab Results   Component Value Date     (H) 04/28/2022    LDL 55 12/21/2020     (H) 01/25/2019    HDL 56 04/28/2022    HDL 55 12/21/2020    HDL 63 (H) 01/25/2019    TRIG 89 04/28/2022

## 2023-05-08 NOTE — ASSESSMENT & PLAN NOTE
Congestive heart failure is stable. Weight up a little since last visit, 5 lbs, but patient denies any increased shortness of breath. Remains on Albuterol inhalers every 4 hrs as needed

## 2023-05-08 NOTE — PROGRESS NOTES
I N T E R N A L  M E D I C I N E  Joseline Bentley APRN    ENCOUNTER DATE:  05/08/2023    Flo Danielleremberto / 68 y.o. / male      CHIEF COMPLAINT / REASON FOR OFFICE VISIT     Follow-up chronic conditions       ASSESSMENT & PLAN     Diagnoses and all orders for this visit:    1. Essential hypertension (Primary)  Assessment & Plan:  Hypertension is stable. Continue Amlodipine 10 mg daily, Lisinopril 20 mg daily, Metoprolol tartrate 25 mg twice daily. Patient states that he is compliant with his medications, but spouse states that he is not.   Continue to loose weight, exercise 30 minutes 3 days a week, and make healthy food choices.   Pacemaker patent and continues on Eliquis 5 mg bid       2. Substernal thyroid goiter  Assessment & Plan:  Labs ordered today. Last TSH 0.012. Patient not on medications at this time.     Orders:  -     TSH+Free T4    3. Acute on chronic diastolic congestive heart failure  Assessment & Plan:  Congestive heart failure is stable. Weight up a little since last visit, 5 lbs, but patient denies any increased shortness of breath. Remains on Albuterol inhalers every 4 hrs as needed    Orders:  -     Ambulatory Referral to Home Health    4. Pure hypercholesterolemia  Assessment & Plan:  Continue Atorvastatin 40 mg daily.     Most recent labs:   Lab Results   Component Value Date     (H) 04/28/2022    LDL 55 12/21/2020     (H) 01/25/2019    HDL 56 04/28/2022    HDL 55 12/21/2020    HDL 63 (H) 01/25/2019    TRIG 89 04/28/2022         Orders:  -     Lipid Panel With LDL/HDL Ratio    5. Need for COVID-19 vaccine  -     COVID-19 (Pfizer) Bivalent 12+yrs       SUMMARY/DISCUSSION  • Follow up Cardiology, ASHANTI Haywood scheduled 6/20/23  • Follow up Thoracic Surgery, Dr Kerns scheduled 3/28/24    Return in about 3 months (around 8/8/2023) for Medicare Wellness.      VITAL SIGNS     Visit Vitals  /88 (BP Location: Left arm, Patient Position: Sitting, Cuff Size: Adult)   Pulse 105  "  Temp 97.7 °F (36.5 °C) (Temporal)   Ht 177.8 cm (70\")   Wt 106 kg (233 lb 12.8 oz)   SpO2 93%   BMI 33.55 kg/m²           BP Readings from Last 3 Encounters:   05/08/23 128/88   03/27/23 116/84   03/22/23 124/82     Wt Readings from Last 3 Encounters:   05/08/23 106 kg (233 lb 12.8 oz)   03/27/23 104 kg (228 lb 9.6 oz)   03/22/23 103 kg (227 lb 1.2 oz)     Body mass index is 33.55 kg/m².    Blood pressure readings recorded on patient flowsheet:       View : No data to display.                     MEDICATIONS AT THE TIME OF OFFICE VISIT     Current Outpatient Medications on File Prior to Visit   Medication Sig Dispense Refill   • acetaminophen (TYLENOL) 325 MG tablet Take 2 tablets by mouth Every 4 (Four) Hours As Needed for Mild Pain . 120 tablet 3   • albuterol sulfate  (90 Base) MCG/ACT inhaler Inhale 2 puffs Every 4 (Four) Hours As Needed for Wheezing. 18 g 3   • amLODIPine (NORVASC) 10 MG tablet TAKE 1 TABLET BY MOUTH DAILY 90 tablet 1   • atorvastatin (LIPITOR) 40 MG tablet Take 1 tablet by mouth Every Night. 90 tablet 3   • busPIRone (BUSPAR) 10 MG tablet Take 1 tablet by mouth 2 (Two) Times a Day. 180 tablet 1   • citalopram (CeleXA) 20 MG tablet Take 1 tablet by mouth Daily. 135 tablet 1   • Diclofenac Sodium (Voltaren) 1 % gel gel Apply 4 g topically to the appropriate area as directed 4 (Four) Times a Day. 50 g 0   • Eliquis 5 MG tablet tablet TAKE 1 TABLET BY MOUTH EVERY 12 HOURS 180 tablet 1   • lisinopril (PRINIVIL,ZESTRIL) 20 MG tablet TAKE 1 TABLET BY MOUTH DAILY 90 tablet 1   • metoprolol tartrate (LOPRESSOR) 25 MG tablet Take 1 tablet by mouth 2 (Two) Times a Day. 60 tablet 3   • omeprazole (priLOSEC) 20 MG capsule TAKE 1 CAPSULE BY MOUTH DAILY 90 capsule 1   • tamsulosin (FLOMAX) 0.4 MG capsule 24 hr capsule Take 1 capsule by mouth Every Night. 90 capsule 1   • trimethoprim-polymyxin b (POLYTRIM) 93068-3.1 UNIT/ML-% ophthalmic solution Administer 1 drop to both eyes Every 6 (Six) Hours. 10 " mL 0   • [DISCONTINUED] DULoxetine (CYMBALTA) 30 MG capsule TAKE 1 CAPSULE BY MOUTH EVERY NIGHT 90 capsule 1   • [DISCONTINUED] metoprolol succinate XL (TOPROL-XL) 25 MG 24 hr tablet Take 1 tablet by mouth Daily. 30 tablet 0   • [DISCONTINUED] QUEtiapine (SEROquel) 25 MG tablet Take 1 tablet by mouth Every Night. Take 30 min before bed 30 tablet 0     No current facility-administered medications on file prior to visit.        HISTORY OF PRESENT ILLNESS   68 year old male being seen today for follow up chronic conditions.   Patient states no longer doing physical therapy. Spouse concerned about compliance with medications- will have Home health SN evaluate compliance.   Hypertension controlled on current medications regimen. Patient states not having any issues with dizziness and no recent falls.   Patient reports compliant with his Atorvastatin without any side effects. Patient was surprised that his LDL went from 55 to 126- patient states does not eat as healthy as he should. Patient states that because he has to use his walker wherever he goes he does not walk as much as he used to.   CHF stable with out any shortness of breath per patient .        Patient Care Team:  Joseline Bentley APRN as PCP - General (Family Medicine)  Eren Bruce MD as Consulting Physician (Cardiology)    REVIEW OF SYSTEMS     Review of Systems   Constitutional: Negative for activity change, appetite change, chills, fatigue and fever.   HENT: Negative.    Respiratory: Negative for cough, chest tightness and shortness of breath.    Cardiovascular: Negative for chest pain, palpitations and leg swelling.   Gastrointestinal: Negative for abdominal distention, abdominal pain, constipation, diarrhea, nausea and vomiting.   Musculoskeletal: Negative for arthralgias.   Skin: Negative.    Neurological: Negative for dizziness and headaches.   Psychiatric/Behavioral: Negative.  Negative for self-injury, sleep disturbance and suicidal ideas.           PHYSICAL EXAMINATION     Physical Exam  Constitutional:       General: He is not in acute distress.  Cardiovascular:      Rate and Rhythm: Normal rate and regular rhythm.      Pulses: Normal pulses.      Heart sounds: Normal heart sounds.      Comments: Pacemaker patent  Pulmonary:      Effort: Pulmonary effort is normal. No respiratory distress.      Breath sounds: Normal breath sounds.   Abdominal:      General: Bowel sounds are normal. There is no distension.      Palpations: Abdomen is soft.      Tenderness: There is no abdominal tenderness.   Musculoskeletal:         General: No swelling or tenderness. Normal range of motion.      Right lower leg: No edema.      Left lower leg: No edema.   Skin:     General: Skin is warm and dry.   Neurological:      General: No focal deficit present.      Mental Status: He is alert and oriented to person, place, and time. Mental status is at baseline.   Psychiatric:         Mood and Affect: Mood normal.         Behavior: Behavior normal.         Thought Content: Thought content normal.         Judgment: Judgment normal.             REVIEWED DATA     Labs:     Lab Results   Component Value Date     02/03/2023    K 4.1 02/03/2023    CALCIUM 9.9 02/03/2023    AST 13 11/01/2022    ALT 14 11/01/2022    BUN 13 02/03/2023    CREATININE 0.94 02/03/2023    CREATININE 0.94 11/01/2022    CREATININE 0.82 08/03/2022    EGFRIFNONA 85 08/27/2021    EGFRIFAFRI 103 08/27/2021       Lab Results   Component Value Date    HGBA1C 5.60 02/03/2023    HGBA1C 5.70 (H) 07/31/2022       Lab Results   Component Value Date     (H) 04/28/2022    LDL 55 12/21/2020     (H) 01/25/2019    HDL 56 04/28/2022    HDL 55 12/21/2020    TRIG 89 04/28/2022    TRIG 77 12/21/2020       Lab Results   Component Value Date    TSH 0.012 (L) 02/03/2023    TSH 0.023 (L) 07/30/2022    TSH 0.007 (L) 04/28/2022    FREET4 1.22 07/30/2022    FREET4 1.25 01/24/2019       Lab Results   Component Value  Date    WBC 8.65 02/03/2023    HGB 15.5 02/03/2023     02/03/2023       No results found for: MALBCRERATIO         Imaging:           Medical Tests:           Summary of old records / correspondence / consultant report:           Request outside records:           ASHANTI Baez

## 2023-05-08 NOTE — ASSESSMENT & PLAN NOTE
Hypertension is stable. Continue Amlodipine 10 mg daily, Lisinopril 20 mg daily, Metoprolol tartrate 25 mg twice daily. Patient states that he is compliant with his medications, but spouse states that he is not.   Continue to loose weight, exercise 30 minutes 3 days a week, and make healthy food choices.   Pacemaker patent and continues on Eliquis 5 mg bid

## 2023-05-09 LAB
CHOLEST SERPL-MCNC: 171 MG/DL (ref 0–200)
HDLC SERPL-MCNC: 57 MG/DL (ref 40–60)
LDLC SERPL CALC-MCNC: 103 MG/DL (ref 0–100)
LDLC/HDLC SERPL: 1.8 {RATIO}
T4 FREE SERPL-MCNC: 1.07 NG/DL (ref 0.93–1.7)
TRIGL SERPL-MCNC: 58 MG/DL (ref 0–150)
TSH SERPL DL<=0.005 MIU/L-ACNC: 0.16 UIU/ML (ref 0.27–4.2)
VLDLC SERPL CALC-MCNC: 11 MG/DL (ref 5–40)

## 2023-05-10 ENCOUNTER — TELEPHONE (OUTPATIENT)
Dept: PHYSICAL THERAPY | Facility: HOSPITAL | Age: 68
End: 2023-05-10
Payer: COMMERCIAL

## 2023-05-10 NOTE — TELEPHONE ENCOUNTER
Called to f/u on today's missed visit. Spoke with pt who states he was not aware of the appointment. Confirmed next week's appt, he will be here 5/18 @10:30.

## 2023-07-09 PROBLEM — Z95.810 ICD (IMPLANTABLE CARDIOVERTER-DEFIBRILLATOR) IN PLACE: Status: ACTIVE | Noted: 2022-04-25

## 2023-07-09 PROBLEM — Z95.810 S/P ICD (INTERNAL CARDIAC DEFIBRILLATOR) PROCEDURE: Status: RESOLVED | Noted: 2022-03-06 | Resolved: 2023-07-09

## 2023-07-11 ENCOUNTER — TELEPHONE (OUTPATIENT)
Dept: INTERNAL MEDICINE | Age: 68
End: 2023-07-11

## 2023-07-14 ENCOUNTER — TELEPHONE (OUTPATIENT)
Dept: INTERNAL MEDICINE | Age: 68
End: 2023-07-14

## 2023-07-14 NOTE — TELEPHONE ENCOUNTER
Caller: MANUEL    Relationship to patient:     Best call back number: [phone]    Patient is needing: HE HAS GAINED 4.2 LBS IN THE LAST 2 DAYS.  HE DOES NOT HAVE ANY OTHER SIGNS, BUT HE DID HAVE THE INCREASED WEIGHT GAIN.

## 2023-07-14 NOTE — TELEPHONE ENCOUNTER
Boy called back, he was needing to let the pt pcp know of the 2lb plus weight gain. I advised that pt provider is no longer with our office, that he can see another provider here or we can help facilitate finding a new pcp. He said he would discuss this with the pt and call back.

## 2023-07-26 ENCOUNTER — TELEPHONE (OUTPATIENT)
Dept: INTERNAL MEDICINE | Age: 68
End: 2023-07-26
Payer: MEDICARE

## 2023-07-26 ENCOUNTER — TELEPHONE (OUTPATIENT)
Dept: INTERNAL MEDICINE | Age: 68
End: 2023-07-26

## 2023-07-26 DIAGNOSIS — I20.8 ANGINA AT REST: ICD-10-CM

## 2023-07-26 DIAGNOSIS — I10 ESSENTIAL HYPERTENSION: ICD-10-CM

## 2023-07-26 NOTE — TELEPHONE ENCOUNTER
Caller: [facility], [place] - [address] - [phone]  - [phone] FX    Relationship: Pharmacy    Best call back number: [phone]    What was the call regarding: [facility] WOULD LIKE TO CLARIFY PATIENTS citalopram (CeleXA) 20 MG tablet.    PLEASE CALL.

## 2023-07-26 NOTE — TELEPHONE ENCOUNTER
Caller: [facility], [place] - [address] - [phone] [facility] [phone] FX    Relationship: Pharmacy    Best call back number: [phone]    Requested Prescriptions:   Requested Prescriptions     Pending Prescriptions Disp Refills    amLODIPine (NORVASC) 10 MG tablet 90 tablet 1     Sig: Take 1 tablet by mouth Daily.    atorvastatin (LIPITOR) 40 MG tablet 90 tablet 3     Sig: Take 1 tablet by mouth Every Night.    apixaban (Eliquis) 5 MG tablet tablet 180 tablet 1     Sig: Take 1 tablet by mouth Every 12 (Twelve) Hours.        Pharmacy where request should be sent: [facility] [place], [place] - [address] - [phone] [facility] [phone] FX     Last office visit with prescribing clinician: 5/8/2023   Last telemedicine visit with prescribing clinician: Visit date not found   Next office visit with prescribing clinician: 11/14/2023     Additional details provided by patient:     Does the patient have less than a 3 day supply:  [] Yes  [] No    Would you like a call back once the refill request has been completed: [] Yes [] No    If the office needs to give you a call back, can they leave a voicemail: [] Yes [] No    Zuly Gardner Rep   07/26/23 11:45 EDT

## 2023-07-27 ENCOUNTER — TELEPHONE (OUTPATIENT)
Dept: INTERNAL MEDICINE | Age: 68
End: 2023-07-27

## 2023-07-27 ENCOUNTER — OFFICE VISIT (OUTPATIENT)
Dept: INTERNAL MEDICINE | Age: 68
End: 2023-07-27
Payer: MEDICARE

## 2023-07-27 VITALS
BODY MASS INDEX: 33.36 KG/M2 | HEIGHT: 70 IN | TEMPERATURE: 97.1 F | HEART RATE: 101 BPM | WEIGHT: 233 LBS | DIASTOLIC BLOOD PRESSURE: 84 MMHG | OXYGEN SATURATION: 95 % | SYSTOLIC BLOOD PRESSURE: 106 MMHG

## 2023-07-27 DIAGNOSIS — I50.32 DIASTOLIC CHF, CHRONIC: Primary | Chronic | ICD-10-CM

## 2023-07-27 DIAGNOSIS — I10 ESSENTIAL HYPERTENSION: Chronic | ICD-10-CM

## 2023-07-27 PROBLEM — I48.91 ATRIAL FIBRILLATION WITH RVR: Chronic | Status: ACTIVE | Noted: 2019-01-24

## 2023-07-27 RX ORDER — AMLODIPINE BESYLATE 10 MG/1
10 TABLET ORAL
Qty: 90 TABLET | Refills: 0 | Status: SHIPPED | OUTPATIENT
Start: 2023-07-27

## 2023-07-27 RX ORDER — FUROSEMIDE 20 MG/1
20 TABLET ORAL DAILY
Qty: 14 TABLET | Refills: 0 | Status: SHIPPED | OUTPATIENT
Start: 2023-07-27 | End: 2023-08-10

## 2023-07-27 RX ORDER — ATORVASTATIN CALCIUM 40 MG/1
40 TABLET, FILM COATED ORAL NIGHTLY
Qty: 90 TABLET | Refills: 0 | Status: SHIPPED | OUTPATIENT
Start: 2023-07-27

## 2023-07-27 NOTE — PROGRESS NOTES
"    I N T E R N A L  M E D I C I N E    J U N O H  K I M,  M D      ENCOUNTER DATE:  07/27/2023    Flo Manzo / 68 y.o. / male    CHIEF COMPLAINT / REASON FOR OFFICE VISIT     Weight Gain (8 LB past 24 h, feet swelling , sob occasionally)      ASSESSMENT & PLAN     1. Diastolic CHF, chronic    2. Essential hypertension      No orders of the defined types were placed in this encounter.    New Medications Ordered This Visit   Medications    furosemide (LASIX) 20 MG tablet     Sig: Take 1 tablet by mouth Daily for 14 days.     Dispense:  14 tablet     Refill:  0       SUMMARY/DISCUSSION  Start furosemide 20 mg qd and monitor response.   Needs BMP on his follow-up with APRN 8/10/23 for AWV  Continue home health.       Next Appointment with me: Visit date not found    No follow-ups on file.        VITAL SIGNS     Vitals:    07/27/23 1321   BP: 106/84   Pulse: 101   Temp: 97.1 °F (36.2 °C)   SpO2: 95%   Weight: 106 kg (233 lb)   Height: 177.8 cm (70\")       BP Readings from Last 3 Encounters:   07/27/23 106/84   07/12/23 138/98   06/20/23 (!) 160/110     Wt Readings from Last 3 Encounters:   07/27/23 106 kg (233 lb)   07/12/23 105 kg (231 lb)   06/20/23 104 kg (229 lb)     Body mass index is 33.43 kg/m².    Blood pressure readings recorded on patient flowsheet:       No data to display                MEDICATIONS AT THE TIME OF OFFICE VISIT     Current Outpatient Medications on File Prior to Visit   Medication Sig    albuterol sulfate  (90 Base) MCG/ACT inhaler Inhale 2 puffs Every 4 (Four) Hours As Needed for Wheezing.    amLODIPine (NORVASC) 10 MG tablet Take 1 tablet by mouth Daily.    apixaban (Eliquis) 5 MG tablet tablet Take 1 tablet by mouth Every 12 (Twelve) Hours.    atorvastatin (LIPITOR) 40 MG tablet Take 1 tablet by mouth Every Night.    busPIRone (BUSPAR) 10 MG tablet Take 1 tablet by mouth 2 (Two) Times a Day.    citalopram (CeleXA) 20 MG tablet Take 1 tablet by mouth Daily.    Diclofenac Sodium " (Voltaren) 1 % gel gel Apply 4 g topically to the appropriate area as directed 4 (Four) Times a Day.    lisinopril (PRINIVIL,ZESTRIL) 20 MG tablet TAKE 1 TABLET BY MOUTH DAILY    metoprolol tartrate (LOPRESSOR) 25 MG tablet Take 1 tablet by mouth 2 (Two) Times a Day.    omeprazole (priLOSEC) 20 MG capsule TAKE 1 CAPSULE BY MOUTH DAILY    tamsulosin (FLOMAX) 0.4 MG capsule 24 hr capsule Take 1 capsule by mouth Every Night.    trimethoprim-polymyxin b (POLYTRIM) 16963-8.1 UNIT/ML-% ophthalmic solution Administer 1 drop to both eyes Every 6 (Six) Hours.    acetaminophen (TYLENOL) 325 MG tablet Take 2 tablets by mouth Every 4 (Four) Hours As Needed for Mild Pain . (Patient not taking: Reported on 7/27/2023)    [DISCONTINUED] DULoxetine (CYMBALTA) 30 MG capsule TAKE 1 CAPSULE BY MOUTH EVERY NIGHT    [DISCONTINUED] metoprolol succinate XL (TOPROL-XL) 25 MG 24 hr tablet Take 1 tablet by mouth Daily.    [DISCONTINUED] QUEtiapine (SEROquel) 25 MG tablet Take 1 tablet by mouth Every Night. Take 30 min before bed     No current facility-administered medications on file prior to visit.          HISTORY OF PRESENT ILLNESS     The patient presents today to discuss weight gain. He was a former patient of ASHANTI Baez. He reports that home health informed him on 07/26 that he gained 6 to 8 pounds in the past 24 hours. His weight today is 233 pounds, when compared to his prior visit there is no significant change in his weight. He confirms swelling in his bilateral feet that he feels has worsened. He does have some dyspnea with exertion occasionally, but this has not increased or worsened. Patient is followed by Dr. Bruce, cardiology for atrial fibrillation and congestive heart failure. Currently managed with metoprolol and Eliquis. He denies any recent changes to cardiac medication and is not taking a diuretic; however, he has in the past which was discontinued. He avoids salt in his diet.         REVIEW OF SYSTEMS      Denies increased cough, PND/orthopnea  Denies chest pain or palpitations   GI negative    negative for change   Neuro negative for change         PHYSICAL EXAMINATION     Physical Exam  Alert with normal thought and judgment.   No acute distress   Heart rate is normal and quiet   Pulm/Chest: Effort normal, breath sounds normal.  1+ bilateral lower extremities edema       REVIEWED DATA     Labs:     Lab Results   Component Value Date     02/03/2023    K 4.1 02/03/2023    CALCIUM 9.9 02/03/2023    AST 13 11/01/2022    ALT 14 11/01/2022    BUN 13 02/03/2023    CREATININE 0.94 02/03/2023    CREATININE 0.94 11/01/2022    CREATININE 0.82 08/03/2022    EGFRIFNONA 85 08/27/2021    EGFRIFAFRI 103 08/27/2021       Lab Results   Component Value Date    HGBA1C 5.60 02/03/2023    HGBA1C 5.70 (H) 07/31/2022       Lab Results   Component Value Date     (H) 05/08/2023     (H) 04/28/2022    LDL 55 12/21/2020    HDL 57 05/08/2023    HDL 56 04/28/2022    TRIG 58 05/08/2023    TRIG 89 04/28/2022       Lab Results   Component Value Date    TSH 0.163 (L) 05/08/2023    TSH 0.012 (L) 02/03/2023    TSH 0.023 (L) 07/30/2022    FREET4 1.07 05/08/2023    FREET4 1.22 07/30/2022    FREET4 1.25 01/24/2019       Lab Results   Component Value Date    WBC 8.65 02/03/2023    HGB 15.5 02/03/2023     02/03/2023       No results found for: MALBCRERATIO         Imaging:           Medical Tests:     Adult Transthoracic Echo Complete W/ Cont if Necessary Per Protocol (07/31/2022 09:21)   Calculated left ventricular EF = 54.7% Estimated left ventricular EF = 55% Estimated left ventricular EF was in agreement with the calculated left ventricular EF. Left ventricular systolic function is normal. Normal left ventricular cavity size noted. Left ventricular wall thickness is consistent with moderate concentric hypertrophy. All left ventricular wall segments contract normally. Left ventricular diastolic function was  indeterminate.  Left atrial volume is mildly increased.  An electronic lead is present in the right atrium.  The aortic valve is abnormal in structure. There is severe calcification of the aortic valve. Mild aortic valve regurgitation is present. Mild aortic valve stenosis is present. Aortic valve area is 1.4 cm2. Aortic valve mean pressure gradient is 18.9 mmHg. Aortic valve dimensionless index is 0.40 .  Severe mitral annular calcification is present. Trace mitral valve regurgitation is present.  Trace tricuspid valve regurgitation is present. Estimated right ventricular systolic pressure from tricuspid regurgitation is normal (<35 mmHg). Calculated right ventricular systolic pressure from tricuspid regurgitation is 27 mmHg.    Summary of old records / correspondence / consultant report:           Request outside records:             *Examiner was wearing KN95 mask during the entire duration of the visit. Patient was masked the entire time. Minimum social distance of 6 ft maintained entire visit except if physical contact was necessary as documented.       Template created by Isis Cloud MD     Transcribed from ambient dictation for Ten Cloud MD by Raquel Johnson.  07/27/23   15:04 EDT    Patient or patient representative verbalized consent to the visit recording.  I have personally performed the services described in this document as transcribed by the above individual, and it is both accurate and complete.  Ten Cloud MD  7/27/2023  17:43 EDT

## 2023-07-27 NOTE — TELEPHONE ENCOUNTER
Please call pt.    Recommend that patient be evaluated today, given his history of CHF.  Lyle has a same day available this afternoon, if he is agreeable.  If he is having any chest pain, shortness of breath, recommend evaluation in the ER.

## 2023-07-27 NOTE — TELEPHONE ENCOUNTER
Caller: MANUEL    Relationship to patient: Home Health    Best call back number: [phone]    Patient is needing: PT HAS GAINED 8 POUNDS IN 24 HOURS, WANTED TO CALL REGARDING WEIGHT GAIN SINCE IT WAS A SIGNIFICANT AMOUNT, HE WAS WEIGHED YESTERDAY

## 2023-07-28 ENCOUNTER — HOSPITAL ENCOUNTER (OUTPATIENT)
Dept: RADIATION ONCOLOGY | Facility: HOSPITAL | Age: 68
Setting detail: RADIATION/ONCOLOGY SERIES
End: 2023-07-28
Payer: MEDICARE

## 2023-07-28 ENCOUNTER — CONSULT (OUTPATIENT)
Dept: RADIATION ONCOLOGY | Facility: HOSPITAL | Age: 68
End: 2023-07-28
Payer: MEDICARE

## 2023-07-28 VITALS
HEART RATE: 83 BPM | DIASTOLIC BLOOD PRESSURE: 79 MMHG | OXYGEN SATURATION: 97 % | BODY MASS INDEX: 33.35 KG/M2 | WEIGHT: 232.4 LBS | SYSTOLIC BLOOD PRESSURE: 127 MMHG

## 2023-07-28 DIAGNOSIS — C61 PROSTATE CANCER: Primary | ICD-10-CM

## 2023-07-28 PROCEDURE — 93296 REM INTERROG EVL PM/IDS: CPT | Performed by: INTERNAL MEDICINE

## 2023-07-28 PROCEDURE — 93294 REM INTERROG EVL PM/LDLS PM: CPT | Performed by: INTERNAL MEDICINE

## 2023-07-28 PROCEDURE — G0463 HOSPITAL OUTPT CLINIC VISIT: HCPCS

## 2023-07-28 NOTE — PROGRESS NOTES
[facility] Radiation Oncology   Consult    Chief Complaint  Favorable intermediate risk prostate cancer       Diagnosis: Favorable intermediate risk prostate cancer      AUA: 17/35  UQOL: 3  IIEF: 23/25    Pacemaker: yes  Prior History of Radiation: no  Contraindications to Radiation: no  Patient Requires Pregnancy Test: No, patient is male      HPI:    Fol Manzo is a 68 y.o. male with a complex past medical history including A-fib,  hemorrhagic stroke, aortic stenosis, pacemaker who was a patient of Dr. Moses due to urge incontinence and urinary frequency who was found to have an elevated PSA.  The patient underwent TRUS prostate biopsy on 5/25/2023 which demonstrated favorable intermediate risk prostate cancer.  The patient had a decipher score however that was 0.82 and high risk.  He was not felt to be a good surgical candidate and was referred for consideration of definitive radiation treatment.      Imaging:      No relevant imaging      Pathology:    TRUS Prostate Biopsy Pathology 5/25/2023  Full Report Available in Media Tab  9 Cores Negative, 2 Cores GG1, 1 Core GG2  Single GG2 Core in Left [place], 25% involvement  Deciper 0.82 High Risk      Labs:    Lab Results   Component Value Date    CREATININE 0.94 02/03/2023       PSA 4/21/2023 - 4.55ng/mL            Problem List:  Patient Active Problem List   Diagnosis    Umbilical hernia without obstruction and without gangrene    Essential hypertension    Arthritis of left knee    Depression    Obesity (BMI 30-39.9)    Atrial fibrillation with RVR    Substernal thyroid goiter    CHF (congestive heart failure)    Diastolic CHF, chronic    Hemorrhagic stroke    GÓMEZ on auto CPAP    Hypersomnia due to medical condition    Sleep-related hypoxia    Chronic atrial fibrillation    Vertigo    Aortic stenosis, moderate    Sinus pause    Pacemaker    Anticoagulated    Generalized weakness    Prediabetes    Pure hypercholesterolemia          Medications:  Current  Outpatient Medications on File Prior to Visit   Medication Sig Dispense Refill    acetaminophen (TYLENOL) 325 MG tablet Take 2 tablets by mouth Every 4 (Four) Hours As Needed for Mild Pain . 120 tablet 3    albuterol sulfate  (90 Base) MCG/ACT inhaler Inhale 2 puffs Every 4 (Four) Hours As Needed for Wheezing. 18 g 3    amLODIPine (NORVASC) 10 MG tablet Take 1 tablet by mouth Daily. 90 tablet 0    apixaban (Eliquis) 5 MG tablet tablet Take 1 tablet by mouth Every 12 (Twelve) Hours. 180 tablet 0    atorvastatin (LIPITOR) 40 MG tablet Take 1 tablet by mouth Every Night. 90 tablet 0    busPIRone (BUSPAR) 10 MG tablet Take 1 tablet by mouth 2 (Two) Times a Day. 180 tablet 1    citalopram (CeleXA) 20 MG tablet Take 1 tablet by mouth Daily. 135 tablet 1    Diclofenac Sodium (Voltaren) 1 % gel gel Apply 4 g topically to the appropriate area as directed 4 (Four) Times a Day. 50 g 0    furosemide (LASIX) 20 MG tablet Take 1 tablet by mouth Daily for 14 days. 14 tablet 0    lisinopril (PRINIVIL,ZESTRIL) 20 MG tablet TAKE 1 TABLET BY MOUTH DAILY 90 tablet 1    metoprolol tartrate (LOPRESSOR) 25 MG tablet Take 1 tablet by mouth 2 (Two) Times a Day. 60 tablet 3    omeprazole (priLOSEC) 20 MG capsule TAKE 1 CAPSULE BY MOUTH DAILY 90 capsule 1    tamsulosin (FLOMAX) 0.4 MG capsule 24 hr capsule Take 1 capsule by mouth Every Night. 90 capsule 1    trimethoprim-polymyxin b (POLYTRIM) 22333-4.1 UNIT/ML-% ophthalmic solution Administer 1 drop to both eyes Every 6 (Six) Hours. 10 mL 0    [DISCONTINUED] DULoxetine (CYMBALTA) 30 MG capsule TAKE 1 CAPSULE BY MOUTH EVERY NIGHT 90 capsule 1    [DISCONTINUED] metoprolol succinate XL (TOPROL-XL) 25 MG 24 hr tablet Take 1 tablet by mouth Daily. 30 tablet 0    [DISCONTINUED] QUEtiapine (SEROquel) 25 MG tablet Take 1 tablet by mouth Every Night. Take 30 min before bed 30 tablet 0     No current facility-administered medications on file prior to visit.          Allergies:  Allergies  "  Allergen Reactions    Poison Ivy Extract Hives, Itching and Rash     ITCHY BLISTERS         Family History:  The patient has no family history of conditions which would be contraindications to radiation therapy      Social History:  Never Smoker    Distance From Clinic: <30 minutes    Patient has someone who can assist with transportation: yes      Review of Systems:    Review of Systems   Genitourinary:  Positive for frequency.   Neurological:  Positive for dizziness and light-headedness.   All other systems reviewed and are negative.      Vital Signs:  /79   Pulse 83   Wt 105 kg (232 lb 6.4 oz)   SpO2 97%   BMI 33.35 kg/m²   Estimated body mass index is 33.35 kg/m² as calculated from the following:    Height as of 7/27/23: 177.8 cm (70\").    Weight as of this encounter: 105 kg (232 lb 6.4 oz).  Pain Score    07/28/23 0948   PainSc: 0-No pain         ECOG: Capable of only limited selfcare; confined to bed or chair more than 50% of waking hours = 3    Physical Exam  Vitals reviewed.   Constitutional:       General: He is not in acute distress.     Appearance: Normal appearance.   HENT:      Head: Normocephalic and atraumatic.   Eyes:      Extraocular Movements: Extraocular movements intact.      Pupils: Pupils are equal, round, and reactive to light.   Pulmonary:      Effort: Pulmonary effort is normal.   Abdominal:      General: Abdomen is flat.      Palpations: Abdomen is soft.   Musculoskeletal:      Cervical back: Normal range of motion.   Skin:     General: Skin is warm and dry.   Neurological:      Mental Status: He is alert and oriented to person, place, and time.   Psychiatric:         Mood and Affect: Mood normal.         Behavior: Behavior normal.          Result Review :  The following data was reviewed by: Temo Wheat MD on 07/28/2023:  Labs: Last Creatinine            Diagnoses and all orders for this visit:    1. Prostate cancer (Primary)        Assessment:    The patient is a " 68-year-old male with newly diagnosed favorable intermediate risk prostate cancer based on PSA and TRUS biopsy pathology.  The patient is a poor surgical candidate due to multiple comorbidities including prior hemorrhagic stroke and A-fib with pacemaker.  The patient of note had a high risk decipher score of 0.82.  The patient has been referred for consideration of definitive radiation therapy.    I met with the patient and discussed his work-up to date.  I discussed the risk, benefits, side effects, and logistics of radiation treatment planning and delivery.  Due to the discordance of his biopsy pathology and his high risk decipher score I would prefer to get an MRI of the prostate prior to initiating treatment.  The patient has a pacemaker and we will look into whether the pacemaker is MRI compatible.  If not we will proceed with definitive radiation therapy.  The patient expressed understanding and all of his questions were answered to his satisfaction.      Plan:    -Plan for definitive radiation therapy for at least favorable intermediate risk prostate cancer.  We will look to evaluate with MRI prostate due to high risk decipher score prior to finalizing treatment  decisions.  The patient has a pacemaker and we will also need to evaluate whether it is MRI compatible.       I spent 60 minutes caring for Flo on this date of service. This time includes time spent by me in the following activities:preparing for the visit, reviewing tests, obtaining and/or reviewing a separately obtained history, documenting information in the medical record, independently interpreting results and communicating that information with the patient/family/caregiver, and care coordination  Follow Up   No follow-ups on file.  Patient was given instructions and counseling regarding his condition or for health maintenance advice. Please see specific information pulled into the AVS if appropriate.     Temo Wheat MD

## 2023-08-08 DIAGNOSIS — C61 PROSTATE CANCER: Primary | ICD-10-CM

## 2023-08-09 ENCOUNTER — OUTSIDE FACILITY SERVICE (OUTPATIENT)
Dept: INTERNAL MEDICINE | Age: 68
End: 2023-08-09
Payer: MEDICARE

## 2023-08-09 ENCOUNTER — TELEPHONE (OUTPATIENT)
Dept: INTERNAL MEDICINE | Age: 68
End: 2023-08-09

## 2023-08-10 ENCOUNTER — OFFICE VISIT (OUTPATIENT)
Dept: INTERNAL MEDICINE | Age: 68
End: 2023-08-10
Payer: MEDICARE

## 2023-08-10 VITALS
OXYGEN SATURATION: 96 % | HEIGHT: 70 IN | WEIGHT: 234 LBS | TEMPERATURE: 98.7 F | DIASTOLIC BLOOD PRESSURE: 72 MMHG | SYSTOLIC BLOOD PRESSURE: 124 MMHG | HEART RATE: 82 BPM | BODY MASS INDEX: 33.5 KG/M2

## 2023-08-10 DIAGNOSIS — G47.33 OSA ON CPAP: ICD-10-CM

## 2023-08-10 DIAGNOSIS — Z76.89 ENCOUNTER TO ESTABLISH CARE: Primary | ICD-10-CM

## 2023-08-10 DIAGNOSIS — I50.32 DIASTOLIC CHF, CHRONIC: Chronic | ICD-10-CM

## 2023-08-10 DIAGNOSIS — E05.90 SUBCLINICAL HYPERTHYROIDISM: ICD-10-CM

## 2023-08-10 DIAGNOSIS — I50.32 CHRONIC DIASTOLIC CONGESTIVE HEART FAILURE: ICD-10-CM

## 2023-08-10 DIAGNOSIS — I10 ESSENTIAL HYPERTENSION: Chronic | ICD-10-CM

## 2023-08-10 DIAGNOSIS — I61.9 HEMORRHAGIC STROKE: ICD-10-CM

## 2023-08-10 DIAGNOSIS — Z12.11 COLON CANCER SCREENING: ICD-10-CM

## 2023-08-10 DIAGNOSIS — Z99.89 OSA ON CPAP: ICD-10-CM

## 2023-08-10 DIAGNOSIS — E04.9 SUBSTERNAL THYROID GOITER: ICD-10-CM

## 2023-08-10 LAB
BUN SERPL-MCNC: 16 MG/DL (ref 8–23)
BUN/CREAT SERPL: 16.7 (ref 7–25)
CALCIUM SERPL-MCNC: 9.8 MG/DL (ref 8.6–10.5)
CHLORIDE SERPL-SCNC: 104 MMOL/L (ref 98–107)
CO2 SERPL-SCNC: 28.1 MMOL/L (ref 22–29)
CREAT SERPL-MCNC: 0.96 MG/DL (ref 0.76–1.27)
EGFRCR SERPLBLD CKD-EPI 2021: 86.6 ML/MIN/1.73
GLUCOSE SERPL-MCNC: 96 MG/DL (ref 65–99)
POTASSIUM SERPL-SCNC: 4.2 MMOL/L (ref 3.5–5.2)
SODIUM SERPL-SCNC: 140 MMOL/L (ref 136–145)

## 2023-08-10 RX ORDER — FUROSEMIDE 20 MG/1
20 TABLET ORAL DAILY
Qty: 30 TABLET | Refills: 0 | Status: SHIPPED | OUTPATIENT
Start: 2023-08-10 | End: 2023-09-09

## 2023-08-10 NOTE — PROGRESS NOTES
"    I N T E R N A L  M E D I C I N E  Karen Jiménez, APRN    ENCOUNTER DATE:  08/10/2023    Flo Manzo / 68 y.o. / male      CHIEF COMPLAINT / REASON FOR OFFICE VISIT     Establish Care      ASSESSMENT & PLAN     Diagnoses and all orders for this visit:    1. Encounter to establish care (Primary)    2. GÓMEZ on auto CPAP  -     Ambulatory Referral to Sleep Medicine    3. Chronic diastolic congestive heart failure  -     Basic metabolic panel    4. Essential hypertension  -     Basic metabolic panel    5. Subclinical hyperthyroidism  -     Ambulatory Referral to Endocrinology    6. Substernal thyroid goiter  -     Ambulatory Referral to Endocrinology    7. Hemorrhagic stroke  -     Ambulatory Referral to Neurology    8. Colon cancer screening  -     Ambulatory Referral to General Surgery         SUMMARY/DISCUSSION  Regarding CHF, pt had benefit with daily use of furosemide 20 mg.  Weight remained stable and reports reduced swelling.  Will recheck BMP at today's visit.  Will review and recommend continuing furosemide 20 mg daily.  Pt has close follow up with cardiology on August 17, 2023.  Recommend that he review CPAP settings by re establishing with sleep medicine, Dr. Dixon.  Establish with endocrine for lengthy history of subclinical hyperthyroidism.    Re establish with neurology, Dr. Wilkins, for history of hemorrhagic stroke.    He is interested in clarifying recall date of colonoscopy with general surgery, Dr. Solitario.  Referral placed.  Follow up with radiology/ urology for recent diagnosis of prostate cancer.      Next Appointment with me: 11/14/2023    Return in about 3 months (around 11/10/2023) for Medicare Wellness.      VITAL SIGNS     Visit Vitals  /72 (BP Location: Right arm, Patient Position: Sitting, Cuff Size: Adult)   Pulse 82   Temp 98.7 øF (37.1 øC) (Temporal)   Ht 177.8 cm (70\")   Wt 106 kg (234 lb)   SpO2 96%   BMI 33.58 kg/mý             Wt Readings from Last 3 Encounters: "   08/10/23 106 kg (234 lb)   07/28/23 105 kg (232 lb 6.4 oz)   07/27/23 106 kg (233 lb)     Body mass index is 33.58 kg/mý.        MEDICATIONS AT THE TIME OF OFFICE VISIT     Current Outpatient Medications on File Prior to Visit   Medication Sig Dispense Refill    acetaminophen (TYLENOL) 325 MG tablet Take 2 tablets by mouth Every 4 (Four) Hours As Needed for Mild Pain . 120 tablet 3    albuterol sulfate  (90 Base) MCG/ACT inhaler Inhale 2 puffs Every 4 (Four) Hours As Needed for Wheezing. 18 g 3    amLODIPine (NORVASC) 10 MG tablet Take 1 tablet by mouth Daily. 90 tablet 0    apixaban (Eliquis) 5 MG tablet tablet Take 1 tablet by mouth Every 12 (Twelve) Hours. 180 tablet 0    atorvastatin (LIPITOR) 40 MG tablet Take 1 tablet by mouth Every Night. 90 tablet 0    busPIRone (BUSPAR) 10 MG tablet Take 1 tablet by mouth 2 (Two) Times a Day. 180 tablet 1    citalopram (CeleXA) 20 MG tablet Take 1 tablet by mouth Daily. 135 tablet 1    Diclofenac Sodium (Voltaren) 1 % gel gel Apply 4 g topically to the appropriate area as directed 4 (Four) Times a Day. 50 g 0    furosemide (LASIX) 20 MG tablet Take 1 tablet by mouth Daily for 14 days. 14 tablet 0    lisinopril (PRINIVIL,ZESTRIL) 20 MG tablet TAKE 1 TABLET BY MOUTH DAILY 90 tablet 1    metoprolol tartrate (LOPRESSOR) 25 MG tablet Take 1 tablet by mouth 2 (Two) Times a Day. 60 tablet 3    omeprazole (priLOSEC) 20 MG capsule TAKE 1 CAPSULE BY MOUTH DAILY 90 capsule 1    tamsulosin (FLOMAX) 0.4 MG capsule 24 hr capsule Take 1 capsule by mouth Every Night. 90 capsule 1    trimethoprim-polymyxin b (POLYTRIM) 59841-0.1 UNIT/ML-% ophthalmic solution Administer 1 drop to both eyes Every 6 (Six) Hours. 10 mL 0    [DISCONTINUED] DULoxetine (CYMBALTA) 30 MG capsule TAKE 1 CAPSULE BY MOUTH EVERY NIGHT 90 capsule 1    [DISCONTINUED] metoprolol succinate XL (TOPROL-XL) 25 MG 24 hr tablet Take 1 tablet by mouth Daily. 30 tablet 0    [DISCONTINUED] QUEtiapine (SEROquel) 25 MG  "tablet Take 1 tablet by mouth Every Night. Take 30 min before bed 30 tablet 0     No current facility-administered medications on file prior to visit.        HISTORY OF PRESENT ILLNESS     Here to establish care.  Former pt of [facility] Baez.      Recently seen in our office on July 27, 2023 by Dr. Cloud after reports of gaining 6-8 pounds in 24 hours with worsening bilateral feet swelling.  He was started on furosemide 20 mg daily, which he finished yesterday.     GÓMEZ:  He wears CPAP regularly, but did report difficulty with the settings feeling \"too strong.\"  Has not been seen by sleep medicine, Dr. Dixon, since 2019. Prescribed albuterol inhaler PRN.  Has not needed this in recent months.      History of prostate cancer, diagnosed two weeks ago.  Followed by Oncology Dr. Her, and [facility], Dr. Rader.  Taking tamsulosin 0.4 mg nightly.  Plan is to undergo radiation.  He is currently awaiting scheduling of MRI prostate.      Afib with RVR, CHF, aortic stenosis, pacemaker: Followed by cardiology, Dr. Bruce.  On Eliquis 5 mg BID.   BP is well controlled at today's visit at today's visit on amlodipine 10 mg daily, lisinopril 20 mg daily, metoprolol succinate tartrate 25 mg BID.  Weight remains stable.  Denies chest pain, does have some shortness of breath with exertion, and pt states this is at baseline.  ECHO has been ordered and remains outstanding.  July 2022 ECHO with EF of 54.7%, mild aortic valve stenosis.      History of hemorrhagic stroke in 2019.  Formerly followed by neurology, Dr. Wilkins, but has not been seen since 2021.    February 2023 Hemoglobin A1C was 5.6, with prior readings within prediabetes range.    HLD: Remains on atorvastatin 40 mg nightly.  May 2023 Lipid panel with ; triglycerides 58.    Anxiety/ depression: Mood is stable on buspirone 10 mg BID, citalopram 20 mg daily.  No SI/ HI.      Colonoscopy done in January 2018 with Dr. Solitario.  Finding of 3 tubular " adenomas.  It was discussed that he would need repeat in 3 years, but pt reports difficulty with scheduling.      Followed by thoracic surgery, Dr. Burciaga, for thyroid goiter.   March 17, 2023 CT Chest showed goiter with bilateral substernal extension and corresponding mass effect, including narrowing of adjacent trachea, which is similar to CT of June 22, 2022.  He is not a candidate for surgical resection so is monitored yearly as surveillance.  CT Chest is next scheduled on March 21, 2024 as follow up.  Labs show a history of subclinical hyperthyroidism.  He reports he has not been seen by endocrine office.      Patient Care Team:  Karen Jiménez APRN as PCP - General (Family Medicine)  Eren Bruce MD as Consulting Physician (Cardiology)  Temo Wheat MD as Consulting Physician (Radiation Oncology)  Bryant Rader MD as Consulting Physician (Urology)    REVIEW OF SYSTEMS     Review of Systems   Constitutional:  Negative for chills, fever and unexpected weight change.   Respiratory:  Positive for shortness of breath (Chronic, baseline). Negative for cough and chest tightness.    Cardiovascular:  Positive for leg swelling. Negative for chest pain and palpitations.   Neurological:  Negative for dizziness, weakness, light-headedness and headaches.   Psychiatric/Behavioral:  The patient is not nervous/anxious.         PHYSICAL EXAMINATION     Physical Exam  Vitals reviewed.   Constitutional:       General: He is not in acute distress.     Appearance: Normal appearance. He is not ill-appearing, toxic-appearing or diaphoretic.   HENT:      Head: Normocephalic and atraumatic.   Cardiovascular:      Rate and Rhythm: Normal rate and regular rhythm.      Heart sounds: Murmur heard.   Pulmonary:      Effort: Pulmonary effort is normal.      Breath sounds: Normal breath sounds.   Musculoskeletal:      Right lower leg: Edema (+1) present.      Left lower leg: Edema (+1) present.   Neurological:       Mental Status: He is alert and oriented to person, place, and time. Mental status is at baseline.   Psychiatric:         Mood and Affect: Mood normal.         Behavior: Behavior normal.         Thought Content: Thought content normal.         Judgment: Judgment normal.         REVIEWED DATA     Labs:           Imaging:            Medical Tests:           Summary of old records / correspondence / consultant report:           Request outside records:

## 2023-08-17 ENCOUNTER — OFFICE VISIT (OUTPATIENT)
Dept: CARDIOLOGY | Facility: CLINIC | Age: 68
End: 2023-08-17
Payer: MEDICARE

## 2023-08-17 ENCOUNTER — HOSPITAL ENCOUNTER (OUTPATIENT)
Dept: CARDIOLOGY | Facility: HOSPITAL | Age: 68
Discharge: HOME | End: 2023-08-17
Admitting: NURSE PRACTITIONER
Payer: MEDICARE

## 2023-08-17 VITALS
SYSTOLIC BLOOD PRESSURE: 124 MMHG | HEART RATE: 72 BPM | WEIGHT: 230 LBS | DIASTOLIC BLOOD PRESSURE: 87 MMHG | HEIGHT: 70 IN | BODY MASS INDEX: 32.93 KG/M2

## 2023-08-17 VITALS
HEIGHT: 70 IN | SYSTOLIC BLOOD PRESSURE: 130 MMHG | WEIGHT: 233.69 LBS | DIASTOLIC BLOOD PRESSURE: 92 MMHG | HEART RATE: 79 BPM | BODY MASS INDEX: 33.46 KG/M2

## 2023-08-17 DIAGNOSIS — I50.32 DIASTOLIC CHF, CHRONIC: Primary | Chronic | ICD-10-CM

## 2023-08-17 DIAGNOSIS — Z95.0 PACEMAKER: ICD-10-CM

## 2023-08-17 DIAGNOSIS — I35.0 AORTIC STENOSIS, SEVERE: ICD-10-CM

## 2023-08-17 DIAGNOSIS — I10 ESSENTIAL HYPERTENSION: ICD-10-CM

## 2023-08-17 PROCEDURE — 25510000001 PERFLUTREN (DEFINITY) 8.476 MG IN SODIUM CHLORIDE (PF) 0.9 % 10 ML INJECTION: Performed by: NURSE PRACTITIONER

## 2023-08-17 PROCEDURE — 93306 TTE W/DOPPLER COMPLETE: CPT

## 2023-08-17 PROCEDURE — 93306 TTE W/DOPPLER COMPLETE: CPT | Performed by: INTERNAL MEDICINE

## 2023-08-17 RX ADMIN — SODIUM CHLORIDE 3 ML: 9 INJECTION INTRAMUSCULAR; INTRAVENOUS; SUBCUTANEOUS at 13:01

## 2023-08-17 NOTE — PROGRESS NOTES
FOLLOW UP WITH ECHO   Subjective:        Flo Manzo is a 68 y.o. male who here for follow up    CC  Follow-up hypertension cardiomyopathy aortic stenosis, may need surgical clearance  HPI  68-year-old male with hypertension cardiomyopathy aortic stenosis and pacemaker here for the follow-up with no complaints of chest pains tightness heaviness or the pressure sensation may need surgery clearance     Problems Addressed this Visit          Cardiac and Vasculature    Essential hypertension (Chronic)    Diastolic CHF, chronic - Primary (Chronic)    Relevant Orders    Adult Transthoracic Echo Complete W/ Cont if Necessary Per Protocol    Aortic stenosis, severe    Relevant Orders    Adult Transthoracic Echo Complete W/ Cont if Necessary Per Protocol    Pacemaker     Diagnoses         Codes Comments    Diastolic CHF, chronic    -  Primary ICD-10-CM: I50.32  ICD-9-CM: 428.32, 428.0     Aortic stenosis, severe     ICD-10-CM: I35.0  ICD-9-CM: 424.1     Essential hypertension     ICD-10-CM: I10  ICD-9-CM: 401.9     Pacemaker     ICD-10-CM: Z95.0  ICD-9-CM: V45.01           .    The following portions of the patient's history were reviewed and updated as appropriate: allergies, current medications, past family history, past medical history, past social history, past surgical history and problem list.    Past Medical History:   Diagnosis Date    Arthritis     Atrial fibrillation with RVR 01/24/2019    Colon polyps 01/04/2018    Hepatic flexure: tubular adenoma, only low-grade dysplasia; splenic flexure: tubular adenoma, only low-grade dysplasia; sigmoid colon: tubular adenoma, multiple fragments only low-grade dysplasia    Depression     Heart murmur     Hemorrhagic stroke 01/29/2019    Hypertension     New onset atrial fibrillation (CMS/HCC) - RVR 1/24/2019    GÓMEZ (obstructive sleep apnea) 06/06/2019    Home sleep study with moderate severity GÓMEZ, AHI 20 events per hour.  Sleep-related hypoxia with low O2 saturation 84%  "for 14 minutes.    Sleep apnea     previously diagnosed with sleep apnea, had T&A done and it has since resolved     Stroke     Uncontrolled hypertension     Ventral hernia      reports that he has never smoked. He has been exposed to tobacco smoke. He has never used smokeless tobacco. He reports that he does not currently use alcohol. He reports that he does not use drugs.   Family History   Problem Relation Age of Onset    Hypertension Mother     Kidney failure Mother     Coronary artery disease Father     Lung cancer Father         positive smoker    Heart attack Father     Breast cancer Sister 60    Prostate cancer Brother     Colon polyps Brother     Kidney nephrosis Brother     Malig Hyperthermia Neg Hx        Review of Systems  Constitutional: No wt loss, fever, fatigue  Gastrointestinal: No nausea, abdominal pain  Behavioral/Psych: No insomnia or anxiety   Cardiovascular no chest pain  Objective:       Physical Exam           Physical Exam  /87   Pulse 72   Ht 177.8 cm (70\")   Wt 104 kg (230 lb)   BMI 33.00 kg/mý     General appearance: No acute changes   Eyes: Sclerae conjunctivae normal, pupils reactive   HENT: Atraumatic; oropharynx clear with moist mucous membranes and no mucosal ulcerations;  Neck: Trachea midline; NECK, supple, no thyromegaly or lymphadenopathy   Lungs: Normal size and shape, normal breath sounds, equal distribution of air, no rales and rhonchi   CV: S1-S2 regular, no murmurs, no rub, no gallop   Abdomen: Soft, nontender; no masses , no abnormal abdominal sounds   Extremities: No deformity , normal color , no peripheral edema   Skin: Normal temperature, turgor and texture; no rash, ulcers  Psych: Appropriate affect, alert and oriented to person, place and time             ECG 12 Lead    Date/Time: 8/17/2023 2:03 PM  Performed by: Eren Bruce MD  Authorized by: Eren Bruce MD   Comparison: compared with previous ECG   Similar to previous ECG  Rhythm: " atrial fibrillation    Clinical impression: abnormal EKG          Echocardiogram:        Current Outpatient Medications:     acetaminophen (TYLENOL) 325 MG tablet, Take 2 tablets by mouth Every 4 (Four) Hours As Needed for Mild Pain ., Disp: 120 tablet, Rfl: 3    albuterol sulfate  (90 Base) MCG/ACT inhaler, Inhale 2 puffs Every 4 (Four) Hours As Needed for Wheezing., Disp: 18 g, Rfl: 3    amLODIPine (NORVASC) 10 MG tablet, Take 1 tablet by mouth Daily., Disp: 90 tablet, Rfl: 0    apixaban (Eliquis) 5 MG tablet tablet, Take 1 tablet by mouth Every 12 (Twelve) Hours., Disp: 180 tablet, Rfl: 0    atorvastatin (LIPITOR) 40 MG tablet, Take 1 tablet by mouth Every Night., Disp: 90 tablet, Rfl: 0    busPIRone (BUSPAR) 10 MG tablet, Take 1 tablet by mouth 2 (Two) Times a Day., Disp: 180 tablet, Rfl: 1    citalopram (CeleXA) 20 MG tablet, Take 1 tablet by mouth Daily. (Patient taking differently: Take 1.5 tablets by mouth Daily. PT TAKING 1.5 TABLETS DAILY), Disp: 135 tablet, Rfl: 1    Diclofenac Sodium (Voltaren) 1 % gel gel, Apply 4 g topically to the appropriate area as directed 4 (Four) Times a Day., Disp: 50 g, Rfl: 0    furosemide (LASIX) 20 MG tablet, Take 1 tablet by mouth Daily for 30 days., Disp: 30 tablet, Rfl: 0    lisinopril (PRINIVIL,ZESTRIL) 20 MG tablet, TAKE 1 TABLET BY MOUTH DAILY, Disp: 90 tablet, Rfl: 1    metoprolol tartrate (LOPRESSOR) 25 MG tablet, Take 1 tablet by mouth 2 (Two) Times a Day., Disp: 60 tablet, Rfl: 3    omeprazole (priLOSEC) 20 MG capsule, TAKE 1 CAPSULE BY MOUTH DAILY, Disp: 90 capsule, Rfl: 1    tamsulosin (FLOMAX) 0.4 MG capsule 24 hr capsule, Take 1 capsule by mouth Every Night., Disp: 90 capsule, Rfl: 1    trimethoprim-polymyxin b (POLYTRIM) 08726-2.1 UNIT/ML-% ophthalmic solution, Administer 1 drop to both eyes Every 6 (Six) Hours., Disp: 10 mL, Rfl: 0    ALPRAZolam (Xanax) 0.25 MG tablet, Take 1 tablet by mouth 2 (Two) Times a Day As Needed for Anxiety (Anxiety prior to  scans)., Disp: 3 tablet, Rfl: 0    gabapentin (NEURONTIN) 100 MG capsule, Take 1 capsule by mouth 3 (Three) Times a Day., Disp: 42 capsule, Rfl: 0   Assessment:        Patient Active Problem List   Diagnosis    Umbilical hernia without obstruction and without gangrene    Essential hypertension    Arthritis of left knee    Depression    Obesity (BMI 30-39.9)    Atrial fibrillation with RVR    Substernal thyroid goiter    CHF (congestive heart failure)    Diastolic CHF, chronic    Hemorrhagic stroke    GÓMEZ on auto CPAP    Hypersomnia due to medical condition    Sleep-related hypoxia    Chronic atrial fibrillation    Vertigo    Aortic stenosis, severe    Sinus pause    Pacemaker    Anticoagulated    Generalized weakness    Prediabetes    Pure hypercholesterolemia               Plan:            ICD-10-CM ICD-9-CM   1. Diastolic CHF, chronic  I50.32 428.32     428.0   2. Aortic stenosis, severe  I35.0 424.1   3. Essential hypertension  I10 401.9   4. Pacemaker  Z95.0 V45.01     1. Diastolic CHF, chronic  Considering patient's medical condition as well as the risk factors, patient will require echocardiogram for further evaluation for the LV function, four-chamber evaluation, including the pressures, valvular function and  pericardial disease and pericardial effusion    - Adult Transthoracic Echo Complete W/ Cont if Necessary Per Protocol; Future    2. Aortic stenosis, severe  Considering patient's medical condition as well as the risk factors, patient will require echocardiogram for further evaluation for the LV function, four-chamber evaluation, including the pressures, valvular function and  pericardial disease and pericardial effusion    - Adult Transthoracic Echo Complete W/ Cont if Necessary Per Protocol; Future    3. Essential hypertension  Continue current treatment    4. Pacemaker  Pacemaker functioning normal      IF NEEDS SURG WILL NEED STRESS AND HIGH RISK  COUNSELING:    Flo Rodriguez was given  to patient for the following topics: diagnostic results, risk factor reductions, impressions, risks and benefits of treatment options and importance of treatment compliance .       SMOKING COUNSELING:        Dictated using Dragon dictation

## 2023-08-18 ENCOUNTER — OFFICE VISIT (OUTPATIENT)
Dept: INTERNAL MEDICINE | Age: 68
End: 2023-08-18
Payer: MEDICARE

## 2023-08-18 VITALS
HEART RATE: 102 BPM | SYSTOLIC BLOOD PRESSURE: 118 MMHG | HEIGHT: 70 IN | TEMPERATURE: 98 F | OXYGEN SATURATION: 95 % | BODY MASS INDEX: 33.21 KG/M2 | WEIGHT: 232 LBS | DIASTOLIC BLOOD PRESSURE: 76 MMHG

## 2023-08-18 DIAGNOSIS — C61 PROSTATE CANCER: Primary | ICD-10-CM

## 2023-08-18 DIAGNOSIS — Z79.899 HIGH RISK MEDICATION USE: ICD-10-CM

## 2023-08-18 DIAGNOSIS — G62.9 NEUROPATHY: Primary | ICD-10-CM

## 2023-08-18 DIAGNOSIS — M79.2 NEUROPATHIC PAIN: Primary | ICD-10-CM

## 2023-08-18 DIAGNOSIS — R20.2 PARESTHESIAS: ICD-10-CM

## 2023-08-18 PROCEDURE — 1160F RVW MEDS BY RX/DR IN RCRD: CPT

## 2023-08-18 PROCEDURE — 99213 OFFICE O/P EST LOW 20 MIN: CPT

## 2023-08-18 PROCEDURE — 3074F SYST BP LT 130 MM HG: CPT

## 2023-08-18 PROCEDURE — 1159F MED LIST DOCD IN RCRD: CPT

## 2023-08-18 PROCEDURE — 3078F DIAST BP <80 MM HG: CPT

## 2023-08-18 RX ORDER — GABAPENTIN 100 MG/1
100 CAPSULE ORAL 3 TIMES DAILY
Qty: 42 CAPSULE | Refills: 0 | Status: SHIPPED | OUTPATIENT
Start: 2023-08-18

## 2023-08-18 RX ORDER — ALPRAZOLAM 0.25 MG/1
0.25 TABLET ORAL 2 TIMES DAILY PRN
Qty: 3 TABLET | Refills: 0 | Status: SHIPPED | OUTPATIENT
Start: 2023-08-18

## 2023-08-18 NOTE — PROGRESS NOTES
"    I N T E R N A L  M E D I C I N E  Karen Jiménez, APRN    ENCOUNTER DATE:  08/18/2023    Flo Manzo / 68 y.o. / male      CHIEF COMPLAINT / REASON FOR OFFICE VISIT     Hand Pain      ASSESSMENT & PLAN     Diagnoses and all orders for this visit:    1. Neuropathy (Primary)    2. Paresthesias  -     Vitamin B12    3. High risk medication use  -     Drug Screen 11 w/Conf, Serum         SUMMARY/DISCUSSION  Suspect patient's bilateral burning, numbness pain symptoms are from peripheral neuropathy.  Obtained drug screen, and signed controlled substance agreement.  Will trial gabapentin for his symptoms and recheck in 6 weeks.        Next Appointment with me: 11/13/2023    Return in about 6 weeks (around 9/29/2023) for Recheck of nueropathy symptoms.      VITAL SIGNS     Visit Vitals  /76   Pulse 102   Temp 98 øF (36.7 øC)   Ht 177.8 cm (70\")   Wt 105 kg (232 lb)   SpO2 95%   BMI 33.29 kg/mý             Wt Readings from Last 3 Encounters:   08/18/23 105 kg (232 lb)   08/17/23 106 kg (233 lb 11 oz)   08/17/23 104 kg (230 lb)     Body mass index is 33.29 kg/mý.        MEDICATIONS AT THE TIME OF OFFICE VISIT     Current Outpatient Medications on File Prior to Visit   Medication Sig Dispense Refill    acetaminophen (TYLENOL) 325 MG tablet Take 2 tablets by mouth Every 4 (Four) Hours As Needed for Mild Pain . 120 tablet 3    albuterol sulfate  (90 Base) MCG/ACT inhaler Inhale 2 puffs Every 4 (Four) Hours As Needed for Wheezing. 18 g 3    amLODIPine (NORVASC) 10 MG tablet Take 1 tablet by mouth Daily. 90 tablet 0    apixaban (Eliquis) 5 MG tablet tablet Take 1 tablet by mouth Every 12 (Twelve) Hours. 180 tablet 0    atorvastatin (LIPITOR) 40 MG tablet Take 1 tablet by mouth Every Night. 90 tablet 0    busPIRone (BUSPAR) 10 MG tablet Take 1 tablet by mouth 2 (Two) Times a Day. 180 tablet 1    citalopram (CeleXA) 20 MG tablet Take 1 tablet by mouth Daily. (Patient taking differently: Take 1.5 tablets by " "mouth Daily. PT TAKING 1.5 TABLETS DAILY) 135 tablet 1    Diclofenac Sodium (Voltaren) 1 % gel gel Apply 4 g topically to the appropriate area as directed 4 (Four) Times a Day. 50 g 0    furosemide (LASIX) 20 MG tablet Take 1 tablet by mouth Daily for 30 days. 30 tablet 0    lisinopril (PRINIVIL,ZESTRIL) 20 MG tablet TAKE 1 TABLET BY MOUTH DAILY 90 tablet 1    metoprolol tartrate (LOPRESSOR) 25 MG tablet Take 1 tablet by mouth 2 (Two) Times a Day. 60 tablet 3    omeprazole (priLOSEC) 20 MG capsule TAKE 1 CAPSULE BY MOUTH DAILY 90 capsule 1    tamsulosin (FLOMAX) 0.4 MG capsule 24 hr capsule Take 1 capsule by mouth Every Night. 90 capsule 1    trimethoprim-polymyxin b (POLYTRIM) 47778-9.1 UNIT/ML-% ophthalmic solution Administer 1 drop to both eyes Every 6 (Six) Hours. 10 mL 0    [DISCONTINUED] DULoxetine (CYMBALTA) 30 MG capsule TAKE 1 CAPSULE BY MOUTH EVERY NIGHT 90 capsule 1    [DISCONTINUED] metoprolol succinate XL (TOPROL-XL) 25 MG 24 hr tablet Take 1 tablet by mouth Daily. 30 tablet 0    [DISCONTINUED] QUEtiapine (SEROquel) 25 MG tablet Take 1 tablet by mouth Every Night. Take 30 min before bed 30 tablet 0     No current facility-administered medications on file prior to visit.        HISTORY OF PRESENT ILLNESS     Here today for concerns of bilateral hand \"burning\" pain, with right > left, ongoing for the last 2 weeks.  Also complains of tingling sensation.  Denies any neck pain, bowel/ bladder changes, saddle anesthesia.   No hand swelling.  This is a new problem for patient.        Patient Care Team:  Karen Jiménez APRN as PCP - General (Family Medicine)  Eren Bruce MD as Consulting Physician (Cardiology)  Temo Wheat MD as Consulting Physician (Radiation Oncology)  Bryant Rader MD as Consulting Physician (Urology)    REVIEW OF SYSTEMS     Review of Systems   Constitutional:  Negative for chills, fever and unexpected weight change.   Respiratory:  Negative for cough, " chest tightness and shortness of breath.    Cardiovascular:  Negative for chest pain, palpitations and leg swelling.   Neurological:  Positive for numbness (Bilateral hands). Negative for dizziness, weakness, light-headedness and headaches.   Psychiatric/Behavioral:  The patient is not nervous/anxious.         PHYSICAL EXAMINATION     Physical Exam  Vitals reviewed.   Constitutional:       General: He is not in acute distress.     Appearance: Normal appearance. He is not ill-appearing, toxic-appearing or diaphoretic.   HENT:      Head: Normocephalic and atraumatic.   Cardiovascular:      Rate and Rhythm: Normal rate and regular rhythm.      Heart sounds: Normal heart sounds.   Pulmonary:      Effort: Pulmonary effort is normal.      Breath sounds: Normal breath sounds.   Musculoskeletal:      Right hand: Normal.      Left hand: Normal.      Right lower leg: No edema.      Left lower leg: No edema.   Skin:     General: Skin is warm and dry.   Neurological:      Mental Status: He is alert and oriented to person, place, and time. Mental status is at baseline.      Sensory: No sensory deficit.      Motor: No weakness.   Psychiatric:         Mood and Affect: Mood normal.         Behavior: Behavior normal.         Thought Content: Thought content normal.         Judgment: Judgment normal.         REVIEWED DATA     Labs:           Imaging:            Medical Tests:           Summary of old records / correspondence / consultant report:           Request outside records:

## 2023-08-21 ENCOUNTER — OUTSIDE FACILITY SERVICE (OUTPATIENT)
Dept: INTERNAL MEDICINE | Age: 68
End: 2023-08-21
Payer: COMMERCIAL

## 2023-08-21 LAB
AMPHETAMINES SERPL QL SCN: NEGATIVE NG/ML
BARBITURATES SERPL QL SCN: NEGATIVE UG/ML
BENZODIAZ SERPL QL SCN: NEGATIVE NG/ML
CANNABINOIDS SERPL QL SCN: NEGATIVE NG/ML
COCAINE+BZE SERPL QL SCN: NEGATIVE NG/ML
ETHANOL SERPL-MCNC: NEGATIVE GM/DL
METHADONE SERPL QL SCN: NEGATIVE NG/ML
OPIATES SERPL QL SCN: NEGATIVE NG/ML
OXYCODONE+OXYMORPHONE SERPLBLD QL SCN: NEGATIVE NG/ML
PCP SERPL QL SCN: NEGATIVE NG/ML
PROPOXYPH SERPL QL SCN: NEGATIVE NG/ML
VIT B12 SERPL-MCNC: 426 PG/ML (ref 211–946)

## 2023-08-22 ENCOUNTER — OUTSIDE FACILITY SERVICE (OUTPATIENT)
Dept: INTERNAL MEDICINE | Age: 68
End: 2023-08-22
Payer: COMMERCIAL

## 2023-08-23 NOTE — TELEPHONE ENCOUNTER
PATIENT'S FRIEND IS CALLING BACK IN TO CHECK STATUS. PLEASE CALL AND ADVISE. SHE IS STAYING WITH THE PATIENT. [phone]   Detail Level: Detailed Detail Level: Generalized

## 2023-09-05 ENCOUNTER — TELEPHONE (OUTPATIENT)
Dept: CARDIOLOGY | Facility: CLINIC | Age: 68
End: 2023-09-05
Payer: MEDICARE

## 2023-09-05 NOTE — TELEPHONE ENCOUNTER
Discussed with patient, denies any changes in activities, feels fine, asymptomatic.   Increase metoprolol to 37.5mg twice daily    Elaine

## 2023-09-05 NOTE — TELEPHONE ENCOUNTER
9/2/23 remote transmission reviewed.  Alert that there were 66 HVR episodes in 24 hours.  Longest duration 56 seconds.    There were no EGMs available to review.  Patient does have a history of A=Fib with RVR.  He is on Eliquis and Metoprolol tartrate 25 mg BID.       9/2/23 Transmission:

## 2023-09-07 RX ORDER — AMLODIPINE BESYLATE 10 MG/1
10 TABLET ORAL
Qty: 90 TABLET | Refills: 0 | Status: SHIPPED | OUTPATIENT
Start: 2023-09-07

## 2023-09-07 NOTE — TELEPHONE ENCOUNTER
Rx Refill Note  Requested Prescriptions     Pending Prescriptions Disp Refills    amLODIPine (NORVASC) 10 MG tablet [Pharmacy Med Name: AMLODIPINE BESYLATE 10MG TABLETS] 90 tablet 0     Sig: TAKE 1 TABLET BY MOUTH DAILY      Last office visit with prescribing clinician: Visit date not found   Last telemedicine visit with prescribing clinician: Visit date not found   Next office visit with prescribing clinician: Visit date not found                         Would you like a call back once the refill request has been completed: [] Yes [] No    If the office needs to give you a call back, can they leave a voicemail: [] Yes [] No    Wali Garcia MA  09/07/23, 16:16 EDT

## 2023-09-13 ENCOUNTER — TELEPHONE (OUTPATIENT)
Dept: INTERNAL MEDICINE | Age: 68
End: 2023-09-13

## 2023-09-13 NOTE — TELEPHONE ENCOUNTER
Please call to clarify.  It looks like Dr. Wheat may have prescribed 3 anxiety pills in anticipation of MRI scans last month.  Does he still have remaining pills?

## 2023-09-13 NOTE — TELEPHONE ENCOUNTER
"  Caller: Flo Manzo \"Danyel\"    Relationship: Self    Best call back number: [phone]     What was the call regarding: PATIENT STATES HE WAS PRESCRIBED ANXIETY MEDICATION TO TAKE BEFORE HIS MRI. PATIENT THOUGHT HIS MRI WAS FOR TODAY SO HE TOOK THE MEDICATION. PATIENT STATES WHEN HE GOT THERE, HE WAS TOLD THAT THE APPOINTMENT IS ON 9/20/23. PATIENT WOULD LIKE TO KNOW IF THE MEDICATION COULD BE CALLED IN FOR HIS APPOINTMENT NEXT WEEK    PLEASE CALL AND ADVISE     "

## 2023-09-14 DIAGNOSIS — F40.240 CLAUSTROPHOBIA: Primary | ICD-10-CM

## 2023-09-14 DIAGNOSIS — C61 PROSTATE CANCER: ICD-10-CM

## 2023-09-14 RX ORDER — ALPRAZOLAM 0.25 MG/1
0.25 TABLET ORAL ONCE AS NEEDED
Qty: 1 TABLET | Refills: 0 | Status: SHIPPED | OUTPATIENT
Start: 2023-09-14

## 2023-09-15 DIAGNOSIS — M79.2 NEUROPATHIC PAIN: ICD-10-CM

## 2023-09-18 RX ORDER — APIXABAN 5 MG/1
TABLET, FILM COATED ORAL
Qty: 180 TABLET | Refills: 0 | OUTPATIENT
Start: 2023-09-18

## 2023-09-18 NOTE — TELEPHONE ENCOUNTER
"  Caller: Flo Manzo \"Danyel\"    Relationship: Self    Best call back number:     [phone]       Requested Prescriptions:   Requested Prescriptions     Pending Prescriptions Disp Refills    apixaban (Eliquis) 5 MG tablet tablet 180 tablet 0     Sig: Take 1 tablet by mouth Every 12 (Twelve) Hours.        Pharmacy where request should be sent: [id number] - [place], [place] - [address] AT [facility] [address]  - [phone] [facility] [phone] [place]     Last office visit with prescribing clinician: 8/18/2023   Last telemedicine visit with prescribing clinician: Visit date not found   Next office visit with prescribing clinician: 10/3/2023     Additional details provided by patient:     PATIENT WOULD LIKE A CALL BACK PLEASE      Does the patient have less than a 3 day supply:  [x] Yes  [] No    Would you like a call back once the refill request has been completed: [x] Yes [] No    If the office needs to give you a call back, can they leave a voicemail: [] Yes [] No    Zuly Pressley Rep   09/18/23 14:38 EDT         "

## 2023-09-18 NOTE — TELEPHONE ENCOUNTER
Pt made aware to contact Cardio for Eliquis refill. This has been refused.  Gabapentin refill working, he has ran out and issue is trying to come back.

## 2023-09-19 RX ORDER — GABAPENTIN 100 MG/1
CAPSULE ORAL
Qty: 90 CAPSULE | Refills: 0 | Status: SHIPPED | OUTPATIENT
Start: 2023-09-19

## 2023-09-20 ENCOUNTER — HOSPITAL ENCOUNTER (OUTPATIENT)
Dept: MRI IMAGING | Facility: HOSPITAL | Age: 68
Discharge: HOME | End: 2023-09-20
Payer: MEDICARE

## 2023-09-20 DIAGNOSIS — C61 PROSTATE CANCER: ICD-10-CM

## 2023-09-21 ENCOUNTER — OFFICE VISIT (OUTPATIENT)
Dept: INTERNAL MEDICINE | Age: 68
End: 2023-09-21
Payer: MEDICARE

## 2023-09-21 VITALS
DIASTOLIC BLOOD PRESSURE: 74 MMHG | HEART RATE: 81 BPM | OXYGEN SATURATION: 95 % | SYSTOLIC BLOOD PRESSURE: 132 MMHG | HEIGHT: 70 IN | BODY MASS INDEX: 33.64 KG/M2 | WEIGHT: 235 LBS | TEMPERATURE: 97.6 F

## 2023-09-21 DIAGNOSIS — I10 ESSENTIAL HYPERTENSION: Chronic | ICD-10-CM

## 2023-09-21 DIAGNOSIS — I50.32 DIASTOLIC CHF, CHRONIC: Chronic | ICD-10-CM

## 2023-09-21 DIAGNOSIS — I20.8 ANGINA AT REST: ICD-10-CM

## 2023-09-21 RX ORDER — FUROSEMIDE 20 MG/1
20 TABLET ORAL DAILY
Qty: 30 TABLET | Refills: 0 | OUTPATIENT
Start: 2023-09-21 | End: 2023-10-21

## 2023-09-21 RX ORDER — ATORVASTATIN CALCIUM 40 MG/1
40 TABLET, FILM COATED ORAL NIGHTLY
Qty: 90 TABLET | Refills: 0 | Status: SHIPPED | OUTPATIENT
Start: 2023-09-21

## 2023-09-21 RX ORDER — BUSPIRONE HYDROCHLORIDE 10 MG/1
10 TABLET ORAL 2 TIMES DAILY
Qty: 180 TABLET | Refills: 1 | Status: SHIPPED | OUTPATIENT
Start: 2023-09-21

## 2023-09-21 RX ORDER — FUROSEMIDE 20 MG/1
20 TABLET ORAL DAILY
Qty: 30 TABLET | Refills: 1 | Status: SHIPPED | OUTPATIENT
Start: 2023-09-21 | End: 2023-11-20

## 2023-09-21 RX ORDER — TAMSULOSIN HYDROCHLORIDE 0.4 MG/1
1 CAPSULE ORAL NIGHTLY
Qty: 90 CAPSULE | Refills: 1 | Status: SHIPPED | OUTPATIENT
Start: 2023-09-21

## 2023-09-21 NOTE — TELEPHONE ENCOUNTER
Caller: [facility], [place] [phone] [place]    Relationship: Pharmacy    Best call back number: [phone]    Requested Prescriptions:   Requested Prescriptions     Pending Prescriptions Disp Refills    busPIRone (BUSPAR) 10 MG tablet 180 tablet 1     Sig: Take 1 tablet by mouth 2 (Two) Times a Day.    tamsulosin (FLOMAX) 0.4 MG capsule 24 hr capsule 90 capsule 1     Sig: Take 1 capsule by mouth Every Night.    metoprolol tartrate (LOPRESSOR) 25 MG tablet 90 tablet 5     Sig: Take 1.5 tablets by mouth 2 (Two) Times a Day.    atorvastatin (LIPITOR) 40 MG tablet 90 tablet 0     Sig: Take 1 tablet by mouth Every Night.    furosemide (LASIX) 20 MG tablet 30 tablet 0     Sig: Take 1 tablet by mouth Daily for 30 days.        Pharmacy where request should be sent: [facility], [place] [id number] [place]     Last office visit with prescribing clinician: 8/18/2023   Last telemedicine visit with prescribing clinician: Visit date not found   Next office visit with prescribing clinician: 9/21/2023     Does the patient have less than a 3 day supply:  [] Yes  [x] No    Would you like a call back once the refill request has been completed: [x] Yes [] No    If the office needs to give you a call back, can they leave a voicemail: [x] Yes [] No    Zluy Miles   09/21/23 09:22 EDT

## 2023-09-21 NOTE — PROGRESS NOTES
"    I N T E R N A L  M E D I C I N E  Karen Jiménez, APRN    ENCOUNTER DATE:  09/21/2023    Flo Danielleremberto / 68 y.o. / male      CHIEF COMPLAINT / REASON FOR OFFICE VISIT     Edema (Foot and ankles )      ASSESSMENT & PLAN     Diagnoses and all orders for this visit:    1. Essential hypertension  -     furosemide (LASIX) 20 MG tablet; Take 1 tablet by mouth Daily for 60 days.  Dispense: 30 tablet; Refill: 1    2. Diastolic CHF, chronic  -     furosemide (LASIX) 20 MG tablet; Take 1 tablet by mouth Daily for 60 days.  Dispense: 30 tablet; Refill: 1    Other orders  -     SCANNED - CARDIOLOGY         SUMMARY/DISCUSSION  Hemodynamically stable at today's exam.  Recommend that he resume daily furosemide use.  Follow up to schedule ECHO for further investigation.  Recommend that he closely monitor his weight at home, and present for further evaluation for weight gain of more than 3 pounds in 1 day or 5 pounds in one week.  Follow low sodium diet.  Present to ER with chest pain, increasing shortness of breath.  Reassess at close follow up appointment on October 3.        Next Appointment with me: 10/3/2023    Return for Next scheduled follow up.      VITAL SIGNS     Visit Vitals  /74   Pulse 81   Temp 97.6 °F (36.4 °C)   Ht 177.8 cm (70\")   Wt 107 kg (235 lb)   SpO2 95%   BMI 33.72 kg/m²             Wt Readings from Last 3 Encounters:   09/21/23 107 kg (235 lb)   08/18/23 105 kg (232 lb)   08/17/23 106 kg (233 lb 11 oz)     Body mass index is 33.72 kg/m².        MEDICATIONS AT THE TIME OF OFFICE VISIT     Current Outpatient Medications on File Prior to Visit   Medication Sig Dispense Refill    acetaminophen (TYLENOL) 325 MG tablet Take 2 tablets by mouth Every 4 (Four) Hours As Needed for Mild Pain . 120 tablet 3    albuterol sulfate  (90 Base) MCG/ACT inhaler Inhale 2 puffs Every 4 (Four) Hours As Needed for Wheezing. 18 g 3    ALPRAZolam (Xanax) 0.25 MG tablet Take 1 tablet by mouth 1 (One) Time As Needed " for Anxiety (Anxiety prior to scans) for up to 1 dose. 1 tablet 0    amLODIPine (NORVASC) 10 MG tablet TAKE 1 TABLET BY MOUTH DAILY 90 tablet 0    apixaban (Eliquis) 5 MG tablet tablet Take 1 tablet by mouth Every 12 (Twelve) Hours. 180 tablet 0    citalopram (CeleXA) 20 MG tablet Take 1 tablet by mouth Daily. (Patient taking differently: Take 1.5 tablets by mouth Daily. PT TAKING 1.5 TABLETS DAILY) 135 tablet 1    Diclofenac Sodium (Voltaren) 1 % gel gel Apply 4 g topically to the appropriate area as directed 4 (Four) Times a Day. 50 g 0    gabapentin (NEURONTIN) 100 MG capsule TAKE 1 CAPSULE BY MOUTH THREE TIMES DAILY 90 capsule 0    lisinopril (PRINIVIL,ZESTRIL) 20 MG tablet TAKE 1 TABLET BY MOUTH DAILY 90 tablet 1    omeprazole (priLOSEC) 20 MG capsule TAKE 1 CAPSULE BY MOUTH DAILY 90 capsule 1    trimethoprim-polymyxin b (POLYTRIM) 88339-3.1 UNIT/ML-% ophthalmic solution Administer 1 drop to both eyes Every 6 (Six) Hours. 10 mL 0    [DISCONTINUED] DULoxetine (CYMBALTA) 30 MG capsule TAKE 1 CAPSULE BY MOUTH EVERY NIGHT 90 capsule 1    [DISCONTINUED] metoprolol succinate XL (TOPROL-XL) 25 MG 24 hr tablet Take 1 tablet by mouth Daily. 30 tablet 0    [DISCONTINUED] QUEtiapine (SEROquel) 25 MG tablet Take 1 tablet by mouth Every Night. Take 30 min before bed 30 tablet 0     No current facility-administered medications on file prior to visit.        HISTORY OF PRESENT ILLNESS     Here today for acutely worsening bilateral feet/ ankle swelling in the last several days.  He reports he has been off of his prescribed furosemide for the same amount of time, due to difficulty picking medication up from pharmacy. He is following low sodium diet, and elevating legs at rest.  By our scale, weight is up 2 pounds since August 2023.  He reports weight remains stable by his own home scale.     Afib with RVR, CHF, aortic stenosis, pacemaker: Followed by cardiology, Dr. Bruce.  On Eliquis 5 mg BID without signs/ symptoms of  bleeding.   BP is well controlled at today's visit, 132/74, on amlodipine 10 mg daily, lisinopril 20 mg daily, metoprolol succinate tartrate 25 mg BID.  Denies chest pain, does have some shortness of breath with exertion, and pt states this is at baseline.  Cardiology ordered ECHO on August 17, 2023 and it remains outstanding.  July 2022 ECHO with EF of 54.7%, mild aortic valve stenosis.   He was started on furosemide 20 mg daily in PCP office on July 27, 2023 for acutely worsening feet swelling, and daily use of this medication was continued on August 10, 2023.        Patient Care Team:  Karen Jiménez APRN as PCP - General (Family Medicine)  Eren Bruce MD as Consulting Physician (Cardiology)  Temo Wheat MD as Consulting Physician (Radiation Oncology)  Bryant Rader MD as Consulting Physician (Urology)    REVIEW OF SYSTEMS     Review of Systems   Constitutional:  Negative for chills, fever and unexpected weight change.   Respiratory:  Negative for cough, chest tightness and shortness of breath.    Cardiovascular:  Negative for chest pain, palpitations and leg swelling.        +Bilateral feet swelling   Neurological:  Negative for dizziness, weakness, light-headedness and headaches.   Psychiatric/Behavioral:  The patient is not nervous/anxious.         PHYSICAL EXAMINATION     Physical Exam  Vitals reviewed.   Constitutional:       General: He is not in acute distress.     Appearance: Normal appearance. He is not ill-appearing, toxic-appearing or diaphoretic.   HENT:      Head: Normocephalic and atraumatic.   Cardiovascular:      Rate and Rhythm: Normal rate and regular rhythm.      Heart sounds: Normal heart sounds.   Pulmonary:      Effort: Pulmonary effort is normal.      Breath sounds: Normal breath sounds.   Musculoskeletal:      Right lower leg: No edema.      Left lower leg: No edema.      Comments: +2 bilateral feet swelling   Neurological:      Mental Status: He is alert and  oriented to person, place, and time. Mental status is at baseline.   Psychiatric:         Mood and Affect: Mood normal.         Behavior: Behavior normal.         Thought Content: Thought content normal.         Judgment: Judgment normal.         REVIEWED DATA     Labs:           Imaging:            Medical Tests:           Summary of old records / correspondence / consultant report:           Request outside records:

## 2023-09-25 ENCOUNTER — TELEPHONE (OUTPATIENT)
Dept: RADIATION ONCOLOGY | Facility: HOSPITAL | Age: 68
End: 2023-09-25

## 2023-09-26 ENCOUNTER — HOSPITAL ENCOUNTER (OUTPATIENT)
Dept: RADIATION ONCOLOGY | Facility: HOSPITAL | Age: 68
Setting detail: RADIATION/ONCOLOGY SERIES
End: 2023-09-26
Payer: MEDICARE

## 2023-09-26 ENCOUNTER — OFFICE VISIT (OUTPATIENT)
Dept: NEUROLOGY | Facility: CLINIC | Age: 68
End: 2023-09-26
Payer: MEDICARE

## 2023-09-26 ENCOUNTER — OFFICE VISIT (OUTPATIENT)
Dept: RADIATION ONCOLOGY | Facility: HOSPITAL | Age: 68
End: 2023-09-26
Payer: MEDICARE

## 2023-09-26 VITALS
HEART RATE: 91 BPM | OXYGEN SATURATION: 93 % | BODY MASS INDEX: 34.01 KG/M2 | SYSTOLIC BLOOD PRESSURE: 133 MMHG | WEIGHT: 237 LBS | DIASTOLIC BLOOD PRESSURE: 95 MMHG

## 2023-09-26 VITALS
OXYGEN SATURATION: 92 % | BODY MASS INDEX: 33.64 KG/M2 | HEART RATE: 90 BPM | DIASTOLIC BLOOD PRESSURE: 78 MMHG | HEIGHT: 70 IN | SYSTOLIC BLOOD PRESSURE: 138 MMHG | WEIGHT: 235 LBS

## 2023-09-26 DIAGNOSIS — C61 PROSTATE CANCER: Primary | ICD-10-CM

## 2023-09-26 DIAGNOSIS — G45.9 TIA (TRANSIENT ISCHEMIC ATTACK): Primary | ICD-10-CM

## 2023-09-26 PROCEDURE — G0463 HOSPITAL OUTPT CLINIC VISIT: HCPCS

## 2023-09-26 NOTE — PROGRESS NOTES
[facility] Radiation Oncology   Reevaluation    Chief Complaint  Favorable intermediate risk prostate cancer         Diagnosis: Favorable intermediate risk prostate cancer        AUA: 17/35  UQOL: 3  IIEF: 23/25     Pacemaker: yes  Prior History of Radiation: no  Contraindications to Radiation: no  Patient Requires Pregnancy Test: No, patient is male      HPI:     Flo Manzo is a 68 y.o. male with a complex past medical history including A-fib,  hemorrhagic stroke, aortic stenosis, pacemaker who was a patient of Dr. Moses due to urge incontinence and urinary frequency who was found to have an elevated PSA.  The patient underwent TRUS prostate biopsy on 5/25/2023 which demonstrated favorable intermediate risk prostate cancer.  The patient had a decipher score however that was 0.82 and high risk.  He was not felt to be a good surgical candidate and was referred for consideration of definitive radiation treatment.     I met with the patient in consultation and had ordered MRI prostate to evaluate for extensive disease.  The patient has struggled to accomplish this due to multiple electronic implants including pacemaker and bladder stimulator.         Imaging:        No relevant imaging        Pathology:     TRUS Prostate Biopsy Pathology 5/25/2023  Full Report Available in Media Tab  9 Cores Negative, 2 Cores GG1, 1 Core GG2  Single GG2 Core in Left [place], 25% involvement  Deciper 0.82 High Risk      Labs:    Lab Results   Component Value Date    CREATININE 0.96 08/10/2023       PSA 4/21/2023 - 4.55ng/mL           Problem List:  Patient Active Problem List   Diagnosis    Umbilical hernia without obstruction and without gangrene    Essential hypertension    Arthritis of left knee    Depression    Obesity (BMI 30-39.9)    Atrial fibrillation with RVR    Substernal thyroid goiter    CHF (congestive heart failure)    Diastolic CHF, chronic    Hemorrhagic stroke    GÓMEZ on auto CPAP    Hypersomnia due to medical  condition    Sleep-related hypoxia    Chronic atrial fibrillation    Vertigo    Aortic stenosis, severe    Sinus pause    Pacemaker    Anticoagulated    Generalized weakness    Prediabetes    Pure hypercholesterolemia          Medications:  Current Outpatient Medications on File Prior to Visit   Medication Sig Dispense Refill    acetaminophen (TYLENOL) 325 MG tablet Take 2 tablets by mouth Every 4 (Four) Hours As Needed for Mild Pain . 120 tablet 3    albuterol sulfate  (90 Base) MCG/ACT inhaler Inhale 2 puffs Every 4 (Four) Hours As Needed for Wheezing. 18 g 3    ALPRAZolam (Xanax) 0.25 MG tablet Take 1 tablet by mouth 1 (One) Time As Needed for Anxiety (Anxiety prior to scans) for up to 1 dose. 1 tablet 0    amLODIPine (NORVASC) 10 MG tablet TAKE 1 TABLET BY MOUTH DAILY 90 tablet 0    apixaban (Eliquis) 5 MG tablet tablet Take 1 tablet by mouth Every 12 (Twelve) Hours. 180 tablet 0    atorvastatin (LIPITOR) 40 MG tablet Take 1 tablet by mouth Every Night. 90 tablet 0    busPIRone (BUSPAR) 10 MG tablet Take 1 tablet by mouth 2 (Two) Times a Day. 180 tablet 1    citalopram (CeleXA) 20 MG tablet Take 1 tablet by mouth Daily. (Patient taking differently: Take 1.5 tablets by mouth Daily. PT TAKING 1.5 TABLETS DAILY) 135 tablet 1    Diclofenac Sodium (Voltaren) 1 % gel gel Apply 4 g topically to the appropriate area as directed 4 (Four) Times a Day. 50 g 0    furosemide (LASIX) 20 MG tablet Take 1 tablet by mouth Daily for 60 days. 30 tablet 1    gabapentin (NEURONTIN) 100 MG capsule TAKE 1 CAPSULE BY MOUTH THREE TIMES DAILY 90 capsule 0    lisinopril (PRINIVIL,ZESTRIL) 20 MG tablet TAKE 1 TABLET BY MOUTH DAILY 90 tablet 1    metoprolol tartrate (LOPRESSOR) 25 MG tablet Take 1.5 tablets by mouth 2 (Two) Times a Day. 90 tablet 5    omeprazole (priLOSEC) 20 MG capsule TAKE 1 CAPSULE BY MOUTH DAILY 90 capsule 1    tamsulosin (FLOMAX) 0.4 MG capsule 24 hr capsule Take 1 capsule by mouth Every Night. 90 capsule 1     "trimethoprim-polymyxin b (POLYTRIM) 83572-9.1 UNIT/ML-% ophthalmic solution Administer 1 drop to both eyes Every 6 (Six) Hours. 10 mL 0    [DISCONTINUED] DULoxetine (CYMBALTA) 30 MG capsule TAKE 1 CAPSULE BY MOUTH EVERY NIGHT 90 capsule 1    [DISCONTINUED] metoprolol succinate XL (TOPROL-XL) 25 MG 24 hr tablet Take 1 tablet by mouth Daily. 30 tablet 0    [DISCONTINUED] QUEtiapine (SEROquel) 25 MG tablet Take 1 tablet by mouth Every Night. Take 30 min before bed 30 tablet 0     No current facility-administered medications on file prior to visit.          Allergies:  Allergies   Allergen Reactions    Poison Ivy Extract Hives, Itching and Rash     ITCHY BLISTERS         Family History:  The patient has no family history of conditions which would be contraindications to radiation therapy        Social History:  Never Smoker     Distance From Clinic: <30 minutes     Patient has someone who can assist with transportation: yes      Vital Signs:  /95   Pulse 91   Wt 108 kg (237 lb)   SpO2 93%   BMI 34.01 kg/m²   Estimated body mass index is 34.01 kg/m² as calculated from the following:    Height as of 9/21/23: 177.8 cm (70\").    Weight as of this encounter: 108 kg (237 lb).  Pain Score    09/26/23 0953   PainSc: 0-No pain         ECOG: Capable of only limited selfcare; confined to bed or chair more than 50% of waking hours = 3    Physical Exam       Result Review :  The following data was reviewed by: Temo Wheat MD on 09/26/2023:  Labs: Last Creatinine, PSA           Diagnoses and all orders for this visit:    1. Prostate cancer (Primary)        Assessment:    Flo Manzo is a 68 y.o. male with a complex past medical history including A-fib,  hemorrhagic stroke, aortic stenosis, pacemaker who was a patient of Dr. Moses due to urge incontinence and urinary frequency who was found to have an elevated PSA.  The patient underwent TRUS prostate biopsy on 5/25/2023 which demonstrated favorable intermediate " risk prostate cancer.  The patient had a decipher score however that was 0.82 and high risk.  He was not felt to be a good surgical candidate and was referred for consideration of definitive radiation treatment.     I met with the patient in consultation and had ordered MRI prostate to evaluate for extensive disease.  The patient has struggled to accomplish this due to multiple electronic implants including pacemaker and bladder stimulator.       I met with the patient and discussed the difficulty he has been having with obtaining his MRI.  I reviewed the patient's pacemaker and bladder stimulator information.  I called radiology and discussed directly with MRI.  The patient is a candidate for MRI as long as certain precautions are taken including bring his bladder stimulator remote to his MRI appointment.  Radiology is going to call and reschedule his MRI.  I gave the patient my contact information and instructed him to follow-up with me if he has any further difficulties with MRI scheduling.  I will plan to see the patient back in reevaluation after MRI is complete.    Plan:    -Patient to undergo prostate MRI to evaluate for extent of disease.  This has been complicated by implanted electronic medical devices.  Radiology will reach out to the patient and schedule.  The patient has my contact information if any further difficulties.       I spent 30 minutes caring for Flo on this date of service. This time includes time spent by me in the following activities:preparing for the visit, reviewing tests, obtaining and/or reviewing a separately obtained history, referring and communicating with other health care professionals , documenting information in the medical record, independently interpreting results and communicating that information with the patient/family/caregiver, and care coordination  Follow Up   No follow-ups on file.  Patient was given instructions and counseling regarding his condition or for  health maintenance advice. Please see specific information pulled into the AVS if appropriate.     Temo Wheat MD

## 2023-09-26 NOTE — PROGRESS NOTES
Chief complaint: TIA    Patient ID: Flo Manzo is a 68 y.o. male.    HPI: I had the pleasure of seeing your patient again today.  As you may know he is a 68-year-old gentleman here for the management of TIA.  He does have a history of stroke with subsequent right upper and lower extremity hemiparesis.  He does have a history of atrial fibrillation and is on Eliquis.  He had onset of worsening weakness on the right side during a hospitalization in July.  However his symptoms spontaneously resolved.  He was back to baseline upon discharge.  He was continued on Eliquis, Lipitor and his other medications as prescribed.  Since discharge he denies any worsening symptoms.  He continues to have some issues with balance and gait due to chronic peripheral neuropathy.  He uses a walker to assist with ambulation.  No new symptoms neurologically.  No recent falls.    The following portions of the patient's history were reviewed and updated as appropriate: allergies, current medications, past family history, past medical history, past social history, past surgical history and problem list.    Review of Systems   Neurological:  Negative for dizziness, tremors, seizures, syncope, facial asymmetry, speech difficulty, weakness, light-headedness, numbness and headaches.   Psychiatric/Behavioral:  Negative for agitation, behavioral problems, confusion, decreased concentration, dysphoric mood, hallucinations, self-injury, sleep disturbance and suicidal ideas. The patient is not nervous/anxious and is not hyperactive.     I have reviewed the review of systems above performed by my medical assistant.      Vitals:    09/26/23 1426   BP: 138/78   Pulse: 90   SpO2: 92%       Neurologic Exam     Mental Status   Oriented to person, place, and time.   Concentration: normal.   Level of consciousness: alert  Knowledge: consistent with education (No deficits found.).     Cranial Nerves     CN II   Visual fields full to confrontation.     CN  III, IV, VI   Pupils are equal, round, and reactive to light.  Extraocular motions are normal.   CN III: no CN III palsy  CN VI: no CN VI palsy    CN V   Facial sensation intact.     CN VII   Facial expression full, symmetric.     CN VIII   CN VIII normal.     CN IX, X   CN IX normal.   CN X normal.     CN XI   CN XI normal.     CN XII   CN XII normal.     Motor Exam     Strength   Right neck flexion: 5/5  Left neck flexion: 5/5  Right neck extension: 5/5  Left neck extension: 5/5  Right deltoid: 5/5  Left deltoid: 5/5  Right biceps: 5/5  Left biceps: 5/5  Right triceps: 5/5  Left triceps: 5/5  Right wrist flexion: 5/5  Left wrist flexion: 5/5  Right wrist extension: 5/5  Left wrist extension: 5/5  Right interossei: 5/5  Left interossei: 5/5  Right abdominals: 5/5  Left abdominals: 5/5  Right iliopsoas: 5/5  Left iliopsoas: 5/5  Right quadriceps: 5/5  Left quadriceps: 5/5  Right hamstrin/5  Left hamstrin/5  Right glutei: 5/5  Left glutei: 5/5  Right anterior tibial: 5/5  Left anterior tibial: 5/5  Right posterior tibial: 5/5  Left posterior tibial: 5/5  Right peroneal: 5/5  Left peroneal: 5/5  Right gastroc: 5/5  Left gastroc: 5/5    Sensory Exam   Light touch normal.   Right leg vibration: decreased from toes  Left leg vibration: decreased from toes    Gait, Coordination, and Reflexes     Gait  Gait: wide-based    Reflexes   Right brachioradialis: 2+  Left brachioradialis: 2+  Right biceps: 2+  Left biceps: 2+  Right triceps: 2+  Left triceps: 2+  Right patellar: 0  Left patellar: 0  Right achilles: 0  Left achilles: 0  Right : 2+  Left : 2+Station is normal.     Physical Exam  Vitals reviewed.   Constitutional:       Appearance: He is well-developed.   HENT:      Head: Normocephalic and atraumatic.   Eyes:      Extraocular Movements: EOM normal.      Pupils: Pupils are equal, round, and reactive to light.   Cardiovascular:      Rate and Rhythm: Normal rate and regular rhythm.   Pulmonary:       Breath sounds: Normal breath sounds.   Musculoskeletal:         General: Normal range of motion.   Skin:     General: Skin is warm.   Neurological:      Mental Status: He is oriented to person, place, and time.      Deep Tendon Reflexes:      Reflex Scores:       Tricep reflexes are 2+ on the right side and 2+ on the left side.       Bicep reflexes are 2+ on the right side and 2+ on the left side.       Brachioradialis reflexes are 2+ on the right side and 2+ on the left side.       Patellar reflexes are 0 on the right side and 0 on the left side.       Achilles reflexes are 0 on the right side and 0 on the left side.      Procedures    Assessment/Plan: He seems to be at his baseline currently.  We will forego further neurodiagnostic testing.  Continue the process of anticoagulation chronically with Eliquis.  We did talk about adequate blood pressure control as well as continuing the Lipitor.  We will see him here on an as-needed basis.  A total of 20 minutes was spent face-to-face with the patient today.  Of that greater than 50% of this time was spent discussing signs and symptoms of TIA, patient education, plan of care and prognosis.         Diagnoses and all orders for this visit:    1. TIA (transient ischemic attack) (Primary)           Jax Wilkins II, MD

## 2023-10-03 ENCOUNTER — OFFICE VISIT (OUTPATIENT)
Dept: INTERNAL MEDICINE | Age: 68
End: 2023-10-03
Payer: MEDICARE

## 2023-10-03 VITALS
SYSTOLIC BLOOD PRESSURE: 118 MMHG | WEIGHT: 235 LBS | OXYGEN SATURATION: 93 % | HEART RATE: 96 BPM | HEIGHT: 70 IN | TEMPERATURE: 97.6 F | DIASTOLIC BLOOD PRESSURE: 76 MMHG | BODY MASS INDEX: 33.64 KG/M2

## 2023-10-03 DIAGNOSIS — I50.32 CHRONIC DIASTOLIC CONGESTIVE HEART FAILURE: Primary | ICD-10-CM

## 2023-10-03 DIAGNOSIS — M79.2 NEUROPATHIC PAIN: ICD-10-CM

## 2023-10-03 DIAGNOSIS — Z23 ENCOUNTER FOR IMMUNIZATION: ICD-10-CM

## 2023-10-03 DIAGNOSIS — I10 ESSENTIAL HYPERTENSION: ICD-10-CM

## 2023-10-03 LAB
BUN SERPL-MCNC: 14 MG/DL (ref 8–23)
BUN/CREAT SERPL: 14.7 (ref 7–25)
CALCIUM SERPL-MCNC: 9.6 MG/DL (ref 8.6–10.5)
CHLORIDE SERPL-SCNC: 105 MMOL/L (ref 98–107)
CO2 SERPL-SCNC: 28 MMOL/L (ref 22–29)
CREAT SERPL-MCNC: 0.95 MG/DL (ref 0.76–1.27)
EGFRCR SERPLBLD CKD-EPI 2021: 87.7 ML/MIN/1.73
GLUCOSE SERPL-MCNC: 92 MG/DL (ref 65–99)
POTASSIUM SERPL-SCNC: 4 MMOL/L (ref 3.5–5.2)
SODIUM SERPL-SCNC: 143 MMOL/L (ref 136–145)

## 2023-10-03 NOTE — LETTER
[facility]  Vaccine Consent Form    Patient Name:  Flo Manzo  Patient :  1955     Vaccine(s) Ordered    Fluzone High-Dose 65+yrs ([id number])        Screening Checklist  The following questions should be completed prior to vaccination. If you answer “yes” to any question, it does not necessarily mean you should not be vaccinated. It just means we may need to clarify or ask more questions. If a question is unclear, please ask your healthcare provider to explain it.    Yes No   Any fever or moderate to severe illness today (mild illness and/or antibiotic treatment are not contraindications)?     Do you have a history of a serious reaction to any previous vaccinations, such as anaphylaxis, encephalopathy within 7 days, Guillain-[place] syndrome within 6 weeks, seizure?     Have you received any live vaccine(s) in the past month (MMR, AUGUSTO)?     Do you have an anaphylactic allergy to latex (DTaP, DTaP-IPV, Hep A, Hep B, MenB, RV, Td, Tdap), baker’s yeast (Hep B, HPV), or gelatin (AUGUSTO, MMR)?     Do you have an anaphylactic allergy to neomycin (Rabies, AUGUSTO, MMR, IPV, Hep A), polymyxin B (IPV), or streptomycin (IPV)?      Any cancer, leukemia, AIDS, or other immune system disorder? ([place]O, MMR, RV)     Do you have a parent, brother, or sister with an immune system problem (if immune competence of vaccine recipient clinically verified, can proceed)? (MMR, AUGUSTO)     Any recent steroid treatments for >2 weeks, chemotherapy, or radiation treatment? (AUGUSTO, MMR)     Have you received antibody-containing blood transfusions or IVIG in the past 11 months (recommended interval is dependent on product)? (MMR, AUGUSTO)     Have you taken antiviral drugs (acyclovir, famciclovir, valacyclovir) in the last 24 or 48 hours, respectively (AUGUSTO)?      Are you pregnant or planning to become pregnant within 1 month? (AUGUSTO, MMR, HPV, IPV, MenB; For hep B- refer to Engerix-B)     For infants, have you ever been told your child has had  intussusception or a medical emergency involving obstruction of the intestine (RV)? If not for an infant, can skip this question.         *Ordering Physician/APC should be consulted if “yes” is checked by the patient or guardian above.      I have received, read, and understand the Vaccine Information Statement (VIS) for each vaccine ordered above.  I have considered my health status as well as the health status of my close contacts.  I have taken the opportunity to discuss my vaccine questions with my health care provider.   I have requested that the ordered vaccine(s) be given to me.  I understand the benefits and risks of the vaccines.  I understand that I should remain in the clinic for 15 minutes after receiving the vaccine(s).  _________________________________________________________  Signature of Patient or Parent/Legal Guardian ____________________  Date

## 2023-10-27 ENCOUNTER — OFFICE VISIT (OUTPATIENT)
Dept: SLEEP MEDICINE | Facility: HOSPITAL | Age: 68
End: 2023-10-27
Payer: MEDICARE

## 2023-10-27 VITALS
OXYGEN SATURATION: 97 % | SYSTOLIC BLOOD PRESSURE: 115 MMHG | BODY MASS INDEX: 33.64 KG/M2 | DIASTOLIC BLOOD PRESSURE: 72 MMHG | HEART RATE: 109 BPM | HEIGHT: 70 IN | WEIGHT: 235 LBS

## 2023-10-27 DIAGNOSIS — G47.33 OSA ON CPAP: Primary | ICD-10-CM

## 2023-10-27 PROCEDURE — G0463 HOSPITAL OUTPT CLINIC VISIT: HCPCS

## 2023-10-27 PROCEDURE — 93296 REM INTERROG EVL PM/IDS: CPT | Performed by: INTERNAL MEDICINE

## 2023-10-27 PROCEDURE — 93294 REM INTERROG EVL PM/LDLS PM: CPT | Performed by: INTERNAL MEDICINE

## 2023-10-27 NOTE — PROGRESS NOTES
"  [facility]  [address]  [address]  [place] [address]  Phone   Fax      Flo Manzo  [phone]   1955  68 y.o.  male      PCP:Karen Jiménez APRN    Type of service: Initial New Patient Office Visit  Date of service: 10/27/2023          CHIEF COMPLAINT: Obstructive sleep apnea      HISTORY OF PRESENT ILLNESS:  Flo Manzo 68 y.o.  presents to the clinic today as a new patient to me and was seen to establish care for treatment and management of obstructive sleep apnea.  He was previously followed by Dr. Dixon, lost to care after his 12/11/2019 follow-up visit.  At that time his AHI was 11.8/h.  His previous home sleep study 6/2019 showed moderate obstructive sleep apnea with AHI of 20/h.  He received a new [employer] DreamStation 2 auto CPAP after the recall. He reports the ramp was not turned on so he was not able to use it.  We also discussed that it may take some time to readjust to PAP use after being off of it for a while.        PATIENT HISTORY:  Sleep schedule:  Bedtime: 11 PM to midnight  Wake time: 8 AM  Average hours of sleep: 8-10    Past medical history: (Relevant to sleep medicine)  Hypertension  Diastolic CHF  History of stroke      Social history:  Do you drive a commercial vehicle:  No   Shift work:  No   Tobacco use:  No  Alcohol use:  0 per week  Caffeinated drinks: 2 sodas per day  Occupation:       Family history (parents, siblings, children) (relevant to sleep medicine):  Thyroid disorder in mother    Medications: reviewed     ALLERGIES: Poison ivy extract        REVIEW OF SYSTEMS:  Full review of systems available on the intake form which is scanned in the media tab.  The relevant positives are noted below:  [place] Sleepiness Scale: Total score: 5             PHYSICAL EXAM:  Vitals:    10/27/23 1426   BP: 115/72   Pulse: 109   SpO2: 97%   Weight: 107 kg (235 lb)   Height: 177.8 cm (70\")    Body mass index is 33.72 " kg/m². Neck Circumference: 18.75 inches  HEAD: atraumatic, normocephalic  NECK: Neck Circumference: 18.75 inches, trachea is in the midline  RESPIRATORY SYSTEM: Respirations even, unlabored, normal rate  EXTREMITES: No cyanosis or clubbing  NEUROLOGICAL SYSTEM: Alert and oriented x 3    Office notes from care team reviewed. Office note(s) reviewed: 8/10/23, 8/17/2023 cardiology note      Labs/ Test Results Reviewed:  TSH          2/3/2023    12:40 5/8/2023    11:57   TSH   TSH 0.012  0.163       Most Recent A1C          2/3/2023    12:40   HGBA1C Most Recent   Hemoglobin A1C 5.60               ASSESSMENT AND PLAN:   Obstructive Sleep Apnea (G 47.33): He got new DreamStation auto CPAP from the recall.  He is only used it once in the past 90 days as of 8/8/2023 which is the last download we have.  He does not believe he has really used it since then either.  He reports that this is because the ramp was not turned on.  Minimum pressure set at a 6 and maximum pressure set at a 12.  A flex is set at 2.  We will turn the ramp on for 30 minutes at 4 cm H2O which is the pressure he was previous at for his ramp.  He will follow-up in 4 to 8 weeks or call sooner for issues or concerns.  Have placed supply order as well.  He is currently using nasal pillow mask and reports he does not do mouth breathing.  Obesity: Body mass index is 33.72 kg/m².. Patients who are overweight or obese are at increased risk of sleep apnea/ sleep disordered breathing. Weight reduction and healthy lifestyle are encouraged in overweight/ obese patients as part of a comprehensive approach to sleep apnea treatment.    Chronic diastolic CHF  Hypertension  Aortic stenosis  Cardiomyopathy  Pacemaker  Atrial fibrillation    Patient will follow-up in 4 to 8 weeks or sooner for issues or concerns.  Patient's questions were answered.        Thank you for allowing me to participate in the care of this patient.    Yisel Breen DNP, APRN  [facility] Sleep  Medicine

## 2023-10-30 DIAGNOSIS — I50.32 DIASTOLIC CHF, CHRONIC: Chronic | ICD-10-CM

## 2023-10-30 DIAGNOSIS — I10 ESSENTIAL HYPERTENSION: Chronic | ICD-10-CM

## 2023-10-30 RX ORDER — FUROSEMIDE 20 MG/1
20 TABLET ORAL DAILY
Qty: 90 TABLET | Refills: 1 | Status: SHIPPED | OUTPATIENT
Start: 2023-10-30 | End: 2024-04-27

## 2023-11-06 RX ORDER — APIXABAN 5 MG/1
5 TABLET, FILM COATED ORAL EVERY 12 HOURS
Qty: 60 TABLET | Refills: 3 | Status: SHIPPED | OUTPATIENT
Start: 2023-11-06

## 2023-11-06 RX ORDER — AMLODIPINE BESYLATE 10 MG/1
10 TABLET ORAL
Qty: 90 TABLET | Refills: 1 | Status: SHIPPED | OUTPATIENT
Start: 2023-11-06

## 2023-11-13 ENCOUNTER — OFFICE VISIT (OUTPATIENT)
Dept: INTERNAL MEDICINE | Age: 68
End: 2023-11-13
Payer: MEDICARE

## 2023-11-13 ENCOUNTER — TELEPHONE (OUTPATIENT)
Dept: INTERNAL MEDICINE | Age: 68
End: 2023-11-13

## 2023-11-13 VITALS
DIASTOLIC BLOOD PRESSURE: 86 MMHG | SYSTOLIC BLOOD PRESSURE: 110 MMHG | BODY MASS INDEX: 33.5 KG/M2 | WEIGHT: 234 LBS | HEIGHT: 70 IN | HEART RATE: 98 BPM | TEMPERATURE: 97.6 F | OXYGEN SATURATION: 96 %

## 2023-11-13 DIAGNOSIS — Z00.00 ENCOUNTER FOR ANNUAL WELLNESS VISIT (AWV) IN MEDICARE PATIENT: Primary | ICD-10-CM

## 2023-11-13 DIAGNOSIS — F40.240 CLAUSTROPHOBIA: ICD-10-CM

## 2023-11-13 DIAGNOSIS — Z86.73 HISTORY OF STROKE: ICD-10-CM

## 2023-11-13 DIAGNOSIS — I10 ESSENTIAL HYPERTENSION: Chronic | ICD-10-CM

## 2023-11-13 DIAGNOSIS — E78.00 PURE HYPERCHOLESTEROLEMIA: ICD-10-CM

## 2023-11-13 DIAGNOSIS — E05.90 SUBCLINICAL HYPERTHYROIDISM: ICD-10-CM

## 2023-11-13 DIAGNOSIS — I50.32 DIASTOLIC CHF, CHRONIC: Chronic | ICD-10-CM

## 2023-11-13 LAB
ALBUMIN SERPL-MCNC: 4.3 G/DL (ref 3.5–5.2)
ALBUMIN/GLOB SERPL: 1.5 G/DL
ALP SERPL-CCNC: 119 U/L (ref 39–117)
ALT SERPL-CCNC: 17 U/L (ref 1–41)
AST SERPL-CCNC: 15 U/L (ref 1–40)
BILIRUB SERPL-MCNC: 0.8 MG/DL (ref 0–1.2)
BUN SERPL-MCNC: 11 MG/DL (ref 8–23)
BUN/CREAT SERPL: 11.5 (ref 7–25)
CALCIUM SERPL-MCNC: 9.7 MG/DL (ref 8.6–10.5)
CHLORIDE SERPL-SCNC: 103 MMOL/L (ref 98–107)
CHOLEST SERPL-MCNC: 117 MG/DL (ref 0–200)
CHOLEST/HDLC SERPL: 2.17 {RATIO}
CO2 SERPL-SCNC: 27.6 MMOL/L (ref 22–29)
CREAT SERPL-MCNC: 0.96 MG/DL (ref 0.76–1.27)
EGFRCR SERPLBLD CKD-EPI 2021: 86.6 ML/MIN/1.73
GLOBULIN SER CALC-MCNC: 2.8 GM/DL
GLUCOSE SERPL-MCNC: 103 MG/DL (ref 65–99)
HDLC SERPL-MCNC: 54 MG/DL (ref 40–60)
LDLC SERPL CALC-MCNC: 52 MG/DL (ref 0–100)
POTASSIUM SERPL-SCNC: 3.7 MMOL/L (ref 3.5–5.2)
PROT SERPL-MCNC: 7.1 G/DL (ref 6–8.5)
SODIUM SERPL-SCNC: 139 MMOL/L (ref 136–145)
T4 FREE SERPL-MCNC: 1.1 NG/DL (ref 0.93–1.7)
TRIGL SERPL-MCNC: 46 MG/DL (ref 0–150)
TSH SERPL DL<=0.005 MIU/L-ACNC: 0.2 UIU/ML (ref 0.27–4.2)
VLDLC SERPL CALC-MCNC: 11 MG/DL (ref 5–40)

## 2023-11-13 RX ORDER — ALPRAZOLAM 0.25 MG/1
TABLET ORAL
Qty: 2 TABLET | Refills: 0 | Status: SHIPPED | OUTPATIENT
Start: 2023-11-13

## 2023-11-13 RX ORDER — FUROSEMIDE 20 MG/1
20 TABLET ORAL DAILY
Qty: 90 TABLET | Refills: 1 | Status: SHIPPED | OUTPATIENT
Start: 2023-11-13 | End: 2024-05-11

## 2023-11-13 NOTE — PROGRESS NOTES
I N T E R N A L  M E D I C I N E  MILTON BRENNER, ASHANTI      ENCOUNTER DATE:  11/13/2023    Flo Manzo / 68 y.o. / male        MEDICARE ANNUAL WELLNESS VISIT       Chief Complaint: Medicare Wellness-subsequent       Patient's general assessment of his health since a year ago:     - Compared to one year ago, he feels his physical health is slightly better.    - Compared to one year ago, he feels his mental health is about the same without significant change.      HPI for other active medical problems:     Afib with RVR, CHF, aortic stenosis, pacemaker: Followed by cardiology, Dr. Bruce.  On Eliquis 5 mg BID.   BP is well controlled at today's visit at today's visit on amlodipine 10 mg daily, lisinopril 20 mg daily, metoprolol succinate tartrate 25 mg BID.  Weight remains stable.  Denies chest pain.  Stable shortness of breath with exertion.  August 2023 ECHO was EF of 46-50%, moderate aortic valve stenosis.      Followed by thoracic surgery, Dr. Burciaga, for thyroid goiter.   March 17, 2023 CT Chest showed goiter with bilateral substernal extension and corresponding mass effect, including narrowing of adjacent trachea, which is similar to CT of June 22, 2022.  He is not a candidate for surgical resection so is monitored yearly as surveillance.  CT Chest is next scheduled on March 21, 2024 as follow up.  Labs show a history of subclinical hyperthyroidism.  Scheduled to establish with endocrine, Dr. Marquis, on January 15, 2024 for lengthy history of subclinical hyperthyroidism.      He has not yet scheduled follow up with neurology, Dr. Wilkins, for history of hemorrhagic stroke, but plans to do so.    Followed by Oncology Dr. Her, and [facility], Dr. Rader for recently diagnosed prostate cancer.  Taking tamsulosin 0.4 mg nightly.  Plan is to undergo radiation  Prostate MRI is scheduled for this Thursday.    February 2023 Hemoglobin A1C was normal, 5.6, with prior readings within prediabetes  range.     HLD: Remains on atorvastatin 40 mg nightly.  May 2023 Lipid panel with ; triglycerides 58.     Anxiety/ depression: Mood is stable on buspirone 10 mg BID, citalopram 20 mg daily.  No SI/ HI.      Remains on gabapentin 100 mg TID daily with benefit for bilateral hand neuropathy.      GÓMEZ: Now followed by sleep medicine and using CPAP regularly.      He is aware that colonoscopy is due, and would like to have it done with Dr. Solitario.  Last colonoscopy was in January 2018, with finding of 3 tubular adenomas and repeat due in 3 years.  Referral was placed, but he has not yet scheduled.      HISTORY       Recent Hospitalizations:    Recent hospitalization?: NO    If YES, location, date, and diagnoses:     Location:   Date:   Principle Discharge Dx:   Secondary Dx:       Patient Care Team:    Patient Care Team:  Karen Jiménez APRN as PCP - General (Family Medicine)  Eren Bruce MD as Consulting Physician (Cardiology)  Temo Wheat MD as Consulting Physician (Radiation Oncology)  Bryant Rader MD as Consulting Physician (Urology)  Cindi Breen DNP, APRN as Nurse Practitioner (Nurse Practitioner)      Allergies:  Poison ivy extract    Medications:  Current Outpatient Medications on File Prior to Visit   Medication Sig Dispense Refill    acetaminophen (TYLENOL) 325 MG tablet Take 2 tablets by mouth Every 4 (Four) Hours As Needed for Mild Pain . 120 tablet 3    albuterol sulfate  (90 Base) MCG/ACT inhaler Inhale 2 puffs Every 4 (Four) Hours As Needed for Wheezing. 18 g 3    amLODIPine (NORVASC) 10 MG tablet TAKE 1 TABLET BY MOUTH DAILY 90 tablet 1    atorvastatin (LIPITOR) 40 MG tablet Take 1 tablet by mouth Every Night. 90 tablet 0    busPIRone (BUSPAR) 10 MG tablet Take 1 tablet by mouth 2 (Two) Times a Day. 180 tablet 1    citalopram (CeleXA) 20 MG tablet Take 1 tablet by mouth Daily. (Patient taking differently: Take 1.5 tablets by mouth Daily. PT TAKING 1.5  TABLETS DAILY) 135 tablet 1    Diclofenac Sodium (Voltaren) 1 % gel gel Apply 4 g topically to the appropriate area as directed 4 (Four) Times a Day. 50 g 0    Eliquis 5 MG tablet tablet TAKE 1 TABLET BY MOUTH EVERY 12 HOURS 60 tablet 3    lisinopril (PRINIVIL,ZESTRIL) 20 MG tablet TAKE 1 TABLET BY MOUTH DAILY 90 tablet 1    metoprolol tartrate (LOPRESSOR) 25 MG tablet Take 1.5 tablets by mouth 2 (Two) Times a Day. 90 tablet 5    omeprazole (priLOSEC) 20 MG capsule TAKE 1 CAPSULE BY MOUTH DAILY 90 capsule 1    tamsulosin (FLOMAX) 0.4 MG capsule 24 hr capsule Take 1 capsule by mouth Every Night. 90 capsule 1    trimethoprim-polymyxin b (POLYTRIM) 06718-9.1 UNIT/ML-% ophthalmic solution Administer 1 drop to both eyes Every 6 (Six) Hours. 10 mL 0    [DISCONTINUED] DULoxetine (CYMBALTA) 30 MG capsule TAKE 1 CAPSULE BY MOUTH EVERY NIGHT 90 capsule 1    [DISCONTINUED] metoprolol succinate XL (TOPROL-XL) 25 MG 24 hr tablet Take 1 tablet by mouth Daily. 30 tablet 0    [DISCONTINUED] QUEtiapine (SEROquel) 25 MG tablet Take 1 tablet by mouth Every Night. Take 30 min before bed 30 tablet 0     No current facility-administered medications on file prior to visit.        PFSH:     The following portions of the patient's history were reviewed and updated as appropriate: Allergies / Current Medications / Past Medical History / Surgical History / Social History / Family History    Problem List:  Patient Active Problem List   Diagnosis    Umbilical hernia without obstruction and without gangrene    Essential hypertension    Arthritis of left knee    Depression    Obesity (BMI 30-39.9)    Atrial fibrillation with RVR    Substernal thyroid goiter    CHF (congestive heart failure)    Diastolic CHF, chronic    Hemorrhagic stroke    GÓMEZ on auto CPAP    Hypersomnia due to medical condition    Sleep-related hypoxia    Chronic atrial fibrillation    Vertigo    Aortic stenosis, severe    Sinus pause    Pacemaker    Anticoagulated     Generalized weakness    Prediabetes    Pure hypercholesterolemia       Past Medical History:  Past Medical History:   Diagnosis Date    Arthritis     Atrial fibrillation with RVR 01/24/2019    Colon polyps 01/04/2018    Hepatic flexure: tubular adenoma, only low-grade dysplasia; splenic flexure: tubular adenoma, only low-grade dysplasia; sigmoid colon: tubular adenoma, multiple fragments only low-grade dysplasia    Depression     Heart murmur     Hemorrhagic stroke 01/29/2019    Hypertension     New onset atrial fibrillation (CMS/HCC) - RVR 1/24/2019    GÓMEZ (obstructive sleep apnea) 06/06/2019    Home sleep study with moderate severity GÓMEZ, AHI 20 events per hour.  Sleep-related hypoxia with low O2 saturation 84% for 14 minutes.    Sleep apnea     previously diagnosed with sleep apnea, had T&A done and it has since resolved     Stroke     Uncontrolled hypertension     Ventral hernia        Past Surgical History:  Past Surgical History:   Procedure Laterality Date    APPENDECTOMY N/A 1972    BACK SURGERY      BLADDER STIMULATOR IMPLANT L LOWER BACK    CARDIAC CATHETERIZATION N/A 07/20/2004    Cath left ventriculography, coronary angiography and left heart catheterization, Normal results-Dr. Fierro     CARDIAC CATHETERIZATION N/A 1/29/2019    Procedure: Left Heart Cath;  Surgeon: Eren Bruce MD;  Location: [facility] CATH INVASIVE LOCATION;  Service: Cardiology    CARDIAC CATHETERIZATION N/A 1/29/2019    Procedure: Left ventriculography;  Surgeon: Eren Bruce MD;  [facility];  Service: Cardiology    CARDIAC CATHETERIZATION N/A 1/29/2019    Procedure: Coronary angiography;  Surgeon: Eren Bruce MD;  Location: [facility] CATH INVASIVE LOCATION;  Service: Cardiology    CARDIAC ELECTROPHYSIOLOGY PROCEDURE N/A 3/4/2022    Procedure: Pacemaker SC new BIOTRONIK;  Surgeon: Eren Bruce MD;  Location: [facility] CATH INVASIVE LOCATION;  Service: Cardiology;   Laterality: N/A;    CARPAL TUNNEL RELEASE Right 2013    CARPAL TUNNEL RELEASE Left     COLONOSCOPY N/A 1/4/2018    Procedure: COLONOSCOPY with cold polypectomy and hot snare polypectomy;  Surgeon: Ten Solitario MD;  Location: [facility] ENDOSCOPY;  Service:     EYE LENS IMPLANT SECONDARY Bilateral 2007, 2008    KNEE CARTILAGE SURGERY Right 1979    TONSILLECTOMY AND ADENOIDECTOMY Bilateral 2005    TOTAL KNEE ARTHROPLASTY Left 03/14/2016    Left total knee arthroplasty with Amberly component, size 11 femur, G tibial baseplate with a 10 polyethylene insert and a 38 patellar button-Dr. Pablo Hairston    TOTAL KNEE ARTHROPLASTY Right 03/02/2015    Right total knee arthroplasty with Amberly component size G femur, 11 tibial base plate with an 11 polyethylene insert and a 38 patellar button-Dr. Pablo Hairston    UVULOPALATOPHARYNGOPLASTY N/A 2005    part of soft palate, and uvula    VENTRAL HERNIA REPAIR N/A 1/23/2018    Procedure: VENTRAL HERNIA REPAIR LAPAROSCOPIC WITH DAVINCI ROBOT AND MESH;  Surgeon: Ten Solitario MD;  Location: [facility];  Service:        Social History:  Social History     Socioeconomic History    Marital status:    Tobacco Use    Smoking status: Never     Passive exposure: Past    Smokeless tobacco: Never   Vaping Use    Vaping Use: Never used   Substance and Sexual Activity    Alcohol use: Not Currently     Comment: social, maybe a drink a month, not since stroke    Drug use: No     Comment: previously smoked marijuana     Sexual activity: Defer       Family History:  Family History   Problem Relation Age of Onset    Hypertension Mother     Kidney failure Mother     Coronary artery disease Father     Lung cancer Father         positive smoker    Heart attack Father     Breast cancer Sister 60    Prostate cancer Brother     Colon polyps Brother     Kidney nephrosis Brother     Malig Hyperthermia Neg Hx          PATIENT ASSESSMENT     Vitals:  Vitals:    11/13/23 1000   BP: 110/86  "  Pulse: 98   Temp: 97.6 °F (36.4 °C)   SpO2: 96%   Weight: 106 kg (234 lb)   Height: 177.8 cm (70\")       BP Readings from Last 3 Encounters:   11/16/23 120/81   11/13/23 110/86   10/27/23 115/72     Wt Readings from Last 3 Encounters:   11/16/23 106 kg (233 lb)   11/13/23 106 kg (234 lb)   10/27/23 107 kg (235 lb)      Body mass index is 33.58 kg/m².    [unfilled]        Review of Systems:    Review of Systems   Constitutional:  Negative for chills, fever and unexpected weight change.   Respiratory:  Negative for cough, chest tightness and shortness of breath.    Cardiovascular:  Negative for chest pain, palpitations and leg swelling.   Neurological:  Negative for dizziness, weakness, light-headedness and headaches.   Psychiatric/Behavioral:  The patient is not nervous/anxious.        Physical Exam:    Physical Exam  Vitals reviewed.   Constitutional:       General: He is not in acute distress.     Appearance: Normal appearance. He is not ill-appearing, toxic-appearing or diaphoretic.   HENT:      Head: Normocephalic and atraumatic.      Right Ear: Tympanic membrane, ear canal and external ear normal. There is no impacted cerumen.      Left Ear: Tympanic membrane, ear canal and external ear normal. There is no impacted cerumen.      Nose: Nose normal. No congestion or rhinorrhea.      Mouth/Throat:      Mouth: Mucous membranes are moist.      Pharynx: Oropharynx is clear. No oropharyngeal exudate or posterior oropharyngeal erythema.   Eyes:      Extraocular Movements: Extraocular movements intact.      Conjunctiva/sclera: Conjunctivae normal.      Pupils: Pupils are equal, round, and reactive to light.   Cardiovascular:      Rate and Rhythm: Normal rate and regular rhythm.      Heart sounds: Murmur heard.   Pulmonary:      Effort: Pulmonary effort is normal. No respiratory distress.      Breath sounds: Normal breath sounds.   Abdominal:      General: Bowel sounds are normal.      Palpations: Abdomen is soft.      " "Tenderness: There is no abdominal tenderness.   Musculoskeletal:         General: Normal range of motion.      Cervical back: Normal range of motion and neck supple.      Right lower leg: No edema.      Left lower leg: No edema.   Lymphadenopathy:      Cervical: No cervical adenopathy.   Skin:     General: Skin is warm and dry.   Neurological:      General: No focal deficit present.      Mental Status: He is alert and oriented to person, place, and time. Mental status is at baseline.   Psychiatric:         Mood and Affect: Mood normal.         Behavior: Behavior normal.         Thought Content: Thought content normal.         Judgment: Judgment normal.           Reviewed Data:    Labs:   Lab Results   Component Value Date     11/13/2023    K 3.7 11/13/2023    CALCIUM 9.7 11/13/2023    AST 15 11/13/2023    ALT 17 11/13/2023    BUN 11 11/13/2023    CREATININE 0.90 11/16/2023    CREATININE 0.96 11/13/2023    CREATININE 0.95 10/03/2023    EGFRIFNONA 85 08/27/2021    EGFRIFAFRI 103 08/27/2021       Lab Results   Component Value Date    HGBA1C 5.60 02/03/2023    HGBA1C 5.70 (H) 07/31/2022       Lab Results   Component Value Date    LDL 52 11/13/2023     (H) 05/08/2023     (H) 04/28/2022    HDL 54 11/13/2023    TRIG 46 11/13/2023    CHOLHDLRATIO 2.17 11/13/2023       Lab Results   Component Value Date    TSH 0.196 (L) 11/13/2023    FREET4 1.10 11/13/2023          Lab Results   Component Value Date    WBC 8.65 02/03/2023    HGB 15.5 02/03/2023    HGB 15.0 11/01/2022    HGB 15.0 08/03/2022     02/03/2023                 No results found for: \"PSA\"    Imaging:          Medical Tests:          Screening for Glaucoma:  Previous screening for glaucoma?: No, plans to schedule      Hearing Loss Screen:  Finger Rub Hearing Test (right ear): passed  Finger Rub Hearing Test (left ear): passed      Urinary Incontinence Screen:  Episodes of urinary incontinence? : Yes      Depression Screen:      7/28/2023    "  9:43 AM   PHQ-2/PHQ-9 Depression Screening   Little Interest or Pleasure in Doing Things 0-->not at all   Feeling Down, Depressed or Hopeless 0-->not at all   Trouble Falling or Staying Asleep, or Sleeping Too Much 0-->not at all   Feeling Tired or Having Little Energy 0-->not at all   Poor Appetite or Overeating 0-->not at all   Feeling Bad about Yourself - or that You are a Failure or Have Let Yourself or Your Family Down 0-->not at all   Trouble Concentrating on Things, Such as Reading the Newspaper or Watching Television 0-->not at all   Moving or Speaking So Slowly that Other People Could Have Noticed? Or the Opposite - Being So Fidgety 0-->not at all   Thoughts that You Would be Better Off Dead or of Hurting Yourself in Some Way 0-->not at all   PHQ-9: Brief Depression Severity Measure Score 0   If You Checked Off Any Problems, How Difficult Have These Problems Made It For You to Do Your Work, Take Care of Things at Home, or Get Along with Other People? not difficult at all        PHQ-2: 0 (Not depressed)    PHQ-9: 0 (Negative screening for depression)       FUNCTIONAL, FALL RISK, & COGNITIVE SCREENING (Components below):    DATA:        11/13/2023    10:00 AM   Functional & Cognitive Status   Do you have difficulty preparing food and eating? No   Do you have difficulty bathing yourself, getting dressed or grooming yourself? No   Do you have difficulty using the toilet? No   Do you have difficulty moving around from place to place? No   Do you have trouble with steps or getting out of a bed or a chair? No   Current Diet Well Balanced Diet   Dental Exam Not up to date   Eye Exam Not up to date   Exercise (times per week) 0 times per week   Current Exercises Include No Regular Exercise   Do you need help using the phone?  No   Are you deaf or do you have serious difficulty hearing?  No   Do you need help to go to places out of walking distance? No   Do you need help shopping? No   Do you need help preparing  meals?  No   Do you need help with housework?  No   Do you need help with laundry? No   Do you need help taking your medications? No   Do you need help managing money? No   Do you ever drive or ride in a car without wearing a seat belt? No   Do you have difficulty concentrating, remembering or making decisions? No         A) Assessment of Functional Ability:  (Assessment of ability to perform ADL's (showering/bathing, using toilet, dressing, feeding self, moving self around) and IADL's (use telephone, shop, prepare food, housekeep, do laundry, transport independently, take medications independently, and handle finances)    Degree of functional impairment: MODERATE (based on assessment noted above)      B) Assessment of Fall Risk:  Fall Risk Assessment was completed, and patient is at low risk for falls.       Need for further evaluation of gait, strength, and balance? : No    Timed Up and Go (TUG):   (>= 12 seconds indicates high risk for falling)    Observable abnormalities included: using assistive device properly (walker)       C. Assessment of Cognitive Function:    Mini-Cog Test:     1) Registration (3 objects): Yes   2) Number of objects recalled: 3   3) Clock Draw: Passed? : N/A       Further evaluation required? : No        COUNSELING       A. Identification of Health Risk Factors:    Risk factors include: cardiovascular risk factors      B. Age-Appropriate Screening Schedule:  (Refer to the list below for future screening recommendations based on patient's age, sex and/or medical conditions. Orders for these recommended tests are listed in the plan section. The patient has been provided with a written plan)    Health Maintenance Topics  Health Maintenance   Topic Date Due    BMI FOLLOWUP  03/27/2024    ANNUAL WELLNESS VISIT  11/13/2024    LIPID PANEL  11/13/2024    COLORECTAL CANCER SCREENING  01/04/2028    TDAP/TD VACCINES (3 - Td or Tdap) 03/27/2033    COVID-19 Vaccine  Completed    INFLUENZA VACCINE   Completed    Pneumococcal Vaccine 65+  Completed    HEPATITIS C SCREENING  Addressed    ZOSTER VACCINE  Addressed       Health Maintenance Topics Due or Over-Due  There are no preventive care reminders to display for this patient.        C. Advanced Care Planning:    Advance Care Planning   ACP discussion was held with the patient during this visit. Patient does not have an advance directive, information provided.       D. Patient Self-Management and Personalized Health Advice:    He has been provided with personalized counseling/information (including brochures/handouts) about:     -- optimizing diet/nutrition plans, improving exercise / conditioning, weight management, reducing risk for cardiac/vascular events with pre-existing disease, and managing depression/anxiety      He has been recommended for the following preventative services which has been performed today, will be ordered today or ordered/performed on upcoming follow-up visit:     -- has hx of prostate cancer and is monitored by urologist/here with annual PSA, **COLORECTAL cancer screening (colonoscopy/Cologuard discussed or ordered), COVID-19 vaccination (and/or booster) administered/recommended/discussed, RSV vaccination administered/recommended/discussed, DIABETES screening performed (current/reviewed labs/lab ordered)      E. Miscellaneous Items:    -Aspirin use counseling: Does not need ASA (and currently is not on it)    -Discussed BMI with him. The BMI is above average; BMI management plan is completed (discussed plans for weight loss)    -Reviewed use of high risk medication in the elderly: YES    -Reviewed for potential of harmful drug interactions in the elderly: YES        WRAP UP       Assessment & Plan:    1) MEDICARE ANNUAL WELLNESS VISIT    2) OTHER MEDICAL CONDITIONS ADDRESSED TODAY:            Problem List Items Addressed This Visit       Essential hypertension (Chronic)    Relevant Medications    lisinopril (PRINIVIL,ZESTRIL) 20 MG  tablet    metoprolol tartrate (LOPRESSOR) 25 MG tablet    atorvastatin (LIPITOR) 40 MG tablet    amLODIPine (NORVASC) 10 MG tablet    furosemide (LASIX) 20 MG tablet    Diastolic CHF, chronic (Chronic)    Relevant Medications    metoprolol tartrate (LOPRESSOR) 25 MG tablet    amLODIPine (NORVASC) 10 MG tablet    furosemide (LASIX) 20 MG tablet    Pure hypercholesterolemia    Relevant Medications    atorvastatin (LIPITOR) 40 MG tablet    Other Relevant Orders    Comprehensive Metabolic Panel (Completed)    Lipid Panel With / Chol / HDL Ratio (Completed)     Other Visit Diagnoses       Encounter for annual wellness visit (AWV) in Medicare patient    -  Primary    Subclinical hyperthyroidism        Relevant Orders    TSH+Free T4 (Completed)    History of stroke        Relevant Orders    Ambulatory Referral to Neurology                      Orders Placed This Encounter   Procedures    Comprehensive Metabolic Panel    Lipid Panel With / Chol / HDL Ratio    TSH+Free T4    Ambulatory Referral to Neurology       Discussion / Summary:    He will follow up closely with specialists as scheduled.      Encouraged to schedule colonoscopy.    Medications as of TODAY:              Current Outpatient Medications   Medication Sig Dispense Refill    acetaminophen (TYLENOL) 325 MG tablet Take 2 tablets by mouth Every 4 (Four) Hours As Needed for Mild Pain . 120 tablet 3    albuterol sulfate  (90 Base) MCG/ACT inhaler Inhale 2 puffs Every 4 (Four) Hours As Needed for Wheezing. 18 g 3    amLODIPine (NORVASC) 10 MG tablet TAKE 1 TABLET BY MOUTH DAILY 90 tablet 1    atorvastatin (LIPITOR) 40 MG tablet Take 1 tablet by mouth Every Night. 90 tablet 0    busPIRone (BUSPAR) 10 MG tablet Take 1 tablet by mouth 2 (Two) Times a Day. 180 tablet 1    citalopram (CeleXA) 20 MG tablet Take 1 tablet by mouth Daily. (Patient taking differently: Take 1.5 tablets by mouth Daily. PT TAKING 1.5 TABLETS DAILY) 135 tablet 1    Diclofenac Sodium  (Voltaren) 1 % gel gel Apply 4 g topically to the appropriate area as directed 4 (Four) Times a Day. 50 g 0    Eliquis 5 MG tablet tablet TAKE 1 TABLET BY MOUTH EVERY 12 HOURS 60 tablet 3    furosemide (LASIX) 20 MG tablet Take 1 tablet by mouth Daily for 180 days. 90 tablet 1    lisinopril (PRINIVIL,ZESTRIL) 20 MG tablet TAKE 1 TABLET BY MOUTH DAILY 90 tablet 1    metoprolol tartrate (LOPRESSOR) 25 MG tablet Take 1.5 tablets by mouth 2 (Two) Times a Day. 90 tablet 5    omeprazole (priLOSEC) 20 MG capsule TAKE 1 CAPSULE BY MOUTH DAILY 90 capsule 1    tamsulosin (FLOMAX) 0.4 MG capsule 24 hr capsule Take 1 capsule by mouth Every Night. 90 capsule 1    trimethoprim-polymyxin b (POLYTRIM) 37616-9.1 UNIT/ML-% ophthalmic solution Administer 1 drop to both eyes Every 6 (Six) Hours. 10 mL 0    ALPRAZolam (Xanax) 0.25 MG tablet Take 1 tablet 30 min prior to MRI, may repeat once if needed 2 tablet 0    COVID-19 mRNA Vac-Aida,Pfizer, 30 MCG/0.3ML suspension Inject  into the appropriate muscle as directed by prescriber. 0.3 mL 0    gabapentin (NEURONTIN) 100 MG capsule Take 1 capsule by mouth 3 (Three) Times a Day. 90 capsule 0    RSVPreF3 Vac Recomb Adjuvanted (Arexvy) 120 MCG/0.5ML reconstituted suspension injection Inject  into the appropriate muscle as directed by prescriber. 1 mL 0     No current facility-administered medications for this visit.         FOLLOW-UP:            Return in about 4 months (around 3/13/2024) for Chronic care, then 1 year AWV.                 Future Appointments   Date Time Provider Department Center   11/21/2023 11:00 AM Temo Wheat MD [facility] None   12/1/2023  2:15 PM Cindi Breen, DNP, APRN [facility] None   12/27/2023  9:00 AM MGWILMA MOYART SPT [facility] DEVICE CHECK [facility]   1/15/2024  2:00 PM Nael Marquis DO [facility]  [facility]   1/18/2024 11:45 AM Eren Bruce MD [facility]   3/13/2024 12:45 PM Karen Jiménez APRN [facility]  [facility]   3/21/2024 10:45 AM [facility] CT  3  [facility] CT [facility]   3/28/2024  3:00 PM Iraj Kerns MD [facility]           After Visit Summary (AVS) including the Personalized Prevention  Plan Services (PPPS) was either printed and given to the patient at check-out today and/or sent to [facility] for review.       @[facility]@

## 2023-11-13 NOTE — TELEPHONE ENCOUNTER
"Caller: Flo Manzo \"Danyel\"    Relationship to patient: Self    Best call back number: [phone]    Patient is needing: PATIENT SAID HE DOES NOT HAVE THE PRESCRIPTION FOR ANXIETY AT HOME HE NEEDS TO BE ABLE TO DO HIS MRI    "

## 2023-11-14 DIAGNOSIS — M79.2 NEUROPATHIC PAIN: ICD-10-CM

## 2023-11-14 RX ORDER — GABAPENTIN 100 MG/1
100 CAPSULE ORAL 3 TIMES DAILY
Qty: 90 CAPSULE | Refills: 0 | Status: SHIPPED | OUTPATIENT
Start: 2023-11-14

## 2023-11-16 ENCOUNTER — HOSPITAL ENCOUNTER (OUTPATIENT)
Dept: MRI IMAGING | Facility: HOSPITAL | Age: 68
Discharge: HOME | End: 2023-11-16
Admitting: STUDENT IN AN ORGANIZED HEALTH CARE EDUCATION/TRAINING PROGRAM
Payer: MEDICARE

## 2023-11-16 ENCOUNTER — TELEPHONE (OUTPATIENT)
Dept: INTERVENTIONAL RADIOLOGY/VASCULAR | Facility: HOSPITAL | Age: 68
End: 2023-11-16
Payer: MEDICARE

## 2023-11-16 VITALS
RESPIRATION RATE: 18 BRPM | DIASTOLIC BLOOD PRESSURE: 81 MMHG | WEIGHT: 233 LBS | HEART RATE: 96 BPM | TEMPERATURE: 98.4 F | OXYGEN SATURATION: 94 % | HEIGHT: 70 IN | SYSTOLIC BLOOD PRESSURE: 120 MMHG | BODY MASS INDEX: 33.36 KG/M2

## 2023-11-16 LAB — CREAT BLDA-MCNC: 0.9 MG/DL (ref 0.6–1.3)

## 2023-11-16 PROCEDURE — 0 GADOBENATE DIMEGLUMINE 529 MG/ML SOLUTION: Performed by: STUDENT IN AN ORGANIZED HEALTH CARE EDUCATION/TRAINING PROGRAM

## 2023-11-16 PROCEDURE — A9577 INJ MULTIHANCE: HCPCS | Performed by: STUDENT IN AN ORGANIZED HEALTH CARE EDUCATION/TRAINING PROGRAM

## 2023-11-16 PROCEDURE — 72197 MRI PELVIS W/O & W/DYE: CPT

## 2023-11-16 PROCEDURE — 82565 ASSAY OF CREATININE: CPT

## 2023-11-16 RX ADMIN — GADOBENATE DIMEGLUMINE 20 ML: 529 INJECTION, SOLUTION INTRAVENOUS at 13:48

## 2023-11-20 ENCOUNTER — TELEPHONE (OUTPATIENT)
Dept: RADIATION ONCOLOGY | Facility: HOSPITAL | Age: 68
End: 2023-11-20
Payer: MEDICARE

## 2023-11-21 ENCOUNTER — OFFICE VISIT (OUTPATIENT)
Dept: RADIATION ONCOLOGY | Facility: HOSPITAL | Age: 68
End: 2023-11-21
Payer: MEDICARE

## 2023-11-21 ENCOUNTER — HOSPITAL ENCOUNTER (OUTPATIENT)
Dept: RADIATION ONCOLOGY | Facility: HOSPITAL | Age: 68
Setting detail: RADIATION/ONCOLOGY SERIES
End: 2023-11-21
Payer: MEDICARE

## 2023-11-21 VITALS
BODY MASS INDEX: 33.58 KG/M2 | HEART RATE: 94 BPM | WEIGHT: 234 LBS | DIASTOLIC BLOOD PRESSURE: 96 MMHG | SYSTOLIC BLOOD PRESSURE: 136 MMHG | OXYGEN SATURATION: 92 %

## 2023-11-21 DIAGNOSIS — C61 PROSTATE CANCER: Primary | ICD-10-CM

## 2023-11-21 PROCEDURE — G0463 HOSPITAL OUTPT CLINIC VISIT: HCPCS

## 2023-11-22 NOTE — PROGRESS NOTES
[facility] Radiation Oncology   Reevaluation    Chief Complaint  Favorable intermediate risk prostate cancer         Diagnosis: Favorable intermediate risk prostate cancer        AUA: 17/35  UQOL: 3  IIEF: 23/25     Pacemaker: yes  Prior History of Radiation: no  Contraindications to Radiation: no  Patient Requires Pregnancy Test: No, patient is male      HPI:    Flo Manzo is a 68 y.o. male with a complex past medical history including A-fib, hemorrhagic stroke, aortic stenosis, pacemaker who was a patient of Dr. Moses due to urge incontinence and urinary frequency who was found to have an elevated PSA.  The patient underwent TRUS prostate biopsy on 5/25/2023 which demonstrated favorable intermediate risk prostate cancer.  The patient had a decipher score however that was 0.82 and high risk.  He was not felt to be a good surgical candidate and was referred for consideration of definitive radiation treatment.     I originally met with the patient in late July and had work to get the patient to undergo MRI prostate to assess his disease burden.  This process was quite difficult due to the patient having a electronic bladder stimulator which caused delays.  Ultimately, the patient underwent MRI prostate on 11/16/2023 which showed a left peripheral zone lesion at the prostate base abutting the prostate capsule.  There were no other areas of concern or lymph nodes.  The patient returns for reevaluation and consideration of definitive treatment.        Imaging:    MRI Prostate 11/16/2023    IMPRESSION:  1.     1 cm PI-RADS 4 lesion within the left peripheral zone of the  prostatic base laterally. The lesion abuts the prostatic capsule over a  long segment and raises the possibility of extraprostatic extension  malignancy.  2.  Findings of hemorrhage within the left prostatic apex which limits  evaluation.  3.  Ill-defined T2 hyperintense material along the inferior aspect of  the scrotum only seen on a few  coronal large field-of-view images. While  findings may represent material external to the patient, findings are  indeterminant and correlation with patient history and physical exam is  recommended to exclude underlying mass/neoplasm arising from this area.  4.  Other findings as above.      Pathology:     TRUS Prostate Biopsy Pathology 5/25/2023  Full Report Available in Media Tab  9 Cores Negative, 2 Cores GG1, 1 Core GG2  Single GG2 Core in Left [place], 25% involvement  Deciper 0.82 High Risk    Labs:    Lab Results   Component Value Date    CREATININE 0.90 11/16/2023           Problem List:  Patient Active Problem List   Diagnosis    Umbilical hernia without obstruction and without gangrene    Essential hypertension    Arthritis of left knee    Depression    Obesity (BMI 30-39.9)    Atrial fibrillation with RVR    Substernal thyroid goiter    CHF (congestive heart failure)    Diastolic CHF, chronic    Hemorrhagic stroke    GÓMEZ on auto CPAP    Hypersomnia due to medical condition    Sleep-related hypoxia    Chronic atrial fibrillation    Vertigo    Aortic stenosis, severe    Sinus pause    Pacemaker    Anticoagulated    Generalized weakness    Prediabetes    Pure hypercholesterolemia          Medications:  Current Outpatient Medications on File Prior to Visit   Medication Sig Dispense Refill    acetaminophen (TYLENOL) 325 MG tablet Take 2 tablets by mouth Every 4 (Four) Hours As Needed for Mild Pain . 120 tablet 3    albuterol sulfate  (90 Base) MCG/ACT inhaler Inhale 2 puffs Every 4 (Four) Hours As Needed for Wheezing. 18 g 3    ALPRAZolam (Xanax) 0.25 MG tablet Take 1 tablet 30 min prior to MRI, may repeat once if needed 2 tablet 0    amLODIPine (NORVASC) 10 MG tablet TAKE 1 TABLET BY MOUTH DAILY 90 tablet 1    atorvastatin (LIPITOR) 40 MG tablet Take 1 tablet by mouth Every Night. 90 tablet 0    busPIRone (BUSPAR) 10 MG tablet Take 1 tablet by mouth 2 (Two) Times a Day. 180 tablet 1    citalopram  (CeleXA) 20 MG tablet Take 1 tablet by mouth Daily. (Patient taking differently: Take 1.5 tablets by mouth Daily. PT TAKING 1.5 TABLETS DAILY) 135 tablet 1    COVID-19 mRNA Vac-Aida,Pfizer, 30 MCG/0.3ML suspension Inject  into the appropriate muscle as directed by prescriber. 0.3 mL 0    Diclofenac Sodium (Voltaren) 1 % gel gel Apply 4 g topically to the appropriate area as directed 4 (Four) Times a Day. 50 g 0    Eliquis 5 MG tablet tablet TAKE 1 TABLET BY MOUTH EVERY 12 HOURS 60 tablet 3    furosemide (LASIX) 20 MG tablet Take 1 tablet by mouth Daily for 180 days. 90 tablet 1    gabapentin (NEURONTIN) 100 MG capsule Take 1 capsule by mouth 3 (Three) Times a Day. 90 capsule 0    lisinopril (PRINIVIL,ZESTRIL) 20 MG tablet TAKE 1 TABLET BY MOUTH DAILY 90 tablet 1    metoprolol tartrate (LOPRESSOR) 25 MG tablet Take 1.5 tablets by mouth 2 (Two) Times a Day. 90 tablet 5    omeprazole (priLOSEC) 20 MG capsule TAKE 1 CAPSULE BY MOUTH DAILY 90 capsule 1    RSVPreF3 Vac Recomb Adjuvanted (Arexvy) 120 MCG/0.5ML reconstituted suspension injection Inject  into the appropriate muscle as directed by prescriber. 1 mL 0    tamsulosin (FLOMAX) 0.4 MG capsule 24 hr capsule Take 1 capsule by mouth Every Night. 90 capsule 1    trimethoprim-polymyxin b (POLYTRIM) 06949-3.1 UNIT/ML-% ophthalmic solution Administer 1 drop to both eyes Every 6 (Six) Hours. 10 mL 0    [DISCONTINUED] DULoxetine (CYMBALTA) 30 MG capsule TAKE 1 CAPSULE BY MOUTH EVERY NIGHT 90 capsule 1    [DISCONTINUED] metoprolol succinate XL (TOPROL-XL) 25 MG 24 hr tablet Take 1 tablet by mouth Daily. 30 tablet 0    [DISCONTINUED] QUEtiapine (SEROquel) 25 MG tablet Take 1 tablet by mouth Every Night. Take 30 min before bed 30 tablet 0     No current facility-administered medications on file prior to visit.          Allergies:  Allergies   Allergen Reactions    Poison Ivy Extract Hives, Itching and Rash     ITCHY BLISTERS         Family History:  The patient has no family  "history of conditions which would be contraindications to radiation therapy        Social History:  Never Smoker     Distance From Clinic: <30 minutes     Patient has someone who can assist with transportation: yes      Vital Signs:  /96   Pulse 94   Wt 106 kg (234 lb)   SpO2 92%   BMI 33.58 kg/m²   Estimated body mass index is 33.58 kg/m² as calculated from the following:    Height as of 11/16/23: 177.8 cm (70\").    Weight as of this encounter: 106 kg (234 lb).  Pain Score    11/21/23 1101   PainSc: 0-No pain         ECOG: Capable of only limited selfcare; confined to bed or chair more than 50% of waking hours = 3    Physical Exam  Vitals reviewed.   Constitutional:       General: He is not in acute distress.     Appearance: Normal appearance.   HENT:      Head: Normocephalic and atraumatic.   Eyes:      Extraocular Movements: Extraocular movements intact.      Pupils: Pupils are equal, round, and reactive to light.   Pulmonary:      Effort: Pulmonary effort is normal.   Abdominal:      General: Abdomen is flat.      Palpations: Abdomen is soft.   Musculoskeletal:      Cervical back: Normal range of motion.      Comments: Uses walker for mobility   Skin:     General: Skin is warm and dry.   Neurological:      General: No focal deficit present.      Mental Status: He is alert and oriented to person, place, and time.   Psychiatric:         Mood and Affect: Mood normal.         Behavior: Behavior normal.          Result Review :  The following data was reviewed by: Temo Wheat MD on 11/21/2023:  Labs: Last Creatinine   Data reviewed : Radiologic studies MRI Prostate             Diagnoses and all orders for this visit:    1. Prostate cancer (Primary)        Assessment:    Flo Manzo is a 68 y.o. male with a complex past medical history including A-fib, hemorrhagic stroke, aortic stenosis, pacemaker who was a patient of Dr. Moses due to urge incontinence and urinary frequency who was found to " have an elevated PSA.  The patient underwent TRUS prostate biopsy on 5/25/2023 which demonstrated favorable intermediate risk prostate cancer.  The patient had a decipher score however that was 0.82 and high risk.  He was not felt to be a good surgical candidate and was referred for consideration of definitive radiation treatment.     I originally met with the patient in late July and had work to get the patient to undergo MRI prostate to assess his disease burden.  This process was quite difficult due to the patient having a electronic bladder stimulator which caused delays.  Ultimately, the patient underwent MRI prostate on 11/16/2023 which showed a left peripheral zone lesion at the prostate base abutting the prostate capsule.  There were no other areas of concern or lymph nodes.  The patient returns for reevaluation and consideration of definitive treatment.      I met with the patient and discussed his work-up to date.  He has favorable intermediate risk prostate cancer with a high risk decipher score.  While the patient is 68 he is not a healthy 68 due to his previous stroke and other medical problems.  He uses a walker at baseline.  I discussed the risk, benefits, side effects, benefits of radiation treatment planning and delivery.  I discussed that if he were healthy it would be an easy decision to treat him given his high risk decipher score.  Given that he has other significant medical problems his case is more borderline.  I discussed that given that he has significant bladder issues at baseline that radiation may make the use significantly worse.  We would best to limit radiation to the bladder or rectum, and as we do not have to cassidy nodes his GI symptoms may be more limited.  Ultimately I offered the patient the treatment and leaning slightly towards treating him although it would be reasonable to not given his underlying medical issues.  It was the patient's wish to pursue definitive treatment.   Consents were signed.  Plan for CT simulation likely next week.    Plan:    -Plan for definitive radiation therapy for favorable intermediate risk prostate cancer with a high risk decipher score.  Consent signed.  We will reach out to patient to schedule CT simulation.       I spent 40 minutes caring for Flo on this date of service. This time includes time spent by me in the following activities:preparing for the visit, reviewing tests, obtaining and/or reviewing a separately obtained history, documenting information in the medical record, independently interpreting results and communicating that information with the patient/family/caregiver, and care coordination  Follow Up   No follow-ups on file.  Patient was given instructions and counseling regarding his condition or for health maintenance advice. Please see specific information pulled into the AVS if appropriate.     Temo Wheat MD

## 2023-11-29 PROCEDURE — 77334 RADIATION TREATMENT AID(S): CPT | Performed by: STUDENT IN AN ORGANIZED HEALTH CARE EDUCATION/TRAINING PROGRAM

## 2023-11-30 NOTE — PROGRESS NOTES
[facility]  [address]  [address]  [place] [address]  Phone   Fax         SLEEP CLINIC FOLLOW-UP PROGRESS NOTE    Flo Manzo  [id number]   1955  68 y.o.  male      PCP: Karen Jiménez APRN    DATE OF VISIT: 12/1/2023            CHIEF COMPLAINT: Obstructive sleep apnea    HPI:  This is a 68 y.o. year old patient who presents to the clinic today for the management of obstructive sleep apnea.  Patient had a(n) home sleep study in 2019 showing obstructive sleep apnea with AHI of 20/hr. he establish care with new provider in October.  He had received his new DreamStation 2 auto CPAP but needed pressure adjustment so had not been able to start using it.  Ramp was set at 4 cm H2O with minimum pressure of 6 and max pressure of 12 cm H2O with a flex at 2.  4 to 8-week follow-up/compliance check was recommended.      Lives with wife and stepson.  Wife recently got out of hospital.  [facility] is a big help to them.  Patient reports he is getting ready to start radiation therapy for prostate cancer.    He reports he has been doing really well since last visit with the CPAP.  He feels the pressures are perfect, does not need any changes.  Denies any condensation in hose or facemask.  Occasional runny nose which can make nasal pillow hard to tolerate.  We discussed fullface mask.  He really likes the nasal pillows.  He is going to try some nasal saline over-the-counter discuss further with PCP if symptoms continue.  I do not have a download to review today as he did not bring his card in.  We are trying to get report from [employer].  Ultimately he may need to run his card by the office if unable to get report from new DreamStation 2.      MEDICATIONS: reviewed     ALLERGIES:  Poison ivy extract    SOCIAL HISTORY (habits pertaining to sleep medicine):  History tobacco use: No   History of alcohol use: 0 per week  Caffeine use: 2     REVIEW OF SYSTEMS:   Pertinent positive  "symptoms are:  [place] Sleepiness Scale :Total score: 5   Nasal congestion  Postnasal drip        PHYSICAL EXAMINATION:  CONSTITUTIONAL:  Vitals:    12/01/23 1355   Pulse: 99   SpO2: 99%   Weight: 106 kg (234 lb 6.4 oz)   Height: 177.8 cm (70\")    Body mass index is 33.63 kg/m².   HEAD: atraumatic, normocephalic  RESP SYSTEM: not in respiratory distress, breathing unlabored  CARDIOVASULAR: normal rate, no edema noted   NEURO: Alert and oriented x 3, mood and affect appeared appropriate      DATA REVIEWED:  No data to review today, unfortunately.  Try to get copy of report from [employer] and if unable to then he will run his card by the office.  Will make addendum to report and determine timing of next follow-up based on download data to review.  Mask type: Nasal pillow  Device: DreamStation 2--- new after recall  DME: [employer]          ASSESSMENT AND PLAN:  Obstructive Sleep Apnea (G 47.33): sleep apnea symptoms have improved with the device and treatment. Without adequate treatment of sleep apnea and without good compliance, patient would be at increased risk of hypertension, diabetes mellitus, pulmonary hypertension, atrial fibrillation, stroke, and nonrestorative sleep with hypersomnia which could increase risk of motor vehicle accidents. The patient is also instructed to get the supplies from the DME [employer] and and change them on a regular basis.  A prescription for supplies has been sent to the DME [employer].  I have also discussed with this patient the importance of good sleep hygiene and getting an adequate amount of sleep to aid in overall good health. Awaiting download data.  Will make addendum to report once received.    Obesity: Body mass index is 33.63 kg/m².. Patients who are overweight or obese are at increased risk of sleep apnea/ sleep disordered breathing. Weight reduction and healthy lifestyle are encouraged in overweight/ obese patients as part of a comprehensive approach to sleep apnea treatment.  "   Chronic diastolic CHF  Hypertension  Aortic stenosis  Cardiomyopathy  Pacemaker  Atrial fibrillation      Patient will follow-up to be determined based on results of download data.  Patient's questions were answered.          Thank you for allowing me to participate in the care of this patient.     Yisel Breen DNP, [facility] Sleep Medicine

## 2023-12-01 ENCOUNTER — OFFICE VISIT (OUTPATIENT)
Dept: SLEEP MEDICINE | Facility: HOSPITAL | Age: 68
End: 2023-12-01
Payer: MEDICARE

## 2023-12-01 VITALS — HEART RATE: 99 BPM | HEIGHT: 70 IN | OXYGEN SATURATION: 99 % | BODY MASS INDEX: 33.56 KG/M2 | WEIGHT: 234.4 LBS

## 2023-12-01 DIAGNOSIS — E66.9 CLASS 1 OBESITY WITH BODY MASS INDEX (BMI) OF 33.0 TO 33.9 IN ADULT, UNSPECIFIED OBESITY TYPE, UNSPECIFIED WHETHER SERIOUS COMORBIDITY PRESENT: ICD-10-CM

## 2023-12-01 DIAGNOSIS — G47.33 OSA ON CPAP: Primary | ICD-10-CM

## 2023-12-01 PROCEDURE — G0463 HOSPITAL OUTPT CLINIC VISIT: HCPCS

## 2023-12-07 ENCOUNTER — HOSPITAL ENCOUNTER (OUTPATIENT)
Dept: RADIATION ONCOLOGY | Facility: HOSPITAL | Age: 68
Discharge: HOME | End: 2023-12-07

## 2023-12-07 ENCOUNTER — HOSPITAL ENCOUNTER (OUTPATIENT)
Dept: RADIATION ONCOLOGY | Facility: HOSPITAL | Age: 68
Setting detail: RADIATION/ONCOLOGY SERIES
End: 2023-12-07
Payer: MEDICARE

## 2023-12-07 LAB
RAD ONC ARIA COURSE ID: NORMAL
RAD ONC ARIA COURSE LAST TREATMENT DATE: NORMAL
RAD ONC ARIA COURSE START DATE: NORMAL
RAD ONC ARIA COURSE TREATMENT ELAPSED DAYS: 0
RAD ONC ARIA FIRST TREATMENT DATE: NORMAL
RAD ONC ARIA PLAN FRACTIONS TREATED TO DATE: 1
RAD ONC ARIA PLAN ID: NORMAL
RAD ONC ARIA PLAN PRESCRIBED DOSE PER FRACTION: 3 GY
RAD ONC ARIA PLAN PRIMARY REFERENCE POINT: NORMAL
RAD ONC ARIA PLAN TOTAL FRACTIONS PRESCRIBED: 20
RAD ONC ARIA PLAN TOTAL PRESCRIBED DOSE: 6000 CGY
RAD ONC ARIA REFERENCE POINT DOSAGE GIVEN TO DATE: 3 GY
RAD ONC ARIA REFERENCE POINT ID: NORMAL
RAD ONC ARIA REFERENCE POINT SESSION DOSAGE GIVEN: 3 GY

## 2023-12-07 PROCEDURE — 77427 RADIATION TX MANAGEMENT X5: CPT | Performed by: STUDENT IN AN ORGANIZED HEALTH CARE EDUCATION/TRAINING PROGRAM

## 2023-12-07 PROCEDURE — 77385: CPT | Performed by: STUDENT IN AN ORGANIZED HEALTH CARE EDUCATION/TRAINING PROGRAM

## 2023-12-07 PROCEDURE — 77014 CHG CT GUIDANCE RADIATION THERAPY FLDS PLACEMENT: CPT | Performed by: STUDENT IN AN ORGANIZED HEALTH CARE EDUCATION/TRAINING PROGRAM

## 2023-12-08 ENCOUNTER — HOSPITAL ENCOUNTER (OUTPATIENT)
Dept: RADIATION ONCOLOGY | Facility: HOSPITAL | Age: 68
Discharge: HOME | End: 2023-12-08

## 2023-12-08 ENCOUNTER — TELEPHONE (OUTPATIENT)
Dept: SLEEP MEDICINE | Facility: HOSPITAL | Age: 68
End: 2023-12-08
Payer: MEDICARE

## 2023-12-08 LAB
RAD ONC ARIA COURSE ID: NORMAL
RAD ONC ARIA COURSE LAST TREATMENT DATE: NORMAL
RAD ONC ARIA COURSE START DATE: NORMAL
RAD ONC ARIA COURSE TREATMENT ELAPSED DAYS: 1
RAD ONC ARIA FIRST TREATMENT DATE: NORMAL
RAD ONC ARIA PLAN FRACTIONS TREATED TO DATE: 2
RAD ONC ARIA PLAN ID: NORMAL
RAD ONC ARIA PLAN PRESCRIBED DOSE PER FRACTION: 3 GY
RAD ONC ARIA PLAN PRIMARY REFERENCE POINT: NORMAL
RAD ONC ARIA PLAN TOTAL FRACTIONS PRESCRIBED: 20
RAD ONC ARIA PLAN TOTAL PRESCRIBED DOSE: 6000 CGY
RAD ONC ARIA REFERENCE POINT DOSAGE GIVEN TO DATE: 6 GY
RAD ONC ARIA REFERENCE POINT ID: NORMAL
RAD ONC ARIA REFERENCE POINT SESSION DOSAGE GIVEN: 3 GY

## 2023-12-08 PROCEDURE — 77014 CHG CT GUIDANCE RADIATION THERAPY FLDS PLACEMENT: CPT | Performed by: STUDENT IN AN ORGANIZED HEALTH CARE EDUCATION/TRAINING PROGRAM

## 2023-12-08 PROCEDURE — 77385: CPT | Performed by: STUDENT IN AN ORGANIZED HEALTH CARE EDUCATION/TRAINING PROGRAM

## 2023-12-08 NOTE — TELEPHONE ENCOUNTER
----- Message from Zuly Maya sent at 12/8/2023 10:39 AM EST -----  [employer] is not connected to him, called pt and he will bring by next week      ----- Message -----  From: Cindi Breen DNP, APRN  Sent: 12/8/2023  10:19 AM EST  To: Zuly Maya    Can we please try again to get patient's dreamstation 2 download from [employer]?   If unable to get download from DME company, he may need to run his card by the office at his convenience sometime within the next couple of weeks.         Thanks!

## 2023-12-11 ENCOUNTER — RADIATION ONCOLOGY WEEKLY ASSESSMENT (OUTPATIENT)
Dept: RADIATION ONCOLOGY | Facility: HOSPITAL | Age: 68
End: 2023-12-11
Payer: MEDICARE

## 2023-12-11 ENCOUNTER — HOSPITAL ENCOUNTER (OUTPATIENT)
Dept: RADIATION ONCOLOGY | Facility: HOSPITAL | Age: 68
Discharge: HOME | End: 2023-12-11
Payer: MEDICARE

## 2023-12-11 VITALS
HEART RATE: 83 BPM | SYSTOLIC BLOOD PRESSURE: 140 MMHG | DIASTOLIC BLOOD PRESSURE: 91 MMHG | BODY MASS INDEX: 33 KG/M2 | OXYGEN SATURATION: 94 % | WEIGHT: 230 LBS

## 2023-12-11 DIAGNOSIS — C61 PROSTATE CANCER: Primary | ICD-10-CM

## 2023-12-11 LAB
RAD ONC ARIA COURSE ID: NORMAL
RAD ONC ARIA COURSE LAST TREATMENT DATE: NORMAL
RAD ONC ARIA COURSE START DATE: NORMAL
RAD ONC ARIA COURSE TREATMENT ELAPSED DAYS: 4
RAD ONC ARIA FIRST TREATMENT DATE: NORMAL
RAD ONC ARIA PLAN FRACTIONS TREATED TO DATE: 3
RAD ONC ARIA PLAN ID: NORMAL
RAD ONC ARIA PLAN PRESCRIBED DOSE PER FRACTION: 3 GY
RAD ONC ARIA PLAN PRIMARY REFERENCE POINT: NORMAL
RAD ONC ARIA PLAN TOTAL FRACTIONS PRESCRIBED: 20
RAD ONC ARIA PLAN TOTAL PRESCRIBED DOSE: 6000 CGY
RAD ONC ARIA REFERENCE POINT DOSAGE GIVEN TO DATE: 9 GY
RAD ONC ARIA REFERENCE POINT ID: NORMAL
RAD ONC ARIA REFERENCE POINT SESSION DOSAGE GIVEN: 3 GY

## 2023-12-11 PROCEDURE — 77336 RADIATION PHYSICS CONSULT: CPT | Performed by: STUDENT IN AN ORGANIZED HEALTH CARE EDUCATION/TRAINING PROGRAM

## 2023-12-11 PROCEDURE — 77385: CPT | Performed by: STUDENT IN AN ORGANIZED HEALTH CARE EDUCATION/TRAINING PROGRAM

## 2023-12-11 PROCEDURE — 77014 CHG CT GUIDANCE RADIATION THERAPY FLDS PLACEMENT: CPT | Performed by: STUDENT IN AN ORGANIZED HEALTH CARE EDUCATION/TRAINING PROGRAM

## 2023-12-12 ENCOUNTER — HOSPITAL ENCOUNTER (OUTPATIENT)
Dept: RADIATION ONCOLOGY | Facility: HOSPITAL | Age: 68
Discharge: HOME | End: 2023-12-12

## 2023-12-12 LAB
RAD ONC ARIA COURSE ID: NORMAL
RAD ONC ARIA COURSE LAST TREATMENT DATE: NORMAL
RAD ONC ARIA COURSE START DATE: NORMAL
RAD ONC ARIA COURSE TREATMENT ELAPSED DAYS: 5
RAD ONC ARIA FIRST TREATMENT DATE: NORMAL
RAD ONC ARIA PLAN FRACTIONS TREATED TO DATE: 4
RAD ONC ARIA PLAN ID: NORMAL
RAD ONC ARIA PLAN PRESCRIBED DOSE PER FRACTION: 3 GY
RAD ONC ARIA PLAN PRIMARY REFERENCE POINT: NORMAL
RAD ONC ARIA PLAN TOTAL FRACTIONS PRESCRIBED: 20
RAD ONC ARIA PLAN TOTAL PRESCRIBED DOSE: 6000 CGY
RAD ONC ARIA REFERENCE POINT DOSAGE GIVEN TO DATE: 12 GY
RAD ONC ARIA REFERENCE POINT ID: NORMAL
RAD ONC ARIA REFERENCE POINT SESSION DOSAGE GIVEN: 3 GY

## 2023-12-12 PROCEDURE — 77014 CHG CT GUIDANCE RADIATION THERAPY FLDS PLACEMENT: CPT | Performed by: STUDENT IN AN ORGANIZED HEALTH CARE EDUCATION/TRAINING PROGRAM

## 2023-12-12 PROCEDURE — 77385: CPT | Performed by: STUDENT IN AN ORGANIZED HEALTH CARE EDUCATION/TRAINING PROGRAM

## 2023-12-12 NOTE — PROGRESS NOTES
Radiation Oncology  On-Treatment Note      Patient: Flo Manzo    MRN: [id number]    Attending Physician: Temo Wheat MD     Diagnosis:     ICD-10-CM ICD-9-CM   1. Prostate cancer  C61 185       Radiation Therapy Visit:  Continue radiation therapy, Dosimetry plan remains acceptable, Films reviewed and remains acceptable, Pain assessed, Pain management planned, Radiation dose schedule reviewed and remains acceptable, Radiation technique remains acceptable, and Symptoms within expected range    Radiation Treatments       Active   Plans   P+SV 60GY1   Most recent treatment: Dose planned: 300 cGy (fraction 3 on 12/11/2023)   Total: Dose planned: 6,000 cGy (20 fractions)   Elapsed Days: 4      Reference Points   PTV_60   Most recent treatment: Dose given: 300 cGy (on 12/11/2023)   Total: Dose given: 900 cGy   Elapsed Days: 4                      Physical Examination:  Vitals: Blood pressure 140/91, pulse 83, weight 104 kg (230 lb), SpO2 94%.  Pain Score    12/11/23 1517   PainSc: 0-No pain       Capable of only limited selfcare; confined to bed or chair more than 50% of waking hours = 3    We examined the relevant areas: yes  Findings are within the expected range for this stage of treatment: yes  -------------------------------------------------------------------------------------------------------------------    ACTION ITEMS:  Patient tolerating treatment well and as expected for this stage in their treatment and Continue radiation therapy as planned    Mild diarrhea controlled with imodium     Estimated Completion Date: 1/5/2024      Temo Wheat MD  Radiation Oncology

## 2023-12-13 ENCOUNTER — HOSPITAL ENCOUNTER (OUTPATIENT)
Dept: RADIATION ONCOLOGY | Facility: HOSPITAL | Age: 68
Discharge: HOME | End: 2023-12-13

## 2023-12-13 LAB
RAD ONC ARIA COURSE ID: NORMAL
RAD ONC ARIA COURSE LAST TREATMENT DATE: NORMAL
RAD ONC ARIA COURSE START DATE: NORMAL
RAD ONC ARIA COURSE TREATMENT ELAPSED DAYS: 6
RAD ONC ARIA FIRST TREATMENT DATE: NORMAL
RAD ONC ARIA PLAN FRACTIONS TREATED TO DATE: 5
RAD ONC ARIA PLAN ID: NORMAL
RAD ONC ARIA PLAN PRESCRIBED DOSE PER FRACTION: 3 GY
RAD ONC ARIA PLAN PRIMARY REFERENCE POINT: NORMAL
RAD ONC ARIA PLAN TOTAL FRACTIONS PRESCRIBED: 20
RAD ONC ARIA PLAN TOTAL PRESCRIBED DOSE: 6000 CGY
RAD ONC ARIA REFERENCE POINT DOSAGE GIVEN TO DATE: 15 GY
RAD ONC ARIA REFERENCE POINT ID: NORMAL
RAD ONC ARIA REFERENCE POINT SESSION DOSAGE GIVEN: 3 GY

## 2023-12-13 PROCEDURE — 77385: CPT | Performed by: STUDENT IN AN ORGANIZED HEALTH CARE EDUCATION/TRAINING PROGRAM

## 2023-12-13 PROCEDURE — 77014 CHG CT GUIDANCE RADIATION THERAPY FLDS PLACEMENT: CPT | Performed by: STUDENT IN AN ORGANIZED HEALTH CARE EDUCATION/TRAINING PROGRAM

## 2023-12-14 ENCOUNTER — HOSPITAL ENCOUNTER (OUTPATIENT)
Dept: RADIATION ONCOLOGY | Facility: HOSPITAL | Age: 68
Discharge: HOME | End: 2023-12-14

## 2023-12-14 LAB
RAD ONC ARIA COURSE ID: NORMAL
RAD ONC ARIA COURSE LAST TREATMENT DATE: NORMAL
RAD ONC ARIA COURSE START DATE: NORMAL
RAD ONC ARIA COURSE TREATMENT ELAPSED DAYS: 7
RAD ONC ARIA FIRST TREATMENT DATE: NORMAL
RAD ONC ARIA PLAN FRACTIONS TREATED TO DATE: 6
RAD ONC ARIA PLAN ID: NORMAL
RAD ONC ARIA PLAN PRESCRIBED DOSE PER FRACTION: 3 GY
RAD ONC ARIA PLAN PRIMARY REFERENCE POINT: NORMAL
RAD ONC ARIA PLAN TOTAL FRACTIONS PRESCRIBED: 20
RAD ONC ARIA PLAN TOTAL PRESCRIBED DOSE: 6000 CGY
RAD ONC ARIA REFERENCE POINT DOSAGE GIVEN TO DATE: 18 GY
RAD ONC ARIA REFERENCE POINT ID: NORMAL
RAD ONC ARIA REFERENCE POINT SESSION DOSAGE GIVEN: 3 GY

## 2023-12-14 PROCEDURE — 77385: CPT | Performed by: STUDENT IN AN ORGANIZED HEALTH CARE EDUCATION/TRAINING PROGRAM

## 2023-12-14 PROCEDURE — 77014 CHG CT GUIDANCE RADIATION THERAPY FLDS PLACEMENT: CPT | Performed by: STUDENT IN AN ORGANIZED HEALTH CARE EDUCATION/TRAINING PROGRAM

## 2023-12-14 PROCEDURE — 77427 RADIATION TX MANAGEMENT X5: CPT | Performed by: STUDENT IN AN ORGANIZED HEALTH CARE EDUCATION/TRAINING PROGRAM

## 2023-12-15 ENCOUNTER — TELEPHONE (OUTPATIENT)
Dept: SLEEP MEDICINE | Facility: HOSPITAL | Age: 68
End: 2023-12-15
Payer: MEDICARE

## 2023-12-15 ENCOUNTER — HOSPITAL ENCOUNTER (OUTPATIENT)
Dept: RADIATION ONCOLOGY | Facility: HOSPITAL | Age: 68
Discharge: HOME | End: 2023-12-15

## 2023-12-15 LAB
RAD ONC ARIA COURSE ID: NORMAL
RAD ONC ARIA COURSE LAST TREATMENT DATE: NORMAL
RAD ONC ARIA COURSE START DATE: NORMAL
RAD ONC ARIA COURSE TREATMENT ELAPSED DAYS: 8
RAD ONC ARIA FIRST TREATMENT DATE: NORMAL
RAD ONC ARIA PLAN FRACTIONS TREATED TO DATE: 7
RAD ONC ARIA PLAN ID: NORMAL
RAD ONC ARIA PLAN PRESCRIBED DOSE PER FRACTION: 3 GY
RAD ONC ARIA PLAN PRIMARY REFERENCE POINT: NORMAL
RAD ONC ARIA PLAN TOTAL FRACTIONS PRESCRIBED: 20
RAD ONC ARIA PLAN TOTAL PRESCRIBED DOSE: 6000 CGY
RAD ONC ARIA REFERENCE POINT DOSAGE GIVEN TO DATE: 21 GY
RAD ONC ARIA REFERENCE POINT ID: NORMAL
RAD ONC ARIA REFERENCE POINT SESSION DOSAGE GIVEN: 3 GY

## 2023-12-15 PROCEDURE — 77014 CHG CT GUIDANCE RADIATION THERAPY FLDS PLACEMENT: CPT | Performed by: STUDENT IN AN ORGANIZED HEALTH CARE EDUCATION/TRAINING PROGRAM

## 2023-12-15 PROCEDURE — 77385: CPT | Performed by: STUDENT IN AN ORGANIZED HEALTH CARE EDUCATION/TRAINING PROGRAM

## 2023-12-15 NOTE — TELEPHONE ENCOUNTER
CHANDLER--patient was previously followed by [facility], switching to [employer].  I have placed supply order but he has not heard anything.  Can you please see if they can reach out to him about getting updated supplies so he can continue to use device.  Can you also see if there is any way they can add him to our care  profile?  He received new DreamStation 2 auto CPAP after the recall.

## 2023-12-15 NOTE — TELEPHONE ENCOUNTER
Received patient's download.  AHI 8.1/h.  Low usage overall.  Recommended trying to increase usage to all night every night if possible, at least 4 hours per night if possible.  He declines any pressure adjustments at this time.  AHI improved from 2019 office visit we did discuss we would like to see this less than 5 if possible.  Still with more than 50% reduction though in AHI.  He is in the middle of radiation and will follow-up after this is completed.  He will call sooner for issues or concerns.  He has not heard anything about getting supplies.

## 2023-12-18 ENCOUNTER — HOSPITAL ENCOUNTER (OUTPATIENT)
Dept: RADIATION ONCOLOGY | Facility: HOSPITAL | Age: 68
Discharge: HOME | End: 2023-12-18
Payer: MEDICARE

## 2023-12-18 ENCOUNTER — RADIATION ONCOLOGY WEEKLY ASSESSMENT (OUTPATIENT)
Dept: RADIATION ONCOLOGY | Facility: HOSPITAL | Age: 68
End: 2023-12-18
Payer: MEDICARE

## 2023-12-18 VITALS
WEIGHT: 228.2 LBS | DIASTOLIC BLOOD PRESSURE: 99 MMHG | SYSTOLIC BLOOD PRESSURE: 137 MMHG | BODY MASS INDEX: 32.74 KG/M2 | HEART RATE: 94 BPM | OXYGEN SATURATION: 97 %

## 2023-12-18 DIAGNOSIS — C61 PROSTATE CANCER: Primary | ICD-10-CM

## 2023-12-18 LAB
RAD ONC ARIA COURSE ID: NORMAL
RAD ONC ARIA COURSE LAST TREATMENT DATE: NORMAL
RAD ONC ARIA COURSE START DATE: NORMAL
RAD ONC ARIA COURSE TREATMENT ELAPSED DAYS: 11
RAD ONC ARIA FIRST TREATMENT DATE: NORMAL
RAD ONC ARIA PLAN FRACTIONS TREATED TO DATE: 8
RAD ONC ARIA PLAN ID: NORMAL
RAD ONC ARIA PLAN PRESCRIBED DOSE PER FRACTION: 3 GY
RAD ONC ARIA PLAN PRIMARY REFERENCE POINT: NORMAL
RAD ONC ARIA PLAN TOTAL FRACTIONS PRESCRIBED: 20
RAD ONC ARIA PLAN TOTAL PRESCRIBED DOSE: 6000 CGY
RAD ONC ARIA REFERENCE POINT DOSAGE GIVEN TO DATE: 24 GY
RAD ONC ARIA REFERENCE POINT ID: NORMAL
RAD ONC ARIA REFERENCE POINT SESSION DOSAGE GIVEN: 3 GY

## 2023-12-18 PROCEDURE — 77385: CPT | Performed by: STUDENT IN AN ORGANIZED HEALTH CARE EDUCATION/TRAINING PROGRAM

## 2023-12-18 PROCEDURE — 77336 RADIATION PHYSICS CONSULT: CPT | Performed by: STUDENT IN AN ORGANIZED HEALTH CARE EDUCATION/TRAINING PROGRAM

## 2023-12-18 PROCEDURE — 77014 CHG CT GUIDANCE RADIATION THERAPY FLDS PLACEMENT: CPT | Performed by: STUDENT IN AN ORGANIZED HEALTH CARE EDUCATION/TRAINING PROGRAM

## 2023-12-18 NOTE — PROGRESS NOTES
Radiation Oncology  On-Treatment Note      Patient: Flo Manzo    MRN: [id number]    Attending Physician: Temo Wheat MD     Diagnosis:     ICD-10-CM ICD-9-CM   1. Prostate cancer  C61 185       Radiation Therapy Visit:  Continue radiation therapy, Dosimetry plan remains acceptable, Films reviewed and remains acceptable, Pain assessed, Pain management planned, Radiation dose schedule reviewed and remains acceptable, Radiation technique remains acceptable, and Symptoms within expected range    Radiation Treatments       Active   Plans   P+SV 60GY1   Most recent treatment: Dose planned: 300 cGy (fraction 8 on 12/18/2023)   Total: Dose planned: 6,000 cGy (20 fractions)   Elapsed Days: 11      Reference Points   PTV_60   Most recent treatment: Dose given: 300 cGy (on 12/18/2023)   Total: Dose given: 2,400 cGy   Elapsed Days: 11                      Physical Examination:  Vitals: Blood pressure 137/99, pulse 94, weight 104 kg (228 lb 3.2 oz), SpO2 97%.  Pain Score    12/18/23 1438   PainSc: 0-No pain       Capable of only limited selfcare; confined to bed or chair more than 50% of waking hours = 3    We examined the relevant areas: yes  Findings are within the expected range for this stage of treatment: yes  -------------------------------------------------------------------------------------------------------------------    ACTION ITEMS:  Patient tolerating treatment well and as expected for this stage in their treatment and Continue radiation therapy as planned    Estimated Completion Date: 1/5/2023      Temo Wheat MD  Radiation Oncology

## 2023-12-19 ENCOUNTER — HOSPITAL ENCOUNTER (OUTPATIENT)
Dept: RADIATION ONCOLOGY | Facility: HOSPITAL | Age: 68
Discharge: HOME | End: 2023-12-19

## 2023-12-19 LAB
RAD ONC ARIA COURSE ID: NORMAL
RAD ONC ARIA COURSE LAST TREATMENT DATE: NORMAL
RAD ONC ARIA COURSE START DATE: NORMAL
RAD ONC ARIA COURSE TREATMENT ELAPSED DAYS: 12
RAD ONC ARIA FIRST TREATMENT DATE: NORMAL
RAD ONC ARIA PLAN FRACTIONS TREATED TO DATE: 9
RAD ONC ARIA PLAN ID: NORMAL
RAD ONC ARIA PLAN PRESCRIBED DOSE PER FRACTION: 3 GY
RAD ONC ARIA PLAN PRIMARY REFERENCE POINT: NORMAL
RAD ONC ARIA PLAN TOTAL FRACTIONS PRESCRIBED: 20
RAD ONC ARIA PLAN TOTAL PRESCRIBED DOSE: 6000 CGY
RAD ONC ARIA REFERENCE POINT DOSAGE GIVEN TO DATE: 27 GY
RAD ONC ARIA REFERENCE POINT ID: NORMAL
RAD ONC ARIA REFERENCE POINT SESSION DOSAGE GIVEN: 3 GY

## 2023-12-19 PROCEDURE — 77385: CPT | Performed by: STUDENT IN AN ORGANIZED HEALTH CARE EDUCATION/TRAINING PROGRAM

## 2023-12-19 PROCEDURE — 77014 CHG CT GUIDANCE RADIATION THERAPY FLDS PLACEMENT: CPT | Performed by: STUDENT IN AN ORGANIZED HEALTH CARE EDUCATION/TRAINING PROGRAM

## 2023-12-20 ENCOUNTER — HOSPITAL ENCOUNTER (OUTPATIENT)
Dept: RADIATION ONCOLOGY | Facility: HOSPITAL | Age: 68
Discharge: HOME | End: 2023-12-20

## 2023-12-20 LAB
RAD ONC ARIA COURSE ID: NORMAL
RAD ONC ARIA COURSE LAST TREATMENT DATE: NORMAL
RAD ONC ARIA COURSE START DATE: NORMAL
RAD ONC ARIA COURSE TREATMENT ELAPSED DAYS: 13
RAD ONC ARIA FIRST TREATMENT DATE: NORMAL
RAD ONC ARIA PLAN FRACTIONS TREATED TO DATE: 10
RAD ONC ARIA PLAN ID: NORMAL
RAD ONC ARIA PLAN PRESCRIBED DOSE PER FRACTION: 3 GY
RAD ONC ARIA PLAN PRIMARY REFERENCE POINT: NORMAL
RAD ONC ARIA PLAN TOTAL FRACTIONS PRESCRIBED: 20
RAD ONC ARIA PLAN TOTAL PRESCRIBED DOSE: 6000 CGY
RAD ONC ARIA REFERENCE POINT DOSAGE GIVEN TO DATE: 30 GY
RAD ONC ARIA REFERENCE POINT ID: NORMAL
RAD ONC ARIA REFERENCE POINT SESSION DOSAGE GIVEN: 3 GY

## 2023-12-20 PROCEDURE — 77385: CPT | Performed by: STUDENT IN AN ORGANIZED HEALTH CARE EDUCATION/TRAINING PROGRAM

## 2023-12-20 PROCEDURE — 77014 CHG CT GUIDANCE RADIATION THERAPY FLDS PLACEMENT: CPT | Performed by: STUDENT IN AN ORGANIZED HEALTH CARE EDUCATION/TRAINING PROGRAM

## 2023-12-21 ENCOUNTER — HOSPITAL ENCOUNTER (OUTPATIENT)
Dept: RADIATION ONCOLOGY | Facility: HOSPITAL | Age: 68
Discharge: HOME | End: 2023-12-21

## 2023-12-21 LAB
RAD ONC ARIA COURSE ID: NORMAL
RAD ONC ARIA COURSE LAST TREATMENT DATE: NORMAL
RAD ONC ARIA COURSE START DATE: NORMAL
RAD ONC ARIA COURSE TREATMENT ELAPSED DAYS: 14
RAD ONC ARIA FIRST TREATMENT DATE: NORMAL
RAD ONC ARIA PLAN FRACTIONS TREATED TO DATE: 11
RAD ONC ARIA PLAN ID: NORMAL
RAD ONC ARIA PLAN PRESCRIBED DOSE PER FRACTION: 3 GY
RAD ONC ARIA PLAN PRIMARY REFERENCE POINT: NORMAL
RAD ONC ARIA PLAN TOTAL FRACTIONS PRESCRIBED: 20
RAD ONC ARIA PLAN TOTAL PRESCRIBED DOSE: 6000 CGY
RAD ONC ARIA REFERENCE POINT DOSAGE GIVEN TO DATE: 33 GY
RAD ONC ARIA REFERENCE POINT ID: NORMAL
RAD ONC ARIA REFERENCE POINT SESSION DOSAGE GIVEN: 3 GY

## 2023-12-21 PROCEDURE — 77385: CPT | Performed by: STUDENT IN AN ORGANIZED HEALTH CARE EDUCATION/TRAINING PROGRAM

## 2023-12-21 PROCEDURE — 77014 CHG CT GUIDANCE RADIATION THERAPY FLDS PLACEMENT: CPT | Performed by: STUDENT IN AN ORGANIZED HEALTH CARE EDUCATION/TRAINING PROGRAM

## 2023-12-21 PROCEDURE — 77427 RADIATION TX MANAGEMENT X5: CPT | Performed by: STUDENT IN AN ORGANIZED HEALTH CARE EDUCATION/TRAINING PROGRAM

## 2023-12-22 ENCOUNTER — HOSPITAL ENCOUNTER (OUTPATIENT)
Dept: RADIATION ONCOLOGY | Facility: HOSPITAL | Age: 68
Discharge: HOME | End: 2023-12-22

## 2023-12-22 LAB
RAD ONC ARIA COURSE ID: NORMAL
RAD ONC ARIA COURSE LAST TREATMENT DATE: NORMAL
RAD ONC ARIA COURSE START DATE: NORMAL
RAD ONC ARIA COURSE TREATMENT ELAPSED DAYS: 15
RAD ONC ARIA FIRST TREATMENT DATE: NORMAL
RAD ONC ARIA PLAN FRACTIONS TREATED TO DATE: 12
RAD ONC ARIA PLAN ID: NORMAL
RAD ONC ARIA PLAN PRESCRIBED DOSE PER FRACTION: 3 GY
RAD ONC ARIA PLAN PRIMARY REFERENCE POINT: NORMAL
RAD ONC ARIA PLAN TOTAL FRACTIONS PRESCRIBED: 20
RAD ONC ARIA PLAN TOTAL PRESCRIBED DOSE: 6000 CGY
RAD ONC ARIA REFERENCE POINT DOSAGE GIVEN TO DATE: 36 GY
RAD ONC ARIA REFERENCE POINT ID: NORMAL
RAD ONC ARIA REFERENCE POINT SESSION DOSAGE GIVEN: 3 GY

## 2023-12-22 PROCEDURE — 77385: CPT | Performed by: STUDENT IN AN ORGANIZED HEALTH CARE EDUCATION/TRAINING PROGRAM

## 2023-12-22 PROCEDURE — 77014 CHG CT GUIDANCE RADIATION THERAPY FLDS PLACEMENT: CPT | Performed by: STUDENT IN AN ORGANIZED HEALTH CARE EDUCATION/TRAINING PROGRAM

## 2023-12-26 ENCOUNTER — HOSPITAL ENCOUNTER (OUTPATIENT)
Dept: RADIATION ONCOLOGY | Facility: HOSPITAL | Age: 68
Discharge: HOME | End: 2023-12-26

## 2023-12-26 LAB
RAD ONC ARIA COURSE ID: NORMAL
RAD ONC ARIA COURSE LAST TREATMENT DATE: NORMAL
RAD ONC ARIA COURSE START DATE: NORMAL
RAD ONC ARIA COURSE TREATMENT ELAPSED DAYS: 19
RAD ONC ARIA FIRST TREATMENT DATE: NORMAL
RAD ONC ARIA PLAN FRACTIONS TREATED TO DATE: 13
RAD ONC ARIA PLAN ID: NORMAL
RAD ONC ARIA PLAN PRESCRIBED DOSE PER FRACTION: 3 GY
RAD ONC ARIA PLAN PRIMARY REFERENCE POINT: NORMAL
RAD ONC ARIA PLAN TOTAL FRACTIONS PRESCRIBED: 20
RAD ONC ARIA PLAN TOTAL PRESCRIBED DOSE: 6000 CGY
RAD ONC ARIA REFERENCE POINT DOSAGE GIVEN TO DATE: 39 GY
RAD ONC ARIA REFERENCE POINT ID: NORMAL
RAD ONC ARIA REFERENCE POINT SESSION DOSAGE GIVEN: 3 GY

## 2023-12-26 PROCEDURE — 77336 RADIATION PHYSICS CONSULT: CPT | Performed by: STUDENT IN AN ORGANIZED HEALTH CARE EDUCATION/TRAINING PROGRAM

## 2023-12-26 PROCEDURE — 77014 CHG CT GUIDANCE RADIATION THERAPY FLDS PLACEMENT: CPT | Performed by: STUDENT IN AN ORGANIZED HEALTH CARE EDUCATION/TRAINING PROGRAM

## 2023-12-26 PROCEDURE — 77385: CPT | Performed by: STUDENT IN AN ORGANIZED HEALTH CARE EDUCATION/TRAINING PROGRAM

## 2023-12-27 ENCOUNTER — HOSPITAL ENCOUNTER (OUTPATIENT)
Dept: RADIATION ONCOLOGY | Facility: HOSPITAL | Age: 68
Discharge: HOME | End: 2023-12-27

## 2023-12-27 ENCOUNTER — CLINICAL SUPPORT NO REQUIREMENTS (OUTPATIENT)
Dept: CARDIOLOGY | Facility: CLINIC | Age: 68
End: 2023-12-27
Payer: MEDICARE

## 2023-12-27 ENCOUNTER — RADIATION ONCOLOGY WEEKLY ASSESSMENT (OUTPATIENT)
Dept: RADIATION ONCOLOGY | Facility: HOSPITAL | Age: 68
End: 2023-12-27
Payer: MEDICARE

## 2023-12-27 VITALS
HEART RATE: 87 BPM | OXYGEN SATURATION: 95 % | SYSTOLIC BLOOD PRESSURE: 124 MMHG | WEIGHT: 229 LBS | DIASTOLIC BLOOD PRESSURE: 85 MMHG | BODY MASS INDEX: 32.86 KG/M2

## 2023-12-27 DIAGNOSIS — C61 PROSTATE CANCER: Primary | ICD-10-CM

## 2023-12-27 DIAGNOSIS — Z95.0 PACEMAKER: Primary | ICD-10-CM

## 2023-12-27 LAB
RAD ONC ARIA COURSE ID: NORMAL
RAD ONC ARIA COURSE LAST TREATMENT DATE: NORMAL
RAD ONC ARIA COURSE START DATE: NORMAL
RAD ONC ARIA COURSE TREATMENT ELAPSED DAYS: 20
RAD ONC ARIA FIRST TREATMENT DATE: NORMAL
RAD ONC ARIA PLAN FRACTIONS TREATED TO DATE: 14
RAD ONC ARIA PLAN ID: NORMAL
RAD ONC ARIA PLAN PRESCRIBED DOSE PER FRACTION: 3 GY
RAD ONC ARIA PLAN PRIMARY REFERENCE POINT: NORMAL
RAD ONC ARIA PLAN TOTAL FRACTIONS PRESCRIBED: 20
RAD ONC ARIA PLAN TOTAL PRESCRIBED DOSE: 6000 CGY
RAD ONC ARIA REFERENCE POINT DOSAGE GIVEN TO DATE: 42 GY
RAD ONC ARIA REFERENCE POINT ID: NORMAL
RAD ONC ARIA REFERENCE POINT SESSION DOSAGE GIVEN: 3 GY

## 2023-12-27 PROCEDURE — 77014 CHG CT GUIDANCE RADIATION THERAPY FLDS PLACEMENT: CPT | Performed by: STUDENT IN AN ORGANIZED HEALTH CARE EDUCATION/TRAINING PROGRAM

## 2023-12-27 PROCEDURE — 77385: CPT | Performed by: STUDENT IN AN ORGANIZED HEALTH CARE EDUCATION/TRAINING PROGRAM

## 2023-12-27 NOTE — PROGRESS NOTES
Radiation Oncology  On-Treatment Note      Patient: Flo Manzo    MRN: [id number]    Attending Physician: Temo Wheat MD     Diagnosis:     ICD-10-CM ICD-9-CM   1. Prostate cancer  C61 185       Radiation Therapy Visit:  Continue radiation therapy, Dosimetry plan remains acceptable, Films reviewed and remains acceptable, Pain assessed, Pain management planned, Radiation dose schedule reviewed and remains acceptable, Radiation technique remains acceptable, and Symptoms within expected range    Radiation Treatments       Active   Plans   P+SV 60GY1   Most recent treatment: Dose planned: 300 cGy (fraction 14 on 12/27/2023)   Total: Dose planned: 6,000 cGy (20 fractions)   Elapsed Days: 20      Reference Points   PTV_60   Most recent treatment: Dose given: 300 cGy (on 12/27/2023)   Total: Dose given: 4,200 cGy   Elapsed Days: 20                      Physical Examination:  Vitals: Blood pressure 124/85, pulse 87, weight 104 kg (229 lb), SpO2 95%.  Pain Score    12/27/23 1437   PainSc: 0-No pain       Capable of only limited selfcare; confined to bed or chair more than 50% of waking hours = 3    We examined the relevant areas: yes  Findings are within the expected range for this stage of treatment: yes  -------------------------------------------------------------------------------------------------------------------    ACTION ITEMS:  Patient tolerating treatment well and as expected for this stage in their treatment and Continue radiation therapy as planned    Estimated Completion Date: 1/5/2023      Temo Wheat MD  Radiation Oncology

## 2023-12-28 ENCOUNTER — HOSPITAL ENCOUNTER (OUTPATIENT)
Dept: RADIATION ONCOLOGY | Facility: HOSPITAL | Age: 68
Discharge: HOME | End: 2023-12-28

## 2023-12-28 DIAGNOSIS — I10 ESSENTIAL HYPERTENSION: Chronic | ICD-10-CM

## 2023-12-28 DIAGNOSIS — I20.89 ANGINA AT REST: ICD-10-CM

## 2023-12-28 LAB
RAD ONC ARIA COURSE ID: NORMAL
RAD ONC ARIA COURSE LAST TREATMENT DATE: NORMAL
RAD ONC ARIA COURSE START DATE: NORMAL
RAD ONC ARIA COURSE TREATMENT ELAPSED DAYS: 21
RAD ONC ARIA FIRST TREATMENT DATE: NORMAL
RAD ONC ARIA PLAN FRACTIONS TREATED TO DATE: 15
RAD ONC ARIA PLAN ID: NORMAL
RAD ONC ARIA PLAN PRESCRIBED DOSE PER FRACTION: 3 GY
RAD ONC ARIA PLAN PRIMARY REFERENCE POINT: NORMAL
RAD ONC ARIA PLAN TOTAL FRACTIONS PRESCRIBED: 20
RAD ONC ARIA PLAN TOTAL PRESCRIBED DOSE: 6000 CGY
RAD ONC ARIA REFERENCE POINT DOSAGE GIVEN TO DATE: 45 GY
RAD ONC ARIA REFERENCE POINT ID: NORMAL
RAD ONC ARIA REFERENCE POINT SESSION DOSAGE GIVEN: 3 GY

## 2023-12-28 PROCEDURE — 77385: CPT | Performed by: STUDENT IN AN ORGANIZED HEALTH CARE EDUCATION/TRAINING PROGRAM

## 2023-12-28 PROCEDURE — 77014 CHG CT GUIDANCE RADIATION THERAPY FLDS PLACEMENT: CPT | Performed by: STUDENT IN AN ORGANIZED HEALTH CARE EDUCATION/TRAINING PROGRAM

## 2023-12-29 ENCOUNTER — TELEPHONE (OUTPATIENT)
Dept: CARDIOLOGY | Facility: CLINIC | Age: 68
End: 2023-12-29
Payer: MEDICARE

## 2023-12-29 ENCOUNTER — HOSPITAL ENCOUNTER (OUTPATIENT)
Dept: RADIATION ONCOLOGY | Facility: HOSPITAL | Age: 68
Discharge: HOME | End: 2023-12-29

## 2023-12-29 LAB
RAD ONC ARIA COURSE ID: NORMAL
RAD ONC ARIA COURSE LAST TREATMENT DATE: NORMAL
RAD ONC ARIA COURSE START DATE: NORMAL
RAD ONC ARIA COURSE TREATMENT ELAPSED DAYS: 22
RAD ONC ARIA FIRST TREATMENT DATE: NORMAL
RAD ONC ARIA PLAN FRACTIONS TREATED TO DATE: 16
RAD ONC ARIA PLAN ID: NORMAL
RAD ONC ARIA PLAN PRESCRIBED DOSE PER FRACTION: 3 GY
RAD ONC ARIA PLAN PRIMARY REFERENCE POINT: NORMAL
RAD ONC ARIA PLAN TOTAL FRACTIONS PRESCRIBED: 20
RAD ONC ARIA PLAN TOTAL PRESCRIBED DOSE: 6000 CGY
RAD ONC ARIA REFERENCE POINT DOSAGE GIVEN TO DATE: 48 GY
RAD ONC ARIA REFERENCE POINT ID: NORMAL
RAD ONC ARIA REFERENCE POINT SESSION DOSAGE GIVEN: 3 GY

## 2023-12-29 PROCEDURE — 77385: CPT | Performed by: STUDENT IN AN ORGANIZED HEALTH CARE EDUCATION/TRAINING PROGRAM

## 2023-12-29 PROCEDURE — 77014 CHG CT GUIDANCE RADIATION THERAPY FLDS PLACEMENT: CPT | Performed by: STUDENT IN AN ORGANIZED HEALTH CARE EDUCATION/TRAINING PROGRAM

## 2023-12-29 RX ORDER — ATORVASTATIN CALCIUM 40 MG/1
40 TABLET, FILM COATED ORAL NIGHTLY
Qty: 30 TABLET | Refills: 0 | Status: SHIPPED | OUTPATIENT
Start: 2023-12-29

## 2023-12-29 RX ORDER — LISINOPRIL 20 MG/1
20 TABLET ORAL
Qty: 30 TABLET | Refills: 0 | Status: SHIPPED | OUTPATIENT
Start: 2023-12-29

## 2023-12-29 NOTE — TELEPHONE ENCOUNTER
12/29/23 remote transmission reviewed.  Device alerted leigh 91 HVR episodes on 12/28/23..  I reviewed the EGM strips available, episodes appear to be RVR.  Patient has history of Afib.    I called and spoke with patient.  He reports he has been asymptomatic.  He states he is compliant with medications.  I advised I would let providers review and we would call only if any changes needed to be made.  Otherwise we would continue to monitor and to call the office with symptoms.  Patient verbalized understanding.

## 2024-01-01 ENCOUNTER — HOSPITAL ENCOUNTER (OUTPATIENT)
Dept: RADIATION ONCOLOGY | Facility: HOSPITAL | Age: 69
Setting detail: RADIATION/ONCOLOGY SERIES
End: 2024-01-01
Payer: MEDICARE

## 2024-01-02 ENCOUNTER — HOSPITAL ENCOUNTER (OUTPATIENT)
Dept: RADIATION ONCOLOGY | Facility: HOSPITAL | Age: 69
Discharge: HOME | End: 2024-01-02

## 2024-01-02 LAB
RAD ONC ARIA COURSE ID: NORMAL
RAD ONC ARIA COURSE LAST TREATMENT DATE: NORMAL
RAD ONC ARIA COURSE START DATE: NORMAL
RAD ONC ARIA COURSE TREATMENT ELAPSED DAYS: 26
RAD ONC ARIA FIRST TREATMENT DATE: NORMAL
RAD ONC ARIA PLAN FRACTIONS TREATED TO DATE: 17
RAD ONC ARIA PLAN ID: NORMAL
RAD ONC ARIA PLAN PRESCRIBED DOSE PER FRACTION: 3 GY
RAD ONC ARIA PLAN PRIMARY REFERENCE POINT: NORMAL
RAD ONC ARIA PLAN TOTAL FRACTIONS PRESCRIBED: 20
RAD ONC ARIA PLAN TOTAL PRESCRIBED DOSE: 6000 CGY
RAD ONC ARIA REFERENCE POINT DOSAGE GIVEN TO DATE: 51 GY
RAD ONC ARIA REFERENCE POINT ID: NORMAL
RAD ONC ARIA REFERENCE POINT SESSION DOSAGE GIVEN: 3 GY

## 2024-01-02 PROCEDURE — 77385: CPT | Performed by: STUDENT IN AN ORGANIZED HEALTH CARE EDUCATION/TRAINING PROGRAM

## 2024-01-02 PROCEDURE — 77014 CHG CT GUIDANCE RADIATION THERAPY FLDS PLACEMENT: CPT | Performed by: STUDENT IN AN ORGANIZED HEALTH CARE EDUCATION/TRAINING PROGRAM

## 2024-01-03 ENCOUNTER — RADIATION ONCOLOGY WEEKLY ASSESSMENT (OUTPATIENT)
Dept: RADIATION ONCOLOGY | Facility: HOSPITAL | Age: 69
End: 2024-01-03
Payer: MEDICARE

## 2024-01-03 ENCOUNTER — HOSPITAL ENCOUNTER (OUTPATIENT)
Dept: RADIATION ONCOLOGY | Facility: HOSPITAL | Age: 69
Discharge: HOME | End: 2024-01-03

## 2024-01-03 VITALS
DIASTOLIC BLOOD PRESSURE: 78 MMHG | HEART RATE: 84 BPM | BODY MASS INDEX: 32.92 KG/M2 | WEIGHT: 229.4 LBS | SYSTOLIC BLOOD PRESSURE: 114 MMHG | OXYGEN SATURATION: 95 %

## 2024-01-03 DIAGNOSIS — C61 PROSTATE CANCER: Primary | ICD-10-CM

## 2024-01-03 LAB
RAD ONC ARIA COURSE ID: NORMAL
RAD ONC ARIA COURSE LAST TREATMENT DATE: NORMAL
RAD ONC ARIA COURSE START DATE: NORMAL
RAD ONC ARIA COURSE TREATMENT ELAPSED DAYS: 27
RAD ONC ARIA FIRST TREATMENT DATE: NORMAL
RAD ONC ARIA PLAN FRACTIONS TREATED TO DATE: 18
RAD ONC ARIA PLAN ID: NORMAL
RAD ONC ARIA PLAN PRESCRIBED DOSE PER FRACTION: 3 GY
RAD ONC ARIA PLAN PRIMARY REFERENCE POINT: NORMAL
RAD ONC ARIA PLAN TOTAL FRACTIONS PRESCRIBED: 20
RAD ONC ARIA PLAN TOTAL PRESCRIBED DOSE: 6000 CGY
RAD ONC ARIA REFERENCE POINT DOSAGE GIVEN TO DATE: 54 GY
RAD ONC ARIA REFERENCE POINT ID: NORMAL
RAD ONC ARIA REFERENCE POINT SESSION DOSAGE GIVEN: 3 GY

## 2024-01-03 PROCEDURE — 77336 RADIATION PHYSICS CONSULT: CPT | Performed by: STUDENT IN AN ORGANIZED HEALTH CARE EDUCATION/TRAINING PROGRAM

## 2024-01-03 PROCEDURE — 77385: CPT | Performed by: STUDENT IN AN ORGANIZED HEALTH CARE EDUCATION/TRAINING PROGRAM

## 2024-01-03 PROCEDURE — 77014 CHG CT GUIDANCE RADIATION THERAPY FLDS PLACEMENT: CPT | Performed by: STUDENT IN AN ORGANIZED HEALTH CARE EDUCATION/TRAINING PROGRAM

## 2024-01-03 NOTE — PROGRESS NOTES
Radiation Oncology  On-Treatment Note      Patient: Flo Manzo    MRN: [id number]    Attending Physician: Temo Wheat MD     Diagnosis:     ICD-10-CM ICD-9-CM   1. Prostate cancer  C61 185       Radiation Therapy Visit:  Continue radiation therapy, Dosimetry plan remains acceptable, Films reviewed and remains acceptable, Pain assessed, Pain management planned, Radiation dose schedule reviewed and remains acceptable, Radiation technique remains acceptable, and Symptoms within expected range    Radiation Treatments       Active   Plans   P+SV 60GY1   Most recent treatment: Dose planned: 300 cGy (fraction 18 on 1/3/2024)   Total: Dose planned: 6,000 cGy (20 fractions)   Elapsed Days: 27      Reference Points   PTV_60   Most recent treatment: Dose given: 300 cGy (on 1/3/2024)   Total: Dose given: 5,400 cGy   Elapsed Days: 27                      Physical Examination:  Vitals: Blood pressure 114/78, pulse 84, weight 104 kg (229 lb 6.4 oz), SpO2 95%.  Pain Score    01/03/24 1433   PainSc: 0-No pain       Capable of only limited selfcare; confined to bed or chair more than 50% of waking hours = 3    We examined the relevant areas: yes  Findings are within the expected range for this stage of treatment: yes  -------------------------------------------------------------------------------------------------------------------    ACTION ITEMS:  Patient tolerating treatment well and as expected for this stage in their treatment and Continue radiation therapy as planned    Estimated Completion Date: 1/5/2024    Follow up in 3 months      Temo Wheat MD  Radiation Oncology

## 2024-01-04 ENCOUNTER — HOSPITAL ENCOUNTER (OUTPATIENT)
Dept: RADIATION ONCOLOGY | Facility: HOSPITAL | Age: 69
Discharge: HOME | End: 2024-01-04

## 2024-01-04 LAB
RAD ONC ARIA COURSE ID: NORMAL
RAD ONC ARIA COURSE LAST TREATMENT DATE: NORMAL
RAD ONC ARIA COURSE START DATE: NORMAL
RAD ONC ARIA COURSE TREATMENT ELAPSED DAYS: 28
RAD ONC ARIA FIRST TREATMENT DATE: NORMAL
RAD ONC ARIA PLAN FRACTIONS TREATED TO DATE: 19
RAD ONC ARIA PLAN ID: NORMAL
RAD ONC ARIA PLAN PRESCRIBED DOSE PER FRACTION: 3 GY
RAD ONC ARIA PLAN PRIMARY REFERENCE POINT: NORMAL
RAD ONC ARIA PLAN TOTAL FRACTIONS PRESCRIBED: 20
RAD ONC ARIA PLAN TOTAL PRESCRIBED DOSE: 6000 CGY
RAD ONC ARIA REFERENCE POINT DOSAGE GIVEN TO DATE: 57 GY
RAD ONC ARIA REFERENCE POINT ID: NORMAL
RAD ONC ARIA REFERENCE POINT SESSION DOSAGE GIVEN: 3 GY

## 2024-01-04 PROCEDURE — 77014 CHG CT GUIDANCE RADIATION THERAPY FLDS PLACEMENT: CPT | Performed by: STUDENT IN AN ORGANIZED HEALTH CARE EDUCATION/TRAINING PROGRAM

## 2024-01-04 PROCEDURE — 77385: CPT | Performed by: STUDENT IN AN ORGANIZED HEALTH CARE EDUCATION/TRAINING PROGRAM

## 2024-01-05 ENCOUNTER — TREATMENT (OUTPATIENT)
Dept: RADIATION ONCOLOGY | Facility: HOSPITAL | Age: 69
End: 2024-01-05
Payer: MEDICARE

## 2024-01-05 ENCOUNTER — HOSPITAL ENCOUNTER (OUTPATIENT)
Dept: RADIATION ONCOLOGY | Facility: HOSPITAL | Age: 69
Discharge: HOME | End: 2024-01-05

## 2024-01-05 DIAGNOSIS — C61 PROSTATE CANCER: Primary | ICD-10-CM

## 2024-01-05 LAB
RAD ONC ARIA COURSE ID: NORMAL
RAD ONC ARIA COURSE LAST TREATMENT DATE: NORMAL
RAD ONC ARIA COURSE START DATE: NORMAL
RAD ONC ARIA COURSE TREATMENT ELAPSED DAYS: 29
RAD ONC ARIA FIRST TREATMENT DATE: NORMAL
RAD ONC ARIA PLAN FRACTIONS TREATED TO DATE: 20
RAD ONC ARIA PLAN ID: NORMAL
RAD ONC ARIA PLAN PRESCRIBED DOSE PER FRACTION: 3 GY
RAD ONC ARIA PLAN PRIMARY REFERENCE POINT: NORMAL
RAD ONC ARIA PLAN TOTAL FRACTIONS PRESCRIBED: 20
RAD ONC ARIA PLAN TOTAL PRESCRIBED DOSE: 6000 CGY
RAD ONC ARIA REFERENCE POINT DOSAGE GIVEN TO DATE: 60 GY
RAD ONC ARIA REFERENCE POINT ID: NORMAL
RAD ONC ARIA REFERENCE POINT SESSION DOSAGE GIVEN: 3 GY

## 2024-01-05 PROCEDURE — 77014 CHG CT GUIDANCE RADIATION THERAPY FLDS PLACEMENT: CPT | Performed by: STUDENT IN AN ORGANIZED HEALTH CARE EDUCATION/TRAINING PROGRAM

## 2024-01-05 PROCEDURE — 77385: CPT | Performed by: STUDENT IN AN ORGANIZED HEALTH CARE EDUCATION/TRAINING PROGRAM

## 2024-01-08 NOTE — PROGRESS NOTES
Radiation Treatment Summary Note      Patient Name: Flo Manzo  : 1955    Attending Provider: Temo Wheat MD      Diagnosis:     ICD-10-CM ICD-9-CM   1. Prostate cancer  C61 185       Radiation Start Date: 2023    Radiation Completion Date: 2024      Prescription:     Site: Prostate and SV  Laterality: N/A  Total Dose: 6000cGy  Dose per Fraction: 300cGy  Total Fractions: 20  Daily or BID: Daily  Modality: Photon  Technique: IMRT/VMAT/Rapid Arc  Bolus: No    Final Delivered Dose Deviated From Initially Prescribed Dose: No    Concurrent Chemotherapy: No    Patient Tolerated Treatment Without Unexpected Side Effects/Complications: Yes    ECOG: Capable of only limited selfcare; confined to bed or chair more than 50% of waking hours = 3    Pain Management Plan: Opioid or other pain medication prescribed by other provider    Follow-Up Plan: 3 months    Imaging Ordered for Follow-Up: None/NA        Temo Wheat MD

## 2024-01-18 ENCOUNTER — OFFICE VISIT (OUTPATIENT)
Dept: CARDIOLOGY | Facility: CLINIC | Age: 69
End: 2024-01-18
Payer: MEDICARE

## 2024-01-18 VITALS
HEART RATE: 81 BPM | DIASTOLIC BLOOD PRESSURE: 90 MMHG | HEIGHT: 70 IN | SYSTOLIC BLOOD PRESSURE: 130 MMHG | BODY MASS INDEX: 33.21 KG/M2 | WEIGHT: 232 LBS

## 2024-01-18 DIAGNOSIS — Z95.0 PACEMAKER: ICD-10-CM

## 2024-01-18 DIAGNOSIS — Z01.810 PRE-OPERATIVE CARDIOVASCULAR EXAMINATION: ICD-10-CM

## 2024-01-18 DIAGNOSIS — I48.20 CHRONIC ATRIAL FIBRILLATION: ICD-10-CM

## 2024-01-18 DIAGNOSIS — I10 ESSENTIAL HYPERTENSION: Primary | Chronic | ICD-10-CM

## 2024-01-18 PROCEDURE — 93000 ELECTROCARDIOGRAM COMPLETE: CPT | Performed by: INTERNAL MEDICINE

## 2024-01-18 PROCEDURE — 3075F SYST BP GE 130 - 139MM HG: CPT | Performed by: INTERNAL MEDICINE

## 2024-01-18 PROCEDURE — 3080F DIAST BP >= 90 MM HG: CPT | Performed by: INTERNAL MEDICINE

## 2024-01-18 PROCEDURE — 99214 OFFICE O/P EST MOD 30 MIN: CPT | Performed by: INTERNAL MEDICINE

## 2024-01-18 RX ORDER — METOPROLOL TARTRATE 50 MG/1
50 TABLET, FILM COATED ORAL 2 TIMES DAILY
Qty: 60 TABLET | Refills: 3 | Status: SHIPPED | OUTPATIENT
Start: 2024-01-18

## 2024-01-18 NOTE — PROGRESS NOTES
MULTIPLE EPISODES OF RVR ON REMOTE MONITOR   Subjective:        Flo Manzo is a 68 y.o. male who here for follow up    CC  Follow-up hypertension atrial fibrillation pacemaker  HPI  68-year-old male having episodes of rapid ventricular rate on the monitoring system     Problems Addressed this Visit          Cardiac and Vasculature    Essential hypertension - Primary (Chronic)    Relevant Medications    metoprolol tartrate (LOPRESSOR) 50 MG tablet    Chronic atrial fibrillation    Relevant Medications    metoprolol tartrate (LOPRESSOR) 50 MG tablet    Pacemaker    Pre-operative cardiovascular examination     Diagnoses         Codes Comments    Essential hypertension    -  Primary ICD-10-CM: I10  ICD-9-CM: 401.9     Chronic atrial fibrillation     ICD-10-CM: I48.20  ICD-9-CM: 427.31     Pre-operative cardiovascular examination     ICD-10-CM: Z01.810  ICD-9-CM: V72.81     Pacemaker     ICD-10-CM: Z95.0  ICD-9-CM: V45.01           .    The following portions of the patient's history were reviewed and updated as appropriate: allergies, current medications, past family history, past medical history, past social history, past surgical history and problem list.    Past Medical History:   Diagnosis Date    Arthritis     Atrial fibrillation with RVR 01/24/2019    Colon polyps 01/04/2018    Hepatic flexure: tubular adenoma, only low-grade dysplasia; splenic flexure: tubular adenoma, only low-grade dysplasia; sigmoid colon: tubular adenoma, multiple fragments only low-grade dysplasia    Depression     Heart murmur     Hemorrhagic stroke 01/29/2019    Hypertension     New onset atrial fibrillation (CMS/HCC) - RVR 1/24/2019    GÓMEZ (obstructive sleep apnea) 06/06/2019    Home sleep study with moderate severity GÓMEZ, AHI 20 events per hour.  Sleep-related hypoxia with low O2 saturation 84% for 14 minutes.    Sleep apnea     previously diagnosed with sleep apnea, had T&A done and it has since resolved     Stroke      "Uncontrolled hypertension     Ventral hernia      reports that he has never smoked. He has been exposed to tobacco smoke. He has never used smokeless tobacco. He reports that he does not currently use alcohol. He reports that he does not use drugs.   Family History   Problem Relation Age of Onset    Hypertension Mother     Kidney failure Mother     Coronary artery disease Father     Lung cancer Father         positive smoker    Heart attack Father     Breast cancer Sister 60    Prostate cancer Brother     Colon polyps Brother     Kidney nephrosis Brother     Malig Hyperthermia Neg Hx        Review of Systems  Constitutional: No wt loss, fever, fatigue  Gastrointestinal: No nausea, abdominal pain  Behavioral/Psych: No insomnia or anxiety   Cardiovascular no chest pain  Objective:       Physical Exam  /90   Pulse 81   Ht 177.8 cm (70\")   Wt 105 kg (232 lb)   BMI 33.29 kg/m²   General appearance: No acute changes   Neck: Trachea midline; NECK, supple, no thyromegaly or lymphadenopathy   Lungs: Normal size and shape, normal breath sounds, equal distribution of air, no rales and rhonchi   CV: S1-S2 regular, no murmurs, no rub, no gallop   Abdomen: Soft, nontender; no masses , no abnormal abdominal sounds   Extremities: No deformity , normal color , no peripheral edema   Skin: Normal temperature, turgor and texture; no rash, ulcers            ECG 12 Lead    Date/Time: 2024 12:47 PM  Performed by: Eren Bruce MD    Authorized by: Eren Bruce MD  Comparison: compared with previous ECG   Similar to previous ECG  Rhythm: atrial fibrillation  Pacin% capture  Clinical impression: abnormal EKG            Echocardiogram:    Results for orders placed in visit on 23    Adult Transthoracic Echo Complete W/ Cont if Necessary Per Protocol    Interpretation Summary    Left ventricular ejection fraction appears to be 46 - 50%.    Left ventricular diastolic function was indeterminate.    " The right ventricular cavity is borderline dilated.    The left atrial cavity is mildly dilated.    Left atrial volume is mildly increased.    The right atrial cavity is mildly  dilated.    Moderate aortic valve stenosis is present.    Estimated right ventricular systolic pressure from tricuspid regurgitation is mildly elevated (35-45 mmHg).          Current Outpatient Medications:     acetaminophen (TYLENOL) 325 MG tablet, Take 2 tablets by mouth Every 4 (Four) Hours As Needed for Mild Pain ., Disp: 120 tablet, Rfl: 3    albuterol sulfate  (90 Base) MCG/ACT inhaler, Inhale 2 puffs Every 4 (Four) Hours As Needed for Wheezing., Disp: 18 g, Rfl: 3    ALPRAZolam (Xanax) 0.25 MG tablet, Take 1 tablet 30 min prior to MRI, may repeat once if needed, Disp: 2 tablet, Rfl: 0    amLODIPine (NORVASC) 10 MG tablet, TAKE 1 TABLET BY MOUTH DAILY, Disp: 90 tablet, Rfl: 1    atorvastatin (LIPITOR) 40 MG tablet, TAKE 1 TABLET BY MOUTH NIGHTLY, Disp: 30 tablet, Rfl: 0    busPIRone (BUSPAR) 10 MG tablet, Take 1 tablet by mouth 2 (Two) Times a Day., Disp: 180 tablet, Rfl: 1    citalopram (CeleXA) 20 MG tablet, Take 1 tablet by mouth Daily. (Patient taking differently: Take 1.5 tablets by mouth Daily. PT TAKING 1.5 TABLETS DAILY), Disp: 135 tablet, Rfl: 1    Diclofenac Sodium (Voltaren) 1 % gel gel, Apply 4 g topically to the appropriate area as directed 4 (Four) Times a Day., Disp: 50 g, Rfl: 0    Eliquis 5 MG tablet tablet, TAKE 1 TABLET BY MOUTH EVERY 12 HOURS, Disp: 60 tablet, Rfl: 3    furosemide (LASIX) 20 MG tablet, Take 1 tablet by mouth Daily for 180 days., Disp: 90 tablet, Rfl: 1    gabapentin (NEURONTIN) 100 MG capsule, Take 1 capsule by mouth 3 (Three) Times a Day., Disp: 90 capsule, Rfl: 0    lisinopril (PRINIVIL,ZESTRIL) 20 MG tablet, TAKE 1 TABLET BY MOUTH DAILY, Disp: 30 tablet, Rfl: 0    metoprolol tartrate (LOPRESSOR) 50 MG tablet, Take 1 tablet by mouth 2 (Two) Times a Day., Disp: 60 tablet, Rfl: 3     omeprazole (priLOSEC) 20 MG capsule, TAKE 1 CAPSULE BY MOUTH DAILY, Disp: 90 capsule, Rfl: 1    RSVPreF3 Vac Recomb Adjuvanted (Arexvy) 120 MCG/0.5ML reconstituted suspension injection, Inject  into the appropriate muscle as directed by prescriber., Disp: 1 mL, Rfl: 0    tamsulosin (FLOMAX) 0.4 MG capsule 24 hr capsule, Take 1 capsule by mouth Every Night., Disp: 90 capsule, Rfl: 1    trimethoprim-polymyxin b (POLYTRIM) 14980-8.1 UNIT/ML-% ophthalmic solution, Administer 1 drop to both eyes Every 6 (Six) Hours., Disp: 10 mL, Rfl: 0    COVID-19 mRNA Vac-Aida,Pfizer, 30 MCG/0.3ML suspension, Inject  into the appropriate muscle as directed by prescriber., Disp: 0.3 mL, Rfl: 0   Assessment:                Plan:          ICD-10-CM ICD-9-CM   1. Essential hypertension  I10 401.9   2. Chronic atrial fibrillation  I48.20 427.31   3. Pre-operative cardiovascular examination  Z01.810 V72.81   4. Pacemaker  Z95.0 V45.01     1. Essential hypertension  Patient understands importance of blood pressure check at home which patient does regularly and the blood pressures are well under control to the level of less than 140/90      2. Chronic atrial fibrillation  Occasional rapid heartbeat will increase beta-blockers    3. Pre-operative cardiovascular examination      4. Pacemaker  Pacemaker functioning normally      Increase metoprolol 50 mfg bid    Pros and cons of this new medication / change medication has been explained to  the patient    Possible side effects has been explained    Associated need of the blood  Work has been explained    Need for the compliance of the medication has been explained      See in 6 months  COUNSELING:    Flo Rodriguez was given to patient for the following topics: diagnostic results, risk factor reductions, impressions, risks and benefits of treatment options and importance of treatment compliance .       SMOKING COUNSELING:        Dictated using Dragon dictation

## 2024-01-29 DIAGNOSIS — I20.89 ANGINA AT REST: ICD-10-CM

## 2024-01-29 DIAGNOSIS — I10 ESSENTIAL HYPERTENSION: Chronic | ICD-10-CM

## 2024-01-30 PROBLEM — Z01.810 PRE-OPERATIVE CARDIOVASCULAR EXAMINATION: Status: ACTIVE | Noted: 2024-01-30

## 2024-01-31 RX ORDER — ATORVASTATIN CALCIUM 40 MG/1
40 TABLET, FILM COATED ORAL NIGHTLY
Qty: 90 TABLET | Refills: 1 | Status: SHIPPED | OUTPATIENT
Start: 2024-01-31

## 2024-02-07 ENCOUNTER — OFFICE VISIT (OUTPATIENT)
Dept: INTERNAL MEDICINE | Age: 69
End: 2024-02-07
Payer: MEDICARE

## 2024-02-07 VITALS
WEIGHT: 230 LBS | OXYGEN SATURATION: 94 % | BODY MASS INDEX: 32.93 KG/M2 | DIASTOLIC BLOOD PRESSURE: 74 MMHG | HEIGHT: 70 IN | SYSTOLIC BLOOD PRESSURE: 136 MMHG | TEMPERATURE: 97.6 F | HEART RATE: 82 BPM

## 2024-02-07 DIAGNOSIS — R60.0 BILATERAL LOWER EXTREMITY EDEMA: ICD-10-CM

## 2024-02-07 DIAGNOSIS — M79.2 NEUROPATHIC PAIN: ICD-10-CM

## 2024-02-07 DIAGNOSIS — G62.9 PERIPHERAL POLYNEUROPATHY: Primary | ICD-10-CM

## 2024-02-07 DIAGNOSIS — Z79.899 HIGH RISK MEDICATION USE: ICD-10-CM

## 2024-02-07 NOTE — PROGRESS NOTES
"    I N T E R N A L  M E D I C I N E  Karen Jiménez, APRN    ENCOUNTER DATE:  02/07/2024    Flo Manzo / 68 y.o. / male      CHIEF COMPLAINT / REASON FOR OFFICE VISIT     Leg Pain and Edema (Feet/)      ASSESSMENT & PLAN     Diagnoses and all orders for this visit:    1. Peripheral polyneuropathy (Primary)    2. Bilateral lower extremity edema    3. High risk medication use  -     Cancel: Compliance Drug Analysis, Ur - Urine, Clean Catch  -     Drug Screen 11 w/Conf, Serum         SUMMARY/DISCUSSION  Lower extremity neuropathic pain has returned after he stopped taking gabapentin.  Will update signed contract and UDS at today's visit and continue same.  Reassess at follow up appointment in 1 month.    Recommend that he wear compression socks, follow low sodium diet, and elevate legs at rest to help with lower extremity edema.       Next Appointment with me: 3/13/2024    No follow-ups on file.      VITAL SIGNS     Visit Vitals  /74   Pulse 82   Temp 97.6 °F (36.4 °C)   Ht 177.8 cm (70\")   Wt 104 kg (230 lb)   SpO2 94%   BMI 33.00 kg/m²             Wt Readings from Last 3 Encounters:   02/07/24 104 kg (230 lb)   01/18/24 105 kg (232 lb)   01/03/24 104 kg (229 lb 6.4 oz)     Body mass index is 33 kg/m².        MEDICATIONS AT THE TIME OF OFFICE VISIT     Current Outpatient Medications on File Prior to Visit   Medication Sig Dispense Refill    acetaminophen (TYLENOL) 325 MG tablet Take 2 tablets by mouth Every 4 (Four) Hours As Needed for Mild Pain . 120 tablet 3    albuterol sulfate  (90 Base) MCG/ACT inhaler Inhale 2 puffs Every 4 (Four) Hours As Needed for Wheezing. 18 g 3    amLODIPine (NORVASC) 10 MG tablet TAKE 1 TABLET BY MOUTH DAILY 90 tablet 1    atorvastatin (LIPITOR) 40 MG tablet TAKE 1 TABLET BY MOUTH NIGHTLY 90 tablet 1    busPIRone (BUSPAR) 10 MG tablet Take 1 tablet by mouth 2 (Two) Times a Day. 180 tablet 1    citalopram (CeleXA) 20 MG tablet Take 1 tablet by mouth Daily. (Patient taking " differently: Take 1.5 tablets by mouth Daily. PT TAKING 1.5 TABLETS DAILY) 135 tablet 1    COVID-19 mRNA Vac-Aida,Pfizer, 30 MCG/0.3ML suspension Inject  into the appropriate muscle as directed by prescriber. 0.3 mL 0    Diclofenac Sodium (Voltaren) 1 % gel gel Apply 4 g topically to the appropriate area as directed 4 (Four) Times a Day. 50 g 0    Eliquis 5 MG tablet tablet TAKE 1 TABLET BY MOUTH EVERY 12 HOURS 60 tablet 3    furosemide (LASIX) 20 MG tablet Take 1 tablet by mouth Daily for 180 days. 90 tablet 1    lisinopril (PRINIVIL,ZESTRIL) 20 MG tablet TAKE 1 TABLET BY MOUTH DAILY 30 tablet 0    metoprolol tartrate (LOPRESSOR) 50 MG tablet Take 1 tablet by mouth 2 (Two) Times a Day. 60 tablet 3    omeprazole (priLOSEC) 20 MG capsule TAKE 1 CAPSULE BY MOUTH DAILY 90 capsule 1    RSVPreF3 Vac Recomb Adjuvanted (Arexvy) 120 MCG/0.5ML reconstituted suspension injection Inject  into the appropriate muscle as directed by prescriber. 1 mL 0    tamsulosin (FLOMAX) 0.4 MG capsule 24 hr capsule Take 1 capsule by mouth Every Night. 90 capsule 1    trimethoprim-polymyxin b (POLYTRIM) 29014-2.1 UNIT/ML-% ophthalmic solution Administer 1 drop to both eyes Every 6 (Six) Hours. 10 mL 0    [DISCONTINUED] DULoxetine (CYMBALTA) 30 MG capsule TAKE 1 CAPSULE BY MOUTH EVERY NIGHT 90 capsule 1    [DISCONTINUED] metoprolol succinate XL (TOPROL-XL) 25 MG 24 hr tablet Take 1 tablet by mouth Daily. 30 tablet 0    [DISCONTINUED] QUEtiapine (SEROquel) 25 MG tablet Take 1 tablet by mouth Every Night. Take 30 min before bed 30 tablet 0     No current facility-administered medications on file prior to visit.        HISTORY OF PRESENT ILLNESS     Here today for bilateral leg pain, described as numbness and tingling, ongoing for last several weeks.  He stopped taking his prescribed gabapentin 100 mg TID for peripheral neuropathy sometime in the last month.  No open sores, wounds, cool extremities.  No leg pain with ambulation.    Continues on  his prescribed furosemide 20 mg daily for lower extremity and swelling is stable.  Mildly worsens at end of long day but not wearing compression socks.        Patient Care Team:  Karen Jiménez APRN as PCP - General (Family Medicine)  Eren Bruce MD as Consulting Physician (Cardiology)  Temo Wheat MD as Consulting Physician (Radiation Oncology)  Bryant Rader MD as Consulting Physician (Urology)  Cindi Breen, GERALDINE, APRN as Nurse Practitioner (Nurse Practitioner)    REVIEW OF SYSTEMS     Review of Systems   Constitutional:  Negative for chills, fever and unexpected weight change.   Respiratory:  Negative for cough, chest tightness and shortness of breath.    Cardiovascular:  Positive for leg swelling. Negative for chest pain and palpitations.   Neurological:  Positive for numbness (BLE). Negative for dizziness, weakness, light-headedness and headaches.   Psychiatric/Behavioral:  The patient is not nervous/anxious.           PHYSICAL EXAMINATION     Physical Exam  Vitals reviewed.   Constitutional:       General: He is not in acute distress.     Appearance: Normal appearance. He is not ill-appearing, toxic-appearing or diaphoretic.   HENT:      Head: Normocephalic and atraumatic.   Cardiovascular:      Rate and Rhythm: Normal rate and regular rhythm.      Heart sounds: Normal heart sounds.   Pulmonary:      Effort: Pulmonary effort is normal.      Breath sounds: Normal breath sounds.   Musculoskeletal:      Right lower leg: No edema.      Left lower leg: No edema.      Comments: Trace edema bilateral feet   Skin:     General: Skin is warm and dry.   Neurological:      Mental Status: He is alert and oriented to person, place, and time. Mental status is at baseline.      Comments: Neurovascularly intact, no open sores, wounds, pedal pulses palpable    Psychiatric:         Mood and Affect: Mood normal.         Behavior: Behavior normal.         Thought Content: Thought content normal.          Judgment: Judgment normal.           REVIEWED DATA     Labs:           Imaging:            Medical Tests:           Summary of old records / correspondence / consultant report:           Request outside records:

## 2024-02-07 NOTE — TELEPHONE ENCOUNTER
"    Caller: Flo Manzo \"Danyel\"    Relationship: Self    Best call back number: [phone]     Requested Prescriptions:   Requested Prescriptions     Pending Prescriptions Disp Refills    gabapentin (NEURONTIN) 100 MG capsule 90 capsule 0     Sig: Take 1 capsule by mouth 3 (Three) Times a Day.        Pharmacy where request should be sent: [facility], [place] [id number] [facility] [phone] [facility] [phone]      Last office visit with prescribing clinician: 2/7/2024   Last telemedicine visit with prescribing clinician: Visit date not found   Next office visit with prescribing clinician: 3/13/2024     Additional details provided by patient: PATIENT IS OUT OF MEDICATION, PLEASE SEND TO [facility].     Does the patient have less than a 3 day supply:  [x] Yes  [] No    Would you like a call back once the refill request has been completed: [] Yes [x] No    If the office needs to give you a call back, can they leave a voicemail: [] Yes [x] No    Zuly Mcmillan Rep   02/07/24 15:00 EST   "

## 2024-02-08 PROBLEM — G62.9 PERIPHERAL NEUROPATHY: Status: ACTIVE | Noted: 2024-02-08

## 2024-02-08 RX ORDER — GABAPENTIN 100 MG/1
100 CAPSULE ORAL 3 TIMES DAILY
Qty: 90 CAPSULE | Refills: 0 | Status: SHIPPED | OUTPATIENT
Start: 2024-02-08

## 2024-02-08 NOTE — TELEPHONE ENCOUNTER
Requested by APRN to authorize prescription refill for controlled substance. Patient's pertinent medical history and medical indication(s) reviewed. Hugo reviewed. Contract is current. Most recent drug screen reviewed and is current. Medication refill authorized.      GABAPENTIN.

## 2024-02-11 LAB
AMPHETAMINES SERPL QL SCN: NEGATIVE NG/ML
BARBITURATES SERPL QL SCN: NEGATIVE UG/ML
BENZODIAZ SERPL QL SCN: NEGATIVE NG/ML
CANNABINOIDS SERPL QL SCN: NEGATIVE NG/ML
COCAINE+BZE SERPL QL SCN: NEGATIVE NG/ML
ETHANOL SERPL-MCNC: NEGATIVE GM/DL
METHADONE SERPL QL SCN: NEGATIVE NG/ML
OPIATES SERPL QL SCN: NEGATIVE NG/ML
OXYCODONE+OXYMORPHONE SERPLBLD QL SCN: NEGATIVE NG/ML
PCP SERPL QL SCN: NEGATIVE NG/ML
PROPOXYPH SERPL QL SCN: NEGATIVE NG/ML

## 2024-02-29 RX ORDER — APIXABAN 5 MG/1
5 TABLET, FILM COATED ORAL 2 TIMES DAILY
Qty: 60 TABLET | Refills: 5 | Status: SHIPPED | OUTPATIENT
Start: 2024-02-29

## 2024-02-29 RX ORDER — LISINOPRIL 20 MG/1
20 TABLET ORAL
Qty: 30 TABLET | Refills: 5 | Status: SHIPPED | OUTPATIENT
Start: 2024-02-29

## 2024-02-29 RX ORDER — BUSPIRONE HYDROCHLORIDE 10 MG/1
10 TABLET ORAL 2 TIMES DAILY
Qty: 60 TABLET | Refills: 5 | Status: SHIPPED | OUTPATIENT
Start: 2024-02-29

## 2024-02-29 RX ORDER — TAMSULOSIN HYDROCHLORIDE 0.4 MG/1
1 CAPSULE ORAL NIGHTLY
Qty: 30 CAPSULE | Refills: 5 | Status: SHIPPED | OUTPATIENT
Start: 2024-02-29

## 2024-03-07 DIAGNOSIS — G62.9 PERIPHERAL POLYNEUROPATHY: ICD-10-CM

## 2024-03-07 DIAGNOSIS — M79.2 NEUROPATHIC PAIN: ICD-10-CM

## 2024-03-08 RX ORDER — GABAPENTIN 100 MG/1
100 CAPSULE ORAL 3 TIMES DAILY
Qty: 90 CAPSULE | Refills: 0 | Status: SHIPPED | OUTPATIENT
Start: 2024-03-08

## 2024-03-13 ENCOUNTER — OFFICE VISIT (OUTPATIENT)
Dept: INTERNAL MEDICINE | Age: 69
End: 2024-03-13
Payer: MEDICARE

## 2024-03-13 VITALS
BODY MASS INDEX: 32.93 KG/M2 | HEART RATE: 94 BPM | WEIGHT: 230 LBS | OXYGEN SATURATION: 94 % | TEMPERATURE: 98.6 F | DIASTOLIC BLOOD PRESSURE: 80 MMHG | HEIGHT: 70 IN | SYSTOLIC BLOOD PRESSURE: 124 MMHG

## 2024-03-13 DIAGNOSIS — R26.89 POOR BALANCE: ICD-10-CM

## 2024-03-13 DIAGNOSIS — E78.00 PURE HYPERCHOLESTEROLEMIA: ICD-10-CM

## 2024-03-13 DIAGNOSIS — I10 ESSENTIAL HYPERTENSION: Primary | ICD-10-CM

## 2024-03-13 DIAGNOSIS — R73.03 PREDIABETES: ICD-10-CM

## 2024-03-13 DIAGNOSIS — R25.2 LEG CRAMPING: ICD-10-CM

## 2024-03-13 DIAGNOSIS — E05.90 SUBCLINICAL HYPERTHYROIDISM: ICD-10-CM

## 2024-03-13 LAB
ALBUMIN SERPL-MCNC: 4.2 G/DL (ref 3.5–5.2)
ALBUMIN/GLOB SERPL: 1.4 G/DL
ALP SERPL-CCNC: 111 U/L (ref 39–117)
ALT SERPL-CCNC: 11 U/L (ref 1–41)
AST SERPL-CCNC: 13 U/L (ref 1–40)
BASOPHILS # BLD AUTO: 0.04 10*3/MM3 (ref 0–0.2)
BASOPHILS NFR BLD AUTO: 0.6 % (ref 0–1.5)
BILIRUB SERPL-MCNC: 0.5 MG/DL (ref 0–1.2)
BUN SERPL-MCNC: 15 MG/DL (ref 8–23)
BUN/CREAT SERPL: 16.9 (ref 7–25)
CALCIUM SERPL-MCNC: 9.6 MG/DL (ref 8.6–10.5)
CHLORIDE SERPL-SCNC: 105 MMOL/L (ref 98–107)
CHOLEST SERPL-MCNC: 148 MG/DL (ref 0–200)
CHOLEST/HDLC SERPL: 2.69 {RATIO}
CO2 SERPL-SCNC: 26.5 MMOL/L (ref 22–29)
CREAT SERPL-MCNC: 0.89 MG/DL (ref 0.76–1.27)
EGFRCR SERPLBLD CKD-EPI 2021: 93.9 ML/MIN/1.73
EOSINOPHIL # BLD AUTO: 0.09 10*3/MM3 (ref 0–0.4)
EOSINOPHIL NFR BLD AUTO: 1.3 % (ref 0.3–6.2)
ERYTHROCYTE [DISTWIDTH] IN BLOOD BY AUTOMATED COUNT: 12.7 % (ref 12.3–15.4)
GLOBULIN SER CALC-MCNC: 2.9 GM/DL
GLUCOSE SERPL-MCNC: 85 MG/DL (ref 65–99)
HBA1C MFR BLD: 5.5 % (ref 4.8–5.6)
HCT VFR BLD AUTO: 44.6 % (ref 37.5–51)
HDLC SERPL-MCNC: 55 MG/DL (ref 40–60)
HGB BLD-MCNC: 14.4 G/DL (ref 13–17.7)
IMM GRANULOCYTES # BLD AUTO: 0.02 10*3/MM3 (ref 0–0.05)
IMM GRANULOCYTES NFR BLD AUTO: 0.3 % (ref 0–0.5)
LDLC SERPL CALC-MCNC: 77 MG/DL (ref 0–100)
LYMPHOCYTES # BLD AUTO: 1.32 10*3/MM3 (ref 0.7–3.1)
LYMPHOCYTES NFR BLD AUTO: 18.4 % (ref 19.6–45.3)
MAGNESIUM SERPL-MCNC: 2.3 MG/DL (ref 1.6–2.4)
MCH RBC QN AUTO: 27.9 PG (ref 26.6–33)
MCHC RBC AUTO-ENTMCNC: 32.3 G/DL (ref 31.5–35.7)
MCV RBC AUTO: 86.3 FL (ref 79–97)
MONOCYTES # BLD AUTO: 0.6 10*3/MM3 (ref 0.1–0.9)
MONOCYTES NFR BLD AUTO: 8.4 % (ref 5–12)
NEUTROPHILS # BLD AUTO: 5.1 10*3/MM3 (ref 1.7–7)
NEUTROPHILS NFR BLD AUTO: 71 % (ref 42.7–76)
NRBC BLD AUTO-RTO: 0 /100 WBC (ref 0–0.2)
PLATELET # BLD AUTO: 242 10*3/MM3 (ref 140–450)
POTASSIUM SERPL-SCNC: 3.9 MMOL/L (ref 3.5–5.2)
PROT SERPL-MCNC: 7.1 G/DL (ref 6–8.5)
RBC # BLD AUTO: 5.17 10*6/MM3 (ref 4.14–5.8)
SODIUM SERPL-SCNC: 143 MMOL/L (ref 136–145)
T4 FREE SERPL-MCNC: 1.29 NG/DL (ref 0.93–1.7)
TRIGL SERPL-MCNC: 82 MG/DL (ref 0–150)
TSH SERPL DL<=0.005 MIU/L-ACNC: 0.1 UIU/ML (ref 0.27–4.2)
VLDLC SERPL CALC-MCNC: 16 MG/DL (ref 5–40)
WBC # BLD AUTO: 7.17 10*3/MM3 (ref 3.4–10.8)

## 2024-03-13 PROCEDURE — 1159F MED LIST DOCD IN RCRD: CPT

## 2024-03-13 PROCEDURE — 3074F SYST BP LT 130 MM HG: CPT

## 2024-03-13 PROCEDURE — 3079F DIAST BP 80-89 MM HG: CPT

## 2024-03-13 PROCEDURE — 1160F RVW MEDS BY RX/DR IN RCRD: CPT

## 2024-03-13 PROCEDURE — 99214 OFFICE O/P EST MOD 30 MIN: CPT

## 2024-03-13 NOTE — PROGRESS NOTES
"    I N T E R N A L  M E D I C I N E  Karen Jiménez, APRN    ENCOUNTER DATE:  03/13/2024    Flo Manzo / 68 y.o. / male      CHIEF COMPLAINT / REASON FOR OFFICE VISIT     Spasms (In legs ) and Balance Issues      ASSESSMENT & PLAN     Diagnoses and all orders for this visit:    1. Essential hypertension (Primary)  -     CBC & Differential  -     Comprehensive Metabolic Panel    2. Pure hypercholesterolemia  -     Comprehensive Metabolic Panel  -     Lipid Panel With / Chol / HDL Ratio    3. Subclinical hyperthyroidism  -     TSH+Free T4    4. Prediabetes  -     Hemoglobin A1c    5. Leg cramping  -     Magnesium    6. Poor balance  -     Ambulatory Referral to Physical Therapy Evaluate and treat         SUMMARY/DISCUSSION  Suspect his muscle cramps are related to hydration status, lack of physical activity, possible low magnesium.  Will review labs.  Encouraged good hydration with water.  Recommend PT referral to help with poor balance.  We are working towards assisting the patient to schedule with general surgery to proceed with updated colonoscopy.  He was encouraged to schedule with both endocrine and neurology offices.  He acknowledged understanding.       Next Appointment with me: Visit date not found    Return for 4 month chronic care.      VITAL SIGNS     Visit Vitals  /80   Pulse 94   Temp 98.6 °F (37 °C)   Ht 177.8 cm (70\")   Wt 104 kg (230 lb)   SpO2 94%   BMI 33.00 kg/m²             Wt Readings from Last 3 Encounters:   03/13/24 104 kg (230 lb)   02/07/24 104 kg (230 lb)   01/18/24 105 kg (232 lb)     Body mass index is 33 kg/m².        MEDICATIONS AT THE TIME OF OFFICE VISIT     Current Outpatient Medications on File Prior to Visit   Medication Sig Dispense Refill    acetaminophen (TYLENOL) 325 MG tablet Take 2 tablets by mouth Every 4 (Four) Hours As Needed for Mild Pain . 120 tablet 3    albuterol sulfate  (90 Base) MCG/ACT inhaler Inhale 2 puffs Every 4 (Four) Hours As Needed for " Wheezing. 18 g 3    amLODIPine (NORVASC) 10 MG tablet TAKE 1 TABLET BY MOUTH DAILY 90 tablet 1    atorvastatin (LIPITOR) 40 MG tablet TAKE 1 TABLET BY MOUTH NIGHTLY 90 tablet 1    busPIRone (BUSPAR) 10 MG tablet TAKE 1 TABLET BY MOUTH TWICE DAILY 60 tablet 5    citalopram (CeleXA) 20 MG tablet Take 1 tablet by mouth Daily. (Patient taking differently: Take 1.5 tablets by mouth Daily. PT TAKING 1.5 TABLETS DAILY) 135 tablet 1    Diclofenac Sodium (Voltaren) 1 % gel gel Apply 4 g topically to the appropriate area as directed 4 (Four) Times a Day. 50 g 0    Eliquis 5 MG tablet tablet TAKE 1 TABLET BY MOUTH TWICE DAILY 60 tablet 5    furosemide (LASIX) 20 MG tablet Take 1 tablet by mouth Daily for 180 days. 90 tablet 1    gabapentin (NEURONTIN) 100 MG capsule TAKE 1 CAPSULE BY MOUTH THREE TIMES A DAY 90 capsule 0    lisinopril (PRINIVIL,ZESTRIL) 20 MG tablet TAKE 1 TABLET BY MOUTH DAILY 30 tablet 5    metoprolol tartrate (LOPRESSOR) 50 MG tablet Take 1 tablet by mouth 2 (Two) Times a Day. 60 tablet 3    omeprazole (priLOSEC) 20 MG capsule TAKE 1 CAPSULE BY MOUTH DAILY 90 capsule 1    tamsulosin (FLOMAX) 0.4 MG capsule 24 hr capsule TAKE 1 CAPSULE BY MOUTH NIGHTLY 30 capsule 5    trimethoprim-polymyxin b (POLYTRIM) 63695-0.1 UNIT/ML-% ophthalmic solution Administer 1 drop to both eyes Every 6 (Six) Hours. 10 mL 0    [DISCONTINUED] DULoxetine (CYMBALTA) 30 MG capsule TAKE 1 CAPSULE BY MOUTH EVERY NIGHT 90 capsule 1    [DISCONTINUED] metoprolol succinate XL (TOPROL-XL) 25 MG 24 hr tablet Take 1 tablet by mouth Daily. 30 tablet 0    [DISCONTINUED] QUEtiapine (SEROquel) 25 MG tablet Take 1 tablet by mouth Every Night. Take 30 min before bed 30 tablet 0     No current facility-administered medications on file prior to visit.        HISTORY OF PRESENT ILLNESS     Afib with RVR, CHF, aortic stenosis, pacemaker: Followed by cardiology, Dr. Bruce.  Last seen on January 18, 2024.  On Eliquis 5 mg BID.   BP is well  controlled at today's visit at today's visit on amlodipine 10 mg daily, lisinopril 20 mg daily, metoprolol succinate tartrate 50 mg BID.  Weight remains stable.  Denies chest pain.  Stable shortness of breath with exertion.  August 2023 ECHO was EF of 46-50%, moderate aortic valve stenosis.  Next appointment is July 18, 2024.  GÓMEZ: Followed by sleep medicine and wearing CPAP regularly.       Followed by thoracic surgery, Dr. Burciaga, for thyroid goiter.   March 17, 2023 CT Chest showed goiter with bilateral substernal extension and corresponding mass effect, including narrowing of adjacent trachea, which is similar to CT of June 22, 2022.  He is not a candidate for surgical resection so is monitored yearly as surveillance.  CT Chest is next scheduled on March 21, 2024 as follow up.  Labs show a history of subclinical hyperthyroidism.  He was scheduled to establish with endocrine, Dr. Marquis, on February 28, 2024 but was a no show.  Has not yet rescheduled.       He has not yet scheduled follow up with neurology, Dr. Wilkins, for history of hemorrhagic stroke, but expresses his ongoing intent to do so.       Followed by Oncology Dr. Her, and [facility], Dr. Rader for prostate cancer.  Taking tamsulosin 0.4 mg nightly.  Completed radiation therapy.  Next follow up is April 3, 2024.     February 2023 Hemoglobin A1C was normal, 5.6, with prior readings within prediabetes range.     HLD: Well controlled on atorvastatin 40 mg nightly.  November 2023 Lipid panel with LDL 52; triglycerides 46.     Anxiety/ depression: Mood is stable on buspirone 10 mg BID, citalopram 20 mg daily.  No SI/ HI.       Remains on gabapentin 100 mg TID daily with benefit for bilateral hand neuropathy.        He is aware that colonoscopy is due, and would like to have it done with Dr. Solitario.  Last colonoscopy was in January 2018, with finding of 3 tubular adenomas and repeat due in 3 years.  Referral was placed in August 2023, but he has  "not yet scheduled.    Complains of bilateral calf \"cramping\" pain when he wakes up in the mornings.  No leg swelling, erythema, warmth.  No pain with ambulation.  He reports less physical activity as of late due to deconditioning.  Using a wheelchair for today's appointment.  He does complain of ongoing poor balance with ambulation.        Patient Care Team:  Karen Jiménez APRN as PCP - General (Family Medicine)  Eren Bruce MD as Consulting Physician (Cardiology)  Temo Wheat MD as Consulting Physician (Radiation Oncology)  Bryant Rader MD as Consulting Physician (Urology)  Cindi Breen DNP, APRN as Nurse Practitioner (Nurse Practitioner)    REVIEW OF SYSTEMS     Review of Systems   Constitutional:  Negative for chills, fever and unexpected weight change.   Respiratory:  Negative for cough, chest tightness and shortness of breath.    Cardiovascular:  Negative for chest pain, palpitations and leg swelling.   Musculoskeletal:  Positive for myalgias (Bilateral calves).   Neurological:  Negative for dizziness, weakness, light-headedness and headaches.   Psychiatric/Behavioral:  The patient is not nervous/anxious.           PHYSICAL EXAMINATION     Physical Exam  Vitals reviewed.   Constitutional:       General: He is not in acute distress.     Appearance: Normal appearance. He is not ill-appearing, toxic-appearing or diaphoretic.   HENT:      Head: Normocephalic and atraumatic.   Cardiovascular:      Rate and Rhythm: Normal rate and regular rhythm.      Heart sounds: Murmur heard.   Pulmonary:      Effort: Pulmonary effort is normal.      Breath sounds: Normal breath sounds.   Musculoskeletal:      Right lower leg: No edema.      Left lower leg: No edema.   Neurological:      Mental Status: He is alert and oriented to person, place, and time. Mental status is at baseline.   Psychiatric:         Mood and Affect: Mood normal.         Behavior: Behavior normal.         Thought Content: " Thought content normal.         Judgment: Judgment normal.           REVIEWED DATA     Labs:           Imaging:            Medical Tests:           Summary of old records / correspondence / consultant report:           Request outside records:

## 2024-03-14 ENCOUNTER — PREP FOR SURGERY (OUTPATIENT)
Dept: OTHER | Facility: HOSPITAL | Age: 69
End: 2024-03-14
Payer: MEDICARE

## 2024-03-14 ENCOUNTER — TELEPHONE (OUTPATIENT)
Dept: INTERNAL MEDICINE | Age: 69
End: 2024-03-14
Payer: MEDICARE

## 2024-03-14 DIAGNOSIS — Z86.010 HISTORY OF COLON POLYPS: Primary | ICD-10-CM

## 2024-03-14 PROBLEM — Z86.0100 HISTORY OF COLON POLYPS: Status: ACTIVE | Noted: 2024-03-14

## 2024-03-14 NOTE — TELEPHONE ENCOUNTER
Spoke with Padmini at Dr. Solitario's office. Got C-scope packet and filled out with patient. Sent packet back to Padmini. Patient was explained review process and informed Dr. Solitario's office will call to schedule after review. Asked patient to reach back out to our office if he doesn't hear from scheduling. Patient acknowledged. Re-opened referral for scheduling purposes.

## 2024-03-20 ENCOUNTER — TELEPHONE (OUTPATIENT)
Dept: SURGERY | Facility: CLINIC | Age: 69
End: 2024-03-20
Payer: MEDICARE

## 2024-03-21 ENCOUNTER — HOSPITAL ENCOUNTER (OUTPATIENT)
Dept: CT IMAGING | Facility: HOSPITAL | Age: 69
Discharge: HOME | End: 2024-03-21
Admitting: NURSE PRACTITIONER
Payer: MEDICARE

## 2024-03-21 DIAGNOSIS — E04.9 SUBSTERNAL THYROID GOITER: ICD-10-CM

## 2024-03-21 PROCEDURE — 71250 CT THORAX DX C-: CPT

## 2024-03-30 DIAGNOSIS — M79.2 NEUROPATHIC PAIN: ICD-10-CM

## 2024-03-30 DIAGNOSIS — G62.9 PERIPHERAL POLYNEUROPATHY: ICD-10-CM

## 2024-04-02 ENCOUNTER — TELEPHONE (OUTPATIENT)
Dept: RADIATION ONCOLOGY | Facility: HOSPITAL | Age: 69
End: 2024-04-02
Payer: MEDICARE

## 2024-04-03 ENCOUNTER — OFFICE VISIT (OUTPATIENT)
Dept: RADIATION ONCOLOGY | Facility: HOSPITAL | Age: 69
End: 2024-04-03
Payer: MEDICARE

## 2024-04-03 VITALS
OXYGEN SATURATION: 93 % | BODY MASS INDEX: 33.29 KG/M2 | HEART RATE: 80 BPM | WEIGHT: 232 LBS | SYSTOLIC BLOOD PRESSURE: 139 MMHG | DIASTOLIC BLOOD PRESSURE: 92 MMHG

## 2024-04-03 DIAGNOSIS — C61 PROSTATE CANCER: Primary | ICD-10-CM

## 2024-04-03 NOTE — PROGRESS NOTES
[facility] Radiation Oncology   Follow Up    Chief Complaint  Favorable intermediate risk prostate cancer         Diagnosis: Favorable intermediate risk prostate cancer      Radiation Completion Date: 1/5/2024        Prescription:      Site: Prostate and SV  Laterality: N/A  Total Dose: 6000cGy  Dose per Fraction: 300cGy  Total Fractions: 20  Daily or BID: Daily  Modality: Photon  Technique: IMRT/VMAT/Rapid Arc  Bolus: No      Interval History:    Flo Manzo presents for regularly scheduled follow-up approximately 3 months after completion of radiation therapy for prostate cancer.  The patient has done fairly well in the interim.  He has a number of other medical comorbidities and limited mobility.  He reports that his bladder is functioning at his baseline.  He reports that he is having some intermittent constipation and some softer stools.  He has some intermittent leakage and sometimes is not aware  that he has had leakage.  He has had some of this at baseline.      Imaging:      No new relevant imaging      Pathology:      No new relevant pathology      Labs:    Lab Results   Component Value Date    CREATININE 0.89 03/13/2024             Problem List:  Patient Active Problem List   Diagnosis    Umbilical hernia without obstruction and without gangrene    Essential hypertension    Arthritis of left knee    Depression    Obesity (BMI 30-39.9)    Atrial fibrillation with RVR    Substernal thyroid goiter    CHF (congestive heart failure)    Diastolic CHF, chronic    Hemorrhagic stroke    GÓMEZ on auto CPAP    Hypersomnia due to medical condition    Sleep-related hypoxia    Chronic atrial fibrillation    Vertigo    Aortic stenosis, severe    Sinus pause    Pacemaker    Anticoagulated    Generalized weakness    Prediabetes    Pure hypercholesterolemia    Pre-operative cardiovascular examination    Peripheral neuropathy    History of colon polyps          Medications:  Current Outpatient Medications on File Prior to  Visit   Medication Sig Dispense Refill    acetaminophen (TYLENOL) 325 MG tablet Take 2 tablets by mouth Every 4 (Four) Hours As Needed for Mild Pain . 120 tablet 3    albuterol sulfate  (90 Base) MCG/ACT inhaler Inhale 2 puffs Every 4 (Four) Hours As Needed for Wheezing. 18 g 3    amLODIPine (NORVASC) 10 MG tablet TAKE 1 TABLET BY MOUTH DAILY 90 tablet 1    atorvastatin (LIPITOR) 40 MG tablet TAKE 1 TABLET BY MOUTH NIGHTLY 90 tablet 1    busPIRone (BUSPAR) 10 MG tablet TAKE 1 TABLET BY MOUTH TWICE DAILY 60 tablet 5    citalopram (CeleXA) 20 MG tablet Take 1 tablet by mouth Daily. (Patient taking differently: Take 1.5 tablets by mouth Daily. PT TAKING 1.5 TABLETS DAILY) 135 tablet 1    Diclofenac Sodium (Voltaren) 1 % gel gel Apply 4 g topically to the appropriate area as directed 4 (Four) Times a Day. 50 g 0    Eliquis 5 MG tablet tablet TAKE 1 TABLET BY MOUTH TWICE DAILY 60 tablet 5    furosemide (LASIX) 20 MG tablet Take 1 tablet by mouth Daily for 180 days. 90 tablet 1    gabapentin (NEURONTIN) 100 MG capsule TAKE 1 CAPSULE BY MOUTH THREE TIMES A DAY 90 capsule 0    lisinopril (PRINIVIL,ZESTRIL) 20 MG tablet TAKE 1 TABLET BY MOUTH DAILY 30 tablet 5    metoprolol tartrate (LOPRESSOR) 50 MG tablet Take 1 tablet by mouth 2 (Two) Times a Day. 60 tablet 3    omeprazole (priLOSEC) 20 MG capsule TAKE 1 CAPSULE BY MOUTH DAILY 90 capsule 1    tamsulosin (FLOMAX) 0.4 MG capsule 24 hr capsule TAKE 1 CAPSULE BY MOUTH NIGHTLY 30 capsule 5    trimethoprim-polymyxin b (POLYTRIM) 52622-6.1 UNIT/ML-% ophthalmic solution Administer 1 drop to both eyes Every 6 (Six) Hours. 10 mL 0    [DISCONTINUED] DULoxetine (CYMBALTA) 30 MG capsule TAKE 1 CAPSULE BY MOUTH EVERY NIGHT 90 capsule 1    [DISCONTINUED] metoprolol succinate XL (TOPROL-XL) 25 MG 24 hr tablet Take 1 tablet by mouth Daily. 30 tablet 0    [DISCONTINUED] QUEtiapine (SEROquel) 25 MG tablet Take 1 tablet by mouth Every Night. Take 30 min before bed 30 tablet 0     No  "current facility-administered medications on file prior to visit.          Allergies:  Allergies   Allergen Reactions    Poison Ivy Extract Hives, Itching and Rash     ITCHY BLISTERS           Vital Signs:  /92   Pulse 80   Wt 105 kg (232 lb)   SpO2 93%   BMI 33.29 kg/m²   Estimated body mass index is 33.29 kg/m² as calculated from the following:    Height as of 3/13/24: 177.8 cm (70\").    Weight as of this encounter: 105 kg (232 lb).  Pain Score    04/03/24 1337   PainSc: 0-No pain         ECOG: Capable of only limited selfcare; confined to bed or chair more than 50% of waking hours = 3    Physical Exam  Vitals reviewed.   Constitutional:       General: He is not in acute distress.     Appearance: Normal appearance.   HENT:      Head: Normocephalic and atraumatic.   Eyes:      Extraocular Movements: Extraocular movements intact.      Pupils: Pupils are equal, round, and reactive to light.   Pulmonary:      Effort: Pulmonary effort is normal.   Abdominal:      General: Abdomen is flat.      Palpations: Abdomen is soft.   Musculoskeletal:      Cervical back: Normal range of motion.   Skin:     General: Skin is warm and dry.   Neurological:      Mental Status: He is alert and oriented to person, place, and time.   Psychiatric:         Mood and Affect: Mood normal.         Behavior: Behavior normal.          Result Review :  The following data was reviewed by: Temo Wheat MD on 04/03/2024:  Labs: Last Creatinine            Diagnoses and all orders for this visit:    1. Prostate cancer (Primary)        Assessment:    Flo Manzo presents for regularly scheduled follow-up approximately 3 months after completion of radiation therapy for prostate cancer.  The patient has done fairly well in the interim.  He has a number of other medical comorbidities and limited mobility.  He reports that his bladder is functioning at his baseline.  He reports that he is having some intermittent constipation and some " softer stools.  He has some intermittent leakage and sometimes is not aware  that he has had leakage.  He has had some of this at baseline.    I met with the patient and discussed his symptoms in detail.  I would like to check in on him in a few months to see how his bowels continue to heal.  Discussed pelvic floor physical therapy exercises.  He has not had follow-up with [facility] but reports they have tried to contact him.  I encouraged him to get back in contact with them and schedule follow-up so we can have evaluation and PSA draws.      Plan:    - Follow-up in 3 months  - Discussed pelvic floor physical therapy exercises  - Discussed importance of keeping follow-up appointments with [facility]       I spent 30 minutes caring for Flo on this date of service. This time includes time spent by me in the following activities:preparing for the visit, reviewing tests, obtaining and/or reviewing a separately obtained history, documenting information in the medical record, independently interpreting results and communicating that information with the patient/family/caregiver, and care coordination  Follow Up   No follow-ups on file.  Patient was given instructions and counseling regarding his condition or for health maintenance advice. Please see specific information pulled into the AVS if appropriate.     Temo Wheat MD

## 2024-04-08 ENCOUNTER — TELEPHONE (OUTPATIENT)
Dept: RADIATION ONCOLOGY | Facility: HOSPITAL | Age: 69
End: 2024-04-08
Payer: MEDICARE

## 2024-04-09 ENCOUNTER — TELEPHONE (OUTPATIENT)
Dept: RADIATION ONCOLOGY | Facility: HOSPITAL | Age: 69
End: 2024-04-09
Payer: MEDICARE

## 2024-04-09 NOTE — TELEPHONE ENCOUNTER
Tried calling PT to schedule his 3 month follow up with Dr. Wheat. Phone number on taj does not have an answering service so I was unable to LVM. I did reach out to his wife listed on his patient contacts and left her a message asking him to call our office back.

## 2024-04-16 NOTE — PROGRESS NOTES
"  [facility]  [address]  [address]  [place] [address]  Phone   Fax         SLEEP CLINIC FOLLOW-UP PROGRESS NOTE    Flo Manzo  [id number]   1955  69 y.o.  male      PCP: Karen Jiménez APRN    DATE OF VISIT: 4/17/2024          CHIEF COMPLAINT: Obstructive sleep apnea    HPI:  This is a 69 y.o. year old patient who presents to the clinic today for the management of obstructive sleep apnea.  Patient had a(n) home sleep study in 2019 showing obstructive sleep apnea with AHI of 20/hr.  He established care with new provider in October.  He had not started using his new DreamStation auto CPAP at that time as pressure adjustments were needed.  AHI was at 8.1/h 12/2023 but he had low usage of device.  Increased usage was recommended.  He was going through radiation at that time and declined any adjustments at that time.  He now presents today for follow-up.  He has completed radiation.  He reports he did not use his CPAP for about a month as he lost the SD card and thought that the device would not work without it.  He has resumed use but still not using at least 4 hours per night.  Feels comfortable with pressures, no complaints.  However hypopnea index increased.  Also reports his nasal pillow mask does not stay in place and comes off and suspected mouth breathing.  He is not opposed to full facemask, has never tried 1.  Fitted for one in the office today.            MEDICATIONS: reviewed     ALLERGIES:  Poison ivy extract    SOCIAL HISTORY (habits pertaining to sleep medicine):  Tobacco use: No   Alcohol use: 0 per week  Caffeine use: 2     REVIEW OF SYSTEMS:   Pertinent positive symptoms are:  [place] Sleepiness Scale :Total score: 4   Chronic dyspnea  Anxiety        PHYSICAL EXAMINATION:  CONSTITUTIONAL:  Vitals:    04/17/24 1149   Pulse: 99   SpO2: 96%   Weight: 104 kg (230 lb)   Height: 177.8 cm (70\")    Body mass index is 33 kg/m².   HEAD: atraumatic, " normocephalic  RESP SYSTEM: not in respiratory distress, breathing appears unlabored  CARDIOVASULAR: normal rate  NEURO: Alert and oriented x 3, mood and affect appeared appropriate      DATA REVIEWED:  The PAP compliance summary downloaded on 4/16/2024 has been reviewed independently by me and discussed with the patient.   Compliance: 35.6%  More than 4 hr use: 0%  Average use of the device: 3 hours 56 minutes per night  Residual AHI: 17.3 /hr (goal < 5.0 /hr)  Device: Room station 2 auto CPAP  Mask type: Nasal pillow  DME: [employer]          ASSESSMENT AND PLAN:  Obstructive Sleep Apnea: AHI increased since last visit, up to 17.3/h.  Is having some mask leak.  Also reports that nasal mask comes off at night, limiting usage.  He is amenable to trial of full facemask.  Aware of risks of untreated or poorly treated sleep apnea.  He also thinks he breathes through his mouth at times at night.  He was fitted for F&P Vitera fullface mask, size large in the office today.  Have also increased minimum pressure to 8 cm H2O as detailed report shows most of AHI consists of hypopneas.  He will follow-up in 4 weeks or sooner for issues or concerns.  Recommended trying to use CPAP all night every night.  Consider titration study if AHI not improving with the above, does have some periodic breathing noted on detailed report.  Obesity: Body mass index is 33 kg/m².. Patients who are overweight or obese are at increased risk of sleep apnea/ sleep disordered breathing. Weight reduction and healthy lifestyle are encouraged in overweight/ obese patients as part of a comprehensive approach to sleep apnea treatment.    Chronic diastolic CHF  Hypertension  Aortic stenosis  Pacemaker  Atrial fibrillation      Patient will follow-up in 4 weeks or follow-up sooner for any issues or concerns.  Patient's questions were answered.          Thank you for allowing me to participate in the care of this patient.     Yisel Breen DNP, ASHANTI  [facility]  Health Sleep Medicine

## 2024-04-17 ENCOUNTER — OFFICE VISIT (OUTPATIENT)
Dept: SLEEP MEDICINE | Facility: HOSPITAL | Age: 69
End: 2024-04-17
Payer: MEDICARE

## 2024-04-17 VITALS — BODY MASS INDEX: 32.93 KG/M2 | HEART RATE: 99 BPM | WEIGHT: 230 LBS | HEIGHT: 70 IN | OXYGEN SATURATION: 96 %

## 2024-04-17 DIAGNOSIS — G47.33 OSA ON CPAP: Primary | ICD-10-CM

## 2024-04-17 DIAGNOSIS — E66.9 CLASS 1 OBESITY WITH SERIOUS COMORBIDITY AND BODY MASS INDEX (BMI) OF 33.0 TO 33.9 IN ADULT, UNSPECIFIED OBESITY TYPE: ICD-10-CM

## 2024-04-17 PROCEDURE — 99214 OFFICE O/P EST MOD 30 MIN: CPT | Performed by: NURSE PRACTITIONER

## 2024-04-17 PROCEDURE — G0463 HOSPITAL OUTPT CLINIC VISIT: HCPCS

## 2024-04-17 PROCEDURE — 1159F MED LIST DOCD IN RCRD: CPT | Performed by: NURSE PRACTITIONER

## 2024-04-17 PROCEDURE — 1160F RVW MEDS BY RX/DR IN RCRD: CPT | Performed by: NURSE PRACTITIONER

## 2024-04-18 ENCOUNTER — TELEPHONE (OUTPATIENT)
Age: 69
End: 2024-04-18

## 2024-04-18 ENCOUNTER — TELEPHONE (OUTPATIENT)
Dept: RADIATION ONCOLOGY | Facility: HOSPITAL | Age: 69
End: 2024-04-18
Payer: MEDICARE

## 2024-04-18 ENCOUNTER — HOSPITAL ENCOUNTER (OUTPATIENT)
Dept: PHYSICAL THERAPY | Facility: HOSPITAL | Age: 69
Setting detail: THERAPIES SERIES
Discharge: HOME | End: 2024-04-18
Payer: MEDICARE

## 2024-04-18 DIAGNOSIS — R26.89 POOR BALANCE: Primary | ICD-10-CM

## 2024-04-18 DIAGNOSIS — R29.898 LEG WEAKNESS, BILATERAL: ICD-10-CM

## 2024-04-18 DIAGNOSIS — Z74.09 IMPAIRED MOBILITY: ICD-10-CM

## 2024-04-18 PROCEDURE — 97162 PT EVAL MOD COMPLEX 30 MIN: CPT

## 2024-04-18 RX ORDER — GABAPENTIN 100 MG/1
100 CAPSULE ORAL 3 TIMES DAILY
Qty: 90 CAPSULE | Refills: 0 | Status: SHIPPED | OUTPATIENT
Start: 2024-04-18

## 2024-04-18 NOTE — THERAPY EVALUATION
Outpatient Physical Therapy Ortho Initial Evaluation  Psychiatric     Patient Name: Flo Manzo  : 1955  MRN: [id number]  Today's Date: 2024      Visit Date: 2024    Patient Active Problem List   Diagnosis    Umbilical hernia without obstruction and without gangrene    Essential hypertension    Arthritis of left knee    Depression    Obesity (BMI 30-39.9)    Atrial fibrillation with RVR    Substernal thyroid goiter    CHF (congestive heart failure)    Diastolic CHF, chronic    Hemorrhagic stroke    GÓMEZ on auto CPAP    Hypersomnia due to medical condition    Sleep-related hypoxia    Chronic atrial fibrillation    Vertigo    Aortic stenosis, severe    Sinus pause    Pacemaker    Anticoagulated    Generalized weakness    Prediabetes    Pure hypercholesterolemia    Pre-operative cardiovascular examination    Peripheral neuropathy    History of colon polyps        Past Medical History:   Diagnosis Date    Arthritis     Atrial fibrillation with RVR 2019    Colon polyps 2018    Hepatic flexure: tubular adenoma, only low-grade dysplasia; splenic flexure: tubular adenoma, only low-grade dysplasia; sigmoid colon: tubular adenoma, multiple fragments only low-grade dysplasia    Depression     Heart murmur     Hemorrhagic stroke 2019    Hypertension     New onset atrial fibrillation (CMS/HCC) - RVR 2019    GÓMEZ (obstructive sleep apnea) 2019    Home sleep study with moderate severity GÓMEZ, AHI 20 events per hour.  Sleep-related hypoxia with low O2 saturation 84% for 14 minutes.    Peripheral neuropathy     Sleep apnea     previously diagnosed with sleep apnea, had T&A done and it has since resolved     Stroke     Uncontrolled hypertension     Ventral hernia         Past Surgical History:   Procedure Laterality Date    APPENDECTOMY N/A     BACK SURGERY      BLADDER STIMULATOR IMPLANT L LOWER BACK    CARDIAC CATHETERIZATION N/A 2004    Cath left ventriculography,  coronary angiography and left heart catheterization, Normal results-Dr. Fierro     CARDIAC CATHETERIZATION N/A 1/29/2019    Procedure: Left Heart Cath;  Surgeon: Eren Bruce MD;  Location: [facility] CATH INVASIVE LOCATION;  Service: Cardiology    CARDIAC CATHETERIZATION N/A 1/29/2019    Procedure: Left ventriculography;  Surgeon: Eren Bruce MD;  Location: [facility] CATH INVASIVE LOCATION;  Service: Cardiology    CARDIAC CATHETERIZATION N/A 1/29/2019    Procedure: Coronary angiography;  Surgeon: Eren Bruce MD;  Location: [facility] CATH INVASIVE LOCATION;  Service: Cardiology    CARDIAC ELECTROPHYSIOLOGY PROCEDURE N/A 3/4/2022    Procedure: Pacemaker SC new BIOTRONIK;  Surgeon: Eren Bruce MD;  Location: [facility] CATH INVASIVE LOCATION;  Service: Cardiology;  Laterality: N/A;    CARPAL TUNNEL RELEASE Right 2013    CARPAL TUNNEL RELEASE Left     COLONOSCOPY N/A 1/4/2018    Procedure: COLONOSCOPY with cold polypectomy and hot snare polypectomy;  Surgeon: Ten Solitario MD;  Location: [facility] ENDOSCOPY;  Service:     EYE LENS IMPLANT SECONDARY Bilateral 2007, 2008    KNEE CARTILAGE SURGERY Right 1979    TONSILLECTOMY AND ADENOIDECTOMY Bilateral 2005    TOTAL KNEE ARTHROPLASTY Left 03/14/2016    Left total knee arthroplasty with Amberly component, size 11 femur, G tibial baseplate with a 10 polyethylene insert and a 38 patellar button-Dr. Pablo Hairston    TOTAL KNEE ARTHROPLASTY Right 03/02/2015    Right total knee arthroplasty with Amberly component size G femur, 11 tibial base plate with an 11 polyethylene insert and a 38 patellar button-Dr. Pablo Hairston    UVULOPALATOPHARYNGOPLASTY N/A 2005    part of soft palate, and uvula    VENTRAL HERNIA REPAIR N/A 1/23/2018    Procedure: VENTRAL HERNIA REPAIR LAPAROSCOPIC WITH DAVINCI ROBOT AND MESH;  Surgeon: Ten Solitario MD;  Location: [facility] MAIN OR;  Service:        Visit Dx:     ICD-10-CM ICD-9-CM   1. Poor balance  R26.89 781.99    2. Leg weakness, bilateral  R29.898 729.89   3. Impaired mobility  Z74.09 799.89          Patient History       Row Name 04/18/24 1100             History    Chief Complaint Balance Problems  -MO      Brief Description of Current Complaint 68 y/o male pt well known to this clinic, seen previously for balance and shoulder pain, returns for worsening balance and radiation induced peripheral neuropathy. He has extensive neuro hx with previous stroke(s), unsure of how many, with a short stay in a rehab at the beginning of last year. He underwent radiation for prostate cancer at the end of 2023, ended treatment about 3-4 months ago. Since radiation, he states he has had more N/T into his feet however denies any tripping secondary to this. He reports he has been using a walker for about a year now (previous therapy notes from 2023 also report he was on a walker, unsure of how long) but that he was on a cane prior to that. He states he is most off balance with bending over, gets really dizzy. He states he is overall moving slower and is scare he is going to fall. He has no steps at home, a ramp to enter, and navigates curbs in the community with his walker. He reports 1 fall in the last year when he knelt down on one knee to take a picture with his wife and was unable to get up and fell backward. His biggest goal is to feel more secure getting in<>out of the shower.  -MO      Patient/Caregiver Goals Improve mobility  -MO      Patient/Caregiver Goals Comment Be able to get in and out of the shower without fear of falling  -MO         Fall Risk Assessment    Any falls in the past year: Other (comment)  knelt on ground in january, couldn't get back up and fell backwards  -MO      Does patient have a fear of falling Yes (comment)  -MO         Services    Prior Rehab/Home Health Experiences No  -MO         Daily Activities    Primary Language English  -MO      Are you able to read Yes  -MO      Are you able to write Yes  -MO       How does patient learn best? Listening;Reading;Demonstration  -MO      Does patient have problems with the following? None  -MO      Barriers to learning None  -MO      Pt Participated in POC and Goals Yes  -MO         Safety    Are you being hurt, hit, or frightened by anyone at home or in your life? No  -MO      Are you being neglected by a caregiver No  -MO      Have you had any of the following issues with N/A  -MO                User Key  (r) = Recorded By, (t) = Taken By, (c) = Cosigned By      Initials Name Provider Type    Nuvia Stafford PT Physical Therapist                     PT Ortho       Row Name 04/18/24 1100       MMT (Manual Muscle Testing)    Rt Lower Ext Rt Hip Flexion;Rt Hip ABduction;Rt Knee Extension;Rt Knee Flexion;Rt Ankle Plantarflexion;Rt Ankle Dorsiflexion  -MO    Lt Lower Ext Lt Hip Flexion;Lt Hip Extension;Lt Hip ABduction;Lt Knee Extension;Lt Knee Flexion;Lt Ankle Plantarflexion;Lt Ankle Dorsiflexion  -MO       MMT Right Lower Ext    Rt Hip Flexion MMT, Gross Movement (4-/5) good minus  -MO    Rt Hip ABduction MMT, Gross Movement (3+/5) fair plus  -MO    Rt Knee Extension MMT, Gross Movement (4+/5) good plus  -MO    Rt Knee Flexion MMT, Gross Movement (4-/5) good minus  -MO    Rt Ankle Plantarflexion MMT, Gross Movement (4/5) good  -MO    Rt Ankle Dorsiflexion MMT, Gross Movement (4-/5) good minus  -MO       MMT Left Lower Ext    Lt Hip Flexion MMT, Gross Movement (4-/5) good minus  -MO    Lt Hip Extension MMT, Gross Movement (3+/5) fair plus  -MO    Lt Hip ABduction MMT, Gross Movement (3+/5) fair plus  -MO    Lt Knee Extension MMT, Gross Movement (4+/5) good plus  -MO    Lt Knee Flexion MMT, Gross Movement (3+/5) fair plus  -MO    Lt Ankle Plantarflexion MMT, Gross Movement (4/5) good  -MO    Lt Ankle Dorsiflexion MMT, Gross Movement (4-/5) good minus  -MO       Gait/Stairs (Locomotion)    Right Sided Gait Deviations foot drop/toe drag  -MO              User Key  (r) = Recorded  By, (t) = Taken By, (c) = Cosigned By      Initials Name Provider Type    Nuvia Stafford, PT Physical Therapist                                Therapy Education  Education Details: Educated on PT role and POC; discussed expectations/timeframes. Encouraged pt to focus on staying inside walker at all times  Given: HEP, Symptoms/condition management  Program: New  How Provided: Verbal, Demonstration, Written  Provided to: Patient  Level of Understanding: Teach back education performed, Verbalized, Demonstrated      PT OP Goals       Row Name 04/18/24 1200          PT Short Term Goals    STG Date to Achieve 05/18/24  -MO     STG 1 Pt will be independent with initial HEP to improve strength and static/dynamic balance.  -MO     STG 1 Progress New  -MO     STG 2 Pt will ambulate 150' with normal heel strike to toe off with symmetrical stride and davon with his RWX without momentary LOB.  -MO     STG 2 Progress New  -MO     STG 3 Patient able to tandem stance bilaterally with 1 finger hold > 30 seconds without LOB for improved core stabilization and decreased fall risk  -MO     STG 3 Progress New  -MO     STG 4 Pt performs 30 seconds sit to stand having complete at least 2 more repetitions that was performed upon initial evaluation for progression of ease with functional transfers, LE strength, and safety in the home.  -MO     STG 4 Progress New  -MO        Long Term Goals    LTG Date to Achieve 07/17/24  -MO     LTG 1 Pt will be independent with advance HEP to improve strength and static/dynamic balance.  -MO     LTG 1 Progress New  -MO     LTG 2 Pt will tolerate 30 minutes of activity with 3 brief rest breaks demonstrating improved functional strength.  -MO     LTG 2 Progress New  -MO     LTG 3 Pt will demonstrate increased strength LEs to 4/5 or better.  -MO     LTG 3 Progress New  -MO     LTG 4 Patient will improve VELAZQUEZ score from 22 to </= 28 to show improved static/dynamic balance leading to decrease risk for  falls.  -MO     LTG 4 Progress New  -MO     LTG 5 Pt will increase 2min walk by >/=30 ft with rwx with </= 1 LOB for improved safety with ambulatory tasks.  -MO     LTG 5 Progress New  -MO     LTG 6 Pt will be able to perform static NBOS on stable surface for >/=30s without UE support for improved balance and decreased risk of falling.  -MO     LTG 6 Progress New  -MO     LTG 7 The pt demonstrates ambulation with step through reciprocal gait pattern with assistive device with no moments of instability for 200 ft while demonstrating increased stance width/SOLOMON for improvements in stability/safety during dynamic ambulatory tasks when navigating the community.  -MO     LTG 7 Progress New  -MO        Time Calculation    PT Goal Re-Cert Due Date 07/17/24  -MO               User Key  (r) = Recorded By, (t) = Taken By, (c) = Cosigned By      Initials Name Provider Type    Nuvia Stafford, PT Physical Therapist                     PT Assessment/Plan       Row Name 04/18/24 1200          PT Assessment    Functional Limitations Decreased safety during functional activities;Impaired gait;Limitation in home management;Limitations in community activities;Limitations in functional capacity and performance;Performance in leisure activities;Performance in self-care ADL  -MO     Impairments Balance;Coordination;Endurance;Gait;Range of motion;Poor body mechanics;Muscle strength;Motor function;Locomotion  -MO     Assessment Comments Flo Manzo is a 69 y.o. male referred to physical therapy for worsening balance impairments and radiation induced peripheral neuropathy. He presents with an evolving clinical presentation, along with  comorbidities of A fib, chronic heart failure, congestive heart failure, aortic stenosis, hypertension, hx of CVA, recent hx of prostate cancer with radiation treatment and personal factors of elevated BMI and more sedentary lifestyle  that may impact his progress in the plan of care. Pt presents today  with significant strength and balance impairments, severely effecting overall functional mobility and safety with ambulation. He scores 22 on pena balance, 168' in 2 min walk test with noted instability, and 3 STS in 30s, all indicating a high risk for falls. He amb into clinic with rwx with trendelenburg gait, L foot drag with several instances of catching or hitting walker on swing through, forward flexed, and requires several cues to stay within walker for safety. His signs and symptoms are consistent with referring diagnosis. The previous impairments limit his ability to complete daily ADLs safely and participate in recreational activities. Patients self reported LEFS outcome measure is 16%, ranging from 0-100 with 0% being severe disability. Pt will benefit from skilled PT to address the previous impairments and return to PLOF.  -MO     Please refer to paper survey for additional self-reported information No  -MO     Rehab Potential Fair  -MO     Patient/caregiver participated in establishment of treatment plan and goals Yes  -MO     Patient would benefit from skilled therapy intervention Yes  -MO        PT Plan    PT Frequency 2x/week;1x/week  -MO     Predicted Duration of Therapy Intervention (PT) 8 weeks  -MO     Planned CPT's? PT EVAL MOD COMPLELITY: 56991;PT RE-EVAL: 76542;PT THER PROC EA 15 MIN: 87742;PT THER ACT EA 15 MIN: 48760;PT MANUAL THERAPY EA 15 MIN: 79347;PT NEUROMUSC RE-EDUCATION EA 15 MIN: 41331;PT GAIT TRAINING EA 15 MIN: 87779;PT SELF CARE/HOME MGMT/TRAIN EA 15: 61329  -MO     PT Plan Comments nustep warmup. Initiate LE strengthening and balance program. Seated HR/toe raise, Bridge, HL clamshells vs sidelying, supine march, STS, lateral walking without band, modified tandem (more stagger stance), NBOS. Work to incorporate functional reaching. Educate on STS mechanics for imporved performance, work on safety with walker navigation.  -MO               User Key  (r) = Recorded By, (t) = Taken  By, (c) = Cosigned By      Initials Name Provider Type    Nuvia Stafford, PT Physical Therapist                                        Outcome Measure Options: 2 Minute Walk Test, 30 Second Chair Stand Test, Ashby Balance, Lower Extremity Functional Scale (LEFS)  2 Minute Walk Test  Gait, Assistive Device: standard walker  Distance Ambulated in 2 Minutes: 168  30 Second Chair Stand Test  30 Second Chair Stand Test: 3 (clinic chair, BL UE assist, very unsteady in standing)  Ashby Balance Scale  Sitting to Standing: able to stand using hands after several tries  Standing Unsupported: able to stand 2 minutes with supervision  Sitting with Back Unsupported but Feet Supported on Floor or on Stool: able to sit safely and securely for 2 minutes  Standing to Sitting: controls descent by using hands  Transfers: able to transfer safely definite need of hands  Standing Unsupported with Eyes Closed: able to stand 10 seconds with supervision  Standing Unsupported with Feet Together: needs help to attain position and unable to hold for 15 seconds (9s)  Reaching Forward with Outstretched Arm While Standing: can reach forward 5 cm (2 inches)   Object From the Floor From a Standing Position: unable to try/needs assist to keep from losing balance or falling  Turning to Look Behind Over Left and Right Shoulders While Standing: needs supervision when turning  Turn 360 Degrees: needs close supervision or verbal cuing (14s in walker)  Place Alternate Foot on Step or Stool While Standing Unsupported: needs assistance to keep from falling/unable to try  Standing Unsupported with One Foot in Front: loses balance while stepping or standing  Standing on One Leg: unable to try of needs assist to prevent fall  Ashby Total Score: 22  Lower Extremity Functional Index  Any of your usual work, housework or school activities: Quite a bit of difficulty  Your usual hobbies, recreational or sporting activities: Extreme difficulty or unable to  perform activity  Getting into or out of the bath: Quite a bit of difficulty  Walking between rooms: Quite a bit of difficulty  Putting on your shoes or socks: Quite a bit of difficulty  Squatting: Extreme difficulty or unable to perform activity  Lifting an object, like a bag of groceries from the floor: Extreme difficulty or unable to perform activity  Performing light activities around your home: Quite a bit of difficulty  Performing heavy activities around your home: Extreme difficulty or unable to perform activity  Getting into or out of a car: Quite a bit of difficulty  Walking 2 blocks: Extreme difficulty or unable to perform activity  Walking a mile: Extreme difficulty or unable to perform activity  Going up or down 10 stairs (about 1 flight of stairs): Extreme difficulty or unable to perform activity  Standing for 1 hour: Quite a bit of difficulty  Sitting for 1 hour: A little bit of difficulty  Running on even ground: Extreme difficulty or unable to perform activity  Running on uneven ground: Extreme difficulty or unable to perform activity  Making sharp turns while running fast: Extreme difficulty or unable to perform activity  Hopping: Extreme difficulty or unable to perform activity  Rolling over in bed: A little bit of difficulty  Total: 13  Lower Extremity Functional Index  Any of your usual work, housework or school activities: Quite a bit of difficulty  Your usual hobbies, recreational or sporting activities: Extreme difficulty or unable to perform activity  Getting into or out of the bath: Quite a bit of difficulty  Walking between rooms: Quite a bit of difficulty  Putting on your shoes or socks: Quite a bit of difficulty  Squatting: Extreme difficulty or unable to perform activity  Lifting an object, like a bag of groceries from the floor: Extreme difficulty or unable to perform activity  Performing light activities around your home: Quite a bit of difficulty  Performing heavy activities around  your home: Extreme difficulty or unable to perform activity  Getting into or out of a car: Quite a bit of difficulty  Walking 2 blocks: Extreme difficulty or unable to perform activity  Walking a mile: Extreme difficulty or unable to perform activity  Going up or down 10 stairs (about 1 flight of stairs): Extreme difficulty or unable to perform activity  Standing for 1 hour: Quite a bit of difficulty  Sitting for 1 hour: A little bit of difficulty  Running on even ground: Extreme difficulty or unable to perform activity  Running on uneven ground: Extreme difficulty or unable to perform activity  Making sharp turns while running fast: Extreme difficulty or unable to perform activity  Hopping: Extreme difficulty or unable to perform activity  Rolling over in bed: A little bit of difficulty  Total: 13      Time Calculation:     Start Time: 1122  Stop Time: 1200  Time Calculation (min): 38 min  Untimed Charges  PT Eval/Re-eval Minutes: 38  Total Minutes  Untimed Charges Total Minutes: 38   Total Minutes: 38     Therapy Charges for Today       Code Description Service Date Service Provider Modifiers Qty    [id number] HC PT EVAL MOD COMPLEXITY 3 4/18/2024 Nuvia Solis, PT GP 1            PT G-Codes  Outcome Measure Options: 2 Minute Walk Test, 30 Second Chair Stand Test, Ashby Balance, Lower Extremity Functional Scale (LEFS)  Ashby Total Score: 22  Total: 13         Nuvia Solis, PT  4/18/2024

## 2024-04-18 NOTE — TELEPHONE ENCOUNTER
Called pt to schedule 3 month F/U with Dr. Wheat. The phone rang a few times and then went to a disconnected number message.    Patient tachycardic and tachypneic. On 4L NC at start of shift. Placed on BIPAP mid shift related to increased breathing effort. Continues on ABT. Afebrile. Continues to show signs of pain. Medicated per PRN order. Communicated with CONI Sahu NP in regards to patient continued complaints of pain. One time dose order received for dilaudid 1mg IV. Note patient relief of pain post 1mg dilaudid administration. Suppository given for constipation. No stool noted this shift. Patient following commands but nonorient at this time. Reoriented throughout shift. Able to move extremities X4. Henriquez to gravity with clear yellow urine noted. Restraints still in place to BUE. Safety Intact

## 2024-04-24 ENCOUNTER — HOSPITAL ENCOUNTER (OUTPATIENT)
Dept: PHYSICAL THERAPY | Facility: HOSPITAL | Age: 69
Setting detail: THERAPIES SERIES
Discharge: HOME | End: 2024-04-24
Payer: MEDICARE

## 2024-04-24 DIAGNOSIS — Z74.09 IMPAIRED MOBILITY: ICD-10-CM

## 2024-04-24 DIAGNOSIS — R29.898 LEG WEAKNESS, BILATERAL: ICD-10-CM

## 2024-04-24 DIAGNOSIS — R26.89 POOR BALANCE: Primary | ICD-10-CM

## 2024-04-24 PROCEDURE — 97110 THERAPEUTIC EXERCISES: CPT

## 2024-04-24 NOTE — THERAPY TREATMENT NOTE
Outpatient Physical Therapy Ortho Treatment Note  [facility]     Patient Name: Flo Manzo  : 1955  MRN: [id number]  Today's Date: 2024      Visit Date: 2024    Visit Dx:    ICD-10-CM ICD-9-CM   1. Poor balance  R26.89 781.99   2. Leg weakness, bilateral  R29.898 729.89   3. Impaired mobility  Z74.09 799.89       Patient Active Problem List   Diagnosis    Umbilical hernia without obstruction and without gangrene    Essential hypertension    Arthritis of left knee    Depression    Obesity (BMI 30-39.9)    Atrial fibrillation with RVR    Substernal thyroid goiter    CHF (congestive heart failure)    Diastolic CHF, chronic    Hemorrhagic stroke    GÓMEZ on auto CPAP    Hypersomnia due to medical condition    Sleep-related hypoxia    Chronic atrial fibrillation    Vertigo    Aortic stenosis, severe    Sinus pause    Pacemaker    Anticoagulated    Generalized weakness    Prediabetes    Pure hypercholesterolemia    Pre-operative cardiovascular examination    Peripheral neuropathy    History of colon polyps        Past Medical History:   Diagnosis Date    Arthritis     Atrial fibrillation with RVR 2019    Colon polyps 2018    Hepatic flexure: tubular adenoma, only low-grade dysplasia; splenic flexure: tubular adenoma, only low-grade dysplasia; sigmoid colon: tubular adenoma, multiple fragments only low-grade dysplasia    Depression     Heart murmur     Hemorrhagic stroke 2019    Hypertension     New onset atrial fibrillation (CMS/HCC) - RVR 2019    GÓMEZ (obstructive sleep apnea) 2019    Home sleep study with moderate severity GÓMEZ, AHI 20 events per hour.  Sleep-related hypoxia with low O2 saturation 84% for 14 minutes.    Peripheral neuropathy     Sleep apnea     previously diagnosed with sleep apnea, had T&A done and it has since resolved     Stroke     Uncontrolled hypertension     Ventral hernia         Past Surgical History:   Procedure Laterality Date     APPENDECTOMY N/A 1972    BACK SURGERY      BLADDER STIMULATOR IMPLANT L LOWER BACK    CARDIAC CATHETERIZATION N/A 07/20/2004    Cath left ventriculography, coronary angiography and left heart catheterization, Normal results-Dr. Vadim Mancuso    CARDIAC CATHETERIZATION N/A 1/29/2019    Procedure: Left Heart Cath;  Surgeon: Eren Bruce MD;  Location: [facility] CATH INVASIVE LOCATION;  Service: Cardiology    CARDIAC CATHETERIZATION N/A 1/29/2019    Procedure: Left ventriculography;  Surgeon: Eren Bruce MD;  [facility];  Service: Cardiology    CARDIAC CATHETERIZATION N/A 1/29/2019    Procedure: Coronary angiography;  Surgeon: Eren Bruce MD;  [facility];  Service: Cardiology    CARDIAC ELECTROPHYSIOLOGY PROCEDURE N/A 3/4/2022    Procedure: Pacemaker SC new BIOTRONIK;  Surgeon: Eren Bruce MD;  Location: [facility] CATH INVASIVE LOCATION;  Service: Cardiology;  Laterality: N/A;    CARPAL TUNNEL RELEASE Right 2013    CARPAL TUNNEL RELEASE Left     COLONOSCOPY N/A 1/4/2018    Procedure: COLONOSCOPY with cold polypectomy and hot snare polypectomy;  Surgeon: Ten Solitario MD;  Location: [facility] ENDOSCOPY;  Service:     EYE LENS IMPLANT SECONDARY Bilateral 2007, 2008    KNEE CARTILAGE SURGERY Right 1979    TONSILLECTOMY AND ADENOIDECTOMY Bilateral 2005    TOTAL KNEE ARTHROPLASTY Left 03/14/2016    Left total knee arthroplasty with Amberly component, size 11 femur, G tibial baseplate with a 10 polyethylene insert and a 38 patellar button-Dr. Pablo Hairston    TOTAL KNEE ARTHROPLASTY Right 03/02/2015    Right total knee arthroplasty with Amberly component size G femur, 11 tibial base plate with an 11 polyethylene insert and a 38 patellar button-Dr. Pablo Hairston    UVULOPALATOPHARYNGOPLASTY N/A 2005    part of soft palate, and uvula    VENTRAL HERNIA REPAIR N/A 1/23/2018    Procedure: VENTRAL HERNIA REPAIR LAPAROSCOPIC WITH Glassdoor ROBOT  AND MESH;  Surgeon: Ten Solitario MD;  Location: [facility];  Service:                         PT Assessment/Plan       Row Name 04/24/24 1328          PT Assessment    Assessment Comments Mr. Manzo returns to the clinic for his first follow-up since initial evaluation for worsening balance impairments and radiation induced peripheral neuropathy. Initiated LE strengthening and balance program this date with good tolerance overall. Reviewed STS mechanics to end the visit with discussion of body positioning, equal foot placement and weightbearing, use of momentum, and achieving full upright position with glute contraction at terminal stance. He remains a good candidate for skilled therapy at this time.  -ZB        PT Plan    PT Plan Comments Response to first tx? Print HEP if tolerated well, likely start with basic activities (bridge, clamshell, etc.). Consider incorporating functional reaching static v dynamic (use gait belt as pt is very unsteady without support). May work on safety with rwx navigation  -ZB               User Key  (r) = Recorded By, (t) = Taken By, (c) = Cosigned By      Initials Name Provider Type    ZB Kamron Edwards, PT Physical Therapist                       OP Exercises       Row Name 04/24/24 1200             Subjective    Subjective Comments I'm doing pretty good.  -ZB         Total Minutes    44008 - PT Therapeutic Exercise Minutes 40  -ZB         Exercise 1    Exercise Name 1 NuStep  -ZB      Time 1 5 min, L4  -ZB         Exercise 2    Exercise Name 2 Beginner bridge  -ZB      Cueing 2 Verbal  -ZB      Sets 2 2  -ZB      Reps 2 10  -ZB         Exercise 3    Exercise Name 3 s/l clamshells  -ZB      Cueing 3 Verbal  -ZB      Sets 3 2  -ZB      Reps 3 10  -ZB      Time 3 RTB  -ZB         Exercise 4    Exercise Name 4 Supine alt march  -ZB      Cueing 4 Verbal  -ZB      Sets 4 2  -ZB      Reps 4 20, alt  -ZB         Exercise 5    Exercise Name 5 Seated HR/TR  -ZB      Cueing 5 Verbal   -ZB      Reps 5 20  -ZB      Additional Comments cue for full AROM, end range muscle contraction  -ZB         Exercise 6    Exercise Name 6 Modified tandem stance  -ZB      Cueing 6 Verbal  -ZB      Reps 6 2 B  -ZB      Time 6 30s  -ZB      Additional Comments more staggered than true tandem  -ZB         Exercise 7    Exercise Name 7 NBOS static balance  -ZB      Cueing 7 Verbal  -ZB      Reps 7 2  -ZB      Time 7 30s  -ZB      Additional Comments intermittent fingertouch // bars  -ZB         Exercise 8    Exercise Name 8 Lateral walks  -ZB      Cueing 8 Verbal  -ZB      Reps 8 3 laps  -ZB      Additional Comments // bars  -ZB         Exercise 9    Exercise Name 9 High knee march  -ZB      Cueing 9 Verbal  -ZB      Reps 9 3 laps  -ZB      Additional Comments // bars  -ZB         Exercise 10    Exercise Name 10 STS  -ZB      Cueing 10 Verbal  -ZB      Sets 10 2  -ZB      Reps 10 10  -ZB      Time 10 std chair + foam  -ZB      Additional Comments reviewed mechanics, full ext at terminal stance, equal weightbearing  -ZB                User Key  (r) = Recorded By, (t) = Taken By, (c) = Cosigned By      Initials Name Provider Type    ZB Kamron Edwards, PT Physical Therapist                                  PT OP Goals       Row Name 04/24/24 1300          PT Short Term Goals    STG Date to Achieve 05/18/24  -ZB     STG 1 Pt will be independent with initial HEP to improve strength and static/dynamic balance.  -ZB     STG 1 Progress Ongoing  -ZB     STG 2 Pt will ambulate 150' with normal heel strike to toe off with symmetrical stride and davon with his RWX without momentary LOB.  -ZB     STG 2 Progress Ongoing  -ZB     STG 3 Patient able to tandem stance bilaterally with 1 finger hold > 30 seconds without LOB for improved core stabilization and decreased fall risk  -ZB     STG 3 Progress Ongoing  -ZB     STG 4 Pt performs 30 seconds sit to stand having complete at least 2 more repetitions that was performed upon initial  evaluation for progression of ease with functional transfers, LE strength, and safety in the home.  -ZB     STG 4 Progress Ongoing  -ZB        Long Term Goals    LTG Date to Achieve 07/17/24  -ZB     LTG 1 Pt will be independent with advance HEP to improve strength and static/dynamic balance.  -ZB     LTG 1 Progress Ongoing  -ZB     LTG 2 Pt will tolerate 30 minutes of activity with 3 brief rest breaks demonstrating improved functional strength.  -ZB     LTG 2 Progress Ongoing  -ZB     LTG 3 Pt will demonstrate increased strength LEs to 4/5 or better.  -ZB     LTG 3 Progress Ongoing  -ZB     LTG 4 Patient will improve VELAZQUEZ score from 22 to </= 28 to show improved static/dynamic balance leading to decrease risk for falls.  -ZB     LTG 4 Progress Ongoing  -ZB     LTG 5 Pt will increase 2min walk by >/=30 ft with rwx with </= 1 LOB for improved safety with ambulatory tasks.  -ZB     LTG 5 Progress Ongoing  -ZB     LTG 6 Pt will be able to perform static NBOS on stable surface for >/=30s without UE support for improved balance and decreased risk of falling.  -ZB     LTG 6 Progress Ongoing  -ZB     LTG 7 The pt demonstrates ambulation with step through reciprocal gait pattern with assistive device with no moments of instability for 200 ft while demonstrating increased stance width/SOLOMON for improvements in stability/safety during dynamic ambulatory tasks when navigating the community.  -ZB     LTG 7 Progress Ongoing  -ZB               User Key  (r) = Recorded By, (t) = Taken By, (c) = Cosigned By      Initials Name Provider Type    Kamron Gomes PT Physical Therapist                                   Time Calculation:   Start Time: 1130  Stop Time: 1210  Time Calculation (min): 40 min  Timed Charges  19411 - PT Therapeutic Exercise Minutes: 40  Total Minutes  Timed Charges Total Minutes: 40   Total Minutes: 40  Therapy Charges for Today       Code Description Service Date Service Provider Modifiers Qty    [id number]  HC PT THER PROC EA 15 MIN 4/24/2024 Kamron Edwards, PT GP 3                      Anjelica Edwards, PT  4/24/2024

## 2024-04-25 ENCOUNTER — HOSPITAL ENCOUNTER (OUTPATIENT)
Facility: HOSPITAL | Age: 69
Setting detail: HOSPITAL OUTPATIENT SURGERY
Discharge: HOME | End: 2024-04-25
Attending: SURGERY | Admitting: SURGERY
Payer: MEDICARE

## 2024-04-25 ENCOUNTER — ANESTHESIA EVENT (OUTPATIENT)
Dept: GASTROENTEROLOGY | Facility: HOSPITAL | Age: 69
End: 2024-04-25
Payer: MEDICARE

## 2024-04-25 ENCOUNTER — ANESTHESIA (OUTPATIENT)
Dept: GASTROENTEROLOGY | Facility: HOSPITAL | Age: 69
End: 2024-04-25
Payer: MEDICARE

## 2024-04-25 VITALS
BODY MASS INDEX: 33.23 KG/M2 | RESPIRATION RATE: 20 BRPM | HEIGHT: 70 IN | DIASTOLIC BLOOD PRESSURE: 97 MMHG | HEART RATE: 74 BPM | OXYGEN SATURATION: 94 % | WEIGHT: 232.1 LBS | SYSTOLIC BLOOD PRESSURE: 129 MMHG

## 2024-04-25 PROCEDURE — 25810000003 LACTATED RINGERS PER 1000 ML: Performed by: NURSE ANESTHETIST, CERTIFIED REGISTERED

## 2024-04-25 PROCEDURE — S0260 H&P FOR SURGERY: HCPCS | Performed by: SURGERY

## 2024-04-25 PROCEDURE — 25010000002 PROPOFOL 200 MG/20ML EMULSION: Performed by: NURSE ANESTHETIST, CERTIFIED REGISTERED

## 2024-04-25 PROCEDURE — 25810000003 LACTATED RINGERS PER 1000 ML: Performed by: SURGERY

## 2024-04-25 PROCEDURE — G0105 COLORECTAL SCRN; HI RISK IND: HCPCS | Performed by: SURGERY

## 2024-04-25 RX ORDER — PROPOFOL 10 MG/ML
INJECTION, EMULSION INTRAVENOUS AS NEEDED
Status: DISCONTINUED | OUTPATIENT
Start: 2024-04-25 | End: 2024-04-25 | Stop reason: SURG

## 2024-04-25 RX ORDER — SODIUM CHLORIDE, SODIUM LACTATE, POTASSIUM CHLORIDE, CALCIUM CHLORIDE 600; 310; 30; 20 MG/100ML; MG/100ML; MG/100ML; MG/100ML
INJECTION, SOLUTION INTRAVENOUS CONTINUOUS PRN
Status: DISCONTINUED | OUTPATIENT
Start: 2024-04-25 | End: 2024-04-25 | Stop reason: SURG

## 2024-04-25 RX ORDER — LIDOCAINE HYDROCHLORIDE 20 MG/ML
INJECTION, SOLUTION INFILTRATION; PERINEURAL AS NEEDED
Status: DISCONTINUED | OUTPATIENT
Start: 2024-04-25 | End: 2024-04-25 | Stop reason: SURG

## 2024-04-25 RX ORDER — SODIUM CHLORIDE, SODIUM LACTATE, POTASSIUM CHLORIDE, CALCIUM CHLORIDE 600; 310; 30; 20 MG/100ML; MG/100ML; MG/100ML; MG/100ML
30 INJECTION, SOLUTION INTRAVENOUS CONTINUOUS PRN
Status: DISCONTINUED | OUTPATIENT
Start: 2024-04-25 | End: 2024-04-25 | Stop reason: HOSPADM

## 2024-04-25 RX ADMIN — LIDOCAINE HYDROCHLORIDE 60 MG: 20 INJECTION, SOLUTION INFILTRATION; PERINEURAL at 10:00

## 2024-04-25 RX ADMIN — SODIUM CHLORIDE, POTASSIUM CHLORIDE, SODIUM LACTATE AND CALCIUM CHLORIDE: 600; 310; 30; 20 INJECTION, SOLUTION INTRAVENOUS at 09:54

## 2024-04-25 RX ADMIN — SODIUM CHLORIDE, POTASSIUM CHLORIDE, SODIUM LACTATE AND CALCIUM CHLORIDE 30 ML/HR: 600; 310; 30; 20 INJECTION, SOLUTION INTRAVENOUS at 09:28

## 2024-04-25 RX ADMIN — PROPOFOL 75 MCG/KG/MIN: 10 INJECTION, EMULSION INTRAVENOUS at 10:01

## 2024-04-25 RX ADMIN — PROPOFOL 50 MG: 10 INJECTION, EMULSION INTRAVENOUS at 10:00

## 2024-04-25 NOTE — ANESTHESIA POSTPROCEDURE EVALUATION
Patient: Flo Manzo    Procedure Summary       Date: 04/25/24 Room / Location: [facility] ENDOSCOPY 8 / [facility] ENDOSCOPY    Anesthesia Start: 0956 Anesthesia Stop: 1018    Procedure: COLONOSCOPY INTO CECUM Diagnosis:       History of colon polyps      (History of colon polyps [Z86.010])    Surgeons: Ten Solitario MD Provider: Bryant Vick MD    Anesthesia Type: MAC ASA Status: 3            Anesthesia Type: MAC    Vitals  Vitals Value Taken Time   /113 04/25/24 1027   Temp     Pulse 79 04/25/24 1028   Resp 18 04/25/24 1026   SpO2 95 % 04/25/24 1028   Vitals shown include unfiled device data.        Post Anesthesia Care and Evaluation    Patient location during evaluation: PACU  Patient participation: complete - patient participated  Level of consciousness: awake and alert  Pain management: adequate    Airway patency: patent  Anesthetic complications: No anesthetic complications    Cardiovascular status: acceptable  Respiratory status: acceptable  Hydration status: acceptable    Comments: ---------------------            04/25/24                1026      ---------------------   BP:     (!) 144/103   Pulse:      78        Resp:       18        SpO2:       96%      ---------------------

## 2024-04-25 NOTE — ANESTHESIA PREPROCEDURE EVALUATION
Anesthesia Evaluation     Patient summary reviewed and Nursing notes reviewed                Airway   Mallampati: III  Dental    (+) poor dentition    Pulmonary    (+) ,sleep apnea on CPAP  Cardiovascular     ECG reviewed  PT is on anticoagulation therapy  Patient on routine beta blocker  Rhythm: irregular  Rate: normal    (+) pacemaker pacemaker, hypertension, valvular problems/murmurs AS, dysrhythmias Atrial Fib, Atrial Flutter, CHF , hyperlipidemia      Neuro/Psych  (+) CVA, dizziness/light headedness, numbness, psychiatric history Anxiety and Depression  GI/Hepatic/Renal/Endo    (+) morbid obesity, thyroid problem hypothyroidism    Musculoskeletal     Abdominal    Substance History - negative use     OB/GYN negative ob/gyn ROS         Other   arthritis,                 Anesthesia Plan    ASA 3     MAC     (AFib ventricular pacemaker  BMI  Poor dentition with multiple chipped and missing teeth  GÓMEZ  Moderate aortic stenosis)  intravenous induction     Anesthetic plan, risks, benefits, and alternatives have been provided, discussed and informed consent has been obtained with: patient.    CODE STATUS:

## 2024-04-25 NOTE — ANESTHESIA POSTPROCEDURE EVALUATION
Patient: Flo Manzo    Procedure Summary       Date: 04/25/24 Room / Location: [facility] ENDOSCOPY 8 / [facility] ENDOSCOPY    Anesthesia Start: 0956 Anesthesia Stop: 1018    Procedure: COLONOSCOPY INTO CECUM Diagnosis:       History of colon polyps      (History of colon polyps [Z86.010])    Surgeons: Ten Solitario MD Provider: Bryant Vick MD    Anesthesia Type: MAC ASA Status: 3            Anesthesia Type: MAC    Vitals  Vitals Value Taken Time   /113 04/25/24 1027   Temp     Pulse 75 04/25/24 1027   Resp 18 04/25/24 1026   SpO2 96 % 04/25/24 1027   Vitals shown include unfiled device data.        Post Anesthesia Care and Evaluation    Patient location during evaluation: PACU  Patient participation: complete - patient participated  Level of consciousness: awake and alert  Pain management: adequate    Airway patency: patent  Anesthetic complications: No anesthetic complications    Cardiovascular status: acceptable  Respiratory status: acceptable  Hydration status: acceptable    Comments: ---------------------            04/25/24                1026      ---------------------   BP:     (!) 144/103   Pulse:      78        Resp:       18        SpO2:       96%      ---------------------

## 2024-04-25 NOTE — DISCHARGE INSTRUCTIONS
For the next 24 hours patient needs to be with a responsible adult.    For 24 hours DO NOT drive, operate machinery, appliances, drink alcohol, make important decisions or sign legal documents.    Start with a light or bland diet if you are feeling sick to your stomach otherwise advance to regular diet as tolerated.    Follow recommendations on procedure report if provided by your doctor.    Call Dr Solitario for problems .    Problems may include but not limited to: large amounts of bleeding, trouble breathing, repeated vomiting, severe unrelieved pain, fever or chills.

## 2024-04-25 NOTE — H&P
Cc: History of colon polyps    History of presenting illness: 69-year-old gentleman with history of adenomatous colon polyps.  Last scope done by me 2018 with finding of several adenomatous polyps.  No other known high risk factors.    Past medical history: Hypertension, depression, arthritis atrial fibrillation, sleep apnea, stroke    Past surgical history: Appendectomy as a teenager.  Bilateral knee surgery, eye surgery, hand surgery.  Colonoscopy done by me January 2018.  Robotic preperitoneal umbilical hernia repair done by me January 2018.  Cardiac catheterization 2004 and 2019.    Medications: Albuterol, amlodipine, Lipitor, Celexa, BuSpar, Eliquis, Lasix, Neurontin, lisinopril, metoprolol, omeprazole, Flomax    Allergies: Poison ivy extract    Social history: He is a non-smoker    Family history: Negative for colon or rectal cancer    Physical exam:  Body mass index: 33.3  General: Awake and alert, no distress  Head: Normocephalic, atraumatic  Neck: Supple, trachea midline  Eyes: Extraocular movements intact, no icterus  Respiratory: No use of accessory muscles, good bilateral chest expansion  Abdomen: Soft, benign, no hernia felt  Skin: Warm and dry      Pathology from 2018 scope reviewed by me and demonstrates a tubular adenoma with no dysplasia    Assessment and plan:  -Personal history of colon polyps  -Plan for colonoscopy today, risks include bleeding and perforation, patient agreeable to proceed    Ten Solitario MD  General and Endoscopic Surgery  [facility]    [address] [place] [address], [address]  [place], [place], [address]  P: [phone]  F: [phone]

## 2024-04-25 NOTE — OP NOTE
Operative Note :   MD Flo Ramirez SAUL Danielleremberto  1955    Procedure Date: 04/25/24    Pre-op Diagnosis:  Personal history of colon polyps    Post-op Diagnosis:  Normal colon    Procedure:   Colonoscopy to cecum    Surgeon: Ten Solitario MD      Anesthesia: MAC    Indications:  69-year-old gentleman presenting for colonoscopy.  Last scope done 2018 with finding of 3 adenomatous polyps.    Findings:   Relatively poor prep    Recommendations:   Repeat colonoscopy could be considered in 5 years based on prior history of polyps    Description of procedure:    After obtaining informed consent, patient was brought to the endoscopy suite and was sedated.  Digital rectal exam was undertaken and was unremarkable.  The flexible colonoscope was then introduced through the rectum and passed around to the level of the cecum under direct visualization.  The appendiceal orifice and ileocecal valve were visualized and were unremarkable.  The scope was then slowly withdrawn, carefully visualizing all mucosal surfaces.  The cecum, ascending colon, hepatic flexure, transverse colon, splenic flexure, descending colon, sigmoid colon and rectum were unremarkable.  There was a moderate amount of irrigation and washing required to get good visualization of the mucosa.  The scope was withdrawn completely.  The patient tolerated the procedure adequately.    Ten Solitario MD  General and Endoscopic Surgery  [facility]    [address], [address]  [place], [place], [address]  P: [phone]  F: [phone]

## 2024-05-01 ENCOUNTER — HOSPITAL ENCOUNTER (OUTPATIENT)
Dept: PHYSICAL THERAPY | Facility: HOSPITAL | Age: 69
Setting detail: THERAPIES SERIES
Discharge: HOME | End: 2024-05-01
Payer: MEDICARE

## 2024-05-01 DIAGNOSIS — R26.89 POOR BALANCE: Primary | ICD-10-CM

## 2024-05-01 DIAGNOSIS — R29.898 LEG WEAKNESS, BILATERAL: ICD-10-CM

## 2024-05-01 DIAGNOSIS — Z74.09 IMPAIRED MOBILITY: ICD-10-CM

## 2024-05-01 PROCEDURE — 97110 THERAPEUTIC EXERCISES: CPT

## 2024-05-01 NOTE — THERAPY TREATMENT NOTE
Outpatient Physical Therapy Ortho Treatment Note  [facility]     Patient Name: Flo Mnazo  : 1955  MRN: [id number]  Today's Date: 2024      Visit Date: 2024    Visit Dx:    ICD-10-CM ICD-9-CM   1. Poor balance  R26.89 781.99   2. Leg weakness, bilateral  R29.898 729.89   3. Impaired mobility  Z74.09 799.89       Patient Active Problem List   Diagnosis    Umbilical hernia without obstruction and without gangrene    Essential hypertension    Arthritis of left knee    Depression    Obesity (BMI 30-39.9)    Atrial fibrillation with RVR    Substernal thyroid goiter    CHF (congestive heart failure)    Diastolic CHF, chronic    Hemorrhagic stroke    GÓMEZ on auto CPAP    Hypersomnia due to medical condition    Sleep-related hypoxia    Chronic atrial fibrillation    Vertigo    Aortic stenosis, severe    Sinus pause    Pacemaker    Anticoagulated    Generalized weakness    Prediabetes    Pure hypercholesterolemia    Pre-operative cardiovascular examination    Peripheral neuropathy    History of colon polyps        Past Medical History:   Diagnosis Date    Arthritis     Atrial fibrillation with RVR 2019    Colon polyps 2018    Hepatic flexure: tubular adenoma, only low-grade dysplasia; splenic flexure: tubular adenoma, only low-grade dysplasia; sigmoid colon: tubular adenoma, multiple fragments only low-grade dysplasia    Depression     Heart murmur     Hemorrhagic stroke 2019    Hypertension     New onset atrial fibrillation (CMS/HCC) - RVR 2019    GÓMEZ (obstructive sleep apnea) 2019    Home sleep study with moderate severity GÓMEZ, AHI 20 events per hour.  Sleep-related hypoxia with low O2 saturation 84% for 14 minutes.    Peripheral neuropathy     Sleep apnea     previously diagnosed with sleep apnea, had T&A done and it has since resolved     Stroke     Uncontrolled hypertension     Ventral hernia         Past Surgical History:   Procedure Laterality Date     APPENDECTOMY N/A 1972    BACK SURGERY      BLADDER STIMULATOR IMPLANT L LOWER BACK    CARDIAC CATHETERIZATION N/A 07/20/2004    Cath left ventriculography, coronary angiography and left heart catheterization, Normal results-Dr. Vadim Mancuso    CARDIAC CATHETERIZATION N/A 1/29/2019    Procedure: Left Heart Cath;  Surgeon: Erne Bruce MD;  Location: [facility] CATH INVASIVE LOCATION;  Service: Cardiology    CARDIAC CATHETERIZATION N/A 1/29/2019    Procedure: Left ventriculography;  Surgeon: Eren Bruce MD;  Location: [facility] CATH INVASIVE LOCATION;  Service: Cardiology    CARDIAC CATHETERIZATION N/A 1/29/2019    Procedure: Coronary angiography;  Surgeon: Eren Bruce MD;  Location: [facility] CATH INVASIVE LOCATION;  Service: Cardiology    CARDIAC ELECTROPHYSIOLOGY PROCEDURE N/A 3/4/2022    Procedure: Pacemaker SC new BIOTRONIK;  Surgeon: Eren Bruce MD;  Location: [facility] CATH INVASIVE LOCATION;  Service: Cardiology;  Laterality: N/A;    CARPAL TUNNEL RELEASE Right 2013    CARPAL TUNNEL RELEASE Left     COLONOSCOPY N/A 1/4/2018    Procedure: COLONOSCOPY with cold polypectomy and hot snare polypectomy;  Surgeon: Ten Solitario MD;  Location: [facility] ENDOSCOPY;  Service:     COLONOSCOPY N/A 4/25/2024    Procedure: COLONOSCOPY INTO CECUM;  Surgeon: Ten Solitario MD;  Location: [facility] ENDOSCOPY;  Service: General;  Laterality: N/A;  PRE: PERSONAL H/O COLON POLYP  /  POST: POOR PREP OTHERWISE NORMAL    EYE LENS IMPLANT SECONDARY Bilateral 2007, 2008    KNEE CARTILAGE SURGERY Right 1979    TONSILLECTOMY AND ADENOIDECTOMY Bilateral 2005    TOTAL KNEE ARTHROPLASTY Left 03/14/2016    Left total knee arthroplasty with Amberly component, size 11 femur, G tibial baseplate with a 10 polyethylene insert and a 38 patellar button-Dr. Pablo Hairston    TOTAL KNEE ARTHROPLASTY Right 03/02/2015    Right total knee arthroplasty with Amberly component size G femur, 11 tibial base plate with an 11  polyethylene insert and a 38 patellar button-Dr. Pablo Hairston    UVULOPALATOPHARYNGOPLASTY N/A 2005    part of soft palate, and uvula    VENTRAL HERNIA REPAIR N/A 1/23/2018    Procedure: VENTRAL HERNIA REPAIR LAPAROSCOPIC WITH DAVINCI ROBOT AND MESH;  Surgeon: Ten Solitario MD;  Location: [facility];  Service:                         PT Assessment/Plan       Row Name 05/01/24 1315          PT Assessment    Assessment Comments Patient returns to the clinic using wheelchair for mobility this afternoon. He reports no change in his level of mobility but simply that his rolling walker was rained on last night so he didn't use it today. Progressed activity with addition of step taps, functional reaching outside SOLOMON, and attempted HR/TR in standing position. The patient tolerated treatment well. Gait belt used for all standing activity as the patient is very unstable without support. Printed basic initial HEP for performance at home. He remains a good candidate for skilled therapy at this time.  -ZB        PT Plan    PT Plan Comments HEP adherence? Conitnue to incorporate functional reaching and static vs dynamic balance activities using gait belt for safety. If patient brings rwx may work on safety with navigation  -ZB               User Key  (r) = Recorded By, (t) = Taken By, (c) = Cosigned By      Initials Name Provider Type    Kamron Gomes, PT Physical Therapist                       OP Exercises       Row Name 05/01/24 1200             Subjective    Subjective Comments I'm doing alright  -ZB         Total Minutes    95110 - PT Therapeutic Exercise Minutes 45  -ZB         Exercise 1    Exercise Name 1 NuStep  -ZB      Time 1 5 min, L4  -ZB         Exercise 2    Exercise Name 2 Beginner bridge  -ZB      Cueing 2 Verbal  -ZB      Sets 2 2  -ZB      Reps 2 10  -ZB         Exercise 3    Exercise Name 3 s/l clamshells  -ZB      Cueing 3 Verbal  -ZB      Sets 3 2  -ZB      Reps 3 10  -ZB      Time 3 RTB  -ZB          Exercise 4    Exercise Name 4 Supine alt march  -ZB      Cueing 4 Verbal  -ZB      Sets 4 2  -ZB      Reps 4 20, alt  -ZB         Exercise 5    Exercise Name 5 Standing HR/TR  -ZB      Cueing 5 Verbal  -ZB      Reps 5 20  -ZB      Additional Comments cue for full AROM, end range muscle contraction  -ZB         Exercise 6    Exercise Name 6 Modified tandem stance  -ZB      Cueing 6 Verbal  -ZB      Reps 6 2 B  -ZB      Time 6 30s  -ZB      Additional Comments more staggered than true tandem  -ZB         Exercise 7    Exercise Name 7 NBOS static balance  -ZB      Cueing 7 Verbal  -ZB      Reps 7 2  -ZB      Time 7 30s  -ZB      Additional Comments intermittent fingertouch // bars  -ZB         Exercise 8    Exercise Name 8 Lateral walks  -ZB      Cueing 8 Verbal  -ZB      Reps 8 4 laps  -ZB      Additional Comments // bars  -ZB         Exercise 9    Exercise Name 9 High knee march  -ZB      Cueing 9 Verbal  -ZB      Reps 9 3 laps  -ZB      Additional Comments // bars  -ZB         Exercise 10    Exercise Name 10 STS  -ZB      Cueing 10 Verbal  -ZB      Sets 10 1  -ZB      Reps 10 10  -ZB      Time 10 std chair + foam  -ZB      Additional Comments reviewed mechanics, full ext at terminal stance, equal weightbearing  -ZB         Exercise 11    Exercise Name 11 Step taps  -ZB      Cueing 11 Verbal  -ZB      Reps 11 20 alt  -ZB      Additional Comments intermittent UE assist, cued for full toe clearance and weight shift  -ZB         Exercise 12    Exercise Name 12 Functional reaching  -ZB      Cueing 12 Verbal  -ZB      Reps 12 4 min  -ZB      Time 12 noodle for target  -ZB      Additional Comments static position, reach outside SOLOMON  -ZB                User Key  (r) = Recorded By, (t) = Taken By, (c) = Cosigned By      Initials Name Provider Type    ZB Kamron Edwards, PT Physical Therapist                                  PT OP Goals       Row Name 05/01/24 1300          PT Short Term Goals    STG Date to Achieve  05/18/24  -ZB     STG 1 Pt will be independent with initial HEP to improve strength and static/dynamic balance.  -ZB     STG 1 Progress Ongoing  -ZB     STG 2 Pt will ambulate 150' with normal heel strike to toe off with symmetrical stride and davon with his RWX without momentary LOB.  -ZB     STG 2 Progress Ongoing  -ZB     STG 3 Patient able to tandem stance bilaterally with 1 finger hold > 30 seconds without LOB for improved core stabilization and decreased fall risk  -ZB     STG 3 Progress Ongoing  -ZB     STG 4 Pt performs 30 seconds sit to stand having complete at least 2 more repetitions that was performed upon initial evaluation for progression of ease with functional transfers, LE strength, and safety in the home.  -ZB     STG 4 Progress Ongoing  -ZB        Long Term Goals    LTG Date to Achieve 07/17/24  -ZB     LTG 1 Pt will be independent with advance HEP to improve strength and static/dynamic balance.  -ZB     LTG 1 Progress Ongoing  -ZB     LTG 2 Pt will tolerate 30 minutes of activity with 3 brief rest breaks demonstrating improved functional strength.  -ZB     LTG 2 Progress Ongoing  -ZB     LTG 3 Pt will demonstrate increased strength LEs to 4/5 or better.  -ZB     LTG 3 Progress Ongoing  -ZB     LTG 4 Patient will improve VELAZQUEZ score from 22 to </= 28 to show improved static/dynamic balance leading to decrease risk for falls.  -ZB     LTG 4 Progress Ongoing  -ZB     LTG 5 Pt will increase 2min walk by >/=30 ft with rwx with </= 1 LOB for improved safety with ambulatory tasks.  -ZB     LTG 5 Progress Ongoing  -ZB     LTG 6 Pt will be able to perform static NBOS on stable surface for >/=30s without UE support for improved balance and decreased risk of falling.  -ZB     LTG 6 Progress Ongoing  -ZB     LTG 7 The pt demonstrates ambulation with step through reciprocal gait pattern with assistive device with no moments of instability for 200 ft while demonstrating increased stance width/SOLOMON for  improvements in stability/safety during dynamic ambulatory tasks when navigating the community.  -NICHOLAS     LTG 7 Progress Ongoing  -BETOB               User Key  (r) = Recorded By, (t) = Taken By, (c) = Cosigned By      Initials Name Provider Type    Kamron Gomes, PT Physical Therapist                                   Time Calculation:   Start Time: 1215  Stop Time: 1300  Time Calculation (min): 45 min  Timed Charges  44842 - PT Therapeutic Exercise Minutes: 45  Total Minutes  Timed Charges Total Minutes: 45   Total Minutes: 45  Therapy Charges for Today       Code Description Service Date Service Provider Modifiers Qty    59568573753 HC PT THER PROC EA 15 MIN 5/1/2024 Kamron Edwards, PT GP 3                      Anjelica Edwards PT  5/1/2024

## 2024-05-06 ENCOUNTER — HOSPITAL ENCOUNTER (OUTPATIENT)
Dept: PHYSICAL THERAPY | Facility: HOSPITAL | Age: 69
Discharge: HOME | End: 2024-05-06
Payer: MEDICARE

## 2024-05-06 DIAGNOSIS — Z74.09 IMPAIRED MOBILITY: ICD-10-CM

## 2024-05-06 DIAGNOSIS — R26.89 POOR BALANCE: Primary | ICD-10-CM

## 2024-05-06 DIAGNOSIS — R29.898 LEG WEAKNESS, BILATERAL: ICD-10-CM

## 2024-05-06 PROCEDURE — 97110 THERAPEUTIC EXERCISES: CPT

## 2024-05-06 PROCEDURE — 97530 THERAPEUTIC ACTIVITIES: CPT

## 2024-05-08 ENCOUNTER — HOSPITAL ENCOUNTER (OUTPATIENT)
Dept: PHYSICAL THERAPY | Facility: HOSPITAL | Age: 69
Setting detail: THERAPIES SERIES
Discharge: HOME | End: 2024-05-08
Payer: MEDICARE

## 2024-05-08 DIAGNOSIS — R29.898 LEG WEAKNESS, BILATERAL: ICD-10-CM

## 2024-05-08 DIAGNOSIS — R26.89 POOR BALANCE: Primary | ICD-10-CM

## 2024-05-08 DIAGNOSIS — Z74.09 IMPAIRED MOBILITY: ICD-10-CM

## 2024-05-08 PROCEDURE — 97530 THERAPEUTIC ACTIVITIES: CPT

## 2024-05-08 PROCEDURE — 97110 THERAPEUTIC EXERCISES: CPT

## 2024-05-13 ENCOUNTER — HOSPITAL ENCOUNTER (OUTPATIENT)
Dept: PHYSICAL THERAPY | Facility: HOSPITAL | Age: 69
Setting detail: THERAPIES SERIES
Discharge: HOME | End: 2024-05-13
Payer: MEDICARE

## 2024-05-13 DIAGNOSIS — Z74.09 IMPAIRED MOBILITY: ICD-10-CM

## 2024-05-13 DIAGNOSIS — R26.89 POOR BALANCE: Primary | ICD-10-CM

## 2024-05-13 DIAGNOSIS — R29.898 LEG WEAKNESS, BILATERAL: ICD-10-CM

## 2024-05-13 PROCEDURE — 97530 THERAPEUTIC ACTIVITIES: CPT

## 2024-05-13 NOTE — THERAPY TREATMENT NOTE
Outpatient Physical Therapy Ortho Treatment Note  [facility]     Patient Name: Flo Manzo  : 1955  MRN: [id number]  Today's Date: 2024      Visit Date: 2024    Visit Dx:    ICD-10-CM ICD-9-CM   1. Poor balance  R26.89 781.99   2. Leg weakness, bilateral  R29.898 729.89   3. Impaired mobility  Z74.09 799.89       Patient Active Problem List   Diagnosis    Umbilical hernia without obstruction and without gangrene    Essential hypertension    Arthritis of left knee    Depression    Obesity (BMI 30-39.9)    Atrial fibrillation with RVR    Substernal thyroid goiter    CHF (congestive heart failure)    Diastolic CHF, chronic    Hemorrhagic stroke    GÓMEZ on auto CPAP    Hypersomnia due to medical condition    Sleep-related hypoxia    Chronic atrial fibrillation    Vertigo    Aortic stenosis, severe    Sinus pause    Pacemaker    Anticoagulated    Generalized weakness    Prediabetes    Pure hypercholesterolemia    Pre-operative cardiovascular examination    Peripheral neuropathy    History of colon polyps        Past Medical History:   Diagnosis Date    Arthritis     Atrial fibrillation with RVR 2019    Colon polyps 2018    Hepatic flexure: tubular adenoma, only low-grade dysplasia; splenic flexure: tubular adenoma, only low-grade dysplasia; sigmoid colon: tubular adenoma, multiple fragments only low-grade dysplasia    Depression     Heart murmur     Hemorrhagic stroke 2019    Hypertension     New onset atrial fibrillation (CMS/HCC) - RVR 2019    GÓMEZ (obstructive sleep apnea) 2019    Home sleep study with moderate severity GÓMEZ, AHI 20 events per hour.  Sleep-related hypoxia with low O2 saturation 84% for 14 minutes.    Peripheral neuropathy     Sleep apnea     previously diagnosed with sleep apnea, had T&A done and it has since resolved     Stroke     Uncontrolled hypertension     Ventral hernia         Past Surgical History:   Procedure Laterality Date     APPENDECTOMY N/A 1972    BACK SURGERY      BLADDER STIMULATOR IMPLANT L LOWER BACK    CARDIAC CATHETERIZATION N/A 07/20/2004    Cath left ventriculography, coronary angiography and left heart catheterization, Normal results-Dr. Vadim Mancuso    CARDIAC CATHETERIZATION N/A 1/29/2019    Procedure: Left Heart Cath;  Surgeon: Eren Bruce MD;  Location: [facility] CATH INVASIVE LOCATION;  Service: Cardiology    CARDIAC CATHETERIZATION N/A 1/29/2019    Procedure: Left ventriculography;  Surgeon: Eren Bruce MD;  [facility];  Service: Cardiology    CARDIAC CATHETERIZATION N/A 1/29/2019    Procedure: Coronary angiography;  Surgeon: Eren Bruce MD;  [facility] C[facility];  Service: Cardiology    CARDIAC ELECTROPHYSIOLOGY PROCEDURE N/A 3/4/2022    Procedure: Pacemaker SC new BIOTRONIK;  Surgeon: Eren Bruce MD;  Location: [facility] CATH INVASIVE LOCATION;  Service: Cardiology;  Laterality: N/A;    CARPAL TUNNEL RELEASE Right 2013    CARPAL TUNNEL RELEASE Left     COLONOSCOPY N/A 1/4/2018    Procedure: COLONOSCOPY with cold polypectomy and hot snare polypectomy;  Surgeon: Ten Solitario MD;  Location: [facility] ENDOSCOPY;  Service:     COLONOSCOPY N/A 4/25/2024    Procedure: COLONOSCOPY INTO CECUM;  Surgeon: Ten Solitario MD;  Location: [facility] ENDOSCOPY;  Service: General;  Laterality: N/A;  PRE: PERSONAL H/O COLON POLYP  /  POST: POOR PREP OTHERWISE NORMAL    EYE LENS IMPLANT SECONDARY Bilateral 2007, 2008    KNEE CARTILAGE SURGERY Right 1979    TONSILLECTOMY AND ADENOIDECTOMY Bilateral 2005    TOTAL KNEE ARTHROPLASTY Left 03/14/2016    Left total knee arthroplasty with Amberly component, size 11 femur, G tibial baseplate with a 10 polyethylene insert and a 38 patellar button-Dr. Pablo Hairston    TOTAL KNEE ARTHROPLASTY Right 03/02/2015    Right total knee arthroplasty with Amberly component size G femur, 11 tibial base plate with an 11  polyethylene insert and a 38 patellar button-Dr. Pablo Hairston    UVULOPALATOPHARYNGOPLASTY N/A 2005    part of soft palate, and uvula    VENTRAL HERNIA REPAIR N/A 1/23/2018    Procedure: VENTRAL HERNIA REPAIR LAPAROSCOPIC WITH DAVINCI ROBOT AND MESH;  Surgeon: Ten Solitario MD;  Location: [facility];  Service:                         PT Assessment/Plan       Row Name 05/13/24 1200          PT Assessment    Assessment Comments Continued to cue for larger step length this date. Additionally pt demonstrates reduced proprioception of L foot, cueing to look at foot for heel strike as well as during seated HR/toe raise as he has reduced response time without. Attempted walking over hurdles this date but pt unable to clear on several attempts secondary to reduced BL hip flex and DF on swing through with poor eccentric lowering control. Cued for upright posture thoughout session as he has tendency to maintain forward flexion with all upright activites. He does demo mild R side lean with STS once he gets fatigued, cueing for midline prior to stand and return to midline with glute squeeze in standing. Additionally added seated low row for upright posture and periscap strengthening with great tolerance. Continue to reinforce importance of HEP compliance at home, progress as appropriate.  -MO        PT Plan    PT Plan Comments continue to work on upright posture throughout all exercises. Could add small step up. Add weighted OH press to STS. Large step gait training in // bars- could try using ladder for equal steps  -MO               User Key  (r) = Recorded By, (t) = Taken By, (c) = Cosigned By      Initials Name Provider Type    Nuvia Stafford, PT Physical Therapist                       OP Exercises       Row Name 05/13/24 1100             Subjective    Subjective Comments Have been busy, didn't get exercises in much  -MO         Total Minutes    86981 - PT Therapeutic Activity Minutes 38  -MO         Exercise 1     Exercise Name 1 NuStep  -MO      Time 1 5 min, L4  -MO         Exercise 5    Exercise Name 5 Seated HR/ Toe raise  -MO      Cueing 5 Verbal;Tactile;Demo  -MO      Sets 5 1  -MO      Reps 5 20  -MO      Time 5 cueing to look at feet for LLE to follow  -MO         Exercise 9    Exercise Name 9 High knee march  -MO      Cueing 9 Verbal  -MO      Reps 9 2 lap walking  -MO      Time 9 [id number]  alternating  -MO         Exercise 10    Exercise Name 10 STS  -MO      Cueing 10 Verbal  -MO      Sets 10 2  -MO      Reps 10 10  -MO      Time 10 from lowest mat table + airex  -MO         Exercise 11    Exercise Name 11 Step taps  -MO      Cueing 11 Verbal  -MO      Sets 11 2  -MO      Reps 11 20 alt  -MO      Time 11 // bars  -MO      Additional Comments 8in step  -MO         Exercise 12    Exercise Name 12 step + reach at window  -MO      Cueing 12 Verbal;Demo  -MO      Reps 12 1x10 BL  -MO      Time 12 1 UE support at barre  -MO      Additional Comments frequent cueing for large step length w/ forward weight shift for reach  -MO         Exercise 13    Exercise Name 13 Reinforced gait mechanics, keeping walker closer to COG, larger steps with heel strike on initial contact  -MO                User Key  (r) = Recorded By, (t) = Taken By, (c) = Cosigned By      Initials Name Provider Type    Nuvia Stafford, PT Physical Therapist                                  PT OP Goals       Row Name 05/13/24 1200          PT Short Term Goals    STG Date to Achieve 05/18/24  -MO     STG 1 Pt will be independent with initial HEP to improve strength and static/dynamic balance.  -MO     STG 1 Progress Ongoing  -MO     STG 2 Pt will ambulate 150' with normal heel strike to toe off with symmetrical stride and davon with his RWX without momentary LOB.  -MO     STG 2 Progress Ongoing  -MO     STG 3 Patient able to tandem stance bilaterally with 1 finger hold > 30 seconds without LOB for improved core stabilization and decreased fall risk   -MO     STG 3 Progress Ongoing  -MO     STG 4 Pt performs 30 seconds sit to stand having complete at least 2 more repetitions that was performed upon initial evaluation for progression of ease with functional transfers, LE strength, and safety in the home.  -MO     STG 4 Progress Ongoing  -MO        Long Term Goals    LTG Date to Achieve 07/17/24  -MO     LTG 1 Pt will be independent with advance HEP to improve strength and static/dynamic balance.  -MO     LTG 1 Progress Ongoing  -MO     LTG 2 Pt will tolerate 30 minutes of activity with 3 brief rest breaks demonstrating improved functional strength.  -MO     LTG 2 Progress Ongoing  -MO     LTG 3 Pt will demonstrate increased strength LEs to 4/5 or better.  -MO     LTG 3 Progress Ongoing  -MO     LTG 4 Patient will improve VELAZQUEZ score from 22 to </= 28 to show improved static/dynamic balance leading to decrease risk for falls.  -MO     LTG 4 Progress Ongoing  -MO     LTG 5 Pt will increase 2min walk by >/=30 ft with rwx with </= 1 LOB for improved safety with ambulatory tasks.  -MO     LTG 5 Progress Ongoing  -MO     LTG 6 Pt will be able to perform static NBOS on stable surface for >/=30s without UE support for improved balance and decreased risk of falling.  -MO     LTG 6 Progress Ongoing  -MO     LTG 7 The pt demonstrates ambulation with step through reciprocal gait pattern with assistive device with no moments of instability for 200 ft while demonstrating increased stance width/SOLOMON for improvements in stability/safety during dynamic ambulatory tasks when navigating the community.  -MO     LTG 7 Progress Ongoing  -MO               User Key  (r) = Recorded By, (t) = Taken By, (c) = Cosigned By      Initials Name Provider Type    Nuvia Stafford, PT Physical Therapist                                   Time Calculation:   Start Time: 1107  Stop Time: 1145  Time Calculation (min): 38 min  Timed Charges  65907 - PT Therapeutic Activity Minutes: 38  Total Minutes  Timed  Charges Total Minutes: 38   Total Minutes: 38  Therapy Charges for Today       Code Description Service Date Service Provider Modifiers Qty    [id number]  PT THERAPEUTIC ACT EA 15 MIN 5/13/2024 Nuvia Solis, PT GP 3                      Nuvia Solis, PT  5/13/2024

## 2024-05-15 ENCOUNTER — OFFICE VISIT (OUTPATIENT)
Dept: SLEEP MEDICINE | Facility: HOSPITAL | Age: 69
End: 2024-05-15
Payer: MEDICARE

## 2024-05-15 VITALS — HEART RATE: 78 BPM | WEIGHT: 231 LBS | OXYGEN SATURATION: 96 % | HEIGHT: 70 IN | BODY MASS INDEX: 33.07 KG/M2

## 2024-05-15 DIAGNOSIS — G47.33 OBSTRUCTIVE SLEEP APNEA, ADULT: Primary | ICD-10-CM

## 2024-05-15 DIAGNOSIS — E66.9 CLASS 1 OBESITY WITH SERIOUS COMORBIDITY AND BODY MASS INDEX (BMI) OF 33.0 TO 33.9 IN ADULT, UNSPECIFIED OBESITY TYPE: ICD-10-CM

## 2024-05-15 PROCEDURE — G0463 HOSPITAL OUTPT CLINIC VISIT: HCPCS

## 2024-05-15 PROCEDURE — 99214 OFFICE O/P EST MOD 30 MIN: CPT | Performed by: NURSE PRACTITIONER

## 2024-05-15 PROCEDURE — 1160F RVW MEDS BY RX/DR IN RCRD: CPT | Performed by: NURSE PRACTITIONER

## 2024-05-15 PROCEDURE — 1159F MED LIST DOCD IN RCRD: CPT | Performed by: NURSE PRACTITIONER

## 2024-05-15 NOTE — PROGRESS NOTES
[facility]  [address]  [address]  [place] [address]  Phone   Fax        SLEEP CLINIC FOLLOW-UP PROGRESS NOTE    Flo Manzo  [id number]   1955  69 y.o.  male      PCP: Karen Jiménez APRN    DATE OF VISIT: 5/15/2024          CHIEF COMPLAINT: Obstructive sleep apnea    HPI:  This is a 69 y.o. year old patient who presents to the clinic today for the management of obstructive sleep apnea.   Patient had a(n) home sleep study in 2019 showing obstructive sleep apnea with AHI of 20/hr.  He established care with new provider in October.  He had not started using his new DreamStation auto CPAP at that time as pressure adjustments were needed.  AHI was at 8.1/h 12/2023 but he had low usage of device.  Increased usage was recommended.  He was going through radiation at that time and declined any adjustments at that time.  He was seen back in the office 4/17/2024 at which point he had completed radiation.  He had not used his CPAP for about a month as he had lost the SD card and did not think the machine would work without it.  He had resumed use but still had low usage.  He felt comfortable with pressures.  AHI was above goal.  He also reported nasal pillow mask did not stay in place and also had suspected mouth breathing.  He was fitted for full facemask in the office.  He now presents today for 4-week follow-up.  His AHI is still above goal at 17.8/h  (consisting of 2.1/h CA index, 4.7/h OA index, 11/h hypopnea index).  Some periodic breathing noted.  Follows with cardiology regularly.  Presents today for follow-up. Did not tolerate full face mask.   Still have issues tolerating CPAP.          MEDICATIONS: reviewed     ALLERGIES:  Poison ivy extract    SOCIAL HISTORY (habits pertaining to sleep medicine):  Tobacco use: No   Alcohol use: 0 per week  Caffeine use:     REVIEW OF SYSTEMS:   Pertinent positive symptoms are:  [place] Sleepiness Scale :Total score:  "5   Depression: sees PCP.  Denies SI or HI.          PHYSICAL EXAMINATION:  CONSTITUTIONAL:  Vitals:    05/15/24 1100   Pulse: 78   SpO2: 96%   Weight: 105 kg (231 lb)   Height: 177.8 cm (70\")    Body mass index is 33.15 kg/m².   HEAD: atraumatic, normocephalic  RESP SYSTEM: not in respiratory distress, breathing unlabored  CARDIOVASULAR: normal rate, no edema noted   NEURO: Alert and oriented x 3, mood and affect appeared appropriate      DATA REVIEWED:  The PAP compliance summary downloaded on 5/11/2024 has been reviewed independently by me and discussed with the patient.   Compliance: 27.8%  More than 4 hr use: 0%  Average use of the device: 1hr 21 min per night  Residual AHI: 17.8/hr (goal < 5.0 /hr)  Device: Dreamstation 2 auto CPAP  Mask type: Nasal  DME: [employer]          ASSESSMENT AND PLAN:  Obstructive Sleep Apnea: AHI remains above goal.  Continues to have issues with PAP.  At this point patient is amenable to in lab titration study so we can adjust pressures in real-time to get sleep apnea better treated.  Did not tolerate Vitera full facemask.  However I do think he would benefit from trial of alternate facemask type when he comes in for titration study  Obesity: Body mass index is 33.15 kg/m².. Patients who are overweight or obese are at increased risk of sleep apnea/ sleep disordered breathing. Weight reduction and healthy lifestyle are encouraged in overweight/ obese patients as part of a comprehensive approach to sleep apnea treatment.    Neuropathy  Depression: follows with PCP.  Denies SI or HI.  Reports he has follow-up with PCP tomorrow.  CHF, PM, aortic stenosis, a fib, hypertension: follows with cardiology.       Patient will follow-up after study or follow-up sooner for any issues or concerns.  Patient's questions were answered.          Thank you for allowing me to participate in the care of this patient.     Yisel Breen DNP, APRN  [facility]                "

## 2024-05-16 ENCOUNTER — OFFICE VISIT (OUTPATIENT)
Dept: INTERNAL MEDICINE | Age: 69
End: 2024-05-16
Payer: MEDICARE

## 2024-05-16 ENCOUNTER — APPOINTMENT (OUTPATIENT)
Dept: PHYSICAL THERAPY | Facility: HOSPITAL | Age: 69
End: 2024-05-16
Payer: MEDICARE

## 2024-05-16 VITALS
DIASTOLIC BLOOD PRESSURE: 100 MMHG | SYSTOLIC BLOOD PRESSURE: 130 MMHG | RESPIRATION RATE: 18 BRPM | WEIGHT: 234 LBS | OXYGEN SATURATION: 94 % | HEART RATE: 74 BPM | HEIGHT: 70 IN | BODY MASS INDEX: 33.5 KG/M2

## 2024-05-16 DIAGNOSIS — R25.2 LEG CRAMPING: ICD-10-CM

## 2024-05-16 DIAGNOSIS — M79.2 NEUROPATHIC PAIN: ICD-10-CM

## 2024-05-16 DIAGNOSIS — I73.9 PAD (PERIPHERAL ARTERY DISEASE): Primary | ICD-10-CM

## 2024-05-16 DIAGNOSIS — I10 ESSENTIAL HYPERTENSION: Chronic | ICD-10-CM

## 2024-05-16 DIAGNOSIS — I50.32 DIASTOLIC CHF, CHRONIC: Chronic | ICD-10-CM

## 2024-05-16 DIAGNOSIS — G62.9 PERIPHERAL POLYNEUROPATHY: ICD-10-CM

## 2024-05-16 RX ORDER — FUROSEMIDE 20 MG/1
20 TABLET ORAL DAILY
Qty: 90 TABLET | Refills: 1 | Status: SHIPPED | OUTPATIENT
Start: 2024-05-16 | End: 2024-11-12

## 2024-05-16 RX ORDER — GABAPENTIN 100 MG/1
100 CAPSULE ORAL 2 TIMES DAILY
Qty: 60 CAPSULE | Refills: 0 | Status: SHIPPED | OUTPATIENT
Start: 2024-05-16

## 2024-05-16 RX ORDER — GABAPENTIN 300 MG/1
300 CAPSULE ORAL NIGHTLY
Qty: 30 CAPSULE | Refills: 0 | Status: SHIPPED | OUTPATIENT
Start: 2024-05-16

## 2024-05-16 RX ORDER — AMLODIPINE BESYLATE 10 MG/1
10 TABLET ORAL
Qty: 90 TABLET | Refills: 1 | Status: SHIPPED | OUTPATIENT
Start: 2024-05-16

## 2024-05-16 NOTE — PROGRESS NOTES
"    I N T E R N A L  M E D I C I N E  Karen Jiménez, APRN    ENCOUNTER DATE:  05/16/2024    Flo Manzo / 69 y.o. / male      CHIEF COMPLAINT / REASON FOR OFFICE VISIT     Numbness (Bilateral legs)      ASSESSMENT & PLAN     Diagnoses and all orders for this visit:    1. PAD (peripheral artery disease) (Primary)  -     Doppler Ankle Brachial Index Single Level CAR; Future  -     Ambulatory Referral to Vascular Surgery    2. Leg cramping  -     Doppler Ankle Brachial Index Single Level CAR; Future    3. Neuropathic pain    4. Peripheral polyneuropathy    5. Essential hypertension    6. Diastolic CHF, chronic         SUMMARY/DISCUSSION  Given report of abnormalities found on recent outside evaluation with private vascular clinic, and report of ongoing leg cramping in the mornings, will evaluate for PAD and refer to vascular.  His pain symptoms of bilateral calves are pervasive and bothersome.  He currently takes gabapentin 100 mg TID for peripheral neuropathy.  Will increase nighttime dose to 300 mg and assess whether this benefits his leg cramping symptoms with close follow up in 1 month.  He is aware of risk factors of gabapentin, including increased dizziness, drowsiness, and will take care to avoid falls.    Recheck of BP, 130/96.  Recommend he monitor at home to ensure it is averaging < 130/80.   Continue close follow up with cardiology.        Next Appointment with me: 6/17/2024    Return in about 1 month (around 6/16/2024) for Recheck.      VITAL SIGNS     Visit Vitals  /100   Pulse 74   Resp 18   Ht 177.8 cm (70\")   Wt 106 kg (234 lb)   SpO2 94%   BMI 33.58 kg/m²             Wt Readings from Last 3 Encounters:   05/16/24 106 kg (234 lb)   05/15/24 105 kg (231 lb)   04/25/24 105 kg (232 lb 1.6 oz)     Body mass index is 33.58 kg/m².        MEDICATIONS AT THE TIME OF OFFICE VISIT     Current Outpatient Medications on File Prior to Visit   Medication Sig Dispense Refill    acetaminophen (TYLENOL) 325 MG " tablet Take 2 tablets by mouth Every 4 (Four) Hours As Needed for Mild Pain . 120 tablet 3    albuterol sulfate  (90 Base) MCG/ACT inhaler Inhale 2 puffs Every 4 (Four) Hours As Needed for Wheezing. 18 g 3    amLODIPine (NORVASC) 10 MG tablet TAKE 1 TABLET BY MOUTH DAILY 90 tablet 1    atorvastatin (LIPITOR) 40 MG tablet TAKE 1 TABLET BY MOUTH NIGHTLY 90 tablet 1    busPIRone (BUSPAR) 10 MG tablet TAKE 1 TABLET BY MOUTH TWICE DAILY 60 tablet 5    citalopram (CeleXA) 20 MG tablet Take 1 tablet by mouth Daily. (Patient taking differently: Take 1.5 tablets by mouth Daily. PT TAKING 1.5 TABLETS DAILY) 135 tablet 1    Diclofenac Sodium (Voltaren) 1 % gel gel Apply 4 g topically to the appropriate area as directed 4 (Four) Times a Day. 50 g 0    Eliquis 5 MG tablet tablet TAKE 1 TABLET BY MOUTH TWICE DAILY 60 tablet 5    gabapentin (NEURONTIN) 100 MG capsule TAKE 1 CAPSULE BY MOUTH THREE TIMES A DAY 90 capsule 0    lisinopril (PRINIVIL,ZESTRIL) 20 MG tablet TAKE 1 TABLET BY MOUTH DAILY 30 tablet 5    metoprolol tartrate (LOPRESSOR) 25 MG tablet TAKE 1 & 1/2 TABLETS BY MOUTH TWICE DAILY 90 tablet 1    tamsulosin (FLOMAX) 0.4 MG capsule 24 hr capsule TAKE 1 CAPSULE BY MOUTH NIGHTLY 30 capsule 5    furosemide (LASIX) 20 MG tablet Take 1 tablet by mouth Daily for 180 days. 90 tablet 1    omeprazole (priLOSEC) 20 MG capsule TAKE 1 CAPSULE BY MOUTH DAILY 90 capsule 1    trimethoprim-polymyxin b (POLYTRIM) 36761-3.1 UNIT/ML-% ophthalmic solution Administer 1 drop to both eyes Every 6 (Six) Hours. (Patient not taking: Reported on 5/16/2024) 10 mL 0    [DISCONTINUED] DULoxetine (CYMBALTA) 30 MG capsule TAKE 1 CAPSULE BY MOUTH EVERY NIGHT 90 capsule 1    [DISCONTINUED] metoprolol succinate XL (TOPROL-XL) 25 MG 24 hr tablet Take 1 tablet by mouth Daily. 30 tablet 0    [DISCONTINUED] QUEtiapine (SEROquel) 25 MG tablet Take 1 tablet by mouth Every Night. Take 30 min before bed 30 tablet 0     No current facility-administered  medications on file prior to visit.        HISTORY OF PRESENT ILLNESS     Here today for concern of ongoing bilateral leg calf cramping, occurring primarily in the mornings, ongoing for last several months.  No pain in calves with ambulation.  Denies any calf warmth, swelling, pain.  Denies lower extremity burning, numbness, tingling at this time.  Reports he was recently evaluated by private vascular clinic with abnormalities found and recommended to undergo procedure, but he deferred due to cost.  He is unsure of the details.  He has no paperwork to provide.  March 2024 labs with normal electrolytes, magnesium, normal thyroid labs.        Patient Care Team:  Karen Jiménez APRN as PCP - General (Family Medicine)  Eren Bruce MD as Consulting Physician (Cardiology)  Temo Wheat MD as Consulting Physician (Radiation Oncology)  Bryant Rader MD as Consulting Physician (Urology)  Cindi Breen DNP, APRN as Nurse Practitioner (Nurse Practitioner)    REVIEW OF SYSTEMS     Review of Systems   Constitutional:  Negative for chills, fever and unexpected weight change.   Respiratory:  Negative for cough, chest tightness and shortness of breath.    Cardiovascular:  Negative for chest pain, palpitations and leg swelling.   Musculoskeletal:  Positive for myalgias.   Neurological:  Negative for dizziness, weakness, light-headedness and headaches.   Psychiatric/Behavioral:  The patient is not nervous/anxious.           PHYSICAL EXAMINATION     Physical Exam  Vitals reviewed.   Constitutional:       General: He is not in acute distress.     Appearance: Normal appearance. He is not ill-appearing, toxic-appearing or diaphoretic.   HENT:      Head: Normocephalic and atraumatic.   Cardiovascular:      Rate and Rhythm: Normal rate and regular rhythm.      Pulses: Normal pulses.      Heart sounds: Murmur heard.   Pulmonary:      Effort: Pulmonary effort is normal.      Breath sounds: Normal breath  sounds.   Musculoskeletal:      Right lower leg: No edema.      Left lower leg: No edema.   Skin:     General: Skin is warm and dry.   Neurological:      Mental Status: He is alert and oriented to person, place, and time. Mental status is at baseline.   Psychiatric:         Mood and Affect: Mood normal.         Behavior: Behavior normal.         Thought Content: Thought content normal.         Judgment: Judgment normal.           REVIEWED DATA     Labs:           Imaging:            Medical Tests:           Summary of old records / correspondence / consultant report:           Request outside records:

## 2024-05-20 ENCOUNTER — HOSPITAL ENCOUNTER (OUTPATIENT)
Dept: PHYSICAL THERAPY | Facility: HOSPITAL | Age: 69
Setting detail: THERAPIES SERIES
Discharge: HOME | End: 2024-05-20
Payer: MEDICARE

## 2024-05-20 DIAGNOSIS — R26.89 POOR BALANCE: Primary | ICD-10-CM

## 2024-05-20 DIAGNOSIS — Z74.09 IMPAIRED MOBILITY: ICD-10-CM

## 2024-05-20 DIAGNOSIS — R29.898 LEG WEAKNESS, BILATERAL: ICD-10-CM

## 2024-05-20 PROCEDURE — 97530 THERAPEUTIC ACTIVITIES: CPT

## 2024-05-20 NOTE — THERAPY PROGRESS REPORT/RE-CERT
Outpatient Physical Therapy Ortho Progress Note  [facility]     Patient Name: Flo Manzo  : 1955  MRN: [id number]  Today's Date: 2024      Visit Date: 2024    Visit Dx:    ICD-10-CM ICD-9-CM   1. Poor balance  R26.89 781.99   2. Leg weakness, bilateral  R29.898 729.89   3. Impaired mobility  Z74.09 799.89       Patient Active Problem List   Diagnosis    Umbilical hernia without obstruction and without gangrene    Essential hypertension    Arthritis of left knee    Depression    Obesity (BMI 30-39.9)    Atrial fibrillation with RVR    Substernal thyroid goiter    CHF (congestive heart failure)    Diastolic CHF, chronic    Hemorrhagic stroke    GÓMEZ on auto CPAP    Hypersomnia due to medical condition    Sleep-related hypoxia    Chronic atrial fibrillation    Vertigo    Aortic stenosis, severe    Sinus pause    Pacemaker    Anticoagulated    Generalized weakness    Prediabetes    Pure hypercholesterolemia    Pre-operative cardiovascular examination    Peripheral neuropathy    History of colon polyps        Past Medical History:   Diagnosis Date    Arthritis     Atrial fibrillation with RVR 2019    Colon polyps 2018    Hepatic flexure: tubular adenoma, only low-grade dysplasia; splenic flexure: tubular adenoma, only low-grade dysplasia; sigmoid colon: tubular adenoma, multiple fragments only low-grade dysplasia    Depression     Heart murmur     Hemorrhagic stroke 2019    Hypertension     New onset atrial fibrillation (CMS/HCC) - RVR 2019    GÓMEZ (obstructive sleep apnea) 2019    Home sleep study with moderate severity GÓMEZ, AHI 20 events per hour.  Sleep-related hypoxia with low O2 saturation 84% for 14 minutes.    Peripheral neuropathy     Sleep apnea     previously diagnosed with sleep apnea, had T&A done and it has since resolved     Stroke     Uncontrolled hypertension     Ventral hernia         Past Surgical History:   Procedure Laterality Date     APPENDECTOMY N/A 1972    BACK SURGERY      BLADDER STIMULATOR IMPLANT L LOWER BACK    CARDIAC CATHETERIZATION N/A 07/20/2004    Cath left ventriculography, coronary angiography and left heart catheterization, Normal results-Dr. Vadim Mancuso    CARDIAC CATHETERIZATION N/A 1/29/2019    Procedure: Left Heart Cath;  Surgeon: Eren Bruce MD;  Location: [facility] CATH INVASIVE LOCATION;  Service: Cardiology    CARDIAC CATHETERIZATION N/A 1/29/2019    Procedure: Left ventriculography;  Surgeon: Eren Bruce MD;  Location[facility] CATH INVASIVE LOCATION;  Service: Cardiology    CARDIAC CATHETERIZATION N/A 1/29/2019    Procedure: Coronary angiography;  Surgeon: Eren Bruce MD;  Location: [facility] CATH INVASIVE LOCATION;  Service: Cardiology    CARDIAC ELECTROPHYSIOLOGY PROCEDURE N/A 3/4/2022    Procedure: Pacemaker SC new BIOTRONIK;  Surgeon: Eren Bruce MD;  Location: [facility] CATH INVASIVE LOCATION;  Service: Cardiology;  Laterality: N/A;    CARPAL TUNNEL RELEASE Right 2013    CARPAL TUNNEL RELEASE Left     COLONOSCOPY N/A 1/4/2018    Procedure: COLONOSCOPY with cold polypectomy and hot snare polypectomy;  Surgeon: Ten Solitario MD;  Location: [facility] ENDOSCOPY;  Service:     COLONOSCOPY N/A 4/25/2024    Procedure: COLONOSCOPY INTO CECUM;  Surgeon: Ten Solitario MD;  Location: [facility] ENDOSCOPY;  Service: General;  Laterality: N/A;  PRE: PERSONAL H/O COLON POLYP  /  POST: POOR PREP OTHERWISE NORMAL    EYE LENS IMPLANT SECONDARY Bilateral 2007, 2008    KNEE CARTILAGE SURGERY Right 1979    TONSILLECTOMY AND ADENOIDECTOMY Bilateral 2005    TOTAL KNEE ARTHROPLASTY Left 03/14/2016    Left total knee arthroplasty with Amberly component, size 11 femur, G tibial baseplate with a 10 polyethylene insert and a 38 patellar button-Dr. Pablo Hairston    TOTAL KNEE ARTHROPLASTY Right 03/02/2015    Right total knee arthroplasty with Amberly component size G femur, 11 tibial base plate with an 11  polyethylene insert and a 38 patellar button-Dr. Pablo Hairston    UVULOPALATOPHARYNGOPLASTY N/A 2005    part of soft palate, and uvula    VENTRAL HERNIA REPAIR N/A 1/23/2018    Procedure: VENTRAL HERNIA REPAIR LAPAROSCOPIC WITH DAVINCI ROBOT AND MESH;  Surgeon: Ten Solitario MD;  Location: [facility];  Service:                         PT Assessment/Plan       Row Name 05/20/24 1100          PT Assessment    Functional Limitations Decreased safety during functional activities;Impaired gait;Limitation in home management;Limitations in community activities;Limitations in functional capacity and performance;Performance in leisure activities;Performance in self-care ADL  -MO     Impairments Balance;Coordination;Endurance;Gait;Range of motion;Poor body mechanics;Muscle strength;Motor function;Locomotion  -MO     Assessment Comments Flo Manzo has been seen for 7 physical therapy sessions for balance impairment.  Treatment has included therapeutic exercise, therapeutic activity, and patient education with home exercise program . Progress to physical therapy goals is fair. Pt has met 0/4 STG and 0/7 LTG. Goals likely limited secondary to limited visits, significant PMH with several comorbilities, and poor compliance to HEP Today he demos maintenance of 2 min walk test and is able to complete 1 additional STS in 30s test, getting 4 this date. He is unable to maintain tandem stance for any period of time without UE assist, modified goals to better reflect functional ability at this time. He is able to demo 25s in stagger stance without UE assist and continued to work on modified tandem reducing UE reliance. He continues to demo gross mobility deficits with L hip circumduction with intermittent toe drag, intermittent shuffled steps, and forward flexed posture, continuing to place him at a high falls risk. He demos s/s consistent with lingering L sided deficits following previous stroke with noted strength,  endurance, and proprioception impairments. He will benefit from continued skilled physical therapy to address remaining impairments and functional limitations.  -MO     Please refer to paper survey for additional self-reported information No  -MO     Rehab Potential Fair  -MO     Patient/caregiver participated in establishment of treatment plan and goals Yes  -MO     Patient would benefit from skilled therapy intervention Yes  -MO        PT Plan    PT Frequency 2x/week  -MO     Predicted Duration of Therapy Intervention (PT) 6-8 weeks  -MO     Planned CPT's? PT RE-EVAL: 99982;PT THER PROC EA 15 MIN: 92814;PT THER ACT EA 15 MIN: 78082;PT MANUAL THERAPY EA 15 MIN: 81016;PT NEUROMUSC RE-EDUCATION EA 15 MIN: 01082;PT GAIT TRAINING EA 15 MIN: 95730;PT SELF CARE/HOME MGMT/TRAIN EA 15: 56937  -MO     PT Plan Comments continue to reinforce upright posture. Add stagger stance to HEP. weighted OH press to STS. Large step training in // bars, could try using agility ladder for equal step length  -MO               User Key  (r) = Recorded By, (t) = Taken By, (c) = Cosigned By      Initials Name Provider Type    Nuvia Stafford, PT Physical Therapist                       OP Exercises       Row Name 05/20/24 1100             Total Minutes    02204 - PT Therapeutic Activity Minutes 40  -MO         Exercise 1    Exercise Name 1 NuStep  -MO      Time 1 5 min, L4  -MO         Exercise 5    Exercise Name 5 Seated HR/ Toe raise  -MO      Cueing 5 Verbal  -MO      Sets 5 1  -MO      Reps 5 20  -MO         Exercise 6    Exercise Name 6 stagger stance  -MO      Cueing 6 Verbal  -MO      Reps 6 2 B  -MO      Time 6 30s  -MO      Additional Comments in //'s. Intermittent hand, working to reduce  -MO         Exercise 8    Exercise Name 8 Progress  note  -MO      Additional Comments 2 min walk test, 30s STS, balance assessment  -MO         Exercise 9    Exercise Name 9 standing march  -MO      Cueing 9 Verbal  -MO      Time 9 2x10 standing  alternating  -MO         Exercise 11    Exercise Name 11 Step taps  -MO      Cueing 11 Verbal  -MO      Sets 11 2  -MO      Reps 11 20 alt  -MO      Time 11 // bars  -MO      Additional Comments 8in step  -MO         Exercise 13    Exercise Name 13 Reinforced gait mechanics, keeping walker closer to COG, larger steps with heel strike on initial contact  -MO         Exercise 14    Exercise Name 14 step up  -MO      Cueing 14 Verbal;Demo  -MO      Sets 14 2B  -MO      Reps 14 10  -MO      Time 14 4inch  -MO                User Key  (r) = Recorded By, (t) = Taken By, (c) = Cosigned By      Initials Name Provider Type    Nuvia Stafford, PT Physical Therapist                                  PT OP Goals       Row Name 05/20/24 1100          PT Short Term Goals    STG Date to Achieve 05/18/24  -MO     STG 1 Pt will be independent with initial HEP to improve strength and static/dynamic balance.  -MO     STG 1 Progress Ongoing  -MO     STG 2 Pt will ambulate 150' with normal heel strike to toe off with symmetrical stride and davon with his RWX without momentary LOB.  -MO     STG 2 Progress Ongoing  -MO     STG 3 Patient able to tandem stance bilaterally with 1 finger hold > 30 seconds without LOB for improved core stabilization and decreased fall risk  -MO     STG 3 Progress Ongoing  -MO     STG 4 Pt performs 30 seconds sit to stand having complete at least 2 more repetitions that was performed upon initial evaluation for progression of ease with functional transfers, LE strength, and safety in the home.  -MO     STG 4 Progress Progressing  -MO     STG 4 Progress Comments 4  -MO        Long Term Goals    LTG Date to Achieve 07/17/24  -MO     LTG 1 Pt will be independent with advance HEP to improve strength and static/dynamic balance.  -MO     LTG 1 Progress Ongoing  -MO     LTG 2 Pt will tolerate 30 minutes of activity with 3 brief rest breaks demonstrating improved functional strength.  -MO     LTG 2 Progress Ongoing   -MO     LTG 3 Pt will demonstrate increased strength LEs to 4/5 or better.  -MO     LTG 3 Progress Ongoing  -MO     LTG 4 Patient will improve VELAZQUEZ score from 22 to </= 28 to show improved static/dynamic balance leading to decrease risk for falls.  -MO     LTG 4 Progress Ongoing  -MO     LTG 5 Pt will increase 2min walk by >/=30 ft with rwx with </= 1 LOB for improved safety with ambulatory tasks.  -MO     LTG 5 Progress Ongoing  -MO     LTG 6 Pt will be able to perform static NBOS on stable surface for >/=30s without UE support for improved balance and decreased risk of falling.  -MO     LTG 6 Progress Ongoing  -MO     LTG 7 The pt demonstrates ambulation with step through reciprocal gait pattern with assistive device with no moments of instability for 200 ft while demonstrating increased stance width/SOLOMON for improvements in stability/safety during dynamic ambulatory tasks when navigating the community.  -MO     LTG 7 Progress Ongoing  -MO               User Key  (r) = Recorded By, (t) = Taken By, (c) = Cosigned By      Initials Name Provider Type    Nuvia Stafford, PT Physical Therapist                         2 Minute Walk Test  Gait, Assistive Device: standard walker  Distance Ambulated in 2 Minutes: 169  30 Second Chair Stand Test  30 Second Chair Stand Test: 4         Time Calculation:   Start Time: 1100  Stop Time: 1140  Time Calculation (min): 40 min  Timed Charges  78362 - PT Therapeutic Activity Minutes: 40  Total Minutes  Timed Charges Total Minutes: 40   Total Minutes: 40  Therapy Charges for Today       Code Description Service Date Service Provider Modifiers Qty    [id number]  PT THERAPEUTIC ACT EA 15 MIN 5/20/2024 Nuvia Solis PT GP 3                      Nuvia Solis PT  5/20/2024

## 2024-05-21 DIAGNOSIS — M79.2 NEUROPATHIC PAIN: ICD-10-CM

## 2024-05-22 ENCOUNTER — HOSPITAL ENCOUNTER (OUTPATIENT)
Dept: PHYSICAL THERAPY | Facility: HOSPITAL | Age: 69
Setting detail: THERAPIES SERIES
Discharge: HOME | End: 2024-05-22
Payer: MEDICARE

## 2024-05-22 DIAGNOSIS — R29.898 LEG WEAKNESS, BILATERAL: ICD-10-CM

## 2024-05-22 DIAGNOSIS — R26.89 POOR BALANCE: Primary | ICD-10-CM

## 2024-05-22 DIAGNOSIS — Z74.09 IMPAIRED MOBILITY: ICD-10-CM

## 2024-05-22 PROCEDURE — 97530 THERAPEUTIC ACTIVITIES: CPT

## 2024-05-22 RX ORDER — GABAPENTIN 300 MG/1
300 CAPSULE ORAL NIGHTLY
Qty: 30 CAPSULE | Refills: 10 | OUTPATIENT
Start: 2024-05-22

## 2024-05-22 NOTE — THERAPY TREATMENT NOTE
Outpatient Physical Therapy Ortho Treatment Note  [facility]     Patient Name: Flo Manzo  : 1955  MRN: [id number]  Today's Date: 2024      Visit Date: 2024    Visit Dx:    ICD-10-CM ICD-9-CM   1. Poor balance  R26.89 781.99   2. Leg weakness, bilateral  R29.898 729.89   3. Impaired mobility  Z74.09 799.89       Patient Active Problem List   Diagnosis    Umbilical hernia without obstruction and without gangrene    Essential hypertension    Arthritis of left knee    Depression    Obesity (BMI 30-39.9)    Atrial fibrillation with RVR    Substernal thyroid goiter    CHF (congestive heart failure)    Diastolic CHF, chronic    Hemorrhagic stroke    GÓMEZ on auto CPAP    Hypersomnia due to medical condition    Sleep-related hypoxia    Chronic atrial fibrillation    Vertigo    Aortic stenosis, severe    Sinus pause    Pacemaker    Anticoagulated    Generalized weakness    Prediabetes    Pure hypercholesterolemia    Pre-operative cardiovascular examination    Peripheral neuropathy    History of colon polyps        Past Medical History:   Diagnosis Date    Arthritis     Atrial fibrillation with RVR 2019    Colon polyps 2018    Hepatic flexure: tubular adenoma, only low-grade dysplasia; splenic flexure: tubular adenoma, only low-grade dysplasia; sigmoid colon: tubular adenoma, multiple fragments only low-grade dysplasia    Depression     Heart murmur     Hemorrhagic stroke 2019    Hypertension     New onset atrial fibrillation (CMS/HCC) - RVR 2019    GÓMEZ (obstructive sleep apnea) 2019    Home sleep study with moderate severity GÓMEZ, AHI 20 events per hour.  Sleep-related hypoxia with low O2 saturation 84% for 14 minutes.    Peripheral neuropathy     Sleep apnea     previously diagnosed with sleep apnea, had T&A done and it has since resolved     Stroke     Uncontrolled hypertension     Ventral hernia         Past Surgical History:   Procedure Laterality Date     APPENDECTOMY N/A 1972    BACK SURGERY      BLADDER STIMULATOR IMPLANT L LOWER BACK    CARDIAC CATHETERIZATION N/A 07/20/2004    Cath left ventriculography, coronary angiography and left heart catheterization, Normal results-Dr. Vadim Mancuso    CARDIAC CATHETERIZATION N/A 1/29/2019    Procedure: Left Heart Cath;  Surgeon: Eren Bruce MD;  Location: [facility] CATH INVASIVE LOCATION;  Service: Cardiology    CARDIAC CATHETERIZATION N/A 1/29/2019    Procedure: Left ventriculography;  Surgeon: Eren Bruce MD;  [facility];  Service: Cardiology    CARDIAC CATHETERIZATION N/A 1/29/2019    Procedure: Coronary angiography;  Surgeon: Eren Bruce MD;  [facility];  Service: Cardiology    CARDIAC ELECTROPHYSIOLOGY PROCEDURE N/A 3/4/2022    Procedure: Pacemaker SC new BIOTRONIK;  Surgeon: Eren Bruce MD;  Location: [facility] CATH INVASIVE LOCATION;  Service: Cardiology;  Laterality: N/A;    CARPAL TUNNEL RELEASE Right 2013    CARPAL TUNNEL RELEASE Left     COLONOSCOPY N/A 1/4/2018    Procedure: COLONOSCOPY with cold polypectomy and hot snare polypectomy;  Surgeon: Ten Solitario MD;  Location: [facility] ENDOSCOPY;  Service:     COLONOSCOPY N/A 4/25/2024    Procedure: COLONOSCOPY INTO CECUM;  Surgeon: Ten Solitario MD;  Location: [facility] ENDOSCOPY;  Service: General;  Laterality: N/A;  PRE: PERSONAL H/O COLON POLYP  /  POST: POOR PREP OTHERWISE NORMAL    EYE LENS IMPLANT SECONDARY Bilateral 2007, 2008    KNEE CARTILAGE SURGERY Right 1979    TONSILLECTOMY AND ADENOIDECTOMY Bilateral 2005    TOTAL KNEE ARTHROPLASTY Left 03/14/2016    Left total knee arthroplasty with Amberly component, size 11 femur, G tibial baseplate with a 10 polyethylene insert and a 38 patellar button-Dr. Pablo Hairston    TOTAL KNEE ARTHROPLASTY Right 03/02/2015    Right total knee arthroplasty with Amberly component size G femur, 11 tibial base plate with an 11  polyethylene insert and a 38 patellar button-Dr. Pablo Hairston    UVULOPALATOPHARYNGOPLASTY N/A 2005    part of soft palate, and uvula    VENTRAL HERNIA REPAIR N/A 1/23/2018    Procedure: VENTRAL HERNIA REPAIR LAPAROSCOPIC WITH DAVINCI ROBOT AND MESH;  Surgeon: Ten Solitario MD;  Location: [facility];  Service:                         PT Assessment/Plan       Row Name 05/22/24 1434          PT Assessment    Assessment Comments Danyel returns to the clinic with nothing new to report this date. Continued to emphasize proper gait mechanics throughout the visit as the patient almost immediately reverts to a forward flexed position, walker extended in front of his COG, and smaller shuffled steps. Added step overs in parallel bars and resumed STS from low surface with addition of weighted ball overhead press at terminal position. Updated HEP. The patient will continue to benefit from skilled therapy at this time.  -ZB        PT Plan    PT Plan Comments Continue to reinforce upright posture and gait mechanics. May try to add large lateral step + return possibly over obstacle for target, small reverse lunge/step with emphasis on upright posture ?  -ZB               User Key  (r) = Recorded By, (t) = Taken By, (c) = Cosigned By      Initials Name Provider Type    ZB Kamron Edwards, PT Physical Therapist                       OP Exercises       Row Name 05/22/24 1100             Subjective    Subjective Comments I'm doing alright, hanging in there  -ZB         Total Minutes    24722 - PT Therapeutic Activity Minutes 40  -ZB         Exercise 1    Exercise Name 1 NuStep  -ZB      Time 1 5 min, L4  -ZB         Exercise 5    Exercise Name 5 Seated HR/ Toe raise  -ZB      Cueing 5 Verbal  -ZB      Sets 5 1  -ZB      Reps 5 20  -ZB         Exercise 6    Exercise Name 6 stagger stance  -ZB      Cueing 6 Verbal  -ZB      Reps 6 2 B  -ZB      Time 6 30s  -ZB      Additional Comments in //'s. Intermittent hand, working to reduce   "-ZB         Exercise 7    Exercise Name 7 Step over  -ZB      Cueing 7 Verbal  -ZB      Sets 7 1  -ZB      Reps 7 10B  -ZB      Additional Comments glove box on floor, cue for large step to clear box without circumduction  -ZB         Exercise 9    Exercise Name 9 standing march  -ZB      Cueing 9 Verbal  -ZB      Time 9 2x10 standing alternating  -ZB         Exercise 10    Exercise Name 10 STS  -ZB      Cueing 10 Verbal  -ZB      Reps 10 10  -ZB      Time 10 L2 wt. ball overhead press at terminal position  -ZB      Additional Comments std chair + foam  -ZB         Exercise 11    Exercise Name 11 Step taps  -ZB      Cueing 11 Verbal  -ZB      Sets 11 2  -ZB      Reps 11 20 alt  -ZB      Time 11 // bars  -ZB      Additional Comments 8\" step  -ZB         Exercise 14    Exercise Name 14 step up  -ZB      Cueing 14 Verbal;Demo  -ZB      Sets 14 2B  -ZB      Reps 14 10  -ZB      Time 14 4inch  -ZB                User Key  (r) = Recorded By, (t) = Taken By, (c) = Cosigned By      Initials Name Provider Type    ZB Kamron Edwards, PT Physical Therapist                                                    Time Calculation:   Start Time: 1135  Stop Time: 1215  Time Calculation (min): 40 min  Timed Charges  62243 - PT Therapeutic Activity Minutes: 40  Total Minutes  Timed Charges Total Minutes: 40   Total Minutes: 40  Therapy Charges for Today       Code Description Service Date Service Provider Modifiers Qty    51492062880  PT THERAPEUTIC ACT EA 15 MIN 5/22/2024 Kamron Edwards, PT GP 3                      Anjelica Edwards PT  5/22/2024     "

## 2024-05-31 ENCOUNTER — APPOINTMENT (OUTPATIENT)
Dept: PHYSICAL THERAPY | Facility: HOSPITAL | Age: 69
End: 2024-05-31
Payer: MEDICARE

## 2024-05-31 ENCOUNTER — HOSPITAL ENCOUNTER (OUTPATIENT)
Dept: PHYSICAL THERAPY | Facility: HOSPITAL | Age: 69
Setting detail: THERAPIES SERIES
Discharge: HOME | End: 2024-05-31
Payer: MEDICARE

## 2024-05-31 DIAGNOSIS — R29.898 LEG WEAKNESS, BILATERAL: ICD-10-CM

## 2024-05-31 DIAGNOSIS — Z74.09 IMPAIRED MOBILITY: ICD-10-CM

## 2024-05-31 DIAGNOSIS — R26.89 POOR BALANCE: Primary | ICD-10-CM

## 2024-05-31 PROCEDURE — 97530 THERAPEUTIC ACTIVITIES: CPT

## 2024-05-31 NOTE — THERAPY TREATMENT NOTE
Outpatient Physical Therapy Ortho Treatment Note  [facility]     Patient Name: Flo Manzo  : 1955  MRN: [id number]  Today's Date: 2024      Visit Date: 2024    Visit Dx:    ICD-10-CM ICD-9-CM   1. Poor balance  R26.89 781.99   2. Leg weakness, bilateral  R29.898 729.89   3. Impaired mobility  Z74.09 799.89       Patient Active Problem List   Diagnosis    Umbilical hernia without obstruction and without gangrene    Essential hypertension    Arthritis of left knee    Depression    Obesity (BMI 30-39.9)    Atrial fibrillation with RVR    Substernal thyroid goiter    CHF (congestive heart failure)    Diastolic CHF, chronic    Hemorrhagic stroke    GÓMEZ on auto CPAP    Hypersomnia due to medical condition    Sleep-related hypoxia    Chronic atrial fibrillation    Vertigo    Aortic stenosis, severe    Sinus pause    Pacemaker    Anticoagulated    Generalized weakness    Prediabetes    Pure hypercholesterolemia    Pre-operative cardiovascular examination    Peripheral neuropathy    History of colon polyps        Past Medical History:   Diagnosis Date    Arthritis     Atrial fibrillation with RVR 2019    Colon polyps 2018    Hepatic flexure: tubular adenoma, only low-grade dysplasia; splenic flexure: tubular adenoma, only low-grade dysplasia; sigmoid colon: tubular adenoma, multiple fragments only low-grade dysplasia    Depression     Heart murmur     Hemorrhagic stroke 2019    Hypertension     New onset atrial fibrillation (CMS/HCC) - RVR 2019    GÓMEZ (obstructive sleep apnea) 2019    Home sleep study with moderate severity GÓMEZ, AHI 20 events per hour.  Sleep-related hypoxia with low O2 saturation 84% for 14 minutes.    Peripheral neuropathy     Sleep apnea     previously diagnosed with sleep apnea, had T&A done and it has since resolved     Stroke     Uncontrolled hypertension     Ventral hernia         Past Surgical History:   Procedure Laterality Date     APPENDECTOMY N/A 1972    BACK SURGERY      BLADDER STIMULATOR IMPLANT L LOWER BACK    CARDIAC CATHETERIZATION N/A 07/20/2004    Cath left ventriculography, coronary angiography and left heart catheterization, Normal results-Dr. Vadim Mancuso    CARDIAC CATHETERIZATION N/A 1/29/2019    Procedure: Left Heart Cath;  Surgeon: Eren Bruce MD;  Location: [facility] CATH INVASIVE LOCATION;  Service: Cardiology    CARDIAC CATHETERIZATION N/A 1/29/2019    Procedure: Left ventriculography;  Surgeon: Eren Bruce MD;  [facility];  Service: Cardiology    CARDIAC CATHETERIZATION N/A 1/29/2019    Procedure: Coronary angiography;  Surgeon: Eren Bruce MD;  [facility];  Service: Cardiology    CARDIAC ELECTROPHYSIOLOGY PROCEDURE N/A 3/4/2022    Procedure: Pacemaker SC new BIOTRONIK;  Surgeon: Eren Bruce MD;  Location: [facility] CATH INVASIVE LOCATION;  Service: Cardiology;  Laterality: N/A;    CARPAL TUNNEL RELEASE Right 2013    CARPAL TUNNEL RELEASE Left     COLONOSCOPY N/A 1/4/2018    Procedure: COLONOSCOPY with cold polypectomy and hot snare polypectomy;  Surgeon: Ten Solitario MD;  Location: [facility] ENDOSCOPY;  Service:     COLONOSCOPY N/A 4/25/2024    Procedure: COLONOSCOPY INTO CECUM;  Surgeon: Ten Solitario MD;  Location: [facility] ENDOSCOPY;  Service: General;  Laterality: N/A;  PRE: PERSONAL H/O COLON POLYP  /  POST: POOR PREP OTHERWISE NORMAL    EYE LENS IMPLANT SECONDARY Bilateral 2007, 2008    KNEE CARTILAGE SURGERY Right 1979    TONSILLECTOMY AND ADENOIDECTOMY Bilateral 2005    TOTAL KNEE ARTHROPLASTY Left 03/14/2016    Left total knee arthroplasty with Amberly component, size 11 femur, G tibial baseplate with a 10 polyethylene insert and a 38 patellar button-Dr. Pablo Hairston    TOTAL KNEE ARTHROPLASTY Right 03/02/2015    Right total knee arthroplasty with Amberly component size G femur, 11 tibial base plate with an 11  polyethylene insert and a 38 patellar button-Dr. Pablo Hairston    UVULOPALATOPHARYNGOPLASTY N/A 2005    part of soft palate, and uvula    VENTRAL HERNIA REPAIR N/A 1/23/2018    Procedure: VENTRAL HERNIA REPAIR LAPAROSCOPIC WITH DAVINCI ROBOT AND MESH;  Surgeon: Ten Solitario MD;  Location: [facility];  Service:                         PT Assessment/Plan       Row Name 05/31/24 1400          PT Assessment    Assessment Comments Danyel returns this date denying any changes since last session, does continue to have little adherence to HEP. Progressed reps in step overs and sit to stand as well as added lateral step ups and lateral step + reach with good tolerance. Continued to cue for upright posture reset after each rep of exercise and larger steps throughout.  -MO        PT Plan    PT Plan Comments add lateral step over tissue box, could consider small reverse lunge with emphasis on upright posture  -MO               User Key  (r) = Recorded By, (t) = Taken By, (c) = Cosigned By      Initials Name Provider Type    MO Nuvia Solis, PT Physical Therapist                       OP Exercises       Row Name 05/31/24 1100             Subjective    Subjective Comments Doing some of my seated exercises at home  -MO         Total Minutes    31514 - PT Therapeutic Activity Minutes 45  -MO         Exercise 1    Exercise Name 1 NuStep  -MO      Time 1 5 mins, lvl 5  -MO         Exercise 5    Exercise Name 5 Seated HR/ Toe raise  -MO      Cueing 5 Verbal  -MO      Sets 5 1  -MO      Reps 5 20  -MO         Exercise 7    Exercise Name 7 Step over  -MO      Cueing 7 Verbal  -MO      Sets 7 2B  -MO      Reps 7 10  -MO      Time 7 glove box  -MO         Exercise 10    Exercise Name 10 STS  -MO      Cueing 10 Verbal  -MO      Sets 10 2  -MO      Reps 10 10  -MO      Time 10 L2 wt. ball overhead press at terminal position  -MO      Additional Comments lowest mat + airex  -MO         Exercise 12    Exercise Name 12 step + reach at  window  -MO      Cueing 12 Verbal  -MO      Reps 12 1x 10BL, each  -MO      Time 12 1 UE support at barre  -MO      Additional Comments fwd, lateral  -MO         Exercise 14    Exercise Name 14 step up  -MO      Cueing 14 Verbal  -MO      Sets 14 1B, each  -MO      Reps 14 10  -MO      Time 14 4inch  -MO      Additional Comments fwd, lateral  -MO                User Key  (r) = Recorded By, (t) = Taken By, (c) = Cosigned By      Initials Name Provider Type    Nuvia Stafford, PT Physical Therapist                                                    Time Calculation:   Start Time: 1230  Stop Time: 1315  Time Calculation (min): 45 min  Timed Charges  46129 - PT Therapeutic Activity Minutes: 45  Total Minutes  Timed Charges Total Minutes: 45   Total Minutes: 45  Therapy Charges for Today       Code Description Service Date Service Provider Modifiers Qty    [id number] HC PT THERAPEUTIC ACT EA 15 MIN 5/31/2024 Nuvia Solis, PT GP 3                      Nuvia Solis PT  5/31/2024

## 2024-06-06 ENCOUNTER — HOSPITAL ENCOUNTER (OUTPATIENT)
Dept: CARDIOLOGY | Facility: HOSPITAL | Age: 69
Discharge: HOME | End: 2024-06-06
Payer: MEDICARE

## 2024-06-06 DIAGNOSIS — I73.9 PAD (PERIPHERAL ARTERY DISEASE): ICD-10-CM

## 2024-06-06 DIAGNOSIS — R25.2 LEG CRAMPING: ICD-10-CM

## 2024-06-06 LAB
BH CV LOWER ARTERIAL LEFT ABI RATIO: 1.16
BH CV LOWER ARTERIAL LEFT DORSALIS PEDIS SYS MAX: 179
BH CV LOWER ARTERIAL LEFT GREAT TOE SYS MAX: 186
BH CV LOWER ARTERIAL LEFT POST TIBIAL SYS MAX: 186
BH CV LOWER ARTERIAL LEFT TBI RATIO: 1.16
BH CV LOWER ARTERIAL RIGHT ABI RATIO: 1.2
BH CV LOWER ARTERIAL RIGHT DORSALIS PEDIS SYS MAX: 171
BH CV LOWER ARTERIAL RIGHT GREAT TOE SYS MAX: 200
BH CV LOWER ARTERIAL RIGHT POST TIBIAL SYS MAX: 193
BH CV LOWER ARTERIAL RIGHT TBI RATIO: 1.24
UPPER ARTERIAL LEFT ARM BRACHIAL SYS MAX: 161
UPPER ARTERIAL RIGHT ARM BRACHIAL SYS MAX: 157

## 2024-06-06 PROCEDURE — 93922 UPR/L XTREMITY ART 2 LEVELS: CPT

## 2024-06-10 ENCOUNTER — HOSPITAL ENCOUNTER (OUTPATIENT)
Dept: PHYSICAL THERAPY | Facility: HOSPITAL | Age: 69
Discharge: HOME | End: 2024-06-10
Payer: MEDICARE

## 2024-06-10 DIAGNOSIS — R26.89 POOR BALANCE: Primary | ICD-10-CM

## 2024-06-10 DIAGNOSIS — R29.898 LEG WEAKNESS, BILATERAL: ICD-10-CM

## 2024-06-10 DIAGNOSIS — Z74.09 IMPAIRED MOBILITY: ICD-10-CM

## 2024-06-10 PROCEDURE — 97530 THERAPEUTIC ACTIVITIES: CPT

## 2024-06-10 NOTE — THERAPY TREATMENT NOTE
Outpatient Physical Therapy Ortho Treatment Note  [facility]     Patient Name: Flo Manzo  : 1955  MRN: [id number]  Today's Date: 6/10/2024      Visit Date: 06/10/2024    Visit Dx:    ICD-10-CM ICD-9-CM   1. Poor balance  R26.89 781.99   2. Leg weakness, bilateral  R29.898 729.89   3. Impaired mobility  Z74.09 799.89       Patient Active Problem List   Diagnosis    Umbilical hernia without obstruction and without gangrene    Essential hypertension    Arthritis of left knee    Depression    Obesity (BMI 30-39.9)    Atrial fibrillation with RVR    Substernal thyroid goiter    CHF (congestive heart failure)    Diastolic CHF, chronic    Hemorrhagic stroke    GÓMEZ on auto CPAP    Hypersomnia due to medical condition    Sleep-related hypoxia    Chronic atrial fibrillation    Vertigo    Aortic stenosis, severe    Sinus pause    Pacemaker    Anticoagulated    Generalized weakness    Prediabetes    Pure hypercholesterolemia    Pre-operative cardiovascular examination    Peripheral neuropathy    History of colon polyps        Past Medical History:   Diagnosis Date    Arthritis     Atrial fibrillation with RVR 2019    Colon polyps 2018    Hepatic flexure: tubular adenoma, only low-grade dysplasia; splenic flexure: tubular adenoma, only low-grade dysplasia; sigmoid colon: tubular adenoma, multiple fragments only low-grade dysplasia    Depression     Heart murmur     Hemorrhagic stroke 2019    Hypertension     New onset atrial fibrillation (CMS/HCC) - RVR 2019    GÓMEZ (obstructive sleep apnea) 2019    Home sleep study with moderate severity GÓMEZ, AHI 20 events per hour.  Sleep-related hypoxia with low O2 saturation 84% for 14 minutes.    Peripheral neuropathy     Sleep apnea     previously diagnosed with sleep apnea, had T&A done and it has since resolved     Stroke     Uncontrolled hypertension     Ventral hernia         Past Surgical History:   Procedure Laterality Date     APPENDECTOMY N/A 1972    BACK SURGERY      BLADDER STIMULATOR IMPLANT L LOWER BACK    CARDIAC CATHETERIZATION N/A 07/20/2004    Cath left ventriculography, coronary angiography and left heart catheterization, Normal results-Dr. Vadim Mancuso    CARDIAC CATHETERIZATION N/A 1/29/2019    Procedure: Left Heart Cath;  Surgeon: Eren Bruce MD;  Location: [facility] CATH INVASIVE LOCATION;  Service: Cardiology    CARDIAC CATHETERIZATION N/A 1/29/2019    Procedure: Left ventriculography;  Surgeon: Eren Bruce MD;  Location: [facility] CATH INVASIVE LOC[facility];  Service: Cardiology    CARDIAC CATHETERIZATION N/A 1/29/2019    Procedure: Coronary angiography;  Surgeon: Eren Bruce MD;  [facility] LOC[facility];  Service: Cardiology    CARDIAC ELECTROPHYSIOLOGY PROCEDURE N/A 3/4/2022    Procedure: Pacemaker SC new BIOTRONIK;  Surgeon: Eren Bruce MD;  Location: [facility] CATH INVASIVE LOCATION;  Service: Cardiology;  Laterality: N/A;    CARPAL TUNNEL RELEASE Right 2013    CARPAL TUNNEL RELEASE Left     COLONOSCOPY N/A 1/4/2018    Procedure: COLONOSCOPY with cold polypectomy and hot snare polypectomy;  Surgeon: Ten Solitario MD;  Location: [facility] ENDOSCOPY;  Service:     COLONOSCOPY N/A 4/25/2024    Procedure: COLONOSCOPY INTO CECUM;  Surgeon: Ten Solitario MD;  Location: [facility] ENDOSCOPY;  Service: General;  Laterality: N/A;  PRE: PERSONAL H/O COLON POLYP  /  POST: POOR PREP OTHERWISE NORMAL    EYE LENS IMPLANT SECONDARY Bilateral 2007, 2008    KNEE CARTILAGE SURGERY Right 1979    TONSILLECTOMY AND ADENOIDECTOMY Bilateral 2005    TOTAL KNEE ARTHROPLASTY Left 03/14/2016    Left total knee arthroplasty with Amberly component, size 11 femur, G tibial baseplate with a 10 polyethylene insert and a 38 patellar button-Dr. Pablo Hairston    TOTAL KNEE ARTHROPLASTY Right 03/02/2015    Right total knee arthroplasty with Amberly component size G femur, 11 tibial base plate with an 11  polyethylene insert and a 38 patellar button-Dr. Pablo Hairston    UVULOPALATOPHARYNGOPLASTY N/A 2005    part of soft palate, and uvula    VENTRAL HERNIA REPAIR N/A 1/23/2018    Procedure: VENTRAL HERNIA REPAIR LAPAROSCOPIC WITH DAVINCI ROBOT AND MESH;  Surgeon: Ten Solitario MD;  Location: [facility];  Service:                         PT Assessment/Plan       Row Name 06/10/24 1200          PT Assessment    Assessment Comments Flo Manzo is a 69 year old male seen for physical therapy treatment session for balance. He reports that he does not feel like his balance has improved much, but he has not had any new falls. His progress is limited secondary to pt. Difficulty navigating home environment. Pt. Ambulates into the clinic with reduced step length, davon and shuffle like mechanics. RW used for additional support. Today, pt. Demonstrating difficulty performing STS as he has been in previous sessions. Typically performing with airex pad and L1 ball w/ OH lift. Today, pt. Requiring CGA-Min A with gait belt to perform standing from low mat table on level ground. Pt. Extremely quick to fatigue, dropping back down to mat table with reduced eccentric control and demo'ing a posterior lean. Pt. With fair sitting balance,however periods of Min A necessary to remain sitting. Initiated LAQ, and pt. Consistently leaning back. This PT supported pt. From behind to reduce compensation. Also added side stepping, standing marches and standing HS curls in bars to progress mobility challenges. Pt. Remains a good candidate for skilled PT.  -ER        PT Plan    PT Plan Comments Consider reassessing STS, lateral step over tissue box, small rev lunge?  -ER               User Key  (r) = Recorded By, (t) = Taken By, (c) = Cosigned By      Initials Name Provider Type    ER Lavonne Eldridge, PT Physical Therapist                       OP Exercises       Row Name 06/10/24 1100             Subjective    Subjective Comments I dont  feel like my balance has gotten a whole lot better. I havent fallen though.  -ER         Total Minutes    25707 - PT Therapeutic Activity Minutes 45  -ER         Exercise 1    Exercise Name 1 NuStep  -ER      Time 1 5 mins, lvl 5  -ER         Exercise 2    Exercise Name 2 lateral walking in // bars  -ER      Cueing 2 Verbal;Demo  -ER      Reps 2 3 laps  -ER         Exercise 3    Exercise Name 3 standing marches  -ER      Cueing 3 Verbal;Demo  -ER      Reps 3 20e  -ER      Additional Comments // bars  -ER         Exercise 5    Exercise Name 5 Seated HR/ Toe raise  -ER      Cueing 5 Verbal  -ER      Sets 5 1  -ER      Reps 5 20  -ER         Exercise 6    Exercise Name 6 HS curls  -ER      Cueing 6 Verbal;Demo  -ER      Sets 6 1  -ER      Reps 6 10e  -ER      Time 6 // bars , BW  -ER         Exercise 7    Exercise Name 7 Step over  -ER      Cueing 7 Verbal  -ER      Sets 7 2B  -ER      Reps 7 10  -ER      Time 7 glove box  -ER         Exercise 10    Exercise Name 10 STS  -ER      Cueing 10 Verbal  -ER      Sets 10 1  -ER      Reps 10 5  -ER      Time 10 no ball  -ER      Additional Comments no foam  -ER                User Key  (r) = Recorded By, (t) = Taken By, (c) = Cosigned By      Initials Name Provider Type    ER Lavonne Eldridge, PT Physical Therapist                                  PT OP Goals       Row Name 06/10/24 1200          PT Short Term Goals    STG Date to Achieve 05/18/24  -ER     STG 1 Pt will be independent with initial HEP to improve strength and static/dynamic balance.  -ER     STG 1 Progress Ongoing  -ER     STG 2 Pt will ambulate 150' with normal heel strike to toe off with symmetrical stride and davon with his RWX without momentary LOB.  -ER     STG 2 Progress Ongoing  -ER     STG 3 Patient able to tandem stance bilaterally with 1 finger hold > 30 seconds without LOB for improved core stabilization and decreased fall risk  -ER     STG 3 Progress Ongoing  -ER     STG 4 Pt performs 30 seconds sit to  stand having complete at least 2 more repetitions that was performed upon initial evaluation for progression of ease with functional transfers, LE strength, and safety in the home.  -ER     STG 4 Progress Progressing  -ER        Long Term Goals    LTG Date to Achieve 07/17/24  -ER     LTG 1 Pt will be independent with advance HEP to improve strength and static/dynamic balance.  -ER     LTG 1 Progress Ongoing  -ER     LTG 2 Pt will tolerate 30 minutes of activity with 3 brief rest breaks demonstrating improved functional strength.  -ER     LTG 2 Progress Ongoing  -ER     LTG 3 Pt will demonstrate increased strength LEs to 4/5 or better.  -ER     LTG 3 Progress Ongoing  -ER     LTG 4 Patient will improve VELAZQUEZ score from 22 to </= 28 to show improved static/dynamic balance leading to decrease risk for falls.  -ER     LTG 4 Progress Ongoing  -ER     LTG 5 Pt will increase 2min walk by >/=30 ft with rwx with </= 1 LOB for improved safety with ambulatory tasks.  -ER     LTG 5 Progress Ongoing  -ER     LTG 6 Pt will be able to perform static NBOS on stable surface for >/=30s without UE support for improved balance and decreased risk of falling.  -ER     LTG 6 Progress Ongoing  -ER     LTG 7 The pt demonstrates ambulation with step through reciprocal gait pattern with assistive device with no moments of instability for 200 ft while demonstrating increased stance width/SOLOMON for improvements in stability/safety during dynamic ambulatory tasks when navigating the community.  -ER     LTG 7 Progress Ongoing  -ER               User Key  (r) = Recorded By, (t) = Taken By, (c) = Cosigned By      Initials Name Provider Type    ER Lavonne Eldridge PT Physical Therapist                    Therapy Education  Education Details: Reinforced importance of compliance with HEP, safety with RW  Given: HEP, Mobility training, Fall prevention and home safety  Program: Reinforced  How Provided: Verbal, Demonstration  Provided to: Patient  Level of  Understanding: Teach back education performed, Verbalized, Demonstrated              Time Calculation:   Start Time: 1115  Stop Time: 1200  Time Calculation (min): 45 min  Total Timed Code Minutes- PT: 45 minute(s)  Timed Charges  58741 - PT Therapeutic Activity Minutes: 45  Total Minutes  Timed Charges Total Minutes: 45   Total Minutes: 45  Therapy Charges for Today       Code Description Service Date Service Provider Modifiers Qty    29001178861 HC PT THERAPEUTIC ACT EA 15 MIN 6/10/2024 Lavonne Eldridge, PT GP 3                      Lavonne Eldridge PT  6/10/2024

## 2024-06-13 ENCOUNTER — HOSPITAL ENCOUNTER (OUTPATIENT)
Dept: SLEEP MEDICINE | Facility: HOSPITAL | Age: 69
End: 2024-06-13
Payer: MEDICARE

## 2024-06-13 DIAGNOSIS — E66.9 CLASS 1 OBESITY WITH SERIOUS COMORBIDITY AND BODY MASS INDEX (BMI) OF 33.0 TO 33.9 IN ADULT, UNSPECIFIED OBESITY TYPE: ICD-10-CM

## 2024-06-13 DIAGNOSIS — G47.33 OBSTRUCTIVE SLEEP APNEA, ADULT: ICD-10-CM

## 2024-06-13 PROCEDURE — 95811 POLYSOM 6/>YRS CPAP 4/> PARM: CPT

## 2024-06-14 ENCOUNTER — TELEPHONE (OUTPATIENT)
Dept: SLEEP MEDICINE | Facility: HOSPITAL | Age: 69
End: 2024-06-14
Payer: MEDICARE

## 2024-06-14 DIAGNOSIS — I10 ESSENTIAL HYPERTENSION: Chronic | ICD-10-CM

## 2024-06-14 DIAGNOSIS — I50.32 CHRONIC DIASTOLIC CONGESTIVE HEART FAILURE: ICD-10-CM

## 2024-06-14 DIAGNOSIS — G47.33 OBSTRUCTIVE SLEEP APNEA, ADULT: Primary | ICD-10-CM

## 2024-06-14 DIAGNOSIS — I48.91 ATRIAL FIBRILLATION WITH RVR: Chronic | ICD-10-CM

## 2024-06-14 PROCEDURE — 95811 POLYSOM 6/>YRS CPAP 4/> PARM: CPT | Performed by: INTERNAL MEDICINE

## 2024-06-14 NOTE — TELEPHONE ENCOUNTER
I called Pt and let him know Dr had wrote order for a bipap machine. Pt wanted order sent to [facility]. Order was sent

## 2024-06-17 ENCOUNTER — OFFICE VISIT (OUTPATIENT)
Dept: INTERNAL MEDICINE | Age: 69
End: 2024-06-17
Payer: MEDICARE

## 2024-06-17 VITALS
HEART RATE: 84 BPM | DIASTOLIC BLOOD PRESSURE: 70 MMHG | SYSTOLIC BLOOD PRESSURE: 120 MMHG | OXYGEN SATURATION: 94 % | HEIGHT: 70 IN | BODY MASS INDEX: 33.07 KG/M2 | TEMPERATURE: 97.2 F | WEIGHT: 231 LBS | RESPIRATION RATE: 18 BRPM

## 2024-06-17 DIAGNOSIS — M79.2 NEUROPATHIC PAIN: ICD-10-CM

## 2024-06-17 PROCEDURE — 3078F DIAST BP <80 MM HG: CPT

## 2024-06-17 PROCEDURE — 99213 OFFICE O/P EST LOW 20 MIN: CPT

## 2024-06-17 PROCEDURE — G2211 COMPLEX E/M VISIT ADD ON: HCPCS

## 2024-06-17 PROCEDURE — 1126F AMNT PAIN NOTED NONE PRSNT: CPT

## 2024-06-17 PROCEDURE — 3074F SYST BP LT 130 MM HG: CPT

## 2024-06-17 RX ORDER — GABAPENTIN 300 MG/1
300 CAPSULE ORAL NIGHTLY
Qty: 90 CAPSULE | Refills: 0 | Status: SHIPPED | OUTPATIENT
Start: 2024-06-17

## 2024-06-17 NOTE — PROGRESS NOTES
"    I N T E R N A L  M E D I C I N E  Karen Jiménez, ASHANTI    ENCOUNTER DATE:  06/17/2024    Flo Manzo / 69 y.o. / male      CHIEF COMPLAINT / REASON FOR OFFICE VISIT     Hypertension      ASSESSMENT & PLAN     Diagnoses and all orders for this visit:    1. Neuropathic pain  -     gabapentin (NEURONTIN) 300 MG capsule; Take 1 capsule by mouth Every Night.  Dispense: 90 capsule; Refill: 0         SUMMARY/DISCUSSION  His neuropathic pain symptoms have improved after increasing night time dosing of gabapentin to 300 mg; continues on gabapentin 100 mg in morning and afternoon.  He is taking care to avoid falls.  Up to date with contract/ UDS; will continue same.    Encouraged to schedule with endocrinology office for subclinical hyperthyroidism.        Next Appointment with me: 7/15/2024    Return for Next scheduled follow up.      VITAL SIGNS     Visit Vitals  /70   Pulse 84   Temp 97.2 °F (36.2 °C)   Resp 18   Ht 177.8 cm (70\")   Wt 105 kg (231 lb)   SpO2 94%   BMI 33.15 kg/m²             Wt Readings from Last 3 Encounters:   06/17/24 105 kg (231 lb)   05/16/24 106 kg (234 lb)   05/15/24 105 kg (231 lb)     Body mass index is 33.15 kg/m².        MEDICATIONS AT THE TIME OF OFFICE VISIT     Current Outpatient Medications on File Prior to Visit   Medication Sig Dispense Refill    acetaminophen (TYLENOL) 325 MG tablet Take 2 tablets by mouth Every 4 (Four) Hours As Needed for Mild Pain . 120 tablet 3    albuterol sulfate  (90 Base) MCG/ACT inhaler Inhale 2 puffs Every 4 (Four) Hours As Needed for Wheezing. 18 g 3    amLODIPine (NORVASC) 10 MG tablet Take 1 tablet by mouth Daily. 90 tablet 1    atorvastatin (LIPITOR) 40 MG tablet TAKE 1 TABLET BY MOUTH NIGHTLY 90 tablet 1    busPIRone (BUSPAR) 10 MG tablet TAKE 1 TABLET BY MOUTH TWICE DAILY 60 tablet 5    citalopram (CeleXA) 20 MG tablet Take 1 tablet by mouth Daily. (Patient taking differently: Take 1.5 tablets by mouth Daily. PT TAKING 1.5 TABLETS " DAILY) 135 tablet 1    Diclofenac Sodium (Voltaren) 1 % gel gel Apply 4 g topically to the appropriate area as directed 4 (Four) Times a Day. 50 g 0    Eliquis 5 MG tablet tablet TAKE 1 TABLET BY MOUTH TWICE DAILY 60 tablet 5    furosemide (LASIX) 20 MG tablet Take 1 tablet by mouth Daily for 180 days. 90 tablet 1    gabapentin (NEURONTIN) 100 MG capsule Take 1 capsule by mouth 2 (Two) Times a Day. 60 capsule 0    lisinopril (PRINIVIL,ZESTRIL) 20 MG tablet TAKE 1 TABLET BY MOUTH DAILY 30 tablet 5    metoprolol tartrate (LOPRESSOR) 25 MG tablet TAKE 1 & 1/2 TABLETS BY MOUTH TWICE DAILY 90 tablet 1    omeprazole (priLOSEC) 20 MG capsule TAKE 1 CAPSULE BY MOUTH DAILY 90 capsule 1    tamsulosin (FLOMAX) 0.4 MG capsule 24 hr capsule TAKE 1 CAPSULE BY MOUTH NIGHTLY 30 capsule 5    [DISCONTINUED] gabapentin (NEURONTIN) 300 MG capsule Take 1 capsule by mouth Every Night. 30 capsule 0    trimethoprim-polymyxin b (POLYTRIM) 59665-4.1 UNIT/ML-% ophthalmic solution Administer 1 drop to both eyes Every 6 (Six) Hours. (Patient not taking: Reported on 5/16/2024) 10 mL 0    [DISCONTINUED] DULoxetine (CYMBALTA) 30 MG capsule TAKE 1 CAPSULE BY MOUTH EVERY NIGHT 90 capsule 1    [DISCONTINUED] metoprolol succinate XL (TOPROL-XL) 25 MG 24 hr tablet Take 1 tablet by mouth Daily. 30 tablet 0    [DISCONTINUED] QUEtiapine (SEROquel) 25 MG tablet Take 1 tablet by mouth Every Night. Take 30 min before bed 30 tablet 0     No current facility-administered medications on file prior to visit.        HISTORY OF PRESENT ILLNESS     Last seen in the office on May 16, 2024 for nocturnal cramping symptoms of bilateral calves.  Symptoms were thought to be related to possible PAD; however, June 2024 BE testing for peripheral arterial disease of lower extremities was normal.  March 2024 magnesium normal, CBC without anemia.  Medical history significant for subclinical hyperthyroidism, for which he has been referred to endocrine office but has not  scheduled appointment.      At May 2024, appointment he was advised to increase gabapentin dose to 300 mg nightly, and continue gabapentin 100 mg morning and afternoon.  Reports benefit from dosage increase and denies any side effects.      Followed by sleep medicine for sleep apnea and plan to start BiPap in near future.        Patient Care Team:  Karen Jiménez APRN as PCP - General (Family Medicine)  Eren Bruce MD as Consulting Physician (Cardiology)  Temo Wheat MD as Consulting Physician (Radiation Oncology)  Bryant Rader MD as Consulting Physician (Urology)  Cindi Breen DNP, APRN as Nurse Practitioner (Nurse Practitioner)    REVIEW OF SYSTEMS     Review of Systems   Constitutional:  Negative for chills, fever and unexpected weight change.   Respiratory:  Negative for cough, chest tightness and shortness of breath.    Cardiovascular:  Negative for chest pain, palpitations and leg swelling.   Neurological:  Negative for dizziness, weakness, light-headedness and headaches.   Psychiatric/Behavioral:  The patient is not nervous/anxious.           PHYSICAL EXAMINATION     Physical Exam  Vitals reviewed.   Constitutional:       General: He is not in acute distress.     Appearance: Normal appearance. He is not ill-appearing, toxic-appearing or diaphoretic.   HENT:      Head: Normocephalic and atraumatic.   Cardiovascular:      Rate and Rhythm: Normal rate and regular rhythm.      Pulses: Normal pulses.      Heart sounds: Normal heart sounds.   Pulmonary:      Effort: Pulmonary effort is normal.      Breath sounds: Normal breath sounds.   Musculoskeletal:      Right lower leg: No edema.      Left lower leg: No edema.   Neurological:      Mental Status: He is alert and oriented to person, place, and time. Mental status is at baseline.      Sensory: No sensory deficit.   Psychiatric:         Mood and Affect: Mood normal.         Behavior: Behavior normal.         Thought Content:  Thought content normal.         Judgment: Judgment normal.           REVIEWED DATA     Labs:           Imaging:            Medical Tests:           Summary of old records / correspondence / consultant report:           Request outside records:

## 2024-06-19 ENCOUNTER — HOSPITAL ENCOUNTER (OUTPATIENT)
Dept: PHYSICAL THERAPY | Facility: HOSPITAL | Age: 69
Discharge: HOME | End: 2024-06-19
Payer: MEDICARE

## 2024-06-19 DIAGNOSIS — R26.89 POOR BALANCE: Primary | ICD-10-CM

## 2024-06-19 DIAGNOSIS — Z74.09 IMPAIRED MOBILITY: ICD-10-CM

## 2024-06-19 DIAGNOSIS — R29.898 LEG WEAKNESS, BILATERAL: ICD-10-CM

## 2024-06-19 PROCEDURE — 97530 THERAPEUTIC ACTIVITIES: CPT

## 2024-06-19 NOTE — THERAPY PROGRESS REPORT/RE-CERT
Outpatient Physical Therapy Ortho Progress Note  [facility]     Patient Name: Flo Manzo  : 1955  MRN: [id number]  Today's Date: 2024      Visit Date: 2024    Visit Dx:    ICD-10-CM ICD-9-CM   1. Poor balance  R26.89 781.99   2. Leg weakness, bilateral  R29.898 729.89   3. Impaired mobility  Z74.09 799.89       Patient Active Problem List   Diagnosis    Umbilical hernia without obstruction and without gangrene    Essential hypertension    Arthritis of left knee    Depression    Obesity (BMI 30-39.9)    Atrial fibrillation with RVR    Substernal thyroid goiter    CHF (congestive heart failure)    Diastolic CHF, chronic    Hemorrhagic stroke    GÓMEZ on auto CPAP    Hypersomnia due to medical condition    Sleep-related hypoxia    Chronic atrial fibrillation    Vertigo    Aortic stenosis, severe    Sinus pause    Pacemaker    Anticoagulated    Generalized weakness    Prediabetes    Pure hypercholesterolemia    Pre-operative cardiovascular examination    Peripheral neuropathy    History of colon polyps        Past Medical History:   Diagnosis Date    Arthritis     Atrial fibrillation with RVR 2019    Colon polyps 2018    Hepatic flexure: tubular adenoma, only low-grade dysplasia; splenic flexure: tubular adenoma, only low-grade dysplasia; sigmoid colon: tubular adenoma, multiple fragments only low-grade dysplasia    Depression     Heart murmur     Hemorrhagic stroke 2019    Hypertension     New onset atrial fibrillation (CMS/HCC) - RVR 2019    GÓMEZ (obstructive sleep apnea) 2019    Home sleep study with moderate severity GÓMEZ, AHI 20 events per hour.  Sleep-related hypoxia with low O2 saturation 84% for 14 minutes.    Peripheral neuropathy     Sleep apnea     previously diagnosed with sleep apnea, had T&A done and it has since resolved     Stroke     Uncontrolled hypertension     Ventral hernia         Past Surgical History:   Procedure Laterality Date     APPENDECTOMY N/A 1972    BACK SURGERY      BLADDER STIMULATOR IMPLANT L LOWER BACK    CARDIAC CATHETERIZATION N/A 07/20/2004    Cath left ventriculography, coronary angiography and left heart catheterization, Normal results-Dr. Vadim Mancuso    CARDIAC CATHETERIZATION N/A 1/29/2019    Procedure: Left Heart Cath;  Surgeon: Eren Bruce MD;  Location: [facility] CATH INVASIVE LOCATION;  Service: Cardiology    CARDIAC CATHETERIZATION N/A 1/29/2019    Procedure: Left ventriculography;  Surgeon: Eren Bruce MD;  [facility];  Service: Cardiology    CARDIAC CATHETERIZATION N/A 1/29/2019    Procedure: Coronary angiography;  Surgeon: Eren Bruce MD;  [facility];  Service: Cardiology    CARDIAC ELECTROPHYSIOLOGY PROCEDURE N/A 3/4/2022    Procedure: Pacemaker SC new BIOTRONIK;  Surgeon: Eren Bruce MD;  Location: [facility] CATH INVASIVE LOCATION;  Service: Cardiology;  Laterality: N/A;    CARPAL TUNNEL RELEASE Right 2013    CARPAL TUNNEL RELEASE Left     COLONOSCOPY N/A 1/4/2018    Procedure: COLONOSCOPY with cold polypectomy and hot snare polypectomy;  Surgeon: Ten Solitario MD;  Location: [facility] ENDOSCOPY;  Service:     COLONOSCOPY N/A 4/25/2024    Procedure: COLONOSCOPY INTO CECUM;  Surgeon: Ten Solitario MD;  Location: [facility] ENDOSCOPY;  Service: General;  Laterality: N/A;  PRE: PERSONAL H/O COLON POLYP  /  POST: POOR PREP OTHERWISE NORMAL    EYE LENS IMPLANT SECONDARY Bilateral 2007, 2008    KNEE CARTILAGE SURGERY Right 1979    TONSILLECTOMY AND ADENOIDECTOMY Bilateral 2005    TOTAL KNEE ARTHROPLASTY Left 03/14/2016    Left total knee arthroplasty with Amberly component, size 11 femur, G tibial baseplate with a 10 polyethylene insert and a 38 patellar button-Dr. Pablo Hairston    TOTAL KNEE ARTHROPLASTY Right 03/02/2015    Right total knee arthroplasty with Amberly component size G femur, 11 tibial base plate with an 11  polyethylene insert and a 38 patellar button-Dr. Pablo Hairston    UVULOPALATOPHARYNGOPLASTY N/A 2005    part of soft palate, and uvula    VENTRAL HERNIA REPAIR N/A 1/23/2018    Procedure: VENTRAL HERNIA REPAIR LAPAROSCOPIC WITH DAVINCI ROBOT AND MESH;  Surgeon: Ten Solitario MD;  Location: [facility];  Service:                         PT Assessment/Plan       Row Name 06/19/24 1336          PT Assessment    Assessment Comments Flo Manzo has been seen for 11 physical therapy sessions (including initial evaluation and this visit) for worsening balance impairments and radiation induced peripheral neuropathy. Treatment has included therapeutic exercise, therapeutic activity, and patient education with home exercise program . Progress to physical therapy goals is fair. Pt has met 1/4 STG and 0/7 LTG. Progress within the last 30 days affected secondary to limited number of visits due to patient cancellations and scheduling conflicts. He continues to report poor adherence to HEP. No change in 30s STS reps this date, slightly decreased distance on 2MWT with patient demonstrating deteriorating gait mechanics near the end of the trial likely due to fatigue, and increase in VELAZQUEZ score to 26. He does demonstrate improvement in tandem stance this date and is able to maintain balance for >30s with 1 fingertouch assist. He continues to demonstrate poor gait mechanics with decreased L step length, mild R circumduction in swing phase, poor heel strike, and left gait path deviation. He will benefit from continued skilled physical therapy to address remaining impairments and functional limitations.  -ZB        PT Plan    PT Frequency 1x/week;2x/week  -ZB     Predicted Duration of Therapy Intervention (PT) 4-6 weeks  -ZB     PT Plan Comments Continue to reassess STS with education regarding proper mechanics as he demos post lean with LEs against seat this date; consider small reverse lunge  -ZB               User Key   (r) = Recorded By, (t) = Taken By, (c) = Cosigned By      Initials Name Provider Type    Kamron Gomes, PT Physical Therapist                       OP Exercises       Row Name 06/19/24 1200             Subjective    Subjective Comments I've been alright, I had to cancel my last 2 appointments.  -ZB         Total Minutes    20084 - PT Therapeutic Activity Minutes 45  -ZB         Exercise 1    Exercise Name 1 NuStep  -ZB      Time 1 5 mins, lvl 5  -ZB         Exercise 2    Exercise Name 2 lateral walking in // bars  -ZB      Cueing 2 Verbal;Demo  -ZB      Reps 2 3 laps  -ZB         Exercise 3    Exercise Name 3 standing marches  -ZB      Cueing 3 Verbal;Demo  -ZB      Reps 3 20e  -ZB      Additional Comments // bars  -ZB         Exercise 5    Exercise Name 5 Seated HR/ Toe raise  -ZB      Cueing 5 Verbal  -ZB      Sets 5 1  -ZB      Reps 5 20  -ZB         Exercise 7    Exercise Name 7 Step over  -ZB      Cueing 7 Verbal  -ZB      Sets 7 1B  -ZB      Reps 7 10  -ZB      Time 7 glove box  -ZB      Additional Comments fwd/lat  -ZB         Exercise 10    Exercise Name 10 STS  -ZB      Additional Comments 30s STS assessment  -ZB                User Key  (r) = Recorded By, (t) = Taken By, (c) = Cosigned By      Initials Name Provider Type    ZB Kamron Edwards, PT Physical Therapist                                  PT OP Goals       Row Name 06/19/24 1300          PT Short Term Goals    STG Date to Achieve 05/18/24  -ZB     STG 1 Pt will be independent with initial HEP to improve strength and static/dynamic balance.  -ZB     STG 1 Progress Ongoing;Progressing  -ZB     STG 2 Pt will ambulate 150' with normal heel strike to toe off with symmetrical stride and davon with his RWX without momentary LOB.  -ZB     STG 2 Progress Ongoing  -ZB     STG 2 Progress Comments Continues to demonstrate asymmetrical step length with deteriorating mechanics with increased distance  -ZB     STG 3 Patient able to tandem stance bilaterally  with 1 finger hold > 30 seconds without LOB for improved core stabilization and decreased fall risk  -ZB     STG 3 Progress Met  -ZB     STG 3 Progress Comments Demonstrates ability to perform >30s bilaterally this date with 1 fingertouch  -ZB     STG 4 Pt performs 30 seconds sit to stand having complete at least 2 more repetitions that was performed upon initial evaluation for progression of ease with functional transfers, LE strength, and safety in the home.  -ZB     STG 4 Progress Ongoing  -ZB        Long Term Goals    LTG Date to Achieve 07/17/24  -ZB     LTG 1 Pt will be independent with advance HEP to improve strength and static/dynamic balance.  -ZB     LTG 1 Progress Ongoing  -ZB     LTG 2 Pt will tolerate 30 minutes of activity with 3 brief rest breaks demonstrating improved functional strength.  -ZB     LTG 2 Progress Ongoing;Progressing  -ZB     LTG 3 Pt will demonstrate increased strength LEs to 4/5 or better.  -ZB     LTG 3 Progress Ongoing  -ZB     LTG 4 Patient will improve VELAZQUEZ score from 22 to >/= 28 to show improved static/dynamic balance leading to decrease risk for falls.  -ZB     LTG 4 Progress Ongoing;Progressing  -ZB     LTG 4 Progress Comments 26 this date  -ZB     LTG 5 Pt will increase 2min walk by >/=30 ft with rwx with </= 1 LOB for improved safety with ambulatory tasks.  -ZB     LTG 5 Progress Ongoing  -ZB     LTG 6 Pt will be able to perform static NBOS on stable surface for >/=30s without UE support for improved balance and decreased risk of falling.  -ZB     LTG 6 Progress Ongoing  -ZB     LTG 6 Progress Comments Unable to attain position without UE support  -ZB     LTG 7 The pt demonstrates ambulation with step through reciprocal gait pattern with assistive device with no moments of instability for 200 ft while demonstrating increased stance width/SOLOMON for improvements in stability/safety during dynamic ambulatory tasks when navigating the community.  -ZB     LTG 7 Progress  Ongoing;Progressing  -ZB               User Key  (r) = Recorded By, (t) = Taken By, (c) = Cosigned By      Initials Name Provider Type    Kamron Gomes PT Physical Therapist                         Outcome Measure Options: 30 Second Chair Stand Test, 2 Minute Walk Test, Ashby Balance  2 Minute Walk Test  Gait, Assistive Device: rolling walker  Distance Ambulated in 2 Minutes: 146 (no LOB, progressively worsening gait mechanics with decreased L step length, intermittent toe drag, worsening foot flat initial contact near the end of trial)  30 Second Chair Stand Test  30 Second Chair Stand Test: 4 (B UE support, unsteady in terminal position)  Ashby Balance Scale  Sitting to Standing: able to stand using hands after several tries  Standing Unsupported: able to stand 2 minutes with supervision  Sitting with Back Unsupported but Feet Supported on Floor or on Stool: able to sit safely and securely for 2 minutes  Standing to Sitting: controls descent by using hands  Transfers: able to transfer safely definite need of hands  Standing Unsupported with Eyes Closed: able to stand 10 seconds with supervision  Standing Unsupported with Feet Together: able to place feet together independently but unable to hold for 30 seconds  Reaching Forward with Outstretched Arm While Standing: can reach forward 5 cm (2 inches)   Object From the Floor From a Standing Position: unable to try/needs assist to keep from losing balance or falling  Turning to Look Behind Over Left and Right Shoulders While Standing: needs supervision when turning  Turn 360 Degrees: needs close supervision or verbal cuing  Place Alternate Foot on Step or Stool While Standing Unsupported: able to complete > 2 steps needs minimal assist  Standing Unsupported with One Foot in Front: needs help to step but can hold 15 seconds  Standing on One Leg: unable to try of needs assist to prevent fall  Ashby Total Score: 26         Time Calculation:   Start Time:  1230  Stop Time: 1315  Time Calculation (min): 45 min  Timed Charges  88460 - PT Therapeutic Activity Minutes: 45  Total Minutes  Timed Charges Total Minutes: 45   Total Minutes: 45  Therapy Charges for Today       Code Description Service Date Service Provider Modifiers Qty    85278537431  PT THERAPEUTIC ACT EA 15 MIN 6/19/2024 Kamron Edwards, PT GP 3            PT G-Codes  Outcome Measure Options: 30 Second Chair Stand Test, 2 Minute Walk Test, Ashby Balance  Ashby Total Score: 26         Anjelica Edwards, PT  6/19/2024

## 2024-06-20 DIAGNOSIS — R25.2 LEG CRAMPING: ICD-10-CM

## 2024-06-20 DIAGNOSIS — G62.9 PERIPHERAL POLYNEUROPATHY: ICD-10-CM

## 2024-06-20 DIAGNOSIS — M79.2 NEUROPATHIC PAIN: ICD-10-CM

## 2024-06-21 RX ORDER — GABAPENTIN 100 MG/1
CAPSULE ORAL
Qty: 60 CAPSULE | Refills: 0 | Status: SHIPPED | OUTPATIENT
Start: 2024-06-21

## 2024-06-24 ENCOUNTER — HOSPITAL ENCOUNTER (OUTPATIENT)
Dept: PHYSICAL THERAPY | Facility: HOSPITAL | Age: 69
Discharge: HOME | End: 2024-06-24
Payer: MEDICARE

## 2024-06-24 DIAGNOSIS — R26.89 POOR BALANCE: Primary | ICD-10-CM

## 2024-06-24 DIAGNOSIS — R29.898 LEG WEAKNESS, BILATERAL: ICD-10-CM

## 2024-06-24 DIAGNOSIS — Z74.09 IMPAIRED MOBILITY: ICD-10-CM

## 2024-06-24 PROCEDURE — 97530 THERAPEUTIC ACTIVITIES: CPT

## 2024-06-24 NOTE — THERAPY TREATMENT NOTE
Outpatient Physical Therapy Ortho Treatment Note  [facility]     Patient Name: Flo Manzo  : 1955  MRN: [id number]  Today's Date: 2024      Visit Date: 2024    Visit Dx:    ICD-10-CM ICD-9-CM   1. Poor balance  R26.89 781.99   2. Leg weakness, bilateral  R29.898 729.89   3. Impaired mobility  Z74.09 799.89       Patient Active Problem List   Diagnosis    Umbilical hernia without obstruction and without gangrene    Essential hypertension    Arthritis of left knee    Depression    Obesity (BMI 30-39.9)    Atrial fibrillation with RVR    Substernal thyroid goiter    CHF (congestive heart failure)    Diastolic CHF, chronic    Hemorrhagic stroke    GÓMEZ on auto CPAP    Hypersomnia due to medical condition    Sleep-related hypoxia    Chronic atrial fibrillation    Vertigo    Aortic stenosis, severe    Sinus pause    Pacemaker    Anticoagulated    Generalized weakness    Prediabetes    Pure hypercholesterolemia    Pre-operative cardiovascular examination    Peripheral neuropathy    History of colon polyps        Past Medical History:   Diagnosis Date    Arthritis     Atrial fibrillation with RVR 2019    Colon polyps 2018    Hepatic flexure: tubular adenoma, only low-grade dysplasia; splenic flexure: tubular adenoma, only low-grade dysplasia; sigmoid colon: tubular adenoma, multiple fragments only low-grade dysplasia    Depression     Heart murmur     Hemorrhagic stroke 2019    Hypertension     New onset atrial fibrillation (CMS/HCC) - RVR 2019    GÓMEZ (obstructive sleep apnea) 2019    Home sleep study with moderate severity GÓMEZ, AHI 20 events per hour.  Sleep-related hypoxia with low O2 saturation 84% for 14 minutes.    Peripheral neuropathy     Sleep apnea     previously diagnosed with sleep apnea, had T&A done and it has since resolved     Stroke     Uncontrolled hypertension     Ventral hernia         Past Surgical History:   Procedure Laterality Date     APPENDECTOMY N/A 1972    BACK SURGERY      BLADDER STIMULATOR IMPLANT L LOWER BACK    CARDIAC CATHETERIZATION N/A 07/20/2004    Cath left ventriculography, coronary angiography and left heart catheterization, Normal results-Dr. Vadim Mancuso    CARDIAC CATHETERIZATION N/A 1/29/2019    Procedure: Left Heart Cath;  Surgeon: Eren Bruce MD;  Location: [facility] CATH INVASIVE LOCATION;  Service: Cardiology    CARDIAC CATHETERIZATION N/A 1/29/2019    Procedure: Left ventriculography;  Surgeon: Eren Bruce MD;  [facility];  Service: Cardiology    CARDIAC CATHETERIZATION N/A 1/29/2019    Procedure: Coronary angiography;  Surgeon: Eren Bruce MD;  [facility];  Service: Cardiology    CARDIAC ELECTROPHYSIOLOGY PROCEDURE N/A 3/4/2022    Procedure: Pacemaker SC new BIOTRONIK;  Surgeon: Eren Bruce MD;  Location: [facility] CATH INVASIVE LOCATION;  Service: Cardiology;  Laterality: N/A;    CARPAL TUNNEL RELEASE Right 2013    CARPAL TUNNEL RELEASE Left     COLONOSCOPY N/A 1/4/2018    Procedure: COLONOSCOPY with cold polypectomy and hot snare polypectomy;  Surgeon: Ten Solitario MD;  Location: [facility] ENDOSCOPY;  Service:     COLONOSCOPY N/A 4/25/2024    Procedure: COLONOSCOPY INTO CECUM;  Surgeon: Ten Solitario MD;  Location: [facility] ENDOSCOPY;  Service: General;  Laterality: N/A;  PRE: PERSONAL H/O COLON POLYP  /  POST: POOR PREP OTHERWISE NORMAL    EYE LENS IMPLANT SECONDARY Bilateral 2007, 2008    KNEE CARTILAGE SURGERY Right 1979    TONSILLECTOMY AND ADENOIDECTOMY Bilateral 2005    TOTAL KNEE ARTHROPLASTY Left 03/14/2016    Left total knee arthroplasty with Amberly component, size 11 femur, G tibial baseplate with a 10 polyethylene insert and a 38 patellar button-Dr. Pablo Hairston    TOTAL KNEE ARTHROPLASTY Right 03/02/2015    Right total knee arthroplasty with Amberly component size G femur, 11 tibial base plate with an 11  polyethylene insert and a 38 patellar button-Dr. Pablo Hairston    UVULOPALATOPHARYNGOPLASTY N/A 2005    part of soft palate, and uvula    VENTRAL HERNIA REPAIR N/A 1/23/2018    Procedure: VENTRAL HERNIA REPAIR LAPAROSCOPIC WITH DAVINCI ROBOT AND MESH;  Surgeon: Ten Solitario MD;  Location: [facility];  Service:                         PT Assessment/Plan       Row Name 06/24/24 1500          PT Assessment    Assessment Comments Mr. Manzo returns to PT, no new complaints. Continues to have difficulty with HEP compliance. Continued to work on LE strength, gait mechanics, and dynamic stability in the clinic today. Does demo improved STS mechanics with little to no posterior lean however he does favor the R side, frequent cueing to weight shift L during stand as well as in standing with OH press. Additionally added standing in NBOS with bicep curls and lateral raises with good tolerance. Continued to reinforce importance and need for carry over at home, discussed daily exercise completion.  -MO        PT Plan    PT Plan Comments review HEP, potentially narrow down 2-3 exercises for compliance at home. Small reverse lunge with emphasis on upright posture  -MO               User Key  (r) = Recorded By, (t) = Taken By, (c) = Cosigned By      Initials Name Provider Type    MO Nuvia Solis, PT Physical Therapist                       OP Exercises       Row Name 06/24/24 1200             Total Minutes    71258 - PT Therapeutic Activity Minutes 40  -MO         Exercise 1    Exercise Name 1 NuStep  -MO      Time 1 5 mins, lvl 5  -MO         Exercise 3    Exercise Name 3 standing marches  -MO      Cueing 3 Verbal  -MO      Reps 3 20e  -MO      Additional Comments // bars  -MO         Exercise 7    Exercise Name 7 Step over  -MO      Cueing 7 Verbal  -MO      Sets 7 1B  -MO      Reps 7 10  -MO      Time 7 glove box  -MO      Additional Comments fwd/ lat  -MO         Exercise 10    Exercise Name 10 STS  -MO      Cueing 10  Verbal  -MO      Sets 10 2  -MO      Reps 10 10  -MO      Time 10 L2 ball over heaad  -MO      Additional Comments lowest mat + airex: cueing to weight shift to L side. Favors R  -MO         Exercise 13    Exercise Name 13 bicep curls in NBOS in // bars  -MO      Cueing 13 Verbal;Tactile  -MO      Sets 13 2B  -MO      Reps 13 10  -MO      Time 13 2# DB  -MO      Additional Comments intermittent min A from PT to reset balance. Sway throughout  -MO         Exercise 15    Exercise Name 15 lateral raise in NBOS in // bars  -MO      Cueing 15 Verbal  -MO      Sets 15 1B  -MO      Reps 15 10  -MO      Time 15 2# DB  -MO      Additional Comments intermittent min A from PT to reset balance. Sway throughout  -MO                User Key  (r) = Recorded By, (t) = Taken By, (c) = Cosigned By      Initials Name Provider Type    Nuvia Stafford, PT Physical Therapist                                  PT OP Goals       Row Name 06/24/24 1500          PT Short Term Goals    STG Date to Achieve 05/18/24  -MO     STG 1 Pt will be independent with initial HEP to improve strength and static/dynamic balance.  -MO     STG 1 Progress Ongoing;Progressing  -MO     STG 2 Pt will ambulate 150' with normal heel strike to toe off with symmetrical stride and davon with his RWX without momentary LOB.  -MO     STG 2 Progress Ongoing  -MO     STG 3 Patient able to tandem stance bilaterally with 1 finger hold > 30 seconds without LOB for improved core stabilization and decreased fall risk  -MO     STG 3 Progress Met  -MO     STG 4 Pt performs 30 seconds sit to stand having complete at least 2 more repetitions that was performed upon initial evaluation for progression of ease with functional transfers, LE strength, and safety in the home.  -MO     STG 4 Progress Ongoing  -MO        Long Term Goals    LTG Date to Achieve 07/17/24  -MO     LTG 1 Pt will be independent with advance HEP to improve strength and static/dynamic balance.  -MO     LTG 1  Progress Ongoing  -MO     LTG 2 Pt will tolerate 30 minutes of activity with 3 brief rest breaks demonstrating improved functional strength.  -MO     LTG 2 Progress Ongoing;Progressing  -MO     LTG 3 Pt will demonstrate increased strength LEs to 4/5 or better.  -MO     LTG 3 Progress Ongoing  -MO     LTG 4 Patient will improve VELAZQUEZ score from 22 to >/= 28 to show improved static/dynamic balance leading to decrease risk for falls.  -MO     LTG 4 Progress Ongoing;Progressing  -MO     LTG 5 Pt will increase 2min walk by >/=30 ft with rwx with </= 1 LOB for improved safety with ambulatory tasks.  -MO     LTG 5 Progress Ongoing  -MO     LTG 6 Pt will be able to perform static NBOS on stable surface for >/=30s without UE support for improved balance and decreased risk of falling.  -MO     LTG 6 Progress Ongoing  -MO     LTG 7 The pt demonstrates ambulation with step through reciprocal gait pattern with assistive device with no moments of instability for 200 ft while demonstrating increased stance width/SOLOMON for improvements in stability/safety during dynamic ambulatory tasks when navigating the community.  -MO     LTG 7 Progress Ongoing;Progressing  -MO               User Key  (r) = Recorded By, (t) = Taken By, (c) = Cosigned By      Initials Name Provider Type    Nuvia Stafford, CHYNA Physical Therapist                                   Time Calculation:   Start Time: 1235  Stop Time: 1315  Time Calculation (min): 40 min  Timed Charges  24853 - PT Therapeutic Activity Minutes: 40  Total Minutes  Timed Charges Total Minutes: 40   Total Minutes: 40  Therapy Charges for Today       Code Description Service Date Service Provider Modifiers Qty    99176411739  PT THERAPEUTIC ACT EA 15 MIN 6/24/2024 Nuvia Solis, PT GP 3                      Nuvia Solis PT  6/24/2024

## 2024-06-25 DIAGNOSIS — R25.2 LEG CRAMPING: ICD-10-CM

## 2024-06-25 DIAGNOSIS — G62.9 PERIPHERAL POLYNEUROPATHY: ICD-10-CM

## 2024-06-25 DIAGNOSIS — M79.2 NEUROPATHIC PAIN: ICD-10-CM

## 2024-06-26 ENCOUNTER — CLINICAL SUPPORT NO REQUIREMENTS (OUTPATIENT)
Dept: CARDIOLOGY | Facility: CLINIC | Age: 69
End: 2024-06-26
Payer: MEDICARE

## 2024-06-26 ENCOUNTER — HOSPITAL ENCOUNTER (OUTPATIENT)
Dept: PHYSICAL THERAPY | Facility: HOSPITAL | Age: 69
Discharge: HOME | End: 2024-06-26
Payer: MEDICARE

## 2024-06-26 DIAGNOSIS — Z74.09 IMPAIRED MOBILITY: ICD-10-CM

## 2024-06-26 DIAGNOSIS — R29.898 LEG WEAKNESS, BILATERAL: ICD-10-CM

## 2024-06-26 DIAGNOSIS — R26.89 POOR BALANCE: Primary | ICD-10-CM

## 2024-06-26 PROCEDURE — 97530 THERAPEUTIC ACTIVITIES: CPT

## 2024-06-26 RX ORDER — GABAPENTIN 100 MG/1
100 CAPSULE ORAL 2 TIMES DAILY
Qty: 60 CAPSULE | Refills: 10 | OUTPATIENT
Start: 2024-06-26

## 2024-06-26 NOTE — THERAPY TREATMENT NOTE
Outpatient Physical Therapy Ortho Treatment Note  [facility]     Patient Name: Flo Manzo  : 1955  MRN: [id number]  Today's Date: 2024      Visit Date: 2024    Visit Dx:    ICD-10-CM ICD-9-CM   1. Poor balance  R26.89 781.99   2. Leg weakness, bilateral  R29.898 729.89   3. Impaired mobility  Z74.09 799.89       Patient Active Problem List   Diagnosis    Umbilical hernia without obstruction and without gangrene    Essential hypertension    Arthritis of left knee    Depression    Obesity (BMI 30-39.9)    Atrial fibrillation with RVR    Substernal thyroid goiter    CHF (congestive heart failure)    Diastolic CHF, chronic    Hemorrhagic stroke    GÓMEZ on auto CPAP    Hypersomnia due to medical condition    Sleep-related hypoxia    Chronic atrial fibrillation    Vertigo    Aortic stenosis, severe    Sinus pause    Pacemaker    Anticoagulated    Generalized weakness    Prediabetes    Pure hypercholesterolemia    Pre-operative cardiovascular examination    Peripheral neuropathy    History of colon polyps        Past Medical History:   Diagnosis Date    Arthritis     Atrial fibrillation with RVR 2019    Colon polyps 2018    Hepatic flexure: tubular adenoma, only low-grade dysplasia; splenic flexure: tubular adenoma, only low-grade dysplasia; sigmoid colon: tubular adenoma, multiple fragments only low-grade dysplasia    Depression     Heart murmur     Hemorrhagic stroke 2019    Hypertension     New onset atrial fibrillation (CMS/HCC) - RVR 2019    GÓMEZ (obstructive sleep apnea) 2019    Home sleep study with moderate severity GÓMEZ, AHI 20 events per hour.  Sleep-related hypoxia with low O2 saturation 84% for 14 minutes.    Peripheral neuropathy     Sleep apnea     previously diagnosed with sleep apnea, had T&A done and it has since resolved     Stroke     Uncontrolled hypertension     Ventral hernia         Past Surgical History:   Procedure Laterality Date     APPENDECTOMY N/A 1972    BACK SURGERY      BLADDER STIMULATOR IMPLANT L LOWER BACK    CARDIAC CATHETERIZATION N/A 07/20/2004    Cath left ventriculography, coronary angiography and left heart catheterization, Normal results-Dr. Vadim Mancuso    CARDIAC CATHETERIZATION N/A 1/29/2019    Procedure: Left Heart Cath;  Surgeon: Eren Bruce MD;  Location: [facility] CATH INVASIVE LOCATION;  Service: Cardiology    CARDIAC CATHETERIZATION N/A 1/29/2019    Procedure: Left ventriculography;  Surgeon: Eren Bruce MD;  Location: [facility] CATH INVASIVE LOCATION;  Service: Cardiology    CARDIAC CATHETERIZATION N/A 1/29/2019    Procedure: Coronary angiography;  Surgeon: Eren Bruce MD;  Location: [facility] CATH INVASIVE LOCATION;  Service: Cardiology    CARDIAC ELECTROPHYSIOLOGY PROCEDURE N/A 3/4/2022    Procedure: Pacemaker SC new BIOTRONIK;  Surgeon: Eren Bruce MD;  Location: [facility] CATH INVASIVE LOCATION;  Service: Cardiology;  Laterality: N/A;    CARPAL TUNNEL RELEASE Right 2013    CARPAL TUNNEL RELEASE Left     COLONOSCOPY N/A 1/4/2018    Procedure: COLONOSCOPY with cold polypectomy and hot snare polypectomy;  Surgeon: Ten Solitario MD;  Location: [facility] ENDOSCOPY;  Service:     COLONOSCOPY N/A 4/25/2024    Procedure: COLONOSCOPY INTO CECUM;  Surgeon: Ten Solitario MD;  Location: [facility] ENDOSCOPY;  Service: General;  Laterality: N/A;  PRE: PERSONAL H/O COLON POLYP  /  POST: POOR PREP OTHERWISE NORMAL    EYE LENS IMPLANT SECONDARY Bilateral 2007, 2008    KNEE CARTILAGE SURGERY Right 1979    TONSILLECTOMY AND ADENOIDECTOMY Bilateral 2005    TOTAL KNEE ARTHROPLASTY Left 03/14/2016    Left total knee arthroplasty with Amberly component, size 11 femur, G tibial baseplate with a 10 polyethylene insert and a 38 patellar button-Dr. Pablo Hairston    TOTAL KNEE ARTHROPLASTY Right 03/02/2015    Right total knee arthroplasty with Amberly component size G femur, 11 tibial base plate with an 11  polyethylene insert and a 38 patellar button-Dr. Pablo Hairston    UVULOPALATOPHARYNGOPLASTY N/A 2005    part of soft palate, and uvula    VENTRAL HERNIA REPAIR N/A 1/23/2018    Procedure: VENTRAL HERNIA REPAIR LAPAROSCOPIC WITH DAVINCI ROBOT AND MESH;  Surgeon: Ten Solitario MD;  Location: [facility];  Service:                         PT Assessment/Plan       Row Name 06/26/24 1356          PT Assessment    Assessment Comments Mr. Manzo returns to the clinic with no new complaints this date. The patient demonstrates improvements in STS mechanics both in standing from waiting room chair as well as performance of STS reps for exercise. Continued to cue for equal weight bearing throughout dynamic activity. Added reverse mini lunges with emphasis on upright posture and stability with good tolerance. Continued to encourage completion of HEP at home. The patient remains a good candidate for skilled therapy at this time.  -ZB        PT Plan    PT Plan Comments Compliance to HEP? May narrow down even further for compliance at home. Add step to STS OH press, NBOS rows with focus on upright posture  -ZB               User Key  (r) = Recorded By, (t) = Taken By, (c) = Cosigned By      Initials Name Provider Type    ZB Kamron Edwards, PT Physical Therapist                       OP Exercises       Row Name 06/26/24 1200             Subjective    Subjective Comments No new complaints  -ZB         Total Minutes    72805 - PT Therapeutic Activity Minutes 40  -ZB         Exercise 1    Exercise Name 1 NuStep  -ZB      Time 1 5 mins, lvl 5  -ZB         Exercise 3    Exercise Name 3 standing marches  -ZB      Cueing 3 Verbal  -ZB      Reps 3 20e  -ZB      Additional Comments // bars  -ZB         Exercise 7    Exercise Name 7 Step over  -ZB      Cueing 7 Verbal  -ZB      Sets 7 1B  -ZB      Reps 7 10  -ZB      Time 7 glove box  -ZB      Additional Comments fwd/lat  -ZB         Exercise 10    Exercise Name 10 STS  -ZB       Cueing 10 Verbal  -ZB      Sets 10 2  -ZB      Reps 10 10  -ZB      Time 10 L2 ball over heaad  -ZB      Additional Comments lowest mat + airex: cueing to weight shift to L side. Favors R  -ZB         Exercise 13    Exercise Name 13 bicep curls in NBOS in // bars  -ZB      Cueing 13 Verbal;Tactile  -ZB      Sets 13 2B  -ZB      Reps 13 10  -ZB      Time 13 2# DB  -ZB      Additional Comments intermittent min A from PT to reset balance. Sway throughout  -ZB         Exercise 15    Exercise Name 15 lateral raise in NBOS in // bars  -ZB      Cueing 15 Verbal  -ZB      Sets 15 1B  -ZB      Reps 15 10  -ZB      Time 15 2# DB  -ZB      Additional Comments intermittent min A from PT to reset balance. Sway throughout  -ZB         Exercise 16    Exercise Name 16 Reverse mini lunge  -ZB      Cueing 16 Verbal  -ZB      Sets 16 1e  -ZB      Reps 16 15  -ZB      Additional Comments cue for upright posture, large step length  -ZB                User Key  (r) = Recorded By, (t) = Taken By, (c) = Cosigned By      Initials Name Provider Type    ZB Kamron Edwards, PT Physical Therapist                                  PT OP Goals       Row Name 06/26/24 1300          PT Short Term Goals    STG Date to Achieve 05/18/24  -ZB     STG 1 Pt will be independent with initial HEP to improve strength and static/dynamic balance.  -ZB     STG 1 Progress Ongoing;Progressing  -ZB     STG 2 Pt will ambulate 150' with normal heel strike to toe off with symmetrical stride and davon with his RWX without momentary LOB.  -ZB     STG 2 Progress Ongoing  -ZB     STG 3 Patient able to tandem stance bilaterally with 1 finger hold > 30 seconds without LOB for improved core stabilization and decreased fall risk  -ZB     STG 3 Progress Met  -ZB     STG 4 Pt performs 30 seconds sit to stand having complete at least 2 more repetitions that was performed upon initial evaluation for progression of ease with functional transfers, LE strength, and safety in the  home.  -ZB     STG 4 Progress Ongoing  -ZB        Long Term Goals    LTG Date to Achieve 07/17/24  -ZB     LTG 1 Pt will be independent with advance HEP to improve strength and static/dynamic balance.  -ZB     LTG 1 Progress Ongoing  -ZB     LTG 2 Pt will tolerate 30 minutes of activity with 3 brief rest breaks demonstrating improved functional strength.  -ZB     LTG 2 Progress Ongoing;Progressing  -ZB     LTG 3 Pt will demonstrate increased strength LEs to 4/5 or better.  -ZB     LTG 3 Progress Ongoing  -ZB     LTG 4 Patient will improve VELAZQUEZ score from 22 to >/= 28 to show improved static/dynamic balance leading to decrease risk for falls.  -ZB     LTG 4 Progress Ongoing;Progressing  -ZB     LTG 5 Pt will increase 2min walk by >/=30 ft with rwx with </= 1 LOB for improved safety with ambulatory tasks.  -ZB     LTG 5 Progress Ongoing  -ZB     LTG 6 Pt will be able to perform static NBOS on stable surface for >/=30s without UE support for improved balance and decreased risk of falling.  -ZB     LTG 6 Progress Ongoing  -ZB     LTG 7 The pt demonstrates ambulation with step through reciprocal gait pattern with assistive device with no moments of instability for 200 ft while demonstrating increased stance width/SOLOMON for improvements in stability/safety during dynamic ambulatory tasks when navigating the community.  -ZB     LTG 7 Progress Ongoing;Progressing  -ZB               User Key  (r) = Recorded By, (t) = Taken By, (c) = Cosigned By      Initials Name Provider Type    Kamron Gomes PT Physical Therapist                                   Time Calculation:   Start Time: 1230  Stop Time: 1310  Time Calculation (min): 40 min  Timed Charges  80153 - PT Therapeutic Activity Minutes: 40  Total Minutes  Timed Charges Total Minutes: 40   Total Minutes: 40  Therapy Charges for Today       Code Description Service Date Service Provider Modifiers Qty    25775013417  PT THERAPEUTIC ACT EA 15 MIN 6/26/2024 Kamron Edwards  PT GP 3                      Anjelica Edwards, PT  6/26/2024

## 2024-07-01 ENCOUNTER — HOSPITAL ENCOUNTER (OUTPATIENT)
Dept: PHYSICAL THERAPY | Facility: HOSPITAL | Age: 69
Discharge: HOME | End: 2024-07-01
Payer: MEDICARE

## 2024-07-01 DIAGNOSIS — R26.89 POOR BALANCE: Primary | ICD-10-CM

## 2024-07-01 DIAGNOSIS — R29.898 LEG WEAKNESS, BILATERAL: ICD-10-CM

## 2024-07-01 DIAGNOSIS — Z74.09 IMPAIRED MOBILITY: ICD-10-CM

## 2024-07-01 PROCEDURE — 97530 THERAPEUTIC ACTIVITIES: CPT

## 2024-07-01 NOTE — THERAPY TREATMENT NOTE
Outpatient Physical Therapy Ortho Treatment Note  [facility]     Patient Name: Flo Manzo  : 1955  MRN: [id number]  Today's Date: 2024      Visit Date: 2024    Visit Dx:    ICD-10-CM ICD-9-CM   1. Poor balance  R26.89 781.99   2. Leg weakness, bilateral  R29.898 729.89   3. Impaired mobility  Z74.09 799.89       Patient Active Problem List   Diagnosis    Umbilical hernia without obstruction and without gangrene    Essential hypertension    Arthritis of left knee    Depression    Obesity (BMI 30-39.9)    Atrial fibrillation with RVR    Substernal thyroid goiter    CHF (congestive heart failure)    Diastolic CHF, chronic    Hemorrhagic stroke    GÓMEZ on auto CPAP    Hypersomnia due to medical condition    Sleep-related hypoxia    Chronic atrial fibrillation    Vertigo    Aortic stenosis, severe    Sinus pause    Pacemaker    Anticoagulated    Generalized weakness    Prediabetes    Pure hypercholesterolemia    Pre-operative cardiovascular examination    Peripheral neuropathy    History of colon polyps        Past Medical History:   Diagnosis Date    Arthritis     Atrial fibrillation with RVR 2019    Colon polyps 2018    Hepatic flexure: tubular adenoma, only low-grade dysplasia; splenic flexure: tubular adenoma, only low-grade dysplasia; sigmoid colon: tubular adenoma, multiple fragments only low-grade dysplasia    Depression     Heart murmur     Hemorrhagic stroke 2019    Hypertension     New onset atrial fibrillation (CMS/HCC) - RVR 2019    GÓMEZ (obstructive sleep apnea) 2019    Home sleep study with moderate severity GÓMEZ, AHI 20 events per hour.  Sleep-related hypoxia with low O2 saturation 84% for 14 minutes.    Peripheral neuropathy     Sleep apnea     previously diagnosed with sleep apnea, had T&A done and it has since resolved     Stroke     Uncontrolled hypertension     Ventral hernia         Past Surgical History:   Procedure Laterality Date     APPENDECTOMY N/A 1972    BACK SURGERY      BLADDER STIMULATOR IMPLANT L LOWER BACK    CARDIAC CATHETERIZATION N/A 07/20/2004    Cath left ventriculography, coronary angiography and left heart catheterization, Normal results-Dr. Vadim Mancuso    CARDIAC CATHETERIZATION N/A 1/29/2019    Procedure: Left Heart Cath;  Surgeon: Eren Bruce MD;  Location: [facility] CATH INVASIVE LOCATION;  Service: Cardiology    CARDIAC CATHETERIZATION N/A 1/29/2019    Procedure: Left ventriculography;  Surgeon: Eren Bruce MD;  Location: [facility] CATH [facility]VASIVE LOC[facility];  Service: Cardiology    CARDIAC CATHETERIZATION N/A 1/29/2019    Procedure: Coronary angiography;  Surgeon: Eren Bruce MD;  [facility]: [facility] C[facility] LOC[facility];  Service: Cardiology    CARDIAC ELECTROPHYSIOLOGY PROCEDURE N/A 3/4/2022    Procedure: Pacemaker SC new BIOTRONIK;  Surgeon: Eren Bruce MD;  Location: [facility] CATH INVASIVE LOCATION;  Service: Cardiology;  Laterality: N/A;    CARPAL TUNNEL RELEASE Right 2013    CARPAL TUNNEL RELEASE Left     COLONOSCOPY N/A 1/4/2018    Procedure: COLONOSCOPY with cold polypectomy and hot snare polypectomy;  Surgeon: Ten Solitario MD;  Location: [facility] ENDOSCOPY;  Service:     COLONOSCOPY N/A 4/25/2024    Procedure: COLONOSCOPY INTO CECUM;  Surgeon: Ten Solitario MD;  Location: [facility] ENDOSCOPY;  Service: General;  Laterality: N/A;  PRE: PERSONAL H/O COLON POLYP  /  POST: POOR PREP OTHERWISE NORMAL    EYE LENS IMPLANT SECONDARY Bilateral 2007, 2008    KNEE CARTILAGE SURGERY Right 1979    TONSILLECTOMY AND ADENOIDECTOMY Bilateral 2005    TOTAL KNEE ARTHROPLASTY Left 03/14/2016    Left total knee arthroplasty with Amberly component, size 11 femur, G tibial baseplate with a 10 polyethylene insert and a 38 patellar button-Dr. Pablo Hairston    TOTAL KNEE ARTHROPLASTY Right 03/02/2015    Right total knee arthroplasty with Amberly component size G femur, 11 tibial base plate with an 11  polyethylene insert and a 38 patellar button-Dr. Pablo Hairston    UVULOPALATOPHARYNGOPLASTY N/A 2005    part of soft palate, and uvula    VENTRAL HERNIA REPAIR N/A 1/23/2018    Procedure: VENTRAL HERNIA REPAIR LAPAROSCOPIC WITH DAVINCI ROBOT AND MESH;  Surgeon: Ten Solitario MD;  Location: [facility];  Service:                         PT Assessment/Plan       Row Name 07/01/24 1500          PT Assessment    Assessment Comments Mr. Manzo has overall good tolerance this date as well as improved static balance demo'd in several exercises. He maintains NBOS with all arm exercises requiring PT assist x1, aside from that, able to maintain balance independently. At this time, pt is appropriate to trial IND management at next session, will plan for full HEP review.  -MO        PT Plan    PT Plan Comments assessment. HEP review  -MO               User Key  (r) = Recorded By, (t) = Taken By, (c) = Cosigned By      Initials Name Provider Type    MO Nuvia Solis, PT Physical Therapist                       OP Exercises       Row Name 07/01/24 1200             Total Minutes    87085 - PT Therapeutic Activity Minutes 43  -MO         Exercise 7    Exercise Name 7 Step over  -MO      Cueing 7 Verbal  -MO      Sets 7 1B  -MO      Reps 7 10  -MO      Time 7 glove box  -MO      Additional Comments fwd/ lat  -MO         Exercise 10    Exercise Name 10 STS  -MO      Cueing 10 Verbal  -MO      Sets 10 2  -MO      Reps 10 10  -MO      Time 10 L2 ball over heaad  -MO         Exercise 11    Exercise Name 11 HA in NBOS  -MO      Cueing 11 Verbal;Demo;Tactile  -MO      Sets 11 1  -MO      Reps 11 20  -MO      Time 11 RTB  -MO         Exercise 12    Exercise Name 12 fwd mini lunge to airex  -MO      Cueing 12 Verbal;Demo  -MO      Reps 12 1B x10  -MO      Time 12 BUE support  -MO         Exercise 13    Exercise Name 13 bicep curls in NBOS in // bars  -MO      Cueing 13 Verbal  -MO      Sets 13 2B  -MO      Reps 13 10  -MO      Time  13 2# DB  -MO         Exercise 15    Exercise Name 15 lateral raise in NBOS in // bars  -MO      Cueing 15 Verbal  -MO      Sets 15 2B  -MO      Reps 15 10  -MO      Time 15 2# DB  -MO         Exercise 16    Exercise Name 16 Reverse mini lunge  -MO      Cueing 16 Verbal  -MO      Sets 16 1e  -MO      Reps 16 15  -MO      Additional Comments cue for upright posture, large step length  -MO                User Key  (r) = Recorded By, (t) = Taken By, (c) = Cosigned By      Initials Name Provider Type    Nuvia Stafford, PT Physical Therapist                                  PT OP Goals       Row Name 07/01/24 1500          PT Short Term Goals    STG Date to Achieve 05/18/24  -MO     STG 1 Pt will be independent with initial HEP to improve strength and static/dynamic balance.  -MO     STG 1 Progress Ongoing;Progressing  -MO     STG 2 Pt will ambulate 150' with normal heel strike to toe off with symmetrical stride and davon with his RWX without momentary LOB.  -MO     STG 2 Progress Ongoing  -MO     STG 3 Patient able to tandem stance bilaterally with 1 finger hold > 30 seconds without LOB for improved core stabilization and decreased fall risk  -MO     STG 3 Progress Met  -MO     STG 4 Pt performs 30 seconds sit to stand having complete at least 2 more repetitions that was performed upon initial evaluation for progression of ease with functional transfers, LE strength, and safety in the home.  -MO     STG 4 Progress Ongoing  -MO        Long Term Goals    LTG Date to Achieve 07/17/24  -MO     LTG 1 Pt will be independent with advance HEP to improve strength and static/dynamic balance.  -MO     LTG 1 Progress Ongoing  -MO     LTG 2 Pt will tolerate 30 minutes of activity with 3 brief rest breaks demonstrating improved functional strength.  -MO     LTG 2 Progress Ongoing;Progressing  -MO     LTG 3 Pt will demonstrate increased strength LEs to 4/5 or better.  -MO     LTG 3 Progress Ongoing  -MO     LTG 4 Patient will  improve VELAZQUEZ score from 22 to >/= 28 to show improved static/dynamic balance leading to decrease risk for falls.  -MO     LTG 4 Progress Ongoing;Progressing  -MO     LTG 5 Pt will increase 2min walk by >/=30 ft with rwx with </= 1 LOB for improved safety with ambulatory tasks.  -MO     LTG 5 Progress Ongoing  -MO     LTG 6 Pt will be able to perform static NBOS on stable surface for >/=30s without UE support for improved balance and decreased risk of falling.  -MO     LTG 6 Progress Ongoing  -MO     LTG 7 The pt demonstrates ambulation with step through reciprocal gait pattern with assistive device with no moments of instability for 200 ft while demonstrating increased stance width/SOLOMON for improvements in stability/safety during dynamic ambulatory tasks when navigating the community.  -MO     LTG 7 Progress Ongoing;Progressing  -MO               User Key  (r) = Recorded By, (t) = Taken By, (c) = Cosigned By      Initials Name Provider Type    Nuvia Stafford PT Physical Therapist                                   Time Calculation:   Start Time: 1237  Stop Time: 1320  Time Calculation (min): 43 min  Timed Charges  86267 - PT Therapeutic Activity Minutes: 43  Total Minutes  Timed Charges Total Minutes: 43   Total Minutes: 43  Therapy Charges for Today       Code Description Service Date Service Provider Modifiers Qty    85348801423 HC PT THERAPEUTIC ACT EA 15 MIN 7/1/2024 Nuvia Slois, PT GP 3                      Nuvia Solis PT  7/1/2024

## 2024-07-03 ENCOUNTER — HOSPITAL ENCOUNTER (OUTPATIENT)
Dept: PHYSICAL THERAPY | Facility: HOSPITAL | Age: 69
Discharge: HOME | End: 2024-07-03
Payer: MEDICARE

## 2024-07-03 DIAGNOSIS — R29.898 LEG WEAKNESS, BILATERAL: ICD-10-CM

## 2024-07-03 DIAGNOSIS — R26.89 POOR BALANCE: Primary | ICD-10-CM

## 2024-07-03 DIAGNOSIS — Z74.09 IMPAIRED MOBILITY: ICD-10-CM

## 2024-07-03 PROCEDURE — 97530 THERAPEUTIC ACTIVITIES: CPT

## 2024-07-03 NOTE — THERAPY DISCHARGE NOTE
Outpatient Physical Therapy Ortho Treatment Note/Discharge Summary  [facility]     Patient Name: Flo Manzo  : 1955  MRN: [id number]  Today's Date: 7/3/2024      Visit Date: 2024    Visit Dx:    ICD-10-CM ICD-9-CM   1. Poor balance  R26.89 781.99   2. Leg weakness, bilateral  R29.898 729.89   3. Impaired mobility  Z74.09 799.89       Patient Active Problem List   Diagnosis    Umbilical hernia without obstruction and without gangrene    Essential hypertension    Arthritis of left knee    Depression    Obesity (BMI 30-39.9)    Atrial fibrillation with RVR    Substernal thyroid goiter    CHF (congestive heart failure)    Diastolic CHF, chronic    Hemorrhagic stroke    GÓMEZ on auto CPAP    Hypersomnia due to medical condition    Sleep-related hypoxia    Chronic atrial fibrillation    Vertigo    Aortic stenosis, severe    Sinus pause    Pacemaker    Anticoagulated    Generalized weakness    Prediabetes    Pure hypercholesterolemia    Pre-operative cardiovascular examination    Peripheral neuropathy    History of colon polyps        Past Medical History:   Diagnosis Date    Arthritis     Atrial fibrillation with RVR 2019    Colon polyps 2018    Hepatic flexure: tubular adenoma, only low-grade dysplasia; splenic flexure: tubular adenoma, only low-grade dysplasia; sigmoid colon: tubular adenoma, multiple fragments only low-grade dysplasia    Depression     Heart murmur     Hemorrhagic stroke 2019    Hypertension     New onset atrial fibrillation (CMS/HCC) - RVR 2019    GÓMEZ (obstructive sleep apnea) 2019    Home sleep study with moderate severity GÓMEZ, AHI 20 events per hour.  Sleep-related hypoxia with low O2 saturation 84% for 14 minutes.    Peripheral neuropathy     Sleep apnea     previously diagnosed with sleep apnea, had T&A done and it has since resolved     Stroke     Uncontrolled hypertension     Ventral hernia         Past Surgical History:   Procedure  Laterality Date    APPENDECTOMY N/A 1972    BACK SURGERY      BLADDER STIMULATOR IMPLANT L LOWER BACK    CARDIAC CATHETERIZATION N/A 07/20/2004    Cath left ventriculography, coronary angiography and left heart catheterization, Normal results-Dr. Fierro     CARDIAC CATHETERIZATION N/A 1/29/2019    Procedure: Left Heart Cath;  Surgeon: Eren Bruce MD;  Location: [facility] CATH INVASIVE LOCATION;  Service: Cardiology    CARDIAC CATHETERIZATION N/A 1/29/2019    Procedure: Left ventriculography;  Surgeon: Eren Bruce MD;  Location:  [facility] CATH INVASIVE LOCATION;  Service: Cardiology    CARDIAC CATHETERIZATION N/A 1/29/2019    Procedure: Coronary angiography;  Surgeon: Eren Bruce MD;  Location:  [facility] CATH INVASIVE LOCATION;  Service: Cardiology    CARDIAC ELECTROPHYSIOLOGY PROCEDURE N/A 3/4/2022    Procedure: Pacemaker SC new BIOTRONIK;  Surgeon: Eren Bruce MD;  Location: [facility] CATH INVASIVE LOCATION;  Service: Cardiology;  Laterality: N/A;    CARPAL TUNNEL RELEASE Right 2013    CARPAL TUNNEL RELEASE Left     COLONOSCOPY N/A 1/4/2018    Procedure: COLONOSCOPY with cold polypectomy and hot snare polypectomy;  Surgeon: Ten Solitario MD;  Location: [facility] ENDOSCOPY;  Service:     COLONOSCOPY N/A 4/25/2024    Procedure: COLONOSCOPY INTO CECUM;  Surgeon: Ten Solitario MD;  Location: [facility] ENDOSCOPY;  Service: General;  Laterality: N/A;  PRE: PERSONAL H/O COLON POLYP  /  POST: POOR PREP OTHERWISE NORMAL    EYE LENS IMPLANT SECONDARY Bilateral 2007, 2008    KNEE CARTILAGE SURGERY Right 1979    TONSILLECTOMY AND ADENOIDECTOMY Bilateral 2005    TOTAL KNEE ARTHROPLASTY Left 03/14/2016    Left total knee arthroplasty with Amberly component, size 11 femur, G tibial baseplate with a 10 polyethylene insert and a 38 patellar button-Dr. Pablo Hairston    TOTAL KNEE ARTHROPLASTY Right 03/02/2015    Right total knee arthroplasty with Amberly component size G femur, 11 tibial base plate  with an 11 polyethylene insert and a 38 patellar button-Dr. Pablo Hairston    UVULOPALATOPHARYNGOPLASTY N/A 2005    part of soft palate, and uvula    VENTRAL HERNIA REPAIR N/A 1/23/2018    Procedure: VENTRAL HERNIA REPAIR LAPAROSCOPIC WITH DAVINCI ROBOT AND MESH;  Surgeon: Ten Solitario MD;  Location: [facility];  Service:                         PT Assessment/Plan       Row Name 07/03/24 1700          PT Assessment    Assessment Comments Flo Manzo was seen for 15 physical therapy sessions for balance treatment.  Treatment included therapeutic exercise, therapeutic activity, and patient education with home exercise program. Progress to physical therapy goals was slow. Pt met 1/4 STG and 0/4 LTG. Goal progression likely limited to significant PMH as well as overall poor compliance to HEP. At this time secondary to limitations in progression, will hold on therapy to work on improving compliance to home exercises and IND management. Pt does return this date without table on rwx with much improved gait mechanics. Frequent cueing to stay inside of walker, when positioned correctly, has much more erect stature with improved stability. 2 min walk test retested this date, improved by >20ft since last trial with improved mechanics. Time spent discussing advanced HEP, narrowed down exercises to improve compliance and discussed appropriate progressions/modifications to make based on response following discharge and optimal frequency/duration to complete HEP over next several weeks-months. Patient verbalized understanding. He was discharged to an independent HEP and provided patient education to self-manage condition.  -MO        PT Plan    PT Plan Comments d/c  -MO               User Key  (r) = Recorded By, (t) = Taken By, (c) = Cosigned By      Initials Name Provider Type    Nuvia Stafford, PT Physical Therapist                         OP Exercises       Row Name 07/03/24 1700             Total Minutes    00539  - PT Therapeutic Activity Minutes 23  -MO         Exercise 2    Exercise Name 2 D/c  -MO      Additional Comments 2 min walk test, d/c discussion  -MO                User Key  (r) = Recorded By, (t) = Taken By, (c) = Cosigned By      Initials Name Provider Type    Nuvia Stafford, PT Physical Therapist                                    PT OP Goals       Row Name 07/03/24 1700          PT Short Term Goals    STG Date to Achieve 05/18/24  -MO     STG 1 Pt will be independent with initial HEP to improve strength and static/dynamic balance.  -MO     STG 1 Progress Ongoing;Progressing  -MO     STG 2 Pt will ambulate 150' with normal heel strike to toe off with symmetrical stride and davon with his RWX without momentary LOB.  -MO     STG 2 Progress Ongoing  -MO     STG 3 Patient able to tandem stance bilaterally with 1 finger hold > 30 seconds without LOB for improved core stabilization and decreased fall risk  -MO     STG 3 Progress Met  -MO     STG 4 Pt performs 30 seconds sit to stand having complete at least 2 more repetitions that was performed upon initial evaluation for progression of ease with functional transfers, LE strength, and safety in the home.  -MO     STG 4 Progress Ongoing  -MO        Long Term Goals    LTG Date to Achieve 07/17/24  -MO     LTG 1 Pt will be independent with advance HEP to improve strength and static/dynamic balance.  -MO     LTG 1 Progress Ongoing  -MO     LTG 2 Pt will tolerate 30 minutes of activity with 3 brief rest breaks demonstrating improved functional strength.  -MO     LTG 2 Progress Ongoing;Progressing  -MO     LTG 3 Pt will demonstrate increased strength LEs to 4/5 or better.  -MO     LTG 3 Progress Ongoing  -MO     LTG 4 Patient will improve VELAZQUEZ score from 22 to >/= 28 to show improved static/dynamic balance leading to decrease risk for falls.  -MO     LTG 4 Progress Ongoing;Progressing  -MO     LTG 5 Pt will increase 2min walk by >/=30 ft with rwx with </= 1 LOB for  improved safety with ambulatory tasks.  -MO     LTG 5 Progress Ongoing  -MO     LTG 6 Pt will be able to perform static NBOS on stable surface for >/=30s without UE support for improved balance and decreased risk of falling.  -MO     LTG 6 Progress Ongoing  -MO     LTG 7 The pt demonstrates ambulation with step through reciprocal gait pattern with assistive device with no moments of instability for 200 ft while demonstrating increased stance width/SOLOMON for improvements in stability/safety during dynamic ambulatory tasks when navigating the community.  -MO     LTG 7 Progress Ongoing;Progressing  -MO               User Key  (r) = Recorded By, (t) = Taken By, (c) = Cosigned By      Initials Name Provider Type    Nuvia Stafford, PT Physical Therapist                         2 Minute Walk Test  Distance Ambulated in 2 Minutes: 171         Time Calculation:   Start Time: 1235  Stop Time: 1258  Time Calculation (min): 23 min  Timed Charges  07252 - PT Therapeutic Activity Minutes: 23  Total Minutes  Timed Charges Total Minutes: 23   Total Minutes: 23  Therapy Charges for Today       Code Description Service Date Service Provider Modifiers Qty    20394778991  PT THERAPEUTIC ACT EA 15 MIN 7/3/2024 Nuvia Solis, PT GP 2                         Nuvia Solis PT  7/3/2024

## 2024-07-09 ENCOUNTER — TELEPHONE (OUTPATIENT)
Dept: CARDIOLOGY | Facility: CLINIC | Age: 69
End: 2024-07-09

## 2024-07-09 NOTE — TELEPHONE ENCOUNTER
"  Caller: Flo Manzo \"Danyel\"    Relationship: Self    Best call back number: [phone]    What is the best time to reach you:ANY    Who are you requesting to speak with (clinical staff, provider,  specific staff member): CLINICAL     Do you know the name of the person who called: NO VM PATIENT NOT SURE    What was the call regarding: PATIENT NOT SURE PLEASE ADVISE AND CALL BACK    Is it okay if the provider responds through MyChart: CALLBACK    "

## 2024-07-11 ENCOUNTER — TELEPHONE (OUTPATIENT)
Dept: CARDIOLOGY | Facility: CLINIC | Age: 69
End: 2024-07-11
Payer: MEDICARE

## 2024-07-11 ENCOUNTER — HOSPITAL ENCOUNTER (OUTPATIENT)
Dept: CARDIOLOGY | Facility: HOSPITAL | Age: 69
Discharge: HOME | End: 2024-07-11
Admitting: INTERNAL MEDICINE
Payer: MEDICARE

## 2024-07-11 VITALS
SYSTOLIC BLOOD PRESSURE: 127 MMHG | HEART RATE: 78 BPM | WEIGHT: 231.48 LBS | DIASTOLIC BLOOD PRESSURE: 87 MMHG | HEIGHT: 70 IN | BODY MASS INDEX: 33.14 KG/M2

## 2024-07-11 DIAGNOSIS — I50.32 DIASTOLIC CHF, CHRONIC: Chronic | ICD-10-CM

## 2024-07-11 DIAGNOSIS — I35.0 AORTIC STENOSIS, SEVERE: ICD-10-CM

## 2024-07-11 LAB
AORTIC DIMENSIONLESS INDEX: 0.4 (DI)
ASCENDING AORTA: 3.6 CM
BH CV ECHO MEAS - ACS: 0.87 CM
BH CV ECHO MEAS - AI P1/2T: 709.3 MSEC
BH CV ECHO MEAS - AO MAX PG: 34 MMHG
BH CV ECHO MEAS - AO MEAN PG: 19.3 MMHG
BH CV ECHO MEAS - AO ROOT DIAM: 3.2 CM
BH CV ECHO MEAS - AO V2 MAX: 291.5 CM/SEC
BH CV ECHO MEAS - AO V2 VTI: 55.4 CM
BH CV ECHO MEAS - AVA(I,D): 1.71 CM2
BH CV ECHO MEAS - EDV(CUBED): 99.7 ML
BH CV ECHO MEAS - EDV(MOD-SP2): 132 ML
BH CV ECHO MEAS - EDV(MOD-SP4): 154 ML
BH CV ECHO MEAS - EF(MOD-BP): 53.9 %
BH CV ECHO MEAS - EF(MOD-SP2): 51.5 %
BH CV ECHO MEAS - EF(MOD-SP4): 55.2 %
BH CV ECHO MEAS - ESV(CUBED): 33.8 ML
BH CV ECHO MEAS - ESV(MOD-SP2): 64 ML
BH CV ECHO MEAS - ESV(MOD-SP4): 69 ML
BH CV ECHO MEAS - FS: 30.3 %
BH CV ECHO MEAS - IVS/LVPW: 1.11 CM
BH CV ECHO MEAS - IVSD: 1.55 CM
BH CV ECHO MEAS - LAT PEAK E' VEL: 9.6 CM/SEC
BH CV ECHO MEAS - LV MASS(C)D: 279.5 GRAMS
BH CV ECHO MEAS - LV MAX PG: 8.6 MMHG
BH CV ECHO MEAS - LV MEAN PG: 3.9 MMHG
BH CV ECHO MEAS - LV V1 MAX: 146.8 CM/SEC
BH CV ECHO MEAS - LV V1 VTI: 23.9 CM
BH CV ECHO MEAS - LVIDD: 4.6 CM
BH CV ECHO MEAS - LVIDS: 3.2 CM
BH CV ECHO MEAS - LVOT AREA: 4 CM2
BH CV ECHO MEAS - LVOT DIAM: 2.25 CM
BH CV ECHO MEAS - LVPWD: 1.39 CM
BH CV ECHO MEAS - MED PEAK E' VEL: 7 CM/SEC
BH CV ECHO MEAS - MR MAX PG: 71.5 MMHG
BH CV ECHO MEAS - MR MAX VEL: 422.7 CM/SEC
BH CV ECHO MEAS - MV DEC SLOPE: 389.4 CM/SEC2
BH CV ECHO MEAS - MV DEC TIME: 0.13 SEC
BH CV ECHO MEAS - MV E MAX VEL: 139 CM/SEC
BH CV ECHO MEAS - MV MAX PG: 7.4 MMHG
BH CV ECHO MEAS - MV MEAN PG: 3 MMHG
BH CV ECHO MEAS - MV P1/2T: 102.1 MSEC
BH CV ECHO MEAS - MV V2 VTI: 31.8 CM
BH CV ECHO MEAS - MVA(P1/2T): 2.15 CM2
BH CV ECHO MEAS - MVA(VTI): 3 CM2
BH CV ECHO MEAS - PA ACC TIME: 0.08 SEC
BH CV ECHO MEAS - PA V2 MAX: 130.3 CM/SEC
BH CV ECHO MEAS - PULM DIAS VEL: 29.9 CM/SEC
BH CV ECHO MEAS - PULM S/D: 0.81
BH CV ECHO MEAS - PULM SYS VEL: 24.3 CM/SEC
BH CV ECHO MEAS - QP/QS: 0.78
BH CV ECHO MEAS - RAP SYSTOLE: 3 MMHG
BH CV ECHO MEAS - RV MAX PG: 3.3 MMHG
BH CV ECHO MEAS - RV V1 MAX: 90.8 CM/SEC
BH CV ECHO MEAS - RV V1 VTI: 16.7 CM
BH CV ECHO MEAS - RVOT DIAM: 2.38 CM
BH CV ECHO MEAS - RVSP: 29 MMHG
BH CV ECHO MEAS - SV(LVOT): 94.9 ML
BH CV ECHO MEAS - SV(MOD-SP2): 68 ML
BH CV ECHO MEAS - SV(MOD-SP4): 85 ML
BH CV ECHO MEAS - SV(RVOT): 74.4 ML
BH CV ECHO MEAS - TAPSE (>1.6): 1.71 CM
BH CV ECHO MEAS - TR MAX PG: 25.8 MMHG
BH CV ECHO MEAS - TR MAX VEL: 253.8 CM/SEC
BH CV ECHO MEASUREMENTS AVERAGE E/E' RATIO: 16.75
BH CV XLRA - RV BASE: 3.2 CM
BH CV XLRA - RV LENGTH: 8 CM
BH CV XLRA - RV MID: 4 CM
BH CV XLRA - TDI S': 12.5 CM/SEC
LEFT ATRIUM VOLUME INDEX: 42 ML/M2
SINUS: 3.2 CM
STJ: 2.8 CM

## 2024-07-11 PROCEDURE — 93306 TTE W/DOPPLER COMPLETE: CPT

## 2024-07-11 PROCEDURE — 25510000001 PERFLUTREN (DEFINITY) 8.476 MG IN SODIUM CHLORIDE (PF) 0.9 % 10 ML INJECTION: Performed by: INTERNAL MEDICINE

## 2024-07-11 RX ADMIN — SODIUM CHLORIDE 3 ML: 9 INJECTION INTRAMUSCULAR; INTRAVENOUS; SUBCUTANEOUS at 10:39

## 2024-07-11 NOTE — TELEPHONE ENCOUNTER
7/11/24 remote transmission reviewed.    Multiple HVR alerts between 6/28-7/11-24.  Totals range between 8-78.    Most recent, 56 HVR events, on 7/10/24.  EGMs not available to review.    Patient has echo scheduled for today, 7/11/24, and f/u appt 7/18/24.  He does have a history of AF with RVR.

## 2024-07-14 NOTE — PROGRESS NOTES
"    I N T E R N A L  M E D I C I N E  Karen Jiménez, APRFALLON    ENCOUNTER DATE:  07/15/2024    Flo Danielleremberto / 69 y.o. / male      CHIEF COMPLAINT / REASON FOR OFFICE VISIT     Hypertension      ASSESSMENT & PLAN     Diagnoses and all orders for this visit:    1. Essential hypertension (Primary)  -     Comprehensive Metabolic Panel    2. Subclinical hyperthyroidism  -     TSH+Free T4  -     Thyroid Peroxidase Antibody  -     Ambulatory Referral to Endocrinology    3. Thyroid goiter  -     Ambulatory Referral to Endocrinology    4. History of stroke  -     Ambulatory Referral to Neurology         SUMMARY/DISCUSSION  Follow up with cardiology as scheduled.  Recommend updating ECHO this year for monitoring of known moderate aortic valve stenosis.  Monitor BP at home to ensure it is averaging < 130/80.  Low sodium diet, wear compression socks.    Establish with endocrinology for subclinical hyperthyroidism/ thyroid goiter.  Establish with neurology, per his request, for history of stroke.  Neuropathic pain symptoms are well controlled on gabapentin 100 mg morning and afternoon and gabapentin 300 mg nightly.  UDS/ contract updated.  Continue same.        Next Appointment with me: Visit date not found    Return in about 4 months (around 11/14/2024) for Medicare Wellness.      VITAL SIGNS     Visit Vitals  /80   Pulse 91   Temp 97 °F (36.1 °C)   Resp 16   Ht 178 cm (70.08\")   Wt 105 kg (232 lb)   SpO2 98%   BMI 33.21 kg/m²             Wt Readings from Last 3 Encounters:   07/15/24 105 kg (232 lb)   07/11/24 105 kg (231 lb 7.7 oz)   06/17/24 105 kg (231 lb)     Body mass index is 33.21 kg/m².        MEDICATIONS AT THE TIME OF OFFICE VISIT     Current Outpatient Medications on File Prior to Visit   Medication Sig Dispense Refill    acetaminophen (TYLENOL) 325 MG tablet Take 2 tablets by mouth Every 4 (Four) Hours As Needed for Mild Pain . 120 tablet 3    albuterol sulfate  (90 Base) MCG/ACT inhaler Inhale 2 puffs " Every 4 (Four) Hours As Needed for Wheezing. 18 g 3    amLODIPine (NORVASC) 10 MG tablet Take 1 tablet by mouth Daily. 90 tablet 1    atorvastatin (LIPITOR) 40 MG tablet TAKE 1 TABLET BY MOUTH NIGHTLY 90 tablet 1    busPIRone (BUSPAR) 10 MG tablet TAKE 1 TABLET BY MOUTH TWICE DAILY 60 tablet 5    citalopram (CeleXA) 20 MG tablet Take 1 tablet by mouth Daily. (Patient taking differently: Take 1.5 tablets by mouth Daily. PT TAKING 1.5 TABLETS DAILY) 135 tablet 1    Diclofenac Sodium (Voltaren) 1 % gel gel Apply 4 g topically to the appropriate area as directed 4 (Four) Times a Day. 50 g 0    Eliquis 5 MG tablet tablet TAKE 1 TABLET BY MOUTH TWICE DAILY 60 tablet 5    gabapentin (NEURONTIN) 100 MG capsule TAKE ONE (1) CAPSULE BY MOUTH TWICE DAILY 60 capsule 0    gabapentin (NEURONTIN) 300 MG capsule Take 1 capsule by mouth Every Night. 90 capsule 0    lisinopril (PRINIVIL,ZESTRIL) 20 MG tablet TAKE 1 TABLET BY MOUTH DAILY 30 tablet 5    metoprolol tartrate (LOPRESSOR) 25 MG tablet TAKE 1 & 1/2 TABLETS BY MOUTH TWICE DAILY 90 tablet 1    omeprazole (priLOSEC) 20 MG capsule TAKE 1 CAPSULE BY MOUTH DAILY 90 capsule 1    tamsulosin (FLOMAX) 0.4 MG capsule 24 hr capsule TAKE 1 CAPSULE BY MOUTH NIGHTLY 30 capsule 5    furosemide (LASIX) 20 MG tablet Take 1 tablet by mouth Daily for 180 days. 90 tablet 1    trimethoprim-polymyxin b (POLYTRIM) 56406-1.1 UNIT/ML-% ophthalmic solution Administer 1 drop to both eyes Every 6 (Six) Hours. (Patient not taking: Reported on 5/16/2024) 10 mL 0    [DISCONTINUED] DULoxetine (CYMBALTA) 30 MG capsule TAKE 1 CAPSULE BY MOUTH EVERY NIGHT 90 capsule 1    [DISCONTINUED] metoprolol succinate XL (TOPROL-XL) 25 MG 24 hr tablet Take 1 tablet by mouth Daily. 30 tablet 0    [DISCONTINUED] QUEtiapine (SEROquel) 25 MG tablet Take 1 tablet by mouth Every Night. Take 30 min before bed 30 tablet 0     No current facility-administered medications on file prior to visit.        HISTORY OF PRESENT ILLNESS      Afib with RVR, CHF, aortic stenosis, pacemaker: Followed by cardiology, Dr. Bruce.  Next appointment is July 18, 2024.  On Eliquis 5 mg BID.   BP is well controlled at today's visit at today's visit on amlodipine 10 mg daily, lisinopril 20 mg daily, metoprolol succinate tartrate 50 mg BID.  Weight remains stable.  Denies chest pain.  Stable shortness of breath with exertion.  August 2023 ECHO was EF of 46-50%, moderate aortic valve stenosis.  History of GÓMEZ, followed by sleep medicine, Dr. Munroe, and in the process obtaining new CPAP.     March 2024 Hemoglobin A1C was normal, 5.5, with prior readings within prediabetes range.     HLD: Well controlled on atorvastatin 40 mg nightly.  March 2024 Lipid panel with LDL 77; triglycerides 82.    Anxiety/ depression: Mood is stable on buspirone 10 mg BID, citalopram 20 mg daily.  May possibly be taken citalopram 30 mg daily - he will verify with his home medications and let our office know.    No SI/ HI.      Followed by thoracic surgery, Dr. Burciaga, for thyroid goiter.   March 17, 2023 CT Chest showed goiter with bilateral substernal extension and corresponding mass effect, including narrowing of adjacent trachea, which is similar to CT of June 22, 2022.  He is not a candidate for surgical resection so is monitored yearly as surveillance.  March 2024 CT Chest showed stable multinodular thyroid goiter.  Labs show a history of subclinical hyperthyroidism.  He was scheduled to establish with endocrine, Dr. Marquis, on February 28, 2024 but was a no show.  Has not yet rescheduled.       He has not yet scheduled follow up with neurology, Dr. Wilkins, for history of hemorrhagic stroke, but expresses his ongoing intent to do so.       Followed by Oncology Dr. Her, and [facility], Dr. Rader for prostate cancer.  Taking tamsulosin 0.4 mg nightly.  Completed radiation therapy.  Next follow up is scheduled next week as 6 month follow up.       Remains on  gabapentin 100 mg morning and afternoon with benefit for bilateral hand neuropathy and gabapentin 300 mg nightly for benefit of lower extremity neuropathy symptoms.        Patient Care Team:  Karen Jiménez APRN as PCP - General (Family Medicine)  Eren Bruce MD as Consulting Physician (Cardiology)  Temo Wheat MD as Consulting Physician (Radiation Oncology)  Bryant Rader MD as Consulting Physician (Urology)  Cindi Breen, GERALDINE, APRN as Nurse Practitioner (Nurse Practitioner)    REVIEW OF SYSTEMS     Review of Systems   Constitutional:  Negative for chills, fever and unexpected weight change.   Respiratory:  Negative for cough, chest tightness and shortness of breath.    Cardiovascular:  Negative for chest pain, palpitations and leg swelling.   Neurological:  Negative for dizziness, weakness, light-headedness and headaches.   Psychiatric/Behavioral:  The patient is not nervous/anxious.           PHYSICAL EXAMINATION     Physical Exam  Vitals reviewed.   Constitutional:       General: He is not in acute distress.     Appearance: Normal appearance. He is not ill-appearing, toxic-appearing or diaphoretic.   HENT:      Head: Normocephalic and atraumatic.   Cardiovascular:      Rate and Rhythm: Normal rate and regular rhythm.      Heart sounds: Murmur heard.   Pulmonary:      Effort: Pulmonary effort is normal.      Breath sounds: Normal breath sounds.   Musculoskeletal:      Right lower leg: Edema (Scant) present.      Left lower leg: Edema (Scant) present.   Neurological:      Mental Status: He is alert and oriented to person, place, and time. Mental status is at baseline.   Psychiatric:         Mood and Affect: Mood normal.         Behavior: Behavior normal.         Thought Content: Thought content normal.         Judgment: Judgment normal.           REVIEWED DATA     Labs:           Imaging:            Medical Tests:           Summary of old records / correspondence / consultant  report:           Request outside records:

## 2024-07-15 ENCOUNTER — OFFICE VISIT (OUTPATIENT)
Dept: INTERNAL MEDICINE | Age: 69
End: 2024-07-15
Payer: MEDICARE

## 2024-07-15 ENCOUNTER — TELEPHONE (OUTPATIENT)
Dept: INTERNAL MEDICINE | Age: 69
End: 2024-07-15

## 2024-07-15 VITALS
HEIGHT: 70 IN | RESPIRATION RATE: 16 BRPM | SYSTOLIC BLOOD PRESSURE: 125 MMHG | DIASTOLIC BLOOD PRESSURE: 80 MMHG | HEART RATE: 91 BPM | TEMPERATURE: 97 F | BODY MASS INDEX: 33.21 KG/M2 | WEIGHT: 232 LBS | OXYGEN SATURATION: 98 %

## 2024-07-15 DIAGNOSIS — E05.90 SUBCLINICAL HYPERTHYROIDISM: ICD-10-CM

## 2024-07-15 DIAGNOSIS — I10 ESSENTIAL HYPERTENSION: Primary | ICD-10-CM

## 2024-07-15 DIAGNOSIS — M79.2 NEUROPATHIC PAIN: ICD-10-CM

## 2024-07-15 DIAGNOSIS — Z86.73 HISTORY OF STROKE: ICD-10-CM

## 2024-07-15 DIAGNOSIS — E04.9 THYROID GOITER: ICD-10-CM

## 2024-07-15 PROCEDURE — 1126F AMNT PAIN NOTED NONE PRSNT: CPT

## 2024-07-15 PROCEDURE — 3079F DIAST BP 80-89 MM HG: CPT

## 2024-07-15 PROCEDURE — G2211 COMPLEX E/M VISIT ADD ON: HCPCS

## 2024-07-15 PROCEDURE — 1159F MED LIST DOCD IN RCRD: CPT

## 2024-07-15 PROCEDURE — 99214 OFFICE O/P EST MOD 30 MIN: CPT

## 2024-07-15 PROCEDURE — 3074F SYST BP LT 130 MM HG: CPT

## 2024-07-15 PROCEDURE — 1160F RVW MEDS BY RX/DR IN RCRD: CPT

## 2024-07-15 NOTE — TELEPHONE ENCOUNTER
Patient came in and said he was out of his medication gabapentin (NEURONTIN) 100 MG Called the Pharmacy and they said it was delivered on 07/01/2025. Patient said he will check at home.

## 2024-07-16 LAB
ALBUMIN SERPL-MCNC: 4.1 G/DL (ref 3.5–5.2)
ALBUMIN/GLOB SERPL: 1.5 G/DL
ALP SERPL-CCNC: 114 U/L (ref 39–117)
ALT SERPL-CCNC: 9 U/L (ref 1–41)
AORTIC DIMENSIONLESS INDEX: 0.4 (DI)
ASCENDING AORTA: 3.6 CM
AST SERPL-CCNC: 12 U/L (ref 1–40)
BH CV ECHO MEAS - ACS: 0.87 CM
BH CV ECHO MEAS - AI P1/2T: 709.3 MSEC
BH CV ECHO MEAS - AO MAX PG: 34 MMHG
BH CV ECHO MEAS - AO MEAN PG: 19.3 MMHG
BH CV ECHO MEAS - AO ROOT DIAM: 3.2 CM
BH CV ECHO MEAS - AO V2 MAX: 291.5 CM/SEC
BH CV ECHO MEAS - AO V2 VTI: 55.4 CM
BH CV ECHO MEAS - AVA(I,D): 1.71 CM2
BH CV ECHO MEAS - EDV(CUBED): 99.7 ML
BH CV ECHO MEAS - EDV(MOD-SP2): 132 ML
BH CV ECHO MEAS - EDV(MOD-SP4): 154 ML
BH CV ECHO MEAS - EF(MOD-BP): 53.9 %
BH CV ECHO MEAS - EF(MOD-SP2): 51.5 %
BH CV ECHO MEAS - EF(MOD-SP4): 55.2 %
BH CV ECHO MEAS - ESV(CUBED): 33.8 ML
BH CV ECHO MEAS - ESV(MOD-SP2): 64 ML
BH CV ECHO MEAS - ESV(MOD-SP4): 69 ML
BH CV ECHO MEAS - FS: 30.3 %
BH CV ECHO MEAS - IVS/LVPW: 1.11 CM
BH CV ECHO MEAS - IVSD: 1.55 CM
BH CV ECHO MEAS - LAT PEAK E' VEL: 9.6 CM/SEC
BH CV ECHO MEAS - LV MASS(C)D: 279.5 GRAMS
BH CV ECHO MEAS - LV MAX PG: 8.6 MMHG
BH CV ECHO MEAS - LV MEAN PG: 3.9 MMHG
BH CV ECHO MEAS - LV V1 MAX: 146.8 CM/SEC
BH CV ECHO MEAS - LV V1 VTI: 23.9 CM
BH CV ECHO MEAS - LVIDD: 4.6 CM
BH CV ECHO MEAS - LVIDS: 3.2 CM
BH CV ECHO MEAS - LVOT AREA: 4 CM2
BH CV ECHO MEAS - LVOT DIAM: 2.25 CM
BH CV ECHO MEAS - LVPWD: 1.39 CM
BH CV ECHO MEAS - MED PEAK E' VEL: 7 CM/SEC
BH CV ECHO MEAS - MR MAX PG: 71.5 MMHG
BH CV ECHO MEAS - MR MAX VEL: 422.7 CM/SEC
BH CV ECHO MEAS - MV DEC SLOPE: 389.4 CM/SEC2
BH CV ECHO MEAS - MV DEC TIME: 0.13 SEC
BH CV ECHO MEAS - MV E MAX VEL: 139 CM/SEC
BH CV ECHO MEAS - MV MAX PG: 7.4 MMHG
BH CV ECHO MEAS - MV MEAN PG: 3 MMHG
BH CV ECHO MEAS - MV P1/2T: 102.1 MSEC
BH CV ECHO MEAS - MV V2 VTI: 31.8 CM
BH CV ECHO MEAS - MVA(P1/2T): 2.15 CM2
BH CV ECHO MEAS - MVA(VTI): 3 CM2
BH CV ECHO MEAS - PA ACC TIME: 0.08 SEC
BH CV ECHO MEAS - PA V2 MAX: 130.3 CM/SEC
BH CV ECHO MEAS - PULM DIAS VEL: 29.9 CM/SEC
BH CV ECHO MEAS - PULM S/D: 0.81
BH CV ECHO MEAS - PULM SYS VEL: 24.3 CM/SEC
BH CV ECHO MEAS - QP/QS: 0.78
BH CV ECHO MEAS - RAP SYSTOLE: 3 MMHG
BH CV ECHO MEAS - RV MAX PG: 3.3 MMHG
BH CV ECHO MEAS - RV V1 MAX: 90.8 CM/SEC
BH CV ECHO MEAS - RV V1 VTI: 16.7 CM
BH CV ECHO MEAS - RVOT DIAM: 2.38 CM
BH CV ECHO MEAS - RVSP: 29 MMHG
BH CV ECHO MEAS - SV(LVOT): 94.9 ML
BH CV ECHO MEAS - SV(MOD-SP2): 68 ML
BH CV ECHO MEAS - SV(MOD-SP4): 85 ML
BH CV ECHO MEAS - SV(RVOT): 74.4 ML
BH CV ECHO MEAS - TAPSE (>1.6): 1.71 CM
BH CV ECHO MEAS - TR MAX PG: 25.8 MMHG
BH CV ECHO MEAS - TR MAX VEL: 253.8 CM/SEC
BH CV ECHO MEASUREMENTS AVERAGE E/E' RATIO: 16.75
BH CV XLRA - RV BASE: 3.2 CM
BH CV XLRA - RV LENGTH: 8 CM
BH CV XLRA - RV MID: 4 CM
BH CV XLRA - TDI S': 12.5 CM/SEC
BILIRUB SERPL-MCNC: 0.4 MG/DL (ref 0–1.2)
BUN SERPL-MCNC: 14 MG/DL (ref 8–23)
BUN/CREAT SERPL: 16.9 (ref 7–25)
CALCIUM SERPL-MCNC: 9.3 MG/DL (ref 8.6–10.5)
CHLORIDE SERPL-SCNC: 104 MMOL/L (ref 98–107)
CO2 SERPL-SCNC: 26.4 MMOL/L (ref 22–29)
CREAT SERPL-MCNC: 0.83 MG/DL (ref 0.76–1.27)
EGFRCR SERPLBLD CKD-EPI 2021: 95.3 ML/MIN/1.73
GLOBULIN SER CALC-MCNC: 2.7 GM/DL
GLUCOSE SERPL-MCNC: 89 MG/DL (ref 65–99)
LEFT ATRIUM VOLUME INDEX: 42 ML/M2
POTASSIUM SERPL-SCNC: 4.4 MMOL/L (ref 3.5–5.2)
PROT SERPL-MCNC: 6.8 G/DL (ref 6–8.5)
SINUS: 3.2 CM
SODIUM SERPL-SCNC: 139 MMOL/L (ref 136–145)
STJ: 2.8 CM
T4 FREE SERPL-MCNC: 1.11 NG/DL (ref 0.92–1.68)
THYROPEROXIDASE AB SERPL-ACNC: 13 IU/ML (ref 0–34)
TSH SERPL DL<=0.005 MIU/L-ACNC: 0.34 UIU/ML (ref 0.27–4.2)

## 2024-07-17 ENCOUNTER — OFFICE VISIT (OUTPATIENT)
Dept: CARDIOLOGY | Facility: CLINIC | Age: 69
End: 2024-07-17
Payer: MEDICARE

## 2024-07-17 VITALS
HEART RATE: 92 BPM | DIASTOLIC BLOOD PRESSURE: 72 MMHG | WEIGHT: 236 LBS | SYSTOLIC BLOOD PRESSURE: 112 MMHG | HEIGHT: 70 IN | BODY MASS INDEX: 33.79 KG/M2

## 2024-07-17 DIAGNOSIS — I10 ESSENTIAL HYPERTENSION: ICD-10-CM

## 2024-07-17 DIAGNOSIS — I35.0 AORTIC STENOSIS, MILD: Primary | ICD-10-CM

## 2024-07-17 DIAGNOSIS — I48.20 CHRONIC ATRIAL FIBRILLATION: ICD-10-CM

## 2024-07-17 DIAGNOSIS — Z79.01 CHRONIC ANTICOAGULATION: ICD-10-CM

## 2024-07-17 NOTE — PROGRESS NOTES
6MO W ECHO RESULTS    Subjective:        Flo Manzo is a 69 y.o. male who here for follow up    CC  Follow-up aortic stenosis atrial fibrillation chronic anticoagulation and hypertension  HPI  69-year-old male with mild aortic stenosis atrial fibrillation and hypertension here for the follow-up denies any chest pains or tightness in the chest     Problems Addressed this Visit          Cardiac and Vasculature    Essential hypertension (Chronic)    Chronic atrial fibrillation    Relevant Orders    Adult Transthoracic Echo Complete W/ Cont if Necessary Per Protocol    Aortic stenosis, mild - Primary    Relevant Orders    Adult Transthoracic Echo Complete W/ Cont if Necessary Per Protocol       Coag and Thromboembolic    Chronic anticoagulation    Relevant Orders    Adult Transthoracic Echo Complete W/ Cont if Necessary Per Protocol     Diagnoses         Codes Comments    Aortic stenosis, mild    -  Primary ICD-10-CM: I35.0  ICD-9-CM: 424.1     Chronic atrial fibrillation     ICD-10-CM: I48.20  ICD-9-CM: 427.31     Chronic anticoagulation     ICD-10-CM: Z79.01  ICD-9-CM: V58.61     Essential hypertension     ICD-10-CM: I10  ICD-9-CM: 401.9           .    The following portions of the patient's history were reviewed and updated as appropriate: allergies, current medications, past family history, past medical history, past social history, past surgical history and problem list.    Past Medical History:   Diagnosis Date    Arthritis     Atrial fibrillation with RVR 01/24/2019    Colon polyps 01/04/2018    Hepatic flexure: tubular adenoma, only low-grade dysplasia; splenic flexure: tubular adenoma, only low-grade dysplasia; sigmoid colon: tubular adenoma, multiple fragments only low-grade dysplasia    Depression     Heart murmur     Hemorrhagic stroke 01/29/2019    Hypertension     New onset atrial fibrillation (CMS/HCC) - RVR 01/24/2019    GÓMEZ (obstructive sleep apnea) 06/06/2019    Home sleep study with moderate  "severity GÓMEZ, AHI 20 events per hour.  Sleep-related hypoxia with low O2 saturation 84% for 14 minutes.    Peripheral neuropathy     Sleep apnea     previously diagnosed with sleep apnea, had T&A done and it has since resolved     Stroke     Uncontrolled hypertension     Ventral hernia      reports that he has never smoked. He has been exposed to tobacco smoke. He has never used smokeless tobacco. He reports that he does not currently use alcohol. He reports that he does not use drugs.   Family History   Problem Relation Age of Onset    Hypertension Mother     Kidney failure Mother     Coronary artery disease Father     Lung cancer Father         positive smoker    Heart attack Father     Breast cancer Sister 60    Prostate cancer Brother     Colon polyps Brother     Kidney nephrosis Brother     Malig Hyperthermia Neg Hx        Review of Systems  Constitutional: No wt loss, fever, fatigue  Gastrointestinal: No nausea, abdominal pain  Behavioral/Psych: No insomnia or anxiety   Cardiovascular no chest pains or tightness in the chest  Objective:       Physical Exam  /72   Pulse 92   Ht 177.8 cm (70\")   Wt 107 kg (236 lb)   BMI 33.86 kg/m²   General appearance: No acute changes   Neck: Trachea midline; NECK, supple, no thyromegaly or lymphadenopathy   Lungs: Normal size and shape, normal breath sounds, equal distribution of air, no rales and rhonchi   CV: S1-S2 regular, no murmurs, no rub, no gallop   Abdomen: Soft, nontender; no masses , no abnormal abdominal sounds   Extremities: No deformity , normal color , no peripheral edema   Skin: Normal temperature, turgor and texture; no rash, ulcers            ECG 12 Lead    Date/Time: 7/17/2024 12:49 PM  Performed by: Eren Bruce MD    Authorized by: Eren Bruce MD  Comparison: compared with previous ECG   Similar to previous ECG  Rhythm: atrial fibrillation    Clinical impression: abnormal EKG            Echocardiogram:    Results for orders " placed during the hospital encounter of 07/11/24    Adult Transthoracic Echo Complete W/ Cont if Necessary Per Protocol    Interpretation Summary    Left ventricular ejection fraction appears to be 51 - 55%.    Left ventricular diastolic function was indeterminate.    The left atrial cavity is mild to moderately dilated.    Mild aortic valve stenosis is present.    Estimated right ventricular systolic pressure from tricuspid regurgitation is normal (<35 mmHg).          Current Outpatient Medications:     acetaminophen (TYLENOL) 325 MG tablet, Take 2 tablets by mouth Every 4 (Four) Hours As Needed for Mild Pain ., Disp: 120 tablet, Rfl: 3    albuterol sulfate  (90 Base) MCG/ACT inhaler, Inhale 2 puffs Every 4 (Four) Hours As Needed for Wheezing., Disp: 18 g, Rfl: 3    amLODIPine (NORVASC) 10 MG tablet, Take 1 tablet by mouth Daily., Disp: 90 tablet, Rfl: 1    busPIRone (BUSPAR) 10 MG tablet, TAKE 1 TABLET BY MOUTH TWICE DAILY, Disp: 60 tablet, Rfl: 5    citalopram (CeleXA) 20 MG tablet, Take 1 tablet by mouth Daily. (Patient taking differently: Take 1.5 tablets by mouth Daily. PT TAKING 1.5 TABLETS DAILY), Disp: 135 tablet, Rfl: 1    Diclofenac Sodium (Voltaren) 1 % gel gel, Apply 4 g topically to the appropriate area as directed 4 (Four) Times a Day., Disp: 50 g, Rfl: 0    Eliquis 5 MG tablet tablet, TAKE 1 TABLET BY MOUTH TWICE DAILY, Disp: 60 tablet, Rfl: 5    furosemide (LASIX) 20 MG tablet, Take 1 tablet by mouth Daily for 180 days., Disp: 90 tablet, Rfl: 1    gabapentin (NEURONTIN) 100 MG capsule, TAKE ONE (1) CAPSULE BY MOUTH TWICE DAILY, Disp: 60 capsule, Rfl: 0    gabapentin (NEURONTIN) 300 MG capsule, Take 1 capsule by mouth Every Night., Disp: 90 capsule, Rfl: 0    lisinopril (PRINIVIL,ZESTRIL) 20 MG tablet, TAKE 1 TABLET BY MOUTH DAILY, Disp: 30 tablet, Rfl: 5    metoprolol tartrate (LOPRESSOR) 25 MG tablet, TAKE 1 & 1/2 TABLETS BY MOUTH TWICE DAILY, Disp: 90 tablet, Rfl: 1    omeprazole (priLOSEC)  20 MG capsule, TAKE 1 CAPSULE BY MOUTH DAILY, Disp: 90 capsule, Rfl: 1    trimethoprim-polymyxin b (POLYTRIM) 04155-8.1 UNIT/ML-% ophthalmic solution, Administer 1 drop to both eyes Every 6 (Six) Hours., Disp: 10 mL, Rfl: 0    atorvastatin (LIPITOR) 40 MG tablet, TAKE 1 TABLET BY MOUTH NIGHTLY, Disp: 90 tablet, Rfl: 1    tamsulosin (FLOMAX) 0.4 MG capsule 24 hr capsule, TAKE 1 CAPSULE BY MOUTH NIGHTLY (Patient not taking: Reported on 7/17/2024), Disp: 30 capsule, Rfl: 5   Assessment:                Plan:          ICD-10-CM ICD-9-CM   1. Aortic stenosis, mild  I35.0 424.1   2. Chronic atrial fibrillation  I48.20 427.31   3. Chronic anticoagulation  Z79.01 V58.61   4. Essential hypertension  I10 401.9     1. Aortic stenosis, mild  Considering patient's medical condition as well as the risk factors, patient will require echocardiogram for further evaluation for the LV function, four-chamber evaluation, including the pressures, valvular function and  pericardial disease and pericardial effusion    - Adult Transthoracic Echo Complete W/ Cont if Necessary Per Protocol; Future    2. Chronic atrial fibrillation  Controlled  - Adult Transthoracic Echo Complete W/ Cont if Necessary Per Protocol; Future    3. Chronic anticoagulation  Continue current treatment  - Adult Transthoracic Echo Complete W/ Cont if Necessary Per Protocol; Future    4. Essential hypertension  Continue current treatment      1 yr with echo  COUNSELING:    Flo Rodriguez was given to patient for the following topics: diagnostic results, risk factor reductions, impressions, risks and benefits of treatment options and importance of treatment compliance .       SMOKING COUNSELING:        Dictated using Dragon dictation

## 2024-07-18 ENCOUNTER — TELEPHONE (OUTPATIENT)
Dept: RADIATION ONCOLOGY | Facility: HOSPITAL | Age: 69
End: 2024-07-18
Payer: MEDICARE

## 2024-07-19 ENCOUNTER — OFFICE VISIT (OUTPATIENT)
Dept: RADIATION ONCOLOGY | Facility: HOSPITAL | Age: 69
End: 2024-07-19
Payer: MEDICARE

## 2024-07-19 DIAGNOSIS — C61 PROSTATE CANCER: Primary | ICD-10-CM

## 2024-07-19 PROCEDURE — G0463 HOSPITAL OUTPT CLINIC VISIT: HCPCS

## 2024-07-19 PROCEDURE — G0463 HOSPITAL OUTPT CLINIC VISIT: HCPCS | Performed by: STUDENT IN AN ORGANIZED HEALTH CARE EDUCATION/TRAINING PROGRAM

## 2024-07-22 VITALS
SYSTOLIC BLOOD PRESSURE: 119 MMHG | WEIGHT: 235 LBS | OXYGEN SATURATION: 95 % | BODY MASS INDEX: 33.72 KG/M2 | DIASTOLIC BLOOD PRESSURE: 78 MMHG | HEART RATE: 89 BPM

## 2024-07-23 ENCOUNTER — TELEPHONE (OUTPATIENT)
Dept: RADIATION ONCOLOGY | Facility: HOSPITAL | Age: 69
End: 2024-07-23
Payer: MEDICARE

## 2024-07-23 NOTE — PROGRESS NOTES
[facility] Radiation Oncology   Follow Up    Chief Complaint  Favorable intermediate risk prostate cancer         Diagnosis: Favorable intermediate risk prostate cancer       Radiation Completion Date: 1/5/2024        Prescription:      Site: Prostate and SV  Laterality: N/A  Total Dose: 6000cGy  Dose per Fraction: 300cGy  Total Fractions: 20  Daily or BID: Daily  Modality: Photon  Technique: IMRT/VMAT/Rapid Arc  Bolus: No      Interval History:    Flo Manzo presents for regularly scheduled follow-up approximately 6 months after completion of radiation therapy for prostate cancer.  The patient has done fairly well in the interim.  He has a number of other medical comorbidities and limited mobility.  He reports that his bladder is functioning at his baseline.  He reports that he is having some intermittent constipation and some softer stools.  He has some intermittent leakage and sometimes is not aware  that he has had leakage.  He has had some of this at baseline. We had discussed pelvic floor physical therapy at his last visit, but he reports that he never heard back regarding this therapy. He has not been back to see [facility] to evaluate PSA.       Imaging:      No new relevant imaging      Pathology:      No new relevant pathology      Labs:    Lab Results   Component Value Date    CREATININE 0.83 07/15/2024             Problem List:  Patient Active Problem List   Diagnosis    Umbilical hernia without obstruction and without gangrene    Essential hypertension    Arthritis of left knee    Depression    Obesity (BMI 30-39.9)    Atrial fibrillation with RVR    Substernal thyroid goiter    CHF (congestive heart failure)    Diastolic CHF, chronic    Hemorrhagic stroke    GÓMEZ on auto CPAP    Hypersomnia due to medical condition    Sleep-related hypoxia    Chronic atrial fibrillation    Vertigo    Aortic stenosis, severe    Sinus pause    Pacemaker    Anticoagulated    Generalized weakness    Prediabetes     Pure hypercholesterolemia    Pre-operative cardiovascular examination    Peripheral neuropathy    History of colon polyps          Medications:  Current Outpatient Medications on File Prior to Visit   Medication Sig Dispense Refill    acetaminophen (TYLENOL) 325 MG tablet Take 2 tablets by mouth Every 4 (Four) Hours As Needed for Mild Pain . 120 tablet 3    albuterol sulfate  (90 Base) MCG/ACT inhaler Inhale 2 puffs Every 4 (Four) Hours As Needed for Wheezing. 18 g 3    amLODIPine (NORVASC) 10 MG tablet Take 1 tablet by mouth Daily. 90 tablet 1    atorvastatin (LIPITOR) 40 MG tablet TAKE 1 TABLET BY MOUTH NIGHTLY 90 tablet 1    busPIRone (BUSPAR) 10 MG tablet TAKE 1 TABLET BY MOUTH TWICE DAILY 60 tablet 5    citalopram (CeleXA) 20 MG tablet Take 1 tablet by mouth Daily. (Patient taking differently: Take 1.5 tablets by mouth Daily. PT TAKING 1.5 TABLETS DAILY) 135 tablet 1    Diclofenac Sodium (Voltaren) 1 % gel gel Apply 4 g topically to the appropriate area as directed 4 (Four) Times a Day. 50 g 0    Eliquis 5 MG tablet tablet TAKE 1 TABLET BY MOUTH TWICE DAILY 60 tablet 5    furosemide (LASIX) 20 MG tablet Take 1 tablet by mouth Daily for 180 days. 90 tablet 1    gabapentin (NEURONTIN) 100 MG capsule TAKE ONE (1) CAPSULE BY MOUTH TWICE DAILY 60 capsule 0    gabapentin (NEURONTIN) 300 MG capsule Take 1 capsule by mouth Every Night. 90 capsule 0    lisinopril (PRINIVIL,ZESTRIL) 20 MG tablet TAKE 1 TABLET BY MOUTH DAILY 30 tablet 5    metoprolol tartrate (LOPRESSOR) 25 MG tablet TAKE 1 & 1/2 TABLETS BY MOUTH TWICE DAILY 90 tablet 1    omeprazole (priLOSEC) 20 MG capsule TAKE 1 CAPSULE BY MOUTH DAILY 90 capsule 1    tamsulosin (FLOMAX) 0.4 MG capsule 24 hr capsule TAKE 1 CAPSULE BY MOUTH NIGHTLY (Patient not taking: Reported on 7/17/2024) 30 capsule 5    trimethoprim-polymyxin b (POLYTRIM) 46871-3.1 UNIT/ML-% ophthalmic solution Administer 1 drop to both eyes Every 6 (Six) Hours. 10 mL 0    [DISCONTINUED]  "DULoxetine (CYMBALTA) 30 MG capsule TAKE 1 CAPSULE BY MOUTH EVERY NIGHT 90 capsule 1    [DISCONTINUED] metoprolol succinate XL (TOPROL-XL) 25 MG 24 hr tablet Take 1 tablet by mouth Daily. 30 tablet 0    [DISCONTINUED] QUEtiapine (SEROquel) 25 MG tablet Take 1 tablet by mouth Every Night. Take 30 min before bed 30 tablet 0     No current facility-administered medications on file prior to visit.          Allergies:  Allergies   Allergen Reactions    Poison Ivy Extract Hives, Itching and Rash     ITCHY BLISTERS         Vital Signs:  /78   Pulse 89   Wt 107 kg (235 lb)   SpO2 95%   BMI 33.72 kg/m²   Estimated body mass index is 33.72 kg/m² as calculated from the following:    Height as of 7/17/24: 177.8 cm (70\").    Weight as of this encounter: 107 kg (235 lb).  Pain Score    07/19/24 1300   PainSc: 0-No pain         ECOG: Capable of only limited selfcare; confined to bed or chair more than 50% of waking hours = 3    Physical Exam  Vitals reviewed.   Constitutional:       General: He is not in acute distress.     Appearance: Normal appearance.   HENT:      Head: Normocephalic and atraumatic.   Eyes:      Extraocular Movements: Extraocular movements intact.      Pupils: Pupils are equal, round, and reactive to light.   Pulmonary:      Effort: Pulmonary effort is normal.   Abdominal:      General: Abdomen is flat.      Palpations: Abdomen is soft.   Musculoskeletal:      Cervical back: Normal range of motion.   Skin:     General: Skin is warm and dry.   Neurological:      Mental Status: He is alert and oriented to person, place, and time.   Psychiatric:         Mood and Affect: Mood normal.         Behavior: Behavior normal.            Result Review :  The following data was reviewed by: Temo Wheat MD on 07/19/2024:  Labs: Last Creatinine            Diagnoses and all orders for this visit:    1. Prostate cancer (Primary)  -     PSA Diagnostic; Future        Assessment:    Flo Manzo presents for " regularly scheduled follow-up approximately 6 months after completion of radiation therapy for prostate cancer.  The patient has done fairly well in the interim.  He has a number of other medical comorbidities and limited mobility.  He reports that his bladder is functioning at his baseline.  He reports that he is having some intermittent constipation and some softer stools.  He has some intermittent leakage and sometimes is not aware  that he has had leakage.  He has had some of this at baseline. We had discussed pelvic floor physical therapy at his last visit, but he reports that he never heard back regarding this therapy. He has not been back to see [facility] to evaluate PSA.     I met with the patient and reviewed his symptoms in detail. We will ensure that he is contacted by pelvic floor physical therapy. I also encouraged him to reach out for follow up with [facility]. As he has not yet undergone postradiation PSA we will order this today.       Plan:    -Follow up in 6 months  -PSA diagnostic ordered       I spent 30 minutes caring for Flo on this date of service. This time includes time spent by me in the following activities:preparing for the visit, reviewing tests, obtaining and/or reviewing a separately obtained history, documenting information in the medical record, independently interpreting results and communicating that information with the patient/family/caregiver, and care coordination  Follow Up   No follow-ups on file.  Patient was given instructions and counseling regarding his condition or for health maintenance advice. Please see specific information pulled into the AVS if appropriate.     Temo Wheat MD

## 2024-07-23 NOTE — TELEPHONE ENCOUNTER
Called pt. To answer questions in regard to last office visit. I gave pt. Number for [facility] and explained where to go to get PSA lab. Pt. Verbalized understanding

## 2024-07-25 ENCOUNTER — TELEPHONE (OUTPATIENT)
Dept: SLEEP MEDICINE | Facility: HOSPITAL | Age: 69
End: 2024-07-25
Payer: MEDICARE

## 2024-07-25 DIAGNOSIS — I10 ESSENTIAL HYPERTENSION: Chronic | ICD-10-CM

## 2024-07-25 DIAGNOSIS — I20.89 ANGINA AT REST: ICD-10-CM

## 2024-07-25 NOTE — TELEPHONE ENCOUNTER
Pt called in and stated that he had not received his Cpap machine from [facility]. I called [facility] and they stated that his machine was sent to his home and it was showing that it was delivered. They gave me the ups tracking number of [id number]. I called Pt and informed him of delivery and gave him tracking number , so he can call [employer] to find CPAP MACHINE

## 2024-07-29 RX ORDER — ATORVASTATIN CALCIUM 40 MG/1
40 TABLET, FILM COATED ORAL NIGHTLY
Qty: 90 TABLET | Refills: 1 | Status: SHIPPED | OUTPATIENT
Start: 2024-07-29

## 2024-08-05 ENCOUNTER — HOSPITAL ENCOUNTER (INPATIENT)
Facility: HOSPITAL | Age: 69
LOS: 4 days | Discharge: NURSING, REHAB OR LONG-TERM CARE | DRG: 177 | End: 2024-08-09
Attending: EMERGENCY MEDICINE | Admitting: INTERNAL MEDICINE
Payer: MEDICARE

## 2024-08-05 ENCOUNTER — APPOINTMENT (OUTPATIENT)
Dept: GENERAL RADIOLOGY | Facility: HOSPITAL | Age: 69
DRG: 177 | End: 2024-08-05
Payer: MEDICARE

## 2024-08-05 ENCOUNTER — APPOINTMENT (OUTPATIENT)
Dept: CT IMAGING | Facility: HOSPITAL | Age: 69
DRG: 177 | End: 2024-08-05
Payer: MEDICARE

## 2024-08-05 DIAGNOSIS — Z79.01 CHRONIC ANTICOAGULATION: ICD-10-CM

## 2024-08-05 DIAGNOSIS — R42 DISEQUILIBRIUM: Primary | ICD-10-CM

## 2024-08-05 DIAGNOSIS — R50.9 ACUTE FEBRILE ILLNESS: ICD-10-CM

## 2024-08-05 LAB
ABO GROUP BLD: NORMAL
ALBUMIN SERPL-MCNC: 4.1 G/DL (ref 3.5–5.2)
ALBUMIN/GLOB SERPL: 1.1 G/DL
ALP SERPL-CCNC: 111 U/L (ref 39–117)
ALT SERPL W P-5'-P-CCNC: 14 U/L (ref 1–41)
ANION GAP SERPL CALCULATED.3IONS-SCNC: 9.6 MMOL/L (ref 5–15)
AST SERPL-CCNC: 19 U/L (ref 1–40)
B PARAPERT DNA SPEC QL NAA+PROBE: NOT DETECTED
B PERT DNA SPEC QL NAA+PROBE: NOT DETECTED
BACTERIA UR QL AUTO: NORMAL /HPF
BASOPHILS # BLD AUTO: 0.02 10*3/MM3 (ref 0–0.2)
BASOPHILS NFR BLD AUTO: 0.2 % (ref 0–1.5)
BILIRUB SERPL-MCNC: 0.8 MG/DL (ref 0–1.2)
BILIRUB UR QL STRIP: NEGATIVE
BLD GP AB SCN SERPL QL: NEGATIVE
BUN SERPL-MCNC: 15 MG/DL (ref 8–23)
BUN/CREAT SERPL: 15.3 (ref 7–25)
C PNEUM DNA NPH QL NAA+NON-PROBE: NOT DETECTED
CALCIUM SPEC-SCNC: 9.9 MG/DL (ref 8.6–10.5)
CHLORIDE SERPL-SCNC: 105 MMOL/L (ref 98–107)
CLARITY UR: CLEAR
CO2 SERPL-SCNC: 25.4 MMOL/L (ref 22–29)
COLOR UR: YELLOW
CREAT SERPL-MCNC: 0.98 MG/DL (ref 0.76–1.27)
D-LACTATE SERPL-SCNC: 1.1 MMOL/L (ref 0.5–2)
D-LACTATE SERPL-SCNC: 2.1 MMOL/L (ref 0.5–2)
DEPRECATED RDW RBC AUTO: 40.9 FL (ref 37–54)
EGFRCR SERPLBLD CKD-EPI 2021: 84 ML/MIN/1.73
EOSINOPHIL # BLD AUTO: 0.04 10*3/MM3 (ref 0–0.4)
EOSINOPHIL NFR BLD AUTO: 0.3 % (ref 0.3–6.2)
ERYTHROCYTE [DISTWIDTH] IN BLOOD BY AUTOMATED COUNT: 12.8 % (ref 12.3–15.4)
FLUAV SUBTYP SPEC NAA+PROBE: NOT DETECTED
FLUBV RNA ISLT QL NAA+PROBE: NOT DETECTED
GLOBULIN UR ELPH-MCNC: 3.6 GM/DL
GLUCOSE BLDC GLUCOMTR-MCNC: 102 MG/DL (ref 70–130)
GLUCOSE SERPL-MCNC: 101 MG/DL (ref 65–99)
GLUCOSE UR STRIP-MCNC: NEGATIVE MG/DL
HADV DNA SPEC NAA+PROBE: NOT DETECTED
HCOV 229E RNA SPEC QL NAA+PROBE: NOT DETECTED
HCOV HKU1 RNA SPEC QL NAA+PROBE: NOT DETECTED
HCOV NL63 RNA SPEC QL NAA+PROBE: NOT DETECTED
HCOV OC43 RNA SPEC QL NAA+PROBE: NOT DETECTED
HCT VFR BLD AUTO: 46.6 % (ref 37.5–51)
HGB BLD-MCNC: 14.9 G/DL (ref 13–17.7)
HGB UR QL STRIP.AUTO: NEGATIVE
HMPV RNA NPH QL NAA+NON-PROBE: NOT DETECTED
HOLD SPECIMEN: NORMAL
HPIV1 RNA ISLT QL NAA+PROBE: NOT DETECTED
HPIV2 RNA SPEC QL NAA+PROBE: NOT DETECTED
HPIV3 RNA NPH QL NAA+PROBE: NOT DETECTED
HPIV4 P GENE NPH QL NAA+PROBE: NOT DETECTED
HYALINE CASTS UR QL AUTO: NORMAL /LPF
IMM GRANULOCYTES # BLD AUTO: 0.03 10*3/MM3 (ref 0–0.05)
IMM GRANULOCYTES NFR BLD AUTO: 0.3 % (ref 0–0.5)
INR PPP: 1.15 (ref 0.9–1.1)
KETONES UR QL STRIP: ABNORMAL
LEUKOCYTE ESTERASE UR QL STRIP.AUTO: NEGATIVE
LYMPHOCYTES # BLD AUTO: 0.73 10*3/MM3 (ref 0.7–3.1)
LYMPHOCYTES NFR BLD AUTO: 6.1 % (ref 19.6–45.3)
M PNEUMO IGG SER IA-ACNC: NOT DETECTED
MCH RBC QN AUTO: 28.2 PG (ref 26.6–33)
MCHC RBC AUTO-ENTMCNC: 32 G/DL (ref 31.5–35.7)
MCV RBC AUTO: 88.1 FL (ref 79–97)
MONOCYTES # BLD AUTO: 0.45 10*3/MM3 (ref 0.1–0.9)
MONOCYTES NFR BLD AUTO: 3.8 % (ref 5–12)
NEUTROPHILS NFR BLD AUTO: 10.64 10*3/MM3 (ref 1.7–7)
NEUTROPHILS NFR BLD AUTO: 89.3 % (ref 42.7–76)
NITRITE UR QL STRIP: NEGATIVE
NRBC BLD AUTO-RTO: 0 /100 WBC (ref 0–0.2)
PH UR STRIP.AUTO: 5.5 [PH] (ref 5–8)
PLATELET # BLD AUTO: 255 10*3/MM3 (ref 140–450)
PMV BLD AUTO: 10 FL (ref 6–12)
POTASSIUM SERPL-SCNC: 3.5 MMOL/L (ref 3.5–5.2)
PROCALCITONIN SERPL-MCNC: 0.04 NG/ML (ref 0–0.25)
PROT SERPL-MCNC: 7.7 G/DL (ref 6–8.5)
PROT UR QL STRIP: ABNORMAL
PROTHROMBIN TIME: 14.9 SECONDS (ref 11.7–14.2)
QT INTERVAL: 332 MS
QTC INTERVAL: 466 MS
RBC # BLD AUTO: 5.29 10*6/MM3 (ref 4.14–5.8)
RBC # UR STRIP: NORMAL /HPF
REF LAB TEST METHOD: NORMAL
RH BLD: POSITIVE
RHINOVIRUS RNA SPEC NAA+PROBE: NOT DETECTED
RSV RNA NPH QL NAA+NON-PROBE: NOT DETECTED
SARS-COV-2 RNA NPH QL NAA+NON-PROBE: NOT DETECTED
SODIUM SERPL-SCNC: 140 MMOL/L (ref 136–145)
SP GR UR STRIP: 1.02 (ref 1–1.03)
SQUAMOUS #/AREA URNS HPF: NORMAL /HPF
T&S EXPIRATION DATE: NORMAL
UROBILINOGEN UR QL STRIP: ABNORMAL
WBC # UR STRIP: NORMAL /HPF
WBC NRBC COR # BLD AUTO: 11.91 10*3/MM3 (ref 3.4–10.8)
WHOLE BLOOD HOLD COAG: NORMAL
WHOLE BLOOD HOLD SPECIMEN: NORMAL

## 2024-08-05 PROCEDURE — 0202U NFCT DS 22 TRGT SARS-COV-2: CPT | Performed by: EMERGENCY MEDICINE

## 2024-08-05 PROCEDURE — 86900 BLOOD TYPING SEROLOGIC ABO: CPT | Performed by: EMERGENCY MEDICINE

## 2024-08-05 PROCEDURE — 81001 URINALYSIS AUTO W/SCOPE: CPT | Performed by: EMERGENCY MEDICINE

## 2024-08-05 PROCEDURE — 80053 COMPREHEN METABOLIC PANEL: CPT | Performed by: EMERGENCY MEDICINE

## 2024-08-05 PROCEDURE — 87040 BLOOD CULTURE FOR BACTERIA: CPT | Performed by: EMERGENCY MEDICINE

## 2024-08-05 PROCEDURE — 85610 PROTHROMBIN TIME: CPT | Performed by: EMERGENCY MEDICINE

## 2024-08-05 PROCEDURE — 70450 CT HEAD/BRAIN W/O DYE: CPT

## 2024-08-05 PROCEDURE — 93005 ELECTROCARDIOGRAM TRACING: CPT | Performed by: EMERGENCY MEDICINE

## 2024-08-05 PROCEDURE — 84145 PROCALCITONIN (PCT): CPT | Performed by: EMERGENCY MEDICINE

## 2024-08-05 PROCEDURE — 36415 COLL VENOUS BLD VENIPUNCTURE: CPT

## 2024-08-05 PROCEDURE — 85025 COMPLETE CBC W/AUTO DIFF WBC: CPT | Performed by: EMERGENCY MEDICINE

## 2024-08-05 PROCEDURE — 83605 ASSAY OF LACTIC ACID: CPT | Performed by: EMERGENCY MEDICINE

## 2024-08-05 PROCEDURE — 86901 BLOOD TYPING SEROLOGIC RH(D): CPT | Performed by: EMERGENCY MEDICINE

## 2024-08-05 PROCEDURE — 71045 X-RAY EXAM CHEST 1 VIEW: CPT

## 2024-08-05 PROCEDURE — 99291 CRITICAL CARE FIRST HOUR: CPT

## 2024-08-05 PROCEDURE — 86850 RBC ANTIBODY SCREEN: CPT | Performed by: EMERGENCY MEDICINE

## 2024-08-05 PROCEDURE — 82948 REAGENT STRIP/BLOOD GLUCOSE: CPT

## 2024-08-05 PROCEDURE — 93010 ELECTROCARDIOGRAM REPORT: CPT | Performed by: INTERNAL MEDICINE

## 2024-08-05 RX ORDER — ACETAMINOPHEN 650 MG/1
650 SUPPOSITORY RECTAL ONCE
Status: COMPLETED | OUTPATIENT
Start: 2024-08-05 | End: 2024-08-05

## 2024-08-05 RX ORDER — SODIUM CHLORIDE 0.9 % (FLUSH) 0.9 %
10 SYRINGE (ML) INJECTION AS NEEDED
Status: DISCONTINUED | OUTPATIENT
Start: 2024-08-05 | End: 2024-08-09 | Stop reason: HOSPADM

## 2024-08-05 RX ORDER — ACETAMINOPHEN 500 MG
1000 TABLET ORAL ONCE
Status: DISCONTINUED | OUTPATIENT
Start: 2024-08-05 | End: 2024-08-05

## 2024-08-05 RX ADMIN — ACETAMINOPHEN 650 MG: 650 SUPPOSITORY RECTAL at 20:33

## 2024-08-05 NOTE — ED PROVIDER NOTES
EMERGENCY DEPARTMENT ENCOUNTER  Room Number:  P594/1  PCP: Karen Jiménez APRN  Independent Historians: Patient, EMS who brought patient      HPI:  Chief Complaint: Trouble walking    A complete HPI/ROS/PMH/PSH/SH/FH are unobtainable due to:   Chronic or social conditions impacting patient care (Social Determinants of Health):       Context: Flo Manzo is a 69 y.o. male with a medical history of A-fib, hypertension, hyperlipidemia who presents to the ED c/o acute trouble with balance.  Patient states he has had chronic balance difficulties and sees physical therapy.  He generally walks with a cane but has had more trouble walking with a cane recently.  Patient had more trouble walking today.  Denies new focal weakness.  Denies headache or recent illness.  According to EMS family had noticed some facial droop and possible slurred speech.      Review of prior external notes (non-ED) -and- Review of prior external test results outside of this encounter:   I reviewed prior medical records note patient was hospitalized in 2022 with A-fib with RVR.  Chronic conditions include CHF, atrial fibrillation, hypertension and sleep apnea.  Patient is anticoagulated on Eliquis.      Prescription drug monitoring program review:         PAST MEDICAL HISTORY  Active Ambulatory Problems     Diagnosis Date Noted    Umbilical hernia without obstruction and without gangrene 12/11/2017    Essential hypertension 01/05/2018    Arthritis of left knee 03/14/2016    Depression 01/05/2018    Obesity (BMI 30-39.9) 03/14/2016    Atrial fibrillation with RVR 01/24/2019    Substernal thyroid goiter 01/24/2019    CHF (congestive heart failure) 01/24/2019    Diastolic CHF, chronic 02/01/2019    Hemorrhagic stroke 02/28/2019    GÓMEZ on auto CPAP 09/08/2019    Hypersomnia due to medical condition 09/08/2019    Sleep-related hypoxia 10/26/2019    Chronic atrial fibrillation 03/19/2020    Vertigo 06/19/2020    Aortic stenosis, mild 12/23/2021     Sinus pause 03/06/2022    Pacemaker 04/25/2022    Chronic anticoagulation 06/24/2022    Generalized weakness 07/29/2022    Prediabetes 02/03/2023    Pure hypercholesterolemia 05/08/2023    Pre-operative cardiovascular examination 01/30/2024    Peripheral neuropathy 02/08/2024    History of colon polyps 03/14/2024     Resolved Ambulatory Problems     Diagnosis Date Noted    Dyspnea 01/24/2019    Angina at rest 01/24/2019    Intraparenchymal hemorrhage of brain 02/01/2019    SSS (sick sinus syndrome) 01/06/2022    S/P ICD (internal cardiac defibrillator) procedure 03/06/2022     Past Medical History:   Diagnosis Date    Arthritis     Colon polyps 01/04/2018    Heart murmur     Hypertension     New onset atrial fibrillation (CMS/HCC) - RVR 01/24/2019    GÓMEZ (obstructive sleep apnea) 06/06/2019    Sleep apnea     Stroke     Uncontrolled hypertension     Ventral hernia          PAST SURGICAL HISTORY  Past Surgical History:   Procedure Laterality Date    APPENDECTOMY N/A 1972    BACK SURGERY      BLADDER STIMULATOR IMPLANT L LOWER BACK    CARDIAC CATHETERIZATION N/A 07/20/2004    Cath left ventriculography, coronary angiography and left heart catheterization, Normal results-Dr. Fierro     CARDIAC CATHETERIZATION N/A 1/29/2019    Procedure: Left Heart Cath;  Surgeon: Eren Bruce MD;  [facility];  Service: Cardiology    CARDIAC CATHETERIZATION N/A 1/29/2019    Procedure: Left ventriculography;  Surgeon: Eren Bruce MD;  Location:  [facility] CATH INVASIVE LOCATION;  Service: Cardiology    CARDIAC CATHETERIZATION N/A 1/29/2019    Procedure: Coronary angiography;  Surgeon: Eren Bruce MD;  Location:  [facility] CATH INVASIVE LOCATION;  Service: Cardiology    CARDIAC ELECTROPHYSIOLOGY PROCEDURE N/A 3/4/2022    Procedure: Pacemaker SC new BIOTRONIK;  Surgeon: Eren Bruce MD;  Location:  [facility] CATH INVASIVE LOCATION;  Service: Cardiology;  Laterality: N/A;     CARPAL TUNNEL RELEASE Right 2013    CARPAL TUNNEL RELEASE Left     COLONOSCOPY N/A 1/4/2018    Procedure: COLONOSCOPY with cold polypectomy and hot snare polypectomy;  Surgeon: Ten Solitario MD;  [facility];  Service:     COLONOSCOPY N/A 4/25/2024    Procedure: COLONOSCOPY INTO CECUM;  Surgeon: Ten Solitario MD;  Location:  [facility] ENDOSCOPY;  Service: General;  Laterality: N/A;  PRE: PERSONAL H/O COLON POLYP  /  POST: POOR PREP OTHERWISE NORMAL    EYE LENS IMPLANT SECONDARY Bilateral 2007, 2008    KNEE CARTILAGE SURGERY Right 1979    TONSILLECTOMY AND ADENOIDECTOMY Bilateral 2005    TOTAL KNEE ARTHROPLASTY Left 03/14/2016    Left total knee arthroplasty with Amberly component, size 11 femur, G tibial baseplate with a 10 polyethylene insert and a 38 patellar button-Dr. Pablo Hairston    TOTAL KNEE ARTHROPLASTY Right 03/02/2015    Right total knee arthroplasty with Amberly component size G femur, 11 tibial base plate with an 11 polyethylene insert and a 38 patellar button-Dr. Pablo Hairston    UVULOPALATOPHARYNGOPLASTY N/A 2005    part of soft palate, and uvula    VENTRAL HERNIA REPAIR N/A 1/23/2018    Procedure: VENTRAL HERNIA REPAIR LAPAROSCOPIC WITH DAVINCI ROBOT AND MESH;  Surgeon: Ten Solitario MD;  Location: [facility] MAIN OR;  Service:          FAMILY HISTORY  Family History   Problem Relation Age of Onset    Hypertension Mother     Kidney failure Mother     Coronary artery disease Father     Lung cancer Father         positive smoker    Heart attack Father     Breast cancer Sister 60    Prostate cancer Brother     Colon polyps Brother     Kidney nephrosis Brother     Malig Hyperthermia Neg Hx          SOCIAL HISTORY  Social History     Socioeconomic History    Marital status:    Tobacco Use    Smoking status: Never     Passive exposure: Past    Smokeless tobacco: Never   Vaping Use    Vaping status: Never Used   Substance and Sexual Activity    Alcohol use: Not Currently      Comment: social, maybe a drink a month, not since stroke    Drug use: No     Comment: previously smoked marijuana     Sexual activity: Defer         ALLERGIES  Poison ivy extract      REVIEW OF SYSTEMS  Review of Systems   Constitutional:  Negative for fever.   Respiratory:  Negative for shortness of breath.    Cardiovascular:  Negative for chest pain.   Neurological:  Negative for speech difficulty.        Patient reports difficulty with balance   All other systems reviewed and are negative.    Included in HPI  All systems reviewed and negative except for those discussed in HPI.      PHYSICAL EXAM    I have reviewed the triage vital signs and nursing notes.    ED Triage Vitals   Temp Pulse Resp BP SpO2   -- -- -- -- --      Temp src Heart Rate Source Patient Position BP Location FiO2 (%)   -- -- -- -- --       Physical Exam  GENERAL: Alert male no obvious distress.  Triage vitals reviewed  SKIN: Warm, dry  HENT: Normocephalic, atraumatic  EYES: no scleral icterus  CV: Irregularly irregular  RESPIRATORY: normal effort, lungs clear  ABDOMEN: soft, nontender, nondistended  MUSCULOSKELETAL: no deformity  NEURO: alert, moves all extremities, follows commands  NIHSS:    Baseline  0-->Alert: keenly responsive  0-->Answers both questions correctly  0-->Performs both tasks correctly  0=normal  0=No visual loss  0=Normal symmetric movement  0-->No drift: limb holds 90 (or 45) degrees for full 10 secs  0-->No drift: limb holds 90 (or 45) degrees for full 10 secs  0-->No drift: limb holds 90 (or 45) degrees for full 10 secs  0-->No drift: limb holds 90 (or 45) degrees for full 10 secs  0=Absent  0=Normal; no sensory loss  0=No aphasia, normal  0=Normal  0=No abnormality    Total score: 0                                           LAB RESULTS  Recent Results (from the past 24 hour(s))   Comprehensive Metabolic Panel    Collection Time: 08/05/24  8:02 PM    Specimen: Blood   Result Value Ref Range    Glucose 101 (H) 65 - 99 mg/dL     BUN 15 8 - 23 mg/dL    Creatinine 0.98 0.76 - 1.27 mg/dL    Sodium 140 136 - 145 mmol/L    Potassium 3.5 3.5 - 5.2 mmol/L    Chloride 105 98 - 107 mmol/L    CO2 25.4 22.0 - 29.0 mmol/L    Calcium 9.9 8.6 - 10.5 mg/dL    Total Protein 7.7 6.0 - 8.5 g/dL    Albumin 4.1 3.5 - 5.2 g/dL    ALT (SGPT) 14 1 - 41 U/L    AST (SGOT) 19 1 - 40 U/L    Alkaline Phosphatase 111 39 - 117 U/L    Total Bilirubin 0.8 0.0 - 1.2 mg/dL    Globulin 3.6 gm/dL    A/G Ratio 1.1 g/dL    BUN/Creatinine Ratio 15.3 7.0 - 25.0    Anion Gap 9.6 5.0 - 15.0 mmol/L    eGFR 84.0 >60.0 mL/min/1.73   Protime-INR    Collection Time: 08/05/24  8:02 PM    Specimen: Blood   Result Value Ref Range    Protime 14.9 (H) 11.7 - 14.2 Seconds    INR 1.15 (H) 0.90 - 1.10   Type & Screen    Collection Time: 08/05/24  8:02 PM    Specimen: Blood   Result Value Ref Range    ABO Type B     RH type Positive     Antibody Screen Negative     T&S Expiration Date 8/8/2024 11:59:59 PM    CBC Auto Differential    Collection Time: 08/05/24  8:02 PM    Specimen: Blood   Result Value Ref Range    WBC 11.91 (H) 3.40 - 10.80 10*3/mm3    RBC 5.29 4.14 - 5.80 10*6/mm3    Hemoglobin 14.9 13.0 - 17.7 g/dL    Hematocrit 46.6 37.5 - 51.0 %    MCV 88.1 79.0 - 97.0 fL    MCH 28.2 26.6 - 33.0 pg    MCHC 32.0 31.5 - 35.7 g/dL    RDW 12.8 12.3 - 15.4 %    RDW-SD 40.9 37.0 - 54.0 fl    MPV 10.0 6.0 - 12.0 fL    Platelets 255 140 - 450 10*3/mm3    Neutrophil % 89.3 (H) 42.7 - 76.0 %    Lymphocyte % 6.1 (L) 19.6 - 45.3 %    Monocyte % 3.8 (L) 5.0 - 12.0 %    Eosinophil % 0.3 0.3 - 6.2 %    Basophil % 0.2 0.0 - 1.5 %    Immature Grans % 0.3 0.0 - 0.5 %    Neutrophils, Absolute 10.64 (H) 1.70 - 7.00 10*3/mm3    Lymphocytes, Absolute 0.73 0.70 - 3.10 10*3/mm3    Monocytes, Absolute 0.45 0.10 - 0.90 10*3/mm3    Eosinophils, Absolute 0.04 0.00 - 0.40 10*3/mm3    Basophils, Absolute 0.02 0.00 - 0.20 10*3/mm3    Immature Grans, Absolute 0.03 0.00 - 0.05 10*3/mm3    nRBC 0.0 0.0 - 0.2 /100 WBC    Green Top (Gel)    Collection Time: 08/05/24  8:02 PM   Result Value Ref Range    Extra Tube Hold for add-ons.    Lavender Top    Collection Time: 08/05/24  8:02 PM   Result Value Ref Range    Extra Tube hold for add-on    Light Blue Top    Collection Time: 08/05/24  8:02 PM   Result Value Ref Range    Extra Tube Hold for add-ons.    Procalcitonin    Collection Time: 08/05/24  8:02 PM    Specimen: Blood   Result Value Ref Range    Procalcitonin 0.04 0.00 - 0.25 ng/mL   POC Glucose Once    Collection Time: 08/05/24  8:10 PM    Specimen: Blood   Result Value Ref Range    Glucose 102 70 - 130 mg/dL   ECG 12 Lead ED Triage Standing Order; Stroke (Onset >12 hrs)    Collection Time: 08/05/24  8:11 PM   Result Value Ref Range    QT Interval 332 ms    QTC Interval 466 ms   Lactic Acid, Plasma    Collection Time: 08/05/24  8:16 PM    Specimen: Blood   Result Value Ref Range    Lactate 2.1 (C) 0.5 - 2.0 mmol/L   Respiratory Panel PCR w/COVID-19(SARS-CoV-2) [facility]/B[facility]O/[facility] In-House, NP Swab in UTM/VTM, 2 HR TAT - Swab, Nasopharynx    Collection Time: 08/05/24  8:16 PM    Specimen: Nasopharynx; Swab   Result Value Ref Range    ADENOVIRUS, PCR Not Detected Not Detected    Coronavirus 229E Not Detected Not Detected    Coronavirus HKU1 Not Detected Not Detected    Coronavirus NL63 Not Detected Not Detected    Coronavirus OC43 Not Detected Not Detected    COVID19 Not Detected Not Detected - Ref. Range    Human Metapneumovirus Not Detected Not Detected    Human Rhinovirus/Enterovirus Not Detected Not Detected    Influenza A PCR Not Detected Not Detected    Influenza B PCR Not Detected Not Detected    Parainfluenza Virus 1 Not Detected Not Detected    Parainfluenza Virus 2 Not Detected Not Detected    Parainfluenza Virus 3 Not Detected Not Detected    Parainfluenza Virus 4 Not Detected Not Detected    RSV, PCR Not Detected Not Detected    Bordetella pertussis pcr Not Detected Not Detected    Bordetella parapertussis PCR Not  Detected Not Detected    Chlamydophila pneumoniae PCR Not Detected Not Detected    Mycoplasma pneumo by PCR Not Detected Not Detected   Urinalysis With Microscopic If Indicated (No Culture) - Urine, Clean Catch    Collection Time: 08/05/24  8:33 PM    Specimen: Urine, Clean Catch   Result Value Ref Range    Color, UA Yellow Yellow, Straw    Appearance, UA Clear Clear    pH, UA 5.5 5.0 - 8.0    Specific Gravity, UA 1.022 1.005 - 1.030    Glucose, UA Negative Negative    Ketones, UA Trace (A) Negative    Bilirubin, UA Negative Negative    Blood, UA Negative Negative    Protein, UA 30 mg/dL (1+) (A) Negative    Leuk Esterase, UA Negative Negative    Nitrite, UA Negative Negative    Urobilinogen, UA 1.0 E.U./dL 0.2 - 1.0 E.U./dL   Urinalysis, Microscopic Only - Urine, Clean Catch    Collection Time: 08/05/24  8:33 PM    Specimen: Urine, Clean Catch   Result Value Ref Range    RBC, UA 0-2 None Seen, 0-2 /HPF    WBC, UA 0-2 None Seen, 0-2 /HPF    Bacteria, UA None Seen None Seen /HPF    Squamous Epithelial Cells, UA 0-2 None Seen, 0-2 /HPF    Hyaline Casts, UA 3-6 None Seen /LPF    Methodology Automated Microscopy    STAT Lactic Acid, Reflex    Collection Time: 08/05/24 11:20 PM    Specimen: Blood   Result Value Ref Range    Lactate 1.1 0.5 - 2.0 mmol/L         RADIOLOGY  CT Head Without Contrast Stroke Protocol    Result Date: 8/5/2024  CT OF THE HEAD WITHOUT CONTRAST  HISTORY: Difficulty walking  COMPARISON: July 12, 2019  TECHNIQUE: Axial CT imaging was obtained through the brain. No IV contrast was administered.  FINDINGS: No acute intracranial hemorrhage is seen. There is atrophy. There is no midline shift or mass effect. There is periventricular and deep white matter microangiopathic change. Degree of atrophy is somewhat advanced for the age of 69. Paranasal sinuses and mastoid air cells appear clear.      No acute intracranial findings.  Radiation dose reduction techniques were utilized, including automated  exposure control and exposure modulation based on body size.   This report was finalized on 8/5/2024 10:07 PM by Dr. Beverly Cat M.D on Workstation: [id number]      XR Chest 1 View    Result Date: 8/5/2024  XR CHEST 1 VW-  CLINICAL HISTORY:  Stroke Protocol (Onset > 12 hrs)  COMPARISON: Chest x-ray 7/29/2022 and CT chest 3/21/2024  FINDINGS: A single view of the chest demonstrates moderate cardiomegaly. There is mild prominence of the pulmonary vasculature suggesting mild congestive changes, more prominent as compared to the prior chest x-ray. A small area of increased density is present in the retrocardiac region which is new versus the prior chest x-ray suggesting a small area of atelectasis/infiltrate at the left lung base. There is no evidence of consolidation or gross effusion.  The trachea is attenuated in the superior mediastinum. This CT examination of the chest from 3/21/2024 demonstrated prominence of the thyroid, extending into the superior mediastinum consistent with a goiter.    This report was finalized on 8/5/2024 8:18 PM by Dr. Kristopher Henriquez M.D on Workstation: [id number]         MEDICATIONS GIVEN IN ER  Medications   sodium chloride 0.9 % flush 10 mL (has no administration in time range)   sodium chloride 0.9 % infusion (has no administration in time range)   acetaminophen (TYLENOL) tablet 650 mg (has no administration in time range)     Or   acetaminophen (TYLENOL) suppository 650 mg (has no administration in time range)   aspirin tablet 325 mg (has no administration in time range)     Or   aspirin suppository 300 mg (has no administration in time range)   atorvastatin (LIPITOR) tablet 80 mg (has no administration in time range)   ondansetron (ZOFRAN) injection 4 mg (has no administration in time range)   bisacodyl (DULCOLAX) suppository 10 mg (has no administration in time range)   acetaminophen (TYLENOL) suppository 650 mg (650 mg Rectal Given 8/5/24 2033)         ORDERS PLACED DURING  THIS VISIT:  Orders Placed This Encounter   Procedures    Blood Culture - Blood,    Blood Culture - Blood,    Respiratory Panel PCR w/COVID-19(SARS-CoV-2) [facility]O/[facility]/COR/[facility] In-House, NP Swab in UTM/VTM, 2 HR TAT - Swab, Nasopharynx    CT Head Without Contrast Stroke Protocol    XR Chest 1 View    MRI Brain Without Contrast    Comprehensive Metabolic Panel    Protime-INR    [place] Draw    CBC Auto Differential    Lactic Acid, Plasma    Procalcitonin    Urinalysis With Microscopic If Indicated (No Culture) - Urine, Clean Catch    STAT Lactic Acid, Reflex    Urinalysis, Microscopic Only - Urine, Clean Catch    CBC (No Diff)    Comprehensive Metabolic Panel    Hemoglobin A1c    Lipid Panel    TSH    NPO Diet NPO Type: Sips with Meds, Strict NPO    Undress and Gown    Vital Signs    Nursing Dysphagia Screening (Complete Prior to Giving anything PO)    RN to Place Order SLP Consult (IF swallow screen failed) - Eval & Treat Choosing Reason of RN Dysphagia Screen Failed    Vital Signs    Continuous Pulse Oximetry    Maintain IV Access    Telemetry - Place Orders & Notify Provider of Results When Patient Experiences Acute Chest Pain, Dysrhythmia or Respiratory Distress    Notify physician of changes in level of consciousness, worsening of stroke symptoms, acute headache or severe nausea and vomiting or any of the following vital sign parameters:    Nursing Dysphagia Screening    RN to Place Order SLP Consult - Eval & Treat Choosing Reason of RN Dysphagia Screen Failed    Nurse to Call MD or Nutrition Services for Diet if Patient Passes Dysphagia Screen    Intake and Output    Neuro Checks    NIHSS Assessment    Order CT Head Without Contrast for Neurological Decline    Provide Stroke Education Material    Tobacco Cessation Education    Place Sequential Compression Device    Maintain Sequential Compression Device    Activity As Tolerated    Code Status and Medical Interventions: CPR (Attempt to Resuscitate); Full  Support    [facility] (on-call MD unless specified) Details    Inpatient Case Management  Consult    Inpatient Nutrition Consult    Inpatient Spiritual Care Consult    Notify Stroke Coordinator    Consult to Case Management     Consult to Diabetes Educator    Inpatient Neurology Consult Stroke    OT Consult: Eval & Treat    PT Consult: Eval & Treat as tolerated    Oxygen Therapy-    SLP Consult: Eval & Treat RN Dysphagia Screen Failed    SLP Consult: Eval & Treat RN Dysphagia Screen Failed    POC Glucose Once    POC Glucose Once    POC Glucose Q6H    ECG 12 Lead ED Triage Standing Order; Stroke (Onset >12 hrs)    ECG 12 Lead Chest Pain    Telemetry Scan    Type & Screen    Insert Peripheral IV    Inpatient Admission    Fall Precautions    CBC & Differential    Green Top (Gel)    Lavender Top    Light Blue Top         OUTPATIENT MEDICATION MANAGEMENT:  Current Facility-Administered Medications Ordered in Epic   Medication Dose Route Frequency Provider Last Rate Last Admin    acetaminophen (TYLENOL) tablet 650 mg  650 mg Oral Q4H PRN Lorena Santoyo APRN        Or    acetaminophen (TYLENOL) suppository 650 mg  650 mg Rectal Q4H PRN Lorena Santoyo APRN        aspirin tablet 325 mg  325 mg Oral Daily Lorena Santoyo APRN        Or    aspirin suppository 300 mg  300 mg Rectal Daily Lorena Santoyo APRN        atorvastatin (LIPITOR) tablet 80 mg  80 mg Oral Nightly Lorena Santoyo APRN        bisacodyl (DULCOLAX) suppository 10 mg  10 mg Rectal Daily PRN Lorena Santoyo APRN        ondansetron (ZOFRAN) injection 4 mg  4 mg Intravenous Q6H PRN Lorena Santoyo APRN        sodium chloride 0.9 % flush 10 mL  10 mL Intravenous PRN Genaro Orourke MD        sodium chloride 0.9 % infusion  50 mL/hr Intravenous Continuous Lorena Santoyo APRN         No current [facility]-ordered outpatient medications on file.         PROCEDURES  Critical Care    Performed by:  Genaro Orourke MD  Authorized by: Genaro Orourke MD    Critical care provider statement:     Critical care time (minutes):  39    Critical care time was exclusive of:  Separately billable procedures and treating other patients    Critical care was necessary to treat or prevent imminent or life-threatening deterioration of the following conditions:  CNS failure or compromise and sepsis    Critical care was time spent personally by me on the following activities:  Ordering and performing treatments and interventions, pulse oximetry, re-evaluation of patient's condition, review of old charts, ordering and review of radiographic studies, ordering and review of laboratory studies, discussions with consultants, examination of patient and obtaining history from patient or surrogate              PROGRESS, DATA ANALYSIS, CONSULTS, AND MEDICAL DECISION MAKING  All labs have been independently interpreted by me.  All radiology studies have been reviewed by me. All EKG's have been independently viewed and interpreted by me.  Discussion below represents my analysis of pertinent findings related to patient's condition, differential diagnosis, treatment plan and final disposition.    Differential diagnosis includes but is not limited to polyneuropathy, subtle stroke, arrhythmia, dehydration, anemia.      ED Course as of 08/06/24 0250   Mon Aug 05, 2024   1958 Patient with NIH of 0.  I do not notice any significant facial droop or slurred speech.  Also patient is anticoagulated on Eliquis and would not be a candidate for thrombolytics.    Certainly not a candidate for mechanical thrombectomy given low NIH.  Will proceed with neurological workup to include EKG, CT scan of head and labs among other testing. [DB]   2011 Patient noted to have elevated temperature of 103.  Blood pressure 130/104.  Heart rate 81.  O2 sats 91% on room air. [DB]   2011 Will give Tylenol.  Will get infectious workup to include blood cultures, lactic  acid and procalcitonin.  Will add urinalysis and viral respiratory panel. [DB]   2024 EKG independently interpreted by me    Time 8:11 PM  A-fib 118  P waves-A-fib  QRS-left axis deviation, inferior Q waves  ST, T waves-nonspecific changes  Not significant change when compared to 7/2022 [DB]   2107 Patient with elevated white blood count of 11.9 suggestive of possible underlying infection.  Hemoglobin and platelet counts are unremarkable. [DB]   2107 Chemistries are unremarkable without significant hepatic or renal failure.  Electrolytes benign. [DB]   2107 Patient does have normal procalcitonin of 0.04 which would generally go against serious bacterial infection. [DB]   2107 Chest x-ray independently interpreted by me shows cardiomegaly.  I see no obvious acute infiltrate.    Official radiology interpretation suggest possible atelectasis or infiltrate at the left base. [DB]   2108 Urinalysis is benign which would go against urinary tract infection. [DB]   2135 Viral panel negative for tested pathogens [DB]   2135 Head CT independently interpreted by me shows no obvious acute hemorrhage or stroke. [DB]   2227 Chemistries reviewed are fairly benign. [DB]   2227 At this point I do not see any clear source of fever.  Suspect patient's lack of balance may be related to underlying infection.  Given his multiple comorbidities will admit to the medical service for further evaluation treatment.  As I do not see clear bacterial infection we will hold off on antibiotics at this time pending source of infection. [DB]      ED Course User Index  [DB] Genaro Orourke MD             AS OF 02:50 EDT VITALS:    BP - 135/94  HR - 79  TEMP - 98.6 °F (37 °C) (Oral)  O2 SATS - 95%    COMPLEXITY OF CARE  Complicated patient brought in as a possible stroke alert with dizziness, concern for left facial droop and slurred speech.    Patient was not a tPA candidate due to being on Eliquis.  He was also found to be febrile with temperature of  100.3.    Please see ED course above for my independent evaluation interpretation of ED testing which is notable for the following:    CT scan no obvious acute disease  Chest x-ray possible retrocardiac infiltrate.  White blood count mildly elevated at 11.9 but normal procalcitonin of 0.04.  Viral panel negative for tested pathogens  Urinalysis benign    Patient continues to have some mild dizziness but no slurred speech and normal mentation.  Is unclear what is causing him to be off balance but I suspect is related to acute febrile illness rather than acute cerebrovascular disease.      DIAGNOSIS  Final diagnoses:   Disequilibrium   Acute febrile illness   Chronic anticoagulation         DISPOSITION  ED Disposition       ED Disposition   Decision to Admit    Condition   --    Comment   Level of Care: Telemetry [5]   Diagnosis: Dizziness [280048]   Admitting Physician: NEIL OSEI [[id number]]   Attending Physician: NEIL OSEI [[id number]]   Certification: I Certify That Inpatient Hospital Services Are Medically Necessary For Greater Than 2 Midnights                  Please note that portions of this document were completed with a voice recognition program.    Note Disclaimer: At [facility], we believe that sharing information builds trust and better relationships. You are receiving this note because you recently visited [facility]. It is possible you will see health information before a provider has talked with you about it. This kind of information can be easy to misunderstand. To help you fully understand what it means for your health, we urge you to discuss this note with your provider.         Genaro Orourke MD  08/06/24 0251

## 2024-08-05 NOTE — ED NOTES
Patient presents to ED from home with new onset of left sided facial droop and slurred speech. Patient went to [facility] for physical therapy at 1600 and family last witnessed him well at that time. When he arrived home from physical therapy family noticed facial droop, slurred speech and unsteady gait.

## 2024-08-06 ENCOUNTER — APPOINTMENT (OUTPATIENT)
Dept: CARDIOLOGY | Facility: HOSPITAL | Age: 69
DRG: 177 | End: 2024-08-06
Payer: MEDICARE

## 2024-08-06 ENCOUNTER — APPOINTMENT (OUTPATIENT)
Dept: MRI IMAGING | Facility: HOSPITAL | Age: 69
DRG: 177 | End: 2024-08-06
Payer: MEDICARE

## 2024-08-06 PROBLEM — R50.9 FEVER: Status: ACTIVE | Noted: 2024-08-06

## 2024-08-06 LAB
ALBUMIN SERPL-MCNC: 3.7 G/DL (ref 3.5–5.2)
ALBUMIN/GLOB SERPL: 1.2 G/DL
ALP SERPL-CCNC: 92 U/L (ref 39–117)
ALT SERPL W P-5'-P-CCNC: 11 U/L (ref 1–41)
ANION GAP SERPL CALCULATED.3IONS-SCNC: 10.1 MMOL/L (ref 5–15)
AST SERPL-CCNC: 18 U/L (ref 1–40)
BH CV XLRA MEAS LEFT DIST CCA EDV: -17.2 CM/SEC
BH CV XLRA MEAS LEFT DIST CCA PSV: -58.5 CM/SEC
BH CV XLRA MEAS LEFT DIST ICA EDV: -23.8 CM/SEC
BH CV XLRA MEAS LEFT DIST ICA PSV: -50.4 CM/SEC
BH CV XLRA MEAS LEFT ICA/CCA RATIO: 0.93
BH CV XLRA MEAS LEFT MID ICA EDV: -16.7 CM/SEC
BH CV XLRA MEAS LEFT MID ICA PSV: -41.1 CM/SEC
BH CV XLRA MEAS LEFT PROX CCA EDV: -22.1 CM/SEC
BH CV XLRA MEAS LEFT PROX CCA PSV: -61.4 CM/SEC
BH CV XLRA MEAS LEFT PROX ECA EDV: -13.3 CM/SEC
BH CV XLRA MEAS LEFT PROX ECA PSV: -45.4 CM/SEC
BH CV XLRA MEAS LEFT PROX ICA EDV: 13.2 CM/SEC
BH CV XLRA MEAS LEFT PROX ICA PSV: 54.4 CM/SEC
BH CV XLRA MEAS LEFT PROX SCLA PSV: 65.9 CM/SEC
BH CV XLRA MEAS LEFT VERTEBRAL A EDV: -8.5 CM/SEC
BH CV XLRA MEAS LEFT VERTEBRAL A PSV: -23.4 CM/SEC
BH CV XLRA MEAS RIGHT DIST CCA EDV: -26.7 CM/SEC
BH CV XLRA MEAS RIGHT DIST CCA PSV: -70.8 CM/SEC
BH CV XLRA MEAS RIGHT DIST ICA EDV: -25.5 CM/SEC
BH CV XLRA MEAS RIGHT DIST ICA PSV: -53.1 CM/SEC
BH CV XLRA MEAS RIGHT ICA/CCA RATIO: 0.75
BH CV XLRA MEAS RIGHT MID ICA EDV: -16.1 CM/SEC
BH CV XLRA MEAS RIGHT MID ICA PSV: -43.9 CM/SEC
BH CV XLRA MEAS RIGHT PROX CCA EDV: 18 CM/SEC
BH CV XLRA MEAS RIGHT PROX CCA PSV: 76.4 CM/SEC
BH CV XLRA MEAS RIGHT PROX ECA EDV: -8.6 CM/SEC
BH CV XLRA MEAS RIGHT PROX ECA PSV: -49 CM/SEC
BH CV XLRA MEAS RIGHT PROX ICA EDV: 14.5 CM/SEC
BH CV XLRA MEAS RIGHT PROX ICA PSV: 51.3 CM/SEC
BH CV XLRA MEAS RIGHT PROX SCLA PSV: 49.4 CM/SEC
BH CV XLRA MEAS RIGHT VERTEBRAL A EDV: 17.2 CM/SEC
BH CV XLRA MEAS RIGHT VERTEBRAL A PSV: 41.3 CM/SEC
BILIRUB SERPL-MCNC: 1.1 MG/DL (ref 0–1.2)
BUN SERPL-MCNC: 15 MG/DL (ref 8–23)
BUN/CREAT SERPL: 19 (ref 7–25)
CALCIUM SPEC-SCNC: 9.3 MG/DL (ref 8.6–10.5)
CHLORIDE SERPL-SCNC: 106 MMOL/L (ref 98–107)
CHOLEST SERPL-MCNC: 107 MG/DL (ref 0–200)
CO2 SERPL-SCNC: 24.9 MMOL/L (ref 22–29)
CREAT SERPL-MCNC: 0.79 MG/DL (ref 0.76–1.27)
DEPRECATED RDW RBC AUTO: 42.1 FL (ref 37–54)
EGFRCR SERPLBLD CKD-EPI 2021: 96.8 ML/MIN/1.73
ERYTHROCYTE [DISTWIDTH] IN BLOOD BY AUTOMATED COUNT: 13 % (ref 12.3–15.4)
GLOBULIN UR ELPH-MCNC: 3.1 GM/DL
GLUCOSE BLDC GLUCOMTR-MCNC: 101 MG/DL (ref 70–130)
GLUCOSE BLDC GLUCOMTR-MCNC: 102 MG/DL (ref 70–130)
GLUCOSE SERPL-MCNC: 105 MG/DL (ref 65–99)
HBA1C MFR BLD: 5.7 % (ref 4.8–5.6)
HCT VFR BLD AUTO: 40.6 % (ref 37.5–51)
HDLC SERPL-MCNC: 53 MG/DL (ref 40–60)
HGB BLD-MCNC: 13.1 G/DL (ref 13–17.7)
L PNEUMO1 AG UR QL IA: NEGATIVE
LDLC SERPL CALC-MCNC: 44 MG/DL (ref 0–100)
LDLC/HDLC SERPL: 0.88 {RATIO}
LEFT ARM BP: NORMAL MMHG
MAGNESIUM SERPL-MCNC: 2 MG/DL (ref 1.6–2.4)
MCH RBC QN AUTO: 28.5 PG (ref 26.6–33)
MCHC RBC AUTO-ENTMCNC: 32.3 G/DL (ref 31.5–35.7)
MCV RBC AUTO: 88.3 FL (ref 79–97)
MRSA DNA SPEC QL NAA+PROBE: NORMAL
PLATELET # BLD AUTO: 212 10*3/MM3 (ref 140–450)
PMV BLD AUTO: 10.1 FL (ref 6–12)
POTASSIUM SERPL-SCNC: 3.2 MMOL/L (ref 3.5–5.2)
PROT SERPL-MCNC: 6.8 G/DL (ref 6–8.5)
RBC # BLD AUTO: 4.6 10*6/MM3 (ref 4.14–5.8)
RIGHT ARM BP: NORMAL MMHG
S PNEUM AG SPEC QL LA: NEGATIVE
SODIUM SERPL-SCNC: 141 MMOL/L (ref 136–145)
T4 FREE SERPL-MCNC: 1.32 NG/DL (ref 0.92–1.68)
TRIGL SERPL-MCNC: 38 MG/DL (ref 0–150)
TSH SERPL DL<=0.05 MIU/L-ACNC: 0.18 UIU/ML (ref 0.27–4.2)
VLDLC SERPL-MCNC: 10 MG/DL (ref 5–40)
WBC NRBC COR # BLD AUTO: 19.65 10*3/MM3 (ref 3.4–10.8)

## 2024-08-06 PROCEDURE — 84439 ASSAY OF FREE THYROXINE: CPT | Performed by: INTERNAL MEDICINE

## 2024-08-06 PROCEDURE — 93880 EXTRACRANIAL BILAT STUDY: CPT | Performed by: SURGERY

## 2024-08-06 PROCEDURE — 99222 1ST HOSP IP/OBS MODERATE 55: CPT | Performed by: STUDENT IN AN ORGANIZED HEALTH CARE EDUCATION/TRAINING PROGRAM

## 2024-08-06 PROCEDURE — 80061 LIPID PANEL: CPT | Performed by: NURSE PRACTITIONER

## 2024-08-06 PROCEDURE — 97162 PT EVAL MOD COMPLEX 30 MIN: CPT

## 2024-08-06 PROCEDURE — 87449 NOS EACH ORGANISM AG IA: CPT | Performed by: INTERNAL MEDICINE

## 2024-08-06 PROCEDURE — 83036 HEMOGLOBIN GLYCOSYLATED A1C: CPT | Performed by: NURSE PRACTITIONER

## 2024-08-06 PROCEDURE — 97530 THERAPEUTIC ACTIVITIES: CPT

## 2024-08-06 PROCEDURE — 82948 REAGENT STRIP/BLOOD GLUCOSE: CPT

## 2024-08-06 PROCEDURE — 84443 ASSAY THYROID STIM HORMONE: CPT | Performed by: NURSE PRACTITIONER

## 2024-08-06 PROCEDURE — 70551 MRI BRAIN STEM W/O DYE: CPT

## 2024-08-06 PROCEDURE — 25010000002 PIPERACILLIN SOD-TAZOBACTAM PER 1 G: Performed by: INTERNAL MEDICINE

## 2024-08-06 PROCEDURE — 25810000003 SODIUM CHLORIDE 0.9 % SOLUTION: Performed by: NURSE PRACTITIONER

## 2024-08-06 PROCEDURE — 80053 COMPREHEN METABOLIC PANEL: CPT | Performed by: NURSE PRACTITIONER

## 2024-08-06 PROCEDURE — 83735 ASSAY OF MAGNESIUM: CPT | Performed by: INTERNAL MEDICINE

## 2024-08-06 PROCEDURE — 85027 COMPLETE CBC AUTOMATED: CPT | Performed by: NURSE PRACTITIONER

## 2024-08-06 PROCEDURE — 92610 EVALUATE SWALLOWING FUNCTION: CPT

## 2024-08-06 PROCEDURE — 87899 AGENT NOS ASSAY W/OPTIC: CPT | Performed by: INTERNAL MEDICINE

## 2024-08-06 PROCEDURE — 93880 EXTRACRANIAL BILAT STUDY: CPT

## 2024-08-06 PROCEDURE — 87641 MR-STAPH DNA AMP PROBE: CPT | Performed by: INTERNAL MEDICINE

## 2024-08-06 RX ORDER — GUAIFENESIN 600 MG/1
1200 TABLET, EXTENDED RELEASE ORAL EVERY 12 HOURS SCHEDULED
Status: DISCONTINUED | OUTPATIENT
Start: 2024-08-06 | End: 2024-08-09 | Stop reason: HOSPADM

## 2024-08-06 RX ORDER — GABAPENTIN 300 MG/1
300 CAPSULE ORAL NIGHTLY
Status: DISCONTINUED | OUTPATIENT
Start: 2024-08-06 | End: 2024-08-09 | Stop reason: HOSPADM

## 2024-08-06 RX ORDER — BISACODYL 10 MG
10 SUPPOSITORY, RECTAL RECTAL DAILY PRN
Status: DISCONTINUED | OUTPATIENT
Start: 2024-08-06 | End: 2024-08-09 | Stop reason: HOSPADM

## 2024-08-06 RX ORDER — ATORVASTATIN CALCIUM 20 MG/1
40 TABLET, FILM COATED ORAL NIGHTLY
Status: DISCONTINUED | OUTPATIENT
Start: 2024-08-06 | End: 2024-08-09 | Stop reason: HOSPADM

## 2024-08-06 RX ORDER — ASPIRIN 325 MG
325 TABLET ORAL DAILY
Status: DISCONTINUED | OUTPATIENT
Start: 2024-08-06 | End: 2024-08-06

## 2024-08-06 RX ORDER — BUSPIRONE HYDROCHLORIDE 10 MG/1
10 TABLET ORAL 2 TIMES DAILY
Status: DISCONTINUED | OUTPATIENT
Start: 2024-08-06 | End: 2024-08-09 | Stop reason: HOSPADM

## 2024-08-06 RX ORDER — SODIUM CHLORIDE 9 MG/ML
75 INJECTION, SOLUTION INTRAVENOUS CONTINUOUS
Status: DISCONTINUED | OUTPATIENT
Start: 2024-08-06 | End: 2024-08-07

## 2024-08-06 RX ORDER — PANTOPRAZOLE SODIUM 40 MG/1
40 TABLET, DELAYED RELEASE ORAL
Status: DISCONTINUED | OUTPATIENT
Start: 2024-08-06 | End: 2024-08-09 | Stop reason: HOSPADM

## 2024-08-06 RX ORDER — ACETAMINOPHEN 325 MG/1
650 TABLET ORAL EVERY 4 HOURS PRN
Status: DISCONTINUED | OUTPATIENT
Start: 2024-08-06 | End: 2024-08-09 | Stop reason: HOSPADM

## 2024-08-06 RX ORDER — ONDANSETRON 2 MG/ML
4 INJECTION INTRAMUSCULAR; INTRAVENOUS EVERY 6 HOURS PRN
Status: DISCONTINUED | OUTPATIENT
Start: 2024-08-06 | End: 2024-08-09 | Stop reason: HOSPADM

## 2024-08-06 RX ORDER — ALBUTEROL SULFATE 2.5 MG/3ML
2.5 SOLUTION RESPIRATORY (INHALATION) EVERY 6 HOURS PRN
Status: DISCONTINUED | OUTPATIENT
Start: 2024-08-06 | End: 2024-08-09 | Stop reason: HOSPADM

## 2024-08-06 RX ORDER — ATORVASTATIN CALCIUM 80 MG/1
80 TABLET, FILM COATED ORAL NIGHTLY
Status: DISCONTINUED | OUTPATIENT
Start: 2024-08-06 | End: 2024-08-06

## 2024-08-06 RX ORDER — TAMSULOSIN HYDROCHLORIDE 0.4 MG/1
0.4 CAPSULE ORAL NIGHTLY
Status: DISCONTINUED | OUTPATIENT
Start: 2024-08-06 | End: 2024-08-09 | Stop reason: HOSPADM

## 2024-08-06 RX ORDER — POTASSIUM CHLORIDE 750 MG/1
40 TABLET, FILM COATED, EXTENDED RELEASE ORAL EVERY 4 HOURS
Status: COMPLETED | OUTPATIENT
Start: 2024-08-06 | End: 2024-08-06

## 2024-08-06 RX ORDER — ACETAMINOPHEN 650 MG/1
650 SUPPOSITORY RECTAL EVERY 4 HOURS PRN
Status: DISCONTINUED | OUTPATIENT
Start: 2024-08-06 | End: 2024-08-09 | Stop reason: HOSPADM

## 2024-08-06 RX ORDER — ASPIRIN 300 MG/1
300 SUPPOSITORY RECTAL DAILY
Status: DISCONTINUED | OUTPATIENT
Start: 2024-08-06 | End: 2024-08-06

## 2024-08-06 RX ORDER — GABAPENTIN 100 MG/1
100 CAPSULE ORAL EVERY 12 HOURS SCHEDULED
Status: DISCONTINUED | OUTPATIENT
Start: 2024-08-06 | End: 2024-08-09 | Stop reason: HOSPADM

## 2024-08-06 RX ADMIN — APIXABAN 5 MG: 5 TABLET, FILM COATED ORAL at 12:10

## 2024-08-06 RX ADMIN — SODIUM CHLORIDE 50 ML/HR: 9 INJECTION, SOLUTION INTRAVENOUS at 03:30

## 2024-08-06 RX ADMIN — POTASSIUM CHLORIDE 40 MEQ: 750 TABLET, EXTENDED RELEASE ORAL at 18:50

## 2024-08-06 RX ADMIN — POTASSIUM CHLORIDE 40 MEQ: 750 TABLET, EXTENDED RELEASE ORAL at 16:24

## 2024-08-06 RX ADMIN — GUAIFENESIN 1200 MG: 600 TABLET, EXTENDED RELEASE ORAL at 12:07

## 2024-08-06 RX ADMIN — BUSPIRONE HYDROCHLORIDE 10 MG: 10 TABLET ORAL at 21:41

## 2024-08-06 RX ADMIN — GABAPENTIN 300 MG: 100 CAPSULE ORAL at 21:42

## 2024-08-06 RX ADMIN — GABAPENTIN 100 MG: 100 CAPSULE ORAL at 12:06

## 2024-08-06 RX ADMIN — ATORVASTATIN CALCIUM 40 MG: 20 TABLET, FILM COATED ORAL at 21:41

## 2024-08-06 RX ADMIN — BUSPIRONE HYDROCHLORIDE 10 MG: 10 TABLET ORAL at 16:24

## 2024-08-06 RX ADMIN — PIPERACILLIN AND TAZOBACTAM 3.38 G: 3; .375 INJECTION, POWDER, FOR SOLUTION INTRAVENOUS at 12:10

## 2024-08-06 RX ADMIN — METOPROLOL TARTRATE 25 MG: 25 TABLET, FILM COATED ORAL at 12:06

## 2024-08-06 RX ADMIN — PIPERACILLIN AND TAZOBACTAM 3.38 G: 3; .375 INJECTION, POWDER, FOR SOLUTION INTRAVENOUS at 16:59

## 2024-08-06 RX ADMIN — TAMSULOSIN HYDROCHLORIDE 0.4 MG: 0.4 CAPSULE ORAL at 21:41

## 2024-08-06 RX ADMIN — ASPIRIN 325 MG: 325 TABLET ORAL at 09:15

## 2024-08-06 RX ADMIN — APIXABAN 5 MG: 5 TABLET, FILM COATED ORAL at 21:41

## 2024-08-06 RX ADMIN — CITALOPRAM 30 MG: 20 TABLET, FILM COATED ORAL at 16:24

## 2024-08-06 RX ADMIN — PANTOPRAZOLE SODIUM 40 MG: 40 TABLET, DELAYED RELEASE ORAL at 12:06

## 2024-08-06 RX ADMIN — METOPROLOL TARTRATE 25 MG: 25 TABLET, FILM COATED ORAL at 21:41

## 2024-08-06 RX ADMIN — GUAIFENESIN 1200 MG: 600 TABLET, EXTENDED RELEASE ORAL at 21:42

## 2024-08-06 RX ADMIN — GABAPENTIN 100 MG: 100 CAPSULE ORAL at 21:41

## 2024-08-06 NOTE — PLAN OF CARE
Goal Outcome Evaluation:  Plan of Care Reviewed With: patient           Outcome Evaluation: Pt seen for PT this am. He was admitted to [facility] overnight w fever, L facial droop, and slurred speech. Pt has hx of A fib, HTN, and HLD. At baseline, pt lives at home w his wife and step son. He states he uses walker for ambulation and is mostly independent w ADLS. Pt reports he has been going to outpatient therapy recently for generalized weakness. Today, pt w no complaints. Strength appears equal R vs L.  He is able to transition to EOB w CGA. He stood w CGA/min A using rwx. Pt able to ambulate approx 80 ft w CGA/min A and rwx. He requires cues for upright posture and to ambulate closer to walker. He does not exhibit any overt LOB or unsteadiness. Pt in the BR at end of session w nsg assistant present. [facility] He will continue to benefit from skilled PT to maximize safety, strength, and independence w mobility.      Anticipated Discharge Disposition (PT): home with assist, home with outpatient therapy services, home with home health

## 2024-08-06 NOTE — THERAPY EVALUATION
Acute Care - Speech Language Pathology   Swallow Initial Evaluation [facility]     Patient Name: Flo Manzo  : 1955  MRN: [id number]  Today's Date: 2024               Admit Date: 2024    Visit Dx:     ICD-10-CM ICD-9-CM   1. Disequilibrium  R42 780.4   2. Acute febrile illness  R50.9 780.60   3. Chronic anticoagulation  Z79.01 V58.61     Patient Active Problem List   Diagnosis    Umbilical hernia without obstruction and without gangrene    Essential hypertension    Arthritis of left knee    Depression    Obesity (BMI 30-39.9)    Atrial fibrillation with RVR    Substernal thyroid goiter    CHF (congestive heart failure)    Diastolic CHF, chronic    Hemorrhagic stroke    GÓMEZ on auto CPAP    Hypersomnia due to medical condition    Sleep-related hypoxia    Chronic atrial fibrillation    Vertigo    Aortic stenosis, mild    Sinus pause    Pacemaker    Chronic anticoagulation    Generalized weakness    Prediabetes    Pure hypercholesterolemia    Pre-operative cardiovascular examination    Peripheral neuropathy    History of colon polyps    Dizziness    Fever     Past Medical History:   Diagnosis Date    Arthritis     Atrial fibrillation with RVR 2019    Colon polyps 2018    Hepatic flexure: tubular adenoma, only low-grade dysplasia; splenic flexure: tubular adenoma, only low-grade dysplasia; sigmoid colon: tubular adenoma, multiple fragments only low-grade dysplasia    Depression     Heart murmur     Hemorrhagic stroke 2019    Hypertension     New onset atrial fibrillation (CMS/HCC) - RVR 2019    GÓMEZ (obstructive sleep apnea) 2019    Home sleep study with moderate severity GÓMEZ, AHI 20 events per hour.  Sleep-related hypoxia with low O2 saturation 84% for 14 minutes.    Peripheral neuropathy     Sleep apnea     previously diagnosed with sleep apnea, had T&A done and it has since resolved     Stroke     Uncontrolled hypertension     Ventral hernia      Past Surgical  History:   Procedure Laterality Date    APPENDECTOMY N/A 1972    BACK SURGERY      BLADDER STIMULATOR IMPLANT L LOWER BACK    CARDIAC CATHETERIZATION N/A 07/20/2004    Cath left ventriculography, coronary angiography and left heart catheterization, Normal results-Dr. Vadim Mancuso    CARDIAC CATHETERIZATION N/A 1/29/2019    Procedure: Left Heart Cath;  Surgeon: Eren Bruce MD;  Location: [facility] CATH INVASIVE LOCATION;  Service: Cardiology    CARDIAC CATHETERIZATION N/A 1/29/2019    Procedure: Left ventriculography;  Surgeon: Eren Bruce MD;  Location:  [facility] CATH INVASIVE LOCATION;  Service: Cardiology    CARDIAC CATHETERIZATION N/A 1/29/2019    Procedure: Coronary angiography;  Surgeon: Eren Bruce MD;  Location:  [facility] CATH INVASIVE LOCATION;  Service: Cardiology    CARDIAC ELECTROPHYSIOLOGY PROCEDURE N/A 3/4/2022    Procedure: Pacemaker SC new BIOTRONIK;  Surgeon: Eren Bruce MD;  [facility] C[facility]VASIVE LOC[facility];  Service: Cardiology;  Laterality: N/A;    CARPAL TUNNEL RELEASE Right 2013    CARPAL TUNNEL RELEASE Left     COLONOSCOPY N/A 1/4/2018    Procedure: COLONOSCOPY with cold polypectomy and hot snare polypectomy;  Surgeon: Ten Solitario MD;  Location: [facility] ENDOSCOPY;  Service:     COLONOSCOPY N/A 4/25/2024    Procedure: COLONOSCOPY INTO CECUM;  Surgeon: Ten Solitario MD;  Location: [facility] ENDOSCOPY;  Service: General;  Laterality: N/A;  PRE: PERSONAL H/O COLON POLYP  /  POST: POOR PREP OTHERWISE NORMAL    EYE LENS IMPLANT SECONDARY Bilateral 2007, 2008    KNEE CARTILAGE SURGERY Right 1979    TONSILLECTOMY AND ADENOIDECTOMY Bilateral 2005    TOTAL KNEE ARTHROPLASTY Left 03/14/2016    Left total knee arthroplasty with Amberly component, size 11 femur, G tibial baseplate with a 10 polyethylene insert and a 38 patellar button-Dr. Pablo Hairston    TOTAL KNEE ARTHROPLASTY Right 03/02/2015    Right total knee arthroplasty with Amberly component size G femur,  11 tibial base plate with an 11 polyethylene insert and a 38 patellar button-Dr. Pablo Hairston    UVULOPALATOPHARYNGOPLASTY N/A 2005    part of soft palate, and uvula    VENTRAL HERNIA REPAIR N/A 1/23/2018    Procedure: VENTRAL HERNIA REPAIR LAPAROSCOPIC WITH DAVINCI ROBOT AND MESH;  Surgeon: Ten Solitario MD;  Location: [facility];  Service:        SLP Recommendation and Plan  SLP Swallowing Diagnosis: oral dysphagia, suspected pharyngeal dysphagia (08/06/24 0900)  SLP Diet Recommendation: soft to chew textures, ground, nectar thick liquids (08/06/24 0900)  Recommended Precautions and Strategies: upright posture during/after eating, small bites of food and sips of liquid, alternate between small bites of food and sips of liquid, general aspiration precautions (08/06/24 0900)  SLP Rec. for Method of Medication Administration: meds whole, meds crushed, with puree, as tolerated (08/06/24 0900)     Monitor for Signs of Aspiration: yes, notify SLP if any concerns (08/06/24 0900)  Recommended Diagnostics: reassess via clinical swallow evaluation (08/06/24 0900)  Swallow Criteria for Skilled Therapeutic Interventions Met: demonstrates skilled criteria (08/06/24 0900)  Anticipated Discharge Disposition (SLP): unknown (08/06/24 0900)  Rehab Potential/Prognosis, Swallowing: adequate, monitor progress closely (08/06/24 0900)  Therapy Frequency (Swallow): PRN (08/06/24 0900)  Predicted Duration Therapy Intervention (Days): until discharge (08/06/24 0900)  Oral Care Recommendations: Oral Care BID/PRN (08/06/24 0900)                                        Plan of Care Reviewed With: patient  Outcome Evaluation: Clinical swallow eval completed.  Recommend nectar thick liquids and mechanical soft ground textures.  Medications whole/crushed with purée.  Recommend upright, slow rate, general aspiration precautions, use of liquid wash.  SLP to follow for diet tolerance and reevaluation.      SWALLOW EVALUATION (Last 72 Hours)        SLP Adult Swallow Evaluation       Row Name 08/06/24 0900                   Rehab Evaluation    Document Type evaluation  -OC        Subjective Information no complaints  -OC        Patient Observations alert;cooperative;agree to therapy  -OC        Patient Effort good  -OC        Symptoms Noted During/After Treatment none  -OC           General Information    Patient Profile Reviewed yes  -OC        Pertinent History Of Current Problem Patient admitted with fever, dizziness. Hx CVA 2019.  -OC        Current Method of Nutrition NPO  -OC        Precautions/Limitations, Vision WFL  -OC        Precautions/Limitations, Hearing WFL  -OC        Prior Level of Function-Communication unknown  -OC        Prior Level of Function-Swallowing unknown;other (see comments)  denies history of dysphagia  -OC        Plans/Goals Discussed with patient;agreed upon  -OC        Barriers to Rehab none identified  -OC        Patient's Goals for Discharge patient did not state  -OC           Pain    Additional Documentation Pain Scale: Numbers Pre/Post-Treatment (Group)  -OC           Pain Scale: Numbers Pre/Post-Treatment    Pretreatment Pain Rating 0/10 - no pain  -OC        Posttreatment Pain Rating 0/10 - no pain  -OC           Oral Motor Structure and Function    Dentition Assessment missing teeth;teeth are in poor condition  -OC        Secretion Management WNL/WFL  -OC        Mucosal Quality moist, healthy  -OC        Volitional Swallow weak  -OC        Volitional Cough weak  -OC           Oral Musculature and Cranial Nerve Assessment    Oral Motor General Assessment generalized oral motor weakness  -OC        Oral Motor, Comment slight asymmetry  -OC           Clinical Swallow Eval    Clinical Swallow Evaluation Summary Patient demonstrated delayed coughing with ice chip and thins via cup.  No overt signs or symptoms with nectar thick via cup, purée, and mechanical soft.  Patient demonstrated prolonged mastication mechanical  soft with use of nectar thick liquid wash to clear oral residue.  -OC           SLP Evaluation Clinical Impression    SLP Swallowing Diagnosis oral dysphagia;suspected pharyngeal dysphagia  -OC        Functional Impact risk of aspiration/pneumonia  -OC        Rehab Potential/Prognosis, Swallowing adequate, monitor progress closely  -OC        Swallow Criteria for Skilled Therapeutic Interventions Met demonstrates skilled criteria  -OC           Recommendations    Therapy Frequency (Swallow) PRN  -OC        Predicted Duration Therapy Intervention (Days) until discharge  -OC        SLP Diet Recommendation soft to chew textures;ground;nectar thick liquids  -OC        Recommended Diagnostics reassess via clinical swallow evaluation  -OC        Recommended Precautions and Strategies upright posture during/after eating;small bites of food and sips of liquid;alternate between small bites of food and sips of liquid;general aspiration precautions  -OC        Oral Care Recommendations Oral Care BID/PRN  -OC        SLP Rec. for Method of Medication Administration meds whole;meds crushed;with puree;as tolerated  -OC        Monitor for Signs of Aspiration yes;notify SLP if any concerns  -OC        Anticipated Discharge Disposition (SLP) unknown  -OC           Swallow Goals (SLP)    Swallow LTGs Swallow Long Term Goal (free text)  -OC           (LTG) Swallow    (LTG) Swallow Tolerate least restrictive diet with no overt signs or symptoms of aspiration.  -OC        Tracy (Swallow Long Term Goal) independently (over 90% accuracy)  -OC        Time Frame (Swallow Long Term Goal) by discharge  -OC                  User Key  (r) = Recorded By, (t) = Taken By, (c) = Cosigned By      Initials Name Effective Dates    OC Card, NICOLAS Anton 08/28/23 -                     EDUCATION  The patient has been educated in the following areas:   Dysphagia (Swallowing Impairment).        SLP GOALS       Row Name 08/06/24 0900             (LTG)  Swallow    (LTG) Swallow Tolerate least restrictive diet with no overt signs or symptoms of aspiration.  -OC      Vista (Swallow Long Term Goal) independently (over 90% accuracy)  -OC      Time Frame (Swallow Long Term Goal) by discharge  -OC                User Key  (r) = Recorded By, (t) = Taken By, (c) = Cosigned By      Initials Name Provider Type    Sloane Diaz SLP Speech and Language Pathologist                         Time Calculation:    Time Calculation- SLP       Row Name 08/06/24 1049             Time Calculation- SLP    SLP Start Time 1049  -OC      SLP Received On 08/06/24  -OC         Untimed Charges    SLP Eval/Re-eval  ST Eval Oral Pharyng Swallow - 35245  -OC      [id number] Eval Oral Pharyng Swallow Minutes 60  -OC         Total Minutes    Untimed Charges Total Minutes 60  -OC       Total Minutes 60  -OC                User Key  (r) = Recorded By, (t) = Taken By, (c) = Cosigned By      Initials Name Provider Type    Sloane Diaz SLP Speech and Language Pathologist                    Therapy Charges for Today       Code Description Service Date Service Provider Modifiers Qty    [id number] HC ST EVAL ORAL PHARYNG SWALLOW 4 8/6/2024 Sloane Gonzalez SLP GN 1                 NICOLAS Baron  8/6/2024

## 2024-08-06 NOTE — H&P
[facility] Admission H&P    Patient Care Team:  Karen Jiménez APRN as PCP - General (Family Medicine)  Eren Bruce MD as Consulting Physician (Cardiology)  Temo Whaet MD as Consulting Physician (Radiation Oncology)  Bryant Rader MD as Consulting Physician (Urology)  Cindi Breen DNP, APRN as Nurse Practitioner (Nurse Practitioner)    Chief complaint: Weakness, cough, fever    History of Present Illness    This is a 69-year-old male with history of atrial fibrillation, hypertension, sleep apnea, prior stroke who presented to the emergency room with weakness and trouble with balance.  This started fairly suddenly yesterday.  Family called EMS and reported that they felt he was having some facial droop and possible slurred speech.  Patient states that he has noticed an increased cough over the past few days as well as some subjective fever yesterday.  Temperature was 103 upon presentation last night.    Past Medical History:   Diagnosis Date    Arthritis     Atrial fibrillation with RVR 01/24/2019    Colon polyps 01/04/2018    Hepatic flexure: tubular adenoma, only low-grade dysplasia; splenic flexure: tubular adenoma, only low-grade dysplasia; sigmoid colon: tubular adenoma, multiple fragments only low-grade dysplasia    Depression     Heart murmur     Hemorrhagic stroke 01/29/2019    Hypertension     New onset atrial fibrillation (CMS/HCC) - RVR 01/24/2019    GÓMEZ (obstructive sleep apnea) 06/06/2019    Home sleep study with moderate severity GÓMEZ, AHI 20 events per hour.  Sleep-related hypoxia with low O2 saturation 84% for 14 minutes.    Peripheral neuropathy     Sleep apnea     previously diagnosed with sleep apnea, had T&A done and it has since resolved     Stroke     Uncontrolled hypertension     Ventral hernia      Past Surgical History:   Procedure Laterality Date    APPENDECTOMY N/A 1972    BACK SURGERY      BLADDER STIMULATOR IMPLANT L LOWER BACK    CARDIAC CATHETERIZATION N/A  07/20/2004    Cath left ventriculography, coronary angiography and left heart catheterization, Normal results-Dr. Fierro     CARDIAC CATHETERIZATION N/A 1/29/2019    Procedure: Left Heart Cath;  Surgeon: Eren Bruce MD;  Location: [facility] CATH INVASIVE LOCATION;  Service: Cardiology    CARDIAC CATHETERIZATION N/A 1/29/2019    Procedure: Left ventriculography;  Surgeon: Eren Bruce MD;  Location: [facility] CATH INVASIVE LOCATION;  Service: Cardiology    CARDIAC CATHETERIZATION N/A 1/29/2019    Procedure: Coronary angiography;  Surgeon: Eren Bruce MD;  Location: [facility] CATH INVASIVE LOCATION;  Service: Cardiology    CARDIAC ELECTROPHYSIOLOGY PROCEDURE N/A 3/4/2022    Procedure: Pacemaker SC new BIOTRONIK;  Surgeon: Eren Bruce MD;  Location: [facility] CATH INVASIVE LOCATION;  Service: Cardiology;  Laterality: N/A;    CARPAL TUNNEL RELEASE Right 2013    CARPAL TUNNEL RELEASE Left     COLONOSCOPY N/A 1/4/2018    Procedure: COLONOSCOPY with cold polypectomy and hot snare polypectomy;  Surgeon: Ten Solitario MD;  Location: [facility] ENDOSCOPY;  Service:     COLONOSCOPY N/A 4/25/2024    Procedure: COLONOSCOPY INTO CECUM;  Surgeon: Ten Solitario MD;  Location: [facility] ENDOSCOPY;  Service: General;  Laterality: N/A;  PRE: PERSONAL H/O COLON POLYP  /  POST: POOR PREP OTHERWISE NORMAL    EYE LENS IMPLANT SECONDARY Bilateral 2007, 2008    KNEE CARTILAGE SURGERY Right 1979    TONSILLECTOMY AND ADENOIDECTOMY Bilateral 2005    TOTAL KNEE ARTHROPLASTY Left 03/14/2016    Left total knee arthroplasty with Amberly component, size 11 femur, G tibial baseplate with a 10 polyethylene insert and a 38 patellar button-Dr. Pablo Hairston    TOTAL KNEE ARTHROPLASTY Right 03/02/2015    Right total knee arthroplasty with Amberly component size G femur, 11 tibial base plate with an 11 polyethylene insert and a 38 patellar button-Dr. Pablo Hairston    UVULOPALATOPHARYNGOPLASTY N/A 2005    part of soft  palate, and uvula    VENTRAL HERNIA REPAIR N/A 1/23/2018    Procedure: VENTRAL HERNIA REPAIR LAPAROSCOPIC WITH DAVINCI ROBOT AND MESH;  Surgeon: Ten Solitario MD;  Location: [facility] OR;  Service:      Family History   Problem Relation Age of Onset    Hypertension Mother     Kidney failure Mother     Coronary artery disease Father     Lung cancer Father         positive smoker    Heart attack Father     Breast cancer Sister 60    Prostate cancer Brother     Colon polyps Brother     Kidney nephrosis Brother     Malig Hyperthermia Neg Hx      Social History     Tobacco Use    Smoking status: Never     Passive exposure: Past    Smokeless tobacco: Never   Vaping Use    Vaping status: Never Used   Substance Use Topics    Alcohol use: Not Currently     Comment: social, maybe a drink a month, not since stroke    Drug use: No     Comment: previously smoked marijuana      Medications Prior to Admission   Medication Sig Dispense Refill Last Dose    albuterol sulfate  (90 Base) MCG/ACT inhaler Inhale 2 puffs Every 4 (Four) Hours As Needed for Wheezing. 18 g 3 8/5/2024    amLODIPine (NORVASC) 10 MG tablet Take 1 tablet by mouth Daily. 90 tablet 1 8/5/2024    atorvastatin (LIPITOR) 40 MG tablet TAKE 1 TABLET BY MOUTH NIGHTLY 90 tablet 1 8/4/2024    busPIRone (BUSPAR) 10 MG tablet TAKE 1 TABLET BY MOUTH TWICE DAILY 60 tablet 5 8/5/2024    citalopram (CeleXA) 20 MG tablet Take 1 tablet by mouth Daily. (Patient taking differently: Take 1.5 tablets by mouth Daily. PT TAKING 1.5 TABLETS DAILY) 135 tablet 1 8/5/2024    Eliquis 5 MG tablet tablet TAKE 1 TABLET BY MOUTH TWICE DAILY 60 tablet 5 8/5/2024    furosemide (LASIX) 20 MG tablet Take 1 tablet by mouth Daily for 180 days. 90 tablet 1 8/5/2024    gabapentin (NEURONTIN) 100 MG capsule TAKE ONE (1) CAPSULE BY MOUTH TWICE DAILY 60 capsule 0 8/5/2024    gabapentin (NEURONTIN) 300 MG capsule Take 1 capsule by mouth Every Night. 90 capsule 0 8/5/2024    lisinopril  (PRINIVIL,ZESTRIL) 20 MG tablet TAKE 1 TABLET BY MOUTH DAILY 30 tablet 5 2024    metoprolol tartrate (LOPRESSOR) 25 MG tablet TAKE 1 & 1/2 TABLETS BY MOUTH TWICE DAILY 90 tablet 1 2024    omeprazole (priLOSEC) 20 MG capsule TAKE 1 CAPSULE BY MOUTH DAILY 90 capsule 1 2024    tamsulosin (FLOMAX) 0.4 MG capsule 24 hr capsule TAKE 1 CAPSULE BY MOUTH NIGHTLY 30 capsule 2024    acetaminophen (TYLENOL) 325 MG tablet Take 2 tablets by mouth Every 4 (Four) Hours As Needed for Mild Pain . 120 tablet 3     Diclofenac Sodium (Voltaren) 1 % gel gel Apply 4 g topically to the appropriate area as directed 4 (Four) Times a Day. 50 g 0     trimethoprim-polymyxin b (POLYTRIM) 86366-6.1 UNIT/ML-% ophthalmic solution Administer 1 drop to both eyes Every 6 (Six) Hours. 10 mL 0      Allergies:  Poison ivy extract    Review of Systems   Constitutional:  Positive for fever. Negative for chills.   HENT:  Negative for congestion and sore throat.    Eyes:  Negative for visual disturbance.   Respiratory:  Positive for cough. Negative for chest tightness, shortness of breath and wheezing.    Cardiovascular:  Negative for chest pain, palpitations and leg swelling.   Gastrointestinal:  Negative for abdominal distention, abdominal pain, diarrhea, nausea and vomiting.   Endocrine: Negative for polydipsia and polyuria.   Genitourinary:  Negative for difficulty urinating, dysuria, frequency and urgency.   Musculoskeletal:  Negative for arthralgias and myalgias.   Skin:  Negative for color change and rash.   Neurological:  Positive for facial asymmetry, speech difficulty and weakness. Negative for dizziness and light-headedness.        PHYSICAL EXAM    Vital Signs  tMax 24 hrs:  Temp (24hrs), Av.7 °F (37.6 °C), Min:98.2 °F (36.8 °C), Max:103 °F (39.4 °C)    Vitals Ranges:  Temp:  [98.2 °F (36.8 °C)-103 °F (39.4 °C)] 98.6 °F (37 °C)  Heart Rate:  [] 88  Resp:  [16-18] 16  BP: (101-156)/() 101/81    Physical  Exam  Vitals and nursing note reviewed.   Constitutional:       General: He is not in acute distress.     Appearance: He is well-developed. He is not ill-appearing.   HENT:      Head: Normocephalic and atraumatic.      Nose: Nose normal.      Mouth/Throat:      Mouth: Mucous membranes are not dry.   Eyes:      Conjunctiva/sclera: Conjunctivae normal.      Pupils: Pupils are equal, round, and reactive to light.   Neck:      Vascular: No JVD.   Cardiovascular:      Rate and Rhythm: Normal rate and regular rhythm.      Heart sounds: No murmur heard.     No gallop.   Pulmonary:      Effort: Pulmonary effort is normal. No accessory muscle usage or respiratory distress.      Breath sounds: No decreased breath sounds or wheezing.      Comments: His breath sounds are coarse  Abdominal:      General: Bowel sounds are normal. There is no distension.      Palpations: Abdomen is soft.      Tenderness: There is no abdominal tenderness. There is no guarding or rebound.   Musculoskeletal:         General: No deformity. Normal range of motion.      Cervical back: Normal range of motion and neck supple.   Lymphadenopathy:      Cervical: No cervical adenopathy.   Skin:     General: Skin is warm and dry.      Findings: No erythema or rash.   Neurological:      Mental Status: He is alert and oriented to person, place, and time.      Cranial Nerves: No cranial nerve deficit.         Results Review:  Results from last 7 days   Lab Units 08/06/24  0351   WBC 10*3/mm3 19.65*   HEMOGLOBIN g/dL 13.1   HEMATOCRIT % 40.6   PLATELETS 10*3/mm3 212     Results from last 7 days   Lab Units 08/06/24  0351   SODIUM mmol/L 141   POTASSIUM mmol/L 3.2*   CHLORIDE mmol/L 106   CO2 mmol/L 24.9   BUN mg/dL 15   CREATININE mg/dL 0.79   CALCIUM mg/dL 9.3   BILIRUBIN mg/dL 1.1   ALK PHOS U/L 92   ALT (SGPT) U/L 11   AST (SGOT) U/L 18   GLUCOSE mg/dL 105*        I reviewed the patient's new clinical results.  I reviewed the patient's new imaging results and  agree with the interpretation.  I personally viewed and interpreted the patient's EKG/Telemetry data        Active Hospital Problems    Diagnosis  POA    **Fever [R50.9]  Unknown    Dizziness [R42]  Yes    Vertigo [R42]  Yes    Chronic atrial fibrillation [I48.20]  Yes    GÓMEZ on auto CPAP [G47.33]  Yes    Diastolic CHF, chronic [I50.32]  Yes    Substernal thyroid goiter [E04.9]  Yes    Essential hypertension [I10]  Yes      Resolved Hospital Problems   No resolved problems to display.       Assessment & Plan    The patient was admitted overnight last night.  He had a fever 103.  Chest x-ray showed possible left lung infiltrate.  He reports increased cough and some subjective fevers yesterday.  I am going to start him on Zosyn.  Check MRSA screen.  Respiratory viral panel was negative.  Blood cultures are pending.  Urinalysis was not consistent with UTI and he has no symptoms.  He denies any gastrointestinal symptoms either.  I do suspect that infectious process (likely pneumonia) is contributing to his presenting neurologic symptoms.  Neurology has also been consulted given his history of prior stroke and family report of facial asymmetry and slurred speech.  His blood pressure looks very well-controlled I am going to hold off on any of his home antihypertensives for the moment.  Continue his outpatient dose of Eliquis.  Heart rate presently controlled and will continue his metoprolol.  Therapy services have been consulted.  Additional plans based on his clinical course.    I discussed the patients findings and my recommendations with patient      Flo Corey MD  08/06/24  10:50 EDT

## 2024-08-06 NOTE — THERAPY EVALUATION
Patient Name: Flo Manzo  : 1955    MRN: [id number]                              Today's Date: 2024       Admit Date: 2024    Visit Dx:     ICD-10-CM ICD-9-CM   1. Disequilibrium  R42 780.4   2. Acute febrile illness  R50.9 780.60   3. Chronic anticoagulation  Z79.01 V58.61     Patient Active Problem List   Diagnosis    Umbilical hernia without obstruction and without gangrene    Essential hypertension    Arthritis of left knee    Depression    Obesity (BMI 30-39.9)    Atrial fibrillation with RVR    Substernal thyroid goiter    CHF (congestive heart failure)    Diastolic CHF, chronic    Hemorrhagic stroke    GÓMEZ on auto CPAP    Hypersomnia due to medical condition    Sleep-related hypoxia    Chronic atrial fibrillation    Vertigo    Aortic stenosis, mild    Sinus pause    Pacemaker    Chronic anticoagulation    Generalized weakness    Prediabetes    Pure hypercholesterolemia    Pre-operative cardiovascular examination    Peripheral neuropathy    History of colon polyps    Dizziness    Fever     Past Medical History:   Diagnosis Date    Arthritis     Atrial fibrillation with RVR 2019    Colon polyps 2018    Hepatic flexure: tubular adenoma, only low-grade dysplasia; splenic flexure: tubular adenoma, only low-grade dysplasia; sigmoid colon: tubular adenoma, multiple fragments only low-grade dysplasia    Depression     Heart murmur     Hemorrhagic stroke 2019    Hypertension     New onset atrial fibrillation (CMS/HCC) - RVR 2019    GÓMEZ (obstructive sleep apnea) 2019    Home sleep study with moderate severity GÓMEZ, AHI 20 events per hour.  Sleep-related hypoxia with low O2 saturation 84% for 14 minutes.    Peripheral neuropathy     Sleep apnea     previously diagnosed with sleep apnea, had T&A done and it has since resolved     Stroke     Uncontrolled hypertension     Ventral hernia      Past Surgical History:   Procedure Laterality Date    APPENDECTOMY N/A      BACK SURGERY      BLADDER STIMULATOR IMPLANT L LOWER BACK    CARDIAC CATHETERIZATION N/A 07/20/2004    Cath left ventriculography, coronary angiography and left heart catheterization, Normal results-Dr. Vadim Mancuso    CARDIAC CATHETERIZATION N/A 1/29/2019    Procedure: Left Heart Cath;  Surgeon: Eren Bruce MD;  Location: [facility] CATH INVASIVE LOCATION;  Service: Cardiology    CARDIAC CATHETERIZATION N/A 1/29/2019    Procedure: Left ventriculography;  Surgeon: Eren Bruce MD;  Location: [facility] CATH INVASIVE LOCATION;  Service: Cardiology    CARDIAC CATHETERIZATION N/A 1/29/2019    Procedure: Coronary angiography;  Surgeon: Eren Bruce MD;  Location: [facility] CATH INVASIVE LOCATION;  Service: Cardiology    CARDIAC ELECTROPHYSIOLOGY PROCEDURE N/A 3/4/2022    Procedure: Pacemaker SC new BIOTRONIK;  Surgeon: Eren Bruce MD;  Location: [facility] CATH INVASIVE LOCATION;  Service: Cardiology;  Laterality: N/A;    CARPAL TUNNEL RELEASE Right 2013    CARPAL TUNNEL RELEASE Left     COLONOSCOPY N/A 1/4/2018    Procedure: COLONOSCOPY with cold polypectomy and hot snare polypectomy;  Surgeon: Ten Solitario MD;  Location: [facility] ENDOSCOPY;  Service:     COLONOSCOPY N/A 4/25/2024    Procedure: COLONOSCOPY INTO CECUM;  Surgeon: Ten Solitario MD;  Location: [facility] ENDOSCOPY;  Service: General;  Laterality: N/A;  PRE: PERSONAL H/O COLON POLYP  /  POST: POOR PREP OTHERWISE NORMAL    EYE LENS IMPLANT SECONDARY Bilateral 2007, 2008    KNEE CARTILAGE SURGERY Right 1979    TONSILLECTOMY AND ADENOIDECTOMY Bilateral 2005    TOTAL KNEE ARTHROPLASTY Left 03/14/2016    Left total knee arthroplasty with Amberly component, size 11 femur, G tibial baseplate with a 10 polyethylene insert and a 38 patellar button-Dr. Pablo Hairston    TOTAL KNEE ARTHROPLASTY Right 03/02/2015    Right total knee arthroplasty with Amberly component size G femur, 11 tibial base plate with an 11 polyethylene insert and a 38  patellar button-Dr. Pablo Hairston    UVULOPALATOPHARYNGOPLASTY N/A 2005    part of soft palate, and uvula    VENTRAL HERNIA REPAIR N/A 1/23/2018    Procedure: VENTRAL HERNIA REPAIR LAPAROSCOPIC WITH DAVINCI ROBOT AND MESH;  Surgeon: Ten Solitario MD;  Location: [facility] OR;  Service:       General Information       Row Name 08/06/24 1319          Physical Therapy Time and Intention    Document Type evaluation  -EJ     Mode of Treatment physical therapy  -EJ       Row Name 08/06/24 1319          General Information    Patient Profile Reviewed yes  -EJ     Prior Level of Function independent:;all household mobility;ADL's  uses walker  -EJ     Existing Precautions/Restrictions fall  -EJ     Barriers to Rehab previous functional deficit  -EJ       Row Name 08/06/24 1319          Living Environment    People in Home spouse  -EJ       Row Name 08/06/24 1319          Cognition    Orientation Status (Cognition) oriented x 3  -       Row Name 08/06/24 1319          Safety Issues, Functional Mobility    Impairments Affecting Function (Mobility) strength;endurance/activity tolerance  -EJ               User Key  (r) = Recorded By, (t) = Taken By, (c) = Cosigned By      Initials Name Provider Type     Kayce Moss, PT Physical Therapist                   Mobility       Row Name 08/06/24 1320          Bed Mobility    Bed Mobility supine-sit  -EJ     Supine-Sit [place] (Bed Mobility) verbal cues;contact guard  -     Assistive Device (Bed Mobility) head of bed elevated;bed rails  -       Row Name 08/06/24 1320          Sit-Stand Transfer    Sit-Stand [place] (Transfers) verbal cues;contact guard;minimum assist (75% patient effort)  -     Assistive Device (Sit-Stand Transfers) walker, front-wheeled  -       Row Name 08/06/24 1320          Gait/Stairs (Locomotion)    [place] Level (Gait) verbal cues;contact guard;minimum assist (75% patient effort)  -     Assistive Device (Gait) walker,  front-wheeled  -EJ     Distance in Feet (Gait) 80  -EJ     Deviations/Abnormal Patterns (Gait) davon decreased;stride length decreased  -EJ     Bilateral Gait Deviations forward flexed posture  -EJ     Comment, (Gait/Stairs) slow pace,  no overt unsteadiness, some assist w walker management, cues to step in closer to walker as well as for upright posture  -EJ               User Key  (r) = Recorded By, (t) = Taken By, (c) = Cosigned By      Initials Name Provider Type    Kayce Jeffries, PT Physical Therapist                   Obj/Interventions       Row Name 08/06/24 1323          Range of Motion Comprehensive    General Range of Motion no range of motion deficits identified  -EJ       [facility] Name 08/06/24 1323          Strength Comprehensive (MMT)    Comment, General Manual Muscle Testing (MMT) Assessment generalized weakness  -[facility] Name 08/06/24 1323          Balance    Balance Assessment sitting static balance;standing static balance;standing dynamic balance  -EJ     Static Sitting Balance independent  -EJ     Position, Sitting Balance sitting edge of bed  -EJ     Static Standing Balance contact guard  -EJ     Dynamic Standing Balance contact guard;minimal assist  -EJ     Position/Device Used, Standing Balance walker, front-wheeled  -EJ               User Key  (r) = Recorded By, (t) = Taken By, (c) = Cosigned By      Initials Name Provider Type    Kayce Jeffries, PT Physical Therapist                   Goals/Plan       Row Name 08/06/24 1327          Bed Mobility Goal 1 (PT)    Activity/Assistive Device (Bed Mobility Goal 1, PT) bed mobility activities, all  -EJ     [place] Level/Cues Needed (Bed Mobility Goal 1, PT) supervision required  -EJ     Time Frame (Bed Mobility Goal 1, PT) 2 weeks  -EJ       Row Name 08/06/24 1327          Transfer Goal 1 (PT)    Activity/Assistive Device (Transfer Goal 1, PT) transfers, all;walker, rolling  -EJ     [place] Level/Cues Needed (Transfer Goal  1, PT) standby assist  -EJ     Time Frame (Transfer Goal 1, PT) 2 weeks  -EJ       Row Name 08/06/24 1327          Gait Training Goal 1 (PT)    Activity/Assistive Device (Gait Training Goal 1, PT) gait (walking locomotion);walker, rolling  -EJ     [place] Level (Gait Training Goal 1, PT) contact guard required  -EJ     Distance (Gait Training Goal 1, PT) 150  -EJ     Time Frame (Gait Training Goal 1, PT) 2 weeks  -EJ       Row Name 08/06/24 1327          Therapy Assessment/Plan (PT)    Planned Therapy Interventions (PT) balance training;bed mobility training;gait training;home exercise program;stretching;strengthening;stair training;ROM (range of motion);patient/family education;transfer training  -EJ               User Key  (r) = Recorded By, (t) = Taken By, (c) = Cosigned By      Initials Name Provider Type    Kayce Jeffries, PT Physical Therapist                   Clinical Impression       Row Name 08/06/24 1323          Pain    Pretreatment Pain Rating 0/10 - no pain  -EJ     Posttreatment Pain Rating 0/10 - no pain  -EJ       Row Name 08/06/24 1323          Plan of Care Review    Plan of Care Reviewed With patient  -EJ     Outcome Evaluation Pt seen for PT this am. He was admitted to [facility] overnight w fever, L facial droop, and slurred speech. Pt has hx of A fib, HTN, and HLD. At baseline, pt lives at home w his wife and step son. He states he uses walker for ambulation and is mostly independent w ADLS. Pt reports he has been going to outpatient therapy recently for generalized weakness. Today, pt w no complaints. Strength appears equal R vs L.  He is able to transition to EOB w CGA. He stood w CGA/min A using rwx. Pt able to ambulate approx 80 ft w CGA/min A and rwx. He requires cues for upright posture and to ambulate closer to walker. He does not exhibit any overt LOB or unsteadiness. Pt in the BR at end of session w nsg assistant present. Pt is hopeful to return home at [place]. He will continue to  benefit from skilled PT to maximize safety, strength, and independence w mobility.  -EJ       Row Name 08/06/24 1323          Therapy Assessment/Plan (PT)    Rehab Potential (PT) good, to achieve stated therapy goals  -EJ     Criteria for Skilled Interventions Met (PT) yes  -EJ     Therapy Frequency (PT) 5 times/wk  -EJ       Row Name 08/06/24 1323          Positioning and Restraints    Pre-Treatment Position in bed  -EJ     Post Treatment Position bathroom  -EJ     Bathroom notified nsg;sitting;call light within reach;encouraged to call for assist;with nsg  -EJ               User Key  (r) = Recorded By, (t) = Taken By, (c) = Cosigned By      Initials Name Provider Type    Kayce Jeffries, PT Physical Therapist                   Outcome Measures       Row Name 08/06/24 1328          How much help from another person do you currently need...    Turning from your back to your side while in flat bed without using bedrails? 4  -EJ     Moving from lying on back to sitting on the side of a flat bed without bedrails? 3  -EJ     Moving to and from a bed to a chair (including a wheelchair)? 3  -EJ     Standing up from a chair using your arms (e.g., wheelchair, bedside chair)? 3  -EJ     Climbing 3-5 steps with a railing? 2  -EJ     To walk in hospital room? 3  -EJ     AM-PAC 6 Clicks Score (PT) 18  -EJ     Highest Level of Mobility Goal 6 --> Walk 10 steps or more  -EJ               User Key  (r) = Recorded By, (t) = Taken By, (c) = Cosigned By      Initials Name Provider Type    Kayce Jeffries, PT Physical Therapist                                 Physical Therapy Education       Title: PT OT SLP Therapies (Not Started)       Topic: Physical Therapy (Not Started)       Point: Mobility training (Not Started)       Learner Progress:  Not documented in this visit.              Point: Home exercise program (Not Started)       Learner Progress:  Not documented in this visit.              Point: Body mechanics (Not  Started)       Learner Progress:  Not documented in this visit.              Point: Precautions (Not Started)       Learner Progress:  Not documented in this visit.                                  PT Recommendation and Plan  Planned Therapy Interventions (PT): balance training, bed mobility training, gait training, home exercise program, stretching, strengthening, stair training, ROM (range of motion), patient/family education, transfer training  Plan of Care Reviewed With: patient  Outcome Evaluation: Pt seen for PT this am. He was admitted to [facility] overnight w fever, L facial droop, and slurred speech. Pt has hx of A fib, HTN, and HLD. At baseline, pt lives at home w his wife and step son. He states he uses walker for ambulation and is mostly independent w ADLS. Pt reports he has been going to outpatient therapy recently for generalized weakness. Today, pt w no complaints. Strength appears equal R vs L.  He is able to transition to EOB w CGA. He stood w CGA/min A using rwx. Pt able to ambulate approx 80 ft w CGA/min A and rwx. He requires cues for upright posture and to ambulate closer to walker. He does not exhibit any overt LOB or unsteadiness. Pt in the BR at end of session w nsg assistant present. Pt is hopeful to return home at [place]. He will continue to benefit from skilled PT to maximize safety, strength, and independence w mobility.     Time Calculation:         PT Charges       Row Name 08/06/24 1328             Time Calculation    Start Time 1140  -EJ      Stop Time 1157  -EJ      Time Calculation (min) 17 min  -EJ      PT Received On 08/06/24  -EJ      PT - Next Appointment 08/07/24  -EJ      PT Goal Re-Cert Due Date 08/20/24  -EJ         Time Calculation- PT    Total Timed Code Minutes- PT 12 minute(s)  -EJ                User Key  (r) = Recorded By, (t) = Taken By, (c) = Cosigned By      Initials Name Provider Type    Kayce Jeffries, PT Physical Therapist                  Therapy Charges for Today        Code Description Service Date Service Provider Modifiers Qty    [id number]  PT EVAL MOD COMPLEXITY 3 8/6/2024 Kayce Moss, PT GP 1    [id number] HC PT THERAPEUTIC ACT EA 15 MIN 8/6/2024 Kayce Moss, PT GP 1            PT G-Codes  AM-PAC 6 Clicks Score (PT): 18  PT Discharge Summary  Anticipated Discharge Disposition (PT): home with assist, home with outpatient therapy services, home with home health    Kayce Moss, PT  8/6/2024

## 2024-08-06 NOTE — SIGNIFICANT NOTE
OT order received and chart reviewed. Pt is currently off of the floor in radiology. OT will continue to follow Pt.

## 2024-08-06 NOTE — CONSULTS
"Neurology Consult Note    Consult Date: 8/6/2024    Referring MD: Padmini Vann MD    Reason for Consult I have been asked to see the patient in neurological consultation to render advice and opinion regarding balance problems, facial droop and slurred speech    Flo Manzo is a 69 y.o. male with past medical history of atrial fibrillation, hypertension, hyperlipidemia presented to the ER with chief complaints of acute trouble with balance, family also noticed facial droop and slurred speech.     Today morning patient says that yesterday while he was using walker he felt weak in his legs and he was having gait difficulty, family also noticed facial droop and slurred speech.  Does have slurred speech which he states gets more prominent whenever he is stressed.    Patient states overall he is feeling much better compared to yesterday denies any other weakness numbness vision problems or troubles talking.    Past Medical History:   Diagnosis Date    Arthritis     Atrial fibrillation with RVR 01/24/2019    Colon polyps 01/04/2018    Hepatic flexure: tubular adenoma, only low-grade dysplasia; splenic flexure: tubular adenoma, only low-grade dysplasia; sigmoid colon: tubular adenoma, multiple fragments only low-grade dysplasia    Depression     Heart murmur     Hemorrhagic stroke 01/29/2019    Hypertension     New onset atrial fibrillation (CMS/HCC) - RVR 01/24/2019    GÓMEZ (obstructive sleep apnea) 06/06/2019    Home sleep study with moderate severity GÓMEZ, AHI 20 events per hour.  Sleep-related hypoxia with low O2 saturation 84% for 14 minutes.    Peripheral neuropathy     Sleep apnea     previously diagnosed with sleep apnea, had T&A done and it has since resolved     Stroke     Uncontrolled hypertension     Ventral hernia        Exam  /81 (BP Location: Right arm, Patient Position: Lying)   Pulse 88   Temp 98.6 °F (37 °C) (Oral)   Resp 16   Ht 177.8 cm (70\")   Wt 103 kg (227 lb 1.2 oz)   SpO2 93%   " BMI 32.58 kg/m²   Gen: NAD, vitals reviewed  MS: oriented x3, normal comprehension repetition and fluency, language intact, no neglect.  CN: visual acuity grossly normal, PERRL, EOMI, mild left facial droop and slurred speech  Motor: 5/5 throughout upper and lower extremities, normal tone    DATA:    Lab Results   Component Value Date    GLUCOSE 105 (H) 08/06/2024    CALCIUM 9.3 08/06/2024     08/06/2024    K 3.2 (L) 08/06/2024    CO2 24.9 08/06/2024     08/06/2024    BUN 15 08/06/2024    CREATININE 0.79 08/06/2024    EGFRIFAFRI 103 08/27/2021    EGFRIFNONA 85 08/27/2021    BCR 19.0 08/06/2024    ANIONGAP 10.1 08/06/2024     Lab Results   Component Value Date    WBC 19.65 (H) 08/06/2024    HGB 13.1 08/06/2024    HCT 40.6 08/06/2024    MCV 88.3 08/06/2024     08/06/2024       Lab review:   Sodium 141  Creatinine 0.79  Blood glucose 105  Normal LFTs    A1c 5.7  TSH 0.179  LDL 44    WBC 19.65  Hemoglobin 13 and platelets 212    Urinalysis negative  Blood cultures pending    Imaging review:   CT head showed no acute hypodensity or hyperdensity he has ex-vacuo dilation of ventricles secondary to cortical atrophy, left cerebellar chronic encephalomalacia    MRI brain pending    Diagnoses:  Concern for acute ischemic stroke    Chronic left cerebellar hemorrhage  Obstructive sleep apnea  Congestive heart failure  Atrial fibrillation  Hypertension  Phonic gait difficulties    Pre-stroke MRS: 2  NIHSS: 2 for slurred speech and facial droop    Plan  He does have a mild facial droop and slurred speech which might be from his chronic left cerebellar hypodensity seen on the CT scan, I do not see any classic signs for stroke.  Have a mild fever of 103, urinalysis negative blood cultures pending  Continue Eliquis 5 mg twice daily for A-fib and Lipitor 40 mg daily  MRI brain pending  Carotid ultrasound pending  PT OT and SLP eval also pending.    No Family at bedside.      MDM   Reviewed: Previous charts, nursing  notes and vitals   Reviewed: Previous labs and CT scan    Interpretation: Labs and CT scan   Total time providing care is :30-74 minutes. This excluded time spent performing separately reportable procedures and services  Consults :Neurology/Stroke    Please note that portions of this note were completed with a voice recognition program.     Jong Frias MD  Neuro Hospitalist /Vascular Neurology.

## 2024-08-06 NOTE — PLAN OF CARE
Goal Outcome Evaluation:  Plan of Care Reviewed With: patient           Outcome Evaluation: Clinical swallow eval completed.  Recommend nectar thick liquids and mechanical soft ground textures.  Medications whole/crushed with purée.  Recommend upright, slow rate, general aspiration precautions, use of liquid wash.  SLP to follow for diet tolerance and reevaluation.      Anticipated Discharge Disposition (SLP): unknown          SLP Swallowing Diagnosis: oral dysphagia, suspected pharyngeal dysphagia (08/06/24 0900)

## 2024-08-06 NOTE — CONSULTS
"Nutrition Services    Patient Name:  Flo Manzo  YOB: 1955  MRN: [id number]  Admit Date:  8/5/2024    Assessment Date:  08/06/24    Summary: Consult for RN admit screen  Pt is a 68 y/o male who presented with trouble walking. Pt has a hx of a fib, stroke, HTN.  Pt laying in bed when I visited. Pt stated he normally eats 2 meals per day but over the past couple weeks he has not been eating as much due \"someone is not always there to make food\". Pt reported he has only been eating the equivalent of 1 meal per day over the past couple weeks. Pt states his appetite is good. Pt stated his usual weight is 232 lbs and he has not noticed any weight loss. Per EMR, pt weight appears stable. Pt evaluated by SLP this morning who recs NTL and mechanical soft ground solids. Pt denied any n/v. Pt does not currently show any muscle wasting or fat loss.     RECS:  Encourage good nutrition  Follow up for PO intake evaluation    CLINICAL NUTRITION ASSESSMENT      Reason for Assessment Nurse Admission Screen, Physician Consult     Diagnosis/Problem   trouble walking. Pt has a hx of a fib, stroke, HTN.   Medical/Surgical History Past Medical History:   Diagnosis Date    Arthritis     Atrial fibrillation with RVR 01/24/2019    Colon polyps 01/04/2018    Hepatic flexure: tubular adenoma, only low-grade dysplasia; splenic flexure: tubular adenoma, only low-grade dysplasia; sigmoid colon: tubular adenoma, multiple fragments only low-grade dysplasia    Depression     Heart murmur     Hemorrhagic stroke 01/29/2019    Hypertension     New onset atrial fibrillation (CMS/HCC) - RVR 01/24/2019    GÓMEZ (obstructive sleep apnea) 06/06/2019    Home sleep study with moderate severity GÓMEZ, AHI 20 events per hour.  Sleep-related hypoxia with low O2 saturation 84% for 14 minutes.    Peripheral neuropathy     Sleep apnea     previously diagnosed with sleep apnea, had T&A done and it has since resolved     Stroke     Uncontrolled " hypertension     Ventral hernia        Past Surgical History:   Procedure Laterality Date    APPENDECTOMY N/A 1972    BACK SURGERY      BLADDER STIMULATOR IMPLANT L LOWER BACK    CARDIAC CATHETERIZATION N/A 07/20/2004    Cath left ventriculography, coronary angiography and left heart catheterization, Normal results-Dr. Fierro     CARDIAC CATHETERIZATION N/A 1/29/2019    Procedure: Left Heart Cath;  Surgeon: Eren Bruce MD;  Location: [facility] CATH INVASIVE LOCATION;  Service: Cardiology    CARDIAC CATHETERIZATION N/A 1/29/2019    Procedure: Left ventriculography;  Surgeon: Eren Bruce MD;  Location: [facility] CATH INVASIVE LOC[facility];  Service: Cardiology    CARDIAC CATHETERIZATION N/A 1/29/2019    Procedure: Coronary angiography;  Surgeon: Eren Bruce MD;  Location: [facility] CATH [facility]VASIVE LOC[facility];  Service: Cardiology    CARDIAC ELECTROPHYSIOLOGY PROCEDURE N/A 3/4/2022    Procedure: Pacemaker SC new BIOTRONIK;  Surgeon: Eren Bruce MD;  Location: [facility] CATH INVASIVE LOCATION;  Service: Cardiology;  Laterality: N/A;    CARPAL TUNNEL RELEASE Right 2013    CARPAL TUNNEL RELEASE Left     COLONOSCOPY N/A 1/4/2018    Procedure: COLONOSCOPY with cold polypectomy and hot snare polypectomy;  Surgeon: Ten Solitario MD;  Location: [facility] ENDOSCOPY;  Service:     COLONOSCOPY N/A 4/25/2024    Procedure: COLONOSCOPY INTO CECUM;  Surgeon: Ten Solitario MD;  Location: [facility] ENDOSCOPY;  Service: General;  Laterality: N/A;  PRE: PERSONAL H/O COLON POLYP  /  POST: POOR PREP OTHERWISE NORMAL    EYE LENS IMPLANT SECONDARY Bilateral 2007, 2008    KNEE CARTILAGE SURGERY Right 1979    TONSILLECTOMY AND ADENOIDECTOMY Bilateral 2005    TOTAL KNEE ARTHROPLASTY Left 03/14/2016    Left total knee arthroplasty with Amberly component, size 11 femur, G tibial baseplate with a 10 polyethylene insert and a 38 patellar button-Dr. Pablo Hairston    TOTAL KNEE ARTHROPLASTY Right 03/02/2015    Right total  "knee arthroplasty with Amberly component size G femur, 11 tibial base plate with an 11 polyethylene insert and a 38 patellar button-Dr. Pablo Hairston    UVULOPALATOPHARYNGOPLASTY N/A 2005    part of soft palate, and uvula    VENTRAL HERNIA REPAIR N/A 1/23/2018    Procedure: VENTRAL HERNIA REPAIR LAPAROSCOPIC WITH DAVINCI ROBOT AND MESH;  Surgeon: Ten Solitario MD;  Location: [facility];  Service:         Anthropometrics        Current Height  Current Weight  BMI kg/m2 Height: 177.8 cm (70\")  Weight: 103 kg (227 lb 1.2 oz) (08/06/24 0100)  Body mass index is 32.58 kg/m².   Adjusted BMI (if applicable)    BMI Category Obese, Class I (30 - 34.9)   Ideal Body Weight (IBW) 160 lbs   Usual Body Weight (UBW) 232 lbs   Weight Trend Stable   Weight History Wt Readings from Last 30 Encounters:   08/06/24 0100 103 kg (227 lb 1.2 oz)   08/1955 105 kg (230 lb 8 oz)   07/19/24 1300 107 kg (235 lb)   07/17/24 1203 107 kg (236 lb)   07/15/24 1243 105 kg (232 lb)   07/11/24 1038 105 kg (231 lb 7.7 oz)   06/17/24 1038 105 kg (231 lb)   05/16/24 1105 106 kg (234 lb)   05/15/24 1100 105 kg (231 lb)   04/25/24 0908 105 kg (232 lb 1.6 oz)   04/17/24 1149 104 kg (230 lb)   04/03/24 1337 105 kg (232 lb)   03/13/24 1245 104 kg (230 lb)   02/07/24 1337 104 kg (230 lb)   01/18/24 1215 105 kg (232 lb)   01/03/24 1433 104 kg (229 lb 6.4 oz)   12/27/23 1437 104 kg (229 lb)   12/18/23 1438 104 kg (228 lb 3.2 oz)   12/11/23 1517 104 kg (230 lb)   12/01/23 1355 106 kg (234 lb 6.4 oz)   11/21/23 1101 106 kg (234 lb)   11/16/23 1046 106 kg (233 lb)   11/13/23 1000 106 kg (234 lb)   10/27/23 1426 107 kg (235 lb)   10/03/23 1127 107 kg (235 lb)   09/26/23 1426 107 kg (235 lb)   09/26/23 0953 108 kg (237 lb)   09/21/23 1039 107 kg (235 lb)   08/18/23 1453 105 kg (232 lb)   08/17/23 1259 106 kg (233 lb 11 oz)   08/17/23 1339 104 kg (230 lb)        Estimated/Assessed Needs        Energy Requirements    Weight for Calculation 73 kg IBW "   Method for Estimation  30-35 kcal/kg   EST Needs (kcal/day) 2855-5177       Protein Requirements    Weight for Calculation 73 kg IBW   EST Protein Needs (g/kg) 1.5 - 2.0 gm/kg   EST Daily Needs (g/day) 109-146       Fluid Requirements     Method for Estimation 1 mL/kcal    Estimated Needs (mL/day)        Fluid Deficit    Current Na Level (mEq/L)    Desired Na Level (mEq/L)    Estimated Fluid Deficit (L)       Labs       Pertinent Labs    Results from last 7 days   Lab Units 08/06/24  0351 08/05/24 2002   SODIUM mmol/L 141 140   POTASSIUM mmol/L 3.2* 3.5   CHLORIDE mmol/L 106 105   CO2 mmol/L 24.9 25.4   BUN mg/dL 15 15   CREATININE mg/dL 0.79 0.98   CALCIUM mg/dL 9.3 9.9   BILIRUBIN mg/dL 1.1 0.8   ALK PHOS U/L 92 111   ALT (SGPT) U/L 11 14   AST (SGOT) U/L 18 19   GLUCOSE mg/dL 105* 101*     Results from last 7 days   Lab Units 08/06/24  0351   MAGNESIUM mg/dL 2.0   HEMOGLOBIN g/dL 13.1   HEMATOCRIT % 40.6   WBC 10*3/mm3 19.65*   TRIGLYCERIDES mg/dL 38   ALBUMIN g/dL 3.7     Results from last 7 days   Lab Units 08/06/24  0351 08/05/24 2002   INR   --  1.15*   PLATELETS 10*3/mm3 212 255     COVID19   Date Value Ref Range Status   08/05/2024 Not Detected Not Detected - Ref. Range Final     Lab Results   Component Value Date    HGBA1C 5.70 (H) 08/06/2024          Medications           Scheduled Medications apixaban, 5 mg, Oral, BID  aspirin, 325 mg, Oral, Daily   Or  aspirin, 300 mg, Rectal, Daily  atorvastatin, 40 mg, Oral, Nightly  busPIRone, 10 mg, Oral, BID  citalopram, 30 mg, Oral, Daily  gabapentin, 100 mg, Oral, Q12H  gabapentin, 300 mg, Oral, Nightly  guaiFENesin, 1,200 mg, Oral, Q12H  metoprolol tartrate, 25 mg, Oral, Q12H  pantoprazole, 40 mg, Oral, Q AM  piperacillin-tazobactam, 3.375 g, Intravenous, Once  piperacillin-tazobactam, 3.375 g, Intravenous, Q8H  tamsulosin, 0.4 mg, Oral, Nightly       Infusions sodium chloride, 75 mL/hr, Last Rate: 75 mL/hr (08/06/24 1126)       PRN Medications    acetaminophen **OR** acetaminophen    albuterol    bisacodyl    Magnesium Standard Dose Replacement - Follow Nurse / BPA Driven Protocol    ondansetron    Potassium Replacement - Follow Nurse / BPA Driven Protocol    sodium chloride     Physical Findings          General Findings alert, obese, oriented, on oxygen therapy   Oral/Mouth Cavity WDL   Edema  no edema   Gastrointestinal WDL   Skin  skin intact   Tubes/Drains/Lines none   NFPE Not indicated at this time   --  Current Nutrition Orders & Evaluation of Intake       Oral Nutrition     Food Allergies NKFA   Current PO Diet Diet: Cardiac, Diabetic; Healthy Heart (2-3 Na+); Consistent Carbohydrate; No Mixed Consistencies; Texture: Mechanical Ground (NDD 2); Fluid Consistency: Nectar Thick   Supplement n/a   PO Evaluation     % PO Intake 1 meal per day x 2 weeks per pt    Factors Affecting Intake: social/economic status     PES STATEMENT / NUTRITION DIAGNOSIS      Nutrition Dx Problem  Problem: Inadequate Oral Intake  Etiology: Other: social issues    Signs/Symptoms: Report of Minimal PO Intake   --  NUTRITION INTERVENTION / PLAN OF CARE      Intervention Goal(s) Maintain nutrition status, Establish goals of care, Meet estimated needs, Increase intake, Appropriate weight loss, and PO intake goal %: 75%         RD Intervention/Action Encourage intake, Continue to monitor, and Care plan reviewed         Prescription/Orders:       PO Diet       Supplements       Enteral Nutrition       Parenteral Nutrition    New Prescription Ordered? Continue same per protocol, No changes at this time   --      Monitor/Evaluation Per protocol, PO intake, Weight, Skin status, GI status, Symptoms, POC/GOC, Swallow function   Discharge Plan/Needs No discharge needs identified at this time   --    RD to follow per protocol.      Electronically signed by:  José Miguel Vigil  08/06/24 11:29 EDT

## 2024-08-06 NOTE — PLAN OF CARE
Goal Outcome Evaluation:         Representative from both Pace maker and bladder stimulator are been present at the room, and Pace maker is in MRI mode, and Bladder stimulator is off and from their stand point patient is ready to have the MRI safely.   Pt will need a f/u appt with [facility] to have the bladder stimulator reactivated and get a controller.

## 2024-08-06 NOTE — PLAN OF CARE
Problem: Adult Inpatient Plan of Care  Goal: Plan of Care Review  Outcome: Ongoing, Progressing     Problem: Adult Inpatient Plan of Care  Goal: Optimal Comfort and Wellbeing  8/6/2024 0114 by Savannah Junior RN  Outcome: Ongoing, Progressing  8/6/2024 0114 by Savannah Junior RN  Outcome: Ongoing, Progressing     Problem: Behavioral Health Comorbidity  Goal: Maintenance of Behavioral Health Symptom Control  8/6/2024 0114 by Savannah Junior RN  Outcome: Ongoing, Progressing  8/6/2024 0114 by Savannah Junior RN  Outcome: Ongoing, Progressing     Problem: Behavioral Health Comorbidity  Goal: Maintenance of Behavioral Health Symptom Control  8/6/2024 0114 by Savannah Junior RN  Outcome: Ongoing, Progressing  8/6/2024 0114 by Savannah Junior RN  Outcome: Ongoing, Progressing     Problem: Hypertension Comorbidity  Goal: Blood Pressure in Desired Range  8/6/2024 0114 by Savannah Junior RN  Outcome: Ongoing, Progressing  8/6/2024 0114 by Savannah Junior RN  Outcome: Ongoing, Progressing     Problem: Skin Injury Risk Increased  Goal: Skin Health and Integrity  8/6/2024 0114 by Savannah Junior RN  Outcome: Ongoing, Progressing  8/6/2024 0114 by Savannah Junior RN  Outcome: Ongoing, Progressing     Problem: Skin Injury Risk Increased  Goal: Skin Health and Integrity  8/6/2024 0114 by Savannah Junior RN  Outcome: Ongoing, Progressing  8/6/2024 0114 by Savannah Junior RN  Outcome: Ongoing, Progressing     Problem: Fall Injury Risk  Goal: Absence of Fall and Fall-Related Injury  8/6/2024 0114 by Savannah Junior RN  Outcome: Ongoing, Progressing  8/6/2024 0114 by Savannah Junior RN  Outcome: Ongoing, Progressing   Goal Outcome Evaluation:

## 2024-08-07 ENCOUNTER — APPOINTMENT (OUTPATIENT)
Dept: CT IMAGING | Facility: HOSPITAL | Age: 69
DRG: 177 | End: 2024-08-07
Payer: MEDICARE

## 2024-08-07 LAB
ANION GAP SERPL CALCULATED.3IONS-SCNC: 8.5 MMOL/L (ref 5–15)
BUN SERPL-MCNC: 19 MG/DL (ref 8–23)
BUN/CREAT SERPL: 21.1 (ref 7–25)
CALCIUM SPEC-SCNC: 9 MG/DL (ref 8.6–10.5)
CHLORIDE SERPL-SCNC: 106 MMOL/L (ref 98–107)
CO2 SERPL-SCNC: 24.5 MMOL/L (ref 22–29)
CREAT SERPL-MCNC: 0.9 MG/DL (ref 0.76–1.27)
DEPRECATED RDW RBC AUTO: 40.7 FL (ref 37–54)
EGFRCR SERPLBLD CKD-EPI 2021: 93 ML/MIN/1.73
ERYTHROCYTE [DISTWIDTH] IN BLOOD BY AUTOMATED COUNT: 12.7 % (ref 12.3–15.4)
GLUCOSE SERPL-MCNC: 114 MG/DL (ref 65–99)
HCT VFR BLD AUTO: 38.8 % (ref 37.5–51)
HGB BLD-MCNC: 12.7 G/DL (ref 13–17.7)
MCH RBC QN AUTO: 28.7 PG (ref 26.6–33)
MCHC RBC AUTO-ENTMCNC: 32.7 G/DL (ref 31.5–35.7)
MCV RBC AUTO: 87.6 FL (ref 79–97)
PLATELET # BLD AUTO: 194 10*3/MM3 (ref 140–450)
PMV BLD AUTO: 10.3 FL (ref 6–12)
POTASSIUM SERPL-SCNC: 4.2 MMOL/L (ref 3.5–5.2)
POTASSIUM SERPL-SCNC: 4.3 MMOL/L (ref 3.5–5.2)
RBC # BLD AUTO: 4.43 10*6/MM3 (ref 4.14–5.8)
SODIUM SERPL-SCNC: 139 MMOL/L (ref 136–145)
WBC NRBC COR # BLD AUTO: 11.06 10*3/MM3 (ref 3.4–10.8)

## 2024-08-07 PROCEDURE — 94799 UNLISTED PULMONARY SVC/PX: CPT

## 2024-08-07 PROCEDURE — 94761 N-INVAS EAR/PLS OXIMETRY MLT: CPT

## 2024-08-07 PROCEDURE — 97530 THERAPEUTIC ACTIVITIES: CPT

## 2024-08-07 PROCEDURE — 97166 OT EVAL MOD COMPLEX 45 MIN: CPT

## 2024-08-07 PROCEDURE — 94664 DEMO&/EVAL PT USE INHALER: CPT

## 2024-08-07 PROCEDURE — 84132 ASSAY OF SERUM POTASSIUM: CPT | Performed by: INTERNAL MEDICINE

## 2024-08-07 PROCEDURE — 85027 COMPLETE CBC AUTOMATED: CPT | Performed by: INTERNAL MEDICINE

## 2024-08-07 PROCEDURE — 25010000002 PIPERACILLIN SOD-TAZOBACTAM PER 1 G: Performed by: INTERNAL MEDICINE

## 2024-08-07 PROCEDURE — 94760 N-INVAS EAR/PLS OXIMETRY 1: CPT

## 2024-08-07 PROCEDURE — 25510000001 IOPAMIDOL PER 1 ML: Performed by: INTERNAL MEDICINE

## 2024-08-07 PROCEDURE — 99233 SBSQ HOSP IP/OBS HIGH 50: CPT | Performed by: PHYSICIAN ASSISTANT

## 2024-08-07 PROCEDURE — 97535 SELF CARE MNGMENT TRAINING: CPT

## 2024-08-07 PROCEDURE — 80048 BASIC METABOLIC PNL TOTAL CA: CPT | Performed by: INTERNAL MEDICINE

## 2024-08-07 PROCEDURE — 70496 CT ANGIOGRAPHY HEAD: CPT

## 2024-08-07 PROCEDURE — 94640 AIRWAY INHALATION TREATMENT: CPT

## 2024-08-07 PROCEDURE — 70498 CT ANGIOGRAPHY NECK: CPT

## 2024-08-07 RX ORDER — IPRATROPIUM BROMIDE AND ALBUTEROL SULFATE 2.5; .5 MG/3ML; MG/3ML
3 SOLUTION RESPIRATORY (INHALATION)
Status: DISCONTINUED | OUTPATIENT
Start: 2024-08-07 | End: 2024-08-08

## 2024-08-07 RX ORDER — ASPIRIN 81 MG/1
81 TABLET, CHEWABLE ORAL DAILY
Status: DISCONTINUED | OUTPATIENT
Start: 2024-08-08 | End: 2024-08-09 | Stop reason: HOSPADM

## 2024-08-07 RX ORDER — FUROSEMIDE 20 MG/1
20 TABLET ORAL DAILY
Status: DISCONTINUED | OUTPATIENT
Start: 2024-08-07 | End: 2024-08-09 | Stop reason: HOSPADM

## 2024-08-07 RX ADMIN — APIXABAN 5 MG: 5 TABLET, FILM COATED ORAL at 08:57

## 2024-08-07 RX ADMIN — METOPROLOL TARTRATE 25 MG: 25 TABLET, FILM COATED ORAL at 08:56

## 2024-08-07 RX ADMIN — GUAIFENESIN 1200 MG: 600 TABLET, EXTENDED RELEASE ORAL at 08:56

## 2024-08-07 RX ADMIN — IPRATROPIUM BROMIDE AND ALBUTEROL SULFATE 3 ML: .5; 3 SOLUTION RESPIRATORY (INHALATION) at 14:39

## 2024-08-07 RX ADMIN — GUAIFENESIN 1200 MG: 600 TABLET, EXTENDED RELEASE ORAL at 20:20

## 2024-08-07 RX ADMIN — GABAPENTIN 100 MG: 100 CAPSULE ORAL at 08:56

## 2024-08-07 RX ADMIN — PIPERACILLIN AND TAZOBACTAM 3.38 G: 3; .375 INJECTION, POWDER, FOR SOLUTION INTRAVENOUS at 18:16

## 2024-08-07 RX ADMIN — PANTOPRAZOLE SODIUM 40 MG: 40 TABLET, DELAYED RELEASE ORAL at 06:02

## 2024-08-07 RX ADMIN — PIPERACILLIN AND TAZOBACTAM 3.38 G: 3; .375 INJECTION, POWDER, FOR SOLUTION INTRAVENOUS at 08:56

## 2024-08-07 RX ADMIN — PIPERACILLIN AND TAZOBACTAM 3.38 G: 3; .375 INJECTION, POWDER, FOR SOLUTION INTRAVENOUS at 01:18

## 2024-08-07 RX ADMIN — FUROSEMIDE 20 MG: 20 TABLET ORAL at 11:00

## 2024-08-07 RX ADMIN — GABAPENTIN 300 MG: 100 CAPSULE ORAL at 20:21

## 2024-08-07 RX ADMIN — BUSPIRONE HYDROCHLORIDE 10 MG: 10 TABLET ORAL at 20:20

## 2024-08-07 RX ADMIN — METOPROLOL TARTRATE 25 MG: 25 TABLET, FILM COATED ORAL at 20:20

## 2024-08-07 RX ADMIN — TAMSULOSIN HYDROCHLORIDE 0.4 MG: 0.4 CAPSULE ORAL at 20:20

## 2024-08-07 RX ADMIN — IOPAMIDOL 95 ML: 755 INJECTION, SOLUTION INTRAVENOUS at 12:29

## 2024-08-07 RX ADMIN — ATORVASTATIN CALCIUM 40 MG: 20 TABLET, FILM COATED ORAL at 20:20

## 2024-08-07 RX ADMIN — BUSPIRONE HYDROCHLORIDE 10 MG: 10 TABLET ORAL at 08:56

## 2024-08-07 RX ADMIN — CITALOPRAM 30 MG: 20 TABLET, FILM COATED ORAL at 08:56

## 2024-08-07 RX ADMIN — IPRATROPIUM BROMIDE AND ALBUTEROL SULFATE 3 ML: .5; 3 SOLUTION RESPIRATORY (INHALATION) at 19:50

## 2024-08-07 RX ADMIN — GABAPENTIN 100 MG: 100 CAPSULE ORAL at 20:20

## 2024-08-07 RX ADMIN — IPRATROPIUM BROMIDE AND ALBUTEROL SULFATE 3 ML: .5; 3 SOLUTION RESPIRATORY (INHALATION) at 11:21

## 2024-08-07 NOTE — PLAN OF CARE
Goal Outcome Evaluation:  Plan of Care Reviewed With: patient           Outcome Evaluation: OT order received and chart reviewed. Pt agrees to OT evlauation. Pt is a 69 year old male who presents to [facility] for stroke-like syptoms. Pt found to have a small infarct. Pt supine in bed when this OT entered room. Pt performs supine>sit with HOB elevated and to Pt's right needing MAX A for BLE and Upper Trunk. Once sitting, Pt needs assist to center self and scoot hips to EOB. Pt required MOD A for static sitting balance. Pt demos right lateral lean and required VC and TC and visual cues to right self. Pt needs TOTAL A to don socks sitting EOB. Pt performs sit>stand to FWW needing MAX A to clear hips, balance, and safety. Pt attempts to take side steps to the right but unable to facilitate movement in BLE. Pt performs stand>sit using a FWW needing MAX A for controlled descent. Pt performs sit>supine with HOB flat needing MAX A.      Anticipated Discharge Disposition (OT): inpatient rehabilitation facility

## 2024-08-07 NOTE — PROGRESS NOTES
" LOS: 2 days     Name: Flo Manzo  Age: 69 y.o.  Sex: male  :  1955  MRN: [id number]         Primary Care Physician: Karen Jiménez APRN    Subjective   Subjective  He denies any shortness of breath.  Fever curve has improved.    Objective   Vital Signs  Temp:  [97.3 °F (36.3 °C)-98.4 °F (36.9 °C)] 98.2 °F (36.8 °C)  Heart Rate:  [69-95] 92  Resp:  [16-18] 18  BP: (104-137)/() 127/94  Body mass index is 32.58 kg/m².    Objective:  General Appearance:  Comfortable and in no acute distress.    Vital signs: (most recent): Blood pressure 127/94, pulse 92, temperature 98.2 °F (36.8 °C), temperature source Oral, resp. rate 18, height 177.8 cm (70\"), weight 103 kg (227 lb 1.2 oz), SpO2 96%.    Lungs:  Normal effort and normal respiratory rate.  He is not in respiratory distress.  There are decreased breath sounds, wheezes and rhonchi.    Heart: Normal rate.  Regular rhythm.    Abdomen: Abdomen is soft.  Bowel sounds are normal.   There is no abdominal tenderness.     Extremities: There is no dependent edema or local swelling.    Neurological: Patient is alert and oriented to person, place and time.    Skin:  Warm and dry.                Results Review:       I reviewed the patient's new clinical results.    Results from last 7 days   Lab Units 24  0552 24  0351 24   WBC 10*3/mm3 11.06* 19.65* 11.91*   HEMOGLOBIN g/dL 12.7* 13.1 14.9   PLATELETS 10*3/mm3 194 212 255     Results from last 7 days   Lab Units 24  0552 24  0051 24  0351 24   SODIUM mmol/L 139  --  141 140   POTASSIUM mmol/L 4.2 4.3 3.2* 3.5   CHLORIDE mmol/L 106  --  106 105   CO2 mmol/L 24.5  --  24.9 25.4   BUN mg/dL 19  --  15 15   CREATININE mg/dL 0.90  --  0.79 0.98   CALCIUM mg/dL 9.0  --  9.3 9.9   GLUCOSE mg/dL 114*  --  105* 101*     Results from last 7 days   Lab Units 24   INR  1.15*             Scheduled Meds:   apixaban, 5 mg, Oral, BID  atorvastatin, 40 mg, Oral, " Nightly  busPIRone, 10 mg, Oral, BID  citalopram, 30 mg, Oral, Daily  furosemide, 20 mg, Oral, Daily  gabapentin, 100 mg, Oral, Q12H  gabapentin, 300 mg, Oral, Nightly  guaiFENesin, 1,200 mg, Oral, Q12H  ipratropium-albuterol, 3 mL, Nebulization, 4x Daily - RT  metoprolol tartrate, 25 mg, Oral, Q12H  pantoprazole, 40 mg, Oral, Q AM  piperacillin-tazobactam, 3.375 g, Intravenous, Q8H  tamsulosin, 0.4 mg, Oral, Nightly      PRN Meds:     acetaminophen **OR** acetaminophen    albuterol    bisacodyl    Magnesium Standard Dose Replacement - Follow Nurse / BPA Driven Protocol    ondansetron    Potassium Replacement - Follow Nurse / BPA Driven Protocol    sodium chloride  Continuous Infusions:       Assessment & Plan   Active Hospital Problems    Diagnosis  POA    **Fever [R50.9]  Unknown    Dizziness [R42]  Yes    Vertigo [R42]  Yes    Chronic atrial fibrillation [I48.20]  Yes    GÓMEZ on auto CPAP [G47.33]  Yes    Diastolic CHF, chronic [I50.32]  Yes    Substernal thyroid goiter [E04.9]  Yes    Essential hypertension [I10]  Yes      Resolved Hospital Problems   No resolved problems to display.       Assessment & Plan    -Continue Zosyn for treatment of suspected pneumonia as cause of his fever.  Certainly could be an element of aspiration.  He has been placed on a modified diet by speech therapy and we will monitor his diet tolerance closely.  -Continue aggressive pulmonary hygiene measures.  Add DuoNebs today  -Leukocytosis improving and fever has resolved.  Blood cultures negative.  Respiratory viral panel negative.  MRSA screen, Legionella and strep pneumo urine antigens negative.  -Neurology evaluating.  Brain MRI and carotid Doppler noted.  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  Amlodipine and lisinopril are currently on hold  -Therapy services      Expected discharge date/ time has not been documented.     Flo Corey MD  [facility]  08/07/24  09:35 EDT

## 2024-08-07 NOTE — PLAN OF CARE
Goal Outcome Evaluation:  Plan of Care Reviewed With: (P) patient           Outcome Evaluation: (P) Pt seen for PT this PM. Pt sat EOB w min A of 1 and VC for repostioning. Notable posterior and R lean, but able to correct w VC. Pt performed STS w min A of 2 w R lean and increased R sided weakness in standing. Pt ambulated 5' from bed to recliner. Gross muscle strength assessed w possible increased weakness in RUE/RLE compared to LUE/LLE -difficult to assess d/t slow processing. Pt states that he does feel weaker today compared to yesterday's treatment session. Pt finished session in recliner w call bell within reach. Notifed nsg of findings and discussed progress. Pt may benefit from SNU upon d/c pending progress.      Anticipated Discharge Disposition (PT): (P) home with assist, home with outpatient therapy services, home with home health

## 2024-08-07 NOTE — THERAPY TREATMENT NOTE
Patient Name: Flo Manzo  : 1955    MRN: [id number]                              Today's Date: 2024       Admit Date: 2024    Visit Dx:     ICD-10-CM ICD-9-CM   1. Disequilibrium  R42 780.4   2. Acute febrile illness  R50.9 780.60   3. Chronic anticoagulation  Z79.01 V58.61     Patient Active Problem List   Diagnosis    Umbilical hernia without obstruction and without gangrene    Essential hypertension    Arthritis of left knee    Depression    Obesity (BMI 30-39.9)    Atrial fibrillation with RVR    Substernal thyroid goiter    CHF (congestive heart failure)    Diastolic CHF, chronic    Hemorrhagic stroke    GÓMEZ on auto CPAP    Hypersomnia due to medical condition    Sleep-related hypoxia    Chronic atrial fibrillation    Vertigo    Aortic stenosis, mild    Sinus pause    Pacemaker    Chronic anticoagulation    Generalized weakness    Prediabetes    Pure hypercholesterolemia    Pre-operative cardiovascular examination    Peripheral neuropathy    History of colon polyps    Dizziness    Fever     Past Medical History:   Diagnosis Date    Arthritis     Atrial fibrillation with RVR 2019    Colon polyps 2018    Hepatic flexure: tubular adenoma, only low-grade dysplasia; splenic flexure: tubular adenoma, only low-grade dysplasia; sigmoid colon: tubular adenoma, multiple fragments only low-grade dysplasia    Depression     Heart murmur     Hemorrhagic stroke 2019    Hypertension     New onset atrial fibrillation (CMS/HCC) - RVR 2019    GÓMEZ (obstructive sleep apnea) 2019    Home sleep study with moderate severity GÓMEZ, AHI 20 events per hour.  Sleep-related hypoxia with low O2 saturation 84% for 14 minutes.    Peripheral neuropathy     Sleep apnea     previously diagnosed with sleep apnea, had T&A done and it has since resolved     Stroke     Uncontrolled hypertension     Ventral hernia      Past Surgical History:   Procedure Laterality Date    APPENDECTOMY N/A      BACK SURGERY      BLADDER STIMULATOR IMPLANT L LOWER BACK    CARDIAC CATHETERIZATION N/A 07/20/2004    Cath left ventriculography, coronary angiography and left heart catheterization, Normal results-Dr. Vadim Mancuso    CARDIAC CATHETERIZATION N/A 1/29/2019    Procedure: Left Heart Cath;  Surgeon: Eren Brcue MD;  Location: [facility] CATH INVASIVE LOCATION;  Service: Cardiology    CARDIAC CATHETERIZATION N/A 1/29/2019    Procedure: Left ventriculography;  Surgeon: Eren Bruce MD;  Location: [facility] CATH INVASIVE LOCATION;  Service: Cardiology    CARDIAC CATHETERIZATION N/A 1/29/2019    Procedure: Coronary angiography;  Surgeon: Eren Bruce MD;  Location: [facility] CATH INVASIVE LOCATION;  Service: Cardiology    CARDIAC ELECTROPHYSIOLOGY PROCEDURE N/A 3/4/2022    Procedure: Pacemaker SC new BIOTRONIK;  Surgeon: Eren Bruce MD;  Location: [facility] CATH INVASIVE LOCATION;  Service: Cardiology;  Laterality: N/A;    CARPAL TUNNEL RELEASE Right 2013    CARPAL TUNNEL RELEASE Left     COLONOSCOPY N/A 1/4/2018    Procedure: COLONOSCOPY with cold polypectomy and hot snare polypectomy;  Surgeon: Ten Solitario MD;  Location: [facility] ENDOSCOPY;  Service:     COLONOSCOPY N/A 4/25/2024    Procedure: COLONOSCOPY INTO CECUM;  Surgeon: Ten Solitario MD;  Location: [facility] ENDOSCOPY;  Service: General;  Laterality: N/A;  PRE: PERSONAL H/O COLON POLYP  /  POST: POOR PREP OTHERWISE NORMAL    EYE LENS IMPLANT SECONDARY Bilateral 2007, 2008    KNEE CARTILAGE SURGERY Right 1979    TONSILLECTOMY AND ADENOIDECTOMY Bilateral 2005    TOTAL KNEE ARTHROPLASTY Left 03/14/2016    Left total knee arthroplasty with Amberly component, size 11 femur, G tibial baseplate with a 10 polyethylene insert and a 38 patellar button-Dr. Pablo Hairston    TOTAL KNEE ARTHROPLASTY Right 03/02/2015    Right total knee arthroplasty with Amberly component size G femur, 11 tibial base plate with an 11 polyethylene insert and a 38  patellar button-Dr. Pablo Hairston    UVULOPALATOPHARYNGOPLASTY N/A 2005    part of soft palate, and uvula    VENTRAL HERNIA REPAIR N/A 1/23/2018    Procedure: VENTRAL HERNIA REPAIR LAPAROSCOPIC WITH DAVINCI ROBOT AND MESH;  Surgeon: Ten Solitario MD;  Location: [facility] MAIN OR;  Service:       General Information       Row Name 08/07/24 1554          Physical Therapy Time and Intention    Document Type therapy note (daily note) (P)   -LC     Mode of Treatment physical therapy (P)   -LC       Row Name 08/07/24 1554          General Information    Patient Profile Reviewed yes (P)   -LC     Existing Precautions/Restrictions fall (P)   -LC     Barriers to Rehab previous functional deficit (P)   -LC       Row Name 08/07/24 1554          Cognition    Orientation Status (Cognition) oriented x 4 (P)   -LC       Row Name 08/07/24 1554          Safety Issues, Functional Mobility    Impairments Affecting Function (Mobility) strength;endurance/activity tolerance;coordination;balance (P)   -LC     Comment, Safety Issues/Impairments (Mobility) gait belt, nonskid socks (P)   -LC               User Key  (r) = Recorded By, (t) = Taken By, (c) = Cosigned By      Initials Name Provider Type     Kathi Vogel, PT Student PT Student                   Mobility       Row Name 08/07/24 1555          Bed Mobility    Bed Mobility supine-sit (P)   -LC     Supine-Sit [place] (Bed Mobility) minimum assist (75% patient effort);verbal cues;1 person assist (P)   -LC     Assistive Device (Bed Mobility) head of bed elevated;bed rails (P)   -LC     Comment, (Bed Mobility) min A 1 to sit EOB. increased post lean requiring min A for repositioning (P)   -LC       Row Name 08/07/24 1555          Sit-Stand Transfer    Sit-Stand [place] (Transfers) minimum assist (75% patient effort);verbal cues;2 person assist (P)   -LC     Assistive Device (Sit-Stand Transfers) walker, front-wheeled (P)   -LC     Comment, (Sit-Stand Transfer) min A of 2  for STS trasnfer. increased processing time for mvmt pattern. R lean and R sided weakness observed (P)   -       Row Name 08/07/24 1555          Gait/Stairs (Locomotion)    [place] Level (Gait) verbal cues;minimum assist (75% patient effort);2 person assist (P)   -     Assistive Device (Gait) walker, front-wheeled (P)   -     Distance in Feet (Gait) 5 (P)   from bed to recliner d/t increased weakness  -     Deviations/Abnormal Patterns (Gait) davon decreased;stride length decreased;festinating/shuffling;gait speed decreased (P)   -     Bilateral Gait Deviations forward flexed posture (P)   -     Right Sided Gait Deviations leans right (P)   -     Comment, (Gait/Stairs) weakness on R side requiring min A of 2 for ambulation of 5' from bed to chair. increased processing time for mvmt pattern (P)   -               User Key  (r) = Recorded By, (t) = Taken By, (c) = Cosigned By      Initials Name Provider Type    Kathi Villeda, PT Student PT Student                   Obj/Interventions       Row Name 08/07/24 1511          Strength Comprehensive (MMT)    Comment, General Manual Muscle Testing (MMT) Assessment increase in R sided weakness in UE/LE observed through gross muscle testing. pt reports he does feel weaker for today's treatment vs yesterday 8/6/24 (P)   -       Row Name 08/07/24 0783          Balance    Static Sitting Balance contact guard;minimal assist (P)   notable post lean in sitting  -     Position, Sitting Balance sitting edge of bed (P)   -     Static Standing Balance minimal assist;2-person assist (P)   -     Dynamic Standing Balance minimal assist;2-person assist (P)   -     Position/Device Used, Standing Balance walker, front-wheeled (P)   -     Comment, Balance increase unsteadiness noted d/t R lean during ambulation from bed to recliner. (P)   -               User Key  (r) = Recorded By, (t) = Taken By, (c) = Cosigned By      Initials Name Provider Type     Kathi Villeda, PT Student PT Student                   Goals/Plan    No documentation.                  Clinical Impression       Row Name 08/07/24 1602          Pain    Pretreatment Pain Rating 0/10 - no pain (P)   -LC     Posttreatment Pain Rating 0/10 - no pain (P)   -LC       Row Name 08/07/24 1602          Plan of Care Review    Plan of Care Reviewed With patient (P)   -LC     Progress -- (P)   -LC     Outcome Evaluation Pt seen for PT this PM. Pt sat EOB w min A of 1 and VC for repostioning. Notable posterior and R lean, but able to correct w VC. Pt performed STS w min A of 2 w R lean and increased R sided weakness in standing. Pt ambulated 5' from bed to recliner. Gross muscle strength assessed w possible increased weakness in RUE/RLE compared to LUE/LLE -difficult to assess d/t slow processing. Pt states that he does feel weaker today compared to yesterday's treatment session. Pt finished session in recliner w call bell within reach. Notifed nsg of findings and discussed progress. Pt may benefit from SNU upon d/c pending progress. (P)   -LC       Row Name 08/07/24 1602          Therapy Assessment/Plan (PT)    Criteria for Skilled Interventions Met (PT) yes (P)   -LC       Row Name 08/07/24 1602          Positioning and Restraints    Pre-Treatment Position in bed (P)   -LC     Post Treatment Position chair (P)   -LC     In Chair reclined;notified nsg;call light within reach;encouraged to call for assist;exit alarm on (P)   -LC               User Key  (r) = Recorded By, (t) = Taken By, (c) = Cosigned By      Initials Name Provider Type    Kathi Villeda, PT Student PT Student                   Outcome Measures       Row Name 08/07/24 1609          How much help from another person do you currently need...    Turning from your back to your side while in flat bed without using bedrails? 3 (P)   -LC     Moving from lying on back to sitting on the side of a flat bed without bedrails? 3 (P)   -LC      Moving to and from a bed to a chair (including a wheelchair)? 3 (P)   -LC     Standing up from a chair using your arms (e.g., wheelchair, bedside chair)? 3 (P)   -LC     Climbing 3-5 steps with a railing? 2 (P)   -LC     To walk in hospital room? 3 (P)   -     AM-PAC 6 Clicks Score (PT) 17 (P)   -     Highest Level of Mobility Goal 5 --> Static standing (P)   -       Row Name 08/07/24 1515          Modified [place] Scale    Pre-Stroke Modified [place] Scale 2 - Slight disability.  Unable to carry out all previous activities but able to look after own affairs without assistance.  -     Modified [place] Scale 4 - Moderately severe disability.  Unable to walk without assistance, and unable to attend to own bodily needs without assistance.  -       Row Name 08/07/24 1609 08/07/24 1515       Functional Assessment    Outcome Measure Options AM-PAC 6 Clicks Basic Mobility (PT) (P)   - AM-PAC 6 Clicks Daily Activity (OT);Modified [place]en  -              User Key  (r) = Recorded By, (t) = Taken By, (c) = Cosigned By      Initials Name Provider Type    Sonu Hubbard, OT Occupational Therapist    Kathi Villeda, PT Student PT Student                                 Physical Therapy Education       Title: PT OT SLP Therapies (In Progress)       Topic: Physical Therapy (In Progress)       Point: Mobility training (Done)       Learning Progress Summary             Patient Acceptance, E,TB, VU by  at 8/7/2024 1610                         Point: Home exercise program (Not Started)       Learner Progress:  Not documented in this visit.              Point: Body mechanics (Done)       Learning Progress Summary             Patient Acceptance, E,TB, VU by  at 8/7/2024 1610                         Point: Precautions (Not Started)       Learner Progress:  Not documented in this visit.                              User Key       Initials Effective Dates Name Provider Type [facility] 07/30/24 -  Kathi Vogel  PT Student PT Student PT                  PT Recommendation and Plan     Plan of Care Reviewed With: (P) patient  Outcome Evaluation: (P) Pt seen for PT this PM. Pt sat EOB w min A of 1 and VC for repostioning. Notable posterior and R lean, but able to correct w VC. Pt performed STS w min A of 2 w R lean and increased R sided weakness in standing. Pt ambulated 5' from bed to recliner. Gross muscle strength assessed w possible increased weakness in RUE/RLE compared to LUE/LLE -difficult to assess d/t slow processing. Pt states that he does feel weaker today compared to yesterday's treatment session. Pt finished session in recliner w call bell within reach. Notifed nsg of findings and discussed progress. Pt may benefit from SNU upon d/c pending progress.     Time Calculation:         PT Charges       Row Name 08/07/24 1610             Time Calculation    Start Time 1530 (P)   -LC      Stop Time 1542 (P)   -LC      Time Calculation (min) 12 min (P)   -LC      PT Received On 08/07/24 (P)   -LC      PT - Next Appointment 08/08/24 (P)   -LC         Timed Charges    08442 - PT Therapeutic Activity Minutes 12 (P)   -LC         Total Minutes    Timed Charges Total Minutes 12 (P)   -LC       Total Minutes 12 (P)   -LC                User Key  (r) = Recorded By, (t) = Taken By, (c) = Cosigned By      Initials Name Provider Type    LC Kathi Vogel, PT Student PT Student                  Therapy Charges for Today       Code Description Service Date Service Provider Modifiers Qty    [id number]  PT THER SUPP EA 15 MIN 8/7/2024 Kathi Vogel, PT Student GP 1    [id number]  PT THERAPEUTIC ACT EA 15 MIN 8/7/2024 Kathi Vogel, PT Student GP 1            PT G-Codes  Outcome Measure Options: (P) AM-PAC 6 Clicks Basic Mobility (PT)  AM-PAC 6 Clicks Score (PT): (P) 17  AM-PAC 6 Clicks Score (OT): 11  Modified [place] Scale: 4 - Moderately severe disability.  Unable to walk without assistance, and unable to attend to own  bodily needs without assistance.  PT Discharge Summary  Anticipated Discharge Disposition (PT): (P) home with assist, home with outpatient therapy services, home with home health    Kathi Vogel, PT Student  8/7/2024

## 2024-08-07 NOTE — PLAN OF CARE
Patient resting in bed which is in the lowest position with call light in reach and bed alarm active. All needs addressed overnight. No acute events overnight.     Problem: Adult Inpatient Plan of Care  Goal: Plan of Care Review  Outcome: Ongoing, Progressing  Flowsheets (Taken 8/7/2024 0620)  Plan of Care Reviewed With: patient     Problem: Fall Injury Risk  Goal: Absence of Fall and Fall-Related Injury  Outcome: Ongoing, Progressing  Intervention: Promote Injury-Free Environment  Flowsheets  Taken 8/7/2024 0600  Safety Promotion/Fall Prevention:   activity supervised   assistive device/personal items within reach   clutter free environment maintained   fall prevention program maintained   lighting adjusted   safety round/check completed   nonskid shoes/slippers when out of bed   room organization consistent  Taken 8/7/2024 0400  Safety Promotion/Fall Prevention:   activity supervised   assistive device/personal items within reach   clutter free environment maintained   fall prevention program maintained   lighting adjusted   safety round/check completed   nonskid shoes/slippers when out of bed   room organization consistent  Taken 8/7/2024 0200  Safety Promotion/Fall Prevention:   activity supervised   assistive device/personal items within reach   clutter free environment maintained   fall prevention program maintained   lighting adjusted   safety round/check completed   nonskid shoes/slippers when out of bed   room organization consistent  Taken 8/7/2024 0000  Safety Promotion/Fall Prevention:   activity supervised   assistive device/personal items within reach   clutter free environment maintained   fall prevention program maintained   lighting adjusted   safety round/check completed   nonskid shoes/slippers when out of bed   room organization consistent  Taken 8/6/2024 2200  Safety Promotion/Fall Prevention:   activity supervised   assistive device/personal items within reach   clutter free environment  maintained   fall prevention program maintained   lighting adjusted   safety round/check completed   nonskid shoes/slippers when out of bed   room organization consistent  Taken 8/6/2024 2000  Safety Promotion/Fall Prevention:   activity supervised   assistive device/personal items within reach   clutter free environment maintained   fall prevention program maintained   lighting adjusted   safety round/check completed   nonskid shoes/slippers when out of bed   room organization consistent   Goal Outcome Evaluation:  Plan of Care Reviewed With: patient

## 2024-08-07 NOTE — THERAPY EVALUATION
Acute Care - Occupational Therapy Initial Evaluation  [facility]     Patient Name: Flo Manzo  : 1955  MRN: [id number]  Today's Date: 2024             Admit Date: 2024       ICD-10-CM ICD-9-CM   1. Disequilibrium  R42 780.4   2. Acute febrile illness  R50.9 780.60   3. Chronic anticoagulation  Z79.01 V58.61     Patient Active Problem List   Diagnosis    Umbilical hernia without obstruction and without gangrene    Essential hypertension    Arthritis of left knee    Depression    Obesity (BMI 30-39.9)    Atrial fibrillation with RVR    Substernal thyroid goiter    CHF (congestive heart failure)    Diastolic CHF, chronic    Hemorrhagic stroke    GÓMEZ on auto CPAP    Hypersomnia due to medical condition    Sleep-related hypoxia    Chronic atrial fibrillation    Vertigo    Aortic stenosis, mild    Sinus pause    Pacemaker    Chronic anticoagulation    Generalized weakness    Prediabetes    Pure hypercholesterolemia    Pre-operative cardiovascular examination    Peripheral neuropathy    History of colon polyps    Dizziness    Fever     Past Medical History:   Diagnosis Date    Arthritis     Atrial fibrillation with RVR 2019    Colon polyps 2018    Hepatic flexure: tubular adenoma, only low-grade dysplasia; splenic flexure: tubular adenoma, only low-grade dysplasia; sigmoid colon: tubular adenoma, multiple fragments only low-grade dysplasia    Depression     Heart murmur     Hemorrhagic stroke 2019    Hypertension     New onset atrial fibrillation (CMS/HCC) - RVR 2019    GÓMEZ (obstructive sleep apnea) 2019    Home sleep study with moderate severity GÓMEZ, AHI 20 events per hour.  Sleep-related hypoxia with low O2 saturation 84% for 14 minutes.    Peripheral neuropathy     Sleep apnea     previously diagnosed with sleep apnea, had T&A done and it has since resolved     Stroke     Uncontrolled hypertension     Ventral hernia      Past Surgical History:   Procedure Laterality  Date    APPENDECTOMY N/A 1972    BACK SURGERY      BLADDER STIMULATOR IMPLANT L LOWER BACK    CARDIAC CATHETERIZATION N/A 07/20/2004    Cath left ventriculography, coronary angiography and left heart catheterization, Normal results-Dr. Vadim Mancuso    CARDIAC CATHETERIZATION N/A 1/29/2019    Procedure: Left Heart Cath;  Surgeon: Eren Bruce MD;  Location: [facility] CATH INVASIVE LOCATION;  Service: Cardiology    CARDIAC CATHETERIZATION N/A 1/29/2019    Procedure: Left ventriculography;  Surgeon: Eren Bruce MD;  [facility];  Service: Cardiology    CARDIAC CATHETERIZATION N/A 1/29/2019    Procedure: Coronary angiography;  Surgeon: Eren Bruce MD;  [facility];  Service: Cardiology    CARDIAC ELECTROPHYSIOLOGY PROCEDURE N/A 3/4/2022    Procedure: Pacemaker SC new BIOTRONIK;  Surgeon: Eren Bruce MD;  Location: [facility] CATH INVASIVE LOCATION;  Service: Cardiology;  Laterality: N/A;    CARPAL TUNNEL RELEASE Right 2013    CARPAL TUNNEL RELEASE Left     COLONOSCOPY N/A 1/4/2018    Procedure: COLONOSCOPY with cold polypectomy and hot snare polypectomy;  Surgeon: Ten Solitario MD;  Location: [facility] ENDOSCOPY;  Service:     COLONOSCOPY N/A 4/25/2024    Procedure: COLONOSCOPY INTO CECUM;  Surgeon: Ten Solitario MD;  Location: [facility] ENDOSCOPY;  Service: General;  Laterality: N/A;  PRE: PERSONAL H/O COLON POLYP  /  POST: POOR PREP OTHERWISE NORMAL    EYE LENS IMPLANT SECONDARY Bilateral 2007, 2008    KNEE CARTILAGE SURGERY Right 1979    TONSILLECTOMY AND ADENOIDECTOMY Bilateral 2005    TOTAL KNEE ARTHROPLASTY Left 03/14/2016    Left total knee arthroplasty with Amberly component, size 11 femur, G tibial baseplate with a 10 polyethylene insert and a 38 patellar button-Dr. Pablo Hairston    TOTAL KNEE ARTHROPLASTY Right 03/02/2015    Right total knee arthroplasty with Amberly component size G femur, 11 tibial base plate with an 11  polyethylene insert and a 38 patellar button-Dr. Pablo Hairston    UVULOPALATOPHARYNGOPLASTY N/A 2005    part of soft palate, and uvula    VENTRAL HERNIA REPAIR N/A 1/23/2018    Procedure: VENTRAL HERNIA REPAIR LAPAROSCOPIC WITH DAVINCI ROBOT AND MESH;  Surgeon: Ten Solitario MD;  Location: [facility] OR;  Service:          OT ASSESSMENT FLOWSHEET (Last 12 Hours)       OT Evaluation and Treatment    No documentation.                    Occupational Therapy Education       Title: PT OT SLP Therapies (Not Started)       Topic: Occupational Therapy (Not Started)       Point: ADL training (Not Started)       Description:   Instruct learner(s) on proper safety adaptation and remediation techniques during self care or transfers.   Instruct in proper use of assistive devices.                  Learner Progress:  Not documented in this visit.              Point: Home exercise program (Not Started)       Description:   Instruct learner(s) on appropriate technique for monitoring, assisting and/or progressing therapeutic exercises/activities.                  Learner Progress:  Not documented in this visit.              Point: Precautions (Not Started)       Description:   Instruct learner(s) on prescribed precautions during self-care and functional transfers.                  Learner Progress:  Not documented in this visit.              Point: Body mechanics (Not Started)       Description:   Instruct learner(s) on proper positioning and spine alignment during self-care, functional mobility activities and/or exercises.                  Learner Progress:  Not documented in this visit.                                      OT Recommendation and Plan  Planned Therapy Interventions (OT): activity tolerance training, patient/caregiver education/training, ROM/therapeutic exercise, strengthening exercise, occupation/activity based interventions, neuromuscular control/coordination retraining, transfer/mobility retraining, BADL  retraining, IADL retraining  Therapy Frequency (OT): 5 times/wk  Plan of Care Review  Plan of Care Reviewed With: patient  Outcome Evaluation: OT order received and chart reviewed. Pt agrees to OT evlauation. Pt is a 69 year old male who presents to [facility] for stroke-like syptoms. Pt found to have a small infarct. Pt supine in bed when this OT entered room. Pt performs supine>sit with HOB elevated and to Pt's right needing MAX A for BLE and Upper Trunk. Once sitting, Pt needs assist to center self and scoot hips to EOB. Pt required MOD A for static sitting balance. Pt demos right lateral lean and required VC and TC and visual cues to right self. Pt needs TOTAL A to don socks sitting EOB. Pt performs sit>stand to FWW needing MAX A to clear hips, balance, and safety. Pt attempts to take side steps to the right but unable to facilitate movement in BLE. Pt performs stand>sit using a FWW needing MAX A for controlled descent. Pt performs sit>supine with HOB flat needing MAX A.  Plan of Care Reviewed With: patient  Outcome Evaluation: OT order received and chart reviewed. Pt agrees to OT evlauation. Pt is a 69 year old male who presents to [facility] for stroke-like syptoms. Pt found to have a small infarct. Pt supine in bed when this OT entered room. Pt performs supine>sit with HOB elevated and to Pt's right needing MAX A for BLE and Upper Trunk. Once sitting, Pt needs assist to center self and scoot hips to EOB. Pt required MOD A for static sitting balance. Pt demos right lateral lean and required VC and TC and visual cues to right self. Pt needs TOTAL A to don socks sitting EOB. Pt performs sit>stand to FWW needing MAX A to clear hips, balance, and safety. Pt attempts to take side steps to the right but unable to facilitate movement in BLE. Pt performs stand>sit using a FWW needing MAX A for controlled descent. Pt performs sit>supine with HOB flat needing MAX A.        Time Calculation:    Time Calculation- OT       Row Name  08/07/24 1517             Time Calculation- OT    OT Start Time 1035  -MK      OT Stop Time 1125  -MK      OT Time Calculation (min) 50 min  -MK      OT Received On 08/07/24  -MK      OT - Next Appointment 08/08/24  -MK         Timed Charges    68547 - OT Self Care/Mgmt Minutes 42  -MK         Untimed Charges    OT Eval/Re-eval Minutes 8  -MK         Total Minutes    Timed Charges Total Minutes 42  -MK      Untimed Charges Total Minutes 8  -MK       Total Minutes 50  -MK                User Key  (r) = Recorded By, (t) = Taken By, (c) = Cosigned By      Initials Name Provider Type    Sonu Hubbard, OT Occupational Therapist                  Therapy Charges for Today       Code Description Service Date Service Provider Modifiers Qty    45536261452 HC OT EVAL MOD COMPLEXITY 3 8/7/2024 Sonu Wood, OT GO 1    [id number]  OT SELF CARE/MGMT/TRAIN EA 15 MIN 8/7/2024 Sonu Wood, OT GO 3                 Sonu Wood OT  8/7/2024

## 2024-08-07 NOTE — PLAN OF CARE
Goal Outcome Evaluation:  Plan of Care Reviewed With: patient         Speech th did swallow screening, and patient will use nectar thick and mechanical ground texture. Patient states he has some cough for years, and don't feel like food is going down the wrong way. Pt going for MRI. Dr. Hand called to give report of results, and immediately discussed with Neuro about, and more test are been ordered. Pt back from MRI. Potassium replaced per protocol during shift. No other neuro changes noted during shift.

## 2024-08-07 NOTE — PLAN OF CARE
Problem: Adult Inpatient Plan of Care  Goal: Absence of Hospital-Acquired Illness or Injury  Intervention: Identify and Manage Fall Risk  Recent Flowsheet Documentation  Taken 8/7/2024 1800 by Ashley Arambula RN  Safety Promotion/Fall Prevention:   safety round/check completed   nonskid shoes/slippers when out of bed   fall prevention program maintained  Taken 8/7/2024 1400 by Ashley Arambula RN  Safety Promotion/Fall Prevention:   safety round/check completed   nonskid shoes/slippers when out of bed   fall prevention program maintained  Taken 8/7/2024 1200 by Ashley Arambula RN  Safety Promotion/Fall Prevention:   safety round/check completed   nonskid shoes/slippers when out of bed   fall prevention program maintained  Taken 8/7/2024 1000 by Ashley Arambula RN  Safety Promotion/Fall Prevention:   safety round/check completed   nonskid shoes/slippers when out of bed   fall prevention program maintained  Taken 8/7/2024 0800 by Ashley Arambula RN  Safety Promotion/Fall Prevention:   safety round/check completed   nonskid shoes/slippers when out of bed   fall prevention program maintained     Problem: Adult Inpatient Plan of Care  Goal: Absence of Hospital-Acquired Illness or Injury  Intervention: Prevent Skin Injury  Recent Flowsheet Documentation  Taken 8/7/2024 1400 by Ashley Arambula RN  Body Position: position changed independently  Taken 8/7/2024 1200 by Ashley Arambula RN  Body Position: position changed independently  Taken 8/7/2024 1000 by Ashley Arambula RN  Body Position: position changed independently  Taken 8/7/2024 0800 by Ashley Arambula RN  Skin Protection:   adhesive use limited   incontinence pads utilized   transparent dressing maintained   tubing/devices free from skin contact   Goal Outcome Evaluation:

## 2024-08-07 NOTE — PROGRESS NOTES
"DOS: 2024  NAME: Flo Manzo   : 1955  PCP: Karen Jiménez APRN  Chief Complaint   Patient presents with    Facial Droop       Chief complaint: L sided weakness  Subjective: Pt working with OT and some difficulty sitting up with lean to R. L sided weakness is about the same.  No new symptoms to report.  On oxygen, and uses cpap at night which he did not have last night    Objective:  Vital signs: /97 (BP Location: Right arm, Patient Position: Lying)   Pulse 91   Temp 98.4 °F (36.9 °C) (Oral)   Resp 18   Ht 177.8 cm (70\")   Wt 103 kg (227 lb 1.2 oz)   SpO2 93%   BMI 32.58 kg/m²      Gen: NAD, vitals reviewed  MS: oriented x3, recent/remote memory intact, normal attention/concentration, language intact, no neglect.  CN: visual acuity grossly normal, PERRL, EOMI, L facial droop, dysphonia and dysarthria  Motor: normal  tone, good power on R, there is 4/5 of LLE with drift of LUE and LLE  Sensory: intact to light touch all 4 ext.  Coordination: no dysmetria on finger nose      ROS:  No weakness, numbness  No fevers, chills      Laboratory results:  Lab Results   Component Value Date    GLUCOSE 114 (H) 2024    CALCIUM 9.0 2024     2024    K 4.2 2024    CO2 24.5 2024     2024    BUN 19 2024    CREATININE 0.90 2024    EGFRIFAFRI 103 2021    EGFRIFNONA 85 2021    BCR 21.1 2024    ANIONGAP 8.5 2024     Lab Results   Component Value Date    WBC 11.06 (H) 2024    HGB 12.7 (L) 2024    HCT 38.8 2024    MCV 87.6 2024     2024     Lab Results   Component Value Date    LDL 44 2024    LDL 77 2024    LDL 52 2023         Lab 24  0351   HEMOGLOBIN A1C 5.70*        Review of labs:     Review and interpretation of imaging:   Reviewed MRI brain and there is multifocal areas of hemosiderin deposition of cortical medullary interfaces, b/l cerebral hemispheres, b/l " thalami and L ching and hemorrhage on L cerebellar hemisphere with extension on 4th ventrical as was previously seen on MRI in 2020      Workup to date:   Diagnoses:  1 suspected CAA  2 tiny acute L frontal stroke  3 chronic anticoagulation    Impression: 70 yo M with h/o afib on eliquis, htn, GÓMEZ, prior cerebellar stroke in 2020, peripheral neuropathy, ambulates with walker, who comes in with acute stroke like symptoms described as balance problems and new facial droop.  He also tells of several week h/o swallowing difficulties at home.  He was admitted with fever and pneumonia and MRI multiple areas of microhemorrhage concerning for cerebral amyloid angiopathy.  In particular there is a confluent area of R posterior internal capsule can explain his symptoms.  The progression compared to previous MRI is significant and feel his anticoagulation is contraindicated at this point and have discontinued this.  Aspirin for primary prevention for now and would see if he is candidate for watchman's device.      Will f/u on pending CTA head/neck    Plan  1 ASA and statin   2 have stopped his eliquis  3 appreciate cardiology for consideration of L atrial appendage occlusion  4 cont therapies, speech for dysphagia- will be good acute rehab candidate        Thank you for this consultation.  Discussed above plan with neuro attending, Dr. Gustafson who agrees with above plan.  Discussed with hospitalist team and wife.  Neurology team is available for concerns or questions.

## 2024-08-08 LAB
ANION GAP SERPL CALCULATED.3IONS-SCNC: 9.3 MMOL/L (ref 5–15)
BUN SERPL-MCNC: 17 MG/DL (ref 8–23)
BUN/CREAT SERPL: 22.4 (ref 7–25)
CALCIUM SPEC-SCNC: 9.2 MG/DL (ref 8.6–10.5)
CHLORIDE SERPL-SCNC: 108 MMOL/L (ref 98–107)
CO2 SERPL-SCNC: 25.7 MMOL/L (ref 22–29)
CREAT SERPL-MCNC: 0.76 MG/DL (ref 0.76–1.27)
DEPRECATED RDW RBC AUTO: 41.4 FL (ref 37–54)
EGFRCR SERPLBLD CKD-EPI 2021: 97.9 ML/MIN/1.73
ERYTHROCYTE [DISTWIDTH] IN BLOOD BY AUTOMATED COUNT: 12.8 % (ref 12.3–15.4)
GLUCOSE SERPL-MCNC: 95 MG/DL (ref 65–99)
HCT VFR BLD AUTO: 38.2 % (ref 37.5–51)
HGB BLD-MCNC: 12.3 G/DL (ref 13–17.7)
MCH RBC QN AUTO: 28.7 PG (ref 26.6–33)
MCHC RBC AUTO-ENTMCNC: 32.2 G/DL (ref 31.5–35.7)
MCV RBC AUTO: 89 FL (ref 79–97)
PLATELET # BLD AUTO: 196 10*3/MM3 (ref 140–450)
PMV BLD AUTO: 10.5 FL (ref 6–12)
POTASSIUM SERPL-SCNC: 3.8 MMOL/L (ref 3.5–5.2)
RBC # BLD AUTO: 4.29 10*6/MM3 (ref 4.14–5.8)
SODIUM SERPL-SCNC: 143 MMOL/L (ref 136–145)
WBC NRBC COR # BLD AUTO: 9.35 10*3/MM3 (ref 3.4–10.8)

## 2024-08-08 PROCEDURE — 25010000002 PIPERACILLIN SOD-TAZOBACTAM PER 1 G: Performed by: INTERNAL MEDICINE

## 2024-08-08 PROCEDURE — 25010000002 METHYLPREDNISOLONE PER 40 MG: Performed by: INTERNAL MEDICINE

## 2024-08-08 PROCEDURE — 85027 COMPLETE CBC AUTOMATED: CPT | Performed by: INTERNAL MEDICINE

## 2024-08-08 PROCEDURE — 94799 UNLISTED PULMONARY SVC/PX: CPT

## 2024-08-08 PROCEDURE — 94760 N-INVAS EAR/PLS OXIMETRY 1: CPT

## 2024-08-08 PROCEDURE — 99221 1ST HOSP IP/OBS SF/LOW 40: CPT | Performed by: INTERNAL MEDICINE

## 2024-08-08 PROCEDURE — 94761 N-INVAS EAR/PLS OXIMETRY MLT: CPT

## 2024-08-08 PROCEDURE — 94664 DEMO&/EVAL PT USE INHALER: CPT

## 2024-08-08 PROCEDURE — 99233 SBSQ HOSP IP/OBS HIGH 50: CPT | Performed by: PHYSICIAN ASSISTANT

## 2024-08-08 PROCEDURE — 97530 THERAPEUTIC ACTIVITIES: CPT

## 2024-08-08 PROCEDURE — 80048 BASIC METABOLIC PNL TOTAL CA: CPT | Performed by: INTERNAL MEDICINE

## 2024-08-08 RX ORDER — METHYLPREDNISOLONE SODIUM SUCCINATE 40 MG/ML
40 INJECTION, POWDER, LYOPHILIZED, FOR SOLUTION INTRAMUSCULAR; INTRAVENOUS EVERY 12 HOURS
Status: DISCONTINUED | OUTPATIENT
Start: 2024-08-08 | End: 2024-08-09

## 2024-08-08 RX ORDER — IPRATROPIUM BROMIDE AND ALBUTEROL SULFATE 2.5; .5 MG/3ML; MG/3ML
3 SOLUTION RESPIRATORY (INHALATION)
Status: DISCONTINUED | OUTPATIENT
Start: 2024-08-08 | End: 2024-08-09 | Stop reason: HOSPADM

## 2024-08-08 RX ADMIN — METOPROLOL TARTRATE 25 MG: 25 TABLET, FILM COATED ORAL at 20:37

## 2024-08-08 RX ADMIN — ATORVASTATIN CALCIUM 40 MG: 20 TABLET, FILM COATED ORAL at 20:38

## 2024-08-08 RX ADMIN — ASPIRIN 81 MG: 81 TABLET, CHEWABLE ORAL at 09:11

## 2024-08-08 RX ADMIN — CITALOPRAM 30 MG: 20 TABLET, FILM COATED ORAL at 09:11

## 2024-08-08 RX ADMIN — ALBUTEROL SULFATE 2.5 MG: 2.5 SOLUTION RESPIRATORY (INHALATION) at 02:52

## 2024-08-08 RX ADMIN — IPRATROPIUM BROMIDE AND ALBUTEROL SULFATE 3 ML: .5; 3 SOLUTION RESPIRATORY (INHALATION) at 11:25

## 2024-08-08 RX ADMIN — IPRATROPIUM BROMIDE AND ALBUTEROL SULFATE 3 ML: .5; 3 SOLUTION RESPIRATORY (INHALATION) at 15:15

## 2024-08-08 RX ADMIN — GABAPENTIN 100 MG: 100 CAPSULE ORAL at 20:38

## 2024-08-08 RX ADMIN — IPRATROPIUM BROMIDE AND ALBUTEROL SULFATE 3 ML: .5; 3 SOLUTION RESPIRATORY (INHALATION) at 23:31

## 2024-08-08 RX ADMIN — BUSPIRONE HYDROCHLORIDE 10 MG: 10 TABLET ORAL at 09:11

## 2024-08-08 RX ADMIN — METHYLPREDNISOLONE SODIUM SUCCINATE 40 MG: 40 INJECTION, POWDER, FOR SOLUTION INTRAMUSCULAR; INTRAVENOUS at 09:12

## 2024-08-08 RX ADMIN — BUSPIRONE HYDROCHLORIDE 10 MG: 10 TABLET ORAL at 20:38

## 2024-08-08 RX ADMIN — METOPROLOL TARTRATE 25 MG: 25 TABLET, FILM COATED ORAL at 09:11

## 2024-08-08 RX ADMIN — PIPERACILLIN AND TAZOBACTAM 3.38 G: 3; .375 INJECTION, POWDER, FOR SOLUTION INTRAVENOUS at 01:29

## 2024-08-08 RX ADMIN — PIPERACILLIN AND TAZOBACTAM 3.38 G: 3; .375 INJECTION, POWDER, FOR SOLUTION INTRAVENOUS at 18:06

## 2024-08-08 RX ADMIN — PIPERACILLIN AND TAZOBACTAM 3.38 G: 3; .375 INJECTION, POWDER, FOR SOLUTION INTRAVENOUS at 09:12

## 2024-08-08 RX ADMIN — TAMSULOSIN HYDROCHLORIDE 0.4 MG: 0.4 CAPSULE ORAL at 20:38

## 2024-08-08 RX ADMIN — IPRATROPIUM BROMIDE AND ALBUTEROL SULFATE 3 ML: .5; 3 SOLUTION RESPIRATORY (INHALATION) at 07:16

## 2024-08-08 RX ADMIN — GABAPENTIN 300 MG: 100 CAPSULE ORAL at 20:38

## 2024-08-08 RX ADMIN — FUROSEMIDE 20 MG: 20 TABLET ORAL at 09:11

## 2024-08-08 RX ADMIN — PANTOPRAZOLE SODIUM 40 MG: 40 TABLET, DELAYED RELEASE ORAL at 06:44

## 2024-08-08 RX ADMIN — GUAIFENESIN 1200 MG: 600 TABLET, EXTENDED RELEASE ORAL at 09:11

## 2024-08-08 RX ADMIN — IPRATROPIUM BROMIDE AND ALBUTEROL SULFATE 3 ML: .5; 3 SOLUTION RESPIRATORY (INHALATION) at 20:11

## 2024-08-08 RX ADMIN — Medication 10 ML: at 20:39

## 2024-08-08 RX ADMIN — METHYLPREDNISOLONE SODIUM SUCCINATE 40 MG: 40 INJECTION, POWDER, FOR SOLUTION INTRAMUSCULAR; INTRAVENOUS at 20:39

## 2024-08-08 RX ADMIN — GABAPENTIN 100 MG: 100 CAPSULE ORAL at 09:11

## 2024-08-08 RX ADMIN — GUAIFENESIN 1200 MG: 600 TABLET, EXTENDED RELEASE ORAL at 20:38

## 2024-08-08 NOTE — CASE MANAGEMENT/SOCIAL WORK
"Physicians Statement of Medical Necessity for  Ambulance Transportation    GENERAL INFORMATION     Name: Flo Manzo  YOB: 1955  Medicare #: [id number]  Transport Date: 8/9/24 (Valid for round trips this date, or for scheduled repetitive trips for 60 days from the date signed below.)  Origin: [id number]  Destination: [facility]  Is the Patient's stay covered under Medicare Part A (PPS/DRG?)Yes  Closest appropriate facility? Yes  If this a hosp-hosp transfer? No  Is this a hospice patient? No    MEDICAL NECESSITY QUESTIONAIRE    Ambulance Transportation is medically necessary only if other means of transportation are contraindicated or would be potentially harmful to the patient.  To meet this requirement, the patient must be either \"bed confined\" or suffer from a condition such that transport by means other than an ambulance is contraindicated by the patient's condition.  The following questions must be answered by the healthcare professional signing below for this form to be valid:     1) Describe the MEDICAL CONDITION (physical and/or mental) of this patient AT THE TIME OF AMBULANCE TRANSPORT that requires the patient to be transported in an ambulance, and why transport by other means is contraindicated by the patient's condition: Suspected CAA; tiny acute left frontal stroke  Past Medical History:   Diagnosis Date    Arthritis     Atrial fibrillation with RVR 01/24/2019    Colon polyps 01/04/2018    Hepatic flexure: tubular adenoma, only low-grade dysplasia; splenic flexure: tubular adenoma, only low-grade dysplasia; sigmoid colon: tubular adenoma, multiple fragments only low-grade dysplasia    Depression     Heart murmur     Hemorrhagic stroke 01/29/2019    Hypertension     New onset atrial fibrillation (CMS/HCC) - RVR 01/24/2019    GÓMEZ (obstructive sleep apnea) 06/06/2019    Home sleep study with moderate severity GÓMEZ, AHI 20 events per hour.  Sleep-related hypoxia with low O2 saturation " 84% for 14 minutes.    Peripheral neuropathy     Sleep apnea     previously diagnosed with sleep apnea, had T&A done and it has since resolved     Stroke     Uncontrolled hypertension     Ventral hernia       Past Surgical History:   Procedure Laterality Date    APPENDECTOMY N/A 1972    BACK SURGERY      BLADDER STIMULATOR IMPLANT L LOWER BACK    CARDIAC CATHETERIZATION N/A 07/20/2004    Cath left ventriculography, coronary angiography and left heart catheterization, Normal results-Dr. Fierro     CARDIAC CATHETERIZATION N/A 1/29/2019    Procedure: Left Heart Cath;  Surgeon: Eren Bruce MD;  Location: [facility] CATH INVASIVE LOCATION;  Service: Cardiology    CARDIAC CATHETERIZATION N/A 1/29/2019    Procedure: Left ventriculography;  Surgeon: Eren Bruce MD;  Location: [facility] CATH INVASIVE LOCATION;  Service: Cardiology    CARDIAC CATHETERIZATION N/A 1/29/2019    Procedure: Coronary angiography;  Surgeon: Eren Bruce MD;  [facility];  Service: Cardiology    CARDIAC ELECTROPHYSIOLOGY PROCEDURE N/A 3/4/2022    Procedure: Pacemaker SC new BIOTRONIK;  Surgeon: Eren Bruce MD;  Location: [facility] CATH INVASIVE LOCATION;  Service: Cardiology;  Laterality: N/A;    CARPAL TUNNEL RELEASE Right 2013    CARPAL TUNNEL RELEASE Left     COLONOSCOPY N/A 1/4/2018    Procedure: COLONOSCOPY with cold polypectomy and hot snare polypectomy;  Surgeon: Ten Solitario MD;  Location: [facility] ENDOSCOPY;  Service:     COLONOSCOPY N/A 4/25/2024    Procedure: COLONOSCOPY INTO CECUM;  Surgeon: Ten Solitario MD;  Location: [facility] ENDOSCOPY;  Service: General;  Laterality: N/A;  PRE: PERSONAL H/O COLON POLYP  /  POST: POOR PREP OTHERWISE NORMAL    EYE LENS IMPLANT SECONDARY Bilateral 2007, 2008    KNEE CARTILAGE SURGERY Right 1979    TONSILLECTOMY AND ADENOIDECTOMY Bilateral 2005    TOTAL KNEE ARTHROPLASTY Left 03/14/2016    Left total knee arthroplasty with Amberly  "component, size 11 femur, G tibial baseplate with a 10 polyethylene insert and a 38 patellar button-Dr. Pablo Hairston    TOTAL KNEE ARTHROPLASTY Right 03/02/2015    Right total knee arthroplasty with Amberly component size G femur, 11 tibial base plate with an 11 polyethylene insert and a 38 patellar button-Dr. Pablo Hairston    UVULOPALATOPHARYNGOPLASTY N/A 2005    part of soft palate, and uvula    VENTRAL HERNIA REPAIR N/A 1/23/2018    Procedure: VENTRAL HERNIA REPAIR LAPAROSCOPIC WITH DAVINCI ROBOT AND MESH;  Surgeon: Ten Solitario MD;  Location: [facility];  Service:       2) Is this patient \"bed confined\" as defined below?Yes   To be \"bed confined\" the patient must satisfy all three of the following criteria:  (1) unable to get up from bed without assistance; AND (2) unable to ambulate;  AND (3) unable to sit in a chair or wheelchair.  3) Can this patient safely be transported by car or wheelchair van (I.e., may safely sit during transport, without an attendant or monitoring?)No   4. In addition to completing questions 1-3 above, please check any of the following conditions that apply*:          *Note: supporting documentation for any boxes checked must be maintained in the patient's medical records Medical attendant required, Requires oxygen - unable to self administer, Unable to tolerate seated position for time needed to transport, and Other unable to ambulate      SIGNATURE OF PHYSICIAN OR OTHER AUTHORIZED HEALTHCARE PROFESSIONAL    I certify that the above information is true and correct based on my evaluation of this patient, and represent that the patient requires transport by ambulance and that other forms of transport are contraindicated.  I understand that this information will be used by the Centers for Medicare and Medicaid Services (CMS) to support the determiniation of medical necessity for ambulance services, and I represent that I have personal knowledge of the patient's condition at the " time of transport.     X   If this box is checked, I also certify that the patient is physically or mentally incapable of signing the ambulance service's claim form and that the institution with which I am affiliated has furnished care, services or assistance to the patient.  My signature below is made on behalf of the patient pursuant to 42 .36(b)(4). In accordance with 42 .37, the specific reason(s) that the patient is physically or mentally incapable of signing the claim for is as follows:     Signature of Physician or Healthcare Professional   Zulema Johnson RN, [facility] Date/Time:   8/8/24 @ 1423     (For Scheduled repetitive transport, this form is not valid for transports performed more than 60 days after this date).                                                                                                                                            --------------------------------------------------------------------------------------------  Printed Name and Credentials of Physician or Authorized Healthcare Professional     *Form must be signed by patient's attending physician for scheduled, repetitive transports,.  For non-repetitive ambulance transports, if unable to obtain the signature of the attending physician, any of the following may sign (please select below):     Physician  Clinical Nurse Specialist  Registered Nurse     Physician Assistant  Discharge Planner  Licensed Practical Nurse     Nurse Practitioner  X

## 2024-08-08 NOTE — PLAN OF CARE
Problem: Adult Inpatient Plan of Care  Goal: Plan of Care Review  Outcome: Ongoing, Progressing  Flowsheets (Taken 8/8/2024 0638)  Plan of Care Reviewed With: patient     Problem: Fall Injury Risk  Goal: Absence of Fall and Fall-Related Injury  Outcome: Ongoing, Progressing  Intervention: Promote Injury-Free Environment  Flowsheets  Taken 8/8/2024 0600  Safety Promotion/Fall Prevention:   activity supervised   assistive device/personal items within reach   clutter free environment maintained   fall prevention program maintained   lighting adjusted   safety round/check completed   nonskid shoes/slippers when out of bed   room organization consistent  Taken 8/8/2024 0400  Safety Promotion/Fall Prevention:   activity supervised   assistive device/personal items within reach   clutter free environment maintained   fall prevention program maintained   lighting adjusted   safety round/check completed   nonskid shoes/slippers when out of bed   room organization consistent  Taken 8/8/2024 0200  Safety Promotion/Fall Prevention:   activity supervised   assistive device/personal items within reach   clutter free environment maintained   fall prevention program maintained   lighting adjusted   safety round/check completed   nonskid shoes/slippers when out of bed   room organization consistent  Taken 8/8/2024 0000  Safety Promotion/Fall Prevention:   activity supervised   assistive device/personal items within reach   clutter free environment maintained   fall prevention program maintained   lighting adjusted   safety round/check completed   nonskid shoes/slippers when out of bed   room organization consistent  Taken 8/7/2024 2200  Safety Promotion/Fall Prevention:   activity supervised   assistive device/personal items within reach   clutter free environment maintained   fall prevention program maintained   lighting adjusted   safety round/check completed   nonskid shoes/slippers when out of bed   room organization  consistent  Taken 8/7/2024 2000  Safety Promotion/Fall Prevention:   activity supervised   assistive device/personal items within reach   clutter free environment maintained   fall prevention program maintained   lighting adjusted   safety round/check completed   nonskid shoes/slippers when out of bed   room organization consistent   Goal Outcome Evaluation:  Plan of Care Reviewed With: patient

## 2024-08-08 NOTE — NURSING NOTE
Physical therapy updated this nurse that pt was weaker and unable to ambulate as much as he has the other days. NIH 1, unchanged from start of shift, VSS, no acute neuro changes noted. Attending MD Corey and Neuro updated. Call light and personal items in reach.

## 2024-08-08 NOTE — THERAPY TREATMENT NOTE
Patient Name: Flo Manzo  : 1955    MRN: [id number]                              Today's Date: 2024       Admit Date: 2024    Visit Dx:     ICD-10-CM ICD-9-CM   1. Disequilibrium  R42 780.4   2. Acute febrile illness  R50.9 780.60   3. Chronic anticoagulation  Z79.01 V58.61     Patient Active Problem List   Diagnosis    Umbilical hernia without obstruction and without gangrene    Essential hypertension    Arthritis of left knee    Depression    Obesity (BMI 30-39.9)    Atrial fibrillation with RVR    Substernal thyroid goiter    CHF (congestive heart failure)    Diastolic CHF, chronic    Hemorrhagic stroke    GÓMEZ on auto CPAP    Hypersomnia due to medical condition    Sleep-related hypoxia    Chronic atrial fibrillation    Vertigo    Aortic stenosis, mild    Sinus pause    Pacemaker    Chronic anticoagulation    Generalized weakness    Prediabetes    Pure hypercholesterolemia    Pre-operative cardiovascular examination    Peripheral neuropathy    History of colon polyps    Dizziness    Fever     Past Medical History:   Diagnosis Date    Arthritis     Atrial fibrillation with RVR 2019    Colon polyps 2018    Hepatic flexure: tubular adenoma, only low-grade dysplasia; splenic flexure: tubular adenoma, only low-grade dysplasia; sigmoid colon: tubular adenoma, multiple fragments only low-grade dysplasia    Depression     Heart murmur     Hemorrhagic stroke 2019    Hypertension     New onset atrial fibrillation (CMS/HCC) - RVR 2019    GÓMEZ (obstructive sleep apnea) 2019    Home sleep study with moderate severity GÓMEZ, AHI 20 events per hour.  Sleep-related hypoxia with low O2 saturation 84% for 14 minutes.    Peripheral neuropathy     Sleep apnea     previously diagnosed with sleep apnea, had T&A done and it has since resolved     Stroke     Uncontrolled hypertension     Ventral hernia      Past Surgical History:   Procedure Laterality Date    APPENDECTOMY N/A      BACK SURGERY      BLADDER STIMULATOR IMPLANT L LOWER BACK    CARDIAC CATHETERIZATION N/A 07/20/2004    Cath left ventriculography, coronary angiography and left heart catheterization, Normal results-Dr. Vadim Mancuso    CARDIAC CATHETERIZATION N/A 1/29/2019    Procedure: Left Heart Cath;  Surgeon: Eren Bruce MD;  Location: [facility] CATH INVASIVE LOCATION;  Service: Cardiology    CARDIAC CATHETERIZATION N/A 1/29/2019    Procedure: Left ventriculography;  Surgeon: Eren Bruce MD;  Location: [facility] CATH INVASIVE LOCATION;  Service: Cardiology    CARDIAC CATHETERIZATION N/A 1/29/2019    Procedure: Coronary angiography;  Surgeon: Eren Bruce MD;  Location: [facility] CATH INVASIVE LOCATION;  Service: Cardiology    CARDIAC ELECTROPHYSIOLOGY PROCEDURE N/A 3/4/2022    Procedure: Pacemaker SC new BIOTRONIK;  Surgeon: Eren Bruce MD;  Location: [facility] CATH INVASIVE LOCATION;  Service: Cardiology;  Laterality: N/A;    CARPAL TUNNEL RELEASE Right 2013    CARPAL TUNNEL RELEASE Left     COLONOSCOPY N/A 1/4/2018    Procedure: COLONOSCOPY with cold polypectomy and hot snare polypectomy;  Surgeon: Ten Solitario MD;  Location: [facility] ENDOSCOPY;  Service:     COLONOSCOPY N/A 4/25/2024    Procedure: COLONOSCOPY INTO CECUM;  Surgeon: Ten Solitario MD;  Location: [facility] ENDOSCOPY;  Service: General;  Laterality: N/A;  PRE: PERSONAL H/O COLON POLYP  /  POST: POOR PREP OTHERWISE NORMAL    EYE LENS IMPLANT SECONDARY Bilateral 2007, 2008    KNEE CARTILAGE SURGERY Right 1979    TONSILLECTOMY AND ADENOIDECTOMY Bilateral 2005    TOTAL KNEE ARTHROPLASTY Left 03/14/2016    Left total knee arthroplasty with Amberly component, size 11 femur, G tibial baseplate with a 10 polyethylene insert and a 38 patellar button-Dr. Pablo Hairston    TOTAL KNEE ARTHROPLASTY Right 03/02/2015    Right total knee arthroplasty with Amberly component size G femur, 11 tibial base plate with an 11 polyethylene insert and a 38  patellar button-Dr. Pablo Hairston    UVULOPALATOPHARYNGOPLASTY N/A 2005    part of soft palate, and uvula    VENTRAL HERNIA REPAIR N/A 1/23/2018    Procedure: VENTRAL HERNIA REPAIR LAPAROSCOPIC WITH DAVINCI ROBOT AND MESH;  Surgeon: Ten Solitario MD;  Location: [facility] MAIN OR;  Service:       General Information       Row Name 08/08/24 1018          Physical Therapy Time and Intention    Document Type therapy note (daily note)  -EJ     Mode of Treatment physical therapy  -EJ       Row Name 08/08/24 1018          General Information    Existing Precautions/Restrictions fall;oxygen therapy device and L/min  -EJ               User Key  (r) = Recorded By, (t) = Taken By, (c) = Cosigned By      Initials Name Provider Type    EJ Kayce Moss, PT Physical Therapist                   Mobility       Row Name 08/08/24 1018          Bed Mobility    Supine-Sit [place] (Bed Mobility) verbal cues;nonverbal cues (demo/gesture);minimum assist (75% patient effort)  -EJ     Sit-Supine [place] (Bed Mobility) not tested  -EJ     Assistive Device (Bed Mobility) head of bed elevated;bed rails  -EJ     Comment, (Bed Mobility) cues/assist to position self at EOB. still tends to lean/slump right and posterior in sitting.  -EJ       Row Name 08/08/24 1018          Sit-Stand Transfer    Sit-Stand [place] (Transfers) nonverbal cues (demo/gesture);verbal cues;minimum assist (75% patient effort);2 person assist  -EJ     Assistive Device (Sit-Stand Transfers) walker, front-wheeled  -EJ     Comment, (Sit-Stand Transfer) cues for upright posture, R lean noted in standing, pt having difficulty correcting  -EJ       Row Name 08/08/24 1018          Gait/Stairs (Locomotion)    [place] Level (Gait) verbal cues;nonverbal cues (demo/gesture);minimum assist (75% patient effort);moderate assist (50% patient effort);2 person assist  -EJ     Assistive Device (Gait) walker, front-wheeled  -EJ     Distance in Feet (Gait) 3  -EJ      Deviations/Abnormal Patterns (Gait) davon decreased;stride length decreased;festinating/shuffling;gait speed decreased  -EJ     Bilateral Gait Deviations decreased arm swing  -EJ     Left Sided Gait Deviations heel strike decreased  -EJ     Right Sided Gait Deviations leans right;heel strike decreased  -EJ     Comment, (Gait/Stairs) difficulty picking up feet to take steps, leaning R in standing, assist w walker management, unsteady  -EJ               User Key  (r) = Recorded By, (t) = Taken By, (c) = Cosigned By      Initials Name Provider Type    Kayce Jeffries PT Physical Therapist                   Obj/Interventions       Row Name 08/08/24 1020          Strength Comprehensive (MMT)    Comment, General Manual Muscle Testing (MMT) Assessment tested BLE strength while sitting in chair, grossly 4+/5 in BLE  -EJ               User Key  (r) = Recorded By, (t) = Taken By, (c) = Cosigned By      Initials Name Provider Type    Kayce Jeffries PT Physical Therapist                   Goals/Plan    No documentation.                  Clinical Impression       Row Name 08/08/24 1021          Pain    Pretreatment Pain Rating 0/10 - no pain  -EJ     Posttreatment Pain Rating 0/10 - no pain  -EJ       Row Name 08/08/24 1021          Plan of Care Review    Plan of Care Reviewed With patient  -EJ     Outcome Evaluation Pt agreeable to PT this am. He is doing well w no reports of pain. Pt able to transition to EOB w min A w cues/assist for positioning at EOB. Pt still exhbits right/posterior lean in sitting. He was able to stand w min A x 2 using Rwx. Pt w R lean in standing and difficulty correcting despite cues. Pt ambulated several small steps from bed to chair. He has unsteadines w difficulty picking up feet. Pt unable to ambulate further. Pt agreeable to sit up in chair. MMT of BLE while in chair and strength is grossly 4 to 4+/5.  Discussed progress w RN as well as messaged neurology team regarding progress.  Recommend acute rehab at [place] pending progress. Will continue to progress activity as tolerated.  -EJ       Row Name 08/08/24 1021          Positioning and Restraints    Pre-Treatment Position in bed  -EJ     Post Treatment Position chair  -EJ     In Chair notified nsg;sitting;call light within reach;encouraged to call for assist;exit alarm on  -EJ               User Key  (r) = Recorded By, (t) = Taken By, (c) = Cosigned By      Initials Name Provider Type    Kayce Jeffries, PT Physical Therapist                   Outcome Measures       Row Name 08/08/24 1025          How much help from another person do you currently need...    Turning from your back to your side while in flat bed without using bedrails? 3  -EJ     Moving from lying on back to sitting on the side of a flat bed without bedrails? 3  -EJ     Moving to and from a bed to a chair (including a wheelchair)? 2  -EJ     Standing up from a chair using your arms (e.g., wheelchair, bedside chair)? 3  -EJ     Climbing 3-5 steps with a railing? 1  -EJ     To walk in hospital room? 2  -EJ     AM-PAC 6 Clicks Score (PT) 14  -EJ     Highest Level of Mobility Goal 4 --> Transfer to chair/commode  -EJ               User Key  (r) = Recorded By, (t) = Taken By, (c) = Cosigned By      Initials Name Provider Type    Kayce Jeffries, PT Physical Therapist                                 Physical Therapy Education       Title: PT OT SLP Therapies (In Progress)       Topic: Physical Therapy (In Progress)       Point: Mobility training (Done)       Learning Progress Summary             Patient Acceptance, E,TB, VU by TERI at 8/7/2024 1610                         Point: Home exercise program (Not Started)       Learner Progress:  Not documented in this visit.              Point: Body mechanics (Done)       Learning Progress Summary             Patient Acceptance, E,TB, VU by TERI at 8/7/2024 1610                         Point: Precautions (Not Started)       Learner  Progress:  Not documented in this visit.                              User Key       Initials Effective Dates Name Provider Type Discipline     07/30/24 -  Kathi Vogel, CHYNA Student PT Student PT                  PT Recommendation and Plan  Planned Therapy Interventions (PT): balance training, bed mobility training, gait training, home exercise program, stretching, strengthening, stair training, ROM (range of motion), patient/family education, transfer training  Plan of Care Reviewed With: patient  Outcome Evaluation: Pt agreeable to PT this am. He is doing well w no reports of pain. Pt able to transition to EOB w min A w cues/assist for positioning at EOB. Pt still exhbits right/posterior lean in sitting. He was able to stand w min A x 2 using Rwx. Pt w R lean in standing and difficulty correcting despite cues. Pt ambulated several small steps from bed to chair. He has unsteadines w difficulty picking up feet. Pt unable to ambulate further. Pt agreeable to sit up in chair. MMT of BLE while in chair and strength is grossly 4 to 4+/5.  Discussed progress w RN as well as messaged neurology team regarding progress. Recommend acute rehab at [place] pending progress. Will continue to progress activity as tolerated.     Time Calculation:         PT Charges       Row Name 08/08/24 1025             Time Calculation    Start Time 0943  -EJ      Stop Time 1007  -EJ      Time Calculation (min) 24 min  -EJ      PT Received On 08/08/24  -EJ      PT - Next Appointment 08/09/24  -EJ         Timed Charges    58843 - PT Therapeutic Activity Minutes 24  -EJ         Total Minutes    Timed Charges Total Minutes 24  -EJ       Total Minutes 24  -EJ                User Key  (r) = Recorded By, (t) = Taken By, (c) = Cosigned By      Initials Name Provider Type    Kayce Jeffries, PT Physical Therapist                  Therapy Charges for Today       Code Description Service Date Service Provider Modifiers Qty    04205691328  PT  THERAPEUTIC ACT EA 15 MIN 8/8/2024 Kayce Moss, PT GP 2    [id number]  PT THER SUPP EA 15 MIN 8/8/2024 Kayce Moss, PT GP 1            PT G-Codes  Outcome Measure Options: AM-PAC 6 Clicks Basic Mobility (PT)  AM-PAC 6 Clicks Score (PT): 14  AM-PAC 6 Clicks Score (OT): 11  Modified Wise Scale: 4 - Moderately severe disability.  Unable to walk without assistance, and unable to attend to own bodily needs without assistance.  PT Discharge Summary  Anticipated Discharge Disposition (PT): inpatient rehabilitation facility, skilled nursing facility    Kayce Moss, PT  8/8/2024

## 2024-08-08 NOTE — PROGRESS NOTES
" LOS: 3 days     Name: Flo Manzo  Age: 69 y.o.  Sex: male  :  1955  MRN: [id number]         Primary Care Physician: Karen Jiménez APRN    Subjective   Subjective  No fevers or chills.  Denies shortness of breath but does have wheezing.  Currently on 2 L.  No new complaints or acute overnight events.    Objective   Vital Signs  Temp:  [98.1 °F (36.7 °C)-98.6 °F (37 °C)] 98.2 °F (36.8 °C)  Heart Rate:  [] 87  Resp:  [16-20] 18  BP: (116-133)/(87-99) 131/99  Body mass index is 32.58 kg/m².    Objective:  General Appearance:  Comfortable and in no acute distress.    Vital signs: (most recent): Blood pressure 131/99, pulse 87, temperature 98.2 °F (36.8 °C), temperature source Oral, resp. rate 18, height 177.8 cm (70\"), weight 103 kg (227 lb 1.2 oz), SpO2 90%.    Lungs:  Normal effort and normal respiratory rate.  There are decreased breath sounds and wheezes.    Heart: Normal rate.  Regular rhythm.    Abdomen: Abdomen is soft.  Bowel sounds are normal.   There is no abdominal tenderness.     Extremities: There is no dependent edema or local swelling.    Neurological: Patient is alert and oriented to person, place and time.    Skin:  Warm and dry.                Results Review:       I reviewed the patient's new clinical results.    Results from last 7 days   Lab Units 2452 24  03524   WBC 10*3/mm3 9.35 11.06* 19.65* 11.91*   HEMOGLOBIN g/dL 12.3* 12.7* 13.1 14.9   PLATELETS 10*3/mm3 196 194 212 255     Results from last 7 days   Lab Units 24  0552 24  0051 24  0351 24   SODIUM mmol/L 143 139  --  141 140   POTASSIUM mmol/L 3.8 4.2 4.3 3.2* 3.5   CHLORIDE mmol/L 108* 106  --  106 105   CO2 mmol/L 25.7 24.5  --  24.9 25.4   BUN mg/dL 17 19  --  15 15   CREATININE mg/dL 0.76 0.90  --  0.79 0.98   CALCIUM mg/dL 9.2 9.0  --  9.3 9.9   GLUCOSE mg/dL 95 114*  --  105* 101*     Results from last 7 days   Lab Units " 08/05/24 2002   INR  1.15*             Scheduled Meds:   aspirin, 81 mg, Oral, Daily  atorvastatin, 40 mg, Oral, Nightly  busPIRone, 10 mg, Oral, BID  citalopram, 30 mg, Oral, Daily  furosemide, 20 mg, Oral, Daily  gabapentin, 100 mg, Oral, Q12H  gabapentin, 300 mg, Oral, Nightly  guaiFENesin, 1,200 mg, Oral, Q12H  ipratropium-albuterol, 3 mL, Nebulization, 4x Daily - RT  metoprolol tartrate, 25 mg, Oral, Q12H  pantoprazole, 40 mg, Oral, Q AM  piperacillin-tazobactam, 3.375 g, Intravenous, Q8H  tamsulosin, 0.4 mg, Oral, Nightly      PRN Meds:     acetaminophen **OR** acetaminophen    albuterol    bisacodyl    Magnesium Standard Dose Replacement - Follow Nurse / BPA Driven Protocol    ondansetron    Potassium Replacement - Follow Nurse / BPA Driven Protocol    sodium chloride  Continuous Infusions:       Assessment & Plan   Active Hospital Problems    Diagnosis  POA    **Fever [R50.9]  Unknown    Dizziness [R42]  Yes    Vertigo [R42]  Yes    Chronic atrial fibrillation [I48.20]  Yes    GÓMEZ on auto CPAP [G47.33]  Yes    Diastolic CHF, chronic [I50.32]  Yes    Substernal thyroid goiter [E04.9]  Yes    Essential hypertension [I10]  Yes      Resolved Hospital Problems   No resolved problems to display.       Assessment & Plan    -Continue Zosyn for treatment of suspected pneumonia as cause of his fever.  Certainly could be an element of aspiration.  He has been placed on a modified diet by speech therapy and we will monitor his diet tolerance closely.  -Continue aggressive pulmonary hygiene measures.   -Continue scheduled DuoNebs.  He is wheezing and sounds rather tight today.  I am going to start him on some steroids.  -Leukocytosis and fever have resolved.  Blood cultures negative.  Respiratory viral panel negative.  MRSA screen, Legionella and strep pneumo urine antigens negative.  -Neurology evaluating.  Brain MRI and carotid Doppler noted.  -CT angiogram of the head and neck noted  -Eliquis has been discontinued  and aspirin initiated.  Cardiology has been consulted given the contraindication to anticoagulation.  -A-fib is rate controlled on metoprolol  -Blood pressure is well-controlled.  Amlodipine and lisinopril are currently on hold  -Therapy services      Expected Discharge Date: 8/9/2024; Expected Discharge Time:      Flo Corey MD  [facility]  08/08/24  07:47 EDT

## 2024-08-08 NOTE — PROGRESS NOTES
"DOS: 2024  NAME: Flo Manzo   : 1955  PCP: Karen Jiménez APRN  Chief Complaint   Patient presents with    Facial Droop       Chief complaint: L sided weakness  Subjective: Pt sitting up in chair this morning.  Breathing better.  No new neuro symptoms to report    Objective:  Vital signs: /99 (BP Location: Right arm, Patient Position: Sitting)   Pulse 92   Temp 98.2 °F (36.8 °C) (Oral)   Resp 18   Ht 177.8 cm (70\")   Wt 103 kg (227 lb 1.2 oz)   SpO2 94%   BMI 32.58 kg/m²      Gen: NAD, vitals reviewed  MS: oriented x3, remote memory intact, normal attention/concentration, language intact, no neglect.  CN: visual acuity grossly normal, PERRL, EOMI, L facial, mild dysarthria  Motor: 4/5 more distal weakness of LLE, improvement of drift today  Sensory: intact to light touch all 4 ext.    ROS:  No weakness, numbness  No fevers, chills      Laboratory results:  Lab Results   Component Value Date    GLUCOSE 95 2024    CALCIUM 9.2 2024     2024    K 3.8 2024    CO2 25.7 2024     (H) 2024    BUN 17 2024    CREATININE 0.76 2024    EGFRIFAFRI 103 2021    EGFRIFNONA 85 2021    BCR 22.4 2024    ANIONGAP 9.3 2024     Lab Results   Component Value Date    WBC 9.35 2024    HGB 12.3 (L) 2024    HCT 38.2 2024    MCV 89.0 2024     2024     Lab Results   Component Value Date    LDL 44 2024    LDL 77 2024    LDL 52 2023         Lab 24  0351   HEMOGLOBIN A1C 5.70*        Review of labs:     Review and interpretation of imaging: Reviewed MRI brain, subacute infarct of L frontal lobe and innumerable areas of hemosiderin deposition of cortical medullary interfaces, b/l cerebral hemispheres, b/l thalami and L ching and chinyere hemorrhage of L cerebellum, L temporal lobe, and L frontal lobes, additionally areas of new hemorrhage on L cerebellar hemisphere compared to " prior MRI    CTA h/n shows no significant stenosis, some motion artifact obscuring L vert partially    Workup to date:    Right internal carotid artery demonstrates a less than 50% stenosis.    Left internal carotid artery demonstrates normal flow without evidence of hemodynamically significant stenosis.    Diagnoses:  1 suspected CAA  2 tiny acute L frontal stroke  3 chronic anticoagulation     Impression: 68 yo M with h/o afib on eliquis, htn, GÓMEZ, prior cerebellar stroke in 2020, peripheral neuropathy, ambulates with walker, who comes in with acute stroke like symptoms described as balance problems and new facial droop.  He also tells of several week h/o swallowing difficulties at home.  He was admitted with fever and pneumonia.  MRI showed multiple areas of microhemorrhage concerning for cerebral amyloid angiopathy.  In particular a confluent area of R posterior limb of the internal capsule can explain his symptoms, less likely the subacute stroke given ipsilateral location.  The progression compared to previous MRI is significant, and feel his anticoagulation is contraindicated.  Aspirin for primary prevention as he is not long term AC candidate. Would consider for watchman's device.  Discussed his high risk for both hemorrhage and ischemic stroke with patient and wife.  Teams are deliberating best long term mgmt       Plan  1 ASA and statin   2 have stopped his eliquis  3 appreciate cardiology for consideration of L atrial appendage occlusion  4 cont therapies, speech for dysphagia- will be good acute rehab candidate    Neuro team available as needed      Thank you for this consultation.  Discussed above plan with neuro attending, Dr. Frias who agrees with above plan.  Neurology team is available for concerns or questions.

## 2024-08-08 NOTE — CONSULTS
[facility]  Cardiology Consult Note    Patient Identification:  Name: Flo Manzo  Age: 69 y.o.  Sex: male  :  1955  MRN: [id number]             Requesting Physician: radha MILLARD    Reason for Consultation / Chief Complaint:  consideration for watchman device, cerebral angiopathy and a/c contraindicated.    History of Present Illness:   Flo Manzo 69-year-old male who is well-known to our service who presented to [facility] hospital with complaints of left-sided weakness, acute trouble with balance, and facial droop.  He has a history of atrial fib, hypertension, hyperlipidemia, SSS with Biotronik pacemaker, GÓMEZ, history of stroke, and peripheral neuropathy. He comes in consult for consideration for watchman's device.    -Discussed with patient and his wife, Bhargavi, and she stated prior to coming to [facility] she states he had come home from therapy and was sitting in his chair and was was fatigue.  She said when he was trying to get up out of the chair he could not and they had get help. At that that time they decided to call EMS because she did not feel like he was acting himself.    On admission     -chest x-ray showed moderate cardiomegaly.  Mild prominence of the pulmonary vasculature suggesting mild congestive changes.  A small area of atelectasis/infiltrate of the left lung.  No evidence of consolidation or gross effusion.     -CT showed no evidence of new infarct or intracranial hemorrhage.  Thyroid goiter is noted.  See full report as below.    -MRI: Several 9 mm focus of diffusion weighted hyperintensity within the left paracentral level that is suspicious for subacute infarct which could be emboli Atik or lacunar in etiology.  Moderate to severe chronic small vessel ischemic phenomena.  Additional areas of chronic chinyere hemorrhage are identified within the medial aspect of the left cerebral hemisphere, the inferior lateral aspect of the left temporal lobe, and medial aspect of the  left frontal lobe.  Interval evolutionary changes are seen at the site of the cerebral hemorrhage whereas the additional foci and chronic hemorrhage are new since the prior exam. See full report as below.    Previous testin24 echo: EF 51 to 55%.  LV function was indeterminate.  LAC is mild to moderately dilated.  Mild aortic valve stenosis.  RVSP normal.    2021 stress test: Myocardial perfusion imaging indicated a normal myocardial perfusion study without evidence of ischemia.    2019 cardiac cath: Normal coronary arteries.    Past Medical History:  Past Medical History:   Diagnosis Date    Arthritis     Atrial fibrillation with RVR 2019    Colon polyps 2018    Hepatic flexure: tubular adenoma, only low-grade dysplasia; splenic flexure: tubular adenoma, only low-grade dysplasia; sigmoid colon: tubular adenoma, multiple fragments only low-grade dysplasia    Depression     Heart murmur     Hemorrhagic stroke 2019    Hypertension     New onset atrial fibrillation (CMS/HCC) - R 2019    GÓMEZ (obstructive sleep apnea) 2019    Home sleep study with moderate severity GÓMEZ, AHI 20 events per hour.  Sleep-related hypoxia with low O2 saturation 84% for 14 minutes.    Peripheral neuropathy     Sleep apnea     previously diagnosed with sleep apnea, had T&A done and it has since resolved     Stroke     Uncontrolled hypertension     Ventral hernia      Past Surgical History:  Past Surgical History:   Procedure Laterality Date    APPENDECTOMY N/A     BACK SURGERY      BLADDER STIMULATOR IMPLANT L LOWER BACK    CARDIAC CATHETERIZATION N/A 2004    Cath left ventriculography, coronary angiography and left heart catheterization, Normal results-Dr. Fierro     CARDIAC CATHETERIZATION N/A 2019    Procedure: Left Heart Cath;  Surgeon: Eren Bruce MD;  Location: [facility] CATH INVASIVE LOCATION;  Service: Cardiology    CARDIAC CATHETERIZATION N/A 2019     Procedure: Left ventriculography;  Surgeon: Eren Bruce MD;  [facility];  Service: Cardiology    CARDIAC CATHETERIZATION N/A 1/29/2019    Procedure: Coronary angiography;  Surgeon: Eren Bruce MD;  Location:  [facility] CATH INVASIVE LOCATION;  Service: Cardiology    CARDIAC ELECTROPHYSIOLOGY PROCEDURE N/A 3/4/2022    Procedure: Pacemaker SC new BIOTRONIK;  Surgeon: Eren Bruce MD;  [facility];  Service: Cardiology;  Laterality: N/A;    CARPAL TUNNEL RELEASE Right 2013    CARPAL TUNNEL RELEASE Left     COLONOSCOPY N/A 1/4/2018    Procedure: COLONOSCOPY with cold polypectomy and hot snare polypectomy;  Surgeon: Ten Solitario MD;  Location: [facility] ENDOSCOPY;  Service:     COLONOSCOPY N/A 4/25/2024    Procedure: COLONOSCOPY INTO CECUM;  Surgeon: Ten Solitario MD;  Location: [facility] ENDOSCOPY;  Service: General;  Laterality: N/A;  PRE: PERSONAL H/O COLON POLYP  /  POST: POOR PREP OTHERWISE NORMAL    EYE LENS IMPLANT SECONDARY Bilateral 2007, 2008    KNEE CARTILAGE SURGERY Right 1979    TONSILLECTOMY AND ADENOIDECTOMY Bilateral 2005    TOTAL KNEE ARTHROPLASTY Left 03/14/2016    Left total knee arthroplasty with Amberly component, size 11 femur, G tibial baseplate with a 10 polyethylene insert and a 38 patellar button-Dr. Pablo Hairston    TOTAL KNEE ARTHROPLASTY Right 03/02/2015    Right total knee arthroplasty with Amberly component size G femur, 11 tibial base plate with an 11 polyethylene insert and a 38 patellar button-Dr. Pablo Hairston    UVULOPALATOPHARYNGOPLASTY N/A 2005    part of soft palate, and uvula    VENTRAL HERNIA REPAIR N/A 1/23/2018    Procedure: VENTRAL HERNIA REPAIR LAPAROSCOPIC WITH DAVINCI ROBOT AND MESH;  Surgeon: Ten Solitario MD;  Location: [facility] MAIN OR;  Service:       Allergies:  Allergies   Allergen Reactions    Poison Ivy Extract Hives, Itching and Rash     ITCHY BLISTERS     Home  Meds:  Medications Prior to Admission   Medication Sig Dispense Refill Last Dose    albuterol sulfate  (90 Base) MCG/ACT inhaler Inhale 2 puffs Every 4 (Four) Hours As Needed for Wheezing. 18 g 3 8/5/2024    amLODIPine (NORVASC) 10 MG tablet Take 1 tablet by mouth Daily. 90 tablet 1 8/5/2024    atorvastatin (LIPITOR) 40 MG tablet TAKE 1 TABLET BY MOUTH NIGHTLY 90 tablet 1 8/4/2024    busPIRone (BUSPAR) 10 MG tablet TAKE 1 TABLET BY MOUTH TWICE DAILY 60 tablet 5 8/5/2024    citalopram (CeleXA) 20 MG tablet Take 1 tablet by mouth Daily. (Patient taking differently: Take 1.5 tablets by mouth Daily. PT TAKING 1.5 TABLETS DAILY) 135 tablet 1 8/5/2024    Eliquis 5 MG tablet tablet TAKE 1 TABLET BY MOUTH TWICE DAILY 60 tablet 5 8/5/2024    furosemide (LASIX) 20 MG tablet Take 1 tablet by mouth Daily for 180 days. 90 tablet 1 8/5/2024    gabapentin (NEURONTIN) 100 MG capsule TAKE ONE (1) CAPSULE BY MOUTH TWICE DAILY 60 capsule 0 8/5/2024    gabapentin (NEURONTIN) 300 MG capsule Take 1 capsule by mouth Every Night. 90 capsule 0 8/5/2024    lisinopril (PRINIVIL,ZESTRIL) 20 MG tablet TAKE 1 TABLET BY MOUTH DAILY 30 tablet 5 8/5/2024    metoprolol tartrate (LOPRESSOR) 25 MG tablet TAKE 1 & 1/2 TABLETS BY MOUTH TWICE DAILY 90 tablet 1 8/5/2024    omeprazole (priLOSEC) 20 MG capsule TAKE 1 CAPSULE BY MOUTH DAILY 90 capsule 1 8/5/2024    tamsulosin (FLOMAX) 0.4 MG capsule 24 hr capsule TAKE 1 CAPSULE BY MOUTH NIGHTLY 30 capsule 5 8/5/2024    acetaminophen (TYLENOL) 325 MG tablet Take 2 tablets by mouth Every 4 (Four) Hours As Needed for Mild Pain . 120 tablet 3     Diclofenac Sodium (Voltaren) 1 % gel gel Apply 4 g topically to the appropriate area as directed 4 (Four) Times a Day. 50 g 0     trimethoprim-polymyxin b (POLYTRIM) 26822-4.1 UNIT/ML-% ophthalmic solution Administer 1 drop to both eyes Every 6 (Six) Hours. 10 mL 0      Current Meds:   [unfilled]  Social History:   Social History     Tobacco Use    Smoking status:  "Never     Passive exposure: Past    Smokeless tobacco: Never   Substance Use Topics    Alcohol use: Not Currently     Comment: social, maybe a drink a month, not since stroke      Family History:  Family History   Problem Relation Age of Onset    Hypertension Mother     Kidney failure Mother     Coronary artery disease Father     Lung cancer Father         positive smoker    Heart attack Father     Breast cancer Sister 60    Prostate cancer Brother     Colon polyps Brother     Kidney nephrosis Brother     Malig Hyperthermia Neg Hx         Review of Systems  Constitutional: No wt loss, fever   Gastrointestinal: No nausea , abdominal pain  Behavioral/Psych: No insomnia or anxiety   Cardiovascular ----positive for shortness of breath. All other systems reviewed and are negative            /96 (BP Location: Right arm, Patient Position: Sitting)   Pulse 113   Temp 98.4 °F (36.9 °C) (Oral)   Resp 20   Ht 177.8 cm (70\")   Wt 103 kg (227 lb 1.2 oz)   SpO2 91%   BMI 32.58 kg/m²   General appearance: No acute changes   Neck: Trachea midline; NECK, supple, no thyromegaly or lymphadenopathy   Lungs: Normal size and shape, normal breath sounds, equal distribution of air, no rales and rhonchi   CV: S1-S2 irregular, no murmurs, no rub, no gallop   Abdomen: Soft, nontender; no masses , no abnormal abdominal sounds   Extremities: No deformity , normal color , no peripheral edema   Skin: Normal temperature, turgor and texture; no rash, ulcers    t              Cardiographics  ECG:    Afib    Comparison ecg         Previous cardiac testing  2019  Impression:      Normal coronary arteries  Normal LV gram     Recommendations:      Medical management            I sincerely appreciate the opportunity to participate in your patient's care. Please feel free to contact me anytime if I can be of assistance in this or any other way.     Eren Bruce MD  2/1/2019  9:20 AM    12/20/21  Interpretation Summary       Findings " consistent with a normal ECG stress test.  Left ventricular ejection fraction is normal. (Calculated EF = 54%).  Myocardial perfusion imaging indicates a normal myocardial perfusion study with no evidence of ischemia.  Impressions are consistent with a low risk study.     Asymptomatic for chest pain. ECG is negative for ischemia.   Ectopy: none  Afib through entirety of stress test  Hypotensive BP after lexiscan administration 104/66. BP at 1min recovery 90/60, BP at 2min recovery 100/74   Supervised by:  Elaine BURRELL.        7/11/24  Interpretation Summary         Left ventricular ejection fraction appears to be 51 - 55%.    Left ventricular diastolic function was indeterminate.    The left atrial cavity is mild to moderately dilated.    Mild aortic valve stenosis is present.    Estimated right ventricular systolic pressure from tricuspid regurgitation is normal (<35 mmHg).     Imaging  Chest X-ray:   IMPRESSION:     There is a subtle 9 mm focus of diffusion-weighted hyperintensity within  the left paracentral lobule that is suspicious for a subacute infarct  which could be embolic or lacunar in etiology. This finding was  discussed with the nurse caring for this patient, Fidel Siddiqui RN, on  08/06/2024 at approximately 2:35 p.m. He was asked to convey these  findings to the physician caring for this patient.     There are moderate-to-severe chronic small vessel ischemic phenomena  which have progressed in the interval since the prior exam.  Additionally, extensive old lacunar disease is identified which  demonstrates mild interval progression.     There are findings compatible with innumerable chronic microhemorrhages  noted both supratentorially and infratentorially in a pattern suggestive  of microhemorrhages related to both amyloid angiopathy as well as  hypertension. Please correlate with clinical data.     Additional areas of chronic chinyere hemorrhage are identified within the  medial aspect of the left  cerebellar hemisphere, the inferolateral  aspect of the left temporal lobe, and the medial aspect of the left  frontal lobe. Interval evolutionary changes are seen at the site of the  cerebellar hemorrhage whereas the additional foci of chronic hemorrhage  are new since the prior exam.        This report was finalized on 8/6/2024 3:02 PM by Dr. Dawood Hand M.D  on Workstation: [id number]:  There is no evidence of a new infarct or of intracranial  hemorrhage. The study is hampered by beam [place] artifact and patient  motion but there was no evidence of significant carotid stenosis or  proximal intracranial arterial high-grade stenosis or occlusion. A  thyroid goiter is noted extending into the superior mediastinum  narrowing the trachea.              Radiation dose reduction techniques were utilized, including automated  exposure control and exposure modulation based on body size.        This report was finalized on 8/7/2024 2:35 PM by Dr. Kristopher Henriquez M.D  on Workstation: [id number]: A single view of the chest demonstrates moderate cardiomegaly.  There is mild prominence of the pulmonary vasculature suggesting mild  congestive changes, more prominent as compared to the prior chest x-ray.  A small area of increased density is present in the retrocardiac region  which is new versus the prior chest x-ray suggesting a small area of  atelectasis/infiltrate at the left lung base. There is no evidence of  consolidation or gross effusion.  The trachea is attenuated in the  superior mediastinum. This CT examination of the chest from 3/21/2024  demonstrated prominence of the thyroid, extending into the superior  mediastinum consistent with a goiter.           This report was finalized on 8/5/2024 8:18 PM by Dr. Kristopher Henriquez M.D  on Workstation: [id number] .     /246  Interpretation Summary         Right internal carotid artery demonstrates a less than 50% stenosis.    Left internal carotid  artery demonstrates normal flow without evidence of hemodynamically significant stenosis.     Lab Review       Results from last 7 days   Lab Units 08/06/24  0351   MAGNESIUM mg/dL 2.0     Results from last 7 days   Lab Units 08/08/24  0408   SODIUM mmol/L 143   POTASSIUM mmol/L 3.8   BUN mg/dL 17   CREATININE mg/dL 0.76   CALCIUM mg/dL 9.2     @[id number]@  Results from last 7 days   Lab Units 08/08/24  0408 08/07/24  0552 08/06/24  0351   WBC 10*3/mm3 9.35 11.06* 19.65*   HEMOGLOBIN g/dL 12.3* 12.7* 13.1   HEMATOCRIT % 38.2 38.8 40.6   PLATELETS 10*3/mm3 196 194 212     Results from last 7 days   Lab Units 08/05/24 2002   INR  1.15*     CHADS-VASc Risk Assessment               5 Total Score    1 CHF    1 Hypertension    2 PRIOR STROKE/TIA/THROMBO    1 Age 65-74    Criteria that do not apply:    Age >/= 75    DM    Vascular Disease    Sex: Female              Assessment:  -Suspected CAA  -Tiny acute L frontal stroke  -Chronic atrial fibrillation with chronic anticoagulation  -dizziness  -vertigo  -fever  -GÓMEZ on auto CPAP  -Chronic diastolic CHF  -substernal thyroid goiter  -hypertension  -SSS with Biotronik pacemaker  -History of Cerebellar stroke in 2020    Recommendations / Plan:   Flo Manzo is a 69-year-old male who presents to [facility] due to left-sided weakness, new left-sided facial droop, fever and pneumonia.  MRI showed micro hemorrhaging, neurology following.  He has a history of chronic atrial fib on Eliquis at home, chronic diastolic CHF who comes in consult for consideration of watchman's device.  Eliquis on hold. He is currently on aspirin and statin. I have consulted structural heart team for watchman's device. Discussed with FREEMAN Faith.        Vira Lynch, APRN  8/8/2024, 09:11 EDT  69-year-old male with left-sided weakness facial droop with intracranial hemorrhage with chronic atrial fibrillation is off anticoagulation, will consider for Watchman procedure  Eren  MD CATHY Bruce/Transcription:   Dictated utilizing Dragon dictation

## 2024-08-08 NOTE — PLAN OF CARE
Problem: Adult Inpatient Plan of Care  Goal: Absence of Hospital-Acquired Illness or Injury  Intervention: Identify and Manage Fall Risk  Recent Flowsheet Documentation  Taken 8/8/2024 1805 by Tri Reynoso RN  Safety Promotion/Fall Prevention:   safety round/check completed   nonskid shoes/slippers when out of bed   lighting adjusted   fall prevention program maintained   clutter free environment maintained   assistive device/personal items within reach  Taken 8/8/2024 1605 by Tri Reynoso RN  Safety Promotion/Fall Prevention: safety round/check completed  Taken 8/8/2024 1410 by Tri Reynoso RN  Safety Promotion/Fall Prevention: safety round/check completed  Taken 8/8/2024 1210 by Tri Reynoso RN  Safety Promotion/Fall Prevention: safety round/check completed  Taken 8/8/2024 1008 by Tri Reynoso RN  Safety Promotion/Fall Prevention: safety round/check completed  Taken 8/8/2024 0815 by Tri Reynoso RN  Safety Promotion/Fall Prevention:   safety round/check completed   nonskid shoes/slippers when out of bed   lighting adjusted   fall prevention program maintained   clutter free environment maintained   assistive device/personal items within reach  Intervention: Prevent Skin Injury  Recent Flowsheet Documentation  Taken 8/8/2024 1805 by Tri Reynoso RN  Body Position: (sitting up in recliner chiar) --  Taken 8/8/2024 1605 by Tri Reynoso RN  Body Position: (sitting up in chair) --  Taken 8/8/2024 1410 by Tri Reynoso RN  Body Position: (sitting up in chair) --  Taken 8/8/2024 1210 by Tri Reynoso RN  Body Position: (sitting up in chair) --  Taken 8/8/2024 1008 by Tri Reynoso RN  Body Position: (sitting up in recliner chair) --  Taken 8/8/2024 0815 by Tri Reynoso RN  Body Position:   position changed independently   sitting up in bed  Skin Protection:   adhesive use limited   transparent dressing maintained  Intervention: Prevent and Manage VTE (Venous Thromboembolism) Risk  Recent Flowsheet Documentation  Taken  8/8/2024 1410 by Tri Reynoso RN  Activity Management: up in chair  Taken 8/8/2024 1008 by Tri Reynoso RN  Activity Management: up in chair  Taken 8/8/2024 0815 by Tri Reynoso RN  Activity Management: activity encouraged  VTE Prevention/Management:   bilateral   sequential compression devices on  Range of Motion: active ROM (range of motion) encouraged  Intervention: Prevent Infection  Recent Flowsheet Documentation  Taken 8/8/2024 1805 by Tri Reynoso RN  Infection Prevention: single patient room provided  Taken 8/8/2024 1210 by Tri Reynoso RN  Infection Prevention:   single patient room provided   hand hygiene promoted  Taken 8/8/2024 0815 by Tri Reynoso RN  Infection Prevention: single patient room provided  Goal: Optimal Comfort and Wellbeing  Intervention: Monitor Pain and Promote Comfort  Recent Flowsheet Documentation  Taken 8/8/2024 0815 by Tri Reynoso RN  Pain Management Interventions: no interventions per patient request  Intervention: Provide Person-Centered Care  Recent Flowsheet Documentation  Taken 8/8/2024 0815 by Tri Reynoso RN  Trust Relationship/Rapport:   care explained   questions answered   thoughts/feelings acknowledged     Problem: COPD (Chronic Obstructive Pulmonary Disease) Comorbidity  Goal: Maintenance of COPD Symptom Control  Intervention: Maintain COPD-Symptom Control  Recent Flowsheet Documentation  Taken 8/8/2024 0815 by Tri Reynoso RN  Supportive Measures: active listening utilized     Problem: Pain Chronic (Persistent) (Comorbidity Management)  Goal: Acceptable Pain Control and Functional Ability  Intervention: Develop Pain Management Plan  Recent Flowsheet Documentation  Taken 8/8/2024 0815 by Tri Reynoso RN  Pain Management Interventions: no interventions per patient request  Intervention: Optimize Psychosocial Wellbeing  Recent Flowsheet Documentation  Taken 8/8/2024 0815 by Tri Reynoso RN  Supportive Measures: active listening utilized  Diversional Activities:    television   smartphone  Family/Support System Care: support provided     Problem: Skin Injury Risk Increased  Goal: Skin Health and Integrity  Intervention: Optimize Skin Protection  Recent Flowsheet Documentation  Taken 8/8/2024 0815 by Tri Reynoso RN  Pressure Reduction Techniques: frequent weight shift encouraged  Head of Bed (HOB) Positioning: HOB at 20-30 degrees  Pressure Reduction Devices: alternating pressure pump (ADD)  Skin Protection:   adhesive use limited   transparent dressing maintained  Goal: Skin Health and Integrity  Intervention: Optimize Skin Protection  Recent Flowsheet Documentation  Taken 8/8/2024 0815 by Tri Reynoso RN  Pressure Reduction Techniques: frequent weight shift encouraged  Head of Bed (HOB) Positioning: HOB at 20-30 degrees  Pressure Reduction Devices: alternating pressure pump (ADD)  Skin Protection:   adhesive use limited   transparent dressing maintained     Problem: Fall Injury Risk  Goal: Absence of Fall and Fall-Related Injury  Intervention: Promote Injury-Free Environment  Recent Flowsheet Documentation  Taken 8/8/2024 1805 by Tri Reynoso RN  Safety Promotion/Fall Prevention:   safety round/check completed   nonskid shoes/slippers when out of bed   lighting adjusted   fall prevention program maintained   clutter free environment maintained   assistive device/personal items within reach  Taken 8/8/2024 1605 by Tri Reynoso RN  Safety Promotion/Fall Prevention: safety round/check completed  Taken 8/8/2024 1410 by Tri Reynoso RN  Safety Promotion/Fall Prevention: safety round/check completed  Taken 8/8/2024 1210 by Tri Reynoso RN  Safety Promotion/Fall Prevention: safety round/check completed  Taken 8/8/2024 1008 by Tri Reynoso RN  Safety Promotion/Fall Prevention: safety round/check completed  Taken 8/8/2024 0815 by Tri Reynoso RN  Safety Promotion/Fall Prevention:   safety round/check completed   nonskid shoes/slippers when out of bed   lighting adjusted   fall  prevention program maintained   clutter free environment maintained   assistive device/personal items within reach   Goal Outcome Evaluation:   VSS throughout shift c/ intermittent elevated heart rate r/t Afib, all meds admin as directed. A&Ox3 c/ confusion, PERRLA, no s/s of distress or SOA noted. IV Abts admin as directed, pt tolerated well., no s/s of adverse reaction noted. Call light and personal items in reach.

## 2024-08-08 NOTE — CASE MANAGEMENT/SOCIAL WORK
Continued Stay Note  [facility]     Patient Name: Flo Manzo  MRN: [id number]  Today's Date: 8/8/2024    Admit Date: 8/5/2024    Plan: [facility] per [facility]   Discharge Plan       Row Name 08/08/24 1356       Plan    Plan [facility] per [facility]    Patient/Family in Agreement with Plan yes    Plan Comments Received message from Ron/[facility] stating able to accept and bed is available. Message sent to MD to inform. [facility] arranged for pickup on 8/9/24 @ 1100. Continue to follow......PRC                   Discharge Codes    No documentation.                 Expected Discharge Date and Time       Expected Discharge Date Expected Discharge Time    Aug 9, 2024               Zulema Johnson RN

## 2024-08-08 NOTE — DISCHARGE PLACEMENT REQUEST
"Flo Marquez \"Danyel\" (69 y.o. Male)       Date of Birth   1955    Social Security Number       Address   [address] [place] [address]    Home Phone   [phone]    MRN   [id number]       Synagogue   [employer]    Marital Status                               Admission Date   8/5/24    Admission Type   Emergency    Admitting Provider   Flo Corey MD    Attending Provider   Flo Corey MD    Department, Room/Bed   [address], P594/1       Discharge Date       Discharge Disposition       Discharge Destination                                 Attending Provider: Flo Corey MD    Allergies: Poison Ivy Extract    Isolation: None   Infection: None   Code Status: CPR    Ht: 177.8 cm (70\")   Wt: 103 kg (227 lb 1.2 oz)    Admission Cmt: None   Principal Problem: Fever [R50.9]                   Active Insurance as of 8/5/2024       Primary Coverage       Payor Plan Insurance Group Employer/Plan Group    MEDICARE MEDICARE A & B        Payor Plan Address Payor Plan Phone Number Payor Plan Fax Number Effective Dates    [address] [phone]  3/1/2021 - None Entered    [facility] [id number]         Subscriber Name Subscriber Birth Date Member ID       FLO MARQUEZ 1955 [id number]               Secondary Coverage       Payor Plan Insurance Group Employer/Plan Group    ANTHEM BLUE CROSS ANTHEM BLUE CROSS BLUE SHIELD PPO [id number]       Payor Plan Address Payor Plan Phone Number Payor Plan Fax Number Effective Dates    [address] [phone]  1/1/2023 - None Entered    [facility] [id number]         Subscriber Name Subscriber Birth Date Member ID       FLO MARQUEZ 1955 [id number]                     Emergency Contacts        (Rel.) Home Phone Work Phone Mobile Phone    Bhargavi Parker (Spouse) [phone] -- [phone]                "

## 2024-08-08 NOTE — CASE MANAGEMENT/SOCIAL WORK
Discharge Planning Assessment  [facility]     Patient Name: Flo Manzo  MRN: [id number]  Today's Date: 8/8/2024    Admit Date: 8/5/2024    Plan: Referrals placed to [facility], [facility] and [facility].   Discharge Needs Assessment       Row Name 08/08/24 0916       Living Environment    People in Home child(roland), adult;spouse    Name(s) of People in Home Bhargavi Parker/spouse [phone]    Current Living Arrangements home    Potentially Unsafe Housing Conditions none    In the past 12 months has the electric, gas, oil, or water company threatened to shut off services in your home? No    Primary Care Provided by self    Provides Primary Care For no one    Family Caregiver if Needed spouse    Family Caregiver Names Bhargavi Parker/spouse [phone]    Quality of Family Relationships helpful;involved;supportive    Able to Return to Prior Arrangements other (see comments)  TBD       Resource/Environmental Concerns    Transportation Concerns none       Transportation Needs    In the past 12 months, has lack of transportation kept you from medical appointments or from getting medications? no    In the past 12 months, has lack of transportation kept you from meetings, work, or from getting things needed for daily living? No       Food Insecurity    Within the past 12 months, you worried that your food would run out before you got the money to buy more. Never true    Within the past 12 months, the food you bought just didn't last and you didn't have money to get more. Never true       Transition Planning    Patient/Family Anticipates Transition to inpatient rehabilitation facility;home with family    Patient/Family Anticipated Services at Transition ;skilled nursing;home health care;rehabilitation services    Transportation Anticipated family or friend will provide;health plan transportation       Discharge Needs Assessment    Readmission Within the Last 30 Days no previous  admission in last 30 days    Equipment Currently Used at Home cpap;ramp    Concerns to be Addressed discharge planning    Anticipated Changes Related to Illness inability to care for self    Outpatient/Agency/Support Group Needs inpatient rehabilitation facility;homecare agency;outpatient therapy    Discharge Facility/Level of Care Needs home with home health;rehabilitation facility;nursing facility, skilled    Provided Post Acute Provider List? Yes    Post Acute Provider List Nursing Home;Home Health;Inpatient Rehab    Delivered To Patient    Method of Delivery In person    Current Discharge Risk physical impairment                   Discharge Plan       Row Name 08/08/24 0956       Plan    Plan Referrals placed to [facility], [facility] and [facility].    Patient/Family in Agreement with Plan yes    Plan Comments Spoke with patient at bedside. Confirmed information on facesheet. PCP: Karen Jiménez/[facility] and Pharmacy: [facility] Patient lives with his wife and stepson in a single level house with basement (3 steps to enter/handrails on both sides plus ramp in place). Denies difficulty affording medications or transportation concerns. States he uses CPAP per [employer]. Reports IADLs and mobility. Has used home health (can't remember which agency) and has been to [facility] for SNF in the past. Discussed plans/needs for after discharge. Agreeable with referrals placed to [facility], [facility] and [facility]. Refer to therapy notes to determine appropriate disposition and discharge needs. Continue to follow........[facility]                  Continued Care and Services - Admitted Since 8/5/2024       Destination       Service Provider Request Status Selected Services Address Phone Fax Patient Preferred    [facility] Accepted N/A [id number] [facility] [address] [phone] --    [facility] Pending - Request Sent N/A  [id number] [facility] [address] [phone] --              Home Medical Care       Service Provider Request Status Selected Services Address Phone Fax Patient Preferred     [facility] Pending - Request Sent N/A [address] [address] [phone] --                  Expected Discharge Date and Time       Expected Discharge Date Expected Discharge Time    Aug 9, 2024            Demographic Summary    No documentation.                  Functional Status    No documentation.                  Psychosocial    No documentation.                  Abuse/Neglect    No documentation.                  Legal    No documentation.                  Substance Abuse    No documentation.                  Patient Forms    No documentation.                     Zulema Johnson RN

## 2024-08-08 NOTE — PLAN OF CARE
Goal Outcome Evaluation:  Plan of Care Reviewed With: patient           Outcome Evaluation: Pt agreeable to PT this am. He is doing well w no reports of pain. Pt able to transition to EOB w min A w cues/assist for positioning at EOB. Pt still exhbits right/posterior lean in sitting. He was able to stand w min A x 2 using Rwx. Pt w R lean in standing and difficulty correcting despite cues. Pt ambulated several small steps from bed to chair. He has unsteadines w difficulty picking up feet. Pt unable to ambulate further. Pt agreeable to sit up in chair. MMT of BLE while in chair and strength is grossly 4 to 4+/5.  Discussed progress w RN as well as messaged neurology team regarding progress. Recommend acute rehab at [place] pending progress. Will continue to progress activity as tolerated.      Anticipated Discharge Disposition (PT): inpatient rehabilitation facility, skilled nursing facility

## 2024-08-09 ENCOUNTER — DOCUMENTATION (OUTPATIENT)
Dept: CARDIOLOGY | Facility: HOSPITAL | Age: 69
End: 2024-08-09
Payer: MEDICARE

## 2024-08-09 VITALS
DIASTOLIC BLOOD PRESSURE: 94 MMHG | HEIGHT: 70 IN | TEMPERATURE: 97.5 F | OXYGEN SATURATION: 95 % | RESPIRATION RATE: 16 BRPM | BODY MASS INDEX: 32.51 KG/M2 | WEIGHT: 227.07 LBS | SYSTOLIC BLOOD PRESSURE: 119 MMHG | HEART RATE: 91 BPM

## 2024-08-09 LAB
ANION GAP SERPL CALCULATED.3IONS-SCNC: 7.6 MMOL/L (ref 5–15)
BUN SERPL-MCNC: 18 MG/DL (ref 8–23)
BUN/CREAT SERPL: 21.4 (ref 7–25)
CALCIUM SPEC-SCNC: 9.8 MG/DL (ref 8.6–10.5)
CHLORIDE SERPL-SCNC: 108 MMOL/L (ref 98–107)
CO2 SERPL-SCNC: 25.4 MMOL/L (ref 22–29)
CREAT SERPL-MCNC: 0.84 MG/DL (ref 0.76–1.27)
DEPRECATED RDW RBC AUTO: 39.6 FL (ref 37–54)
EGFRCR SERPLBLD CKD-EPI 2021: 95 ML/MIN/1.73
ERYTHROCYTE [DISTWIDTH] IN BLOOD BY AUTOMATED COUNT: 12.7 % (ref 12.3–15.4)
GLUCOSE SERPL-MCNC: 164 MG/DL (ref 65–99)
HCT VFR BLD AUTO: 38 % (ref 37.5–51)
HGB BLD-MCNC: 12.4 G/DL (ref 13–17.7)
MCH RBC QN AUTO: 28.6 PG (ref 26.6–33)
MCHC RBC AUTO-ENTMCNC: 32.6 G/DL (ref 31.5–35.7)
MCV RBC AUTO: 87.6 FL (ref 79–97)
PLATELET # BLD AUTO: 215 10*3/MM3 (ref 140–450)
PMV BLD AUTO: 10.7 FL (ref 6–12)
POTASSIUM SERPL-SCNC: 4.3 MMOL/L (ref 3.5–5.2)
RBC # BLD AUTO: 4.34 10*6/MM3 (ref 4.14–5.8)
SODIUM SERPL-SCNC: 141 MMOL/L (ref 136–145)
WBC NRBC COR # BLD AUTO: 10.62 10*3/MM3 (ref 3.4–10.8)

## 2024-08-09 PROCEDURE — 63710000001 PREDNISONE PER 1 MG: Performed by: INTERNAL MEDICINE

## 2024-08-09 PROCEDURE — 99222 1ST HOSP IP/OBS MODERATE 55: CPT | Performed by: INTERNAL MEDICINE

## 2024-08-09 PROCEDURE — 80048 BASIC METABOLIC PNL TOTAL CA: CPT | Performed by: INTERNAL MEDICINE

## 2024-08-09 PROCEDURE — 94799 UNLISTED PULMONARY SVC/PX: CPT

## 2024-08-09 PROCEDURE — 94664 DEMO&/EVAL PT USE INHALER: CPT

## 2024-08-09 PROCEDURE — 94761 N-INVAS EAR/PLS OXIMETRY MLT: CPT

## 2024-08-09 PROCEDURE — 99232 SBSQ HOSP IP/OBS MODERATE 35: CPT | Performed by: NURSE PRACTITIONER

## 2024-08-09 PROCEDURE — 25010000002 PIPERACILLIN SOD-TAZOBACTAM PER 1 G: Performed by: INTERNAL MEDICINE

## 2024-08-09 PROCEDURE — 94760 N-INVAS EAR/PLS OXIMETRY 1: CPT

## 2024-08-09 PROCEDURE — 85027 COMPLETE CBC AUTOMATED: CPT | Performed by: INTERNAL MEDICINE

## 2024-08-09 RX ORDER — GUAIFENESIN 600 MG/1
1200 TABLET, EXTENDED RELEASE ORAL EVERY 12 HOURS SCHEDULED
Start: 2024-08-09

## 2024-08-09 RX ORDER — AMOXICILLIN AND CLAVULANATE POTASSIUM 875; 125 MG/1; MG/1
1 TABLET, FILM COATED ORAL EVERY 12 HOURS SCHEDULED
Start: 2024-08-09 | End: 2024-08-13

## 2024-08-09 RX ORDER — PREDNISONE 20 MG/1
40 TABLET ORAL
Status: DISCONTINUED | OUTPATIENT
Start: 2024-08-09 | End: 2024-08-09 | Stop reason: HOSPADM

## 2024-08-09 RX ORDER — ASPIRIN 81 MG/1
81 TABLET, CHEWABLE ORAL DAILY
Start: 2024-08-10

## 2024-08-09 RX ORDER — PREDNISONE 20 MG/1
40 TABLET ORAL
Start: 2024-08-10 | End: 2024-08-13

## 2024-08-09 RX ORDER — AMOXICILLIN AND CLAVULANATE POTASSIUM 875; 125 MG/1; MG/1
1 TABLET, FILM COATED ORAL EVERY 12 HOURS SCHEDULED
Status: DISCONTINUED | OUTPATIENT
Start: 2024-08-09 | End: 2024-08-09 | Stop reason: HOSPADM

## 2024-08-09 RX ADMIN — PANTOPRAZOLE SODIUM 40 MG: 40 TABLET, DELAYED RELEASE ORAL at 05:05

## 2024-08-09 RX ADMIN — ASPIRIN 81 MG: 81 TABLET, CHEWABLE ORAL at 09:05

## 2024-08-09 RX ADMIN — GUAIFENESIN 1200 MG: 600 TABLET, EXTENDED RELEASE ORAL at 09:04

## 2024-08-09 RX ADMIN — PIPERACILLIN AND TAZOBACTAM 3.38 G: 3; .375 INJECTION, POWDER, FOR SOLUTION INTRAVENOUS at 01:35

## 2024-08-09 RX ADMIN — BUSPIRONE HYDROCHLORIDE 10 MG: 10 TABLET ORAL at 09:04

## 2024-08-09 RX ADMIN — IPRATROPIUM BROMIDE AND ALBUTEROL SULFATE 3 ML: .5; 3 SOLUTION RESPIRATORY (INHALATION) at 03:34

## 2024-08-09 RX ADMIN — IPRATROPIUM BROMIDE AND ALBUTEROL SULFATE 3 ML: .5; 3 SOLUTION RESPIRATORY (INHALATION) at 11:15

## 2024-08-09 RX ADMIN — CITALOPRAM 30 MG: 20 TABLET, FILM COATED ORAL at 09:04

## 2024-08-09 RX ADMIN — PREDNISONE 40 MG: 20 TABLET ORAL at 11:52

## 2024-08-09 RX ADMIN — METOPROLOL TARTRATE 25 MG: 25 TABLET, FILM COATED ORAL at 09:04

## 2024-08-09 RX ADMIN — FUROSEMIDE 20 MG: 20 TABLET ORAL at 09:04

## 2024-08-09 RX ADMIN — IPRATROPIUM BROMIDE AND ALBUTEROL SULFATE 3 ML: .5; 3 SOLUTION RESPIRATORY (INHALATION) at 06:41

## 2024-08-09 RX ADMIN — AMOXICILLIN AND CLAVULANATE POTASSIUM 1 TABLET: 875; 125 TABLET, FILM COATED ORAL at 11:52

## 2024-08-09 RX ADMIN — GABAPENTIN 100 MG: 100 CAPSULE ORAL at 09:04

## 2024-08-09 NOTE — PROGRESS NOTES
" LOS: 4 days     Name: Flo aMnzo  Age: 69 y.o.  Sex: male  :  1955  MRN: [id number]         Primary Care Physician: Karen Jiménez APRN    Subjective   Subjective  No new complaints or acute overnight events.  Presently on room air.  Denies any shortness of breath.    Objective   Vital Signs  Temp:  [97.9 °F (36.6 °C)-98.4 °F (36.9 °C)] 98.2 °F (36.8 °C)  Heart Rate:  [] 91  Resp:  [18-20] 18  BP: (123-138)/() 138/106  Body mass index is 32.58 kg/m².    Objective:  General Appearance:  Comfortable and in no acute distress.    Vital signs: (most recent): Blood pressure (!) 138/106, pulse 91, temperature 98.2 °F (36.8 °C), temperature source Oral, resp. rate 18, height 177.8 cm (70\"), weight 103 kg (227 lb 1.2 oz), SpO2 99%.    Lungs:  Normal effort and normal respiratory rate.  He is not in respiratory distress.  There are decreased breath sounds.  No wheezes.    Heart: Normal rate.  Regular rhythm.    Abdomen: Abdomen is soft.  Bowel sounds are normal.   There is no abdominal tenderness.     Extremities: There is no dependent edema or local swelling.    Neurological: Patient is alert and oriented to person, place and time.    Skin:  Warm and dry.                Results Review:       I reviewed the patient's new clinical results.    Results from last 7 days   Lab Units 24  0624  0408 24  0552 24  03524   WBC 10*3/mm3 10.62 9.35 11.06* 19.65* 11.91*   HEMOGLOBIN g/dL 12.4* 12.3* 12.7* 13.1 14.9   PLATELETS 10*3/mm3 215 196 194 212 255     Results from last 7 days   Lab Units 24  0635 24  0408 24  0552 24  0051 24  0351 24   SODIUM mmol/L 141 143 139  --  141 140   POTASSIUM mmol/L 4.3 3.8 4.2 4.3 3.2* 3.5   CHLORIDE mmol/L 108* 108* 106  --  106 105   CO2 mmol/L 25.4 25.7 24.5  --  24.9 25.4   BUN mg/dL 18 17 19  --  15 15   CREATININE mg/dL 0.84 0.76 0.90  --  0.79 0.98   CALCIUM mg/dL 9.8 9.2 9.0  --  9.3 " 9.9   GLUCOSE mg/dL 164* 95 114*  --  105* 101*     Results from last 7 days   Lab Units 08/05/24 2002   INR  1.15*             Scheduled Meds:   amoxicillin-clavulanate, 1 tablet, Oral, Q12H  aspirin, 81 mg, Oral, Daily  atorvastatin, 40 mg, Oral, Nightly  busPIRone, 10 mg, Oral, BID  citalopram, 30 mg, Oral, Daily  furosemide, 20 mg, Oral, Daily  gabapentin, 100 mg, Oral, Q12H  gabapentin, 300 mg, Oral, Nightly  guaiFENesin, 1,200 mg, Oral, Q12H  ipratropium-albuterol, 3 mL, Nebulization, Q4H - RT  metoprolol tartrate, 25 mg, Oral, Q12H  pantoprazole, 40 mg, Oral, Q AM  predniSONE, 40 mg, Oral, Daily With Breakfast  tamsulosin, 0.4 mg, Oral, Nightly      PRN Meds:     acetaminophen **OR** acetaminophen    albuterol    bisacodyl    Magnesium Standard Dose Replacement - Follow Nurse / BPA Driven Protocol    ondansetron    Potassium Replacement - Follow Nurse / BPA Driven Protocol    sodium chloride  Continuous Infusions:       Assessment & Plan   Active Hospital Problems    Diagnosis  POA    **Fever [R50.9]  Unknown    Dizziness [R42]  Yes    Vertigo [R42]  Yes    Chronic atrial fibrillation [I48.20]  Yes    GÓMEZ on auto CPAP [G47.33]  Yes    Diastolic CHF, chronic [I50.32]  Yes    Substernal thyroid goiter [E04.9]  Yes    Essential hypertension [I10]  Yes      Resolved Hospital Problems   No resolved problems to display.       Assessment & Plan    -He is wheezing is resolved today.  He has nonlabored breathing.  I will switch the Solu-Medrol to prednisone to complete a 5-day course of steroids.  Also switch the Zosyn to Augmentin today.  -Continue aggressive pulmonary hygiene measures.   -Continue scheduled DuoNebs  -Leukocytosis and fever have resolved.  Blood cultures negative.  Respiratory viral panel negative.  MRSA screen, Legionella and strep pneumo urine antigens negative.  -Discussed with neurology.  -Eliquis has been discontinued and aspirin initiated.  Cardiology following and evaluating for watchman's  procedure  -A-fib is rate controlled on metoprolol  -Blood pressure is well-controlled.  Amlodipine and lisinopril are currently on hold  -Therapy services  -Referrals have been sent to acute rehab and subacute    Expected Discharge Date: 8/9/2024; Expected Discharge Time:      Flo Corey MD  [facility]  08/09/24  08:31 EDT

## 2024-08-09 NOTE — PLAN OF CARE
Goal Outcome Evaluation:   Pt d/c to rehab at this time. VSS, PERRLA, no acute s/s of distress or pain noted. SOA noted c/ walking, O2 in place running at 2 LPM as directed. Paperwork and med list reviewed c/ pt. Paperwork sent c/ EMS. RP called and updated about transfers.

## 2024-08-09 NOTE — CONSULTS
[facility] Consult Note       Encounter Date:24  Patient:Flo Manzo  :1955  MRN:[id number]    Date of Admission: 2024  Date of Encounter Visit: 24  Encounter Provider: Michael Ewing MD  Referring Provider: Padmini Vann MD  Place of Service: [facility]  Patient Care Team:  Karen Jiménez APRN as PCP - General (Family Medicine)  Eren Bruce MD as Consulting Physician (Cardiology)  Temo Wheat MD as Consulting Physician (Radiation Oncology)  Bryant Rader MD as Consulting Physician (Urology)  Cindi Breen DNP, APRN as Nurse Practitioner (Nurse Practitioner)      Consulted for: Watchman evaluation     Chief Complaint: Watchman evaluation      History of Presenting Illness:      Mr. Manzo is a 69 y.o. gentleman who follows with Dr. Bruce with past medical history notable for sick sinus syndrome status post Biotronik pacemaker, obstructive sleep apnea, atrial fibrillation on chronic anticoagulation, hypertension, and mixed hyperlipidemia who presents to the hospital with facial droop.  Further evaluation workup identified concerns for chronic angio that the and areas of prior hemorrhage which is concerning given that he is on chronic anticoagulation for his atrial fibrillation.  Neurology did see the patient they have worked him up they do not think there is any acute process going on but there is concerns that he would not be a good long-term anticoagulation candidate because of his angiopathy noted on his MRI imaging.  They requested that he be evaluated for watchman as I think he would be a better candidate for short-term anticoagulation but not for long-term anticoagulation.  I did reach out to them they do think that he can initiate anticoagulation and be on it for short period of time followed by dual antiplatelet therapy for 6 months and aspirin lifelong    Overall he is doing  reasonably well there was maybe some concerns for pneumonia and currently being discharged later today to rehab      Review of Systems:  Review of Systems   Constitutional: Negative.   HENT: Negative.     Eyes: Negative.    Cardiovascular: Negative.    Respiratory: Negative.     Endocrine: Negative.    Hematologic/Lymphatic: Negative.    Skin: Negative.    Musculoskeletal: Negative.    Gastrointestinal: Negative.    Genitourinary: Negative.    Neurological:  Positive for difficulty with concentration and focal weakness.   Psychiatric/Behavioral: Negative.     Allergic/Immunologic: Negative.        Medications:    Current Facility-Administered Medications:     acetaminophen (TYLENOL) tablet 650 mg, 650 mg, Oral, Q4H PRN **OR** acetaminophen (TYLENOL) suppository 650 mg, 650 mg, Rectal, Q4H PRN, Lorena Santoyo APRN    albuterol (PROVENTIL) nebulizer solution 0.083% 2.5 mg/3mL, 2.5 mg, Nebulization, Q6H PRN, Flo Corey MD, 2.5 mg at 08/08/24 0252    amoxicillin-clavulanate (AUGMENTIN) 875-125 MG per tablet 1 tablet, 1 tablet, Oral, Q12H, Flo Corey MD, 1 tablet at 08/09/24 1152    aspirin chewable tablet 81 mg, 81 mg, Oral, Daily, Shante Carr PA, 81 mg at 08/09/24 0905    atorvastatin (LIPITOR) tablet 40 mg, 40 mg, Oral, Nightly, Flo Corey MD, 40 mg at 08/08/24 2038    bisacodyl (DULCOLAX) suppository 10 mg, 10 mg, Rectal, Daily PRN, Lorena Santoyo APRN    busPIRone (BUSPAR) tablet 10 mg, 10 mg, Oral, BID, Flo Corey MD, 10 mg at 08/09/24 0904    citalopram (CeleXA) tablet 30 mg, 30 mg, Oral, Daily, Flo Corey MD, 30 mg at 08/09/24 0904    furosemide (LASIX) tablet 20 mg, 20 mg, Oral, Daily, Flo Corey MD, 20 mg at 08/09/24 0904    gabapentin (NEURONTIN) capsule 100 mg, 100 mg, Oral, Q12H, Flo Corey MD, 100 mg at 08/09/24 0904    gabapentin (NEURONTIN) capsule 300 mg, 300 mg, Oral,  Nightly, Flo Corey MD, 300 mg at 08/08/24 2038    guaiFENesin (MUCINEX) 12 hr tablet 1,200 mg, 1,200 mg, Oral, Q12H, Flo Corey MD, 1,200 mg at 08/09/24 0904    ipratropium-albuterol (DUO-NEB) nebulizer solution 3 mL, 3 mL, Nebulization, Q4H - RT, Flo Corey MD, 3 mL at 08/09/24 1115    Magnesium Standard Dose Replacement - Follow Nurse / BPA Driven Protocol, , Does not apply, PRN, Flo Corey MD    metoprolol tartrate (LOPRESSOR) tablet 25 mg, 25 mg, Oral, Q12H, Flo Corey MD, 25 mg at 08/09/24 0904    ondansetron (ZOFRAN) injection 4 mg, 4 mg, Intravenous, Q6H PRN, Lorena Santoyo APRN    pantoprazole (PROTONIX) EC tablet 40 mg, 40 mg, Oral, Q AM, Flo Corey MD, 40 mg at 08/09/24 0505    Potassium Replacement - Follow Nurse / BPA Driven Protocol, , Does not apply, PRN, Flo Corey MD    predniSONE (DELTASONE) tablet 40 mg, 40 mg, Oral, Daily With Breakfast, Flo Corey MD, 40 mg at 08/09/24 1152    sodium chloride 0.9 % flush 10 mL, 10 mL, Intravenous, PRN, Genaro Orourke MD, 10 mL at 08/08/24 2039    tamsulosin (FLOMAX) 24 hr capsule 0.4 mg, 0.4 mg, Oral, Nightly, Flo Corey MD, 0.4 mg at 08/08/24 2038    Allergies   Allergen Reactions    Poison Ivy Extract Hives, Itching and Rash     ITCHY BLISTERS       Past Medical History:   Diagnosis Date    Arthritis     Atrial fibrillation with RVR 01/24/2019    Colon polyps 01/04/2018    Hepatic flexure: tubular adenoma, only low-grade dysplasia; splenic flexure: tubular adenoma, only low-grade dysplasia; sigmoid colon: tubular adenoma, multiple fragments only low-grade dysplasia    Depression     Heart murmur     Hemorrhagic stroke 01/29/2019    Hypertension     New onset atrial fibrillation (CMS/HCC) - RVR 01/24/2019    GÓMEZ (obstructive sleep apnea) 06/06/2019    Home sleep study with moderate severity GÓMEZ, AHI 20 events  per hour.  Sleep-related hypoxia with low O2 saturation 84% for 14 minutes.    Peripheral neuropathy     Sleep apnea     previously diagnosed with sleep apnea, had T&A done and it has since resolved     Stroke     Uncontrolled hypertension     Ventral hernia        Past Surgical History:   Procedure Laterality Date    APPENDECTOMY N/A 1972    BACK SURGERY      BLADDER STIMULATOR IMPLANT L LOWER BACK    CARDIAC CATHETERIZATION N/A 07/20/2004    Cath left ventriculography, coronary angiography and left heart catheterization, Normal results-Dr. Fierro     CARDIAC CATHETERIZATION N/A 1/29/2019    Procedure: Left Heart Cath;  Surgeon: Eren Bruce MD;  Location: [facility] CATH INVASIVE LOCATION;  Service: Cardiology    CARDIAC CATHETERIZATION N/A 1/29/2019    Procedure: Left ventriculography;  Surgeon: Eren Bruce MD;  Location: [facility] CATH INVASIVE LOCATION;  Service: Cardiology    CARDIAC CATHETERIZATION N/A 1/29/2019    Procedure: Coronary angiography;  Surgeon: Eren Bruce MD;  Location: [facility] CATH INVASIVE LOCATION;  Service: Cardiology    CARDIAC ELECTROPHYSIOLOGY PROCEDURE N/A 3/4/2022    Procedure: Pacemaker SC new BIOTRONIK;  Surgeon: Eren Bruce MD;  Location: [facility] CATH INVASIVE LOCATION;  Service: Cardiology;  Laterality: N/A;    CARPAL TUNNEL RELEASE Right 2013    CARPAL TUNNEL RELEASE Left     COLONOSCOPY N/A 1/4/2018    Procedure: COLONOSCOPY with cold polypectomy and hot snare polypectomy;  Surgeon: Ten Solitario MD;  Location: [facility] ENDOSCOPY;  Service:     COLONOSCOPY N/A 4/25/2024    Procedure: COLONOSCOPY INTO CECUM;  Surgeon: Ten Solitario MD;  Location: [facility] ENDOSCOPY;  Service: General;  Laterality: N/A;  PRE: PERSONAL H/O COLON POLYP  /  POST: POOR PREP OTHERWISE NORMAL    EYE LENS IMPLANT SECONDARY Bilateral 2007, 2008    KNEE CARTILAGE SURGERY Right 1979    TONSILLECTOMY AND ADENOIDECTOMY Bilateral 2005    TOTAL KNEE ARTHROPLASTY Left  03/14/2016    Left total knee arthroplasty with Amberly component, size 11 femur, G tibial baseplate with a 10 polyethylene insert and a 38 patellar button-Dr. Pablo Hairston    TOTAL KNEE ARTHROPLASTY Right 03/02/2015    Right total knee arthroplasty with Amberly component size G femur, 11 tibial base plate with an 11 polyethylene insert and a 38 patellar button-Dr. Pablo Hairston    UVULOPALATOPHARYNGOPLASTY N/A 2005    part of soft palate, and uvula    VENTRAL HERNIA REPAIR N/A 1/23/2018    Procedure: VENTRAL HERNIA REPAIR LAPAROSCOPIC WITH DAVINCI ROBOT AND MESH;  Surgeon: Ten Solitario MD;  Location: [facility] OR;  Service:        Social History     Socioeconomic History    Marital status:    Tobacco Use    Smoking status: Never     Passive exposure: Past    Smokeless tobacco: Never   Vaping Use    Vaping status: Never Used   Substance and Sexual Activity    Alcohol use: Not Currently     Comment: social, maybe a drink a month, not since stroke    Drug use: No     Comment: previously smoked marijuana     Sexual activity: Defer       Family History   Problem Relation Age of Onset    Hypertension Mother     Kidney failure Mother     Coronary artery disease Father     Lung cancer Father         positive smoker    Heart attack Father     Breast cancer Sister 60    Prostate cancer Brother     Colon polyps Brother     Kidney nephrosis Brother     Malig Hyperthermia Neg Hx        The following portions of the patient's history were reviewed and updated as appropriate: allergies, current medications, past family history, past medical history, past social history, past surgical history and problem list.         Objective:      Temp:  [97.2 °F (36.2 °C)-98.4 °F (36.9 °C)] 97.2 °F (36.2 °C)  Heart Rate:  [] 95  Resp:  [18-20] 18  BP: (123-138)/() 123/89     Intake/Output Summary (Last 24 hours) at 8/9/2024 1251  Last data filed at 8/9/2024 0810  Gross per 24 hour   Intake 1700 ml   Output 180 ml   Net  1520 ml     Body mass index is 32.58 kg/m².      08/1955 08/06/24  0100   Weight: 105 kg (230 lb 8 oz) 103 kg (227 lb 1.2 oz)           Physical Exam:  Constitutional: Well appearing, well developed, no acute distress   HENT: Oropharynx clear and membrane moist  Eyes: Normal conjunctiva, no sclera icterus.  Neck: Supple, no carotid bruit bilaterally.  Cardiovascular: Irregularly irregular rate and rhythm, No Murmur, Trace bilateral lower extremity edema.  Pulmonary: Normal respiratory effort, normal lung sounds, no wheezing.  Abdominal: Soft, nontender, no hepatosplenomegaly, liver is non-pulsatile.  Neurological: Alert and orient x 3.   Skin: Warm, dry, no ecchymosis, no rash.  Psych: Appropriate mood and affect. Normal judgment and insight.         Lab Review:   Results from last 7 days   Lab Units 08/09/24 0635 08/08/24 0408 08/07/24 0552 08/07/24 0051 08/06/24 0351 08/05/24 2002   SODIUM mmol/L 141 143 139  --  141 140   POTASSIUM mmol/L 4.3 3.8 4.2 4.3 3.2* 3.5   CHLORIDE mmol/L 108* 108* 106  --  106 105   CO2 mmol/L 25.4 25.7 24.5  --  24.9 25.4   BUN mg/dL 18 17 19  --  15 15   CREATININE mg/dL 0.84 0.76 0.90  --  0.79 0.98   GLUCOSE mg/dL 164* 95 114*  --  105* 101*   CALCIUM mg/dL 9.8 9.2 9.0  --  9.3 9.9   AST (SGOT) U/L  --   --   --   --  18 19   ALT (SGPT) U/L  --   --   --   --  11 14         Results from last 7 days   Lab Units 08/09/24 0635 08/08/24 0408 08/07/24  0552 08/06/24 0351 08/05/24 2002   WBC 10*3/mm3 10.62 9.35 11.06* 19.65* 11.91*   HEMOGLOBIN g/dL 12.4* 12.3* 12.7* 13.1 14.9   HEMATOCRIT % 38.0 38.2 38.8 40.6 46.6   PLATELETS 10*3/mm3 215 196 194 212 255     Results from last 7 days   Lab Units 08/05/24 2002   INR  1.15*     Results from last 7 days   Lab Units 08/06/24  0351   MAGNESIUM mg/dL 2.0     Results from last 7 days   Lab Units 08/06/24  0351   CHOLESTEROL mg/dL 107   TRIGLYCERIDES mg/dL 38   HDL CHOL mg/dL 53     The ASCVD Risk score (Pamela DOHERTY, et al.,  2019) failed to calculate for the following reasons:    The patient has a prior MI or stroke diagnosis         Results from last 7 days   Lab Units 08/06/24  0351   TSH uIU/mL 0.179*       Echocardiogram 7/16/2024  Left ventricular ejection fraction appears to be 51 - 55%.  Left ventricular diastolic function was indeterminate.  The left atrial cavity is mild to moderately dilated.  Mild aortic valve stenosis is present.  Estimated right ventricular systolic pressure from tricuspid regurgitation is normal (<35 mmHg).         Assessment:           Fever    Essential hypertension    Substernal thyroid goiter    Diastolic CHF, chronic    GÓMEZ on auto CPAP    Chronic atrial fibrillation    Vertigo    Dizziness         Plan:       Mr. Manzo is a 69 y.o. gentleman who follows with Dr. Bruce with past medical history notable for sick sinus syndrome status post Biotronik pacemaker, obstructive sleep apnea, atrial fibrillation on chronic anticoagulation, hypertension, and mixed hyperlipidemia who presents to the hospital with facial droop.  He was found to have chronic hemorrhages in his brain due to angiopathy and neurology is recommending watchman.  I did look at his recent echocardiogram which shows no significant valvular abnormalities which makes his atrial fibrillation nonvalvular in etiology.  Given that he can tolerate short-term anticoagulation as evident by previously being on anticoagulation and with neurology's guidance and recommendation to think he would be a candidate for watchman.  I discussed this with the patient he is interested.  Risk and benefits and shared decision was given to him today also try and reach out and talk to his wife was not here present but will talk to her on the phone to see if any further questions are needed.  He would need further imaging we can obtain this as an outpatient since he is getting ready to go to rehab would be good to let him heal and recover from his current  acute illness and we will work on getting him scheduled soon as an outpatient after chest CTA to define his left atrial appendage anatomy.      Persistent atrial fibrillation:  Patient has nonvalvular atrial fibrillation based on echocardiogram from 7/24  Previously on Eliquis 5 mg twice daily  [id number] score 5  Has bled score 4  Shared decision with neurology and primary cardiologist  Will work on obtaining CTA as an outpatient to define left atrial Penders anatomy and work on scheduling for closure    Hemorrhagic bleeding on brain due to angiopathy:  Neurology thinks he would not be a good long-term candidate for anticoagulation but I do think he would be safe to initiate anticoagulation in the short-term which is needed for watchman implantation.      Thank you for allowing me to participate in the care of Flo Manzo. Feel free to contact me directly with any further questions or concerns.    Michael Ewing MD  [facility]  08/09/24  12:51 EDT

## 2024-08-09 NOTE — PROGRESS NOTES
[facility]  Cardiology Progress Note    Patient Identification:  Name: Flo Manzo  Age: 69 y.o.  Sex: male  :  1955  MRN: [id number]                 Follow Up / Chief Complaint: Follow-up for consideration for Watchman device, cerebral angiopathy and A/C contraindicated.    Interval History: Resting in chair.  No chest pain or shortness of breath.        Objective:    Past Medical History:  Past Medical History:   Diagnosis Date    Arthritis     Atrial fibrillation with RVR 2019    Colon polyps 2018    Hepatic flexure: tubular adenoma, only low-grade dysplasia; splenic flexure: tubular adenoma, only low-grade dysplasia; sigmoid colon: tubular adenoma, multiple fragments only low-grade dysplasia    Depression     Heart murmur     Hemorrhagic stroke 2019    Hypertension     New onset atrial fibrillation (CMS/HCC) - RVR 2019    GÓMEZ (obstructive sleep apnea) 2019    Home sleep study with moderate severity GÓMEZ, AHI 20 events per hour.  Sleep-related hypoxia with low O2 saturation 84% for 14 minutes.    Peripheral neuropathy     Sleep apnea     previously diagnosed with sleep apnea, had T&A done and it has since resolved     Stroke     Uncontrolled hypertension     Ventral hernia      Past Surgical History:  Past Surgical History:   Procedure Laterality Date    APPENDECTOMY N/A     BACK SURGERY      BLADDER STIMULATOR IMPLANT L LOWER BACK    CARDIAC CATHETERIZATION N/A 2004    Cath left ventriculography, coronary angiography and left heart catheterization, Normal results-Dr. Fierro     CARDIAC CATHETERIZATION N/A 2019    Procedure: Left Heart Cath;  Surgeon: Eren Bruce MD;  Location: [facility] CATH INVASIVE LOCATION;  Service: Cardiology    CARDIAC CATHETERIZATION N/A 2019    Procedure: Left ventriculography;  Surgeon: Eren Bruce MD;  Location: [facility] CATH INVASIVE LOCATION;  Service: Cardiology    CARDIAC  CATHETERIZATION N/A 1/29/2019    Procedure: Coronary angiography;  Surgeon: Eren Bruce MD;  Location: [facility] CATH INVASIVE LOCATION;  Service: Cardiology    CARDIAC ELECTROPHYSIOLOGY PROCEDURE N/A 3/4/2022    Procedure: Pacemaker SC new BIOTRONIK;  Surgeon: Eren Bruce MD;  Location: [facility] CATH INVASIVE LOCATION;  Service: Cardiology;  Laterality: N/A;    CARPAL TUNNEL RELEASE Right 2013    CARPAL TUNNEL RELEASE Left     COLONOSCOPY N/A 1/4/2018    Procedure: COLONOSCOPY with cold polypectomy and hot snare polypectomy;  Surgeon: Ten Solitario MD;  Location: [facility] ENDOSCOPY;  Service:     COLONOSCOPY N/A 4/25/2024    Procedure: COLONOSCOPY INTO CECUM;  Surgeon: Ten Solitario MD;  [facility];  Service: General;  Laterality: N/A;  PRE: PERSONAL H/O COLON POLYP  /  POST: POOR PREP OTHERWISE NORMAL    EYE LENS IMPLANT SECONDARY Bilateral 2007, 2008    KNEE CARTILAGE SURGERY Right 1979    TONSILLECTOMY AND ADENOIDECTOMY Bilateral 2005    TOTAL KNEE ARTHROPLASTY Left 03/14/2016    Left total knee arthroplasty with Amberly component, size 11 femur, G tibial baseplate with a 10 polyethylene insert and a 38 patellar button-Dr. Pablo Hairston    TOTAL KNEE ARTHROPLASTY Right 03/02/2015    Right total knee arthroplasty with Amberly component size G femur, 11 tibial base plate with an 11 polyethylene insert and a 38 patellar button-Dr. Pablo Hairston    UVULOPALATOPHARYNGOPLASTY N/A 2005    part of soft palate, and uvula    VENTRAL HERNIA REPAIR N/A 1/23/2018    Procedure: VENTRAL HERNIA REPAIR LAPAROSCOPIC WITH DAVINCI ROBOT AND MESH;  Surgeon: Ten Solitario MD;  Location: [facility] MAIN OR;  Service:         Social History:   Social History     Tobacco Use    Smoking status: Never     Passive exposure: Past    Smokeless tobacco: Never   Substance Use Topics    Alcohol use: Not Currently     Comment: social, maybe a drink a month, not since stroke      Family History:  Family  History   Problem Relation Age of Onset    Hypertension Mother     Kidney failure Mother     Coronary artery disease Father     Lung cancer Father         positive smoker    Heart attack Father     Breast cancer Sister 60    Prostate cancer Brother     Colon polyps Brother     Kidney nephrosis Brother     Malig Hyperthermia Neg Hx           Allergies:  Allergies   Allergen Reactions    Poison Ivy Extract Hives, Itching and Rash     ITCHY BLISTERS     Scheduled Meds:  amoxicillin-clavulanate, 1 tablet, Q12H  aspirin, 81 mg, Daily  atorvastatin, 40 mg, Nightly  busPIRone, 10 mg, BID  citalopram, 30 mg, Daily  furosemide, 20 mg, Daily  gabapentin, 100 mg, Q12H  gabapentin, 300 mg, Nightly  guaiFENesin, 1,200 mg, Q12H  ipratropium-albuterol, 3 mL, Q4H - RT  metoprolol tartrate, 25 mg, Q12H  pantoprazole, 40 mg, Q AM  predniSONE, 40 mg, Daily With Breakfast  tamsulosin, 0.4 mg, Nightly            INTAKE AND OUTPUT:    Intake/Output Summary (Last 24 hours) at 8/9/2024 1252  Last data filed at 8/9/2024 0810  Gross per 24 hour   Intake 1700 ml   Output 180 ml   Net 1520 ml       Review of Systems:   GI: No nausea or vomiting  Cardiac: No chest pain  Pulmonary: No shortness of breath    Constitutional:  Temp:  [97.2 °F (36.2 °C)-98.4 °F (36.9 °C)] 97.2 °F (36.2 °C)  Heart Rate:  [] 95  Resp:  [18-20] 18  BP: (123-138)/() 123/89    Physical Exam:  General:  Appears in no acute distress, resting in chair  Eyes: eom normal no conjunctival drainage  HEENT:  No JVD. Thyroid not visibly enlarged. No mucosal pallor or cyanosis  Respiratory: Respirations regular and unlabored at rest. BBS with good air entry in all fields. No crackles, rubs or wheezes auscultated  Cardiovascular: S1S2 Regular rate and rhythm. No murmur, rub or gallop. No carotid bruits. DP/PT pulses     . No pretibial pitting edema  Gastrointestinal: Abdomen soft, flat, non tender. Bowel sounds present. No hepatosplenomegaly. No ascites  Skin:   Skin  warm and dry to touch. No rashes    Neuro: AAO x3 CN II-XII grossly intact  Psych: Mood and affect normal, pleasant and cooperative          I reviewed the patient's new clinical results, and personally reviewed and interpreted the patient's ECG and telemetry data from the last 24 hours        Cardiographics        Afib    Comparison ecg                      Previous cardiac testing  2019  Impression:      Normal coronary arteries  Normal LV gram     Recommendations:      Medical management            I sincerely appreciate the opportunity to participate in your patient's care. Please feel free to contact me anytime if I can be of assistance in this or any other way.     Eren Bruce MD  2/1/2019  9:20 AM     12/20/21  Interpretation Summary        Findings consistent with a normal ECG stress test.  Left ventricular ejection fraction is normal. (Calculated EF = 54%).  Myocardial perfusion imaging indicates a normal myocardial perfusion study with no evidence of ischemia.  Impressions are consistent with a low risk study.     Asymptomatic for chest pain. ECG is negative for ischemia.   Ectopy: none  Afib through entirety of stress test  Hypotensive BP after lexiscan administration 104/66. BP at 1min recovery 90/60, BP at 2min recovery 100/74   Supervised by:  Elaine BURRELL.          7/11/24  Interpretation Summary          Left ventricular ejection fraction appears to be 51 - 55%.    Left ventricular diastolic function was indeterminate.    The left atrial cavity is mild to moderately dilated.    Mild aortic valve stenosis is present.    Estimated right ventricular systolic pressure from tricuspid regurgitation is normal (<35 mmHg).     Imaging  Chest X-ray:   IMPRESSION:     There is a subtle 9 mm focus of diffusion-weighted hyperintensity within  the left paracentral lobule that is suspicious for a subacute infarct  which could be embolic or lacunar in etiology. This finding was  discussed with the  nurse caring for this patient, Fidel Siddiqui RN, on  08/06/2024 at approximately 2:35 p.m. He was asked to convey these  findings to the physician caring for this patient.     There are moderate-to-severe chronic small vessel ischemic phenomena  which have progressed in the interval since the prior exam.  Additionally, extensive old lacunar disease is identified which  demonstrates mild interval progression.     There are findings compatible with innumerable chronic microhemorrhages  noted both supratentorially and infratentorially in a pattern suggestive  of microhemorrhages related to both amyloid angiopathy as well as  hypertension. Please correlate with clinical data.     Additional areas of chronic chinyere hemorrhage are identified within the  medial aspect of the left cerebellar hemisphere, the inferolateral  aspect of the left temporal lobe, and the medial aspect of the left  frontal lobe. Interval evolutionary changes are seen at the site of the  cerebellar hemorrhage whereas the additional foci of chronic hemorrhage  are new since the prior exam.        This report was finalized on 8/6/2024 3:02 PM by Dr. Dawood Hand M.D  on Workstation: [id number]:  There is no evidence of a new infarct or of intracranial  hemorrhage. The study is hampered by beam [place] artifact and patient  motion but there was no evidence of significant carotid stenosis or  proximal intracranial arterial high-grade stenosis or occlusion. A  thyroid goiter is noted extending into the superior mediastinum  narrowing the trachea.              Radiation dose reduction techniques were utilized, including automated  exposure control and exposure modulation based on body size.        This report was finalized on 8/7/2024 2:35 PM by Dr. Kristopher Henriquez M.D  on Workstation: [id number]: A single view of the chest demonstrates moderate cardiomegaly.  There is mild prominence of the pulmonary vasculature suggesting  mild  congestive changes, more prominent as compared to the prior chest x-ray.  A small area of increased density is present in the retrocardiac region  which is new versus the prior chest x-ray suggesting a small area of  atelectasis/infiltrate at the left lung base. There is no evidence of  consolidation or gross effusion.  The trachea is attenuated in the  superior mediastinum. This CT examination of the chest from 3/21/2024  demonstrated prominence of the thyroid, extending into the superior  mediastinum consistent with a goiter.           This report was finalized on 8/5/2024 8:18 PM by Dr. Kristopher Henriquez M.D  on Workstation: [id number] .      /246  Interpretation Summary          Right internal carotid artery demonstrates a less than 50% stenosis.    Left internal carotid artery demonstrates normal flow without evidence of hemodynamically significant stenosis.       Lab Review       Results from last 7 days   Lab Units 08/06/24  0351   MAGNESIUM mg/dL 2.0     Results from last 7 days   Lab Units 08/09/24  0635   SODIUM mmol/L 141   POTASSIUM mmol/L 4.3   BUN mg/dL 18   CREATININE mg/dL 0.84   CALCIUM mg/dL 9.8     @[id number]@  Results from last 7 days   Lab Units 08/09/24  0635 08/08/24  0408 08/07/24  0552   WBC 10*3/mm3 10.62 9.35 11.06*   HEMOGLOBIN g/dL 12.4* 12.3* 12.7*   HEMATOCRIT % 38.0 38.2 38.8   PLATELETS 10*3/mm3 215 196 194     Results from last 7 days   Lab Units 08/05/24 2002   INR  1.15*     CHADS-VASc Risk Assessment               5 Total Score    1 CHF    1 Hypertension    2 PRIOR STROKE/TIA/THROMBO    1 Age 65-74    Criteria that do not apply:    Age >/= 75    DM    Vascular Disease    Sex: Female              Assessment:    -Suspected CAA  -Tiny acute L frontal stroke  -Chronic atrial fibrillation with chronic anticoagulation  -dizziness  -vertigo  -fever  -GÓMEZ on auto CPAP  -Chronic diastolic CHF  -substernal thyroid goiter  -hypertension  -SSS with Biotronik pacemaker  -History of  "Cerebellar stroke in 2020      Plan:  Blood pressure and heart rate stable.  A/C on hold  Structural heart team working up for watchman's device.      We will sign off at this time.  Please call with any concerns.      )8/9/2024  ASHANTI Recinos/Transcription:   \"Dictated utilizing Dragon dictation\".     "

## 2024-08-09 NOTE — CASE MANAGEMENT/SOCIAL WORK
Case Management Discharge Note      Final Note: [facility] per [facility]    Provided Post Acute Provider List?: Yes  Post Acute Provider List: Nursing Home, Home Health, Inpatient Rehab  Delivered To: Patient  Method of Delivery: In person    Selected Continued Care - Admitted Since 8/5/2024       Destination Coordination complete.      Service Provider Selected Services Address Phone Fax Patient Preferred    Critical access hospital [facility] [address] [address] [phone] --              Durable Medical Equipment    No services have been selected for the patient.                Dialysis/Infusion    No services have been selected for the patient.                Home Medical Care    No services have been selected for the patient.                Therapy    No services have been selected for the patient.                Community Resources    No services have been selected for the patient.                Community & DME    No services have been selected for the patient.                    Transportation Services  Ambulance: [facility]    Final Discharge Disposition Code: 62 - inpatient rehab facility

## 2024-08-09 NOTE — PLAN OF CARE
Goal Outcome Evaluation:  Plan of Care Reviewed With: patient        Progress: no change  Outcome Evaluation: Patient is alert x4. NIH 1.  Patient's oxygen decreases to mid 80's while sleeping. 2 L oxygen.  Patient is an assist x2 ambulating to bathroom with frequent cueing. No c/o pain distress is voiced.  Will continue to monitor.

## 2024-08-09 NOTE — CASE MANAGEMENT/SOCIAL WORK
Continued Stay Note  [facility]     Patient Name: Flo Manzo  MRN: [id number]  Today's Date: 8/9/2024    Admit Date: 8/5/2024    Plan: [facility] per [facility]   Discharge Plan       Row Name 08/09/24 1433       Plan    Plan [facility] per [facility]    Patient/Family in Agreement with Plan yes    Plan Comments Call placed to Ron/[facility] to inform EMS pickup time changed to 1500. Says he will contact facility.    Final Discharge Disposition Code 62 - inpatient rehab facility    Final Note [facility] per [facility]                   Discharge Codes    No documentation.                 Expected Discharge Date and Time       Expected Discharge Date Expected Discharge Time    Aug 9, 2024               Zulema Johnson RN

## 2024-08-09 NOTE — NURSING NOTE
Mr Manzo has been referred for LAAO/Watchman evaluation. I met with Mr Manzo in his room, he was alone. We discussed the indications for Watchman implant, specifically related to multiple microhemorrhage found on MRI. We discussed the evaluation process, that Dr Ewing would be by to see him, and a CT would be needed. I provided him a shared decision-making tool, information on Watchman device, and my contact information. There are plans for him to be discharged to [facility].     Dr Ewing did see Mr Manzo, I returned with him for consult. All questions have been answered. Plans for cardiac CT outpt. Dr Ewing also plans to call pt's wife to discuss. We encouraged Danyel to call with any questions. RTRN

## 2024-08-09 NOTE — DISCHARGE SUMMARY
Date of Admission: 8/5/2024  Date of Discharge:  8/9/2024  Primary Care Physician: Karen Jiménez APRN     Discharge Diagnosis:  Active Hospital Problems    Diagnosis  POA    **Fever [R50.9]  Unknown    Dizziness [R42]  Yes    Vertigo [R42]  Yes    Chronic atrial fibrillation [I48.20]  Yes    GÓMEZ on auto CPAP [G47.33]  Yes    Diastolic CHF, chronic [I50.32]  Yes    Substernal thyroid goiter [E04.9]  Yes    Essential hypertension [I10]  Yes      Resolved Hospital Problems   No resolved problems to display.       DETAILS OF HOSPITAL STAY     HPI  This is a 69-year-old male with history of atrial fibrillation, hypertension, sleep apnea, prior stroke who presented to the emergency room with weakness and trouble with balance. This started fairly suddenly yesterday. Family called EMS and reported that they felt he was having some facial droop and possible slurred speech. Patient states that he has noticed an increased cough over the past few days as well as some subjective fever yesterday. Temperature was 103 upon presentation last night.     Hospital Course  Upon admission he was evaluated for possible infectious process.  No definitive infection was found however I do suspect he had pneumonia upon presentation.  Could have been aspiration type.  He was started on Zosyn with good effect.  He had resolution of his fever and leukocytosis.  He has been transitioned to Augmentin to complete a 7 total day course of antibiotics.  He also developed some wheezing and I suspect an element of bronchospasm.  He was started on IV Solu-Medrol with resolution of his wheezing and I have transitioned him to prednisone to complete a 5-day total course of steroids.  He is now stable from a pulmonary/infectious standpoint.    Neurology evaluated and workup was ordered with brain MRI, carotid Doppler, and CT angiogram of the head and neck as outlined below.  They found a very small acute left frontal stroke and suspect cerebral amyloid  angiopathy.  For that reason they recommended discontinuation of Eliquis and have initiated him on aspirin.  Cardiology was consulted for further recommendations regarding potential watchman's device.  This has been discussed with the patient.  They plan to obtain CTA of the chest as an outpatient to further evaluate his left atrial appendage.    At this point he is medically stable.  He has placement at acute rehab and will transition there today with plans to follow-up closely in outpatient setting with cardiology once he is more recovered from his current illness.    Pertinent Test Results and Procedures Performed    CXR:  A single view of the chest demonstrates moderate cardiomegaly.   There is mild prominence of the pulmonary vasculature suggesting mild   congestive changes, more prominent as compared to the prior chest x-ray.   A small area of increased density is present in the retrocardiac region   which is new versus the prior chest x-ray suggesting a small area of   atelectasis/infiltrate at the left lung base. There is no evidence of   consolidation or gross effusion.  The trachea is attenuated in the   superior mediastinum. This CT examination of the chest from 3/21/2024   demonstrated prominence of the thyroid, extending into the superior   mediastinum consistent with a goiter.     Head CT unremarkable    Brain MRI:  There is a subtle 9 mm focus of diffusion-weighted hyperintensity within   the left paracentral lobule that is suspicious for a subacute infarct   which could be embolic or lacunar in etiology. This finding was   discussed with the nurse caring for this patient, Fidel Siddiqui RN, on   08/06/2024 at approximately 2:35 p.m. He was asked to convey these   findings to the physician caring for this patient.      There are moderate-to-severe chronic small vessel ischemic phenomena   which have progressed in the interval since the prior exam.   Additionally, extensive old lacunar disease is  identified which   demonstrates mild interval progression.      There are findings compatible with innumerable chronic microhemorrhages   noted both supratentorially and infratentorially in a pattern suggestive   of microhemorrhages related to both amyloid angiopathy as well as   hypertension. Please correlate with clinical data.      Additional areas of chronic chinyere hemorrhage are identified within the   medial aspect of the left cerebellar hemisphere, the inferolateral   aspect of the left temporal lobe, and the medial aspect of the left   frontal lobe. Interval evolutionary changes are seen at the site of the   cerebellar hemorrhage whereas the additional foci of chronic hemorrhage   are new since the prior exam.     Bilateral carotid Doppler:    Right internal carotid artery demonstrates a less than 50% stenosis.     Left internal carotid artery demonstrates normal flow without evidence   of hemodynamically significant stenosis.     CT angiogram of the head and neck:  There is no evidence of a new infarct or of intracranial   hemorrhage. The study is hampered by beam [place] artifact and patient   motion but there was no evidence of significant carotid stenosis or   proximal intracranial arterial high-grade stenosis or occlusion. A   thyroid goiter is noted extending into the superior mediastinum   narrowing the trachea.     Physical Exam at Discharge:  General: No acute distress, AAOx3  HEENT: EOMI, PERRL  Cardiovascular: +s1 and s2, RRR  Lungs: No rhonchi or wheezing  Abdomen: soft, nontender    Consults:   Consults       Date and Time Order Name Status Description    8/7/2024  3:33 PM Inpatient Cardiology Consult Completed     8/6/2024  1:19 AM Inpatient Neurology Consult Stroke Completed     8/5/2024 10:28 PM [facility] (on-call MD unless specified) Details                Condition on Discharge: Stable, improved    Discharge Disposition  Rehab Facility or Unit (DC - External)    Discharge Medications     Discharge  Medications        New Medications        Instructions Start Date   amoxicillin-clavulanate 875-125 MG per tablet  Commonly known as: AUGMENTIN   1 tablet, Oral, Every 12 Hours Scheduled      aspirin 81 MG chewable tablet   81 mg, Oral, Daily   Start Date: August 10, 2024     guaiFENesin 600 MG 12 hr tablet  Commonly known as: MUCINEX   1,200 mg, Oral, Every 12 Hours Scheduled      predniSONE 20 MG tablet  Commonly known as: DELTASONE   40 mg, Oral, Daily With Breakfast   Start Date: August 10, 2024            Changes to Medications        Instructions Start Date   citalopram 20 MG tablet  Commonly known as: CeleXA  What changed:   how much to take  additional instructions   20 mg, Oral, Daily             Continue These Medications        Instructions Start Date   acetaminophen 325 MG tablet  Commonly known as: TYLENOL   650 mg, Oral, Every 4 Hours PRN      albuterol sulfate  (90 Base) MCG/ACT inhaler  Commonly known as: PROVENTIL HFA;VENTOLIN HFA;PROAIR HFA   2 puffs, Inhalation, Every 4 Hours PRN      atorvastatin 40 MG tablet  Commonly known as: LIPITOR   40 mg, Oral, Nightly      busPIRone 10 MG tablet  Commonly known as: BUSPAR   10 mg, Oral, 2 Times Daily      Diclofenac Sodium 1 % gel gel  Commonly known as: Voltaren   4 g, Topical, 4 Times Daily      furosemide 20 MG tablet  Commonly known as: LASIX   20 mg, Oral, Daily      gabapentin 300 MG capsule  Commonly known as: NEURONTIN   300 mg, Oral, Nightly      gabapentin 100 MG capsule  Commonly known as: NEURONTIN   TAKE ONE (1) CAPSULE BY MOUTH TWICE DAILY      metoprolol tartrate 25 MG tablet  Commonly known as: LOPRESSOR   TAKE 1 & 1/2 TABLETS BY MOUTH TWICE DAILY      omeprazole 20 MG capsule  Commonly known as: priLOSEC   20 mg, Oral, Daily      tamsulosin 0.4 MG capsule 24 hr capsule  Commonly known as: FLOMAX   0.4 mg, Oral, Nightly      trimethoprim-polymyxin b 13600-3.1 UNIT/ML-% ophthalmic solution  Commonly known as: POLYTRIM   1 drop, Both  Eyes, Every 6 Hours             Stop These Medications      amLODIPine 10 MG tablet  Commonly known as: NORVASC     Eliquis 5 MG tablet tablet  Generic drug: apixaban     lisinopril 20 MG tablet  Commonly known as: PRINIVIL,ZESTRIL              Discharge Diet:   Diet Instructions       Diet: Regular/House Diet, Diabetic Diets, Cardiac Diets; Healthy Heart (2-3 Na+); Mechanical Ground (NDD 2); [place] Thick; Consistent Carbohydrate; No Mixed Consistencies      Discharge Diet:  Regular/House Diet  Diabetic Diets  Cardiac Diets       Cardiac Diet: Healthy Heart (2-3 Na+)    Texture: Mechanical Ground (NDD 2)    Fluid Consistency: Nectar Thick    Diabetic Diet: Consistent Carbohydrate    Diet Modifiers / Additional Diets: No Mixed Consistencies            Activity at Discharge:   Activity Instructions       Activity as Tolerated              Follow-up Appointments  Future Appointments   Date Time Provider Department Center   11/15/2024  1:30 PM Karen Jiménez APRN [facility]   1/8/2025  9:30 AM [facility] S[facility] DEVICE CHECK [facility]   1/24/2025 11:45 AM Temo Wheat MD [facility] None   7/16/2025 12:15 PM [facility] S[facility] DEVICE CHECK [facility]   7/16/2025 12:30 PM [facility] ECHO [facility]ART [facility]   7/16/2025  1:15 PM Eren Bruce MD [facility]     Additional Instructions for the Follow-ups that You Need to Schedule       Discharge Follow-up with PCP   As directed       Currently Documented PCP:    Karen Jiménez APRN    PCP Phone Number:    [phone]     Follow Up Details: 1-2 weeks after rehab        Discharge Follow-up with Specialty: [facility].  Their office to arrange   As directed      Specialty: [facility].  Their office to arrange   Follow Up Details: For further evaluation of watchman's device        Discharge Follow-up with Specified Provider: Dr. Bruce of cardiology   As directed      To: Dr. Bruce of  cardiology   Follow Up Details: His office to arrange                Test Results Pending at Discharge  Pending Labs       Order Current Status    Blood Culture - Blood, Arm, Left Preliminary result    Blood Culture - Blood, Arm, Right Preliminary result            I have examined and discussed discharge planning with the patient today.    Flo Corey MD  08/09/24  13:13 EDT    Time: Discharge greater than 30 min

## 2024-08-10 LAB
BACTERIA SPEC AEROBE CULT: NORMAL
BACTERIA SPEC AEROBE CULT: NORMAL

## 2024-08-11 ENCOUNTER — HOSPITAL ENCOUNTER (EMERGENCY)
Facility: HOSPITAL | Age: 69
Discharge: HOME | End: 2024-08-11
Attending: EMERGENCY MEDICINE | Admitting: EMERGENCY MEDICINE
Payer: MEDICARE

## 2024-08-11 ENCOUNTER — APPOINTMENT (OUTPATIENT)
Dept: GENERAL RADIOLOGY | Facility: HOSPITAL | Age: 69
End: 2024-08-11
Payer: MEDICARE

## 2024-08-11 VITALS
TEMPERATURE: 98.1 F | WEIGHT: 232 LBS | SYSTOLIC BLOOD PRESSURE: 143 MMHG | HEART RATE: 70 BPM | OXYGEN SATURATION: 94 % | HEIGHT: 70 IN | DIASTOLIC BLOOD PRESSURE: 101 MMHG | BODY MASS INDEX: 33.21 KG/M2 | RESPIRATION RATE: 28 BRPM

## 2024-08-11 DIAGNOSIS — R41.0 CONFUSION: Primary | ICD-10-CM

## 2024-08-11 LAB
ALBUMIN SERPL-MCNC: 3.6 G/DL (ref 3.5–5.2)
ALBUMIN/GLOB SERPL: 1.1 G/DL
ALP SERPL-CCNC: 81 U/L (ref 39–117)
ALT SERPL W P-5'-P-CCNC: 16 U/L (ref 1–41)
ANION GAP SERPL CALCULATED.3IONS-SCNC: 8 MMOL/L (ref 5–15)
ARTERIAL PATENCY WRIST A: POSITIVE
AST SERPL-CCNC: 11 U/L (ref 1–40)
ATMOSPHERIC PRESS: 752.5 MMHG
BASE EXCESS BLDA CALC-SCNC: 3.5 MMOL/L (ref 0–2)
BASOPHILS # BLD AUTO: 0.02 10*3/MM3 (ref 0–0.2)
BASOPHILS NFR BLD AUTO: 0.2 % (ref 0–1.5)
BDY SITE: ABNORMAL
BILIRUB SERPL-MCNC: 0.5 MG/DL (ref 0–1.2)
BUN SERPL-MCNC: 24 MG/DL (ref 8–23)
BUN/CREAT SERPL: 38.1 (ref 7–25)
CALCIUM SPEC-SCNC: 9.5 MG/DL (ref 8.6–10.5)
CHLORIDE SERPL-SCNC: 102 MMOL/L (ref 98–107)
CO2 BLDA-SCNC: 30.5 MMOL/L (ref 23–27)
CO2 SERPL-SCNC: 28 MMOL/L (ref 22–29)
CREAT SERPL-MCNC: 0.63 MG/DL (ref 0.76–1.27)
DEPRECATED RDW RBC AUTO: 40.9 FL (ref 37–54)
DEVICE COMMENT: ABNORMAL
EGFRCR SERPLBLD CKD-EPI 2021: 103.6 ML/MIN/1.73
EOSINOPHIL # BLD AUTO: 0.08 10*3/MM3 (ref 0–0.4)
EOSINOPHIL NFR BLD AUTO: 0.8 % (ref 0.3–6.2)
ERYTHROCYTE [DISTWIDTH] IN BLOOD BY AUTOMATED COUNT: 12.8 % (ref 12.3–15.4)
GAS FLOW AIRWAY: 3 LPM
GLOBULIN UR ELPH-MCNC: 3.2 GM/DL
GLUCOSE SERPL-MCNC: 87 MG/DL (ref 65–99)
HCO3 BLDA-SCNC: 29.1 MMOL/L (ref 22–28)
HCT VFR BLD AUTO: 43.4 % (ref 37.5–51)
HEMODILUTION: NO
HGB BLD-MCNC: 14.2 G/DL (ref 13–17.7)
IMM GRANULOCYTES # BLD AUTO: 0.06 10*3/MM3 (ref 0–0.05)
IMM GRANULOCYTES NFR BLD AUTO: 0.6 % (ref 0–0.5)
LYMPHOCYTES # BLD AUTO: 1.45 10*3/MM3 (ref 0.7–3.1)
LYMPHOCYTES NFR BLD AUTO: 15.2 % (ref 19.6–45.3)
MCH RBC QN AUTO: 28.9 PG (ref 26.6–33)
MCHC RBC AUTO-ENTMCNC: 32.7 G/DL (ref 31.5–35.7)
MCV RBC AUTO: 88.2 FL (ref 79–97)
MODALITY: ABNORMAL
MONOCYTES # BLD AUTO: 0.67 10*3/MM3 (ref 0.1–0.9)
MONOCYTES NFR BLD AUTO: 7 % (ref 5–12)
NEUTROPHILS NFR BLD AUTO: 7.26 10*3/MM3 (ref 1.7–7)
NEUTROPHILS NFR BLD AUTO: 76.2 % (ref 42.7–76)
NRBC BLD AUTO-RTO: 0 /100 WBC (ref 0–0.2)
PCO2 BLDA: 46.6 MM HG (ref 35–45)
PH BLDA: 7.4 PH UNITS (ref 7.35–7.45)
PLATELET # BLD AUTO: 213 10*3/MM3 (ref 140–450)
PMV BLD AUTO: 10.5 FL (ref 6–12)
PO2 BLDA: 65.9 MM HG (ref 80–100)
POTASSIUM SERPL-SCNC: 4 MMOL/L (ref 3.5–5.2)
PROT SERPL-MCNC: 6.8 G/DL (ref 6–8.5)
RBC # BLD AUTO: 4.92 10*6/MM3 (ref 4.14–5.8)
SAO2 % BLDCOA: 92.5 % (ref 92–98.5)
SODIUM SERPL-SCNC: 138 MMOL/L (ref 136–145)
TOTAL RATE: 20 BREATHS/MINUTE
WBC NRBC COR # BLD AUTO: 9.54 10*3/MM3 (ref 3.4–10.8)

## 2024-08-11 PROCEDURE — 82803 BLOOD GASES ANY COMBINATION: CPT | Performed by: EMERGENCY MEDICINE

## 2024-08-11 PROCEDURE — 85025 COMPLETE CBC W/AUTO DIFF WBC: CPT | Performed by: EMERGENCY MEDICINE

## 2024-08-11 PROCEDURE — 71045 X-RAY EXAM CHEST 1 VIEW: CPT

## 2024-08-11 PROCEDURE — 99283 EMERGENCY DEPT VISIT LOW MDM: CPT

## 2024-08-11 PROCEDURE — 80053 COMPREHEN METABOLIC PANEL: CPT | Performed by: EMERGENCY MEDICINE

## 2024-08-11 PROCEDURE — 36600 WITHDRAWAL OF ARTERIAL BLOOD: CPT | Performed by: EMERGENCY MEDICINE

## 2024-08-11 RX ORDER — SODIUM CHLORIDE 0.9 % (FLUSH) 0.9 %
10 SYRINGE (ML) INJECTION AS NEEDED
Status: DISCONTINUED | OUTPATIENT
Start: 2024-08-11 | End: 2024-08-11 | Stop reason: HOSPADM

## 2024-08-11 NOTE — ED PROVIDER NOTES
EMERGENCY DEPARTMENT ENCOUNTER    Room Number:  12/12  PCP: Karen Jiménez APRN    HPI:  Chief Complaint: Confusion  A complete HPI/ROS/PMH/PSH/SH/FH are unobtainable due to: None  Context: Flo Manzo is a 69 y.o. male who presents to the ED c/o acute confusion.  By report, patient was recently sent to rehab hospital yesterday after being diagnosed with pneumonia.  He did not have his CPAP.  Nursing staff felt that he was more confused today which is why he was sent here.  The patient himself states that he thinks he was sent here because he was told that he looked like he was short of breath.  However, he himself states that he has had no shortness of breath.  He has no fever or chest pain.  He has no complaints.        PAST MEDICAL HISTORY  Active Ambulatory Problems     Diagnosis Date Noted    Umbilical hernia without obstruction and without gangrene 12/11/2017    Essential hypertension 01/05/2018    Arthritis of left knee 03/14/2016    Depression 01/05/2018    Obesity (BMI 30-39.9) 03/14/2016    Atrial fibrillation with RVR 01/24/2019    Substernal thyroid goiter 01/24/2019    CHF (congestive heart failure) 01/24/2019    Diastolic CHF, chronic 02/01/2019    Hemorrhagic stroke 02/28/2019    GÓMEZ on auto CPAP 09/08/2019    Hypersomnia due to medical condition 09/08/2019    Sleep-related hypoxia 10/26/2019    Chronic atrial fibrillation 03/19/2020    Vertigo 06/19/2020    Aortic stenosis, mild 12/23/2021    Sinus pause 03/06/2022    Pacemaker 04/25/2022    Chronic anticoagulation 06/24/2022    Generalized weakness 07/29/2022    Prediabetes 02/03/2023    Pure hypercholesterolemia 05/08/2023    Pre-operative cardiovascular examination 01/30/2024    Peripheral neuropathy 02/08/2024    History of colon polyps 03/14/2024    Dizziness 08/05/2024    Fever 08/06/2024     Resolved Ambulatory Problems     Diagnosis Date Noted    Dyspnea 01/24/2019    Angina at rest 01/24/2019    Intraparenchymal hemorrhage of brain  02/01/2019    SSS (sick sinus syndrome) 01/06/2022    S/P ICD (internal cardiac defibrillator) procedure 03/06/2022     Past Medical History:   Diagnosis Date    Arthritis     Colon polyps 01/04/2018    Heart murmur     Hypertension     New onset atrial fibrillation (CMS/HCC) - RVR 01/24/2019    GÓMEZ (obstructive sleep apnea) 06/06/2019    Sleep apnea     Stroke     Uncontrolled hypertension     Ventral hernia          PAST SURGICAL HISTORY  Past Surgical History:   Procedure Laterality Date    APPENDECTOMY N/A 1972    BACK SURGERY      BLADDER STIMULATOR IMPLANT L LOWER BACK    CARDIAC CATHETERIZATION N/A 07/20/2004    Cath left ventriculography, coronary angiography and left heart catheterization, Normal results-Dr. Fierro     CARDIAC CATHETERIZATION N/A 1/29/2019    Procedure: Left Heart Cath;  Surgeon: Eren Bruce MD;  Location: [facility] CATH INVASIVE LOCATION;  Service: Cardiology    CARDIAC CATHETERIZATION N/A 1/29/2019    Procedure: Left ventriculography;  Surgeon: Eren Bruce MD;  Location: [facility] CATH INVASIVE LOCATION;  Service: Cardiology    CARDIAC CATHETERIZATION N/A 1/29/2019    Procedure: Coronary angiography;  Surgeon: Eren Bruce MD;  Location: [facility] CATH INVASIVE LOCATION;  Service: Cardiology    CARDIAC ELECTROPHYSIOLOGY PROCEDURE N/A 3/4/2022    Procedure: Pacemaker SC new BIOTRONIK;  Surgeon: Eren Bruce MD;  Location: [facility] CATH INVASIVE LOCATION;  Service: Cardiology;  Laterality: N/A;    CARPAL TUNNEL RELEASE Right 2013    CARPAL TUNNEL RELEASE Left     COLONOSCOPY N/A 1/4/2018    Procedure: COLONOSCOPY with cold polypectomy and hot snare polypectomy;  Surgeon: Ten Solitario MD;  Location: [facility] ENDOSCOPY;  Service:     COLONOSCOPY N/A 4/25/2024    Procedure: COLONOSCOPY INTO CECUM;  Surgeon: Ten Solitario MD;  Location: [facility] ENDOSCOPY;  Service: General;  Laterality: N/A;  PRE: PERSONAL H/O COLON POLYP  /  POST: POOR PREP OTHERWISE  NORMAL    EYE LENS IMPLANT SECONDARY Bilateral 2007, 2008    KNEE CARTILAGE SURGERY Right 1979    TONSILLECTOMY AND ADENOIDECTOMY Bilateral 2005    TOTAL KNEE ARTHROPLASTY Left 03/14/2016    Left total knee arthroplasty with Amberly component, size 11 femur, G tibial baseplate with a 10 polyethylene insert and a 38 patellar button-Dr. Pablo Hairston    TOTAL KNEE ARTHROPLASTY Right 03/02/2015    Right total knee arthroplasty with Amberly component size G femur, 11 tibial base plate with an 11 polyethylene insert and a 38 patellar button-Dr. Pablo Hairston    UVULOPALATOPHARYNGOPLASTY N/A 2005    part of soft palate, and uvula    VENTRAL HERNIA REPAIR N/A 1/23/2018    Procedure: VENTRAL HERNIA REPAIR LAPAROSCOPIC WITH DAVINCI ROBOT AND MESH;  Surgeon: Ten Solitario MD;  Location: [facility] OR;  Service:          FAMILY HISTORY  Family History   Problem Relation Age of Onset    Hypertension Mother     Kidney failure Mother     Coronary artery disease Father     Lung cancer Father         positive smoker    Heart attack Father     Breast cancer Sister 60    Prostate cancer Brother     Colon polyps Brother     Kidney nephrosis Brother     Malig Hyperthermia Neg Hx          SOCIAL HISTORY  Social History     Socioeconomic History    Marital status:    Tobacco Use    Smoking status: Never     Passive exposure: Past    Smokeless tobacco: Never   Vaping Use    Vaping status: Never Used   Substance and Sexual Activity    Alcohol use: Not Currently     Comment: social, maybe a drink a month, not since stroke    Drug use: No     Comment: previously smoked marijuana     Sexual activity: Defer         ALLERGIES  Poison ivy extract        REVIEW OF SYSTEMS  Review of Systems     Included in HPI  All systems reviewed and negative except for those discussed in HPI.       PHYSICAL EXAM  ED Triage Vitals [08/11/24 0206]   Temp Heart Rate Resp BP SpO2   98.1 °F (36.7 °C) 90 28 (!) 162/114 97 %      Temp src Heart Rate Source  Patient Position BP Location FiO2 (%)   Tympanic -- -- -- --       Physical Exam      GENERAL: no acute distress  HENT: nares patent  EYES: no scleral icterus  CV: regular rhythm, normal rate, systolic murmur noted at the heart apex  RESPIRATORY: normal effort, clear to auscultation bilaterally  ABDOMEN: soft, obese abdomen, nontender  MUSCULOSKELETAL: no deformity, 1+ edema to the legs bilaterally  NEURO: alert, oriented x 4, moves all extremities, follows commands  PSYCH:  calm, cooperative  SKIN: warm, dry    Vital signs and nursing notes reviewed.          LAB RESULTS  Recent Results (from the past 24 hour(s))   CBC Auto Differential    Collection Time: 08/11/24  2:51 AM    Specimen: Blood   Result Value Ref Range    WBC 9.54 3.40 - 10.80 10*3/mm3    RBC 4.92 4.14 - 5.80 10*6/mm3    Hemoglobin 14.2 13.0 - 17.7 g/dL    Hematocrit 43.4 37.5 - 51.0 %    MCV 88.2 79.0 - 97.0 fL    MCH 28.9 26.6 - 33.0 pg    MCHC 32.7 31.5 - 35.7 g/dL    RDW 12.8 12.3 - 15.4 %    RDW-SD 40.9 37.0 - 54.0 fl    MPV 10.5 6.0 - 12.0 fL    Platelets 213 140 - 450 10*3/mm3    Neutrophil % 76.2 (H) 42.7 - 76.0 %    Lymphocyte % 15.2 (L) 19.6 - 45.3 %    Monocyte % 7.0 5.0 - 12.0 %    Eosinophil % 0.8 0.3 - 6.2 %    Basophil % 0.2 0.0 - 1.5 %    Immature Grans % 0.6 (H) 0.0 - 0.5 %    Neutrophils, Absolute 7.26 (H) 1.70 - 7.00 10*3/mm3    Lymphocytes, Absolute 1.45 0.70 - 3.10 10*3/mm3    Monocytes, Absolute 0.67 0.10 - 0.90 10*3/mm3    Eosinophils, Absolute 0.08 0.00 - 0.40 10*3/mm3    Basophils, Absolute 0.02 0.00 - 0.20 10*3/mm3    Immature Grans, Absolute 0.06 (H) 0.00 - 0.05 10*3/mm3    nRBC 0.0 0.0 - 0.2 /100 WBC   Blood Gas, Arterial -    Collection Time: 08/11/24  3:23 AM    Specimen: Arterial Blood   Result Value Ref Range    Site Left Radial     Ildefonso's Test Positive     pH, Arterial 7.403 7.350 - 7.450 pH units    pCO2, Arterial 46.6 (H) 35.0 - 45.0 mm Hg    pO2, Arterial 65.9 (L) 80.0 - 100.0 mm Hg    HCO3, Arterial 29.1 (H) 22.0  - 28.0 mmol/L    Base Excess, Arterial 3.5 (H) 0.0 - 2.0 mmol/L    O2 Saturation, Arterial 92.5 92.0 - 98.5 %    CO2 Content 30.5 (H) 23 - 27 mmol/L    Barometric Pressure for Blood Gas 752.5000 mmHg    Modality Cannula     Flow Rate 3.0000 lpm    Rate 20 Breaths/minute    Hemodilution No     Device Comment SpO2 93%    Comprehensive Metabolic Panel    Collection Time: 08/11/24  3:36 AM    Specimen: Arm, Left; Blood   Result Value Ref Range    Glucose 87 65 - 99 mg/dL    BUN 24 (H) 8 - 23 mg/dL    Creatinine 0.63 (L) 0.76 - 1.27 mg/dL    Sodium 138 136 - 145 mmol/L    Potassium 4.0 3.5 - 5.2 mmol/L    Chloride 102 98 - 107 mmol/L    CO2 28.0 22.0 - 29.0 mmol/L    Calcium 9.5 8.6 - 10.5 mg/dL    Total Protein 6.8 6.0 - 8.5 g/dL    Albumin 3.6 3.5 - 5.2 g/dL    ALT (SGPT) 16 1 - 41 U/L    AST (SGOT) 11 1 - 40 U/L    Alkaline Phosphatase 81 39 - 117 U/L    Total Bilirubin 0.5 0.0 - 1.2 mg/dL    Globulin 3.2 gm/dL    A/G Ratio 1.1 g/dL    BUN/Creatinine Ratio 38.1 (H) 7.0 - 25.0    Anion Gap 8.0 5.0 - 15.0 mmol/L    eGFR 103.6 >60.0 mL/min/1.73       Ordered the above labs and reviewed the results.        RADIOLOGY  XR Chest 1 View    Result Date: 8/11/2024  Patient: OMKAR MARQUEZ  Time Out: 04:06 Exam(s): XR CXR 1 VIEW EXAM:   XR Chest, 1 View CLINICAL HISTORY:    Reason for exam: recent pneumonia. TECHNIQUE:   Frontal view of the chest. COMPARISON:   No relevant prior studies available. FINDINGS:   Lungs:  Unremarkable.  No consolidation.   Pleural space:  Unremarkable.  No pneumothorax.   Heart:  Cardiomegaly.   Mediastinum:  Unremarkable.  Normal mediastinal contour.   Bones joints:  Unremarkable.  No acute fracture.   Tubes, lines and devices:  Single lead pacemaker.   Upper abdomen:  Elevated RIGHT hemidiaphragm. IMPRESSION:       No acute findings in the chest.     Electronically signed by Francis Turpin MD on 08-11-24 at 0406     Ordered the above noted radiological studies. Reviewed by me in PACS.         MEDICATIONS GIVEN IN ER  Medications   sodium chloride 0.9 % flush 10 mL (has no administration in time range)         ORDERS PLACED DURING THIS VISIT:  Orders Placed This Encounter   Procedures    XR Chest 1 View    Comprehensive Metabolic Panel    CBC Auto Differential    Blood Gas, Arterial -    Monitor Blood Pressure    Continuous Pulse Oximetry    Insert Peripheral IV    CBC & Differential         OUTPATIENT MEDICATION MANAGEMENT:  Current Facility-Administered Medications Ordered in Epic   Medication Dose Route Frequency Provider Last Rate Last Admin    sodium chloride 0.9 % flush 10 mL  10 mL Intravenous PRN Vadim Whaley II, MD         Current Outpatient Medications Ordered in Epic   Medication Sig Dispense Refill    acetaminophen (TYLENOL) 325 MG tablet Take 2 tablets by mouth Every 4 (Four) Hours As Needed for Mild Pain . 120 tablet 3    albuterol sulfate  (90 Base) MCG/ACT inhaler Inhale 2 puffs Every 4 (Four) Hours As Needed for Wheezing. 18 g 3    amoxicillin-clavulanate (AUGMENTIN) 875-125 MG per tablet Take 1 tablet by mouth Every 12 (Twelve) Hours for 7 doses. Indications: Pneumonia      aspirin 81 MG chewable tablet Chew 1 tablet Daily.      atorvastatin (LIPITOR) 40 MG tablet TAKE 1 TABLET BY MOUTH NIGHTLY 90 tablet 1    busPIRone (BUSPAR) 10 MG tablet TAKE 1 TABLET BY MOUTH TWICE DAILY 60 tablet 5    citalopram (CeleXA) 20 MG tablet Take 1 tablet by mouth Daily. (Patient taking differently: Take 1.5 tablets by mouth Daily. PT TAKING 1.5 TABLETS DAILY) 135 tablet 1    Diclofenac Sodium (Voltaren) 1 % gel gel Apply 4 g topically to the appropriate area as directed 4 (Four) Times a Day. 50 g 0    furosemide (LASIX) 20 MG tablet Take 1 tablet by mouth Daily for 180 days. 90 tablet 1    gabapentin (NEURONTIN) 100 MG capsule TAKE ONE (1) CAPSULE BY MOUTH TWICE DAILY 60 capsule 0    gabapentin (NEURONTIN) 300 MG capsule Take 1 capsule by mouth Every Night. 90 capsule 0    guaiFENesin  (MUCINEX) 600 MG 12 hr tablet Take 2 tablets by mouth Every 12 (Twelve) Hours.      metoprolol tartrate (LOPRESSOR) 25 MG tablet TAKE 1 & 1/2 TABLETS BY MOUTH TWICE DAILY 90 tablet 1    omeprazole (priLOSEC) 20 MG capsule TAKE 1 CAPSULE BY MOUTH DAILY 90 capsule 1    predniSONE (DELTASONE) 20 MG tablet Take 2 tablets by mouth Daily With Breakfast for 3 doses.      tamsulosin (FLOMAX) 0.4 MG capsule 24 hr capsule TAKE 1 CAPSULE BY MOUTH NIGHTLY 30 capsule 5    trimethoprim-polymyxin b (POLYTRIM) 60822-9.1 UNIT/ML-% ophthalmic solution Administer 1 drop to both eyes Every 6 (Six) Hours. 10 mL 0       PROCEDURES  Procedures          MEDICAL DECISION MAKING, PROGRESS, and CONSULTS    Discussion below represents my analysis of pertinent findings related to patient's condition, differential diagnosis, treatment plan and final disposition.              Differential diagnosis:    Differential diagnosis for altered mental status includes:  - vital sign abnormalities such as HTN encephalopathy, hypotension, hypoxemia, hypercarbia, heat stroke  - toxic/metabolic pathology such as hypoglycemia, DKA, hypo/hyper-natremia, thyroid storm, myxedema coma, medication side effect (either intentional or accidental)  - infectious etiology  - intracranial pathology such as stroke, seizure, intracranial mass, intracranial hemorrhage  - psychiatric pathology                 Independent interpretation of labs, radiology studies, and discussions with consultants:  ED Course as of 08/11/24 0433   Sun Aug 11, 2024   0229 On medical chart review, reviewed the discharge summary from 8/9/2024.  Patient was admitted from the emergency room for weakness and trouble with balance.  He was noted to have some facial droop and possible slurred speech.  Was febrile upon presentation.  However, no definitive infection was identified.  Pneumonia was suspected, however.  Transition from Zosyn to Augmentin to complete a 7-day course of antibiotics. [TD]    0245 By report, patient reportedly was at his health rehab hospital yesterday for recent diagnosis of pneumonia.  He did not have his home CPAP machine throughout the night and nursing felt that he was more confused this morning compared to yesterday. [TD]   0401 pH, Arterial: 7.403 [TD]   0402 pO2, Arterial(!): 65.9 [TD]   0402 pCO2, Arterial(!): 46.6 [TD]   0402 WBC: 9.54 [TD]   0402 Hemoglobin: 14.2 [TD]   0402 Chest x-ray dependently interpreted by myself.  Right hemidiaphragm elevation.  No infiltrate [TD]   0411 Creatinine(!): 0.63 [TD]   0411 Sodium: 138 [TD]   0411 Potassium: 4.0 [TD]   0429 I rechecked the patient prior to discharge.  He is fully oriented.  He is satting 93% on his baseline home oxygen.  I think that he is appropriate for discharge home. [TD]      ED Course User Index  [TD] Vadim Whaley II, MD         Patient does not show any signs of confusion at this time.  His symptoms were nonfocal and worse activity due to the fact that he was not using his BiPAP.  Therefore got an ABG which shows no evidence of CO2 narcosis.  Given his normal neurological examination, I see no need for stroke workup at this time.  Additionally, I do not think that he needs a CT scan of his head even.        DIAGNOSIS  Final diagnoses:   Confusion         DISPOSITION  DISCHARGE    FOLLOW-UP  Karen Jiménez, ASHANTI  [address] [id number] [phone]    Schedule an appointment as soon as possible for a visit   As needed    [facility] EMERGENCY DEPARTMENT  [address] [facility] [address] [phone]  Go to   If symptoms worsen         Medication List        Changed      citalopram 20 MG tablet  Commonly known as: CeleXA  Take 1 tablet by mouth Daily.  What changed:   how much to take  additional instructions                  Latest Documented Vital Signs:  As of 04:33 EDT  BP- 134/100 HR- 70 Temp- 98.1 °F (36.7 °C) (Tympanic) O2 sat- 92%      --    Please  note that portions of this were completed with a voice recognition program.       Note Disclaimer: At [facility], we believe that sharing information builds trust and better relationships. You are receiving this note because you are receiving care at [facility] or recently visited. It is possible you will see health information before a provider has talked with you about it. This kind of information can be easy to misunderstand. To help you fully understand what it means for your health, we urge you to discuss this note with your provider.         Vadim Whaley II, MD  08/11/24 1055

## 2024-08-14 ENCOUNTER — TELEPHONE (OUTPATIENT)
Dept: CARDIOLOGY | Facility: HOSPITAL | Age: 69
End: 2024-08-14
Payer: MEDICARE

## 2024-08-14 NOTE — TELEPHONE ENCOUNTER
I called & spoke with pt's wife, Adilia, regarding pt's evaluation for LAAO/Watchman. Pt is currently in Rehab, she states he is doing ok, & hopes to be home in about a week. I discussed with her the indications for Watchman & evaluation of a cardiac CT. I was able to answer her questions and will mail educational information to her. The plan is for pt to recover from likely pneumonia and get cardiac CT. I will follow up next week & see how pt is doing; potentially get CT arranged at that time. My contact # has been provided & I encourager her to call with any questions. RTRN

## 2024-08-22 ENCOUNTER — TRANSCRIBE ORDERS (OUTPATIENT)
Dept: HOME HEALTH SERVICES | Facility: HOME HEALTHCARE | Age: 69
End: 2024-08-22
Payer: MEDICARE

## 2024-08-22 ENCOUNTER — TELEPHONE (OUTPATIENT)
Dept: INTERNAL MEDICINE | Age: 69
End: 2024-08-22
Payer: MEDICARE

## 2024-08-22 DIAGNOSIS — I63.9 CEREBROVASCULAR ACCIDENT (CVA), UNSPECIFIED MECHANISM: Primary | ICD-10-CM

## 2024-08-22 NOTE — TELEPHONE ENCOUNTER
SPOKE TO [place] WITH [facility] REQUESTING VERBAL ORDER EVALUATION FOR NURSING PT,OT, AND SPEECH

## 2024-08-23 ENCOUNTER — HOME HEALTH ADMISSION (OUTPATIENT)
Dept: HOME HEALTH SERVICES | Facility: HOME HEALTHCARE | Age: 69
End: 2024-08-23
Payer: MEDICARE

## 2024-08-25 ENCOUNTER — HOME CARE VISIT (OUTPATIENT)
Dept: HOME HEALTH SERVICES | Facility: HOME HEALTHCARE | Age: 69
End: 2024-08-25
Payer: MEDICARE

## 2024-08-25 PROCEDURE — G0299 HHS/HOSPICE OF RN EA 15 MIN: HCPCS

## 2024-08-26 ENCOUNTER — TELEPHONE (OUTPATIENT)
Dept: INTERNAL MEDICINE | Age: 69
End: 2024-08-26
Payer: MEDICARE

## 2024-08-26 ENCOUNTER — HOME CARE VISIT (OUTPATIENT)
Dept: HOME HEALTH SERVICES | Facility: HOME HEALTHCARE | Age: 69
End: 2024-08-26
Payer: MEDICARE

## 2024-08-26 ENCOUNTER — APPOINTMENT (OUTPATIENT)
Dept: CT IMAGING | Facility: HOSPITAL | Age: 69
End: 2024-08-26
Payer: MEDICARE

## 2024-08-26 ENCOUNTER — HOSPITAL ENCOUNTER (OUTPATIENT)
Facility: HOSPITAL | Age: 69
Setting detail: OBSERVATION
LOS: 1 days | Discharge: NURSING, REHAB OR LONG-TERM CARE | End: 2024-08-29
Attending: EMERGENCY MEDICINE | Admitting: HOSPITALIST
Payer: MEDICARE

## 2024-08-26 ENCOUNTER — OFFICE VISIT (OUTPATIENT)
Dept: CARDIOLOGY | Facility: CLINIC | Age: 69
End: 2024-08-26
Payer: MEDICARE

## 2024-08-26 VITALS
SYSTOLIC BLOOD PRESSURE: 138 MMHG | BODY MASS INDEX: 36.82 KG/M2 | DIASTOLIC BLOOD PRESSURE: 94 MMHG | HEIGHT: 65 IN | WEIGHT: 221 LBS | HEART RATE: 70 BPM

## 2024-08-26 DIAGNOSIS — Z79.01 CHRONIC ANTICOAGULATION: ICD-10-CM

## 2024-08-26 DIAGNOSIS — M79.2 NEUROPATHIC PAIN: ICD-10-CM

## 2024-08-26 DIAGNOSIS — R53.1 GENERALIZED WEAKNESS: Primary | ICD-10-CM

## 2024-08-26 DIAGNOSIS — R29.898 RIGHT LEG WEAKNESS: ICD-10-CM

## 2024-08-26 DIAGNOSIS — R29.6 FREQUENT FALLS: ICD-10-CM

## 2024-08-26 DIAGNOSIS — Z86.73 HISTORY OF CVA (CEREBROVASCULAR ACCIDENT): ICD-10-CM

## 2024-08-26 DIAGNOSIS — I48.20 CHRONIC ATRIAL FIBRILLATION: Primary | ICD-10-CM

## 2024-08-26 DIAGNOSIS — Z95.0 PACEMAKER: ICD-10-CM

## 2024-08-26 DIAGNOSIS — R25.2 LEG CRAMPING: ICD-10-CM

## 2024-08-26 DIAGNOSIS — G62.9 PERIPHERAL POLYNEUROPATHY: ICD-10-CM

## 2024-08-26 DIAGNOSIS — I10 ESSENTIAL HYPERTENSION: ICD-10-CM

## 2024-08-26 LAB
ANION GAP SERPL CALCULATED.3IONS-SCNC: 8.1 MMOL/L (ref 5–15)
BASOPHILS # BLD AUTO: 0.02 10*3/MM3 (ref 0–0.2)
BASOPHILS NFR BLD AUTO: 0.3 % (ref 0–1.5)
BILIRUB UR QL STRIP: NEGATIVE
BUN SERPL-MCNC: 16 MG/DL (ref 8–23)
BUN/CREAT SERPL: 18.8 (ref 7–25)
CALCIUM SPEC-SCNC: 9.6 MG/DL (ref 8.6–10.5)
CHLORIDE SERPL-SCNC: 107 MMOL/L (ref 98–107)
CLARITY UR: CLEAR
CO2 SERPL-SCNC: 26.9 MMOL/L (ref 22–29)
COLOR UR: YELLOW
CREAT SERPL-MCNC: 0.85 MG/DL (ref 0.76–1.27)
DEPRECATED RDW RBC AUTO: 40.9 FL (ref 37–54)
EGFRCR SERPLBLD CKD-EPI 2021: 94.6 ML/MIN/1.73
EOSINOPHIL # BLD AUTO: 0.17 10*3/MM3 (ref 0–0.4)
EOSINOPHIL NFR BLD AUTO: 2.6 % (ref 0.3–6.2)
ERYTHROCYTE [DISTWIDTH] IN BLOOD BY AUTOMATED COUNT: 12.9 % (ref 12.3–15.4)
GLUCOSE SERPL-MCNC: 97 MG/DL (ref 65–99)
GLUCOSE UR STRIP-MCNC: NEGATIVE MG/DL
HCT VFR BLD AUTO: 41.9 % (ref 37.5–51)
HGB BLD-MCNC: 13.4 G/DL (ref 13–17.7)
HGB UR QL STRIP.AUTO: NEGATIVE
IMM GRANULOCYTES # BLD AUTO: 0.01 10*3/MM3 (ref 0–0.05)
IMM GRANULOCYTES NFR BLD AUTO: 0.2 % (ref 0–0.5)
KETONES UR QL STRIP: ABNORMAL
LEUKOCYTE ESTERASE UR QL STRIP.AUTO: NEGATIVE
LYMPHOCYTES # BLD AUTO: 1.11 10*3/MM3 (ref 0.7–3.1)
LYMPHOCYTES NFR BLD AUTO: 17.3 % (ref 19.6–45.3)
MCH RBC QN AUTO: 28 PG (ref 26.6–33)
MCHC RBC AUTO-ENTMCNC: 32 G/DL (ref 31.5–35.7)
MCV RBC AUTO: 87.7 FL (ref 79–97)
MONOCYTES # BLD AUTO: 0.57 10*3/MM3 (ref 0.1–0.9)
MONOCYTES NFR BLD AUTO: 8.9 % (ref 5–12)
NEUTROPHILS NFR BLD AUTO: 4.54 10*3/MM3 (ref 1.7–7)
NEUTROPHILS NFR BLD AUTO: 70.7 % (ref 42.7–76)
NITRITE UR QL STRIP: NEGATIVE
NRBC BLD AUTO-RTO: 0 /100 WBC (ref 0–0.2)
PH UR STRIP.AUTO: 6 [PH] (ref 5–8)
PLATELET # BLD AUTO: 237 10*3/MM3 (ref 140–450)
PMV BLD AUTO: 9.8 FL (ref 6–12)
POTASSIUM SERPL-SCNC: 3.9 MMOL/L (ref 3.5–5.2)
PROT UR QL STRIP: NEGATIVE
RBC # BLD AUTO: 4.78 10*6/MM3 (ref 4.14–5.8)
SODIUM SERPL-SCNC: 142 MMOL/L (ref 136–145)
SP GR UR STRIP: 1.02 (ref 1–1.03)
UROBILINOGEN UR QL STRIP: ABNORMAL
WBC NRBC COR # BLD AUTO: 6.42 10*3/MM3 (ref 3.4–10.8)

## 2024-08-26 PROCEDURE — 80048 BASIC METABOLIC PNL TOTAL CA: CPT | Performed by: EMERGENCY MEDICINE

## 2024-08-26 PROCEDURE — G0378 HOSPITAL OBSERVATION PER HR: HCPCS

## 2024-08-26 PROCEDURE — 81003 URINALYSIS AUTO W/O SCOPE: CPT | Performed by: EMERGENCY MEDICINE

## 2024-08-26 PROCEDURE — 85025 COMPLETE CBC W/AUTO DIFF WBC: CPT | Performed by: EMERGENCY MEDICINE

## 2024-08-26 PROCEDURE — 99285 EMERGENCY DEPT VISIT HI MDM: CPT

## 2024-08-26 PROCEDURE — 93010 ELECTROCARDIOGRAM REPORT: CPT | Performed by: INTERNAL MEDICINE

## 2024-08-26 PROCEDURE — 70450 CT HEAD/BRAIN W/O DYE: CPT

## 2024-08-26 PROCEDURE — 93005 ELECTROCARDIOGRAM TRACING: CPT | Performed by: EMERGENCY MEDICINE

## 2024-08-26 RX ORDER — ACETAMINOPHEN 325 MG/1
650 TABLET ORAL EVERY 4 HOURS PRN
Status: DISCONTINUED | OUTPATIENT
Start: 2024-08-26 | End: 2024-08-29 | Stop reason: HOSPADM

## 2024-08-26 RX ORDER — POLYETHYLENE GLYCOL 3350 17 G/17G
17 POWDER, FOR SOLUTION ORAL DAILY PRN
Status: DISCONTINUED | OUTPATIENT
Start: 2024-08-26 | End: 2024-08-29 | Stop reason: HOSPADM

## 2024-08-26 RX ORDER — ALUMINA, MAGNESIA, AND SIMETHICONE 2400; 2400; 240 MG/30ML; MG/30ML; MG/30ML
15 SUSPENSION ORAL EVERY 6 HOURS PRN
Status: DISCONTINUED | OUTPATIENT
Start: 2024-08-26 | End: 2024-08-29 | Stop reason: HOSPADM

## 2024-08-26 RX ORDER — BISACODYL 10 MG
10 SUPPOSITORY, RECTAL RECTAL DAILY PRN
Status: DISCONTINUED | OUTPATIENT
Start: 2024-08-26 | End: 2024-08-29 | Stop reason: HOSPADM

## 2024-08-26 RX ORDER — SODIUM CHLORIDE 0.9 % (FLUSH) 0.9 %
10 SYRINGE (ML) INJECTION AS NEEDED
Status: DISCONTINUED | OUTPATIENT
Start: 2024-08-26 | End: 2024-08-29 | Stop reason: HOSPADM

## 2024-08-26 RX ORDER — ONDANSETRON 4 MG/1
4 TABLET, ORALLY DISINTEGRATING ORAL EVERY 6 HOURS PRN
Status: DISCONTINUED | OUTPATIENT
Start: 2024-08-26 | End: 2024-08-29 | Stop reason: HOSPADM

## 2024-08-26 RX ORDER — ONDANSETRON 2 MG/ML
4 INJECTION INTRAMUSCULAR; INTRAVENOUS EVERY 6 HOURS PRN
Status: DISCONTINUED | OUTPATIENT
Start: 2024-08-26 | End: 2024-08-29 | Stop reason: HOSPADM

## 2024-08-26 RX ORDER — ACETAMINOPHEN 160 MG/5ML
650 SOLUTION ORAL EVERY 4 HOURS PRN
Status: DISCONTINUED | OUTPATIENT
Start: 2024-08-26 | End: 2024-08-29 | Stop reason: HOSPADM

## 2024-08-26 RX ORDER — AMOXICILLIN 250 MG
2 CAPSULE ORAL 2 TIMES DAILY PRN
Status: DISCONTINUED | OUTPATIENT
Start: 2024-08-26 | End: 2024-08-29 | Stop reason: HOSPADM

## 2024-08-26 RX ORDER — BISACODYL 5 MG/1
5 TABLET, DELAYED RELEASE ORAL DAILY PRN
Status: DISCONTINUED | OUTPATIENT
Start: 2024-08-26 | End: 2024-08-29 | Stop reason: HOSPADM

## 2024-08-26 RX ORDER — ACETAMINOPHEN 650 MG/1
650 SUPPOSITORY RECTAL EVERY 4 HOURS PRN
Status: DISCONTINUED | OUTPATIENT
Start: 2024-08-26 | End: 2024-08-29 | Stop reason: HOSPADM

## 2024-08-26 RX ORDER — NITROGLYCERIN 0.4 MG/1
0.4 TABLET SUBLINGUAL
Status: DISCONTINUED | OUTPATIENT
Start: 2024-08-26 | End: 2024-08-29 | Stop reason: HOSPADM

## 2024-08-26 NOTE — TELEPHONE ENCOUNTER
PATIENT'S WIFE STATES THE PATIENT GOT OUT OF REHAB FRIDAY AND HAS FALLEN 3 TIMES SINCE THEN. PLEASE ADVISE. SCHEDULED HOSPITAL FOLLOW UP TOMORROW MORNING.

## 2024-08-26 NOTE — ED NOTES
Pt c/o worsening right leg weakness. He was dx c stroke 3 weeks ago.  That is when the leg weakness started.  He has left facial droop from that stroke.  He has fallen 3X since Friday when he was dc'd from rehab facility

## 2024-08-26 NOTE — TELEPHONE ENCOUNTER
If patient is immobile with recurrent falls, he needs to call EMS to visit the ER for evaluation.

## 2024-08-26 NOTE — ED PROVIDER NOTES
EMERGENCY DEPARTMENT ENCOUNTER    Room Number:  20/20  PCP: Karen Jiménez APRN  Historian: Patient      HPI:  Chief Complaint: Weakness   A complete HPI/ROS/PMH/PSH/SH/FH are unobtainable due to: None  Context: Flo Manzo is a 69 y.o. male who presents to the ED c/o sudden onset of weakness that he feels is more so in his right leg that has been present now for some time.  He reports that he was diagnosed with a stroke earlier this month and does have some weakness in that right leg due to that.  However, over the last few days, he feels as though that weakness has worsened significantly.  He states that he is unable to get around at home and has actually had 3 falls since discharge.  He does have a known history of atrial fibrillation but is no longer on any anticoagulation it appears as though due to a hemorrhagic stroke.  He currently denies headache/head trauma, chest pain, back pain, shortness of breath, nausea/vomiting, or fever/chills.            PAST MEDICAL HISTORY  Active Ambulatory Problems     Diagnosis Date Noted    Umbilical hernia without obstruction and without gangrene 12/11/2017    Essential hypertension 01/05/2018    Arthritis of left knee 03/14/2016    Depression 01/05/2018    Obesity (BMI 30-39.9) 03/14/2016    Atrial fibrillation with RVR 01/24/2019    Substernal thyroid goiter 01/24/2019    CHF (congestive heart failure) 01/24/2019    Diastolic CHF, chronic 02/01/2019    Hemorrhagic stroke 02/28/2019    GÓMEZ on auto CPAP 09/08/2019    Hypersomnia due to medical condition 09/08/2019    Sleep-related hypoxia 10/26/2019    Chronic atrial fibrillation 03/19/2020    Vertigo 06/19/2020    Aortic stenosis, mild 12/23/2021    Sinus pause 03/06/2022    Pacemaker 04/25/2022    Chronic anticoagulation 06/24/2022    Generalized weakness 07/29/2022    Prediabetes 02/03/2023    Pure hypercholesterolemia 05/08/2023    Pre-operative cardiovascular examination 01/30/2024    Peripheral neuropathy  02/08/2024    History of colon polyps 03/14/2024    Dizziness 08/05/2024    Fever 08/06/2024     Resolved Ambulatory Problems     Diagnosis Date Noted    Dyspnea 01/24/2019    Angina at rest 01/24/2019    Intraparenchymal hemorrhage of brain 02/01/2019    SSS (sick sinus syndrome) 01/06/2022    S/P ICD (internal cardiac defibrillator) procedure 03/06/2022     Past Medical History:   Diagnosis Date    Arthritis     Colon polyps 01/04/2018    Heart murmur     Hypertension     New onset atrial fibrillation (CMS/HCC) - RVR 01/24/2019    GÓMEZ (obstructive sleep apnea) 06/06/2019    Sleep apnea     Stroke     Uncontrolled hypertension     Ventral hernia          PAST SURGICAL HISTORY  Past Surgical History:   Procedure Laterality Date    APPENDECTOMY N/A 1972    BACK SURGERY      BLADDER STIMULATOR IMPLANT L LOWER BACK    CARDIAC CATHETERIZATION N/A 07/20/2004    Cath left ventriculography, coronary angiography and left heart catheterization, Normal results-Dr. Fierro     CARDIAC CATHETERIZATION N/A 1/29/2019    Procedure: Left Heart Cath;  Surgeon: Eren Bruce MD;  Location: [facility] CATH INVASIVE LOCATION;  Service: Cardiology    CARDIAC CATHETERIZATION N/A 1/29/2019    Procedure: Left ventriculography;  Surgeon: Eren Bruce MD;  Location: [facility] CATH INVASIVE LOCATION;  Service: Cardiology    CARDIAC CATHETERIZATION N/A 1/29/2019    Procedure: Coronary angiography;  Surgeon: Erne Bruce MD;  Location: [facility] CATH INVASIVE LOCATION;  Service: Cardiology    CARDIAC ELECTROPHYSIOLOGY PROCEDURE N/A 3/4/2022    Procedure: Pacemaker SC new BIOTRONIK;  Surgeon: Eren Bruce MD;  Location: [facility] CATH INVASIVE LOCATION;  Service: Cardiology;  Laterality: N/A;    CARPAL TUNNEL RELEASE Right 2013    CARPAL TUNNEL RELEASE Left     COLONOSCOPY N/A 1/4/2018    Procedure: COLONOSCOPY with cold polypectomy and hot snare polypectomy;  Surgeon: Ten Solitario MD;  Location: [facility] ENDOSCOPY;   Service:     COLONOSCOPY N/A 4/25/2024    Procedure: COLONOSCOPY INTO CECUM;  Surgeon: Ten Solitario MD;  Location: [facility] ENDOSCOPY;  Service: General;  Laterality: N/A;  PRE: PERSONAL H/O COLON POLYP  /  POST: POOR PREP OTHERWISE NORMAL    EYE LENS IMPLANT SECONDARY Bilateral 2007, 2008    KNEE CARTILAGE SURGERY Right 1979    TONSILLECTOMY AND ADENOIDECTOMY Bilateral 2005    TOTAL KNEE ARTHROPLASTY Left 03/14/2016    Left total knee arthroplasty with Amberly component, size 11 femur, G tibial baseplate with a 10 polyethylene insert and a 38 patellar button-Dr. Pablo Hairston    TOTAL KNEE ARTHROPLASTY Right 03/02/2015    Right total knee arthroplasty with Amberly component size G femur, 11 tibial base plate with an 11 polyethylene insert and a 38 patellar button-Dr. Pablo Hairston    UVULOPALATOPHARYNGOPLASTY N/A 2005    part of soft palate, and uvula    VENTRAL HERNIA REPAIR N/A 1/23/2018    Procedure: VENTRAL HERNIA REPAIR LAPAROSCOPIC WITH DAVINCI ROBOT AND MESH;  Surgeon: Ten Solitario MD;  Location: [facility] MAIN OR;  Service:          FAMILY HISTORY  Family History   Problem Relation Age of Onset    Hypertension Mother     Kidney failure Mother     Coronary artery disease Father     Lung cancer Father         positive smoker    Heart attack Father     Breast cancer Sister 60    Prostate cancer Brother     Colon polyps Brother     Kidney nephrosis Brother     Malig Hyperthermia Neg Hx          SOCIAL HISTORY  Social History     Socioeconomic History    Marital status:    Tobacco Use    Smoking status: Never     Passive exposure: Past    Smokeless tobacco: Never   Vaping Use    Vaping status: Never Used   Substance and Sexual Activity    Alcohol use: Not Currently     Comment: social, maybe a drink a month, not since stroke    Drug use: No     Comment: previously smoked marijuana     Sexual activity: Defer         ALLERGIES  Poison ivy extract        REVIEW OF SYSTEMS  Review of Systems    Constitutional:  Negative for activity change, appetite change and fever.   HENT:  Negative for congestion and sore throat.    Eyes: Negative.    Respiratory:  Negative for cough and shortness of breath.    Cardiovascular:  Negative for chest pain and leg swelling.   Gastrointestinal:  Negative for abdominal pain, diarrhea and vomiting.   Endocrine: Negative.    Genitourinary:  Negative for decreased urine volume and dysuria.   Musculoskeletal:  Negative for neck pain.   Skin:  Negative for rash and wound.   Allergic/Immunologic: Negative.    Neurological:  Positive for weakness. Negative for numbness and headaches.   Hematological: Negative.    Psychiatric/Behavioral: Negative.     All other systems reviewed and are negative.         PHYSICAL EXAM  ED Triage Vitals [08/26/24 1720]   Temp Heart Rate Resp BP SpO2   98.6 °F (37 °C) 83 20 150/96 95 %      Temp src Heart Rate Source Patient Position BP Location FiO2 (%)   Tympanic Monitor -- -- --       Physical Exam  Constitutional:       General: He is not in acute distress.     Appearance: Normal appearance. He is not ill-appearing or toxic-appearing.   HENT:      Head: Normocephalic and atraumatic.   Eyes:      Extraocular Movements: Extraocular movements intact.      Pupils: Pupils are equal, round, and reactive to light.   Cardiovascular:      Rate and Rhythm: Normal rate and regular rhythm.      Heart sounds: Murmur heard.      No friction rub. No gallop.   Pulmonary:      Effort: Pulmonary effort is normal.      Breath sounds: Normal breath sounds.   Abdominal:      General: Abdomen is flat. There is no distension.      Palpations: Abdomen is soft.      Tenderness: There is no abdominal tenderness.   Musculoskeletal:         General: No swelling or tenderness. Normal range of motion.      Cervical back: Normal range of motion and neck supple.   Skin:     General: Skin is warm and dry.   Neurological:      General: No focal deficit present.      Mental Status:  He is alert and oriented to person, place, and time.      Sensory: No sensory deficit.      Motor: No weakness.      Comments: Left-sided facial drooping   Psychiatric:         Mood and Affect: Mood normal.         Behavior: Behavior normal.         Vital signs and nursing notes reviewed.          LAB RESULTS  Recent Results (from the past 24 hour(s))   Basic Metabolic Panel    Collection Time: 08/26/24  5:46 PM    Specimen: Blood   Result Value Ref Range    Glucose 97 65 - 99 mg/dL    BUN 16 8 - 23 mg/dL    Creatinine 0.85 0.76 - 1.27 mg/dL    Sodium 142 136 - 145 mmol/L    Potassium 3.9 3.5 - 5.2 mmol/L    Chloride 107 98 - 107 mmol/L    CO2 26.9 22.0 - 29.0 mmol/L    Calcium 9.6 8.6 - 10.5 mg/dL    BUN/Creatinine Ratio 18.8 7.0 - 25.0    Anion Gap 8.1 5.0 - 15.0 mmol/L    eGFR 94.6 >60.0 mL/min/1.73   CBC Auto Differential    Collection Time: 08/26/24  5:46 PM    Specimen: Blood   Result Value Ref Range    WBC 6.42 3.40 - 10.80 10*3/mm3    RBC 4.78 4.14 - 5.80 10*6/mm3    Hemoglobin 13.4 13.0 - 17.7 g/dL    Hematocrit 41.9 37.5 - 51.0 %    MCV 87.7 79.0 - 97.0 fL    MCH 28.0 26.6 - 33.0 pg    MCHC 32.0 31.5 - 35.7 g/dL    RDW 12.9 12.3 - 15.4 %    RDW-SD 40.9 37.0 - 54.0 fl    MPV 9.8 6.0 - 12.0 fL    Platelets 237 140 - 450 10*3/mm3    Neutrophil % 70.7 42.7 - 76.0 %    Lymphocyte % 17.3 (L) 19.6 - 45.3 %    Monocyte % 8.9 5.0 - 12.0 %    Eosinophil % 2.6 0.3 - 6.2 %    Basophil % 0.3 0.0 - 1.5 %    Immature Grans % 0.2 0.0 - 0.5 %    Neutrophils, Absolute 4.54 1.70 - 7.00 10*3/mm3    Lymphocytes, Absolute 1.11 0.70 - 3.10 10*3/mm3    Monocytes, Absolute 0.57 0.10 - 0.90 10*3/mm3    Eosinophils, Absolute 0.17 0.00 - 0.40 10*3/mm3    Basophils, Absolute 0.02 0.00 - 0.20 10*3/mm3    Immature Grans, Absolute 0.01 0.00 - 0.05 10*3/mm3    nRBC 0.0 0.0 - 0.2 /100 WBC   ECG 12 Lead Other; weakness    Collection Time: 08/26/24  7:51 PM   Result Value Ref Range    QT Interval 383 ms    QTC Interval 467 ms       Ordered  the above labs and reviewed the results.        RADIOLOGY  CT Head Without Contrast    Result Date: 8/26/2024  CT HEAD WO CONTRAST-  HISTORY:  right leg weakness/recent cva  COMPARISON: CT head 8/7/2024  FINDINGS: Encephalomalacia is appreciated involving the left frontal lobe posteriorly and medially including involvement of the cingulate gyrus, present previously. There is no evidence of intracranial hemorrhage, hydrocephalus or of acute infarction. Further evaluation could be performed with MRI examination of the brain.      Radiation dose reduction techniques were utilized, including automated exposure modulation based on body size.  This report was finalized on 8/26/2024 8:25 PM by Dr. Kristopher Henriquez M.D on Workstation: [employer]       Ordered the above noted radiological studies. Reviewed by me in PACS.            PROCEDURES  Procedures    EKG independently interpreted by myself as follows:    EKG          EKG time: 1951  Rhythm/Rate: ventricular paced, 89  P waves and DC: absent  QRS, axis: widened, LAD, LBBB   ST and T waves: nml     Interpreted Contemporaneously by me, independently viewed  changed compared to prior 8/5/24            MEDICATIONS GIVEN IN ER  Medications - No data to display                MEDICAL DECISION MAKING, PROGRESS, and CONSULTS    All labs have been independently reviewed by me.  All radiology studies have been reviewed by me and I have also reviewed the radiology report.   EKG's independently viewed and interpreted by me.  Discussion below represents my analysis of pertinent findings related to patient's condition, differential diagnosis, treatment plan and final disposition.      Additional sources:  - Discussed/ obtained information from independent historians: History obtained from the patient himself at bedside.    - External (non-ED) record review: Upon medical records review, the patient was recently admitted to the hospital from 8/5/2024 through 8/9/2024 due to dizziness  with disequilibrium.  His MRI appears to have revealed areas of subacute infarct as well as  areas of microhemorrhage.  He also had a fever at that visit as well.    - Chronic or social conditions impacting care: Atrial fibrillation, hypertension, previous CVA    - Shared decision making: Admission decision based on shared conversations have between myself, the patient at bedside, as well as ASHANTI Rudd for [facility].      Orders placed during this visit:  Orders Placed This Encounter   Procedures    CT Head Without Contrast    Basic Metabolic Panel    CBC Auto Differential    Urinalysis With Microscopic If Indicated (No Culture) - Urine, Clean Catch    [facility] (on-call MD unless specified) Details    ECG 12 Lead Other; weakness    CBC & Differential             Differential diagnosis includes but is not limited to:    Acute CVA, TIA, intracranial hemorrhage, intracranial mass effect, acute coronary syndrome, cardiac rhythm disturbance, acute anemia, urinary tract infection, or severe electrolyte disturbance      Independent interpretation of labs, radiology studies, and discussions with consultants:    Head CT independently interpreted by myself with my interpretation showing no area of acute ischemia/infarct, hemorrhage, or mass effect.        ED Course as of 08/26/24 2243   Mon Aug 26, 2024   2154 On reevaluation, the patient is attempting to give urine however has been fairly unsuccessful thus far.  He states that that is a fairly chronic issue with him since his prostate cancer treatment.  I did inform him that his workup thus far is fairly unremarkable including labs and head CT.  However, given the significant weakness and falls at home, he certainly should be readmitted to the hospital for further workup and even potential rehab placement.  He is in agreement with that plan and all questions answered. [BM]   2755 The patient's presentation, workup, as well as diagnosis and treatment plan was discussed at  length with ASHANTI Rudd for [facility].  She agrees to admit the patient to the hospital today for Dr. Burr. [BM]      ED Course User Index  [BM] Ever Heart MD           DIAGNOSIS  Final diagnoses:   Generalized weakness   Frequent falls   Right leg weakness   History of CVA (cerebrovascular accident)         DISPOSITION  ADMISSION    Discussed treatment plan and reason for admission with pt/family and admitting physician.  Pt/family voiced understanding of the plan for admission for further testing/treatment as needed.               Latest Documented Vital Signs:  As of 22:43 EDT  BP- 101/91 HR- 84 Temp- 98.6 °F (37 °C) (Tympanic) O2 sat- 96%              --    Please note that portions of this were completed with a voice recognition program.       Note Disclaimer: At [facility], we believe that sharing information builds trust and better relationships. You are receiving this note because you are receiving care at [facility] or recently visited. It is possible you will see health information before a provider has talked with you about it. This kind of information can be easy to misunderstand. To help you fully understand what it means for your health, we urge you to discuss this note with your provider.             Ever Heart MD  08/26/24 7946

## 2024-08-26 NOTE — Clinical Note
Level of Care: Telemetry [5]   Diagnosis: Generalized weakness [648361]   Admitting Physician: CINTIA GREENE [[id number]]   Attending Physician: CINTIA GREENE [[id number]]   Certification: I Certify That Inpatient Hospital Services Are Medically Necessary For Greater Than 2 Midnights

## 2024-08-26 NOTE — TELEPHONE ENCOUNTER
Called the pt and spoke with the pt on the phone. He understood and he will do what you recommended of him doing.

## 2024-08-26 NOTE — PROGRESS NOTES
[facility]    Subjective:        Flo Manzo is a 69 y.o. male who here for follow up    CC  Stroke with blood thinner  HPI  69-year-old with chronic atrial fibrillation hypertension and a pacemaker here for the follow-up after recent hospitalization for hemorrhagic stroke     Problems Addressed this Visit          Cardiac and Vasculature    Essential hypertension (Chronic)    Chronic atrial fibrillation - Primary    Pacemaker       Coag and Thromboembolic    Chronic anticoagulation     Diagnoses         Codes Comments    Chronic atrial fibrillation    -  Primary ICD-10-CM: I48.20  ICD-9-CM: 427.31     Chronic anticoagulation     ICD-10-CM: Z79.01  ICD-9-CM: V58.61     Essential hypertension     ICD-10-CM: I10  ICD-9-CM: 401.9     Pacemaker     ICD-10-CM: Z95.0  ICD-9-CM: V45.01           .    The following portions of the patient's history were reviewed and updated as appropriate: allergies, current medications, past family history, past medical history, past social history, past surgical history and problem list.    Past Medical History:   Diagnosis Date    Arthritis     Atrial fibrillation with RVR 01/24/2019    Colon polyps 01/04/2018    Hepatic flexure: tubular adenoma, only low-grade dysplasia; splenic flexure: tubular adenoma, only low-grade dysplasia; sigmoid colon: tubular adenoma, multiple fragments only low-grade dysplasia    Depression     Heart murmur     Hemorrhagic stroke 01/29/2019    Hypertension     New onset atrial fibrillation (CMS/HCC) - RVR 01/24/2019    GÓMEZ (obstructive sleep apnea) 06/06/2019    Home sleep study with moderate severity GÓMEZ, AHI 20 events per hour.  Sleep-related hypoxia with low O2 saturation 84% for 14 minutes.    Peripheral neuropathy     Sleep apnea     previously diagnosed with sleep apnea, had T&A done and it has since resolved     Stroke     Uncontrolled hypertension     Ventral hernia      reports that he has never smoked. He has been exposed to  "tobacco smoke. He has never used smokeless tobacco. He reports that he does not currently use alcohol. He reports that he does not use drugs.   Family History   Problem Relation Age of Onset    Hypertension Mother     Kidney failure Mother     Coronary artery disease Father     Lung cancer Father         positive smoker    Heart attack Father     Breast cancer Sister 60    Prostate cancer Brother     Colon polyps Brother     Kidney nephrosis Brother     Malig Hyperthermia Neg Hx        Review of Systems  Constitutional: No wt loss, fever, fatigue  Gastrointestinal: No nausea, abdominal pain  Behavioral/Psych: No insomnia or anxiety   Cardiovascular no chest pains or tightness in the chest  Objective:       Physical Exam  /94   Pulse 70   Ht 165.1 cm (65\")   Wt 100 kg (221 lb)   BMI 36.78 kg/m²   General appearance: No acute changes   Neck: Trachea midline; NECK, supple, no thyromegaly or lymphadenopathy   Lungs: Normal size and shape, normal breath sounds, equal distribution of air, no rales and rhonchi   CV: S1-S2 irregular, no murmurs, no rub, no gallop   Abdomen: Soft, nontender; no masses , no abnormal abdominal sounds   Extremities: No deformity , normal color , no peripheral edema   Skin: Normal temperature, turgor and texture; no rash, ulcers            ECG 12 Lead    Date/Time: 8/26/2024 12:19 PM  Performed by: Eren Bruce MD    Authorized by: Eren Bruce MD  Comparison: compared with previous ECG   Similar to previous ECG  Rhythm: atrial fibrillation    Clinical impression: abnormal EKG            Echocardiogram:    Results for orders placed during the hospital encounter of 07/11/24    Adult Transthoracic Echo Complete W/ Cont if Necessary Per Protocol    Interpretation Summary    Left ventricular ejection fraction appears to be 51 - 55%.    Left ventricular diastolic function was indeterminate.    The left atrial cavity is mild to moderately dilated.    Mild aortic valve " stenosis is present.    Estimated right ventricular systolic pressure from tricuspid regurgitation is normal (<35 mmHg).        No current facility-administered medications for this visit.  No current outpatient medications on file.    Facility-Administered Medications Ordered in Other Visits:     acetaminophen (TYLENOL) tablet 650 mg, 650 mg, Oral, Q4H PRN **OR** acetaminophen (TYLENOL) 160 MG/5ML oral solution 650 mg, 650 mg, Oral, Q4H PRN **OR** acetaminophen (TYLENOL) suppository 650 mg, 650 mg, Rectal, Q4H PRN, Margie Dodson, APRN    aluminum-magnesium hydroxide-simethicone (MAALOX MAX) 400-400-40 MG/5ML suspension 15 mL, 15 mL, Oral, Q6H PRN, Margie Dodson APRN    aspirin chewable tablet 81 mg, 81 mg, Oral, Daily, Margie Dodson APRN, 81 mg at 08/28/24 0917    atorvastatin (LIPITOR) tablet 40 mg, 40 mg, Oral, Nightly, Margie Dodson APRN, 40 mg at 08/27/24 2034    sennosides-docusate (PERICOLACE) 8.6-50 MG per tablet 2 tablet, 2 tablet, Oral, BID PRN **AND** polyethylene glycol (MIRALAX) packet 17 g, 17 g, Oral, Daily PRN **AND** bisacodyl (DULCOLAX) EC tablet 5 mg, 5 mg, Oral, Daily PRN **AND** bisacodyl (DULCOLAX) suppository 10 mg, 10 mg, Rectal, Daily PRN, Margie Dodson, APRN    busPIRone (BUSPAR) tablet 10 mg, 10 mg, Oral, BID, Margie Dodson APRN, 10 mg at 08/28/24 0917    citalopram (CeleXA) tablet 20 mg, 20 mg, Oral, Daily, Margie Dodson, APRN, 20 mg at 08/28/24 0917    furosemide (LASIX) tablet 20 mg, 20 mg, Oral, Daily, Margie Dodson APRN, 20 mg at 08/28/24 0917    gabapentin (NEURONTIN) capsule 100 mg, 100 mg, Oral, QAM, Margie Dodson APRN, 100 mg at 08/28/24 0631    gabapentin (NEURONTIN) capsule 300 mg, 300 mg, Oral, Nightly, Margie Dodson APRN, 300 mg at 08/27/24 2033    metoprolol tartrate (LOPRESSOR) tablet 37.5 mg, 37.5 mg, Oral, Q12H, Margie Dodson APRN, 37.5 mg at 08/28/24 0917    nitroglycerin (NITROSTAT) SL tablet 0.4  mg, 0.4 mg, Sublingual, Q5 Min PRN, Margie Dodson, APRN    ondansetron ODT (ZOFRAN-ODT) disintegrating tablet 4 mg, 4 mg, Oral, Q6H PRN **OR** ondansetron (ZOFRAN) injection 4 mg, 4 mg, Intravenous, Q6H PRN, Cindy Dodsona N, APRN    pantoprazole (PROTONIX) EC tablet 40 mg, 40 mg, Oral, Q AM, Margie Dodson, APRN, 40 mg at 08/28/24 0631    sodium chloride 0.9 % flush 10 mL, 10 mL, Intravenous, PRN, Margie Dodson, APRN    tamsulosin (FLOMAX) 24 hr capsule 0.4 mg, 0.4 mg, Oral, Nightly, Margie Dodson, APRN, 0.4 mg at 08/27/24 2034   Assessment:                Plan:          ICD-10-CM ICD-9-CM   1. Chronic atrial fibrillation  I48.20 427.31   2. Chronic anticoagulation  Z79.01 V58.61   3. Essential hypertension  I10 401.9   4. Pacemaker  Z95.0 V45.01     1. Chronic atrial fibrillation  Rate controlled    2. Chronic anticoagulation  Off anticoagulation because of the recent hemorrhagic stroke    3. Essential hypertension  Blood pressure controlled    4. Pacemaker  Pacemaker functioning normally    Off blood thinner    Working on watchman procedure    3 months  COUNSELING:    Flo Rodriguez was given to patient for the following topics: diagnostic results, risk factor reductions, impressions, risks and benefits of treatment options and importance of treatment compliance .       SMOKING COUNSELING:        Dictated using Dragon dictation

## 2024-08-26 NOTE — TELEPHONE ENCOUNTER
If patient has any pain, or if he hit his head during any of the falls, I recommend family contact EMS for transportation to ER for evaluation.

## 2024-08-26 NOTE — HOME HEALTH
"SOC Note: Patient returned home from rehab facility yesterday. Prior to rehab he was hospitalized for a stroke. Patient and wife feel that patient was not appropriate for discharge from rehab. He has fallen since being home. No injuries sustained. He is very weak and \"unable to get up from the toilet without help.\" His wife has limited mobility herself. Patient is cognitively intact but his wife does most of the speaking and requests instruction go through her. Patient needs updated med packs. Educated patient/spouse on new med regimen and discrepencies in med pack. Identified specific meds that have changed doses and made list for patient. Pointed out and made list of which medications need to be split in old med pack to ensure correct dose is given. Patient and spouse demonstrated understanding. Wife states she will make PCP appointment and call for updated med pack. Need order for MSW to assist with finding and setting up paid caregivers.Nursing will visit 2w4.    Home Health ordered for: SN, PT, OT, SLP, MSW order needed    Primary Dx: CVA    Focus of Care: CVA    Patient's goal(s): \"Improve my safety at home\"    Current Functional status: Need assistance    HB status/Living Arrangements: HB. Lives at home with wife    Code Status: Full Code    Educated on Emergency Plan, steps to take prior to going to the ER and when to Call [facility]: 8/25     Medication issues/Concerns: Discussed with lydia and CC sent to provider"

## 2024-08-27 ENCOUNTER — HOME CARE VISIT (OUTPATIENT)
Dept: HOME HEALTH SERVICES | Facility: HOME HEALTHCARE | Age: 69
End: 2024-08-27
Payer: MEDICARE

## 2024-08-27 ENCOUNTER — DOCUMENTATION (OUTPATIENT)
Dept: HOME HEALTH SERVICES | Facility: HOME HEALTHCARE | Age: 69
End: 2024-08-27
Payer: MEDICARE

## 2024-08-27 VITALS
OXYGEN SATURATION: 96 % | TEMPERATURE: 97.4 F | HEART RATE: 71 BPM | SYSTOLIC BLOOD PRESSURE: 108 MMHG | RESPIRATION RATE: 18 BRPM | DIASTOLIC BLOOD PRESSURE: 70 MMHG

## 2024-08-27 LAB
QT INTERVAL: 383 MS
QTC INTERVAL: 467 MS

## 2024-08-27 PROCEDURE — G0378 HOSPITAL OBSERVATION PER HR: HCPCS

## 2024-08-27 PROCEDURE — 97162 PT EVAL MOD COMPLEX 30 MIN: CPT

## 2024-08-27 PROCEDURE — 97530 THERAPEUTIC ACTIVITIES: CPT

## 2024-08-27 PROCEDURE — 99214 OFFICE O/P EST MOD 30 MIN: CPT | Performed by: STUDENT IN AN ORGANIZED HEALTH CARE EDUCATION/TRAINING PROGRAM

## 2024-08-27 PROCEDURE — 99221 1ST HOSP IP/OBS SF/LOW 40: CPT | Performed by: INTERNAL MEDICINE

## 2024-08-27 PROCEDURE — 97166 OT EVAL MOD COMPLEX 45 MIN: CPT

## 2024-08-27 RX ORDER — ATORVASTATIN CALCIUM 20 MG/1
40 TABLET, FILM COATED ORAL NIGHTLY
Status: DISCONTINUED | OUTPATIENT
Start: 2024-08-27 | End: 2024-08-29 | Stop reason: HOSPADM

## 2024-08-27 RX ORDER — TAMSULOSIN HYDROCHLORIDE 0.4 MG/1
0.4 CAPSULE ORAL NIGHTLY
Status: DISCONTINUED | OUTPATIENT
Start: 2024-08-27 | End: 2024-08-29 | Stop reason: HOSPADM

## 2024-08-27 RX ORDER — FUROSEMIDE 20 MG
20 TABLET ORAL DAILY
Status: DISCONTINUED | OUTPATIENT
Start: 2024-08-27 | End: 2024-08-29 | Stop reason: HOSPADM

## 2024-08-27 RX ORDER — METOPROLOL TARTRATE 25 MG/1
37.5 TABLET, FILM COATED ORAL EVERY 12 HOURS SCHEDULED
Status: DISCONTINUED | OUTPATIENT
Start: 2024-08-27 | End: 2024-08-29 | Stop reason: HOSPADM

## 2024-08-27 RX ORDER — BUSPIRONE HYDROCHLORIDE 10 MG/1
10 TABLET ORAL 2 TIMES DAILY
Status: DISCONTINUED | OUTPATIENT
Start: 2024-08-27 | End: 2024-08-29 | Stop reason: HOSPADM

## 2024-08-27 RX ORDER — GABAPENTIN 100 MG/1
100 CAPSULE ORAL EVERY MORNING
Status: DISCONTINUED | OUTPATIENT
Start: 2024-08-27 | End: 2024-08-29 | Stop reason: HOSPADM

## 2024-08-27 RX ORDER — PANTOPRAZOLE SODIUM 40 MG/1
40 TABLET, DELAYED RELEASE ORAL
Status: DISCONTINUED | OUTPATIENT
Start: 2024-08-27 | End: 2024-08-29 | Stop reason: HOSPADM

## 2024-08-27 RX ORDER — CITALOPRAM HYDROBROMIDE 20 MG/1
20 TABLET ORAL DAILY
Status: DISCONTINUED | OUTPATIENT
Start: 2024-08-27 | End: 2024-08-29 | Stop reason: HOSPADM

## 2024-08-27 RX ORDER — GABAPENTIN 300 MG/1
300 CAPSULE ORAL NIGHTLY
Status: DISCONTINUED | OUTPATIENT
Start: 2024-08-27 | End: 2024-08-29 | Stop reason: HOSPADM

## 2024-08-27 RX ORDER — ASPIRIN 81 MG/1
81 TABLET, CHEWABLE ORAL DAILY
Status: DISCONTINUED | OUTPATIENT
Start: 2024-08-27 | End: 2024-08-29 | Stop reason: HOSPADM

## 2024-08-27 RX ADMIN — METOPROLOL TARTRATE 37.5 MG: 25 TABLET, FILM COATED ORAL at 09:18

## 2024-08-27 RX ADMIN — BUSPIRONE HYDROCHLORIDE 10 MG: 10 TABLET ORAL at 09:19

## 2024-08-27 RX ADMIN — GABAPENTIN 100 MG: 100 CAPSULE ORAL at 06:08

## 2024-08-27 RX ADMIN — GABAPENTIN 300 MG: 300 CAPSULE ORAL at 20:33

## 2024-08-27 RX ADMIN — PANTOPRAZOLE SODIUM 40 MG: 40 TABLET, DELAYED RELEASE ORAL at 06:08

## 2024-08-27 RX ADMIN — FUROSEMIDE 20 MG: 20 TABLET ORAL at 09:18

## 2024-08-27 RX ADMIN — TAMSULOSIN HYDROCHLORIDE 0.4 MG: 0.4 CAPSULE ORAL at 20:34

## 2024-08-27 RX ADMIN — CITALOPRAM 20 MG: 20 TABLET, FILM COATED ORAL at 09:18

## 2024-08-27 RX ADMIN — BUSPIRONE HYDROCHLORIDE 10 MG: 10 TABLET ORAL at 20:34

## 2024-08-27 RX ADMIN — METOPROLOL TARTRATE 37.5 MG: 25 TABLET, FILM COATED ORAL at 20:34

## 2024-08-27 RX ADMIN — ATORVASTATIN CALCIUM 40 MG: 20 TABLET, FILM COATED ORAL at 20:34

## 2024-08-27 RX ADMIN — ASPIRIN 81 MG: 81 TABLET, CHEWABLE ORAL at 09:18

## 2024-08-27 NOTE — PLAN OF CARE
Goal Outcome Evaluation:  Plan of Care Reviewed With: patient           Outcome Evaluation: Pt is a 70 y/o M admitted with generalized weakness and multiple falls. Recently d/c'ed home from rehab after acute CVA. He reports requiring assist with ADLs at home and use of RW for mobility. Presents with impaired balance, endurance/act tolerance and RUE weakness. On BSC with nsg aide assisting with toileting. Completes TF with Shayne, takes a few steps with minAx2 for safety and balance. Quick to fatigue and is assisted back to EOB. Recommending d/c to rehab to promote return to PLOF and prevent further falls.

## 2024-08-27 NOTE — PROGRESS NOTES
Patient is current with [facility]; will follow for dc plans/home health needs.

## 2024-08-27 NOTE — DISCHARGE PLACEMENT REQUEST
"Omkar Marquez \"Danyel\" (69 y.o. Male)       Date of Birth   1955    Social Security Number       Address   [address] [place] [address]    Home Phone   [phone]    MRN   [id number]       Taoism   Anglican    Marital Status                               Admission Date   8/26/24    Admission Type   Emergency    Admitting Provider   Froylan Botello MD    Attending Provider   Froylan Botello MD    Department, Room/Bed   [address], [address]/1       Discharge Date       Discharge Disposition       Discharge Destination                                 Attending Provider: Froylan Botello MD    Allergies: Poison Ivy Extract    Isolation: None   Infection: None   Code Status: CPR    Ht: 177.8 cm (70\")   Wt: 98.5 kg (217 lb 2.5 oz)    Admission Cmt: None   Principal Problem: Generalized weakness [R53.1]                   Active Insurance as of 8/26/2024       Primary Coverage       Payor Plan Insurance Group Employer/Plan Group    MEDICARE MEDICARE A & B        Payor Plan Address Payor Plan Phone Number Payor Plan Fax Number Effective Dates    [address] [phone]  3/1/2021 - None Entered    [facility] [id number]         Subscriber Name Subscriber Birth Date Member ID       OMKAR MARQUEZ 1955 [id number]               Secondary Coverage       Payor Plan Insurance Group Employer/Plan Group    ANTHEM BLUE CROSS ANTHEM BLUE CROSS BLUE SHIELD PPO [id number]       Payor Plan Address Payor Plan Phone Number Payor Plan Fax Number Effective Dates    [address] [phone]  1/1/2023 - None Entered    [facility] [id number]         Subscriber Name Subscriber Birth Date Member ID       OMKAR MARQUEZ 1955 [id number]                     Emergency Contacts        (Rel.) Home Phone Work Phone Mobile Phone    Bhargavi Parker (Spouse) [phone] -- [phone]              "

## 2024-08-27 NOTE — CASE MANAGEMENT/SOCIAL WORK
Discharge Planning Assessment  [facility]     Patient Name: Flo Manzo  MRN: [id number]  Today's Date: 8/27/2024    Admit Date: 8/26/2024    Plan: Pending SNF referrals   Discharge Needs Assessment       Row Name 08/27/24 1128       Living Environment    People in Home child(roland), adult;spouse    Current Living Arrangements home    Potentially Unsafe Housing Conditions none    Primary Care Provided by self    Provides Primary Care For no one    Family Caregiver if Needed spouse    Quality of Family Relationships involved;helpful;supportive       Resource/Environmental Concerns    Resource/Environmental Concerns none       Transition Planning    Patient/Family Anticipates Transition to inpatient rehabilitation facility    Patient/Family Anticipated Services at Transition skilled nursing       Discharge Needs Assessment    Current Outpatient/Agency/Support Group skilled nursing facility    Equipment Currently Used at Home walker, rolling;grab bar;shower chair    Concerns to be Addressed no discharge needs identified;denies needs/concerns at this time    Equipment Needed After Discharge none    Outpatient/Agency/Support Group Needs skilled nursing facility    Discharge Facility/Level of Care Needs nursing facility, skilled    Provided Post Acute Provider List? Yes    Post Acute Provider List Home Health;Nursing Home    Delivered To Patient    Method of Delivery In person                   Discharge Plan       Row Name 08/27/24 1129       Plan    Plan Pending SNF referrals    Plan Comments CCP met with patient at bedside. CCP role explained and discharge planning discussed. Face sheet verified and SUAREZ noted. Patient's PCP is Karen Jiménez. Patient lives with his spouse and step-son (48 years old). Patient has a ramp to the entrance of his home. Patient's bedroom and bathroom are on the main level. Patient has grab bars and shower chair present in his bathroom. Patient uses a walker for mobility. Patient is  current with [facility]. Patient states he was recently at [facility] rehab. PAtient states since he has been home, he has had a lot of weakness. Patient feels like he does need SNF prior to returning home. Patient would like referral to [facility] and was agreeable to CCP placing referrals to facilities with 3 stars and higher ratings according to Medicare.gov. Per chart review; patient should have qualifying stay from his previous admission 8/5/24-8/9/24. CCP will follow for pending SNF referrals. Sara YIP                  Continued Care and Services - Admitted Since 8/26/2024    No active coordination exists for this encounter.       Selected Continued Care - Prior Encounters Includes continued care and service providers with selected services from prior encounters from 5/28/2024 to 8/27/2024      Discharged on 8/9/2024 Admission date: 8/5/2024 - Discharge disposition: Rehab Facility or Unit (DC - External)      Destination       Service Provider Selected Services Address Phone Fax Patient Preferred    [facility] [address] [id number] [phone] --                          Expected Discharge Date and Time       Expected Discharge Date Expected Discharge Time    Aug 28, 2024            Demographic Summary       Row Name 08/27/24 1127       General Information    Admission Type observation    Arrived From emergency department    Required Notices Provided Observation Status Notice    Referral Source admission list    Reason for Consult discharge planning    Preferred Language English                   Functional Status       Row Name 08/27/24 1127       Functional Status    Usual Activity Tolerance good    Current Activity Tolerance moderate       Functional Status, IADL    Medications assistive equipment    Meal Preparation assistive equipment    Housekeeping assistive equipment    Laundry assistive  equipment    Shopping assistive equipment                   Psychosocial    No documentation.                  Abuse/Neglect    No documentation.                  Legal    No documentation.                  Substance Abuse    No documentation.                  Patient Forms    No documentation.                     PHI Marie

## 2024-08-27 NOTE — PLAN OF CARE
Goal Outcome Evaluation:           Progress: improving  Outcome Evaluation: Pt up to bedside commode with 1 x assist. Waiting for neuro and cardiology to consult. No c/o pain. Pt agreeable with treatment plan and interventions.Potential MRI in the future; working up patient for watchman procedure per cardiology.

## 2024-08-27 NOTE — ED NOTES
Nursing report ED to floor  Flo Manzo  69 y.o.  male    HPI :  HPI (Adult)  Stated Reason for Visit: extremity weakness  History Obtained From: EMS    Chief Complaint  Chief Complaint   Patient presents with    Extremity Weakness       Admitting doctor:   Thomas Burr DO    Admitting diagnosis:   The primary encounter diagnosis was Generalized weakness. Diagnoses of Frequent falls, Right leg weakness, and History of CVA (cerebrovascular accident) were also pertinent to this visit.    Code status:   Current Code Status       Date Active Code Status Order ID Comments User Context       Prior            Allergies:   Poison ivy extract    Isolation:   No active isolations    Intake and Output  No intake or output data in the 24 hours ending 08/26/24 2247    Weight:   There were no vitals filed for this visit.    Most recent vitals:   Vitals:    08/26/24 1720 08/26/24 1742 08/26/24 1901 08/26/24 2043   BP: 150/96 120/93 101/91    Pulse: 83 90 87 84   Resp: 20      Temp: 98.6 °F (37 °C)      TempSrc: Tympanic      SpO2: 95% 94% 93% 96%       Active LDAs/IV Access:   Lines, Drains & Airways       Active LDAs       Name Placement date Placement time Site Days    Peripheral IV 08/26/24 1746 Left Antecubital 08/26/24 1746  Antecubital  less than 1                    Labs (abnormal labs have a star):   Labs Reviewed   CBC WITH AUTO DIFFERENTIAL - Abnormal; Notable for the following components:       Result Value    Lymphocyte % 17.3 (*)     All other components within normal limits   URINALYSIS W/ MICROSCOPIC IF INDICATED (NO CULTURE) - Abnormal; Notable for the following components:    Ketones, UA 15 mg/dL (1+) (*)     All other components within normal limits    Narrative:     Urine microscopic not indicated.   BASIC METABOLIC PANEL - Normal    Narrative:     GFR Normal >60  Chronic Kidney Disease <60  Kidney Failure <15     CBC AND DIFFERENTIAL    Narrative:     The following orders were created for panel order CBC  & Differential.  Procedure                               Abnormality         Status                     ---------                               -----------         ------                     CBC Auto Differential[[id number]]        Abnormal            Final result                 Please view results for these tests on the individual orders.       EKG:   ECG 12 Lead Other; weakness   Preliminary Result   HEART RATE=89  bpm   RR Htbzkbgo=325  ms   AL Interval=  ms   P Horizontal Axis=  deg   P Front Axis=  deg   QRSD Dtntxwky=889  ms   QT Ihghcokx=005  ms   OBgP=397  ms   QRS Axis=-41  deg   T Wave Axis=-6  deg   - ABNORMAL ECG -   Afib/flut and V-paced complexes   No further rhythm analysis attempted due to paced rhythm   Incomplete left bundle branch block   Inferior infarct, old   Anterior Q waves, possibly due to ILBBB   Date and Time of Study:2024-08-26 19:51:34          Meds given in ED:   Medications - No data to display    Imaging results:  No radiology results for the last day    Ambulatory status:       Social issues:   Social History     Socioeconomic History    Marital status:    Tobacco Use    Smoking status: Never     Passive exposure: Past    Smokeless tobacco: Never   Vaping Use    Vaping status: Never Used   Substance and Sexual Activity    Alcohol use: Not Currently     Comment: social, maybe a drink a month, not since stroke    Drug use: No     Comment: previously smoked marijuana     Sexual activity: Defer       Peripheral Neurovascular  Peripheral Neurovascular (Adult)  Peripheral Neurovascular WDL: WDL    Neuro Cognitive  Neuro Cognitive (Adult)  Cognitive/Neuro/Behavioral WDL: WDL    Learning       Respiratory  Respiratory WDL  Respiratory WDL: WDL    Abdominal Pain       Pain Assessments  Pain (Adult)  (0-10) Pain Rating: Rest: 0    NIH Stroke Scale       Ying Vines RN  08/26/24 22:47 EDT

## 2024-08-27 NOTE — CONSULTS
[facility]  Cardiology Consult Note    Patient Identification:  Name: Flo Manzo  Age: 69 y.o.  Sex: male  :  1955  MRN: [id number]             Requesting Physician: Dr. Botello    Reason for Consultation / Chief Complaint: Recurrent neurosymptoms in patient with A-fib and recent CVA, watchman's device discussed last admission as patient off anticoagulation due to cerebral amyloid angiopathy      History of Present Illness:   Flo Manzo is a 69-year-old male who has a here history of atrial fibrillation (no anticoagulation but working on watchman's device), hypertension, CVA, GÓMEZ, SSS with Biotronik pacemaker, peripheral neuropathy, hypertension, sleep apnea, and history of stroke.  Who presented to [facility] with left-sided weakness, acute trouble with balance, and facial droop.    On admission chest x-ray showed moderate cardiomyopathy.  Small area of atelectasis/infiltrate of left lung.  Mild prominence of pulmonary vasculature suggesting mild congestive changes.  CT showed no evidence of new infarct or intracranial hemorrhage.  Thyroid goiter is noted.  See full report as above.  MRI showed several 9 mm focus of diffuse weighted hyperintensity within the left paracentral level that is superstitious for subacute infarct which could be emboli Atik tic or lacunar in etiology.  Moderate to severe chronic small vessel ischemic phenomena.  Additional areas of chronic chinyere hemorrhage.  Identified within the medial aspect of the left cerebral hemisphere.  The inferior lateral aspect of the left temporal lobe, and medial aspect of the left frontal lobe.    2024 echo: EF 51 to 55%, LV diastolic function was indeterminate, LAC is mild to moderately dilated, mild aortic valve stenosis, RV CSP is normal.    2019 cardiac cath: Normal coronary arteries.     Biotronik single-chamber pacemaker implantation.     stress test: Myocardial perfusion imaging indicated a  normal myocardial perfusion study with no evidence of ischemia.    past Medical History:  Past Medical History:   Diagnosis Date    Arthritis     Atrial fibrillation with RVR 01/24/2019    Colon polyps 01/04/2018    Hepatic flexure: tubular adenoma, only low-grade dysplasia; splenic flexure: tubular adenoma, only low-grade dysplasia; sigmoid colon: tubular adenoma, multiple fragments only low-grade dysplasia    Depression     Heart murmur     Hemorrhagic stroke 01/29/2019    Hypertension     New onset atrial fibrillation (CMS/HCC) - RVR 01/24/2019    GÓMEZ (obstructive sleep apnea) 06/06/2019    Home sleep study with moderate severity GÓMEZ, AHI 20 events per hour.  Sleep-related hypoxia with low O2 saturation 84% for 14 minutes.    Peripheral neuropathy     Sleep apnea     previously diagnosed with sleep apnea, had T&A done and it has since resolved     Stroke     Uncontrolled hypertension     Ventral hernia      Past Surgical History:  Past Surgical History:   Procedure Laterality Date    APPENDECTOMY N/A 1972    BACK SURGERY      BLADDER STIMULATOR IMPLANT L LOWER BACK    CARDIAC CATHETERIZATION N/A 07/20/2004    Cath left ventriculography, coronary angiography and left heart catheterization, Normal results-Dr. Fierro     CARDIAC CATHETERIZATION N/A 1/29/2019    Procedure: Left Heart Cath;  Surgeon: Eren Bruce MD;  Location: [facility] CATH INVASIVE LOCATION;  Service: Cardiology    CARDIAC CATHETERIZATION N/A 1/29/2019    Procedure: Left ventriculography;  Surgeon: Eren Bruce MD;  Location[facility] CATH INVASIVE LOC[facility];  Service: Cardiology    CARDIAC CATHETERIZATION N/A 1/29/2019    Procedure: Coronary angiography;  Surgeon: Eren Bruce MD;  [facility];  Service: Cardiology    CARDIAC ELECTROPHYSIOLOGY PROCEDURE N/A 3/4/2022    Procedure: Pacemaker SC new BIOTRONIK;  Surgeon: Eren Bruce MD;  Location: [facility] CATH INVASIVE LOCATION;  Service:  Cardiology;  Laterality: N/A;    CARPAL TUNNEL RELEASE Right 2013    CARPAL TUNNEL RELEASE Left     COLONOSCOPY N/A 1/4/2018    Procedure: COLONOSCOPY with cold polypectomy and hot snare polypectomy;  Surgeon: Ten Solitario MD;  Location: [facility] ENDOSCOPY;  Service:     COLONOSCOPY N/A 4/25/2024    Procedure: COLONOSCOPY INTO CECUM;  Surgeon: Ten Solitario MD;  Location: [facility] ENDOSCOPY;  Service: General;  Laterality: N/A;  PRE: PERSONAL H/O COLON POLYP  /  POST: POOR PREP OTHERWISE NORMAL    EYE LENS IMPLANT SECONDARY Bilateral 2007, 2008    KNEE CARTILAGE SURGERY Right 1979    TONSILLECTOMY AND ADENOIDECTOMY Bilateral 2005    TOTAL KNEE ARTHROPLASTY Left 03/14/2016    Left total knee arthroplasty with Amberly component, size 11 femur, G tibial baseplate with a 10 polyethylene insert and a 38 patellar button-Dr. Pablo Hairston    TOTAL KNEE ARTHROPLASTY Right 03/02/2015    Right total knee arthroplasty with Amberly component size G femur, 11 tibial base plate with an 11 polyethylene insert and a 38 patellar button-Dr. Pablo Hairston    UVULOPALATOPHARYNGOPLASTY N/A 2005    part of soft palate, and uvula    VENTRAL HERNIA REPAIR N/A 1/23/2018    Procedure: VENTRAL HERNIA REPAIR LAPAROSCOPIC WITH DAVINCI ROBOT AND MESH;  Surgeon: Ten Solitario MD;  Location: [facility] MAIN OR;  Service:       Allergies:  Allergies   Allergen Reactions    Poison Ivy Extract Hives, Itching and Rash     ITCHY BLISTERS     Home Meds:  Medications Prior to Admission   Medication Sig Dispense Refill Last Dose    acetaminophen (TYLENOL) 325 MG tablet Take 2 tablets by mouth Every 4 (Four) Hours As Needed for Mild Pain . 120 tablet 3 8/25/2024    albuterol sulfate  (90 Base) MCG/ACT inhaler Inhale 2 puffs Every 4 (Four) Hours As Needed for Wheezing. 18 g 3 8/25/2024    aspirin 81 MG chewable tablet Chew 1 tablet Daily.   8/25/2024    atorvastatin (LIPITOR) 40 MG tablet TAKE 1 TABLET BY MOUTH NIGHTLY 90 tablet 1 8/25/2024     busPIRone (BUSPAR) 10 MG tablet TAKE 1 TABLET BY MOUTH TWICE DAILY 60 tablet 5 8/25/2024    citalopram (CeleXA) 20 MG tablet Take 1 tablet by mouth Daily. (Patient taking differently: Take 1.5 tablets by mouth Daily. PT TAKING 1.5 TABLETS DAILY) 135 tablet 1 8/25/2024    Diclofenac Sodium (Voltaren) 1 % gel gel Apply 4 g topically to the appropriate area as directed 4 (Four) Times a Day. 50 g 0 8/25/2024    furosemide (LASIX) 20 MG tablet Take 1 tablet by mouth Daily for 180 days. 90 tablet 1 8/25/2024    gabapentin (NEURONTIN) 100 MG capsule TAKE ONE (1) CAPSULE BY MOUTH TWICE DAILY (Patient taking differently: Take 1 capsule by mouth Every Morning.) 60 capsule 0 8/25/2024    gabapentin (NEURONTIN) 300 MG capsule Take 1 capsule by mouth Every Night. 90 capsule 0 8/25/2024    guaiFENesin (MUCINEX) 600 MG 12 hr tablet Take 2 tablets by mouth Every 12 (Twelve) Hours.   8/25/2024    metoprolol tartrate (LOPRESSOR) 25 MG tablet TAKE 1 & 1/2 TABLETS BY MOUTH TWICE DAILY 90 tablet 1 8/25/2024    omeprazole (priLOSEC) 20 MG capsule TAKE 1 CAPSULE BY MOUTH DAILY 90 capsule 1 8/25/2024    tamsulosin (FLOMAX) 0.4 MG capsule 24 hr capsule TAKE 1 CAPSULE BY MOUTH NIGHTLY 30 capsule 5 8/25/2024     Current Meds:   [unfilled]  Social History:   Social History     Tobacco Use    Smoking status: Never     Passive exposure: Past    Smokeless tobacco: Never   Substance Use Topics    Alcohol use: Not Currently     Comment: social, maybe a drink a month, not since stroke      Family History:  Family History   Problem Relation Age of Onset    Hypertension Mother     Kidney failure Mother     Coronary artery disease Father     Lung cancer Father         positive smoker    Heart attack Father     Breast cancer Sister 60    Prostate cancer Brother     Colon polyps Brother     Kidney nephrosis Brother     Malig Hyperthermia Neg Hx         Review of Systems    Constitutional: No weakness,fatigue, fever, rigors, chills   Eyes: No vision changes,  "eye pain   ENT/oropharynx: No difficulty swallowing, sore throat, epistaxis, changes in hearing   Cardiovascular: No chest pain, chest tightness, palpitations, paroxysmal nocturnal dyspnea, orthopnea, diaphoresis, dizziness / syncopal episode   Respiratory: No shortness of breath, dyspnea on exertion, cough, wheezing hemoptysis   Gastrointestinal: No abdominal pain, nausea, vomiting, diarrhea, bloody stools   Genitourinary: No hematuria, dysuria   Neurological: No headache, tremors, numbness,  one-sided weakness    Musculoskeletal: No cramps, myalgias,  joint pain, joint swelling   Integument: No rash, edema           /100 (BP Location: Right arm, Patient Position: Lying)   Pulse 75   Temp 98.1 °F (36.7 °C) (Oral)   Resp 18   Ht 177.8 cm (70\")   Wt 98.5 kg (217 lb 2.5 oz)   SpO2 95%   BMI 31.16 kg/m²   General appearance: No acute changes   Neck: Trachea midline; NECK, supple, no thyromegaly or lymphadenopathy   Lungs: Normal size and shape, normal breath sounds, equal distribution of air, no rales and rhonchi   CV: S1-S2 regular, no murmurs, no rub, no gallop   Abdomen: Soft, nontender; no masses , no abnormal abdominal sounds   Extremities: No deformity , normal color , no peripheral edema   Skin: Normal temperature, turgor and texture; no rash, ulcers                  Cardiographics  EC/11/24  Echocardiogram:   Interpretation Summary         Left ventricular ejection fraction appears to be 51 - 55%.    Left ventricular diastolic function was indeterminate.    The left atrial cavity is mild to moderately dilated.    Mild aortic valve stenosis is present.    Estimated right ventricular systolic pressure from tricuspid regurgitation is normal (<35 mmHg).     2023  Conclusion       Successful single-chamber pacemaker implantation          Interpretation Summary       Findings consistent with a normal ECG stress test.  Left ventricular ejection fraction is normal. (Calculated EF = " 54%).  Myocardial perfusion imaging indicates a normal myocardial perfusion study with no evidence of ischemia.  Impressions are consistent with a low risk study.     Asymptomatic for chest pain. ECG is negative for ischemia.   Ectopy: none  Afib through entirety of stress test  Hypotensive BP after lexiscan administration 104/66. BP at 1min recovery 90/60, BP at 2min recovery 100/74   Supervised by:  Elaine BURRELL.    2019  Impression:      Normal coronary arteries  Normal LV gram     Recommendations:      Medical management            I sincerely appreciate the opportunity to participate in your patient's care. Please feel free to contact me anytime if I can be of assistance in this or any other way.     Eren Bruce MD  2/1/2019  9:20 AM   Imaging  Chest X-ray:    CT    Study Result    Narrative & Impression   CT HEAD WO CONTRAST-     HISTORY:  right leg weakness/recent cva     COMPARISON: CT head 8/7/2024     FINDINGS: Encephalomalacia is appreciated involving the left frontal  lobe posteriorly and medially including involvement of the cingulate  gyrus, present previously. There is no evidence of intracranial  hemorrhage, hydrocephalus or of acute infarction. Further evaluation  could be performed with MRI examination of the brain.                 Radiation dose reduction techniques were utilized, including automated  exposure modulation based on body size.     This report was finalized on 8/26/2024 8:25 PM by Dr. Kristopher Henriquez M.D  on Workstation: [id number]       MRI  IMPRESSION:     There is a subtle 9 mm focus of diffusion-weighted hyperintensity within  the left paracentral lobule that is suspicious for a subacute infarct  which could be embolic or lacunar in etiology. This finding was  discussed with the nurse caring for this patient, Fidel Siddiqui RN, on  08/06/2024 at approximately 2:35 p.m. He was asked to convey these  findings to the physician caring for this patient.     There are  moderate-to-severe chronic small vessel ischemic phenomena  which have progressed in the interval since the prior exam.  Additionally, extensive old lacunar disease is identified which  demonstrates mild interval progression.     There are findings compatible with innumerable chronic microhemorrhages  noted both supratentorially and infratentorially in a pattern suggestive  of microhemorrhages related to both amyloid angiopathy as well as  hypertension. Please correlate with clinical data.     Additional areas of chronic chinyere hemorrhage are identified within the  medial aspect of the left cerebellar hemisphere, the inferolateral  aspect of the left temporal lobe, and the medial aspect of the left  frontal lobe. Interval evolutionary changes are seen at the site of the  cerebellar hemorrhage whereas the additional foci of chronic hemorrhage  are new since the prior exam.        This report was finalized on 8/6/2024 3:02 PM by Dr. Dawood Hand M.D  on Workstation: [id number]    Lab Review           Results from last 7 days   Lab Units 08/26/24  1746   SODIUM mmol/L 142   POTASSIUM mmol/L 3.9   BUN mg/dL 16   CREATININE mg/dL 0.85   CALCIUM mg/dL 9.6     @[id number]@  Results from last 7 days   Lab Units 08/26/24  1746   WBC 10*3/mm3 6.42   HEMOGLOBIN g/dL 13.4   HEMATOCRIT % 41.9   PLATELETS 10*3/mm3 237           Intake/Output Summary (Last 24 hours) at 8/27/2024 1027  Last data filed at 8/27/2024 0525  Gross per 24 hour   Intake 0 ml   Output --   Net 0 ml         Assessment:  Generalized weakness  History of recent CVA  SSS with pacemaker  Persistent A-fib no anticoagulation was admitted with due to cerebral amyloid angiopathy  Chronic diastolic congestive heart failure  GÓMEZ on CPAP hypertension      Recommendations / Plan:   Flo Manzo is a 69-year-old male who comes in consult due to atrial fibrillation and workup for watchman's device.  He presented to [facility] with generalized  weakness, left-sided weakness, acute trouble with balance and facial droop.  Neurology consulted.  On 8/9/2024 Dr. Ewing evaluated patient and plans to undergo watchman's device has been initiated.  I have reached out to their team to see where they are in the process.  Further recommendations once information obtained.      Discussed with Taya Hopkins on the TAVR team and she we will have Dr. ewing follow-up with patient while he is here and possibly do CTA.    Vira Lynch, APRN  8/27/2024, 10:09 EDT  69-year-old male with chronic atrial fibrillation with a recent hemorrhage intracranial with stroke has been admitted with questionable neurological symptoms, neurologist has been consulted to rule out new stroke, will have watchman team evaluate for possible watchman placement  Clinically shows no angina pectoris or congestive heart failure  Eren Bruce MD      EMR Dragon/Transcription:   Dictated utilizing Dragon dictation

## 2024-08-27 NOTE — THERAPY EVALUATION
Patient Name: Flo Manzo  : 1955    MRN: [id number]                              Today's Date: 2024       Admit Date: 2024    Visit Dx:     ICD-10-CM ICD-9-CM   1. Generalized weakness  R53.1 780.79   2. Frequent falls  R29.6 V15.88   3. Right leg weakness  R29.898 729.89   4. History of CVA (cerebrovascular accident)  Z86.73 V12.54     Patient Active Problem List   Diagnosis    Umbilical hernia without obstruction and without gangrene    Essential hypertension    Arthritis of left knee    Depression    Obesity (BMI 30-39.9)    Atrial fibrillation with RVR    Substernal thyroid goiter    CHF (congestive heart failure)    Diastolic CHF, chronic    Hemorrhagic stroke    GÓMEZ on auto CPAP    Hypersomnia due to medical condition    Sleep-related hypoxia    Chronic atrial fibrillation    Vertigo    Aortic stenosis, mild    Sinus pause    Pacemaker    Chronic anticoagulation    Generalized weakness    Prediabetes    Pure hypercholesterolemia    Pre-operative cardiovascular examination    Peripheral neuropathy    History of colon polyps    Dizziness    Fever     Past Medical History:   Diagnosis Date    Arthritis     Atrial fibrillation with RVR 2019    Colon polyps 2018    Hepatic flexure: tubular adenoma, only low-grade dysplasia; splenic flexure: tubular adenoma, only low-grade dysplasia; sigmoid colon: tubular adenoma, multiple fragments only low-grade dysplasia    Depression     Heart murmur     Hemorrhagic stroke 2019    Hypertension     New onset atrial fibrillation (CMS/HCC) - RVR 2019    GÓMEZ (obstructive sleep apnea) 2019    Home sleep study with moderate severity GÓMEZ, AHI 20 events per hour.  Sleep-related hypoxia with low O2 saturation 84% for 14 minutes.    Peripheral neuropathy     Sleep apnea     previously diagnosed with sleep apnea, had T&A done and it has since resolved     Stroke     Uncontrolled hypertension     Ventral hernia      Past Surgical  History:   Procedure Laterality Date    APPENDECTOMY N/A 1972    BACK SURGERY      BLADDER STIMULATOR IMPLANT L LOWER BACK    CARDIAC CATHETERIZATION N/A 07/20/2004    Cath left ventriculography, coronary angiography and left heart catheterization, Normal results-Dr. Vadim Mancuso    CARDIAC CATHETERIZATION N/A 1/29/2019    Procedure: Left Heart Cath;  Surgeon: Eren Bruce MD;  Location: [facility] CATH INVASIVE LOCATION;  Service: Cardiology    CARDIAC CATHETERIZATION N/A 1/29/2019    Procedure: Left ventriculography;  Surgeon: Eren Bruce MD;  Location:  [facility] CATH INVASIVE LOCATION;  Service: Cardiology    CARDIAC CATHETERIZATION N/A 1/29/2019    Procedure: Coronary angiography;  Surgeon: Eren Bruce MD;  Location:  [facility] CATH INVASIVE LOCATION;  Service: Cardiology    CARDIAC ELECTROPHYSIOLOGY PROCEDURE N/A 3/4/2022    Procedure: Pacemaker SC new BIOTRONIK;  Surgeon: Eren Bruce MD;  Location: [facility] CATH INVASIVE LOCATION;  Service: Cardiology;  Laterality: N/A;    CARPAL TUNNEL RELEASE Right 2013    CARPAL TUNNEL RELEASE Left     COLONOSCOPY N/A 1/4/2018    Procedure: COLONOSCOPY with cold polypectomy and hot snare polypectomy;  Surgeon: Ten Solitario MD;  Location: [facility] ENDOSCOPY;  Service:     COLONOSCOPY N/A 4/25/2024    Procedure: COLONOSCOPY INTO CECUM;  Surgeon: Ten Solitario MD;  Location: [facility] ENDOSCOPY;  Service: General;  Laterality: N/A;  PRE: PERSONAL H/O COLON POLYP  /  POST: POOR PREP OTHERWISE NORMAL    EYE LENS IMPLANT SECONDARY Bilateral 2007, 2008    KNEE CARTILAGE SURGERY Right 1979    TONSILLECTOMY AND ADENOIDECTOMY Bilateral 2005    TOTAL KNEE ARTHROPLASTY Left 03/14/2016    Left total knee arthroplasty with Amberly component, size 11 femur, G tibial baseplate with a 10 polyethylene insert and a 38 patellar button-Dr. Pablo Hairston    TOTAL KNEE ARTHROPLASTY Right 03/02/2015    Right total knee arthroplasty with Amberly component size G femur,  11 tibial base plate with an 11 polyethylene insert and a 38 patellar button-Dr. Pablo Hairston    UVULOPALATOPHARYNGOPLASTY N/A 2005    part of soft palate, and uvula    VENTRAL HERNIA REPAIR N/A 1/23/2018    Procedure: VENTRAL HERNIA REPAIR LAPAROSCOPIC WITH DAVINCI ROBOT AND MESH;  Surgeon: Ten Solitario MD;  Location: [facility] OR;  Service:       General Information       Row Name 08/27/24 1106          Physical Therapy Time and Intention    Document Type evaluation  Pt. admitted with general weakness.  Recent CVA and rehab stay.  -MS     Mode of Treatment physical therapy;co-treatment;occupational therapy  Activity limitation due to fatigue/weakness; Working on functional balance and strengthening while performing ADL's  -MS       Row Name 08/27/24 1106          General Information    Patient Profile Reviewed yes  -MS     Prior Level of Function --  Use of Rwx for ambulation  -MS     Existing Precautions/Restrictions fall   Exit alarm  -MS     Barriers to Rehab none identified  -MS       Row Name 08/27/24 1106          Cognition    Orientation Status (Cognition) oriented x 3  -MS       Row Name 08/27/24 1106          Safety Issues, Functional Mobility    Comment, Safety Issues/Impairments (Mobility) Gait belt used for safety.  -MS               User Key  (r) = Recorded By, (t) = Taken By, (c) = Cosigned By      Initials Name Provider Type    Pablo Boone, PT Physical Therapist                   Mobility       Row Name 08/27/24 1108          Bed Mobility    Bed Mobility supine-sit;sit-supine  -MS     Sit-Supine [place] (Bed Mobility) minimum assist (75% patient effort)  -MS       Row Name 08/27/24 1108          Sit-Stand Transfer    Sit-Stand [place] (Transfers) minimum assist (75% patient effort)  -MS     Assistive Device (Sit-Stand Transfers) walker, front-wheeled  -MS       Row Name 08/27/24 1108          Gait/Stairs (Locomotion)    [place] Level (Gait) minimum assist (75% patient  effort)  -MS     Assistive Device (Gait) walker, front-wheeled  -MS     Distance in Feet (Gait) 10  FW/BW at bedside  -MS     Deviations/Abnormal Patterns (Gait) davon decreased  -MS     Bilateral Gait Deviations forward flexed posture  -MS     Comment, (Gait/Stairs) Verbal/tactile cues given for posture correction and Rwx guidance.  -MS               User Key  (r) = Recorded By, (t) = Taken By, (c) = Cosigned By      Initials Name Provider Type    Pablo Boone SAUL, PT Physical Therapist                   Obj/Interventions       Row Name 08/27/24 1108          Range of Motion Comprehensive    Comment, General Range of Motion BLE (WFL's)  -MS       Row Name 08/27/24 1108          Strength Comprehensive (MMT)    Comment, General Manual Muscle Testing (MMT) Assessment BLE (3+/5)  -MS               User Key  (r) = Recorded By, (t) = Taken By, (c) = Cosigned By      Initials Name Provider Type    Pablo Boone SAUL, PT Physical Therapist                   Goals/Plan       Row Name 08/27/24 1109          Bed Mobility Goal 1 (PT)    Activity/Assistive Device (Bed Mobility Goal 1, PT) bed mobility activities, all  -MS     [place] Level/Cues Needed (Bed Mobility Goal 1, PT) standby assist  -MS     Time Frame (Bed Mobility Goal 1, PT) long term goal (LTG);1 week  -MS       Row Name 08/27/24 1109          Transfer Goal 1 (PT)    Activity/Assistive Device (Transfer Goal 1, PT) transfers, all;walker, rolling  -MS     [place] Level/Cues Needed (Transfer Goal 1, PT) standby assist  -MS     Time Frame (Transfer Goal 1, PT) long term goal (LTG);1 week  -MS       Row Name 08/27/24 1109          Gait Training Goal 1 (PT)    Activity/Assistive Device (Gait Training Goal 1, PT) gait (walking locomotion);walker, rolling  -MS     [place] Level (Gait Training Goal 1, PT) standby assist  -MS     Distance (Gait Training Goal 1, PT) 50 feet  -MS     Time Frame (Gait Training Goal 1, PT) long term goal (LTG);1 week  -MS        Row Name 08/27/24 1109          Therapy Assessment/Plan (PT)    Planned Therapy Interventions (PT) balance training;bed mobility training;gait training;home exercise program;patient/family education;postural re-education;transfer training;strengthening  -MS               User Key  (r) = Recorded By, (t) = Taken By, (c) = Cosigned By      Initials Name Provider Type    Pablo Boone, PT Physical Therapist                   Clinical Impression       Row Name 08/27/24 1109          Pain    Pretreatment Pain Rating 0/10 - no pain  -MS     Posttreatment Pain Rating 0/10 - no pain  -MS       Row Name 08/27/24 1109          Plan of Care Review    Plan of Care Reviewed With patient  -MS       Row Name 08/27/24 1109          Therapy Assessment/Plan (PT)    Rehab Potential (PT) good, to achieve stated therapy goals  -MS     Criteria for Skilled Interventions Met (PT) skilled treatment is necessary  -MS     Therapy Frequency (PT) 6 times/wk  -MS       Row Name 08/27/24 1109          Positioning and Restraints    Pre-Treatment Position bedside commode  -MS     Post Treatment Position bed  -MS     In Bed notified nsg;supine;call light within reach;encouraged to call for assist;exit alarm on  All lines intact.  -MS               User Key  (r) = Recorded By, (t) = Taken By, (c) = Cosigned By      Initials Name Provider Type    Pablo Boone, PT Physical Therapist                   Outcome Measures       Row Name 08/27/24 1110 08/27/24 0918       How much help from another person do you currently need...    Turning from your back to your side while in flat bed without using bedrails? 3  -MS 4  -AS    Moving from lying on back to sitting on the side of a flat bed without bedrails? 3  -MS 3  -AS    Moving to and from a bed to a chair (including a wheelchair)? 3  -MS 2  -AS    Standing up from a chair using your arms (e.g., wheelchair, bedside chair)? 3  -MS 2  -AS    Climbing 3-5 steps with a railing? 2  -MS 2  -AS     To walk in hospital room? 3  -MS 2  -AS    AM-PAC 6 Clicks Score (PT) 17  -MS 15  -AS    Highest Level of Mobility Goal 5 --> Static standing  -MS 4 --> Transfer to chair/commode  -AS      Row Name 08/26/24 2341          How much help from another person do you currently need...    Turning from your back to your side while in flat bed without using bedrails? 4  -AH     Moving from lying on back to sitting on the side of a flat bed without bedrails? 3  -AH     Moving to and from a bed to a chair (including a wheelchair)? 2  -AH     Standing up from a chair using your arms (e.g., wheelchair, bedside chair)? 2  -AH     Climbing 3-5 steps with a railing? 2  -AH     To walk in hospital room? 2  -AH     AM-PAC 6 Clicks Score (PT) 15  -AH     Highest Level of Mobility Goal 4 --> Transfer to chair/commode  -AH       Row Name 08/27/24 1110          Functional Assessment    Outcome Measure Options AM-PAC 6 Clicks Basic Mobility (PT)  -MS               User Key  (r) = Recorded By, (t) = Taken By, (c) = Cosigned By      Initials Name Provider Type    MS Reji Pablo SAUL, PT Physical Therapist    Kadi Vidal RN Registered Nurse    AS Chema Lei, RN Registered Nurse                                 Physical Therapy Education       Title: PT OT SLP Therapies (In Progress)       Topic: Physical Therapy (In Progress)       Point: Mobility training (Done)       Learning Progress Summary             Patient Acceptance, E,D, VU,NR by MS at 8/27/2024 1110                         Point: Home exercise program (Not Started)       Learner Progress:  Not documented in this visit.              Point: Body mechanics (Done)       Learning Progress Summary             Patient Acceptance, E,D, VU,NR by MS at 8/27/2024 1110                         Point: Precautions (Done)       Learning Progress Summary             Patient Acceptance, E,D, VU,NR by MS at 8/27/2024 1110                                         User Key       Initials  Effective Dates Name Provider Type Discipline    MS 06/16/21 -  Pablo Mendoza PT Physical Therapist PT                  PT Recommendation and Plan  Planned Therapy Interventions (PT): balance training, bed mobility training, gait training, home exercise program, patient/family education, postural re-education, transfer training, strengthening  Plan of Care Reviewed With: patient  Outcome Evaluation: Pt. is a 69 year old Male admitted to the hospital with generalized weakness (recent h/o CVA and rehab stay).  Pt. reports that prior to admission he was using a Rwx for ambulation.  Pt. currently presents with decreased strength, decreased balance, and decreased tolerance to functional activity. This AM, pt. able to ambulate 10 feet (FW/BW), Min. assist x 1, with use of Rwx. Pt. requires Min. assist x 1 for bed mobility and Min. assist x 1 for sit <-> stand transfers.  Verbal/tactile cues given during ambulation for posture correction and Rwx guidance. Pt. will benefit from skilled inpt. P.T. to address his functional deficits and to assist pt. in regaining his maximum level of independence with functional mobility.     Time Calculation:         PT Charges       Row Name 08/27/24 1114             Time Calculation    Start Time 0907  -MS      Stop Time 0920  -MS      Time Calculation (min) 13 min  -MS      PT Received On 08/27/24  -MS      PT - Next Appointment 08/28/24  -MS      PT Goal Re-Cert Due Date 09/03/24  -MS         Time Calculation- PT    Total Timed Code Minutes- PT 12 minute(s)  -MS                User Key  (r) = Recorded By, (t) = Taken By, (c) = Cosigned By      Initials Name Provider Type    MS Pablo Mendoza PT Physical Therapist                  Therapy Charges for Today       Code Description Service Date Service Provider Modifiers Qty    84994535172  PT EVAL MOD COMPLEXITY 2 8/27/2024 Pablo Mendoza, PT GP 1    [id number]  PT THERAPEUTIC ACT EA 15 MIN 8/27/2024 Pablo Mendoza PT  GP 1            PT G-Codes  Outcome Measure Options: AM-PAC 6 Clicks Basic Mobility (PT)  AM-PAC 6 Clicks Score (PT): 17  PT Discharge Summary  Anticipated Discharge Disposition (PT): skilled nursing facility, inpatient rehabilitation facility (Pending pt. progress)    Pablo Mendoza, PT  8/27/2024

## 2024-08-27 NOTE — CONSULTS
Neurology Consult Note    Consult Date: 8/27/2024    Referring MD: Ever Heart MD    Reason for Consult I have been asked to see the patient in neurological consultation to render advice and opinion regarding right leg weakness.    Flo Manzo is a 69 y.o. right-handed white male with known diagnosis of essential hypertension, atrial fibrillation not on anticoagulation due to risk of bleeding from multiple chronic cerebral microbleed's related to cerebral amyloid angiopathy, obstructive sleep apnea he was evaluated by our service earlier this month and the plan was to arrange for a Watchman device and to hold anticoagulation due to high risk of brain bleed, the patient went to rehab then on Friday he developed sudden onset of right leg weakness, currently on exam he was able to lift his right leg against gravity with at least 4-/5 strength both proximal and distal, he denied weakness on his right arm or facial droop, no aphasia or hemianopsia, expected small acute to subacute ischemic stroke on the left MCA versus LANETTE territory.  Patient quit smoking 20 years ago, quit drinking 13 years ago, denied drug illicit.    Past Medical History:   Diagnosis Date    Arthritis     Atrial fibrillation with RVR 01/24/2019    Colon polyps 01/04/2018    Hepatic flexure: tubular adenoma, only low-grade dysplasia; splenic flexure: tubular adenoma, only low-grade dysplasia; sigmoid colon: tubular adenoma, multiple fragments only low-grade dysplasia    Depression     Heart murmur     Hemorrhagic stroke 01/29/2019    Hypertension     New onset atrial fibrillation (CMS/HCC) - RVR 01/24/2019    GÓMEZ (obstructive sleep apnea) 06/06/2019    Home sleep study with moderate severity GÓMEZ, AHI 20 events per hour.  Sleep-related hypoxia with low O2 saturation 84% for 14 minutes.    Peripheral neuropathy     Sleep apnea     previously diagnosed with sleep apnea, had T&A done and it has since resolved     Stroke     Uncontrolled  "hypertension     Ventral hernia        Exam  /100 (BP Location: Right arm, Patient Position: Lying)   Pulse 75   Temp 98.1 °F (36.7 °C) (Oral)   Resp 18   Ht 177.8 cm (70\")   Wt 98.5 kg (217 lb 2.5 oz)   SpO2 95%   BMI 31.16 kg/m²   Gen: NAD, vitals reviewed  MS: oriented x3, recent/remote memory impaired remembered 1/3 objects after 5 minutes, normal attention/concentration, language intact, no neglect.  CN: visual acuity grossly normal, PERRL, EOMI, no facial droop, no dysarthria  Motor: Drifting on the right lower extremity but was able to raise against gravity no drifting on other extremities, normal tone  Sensory exam: Normal to light touch throughout    DATA:    Lab Results   Component Value Date    GLUCOSE 97 08/26/2024    CALCIUM 9.6 08/26/2024     08/26/2024    K 3.9 08/26/2024    CO2 26.9 08/26/2024     08/26/2024    BUN 16 08/26/2024    CREATININE 0.85 08/26/2024    EGFRIFAFRI 103 08/27/2021    EGFRIFNONA 85 08/27/2021    BCR 18.8 08/26/2024    ANIONGAP 8.1 08/26/2024     Lab Results   Component Value Date    WBC 6.42 08/26/2024    HGB 13.4 08/26/2024    HCT 41.9 08/26/2024    MCV 87.7 08/26/2024     08/26/2024       Lab review: A1c 5.7, LDL 44    Imaging review: I personally reviewed his CT angios head and neck from August 7 and agreed with radiology report as detailed below.  MRI brain on 8/6 with 9 mm left diffusion-weighted hyperintensity on the left paracentral lobule suspicious for subacute ischemic stroke in addition to multiple microhemorrhage with the largest on the left LANETTE concerning for cerebral amyloid angiopathy.    IMPRESSION:     There is a subtle 9 mm focus of diffusion-weighted hyperintensity within  the left paracentral lobule that is suspicious for a subacute infarct  which could be embolic or lacunar in etiology. This finding was  discussed with the nurse caring for this patient, Fidel Siddiqui RN, on  08/06/2024 at approximately 2:35 p.m. He was asked " to convey these  findings to the physician caring for this patient.     There are moderate-to-severe chronic small vessel ischemic phenomena  which have progressed in the interval since the prior exam.  Additionally, extensive old lacunar disease is identified which  demonstrates mild interval progression.     There are findings compatible with innumerable chronic microhemorrhages  noted both supratentorially and infratentorially in a pattern suggestive  of microhemorrhages related to both amyloid angiopathy as well as  hypertension. Please correlate with clinical data.     Additional areas of chronic chinyere hemorrhage are identified within the  medial aspect of the left cerebellar hemisphere, the inferolateral  aspect of the left temporal lobe, and the medial aspect of the left  frontal lobe. Interval evolutionary changes are seen at the site of the  cerebellar hemorrhage whereas the additional foci of chronic hemorrhage  are new since the prior exam.    IMPRESSION:  There is no evidence of a new infarct or of intracranial  hemorrhage. The study is hampered by beam [place] artifact and patient  motion but there was no evidence of significant carotid stenosis or  proximal intracranial arterial high-grade stenosis or occlusion. A  thyroid goiter is noted extending into the superior mediastinum  narrowing the trachea.    Diagnoses:  -Right leg weakness etiology likely cardioembolic small left MCA/LANETTE acute to subacute ischemic stroke versus extension of his known left LANETTE chronic microhemorrhage.  -Multiple chronic intracranial microbleed's etiology felt to be mixed hypertensive and probable cerebral amyloid angiopathy  -Essential hypertension  -Atrial fibrillation not on anticoagulation due to risk of brain bleed    Pre-stroke MRS: 3  NIHSS: 1        PLAN:   -Get MRI brain even though would not  as we cannot start patient on anticoagulation due to high risk of hemorrhagic transformation  -Patient  "under workup for Watchman device by cardiology, recommend Watchman device as soon as possible  -Continue aspirin 81 mg daily  -Continue Lipitor 40 mg daily  -Recommend strict blood pressure control less than 130/80 for the long-term to decrease risk of worsening cerebral microbleeds.  -PT OT evaluation  -Okay with transient anticoagulation with Eliquis if needed post Watchman device  -Discussed with Dr. Botello  -No further stroke workup needed, will sign off and see again as needed.    Medical Decision Making for this neurology consultation consists of the following:  Review of previous chart, including H/P, provider and nursing notes as applicable.  Review of medications and vital signs.  Review of previous labs, neuroimaging, and additional relevant diagnostics.   Interpretation of laboratory, imaging, and other diagnostic results  Discussion with other providers and family   Total face-to-face/floor time: 30-75 minutes.     Addendum 8/28 10:18 PM.  Utilizing shared decision-making, the patient has decided to pursue left atrial appendage occlusion evaluation.  He has been deemed to be a suitable candidate for short-term anticoagulation therapy, but unable to take long-term anticoagulation secondary to chronic cerebral hemorrhages.    \"Dictated utilizing Dragon dictation\".    "

## 2024-08-27 NOTE — PLAN OF CARE
Goal Outcome Evaluation:  Plan of Care Reviewed With: patient        Progress: no change  Outcome Evaluation: Patient admitted overnight d/t increased weakness and frequent falling at home. Recently had a stroke a few weeks ago with a rehab stay afterwards. CCP consulted for possible rehab at d/c.      Problem: Adult Inpatient Plan of Care  Goal: Plan of Care Review  Outcome: Ongoing, Not Progressing  Flowsheets (Taken 8/27/2024 0224)  Progress: no change  Plan of Care Reviewed With: patient  Outcome Evaluation: Patient admitted overnight d/t increased weakness and frequent falling at home. Recently had a stroke a few weeks ago with a rehab stay afterwards. CCP consulted for possible rehab at d/c.  Goal: Patient-Specific Goal (Individualized)  Outcome: Ongoing, Not Progressing  Goal: Absence of Hospital-Acquired Illness or Injury  Outcome: Ongoing, Not Progressing  Intervention: Identify and Manage Fall Risk  Recent Flowsheet Documentation  Taken 8/27/2024 0000 by Kadi Suh RN  Safety Promotion/Fall Prevention:   activity supervised   assistive device/personal items within reach   clutter free environment maintained   fall prevention program maintained   nonskid shoes/slippers when out of bed   room organization consistent   safety round/check completed  Taken 8/26/2024 2346 by Kadi Suh RN  Safety Promotion/Fall Prevention:   activity supervised   assistive device/personal items within reach   clutter free environment maintained   fall prevention program maintained   nonskid shoes/slippers when out of bed   room organization consistent  Intervention: Prevent Skin Injury  Recent Flowsheet Documentation  Taken 8/27/2024 0000 by Kadi Suh RN  Body Position: supine  Taken 8/26/2024 2346 by Kadi Suh RN  Body Position: supine  Intervention: Prevent and Manage VTE (Venous Thromboembolism) Risk  Recent Flowsheet Documentation  Taken 8/26/2024 2346 by Kadi Suh RN  Activity Management: activity  encouraged  Goal: Optimal Comfort and Wellbeing  Outcome: Ongoing, Not Progressing  Intervention: Provide Person-Centered Care  Recent Flowsheet Documentation  Taken 8/26/2024 2346 by Kadi Suh RN  Trust Relationship/Rapport:   care explained   choices provided   emotional support provided   empathic listening provided   questions answered   questions encouraged   reassurance provided   thoughts/feelings acknowledged  Goal: Readiness for Transition of Care  Outcome: Ongoing, Not Progressing  Intervention: Mutually Develop Transition Plan  Recent Flowsheet Documentation  Taken 8/26/2024 2343 by Kadi Suh RN  Transportation Anticipated: family or friend will provide  Patient/Family Anticipated Services at Transition:   Patient/Family Anticipates Transition to: inpatient rehabilitation facility  Taken 8/26/2024 2340 by Kadi Suh RN  Equipment Currently Used at Home: walker, standard     Problem: Fall Injury Risk  Goal: Absence of Fall and Fall-Related Injury  Outcome: Ongoing, Not Progressing  Intervention: Promote Injury-Free Environment  Recent Flowsheet Documentation  Taken 8/27/2024 0000 by Kadi Suh, RN  Safety Promotion/Fall Prevention:   activity supervised   assistive device/personal items within reach   clutter free environment maintained   fall prevention program maintained   nonskid shoes/slippers when out of bed   room organization consistent   safety round/check completed  Taken 8/26/2024 2346 by Kadi Suh RN  Safety Promotion/Fall Prevention:   activity supervised   assistive device/personal items within reach   clutter free environment maintained   fall prevention program maintained   nonskid shoes/slippers when out of bed   room organization consistent     Problem: Hypertension Comorbidity  Goal: Blood Pressure in Desired Range  Outcome: Ongoing, Not Progressing

## 2024-08-27 NOTE — PLAN OF CARE
Goal Outcome Evaluation:  Plan of Care Reviewed With: patient           Outcome Evaluation: Pt. is a 69 year old Male admitted to the hospital with generalized weakness (recent h/o CVA and rehab stay).  Pt. reports that prior to admission he was using a Rwx for ambulation.  Pt. currently presents with decreased strength, decreased balance, and decreased tolerance to functional activity. This AM, pt. able to ambulate 10 feet (FW/BW), Min. assist x 1, with use of Rwx. Pt. requires Min. assist x 1 for bed mobility and Min. assist x 1 for sit <-> stand transfers.  Verbal/tactile cues given during ambulation for posture correction and Rwx guidance. Pt. will benefit from skilled inpt. P.T. to address his functional deficits and to assist pt. in regaining his maximum level of independence with functional mobility.      Anticipated Discharge Disposition (PT): skilled nursing facility, inpatient rehabilitation facility (Pending pt. progress)

## 2024-08-27 NOTE — H&P
Patient Name:  Flo Manzo  YOB: 1955  MRN:  [id number]  Admit Date:  8/26/2024  Patient Care Team:  Karen Jiménez APRN as PCP - General (Family Medicine)  Eren Bruce MD as Consulting Physician (Cardiology)  Temo Wheat MD as Consulting Physician (Radiation Oncology)  Bryant Rader MD as Consulting Physician (Urology)  Cindi Breen DNP, ASHANTI as Nurse Practitioner (Nurse Practitioner)      Subjective   History Present Illness     Chief Complaint   Patient presents with    Extremity Weakness     History of Present Illness  Mr. Manzo is a 69 y.o. non-smoker with a history of CVA, chronic atrial fibrillation, diastolic CHF, HTN, GÓMEZ, and obesity that presents to [facility] complaining of worsening right leg weakness.  He was admitted to our facility approximately 3 weeks ago and diagnosed with acute CVA now with right-sided residual.  Post hospitalization, he was discharged to a subacute rehab.  He was discharged home from rehab a couple of days ago and now complains of worsening right-sided weakness.  He says his knee wobbles when he stands and tries to walk.  He denies any other focal deficits.  CT of the head obtained in the ED shows no evidence of intracranial hemorrhage, hydrocephalus or of acute infarction.  We were asked admit for observation.    Review of Systems   Constitutional:  Negative for chills and fever.   HENT:  Negative for congestion and rhinorrhea.    Eyes:  Negative for photophobia and visual disturbance.   Respiratory:  Negative for cough and shortness of breath.    Cardiovascular:  Negative for chest pain and palpitations.   Gastrointestinal:  Negative for constipation, diarrhea, nausea and vomiting.   Endocrine: Negative for cold intolerance and heat intolerance.   Genitourinary:  Negative for difficulty urinating and dysuria.   Musculoskeletal:  Negative for gait problem and joint swelling.   Skin:  Negative for rash  and wound.   Neurological:  Positive for weakness. Negative for dizziness, light-headedness and headaches.   Psychiatric/Behavioral:  Negative for sleep disturbance and suicidal ideas.         Personal History     Past Medical History:   Diagnosis Date    Arthritis     Atrial fibrillation with RVR 01/24/2019    Colon polyps 01/04/2018    Hepatic flexure: tubular adenoma, only low-grade dysplasia; splenic flexure: tubular adenoma, only low-grade dysplasia; sigmoid colon: tubular adenoma, multiple fragments only low-grade dysplasia    Depression     Heart murmur     Hemorrhagic stroke 01/29/2019    Hypertension     New onset atrial fibrillation (CMS/HCC) - RVR 01/24/2019    GÓMEZ (obstructive sleep apnea) 06/06/2019    Home sleep study with moderate severity GÓMEZ, AHI 20 events per hour.  Sleep-related hypoxia with low O2 saturation 84% for 14 minutes.    Peripheral neuropathy     Sleep apnea     previously diagnosed with sleep apnea, had T&A done and it has since resolved     Stroke     Uncontrolled hypertension     Ventral hernia      Past Surgical History:   Procedure Laterality Date    APPENDECTOMY N/A 1972    BACK SURGERY      BLADDER STIMULATOR IMPLANT L LOWER BACK    CARDIAC CATHETERIZATION N/A 07/20/2004    Cath left ventriculography, coronary angiography and left heart catheterization, Normal results-Dr. Fierro     CARDIAC CATHETERIZATION N/A 1/29/2019    Procedure: Left Heart Cath;  Surgeon: Eren Bruce MD;  Location: [facility] CATH INVASIVE LOCATION;  Service: Cardiology    CARDIAC CATHETERIZATION N/A 1/29/2019    Procedure: Left ventriculography;  Surgeon: Eren Bruce MD;  Location:  [facility] CATH INVASIVE LOCATION;  Service: Cardiology    CARDIAC CATHETERIZATION N/A 1/29/2019    Procedure: Coronary angiography;  Surgeon: Eren Bruce MD;  Location:  [facility] CATH INVASIVE LOCATION;  Service: Cardiology    CARDIAC ELECTROPHYSIOLOGY PROCEDURE N/A 3/4/2022    Procedure: Pacemaker SC new  BIOTRONIK;  Surgeon: Eren Bruce MD;  Location: [facility] CATH INVASIVE LOCATION;  Service: Cardiology;  Laterality: N/A;    CARPAL TUNNEL RELEASE Right 2013    CARPAL TUNNEL RELEASE Left     COLONOSCOPY N/A 1/4/2018    Procedure: COLONOSCOPY with cold polypectomy and hot snare polypectomy;  Surgeon: Ten Solitario MD;  Location: [facility] ENDOSCOPY;  Service:     COLONOSCOPY N/A 4/25/2024    Procedure: COLONOSCOPY INTO CECUM;  Surgeon: Ten Solitario MD;  Location: [facility] ENDOSCOPY;  Service: General;  Laterality: N/A;  PRE: PERSONAL H/O COLON POLYP  /  POST: POOR PREP OTHERWISE NORMAL    EYE LENS IMPLANT SECONDARY Bilateral 2007, 2008    KNEE CARTILAGE SURGERY Right 1979    TONSILLECTOMY AND ADENOIDECTOMY Bilateral 2005    TOTAL KNEE ARTHROPLASTY Left 03/14/2016    Left total knee arthroplasty with Amberly component, size 11 femur, G tibial baseplate with a 10 polyethylene insert and a 38 patellar button-Dr. Pablo Hairston    TOTAL KNEE ARTHROPLASTY Right 03/02/2015    Right total knee arthroplasty with Amberly component size G femur, 11 tibial base plate with an 11 polyethylene insert and a 38 patellar button-Dr. Pablo Hairston    UVULOPALATOPHARYNGOPLASTY N/A 2005    part of soft palate, and uvula    VENTRAL HERNIA REPAIR N/A 1/23/2018    Procedure: VENTRAL HERNIA REPAIR LAPAROSCOPIC WITH DAVINCI ROBOT AND MESH;  Surgeon: Ten Solitario MD;  Location: [facility] MAIN OR;  Service:      Family History   Problem Relation Age of Onset    Hypertension Mother     Kidney failure Mother     Coronary artery disease Father     Lung cancer Father         positive smoker    Heart attack Father     Breast cancer Sister 60    Prostate cancer Brother     Colon polyps Brother     Kidney nephrosis Brother     Malig Hyperthermia Neg Hx      Social History     Tobacco Use    Smoking status: Never     Passive exposure: Past    Smokeless tobacco: Never   Vaping Use    Vaping status: Never Used   Substance Use Topics     Alcohol use: Not Currently     Comment: social, maybe a drink a month, not since stroke    Drug use: No     Comment: previously smoked marijuana      No current facility-administered medications on file prior to encounter.     Current Outpatient Medications on File Prior to Encounter   Medication Sig Dispense Refill    acetaminophen (TYLENOL) 325 MG tablet Take 2 tablets by mouth Every 4 (Four) Hours As Needed for Mild Pain . 120 tablet 3    albuterol sulfate  (90 Base) MCG/ACT inhaler Inhale 2 puffs Every 4 (Four) Hours As Needed for Wheezing. 18 g 3    aspirin 81 MG chewable tablet Chew 1 tablet Daily.      atorvastatin (LIPITOR) 40 MG tablet TAKE 1 TABLET BY MOUTH NIGHTLY 90 tablet 1    busPIRone (BUSPAR) 10 MG tablet TAKE 1 TABLET BY MOUTH TWICE DAILY 60 tablet 5    citalopram (CeleXA) 20 MG tablet Take 1 tablet by mouth Daily. (Patient taking differently: Take 1.5 tablets by mouth Daily. PT TAKING 1.5 TABLETS DAILY) 135 tablet 1    Diclofenac Sodium (Voltaren) 1 % gel gel Apply 4 g topically to the appropriate area as directed 4 (Four) Times a Day. 50 g 0    furosemide (LASIX) 20 MG tablet Take 1 tablet by mouth Daily for 180 days. 90 tablet 1    gabapentin (NEURONTIN) 100 MG capsule TAKE ONE (1) CAPSULE BY MOUTH TWICE DAILY (Patient taking differently: Take 1 capsule by mouth Every Morning.) 60 capsule 0    gabapentin (NEURONTIN) 300 MG capsule Take 1 capsule by mouth Every Night. 90 capsule 0    guaiFENesin (MUCINEX) 600 MG 12 hr tablet Take 2 tablets by mouth Every 12 (Twelve) Hours.      metoprolol tartrate (LOPRESSOR) 25 MG tablet TAKE 1 & 1/2 TABLETS BY MOUTH TWICE DAILY 90 tablet 1    omeprazole (priLOSEC) 20 MG capsule TAKE 1 CAPSULE BY MOUTH DAILY 90 capsule 1    tamsulosin (FLOMAX) 0.4 MG capsule 24 hr capsule TAKE 1 CAPSULE BY MOUTH NIGHTLY 30 capsule 5    [DISCONTINUED] DULoxetine (CYMBALTA) 30 MG capsule TAKE 1 CAPSULE BY MOUTH EVERY NIGHT 90 capsule 1    [DISCONTINUED] metoprolol succinate  XL (TOPROL-XL) 25 MG 24 hr tablet Take 1 tablet by mouth Daily. 30 tablet 0    [DISCONTINUED] QUEtiapine (SEROquel) 25 MG tablet Take 1 tablet by mouth Every Night. Take 30 min before bed 30 tablet 0     Allergies   Allergen Reactions    Poison Ivy Extract Hives, Itching and Rash     ITCHY BLISTERS       Objective    Objective     Vital Signs  Temp:  [97.9 °F (36.6 °C)-98.6 °F (37 °C)] 97.9 °F (36.6 °C)  Heart Rate:  [70-90] 89  Resp:  [18-20] 18  BP: (101-150)/() 142/105  SpO2:  [90 %-96 %] 90 %  on   ;   Device (Oxygen Therapy): room air  Body mass index is 31.63 kg/m².    Physical Exam  Vitals and nursing note reviewed.   Constitutional:       General: He is not in acute distress.     Appearance: He is well-developed. He is obese. He is not toxic-appearing.      Comments: Frail, chronically ill appearing   HENT:      Head: Normocephalic and atraumatic.   Eyes:      General: No scleral icterus.        Right eye: No discharge.         Left eye: No discharge.      Extraocular Movements: Extraocular movements intact.      Conjunctiva/sclera: Conjunctivae normal.      Pupils: Pupils are equal, round, and reactive to light.   Neck:      Vascular: No JVD.   Cardiovascular:      Rate and Rhythm: Normal rate and regular rhythm.      Heart sounds: Murmur heard.      Systolic murmur is present.      No friction rub. No gallop.   Pulmonary:      Effort: Pulmonary effort is normal. No respiratory distress.      Breath sounds: Normal breath sounds. No wheezing or rales.   Abdominal:      General: Bowel sounds are normal. There is no distension.      Palpations: Abdomen is soft.      Tenderness: There is no abdominal tenderness. There is no guarding.   Musculoskeletal:         General: No tenderness or deformity. Normal range of motion.      Cervical back: Normal range of motion and neck supple.   Skin:     General: Skin is warm and dry.      Capillary Refill: Capillary refill takes less than 2 seconds.   Neurological:       Mental Status: He is alert and oriented to person, place, and time.      Cranial Nerves: Facial asymmetry present.      Comments: Left-sided facial droop   Psychiatric:         Behavior: Behavior normal.     Results Review:  I reviewed the patient's new clinical results.  I reviewed the patient's new imaging results and agree with the interpretation.  I reviewed the patient's other test results and agree with the interpretation  I personally viewed and interpreted the patient's EKG/Telemetry data  Discussed with ED provider.    Lab Results (last 24 hours)       Procedure Component Value Units Date/Time    CBC & Differential [[id number]]  (Abnormal) Collected: 08/26/24 1746    Specimen: Blood Updated: 08/26/24 1755    Narrative:      The following orders were created for panel order CBC & Differential.  Procedure                               Abnormality         Status                     ---------                               -----------         ------                     CBC Auto Differential[[id number]]        Abnormal            Final result                 Please view results for these tests on the individual orders.    Basic Metabolic Panel [[id number]]  (Normal) Collected: 08/26/24 1746    Specimen: Blood Updated: 08/26/24 1817     Glucose 97 mg/dL      BUN 16 mg/dL      Creatinine 0.85 mg/dL      Sodium 142 mmol/L      Potassium 3.9 mmol/L      Comment: Slight hemolysis detected by analyzer. Result may be falsely elevated.        Chloride 107 mmol/L      CO2 26.9 mmol/L      Calcium 9.6 mg/dL      BUN/Creatinine Ratio 18.8     Anion Gap 8.1 mmol/L      eGFR 94.6 mL/min/1.73     Narrative:      GFR Normal >60  Chronic Kidney Disease <60  Kidney Failure <15      CBC Auto Differential [[id number]]  (Abnormal) Collected: 08/26/24 1746    Specimen: Blood Updated: 08/26/24 1755     WBC 6.42 10*3/mm3      RBC 4.78 10*6/mm3      Hemoglobin 13.4 g/dL      Hematocrit 41.9 %      MCV 87.7 fL      MCH 28.0 pg      MCHC  32.0 g/dL      RDW 12.9 %      RDW-SD 40.9 fl      MPV 9.8 fL      Platelets 237 10*3/mm3      Neutrophil % 70.7 %      Lymphocyte % 17.3 %      Monocyte % 8.9 %      Eosinophil % 2.6 %      Basophil % 0.3 %      Immature Grans % 0.2 %      Neutrophils, Absolute 4.54 10*3/mm3      Lymphocytes, Absolute 1.11 10*3/mm3      Monocytes, Absolute 0.57 10*3/mm3      Eosinophils, Absolute 0.17 10*3/mm3      Basophils, Absolute 0.02 10*3/mm3      Immature Grans, Absolute 0.01 10*3/mm3      nRBC 0.0 /100 WBC     Urinalysis With Microscopic If Indicated (No Culture) - Urine, Clean Catch [[id number]]  (Abnormal) Collected: 08/26/24 2225    Specimen: Urine, Clean Catch Updated: 08/26/24 2246     Color, UA Yellow     Appearance, UA Clear     pH, UA 6.0     Specific Gravity, UA 1.019     Glucose, UA Negative     Ketones, UA 15 mg/dL (1+)     Bilirubin, UA Negative     Blood, UA Negative     Protein, UA Negative     Leuk Esterase, UA Negative     Nitrite, UA Negative     Urobilinogen, UA 1.0 E.U./dL    Narrative:      Urine microscopic not indicated.            Imaging Results (Last 24 Hours)       Procedure Component Value Units Date/Time    CT Head Without Contrast [[id number]] Collected: 08/26/24 2024     Updated: 08/26/24 2028    Narrative:      CT HEAD WO CONTRAST-     HISTORY:  right leg weakness/recent cva     COMPARISON: CT head 8/7/2024     FINDINGS: Encephalomalacia is appreciated involving the left frontal  lobe posteriorly and medially including involvement of the cingulate  gyrus, present previously. There is no evidence of intracranial  hemorrhage, hydrocephalus or of acute infarction. Further evaluation  could be performed with MRI examination of the brain.                 Radiation dose reduction techniques were utilized, including automated  exposure modulation based on body size.     This report was finalized on 8/26/2024 8:25 PM by Dr. Kristopher Henriquez M.D  on Workstation: [id number]               Results for  orders placed during the hospital encounter of 07/11/24    Adult Transthoracic Echo Complete W/ Cont if Necessary Per Protocol    Interpretation Summary    Left ventricular ejection fraction appears to be 51 - 55%.    Left ventricular diastolic function was indeterminate.    The left atrial cavity is mild to moderately dilated.    Mild aortic valve stenosis is present.    Estimated right ventricular systolic pressure from tricuspid regurgitation is normal (<35 mmHg).      ECG 12 Lead Other; weakness   Preliminary Result   HEART RATE=89  bpm   RR Nfjwzunj=035  ms   NE Interval=  ms   P Horizontal Axis=  deg   P Front Axis=  deg   QRSD Mzkpveok=945  ms   QT Szsjuwuo=213  ms   AIqQ=821  ms   QRS Axis=-41  deg   T Wave Axis=-6  deg   - ABNORMAL ECG -   Afib/flut and V-paced complexes   No further rhythm analysis attempted due to paced rhythm   Incomplete left bundle branch block   Inferior infarct, old   Anterior Q waves, possibly due to ILBBB   Date and Time of Study:2024-08-26 19:51:34           Assessment/Plan     Active Hospital Problems    Diagnosis  POA    **Generalized weakness [R53.1]  Yes    Chronic atrial fibrillation [I48.20]  Yes    GÓMEZ on auto CPAP [G47.33]  Yes    Diastolic CHF, chronic [I50.32]  Yes    Essential hypertension [I10]  Yes    Obesity (BMI 30-39.9) [E66.9]  Yes      Resolved Hospital Problems   No resolved problems to display.       Mr. Manzo is a 69 y.o. former smoker with a history of atrial fibrillation, HTN, sleep apnea, and previous CVA who presents with generalized weakness.    Generalized weakness  -Recently discharged home from rehab secondary to acute CVA.  Suspect his weakness is secondary to his recent CVA.  Reports that he feels like his knee is weak and that when he walks it wobbles.  Will defer any further consultion to hazel IRIZARRY.  -PT to treat and eval  -CCP for rehab placement    History of recent CVA  -Small acute left frontal CVA and suspected cerebral amyloid angiopathy.   -Resume aspirin    SSS  -S/p pacemaker    Persistent Afib  -Eliquis has been d/c'd d/t cerebral amyloid angiopathy  -Resume antiarrhythmics     Chronic diastolic congestive heart failure  -No signs or symptoms of fluid overload on exam  -Daily weights  -Strict I's and O  -Resume home regimen    GÓMEZ  -On CPAP, will continue    Essential hypertension  -Stable, resume lopressor      I discussed the patient's findings and my recommendations with patient and ED provider.    VTE Prophylaxis - SCDs.  Code Status - Full code.       ASHANTI Suárez  [facility]  08/27/24  01:26 EDT

## 2024-08-27 NOTE — NURSING NOTE
I met with Mr Manzo in his room. Dr Ewing saw Danyel during his last admission and planned to get a cardiac CT as an outpt d/t contrast dye load during that admission. Dr Ewing has reviewed and recommended proceeding with CT, then can plan for LAAO/Watchman after he is home & stronger. I discussed with Danyel getting CT during this stay, he has not had any contrast; he is agreeable. CT ordered & his RN notified. RTRN

## 2024-08-27 NOTE — THERAPY EVALUATION
Patient Name: Flo Manzo  : 1955    MRN: [id number]                              Today's Date: 2024       Admit Date: 2024    Visit Dx:     ICD-10-CM ICD-9-CM   1. Generalized weakness  R53.1 780.79   2. Frequent falls  R29.6 V15.88   3. Right leg weakness  R29.898 729.89   4. History of CVA (cerebrovascular accident)  Z86.73 V12.54     Patient Active Problem List   Diagnosis    Umbilical hernia without obstruction and without gangrene    Essential hypertension    Arthritis of left knee    Depression    Obesity (BMI 30-39.9)    Atrial fibrillation with RVR    Substernal thyroid goiter    CHF (congestive heart failure)    Diastolic CHF, chronic    Hemorrhagic stroke    GÓMEZ on auto CPAP    Hypersomnia due to medical condition    Sleep-related hypoxia    Chronic atrial fibrillation    Vertigo    Aortic stenosis, mild    Sinus pause    Pacemaker    Chronic anticoagulation    Generalized weakness    Prediabetes    Pure hypercholesterolemia    Pre-operative cardiovascular examination    Peripheral neuropathy    History of colon polyps    Dizziness    Fever     Past Medical History:   Diagnosis Date    Arthritis     Atrial fibrillation with RVR 2019    Colon polyps 2018    Hepatic flexure: tubular adenoma, only low-grade dysplasia; splenic flexure: tubular adenoma, only low-grade dysplasia; sigmoid colon: tubular adenoma, multiple fragments only low-grade dysplasia    Depression     Heart murmur     Hemorrhagic stroke 2019    Hypertension     New onset atrial fibrillation (CMS/HCC) - RVR 2019    GÓMEZ (obstructive sleep apnea) 2019    Home sleep study with moderate severity GÓMEZ, AHI 20 events per hour.  Sleep-related hypoxia with low O2 saturation 84% for 14 minutes.    Peripheral neuropathy     Sleep apnea     previously diagnosed with sleep apnea, had T&A done and it has since resolved     Stroke     Uncontrolled hypertension     Ventral hernia      Past Surgical  History:   Procedure Laterality Date    APPENDECTOMY N/A 1972    BACK SURGERY      BLADDER STIMULATOR IMPLANT L LOWER BACK    CARDIAC CATHETERIZATION N/A 07/20/2004    Cath left ventriculography, coronary angiography and left heart catheterization, Normal results-Dr. Vadim Mancuso    CARDIAC CATHETERIZATION N/A 1/29/2019    Procedure: Left Heart Cath;  Surgeon: Eren Bruce MD;  Location: [facility] CATH INVASIVE LOCATION;  Service: Cardiology    CARDIAC CATHETERIZATION N/A 1/29/2019    Procedure: Left ventriculography;  Surgeon: Eren Bruce MD;  Location:  [facility] CATH INVASIVE LOCATION;  Service: Cardiology    CARDIAC CATHETERIZATION N/A 1/29/2019    Procedure: Coronary angiography;  Surgeon: Eren Bruce MD;  Location:  [facility] CATH INVASIVE LOCATION;  Service: Cardiology    CARDIAC ELECTROPHYSIOLOGY PROCEDURE N/A 3/4/2022    Procedure: Pacemaker SC new BIOTRONIK;  Surgeon: Eren Bruce MD;  [facility];  Service: Cardiology;  Laterality: N/A;    CARPAL TUNNEL RELEASE Right 2013    CARPAL TUNNEL RELEASE Left     COLONOSCOPY N/A 1/4/2018    Procedure: COLONOSCOPY with cold polypectomy and hot snare polypectomy;  Surgeon: Ten Solitario MD;  Location: [facility] ENDOSCOPY;  Service:     COLONOSCOPY N/A 4/25/2024    Procedure: COLONOSCOPY INTO CECUM;  Surgeon: Ten Solitario MD;  Location: [facility] ENDOSCOPY;  Service: General;  Laterality: N/A;  PRE: PERSONAL H/O COLON POLYP  /  POST: POOR PREP OTHERWISE NORMAL    EYE LENS IMPLANT SECONDARY Bilateral 2007, 2008    KNEE CARTILAGE SURGERY Right 1979    TONSILLECTOMY AND ADENOIDECTOMY Bilateral 2005    TOTAL KNEE ARTHROPLASTY Left 03/14/2016    Left total knee arthroplasty with Amberly component, size 11 femur, G tibial baseplate with a 10 polyethylene insert and a 38 patellar button-Dr. Pablo Hairston    TOTAL KNEE ARTHROPLASTY Right 03/02/2015    Right total knee arthroplasty with Amberly component size G femur,  11 tibial base plate with an 11 polyethylene insert and a 38 patellar button-Dr. Pablo Hairston    UVULOPALATOPHARYNGOPLASTY N/A 2005    part of soft palate, and uvula    VENTRAL HERNIA REPAIR N/A 1/23/2018    Procedure: VENTRAL HERNIA REPAIR LAPAROSCOPIC WITH DAVINCI ROBOT AND MESH;  Surgeon: Ten Solitario MD;  Location: [facility] MAIN OR;  Service:       General Information       Row Name 08/27/24 1252          OT Time and Intention    Document Type evaluation  -     Mode of Treatment occupational therapy;co-treatment  -       Row Name 08/27/24 1252          General Information    Patient Profile Reviewed yes  -     Prior Level of Function min assist:;ADL's;all household mobility  requiring some assist for ADLs, using RW. Multiple falls since d/c home  -     Existing Precautions/Restrictions fall  -     Barriers to Rehab none identified  -       Row Name 08/27/24 1252          Living Environment    People in Home spouse;child(roland), adult  -       Row Name 08/27/24 1252          Cognition    Orientation Status (Cognition) oriented x 3  -       Row Name 08/27/24 1252          Safety Issues, Functional Mobility    Impairments Affecting Function (Mobility) balance;endurance/activity tolerance;strength  -     Comment, Safety Issues/Impairments (Mobility) Pt is co-tx appropriate d/t decreased activity tolerance and to maximize pt participation and safety with functional activity.  -               User Key  (r) = Recorded By, (t) = Taken By, (c) = Cosigned By      Initials Name Provider Type     Tawana Sinclair OT Occupational Therapist                     Mobility/ADL's       Row Name 08/27/24 1255          Bed Mobility    Bed Mobility sit-supine  -     Sit-Supine [place] (Bed Mobility) minimum assist (75% patient effort)  -     Comment, (Bed Mobility) BSC with nsg aide upon arrival  -       Row Name 08/27/24 1253          Transfers    Transfers toilet transfer;sit-stand transfer  -        [facility] Name 08/27/24 1255          Sit-Stand Transfer    Sit-Stand [place] (Transfers) minimum assist (75% patient effort)  -     Assistive Device (Sit-Stand Transfers) walker, front-wheeled  -[facility] 08/27/24 1255          Toilet Transfer    Type (Toilet Transfer) sit-stand;stand-sit  -     [place] Level (Toilet Transfer) minimum assist (75% patient effort)  -     Assistive Device (Toilet Transfer) commode, bedside without drop arms;walker, front-wheeled  -[facility] Name 08/27/24 1255          Functional Mobility    Functional Mobility- Comment takes a few steps with Shayne using RW. Unsteady  -[facility] Name 08/27/24 1255          Activities of Daily Living    BADL Assessment/Intervention toileting  -JW       Row Name 08/27/24 1255          Toileting Assessment/Training    [place] Level (Toileting) moderate assist (50% patient effort);maximum assist (25% patient effort)  -     Comment, (Toileting) nsg aide assisting with hygiene and brief mgt for toileting  -               User Key  (r) = Recorded By, (t) = Taken By, (c) = Cosigned By      Initials Name Provider Type    JW Tawana Sinclair OT Occupational Therapist                   Obj/Interventions       [facility] Name 08/27/24 1256          Sensory Assessment (Somatosensory)    Sensory Assessment (Somatosensory) UE sensation intact  -[facility] 08/27/24 1256          Vision Assessment/Intervention    Visual Impairment/Limitations WNL  -[facility] Name 08/27/24 1256          Range of Motion Comprehensive    General Range of Motion bilateral upper extremity ROM WNL  -JW       Row Name 08/27/24 1256          Strength Comprehensive (MMT)    Comment, General Manual Muscle Testing (MMT) Assessment LUE 4+/5, RUE proximal 3+/5, distal 4/5  -[facility] Name 08/27/24 1256          Motor Skills    Motor Skills functional endurance  -     Functional Endurance fair  -JW       Row Name 08/27/24 1256          Balance    Balance  Assessment sitting static balance;standing static balance;standing dynamic balance;sitting dynamic balance  -     Static Sitting Balance standby assist;contact guard  -     Dynamic Sitting Balance contact guard;minimal assist  -     Position, Sitting Balance sitting edge of bed  -     Static Standing Balance minimal assist  -     Dynamic Standing Balance minimal assist  -     Position/Device Used, Standing Balance supported  -     Balance Interventions standing;sitting;occupation based/functional task  -     Comment, Balance posterior and R sided lean in sitting and standing  -               User Key  (r) = Recorded By, (t) = Taken By, (c) = Cosigned By      Initials Name Provider Type    JW Tawana Sinclair, OT Occupational Therapist                   Goals/Plan       Row Name 08/27/24 1300          Transfer Goal 1 (OT)    Activity/Assistive Device (Transfer Goal 1, OT) transfers, all  -     [place] Level/Cues Needed (Transfer Goal 1, OT) modified independence  -     Time Frame (Transfer Goal 1, OT) short term goal (STG);2 weeks  -     Progress/Outcome (Transfer Goal 1, OT) goal ongoing  -       Row Name 08/27/24 1300          Dressing Goal 1 (OT)    Activity/Device (Dressing Goal 1, OT) lower body dressing  -     [place]/Cues Needed (Dressing Goal 1, OT) standby assist  -     Time Frame (Dressing Goal 1, OT) short term goal (STG);2 weeks  -     Progress/Outcome (Dressing Goal 1, OT) goal ongoing  -       Row Name 08/27/24 1300          Toileting Goal 1 (OT)    Activity/Device (Toileting Goal 1, OT) toileting skills, all  -     [place] Level/Cues Needed (Toileting Goal 1, OT) contact guard required;minimum assist (75% or more patient effort)  -     Time Frame (Toileting Goal 1, OT) short term goal (STG);2 weeks  -     Progress/Outcome (Toileting Goal 1, OT) goal ongoing  -       Row Name 08/27/24 1300          Grooming Goal 1 (OT)    Activity/Device (Grooming Goal  1, OT) grooming skills, all  -     [place] (Grooming Goal 1, OT) modified independence  -     Time Frame (Grooming Goal 1, OT) short term goal (STG);2 weeks  -     Strategies/Barriers (Grooming Goal 1, OT) sitting at sink  -     Progress/Outcome (Grooming Goal 1, OT) goal ongoing  -       Row Name 08/27/24 1300          Strength Goal 1 (OT)    Strength Goal 1 (OT) Pt will be indep with HEP to promote RUE strength for ADL routinue  -     Time Frame (Strength Goal 1, OT) short term goal (STG);2 weeks  -JW     Progress/Outcome (Strength Goal 1, OT) goal ongoing  -       Row Name 08/27/24 1300          Therapy Assessment/Plan (OT)    Planned Therapy Interventions (OT) activity tolerance training;BADL retraining;functional balance retraining;occupation/activity based interventions;neuromuscular control/coordination retraining;patient/caregiver education/training;transfer/mobility retraining;strengthening exercise  -               User Key  (r) = Recorded By, (t) = Taken By, (c) = Cosigned By      Initials Name Provider Type    Tawana Aguila, OT Occupational Therapist                   Clinical Impression       Row Name 08/27/24 1258          Pain Assessment    Pretreatment Pain Rating 0/10 - no pain  -     Posttreatment Pain Rating 0/10 - no pain  -       Row Name 08/27/24 1258          Plan of Care Review    Plan of Care Reviewed With patient  -     Outcome Evaluation Pt is a 68 y/o M admitted with generalized weakness and multiple falls. Recently d/c'ed home from rehab after acute CVA. He reports requiring assist with ADLs at home and use of RW for mobility. Presents with impaired balance, endurance/act tolerance and RUE weakness. On BSC with nsg aide assisting with toileting. Completes TF with Shayne, takes a few steps with minAx2 for safety and balance. Quick to fatigue and is assisted back to EOB. Recommending d/c to rehab to promote return to PLOF and prevent further falls.  -GABI Dasilva  Name 08/27/24 1258          Therapy Assessment/Plan (OT)    Rehab Potential (OT) good, to achieve stated therapy goals  -     Therapy Frequency (OT) 5 times/wk  -       Row Name 08/27/24 1258          Therapy Plan Review/Discharge Plan (OT)    Anticipated Discharge Disposition (OT) inpatient rehabilitation facility  -       Row Name 08/27/24 1258          Positioning and Restraints    Pre-Treatment Position in bed  -     Post Treatment Position chair  -JW     In Chair notified nsg;sitting;call light within reach;encouraged to call for assist;exit alarm on;legs elevated  -               User Key  (r) = Recorded By, (t) = Taken By, (c) = Cosigned By      Initials Name Provider Type    Tawana Aguila OT Occupational Therapist                   Outcome Measures       Row Name 08/27/24 1302          How much help from another is currently needed...    Putting on and taking off regular lower body clothing? 2  -JW     Bathing (including washing, rinsing, and drying) 2  -JW     Toileting (which includes using toilet bed pan or urinal) 2  -JW     Putting on and taking off regular upper body clothing 3  -JW     Taking care of personal grooming (such as brushing teeth) 3  -JW     Eating meals 3  -JW     AM-PAC 6 Clicks Score (OT) 15  -JW       Row Name 08/27/24 1110 08/27/24 0918       How much help from another person do you currently need...    Turning from your back to your side while in flat bed without using bedrails? 3  -MS 4  -AS    Moving from lying on back to sitting on the side of a flat bed without bedrails? 3  -MS 3  -AS    Moving to and from a bed to a chair (including a wheelchair)? 3  -MS 2  -AS    Standing up from a chair using your arms (e.g., wheelchair, bedside chair)? 3  -MS 2  -AS    Climbing 3-5 steps with a railing? 2  -MS 2  -AS    To walk in hospital room? 3  -MS 2  -AS    AM-PAC 6 Clicks Score (PT) 17  -MS 15  -AS    Highest Level of Mobility Goal 5 --> Static standing  -MS 4 --> Transfer  to chair/commode  -AS      Row Name 08/27/24 1302          Modified [place] Scale    Modified [place] Scale 4 - Moderately severe disability.  Unable to walk without assistance, and unable to attend to own bodily needs without assistance.  -       Row Name 08/27/24 1302 08/27/24 1110       Functional Assessment    Outcome Measure Options AM-PAC 6 Clicks Daily Activity (OT);Modified [place]  -JW AM-PAC 6 Clicks Basic Mobility (PT)  -MS              User Key  (r) = Recorded By, (t) = Taken By, (c) = Cosigned By      Initials Name Provider Type    MS Pablo Mendoza SAUL, PT Physical Therapist    AS Chema Lei, RN Registered Nurse    Tawana Aguila, OT Occupational Therapist                    Occupational Therapy Education       Title: PT OT SLP Therapies (In Progress)       Topic: Occupational Therapy (In Progress)       Point: ADL training (Done)       Description:   Instruct learner(s) on proper safety adaptation and remediation techniques during self care or transfers.   Instruct in proper use of assistive devices.                  Learning Progress Summary             Patient Acceptance, E, VU by GABI at 8/27/2024 1302                         Point: Home exercise program (Not Started)       Description:   Instruct learner(s) on appropriate technique for monitoring, assisting and/or progressing therapeutic exercises/activities.                  Learner Progress:  Not documented in this visit.              Point: Precautions (Done)       Description:   Instruct learner(s) on prescribed precautions during self-care and functional transfers.                  Learning Progress Summary             Patient Acceptance, E, VU by GABI at 8/27/2024 1302                         Point: Body mechanics (Done)       Description:   Instruct learner(s) on proper positioning and spine alignment during self-care, functional mobility activities and/or exercises.                  Learning Progress Summary             Patient Acceptance,  E, VU by  at 8/27/2024 1302                                         User Key       Initials Effective Dates Name Provider Type Discipline     06/10/21 -  Tawana Sinclair OT Occupational Therapist OT                  OT Recommendation and Plan  Planned Therapy Interventions (OT): activity tolerance training, BADL retraining, functional balance retraining, occupation/activity based interventions, neuromuscular control/coordination retraining, patient/caregiver education/training, transfer/mobility retraining, strengthening exercise  Therapy Frequency (OT): 5 times/wk  Plan of Care Review  Plan of Care Reviewed With: patient  Outcome Evaluation: Pt is a 70 y/o M admitted with generalized weakness and multiple falls. Recently d/c'ed home from rehab after acute CVA. He reports requiring assist with ADLs at home and use of RW for mobility. Presents with impaired balance, endurance/act tolerance and RUE weakness. On BSC with nsg aide assisting with toileting. Completes TF with Shayne, takes a few steps with minAx2 for safety and balance. Quick to fatigue and is assisted back to EOB. Recommending d/c to rehab to promote return to PLOF and prevent further falls.     Time Calculation:   Evaluation Complexity (OT)  Review Occupational Profile/Medical/Therapy History Complexity: expanded/moderate complexity  Assessment, Occupational Performance/Identification of Deficit Complexity: 3-5 performance deficits  Clinical Decision Making Complexity (OT): detailed assessment/moderate complexity  Overall Complexity of Evaluation (OT): moderate complexity     Time Calculation- OT       Row Name 08/27/24 1302             Time Calculation- OT    OT Start Time 0906  -      OT Stop Time 0919  -      OT Time Calculation (min) 13 min  -      Total Timed Code Minutes- OT 8 minute(s)  -      OT Received On 08/27/24  -      OT - Next Appointment 08/28/24  -      OT Goal Re-Cert Due Date 09/10/24  -         Timed Charges    31268 -  OT Therapeutic Activity Minutes 8  -JW         Untimed Charges    OT Eval/Re-eval Minutes 5  -JW         Total Minutes    Timed Charges Total Minutes 8  -JW      Untimed Charges Total Minutes 5  -JW       Total Minutes 13  -JW                User Key  (r) = Recorded By, (t) = Taken By, (c) = Cosigned By      Initials Name Provider Type    Tawana Aguila OT Occupational Therapist                  Therapy Charges for Today       Code Description Service Date Service Provider Modifiers Qty    [id number]  OT THERAPEUTIC ACT EA 15 MIN 8/27/2024 Tawana Sinclair OT GO 1    [id number]  OT EVAL MOD COMPLEXITY 2 8/27/2024 Tawana Sinclair OT GO 1                 Tawana Sinclair OT  8/27/2024

## 2024-08-28 ENCOUNTER — HOME CARE VISIT (OUTPATIENT)
Dept: HOME HEALTH SERVICES | Facility: HOME HEALTHCARE | Age: 69
End: 2024-08-28
Payer: MEDICARE

## 2024-08-28 ENCOUNTER — APPOINTMENT (OUTPATIENT)
Dept: CT IMAGING | Facility: HOSPITAL | Age: 69
End: 2024-08-28
Payer: MEDICARE

## 2024-08-28 LAB
ALBUMIN SERPL-MCNC: 3.5 G/DL (ref 3.5–5.2)
ALBUMIN/GLOB SERPL: 1.3 G/DL
ALP SERPL-CCNC: 99 U/L (ref 39–117)
ALT SERPL W P-5'-P-CCNC: 14 U/L (ref 1–41)
ANION GAP SERPL CALCULATED.3IONS-SCNC: 8 MMOL/L (ref 5–15)
AST SERPL-CCNC: 7 U/L (ref 1–40)
BILIRUB SERPL-MCNC: 0.7 MG/DL (ref 0–1.2)
BUN SERPL-MCNC: 14 MG/DL (ref 8–23)
BUN/CREAT SERPL: 20.3 (ref 7–25)
CALCIUM SPEC-SCNC: 9.4 MG/DL (ref 8.6–10.5)
CHLORIDE SERPL-SCNC: 107 MMOL/L (ref 98–107)
CO2 SERPL-SCNC: 27 MMOL/L (ref 22–29)
CREAT SERPL-MCNC: 0.69 MG/DL (ref 0.76–1.27)
DEPRECATED RDW RBC AUTO: 41.5 FL (ref 37–54)
EGFRCR SERPLBLD CKD-EPI 2021: 100.8 ML/MIN/1.73
ERYTHROCYTE [DISTWIDTH] IN BLOOD BY AUTOMATED COUNT: 12.9 % (ref 12.3–15.4)
GLOBULIN UR ELPH-MCNC: 2.7 GM/DL
GLUCOSE SERPL-MCNC: 90 MG/DL (ref 65–99)
HCT VFR BLD AUTO: 40.6 % (ref 37.5–51)
HGB BLD-MCNC: 12.9 G/DL (ref 13–17.7)
MAGNESIUM SERPL-MCNC: 2.3 MG/DL (ref 1.6–2.4)
MCH RBC QN AUTO: 28.1 PG (ref 26.6–33)
MCHC RBC AUTO-ENTMCNC: 31.8 G/DL (ref 31.5–35.7)
MCV RBC AUTO: 88.5 FL (ref 79–97)
PHOSPHATE SERPL-MCNC: 3.6 MG/DL (ref 2.5–4.5)
PLATELET # BLD AUTO: 228 10*3/MM3 (ref 140–450)
PMV BLD AUTO: 9.9 FL (ref 6–12)
POTASSIUM SERPL-SCNC: 3.9 MMOL/L (ref 3.5–5.2)
PROT SERPL-MCNC: 6.2 G/DL (ref 6–8.5)
RBC # BLD AUTO: 4.59 10*6/MM3 (ref 4.14–5.8)
SODIUM SERPL-SCNC: 142 MMOL/L (ref 136–145)
WBC NRBC COR # BLD AUTO: 6.07 10*3/MM3 (ref 3.4–10.8)

## 2024-08-28 PROCEDURE — G0378 HOSPITAL OBSERVATION PER HR: HCPCS

## 2024-08-28 PROCEDURE — 97116 GAIT TRAINING THERAPY: CPT

## 2024-08-28 PROCEDURE — 75572 CT HRT W/3D IMAGE: CPT

## 2024-08-28 PROCEDURE — 85027 COMPLETE CBC AUTOMATED: CPT | Performed by: HOSPITALIST

## 2024-08-28 PROCEDURE — 97535 SELF CARE MNGMENT TRAINING: CPT

## 2024-08-28 PROCEDURE — 25510000001 IOPAMIDOL PER 1 ML: Performed by: INTERNAL MEDICINE

## 2024-08-28 PROCEDURE — 84100 ASSAY OF PHOSPHORUS: CPT | Performed by: HOSPITALIST

## 2024-08-28 PROCEDURE — 97530 THERAPEUTIC ACTIVITIES: CPT

## 2024-08-28 PROCEDURE — 83735 ASSAY OF MAGNESIUM: CPT | Performed by: HOSPITALIST

## 2024-08-28 PROCEDURE — 80053 COMPREHEN METABOLIC PANEL: CPT | Performed by: HOSPITALIST

## 2024-08-28 PROCEDURE — 99232 SBSQ HOSP IP/OBS MODERATE 35: CPT

## 2024-08-28 RX ORDER — IOPAMIDOL 755 MG/ML
100 INJECTION, SOLUTION INTRAVASCULAR
Status: COMPLETED | OUTPATIENT
Start: 2024-08-28 | End: 2024-08-28

## 2024-08-28 RX ADMIN — BUSPIRONE HYDROCHLORIDE 10 MG: 10 TABLET ORAL at 09:17

## 2024-08-28 RX ADMIN — GABAPENTIN 100 MG: 100 CAPSULE ORAL at 06:31

## 2024-08-28 RX ADMIN — ATORVASTATIN CALCIUM 40 MG: 20 TABLET, FILM COATED ORAL at 20:52

## 2024-08-28 RX ADMIN — GABAPENTIN 300 MG: 300 CAPSULE ORAL at 20:52

## 2024-08-28 RX ADMIN — IOPAMIDOL 95 ML: 755 INJECTION, SOLUTION INTRAVENOUS at 14:56

## 2024-08-28 RX ADMIN — TAMSULOSIN HYDROCHLORIDE 0.4 MG: 0.4 CAPSULE ORAL at 20:52

## 2024-08-28 RX ADMIN — ASPIRIN 81 MG: 81 TABLET, CHEWABLE ORAL at 09:17

## 2024-08-28 RX ADMIN — METOPROLOL TARTRATE 37.5 MG: 25 TABLET, FILM COATED ORAL at 20:52

## 2024-08-28 RX ADMIN — FUROSEMIDE 20 MG: 20 TABLET ORAL at 09:17

## 2024-08-28 RX ADMIN — BUSPIRONE HYDROCHLORIDE 10 MG: 10 TABLET ORAL at 20:52

## 2024-08-28 RX ADMIN — METOPROLOL TARTRATE 37.5 MG: 25 TABLET, FILM COATED ORAL at 09:17

## 2024-08-28 RX ADMIN — PANTOPRAZOLE SODIUM 40 MG: 40 TABLET, DELAYED RELEASE ORAL at 06:31

## 2024-08-28 RX ADMIN — CITALOPRAM 20 MG: 20 TABLET, FILM COATED ORAL at 09:17

## 2024-08-28 NOTE — PLAN OF CARE
Problem: Adult Inpatient Plan of Care  Goal: Plan of Care Review  Outcome: Ongoing, Progressing  Flowsheets (Taken 8/28/2024 0417)  Progress: improving  Plan of Care Reviewed With: patient  Goal: Absence of Hospital-Acquired Illness or Injury  Outcome: Ongoing, Progressing  Intervention: Identify and Manage Fall Risk  Recent Flowsheet Documentation  Taken 8/28/2024 0416 by Daniela Hairston, RN  Safety Promotion/Fall Prevention:   assistive device/personal items within reach   clutter free environment maintained   fall prevention program maintained   lighting adjusted   nonskid shoes/slippers when out of bed   room organization consistent   safety round/check completed  Taken 8/28/2024 0204 by Daniela Hairston, RN  Safety Promotion/Fall Prevention:   assistive device/personal items within reach   clutter free environment maintained   fall prevention program maintained   lighting adjusted   nonskid shoes/slippers when out of bed   room organization consistent   safety round/check completed  Taken 8/28/2024 0047 by Daniela Hairston, RN  Safety Promotion/Fall Prevention:   assistive device/personal items within reach   clutter free environment maintained   fall prevention program maintained   lighting adjusted   nonskid shoes/slippers when out of bed   room organization consistent   safety round/check completed  Taken 8/27/2024 2200 by Daniela Hairston, RN  Safety Promotion/Fall Prevention:   assistive device/personal items within reach   clutter free environment maintained   fall prevention program maintained   lighting adjusted   nonskid shoes/slippers when out of bed   room organization consistent   safety round/check completed  Taken 8/27/2024 2039 by Daniela Hairston, RN  Safety Promotion/Fall Prevention:   activity supervised   assistive device/personal items within reach   clutter free environment maintained   fall prevention program maintained   lighting adjusted   nonskid shoes/slippers when out of bed   room organization  consistent   safety round/check completed  Intervention: Prevent Skin Injury  Recent Flowsheet Documentation  Taken 8/28/2024 0416 by Daniela Hairston RN  Body Position: position changed independently  Taken 8/28/2024 0204 by Daniela Hairston RN  Body Position: position changed independently  Taken 8/28/2024 0047 by Daniela Hairston RN  Body Position: position changed independently  Taken 8/27/2024 2200 by Daniela Hairston RN  Body Position: position changed independently  Taken 8/27/2024 2039 by Daniela Hairston RN  Body Position: position changed independently  Skin Protection:   adhesive use limited   incontinence pads utilized   pectin skin barriers applied   transparent dressing maintained   tubing/devices free from skin contact  Intervention: Prevent and Manage VTE (Venous Thromboembolism) Risk  Recent Flowsheet Documentation  Taken 8/28/2024 0416 by Daniela Hairston RN  Activity Management: activity minimized  Taken 8/28/2024 0204 by Daniela Hairston RN  Activity Management: (asleep) other (see comments)  Taken 8/28/2024 0047 by Daniela Hairston RN  Activity Management: (asleep) other (see comments)  Taken 8/27/2024 2200 by Daniela Hairston RN  Activity Management: activity minimized  Taken 8/27/2024 2039 by Daniela Hairston RN  Activity Management: activity encouraged  VTE Prevention/Management:   bilateral   sequential compression devices on  Intervention: Prevent Infection  Recent Flowsheet Documentation  Taken 8/27/2024 2039 by Daniela Hairston RN  Infection Prevention:   environmental surveillance performed   hand hygiene promoted   personal protective equipment utilized   rest/sleep promoted   single patient room provided  Goal: Optimal Comfort and Wellbeing  Outcome: Ongoing, Progressing  Intervention: Provide Person-Centered Care  Recent Flowsheet Documentation  Taken 8/27/2024 2039 by Daniela Hairston RN  Trust Relationship/Rapport:   care explained   questions answered   questions encouraged  Goal: Readiness for Transition  of Care  Outcome: Ongoing, Progressing     Problem: Hypertension Comorbidity  Goal: Blood Pressure in Desired Range  Outcome: Ongoing, Progressing  Intervention: Maintain Blood Pressure Management  Recent Flowsheet Documentation  Taken 8/27/2024 2039 by Daniela Hairston RN  Medication Review/Management: medications reviewed     Problem: Fall Injury Risk  Goal: Absence of Fall and Fall-Related Injury  Outcome: Ongoing, Progressing  Intervention: Identify and Manage Contributors  Recent Flowsheet Documentation  Taken 8/27/2024 2039 by Daniela Hairston, RN  Medication Review/Management: medications reviewed  Intervention: Promote Injury-Free Environment  Recent Flowsheet Documentation  Taken 8/28/2024 0416 by Daniela Hairston, RN  Safety Promotion/Fall Prevention:   assistive device/personal items within reach   clutter free environment maintained   fall prevention program maintained   lighting adjusted   nonskid shoes/slippers when out of bed   room organization consistent   safety round/check completed  Taken 8/28/2024 0204 by Daniela Hairston, RN  Safety Promotion/Fall Prevention:   assistive device/personal items within reach   clutter free environment maintained   fall prevention program maintained   lighting adjusted   nonskid shoes/slippers when out of bed   room organization consistent   safety round/check completed  Taken 8/28/2024 0047 by Daniela Hairston, RN  Safety Promotion/Fall Prevention:   assistive device/personal items within reach   clutter free environment maintained   fall prevention program maintained   lighting adjusted   nonskid shoes/slippers when out of bed   room organization consistent   safety round/check completed  Taken 8/27/2024 2200 by Daniela Hairston, RN  Safety Promotion/Fall Prevention:   assistive device/personal items within reach   clutter free environment maintained   fall prevention program maintained   lighting adjusted   nonskid shoes/slippers when out of bed   room organization consistent    safety round/check completed  Taken 8/27/2024 2039 by Daniela Hairston, RN  Safety Promotion/Fall Prevention:   activity supervised   assistive device/personal items within reach   clutter free environment maintained   fall prevention program maintained   lighting adjusted   nonskid shoes/slippers when out of bed   room organization consistent   safety round/check completed     Problem: Skin Injury Risk Increased  Goal: Skin Health and Integrity  Outcome: Ongoing, Progressing  Intervention: Optimize Skin Protection  Recent Flowsheet Documentation  Taken 8/27/2024 2039 by Daniela Hairston, RN  Skin Protection:   adhesive use limited   incontinence pads utilized   pectin skin barriers applied   transparent dressing maintained   tubing/devices free from skin contact   Goal Outcome Evaluation:  Plan of Care Reviewed With: patient        Progress: improving

## 2024-08-28 NOTE — PLAN OF CARE
Goal Outcome Evaluation:  Plan of Care Reviewed With: patient        Progress: improving  Outcome Evaluation: Pt was found supine in bed, agreeable to OOB ax. He sits EOB with CGA, Shayne x1-2 for STS and fxl ambulation around room using RW. Pt sits at the sink to complete grooming tasks with set-upA, standing briefly. Pt demo's improvement with balance, act tolerance and endurance, however cont to recommend d/c rehab.

## 2024-08-28 NOTE — THERAPY TREATMENT NOTE
Patient Name: Flo Manzo  : 1955    MRN: [id number]                              Today's Date: 2024       Admit Date: 2024    Visit Dx:     ICD-10-CM ICD-9-CM   1. Generalized weakness  R53.1 780.79   2. Frequent falls  R29.6 V15.88   3. Right leg weakness  R29.898 729.89   4. History of CVA (cerebrovascular accident)  Z86.73 V12.54     Patient Active Problem List   Diagnosis    Umbilical hernia without obstruction and without gangrene    Essential hypertension    Arthritis of left knee    Depression    Obesity (BMI 30-39.9)    Atrial fibrillation with RVR    Substernal thyroid goiter    CHF (congestive heart failure)    Diastolic CHF, chronic    Hemorrhagic stroke    GÓMEZ on auto CPAP    Hypersomnia due to medical condition    Sleep-related hypoxia    Chronic atrial fibrillation    Vertigo    Aortic stenosis, mild    Sinus pause    Pacemaker    Chronic anticoagulation    Generalized weakness    Prediabetes    Pure hypercholesterolemia    Pre-operative cardiovascular examination    Peripheral neuropathy    History of colon polyps    Dizziness    Fever     Past Medical History:   Diagnosis Date    Arthritis     Atrial fibrillation with RVR 2019    Colon polyps 2018    Hepatic flexure: tubular adenoma, only low-grade dysplasia; splenic flexure: tubular adenoma, only low-grade dysplasia; sigmoid colon: tubular adenoma, multiple fragments only low-grade dysplasia    Depression     Heart murmur     Hemorrhagic stroke 2019    Hypertension     New onset atrial fibrillation (CMS/HCC) - RVR 2019    GÓMEZ (obstructive sleep apnea) 2019    Home sleep study with moderate severity GÓMEZ, AHI 20 events per hour.  Sleep-related hypoxia with low O2 saturation 84% for 14 minutes.    Peripheral neuropathy     Sleep apnea     previously diagnosed with sleep apnea, had T&A done and it has since resolved     Stroke     Uncontrolled hypertension     Ventral hernia      Past Surgical  History:   Procedure Laterality Date    APPENDECTOMY N/A 1972    BACK SURGERY      BLADDER STIMULATOR IMPLANT L LOWER BACK    CARDIAC CATHETERIZATION N/A 07/20/2004    Cath left ventriculography, coronary angiography and left heart catheterization, Normal results-Dr. Vadim Mancuso    CARDIAC CATHETERIZATION N/A 1/29/2019    Procedure: Left Heart Cath;  Surgeon: Eren Bruce MD;  Location: [facility] CATH INVASIVE LOCATION;  Service: Cardiology    CARDIAC CATHETERIZATION N/A 1/29/2019    Procedure: Left ventriculography;  Surgeon: Eren Bruce MD;  Location:  [facility] CATH INVASIVE LOCATION;  Service: Cardiology    CARDIAC CATHETERIZATION N/A 1/29/2019    Procedure: Coronary angiography;  Surgeon: Eren Bruce MD;  Location:  [facility] CATH INVASIVE LOCATION;  Service: Cardiology    CARDIAC ELECTROPHYSIOLOGY PROCEDURE N/A 3/4/2022    Procedure: Pacemaker SC new BIOTRONIK;  Surgeon: Eren Bruce MD;  [facility];  Service: Cardiology;  Laterality: N/A;    CARPAL TUNNEL RELEASE Right 2013    CARPAL TUNNEL RELEASE Left     COLONOSCOPY N/A 1/4/2018    Procedure: COLONOSCOPY with cold polypectomy and hot snare polypectomy;  Surgeon: Ten Solitario MD;  Location: [facility] ENDOSCOPY;  Service:     COLONOSCOPY N/A 4/25/2024    Procedure: COLONOSCOPY INTO CECUM;  Surgeon: Ten Solitario MD;  Location: [facility] ENDOSCOPY;  Service: General;  Laterality: N/A;  PRE: PERSONAL H/O COLON POLYP  /  POST: POOR PREP OTHERWISE NORMAL    EYE LENS IMPLANT SECONDARY Bilateral 2007, 2008    KNEE CARTILAGE SURGERY Right 1979    TONSILLECTOMY AND ADENOIDECTOMY Bilateral 2005    TOTAL KNEE ARTHROPLASTY Left 03/14/2016    Left total knee arthroplasty with Amberly component, size 11 femur, G tibial baseplate with a 10 polyethylene insert and a 38 patellar button-Dr. Pablo Hairston    TOTAL KNEE ARTHROPLASTY Right 03/02/2015    Right total knee arthroplasty with Amberly component size G femur,  11 tibial base plate with an 11 polyethylene insert and a 38 patellar button-Dr. Pablo Hairston    UVULOPALATOPHARYNGOPLASTY N/A 2005    part of soft palate, and uvula    VENTRAL HERNIA REPAIR N/A 1/23/2018    Procedure: VENTRAL HERNIA REPAIR LAPAROSCOPIC WITH DAVINCI ROBOT AND MESH;  Surgeon: Ten Solitario MD;  Location: [facility] OR;  Service:       General Information       Row Name 08/28/24 1606          Physical Therapy Time and Intention    Document Type therapy note (daily note)  -     Mode of Treatment physical therapy  -EB       Row Name 08/28/24 1606          General Information    Patient Profile Reviewed yes  -     Existing Precautions/Restrictions fall  -EB       Row Name 08/28/24 1606          Cognition    Orientation Status (Cognition) oriented x 3  -EB       Row Name 08/28/24 1606          Safety Issues, Functional Mobility    Impairments Affecting Function (Mobility) endurance/activity tolerance;strength;balance  -               User Key  (r) = Recorded By, (t) = Taken By, (c) = Cosigned By      Initials Name Provider Type    EB Dora Escobar PTA Physical Therapist Assistant                   Mobility       Row Name 08/28/24 1606          Bed Mobility    Supine-Sit Iron (Bed Mobility) contact guard  -     Sit-Supine Iron (Bed Mobility) not tested  -     Assistive Device (Bed Mobility) bed rails;head of bed elevated  -EB     Comment, (Bed Mobility) increased time, sequencing cues needed  -       Row Name 08/28/24 1606          Sit-Stand Transfer    Sit-Stand Iron (Transfers) minimum assist (75% patient effort);2 person assist  -     Assistive Device (Sit-Stand Transfers) walker, front-wheeled  -       Row Name 08/28/24 1606          Gait/Stairs (Locomotion)    Iron Level (Gait) contact guard;minimum assist (75% patient effort)  -     Assistive Device (Gait) walker, front-wheeled  -EB     Distance in Feet (Gait) 40  30ft, 10ft  -EB      Deviations/Abnormal Patterns (Gait) gait speed decreased;davon decreased;stride length decreased  -EB     Bilateral Gait Deviations forward flexed posture;heel strike decreased  -EB     Comment, (Gait/Stairs) cues for posture, cues to increase step length R>L, seated rest break, transport present to take pt to CT, ambulated to the stretcher about 10ft  -EB               User Key  (r) = Recorded By, (t) = Taken By, (c) = Cosigned By      Initials Name Provider Type    Dora Dominguez PTA Physical Therapist Assistant                   Obj/Interventions    No documentation.                  Goals/Plan    No documentation.                  Clinical Impression       Row Name 08/28/24 1608          Pain    Pretreatment Pain Rating 0/10 - no pain  -EB     Posttreatment Pain Rating 0/10 - no pain  -EB       Row Name 08/28/24 1608          Plan of Care Review    Plan of Care Reviewed With patient  -EB     Progress improving  -EB     Outcome Evaluation Pt seen for PT tx today. Pt agreeable to participate. Pt required CGA with bed mobility needing increased time and sequencing cues. Pt stood with Shayne/MinAX2 cues for hand placement. Pt ambulated 30ft then 10ft, CGA/Shayne, rwx. Seated rest beak. Pt cued for upright posture and cues for pt increasing mukund step length but R>L. Pt neded some assist with guiding walker.  Pt will need rehab at d/c.  -       Row Name 08/28/24 1608          Therapy Assessment/Plan (PT)    Therapy Frequency (PT) 6 times/wk  -       Row Name 08/28/24 1608          Positioning and Restraints    Pre-Treatment Position in bed  -EB     Post Treatment Position other  transport bed.  -EB               User Key  (r) = Recorded By, (t) = Taken By, (c) = Cosigned By      Initials Name Provider Type    Dora Dominguez PTA Physical Therapist Assistant                   Outcome Measures       Row Name 08/28/24 1611 08/28/24 0917       How much help from another person do you currently need...    Turning  from your back to your side while in flat bed without using bedrails? 3  -EB 4  -AS    Moving from lying on back to sitting on the side of a flat bed without bedrails? 3  -EB 3  -AS    Moving to and from a bed to a chair (including a wheelchair)? 3  -EB 2  -AS    Standing up from a chair using your arms (e.g., wheelchair, bedside chair)? 2  -EB 3  -AS    Climbing 3-5 steps with a railing? 1  -EB 2  -AS    To walk in hospital room? 3  -EB 2  -AS    AM-PAC 6 Clicks Score (PT) 15  -EB 16  -AS    Highest Level of Mobility Goal 4 --> Transfer to chair/commode  -EB 5 --> Static standing  -AS              User Key  (r) = Recorded By, (t) = Taken By, (c) = Cosigned By      Initials Name Provider Type    EB Dora Escobar PTA Physical Therapist Assistant    AS Chema Lei RN Registered Nurse                                 Physical Therapy Education       Title: PT OT SLP Therapies (In Progress)       Topic: Physical Therapy (In Progress)       Point: Mobility training (Done)       Learning Progress Summary             Patient Acceptance, E,D, VU,NR by  at 8/28/2024 1612    Acceptance, E,D, VU,NR by MS at 8/27/2024 1110                         Point: Home exercise program (Not Started)       Learner Progress:  Not documented in this visit.              Point: Body mechanics (Done)       Learning Progress Summary             Patient Acceptance, E,D, VU,NR by  at 8/28/2024 1612    Acceptance, E,D, VU,NR by MS at 8/27/2024 1110                         Point: Precautions (Done)       Learning Progress Summary             Patient Acceptance, E,D, VU,NR by MS at 8/27/2024 1110                                         User Key       Initials Effective Dates Name Provider Type Discipline    MS 06/16/21 -  Pablo Mendoza PT Physical Therapist PT     02/14/23 -  Dora Escobar PTA Physical Therapist Assistant PT                  PT Recommendation and Plan     Plan of Care Reviewed With: patient  Progress:  improving  Outcome Evaluation: Pt seen for PT tx today. Pt agreeable to participate. Pt required CGA with bed mobility needing increased time and sequencing cues. Pt stood with Shayne/MinAX2 cues for hand placement. Pt ambulated 30ft then 10ft, CGA/Shayne, rwx. Seated rest beak. Pt cued for upright posture and cues for pt increasing mukund step length but R>L. Pt neded some assist with guiding walker.  Pt will need rehab at d/c.     Time Calculation:         PT Charges       Row Name 08/28/24 1612             Time Calculation    Start Time 1407  -EB      Stop Time 1431  -EB      Time Calculation (min) 24 min  -EB      PT Received On 08/28/24  -EB      PT - Next Appointment 08/29/24  -EB         Time Calculation- PT    Total Timed Code Minutes- PT 24 minute(s)  -EB                User Key  (r) = Recorded By, (t) = Taken By, (c) = Cosigned By      Initials Name Provider Type    Dora Dominguez PTA Physical Therapist Assistant                  Therapy Charges for Today       Code Description Service Date Service Provider Modifiers Qty    49009837402 HC GAIT TRAINING EA 15 MIN 8/28/2024 Dora Escobar, ZAC GP 1    67765845095 HC PT THERAPEUTIC ACT EA 15 MIN 8/28/2024 Dora Escobar PTA GP 1            PT G-Codes  Outcome Measure Options: AM-PAC 6 Clicks Daily Activity (OT), Modified [place]  AM-PAC 6 Clicks Score (PT): 15  AM-PAC 6 Clicks Score (OT): 15  Modified [place] Scale: 4 - Moderately severe disability.  Unable to walk without assistance, and unable to attend to own bodily needs without assistance.       Dora Escobar PTA  8/28/2024

## 2024-08-28 NOTE — CASE MANAGEMENT/SOCIAL WORK
Continued Stay Note  [facility]     Patient Name: Flo Manzo  MRN: [id number]  Today's Date: 8/28/2024    Admit Date: 8/26/2024    Plan: [facility] SNF. Has qualifying stay from IP admission 8/5-8/9.  [facility] van setup to transport tomorrow at 1700. Notified Wife   Discharge Plan       Row Name 08/28/24 1601       Plan    Plan [facility] SNF. Has qualifying stay from IP admission 8/5-8/9.  [facility] van setup to transport tomorrow at 1700. Notified Wife    Patient/Family in Agreement with Plan yes    Plan Comments Per Dr Brenner, ready to D/C to SNF. Per Mehnaz/Sujata, [facility] can accept tomorrow late afternoon (pt will be D/C from [place] and pt will take that bed). Called and setup transport with  [facility] for tomorrow at 1700. Discussed D/C plan with pt at bedside. Called and notified wife of D/C plan. Has qualifying stay from IP admission 8/5-8/9. Arley Pretty RN-BC                   Discharge Codes    No documentation.                 Expected Discharge Date and Time       Expected Discharge Date Expected Discharge Time    Aug 29, 2024               Arley Pretty RN

## 2024-08-28 NOTE — THERAPY TREATMENT NOTE
Patient Name: Flo Manzo  : 1955    MRN: [id number]                              Today's Date: 2024       Admit Date: 2024    Visit Dx:     ICD-10-CM ICD-9-CM   1. Generalized weakness  R53.1 780.79   2. Frequent falls  R29.6 V15.88   3. Right leg weakness  R29.898 729.89   4. History of CVA (cerebrovascular accident)  Z86.73 V12.54     Patient Active Problem List   Diagnosis    Umbilical hernia without obstruction and without gangrene    Essential hypertension    Arthritis of left knee    Depression    Obesity (BMI 30-39.9)    Atrial fibrillation with RVR    Substernal thyroid goiter    CHF (congestive heart failure)    Diastolic CHF, chronic    Hemorrhagic stroke    GÓMEZ on auto CPAP    Hypersomnia due to medical condition    Sleep-related hypoxia    Chronic atrial fibrillation    Vertigo    Aortic stenosis, mild    Sinus pause    Pacemaker    Chronic anticoagulation    Generalized weakness    Prediabetes    Pure hypercholesterolemia    Pre-operative cardiovascular examination    Peripheral neuropathy    History of colon polyps    Dizziness    Fever     Past Medical History:   Diagnosis Date    Arthritis     Atrial fibrillation with RVR 2019    Colon polyps 2018    Hepatic flexure: tubular adenoma, only low-grade dysplasia; splenic flexure: tubular adenoma, only low-grade dysplasia; sigmoid colon: tubular adenoma, multiple fragments only low-grade dysplasia    Depression     Heart murmur     Hemorrhagic stroke 2019    Hypertension     New onset atrial fibrillation (CMS/HCC) - RVR 2019    GÓMEZ (obstructive sleep apnea) 2019    Home sleep study with moderate severity GÓMEZ, AHI 20 events per hour.  Sleep-related hypoxia with low O2 saturation 84% for 14 minutes.    Peripheral neuropathy     Sleep apnea     previously diagnosed with sleep apnea, had T&A done and it has since resolved     Stroke     Uncontrolled hypertension     Ventral hernia      Past Surgical  History:   Procedure Laterality Date    APPENDECTOMY N/A 1972    BACK SURGERY      BLADDER STIMULATOR IMPLANT L LOWER BACK    CARDIAC CATHETERIZATION N/A 07/20/2004    Cath left ventriculography, coronary angiography and left heart catheterization, Normal results-Dr. Vadim Mancuso    CARDIAC CATHETERIZATION N/A 1/29/2019    Procedure: Left Heart Cath;  Surgeon: Eren Bruce MD;  Location: [facility] CATH INVASIVE LOCATION;  Service: Cardiology    CARDIAC CATHETERIZATION N/A 1/29/2019    Procedure: Left ventriculography;  Surgeon: Eren Bruce MD;  Location:  [facility] CATH INVASIVE LOCATION;  Service: Cardiology    CARDIAC CATHETERIZATION N/A 1/29/2019    Procedure: Coronary angiography;  Surgeon: Eren Bruce MD;  Location:  [facility] CATH INVASIVE LOCATION;  Service: Cardiology    CARDIAC ELECTROPHYSIOLOGY PROCEDURE N/A 3/4/2022    Procedure: Pacemaker SC new BIOTRONIK;  Surgeon: Eren Bruce MD;  [facility];  Service: Cardiology;  Laterality: N/A;    CARPAL TUNNEL RELEASE Right 2013    CARPAL TUNNEL RELEASE Left     COLONOSCOPY N/A 1/4/2018    Procedure: COLONOSCOPY with cold polypectomy and hot snare polypectomy;  Surgeon: Ten Solitario MD;  Location: [facility] ENDOSCOPY;  Service:     COLONOSCOPY N/A 4/25/2024    Procedure: COLONOSCOPY INTO CECUM;  Surgeon: Ten Solitario MD;  Location: [facility] ENDOSCOPY;  Service: General;  Laterality: N/A;  PRE: PERSONAL H/O COLON POLYP  /  POST: POOR PREP OTHERWISE NORMAL    EYE LENS IMPLANT SECONDARY Bilateral 2007, 2008    KNEE CARTILAGE SURGERY Right 1979    TONSILLECTOMY AND ADENOIDECTOMY Bilateral 2005    TOTAL KNEE ARTHROPLASTY Left 03/14/2016    Left total knee arthroplasty with Amberly component, size 11 femur, G tibial baseplate with a 10 polyethylene insert and a 38 patellar button-Dr. Pablo Hairston    TOTAL KNEE ARTHROPLASTY Right 03/02/2015    Right total knee arthroplasty with Amberly component size G femur,  11 tibial base plate with an 11 polyethylene insert and a 38 patellar button-Dr. Pablo Hairston    UVULOPALATOPHARYNGOPLASTY N/A 2005    part of soft palate, and uvula    VENTRAL HERNIA REPAIR N/A 1/23/2018    Procedure: VENTRAL HERNIA REPAIR LAPAROSCOPIC WITH DAVINCI ROBOT AND MESH;  Surgeon: Ten Solitario MD;  Location: [facility] OR;  Service:       General Information       Row Name 08/28/24 1535          OT Time and Intention    Document Type therapy note (daily note)  -     Mode of Treatment occupational therapy  -       Row Name 08/28/24 1535          General Information    Patient Profile Reviewed yes  -     Existing Precautions/Restrictions fall  -       Row Name 08/28/24 1535          Cognition    Orientation Status (Cognition) oriented x 3  -       Row Name 08/28/24 1535          Safety Issues, Functional Mobility    Impairments Affecting Function (Mobility) balance;endurance/activity tolerance;strength  -               User Key  (r) = Recorded By, (t) = Taken By, (c) = Cosigned By      Initials Name Provider Type     Tawana Sinclair OT Occupational Therapist                     Mobility/ADL's       Row Name 08/28/24 1535          Bed Mobility    Bed Mobility sit-supine  -     Supine-Sit Zapata (Bed Mobility) contact guard  -     Assistive Device (Bed Mobility) bed rails;head of bed elevated  -       Row Name 08/28/24 1535          Transfers    Transfers sit-stand transfer  -       Row Name 08/28/24 1535          Sit-Stand Transfer    Sit-Stand Zapata (Transfers) minimum assist (75% patient effort);1 person assist;2 person assist  -     Assistive Device (Sit-Stand Transfers) walker, front-wheeled  -       Row Name 08/28/24 1535          Functional Mobility    Functional Mobility- Comment Shayne using RW around room and to sink for ADLs  -       Row Name 08/28/24 1535          Activities of Daily Living    BADL Assessment/Intervention grooming  -       Row Name  08/28/24 1535          Grooming Assessment/Training    [place] Level (Grooming) grooming skills;oral care regimen;wash face, hands;hair care, combing/brushing;supervision  -     Oral Care teeth brushed - regular toothbrush  -     Position (Grooming) sink side;supported sitting;supported standing  -     Comment, (Grooming) mostly sitting at sink, briefly able to stand to complete ADLs  -               User Key  (r) = Recorded By, (t) = Taken By, (c) = Cosigned By      Initials Name Provider Type    Tawana Aguila OT Occupational Therapist                   Obj/Interventions    No documentation.                  Goals/Plan    No documentation.                  Clinical Impression       Row Name 08/28/24 1537          Pain Assessment    Pretreatment Pain Rating 0/10 - no pain  -     Posttreatment Pain Rating 0/10 - no pain  -       Row Name 08/28/24 1537          Plan of Care Review    Plan of Care Reviewed With patient  -     Progress improving  -     Outcome Evaluation Pt was found supine in bed, agreeable to OOB ax. He sits EOB with CGA, Shayne x1-2 for STS and fxl ambulation around room using RW. Pt sits at the sink to complete grooming tasks with set-upA, standing briefly. Pt demo's improvement with balance, act tolerance and endurance, however cont to recommend d/c rehab.  -       Row Name 08/28/24 1537          Positioning and Restraints    Pre-Treatment Position in bed  -     Post Treatment Position other  -     Other Position with other staff  transport  -               User Key  (r) = Recorded By, (t) = Taken By, (c) = Cosigned By      Initials Name Provider Type    Tawana Aguila OT Occupational Therapist                   Outcome Measures       Row Name 08/28/24 0917          How much help from another person do you currently need...    Turning from your back to your side while in flat bed without using bedrails? 4  -AS     Moving from lying on back to sitting on the side of a  flat bed without bedrails? 3  -AS     Moving to and from a bed to a chair (including a wheelchair)? 2  -AS     Standing up from a chair using your arms (e.g., wheelchair, bedside chair)? 3  -AS     Climbing 3-5 steps with a railing? 2  -AS     To walk in hospital room? 2  -AS     AM-PAC 6 Clicks Score (PT) 16  -AS     Highest Level of Mobility Goal 5 --> Static standing  -AS               User Key  (r) = Recorded By, (t) = Taken By, (c) = Cosigned By      Initials Name Provider Type    AS Chema Lei, RN Registered Nurse                    Occupational Therapy Education       Title: PT OT SLP Therapies (In Progress)       Topic: Occupational Therapy (In Progress)       Point: ADL training (Done)       Description:   Instruct learner(s) on proper safety adaptation and remediation techniques during self care or transfers.   Instruct in proper use of assistive devices.                  Learning Progress Summary             Patient Acceptance, E, VU by GABI at 8/27/2024 1302                         Point: Home exercise program (Not Started)       Description:   Instruct learner(s) on appropriate technique for monitoring, assisting and/or progressing therapeutic exercises/activities.                  Learner Progress:  Not documented in this visit.              Point: Precautions (Done)       Description:   Instruct learner(s) on prescribed precautions during self-care and functional transfers.                  Learning Progress Summary             Patient Acceptance, E, VU by GABI at 8/27/2024 1302                         Point: Body mechanics (Done)       Description:   Instruct learner(s) on proper positioning and spine alignment during self-care, functional mobility activities and/or exercises.                  Learning Progress Summary             Patient Acceptance, E, VU by GABI at 8/27/2024 1302                                         User Key       Initials Effective Dates Name Provider Type Nishant ASHLEY  06/10/21 -  Tawana Sinclair OT Occupational Therapist OT                  OT Recommendation and Plan  Planned Therapy Interventions (OT): activity tolerance training, BADL retraining, functional balance retraining, occupation/activity based interventions, neuromuscular control/coordination retraining, patient/caregiver education/training, transfer/mobility retraining, strengthening exercise  Therapy Frequency (OT): 5 times/wk  Plan of Care Review  Plan of Care Reviewed With: patient  Progress: improving  Outcome Evaluation: Pt was found supine in bed, agreeable to OOB ax. He sits EOB with CGA, Shayne x1-2 for STS and fxl ambulation around room using RW. Pt sits at the sink to complete grooming tasks with set-upA, standing briefly. Pt demo's improvement with balance, act tolerance and endurance, however cont to recommend d/c rehab.     Time Calculation:   Evaluation Complexity (OT)  Review Occupational Profile/Medical/Therapy History Complexity: expanded/moderate complexity  Assessment, Occupational Performance/Identification of Deficit Complexity: 3-5 performance deficits  Clinical Decision Making Complexity (OT): detailed assessment/moderate complexity  Overall Complexity of Evaluation (OT): moderate complexity     Time Calculation- OT       Row Name 08/28/24 1539             Time Calculation- OT    OT Start Time 1407  -JW      OT Stop Time 1431  -JW      OT Time Calculation (min) 24 min  -      Total Timed Code Minutes- OT 24 minute(s)  -      OT Received On 08/28/24  -      OT - Next Appointment 08/29/24  -         Timed Charges    15157 - OT Therapeutic Activity Minutes 10  -JW      37462 - OT Self Care/Mgmt Minutes 14  -JW         Total Minutes    Timed Charges Total Minutes 24  -       Total Minutes 24  -JW                User Key  (r) = Recorded By, (t) = Taken By, (c) = Cosigned By      Initials Name Provider Type    JW Tawana Sinclair OT Occupational Therapist                  Therapy Charges for Today        Code Description Service Date Service Provider Modifiers Qty    64666250694 HC OT THERAPEUTIC ACT EA 15 MIN 8/27/2024 Tawana Sinclair OT GO 1    [id number]  OT EVAL MOD COMPLEXITY 2 8/27/2024 Tawana Sinclair OT GO 1    63630857928 HC OT THERAPEUTIC ACT EA 15 MIN 8/28/2024 Tawana Sinclair OT GO 1    [id number]  OT SELF CARE/MGMT/TRAIN EA 15 MIN 8/28/2024 Tawana Sinclair OT GO 1                 Tawana Sinclair OT  8/28/2024

## 2024-08-28 NOTE — PLAN OF CARE
Goal Outcome Evaluation:  Plan of Care Reviewed With: patient        Progress: improving  Outcome Evaluation: Pt seen for PT tx today. Pt agreeable to participate. Pt required CGA with bed mobility needing increased time and sequencing cues. Pt stood with Shayne/MinAX2 cues for hand placement. Pt ambulated 30ft then 10ft, CGA/Shayne, rwx. Seated rest beak. Pt cued for upright posture and cues for pt increasing mukund step length but R>L. Pt neded some assist with guiding walker.  Pt will need rehab at d/c.

## 2024-08-28 NOTE — PROGRESS NOTES
[facility]  Cardiology Progress Note    Patient Identification:  Name: Flo Manzo  Age: 69 y.o.  Sex: male  :  1955  MRN: [id number]                 Follow Up / Chief Complaint: follow up for afib/off AC/watchman     Interval History:seen by structural team yesterday, planning ct while admitted with plans for LAAO as outpatient          Objective:    Past Medical History:  Past Medical History:   Diagnosis Date    Arthritis     Atrial fibrillation with RVR 2019    Colon polyps 2018    Hepatic flexure: tubular adenoma, only low-grade dysplasia; splenic flexure: tubular adenoma, only low-grade dysplasia; sigmoid colon: tubular adenoma, multiple fragments only low-grade dysplasia    Depression     Heart murmur     Hemorrhagic stroke 2019    Hypertension     New onset atrial fibrillation (CMS/HCC) - RVR 2019    GÓMEZ (obstructive sleep apnea) 2019    Home sleep study with moderate severity GÓMEZ, AHI 20 events per hour.  Sleep-related hypoxia with low O2 saturation 84% for 14 minutes.    Peripheral neuropathy     Sleep apnea     previously diagnosed with sleep apnea, had T&A done and it has since resolved     Stroke     Uncontrolled hypertension     Ventral hernia      Past Surgical History:  Past Surgical History:   Procedure Laterality Date    APPENDECTOMY N/A     BACK SURGERY      BLADDER STIMULATOR IMPLANT L LOWER BACK    CARDIAC CATHETERIZATION N/A 2004    Cath left ventriculography, coronary angiography and left heart catheterization, Normal results-Dr. Fierro     CARDIAC CATHETERIZATION N/A 2019    Procedure: Left Heart Cath;  Surgeon: Eren Bruce MD;  Location:  [facility] CATH INVASIVE LOCATION;  Service: Cardiology    CARDIAC CATHETERIZATION N/A 2019    Procedure: Left ventriculography;  Surgeon: Eren Bruce MD;  Location:  [facility] CATH INVASIVE LOCATION;  Service: Cardiology    CARDIAC CATHETERIZATION N/A 2019     Procedure: Coronary angiography;  Surgeon: Eren Bruce MD;  Location: [facility] CATH INVASIVE LOCATION;  Service: Cardiology    CARDIAC ELECTROPHYSIOLOGY PROCEDURE N/A 3/4/2022    Procedure: Pacemaker SC new BIOTRONIK;  Surgeon: Eren Bruce MD;  [facility];  Service: Cardiology;  Laterality: N/A;    CARPAL TUNNEL RELEASE Right 2013    CARPAL TUNNEL RELEASE Left     COLONOSCOPY N/A 1/4/2018    Procedure: COLONOSCOPY with cold polypectomy and hot snare polypectomy;  Surgeon: Ten Solitario MD;  [facility];  Service:     COLONOSCOPY N/A 4/25/2024    Procedure: COLONOSCOPY INTO CECUM;  Surgeon: Ten Solitario MD;  [facility];  Service: General;  Laterality: N/A;  PRE: PERSONAL H/O COLON POLYP  /  POST: POOR PREP OTHERWISE NORMAL    EYE LENS IMPLANT SECONDARY Bilateral 2007, 2008    KNEE CARTILAGE SURGERY Right 1979    TONSILLECTOMY AND ADENOIDECTOMY Bilateral 2005    TOTAL KNEE ARTHROPLASTY Left 03/14/2016    Left total knee arthroplasty with Amberly component, size 11 femur, G tibial baseplate with a 10 polyethylene insert and a 38 patellar button-Dr. Pablo Hairston    TOTAL KNEE ARTHROPLASTY Right 03/02/2015    Right total knee arthroplasty with Amberly component size G femur, 11 tibial base plate with an 11 polyethylene insert and a 38 patellar button-Dr. Pablo Hairston    UVULOPALATOPHARYNGOPLASTY N/A 2005    part of soft palate, and uvula    VENTRAL HERNIA REPAIR N/A 1/23/2018    Procedure: VENTRAL HERNIA REPAIR LAPAROSCOPIC WITH DAVINCI ROBOT AND MESH;  Surgeon: Ten Solitario MD;  Location: [facility] MAIN OR;  Service:         Social History:   Social History     Tobacco Use    Smoking status: Never     Passive exposure: Past    Smokeless tobacco: Never   Substance Use Topics    Alcohol use: Not Currently     Comment: social, maybe a drink a month, not since stroke      Family History:  Family History   Problem Relation Age of  Onset    Hypertension Mother     Kidney failure Mother     Coronary artery disease Father     Lung cancer Father         positive smoker    Heart attack Father     Breast cancer Sister 60    Prostate cancer Brother     Colon polyps Brother     Kidney nephrosis Brother     Malig Hyperthermia Neg Hx           Allergies:  Allergies   Allergen Reactions    Poison Ivy Extract Hives, Itching and Rash     ITCHY BLISTERS     Scheduled Meds:  aspirin, 81 mg, Daily  atorvastatin, 40 mg, Nightly  busPIRone, 10 mg, BID  citalopram, 20 mg, Daily  furosemide, 20 mg, Daily  gabapentin, 100 mg, QAM  gabapentin, 300 mg, Nightly  metoprolol tartrate, 37.5 mg, Q12H  pantoprazole, 40 mg, Q AM  tamsulosin, 0.4 mg, Nightly            INTAKE AND OUTPUT:    Intake/Output Summary (Last 24 hours) at 8/28/2024 1326  Last data filed at 8/28/2024 0712  Gross per 24 hour   Intake 600 ml   Output --   Net 600 ml       Review of Systems:   GI: no n/v or abd pain  Cardiac: no chest pain or palpitations  Pulmonary: no shortness of breath or cough      Constitutional:  Temp:  [97.9 °F (36.6 °C)-98.2 °F (36.8 °C)] 97.9 °F (36.6 °C)  Heart Rate:  [68-87] 78  Resp:  [18-20] 18  BP: (105-134)/(78-97) 134/97    Physical Exam:  General:  Alert, cooperative, appears in no acute distress  Respiratory: Clear to auscultation.  Normal respiratory effort and rate.  Cardiovascular: S1S2 Regular rate and rhythm. No murmur, rub or gallop.   Gastrointestinal: soft, non tender. Bowel sounds present.   Extremities: ROCKWELL x4. No pretibial pitting edema. Adequate musculoskeletal strength.   Neuro: AAO x3 CN II-XII grossly intact        Cardiographics    Lab Review       Results from last 7 days   Lab Units 08/28/24  0338   MAGNESIUM mg/dL 2.3     Results from last 7 days   Lab Units 08/28/24  0338   SODIUM mmol/L 142   POTASSIUM mmol/L 3.9   BUN mg/dL 14   CREATININE mg/dL 0.69*   CALCIUM mg/dL 9.4     @[id number]@  Results from last 7 days   Lab Units 08/28/24 0338  "08/26/24  1746   WBC 10*3/mm3 6.07 6.42   HEMOGLOBIN g/dL 12.9* 13.4   HEMATOCRIT % 40.6 41.9   PLATELETS 10*3/mm3 228 237             Assessment:  Generalized weakness  Hx recent CVA/cerebral amyloid angiopathy-neurology has signed off, they are ok with transient anticoagulation with Eliquis for watchman  SSS s/p pacemaker  Chronic Persistent atrial fibrillation-rate controlled  Chronic diastolic CHF-volume stable on exam  GÓMEZ on CPAP  Hypertension      Plan:  BP and HR stable  Plan for CT scan now with plans for proceeding with LAAO/watchman as outpatient-seen by structural team  No chest pain or shortness of breath       )8/28/2024  ASHANTI Trinidad/Transcription:   \"Dictated utilizing Dragon dictation\".     "

## 2024-08-28 NOTE — PROGRESS NOTES
Name: Flo Manzo ADMIT: 2024   : 1955  PCP: Karen Jiménez APRN    MRN: [id number] LOS: 1 days   AGE/SEX: 69 y.o. male  ROOM: [facility]   Subjective   Chief Complaint   Patient presents with    Extremity Weakness     Still with weakness  Recent stroke  Working with therapy    ROS  No f/c  No n/v  No cp/palp  No soa/cough    Objective   Vital Signs  Temp:  [97.9 °F (36.6 °C)-99 °F (37.2 °C)] 99 °F (37.2 °C)  Heart Rate:  [68-87] 80  Resp:  [18-20] 18  BP: (105-134)/(78-97) 110/80  SpO2:  [91 %-99 %] 95 %  on  Flow (L/min):  [2] 2;   Device (Oxygen Therapy): nasal cannula  Body mass index is 31.16 kg/m².    Physical Exam  HENT:      Head: Normocephalic and atraumatic.   Eyes:      General: No scleral icterus.  Pulmonary:      Effort: Pulmonary effort is normal. No respiratory distress.   Musculoskeletal:      Cervical back: Neck supple.   Neurological:      Mental Status: He is alert.   Psychiatric:         Mood and Affect: Mood normal.         Behavior: Behavior normal.         Thought Content: Thought content normal.         Judgment: Judgment normal.         Results Review:       I reviewed the patient's new clinical results.  Results from last 7 days   Lab Units 24  0338 24  1746   WBC 10*3/mm3 6.07 6.42   HEMOGLOBIN g/dL 12.9* 13.4   PLATELETS 10*3/mm3 228 237     Results from last 7 days   Lab Units 24  0338 24  1746   SODIUM mmol/L 142 142   POTASSIUM mmol/L 3.9 3.9   CHLORIDE mmol/L 107 107   CO2 mmol/L 27.0 26.9   BUN mg/dL 14 16   CREATININE mg/dL 0.69* 0.85   GLUCOSE mg/dL 90 97   Estimated Creatinine Clearance: 120.6 mL/min (A) (by C-G formula based on SCr of 0.69 mg/dL (L)).  Results from last 7 days   Lab Units 24  0338   ALBUMIN g/dL 3.5   BILIRUBIN mg/dL 0.7   ALK PHOS U/L 99   AST (SGOT) U/L 7   ALT (SGPT) U/L 14     Results from last 7 days   Lab Units 24  0338 24  1746   CALCIUM mg/dL 9.4 9.6   ALBUMIN g/dL 3.5  --    MAGNESIUM mg/dL 2.3   "--    PHOSPHORUS mg/dL 3.6  --          Coag     HbA1C   Lab Results   Component Value Date    HGBA1C 5.70 (H) 08/06/2024    HGBA1C 5.50 03/13/2024    HGBA1C 5.60 02/03/2023     Infection     Radiology(recent) No radiology results for the last day  No results found for: \"TROPONINT\", \"TROPONINI\", \"BNP\"  No components found for: \"TSH;2\"    aspirin, 81 mg, Oral, Daily  atorvastatin, 40 mg, Oral, Nightly  busPIRone, 10 mg, Oral, BID  citalopram, 20 mg, Oral, Daily  furosemide, 20 mg, Oral, Daily  gabapentin, 100 mg, Oral, QAM  gabapentin, 300 mg, Oral, Nightly  metoprolol tartrate, 37.5 mg, Oral, Q12H  pantoprazole, 40 mg, Oral, Q AM  tamsulosin, 0.4 mg, Oral, Nightly       Diet: Cardiac, Diabetic; Healthy Heart (2-3 Na+); Consistent Carbohydrate; Fluid Consistency: Thin (IDDSI 0)      Assessment & Plan      Active Hospital Problems    Diagnosis  POA    **Generalized weakness [R53.1]  Yes    Pacemaker [Z95.0]  Yes    Aortic stenosis, mild [I35.0]  Yes    Chronic atrial fibrillation [I48.20]  Yes    GÓMEZ on auto CPAP [G47.33]  Yes    Diastolic CHF, chronic [I50.32]  Yes    Essential hypertension [I10]  Yes    Obesity (BMI 30-39.9) [E66.9]  Yes      Resolved Hospital Problems   No resolved problems to display.     Very pleasant 70yo gentleman with PAF, HTN, GÓMEZ, and chronic diastolic CHF, who was admitted to our service earlier this month with PNA and acute left CVA with left facial droop and slurred speech. He was diagnosed with cerebral amyloid angiopathy. He was dc'd to rehab and eventually dc'd home from rehab a few days ago. After he got home he began to get weaker in legs and have more trouble walking--he reports RLE was weaker than LLE.     Neuro has evaluated and agree with current management as cannot start a/c due to high risk of hemorrhagic conversion with pt's AC stopped last admission due to discovery of cerebral amyloid angiopathy, ASA started    Card had mentioned possibility of Watchman device--doing some " of evaluation here    PT/OT/CCP working on disposition.     DW staff  Reviewed records      Terrance Brenner MD  [facility]  08/28/24  15:07 EDT

## 2024-08-28 NOTE — PLAN OF CARE
Goal Outcome Evaluation:           Progress: improving  Outcome Evaluation: MRI of brain without contrast was ordered yesterday, Pt is waiting for a family member to bring the remote to his bladder stimulator, fully charged, to the unit before MRI can perform the test. Pt transfers well with 1 x assist, walker, and a gait belt but is weak on his right side. PT recommends Pt be up in the chair meals and Pt wants to be compliant with that. He has been up in the chair for all 3 meals today. CT today. Waiting on Integra[employer] device. Pt plans to be d/c tomorrow to [facility] via wheelchair van.

## 2024-08-29 VITALS
WEIGHT: 217.15 LBS | HEIGHT: 70 IN | TEMPERATURE: 98.1 F | BODY MASS INDEX: 31.09 KG/M2 | DIASTOLIC BLOOD PRESSURE: 88 MMHG | SYSTOLIC BLOOD PRESSURE: 103 MMHG | RESPIRATION RATE: 18 BRPM | OXYGEN SATURATION: 95 % | HEART RATE: 71 BPM

## 2024-08-29 PROBLEM — I48.0 PAROXYSMAL ATRIAL FIBRILLATION: Status: ACTIVE | Noted: 2024-08-29

## 2024-08-29 PROBLEM — Z86.73 HISTORY OF CVA (CEREBROVASCULAR ACCIDENT): Status: ACTIVE | Noted: 2024-08-29

## 2024-08-29 PROBLEM — R29.6 FREQUENT FALLS: Status: ACTIVE | Noted: 2024-08-29

## 2024-08-29 PROBLEM — R29.898 RIGHT LEG WEAKNESS: Status: ACTIVE | Noted: 2024-08-29

## 2024-08-29 PROCEDURE — 99232 SBSQ HOSP IP/OBS MODERATE 35: CPT | Performed by: NURSE PRACTITIONER

## 2024-08-29 PROCEDURE — G0378 HOSPITAL OBSERVATION PER HR: HCPCS

## 2024-08-29 RX ORDER — CITALOPRAM HYDROBROMIDE 20 MG/1
30 TABLET ORAL DAILY
Start: 2024-08-29

## 2024-08-29 RX ORDER — GABAPENTIN 100 MG/1
100 CAPSULE ORAL EVERY MORNING
Qty: 5 CAPSULE | Refills: 0 | Status: SHIPPED | OUTPATIENT
Start: 2024-08-29

## 2024-08-29 RX ORDER — GABAPENTIN 300 MG/1
300 CAPSULE ORAL NIGHTLY
Qty: 5 CAPSULE | Refills: 0 | Status: SHIPPED | OUTPATIENT
Start: 2024-08-29

## 2024-08-29 RX ADMIN — BUSPIRONE HYDROCHLORIDE 10 MG: 10 TABLET ORAL at 08:09

## 2024-08-29 RX ADMIN — CITALOPRAM 20 MG: 20 TABLET, FILM COATED ORAL at 08:09

## 2024-08-29 RX ADMIN — FUROSEMIDE 20 MG: 20 TABLET ORAL at 08:09

## 2024-08-29 RX ADMIN — METOPROLOL TARTRATE 37.5 MG: 25 TABLET, FILM COATED ORAL at 08:09

## 2024-08-29 RX ADMIN — GABAPENTIN 100 MG: 100 CAPSULE ORAL at 06:20

## 2024-08-29 RX ADMIN — ASPIRIN 81 MG: 81 TABLET, CHEWABLE ORAL at 08:09

## 2024-08-29 RX ADMIN — PANTOPRAZOLE SODIUM 40 MG: 40 TABLET, DELAYED RELEASE ORAL at 06:20

## 2024-08-29 NOTE — PLAN OF CARE
Problem: Adult Inpatient Plan of Care  Goal: Plan of Care Review  Outcome: Met  Flowsheets (Taken 8/29/2024 3998)  Progress: improving  Plan of Care Reviewed With: patient  Outcome Evaluation: Vitals stable. Up to chair with staff. Pt discharged to SNF - [facility] transport at 1700 to [facility]. Report called to Saira nurse at SNF at 1510

## 2024-08-29 NOTE — PROGRESS NOTES
[facility]  Cardiology Progress Note    Patient Identification:  Name: Flo Manzo  Age: 69 y.o.  Sex: male  :  1955  MRN: [id number]                 Follow Up / Chief Complaint: follow up for afib/off AC/watchman     Interval History:Resting in bed.  No chest pain or shortness of breath.  Structural heart team seen on 2024 with plans of CT while here and plan for LAAO as outpatient.           Objective:    Past Medical History:  Past Medical History:   Diagnosis Date    Arthritis     Atrial fibrillation with RVR 2019    Colon polyps 2018    Hepatic flexure: tubular adenoma, only low-grade dysplasia; splenic flexure: tubular adenoma, only low-grade dysplasia; sigmoid colon: tubular adenoma, multiple fragments only low-grade dysplasia    Depression     Heart murmur     Hemorrhagic stroke 2019    Hypertension     New onset atrial fibrillation (CMS/HCC) - RVR 2019    GÓMEZ (obstructive sleep apnea) 2019    Home sleep study with moderate severity GÓMEZ, AHI 20 events per hour.  Sleep-related hypoxia with low O2 saturation 84% for 14 minutes.    Peripheral neuropathy     Sleep apnea     previously diagnosed with sleep apnea, had T&A done and it has since resolved     Stroke     Uncontrolled hypertension     Ventral hernia      Past Surgical History:  Past Surgical History:   Procedure Laterality Date    APPENDECTOMY N/A     BACK SURGERY      BLADDER STIMULATOR IMPLANT L LOWER BACK    CARDIAC CATHETERIZATION N/A 2004    Cath left ventriculography, coronary angiography and left heart catheterization, Normal results-Dr. Fierro     CARDIAC CATHETERIZATION N/A 2019    Procedure: Left Heart Cath;  Surgeon: Eren Bruce MD;  Location: [facility] INVASIVE LOCATION;  Service: Cardiology    CARDIAC CATHETERIZATION N/A 2019    Procedure: Left ventriculography;  Surgeon: Eren Bruce MD;  Location: [facility] CATH INVASIVE LOCATION;   Service: Cardiology    CARDIAC CATHETERIZATION N/A 1/29/2019    Procedure: Coronary angiography;  Surgeon: Eren Bruce MD;  Location: [facility] CATH INVASIVE LOCATION;  Service: Cardiology    CARDIAC ELECTROPHYSIOLOGY PROCEDURE N/A 3/4/2022    Procedure: Pacemaker SC new BIOTRONIK;  Surgeon: Eren Bruce MD;  [facility] CATH INVASIVE LOCATION;  Service: Cardiology;  Laterality: N/A;    CARPAL TUNNEL RELEASE Right 2013    CARPAL TUNNEL RELEASE Left     COLONOSCOPY N/A 1/4/2018    Procedure: COLONOSCOPY with cold polypectomy and hot snare polypectomy;  Surgeon: Ten Solitario MD;  Location: [facility] ENDOSCOPY;  Service:     COLONOSCOPY N/A 4/25/2024    Procedure: COLONOSCOPY INTO CECUM;  Surgeon: Ten Solitario MD;  Location: [facility] ENDOSCOPY;  Service: General;  Laterality: N/A;  PRE: PERSONAL H/O COLON POLYP  /  POST: POOR PREP OTHERWISE NORMAL    EYE LENS IMPLANT SECONDARY Bilateral 2007, 2008    KNEE CARTILAGE SURGERY Right 1979    TONSILLECTOMY AND ADENOIDECTOMY Bilateral 2005    TOTAL KNEE ARTHROPLASTY Left 03/14/2016    Left total knee arthroplasty with Amberly component, size 11 femur, G tibial baseplate with a 10 polyethylene insert and a 38 patellar button-Dr. Pablo Hairston    TOTAL KNEE ARTHROPLASTY Right 03/02/2015    Right total knee arthroplasty with Amberly component size G femur, 11 tibial base plate with an 11 polyethylene insert and a 38 patellar button-Dr. Pablo Hairston    UVULOPALATOPHARYNGOPLASTY N/A 2005    part of soft palate, and uvula    VENTRAL HERNIA REPAIR N/A 1/23/2018    Procedure: VENTRAL HERNIA REPAIR LAPAROSCOPIC WITH DAVINCI ROBOT AND MESH;  Surgeon: Ten Solitario MD;  Location: [facility] MAIN OR;  Service:         Social History:   Social History     Tobacco Use    Smoking status: Never     Passive exposure: Past    Smokeless tobacco: Never   Substance Use Topics    Alcohol use: Not Currently     Comment: social, maybe a drink a month, not since stroke       Family History:  Family History   Problem Relation Age of Onset    Hypertension Mother     Kidney failure Mother     Coronary artery disease Father     Lung cancer Father         positive smoker    Heart attack Father     Breast cancer Sister 60    Prostate cancer Brother     Colon polyps Brother     Kidney nephrosis Brother     Malig Hyperthermia Neg Hx           Allergies:  Allergies   Allergen Reactions    Poison Ivy Extract Hives, Itching and Rash     ITCHY BLISTERS     Scheduled Meds:  aspirin, 81 mg, Daily  atorvastatin, 40 mg, Nightly  busPIRone, 10 mg, BID  citalopram, 20 mg, Daily  furosemide, 20 mg, Daily  gabapentin, 100 mg, QAM  gabapentin, 300 mg, Nightly  metoprolol tartrate, 37.5 mg, Q12H  pantoprazole, 40 mg, Q AM  tamsulosin, 0.4 mg, Nightly            INTAKE AND OUTPUT:    Intake/Output Summary (Last 24 hours) at 8/29/2024 0907  Last data filed at 8/29/2024 0743  Gross per 24 hour   Intake 290 ml   Output --   Net 290 ml       Review of Systems:   GI: No nausea/vomiting or abdominal pain   Cardiac: No CP or palpitation   Pulmonary: No shortness of breath or cough       Constitutional:  Temp:  [97 °F (36.1 °C)-99 °F (37.2 °C)] 98.1 °F (36.7 °C)  Heart Rate:  [66-80] 66  Resp:  [18] 18  BP: (110-129)/(80-97) 129/94    Physical Exam:  General:  Appears in no acute distress, resting in bed  Eyes: eom normal no conjunctival drainage  HEENT:  No JVD. Thyroid not visibly enlarged. No mucosal pallor or cyanosis  Respiratory: Respirations regular and unlabored at rest. BBS with good air entry in all fields. No crackles, rubs or wheezes auscultated  Cardiovascular: S1S2 Regular rate and rhythm. No murmur, rub or gallop. No carotid bruits. DP/PT pulses     . No pretibial pitting edema  Gastrointestinal: Abdomen soft, flat, non tender. Bowel sounds present. No hepatosplenomegaly. No ascites  Skin:   Skin warm and dry to touch. No rashes    Neuro: AAO x3 CN II-XII grossly intact  Psych: Mood and affect  "normal, pleasant and cooperative          I reviewed the patient's new clinical results, and personally reviewed and interpreted the patient's ECG and telemetry data from the last 24 hours        Cardiographics    Lab Review       Results from last 7 days   Lab Units 08/28/24  0338   MAGNESIUM mg/dL 2.3     Results from last 7 days   Lab Units 08/28/24  0338   SODIUM mmol/L 142   POTASSIUM mmol/L 3.9   BUN mg/dL 14   CREATININE mg/dL 0.69*   CALCIUM mg/dL 9.4     @[id number]@  Results from last 7 days   Lab Units 08/28/24  0338 08/26/24  1746   WBC 10*3/mm3 6.07 6.42   HEMOGLOBIN g/dL 12.9* 13.4   HEMATOCRIT % 40.6 41.9   PLATELETS 10*3/mm3 228 237             Assessment:  Generalized weakness  Hx recent CVA/cerebral amyloid angiopathy-neurology has signed off, they are ok with transient anticoagulation with Eliquis for watchman  SSS s/p pacemaker  Chronic Persistent atrial fibrillation-HR controlled   Chronic diastolic CHF-euvolemic on exam  GÓMEZ on CPAP  Hypertension      Plan:  -Blood pressure and heart rate stable.  Plan for CT scan now with plans for proceeding with LAAO/watchman as outpatient-seen by structural team  No chest pain or shortness of breath     Ok to be discharged from our perspective. He will follow up with Dr. Bruce on 9/23/24 at 11:30 am.    )8/29/2024  ASHANTI Recinos/Transcription:   \"Dictated utilizing Dragon dictation\".     "

## 2024-08-29 NOTE — PLAN OF CARE
Problem: Adult Inpatient Plan of Care  Goal: Plan of Care Review  Outcome: Ongoing, Progressing  Flowsheets (Taken 8/29/2024 0510)  Progress: improving  Plan of Care Reviewed With: patient  Outcome Evaluation: Vital signs stable. Pt on room air while awake. Pt placed on 2L O2 while sleeping to maintain O2 sats. No complaints of pain. Pt able to rest through night. Potential d/c later today to [facility] via wheelchair van @ 1700. Plan of care ongoing.

## 2024-08-29 NOTE — DISCHARGE SUMMARY
NAME: Flo Manzo ADMIT: 2024   : 1955  PCP: Karen Jiménez APRN    MRN: [id number] LOS: 1 days   AGE/SEX: 69 y.o. male  ROOM: E458/     Date of Admission:  2024  Date of Discharge:  2024    PCP: Karen Jiménez APRN    CHIEF COMPLAINT  Extremity Weakness      DISCHARGE DIAGNOSIS  Active Hospital Problems    Diagnosis  POA    **Generalized weakness [R53.1]  Yes    History of CVA (cerebrovascular accident) [Z86.73]  Not Applicable    Right leg weakness [R29.898]  Yes    Frequent falls [R29.6]  Not Applicable    Paroxysmal atrial fibrillation [I48.0]  Unknown    Pacemaker [Z95.0]  Yes    Aortic stenosis, mild [I35.0]  Yes    Chronic atrial fibrillation [I48.20]  Yes    GÓMEZ on auto CPAP [G47.33]  Yes    Diastolic CHF, chronic [I50.32]  Yes    Essential hypertension [I10]  Yes    Obesity (BMI 30-39.9) [E66.9]  Yes      Resolved Hospital Problems   No resolved problems to display.       SECONDARY DIAGNOSES  Past Medical History:   Diagnosis Date    Arthritis     Atrial fibrillation with RVR 2019    Colon polyps 2018    Hepatic flexure: tubular adenoma, only low-grade dysplasia; splenic flexure: tubular adenoma, only low-grade dysplasia; sigmoid colon: tubular adenoma, multiple fragments only low-grade dysplasia    Depression     Heart murmur     Hemorrhagic stroke 2019    Hypertension     New onset atrial fibrillation (CMS/HCC) - RVR 2019    GÓMEZ (obstructive sleep apnea) 2019    Home sleep study with moderate severity GÓMEZ, AHI 20 events per hour.  Sleep-related hypoxia with low O2 saturation 84% for 14 minutes.    Peripheral neuropathy     Sleep apnea     previously diagnosed with sleep apnea, had T&A done and it has since resolved     Stroke     Uncontrolled hypertension     Ventral hernia        CONSULTS   Neurology  Cardiology    HOSPITAL COURSE    Very pleasant 68yo gentleman with PAF, HTN, GÓMEZ, and chronic diastolic CHF, who was admitted to our service  earlier this month with PNA and acute left CVA with left facial droop and slurred speech. He was diagnosed with cerebral amyloid angiopathy. He was dc'd to rehab and eventually dc'd home from rehab a few days ago. After he got home he began to get weaker in legs and have more trouble walking--he reports RLE was weaker than LLE.      Neuro has evaluated and agree with current management as cannot start a/c due to high risk of hemorrhagic conversion with pt's AC stopped last admission due to discovery of cerebral amyloid angiopathy, ASA started     Card had mentioned possibility of Watchman device--they did some of evaluation here and he will follow up as outpatient with them for further planning/possibility of procedure.      He will go to subacute rehab today.     DIAGNOSTICS    Radiology Results (last 21 days)      Procedure Component Value Units Date/Time   CT CARDIAC MOPRH W 3D IMAGE (PRE-ABLATION/LAAO) [[id number]] Review Not Required   Order Status: Sent Resulted: 08/28/24 1445    Updated: 08/28/24 1457    CT Head Without Contrast [[id number]] Sonu as Reviewed   Order Status: Completed Collected: 08/26/24 2024    Updated: 08/26/24 2028   Narrative:     CT HEAD WO CONTRAST-     HISTORY:  right leg weakness/recent cva     COMPARISON: CT head 8/7/2024     FINDINGS: Encephalomalacia is appreciated involving the left frontal  lobe posteriorly and medially including involvement of the cingulate  gyrus, present previously. There is no evidence of intracranial  hemorrhage, hydrocephalus or of acute infarction. Further evaluation  could be performed with MRI examination of the brain.                 Radiation dose reduction techniques were utilized, including automated  exposure modulation based on body size.     This report was finalized on 8/26/2024 8:25 PM by Dr. Kristopher Henriquez M.D  on Workstation: [id number]       XR Chest 1 View [[id number]] Sonu as Reviewed   Order Status: Completed Collected: 08/11/24 0405     Updated: 08/11/24 0407   Narrative:       Patient: OMKAR MARQUEZ  Time Out: 04:06  Exam(s): XR CXR 1 VIEW    EXAM:    XR Chest, 1 View    CLINICAL HISTORY:     Reason for exam: recent pneumonia.    TECHNIQUE:    Frontal view of the chest.    COMPARISON:    No relevant prior studies available.    FINDINGS:    Lungs:  Unremarkable.  No consolidation.    Pleural space:  Unremarkable.  No pneumothorax.    Heart:  Cardiomegaly.    Mediastinum:  Unremarkable.  Normal mediastinal contour.    Bones joints:  Unremarkable.  No acute fracture.    Tubes, lines and devices:  Single lead pacemaker.    Upper abdomen:  Elevated RIGHT hemidiaphragm.    IMPRESSION:        No acute findings in the chest.   Impression:              ollected Updated Procedure Result Status    08/28/2024 0338 08/28/2024 0425 Comprehensive Metabolic Panel [[id number]]    (Abnormal)   Blood    Final result Component Value Units   Glucose 90 mg/dL   BUN 14 mg/dL   Creatinine 0.69 Low  mg/dL   Sodium 142 mmol/L   Potassium 3.9 mmol/L   Chloride 107 mmol/L   CO2 27.0 mmol/L   Calcium 9.4 mg/dL   Total Protein 6.2 g/dL   Albumin 3.5 g/dL   ALT (SGPT) 14 U/L   AST (SGOT) 7 U/L   Alkaline Phosphatase 99 U/L   Total Bilirubin 0.7 mg/dL   Globulin 2.7 gm/dL   A/G Ratio 1.3 g/dL   BUN/Creatinine Ratio 20.3    Anion Gap 8.0 mmol/L   eGFR 100.8 mL/min/1.73          08/28/2024 0338 08/28/2024 0407 CBC (No Diff) [[id number]]   (Abnormal)   Blood    Final result Component Value Units   WBC 6.07 10*3/mm3   RBC 4.59 10*6/mm3   Hemoglobin 12.9 Low  g/dL   Hematocrit 40.6 %   MCV 88.5 fL   MCH 28.1 pg   MCHC 31.8 g/dL   RDW 12.9 %   RDW-SD 41.5 fl   MPV 9.9 fL   Platelets 228 10*3/mm3          08/28/2024 0338 08/28/2024 0425 Magnesium [[id number]]   Blood    Final result Component Value Units   Magnesium 2.3 mg/dL          08/28/2024 0338 08/28/2024 0425 Phosphorus [[id number]]   Blood    Final result Component Value Units   Phosphorus 3.6 mg/dL          08/26/2024 2434  08/26/2024 2246 Urinalysis With Microscopic If Indicated (No Culture) - Urine, Clean Catch [[id number]]    (Abnormal)   Urine, Clean Catch    Final result Component Value   Color, UA Yellow   Appearance, UA Clear   pH, UA 6.0   Specific Gravity, UA 1.019   Glucose, UA Negative   Ketones, UA 15 mg/dL (1+) Abnormal    Bilirubin, UA Negative   Blood, UA Negative   Protein, UA Negative   Leuk Esterase, UA Negative   Nitrite, UA Negative   Urobilinogen, UA 1.0 E.U./dL          08/26/2024 1746 08/26/2024 1817 Basic Metabolic Panel [[id number]]    Blood    Final result Component Value Units   Glucose 97 mg/dL   BUN 16 mg/dL   Creatinine 0.85 mg/dL   Sodium 142 mmol/L   Potassium 3.9  mmol/L   Chloride 107 mmol/L   CO2 26.9 mmol/L   Calcium 9.6 mg/dL   BUN/Creatinine Ratio 18.8    Anion Gap 8.1 mmol/L   eGFR 94.6 mL/min/1.73          08/26/2024 1746 08/26/2024 1755 CBC Auto Differential [[id number]]   (Abnormal)   Blood    Final result Component Value Units   WBC 6.42 10*3/mm3   RBC 4.78 10*6/mm3   Hemoglobin 13.4 g/dL   Hematocrit 41.9 %   MCV 87.7 fL   MCH 28.0 pg   MCHC 32.0 g/dL   RDW 12.9 %   RDW-SD 40.9 fl   MPV 9.8 fL   Platelets 237 10*3/mm3   Neutrophil % 70.7 %   Lymphocyte % 17.3 Low  %   Monocyte % 8.9 %   Eosinophil % 2.6 %   Basophil % 0.3 %   Immature Grans % 0.2 %   Neutrophils, Absolute 4.54 10*3/mm3   Lymphocytes, Absolute 1.11 10*3/mm3   Monocytes, Absolute 0.57 10*3/mm3   Eosinophils, Absolute 0.17 10*3/mm3   Basophils, Absolute 0.02 10*3/mm3   Immature Grans, Absolute 0.01 10*3/mm3   nRBC 0.0 /100 WBC          08/11/2024 0336 08/11/2024 0409 Comprehensive Metabolic Panel [[id number]]    (Abnormal)   Blood from Arm, Left    Final result Component Value Units   Glucose 87 mg/dL   BUN 24 High  mg/dL   Creatinine 0.63 Low  mg/dL   Sodium 138 mmol/L   Potassium 4.0 mmol/L   Chloride 102 mmol/L   CO2 28.0 mmol/L   Calcium 9.5 mg/dL   Total Protein 6.8 g/dL   Albumin 3.6 g/dL   ALT (SGPT) 16 U/L   AST (SGOT) 11  U/L   Alkaline Phosphatase 81 U/L   Total Bilirubin 0.5 mg/dL   Globulin 3.2 gm/dL   A/G Ratio 1.1 g/dL   BUN/Creatinine Ratio 38.1 High     Anion Gap 8.0 mmol/L   eGFR 103.6 mL/min/1.73          08/11/2024 0323 08/11/2024 0326 Blood Gas, Arterial - [[id number]]   (Abnormal)   Arterial Blood    Final result Component Value Units   Site Left Radial    Ildefonso's Test Positive    pH, Arterial 7.403 pH units   pCO2, Arterial 46.6 High  mm Hg   pO2, Arterial 65.9 Low  mm Hg   HCO3, Arterial 29.1 High  mmol/L   Base Excess, Arterial 3.5 High   mmol/L   O2 Saturation, Arterial 92.5 %   CO2 Content 30.5 High  mmol/L   Barometric Pressure for Blood Gas 752.5000 mmHg   Modality Cannula    Flow Rate 3.0000 lpm   Rate 20 Breaths/minute   Hemodilution No    Device Comment SpO2 93%           08/11/2024 0251 08/11/2024 0309 CBC Auto Differential [[id number]]   (Abnormal)   Blood    Final result Component Value Units   WBC 9.54 10*3/mm3   RBC 4.92 10*6/mm3   Hemoglobin 14.2 g/dL   Hematocrit 43.4 %   MCV 88.2 fL   MCH 28.9 pg   MCHC 32.7 g/dL   RDW 12.8 %   RDW-SD 40.9 fl   MPV 10.5 fL   Platelets 213 10*3/mm3   Neutrophil % 76.2 High  %   Lymphocyte % 15.2 Low  %   Monocyte % 7.0 %   Eosinophil % 0.8 %   Basophil % 0.2 %   Immature Grans % 0.6 High  %   Neutrophils, Absolute 7.26 High  10*3/mm3   Lymphocytes, Absolute 1.45 10*3/mm3   Monocytes, Absolute 0.67 10*3/mm3   Eosinophils, Absolute 0.08 10*3/mm3   Basophils, Absolute 0.02 10*3/mm3   Immature Grans, Absolute 0.06 High  10*3/mm3   nRBC 0.0 /100 WBC          08/09/2024 0635 08/09/2024 0701 CBC (No Diff) [[id number]]   (Abnormal)   Blood    Final result Component Value Units   WBC 10.62 10*3/mm3   RBC 4.34 10*6/mm3   Hemoglobin 12.4 Low  g/dL   Hematocrit 38.0 %   MCV 87.6 fL   MCH 28.6 pg   MCHC 32.6 g/dL   RDW 12.7 %   RDW-SD 39.6 fl   MPV 10.7 fL   Platelets 215 10*3/mm3        PHYSICAL EXAM  Objective:  Vital signs: (most recent): Blood pressure 129/94, pulse 66, temperature  "98.1 °F (36.7 °C), temperature source Oral, resp. rate 18, height 177.8 cm (70\"), weight 98.5 kg (217 lb 2.5 oz), SpO2 91%.                Alert  nad  No resp distress  pleasant    CONDITION ON DISCHARGE  Stable.      DISCHARGE DISPOSITION   Skilled Nursing Facility (DC - External)      DISCHARGE MEDICATIONS       Your medication list        CONTINUE taking these medications        Instructions Last Dose Given Next Dose Due   acetaminophen 325 MG tablet  Commonly known as: TYLENOL      Take 2 tablets by mouth Every 4 (Four) Hours As Needed for Mild Pain .       albuterol sulfate  (90 Base) MCG/ACT inhaler  Commonly known as: PROVENTIL HFA;VENTOLIN HFA;PROAIR HFA      Inhale 2 puffs Every 4 (Four) Hours As Needed for Wheezing.       aspirin 81 MG chewable tablet      Chew 1 tablet Daily.       atorvastatin 40 MG tablet  Commonly known as: LIPITOR      TAKE 1 TABLET BY MOUTH NIGHTLY       busPIRone 10 MG tablet  Commonly known as: BUSPAR      TAKE 1 TABLET BY MOUTH TWICE DAILY       citalopram 20 MG tablet  Commonly known as: CeleXA      Take 1.5 tablets by mouth Daily. PT TAKING 1.5 TABLETS DAILY       furosemide 20 MG tablet  Commonly known as: LASIX      Take 1 tablet by mouth Daily for 180 days.       gabapentin 300 MG capsule  Commonly known as: NEURONTIN      Take 1 capsule by mouth Every Night.       gabapentin 100 MG capsule  Commonly known as: NEURONTIN      Take 1 capsule by mouth Every Morning.       guaiFENesin 600 MG 12 hr tablet  Commonly known as: MUCINEX      Take 2 tablets by mouth Every 12 (Twelve) Hours.       metoprolol tartrate 25 MG tablet  Commonly known as: LOPRESSOR      TAKE 1 & 1/2 TABLETS BY MOUTH TWICE DAILY       omeprazole 20 MG capsule  Commonly known as: priLOSEC      TAKE 1 CAPSULE BY MOUTH DAILY       tamsulosin 0.4 MG capsule 24 hr capsule  Commonly known as: FLOMAX      TAKE 1 CAPSULE BY MOUTH NIGHTLY              STOP taking these medications      Diclofenac Sodium 1 % gel " gel  Commonly known as: Voltaren                  Where to Get Your Medications        These medications were sent to [facility] - [place], [place] - [address] [facility] - [phone] [facility] [phone] [address], [facility] [address]      Phone: [phone]   gabapentin 100 MG capsule  gabapentin 300 MG capsule       Information about where to get these medications is not yet available    Ask your nurse or doctor about these medications  citalopram 20 MG tablet          Future Appointments   Date Time Provider Department Center   8/29/2024 10:45 AM Karen Jiménez APRN [facility]  [facility]   8/29/2024 To Be Determined Tammy Lees, RN  [facility] None   8/30/2024 To Be Determined Aleja Carr OT  [facility] None   9/3/2024 To Be Determined Tammy Lees, RN  [facility] None   9/5/2024 To Be Determined Tammy Lees, RN  [facility] None   9/10/2024 To Be Determined Lees, Tammy, RN  [facility] None   9/12/2024 To Be Determined Tammy Lees, RN  [facility] None   9/17/2024 To Be Determined Lees, Tammy, RN  [facility] None   9/19/2024 To Be Determined Tammy Lees, RN  [facility] None   10/8/2024  3:00 PM Shante Carr PA [facility]   11/15/2024  1:30 PM Karen Jiménez APRN [facility]  [facility]   11/18/2024 12:45 PM Eren Bruce MD [facility]   1/8/2025  9:30 AM [facility] SPT POPLAR DEVICE CHECK [facility]   1/24/2025 11:45 AM Temo Wheat MD [facility] None   7/16/2025 12:15 PM [facility] K[facility] SPT POPLAR DEVICE CHECK [facility]   7/16/2025 12:30 PM [facility] ECHO CART BH [facility]   7/16/2025  1:15 PM Eren Bruce MD [facility]      Contact information for follow-up providers       Karen Jiménez APRN Follow up.    Specialties: Family Medicine, Nurse Practitioner  Contact information:  [address] [id number] [phone]                       Contact information for after-discharge care       Destination        [facility] .    Service: Skilled Nursing  Contact information:  [address] [facility] [address] [phone]                     Home Medical Care       [facility] .    Service: Home Health Services  Contact information:  [address]  [facility] [address] [phone]                                   TEST  RESULTS PENDING AT DISCHARGE         Terrance Brenner MD  [facility]  08/29/24  07:55 EDT      Time: greater than 32 minutes on discharge  It was a pleasure taking care of this patient while in the hospital.

## 2024-08-29 NOTE — DISCHARGE PLACEMENT REQUEST
"Omkar Marquez \"Danyel\" (69 y.o. Male)       Date of Birth   1955    Social Security Number       Address   [address] [place] [address]    Home Phone   [phone]    MRN   [id number]       Nondenominational   Holiness    Marital Status                               Admission Date   8/26/24    Admission Type   Emergency    Admitting Provider   Froylan Botello MD    Attending Provider   Terrance Brenner MD    Department, Room/Bed   [address], E458/1       Discharge Date       Discharge Disposition   Skilled Nursing Facility (DC - External)    Discharge Destination                                 Attending Provider: Terrance Brenner MD    Allergies: Poison Ivy Extract    Isolation: None   Infection: None   Code Status: CPR    Ht: 177.8 cm (70\")   Wt: 98.5 kg (217 lb 2.5 oz)    Admission Cmt: None   Principal Problem: Generalized weakness [R53.1]                   Active Insurance as of 8/26/2024       Primary Coverage       Payor Plan Insurance Group Employer/Plan Group    MEDICARE MEDICARE A & B        Payor Plan Address Payor Plan Phone Number Payor Plan Fax Number Effective Dates    [address] [phone]  3/1/2021 - None Entered    [facility] [id number]         Subscriber Name Subscriber Birth Date Member ID       OMKAR MARQUEZ 1955 [id number]               Secondary Coverage       Payor Plan Insurance Group Employer/Plan Group    ANTHEM BLUE CROSS ANTHEM BLUE CROSS BLUE SHIELD PPO [id number]       Payor Plan Address Payor Plan Phone Number Payor Plan Fax Number Effective Dates    [address] [phone]  1/1/2023 - None Entered    [facility] [id number]         Subscriber Name Subscriber Birth Date Member ID       OMKAR MARQUEZ 1955 [id number]                     Emergency Contacts        (Rel.) Home Phone Work Phone Mobile Phone    Bhargavi Parker (Spouse) [phone] -- [phone]                   Discharge Summary "        Terrance Brenner MD at 24 0754                 NAME: Flo Manzo ADMIT: 2024   : 1955  PCP: Karen Jiménez APRN    MRN: [id number] LOS: 1 days   AGE/SEX: 69 y.o. male  ROOM: [facility]     Date of Admission:  2024  Date of Discharge:  2024    PCP: Karen Jiménez APRN    CHIEF COMPLAINT  Extremity Weakness      DISCHARGE DIAGNOSIS  Active Hospital Problems    Diagnosis  POA    **Generalized weakness [R53.1]  Yes    History of CVA (cerebrovascular accident) [Z86.73]  Not Applicable    Right leg weakness [R29.898]  Yes    Frequent falls [R29.6]  Not Applicable    Paroxysmal atrial fibrillation [I48.0]  Unknown    Pacemaker [Z95.0]  Yes    Aortic stenosis, mild [I35.0]  Yes    Chronic atrial fibrillation [I48.20]  Yes    GÓMEZ on auto CPAP [G47.33]  Yes    Diastolic CHF, chronic [I50.32]  Yes    Essential hypertension [I10]  Yes    Obesity (BMI 30-39.9) [E66.9]  Yes      Resolved Hospital Problems   No resolved problems to display.       SECONDARY DIAGNOSES  Past Medical History:   Diagnosis Date    Arthritis     Atrial fibrillation with RVR 2019    Colon polyps 2018    Hepatic flexure: tubular adenoma, only low-grade dysplasia; splenic flexure: tubular adenoma, only low-grade dysplasia; sigmoid colon: tubular adenoma, multiple fragments only low-grade dysplasia    Depression     Heart murmur     Hemorrhagic stroke 2019    Hypertension     New onset atrial fibrillation (CMS/HCC) - RVR 2019    GÓMEZ (obstructive sleep apnea) 2019    Home sleep study with moderate severity GÓMEZ, AHI 20 events per hour.  Sleep-related hypoxia with low O2 saturation 84% for 14 minutes.    Peripheral neuropathy     Sleep apnea     previously diagnosed with sleep apnea, had T&A done and it has since resolved     Stroke     Uncontrolled hypertension     Ventral hernia        CONSULTS   Neurology  Cardiology    HOSPITAL COURSE    Very pleasant 68yo gentleman with PAF, HTN, GÓMEZ,  and chronic diastolic CHF, who was admitted to our service earlier this month with PNA and acute left CVA with left facial droop and slurred speech. He was diagnosed with cerebral amyloid angiopathy. He was dc'd to rehab and eventually dc'd home from rehab a few days ago. After he got home he began to get weaker in legs and have more trouble walking--he reports RLE was weaker than LLE.      Neuro has evaluated and agree with current management as cannot start a/c due to high risk of hemorrhagic conversion with pt's AC stopped last admission due to discovery of cerebral amyloid angiopathy, ASA started     Card had mentioned possibility of Watchman device--they did some of evaluation here and he will follow up as outpatient with them for further planning/possibility of procedure.      He will go to subacute rehab today.     DIAGNOSTICS    Radiology Results (last 21 days)      Procedure Component Value Units Date/Time   CT CARDIAC MOPRH W 3D IMAGE (PRE-ABLATION/LAAO) [[id number]] Review Not Required   Order Status: Sent Resulted: 08/28/24 1445    Updated: 08/28/24 1457    CT Head Without Contrast [[id number]] Sonu as Reviewed   Order Status: Completed Collected: 08/26/24 2024    Updated: 08/26/24 2028   Narrative:     CT HEAD WO CONTRAST-     HISTORY:  right leg weakness/recent cva     COMPARISON: CT head 8/7/2024     FINDINGS: Encephalomalacia is appreciated involving the left frontal  lobe posteriorly and medially including involvement of the cingulate  gyrus, present previously. There is no evidence of intracranial  hemorrhage, hydrocephalus or of acute infarction. Further evaluation  could be performed with MRI examination of the brain.                 Radiation dose reduction techniques were utilized, including automated  exposure modulation based on body size.     This report was finalized on 8/26/2024 8:25 PM by Dr. Kristopher Henriquez M.D  on Workstation: [id number]       XR Chest 1 View [[id number]] Sonu as Reviewed    Order Status: Completed Collected: 08/11/24 0407    Updated: 08/11/24 0407   Narrative:       Patient: OMKAR MARQUEZ  Time Out: 04:06  Exam(s): XR CXR 1 VIEW    EXAM:    XR Chest, 1 View    CLINICAL HISTORY:     Reason for exam: recent pneumonia.    TECHNIQUE:    Frontal view of the chest.    COMPARISON:    No relevant prior studies available.    FINDINGS:    Lungs:  Unremarkable.  No consolidation.    Pleural space:  Unremarkable.  No pneumothorax.    Heart:  Cardiomegaly.    Mediastinum:  Unremarkable.  Normal mediastinal contour.    Bones joints:  Unremarkable.  No acute fracture.    Tubes, lines and devices:  Single lead pacemaker.    Upper abdomen:  Elevated RIGHT hemidiaphragm.    IMPRESSION:        No acute findings in the chest.   Impression:              ollected Updated Procedure Result Status    08/28/2024 0338 08/28/2024 0425 Comprehensive Metabolic Panel [[id number]]    (Abnormal)   Blood    Final result Component Value Units   Glucose 90 mg/dL   BUN 14 mg/dL   Creatinine 0.69 Low  mg/dL   Sodium 142 mmol/L   Potassium 3.9 mmol/L   Chloride 107 mmol/L   CO2 27.0 mmol/L   Calcium 9.4 mg/dL   Total Protein 6.2 g/dL   Albumin 3.5 g/dL   ALT (SGPT) 14 U/L   AST (SGOT) 7 U/L   Alkaline Phosphatase 99 U/L   Total Bilirubin 0.7 mg/dL   Globulin 2.7 gm/dL   A/G Ratio 1.3 g/dL   BUN/Creatinine Ratio 20.3    Anion Gap 8.0 mmol/L   eGFR 100.8 mL/min/1.73          08/28/2024 0338 08/28/2024 0407 CBC (No Diff) [[id number]]   (Abnormal)   Blood    Final result Component Value Units   WBC 6.07 10*3/mm3   RBC 4.59 10*6/mm3   Hemoglobin 12.9 Low  g/dL   Hematocrit 40.6 %   MCV 88.5 fL   MCH 28.1 pg   MCHC 31.8 g/dL   RDW 12.9 %   RDW-SD 41.5 fl   MPV 9.9 fL   Platelets 228 10*3/mm3          08/28/2024 0338 08/28/2024 0425 Magnesium [[id number]]   Blood    Final result Component Value Units   Magnesium 2.3 mg/dL          08/28/2024 0338 08/28/2024 0425 Phosphorus [[id number]]   Blood    Final result Component Value Units    Phosphorus 3.6 mg/dL          08/26/2024 2225 08/26/2024 2246 Urinalysis With Microscopic If Indicated (No Culture) - Urine, Clean Catch [[id number]]    (Abnormal)   Urine, Clean Catch    Final result Component Value   Color, UA Yellow   Appearance, UA Clear   pH, UA 6.0   Specific Gravity, UA 1.019   Glucose, UA Negative   Ketones, UA 15 mg/dL (1+) Abnormal    Bilirubin, UA Negative   Blood, UA Negative   Protein, UA Negative   Leuk Esterase, UA Negative   Nitrite, UA Negative   Urobilinogen, UA 1.0 E.U./dL          08/26/2024 1746 08/26/2024 1817 Basic Metabolic Panel [[id number]]    Blood    Final result Component Value Units   Glucose 97 mg/dL   BUN 16 mg/dL   Creatinine 0.85 mg/dL   Sodium 142 mmol/L   Potassium 3.9  mmol/L   Chloride 107 mmol/L   CO2 26.9 mmol/L   Calcium 9.6 mg/dL   BUN/Creatinine Ratio 18.8    Anion Gap 8.1 mmol/L   eGFR 94.6 mL/min/1.73          08/26/2024 1746 08/26/2024 1755 CBC Auto Differential [[id number]]   (Abnormal)   Blood    Final result Component Value Units   WBC 6.42 10*3/mm3   RBC 4.78 10*6/mm3   Hemoglobin 13.4 g/dL   Hematocrit 41.9 %   MCV 87.7 fL   MCH 28.0 pg   MCHC 32.0 g/dL   RDW 12.9 %   RDW-SD 40.9 fl   MPV 9.8 fL   Platelets 237 10*3/mm3   Neutrophil % 70.7 %   Lymphocyte % 17.3 Low  %   Monocyte % 8.9 %   Eosinophil % 2.6 %   Basophil % 0.3 %   Immature Grans % 0.2 %   Neutrophils, Absolute 4.54 10*3/mm3   Lymphocytes, Absolute 1.11 10*3/mm3   Monocytes, Absolute 0.57 10*3/mm3   Eosinophils, Absolute 0.17 10*3/mm3   Basophils, Absolute 0.02 10*3/mm3   Immature Grans, Absolute 0.01 10*3/mm3   nRBC 0.0 /100 WBC          08/11/2024 0336 08/11/2024 0409 Comprehensive Metabolic Panel [[id number]]    (Abnormal)   Blood from Arm, Left    Final result Component Value Units   Glucose 87 mg/dL   BUN 24 High  mg/dL   Creatinine 0.63 Low  mg/dL   Sodium 138 mmol/L   Potassium 4.0 mmol/L   Chloride 102 mmol/L   CO2 28.0 mmol/L   Calcium 9.5 mg/dL   Total Protein 6.8 g/dL   Albumin  3.6 g/dL   ALT (SGPT) 16 U/L   AST (SGOT) 11 U/L   Alkaline Phosphatase 81 U/L   Total Bilirubin 0.5 mg/dL   Globulin 3.2 gm/dL   A/G Ratio 1.1 g/dL   BUN/Creatinine Ratio 38.1 High     Anion Gap 8.0 mmol/L   eGFR 103.6 mL/min/1.73          08/11/2024 0323 08/11/2024 0326 Blood Gas, Arterial - [[id number]]   (Abnormal)   Arterial Blood    Final result Component Value Units   Site Left Radial    Ildefonso's Test Positive    pH, Arterial 7.403 pH units   pCO2, Arterial 46.6 High  mm Hg   pO2, Arterial 65.9 Low  mm Hg   HCO3, Arterial 29.1 High  mmol/L   Base Excess, Arterial 3.5 High   mmol/L   O2 Saturation, Arterial 92.5 %   CO2 Content 30.5 High  mmol/L   Barometric Pressure for Blood Gas 752.5000 mmHg   Modality Cannula    Flow Rate 3.0000 lpm   Rate 20 Breaths/minute   Hemodilution No    Device Comment SpO2 93%           08/11/2024 0251 08/11/2024 0309 CBC Auto Differential [[id number]]   (Abnormal)   Blood    Final result Component Value Units   WBC 9.54 10*3/mm3   RBC 4.92 10*6/mm3   Hemoglobin 14.2 g/dL   Hematocrit 43.4 %   MCV 88.2 fL   MCH 28.9 pg   MCHC 32.7 g/dL   RDW 12.8 %   RDW-SD 40.9 fl   MPV 10.5 fL   Platelets 213 10*3/mm3   Neutrophil % 76.2 High  %   Lymphocyte % 15.2 Low  %   Monocyte % 7.0 %   Eosinophil % 0.8 %   Basophil % 0.2 %   Immature Grans % 0.6 High  %   Neutrophils, Absolute 7.26 High  10*3/mm3   Lymphocytes, Absolute 1.45 10*3/mm3   Monocytes, Absolute 0.67 10*3/mm3   Eosinophils, Absolute 0.08 10*3/mm3   Basophils, Absolute 0.02 10*3/mm3   Immature Grans, Absolute 0.06 High  10*3/mm3   nRBC 0.0 /100 WBC          08/09/2024 0635 08/09/2024 0701 CBC (No Diff) [[id number]]   (Abnormal)   Blood    Final result Component Value Units   WBC 10.62 10*3/mm3   RBC 4.34 10*6/mm3   Hemoglobin 12.4 Low  g/dL   Hematocrit 38.0 %   MCV 87.6 fL   MCH 28.6 pg   MCHC 32.6 g/dL   RDW 12.7 %   RDW-SD 39.6 fl   MPV 10.7 fL   Platelets 215 10*3/mm3        PHYSICAL EXAM  Objective:  Vital signs: (most recent):  "Blood pressure 129/94, pulse 66, temperature 98.1 °F (36.7 °C), temperature source Oral, resp. rate 18, height 177.8 cm (70\"), weight 98.5 kg (217 lb 2.5 oz), SpO2 91%.                Alert  nad  No resp distress  pleasant    CONDITION ON DISCHARGE  Stable.      DISCHARGE DISPOSITION   Skilled Nursing Facility (DC - External)      DISCHARGE MEDICATIONS       Your medication list        CONTINUE taking these medications        Instructions Last Dose Given Next Dose Due   acetaminophen 325 MG tablet  Commonly known as: TYLENOL      Take 2 tablets by mouth Every 4 (Four) Hours As Needed for Mild Pain .       albuterol sulfate  (90 Base) MCG/ACT inhaler  Commonly known as: PROVENTIL HFA;VENTOLIN HFA;PROAIR HFA      Inhale 2 puffs Every 4 (Four) Hours As Needed for Wheezing.       aspirin 81 MG chewable tablet      Chew 1 tablet Daily.       atorvastatin 40 MG tablet  Commonly known as: LIPITOR      TAKE 1 TABLET BY MOUTH NIGHTLY       busPIRone 10 MG tablet  Commonly known as: BUSPAR      TAKE 1 TABLET BY MOUTH TWICE DAILY       citalopram 20 MG tablet  Commonly known as: CeleXA      Take 1.5 tablets by mouth Daily. PT TAKING 1.5 TABLETS DAILY       furosemide 20 MG tablet  Commonly known as: LASIX      Take 1 tablet by mouth Daily for 180 days.       gabapentin 300 MG capsule  Commonly known as: NEURONTIN      Take 1 capsule by mouth Every Night.       gabapentin 100 MG capsule  Commonly known as: NEURONTIN      Take 1 capsule by mouth Every Morning.       guaiFENesin 600 MG 12 hr tablet  Commonly known as: MUCINEX      Take 2 tablets by mouth Every 12 (Twelve) Hours.       metoprolol tartrate 25 MG tablet  Commonly known as: LOPRESSOR      TAKE 1 & 1/2 TABLETS BY MOUTH TWICE DAILY       omeprazole 20 MG capsule  Commonly known as: priLOSEC      TAKE 1 CAPSULE BY MOUTH DAILY       tamsulosin 0.4 MG capsule 24 hr capsule  Commonly known as: FLOMAX      TAKE 1 CAPSULE BY MOUTH NIGHTLY              STOP taking " these medications      Diclofenac Sodium 1 % gel gel  Commonly known as: Voltaren                  Where to Get Your Medications        These medications were sent to [facility] - [place], [place] - [address] [facility] - [phone] [facility] [phone]   [address], [facility] [address]      Phone: [phone]   gabapentin 100 MG capsule  gabapentin 300 MG capsule       Information about where to get these medications is not yet available    Ask your nurse or doctor about these medications  citalopram 20 MG tablet          Future Appointments   Date Time Provider Department Center   8/29/2024 10:45 AM Karen Jiménez APRN [facility]  [facility]   8/29/2024 To Be Determined Tammy Lees, RN  [facility] None   8/30/2024 To Be Determined Aleja Carr OT  [facility] None   9/3/2024 To Be Determined Tammy Lees RN  [facility] None   9/5/2024 To Be Determined Tammy Lees, RN  [facility] None   9/10/2024 To Be Determined Tammy Lees, RN  [facility] None   9/12/2024 To Be Determined Tammy Lees, RN  [facility] None   9/17/2024 To Be Determined Tammy Lees, RN  [facility] None   9/19/2024 To Be Determined Tammy Lees, RN  [facility] None   10/8/2024  3:00 PM Shante Carr PA [facility]GE [facility]   11/15/2024  1:30 PM Karen Jiménez APRN [facility]  [facility]   11/18/2024 12:45 PM Eren Bruce MD [facility] K[facility]OP [facility]   1/8/2025  9:30 AM [facility] SPT POPLAR DEVICE CHECK [facility]   1/24/2025 11:45 AM Temo Wheat MD [facility] RO [facility] None   7/16/2025 12:15 PM [facility] KHRT SPT POPLAR DEVICE CHECK [facility]OP [facility]   7/16/2025 12:30 PM [facility] KHSP ECHO CART BH [facility] KPL [facility]   7/16/2025  1:15 PM Eren Bruce MD [facility]      Contact information for follow-up providers       Karen Jiménez APRN Follow up.    Specialties: Family Medicine, Nurse Practitioner  Contact information:  [address] [address]  [phone]                       Contact  information for after-discharge care       Destination       [facility] .    Service: Skilled Nursing  Contact information:  [address] [facility] [address] [phone]                     Home Medical Care       [facility] .    Service: Home Health Services  Contact information:  [address]  [facility] [address] [phone]                                   TEST  RESULTS PENDING AT DISCHARGE         Terrance Brenner MD  [facility]  08/29/24  07:55 EDT      Time: greater than 32 minutes on discharge  It was a pleasure taking care of this patient while in the hospital.       Electronically signed by Terrance Brenner MD at 08/29/24 0757       Discharge Order (From admission, onward)       Start     Ordered    08/29/24 0751  Discharge patient  Once        Expected Discharge Date: 08/29/24   Discharge Disposition: Skilled Nursing Facility (DC - External)   Physician of Record for Attribution - Please select from Treatment Team: TERRANCE BRENNER [[id number]]   Review needed by CMO to determine Physician of Record: No      Question Answer Comment   Physician of Record for Attribution - Please select from Treatment Team TERRANCE BRENNER    Review needed by CMO to determine Physician of Record No        08/29/24 0754

## 2024-08-29 NOTE — CASE MANAGEMENT/SOCIAL WORK
Case Management Discharge Note      Final Note: [facility]. Has qualifying stay from IP admission 8/5-8/9. [facility] van setup to transport today at 1700. Notified Wife    Provided Post Acute Provider List?: Yes  Post Acute Provider List: Home Health, Nursing Home  Delivered To: Patient  Method of Delivery: In person    Selected Continued Care - Admitted Since 8/26/2024       Destination Coordination complete.      Service Provider Selected Services Address Phone Fax Patient Preferred    [facility] [address] [facility] [address] [phone] --              Durable Medical Equipment    No services have been selected for the patient.                Dialysis/Infusion    No services have been selected for the patient.                Home Medical Care Coordination complete.      Service Provider Selected Services Address Phone Fax Patient Preferred    [facility] [address] [address] [phone] --              Therapy    No services have been selected for the patient.                Community Resources    No services have been selected for the patient.                Community & DME    No services have been selected for the patient.                    Selected Continued Care - Prior Encounters Includes continued care and service providers with selected services from prior encounters from 5/28/2024 to 8/29/2024      Discharged on 8/9/2024 Admission date: 8/5/2024 - Discharge disposition: Rehab Facility or Unit (DC - External)      Destination       Service Provider Selected Services Address Phone Fax Patient Preferred    [facility] [address] [facility] [address] [phone] --                          Transportation Services  W/C Van: Jose Alas    Final Discharge Disposition Code: 03 -  skilled nursing facility (SNF)

## 2024-08-30 ENCOUNTER — HOME CARE VISIT (OUTPATIENT)
Dept: HOME HEALTH SERVICES | Facility: HOME HEALTHCARE | Age: 69
End: 2024-08-30
Payer: MEDICARE

## 2024-08-30 ENCOUNTER — HOSPITAL ENCOUNTER (OUTPATIENT)
Dept: CT IMAGING | Facility: HOSPITAL | Age: 69
Discharge: HOME | End: 2024-08-30
Payer: MEDICARE

## 2024-08-30 DIAGNOSIS — Q20.8 ABNORMALITY OF LEFT ATRIAL APPENDAGE: ICD-10-CM

## 2024-08-30 PROCEDURE — 75572 CT HRT W/3D IMAGE: CPT | Performed by: STUDENT IN AN ORGANIZED HEALTH CARE EDUCATION/TRAINING PROGRAM

## 2024-09-10 ENCOUNTER — PATIENT OUTREACH (OUTPATIENT)
Dept: CASE MANAGEMENT | Facility: OTHER | Age: 69
End: 2024-09-10
Payer: MEDICARE

## 2024-09-10 NOTE — OUTREACH NOTE
AMBULATORY CASE MANAGEMENT NOTE    Names and Relationships of Patient/Support Persons:  -     Care Coordination    Left message with discharge planner at [facility] requesting update on patient's discharge plan. Provided ACM contact information.     SNF Follow-up    Questions/Answers      Flowsheet Row Responses   Acute Facility Discharged From [facility]   Acute Discharge Date 08/29/24   Name of the Skilled Nursing Facility? [facility]   Purpose of SNF Admission PT, OT, SN   Who is the insurance provider or payor of patient stay? Medicare            Education Documentation  No documentation found.        Gretel FRAGA  Ambulatory Case Management    9/10/2024, 15:15 EDT

## 2024-09-12 ENCOUNTER — TELEPHONE (OUTPATIENT)
Dept: CARDIOLOGY | Facility: HOSPITAL | Age: 69
End: 2024-09-12
Payer: MEDICARE

## 2024-09-12 NOTE — TELEPHONE ENCOUNTER
I called to check in on Danyel after recent hospital stay as we've been following for potential Watchman implant in the future. I spoke with his wife, Bhargavi; she's recently gotten home from a hospital admission and has not seen Danyel recently. She has talked to him on the phone and her son helps to facilitate his care at rehab. She is aware they had a meeting this week but is unsure the outcome. I also called & spoke with Danyel ([phone]). He says he is working to get stronger & he is ambulating with a walker for short distances. He vaguely remembers meeting me and our discussions about Watchman. I encouraged him to continue therapy and be safe, will continue to follow. He is scheduled for follow up with Dr Bruce 9/23/24. RTRN

## 2024-09-17 ENCOUNTER — PATIENT OUTREACH (OUTPATIENT)
Dept: CASE MANAGEMENT | Facility: OTHER | Age: 69
End: 2024-09-17
Payer: MEDICARE

## 2024-09-23 ENCOUNTER — PATIENT ROUNDING (BHMG ONLY) (OUTPATIENT)
Dept: CARDIOLOGY | Facility: CLINIC | Age: 69
End: 2024-09-23
Payer: MEDICARE

## 2024-09-23 ENCOUNTER — OFFICE VISIT (OUTPATIENT)
Dept: CARDIOLOGY | Facility: CLINIC | Age: 69
End: 2024-09-23
Payer: MEDICARE

## 2024-09-23 VITALS
DIASTOLIC BLOOD PRESSURE: 90 MMHG | BODY MASS INDEX: 32.35 KG/M2 | HEART RATE: 73 BPM | SYSTOLIC BLOOD PRESSURE: 137 MMHG | HEIGHT: 70 IN | WEIGHT: 226 LBS

## 2024-09-23 DIAGNOSIS — Z79.01 CHRONIC ANTICOAGULATION: ICD-10-CM

## 2024-09-23 DIAGNOSIS — I48.20 CHRONIC ATRIAL FIBRILLATION: Primary | ICD-10-CM

## 2024-09-23 DIAGNOSIS — Z95.0 PACEMAKER: ICD-10-CM

## 2024-09-23 DIAGNOSIS — I10 ESSENTIAL HYPERTENSION: ICD-10-CM

## 2024-09-23 PROCEDURE — 3080F DIAST BP >= 90 MM HG: CPT | Performed by: INTERNAL MEDICINE

## 2024-09-23 PROCEDURE — 99214 OFFICE O/P EST MOD 30 MIN: CPT | Performed by: INTERNAL MEDICINE

## 2024-09-23 PROCEDURE — 93000 ELECTROCARDIOGRAM COMPLETE: CPT | Performed by: INTERNAL MEDICINE

## 2024-09-23 PROCEDURE — 3075F SYST BP GE 130 - 139MM HG: CPT | Performed by: INTERNAL MEDICINE

## 2024-10-04 ENCOUNTER — PATIENT OUTREACH (OUTPATIENT)
Dept: CASE MANAGEMENT | Facility: OTHER | Age: 69
End: 2024-10-04
Payer: MEDICARE

## 2024-10-04 NOTE — OUTREACH NOTE
AMBULATORY CASE MANAGEMENT NOTE    Names and Relationships of Patient/Support Persons:  -     Care Coordination    Spoke with care management at [facility]. Patient has discharge date planned for 10/17/24. Outreach scheduled.     Education Documentation  No documentation found.        Gretel FRAGA  Ambulatory Case Management    10/4/2024, 14:24 EDT

## 2024-10-07 NOTE — PROGRESS NOTES
CC: hospital f/u    HPI:  Flo Manzo is a  69 y.o.  right-handed male past medical history of A-fib, hypertension, hyperlipidemia, prior cerebellar stroke in 2020, peripheral neuropathy, ambulates with walker who came to the ER in August with complaints of imbalance, facial droop, dysarthria.  His exam was notable for mild left facial and MRI brain showed multifocal areas of hemosiderin deposition of cortical medullary interfaces, bilateral cerebral hemispheres, bilateral thalami and left ching and hemorrhage of the left cerebellar hemisphere with extension to the fourth ventricle concerning for cerebral amyloid angiopathy progression significant compared to previous.  There is also subtle focus of left paracentral lobule could be subacute infarct lacunar.  CTA was unremarkable.  It was felt his anticoagulation was contraindicated in light of this.  He was changed to aspirin for primary prevention and is undergoing watchman's device consideration.    It looks like he had rehospitalization on 8/27 for right leg weakness.  Further neuroimaging not felt to .  Strict blood pressure control recommended.  Today blood pressure at goal.  He still in inpatient rehab due to be discharged next week.  He reports that his weakness has all but resolved.  No new complaint of neurologic symptoms, no headaches or any other    Social history:    Family history:      Pain Scale:        ROS:  Review of Systems   Constitutional:  Positive for fatigue.   HENT:  Negative for ear pain, hearing loss, nosebleeds and tinnitus.    Eyes:  Negative for photophobia, pain and visual disturbance.   Endocrine: Negative for cold intolerance and heat intolerance.   Musculoskeletal:  Positive for gait problem (balance problem-Physical therapy).   Allergic/Immunologic: Negative for food allergies.   Neurological:  Positive for weakness (rt leg).   Psychiatric/Behavioral:  Negative for confusion and decreased concentration.   "      Reviewed ROS conducted by MA and jillian        Physical Exam:  There were no vitals filed for this visit.   Orthostatic BP:    There is no height or weight on file to calculate BMI.        General: pt well appearing, no distress  HEENT: Normocephalic, conjunctiva normal, external canals normal, no nasal discharge, moist mucous membranes  Neck: No lymphadenopathy, thyroid not enlarged, no JVD  CV: Regular rate and rhythm, no murmurs negative no bruits auscultated at neck, equal pulses  Pulmonary: Normal respiratory effort, clear to auscultation bilaterally  Extremities: no edema, bruising, or skin lesions  Pysch: good eye contact, cooperative, full affect, euthymic mood, good attention, good insight  Mental: alert, conversant, AOx3, provides history, 3/3 recall.  Language fluent, names, repeats.  CN: CN II-XII intact, PERRL, EOMi, no gaze palsy or nystagmus, intact facial sensation, no facial assymetry, intact hearing, symmetric palate elevation, tongue midline, good SCM strength  Motor: no abnormal movements, no pronator drift, normal  bulk and tone, 5/5 strength b/l UE & LE  Sensory: normal sensation to crude touch, temperature, and vibration throughout  Reflexes: 2+ throughout, neg babinski  Coordination: no ataxia on finger-nose, heel-shin  Gait: not tested, in WC        Results:      Lab Results   Component Value Date    GLUCOSE 90 08/28/2024    BUN 14 08/28/2024    CREATININE 0.69 (L) 08/28/2024    EGFRIFNONA 85 08/27/2021    EGFRIFAFRI 103 08/27/2021    BCR 20.3 08/28/2024    CO2 27.0 08/28/2024    CALCIUM 9.4 08/28/2024    PROTENTOTREF 6.8 07/15/2024    ALBUMIN 3.5 08/28/2024    LABIL2 1.5 07/15/2024    AST 7 08/28/2024    ALT 14 08/28/2024       Lab Results   Component Value Date    WBC 6.07 08/28/2024    HGB 12.9 (L) 08/28/2024    HCT 40.6 08/28/2024    MCV 88.5 08/28/2024     08/28/2024         .No results found for: \"RPR\"      Lab Results   Component Value Date    TSH 0.179 (L) 08/06/2024    " "C2KVEXG 80 08/01/2022    X3FGYTR 6.51 02/03/2023    THYROIDAB 13 07/15/2024         Lab Results   Component Value Date    WDUZONOW78 426 08/18/2023         No results found for: \"FOLATE\"      Lab Results   Component Value Date    HGBA1C 5.70 (H) 08/06/2024         Lab Results   Component Value Date    GLUCOSE 90 08/28/2024    BUN 14 08/28/2024    CREATININE 0.69 (L) 08/28/2024    EGFRIFNONA 85 08/27/2021    EGFRIFAFRI 103 08/27/2021    BCR 20.3 08/28/2024    K 3.9 08/28/2024    CO2 27.0 08/28/2024    CALCIUM 9.4 08/28/2024    PROTENTOTREF 6.8 07/15/2024    ALBUMIN 3.5 08/28/2024    LABIL2 1.5 07/15/2024    AST 7 08/28/2024    ALT 14 08/28/2024         Diagnosis:  1 suspected CAA  2 tiny acute L frontal stroke  3 chronic anticoagulation     Impression: 68 yo M with h/o afib on eliquis, htn, GÓMEZ, prior cerebellar stroke in 2020, peripheral neuropathy, ambulates with walker, who comes in with acute stroke like symptoms described as balance problems and new facial droop.  He also tells of several week h/o swallowing difficulties at home.  He was admitted with fever and pneumonia.  MRI showed multiple areas of microhemorrhage concerning for cerebral amyloid angiopathy. The progression compared to previous MRI is significant, and his anticoagulation thought contraindicated.  He was placed on aspirin for primary prevention as he is not long term AC candidate and is undergoing coordination for watchman's device.        Plan  1 cont ASA and statin   2 rx for albuterol given, his rx was lost in discharge  3 will reach out to watchman coordinator, pt curious about next steps     F/u in 6-9 mos    I spent at least 40 minutes interviewing, examining, and counseling patient.  I independently reviewed documentation, laboratory and diagnostic findings, external documentation where applicable, and formulated treatment plan which was discussed with the patient.          "

## 2024-10-08 ENCOUNTER — OFFICE VISIT (OUTPATIENT)
Dept: NEUROLOGY | Facility: CLINIC | Age: 69
End: 2024-10-08
Payer: MEDICARE

## 2024-10-08 VITALS
HEART RATE: 72 BPM | HEIGHT: 70 IN | DIASTOLIC BLOOD PRESSURE: 80 MMHG | WEIGHT: 232 LBS | BODY MASS INDEX: 33.21 KG/M2 | OXYGEN SATURATION: 93 % | SYSTOLIC BLOOD PRESSURE: 122 MMHG

## 2024-10-08 DIAGNOSIS — I10 ESSENTIAL HYPERTENSION: Chronic | ICD-10-CM

## 2024-10-08 DIAGNOSIS — I68.0 CEREBRAL AMYLOID ANGIOPATHY: ICD-10-CM

## 2024-10-08 DIAGNOSIS — Z86.73 HISTORY OF CVA (CEREBROVASCULAR ACCIDENT): Primary | ICD-10-CM

## 2024-10-08 DIAGNOSIS — E85.4 CEREBRAL AMYLOID ANGIOPATHY: ICD-10-CM

## 2024-10-08 RX ORDER — ATORVASTATIN CALCIUM 40 MG/1
40 TABLET, FILM COATED ORAL NIGHTLY
Qty: 90 TABLET | Refills: 1 | Status: SHIPPED | OUTPATIENT
Start: 2024-10-08 | End: 2024-10-08 | Stop reason: ALTCHOICE

## 2024-10-08 RX ORDER — POTASSIUM CHLORIDE 750 MG/1
20 CAPSULE, EXTENDED RELEASE ORAL DAILY
COMMUNITY

## 2024-10-08 RX ORDER — ALBUTEROL SULFATE 90 UG/1
2 INHALANT RESPIRATORY (INHALATION) EVERY 4 HOURS PRN
Qty: 18 G | Refills: 3 | Status: SHIPPED | OUTPATIENT
Start: 2024-10-08

## 2024-10-08 NOTE — LETTER
October 8, 2024       No Recipients    Patient: Flo Manzo   YOB: 1955   Date of Visit: 10/8/2024     Dear ASHANTI El:       Thank you for referring Flo Manzo to me for evaluation. Below are the relevant portions of my assessment and plan of care.    If you have questions, please do not hesitate to call me. I look forward to following Flo along with you.         Sincerely,        FREEMAN Fajardo        CC:   No Recipients    Shante Carr PA  10/08/24 1559  Sign when Signing Visit  CC: hospital f/u    HPI:  Flo Manzo is a  69 y.o.  right-handed male past medical history of A-fib, hypertension, hyperlipidemia, prior cerebellar stroke in 2020, peripheral neuropathy, ambulates with walker who came to the ER in August with complaints of imbalance, facial droop, dysarthria.  His exam was notable for mild left facial and MRI brain showed multifocal areas of hemosiderin deposition of cortical medullary interfaces, bilateral cerebral hemispheres, bilateral thalami and left ching and hemorrhage of the left cerebellar hemisphere with extension to the fourth ventricle concerning for cerebral amyloid angiopathy progression significant compared to previous.  There is also subtle focus of left paracentral lobule could be subacute infarct lacunar.  CTA was unremarkable.  It was felt his anticoagulation was contraindicated in light of this.  He was changed to aspirin for primary prevention and is undergoing watchman's device consideration.    It looks like he had rehospitalization on 8/27 for right leg weakness.  Further neuroimaging not felt to .  Strict blood pressure control recommended.  Today blood pressure at goal.  He still in inpatient rehab due to be discharged next week.  He reports that his weakness has all but resolved.  No new complaint of neurologic symptoms, no headaches or any other    Social history:    Family history:      Pain  Scale:        ROS:  Review of Systems   Constitutional:  Positive for fatigue.   HENT:  Negative for ear pain, hearing loss, nosebleeds and tinnitus.    Eyes:  Negative for photophobia, pain and visual disturbance.   Endocrine: Negative for cold intolerance and heat intolerance.   Musculoskeletal:  Positive for gait problem (balance problem-Physical therapy).   Allergic/Immunologic: Negative for food allergies.   Neurological:  Positive for weakness (rt leg).   Psychiatric/Behavioral:  Negative for confusion and decreased concentration.        Reviewed ROS conducted by MA and agree    ***    Physical Exam:  There were no vitals filed for this visit.   Orthostatic BP:    There is no height or weight on file to calculate BMI.        General: pt well appearing, no distress  HEENT: Normocephalic, conjunctiva normal, external canals normal, no nasal discharge, moist mucous membranes  Neck: No lymphadenopathy, thyroid not enlarged, no JVD  CV: Regular rate and rhythm, no murmurs negative no bruits auscultated at neck, equal pulses  Pulmonary: Normal respiratory effort, clear to auscultation bilaterally  Extremities: no edema, bruising, or skin lesions  Pysch: good eye contact, cooperative, full affect, euthymic mood, good attention, good insight  Mental: alert, conversant, AOx3, provides history, 3/3 recall.  Language fluent, names, repeats.  CN: CN II-XII intact, PERRL, EOMi, no gaze palsy or nystagmus, intact facial sensation, no facial assymetry, intact hearing, symmetric palate elevation, tongue midline, good SCM strength  Motor: no abnormal movements, no pronator drift, normal  bulk and tone, 5/5 strength b/l UE & LE  Sensory: normal sensation to crude touch, temperature, and vibration throughout  Reflexes: 2+ throughout, neg babinski  Coordination: no ataxia on finger-nose, heel-shin  Gait: normal gait, no ataxia, intact heel and toe walking        Results:      Lab Results   Component Value Date    GLUCOSE 90  "08/28/2024    BUN 14 08/28/2024    CREATININE 0.69 (L) 08/28/2024    EGFRIFNONA 85 08/27/2021    EGFRIFAFRI 103 08/27/2021    BCR 20.3 08/28/2024    CO2 27.0 08/28/2024    CALCIUM 9.4 08/28/2024    PROTENTOTREF 6.8 07/15/2024    ALBUMIN 3.5 08/28/2024    LABIL2 1.5 07/15/2024    AST 7 08/28/2024    ALT 14 08/28/2024       Lab Results   Component Value Date    WBC 6.07 08/28/2024    HGB 12.9 (L) 08/28/2024    HCT 40.6 08/28/2024    MCV 88.5 08/28/2024     08/28/2024         .No results found for: \"RPR\"      Lab Results   Component Value Date    TSH 0.179 (L) 08/06/2024    H4MFKOY 80 08/01/2022    S5MZCNH 6.51 02/03/2023    THYROIDAB 13 07/15/2024         Lab Results   Component Value Date    EXUXZYGZ56 426 08/18/2023         No results found for: \"FOLATE\"      Lab Results   Component Value Date    HGBA1C 5.70 (H) 08/06/2024         Lab Results   Component Value Date    GLUCOSE 90 08/28/2024    BUN 14 08/28/2024    CREATININE 0.69 (L) 08/28/2024    EGFRIFNONA 85 08/27/2021    EGFRIFAFRI 103 08/27/2021    BCR 20.3 08/28/2024    K 3.9 08/28/2024    CO2 27.0 08/28/2024    CALCIUM 9.4 08/28/2024    PROTENTOTREF 6.8 07/15/2024    ALBUMIN 3.5 08/28/2024    LABIL2 1.5 07/15/2024    AST 7 08/28/2024    ALT 14 08/28/2024         Diagnosis:  1 suspected CAA  2 tiny acute L frontal stroke  3 chronic anticoagulation     Impression: 70 yo M with h/o afib on eliquis, htn, GÓMEZ, prior cerebellar stroke in 2020, peripheral neuropathy, ambulates with walker, who comes in with acute stroke like symptoms described as balance problems and new facial droop.  He also tells of several week h/o swallowing difficulties at home.  He was admitted with fever and pneumonia.  MRI showed multiple areas of microhemorrhage concerning for cerebral amyloid angiopathy. The progression compared to previous MRI is significant, and his anticoagulation thought contraindicated.  He was placed on aspirin for primary prevention as he is not long term AC " candidate and is undergoing coordination for watchman's device.        Plan  1 cont ASA and statin   2 rx for albuterol given, his rx was lost in discharge  3 will reach out to watchman coordinator, pt curious about next steps     F/u in 6-9 mos    I spent at least 40 minutes interviewing, examining, and counseling patient.  I independently reviewed documentation, laboratory and diagnostic findings, external documentation where applicable, and formulated treatment plan which was discussed with the patient.

## 2024-10-08 NOTE — PATIENT INSTRUCTIONS
Pt needs to begin albuterol inhaler, 2 puffs every 6hr PRN SOB  Pt needs to be on atorvastatin 40 mg daily    Fu in 6-9 mos

## 2024-10-09 ENCOUNTER — TELEPHONE (OUTPATIENT)
Dept: CARDIOLOGY | Facility: HOSPITAL | Age: 69
End: 2024-10-09
Payer: MEDICARE

## 2024-10-09 NOTE — TELEPHONE ENCOUNTER
I called pt to check in on how he is doing. I called his cell, left message for return call.  I also called his wife, Bhargavi. She states he is due to be discharged from Rehab on 10/17. I discussed with her the Watchman device and indications for this. We talked previously in August but she didn't seem to remember much from that conversation. We discussed scheduling an appt with Dr Ewing for her  & her to have a sit down with Dr Ewing & discuss the Watchman procedure further. She is nervous about him coming home and is not entirely sure what his functional capacity will be. She asked that we talk again in 2 weeks after he is settled from coming home from Rehab. Will plan to follow up then. RTRN

## 2024-10-17 ENCOUNTER — TRANSCRIBE ORDERS (OUTPATIENT)
Dept: HOME HEALTH SERVICES | Facility: HOME HEALTHCARE | Age: 69
End: 2024-10-17
Payer: MEDICARE

## 2024-10-17 DIAGNOSIS — I63.9 CEREBROVASCULAR ACCIDENT (CVA), UNSPECIFIED MECHANISM: Primary | ICD-10-CM

## 2024-10-18 ENCOUNTER — HOME CARE VISIT (OUTPATIENT)
Dept: HOME HEALTH SERVICES | Facility: HOME HEALTHCARE | Age: 69
End: 2024-10-18
Payer: MEDICARE

## 2024-10-18 ENCOUNTER — NURSE TRIAGE (OUTPATIENT)
Dept: CALL CENTER | Facility: HOSPITAL | Age: 69
End: 2024-10-18
Payer: MEDICARE

## 2024-10-18 ENCOUNTER — TELEPHONE (OUTPATIENT)
Dept: INTERNAL MEDICINE | Age: 69
End: 2024-10-18

## 2024-10-18 PROCEDURE — G0299 HHS/HOSPICE OF RN EA 15 MIN: HCPCS

## 2024-10-18 NOTE — TELEPHONE ENCOUNTER
"UNABLE TO WARM TRANSFER     Caller: Flo Manzo \"Danyel\"    Relationship to patient: Self    Best call back number: [phone]     Type of visit: HOSPITAL FOLLOW UP    Requested date: NEXT WEEK, WEEK OF 10/21    If rescheduling, when is the original appointment: 10/18     Additional notes: PATIENT RECENTLY HAD A STROKE AND IS NEEDING TO RESCHEDULE APPT FOR TODAY DUE TO ANOTHER APPT FOR HOME HEALTH. PLEASE CALL AND SCHEDULE FOR NEXT WEEK.         "

## 2024-10-18 NOTE — TELEPHONE ENCOUNTER
Caller: Bhargavi Parker    Relationship: Emergency Contact    Best call back number: [phone]     What was the call regarding: PATIENT'S WIFE CALLED TO FOLLOW UP ON STATUS OF REQUEST

## 2024-10-18 NOTE — TELEPHONE ENCOUNTER
"Reason for Disposition   Requesting regular office appointment    Additional Information   Negative: [1] Caller is not with the adult (patient) AND [2] reporting urgent symptoms   Negative: Lab result questions   Negative: Medication questions   Negative: Caller can't be reached by phone   Negative: Caller has already spoken to PCP or another triager   Negative: RN needs further essential information from caller in order to complete triage    Answer Assessment - Initial Assessment Questions  1. REASON FOR CALL or QUESTION: \"What is your reason for calling today?\" or \"How can I best help you?\" or \"What question do you have that I can help answer?\"     Schedule appt after discharge from hospital    Protocols used: Information Only Call-ADULT-    "

## 2024-10-19 VITALS
HEIGHT: 70 IN | TEMPERATURE: 98.1 F | SYSTOLIC BLOOD PRESSURE: 138 MMHG | BODY MASS INDEX: 33.29 KG/M2 | OXYGEN SATURATION: 93 % | HEART RATE: 63 BPM | DIASTOLIC BLOOD PRESSURE: 88 MMHG | RESPIRATION RATE: 20 BRPM

## 2024-10-19 NOTE — HOME HEALTH
69 year old male readmitted to home care services s/p readmission acute hospitalization at [facility] 8/26-8/29. Patient transferred to [facility] from 9/29-10/17/24. Patient s/p LEFT CVA originally on 8/5/24, generalized weakness, right leg weakness, and frequent falls. Patient with history of AFIB, PACEMAKER, GÓMEZ, CHF, HTN, POLYNERUOPATHY, OBESITY. Patient in discussion with Cardiology regarding the Watchman procedure. Patient lives with elderly wife, and step son who is PCG in single family home with ramp to enter home.  Patient sitting in lift chair in living room, vss, afebrile, denies any pain. Patient with +3 pitting edema noted to bilateral feet, and +2 pitting to BLE. Patient currently taking furosemide 20mg twice a day.  Patient states [facility] has been wrapping his legs with ace bandages. Sn removed bandages, no skin breakdown noted. Rewrapped with 2 layer kerlix, ace wraps toes to knee. Patient has small scratch wound to LFA measurement and picture taken today.  Medications are administered by pcg wife. Currently patient using the [employer] prepackaged medications from [facility]. Instructed patient/cgs to not use the old prepackaged medications as most of those medications have been discontinued. Patient has Gabapentin 300mg blister package with 5 days left for his evening dose. Patient only has 1 capsule left of the morning dose of Gabapention 100mg, otherwise has all current medications in the home but will need refills soon.  Pcg wife called PCP office while sn present to make follow up appointment for patient to be seen and to have medications refilled. Patient requires assistance when transferring to wheelchair and toilet.  SN visits 1w2, (NEXT SN VISIT will be RECERTIFICATION),  for focus of care teaching CVA disease process, assessment of all systems, teach medications and med effects, edema management, nurtrition,hydration and bowel regimen. PT eval/tx for therapeutic  exercises, home safety, strengthening, transfers. OT eval/tx for ADLS, bathroom safety, strengthening. Patient declined ST at this time, and denies having any trouble with swallowing.

## 2024-10-21 ENCOUNTER — HOME CARE VISIT (OUTPATIENT)
Dept: HOME HEALTH SERVICES | Facility: HOME HEALTHCARE | Age: 69
End: 2024-10-21
Payer: MEDICARE

## 2024-10-21 PROCEDURE — G0151 HHCP-SERV OF PT,EA 15 MIN: HCPCS

## 2024-10-21 NOTE — Clinical Note
Patient admitted to [facility] PT services 2 week 4 for ther-ex instruction, gait training , fall prevention, transfer training, energy conservation education following recent CVA.

## 2024-10-21 NOTE — HOME HEALTH
"REASON FOR REFERRAL: 69  year old  male   presents with complaints of :  decreased independence with transfers, difficulty walking, decreased independence with self care and mobility following recent CVA. Patient intially admitted to [facility] on 8/5-8/9 due to weakness, slurred speech and facial droop. During hospital stay, neurology identified a small acute left frontal stroke and suspected cerebral amyloid angiopathy. They discontinued Eliquis and started aspirin.  At that time he transitioned to [facility].  Transitioned home on 8/24 where he fell 3 times in 2 days.  REadmitted to [facility] on 8/26 with c/o progressive LE weakness and frequent falls. Transitioned to subacute rehab on 8/29 where he stayed until he returned home on 10/17.  Has been to ER 3 times in less than 3 months. Patient is in discussion with Cardiology regarding the Watchman procedure but no noted upcoming follow up visit.     Home health ordered for: PT, SN, SLP, OT.  would benefit from MSW referral    SUBJECTIVE: \"My walking is my big problem\"      DIAGNOSIS/FOCUS OF CARE: CVA      PERTINENT MEDICAL HISTORY:   R LE weakness  pacemaker  polyneuopathy  Generalized weakness     History of CVA (cerebrovascular accident)   Right leg weakness   Frequent falls    Paroxysmal atrial fibrillation    Pacemaker   Aortic stenosis, mild    Chronic atrial fibrillation   GÓMEZ on auto CPAP   Diastolic CHF, chronic   Essential hypertension  Obesity (BMI 30-39.9)     PRIOR LEVEL OF FUNCTION: patient was independent with self care and mobility prior to recent CVA    PATIENT PHYSICAL THERAPY GOAL(S): \"I want to walk by myself so I can get around\"       SOCIAL ENVIRONMENT/ DME /POTENTIAL BARRIERS FOR GOAL ATTAINMENT : lives in single story home with basement with wife and stepson. ramp to enter; grab bar in bathroom, w/c; rollator; RW; lift chair; cane; hospital bed.  wife has own physical ailments and relies on assistive device for her own mobility so " "is unable to physically assist patient; per patient, stepson assists intermittently \"when he is not mad\" and \"when he feels like it.\"  Stepson in home during PT evaulation but neither patient nor wife able to get stepson to respond to calls or requests     SKIN INTEGRITY/ WOUND STATUS:  see wound care screen for details; no issues reported    CODE STATUS:  full    MEDICATION ISSUES/ CONCERNS: meds addressed via SN on prior visit    HOMEBOUND STATUS: yes - see homebound screen for details    PROBLEMS IDENTIFIED: see care plan-    FUNCTIONAL STATUS/ FALL RISK/ SAFETY: see physical therapy evaluation/ care plan       ASSESSMENT: patient currently requires mod A for safe transfers. ambulates with limited knee flexion, limited foot clearance, shuffle steps.  Difficulty with turns leading to high fall risk.  caregiver situation in home is inconsistent at best and likely inadequate as wife physically unable and stepson with limited willingness. plush carpet in home leads to difficulty with independent w/c navigation. W/c will not fit through bathroom door so patient must ambulate into narrow bathroom but small square footage will have difficulty simultaneously accommodating DME, patient and caregiver. Patient eager to improve and willing to participate in all requested of him however fatigues quickly . Orthopnea. Carryover may be significantly limited due to lack of willing caregiver and patient with memory challenges due to recent CVA. Patient with +3 pitting edema noted to bilateral feet, and +2 pitting to BLE      ___________________________________  PLAN FOR NEXT VISIT:   MEDICAL NECESSITY FOR ONGOING SKILLED THERAPY:  Skilled physical therapy is medically necessary for treatment of: gait, transfer, ROM, strength and balance deficits following recent hospitalization for: CVA with R sided weakness. Requires instruction in appropriate progression of exercises; education in fall prevention/ edema management; gait training " to reduce reliance on assistive device; balance retraining to prevent falls.  Without skilled physical therapy, patient at risk for: falls, rehospitalization, increased reliance on caregiver, chronic pain,       SPECIFIC INTERVENTIONS AND GOALS TO ADDRESS ON NEXT VISIT:  - initiate HEP to include exercises that patient is able to complete independently  - gait training to restore normal mechanics  - continued home safety education  - transfer training    FREQUENCY AND DURATION:  - 2 week 4    ANY OTHER FOLLOW UP NEEDED: none    DATE OF NEXT APPOINTMENT WITH DOCTOR: PCP on 10/24      REASSESSMENT DUE DATE: 30 day:  11/20/24     Karen Jiménez   electronically notified of POC via case communication on 10/21/24

## 2024-10-22 ENCOUNTER — HOME CARE VISIT (OUTPATIENT)
Dept: HOME HEALTH SERVICES | Facility: HOME HEALTHCARE | Age: 69
End: 2024-10-22
Payer: MEDICARE

## 2024-10-22 ENCOUNTER — PATIENT OUTREACH (OUTPATIENT)
Dept: CASE MANAGEMENT | Facility: OTHER | Age: 69
End: 2024-10-22
Payer: MEDICARE

## 2024-10-22 VITALS
TEMPERATURE: 97.9 F | RESPIRATION RATE: 18 BRPM | SYSTOLIC BLOOD PRESSURE: 140 MMHG | OXYGEN SATURATION: 96 % | HEART RATE: 81 BPM | DIASTOLIC BLOOD PRESSURE: 82 MMHG

## 2024-10-22 VITALS
DIASTOLIC BLOOD PRESSURE: 80 MMHG | HEART RATE: 76 BPM | SYSTOLIC BLOOD PRESSURE: 148 MMHG | RESPIRATION RATE: 18 BRPM | BODY MASS INDEX: 34.36 KG/M2 | HEIGHT: 70 IN | TEMPERATURE: 98.7 F | OXYGEN SATURATION: 94 % | WEIGHT: 240 LBS

## 2024-10-22 PROCEDURE — G0299 HHS/HOSPICE OF RN EA 15 MIN: HCPCS

## 2024-10-22 NOTE — OUTREACH NOTE
"AMBULATORY CASE MANAGEMENT NOTE    Names and Relationships of Patient/Support Persons: Contact: Flo Manzo \"Danyel\"; Relationship: Self -     SNF Follow-up    Questions/Answers      Flowsheet Row Responses   Acute Facility Discharged From [facility]   Acute Discharge Date 08/29/24   Name of the Skilled Nursing Facility? [facility]   Purpose of SNF Admission PT, OT, SN   Who is the insurance provider or payor of patient stay? Medicare   Progression of Patient? discharged   Skilled Nursing Discharge Date? 10/17/24   Where was the patient discharged to? Home   Home Health Agency at discharge? Yes   Name of Home Health Agency? [facility]   DME Needed at Discharge? Yes   What Medical Equipment is needed? Family states CPAP is being delivered Friday.   Are there any medication concerns at Discharge No   Does the patient have a follow-up appointment? Yes   Comments Regarding F/U Appt. ? Patient scheduled with Jessy BURRELL on 10/24        Patient Outreach    Introduced self, explained ACM RN role and provided contact information. Spoke with patient's family regarding health and wellness. Patient is reportedly doing well. Nursing and PT have seen the patient. Follow up scheduled in 2 weeks.     Education Documentation  No documentation found.        Gretel FRAGA  Ambulatory Case Management    10/22/2024, 16:12 EDT  "

## 2024-10-22 NOTE — HOME HEALTH
60 Day Summary    Home Health need continues for: Nursing neuro assessments, cardiopulmonary assessments, medication teaching and monitoring medication effects and compliance, PT/OT/MSW.    Primary diagnoses/co-morbidities/recent procedures in past 60 days that impact current episode: LEFT CVA, weakness. AFIB, PACEMAKER, GÓMEZ, CHF, HTN, POLYNERUOPATHY, OBESITY.    Current level of functional ability: Patient requires assistance with ADLS, and transferring safely to wheelchair, and lift chair, toilet transferring   Homebound status and living arrangements:HOMEBOUND    Goals accomplished and/or measurable progress toward unmet goals in past 60 days: Patient was just discharged home from [facility] 10/17/24, Resumption of care 10/18, New cert period begins 10/24.  Skilled need:  SN, PT,OT and MSW. Patient declines Speech therapist at this time.    Focus of care for next 60 days for each discipline ordered: SN visits 1w2, (NEXT SN VISIT will be RECERTIFICATION),  for focus of care teaching CVA disease process, assessment of all systems, teach medications and med effects, edema management, nurtrition,hydration and bowel regimen. PT eval/tx for therapeutic exercises, home safety, strengthening, transfers. OT eval/tx for ADLS, bathroom safety, strengthening. Patient declined ST at this time, and denies having any trouble with swallowing.    Skin integrity/wound status: Patient has scab to left fore arm from scratch, edema has improved to BLE, currently edema +1 to L Pedal.    Code status: full code    Most recent fall risk: no falls since being home since 10/17/24    Estimated date when home care services will end: 4 weeks    Plan of Care confirmed with Karen BURRELL

## 2024-10-23 ENCOUNTER — HOME CARE VISIT (OUTPATIENT)
Dept: HOME HEALTH SERVICES | Facility: HOME HEALTHCARE | Age: 69
End: 2024-10-23
Payer: MEDICARE

## 2024-10-23 VITALS
DIASTOLIC BLOOD PRESSURE: 82 MMHG | SYSTOLIC BLOOD PRESSURE: 142 MMHG | OXYGEN SATURATION: 95 % | TEMPERATURE: 97.9 F | HEART RATE: 81 BPM

## 2024-10-23 PROCEDURE — G0157 HHC PT ASSISTANT EA 15: HCPCS

## 2024-10-23 NOTE — HOME HEALTH
Subjective: Patient states no pain at this time. CG states he just had a big mess with his bowel movement and he can't clean himself up ( we discussed MSW to assist with needs and made recommendation of more inhome care or LTC)    Wound-Left arm- addressed by nursing  Falls- none  Medication Changes- none  Cognition- has some noted dementia    Assessment: Patient has spouse in home that is not able to assist secondary to her own limitiation and a son that helps intermittently and is not dependable. I had a lengthy conversation with spouse, son and patient about lack of carryover concerns and that he needs to manuever with walker 4 times a day and do his HEP consistently for strengthening. They state he doesn't listen to them much. Nurse is trying to get a MSW visit to assist with needs and we discussed LTC or more dependable in home care. They state they can't afford that at this time. He is high risk for falls and instructed on using the gait belt and keeping w/c close during mobility. Patient and CG re-education on medication regimen, hydration and proper nutrition and used teach back for carryover and accuracy. Patient will benefit with continued PT for progression and reduce risk of decline.    Plan for next visit:  Gait training  Review and progress HEP  Balance/transfers/safety  CG education

## 2024-10-23 NOTE — PROGRESS NOTES
"    I N T E R N A L  M E D I C I N E  Karen Jiménez, APRN       ENCOUNTER DATE:  10/24/2024    Flo Manzo / 69 y.o. / male        CC:   (Transitional Care Follow Up Visit)  [facility] -8/26/2024-8/29/2024        Within 48 business hours after discharge our office contacted him via telephone to coordinate his care and needs.      I reviewed and discussed the details of that call along with the discharge summary, hospital problems, inpatient lab results, inpatient diagnostic studies, and consultation reports with the patient.         3/6/2022     2:39 PM   Date of TCM Phone Call   [facility]   Date of Admission 3/4/2022   Date of Discharge 3/6/2022   Discharge Disposition Home or Self Care       Risk for Readmission (LACE) Score: 10 (10/25/2024  6:00 AM)            VITALS    Visit Vitals  /75   Pulse 78   Temp 97.2 °F (36.2 °C) (Temporal)   Resp 16   Ht 177.8 cm (70\")   Wt 106 kg (233 lb)   SpO2 98%   BMI 33.43 kg/m²       BP Readings from Last 3 Encounters:   10/25/24 (!) 150/105   10/24/24 130/75   10/23/24 142/82     Wt Readings from Last 3 Encounters:   10/25/24 104 kg (228 lb 2.8 oz)   10/24/24 106 kg (233 lb)   10/22/24 109 kg (240 lb)      Body mass index is 33.43 kg/m².    HPI:     Date of admission/discharge: As noted above in CC  Hospital: [facility]   Principle Dx: Generalized weakness  Secondary Dx: History of CVA, right leg weakness, frequent falls, paroxysmal atrial fibrillation, pacemaker, aortic stenosis, GÓMEZ on CPAP, diastolic CHF, chronic, HTN, obesity  History prior to hospitalization: Presented to ER with progressive bilateral leg weakness, RLE > LLE.  Previously admitted August 5-9, 2024 for PNA and acute left CVA with left facial droop and slurred speech.  Diagnosed with cerebral amyloid angiopathy.  Evaluation/Treatment: August 11, 2024 Chest XR was normal.  Evaluated by neurology who recommended against a/c due to high risk of hemorrhagic conversion.  On " aspirin 81 mg daily.  Cardiology will consider possibility of Watchman device - patient to follow up as outpatient.  He was discharged to subacute rehab.  Course: Accompanied to today's appointment by his son, Ricardo.  Now returned to living at home.  Using wheelchair at home and continues to have poor stability on his feet.  Scheduled for follow up with cardiology, Dr. Bruce, on December 19, 2024.  BP is well controlled at today's visit at today's visit on amlodipine 10 mg daily, lisinopril 20 mg daily, metoprolol succinate tartrate 37.5 mg BID, furosemide 20 mg daily.  Weight remains stable, lower extremity edema at baseline, non worsening.  Denies chest pain.  Ongoing shortness of breath with exertion, which has been going on since hospitalization in August.  August 2023 ECHO was EF of 46-50%, moderate aortic valve stenosis.  History of GÓMEZ, followed by sleep medicine, Dr. Munroe.  Plans to start using CPAP (supposed to receive shipment tomorrow).      Prediabetes: August 2024 A1C 5.7.     HLD: Well controlled on atorvastatin 40 mg nightly.  August 2024 Lipid panel with LDL 44; triglycerides 38.     Anxiety/ depression: Mood is stable on buspirone 10 mg BID.  No SI/ HI.       Followed by thoracic surgery, Dr. Burciaga, for thyroid goiter.   March 17, 2023 CT Chest showed goiter with bilateral substernal extension and corresponding mass effect, including narrowing of adjacent trachea, which is similar to CT of June 22, 2022.  He is not a candidate for surgical resection so is monitored yearly as surveillance.  March 2024 CT Chest showed stable multinodular thyroid goiter.       He has not yet scheduled follow up with neurology, Dr. Wilkins, for history of hemorrhagic stroke, but plans to do so.     Followed by Oncology Dr. Wheat, and [facility], Dr. Rader for prostate cancer.  Taking tamsulosin 0.4 mg nightly.  Completed radiation therapy.  He has plans to re schedule follow up.     Remains on  gabapentin 100 mg morning and afternoon with benefit for bilateral hand neuropathy and gabapentin 300 mg nightly for benefit of lower extremity neuropathy symptoms.         Patient Care Team:  Karen Jiménez APRN as PCP - General (Family Medicine)  Eren Bruce MD as Consulting Physician (Cardiology)  Temo Wheat MD as Consulting Physician (Radiation Oncology)  Bryant Rader MD as Consulting Physician (Urology)  Cindi Breen DNP, APRN as Nurse Practitioner (Nurse Practitioner)  Gretel Galaviz RN as Ambulatory  ([facility])  ____________________________________________________________________    ASSESSMENT & PLAN:    1. Essential hypertension    2. Dyspnea on exertion    3. Poor balance    4. Generalized weakness    5. Requires assistance with activities of daily living (ADL)    6. High risk medication use      Orders Placed This Encounter   Procedures    XR Chest 2 View    Comprehensive Metabolic Panel    Compliance Drug Analysis, Ur - Urine, Clean Catch    BNP ([facility] Only)    Ambulatory Referral to Home Health    Ambulatory Referral to Social Care Services (Amb Case Mgmt)    Adult Transthoracic Echo Complete W/ Cont if Necessary Per Protocol    Spirometry - Pre & Post Bronchodilator    CBC & Differential       Summary/Discussion:  Hemodynamically stable at today's visit.  Recommend updating ECHO given ongoing dyspnea with exertion symptoms, as well as investigating PFT.  He reports history of chemical exposures through his prior work as .  No smoking history.  Labs ordered, including BNP.  Will consider increasing furosemide from 20 mg daily to BID pending Chest XR review.  He was educated on importance of close follow up with cardiology office as scheduled.  Phone numbers provided for neurology and oncology offices to schedule follow up appointments.    He would benefit from additional PT given deconditioning from recent hospital admissions.  Home  health referral placed.  Also agreeable for social work referral given need for additional assistance with ADLs.  May need to consider moving to assisted living facility.    45 minutes spent reviewing chart, prior test and imaging records, meeting with patient and developing plan of care.      Return in about 3 months (around 1/24/2025) for Medicare Wellness.    ____________________________________________________________________    REVIEW OF SYSTEMS    Review of Systems   Constitutional:  Positive for fatigue. Negative for chills, fever and unexpected weight change.   Respiratory:  Negative for cough, chest tightness and shortness of breath.    Cardiovascular:  Negative for chest pain, palpitations and leg swelling.   Neurological:  Positive for weakness (Generalized). Negative for dizziness, light-headedness and headaches.   Psychiatric/Behavioral:  The patient is not nervous/anxious.          PHYSICAL EXAMINATION    Physical Exam  Vitals reviewed.   Constitutional:       General: He is not in acute distress.     Appearance: Normal appearance. He is not ill-appearing, toxic-appearing or diaphoretic.   HENT:      Head: Normocephalic and atraumatic.   Cardiovascular:      Rate and Rhythm: Normal rate and regular rhythm.      Heart sounds: Normal heart sounds. Murmur heard.   Pulmonary:      Effort: Pulmonary effort is normal.      Breath sounds: Normal breath sounds. No wheezing, rhonchi or rales.   Musculoskeletal:      Right lower leg: Edema (Scant) present.      Left lower leg: Edema (Scant) present.   Neurological:      Mental Status: He is alert and oriented to person, place, and time. Mental status is at baseline.   Psychiatric:         Mood and Affect: Mood normal.         Behavior: Behavior normal.         Thought Content: Thought content normal.         Judgment: Judgment normal.           REVIEWED DATA:    Labs:   Lab Results   Component Value Date     10/25/2024    K 3.5 10/25/2024    CALCIUM 9.5  10/25/2024    AST 10 10/24/2024    ALT 15 10/24/2024    BUN 17 10/25/2024    CREATININE 0.93 10/25/2024    CREATININE 0.75 (L) 10/24/2024    CREATININE 0.82 10/24/2024    EGFRIFNONA 85 08/27/2021    EGFRIFAFRI 103 08/27/2021       Lab Results   Component Value Date    WBC 7.76 10/25/2024    HGB 13.6 10/25/2024    HGB 14.0 10/24/2024    HGB 13.8 10/24/2024     10/25/2024       Lab Results   Component Value Date    GLUCOSEU Negative 08/26/2024    BLOODU Negative 08/26/2024    NITRITEU Negative 08/26/2024    LEUKOCYTESUR Negative 08/26/2024       Imaging:   XR Chest 2 View    Result Date: 10/24/2024  Narrative: XR CHEST 2 VW-  DATE OF EXAM: 10/24/2024 11:57 AM  INDICATION: Dyspnea with exertion.  COMPARISON: Radiographs 8/11/2024, 8/5/2024, and 7/29/2022. CT 10/24/2024.  TECHNIQUE: PA and lateral views of the chest were obtained.  FINDINGS: Stable left chest wall cardiac pacer device. Elevation of the right hemidiaphragm with ill-defined bilateral perihilar and bibasilar subsegmental atelectasis and/or scarring. No dense lung consolidation. No pneumothorax. Unchanged cardiac and mediastinal contours. Calcified remnants of prior granulomatous disease in the left hilum. Multilevel thoracic spondylosis. Partially evaluated chronic degenerative changes in both shoulders. No acute osseous abnormality is identified.      Impression: Elevation of the right hemidiaphragm with ill-defined bilateral perihilar and bibasilar subsegmental atelectasis and/or scarring.  This report was finalized on 10/24/2024 10:29 PM by Charles Torres MD on Workstation: [id number]      CT Angiogram Chest Pulmonary Embolism    Result Date: 10/24/2024  Narrative: CT ANGIOGRAM CHEST PULMONARY EMBOLISM-  DATE OF EXAM: 10/24/2024 9:19 PM  INDICATION: hypoxia, tachypnea, dyspnea, immobility.  COMPARISON: Chest radiographs 10/24/2024, 8/11/2024, and 8/5/2024. CT chest 3/21/2024, 3/17/2023, and 6/22/2022. Coronary CT 8/28/2024.  TECHNIQUE: Multiple  contiguous axial images were acquired through the chest following the intravenous administration of 85 mL of Isovue-370. Reformatted coronal, sagittal, and 3D reconstruction images were also reviewed. Radiation dose reduction techniques were utilized, including automated exposure control and exposure modulation based on body size.  FINDINGS: No evidence of a pulmonary arterial filling defect to suggest pulmonary embolism. Mild enlargement of the main pulmonary arteries could reflect pulmonary Terryl hypertension. Stable cardiomegaly with mild to moderate calcified coronary artery disease. No pericardial effusion. Aortic valve calcifications. Calcified remnants of prior granulomatous disease in the mediastinum and left hilum. No pathologically enlarged intrathoracic lymph nodes are identified. Similar-appearing partially imaged heterogeneous multinodular thyroid goiter extending substernal. Tiny hiatal hernia.  Low lung volumes with bilateral dependent atelectasis and multifocal bibasilar subsegmental atelectasis and/or scarring. Multiple likely benign sub-6 mm pulmonary nodules in both lungs. No dense lung consolidation. The central airways are widely patent. No pneumothorax or pleural effusion.  Limited noncontrast CT imaging of the upper abdomen demonstrates a similar-appearing incompletely evaluated 1.5 cm low-density lesion in the right lobe of the liver, statistically representing a hepatic cyst or hemangioma. Fatty atrophy of the pancreas.  Similar-appearing multilevel thoracic spondylosis and flowing multilevel mid and lower thoracic anterior osteophyte formation. Partially imaged chronic degenerative changes in both shoulders. No acute osseous abnormality or concerning osseous lesion.      Impression:  1. The study is negative for pulmonary embolism. No acute intrathoracic abnormality. 2. Stable cardiomegaly with mild to moderate calcified coronary artery disease.  This report was finalized on 10/24/2024  10:24 PM by Charles Torres MD on Workstation: [id number]        Medical Tests:        Summary of old records / correspondence / consultant report:   DC summary re: issues addressed on HPI    Request outside records:         MEDICATIONS   No current facility-administered medications for this visit.     No current outpatient medications on file.     Facility-Administered Medications Ordered in Other Visits   Medication Dose Route Frequency Provider Last Rate Last Admin    acetaminophen (TYLENOL) tablet 650 mg  650 mg Oral Q4H PRN Lorena Santoyo APRN        Or    acetaminophen (TYLENOL) 160 MG/5ML oral solution 650 mg  650 mg Oral Q4H PRN Lorena Santoyo APRN        Or    acetaminophen (TYLENOL) suppository 650 mg  650 mg Rectal Q4H PRN Lorena Santoyo APRN        albuterol (PROVENTIL) nebulizer solution 0.083% 2.5 mg/3mL  2.5 mg Nebulization Q6H PRN Lorena Santoyo APRN        aspirin chewable tablet 81 mg  81 mg Oral Daily Lorena Santoyo APRN   81 mg at 10/25/24 0846    atorvastatin (LIPITOR) tablet 40 mg  40 mg Oral Nightly Lorena Santoyo APRN        sennosides-docusate (PERICOLACE) 8.6-50 MG per tablet 2 tablet  2 tablet Oral BID PRN Lorena Santoyo APRN        And    polyethylene glycol (MIRALAX) packet 17 g  17 g Oral Daily PRN Lorena Santoyo APRN        And    bisacodyl (DULCOLAX) EC tablet 5 mg  5 mg Oral Daily PRN Lorena Santoyo APRN        And    bisacodyl (DULCOLAX) suppository 10 mg  10 mg Rectal Daily PRN Lorena Santoyo APRN        busPIRone (BUSPAR) tablet 10 mg  10 mg Oral BID Lorena Santoyo APRN   10 mg at 10/25/24 0845    calcium carbonate (TUMS) chewable tablet 500 mg (200 mg elemental)  2 tablet Oral BID PRN Lorena Santoyo APRN        citalopram (CeleXA) tablet 20 mg  20 mg Oral Daily Lorena Santoyo APRN   20 mg at 10/25/24 0845    furosemide (LASIX) tablet 20 mg  20 mg Oral Daily Lorena Santoyo, APRN   20  mg at 10/25/24 0846    gabapentin (NEURONTIN) capsule 100 mg  100 mg Oral QAM Lorena Santoyo APRN   100 mg at 10/25/24 0845    gabapentin (NEURONTIN) capsule 300 mg  300 mg Oral Nightly Lorena Santoyo APRN        guaiFENesin (MUCINEX) 12 hr tablet 1,200 mg  1,200 mg Oral BID Lorena Santoyo APRN   1,200 mg at 10/25/24 0846    lisinopril (PRINIVIL,ZESTRIL) tablet 20 mg  20 mg Oral Daily Lorena Santoyo APRN   20 mg at 10/25/24 0845    metoprolol tartrate (LOPRESSOR) tablet 25 mg  25 mg Oral Q12H Lorena Santoyo APRN   25 mg at 10/25/24 0846    nitroglycerin (NITROSTAT) SL tablet 0.4 mg  0.4 mg Sublingual Q5 Min PRN Lorena Santoyo APRN        ondansetron ODT (ZOFRAN-ODT) disintegrating tablet 4 mg  4 mg Oral Q6H PRN Lorena Santoyo APRN        Or    ondansetron (ZOFRAN) injection 4 mg  4 mg Intravenous Q6H PRN Lorena Santoyo APRN        pantoprazole (PROTONIX) EC tablet 40 mg  40 mg Oral Q AM Lorena Santoyo APRN   40 mg at 10/25/24 0845    potassium chloride (K-DUR,KLOR-CON) ER tablet 20 mEq  20 mEq Oral Daily Lorena Santoyo APRN   20 mEq at 10/25/24 0846    sodium chloride 0.9 % flush 10 mL  10 mL Intravenous PRN José Miguel Blanton MD        sodium chloride 0.9 % flush 10 mL  10 mL Intravenous Q12H Lorena Santoyo APRN   10 mL at 10/25/24 0846    sodium chloride 0.9 % flush 10 mL  10 mL Intravenous PRN Lorena Santoyo APRN        tamsulosin (FLOMAX) 24 hr capsule 0.4 mg  0.4 mg Oral Nightly Lorena Santoyo APRN           Current outpatient and discharge medications have been reconciled for the patient.  Reviewed by: ASHANTI El         @[facility] [place]

## 2024-10-24 ENCOUNTER — APPOINTMENT (OUTPATIENT)
Dept: CT IMAGING | Facility: HOSPITAL | Age: 69
DRG: 204 | End: 2024-10-24
Payer: MEDICARE

## 2024-10-24 ENCOUNTER — HOME CARE VISIT (OUTPATIENT)
Dept: HOME HEALTH SERVICES | Facility: HOME HEALTHCARE | Age: 69
End: 2024-10-24
Payer: MEDICARE

## 2024-10-24 ENCOUNTER — HOSPITAL ENCOUNTER (INPATIENT)
Facility: HOSPITAL | Age: 69
LOS: 4 days | Discharge: NURSING, REHAB OR LONG-TERM CARE | DRG: 204 | End: 2024-10-28
Attending: STUDENT IN AN ORGANIZED HEALTH CARE EDUCATION/TRAINING PROGRAM | Admitting: HOSPITALIST
Payer: MEDICARE

## 2024-10-24 ENCOUNTER — OFFICE VISIT (OUTPATIENT)
Dept: INTERNAL MEDICINE | Age: 69
End: 2024-10-24
Payer: MEDICARE

## 2024-10-24 ENCOUNTER — HOSPITAL ENCOUNTER (OUTPATIENT)
Facility: HOSPITAL | Age: 69
Discharge: HOME | End: 2024-10-24
Payer: MEDICARE

## 2024-10-24 ENCOUNTER — APPOINTMENT (OUTPATIENT)
Dept: GENERAL RADIOLOGY | Facility: HOSPITAL | Age: 69
DRG: 204 | End: 2024-10-24
Payer: MEDICARE

## 2024-10-24 VITALS
HEIGHT: 70 IN | HEART RATE: 78 BPM | BODY MASS INDEX: 33.36 KG/M2 | SYSTOLIC BLOOD PRESSURE: 130 MMHG | TEMPERATURE: 97.2 F | DIASTOLIC BLOOD PRESSURE: 75 MMHG | RESPIRATION RATE: 16 BRPM | OXYGEN SATURATION: 98 % | WEIGHT: 233 LBS

## 2024-10-24 DIAGNOSIS — Z79.899 HIGH RISK MEDICATION USE: ICD-10-CM

## 2024-10-24 DIAGNOSIS — R79.89 ELEVATED TROPONIN: Primary | ICD-10-CM

## 2024-10-24 DIAGNOSIS — R06.03 ACUTE RESPIRATORY DISTRESS: ICD-10-CM

## 2024-10-24 DIAGNOSIS — I10 ESSENTIAL HYPERTENSION: Primary | ICD-10-CM

## 2024-10-24 DIAGNOSIS — R06.09 DYSPNEA ON EXERTION: ICD-10-CM

## 2024-10-24 DIAGNOSIS — R53.1 GENERALIZED WEAKNESS: ICD-10-CM

## 2024-10-24 DIAGNOSIS — Z74.1 REQUIRES ASSISTANCE WITH ACTIVITIES OF DAILY LIVING (ADL): ICD-10-CM

## 2024-10-24 DIAGNOSIS — R26.89 POOR BALANCE: ICD-10-CM

## 2024-10-24 LAB
ALBUMIN SERPL-MCNC: 4.1 G/DL (ref 3.5–5.2)
ALBUMIN/GLOB SERPL: 1.4 G/DL
ALP SERPL-CCNC: 119 U/L (ref 39–117)
ALT SERPL W P-5'-P-CCNC: 15 U/L (ref 1–41)
ANION GAP SERPL CALCULATED.3IONS-SCNC: 10 MMOL/L (ref 5–15)
AST SERPL-CCNC: 10 U/L (ref 1–40)
B PARAPERT DNA SPEC QL NAA+PROBE: NOT DETECTED
B PERT DNA SPEC QL NAA+PROBE: NOT DETECTED
BASOPHILS # BLD AUTO: 0.04 10*3/MM3 (ref 0–0.2)
BASOPHILS NFR BLD AUTO: 0.4 % (ref 0–1.5)
BILIRUB SERPL-MCNC: 0.8 MG/DL (ref 0–1.2)
BUN SERPL-MCNC: 21 MG/DL (ref 8–23)
BUN/CREAT SERPL: 28 (ref 7–25)
C PNEUM DNA NPH QL NAA+NON-PROBE: NOT DETECTED
CALCIUM SPEC-SCNC: 10 MG/DL (ref 8.6–10.5)
CHLORIDE SERPL-SCNC: 109 MMOL/L (ref 98–107)
CO2 SERPL-SCNC: 26 MMOL/L (ref 22–29)
CREAT SERPL-MCNC: 0.75 MG/DL (ref 0.76–1.27)
DEPRECATED RDW RBC AUTO: 38.6 FL (ref 37–54)
EGFRCR SERPLBLD CKD-EPI 2021: 98.3 ML/MIN/1.73
EOSINOPHIL # BLD AUTO: 0.25 10*3/MM3 (ref 0–0.4)
EOSINOPHIL NFR BLD AUTO: 2.7 % (ref 0.3–6.2)
ERYTHROCYTE [DISTWIDTH] IN BLOOD BY AUTOMATED COUNT: 12.7 % (ref 12.3–15.4)
FLUAV SUBTYP SPEC NAA+PROBE: NOT DETECTED
FLUBV RNA ISLT QL NAA+PROBE: NOT DETECTED
GLOBULIN UR ELPH-MCNC: 3 GM/DL
GLUCOSE SERPL-MCNC: 83 MG/DL (ref 65–99)
HADV DNA SPEC NAA+PROBE: NOT DETECTED
HCOV 229E RNA SPEC QL NAA+PROBE: NOT DETECTED
HCOV HKU1 RNA SPEC QL NAA+PROBE: NOT DETECTED
HCOV NL63 RNA SPEC QL NAA+PROBE: NOT DETECTED
HCOV OC43 RNA SPEC QL NAA+PROBE: NOT DETECTED
HCT VFR BLD AUTO: 43.3 % (ref 37.5–51)
HGB BLD-MCNC: 14 G/DL (ref 13–17.7)
HMPV RNA NPH QL NAA+NON-PROBE: NOT DETECTED
HOLD SPECIMEN: NORMAL
HOLD SPECIMEN: NORMAL
HPIV1 RNA ISLT QL NAA+PROBE: NOT DETECTED
HPIV2 RNA SPEC QL NAA+PROBE: NOT DETECTED
HPIV3 RNA NPH QL NAA+PROBE: NOT DETECTED
HPIV4 P GENE NPH QL NAA+PROBE: NOT DETECTED
IMM GRANULOCYTES # BLD AUTO: 0.03 10*3/MM3 (ref 0–0.05)
IMM GRANULOCYTES NFR BLD AUTO: 0.3 % (ref 0–0.5)
INR PPP: 1.18 (ref 0.9–1.1)
LYMPHOCYTES # BLD AUTO: 2.01 10*3/MM3 (ref 0.7–3.1)
LYMPHOCYTES NFR BLD AUTO: 21.3 % (ref 19.6–45.3)
M PNEUMO IGG SER IA-ACNC: NOT DETECTED
MCH RBC QN AUTO: 27.7 PG (ref 26.6–33)
MCHC RBC AUTO-ENTMCNC: 32.3 G/DL (ref 31.5–35.7)
MCV RBC AUTO: 85.7 FL (ref 79–97)
MONOCYTES # BLD AUTO: 0.7 10*3/MM3 (ref 0.1–0.9)
MONOCYTES NFR BLD AUTO: 7.4 % (ref 5–12)
NEUTROPHILS NFR BLD AUTO: 6.39 10*3/MM3 (ref 1.7–7)
NEUTROPHILS NFR BLD AUTO: 67.9 % (ref 42.7–76)
NRBC BLD AUTO-RTO: 0 /100 WBC (ref 0–0.2)
NT-PROBNP SERPL-MCNC: 1551 PG/ML (ref 0–900)
PLATELET # BLD AUTO: 213 10*3/MM3 (ref 140–450)
PMV BLD AUTO: 10.1 FL (ref 6–12)
POTASSIUM SERPL-SCNC: 4.1 MMOL/L (ref 3.5–5.2)
PROT SERPL-MCNC: 7.1 G/DL (ref 6–8.5)
PROTHROMBIN TIME: 15.2 SECONDS (ref 11.7–14.2)
RBC # BLD AUTO: 5.05 10*6/MM3 (ref 4.14–5.8)
RHINOVIRUS RNA SPEC NAA+PROBE: NOT DETECTED
RSV RNA NPH QL NAA+NON-PROBE: NOT DETECTED
SARS-COV-2 RNA NPH QL NAA+NON-PROBE: NOT DETECTED
SODIUM SERPL-SCNC: 145 MMOL/L (ref 136–145)
TROPONIN T SERPL HS-MCNC: 34 NG/L
TROPONIN T SERPL HS-MCNC: 36 NG/L
WBC NRBC COR # BLD AUTO: 9.42 10*3/MM3 (ref 3.4–10.8)
WHOLE BLOOD HOLD COAG: NORMAL
WHOLE BLOOD HOLD SPECIMEN: NORMAL

## 2024-10-24 PROCEDURE — 85610 PROTHROMBIN TIME: CPT | Performed by: STUDENT IN AN ORGANIZED HEALTH CARE EDUCATION/TRAINING PROGRAM

## 2024-10-24 PROCEDURE — 83880 ASSAY OF NATRIURETIC PEPTIDE: CPT | Performed by: STUDENT IN AN ORGANIZED HEALTH CARE EDUCATION/TRAINING PROGRAM

## 2024-10-24 PROCEDURE — 84484 ASSAY OF TROPONIN QUANT: CPT | Performed by: STUDENT IN AN ORGANIZED HEALTH CARE EDUCATION/TRAINING PROGRAM

## 2024-10-24 PROCEDURE — 93010 ELECTROCARDIOGRAM REPORT: CPT | Performed by: INTERNAL MEDICINE

## 2024-10-24 PROCEDURE — 3075F SYST BP GE 130 - 139MM HG: CPT

## 2024-10-24 PROCEDURE — 1111F DSCHRG MED/CURRENT MED MERGE: CPT

## 2024-10-24 PROCEDURE — 1126F AMNT PAIN NOTED NONE PRSNT: CPT

## 2024-10-24 PROCEDURE — 85025 COMPLETE CBC W/AUTO DIFF WBC: CPT | Performed by: STUDENT IN AN ORGANIZED HEALTH CARE EDUCATION/TRAINING PROGRAM

## 2024-10-24 PROCEDURE — 71046 X-RAY EXAM CHEST 2 VIEWS: CPT

## 2024-10-24 PROCEDURE — 71275 CT ANGIOGRAPHY CHEST: CPT

## 2024-10-24 PROCEDURE — 80053 COMPREHEN METABOLIC PANEL: CPT | Performed by: STUDENT IN AN ORGANIZED HEALTH CARE EDUCATION/TRAINING PROGRAM

## 2024-10-24 PROCEDURE — 36415 COLL VENOUS BLD VENIPUNCTURE: CPT

## 2024-10-24 PROCEDURE — 93005 ELECTROCARDIOGRAM TRACING: CPT | Performed by: STUDENT IN AN ORGANIZED HEALTH CARE EDUCATION/TRAINING PROGRAM

## 2024-10-24 PROCEDURE — 99285 EMERGENCY DEPT VISIT HI MDM: CPT

## 2024-10-24 PROCEDURE — 99215 OFFICE O/P EST HI 40 MIN: CPT

## 2024-10-24 PROCEDURE — 25510000001 IOPAMIDOL PER 1 ML: Performed by: STUDENT IN AN ORGANIZED HEALTH CARE EDUCATION/TRAINING PROGRAM

## 2024-10-24 PROCEDURE — G0155 HHCP-SVS OF CSW,EA 15 MIN: HCPCS

## 2024-10-24 PROCEDURE — 3078F DIAST BP <80 MM HG: CPT

## 2024-10-24 PROCEDURE — 0202U NFCT DS 22 TRGT SARS-COV-2: CPT | Performed by: NURSE PRACTITIONER

## 2024-10-24 RX ORDER — ACETAMINOPHEN 325 MG/1
325 TABLET ORAL EVERY 4 HOURS PRN
COMMUNITY

## 2024-10-24 RX ORDER — ALBUTEROL SULFATE 90 UG/1
2 INHALANT RESPIRATORY (INHALATION) EVERY 4 HOURS PRN
Qty: 18 G | Refills: 3 | Status: ON HOLD | OUTPATIENT
Start: 2024-10-24 | End: 2024-10-25 | Stop reason: SDUPTHER

## 2024-10-24 RX ORDER — BISACODYL 10 MG
10 SUPPOSITORY, RECTAL RECTAL DAILY PRN
Status: DISCONTINUED | OUTPATIENT
Start: 2024-10-24 | End: 2024-10-28 | Stop reason: HOSPADM

## 2024-10-24 RX ORDER — SODIUM CHLORIDE 0.9 % (FLUSH) 0.9 %
10 SYRINGE (ML) INJECTION AS NEEDED
Status: DISCONTINUED | OUTPATIENT
Start: 2024-10-24 | End: 2024-10-28 | Stop reason: HOSPADM

## 2024-10-24 RX ORDER — AMOXICILLIN 250 MG
2 CAPSULE ORAL 2 TIMES DAILY PRN
Status: DISCONTINUED | OUTPATIENT
Start: 2024-10-24 | End: 2024-10-28 | Stop reason: HOSPADM

## 2024-10-24 RX ORDER — GUAIFENESIN 600 MG/1
1200 TABLET, EXTENDED RELEASE ORAL 2 TIMES DAILY
COMMUNITY

## 2024-10-24 RX ORDER — BISACODYL 5 MG/1
5 TABLET, DELAYED RELEASE ORAL DAILY PRN
Status: DISCONTINUED | OUTPATIENT
Start: 2024-10-24 | End: 2024-10-28 | Stop reason: HOSPADM

## 2024-10-24 RX ORDER — POLYETHYLENE GLYCOL 3350 17 G/17G
17 POWDER, FOR SOLUTION ORAL DAILY PRN
Status: DISCONTINUED | OUTPATIENT
Start: 2024-10-24 | End: 2024-10-28 | Stop reason: HOSPADM

## 2024-10-24 RX ORDER — ACETAMINOPHEN 160 MG/5ML
650 SOLUTION ORAL EVERY 4 HOURS PRN
Status: DISCONTINUED | OUTPATIENT
Start: 2024-10-24 | End: 2024-10-28 | Stop reason: HOSPADM

## 2024-10-24 RX ORDER — IOPAMIDOL 755 MG/ML
100 INJECTION, SOLUTION INTRAVASCULAR
Status: COMPLETED | OUTPATIENT
Start: 2024-10-24 | End: 2024-10-24

## 2024-10-24 RX ORDER — SODIUM CHLORIDE 0.9 % (FLUSH) 0.9 %
10 SYRINGE (ML) INJECTION EVERY 12 HOURS SCHEDULED
Status: DISCONTINUED | OUTPATIENT
Start: 2024-10-24 | End: 2024-10-28 | Stop reason: HOSPADM

## 2024-10-24 RX ORDER — POTASSIUM CHLORIDE 1500 MG/1
20 TABLET, EXTENDED RELEASE ORAL DAILY
COMMUNITY

## 2024-10-24 RX ORDER — ONDANSETRON 2 MG/ML
4 INJECTION INTRAMUSCULAR; INTRAVENOUS EVERY 6 HOURS PRN
Status: DISCONTINUED | OUTPATIENT
Start: 2024-10-24 | End: 2024-10-28 | Stop reason: HOSPADM

## 2024-10-24 RX ORDER — ACETAMINOPHEN 650 MG/1
650 SUPPOSITORY RECTAL EVERY 4 HOURS PRN
Status: DISCONTINUED | OUTPATIENT
Start: 2024-10-24 | End: 2024-10-28 | Stop reason: HOSPADM

## 2024-10-24 RX ORDER — CALCIUM CARBONATE 500 MG/1
2 TABLET, CHEWABLE ORAL 2 TIMES DAILY PRN
Status: DISCONTINUED | OUTPATIENT
Start: 2024-10-24 | End: 2024-10-28 | Stop reason: HOSPADM

## 2024-10-24 RX ORDER — ACETAMINOPHEN 325 MG/1
650 TABLET ORAL EVERY 4 HOURS PRN
Status: DISCONTINUED | OUTPATIENT
Start: 2024-10-24 | End: 2024-10-28 | Stop reason: HOSPADM

## 2024-10-24 RX ORDER — ONDANSETRON 4 MG/1
4 TABLET, ORALLY DISINTEGRATING ORAL EVERY 6 HOURS PRN
Status: DISCONTINUED | OUTPATIENT
Start: 2024-10-24 | End: 2024-10-28 | Stop reason: HOSPADM

## 2024-10-24 RX ORDER — NITROGLYCERIN 0.4 MG/1
0.4 TABLET SUBLINGUAL
Status: DISCONTINUED | OUTPATIENT
Start: 2024-10-24 | End: 2024-10-28 | Stop reason: HOSPADM

## 2024-10-24 RX ORDER — LISINOPRIL 20 MG/1
20 TABLET ORAL DAILY
COMMUNITY
End: 2024-10-28 | Stop reason: HOSPADM

## 2024-10-24 RX ORDER — FUROSEMIDE 10 MG/ML
40 INJECTION INTRAMUSCULAR; INTRAVENOUS ONCE
Status: COMPLETED | OUTPATIENT
Start: 2024-10-25 | End: 2024-10-25

## 2024-10-24 RX ORDER — CITALOPRAM HYDROBROMIDE 20 MG/1
20 TABLET ORAL DAILY
COMMUNITY

## 2024-10-24 RX ADMIN — IOPAMIDOL 85 ML: 755 INJECTION, SOLUTION INTRAVENOUS at 21:31

## 2024-10-24 NOTE — ED NOTES
Patient arrives via [facility] from home for complaints of SOB and cough since last night. Patient has a history of asthma and CHF. Alert and oriented x4.

## 2024-10-24 NOTE — HOME HEALTH
MSW eval on this date with patient and spouse. Prior meeting with spouse last month while patient was in rehab. Patient's step son Ariadne is providing transportation and some hands on care to patient however not always reliable according to patient and spouse. Family denies ability to pay for care in home. [facility]. Family has necessary equipment and medication and able to obtain food and meals. Patient reports some depression however denies severe or SI. Family agreeable to contact MSW with additional needs or concerns.

## 2024-10-24 NOTE — ED PROVIDER NOTES
EMERGENCY DEPARTMENT ENCOUNTER  Room Number:  32/32  PCP: Karen Jiménez APRN  Independent Historians: Patient      HPI:  Chief Complaint: had concerns including Shortness of Breath and Cough.     A complete HPI/ROS/PMH/PSH/SH/FH are unobtainable due to: None    Chronic or social conditions impacting patient care (Social Determinants of Health): None      Context: Flo Manzo is a 69 y.o. male with a medical history of A-fib RVR, hypertension, CHF, sleep apnea, who presents to the ED c/o acute difficulty breathing.  Patient was found to be hypoxic mid 80s on arrival of EMS.  He states he felt well this morning when he went to his family medicine appointment.  He had sudden onset of symptoms while resting on the couch.  He states he was discharged home from rehab 1 week ago and has been relatively immobile since.  His son helps him into the wheelchair to go to the bathroom and patient is otherwise resting.  No orthopnea, fever, chills, nausea, vomiting.      Review of prior external notes (non-ED) -and- Review of prior external test results outside of this encounter:  Discharge summary 8/29/2024.  Patient had a stroke, anticoagulation discontinued due to high risk hemorrhagic conversion and cerebral amyloid angiopathy.  Discharged to rehab.  Plan to consider Watchman device.    Patient believes he has received the Watchman device.  No operative note of this.  On 10/9/2024 telephone note discusses plan to have a sit down with Dr. Ewing to discuss Watchman procedure further    I reviewed labs from rehab facility.  9/26/2024.  Creatinine 0.9, metabolic panel is normal.  Hemoglobin 12.6.    Prescription drug monitoring program review:         PAST MEDICAL HISTORY  Active Ambulatory Problems     Diagnosis Date Noted    Umbilical hernia without obstruction and without gangrene 12/11/2017    Essential hypertension 01/05/2018    Arthritis of left knee 03/14/2016    Depression 01/05/2018    Obesity (BMI 30-39.9)  03/14/2016    Atrial fibrillation with RVR 01/24/2019    Substernal thyroid goiter 01/24/2019    CHF (congestive heart failure) 01/24/2019    Diastolic CHF, chronic 02/01/2019    Hemorrhagic stroke 02/28/2019    GÓMEZ on auto CPAP 09/08/2019    Hypersomnia due to medical condition 09/08/2019    Sleep-related hypoxia 10/26/2019    Chronic atrial fibrillation 03/19/2020    Vertigo 06/19/2020    Aortic stenosis, mild 12/23/2021    Sinus pause 03/06/2022    Pacemaker 04/25/2022    Chronic anticoagulation 06/24/2022    Generalized weakness 07/29/2022    Prediabetes 02/03/2023    Pure hypercholesterolemia 05/08/2023    Pre-operative cardiovascular examination 01/30/2024    Peripheral neuropathy 02/08/2024    History of colon polyps 03/14/2024    Dizziness 08/05/2024    Fever 08/06/2024    History of CVA (cerebrovascular accident) 08/29/2024    Right leg weakness 08/29/2024    Frequent falls 08/29/2024    Paroxysmal atrial fibrillation 08/29/2024     Resolved Ambulatory Problems     Diagnosis Date Noted    Dyspnea 01/24/2019    Angina at rest 01/24/2019    Intraparenchymal hemorrhage of brain 02/01/2019    SSS (sick sinus syndrome) 01/06/2022    S/P ICD (internal cardiac defibrillator) procedure 03/06/2022     Past Medical History:   Diagnosis Date    Arthritis     Colon polyps 01/04/2018    Heart murmur     Hypertension     New onset atrial fibrillation (CMS/HCC) - RVR 01/24/2019    GÓMEZ (obstructive sleep apnea) 06/06/2019    Sleep apnea     Stroke     Uncontrolled hypertension     Ventral hernia          PAST SURGICAL HISTORY  Past Surgical History:   Procedure Laterality Date    APPENDECTOMY N/A 1972    BACK SURGERY      BLADDER STIMULATOR IMPLANT L LOWER BACK    CARDIAC CATHETERIZATION N/A 07/20/2004    Cath left ventriculography, coronary angiography and left heart catheterization, Normal results-Dr. Vadim Mancuso    CARDIAC CATHETERIZATION N/A 1/29/2019    Procedure: Left Heart Cath;  Surgeon: Eren Bruce MD;   Location: [facility] CATH INVASIVE LOCATION;  Service: Cardiology    CARDIAC CATHETERIZATION N/A 1/29/2019    Procedure: Left ventriculography;  Surgeon: Eren Bruce MD;  Location: [facility] CATH INVASIVE LOCATION;  Service: Cardiology    CARDIAC CATHETERIZATION N/A 1/29/2019    Procedure: Coronary angiography;  Surgeon: Eren Bruce MD;  Location: [facility] CATH INVASIVE LOCATION;  Service: Cardiology    CARDIAC ELECTROPHYSIOLOGY PROCEDURE N/A 3/4/2022    Procedure: Pacemaker SC new BIOTRONIK;  Surgeon: Eren Bruce MD;  Location: [facility] CATH INVASIVE LOCATION;  Service: Cardiology;  Laterality: N/A;    CARPAL TUNNEL RELEASE Right 2013    CARPAL TUNNEL RELEASE Left     COLONOSCOPY N/A 1/4/2018    Procedure: COLONOSCOPY with cold polypectomy and hot snare polypectomy;  Surgeon: Ten Solitario MD;  Location: [facility] ENDOSCOPY;  Service:     COLONOSCOPY N/A 4/25/2024    Procedure: COLONOSCOPY INTO CECUM;  Surgeon: Ten Solitario MD;  Location: [facility] ENDOSCOPY;  Service: General;  Laterality: N/A;  PRE: PERSONAL H/O COLON POLYP  /  POST: POOR PREP OTHERWISE NORMAL    EYE LENS IMPLANT SECONDARY Bilateral 2007, 2008    KNEE CARTILAGE SURGERY Right 1979    TONSILLECTOMY AND ADENOIDECTOMY Bilateral 2005    TOTAL KNEE ARTHROPLASTY Left 03/14/2016    Left total knee arthroplasty with Amberly component, size 11 femur, G tibial baseplate with a 10 polyethylene insert and a 38 patellar button-Dr. Pablo Hairston    TOTAL KNEE ARTHROPLASTY Right 03/02/2015    Right total knee arthroplasty with Amberly component size G femur, 11 tibial base plate with an 11 polyethylene insert and a 38 patellar button-Dr. Pablo Hairston    UVULOPALATOPHARYNGOPLASTY N/A 2005    part of soft palate, and uvula    VENTRAL HERNIA REPAIR N/A 1/23/2018    Procedure: VENTRAL HERNIA REPAIR LAPAROSCOPIC WITH DAVINCI ROBOT AND MESH;  Surgeon: Ten Solitario MD;  Location: [facility] MAIN OR;  Service:          FAMILY HISTORY  Family  History   Problem Relation Age of Onset    Hypertension Mother     Kidney failure Mother     Coronary artery disease Father     Lung cancer Father         positive smoker    Heart attack Father     Breast cancer Sister 60    Prostate cancer Brother     Colon polyps Brother     Kidney nephrosis Brother     Malig Hyperthermia Neg Hx          SOCIAL HISTORY  Social History     Socioeconomic History    Marital status:    Tobacco Use    Smoking status: Never     Passive exposure: Past    Smokeless tobacco: Never   Vaping Use    Vaping status: Never Used   Substance and Sexual Activity    Alcohol use: Not Currently     Comment: social, maybe a drink a month, not since stroke    Drug use: No     Comment: previously smoked marijuana     Sexual activity: Defer         ALLERGIES  Poison ivy extract      REVIEW OF SYSTEMS  Review of Systems  Included in HPI  All systems reviewed and negative except for those discussed in HPI.      PHYSICAL EXAM    I have reviewed the triage vital signs and nursing notes.    ED Triage Vitals [10/24/24 1847]   Temp Heart Rate Resp BP SpO2   98.6 °F (37 °C) 86 (!) 30 (!) 167/118 98 %      Temp src Heart Rate Source Patient Position BP Location FiO2 (%)   -- -- -- -- --       Physical Exam  GENERAL: alert, no acute distress  SKIN: Warm, dry  HENT: Normocephalic, atraumatic  EYES: no scleral icterus  CV: regular rhythm, regular rate  RESPIRATORY: normal effort, lungs clear, 94% on 2L nasal cannula  ABDOMEN: soft, nontender, nondistended  MUSCULOSKELETAL: no deformity  NEURO: alert, moves all extremities, follows commands            LAB RESULTS  Recent Results (from the past 24 hours)   Comprehensive Metabolic Panel    Collection Time: 10/24/24  7:06 PM    Specimen: Blood   Result Value Ref Range    Glucose 83 65 - 99 mg/dL    BUN 21 8 - 23 mg/dL    Creatinine 0.75 (L) 0.76 - 1.27 mg/dL    Sodium 145 136 - 145 mmol/L    Potassium 4.1 3.5 - 5.2 mmol/L    Chloride 109 (H) 98 - 107 mmol/L    CO2  26.0 22.0 - 29.0 mmol/L    Calcium 10.0 8.6 - 10.5 mg/dL    Total Protein 7.1 6.0 - 8.5 g/dL    Albumin 4.1 3.5 - 5.2 g/dL    ALT (SGPT) 15 1 - 41 U/L    AST (SGOT) 10 1 - 40 U/L    Alkaline Phosphatase 119 (H) 39 - 117 U/L    Total Bilirubin 0.8 0.0 - 1.2 mg/dL    Globulin 3.0 gm/dL    A/G Ratio 1.4 g/dL    BUN/Creatinine Ratio 28.0 (H) 7.0 - 25.0    Anion Gap 10.0 5.0 - 15.0 mmol/L    eGFR 98.3 >60.0 mL/min/1.73   BNP    Collection Time: 10/24/24  7:06 PM    Specimen: Blood   Result Value Ref Range    proBNP 1,551.0 (H) 0.0 - 900.0 pg/mL   Single High Sensitivity Troponin T    Collection Time: 10/24/24  7:06 PM    Specimen: Blood   Result Value Ref Range    HS Troponin T 36 (H) <22 ng/L   Protime-INR    Collection Time: 10/24/24  7:06 PM    Specimen: Blood   Result Value Ref Range    Protime 15.2 (H) 11.7 - 14.2 Seconds    INR 1.18 (H) 0.90 - 1.10   Green Top (Gel)    Collection Time: 10/24/24  7:06 PM   Result Value Ref Range    Extra Tube Hold for add-ons.    Lavender Top    Collection Time: 10/24/24  7:06 PM   Result Value Ref Range    Extra Tube hold for add-on    Gold Top - SST    Collection Time: 10/24/24  7:06 PM   Result Value Ref Range    Extra Tube Hold for add-ons.    Light Blue Top    Collection Time: 10/24/24  7:06 PM   Result Value Ref Range    Extra Tube Hold for add-ons.    CBC Auto Differential    Collection Time: 10/24/24  7:06 PM    Specimen: Blood   Result Value Ref Range    WBC 9.42 3.40 - 10.80 10*3/mm3    RBC 5.05 4.14 - 5.80 10*6/mm3    Hemoglobin 14.0 13.0 - 17.7 g/dL    Hematocrit 43.3 37.5 - 51.0 %    MCV 85.7 79.0 - 97.0 fL    MCH 27.7 26.6 - 33.0 pg    MCHC 32.3 31.5 - 35.7 g/dL    RDW 12.7 12.3 - 15.4 %    RDW-SD 38.6 37.0 - 54.0 fl    MPV 10.1 6.0 - 12.0 fL    Platelets 213 140 - 450 10*3/mm3    Neutrophil % 67.9 42.7 - 76.0 %    Lymphocyte % 21.3 19.6 - 45.3 %    Monocyte % 7.4 5.0 - 12.0 %    Eosinophil % 2.7 0.3 - 6.2 %    Basophil % 0.4 0.0 - 1.5 %    Immature Grans % 0.3 0.0 -  0.5 %    Neutrophils, Absolute 6.39 1.70 - 7.00 10*3/mm3    Lymphocytes, Absolute 2.01 0.70 - 3.10 10*3/mm3    Monocytes, Absolute 0.70 0.10 - 0.90 10*3/mm3    Eosinophils, Absolute 0.25 0.00 - 0.40 10*3/mm3    Basophils, Absolute 0.04 0.00 - 0.20 10*3/mm3    Immature Grans, Absolute 0.03 0.00 - 0.05 10*3/mm3    nRBC 0.0 0.0 - 0.2 /100 WBC   ECG 12 Lead ED Triage Standing Order; SOA    Collection Time: 10/24/24  7:31 PM   Result Value Ref Range    QT Interval 358 ms    QTC Interval 434 ms         RADIOLOGY  No Radiology Exams Resulted Within Past 24 Hours      MEDICATIONS GIVEN IN ER  Medications   sodium chloride 0.9 % flush 10 mL (has no administration in time range)   iopamidol (ISOVUE-370) 76 % injection 100 mL (85 mL Intravenous Given by Other 10/24/24 6529)         ORDERS PLACED DURING THIS VISIT:  Orders Placed This Encounter   Procedures    Respiratory Panel PCR w/COVID-19(SARS-CoV-2) ALLYSSA/OSCAR/BETO/PAD/COR/NAHED In-House, NP Swab in UTM/VTM, 2 HR TAT - Swab, Nasopharynx    CT Angiogram Chest Pulmonary Embolism    Lavon Draw    Comprehensive Metabolic Panel    BNP    Single High Sensitivity Troponin T    Protime-INR    CBC Auto Differential    Single High Sensitivity Troponin T    NPO Diet NPO Type: Strict NPO    Undress & Gown    Continuous Pulse Oximetry    Vital Signs    [facility] (on-call MD unless specified) Details    Cardiology (on-call MD unless specified)    Oxygen Therapy- Nasal Cannula; Titrate 1-6 LPM Per SpO2; 90 - 95%    ECG 12 Lead ED Triage Standing Order; SOA    Insert Peripheral IV    Inpatient Admission    CBC & Differential    Green Top (Gel)    Lavender Top    Gold Top - SST    Light Blue Top         OUTPATIENT MEDICATION MANAGEMENT:  Current Facility-Administered Medications Ordered in Epic   Medication Dose Route Frequency Provider Last Rate Last Admin    sodium chloride 0.9 % flush 10 mL  10 mL Intravenous PRN José Miguel Blanton MD         Current Outpatient Medications Ordered in Epic    Medication Sig Dispense Refill    acetaminophen (TYLENOL) 325 MG tablet Take 1 tablet by mouth Every 4 (Four) Hours As Needed for Mild Pain. 2 tablets Q4 hours PRN for mild pain.      albuterol sulfate  (90 Base) MCG/ACT inhaler Inhale 2 puffs Every 4 (Four) Hours As Needed for Wheezing or Shortness of Air. Indications: shortness of breath 18 g 3    aspirin 81 MG chewable tablet Chew 1 tablet Daily.      atorvastatin (LIPITOR) 40 MG tablet Take 1 tablet by mouth Every Night. Indications: Cerebrovascular Accident or Stroke      busPIRone (BUSPAR) 10 MG tablet TAKE 1 TABLET BY MOUTH TWICE DAILY 60 tablet 5    citalopram (CeleXA) 20 MG tablet Take 1 tablet by mouth Daily. 30mg, oral, Once a day, give 1.5 tablets by mouth daily.      furosemide (LASIX) 20 MG tablet Take 1 tablet by mouth Daily for 180 days. 90 tablet 1    gabapentin (NEURONTIN) 100 MG capsule Take 1 capsule by mouth Every Morning. 5 capsule 0    gabapentin (NEURONTIN) 300 MG capsule Take 1 capsule by mouth Every Night. 5 capsule 0    guaiFENesin (MUCINEX) 600 MG 12 hr tablet Take 2 tablets by mouth 2 (Two) Times a Day.      influenza vac split high-dose (Fluzone High-Dose) 0.5 ML suspension prefilled syringe injection Inject 0.5 mL into the appropriate muscle as directed by prescriber. Indications: na 0.5 mL 0    lisinopril (PRINIVIL,ZESTRIL) 20 MG tablet Take 1 tablet by mouth Daily.      metoprolol tartrate (LOPRESSOR) 25 MG tablet TAKE 1 & 1/2 TABLETS BY MOUTH TWICE DAILY 90 tablet 1    omeprazole (priLOSEC) 20 MG capsule Take 1 capsule by mouth Daily.      potassium chloride ER (K-TAB) 20 MEQ tablet controlled-release ER tablet Take 1 tablet by mouth Daily.      tamsulosin (FLOMAX) 0.4 MG capsule 24 hr capsule TAKE 1 CAPSULE BY MOUTH NIGHTLY 30 capsule 5         PROCEDURES  Procedures            PROGRESS, DATA ANALYSIS, CONSULTS, AND MEDICAL DECISION MAKING  All labs have been independently interpreted by me.  All radiology studies have  been reviewed by me. All EKG's have been independently viewed and interpreted by me.  Discussion below represents my analysis of pertinent findings related to patient's condition, differential diagnosis, treatment plan and final disposition.    Differential diagnosis includes but is not limited to CHF, pneumonia, pulmonary edema, pleural effusion, PE, ACS, STEMI, NSTEMI.    Clinical Scores:                   ED Course as of 10/24/24 2212   Thu Oct 24, 2024   1927 Resp(!): 30 [DN]   1928 BP(!): 167/118 [DN]   1932 Patient went to family medicine appointment today.  Upon getting home patient had sudden onset difficulty breathing while at rest.  Patient was 80s on room air.  Discharged from rehab to home 1 week ago.  Watchman device for A-fib, discontinued Eliquis.  No fever chills, chest pain, orthopnea.  Obtain basic labs, cardiac workup, CT PE.  Patient agreeable plan. [DN]   1934 ECG 12 Lead ED Triage Standing Order; SOA  EKG reviewed, A-fib, PVC x1, rate 88, LAD, normal intervals, inferior Q waves, T wave inversion lead III, no ST changes, no STEMI    I compared EKG to prior on 8/26/2024, A-fib, rate 89, increased number of PVCs, otherwise no significant change [DN]   2028 Comprehensive Metabolic Panel  I spoke with lab, CMP is still currently running due to issues with equipment. [DN]   2050 HS Troponin T(!): 36 [DN]   2050 proBNP(!): 1,551.0 [DN]   2050 Creatinine(!): 0.75 [DN]   2145 CT Angiogram Chest Pulmonary Embolism  I reviewed CT images.  No obvious PE.  No large infiltrate.  Small amount of contrast reflux into IVC [DN]   2159 I discussed patient with Dr. Bruce, agreeable to consult [DN]   2211 I discussed patient with on-call provider for [facility], agreeable to plan to admit to Dr. Vann [DN]      ED Course User Index  [DN] José Miguel Blanton MD             AS OF 22:12 EDT VITALS:    BP - (!) 148/104  HR - 86  TEMP - 98.6 °F (37 °C)  O2 SATS - 95%    COMPLEXITY OF CARE  The patient requires  admission.      DIAGNOSIS  Final diagnoses:   Elevated troponin   Acute respiratory distress         DISPOSITION  ED Disposition       ED Disposition   Decision to Admit    Condition   --    Comment   Level of Care: Telemetry [5]   Diagnosis: Elevated troponin [945548]   Admitting Physician: NEIL OSEI [[id number]]   Attending Physician: NEIL OSEI [[id number]]   Certification: I Certify That Inpatient Hospital Services Are Medically Necessary For Greater Than 2 Midnights                  Please note that portions of this document were completed with a voice recognition program.    Note Disclaimer: At [facility], we believe that sharing information builds trust and better relationships. You are receiving this note because you recently visited [facility]. It is possible you will see health information before a provider has talked with you about it. This kind of information can be easy to misunderstand. To help you fully understand what it means for your health, we urge you to discuss this note with your provider.         José Miguel Blanton MD  10/24/24 1942       José Miguel Blanton MD  10/24/24 221

## 2024-10-25 ENCOUNTER — APPOINTMENT (OUTPATIENT)
Dept: NUCLEAR MEDICINE | Facility: HOSPITAL | Age: 69
DRG: 204 | End: 2024-10-25
Payer: MEDICARE

## 2024-10-25 ENCOUNTER — DOCUMENTATION (OUTPATIENT)
Dept: HOME HEALTH SERVICES | Facility: HOME HEALTHCARE | Age: 69
End: 2024-10-25
Payer: MEDICARE

## 2024-10-25 DIAGNOSIS — R06.09 DYSPNEA ON EXERTION: ICD-10-CM

## 2024-10-25 LAB
ALBUMIN SERPL-MCNC: 4.1 G/DL (ref 3.9–4.9)
ALP SERPL-CCNC: 118 IU/L (ref 44–121)
ALT SERPL-CCNC: 14 IU/L (ref 0–44)
ANION GAP SERPL CALCULATED.3IONS-SCNC: 6.3 MMOL/L (ref 5–15)
AST SERPL-CCNC: 15 IU/L (ref 0–40)
BASOPHILS # BLD AUTO: 0 X10E3/UL (ref 0–0.2)
BASOPHILS NFR BLD AUTO: 1 %
BH CV REST NUCLEAR ISOTOPE DOSE: 10.2 MCI
BH CV STRESS COMMENTS STAGE 1: NORMAL
BH CV STRESS DOSE REGADENOSON STAGE 1: 0.4
BH CV STRESS DURATION MIN STAGE 1: 0
BH CV STRESS DURATION SEC STAGE 1: 10
BH CV STRESS NUCLEAR ISOTOPE DOSE: 30.6 MCI
BH CV STRESS PROTOCOL 1: NORMAL
BH CV STRESS RECOVERY BP: NORMAL MMHG
BH CV STRESS RECOVERY HR: 99 BPM
BH CV STRESS STAGE 1: 1
BILIRUB SERPL-MCNC: 0.7 MG/DL (ref 0–1.2)
BNP SERPL-MCNC: 147.1 PG/ML (ref 0–100)
BUN SERPL-MCNC: 17 MG/DL (ref 8–23)
BUN SERPL-MCNC: 23 MG/DL (ref 8–27)
BUN/CREAT SERPL: 18.3 (ref 7–25)
BUN/CREAT SERPL: 28 (ref 10–24)
CALCIUM SERPL-MCNC: 10 MG/DL (ref 8.6–10.2)
CALCIUM SPEC-SCNC: 9.5 MG/DL (ref 8.6–10.5)
CHLORIDE SERPL-SCNC: 103 MMOL/L (ref 98–107)
CHLORIDE SERPL-SCNC: 107 MMOL/L (ref 96–106)
CO2 SERPL-SCNC: 24 MMOL/L (ref 20–29)
CO2 SERPL-SCNC: 30.7 MMOL/L (ref 22–29)
CREAT SERPL-MCNC: 0.82 MG/DL (ref 0.76–1.27)
CREAT SERPL-MCNC: 0.93 MG/DL (ref 0.76–1.27)
DEPRECATED RDW RBC AUTO: 41.8 FL (ref 37–54)
EGFRCR SERPLBLD CKD-EPI 2021: 89.4 ML/MIN/1.73
EGFRCR SERPLBLD CKD-EPI 2021: 96 ML/MIN/1.73
EOSINOPHIL # BLD AUTO: 0.2 X10E3/UL (ref 0–0.4)
EOSINOPHIL NFR BLD AUTO: 4 %
ERYTHROCYTE [DISTWIDTH] IN BLOOD BY AUTOMATED COUNT: 12.7 % (ref 12.3–15.4)
ERYTHROCYTE [DISTWIDTH] IN BLOOD BY AUTOMATED COUNT: 12.8 % (ref 11.6–15.4)
GEN 5 2HR TROPONIN T REFLEX: 32 NG/L
GLOBULIN SER CALC-MCNC: 3 G/DL (ref 1.5–4.5)
GLUCOSE BLDC GLUCOMTR-MCNC: 88 MG/DL (ref 70–130)
GLUCOSE SERPL-MCNC: 88 MG/DL (ref 65–99)
GLUCOSE SERPL-MCNC: 97 MG/DL (ref 70–99)
HCT VFR BLD AUTO: 44.3 % (ref 37.5–51)
HCT VFR BLD AUTO: 44.4 % (ref 37.5–51)
HGB BLD-MCNC: 13.6 G/DL (ref 13–17.7)
HGB BLD-MCNC: 13.8 G/DL (ref 13–17.7)
IMM GRANULOCYTES # BLD AUTO: 0 X10E3/UL (ref 0–0.1)
IMM GRANULOCYTES NFR BLD AUTO: 0 %
LV EF NUC BP: 45 %
LYMPHOCYTES # BLD AUTO: 1.1 X10E3/UL (ref 0.7–3.1)
LYMPHOCYTES NFR BLD AUTO: 18 %
MAXIMAL PREDICTED HEART RATE: 152 BPM
MCH RBC QN AUTO: 27.5 PG (ref 26.6–33)
MCH RBC QN AUTO: 27.7 PG (ref 26.6–33)
MCHC RBC AUTO-ENTMCNC: 30.6 G/DL (ref 31.5–35.7)
MCHC RBC AUTO-ENTMCNC: 31.2 G/DL (ref 31.5–35.7)
MCV RBC AUTO: 89 FL (ref 79–97)
MCV RBC AUTO: 89.9 FL (ref 79–97)
MONOCYTES # BLD AUTO: 0.5 X10E3/UL (ref 0.1–0.9)
MONOCYTES NFR BLD AUTO: 8 %
NEUTROPHILS # BLD AUTO: 4.5 X10E3/UL (ref 1.4–7)
NEUTROPHILS NFR BLD AUTO: 69 %
PERCENT MAX PREDICTED HR: 67.76 %
PLATELET # BLD AUTO: 215 X10E3/UL (ref 150–450)
PLATELET # BLD AUTO: 216 10*3/MM3 (ref 140–450)
PMV BLD AUTO: 10.2 FL (ref 6–12)
POTASSIUM SERPL-SCNC: 3.5 MMOL/L (ref 3.5–5.2)
POTASSIUM SERPL-SCNC: 4.4 MMOL/L (ref 3.5–5.2)
PROT SERPL-MCNC: 7.1 G/DL (ref 6–8.5)
QT INTERVAL: 358 MS
QTC INTERVAL: 434 MS
RBC # BLD AUTO: 4.94 10*6/MM3 (ref 4.14–5.8)
RBC # BLD AUTO: 4.98 X10E6/UL (ref 4.14–5.8)
SODIUM SERPL-SCNC: 140 MMOL/L (ref 136–145)
SODIUM SERPL-SCNC: 142 MMOL/L (ref 134–144)
STRESS BASELINE BP: NORMAL MMHG
STRESS BASELINE HR: 82 BPM
STRESS PERCENT HR: 80 %
STRESS POST PEAK BP: NORMAL MMHG
STRESS POST PEAK HR: 103 BPM
STRESS TARGET HR: 129 BPM
TROPONIN T DELTA: 0 NG/L
TROPONIN T SERPL HS-MCNC: 32 NG/L
WBC # BLD AUTO: 6.4 X10E3/UL (ref 3.4–10.8)
WBC NRBC COR # BLD AUTO: 7.76 10*3/MM3 (ref 3.4–10.8)

## 2024-10-25 PROCEDURE — 93018 CV STRESS TEST I&R ONLY: CPT | Performed by: INTERNAL MEDICINE

## 2024-10-25 PROCEDURE — 36415 COLL VENOUS BLD VENIPUNCTURE: CPT | Performed by: NURSE PRACTITIONER

## 2024-10-25 PROCEDURE — 84484 ASSAY OF TROPONIN QUANT: CPT | Performed by: NURSE PRACTITIONER

## 2024-10-25 PROCEDURE — A9500 TC99M SESTAMIBI: HCPCS | Performed by: HOSPITALIST

## 2024-10-25 PROCEDURE — 80048 BASIC METABOLIC PNL TOTAL CA: CPT | Performed by: NURSE PRACTITIONER

## 2024-10-25 PROCEDURE — 93017 CV STRESS TEST TRACING ONLY: CPT

## 2024-10-25 PROCEDURE — 78452 HT MUSCLE IMAGE SPECT MULT: CPT | Performed by: INTERNAL MEDICINE

## 2024-10-25 PROCEDURE — 93016 CV STRESS TEST SUPVJ ONLY: CPT | Performed by: INTERNAL MEDICINE

## 2024-10-25 PROCEDURE — 85027 COMPLETE CBC AUTOMATED: CPT | Performed by: NURSE PRACTITIONER

## 2024-10-25 PROCEDURE — 0 TECHNETIUM SESTAMIBI: Performed by: HOSPITALIST

## 2024-10-25 PROCEDURE — 78452 HT MUSCLE IMAGE SPECT MULT: CPT

## 2024-10-25 PROCEDURE — 99221 1ST HOSP IP/OBS SF/LOW 40: CPT | Performed by: INTERNAL MEDICINE

## 2024-10-25 PROCEDURE — 25010000002 FUROSEMIDE PER 20 MG: Performed by: NURSE PRACTITIONER

## 2024-10-25 PROCEDURE — 82948 REAGENT STRIP/BLOOD GLUCOSE: CPT

## 2024-10-25 PROCEDURE — 92610 EVALUATE SWALLOWING FUNCTION: CPT

## 2024-10-25 RX ORDER — ALBUTEROL SULFATE 90 UG/1
2 INHALANT RESPIRATORY (INHALATION) EVERY 4 HOURS PRN
Qty: 18 G | Refills: 3 | Status: SHIPPED | OUTPATIENT
Start: 2024-10-25

## 2024-10-25 RX ORDER — GABAPENTIN 300 MG/1
300 CAPSULE ORAL NIGHTLY
Status: DISCONTINUED | OUTPATIENT
Start: 2024-10-25 | End: 2024-10-28 | Stop reason: HOSPADM

## 2024-10-25 RX ORDER — GABAPENTIN 100 MG/1
100 CAPSULE ORAL EVERY MORNING
Status: DISCONTINUED | OUTPATIENT
Start: 2024-10-25 | End: 2024-10-28 | Stop reason: HOSPADM

## 2024-10-25 RX ORDER — ASPIRIN 81 MG/1
81 TABLET, CHEWABLE ORAL DAILY
Status: DISCONTINUED | OUTPATIENT
Start: 2024-10-25 | End: 2024-10-28 | Stop reason: HOSPADM

## 2024-10-25 RX ORDER — PANTOPRAZOLE SODIUM 40 MG/1
40 TABLET, DELAYED RELEASE ORAL
Status: DISCONTINUED | OUTPATIENT
Start: 2024-10-25 | End: 2024-10-28 | Stop reason: HOSPADM

## 2024-10-25 RX ORDER — GUAIFENESIN 600 MG/1
1200 TABLET, EXTENDED RELEASE ORAL 2 TIMES DAILY
Status: DISCONTINUED | OUTPATIENT
Start: 2024-10-25 | End: 2024-10-28 | Stop reason: HOSPADM

## 2024-10-25 RX ORDER — ALBUTEROL SULFATE 0.83 MG/ML
2.5 SOLUTION RESPIRATORY (INHALATION) EVERY 6 HOURS PRN
Status: DISCONTINUED | OUTPATIENT
Start: 2024-10-25 | End: 2024-10-28 | Stop reason: HOSPADM

## 2024-10-25 RX ORDER — ALBUTEROL SULFATE 90 UG/1
2 INHALANT RESPIRATORY (INHALATION) EVERY 4 HOURS PRN
Qty: 18 G | Refills: 3 | Status: SHIPPED | OUTPATIENT
Start: 2024-10-25 | End: 2024-10-25 | Stop reason: SDUPTHER

## 2024-10-25 RX ORDER — ATORVASTATIN CALCIUM 20 MG/1
40 TABLET, FILM COATED ORAL NIGHTLY
Status: DISCONTINUED | OUTPATIENT
Start: 2024-10-25 | End: 2024-10-28 | Stop reason: HOSPADM

## 2024-10-25 RX ORDER — LISINOPRIL 20 MG/1
20 TABLET ORAL DAILY
Status: DISCONTINUED | OUTPATIENT
Start: 2024-10-25 | End: 2024-10-26

## 2024-10-25 RX ORDER — METOPROLOL TARTRATE 25 MG/1
25 TABLET, FILM COATED ORAL EVERY 12 HOURS SCHEDULED
Status: DISCONTINUED | OUTPATIENT
Start: 2024-10-25 | End: 2024-10-28 | Stop reason: HOSPADM

## 2024-10-25 RX ORDER — FUROSEMIDE 20 MG/1
20 TABLET ORAL DAILY
Status: DISCONTINUED | OUTPATIENT
Start: 2024-10-25 | End: 2024-10-28 | Stop reason: HOSPADM

## 2024-10-25 RX ORDER — POTASSIUM CHLORIDE 750 MG/1
20 TABLET, FILM COATED, EXTENDED RELEASE ORAL DAILY
Status: DISCONTINUED | OUTPATIENT
Start: 2024-10-25 | End: 2024-10-26

## 2024-10-25 RX ORDER — BUSPIRONE HYDROCHLORIDE 10 MG/1
10 TABLET ORAL 2 TIMES DAILY
Status: DISCONTINUED | OUTPATIENT
Start: 2024-10-25 | End: 2024-10-28 | Stop reason: HOSPADM

## 2024-10-25 RX ORDER — CITALOPRAM HYDROBROMIDE 20 MG/1
20 TABLET ORAL DAILY
Status: DISCONTINUED | OUTPATIENT
Start: 2024-10-25 | End: 2024-10-28 | Stop reason: HOSPADM

## 2024-10-25 RX ORDER — TAMSULOSIN HYDROCHLORIDE 0.4 MG/1
0.4 CAPSULE ORAL NIGHTLY
Status: DISCONTINUED | OUTPATIENT
Start: 2024-10-25 | End: 2024-10-28 | Stop reason: HOSPADM

## 2024-10-25 RX ADMIN — LISINOPRIL 20 MG: 20 TABLET ORAL at 08:45

## 2024-10-25 RX ADMIN — METOPROLOL TARTRATE 25 MG: 25 TABLET, FILM COATED ORAL at 08:46

## 2024-10-25 RX ADMIN — CITALOPRAM 20 MG: 20 TABLET, FILM COATED ORAL at 08:45

## 2024-10-25 RX ADMIN — BUSPIRONE HYDROCHLORIDE 10 MG: 10 TABLET ORAL at 08:45

## 2024-10-25 RX ADMIN — Medication 10 ML: at 21:39

## 2024-10-25 RX ADMIN — BUSPIRONE HYDROCHLORIDE 10 MG: 10 TABLET ORAL at 21:38

## 2024-10-25 RX ADMIN — ASPIRIN 81 MG: 81 TABLET, CHEWABLE ORAL at 08:46

## 2024-10-25 RX ADMIN — FUROSEMIDE 20 MG: 20 TABLET ORAL at 08:46

## 2024-10-25 RX ADMIN — Medication 10 ML: at 08:46

## 2024-10-25 RX ADMIN — GABAPENTIN 300 MG: 300 CAPSULE ORAL at 21:38

## 2024-10-25 RX ADMIN — Medication 10 ML: at 00:31

## 2024-10-25 RX ADMIN — GUAIFENESIN 1200 MG: 600 TABLET, EXTENDED RELEASE ORAL at 08:46

## 2024-10-25 RX ADMIN — ATORVASTATIN CALCIUM 40 MG: 20 TABLET, FILM COATED ORAL at 21:38

## 2024-10-25 RX ADMIN — GUAIFENESIN 1200 MG: 600 TABLET, EXTENDED RELEASE ORAL at 21:38

## 2024-10-25 RX ADMIN — FUROSEMIDE 40 MG: 10 INJECTION, SOLUTION INTRAMUSCULAR; INTRAVENOUS at 00:31

## 2024-10-25 RX ADMIN — METOPROLOL TARTRATE 25 MG: 25 TABLET, FILM COATED ORAL at 21:52

## 2024-10-25 RX ADMIN — TAMSULOSIN HYDROCHLORIDE 0.4 MG: 0.4 CAPSULE ORAL at 21:38

## 2024-10-25 RX ADMIN — TECHNETIUM TC 99M SESTAMIBI 1 DOSE: 1 INJECTION INTRAVENOUS at 11:12

## 2024-10-25 RX ADMIN — GABAPENTIN 100 MG: 100 CAPSULE ORAL at 08:45

## 2024-10-25 RX ADMIN — PANTOPRAZOLE SODIUM 40 MG: 40 TABLET, DELAYED RELEASE ORAL at 08:45

## 2024-10-25 RX ADMIN — POTASSIUM CHLORIDE 20 MEQ: 750 TABLET, EXTENDED RELEASE ORAL at 08:46

## 2024-10-25 NOTE — CONSULTS
[facility]  Cardiology Consult Note    Patient Identification:  Name: Flo Manzo  Age: 69 y.o.  Sex: male  :  1955  MRN: [id number]             Requesting Physician: Lorena BURRELL    Reason for Consultation / Chief Complaint: elevated troponin, dyspnea    History of Present Illness:   This is a 69-year-old male who is well-known with our service with atrial fibrillation, hypertension, GÓMEZ, SSS s/p pacemaker, hypertension, sleep apnea, cerebral amyloid angiopathy, history of CVA.  He presented to [facility] ER with shortness of breath.  Workup in ER with high-sensitivity troponin 36, BNP 1551, creatinine 0.75, CBC unremarkable.  Respiratory panel was negative. CTA chest negative for pulmonary embolism, no acute intrathoracic abnormality.  Stable cardiomegaly with mild to moderate calcified coronary artery disease.  ECG is Atrial fibrillation without acute ST changes.  He is admitted for further management.    Admitted to the hospital 2024 through 2024 with acute stroke and suspected cerebral amyloid angiopathy.  During that admission neurology recommended discontinuation of Eliquis and aspirin only.  Readmitted to [facility] 2024 through 2024 with weakness and frequent falls.    Echo 2024 EF 51 to 55%, indeterminate LV diastolic function.  Left atrial cavity mild to moderately dilated.  Mild AV stenosis.      Stress test 2021 myocardial perfusion image indicates a normal study with no evidence of ischemia.      Cardiac catheterization  normal course, normal LV gram.    Comorbid cardiac risk factors: Hypertension    Past Medical History:  Past Medical History:   Diagnosis Date    Arthritis     Atrial fibrillation with RVR 2019    Colon polyps 2018    Hepatic flexure: tubular adenoma, only low-grade dysplasia; splenic flexure: tubular adenoma, only low-grade dysplasia; sigmoid colon: tubular adenoma, multiple fragments  only low-grade dysplasia    Depression     Heart murmur     Hemorrhagic stroke 01/29/2019    Hypertension     New onset atrial fibrillation (CMS/HCC) - RVR 01/24/2019    GÓMEZ (obstructive sleep apnea) 06/06/2019    Home sleep study with moderate severity GÓMEZ, AHI 20 events per hour.  Sleep-related hypoxia with low O2 saturation 84% for 14 minutes.    Peripheral neuropathy     Sleep apnea     previously diagnosed with sleep apnea, had T&A done and it has since resolved     Stroke     Uncontrolled hypertension     Ventral hernia      Past Surgical History:  Past Surgical History:   Procedure Laterality Date    APPENDECTOMY N/A 1972    BACK SURGERY      BLADDER STIMULATOR IMPLANT L LOWER BACK    CARDIAC CATHETERIZATION N/A 07/20/2004    Cath left ventriculography, coronary angiography and left heart catheterization, Normal results-Dr. Fierro     CARDIAC CATHETERIZATION N/A 1/29/2019    Procedure: Left Heart Cath;  Surgeon: Eren Bruce MD;  Location: [facility] CATH INVASIVE LOCATION;  Service: Cardiology    CARDIAC CATHETERIZATION N/A 1/29/2019    Procedure: Left ventriculography;  Surgeon: Eren Bruce MD;  Location: [facility] CATH INVASIVE LOCATION;  Service: Cardiology    CARDIAC CATHETERIZATION N/A 1/29/2019    Procedure: Coronary angiography;  Surgeon: Eren Bruce MD;  Location: [facility] CATH INVASIVE LOCATION;  Service: Cardiology    CARDIAC ELECTROPHYSIOLOGY PROCEDURE N/A 3/4/2022    Procedure: Pacemaker SC new BIOTRONIK;  Surgeon: Eren Bruce MD;  Location: [facility] CATH INVASIVE LOCATION;  Service: Cardiology;  Laterality: N/A;    CARPAL TUNNEL RELEASE Right 2013    CARPAL TUNNEL RELEASE Left     COLONOSCOPY N/A 1/4/2018    Procedure: COLONOSCOPY with cold polypectomy and hot snare polypectomy;  Surgeon: Ten Solitario MD;  Location: [facility] ENDOSCOPY;  Service:     COLONOSCOPY N/A 4/25/2024    Procedure: COLONOSCOPY INTO CECUM;  Surgeon: Ten Solitario MD;  Location:   [facility] ENDOSCOPY;  Service: General;  Laterality: N/A;  PRE: PERSONAL H/O COLON POLYP  /  POST: POOR PREP OTHERWISE NORMAL    EYE LENS IMPLANT SECONDARY Bilateral 2007, 2008    KNEE CARTILAGE SURGERY Right 1979    TONSILLECTOMY AND ADENOIDECTOMY Bilateral 2005    TOTAL KNEE ARTHROPLASTY Left 03/14/2016    Left total knee arthroplasty with Amberly component, size 11 femur, G tibial baseplate with a 10 polyethylene insert and a 38 patellar button-Dr. Pablo Hairston    TOTAL KNEE ARTHROPLASTY Right 03/02/2015    Right total knee arthroplasty with Amberly component size G femur, 11 tibial base plate with an 11 polyethylene insert and a 38 patellar button-Dr. Pablo Hairston    UVULOPALATOPHARYNGOPLASTY N/A 2005    part of soft palate, and uvula    VENTRAL HERNIA REPAIR N/A 1/23/2018    Procedure: VENTRAL HERNIA REPAIR LAPAROSCOPIC WITH DAVINCI ROBOT AND MESH;  Surgeon: Ten Solitario MD;  Location: [facility] MAIN OR;  Service:       Allergies:  Allergies   Allergen Reactions    Poison Ivy Extract Hives, Itching and Rash     ITCHY BLISTERS     Home Meds:  Medications Prior to Admission   Medication Sig Dispense Refill Last Dose/Taking    aspirin 81 MG chewable tablet Chew 1 tablet Daily.   10/24/2024    atorvastatin (LIPITOR) 40 MG tablet Take 1 tablet by mouth Every Night. Indications: Cerebrovascular Accident or Stroke   10/23/2024    busPIRone (BUSPAR) 10 MG tablet TAKE 1 TABLET BY MOUTH TWICE DAILY 60 tablet 5 10/24/2024    citalopram (CeleXA) 20 MG tablet Take 1 tablet by mouth Daily. 30mg, oral, Once a day, give 1.5 tablets by mouth daily.   10/23/2024    furosemide (LASIX) 20 MG tablet Take 1 tablet by mouth Daily for 180 days. 90 tablet 1 Past Week    gabapentin (NEURONTIN) 100 MG capsule Take 1 capsule by mouth Every Morning. 5 capsule 0 10/23/2024    gabapentin (NEURONTIN) 300 MG capsule Take 1 capsule by mouth Every Night. 5 capsule 0 10/23/2024    guaiFENesin (MUCINEX) 600 MG 12 hr tablet Take 2 tablets by mouth 2  (Two) Times a Day.   Patient Taking Differently    metoprolol tartrate (LOPRESSOR) 25 MG tablet TAKE 1 & 1/2 TABLETS BY MOUTH TWICE DAILY 90 tablet 1 Past Week    omeprazole (priLOSEC) 20 MG capsule Take 1 capsule by mouth Daily.   Past Week    potassium chloride ER (K-TAB) 20 MEQ tablet controlled-release ER tablet Take 1 tablet by mouth Daily.   Past Week    tamsulosin (FLOMAX) 0.4 MG capsule 24 hr capsule TAKE 1 CAPSULE BY MOUTH NIGHTLY 30 capsule 5 10/23/2024    acetaminophen (TYLENOL) 325 MG tablet Take 1 tablet by mouth Every 4 (Four) Hours As Needed for Mild Pain. 2 tablets Q4 hours PRN for mild pain.   9/23/2024 at  9:37 PM    albuterol sulfate  (90 Base) MCG/ACT inhaler Inhale 2 puffs Every 4 (Four) Hours As Needed for Wheezing or Shortness of Air. Indications: shortness of breath 18 g 3 Unknown    influenza vac split high-dose (Fluzone High-Dose) 0.5 ML suspension prefilled syringe injection Inject 0.5 mL into the appropriate muscle as directed by prescriber. Indications: na 0.5 mL 0 Unknown    lisinopril (PRINIVIL,ZESTRIL) 20 MG tablet Take 1 tablet by mouth Daily.   Unknown     Current Meds:   Current Facility-Administered Medications   Medication Dose Route Frequency Provider Last Rate Last Admin    acetaminophen (TYLENOL) tablet 650 mg  650 mg Oral Q4H PRN Lorena Santoyo APRN        Or    acetaminophen (TYLENOL) 160 MG/5ML oral solution 650 mg  650 mg Oral Q4H PRN Lorena Santoyo APRN        Or    acetaminophen (TYLENOL) suppository 650 mg  650 mg Rectal Q4H PRN Lorena Santoyo APRN        albuterol (PROVENTIL) nebulizer solution 0.083% 2.5 mg/3mL  2.5 mg Nebulization Q6H PRN Lorena Santoyo APRN        aspirin chewable tablet 81 mg  81 mg Oral Daily Lorena Santoyo APRN   81 mg at 10/25/24 0846    atorvastatin (LIPITOR) tablet 40 mg  40 mg Oral Nightly Lorena Santoyo APRN        sennosides-docusate (PERICOLACE) 8.6-50 MG per tablet 2 tablet  2 tablet Oral  BID PRN Lorena Santoyo APRN        And    polyethylene glycol (MIRALAX) packet 17 g  17 g Oral Daily PRN Lorena Santoyo APRN        And    bisacodyl (DULCOLAX) EC tablet 5 mg  5 mg Oral Daily PRN Lorena Santoyo APRN        And    bisacodyl (DULCOLAX) suppository 10 mg  10 mg Rectal Daily PRN Lorena Santoyo APRN        busPIRone (BUSPAR) tablet 10 mg  10 mg Oral BID Lorena Santoyo APRN   10 mg at 10/25/24 0845    calcium carbonate (TUMS) chewable tablet 500 mg (200 mg elemental)  2 tablet Oral BID PRN Lorena Santoyo APRN        citalopram (CeleXA) tablet 20 mg  20 mg Oral Daily Lorena Santoyo APRN   20 mg at 10/25/24 0845    furosemide (LASIX) tablet 20 mg  20 mg Oral Daily Lorena Santoyo APRN   20 mg at 10/25/24 0846    gabapentin (NEURONTIN) capsule 100 mg  100 mg Oral QAM Lorena Santoyo APRN   100 mg at 10/25/24 0845    gabapentin (NEURONTIN) capsule 300 mg  300 mg Oral Nightly Lorena Santoyo APRN        guaiFENesin (MUCINEX) 12 hr tablet 1,200 mg  1,200 mg Oral BID Lorena Santoyo APRN   1,200 mg at 10/25/24 0846    lisinopril (PRINIVIL,ZESTRIL) tablet 20 mg  20 mg Oral Daily Lorena Santoyo APRN   20 mg at 10/25/24 0845    metoprolol tartrate (LOPRESSOR) tablet 25 mg  25 mg Oral Q12H Lorena Santoyo APRN   25 mg at 10/25/24 0846    nitroglycerin (NITROSTAT) SL tablet 0.4 mg  0.4 mg Sublingual Q5 Min PRN Lorena Santoyo APRN        ondansetron ODT (ZOFRAN-ODT) disintegrating tablet 4 mg  4 mg Oral Q6H PRN Lorena Santoyo APRN        Or    ondansetron (ZOFRAN) injection 4 mg  4 mg Intravenous Q6H PRN Lorena Santoyo APRN        pantoprazole (PROTONIX) EC tablet 40 mg  40 mg Oral Q AM Lorena Santoyo APRN   40 mg at 10/25/24 0845    potassium chloride (K-DUR,KLOR-CON) ER tablet 20 mEq  20 mEq Oral Daily Lorena Santoyo APRN   20 mEq at 10/25/24 0846    sodium chloride 0.9 % flush 10 mL  10 mL  "Intravenous PRN José Miguel Blanton MD        sodium chloride 0.9 % flush 10 mL  10 mL Intravenous Q12H Lorena Santoyo APRN   10 mL at 10/25/24 0031    sodium chloride 0.9 % flush 10 mL  10 mL Intravenous PRN Lorena Santoyo APRN        tamsulosin (FLOMAX) 24 hr capsule 0.4 mg  0.4 mg Oral Nightly Lorena Santoyo APRN           Social History:   Social History     Tobacco Use    Smoking status: Never     Passive exposure: Past    Smokeless tobacco: Never   Substance Use Topics    Alcohol use: Not Currently     Comment: social, maybe a drink a month, not since stroke      Family History:  Family History   Problem Relation Age of Onset    Hypertension Mother     Kidney failure Mother     Coronary artery disease Father     Lung cancer Father         positive smoker    Heart attack Father     Breast cancer Sister 60    Prostate cancer Brother     Colon polyps Brother     Kidney nephrosis Brother     Malig Hyperthermia Neg Hx         Review of Systems    Constitutional: No weakness,fatigue, fever, rigors, chills   Eyes: No vision changes, eye pain   ENT/oropharynx: No difficulty swallowing, sore throat, epistaxis, changes in hearing   Cardiovascular: No chest pain, chest tightness, palpitations, paroxysmal nocturnal dyspnea, orthopnea, diaphoresis, dizziness / syncopal episode   Respiratory: Moderate shortness of breath, dyspnea on exertion, cough, wheezing hemoptysis   Gastrointestinal: No abdominal pain, nausea, vomiting, diarrhea, bloody stools   Genitourinary: No hematuria, dysuria   Neurological: No headache, tremors, numbness,  one-sided weakness    Musculoskeletal: No cramps, myalgias,  joint pain, joint swelling   Integument: No rash, edema           BP (!) 150/105   Pulse 86   Temp 97.9 °F (36.6 °C) (Oral)   Resp 18   Ht 177.8 cm (70\")   Wt 104 kg (228 lb 2.8 oz)   SpO2 95%   BMI 32.74 kg/m²   General appearance: No acute changes   Neck: Trachea midline; NECK, supple, no thyromegaly or " lymphadenopathy   Lungs: Normal size and shape, normal breath sounds, equal distribution of air, no rales and rhonchi   CV: S1-S2 irregular, sys murmurs, no rub, no gallop   Abdomen: Soft, nontender; no masses , no abnormal abdominal sounds   Extremities: No deformity , normal color , no peripheral edema   Skin: Normal temperature, turgor and texture; no rash, ulcers          Cardiographics  ECG:             Echocardiogram:   7/2024  Interpretation Summary         Left ventricular ejection fraction appears to be 51 - 55%.    Left ventricular diastolic function was indeterminate.    The left atrial cavity is mild to moderately dilated.    Mild aortic valve stenosis is present.    Estimated right ventricular systolic pressure from tricuspid regurgitation is normal (<35 mmHg).     Stress test 2021  Interpretation Summary  Findings consistent with a normal ECG stress test.  Left ventricular ejection fraction is normal. (Calculated EF = 54%).  Myocardial perfusion imaging indicates a normal myocardial perfusion study with no evidence of ischemia.  Impressions are consistent with a low risk study.    Cath 2019  Coronary Angiography      Left Main:  The left main originates in the left coronary cusp.  It bifurcates and gives rise to the LAD and circumflex system.  Normal      Left Anterior Descending:  Normal with minimum irregularity including the diagonal and  branches     Left Circumflex:  Nondominant and normal     Right Coronary Artery:  The right coronary artery originates in the right coronary cusp.  Nondominant and normal     Left Ventriculography:       Estimated Ejection Fraction:         50 %   Wall motion abnormalities:  None   Mitral Regurgitation:  None      Impression:      Normal coronary arteries  Normal LV gram     Recommendations:      Medical management            I sincerely appreciate the opportunity to participate in your patient's care. Please feel free to contact me anytime if I can be of  assistance in this or any other way.     Eren Bruce MD  2/1/2019  9:20 AM  Imaging  CTA chest   IMPRESSION:     1. The study is negative for pulmonary embolism. No acute intrathoracic  abnormality.  2. Stable cardiomegaly with mild to moderate calcified coronary artery  disease.     This report was finalized on 10/24/2024 10:24 PM by Charles Torres MD on  Workstation: [id number]    Lab Review   Results from last 7 days   Lab Units 10/25/24  0642 10/24/24  2141 10/24/24  1906   HSTROP T ng/L 32* 34* 36*         Results from last 7 days   Lab Units 10/25/24  0642   SODIUM mmol/L 140   POTASSIUM mmol/L 3.5   BUN mg/dL 17   CREATININE mg/dL 0.93   CALCIUM mg/dL 9.5     @[id number]@  Results from last 7 days   Lab Units 10/25/24  0642 10/24/24  1906   WBC 10*3/mm3 7.76 9.42   HEMOGLOBIN g/dL 13.6 14.0   HEMATOCRIT % 44.4 43.3   PLATELETS 10*3/mm3 216 213     Results from last 7 days   Lab Units 10/24/24  1906   INR  1.18*         Assessment:  Dyspnea  Elevated troponin  Chronic atrial fibrillation  Hypertension  Chronic diastolic CHF  History of CVA  GÓMEZ      Recommendations / Plan:     This is a 69-year-old male who is well-known with our service admitted with dyspnea.  Troponin is slightly elevated at 36, repeat 34 and 32.  CTA chest negative for pulmonary embolism negative for acute intrathoracic abnormality.  BNP 1551.  He has been given Lasix IV 40 mg x 1 with some improvement in shortness of breath. He is on 2Lnc.  Will check stress test.  Echo 7/11/2024 EF 51 to 55%, indeterminate LV diastolic function, mild AV stenosis, wes 1.7, mean  gradient 19, peak 34. Discussed with Dr Bruce will repeat echo to evaluate AV stenosis. Afib, rate is controlled, he is not historically anticoagulated due to cerebral amyloid angiopathy concern in recent admission. Will ensure outpatient watchman referral is placed. BP elevated this morning prior to am meds, will monitor for response.        Elaine Kaba,  APRN  10/25/2024, 08:39 EDT  69-year-old male with atrial fibrillation, recent stroke, unable to take anticoagulation, will have the Watchman team come and evaluate  Proceed with a basic cardiac workup  Eren Bruce MD      EMR Dragon/Transcription:   Dictated utilizing Dragon dictation

## 2024-10-25 NOTE — PROGRESS NOTES
Message received from [facility] clinical manager, Russell, that patient is currently requiring a higher level of care than home health can offer therefore, [facility] will be unable to continue services at this time. [facility], Odilia Salter, informed.

## 2024-10-25 NOTE — H&P
Patient Name:  Flo Manzo  YOB: 1955  MRN:  [id number]  Admit Date:  10/24/2024  Patient Care Team:  Karen Jiménez APRN as PCP - General (Family Medicine)  Eren Bruce MD as Consulting Physician (Cardiology)  Temo Wheat MD as Consulting Physician (Radiation Oncology)  Bryant Rader MD as Consulting Physician (Urology)  Cindi Breen DNP, ASHANTI as Nurse Practitioner (Nurse Practitioner)  Greetl Galaviz RN as Ambulatory  ([facility])      Subjective   History Present Illness     Chief Complaint   Patient presents with   • Shortness of Breath   • Cough       Mr. Manzo is a 69 y.o. non-smoker with a history of hypertension, chronic atrial fibrillation, chronic anticoagulation, stroke, GÓMEZ on CPAP, and obesity that presents to [facility] complaining of shortness of breath. He reports a sudden onset of shortness of breath that began last night. He reports an associated non-productive cough. He denies chest pain, palpitations, fever, and chills. He reports some swelling in his legs. Work up in the ED revealed troponin 36 and repeat 34. An EKG showed atrial fibrillation. A chest x-ray showed elevation of the right hemidiaphragm with ill-defined bilateral perihilar and bibasilar subsegmental atelectasis and/or scarring. A CT angiogram of the chest was negative for an acute pulmonary embolism and showed stable cardiomegaly with mild to moderate calcified coronary artery disease.  The patient has been admitted for further evaluation.      History of Present Illness  Review of Systems   Constitutional:  Negative for chills and fever.   HENT:  Negative for congestion and sore throat.    Eyes:  Negative for photophobia and visual disturbance.   Respiratory:  Positive for cough and shortness of breath. Negative for chest tightness and wheezing.    Cardiovascular:  Positive for leg swelling. Negative for chest pain and palpitations.    Gastrointestinal:  Positive for vomiting. Negative for abdominal pain and nausea.   Musculoskeletal:  Negative for arthralgias and myalgias.   Skin:  Negative for color change and pallor.   Neurological:  Negative for dizziness, weakness, light-headedness, numbness and headaches.        Personal History     Past Medical History:   Diagnosis Date   • Arthritis    • Atrial fibrillation with RVR 01/24/2019   • Colon polyps 01/04/2018    Hepatic flexure: tubular adenoma, only low-grade dysplasia; splenic flexure: tubular adenoma, only low-grade dysplasia; sigmoid colon: tubular adenoma, multiple fragments only low-grade dysplasia   • Depression    • Heart murmur    • Hemorrhagic stroke 01/29/2019   • Hypertension    • New onset atrial fibrillation (CMS/HCC) - RVR 01/24/2019   • GÓMEZ (obstructive sleep apnea) 06/06/2019    Home sleep study with moderate severity GÓMEZ, AHI 20 events per hour.  Sleep-related hypoxia with low O2 saturation 84% for 14 minutes.   • Peripheral neuropathy    • Sleep apnea     previously diagnosed with sleep apnea, had T&A done and it has since resolved    • Stroke    • Uncontrolled hypertension    • Ventral hernia      Past Surgical History:   Procedure Laterality Date   • APPENDECTOMY N/A 1972   • BACK SURGERY      BLADDER STIMULATOR IMPLANT L LOWER BACK   • CARDIAC CATHETERIZATION N/A 07/20/2004    Cath left ventriculography, coronary angiography and left heart catheterization, Normal results-Dr. Vadim Mancuso   • CARDIAC CATHETERIZATION N/A 1/29/2019    Procedure: Left Heart Cath;  Surgeon: Eren Bruce MD;  Location[facility]AS[facility] LOC[facility];  Service: Cardiology   • CARDIAC CATHETERIZATION N/A 1/29/2019    Procedure: Left ventriculography;  Surgeon: Eren Bruce MD;  Location: [facility] CATH INVASIVE LOCATION;  Service: Cardiology   • CARDIAC CATHETERIZATION N/A 1/29/2019    Procedure: Coronary angiography;  Surgeon: Eren Bruce MD;  Location: [facility] CATH  INVASIVE LOCATION;  Service: Cardiology   • CARDIAC ELECTROPHYSIOLOGY PROCEDURE N/A 3/4/2022    Procedure: Pacemaker SC new BIOTRONIK;  Surgeon: Eren Bruce MD;  Location: [facility] CATH INVASIVE LOCATION;  Service: Cardiology;  Laterality: N/A;   • CARPAL TUNNEL RELEASE Right 2013   • CARPAL TUNNEL RELEASE Left    • COLONOSCOPY N/A 1/4/2018    Procedure: COLONOSCOPY with cold polypectomy and hot snare polypectomy;  Surgeon: Ten Solitario MD;  [facility];  Service:    • COLONOSCOPY N/A 4/25/2024    Procedure: COLONOSCOPY INTO CECUM;  Surgeon: Ten Solitario MD;  [facility];  Service: General;  Laterality: N/A;  PRE: PERSONAL H/O COLON POLYP  /  POST: POOR PREP OTHERWISE NORMAL   • EYE LENS IMPLANT SECONDARY Bilateral 2007, 2008   • KNEE CARTILAGE SURGERY Right 1979   • TONSILLECTOMY AND ADENOIDECTOMY Bilateral 2005   • TOTAL KNEE ARTHROPLASTY Left 03/14/2016    Left total knee arthroplasty with Amberly component, size 11 femur, G tibial baseplate with a 10 polyethylene insert and a 38 patellar button-Dr. Pablo Hairston   • TOTAL KNEE ARTHROPLASTY Right 03/02/2015    Right total knee arthroplasty with Amberly component size G femur, 11 tibial base plate with an 11 polyethylene insert and a 38 patellar button-Dr. Pablo Hairston   • UVULOPALATOPHARYNGOPLASTY N/A 2005    part of soft palate, and uvula   • VENTRAL HERNIA REPAIR N/A 1/23/2018    Procedure: VENTRAL HERNIA REPAIR LAPAROSCOPIC WITH DAVINCI ROBOT AND MESH;  Surgeon: Ten Solitario MD;  Location: [facility];  Service:      Family History   Problem Relation Age of Onset   • Hypertension Mother    • Kidney failure Mother    • Coronary artery disease Father    • Lung cancer Father         positive smoker   • Heart attack Father    • Breast cancer Sister 60   • Prostate cancer Brother    • Colon polyps Brother    • Kidney nephrosis Brother    • Malig Hyperthermia Neg Hx      Social History     Tobacco Use   •  Smoking status: Never     Passive exposure: Past   • Smokeless tobacco: Never   Vaping Use   • Vaping status: Never Used   Substance Use Topics   • Alcohol use: Not Currently     Comment: social, maybe a drink a month, not since stroke   • Drug use: No     Comment: previously smoked marijuana      (Not in a hospital admission)    Allergies:    Allergies   Allergen Reactions   • Poison Ivy Extract Hives, Itching and Rash     ITCHY BLISTERS       Objective    Objective     Vital Signs  Temp:  [97.2 °F (36.2 °C)-98.6 °F (37 °C)] 98.6 °F (37 °C)  Heart Rate:  [] 79  Resp:  [16-30] 24  BP: (130-167)/() 140/104  SpO2:  [94 %-98 %] 96 %  on  Flow (L/min) (Oxygen Therapy):  [2] 2;   Device (Oxygen Therapy): nasal cannula  Body mass index is 33.53 kg/m².    Physical Exam  Vitals and nursing note reviewed.   Constitutional:       Comments: unkempt   HENT:      Head: Normocephalic and atraumatic.      Nose: Nose normal.      Mouth/Throat:      Mouth: Mucous membranes are moist.      Pharynx: Oropharynx is clear.   Eyes:      Extraocular Movements: Extraocular movements intact.      Conjunctiva/sclera: Conjunctivae normal.   Cardiovascular:      Rate and Rhythm: Normal rate. Rhythm irregular.      Pulses: Normal pulses.      Heart sounds: Murmur heard.   Pulmonary:      Breath sounds: Examination of the right-upper field reveals decreased breath sounds. Examination of the left-upper field reveals decreased breath sounds. Examination of the right-middle field reveals decreased breath sounds. Examination of the left-middle field reveals decreased breath sounds. Examination of the right-lower field reveals rales. Examination of the left-lower field reveals rales. Decreased breath sounds and rales present.   Abdominal:      General: Bowel sounds are normal.      Palpations: Abdomen is soft.   Musculoskeletal:         General: Normal range of motion.      Cervical back: Normal range of motion and neck supple.      Right  lower leg: Edema (trace edema) present.      Left lower leg: Edema (trace edema) present.   Skin:     General: Skin is warm and dry.   Neurological:      General: No focal deficit present.      Mental Status: He is alert and oriented to person, place, and time.   Psychiatric:         Mood and Affect: Mood normal.         Behavior: Behavior normal.         Results Review:  I reviewed the patient's new clinical results.  I reviewed the patient's new imaging results and agree with the interpretation.  I reviewed the patient's other test results and agree with the interpretation  I personally viewed and interpreted the patient's EKG/Telemetry data  Discussed with ED provider.    Lab Results (last 24 hours)       Procedure Component Value Units Date/Time    CBC & Differential [[id number]]  (Normal) Collected: 10/24/24 1906    Specimen: Blood Updated: 10/24/24 1922    Narrative:      The following orders were created for panel order CBC & Differential.  Procedure                               Abnormality         Status                     ---------                               -----------         ------                     CBC Auto Differential[[id number]]        Normal              Final result                 Please view results for these tests on the individual orders.    Comprehensive Metabolic Panel [[id number]]  (Abnormal) Collected: 10/24/24 1906    Specimen: Blood Updated: 10/24/24 2048     Glucose 83 mg/dL      BUN 21 mg/dL      Creatinine 0.75 mg/dL      Sodium 145 mmol/L      Potassium 4.1 mmol/L      Chloride 109 mmol/L      CO2 26.0 mmol/L      Calcium 10.0 mg/dL      Total Protein 7.1 g/dL      Albumin 4.1 g/dL      ALT (SGPT) 15 U/L      AST (SGOT) 10 U/L      Alkaline Phosphatase 119 U/L      Total Bilirubin 0.8 mg/dL      Globulin 3.0 gm/dL      A/G Ratio 1.4 g/dL      BUN/Creatinine Ratio 28.0     Anion Gap 10.0 mmol/L      eGFR 98.3 mL/min/1.73     Narrative:      GFR Normal >60  Chronic Kidney Disease  <60  Kidney Failure <15      BNP [[id number]]  (Abnormal) Collected: 10/24/24 1906    Specimen: Blood Updated: 10/24/24 2048     proBNP 1,551.0 pg/mL     Narrative:      This assay is used as an aid in the diagnosis of individuals suspected of having heart failure. It can be used as an aid in the diagnosis of acute decompensated heart failure (ADHF) in patients presenting with signs and symptoms of ADHF to the emergency department (ED). In addition, NT-proBNP of <300 pg/mL indicates ADHF is not likely.    Age Range Result Interpretation  NT-proBNP Concentration (pg/mL:      <50             Positive            >450                   Gray                 300-450                    Negative             <300    50-75           Positive            >900                  Gray                300-900                  Negative            <300      >75             Positive            >1800                  Gray                300-1800                  Negative            <300    Single High Sensitivity Troponin T [[id number]]  (Abnormal) Collected: 10/24/24 1906    Specimen: Blood Updated: 10/24/24 2048     HS Troponin T 36 ng/L     Narrative:      High Sensitive Troponin T Reference Range:  <14.0 ng/L- Negative Female for AMI  <22.0 ng/L- Negative Male for AMI  >=14 - Abnormal Female indicating possible myocardial injury.  >=22 - Abnormal Male indicating possible myocardial injury.   Clinicians would have to utilize clinical acumen, EKG, Troponin, and serial changes to determine if it is an Acute Myocardial Infarction or myocardial injury due to an underlying chronic condition.         Protime-INR [[id number]]  (Abnormal) Collected: 10/24/24 1906    Specimen: Blood Updated: 10/24/24 1931     Protime 15.2 Seconds      INR 1.18    CBC Auto Differential [[id number]]  (Normal) Collected: 10/24/24 1906    Specimen: Blood Updated: 10/24/24 1922     WBC 9.42 10*3/mm3      RBC 5.05 10*6/mm3      Hemoglobin 14.0 g/dL      Hematocrit 43.3  %      MCV 85.7 fL      MCH 27.7 pg      MCHC 32.3 g/dL      RDW 12.7 %      RDW-SD 38.6 fl      MPV 10.1 fL      Platelets 213 10*3/mm3      Neutrophil % 67.9 %      Lymphocyte % 21.3 %      Monocyte % 7.4 %      Eosinophil % 2.7 %      Basophil % 0.4 %      Immature Grans % 0.3 %      Neutrophils, Absolute 6.39 10*3/mm3      Lymphocytes, Absolute 2.01 10*3/mm3      Monocytes, Absolute 0.70 10*3/mm3      Eosinophils, Absolute 0.25 10*3/mm3      Basophils, Absolute 0.04 10*3/mm3      Immature Grans, Absolute 0.03 10*3/mm3      nRBC 0.0 /100 WBC     Single High Sensitivity Troponin T [[id number]]  (Abnormal) Collected: 10/24/24 2141    Specimen: Blood from Arm, Left Updated: 10/24/24 2222     HS Troponin T 34 ng/L     Narrative:      High Sensitive Troponin T Reference Range:  <14.0 ng/L- Negative Female for AMI  <22.0 ng/L- Negative Male for AMI  >=14 - Abnormal Female indicating possible myocardial injury.  >=22 - Abnormal Male indicating possible myocardial injury.   Clinicians would have to utilize clinical acumen, EKG, Troponin, and serial changes to determine if it is an Acute Myocardial Infarction or myocardial injury due to an underlying chronic condition.         Respiratory Panel PCR w/COVID-19(SARS-CoV-2) ALLYSSA/OSCAR/BETO/PAD/COR/NAHED In-House, NP Swab in UTM/VTM, 2 HR TAT - Swab, Nasopharynx [[id number]] Collected: 10/24/24 2221    Specimen: Swab from Nasopharynx Updated: 10/24/24 2225            Imaging Results (Last 24 Hours)       Procedure Component Value Units Date/Time    CT Angiogram Chest Pulmonary Embolism [[id number]] Collected: 10/24/24 2215     Updated: 10/24/24 2227    Narrative:      CT ANGIOGRAM CHEST PULMONARY EMBOLISM-     DATE OF EXAM: 10/24/2024 9:19 PM     INDICATION: hypoxia, tachypnea, dyspnea, immobility.     COMPARISON: Chest radiographs 10/24/2024, 8/11/2024, and 8/5/2024. CT  chest 3/21/2024, 3/17/2023, and 6/22/2022. Coronary CT 8/28/2024.     TECHNIQUE: Multiple contiguous axial images  were acquired through the  chest following the intravenous administration of 85 mL of Isovue-370.  Reformatted coronal, sagittal, and 3D reconstruction images were also  reviewed. Radiation dose reduction techniques were utilized, including  automated exposure control and exposure modulation based on body size.     FINDINGS:  No evidence of a pulmonary arterial filling defect to suggest pulmonary  embolism. Mild enlargement of the main pulmonary arteries could reflect  pulmonary Terryl hypertension. Stable cardiomegaly with mild to moderate  calcified coronary artery disease. No pericardial effusion. Aortic valve  calcifications. Calcified remnants of prior granulomatous disease in the  mediastinum and left hilum. No pathologically enlarged intrathoracic  lymph nodes are identified. Similar-appearing partially imaged  heterogeneous multinodular thyroid goiter extending substernal. Tiny  hiatal hernia.     Low lung volumes with bilateral dependent atelectasis and multifocal  bibasilar subsegmental atelectasis and/or scarring. Multiple likely  benign sub-6 mm pulmonary nodules in both lungs. No dense lung  consolidation. The central airways are widely patent. No pneumothorax or  pleural effusion.     Limited noncontrast CT imaging of the upper abdomen demonstrates a  similar-appearing incompletely evaluated 1.5 cm low-density lesion in  the right lobe of the liver, statistically representing a hepatic cyst  or hemangioma. Fatty atrophy of the pancreas.     Similar-appearing multilevel thoracic spondylosis and flowing multilevel  mid and lower thoracic anterior osteophyte formation. Partially imaged  chronic degenerative changes in both shoulders. No acute osseous  abnormality or concerning osseous lesion.       Impression:         1. The study is negative for pulmonary embolism. No acute intrathoracic  abnormality.  2. Stable cardiomegaly with mild to moderate calcified coronary artery  disease.     This report was  finalized on 10/24/2024 10:24 PM by Charles Torres MD on  Workstation: [id number]               Results for orders placed during the hospital encounter of 07/11/24    Adult Transthoracic Echo Complete W/ Cont if Necessary Per Protocol    Interpretation Summary  •  Left ventricular ejection fraction appears to be 51 - 55%.  •  Left ventricular diastolic function was indeterminate.  •  The left atrial cavity is mild to moderately dilated.  •  Mild aortic valve stenosis is present.  •  Estimated right ventricular systolic pressure from tricuspid regurgitation is normal (<35 mmHg).      ECG 12 Lead ED Triage Standing Order; SOA   Preliminary Result   HEART RATE=88  bpm   RR Vfygniwn=651  ms   IN Interval=  ms   P Horizontal Axis=  deg   P Front Axis=  deg   QRSD Ctbdildm=566  ms   QT Bqmsvzca=778  ms   CVeQ=813  ms   QRS Axis=-42  deg   T Wave Axis=-10  deg   - ABNORMAL ECG -   Atrial fibrillation   Inferior infarct, old   Consider anterior infarct   Date and Time of Study:2024-10-24 19:31:01           Assessment/Plan     Active Hospital Problems    Diagnosis  POA   • **Elevated troponin [R79.89]  Yes   • History of CVA (cerebrovascular accident) [Z86.73]  Not Applicable   • Chronic anticoagulation [Z79.01]  Not Applicable   • Chronic atrial fibrillation [I48.20]  Yes   • GÓMEZ on auto CPAP [G47.33]  Yes   • Diastolic CHF, chronic [I50.32]  Yes   • Essential hypertension [I10]  Yes   • Obesity (BMI 30-39.9) [E66.9]  Yes       Dyspnea  Elevated Troponin  -Admit to a telemetry unit for monitoring  -Cardiology consult  -Troponin has trended down 36 > 34. Will continue to trend  -EKG shows atrial fibrillation with a controlled rate  -Echo from 1/11/24 showed EF 51-55%. Will defer repeat echo to cardiology group  -His BNP was elevated, will order a dose of IV lasix to see if this helps with the dyspnea    Chronic Atrial Fibrillation  -Rate has been controlled. Continue metoprolol  -His anticoagulant has been on hold for a  Watchman's procedure    Hypertension  Chronic Diastolic CHF  -He has been hypertensive. Continue home dose of lasix and lisinopril  -Daily weights  -Strict I&O    History of CVA  -Continue aspirin and statin    GÓMEZ  -He may use his home CPAP if here  -Supplemental oxygen as needed to keep sats greater than or equal to 92%    BPH  -Continue Flomax    GERD  -He takes Omeprazole at home, will start Protonix daily    Anxiety  Depression  -Continue buspirone and citalopram    Obesity  -Complicating all of the above    -I discussed the patients findings and my recommendations with patient.    VTE Prophylaxis - SCDs.  Code Status - Full code.       ASHANTI Doss  [facility]  10/25/24  00:45 EDT

## 2024-10-25 NOTE — PLAN OF CARE
Goal Outcome Evaluation:  Plan of Care Reviewed With: patient           Outcome Evaluation: Clinical swallow eval completed. Pt took trials of ice, thin (spoon/cup/straw), pureed, soft, mixed, and regular. No overt s/s were noted. Pt had slow mastication with solids due to dentition. Oral cavity was clear post swallow. Laryngeal elevation was adequate with palpation. Recommend soft, chopped wtih thin; meds w/ thin or pureed; upright for meals and 30 min after; slow rate; small bites/sips. ST to follow for tolerance and need for instrumental.    Anticipated Discharge Disposition (SLP): unknown          SLP Swallowing Diagnosis: oral dysphagia, R/O pharyngeal dysphagia (10/25/24 1600)

## 2024-10-25 NOTE — PLAN OF CARE
Goal Outcome Evaluation:  Plan of Care Reviewed With: patient        Progress: no change  Outcome Evaluation: Pt A&Ox4, afib on monitor, on 2 L. NC, lasix x1 given per order, came in this shift for SOA, cough, weakness, cardio has been consulted, troponin trending down. VSS.

## 2024-10-25 NOTE — PROGRESS NOTES
Discharge Planning Assessment  [facility]     Patient Name: Flo Manzo  MRN: [id number]  Today's Date: 10/25/2024    Admit Date: 10/24/2024    Plan: SNF pending referrals,progress and PT eval   Discharge Needs Assessment       Row Name 10/25/24 1521       Living Environment    People in Home spouse    Current Living Arrangements home    Potentially Unsafe Housing Conditions none    Primary Care Provided by self    Provides Primary Care For no one    Family Caregiver if Needed spouse    Family Caregiver Names Bhargavi spouse    Quality of Family Relationships supportive       Resource/Environmental Concerns    Resource/Environmental Concerns none       Transition Planning    Patient/Family Anticipates Transition to home with family;inpatient rehabilitation facility       Discharge Needs Assessment    Equipment Currently Used at Home walker, rolling    Provided Post Acute Provider List? Yes    Post Acute Provider List Nursing Home    Provided Post Acute Provider Quality & Resource List? Yes    Post Acute Provider Quality and Resource List Nursing Home    Delivered To Patient;Support Person    Support Person Bhargavi    Method of Delivery In person;Telephone    Offered/Gave Vendor List yes                   Discharge Plan       Row Name 10/25/24 1523       Plan    Plan SNF pending referrals,progress and PT eval    Plan Comments Spoke with pt at bedside and pt's wife Bhargavi by phone.  Pt did confirm his facesheet as correct including his PCP as yang Jiménez.   Pt report that he does live with his wife Bhargavi who is in a wheelchair and his step son.  Pt stated that he did just recently get DC home from [facility] where he did rehab for 5 weeks.  Pt and  pt's wife both report that pt is very weak and they do feel he odessa need to return back to rehab upon DC from [facility].  Discussed with both pt and his wife  the possibility of pt maybe needing LTC.  Pt really still wouldlike to return back home after rehab but did  voice that he is concerned that he will be abl eto get back to the level he would need ot be to return home.   Both also voiced financial concerns over pt going into LTC.  Robert discussed pt possible needing medicaid for LTC.  Reviewed CMS list for SNF forpt.  Pt stated that he would like to return to [facility] but unsure that would be an option if he did need that for LTC.  Pt requested referrals to SNF's close to [id number] so pt's wife can be able to come see him,  Multiple SNF referrals made. Pt also was current with [facility] after DC from [facility] and per Raine with [facility] they will not be able to accept pt back if he does DC directly back home.   CCP will need to follow up on pt's progress, PT eval, SNF referrals and discuss with pt and elsi berger.                  Continued Care and Services - Admitted Since 10/24/2024       Destination       Service Provider Request Status Services Address Phone Fax Patient Preferred    [facility] Considering  Need family discussion -- [address] [facility] [address] [phone] --    [facility] Considering  Need family discussion [address] [address] [phone] --    [facility] Pending - Request Sent -- [address] [address] [phone] --    [facility] [facility] Pending - Request Sent -- [address] [address] [phone] --    [facility]ATION Pending - Request Sent -- [address] [facility] [address] [phone] --    [facility] Pending - Request Sent -- [address] [phone] --    [facility] REHABILITATION Pending - Request Sent -- [address] [facility] [id number] [phone] --                  Selected Continued Care - Episodes  Includes continued care and service providers with selected services from the active episodes listed below      High Risk Care Management Episode start date: 8/30/2024   There are no active outsourced providers for this episode.                 Selected Continued Care - Prior Encounters Includes continued care and service providers with selected services from prior encounters from 7/26/2024 to 10/25/2024      Discharged on 8/29/2024 Admission date: 8/26/2024 - Discharge disposition: Skilled Nursing Facility (DC - External)      Destination       Service Provider Services Address Phone Fax Patient Preferred    [facility] [address] [facility] [address] [phone] --              Home Medical Care       Service Provider Services Address Phone Fax Patient Preferred    [facility] [address] [id number] [phone] --                      Discharged on 8/9/2024 Admission date: 8/5/2024 - Discharge disposition: Rehab Facility or Unit (DC - External)      Destination       Service Provider Services Address Phone Fax Patient Preferred    [facility] [address] [facility] [address] [phone] --                             Demographic Summary       Row Name 10/25/24 1521       General Information    Admission Type inpatient    Arrived From home    Referral Source admission list    Reason for Consult discharge planning    Preferred Language English                   Functional Status       Row Name 10/25/24 1521       Functional Status    Usual Activity Tolerance fair    Current Activity Tolerance poor       Functional Status, IADL    Medications assistive person    Meal Preparation assistive person    Housekeeping assistive person    Laundry assistive person       Mental Status Summary    Recent Changes  in Mental Status/Cognitive Functioning no changes                   Psychosocial    No documentation.                  Abuse/Neglect    No documentation.                  Legal    No documentation.                  Substance Abuse    No documentation.                  Patient Forms    No documentation.                     Barbara Salter MSW

## 2024-10-25 NOTE — THERAPY EVALUATION
Acute Care - Speech Language Pathology   Swallow Initial Evaluation [facility]     Patient Name: Flo Manzo  : 1955  MRN: [id number]  Today's Date: 10/25/2024               Admit Date: 10/24/2024    Visit Dx:     ICD-10-CM ICD-9-CM   1. Elevated troponin  R79.89 790.6   2. Acute respiratory distress  R06.03 518.82     Patient Active Problem List   Diagnosis    Umbilical hernia without obstruction and without gangrene    Essential hypertension    Arthritis of left knee    Depression    Obesity (BMI 30-39.9)    Atrial fibrillation with RVR    Substernal thyroid goiter    CHF (congestive heart failure)    Diastolic CHF, chronic    Hemorrhagic stroke    GÓMEZ on auto CPAP    Hypersomnia due to medical condition    Sleep-related hypoxia    Chronic atrial fibrillation    Vertigo    Aortic stenosis, mild    Sinus pause    Pacemaker    Chronic anticoagulation    Generalized weakness    Prediabetes    Pure hypercholesterolemia    Pre-operative cardiovascular examination    Peripheral neuropathy    History of colon polyps    Dizziness    Fever    History of CVA (cerebrovascular accident)    Right leg weakness    Frequent falls    Paroxysmal atrial fibrillation    Elevated troponin     Past Medical History:   Diagnosis Date    Arthritis     Atrial fibrillation with RVR 2019    Colon polyps 2018    Hepatic flexure: tubular adenoma, only low-grade dysplasia; splenic flexure: tubular adenoma, only low-grade dysplasia; sigmoid colon: tubular adenoma, multiple fragments only low-grade dysplasia    Depression     Heart murmur     Hemorrhagic stroke 2019    Hypertension     New onset atrial fibrillation (CMS/HCC) - RVR 2019    GÓMEZ (obstructive sleep apnea) 2019    Home sleep study with moderate severity GÓMEZ, AHI 20 events per hour.  Sleep-related hypoxia with low O2 saturation 84% for 14 minutes.    Peripheral neuropathy     Sleep apnea     previously diagnosed with sleep apnea, had T&A  done and it has since resolved     Stroke     Uncontrolled hypertension     Ventral hernia      Past Surgical History:   Procedure Laterality Date    APPENDECTOMY N/A 1972    BACK SURGERY      BLADDER STIMULATOR IMPLANT L LOWER BACK    CARDIAC CATHETERIZATION N/A 07/20/2004    Cath left ventriculography, coronary angiography and left heart catheterization, Normal results-Dr. Fierro     CARDIAC CATHETERIZATION N/A 1/29/2019    Procedure: Left Heart Cath;  Surgeon: Eren Bruce MD;  Location: [facility] CATH INVASIVE LOCATION;  Service: Cardiology    CARDIAC CATHETERIZATION N/A 1/29/2019    Procedure: Left ventriculography;  Surgeon: Eren Bruce MD;  Location:  [facility] CATH INVASIVE LOCATION;  Service: Cardiology    CARDIAC CATHETERIZATION N/A 1/29/2019    Procedure: Coronary angiography;  Surgeon: Eren Bruce MD;  Location:  [facility] CATH INVASIVE LOCATION;  Service: Cardiology    CARDIAC ELECTROPHYSIOLOGY PROCEDURE N/A 3/4/2022    Procedure: Pacemaker SC new BIOTRONIK;  Surgeon: Eren Bruce MD;  [facility];  Service: Cardiology;  Laterality: N/A;    CARPAL TUNNEL RELEASE Right 2013    CARPAL TUNNEL RELEASE Left     COLONOSCOPY N/A 1/4/2018    Procedure: COLONOSCOPY with cold polypectomy and hot snare polypectomy;  Surgeon: Ten Solitario MD;  Location: [facility] ENDOSCOPY;  Service:     COLONOSCOPY N/A 4/25/2024    Procedure: COLONOSCOPY INTO CECUM;  Surgeon: Ten Solitario MD;  Location: [facility] ENDOSCOPY;  Service: General;  Laterality: N/A;  PRE: PERSONAL H/O COLON POLYP  /  POST: POOR PREP OTHERWISE NORMAL    EYE LENS IMPLANT SECONDARY Bilateral 2007, 2008    KNEE CARTILAGE SURGERY Right 1979    TONSILLECTOMY AND ADENOIDECTOMY Bilateral 2005    TOTAL KNEE ARTHROPLASTY Left 03/14/2016    Left total knee arthroplasty with Amberly component, size 11 femur, G tibial baseplate with a 10 polyethylene insert and a 38 patellar button-Dr. Pablo Hairston     TOTAL KNEE ARTHROPLASTY Right 03/02/2015    Right total knee arthroplasty with Amberly component size G femur, 11 tibial base plate with an 11 polyethylene insert and a 38 patellar button-Dr. Pablo Hairston    UVULOPALATOPHARYNGOPLASTY N/A 2005    part of soft palate, and uvula    VENTRAL HERNIA REPAIR N/A 1/23/2018    Procedure: VENTRAL HERNIA REPAIR LAPAROSCOPIC WITH DAVINCI ROBOT AND MESH;  Surgeon: Ten Solitario MD;  Location: [facility];  Service:        SLP Recommendation and Plan  SLP Swallowing Diagnosis: oral dysphagia, R/O pharyngeal dysphagia (10/25/24 1600)  SLP Diet Recommendation: soft to chew textures, chopped, thin liquids (10/25/24 1600)  Recommended Precautions and Strategies: upright posture during/after eating, small bites of food and sips of liquid (10/25/24 1600)  SLP Rec. for Method of Medication Administration: meds whole, with thin liquids, with puree, as tolerated (10/25/24 1600)     Monitor for Signs of Aspiration: yes, notify SLP if any concerns (10/25/24 1600)     Swallow Criteria for Skilled Therapeutic Interventions Met: demonstrates skilled criteria (10/25/24 1600)  Anticipated Discharge Disposition (SLP): unknown (10/25/24 1600)  Rehab Potential/Prognosis, Swallowing: good, to achieve stated therapy goals (10/25/24 1600)  Therapy Frequency (Swallow): PRN (10/25/24 1600)  Predicted Duration Therapy Intervention (Days): until discharge (10/25/24 1600)  Oral Care Recommendations: Oral Care BID/PRN (10/25/24 1600)                                               SWALLOW EVALUATION (Last 72 Hours)       SLP Adult Swallow Evaluation       Row Name 10/25/24 1600                   Rehab Evaluation    Document Type evaluation  -AW        Subjective Information no complaints  -AW        Patient Observations cooperative;alert;agree to therapy  -AW        Patient Effort good  -AW        Symptoms Noted During/After Treatment none  -AW           General Information    Patient Profile Reviewed  yes  -AW        Pertinent History Of Current Problem Pt admitted with respiratory distress, SOB, elevated troponin; h/o CVA, GERD.  -AW        Current Method of Nutrition regular textures;thin liquids;other (see comments)  Pt reported a period of being on NTL when at [facility] recently, however, was upgraded to thin prior to DC.  -AW        Precautions/Limitations, Vision WFL;for purposes of eval  -AW        Precautions/Limitations, Hearing WFL;for purposes of eval  -AW        Prior Level of Function-Communication WFL  -AW        Prior Level of Function-Swallowing no diet consistency restrictions  -AW        Plans/Goals Discussed with patient;agreed upon  -AW        Barriers to Rehab none identified  -AW        Patient's Goals for Discharge return home  -AW           Pain    Pretreatment Pain Rating 0/10 - no pain  -AW        Posttreatment Pain Rating 0/10 - no pain  -AW           Oral Motor Structure and Function    Dentition Assessment natural, present and adequate;missing teeth  -AW        Secretion Management WNL/WFL  -AW        Mucosal Quality moist, healthy  -AW           Oral Musculature and Cranial Nerve Assessment    Oral Labial or Buccal Impairment, Detail, Cranial Nerve VII (Facial): left labial droop  -AW           General Eating/Swallowing Observations    Respiratory Support Currently in Use room air  -AW        Eating/Swallowing Skills self-fed;fed by SLP  -AW        Positioning During Eating upright in bed  -AW        Utensils Used spoon;cup;straw  -AW        Consistencies Trialed regular textures;soft to chew textures;mixed consistency;pureed;ice chips;thin liquids  -AW           Clinical Swallow Eval    Clinical Swallow Evaluation Summary Clinical swallow eval completed. Pt took trials of ice, thin (spoon/cup/straw), pureed, soft, mixed, and regular. No overt s/s were noted. Pt had slow mastication with solids due to dentition. Oral cavity was clear post swallow. Laryngeal elevation was adequate  with palpation. Recommend soft, chopped wtih thin; meds w/ thin or pureed; upright for meals and 30 min after; slow rate; small bites/sips. ST to follow for tolerance and need for instrumental.  -AW           SLP Evaluation Clinical Impression    SLP Swallowing Diagnosis oral dysphagia;R/O pharyngeal dysphagia  -AW        Functional Impact risk of aspiration/pneumonia  -AW        Rehab Potential/Prognosis, Swallowing good, to achieve stated therapy goals  -AW        Swallow Criteria for Skilled Therapeutic Interventions Met demonstrates skilled criteria  -AW           Recommendations    Therapy Frequency (Swallow) PRN  -AW        Predicted Duration Therapy Intervention (Days) until discharge  -AW        SLP Diet Recommendation soft to chew textures;chopped;thin liquids  -AW        Recommended Precautions and Strategies upright posture during/after eating;small bites of food and sips of liquid  -AW        Oral Care Recommendations Oral Care BID/PRN  -AW        SLP Rec. for Method of Medication Administration meds whole;with thin liquids;with puree;as tolerated  -AW        Monitor for Signs of Aspiration yes;notify SLP if any concerns  -AW        Anticipated Discharge Disposition (SLP) unknown  -AW           (STG) Patient will tolerate trials of    Consistencies Trialed (Tolerate trials) soft to chew (chopped) textures;thin liquids  -AW        Desired Outcome (Tolerate trials) without signs/symptoms of aspiration  -AW        [place] (Tolerate trials) independently (over 90% accuracy)  -AW        Time Frame (Tolerate trials) by discharge  -AW                  User Key  (r) = Recorded By, (t) = Taken By, (c) = Cosigned By      Initials Name Effective Dates    Nirmala Barragan, SLP 08/28/23 -                     EDUCATION  The patient has been educated in the following areas:   Dysphagia (Swallowing Impairment) Oral Care/Hydration Modified Diet Instruction.        SLP GOALS       Row Name 10/25/24 4663              (STG) Patient will tolerate trials of    Consistencies Trialed (Tolerate trials) soft to chew (chopped) textures;thin liquids  -AW      Desired Outcome (Tolerate trials) without signs/symptoms of aspiration  -AW      [place] (Tolerate trials) independently (over 90% accuracy)  -AW      Time Frame (Tolerate trials) by discharge  -AW                User Key  (r) = Recorded By, (t) = Taken By, (c) = Cosigned By      Initials Name Provider Type    Nirmala Barragan SLP Speech and Language Pathologist                         Time Calculation:    Time Calculation- SLP       Row Name 10/25/24 1745             Time Calculation- SLP    SLP Start Time 1500  -AW      SLP Received On 10/25/24  -AW                User Key  (r) = Recorded By, (t) = Taken By, (c) = Cosigned By      Initials Name Provider Type    Nirmala Barragan SLP Speech and Language Pathologist                    Therapy Charges for Today       Code Description Service Date Service Provider Modifiers Qty    [id number]  ST EVAL ORAL PHARYNG SWALLOW 4 10/25/2024 Nirmala Guajardo SLP GN 1                 NICOLAS Rodriguez  10/25/2024

## 2024-10-25 NOTE — ED NOTES
Nursing report ED to floor  Flo Manzo  69 y.o.  male    HPI :  HPI  Stated Reason for Visit: sob, cough  History Obtained From: EMS    Chief Complaint  Chief Complaint   Patient presents with    Shortness of Breath    Cough       Admitting doctor:   Padmini Vann MD    Admitting diagnosis:   The primary encounter diagnosis was Elevated troponin. A diagnosis of Acute respiratory distress was also pertinent to this visit.    Code status:   Current Code Status       Date Active Code Status Order ID Comments User Context       Prior            Allergies:   Poison ivy extract    Isolation:   No active isolations    Intake and Output  No intake or output data in the 24 hours ending 10/24/24 2229    Weight:       10/24/24  1847   Weight: 106 kg (233 lb 11 oz)       Most recent vitals:   Vitals:    10/24/24 2000 10/24/24 2021 10/24/24 2030 10/24/24 2100   BP: (!) 147/114  (!) 141/109 (!) 148/104   Pulse: 98  97 86   Resp:  24     Temp:       SpO2: 94%  94% 95%   Weight:       Height:           Active LDAs/IV Access:   Lines, Drains & Airways       Active LDAs       Name Placement date Placement time Site Days    Peripheral IV 10/24/24 2120 Anterior;Left;Proximal Forearm 10/24/24  2120  Forearm  less than 1                    Labs (abnormal labs have a star):   Labs Reviewed   COMPREHENSIVE METABOLIC PANEL - Abnormal; Notable for the following components:       Result Value    Creatinine 0.75 (*)     Chloride 109 (*)     Alkaline Phosphatase 119 (*)     BUN/Creatinine Ratio 28.0 (*)     All other components within normal limits    Narrative:     GFR Normal >60  Chronic Kidney Disease <60  Kidney Failure <15     BNP (IN-HOUSE) - Abnormal; Notable for the following components:    proBNP 1,551.0 (*)     All other components within normal limits    Narrative:     This assay is used as an aid in the diagnosis of individuals suspected of having heart failure. It can be used as an aid in the diagnosis of acute decompensated  heart failure (ADHF) in patients presenting with signs and symptoms of ADHF to the emergency department (ED). In addition, NT-proBNP of <300 pg/mL indicates ADHF is not likely.    Age Range Result Interpretation  NT-proBNP Concentration (pg/mL:      <50             Positive            >450                   Gray                 300-450                    Negative             <300    50-75           Positive            >900                  Gray                300-900                  Negative            <300      >75             Positive            >1800                  Gray                300-1800                  Negative            <300   SINGLE HS TROPONIN T - Abnormal; Notable for the following components:    HS Troponin T 36 (*)     All other components within normal limits    Narrative:     High Sensitive Troponin T Reference Range:  <14.0 ng/L- Negative Female for AMI  <22.0 ng/L- Negative Male for AMI  >=14 - Abnormal Female indicating possible myocardial injury.  >=22 - Abnormal Male indicating possible myocardial injury.   Clinicians would have to utilize clinical acumen, EKG, Troponin, and serial changes to determine if it is an Acute Myocardial Infarction or myocardial injury due to an underlying chronic condition.        PROTIME-INR - Abnormal; Notable for the following components:    Protime 15.2 (*)     INR 1.18 (*)     All other components within normal limits   SINGLE HS TROPONIN T - Abnormal; Notable for the following components:    HS Troponin T 34 (*)     All other components within normal limits    Narrative:     High Sensitive Troponin T Reference Range:  <14.0 ng/L- Negative Female for AMI  <22.0 ng/L- Negative Male for AMI  >=14 - Abnormal Female indicating possible myocardial injury.  >=22 - Abnormal Male indicating possible myocardial injury.   Clinicians would have to utilize clinical acumen, EKG, Troponin, and serial changes to determine if it is an Acute Myocardial Infarction or  myocardial injury due to an underlying chronic condition.        CBC WITH AUTO DIFFERENTIAL - Normal   RESPIRATORY PANEL PCR W/ COVID-19 (SARS-COV-2), NP SWAB IN UTM/VTP, 2 HR TAT   RAINBOW DRAW    Narrative:     The following orders were created for panel order [place] Draw.  Procedure                               Abnormality         Status                     ---------                               -----------         ------                     Green Top (Gel)[[id number]]                                  Final result               Lavender Top[[id number]]                                     Final result               Gold Top - SST[[id number]]                                   Final result               Light Blue Top[[id number]]                                   Final result                 Please view results for these tests on the individual orders.   CBC AND DIFFERENTIAL    Narrative:     The following orders were created for panel order CBC & Differential.  Procedure                               Abnormality         Status                     ---------                               -----------         ------                     CBC Auto Differential[[id number]]        Normal              Final result                 Please view results for these tests on the individual orders.   GREEN TOP   LAVENDER TOP   GOLD TOP - SST   LIGHT BLUE TOP       EKG:   ECG 12 Lead ED Triage Standing Order; SOA   Preliminary Result   HEART RATE=88  bpm   RR Bgtjyyba=744  ms   CA Interval=  ms   P Horizontal Axis=  deg   P Front Axis=  deg   QRSD Xhgyilbp=975  ms   QT Uyagtbdp=328  ms   RRgV=208  ms   QRS Axis=-42  deg   T Wave Axis=-10  deg   - ABNORMAL ECG -   Atrial fibrillation   Inferior infarct, old   Consider anterior infarct   Date and Time of Study:2024-10-24 19:31:01          Meds given in ED:   Medications   sodium chloride 0.9 % flush 10 mL (has no administration in time range)   iopamidol (ISOVUE-370) 76 % injection 100  mL (85 mL Intravenous Given by Other 10/24/24 8324)       Imaging results:  CT Angiogram Chest Pulmonary Embolism    Result Date: 10/24/2024   1. The study is negative for pulmonary embolism. No acute intrathoracic abnormality. 2. Stable cardiomegaly with mild to moderate calcified coronary artery disease.  This report was finalized on 10/24/2024 10:24 PM by Charles Torres MD on Workstation: [id number]       Ambulatory status:   - asst  1      Social issues:   Social History     Socioeconomic History    Marital status:    Tobacco Use    Smoking status: Never     Passive exposure: Past    Smokeless tobacco: Never   Vaping Use    Vaping status: Never Used   Substance and Sexual Activity    Alcohol use: Not Currently     Comment: social, maybe a drink a month, not since stroke    Drug use: No     Comment: previously smoked marijuana     Sexual activity: Defer       Peripheral Neurovascular       Neuro Cognitive  Neuro Cognitive (Adult)  Cognitive/Neuro/Behavioral WDL: WDL, mood/behavior, orientation  Orientation: oriented x 4  Mood/Behavior: calm, cooperative    Learning  Learning Assessment  Learning Readiness and Ability: no barriers identified  Education Provided  Person Taught: patient  Teaching Method: verbal instruction  Education Outcome Evaluation: verbalizes understanding    Respiratory  Respiratory  Airway WDL: WDL  Respiratory WDL  Respiratory WDL: .WDL except, all, cough  Rhythm/Pattern, Respiratory: shortness of breath  Cough Frequency: infrequent    Abdominal Pain       Pain Assessments  Pain (Adult)  (0-10) Pain Rating: Rest: 0    NIH Stroke Scale       Karis Roberts RN  10/24/24 22:29 EDT

## 2024-10-25 NOTE — DISCHARGE PLACEMENT REQUEST
"Omkar Marquez \"Danyel\" (69 y.o. Male)       Date of Birth   1955    Social Security Number       Address   [address] [place] [address]    Home Phone   [phone]    MRN   [id number]       [employer]vent   Restorationist    Marital Status                               Admission Date   10/24/24    Admission Type   Emergency    Admitting Provider   Brandon Banuelos MD    Attending Provider   Brandon Banuelos MD    Department, Room/Bed   [address], S619/1       Discharge Date       Discharge Disposition       Discharge Destination                                 Attending Provider: Brandon Banuelos MD    Allergies: Poison Ivy Extract    Isolation: None   Infection: None   Code Status: CPR    Ht: 177.8 cm (70\")   Wt: 104 kg (228 lb 2.8 oz)    Admission Cmt: None   Principal Problem: Elevated troponin [R79.89]                   Active Insurance as of 10/24/2024       Primary Coverage       Payor Plan Insurance Group Employer/Plan Group    MEDICARE MEDICARE A & B        Payor Plan Address Payor Plan Phone Number Payor Plan Fax Number Effective Dates    [address] [phone]  3/1/2021 - None Entered    formerly [facility] [id number]         Subscriber Name Subscriber Birth Date Member ID       OMKAR MARQUEZ 1955 [id number]               Secondary Coverage       Payor Plan Insurance Group Employer/Plan Group    ANTHEM BLUE CROSS ANTHEM BLUE CROSS BLUE SHIELD PPO [id number]       Payor Plan Address Payor Plan Phone Number Payor Plan Fax Number Effective Dates    [address] [phone]  1/1/2023 - None Entered    [facility] [id number]         Subscriber Name Subscriber Birth Date Member ID       OMKAR MARQUEZ 1955 [id number]                     Emergency Contacts        (Rel.) Home Phone Work Phone Mobile Phone    Bhargavi Parker (Spouse) [phone] -- [phone]                "

## 2024-10-26 ENCOUNTER — APPOINTMENT (OUTPATIENT)
Dept: CARDIOLOGY | Facility: HOSPITAL | Age: 69
DRG: 204 | End: 2024-10-26
Payer: MEDICARE

## 2024-10-26 LAB
AORTIC DIMENSIONLESS INDEX: 0.3 (DI)
ASCENDING AORTA: 3.9 CM
BH CV ECHO MEAS - ACS: 1.21 CM
BH CV ECHO MEAS - AI P1/2T: 838.6 MSEC
BH CV ECHO MEAS - AO MAX PG: 38.5 MMHG
BH CV ECHO MEAS - AO MEAN PG: 25.3 MMHG
BH CV ECHO MEAS - AO ROOT AREA (BSA CORRECTED): 1.7 CM2
BH CV ECHO MEAS - AO ROOT DIAM: 3.7 CM
BH CV ECHO MEAS - AO V2 MAX: 310.1 CM/SEC
BH CV ECHO MEAS - AO V2 VTI: 55.7 CM
BH CV ECHO MEAS - AVA(I,D): 1 CM2
BH CV ECHO MEAS - EDV(CUBED): 140.2 ML
BH CV ECHO MEAS - EDV(MOD-SP2): 126 ML
BH CV ECHO MEAS - EDV(MOD-SP4): 133 ML
BH CV ECHO MEAS - EF(MOD-BP): 44.5 %
BH CV ECHO MEAS - EF(MOD-SP2): 46 %
BH CV ECHO MEAS - EF(MOD-SP4): 41.4 %
BH CV ECHO MEAS - ESV(CUBED): 69 ML
BH CV ECHO MEAS - ESV(MOD-SP2): 68 ML
BH CV ECHO MEAS - ESV(MOD-SP4): 78 ML
BH CV ECHO MEAS - FS: 21 %
BH CV ECHO MEAS - IVS/LVPW: 0.99 CM
BH CV ECHO MEAS - IVSD: 1.19 CM
BH CV ECHO MEAS - LAT PEAK E' VEL: 8.4 CM/SEC
BH CV ECHO MEAS - LV DIASTOLIC VOL/BSA (35-75): 60.8 CM2
BH CV ECHO MEAS - LV MASS(C)D: 247.9 GRAMS
BH CV ECHO MEAS - LV MAX PG: 3.3 MMHG
BH CV ECHO MEAS - LV MEAN PG: 1.93 MMHG
BH CV ECHO MEAS - LV SYSTOLIC VOL/BSA (12-30): 35.7 CM2
BH CV ECHO MEAS - LV V1 MAX: 91.2 CM/SEC
BH CV ECHO MEAS - LV V1 VTI: 14.9 CM
BH CV ECHO MEAS - LVIDD: 5.2 CM
BH CV ECHO MEAS - LVIDS: 4.1 CM
BH CV ECHO MEAS - LVOT AREA: 3.7 CM2
BH CV ECHO MEAS - LVOT DIAM: 2.18 CM
BH CV ECHO MEAS - LVPWD: 1.2 CM
BH CV ECHO MEAS - MED PEAK E' VEL: 5.4 CM/SEC
BH CV ECHO MEAS - MR MAX PG: 68.4 MMHG
BH CV ECHO MEAS - MR MAX VEL: 413.5 CM/SEC
BH CV ECHO MEAS - MV DEC SLOPE: 410.2 CM/SEC2
BH CV ECHO MEAS - MV DEC TIME: 0.24 SEC
BH CV ECHO MEAS - MV E MAX VEL: 115 CM/SEC
BH CV ECHO MEAS - MV MAX PG: 4.6 MMHG
BH CV ECHO MEAS - MV MEAN PG: 2.15 MMHG
BH CV ECHO MEAS - MV P1/2T: 77.9 MSEC
BH CV ECHO MEAS - MV V2 VTI: 27.4 CM
BH CV ECHO MEAS - MVA(P1/2T): 2.8 CM2
BH CV ECHO MEAS - MVA(VTI): 2.03 CM2
BH CV ECHO MEAS - PA ACC TIME: 0.08 SEC
BH CV ECHO MEAS - PA V2 MAX: 89.8 CM/SEC
BH CV ECHO MEAS - RAP SYSTOLE: 3 MMHG
BH CV ECHO MEAS - RV MAX PG: 1.74 MMHG
BH CV ECHO MEAS - RV V1 MAX: 66 CM/SEC
BH CV ECHO MEAS - RV V1 VTI: 10.4 CM
BH CV ECHO MEAS - RVSP: 27 MMHG
BH CV ECHO MEAS - SV(LVOT): 55.6 ML
BH CV ECHO MEAS - SV(MOD-SP2): 58 ML
BH CV ECHO MEAS - SV(MOD-SP4): 55 ML
BH CV ECHO MEAS - SVI(LVOT): 25.4 ML/M2
BH CV ECHO MEAS - SVI(MOD-SP2): 26.5 ML/M2
BH CV ECHO MEAS - SVI(MOD-SP4): 25.2 ML/M2
BH CV ECHO MEAS - TAPSE (>1.6): 1.46 CM
BH CV ECHO MEAS - TR MAX PG: 24.1 MMHG
BH CV ECHO MEAS - TR MAX VEL: 245.2 CM/SEC
BH CV ECHO MEASUREMENTS AVERAGE E/E' RATIO: 16.67
BH CV XLRA - RV BASE: 3.7 CM
BH CV XLRA - RV LENGTH: 6.5 CM
BH CV XLRA - RV MID: 2.8 CM
BH CV XLRA - TDI S': 9 CM/SEC
LEFT ATRIUM VOLUME INDEX: 37 ML/M2
LV EF 2D ECHO EST: 50 %
SINUS: 3.1 CM
STJ: 3 CM

## 2024-10-26 PROCEDURE — 93306 TTE W/DOPPLER COMPLETE: CPT | Performed by: INTERNAL MEDICINE

## 2024-10-26 PROCEDURE — 99232 SBSQ HOSP IP/OBS MODERATE 35: CPT | Performed by: NURSE PRACTITIONER

## 2024-10-26 PROCEDURE — 25510000001 PERFLUTREN 6.52 MG/ML SUSPENSION 2 ML VIAL

## 2024-10-26 PROCEDURE — 97162 PT EVAL MOD COMPLEX 30 MIN: CPT | Performed by: PHYSICAL THERAPIST

## 2024-10-26 PROCEDURE — 97530 THERAPEUTIC ACTIVITIES: CPT | Performed by: PHYSICAL THERAPIST

## 2024-10-26 PROCEDURE — 93306 TTE W/DOPPLER COMPLETE: CPT

## 2024-10-26 RX ORDER — LISINOPRIL 10 MG/1
10 TABLET ORAL DAILY
Status: DISCONTINUED | OUTPATIENT
Start: 2024-10-27 | End: 2024-10-27

## 2024-10-26 RX ADMIN — BUSPIRONE HYDROCHLORIDE 10 MG: 10 TABLET ORAL at 09:07

## 2024-10-26 RX ADMIN — GABAPENTIN 100 MG: 100 CAPSULE ORAL at 06:15

## 2024-10-26 RX ADMIN — METOPROLOL TARTRATE 25 MG: 25 TABLET, FILM COATED ORAL at 09:07

## 2024-10-26 RX ADMIN — GUAIFENESIN 1200 MG: 600 TABLET, EXTENDED RELEASE ORAL at 09:07

## 2024-10-26 RX ADMIN — TAMSULOSIN HYDROCHLORIDE 0.4 MG: 0.4 CAPSULE ORAL at 20:56

## 2024-10-26 RX ADMIN — CITALOPRAM 20 MG: 20 TABLET, FILM COATED ORAL at 09:07

## 2024-10-26 RX ADMIN — GABAPENTIN 300 MG: 300 CAPSULE ORAL at 20:56

## 2024-10-26 RX ADMIN — ATORVASTATIN CALCIUM 40 MG: 20 TABLET, FILM COATED ORAL at 20:55

## 2024-10-26 RX ADMIN — FUROSEMIDE 20 MG: 20 TABLET ORAL at 09:07

## 2024-10-26 RX ADMIN — ASPIRIN 81 MG: 81 TABLET, CHEWABLE ORAL at 09:07

## 2024-10-26 RX ADMIN — BUSPIRONE HYDROCHLORIDE 10 MG: 10 TABLET ORAL at 20:56

## 2024-10-26 RX ADMIN — PANTOPRAZOLE SODIUM 40 MG: 40 TABLET, DELAYED RELEASE ORAL at 06:15

## 2024-10-26 RX ADMIN — LISINOPRIL 20 MG: 20 TABLET ORAL at 09:07

## 2024-10-26 RX ADMIN — METOPROLOL TARTRATE 25 MG: 25 TABLET, FILM COATED ORAL at 21:15

## 2024-10-26 RX ADMIN — PERFLUTREN 3 ML: 6.52 INJECTION, SUSPENSION INTRAVENOUS at 10:49

## 2024-10-26 RX ADMIN — Medication 10 ML: at 20:56

## 2024-10-26 RX ADMIN — GUAIFENESIN 1200 MG: 600 TABLET, EXTENDED RELEASE ORAL at 20:56

## 2024-10-26 RX ADMIN — POTASSIUM CHLORIDE 20 MEQ: 750 TABLET, EXTENDED RELEASE ORAL at 09:07

## 2024-10-26 RX ADMIN — Medication 10 ML: at 09:43

## 2024-10-26 NOTE — THERAPY EVALUATION
Patient Name: Flo Manzo  : 1955    MRN: [id number]                              Today's Date: 10/26/2024       Admit Date: 10/24/2024    Visit Dx:     ICD-10-CM ICD-9-CM   1. Elevated troponin  R79.89 790.6   2. Acute respiratory distress  R06.03 518.82     Patient Active Problem List   Diagnosis    Umbilical hernia without obstruction and without gangrene    Essential hypertension    Arthritis of left knee    Depression    Obesity (BMI 30-39.9)    Atrial fibrillation with RVR    Substernal thyroid goiter    CHF (congestive heart failure)    Diastolic CHF, chronic    Hemorrhagic stroke    GÓMEZ on auto CPAP    Hypersomnia due to medical condition    Sleep-related hypoxia    Chronic atrial fibrillation    Vertigo    Aortic stenosis, mild    Sinus pause    Pacemaker    Chronic anticoagulation    Generalized weakness    Prediabetes    Pure hypercholesterolemia    Pre-operative cardiovascular examination    Peripheral neuropathy    History of colon polyps    Dizziness    Fever    History of CVA (cerebrovascular accident)    Right leg weakness    Frequent falls    Paroxysmal atrial fibrillation    Elevated troponin     Past Medical History:   Diagnosis Date    Arthritis     Atrial fibrillation with RVR 2019    Colon polyps 2018    Hepatic flexure: tubular adenoma, only low-grade dysplasia; splenic flexure: tubular adenoma, only low-grade dysplasia; sigmoid colon: tubular adenoma, multiple fragments only low-grade dysplasia    Depression     Heart murmur     Hemorrhagic stroke 2019    Hypertension     New onset atrial fibrillation (CMS/HCC) - RVR 2019    GÓMEZ (obstructive sleep apnea) 2019    Home sleep study with moderate severity GÓMEZ, AHI 20 events per hour.  Sleep-related hypoxia with low O2 saturation 84% for 14 minutes.    Peripheral neuropathy     Sleep apnea     previously diagnosed with sleep apnea, had T&A done and it has since resolved     Stroke     Uncontrolled  hypertension     Ventral hernia      Past Surgical History:   Procedure Laterality Date    APPENDECTOMY N/A 1972    BACK SURGERY      BLADDER STIMULATOR IMPLANT L LOWER BACK    CARDIAC CATHETERIZATION N/A 07/20/2004    Cath left ventriculography, coronary angiography and left heart catheterization, Normal results-Dr. Fierro     CARDIAC CATHETERIZATION N/A 1/29/2019    Procedure: Left Heart Cath;  Surgeon: Eren Bruce MD;  Location: [facility] CATH INVASIVE LOCATION;  Service: Cardiology    CARDIAC CATHETERIZATION N/A 1/29/2019    Procedure: Left ventriculography;  Surgeon: Eren Bruce MD;  Location: [facility] CATH INVASIVE LOCATION;  Service: Cardiology    CARDIAC CATHETERIZATION N/A 1/29/2019    Procedure: Coronary angiography;  Surgeon: Eren Bruce MD;  Location: [facility] CATH INVASIVE LOCATION;  Service: Cardiology    CARDIAC ELECTROPHYSIOLOGY PROCEDURE N/A 3/4/2022    Procedure: Pacemaker SC new BIOTRONIK;  Surgeon: Eren Bruce MD;  Location: [facility] CATH INVASIVE LOCATION;  Service: Cardiology;  Laterality: N/A;    CARPAL TUNNEL RELEASE Right 2013    CARPAL TUNNEL RELEASE Left     COLONOSCOPY N/A 1/4/2018    Procedure: COLONOSCOPY with cold polypectomy and hot snare polypectomy;  Surgeon: Ten Solitario MD;  Location: [facility] ENDOSCOPY;  Service:     COLONOSCOPY N/A 4/25/2024    Procedure: COLONOSCOPY INTO CECUM;  Surgeon: Ten Solitario MD;  Location: [facility] ENDOSCOPY;  Service: General;  Laterality: N/A;  PRE: PERSONAL H/O COLON POLYP  /  POST: POOR PREP OTHERWISE NORMAL    EYE LENS IMPLANT SECONDARY Bilateral 2007, 2008    KNEE CARTILAGE SURGERY Right 1979    TONSILLECTOMY AND ADENOIDECTOMY Bilateral 2005    TOTAL KNEE ARTHROPLASTY Left 03/14/2016    Left total knee arthroplasty with Amberly component, size 11 femur, G tibial baseplate with a 10 polyethylene insert and a 38 patellar button-Dr. Pablo Hairston    TOTAL KNEE ARTHROPLASTY Right 03/02/2015    Right total  knee arthroplasty with Amberly component size G femur, 11 tibial base plate with an 11 polyethylene insert and a 38 patellar button-Dr. Pablo Hairston    UVULOPALATOPHARYNGOPLASTY N/A 2005    part of soft palate, and uvula    VENTRAL HERNIA REPAIR N/A 1/23/2018    Procedure: VENTRAL HERNIA REPAIR LAPAROSCOPIC WITH DAVINCI ROBOT AND MESH;  Surgeon: Ten Solitario MD;  [facility] MA[facility];  Service:       General Information       Row Name 10/26/24 1314          Physical Therapy Time and Intention    Document Type evaluation  -     Mode of Treatment individual therapy;physical therapy  -       Row Name 10/26/24 1314          General Information    Patient Profile Reviewed yes  -     Prior Level of Function min assist:;gait;transfer;ADL's;bed mobility;w/c or scooter;all household mobility  -     Existing Precautions/Restrictions fall;oxygen therapy device and L/min  -       Row Name 10/26/24 1314          Living Environment    People in Home spouse;child(roland), adult  -       Row Name 10/26/24 1314          Home Main Entrance    Stair Railings, Main Entrance none  -       Row Name 10/26/24 1314          Cognition    Orientation Status (Cognition) oriented x 4  -       Row Name 10/26/24 1314          Safety Issues/Impairments Affecting Functional Mobility    Impairments Affecting Function (Mobility) balance;endurance/activity tolerance;shortness of breath;strength  -               User Key  (r) = Recorded By, (t) = Taken By, (c) = Cosigned By      Initials Name Provider Type     Arash Hong, PT DPT Physical Therapist                   Mobility       Row Name 10/26/24 1315          Bed Mobility    Bed Mobility bed mobility (all) activities  -     All Activities, [place] (Bed Mobility) minimum assist (75% patient effort)  -     Assistive Device (Bed Mobility) bed rails;head of bed elevated  -       Row Name 10/26/24 1315          Sit-Stand Transfer    Sit-Stand [place]  (Transfers) moderate assist (50% patient effort)  -     Assistive Device (Sit-Stand Transfers) walker, front-wheeled  -       Row Name 10/26/24 1315          Gait/Stairs (Locomotion)    Benzie Level (Gait) moderate assist (50% patient effort)  -     Assistive Device (Gait) walker, front-wheeled  -     Distance in Feet (Gait) 10  -LC     Deviations/Abnormal Patterns (Gait) ataxic;gait speed decreased;stride length decreased  -               User Key  (r) = Recorded By, (t) = Taken By, (c) = Cosigned By      Initials Name Provider Type     Arash Hong, PT DPT Physical Therapist                   Obj/Interventions       Row Name 10/26/24 1315          Range of Motion Comprehensive    General Range of Motion no range of motion deficits identified  -       Row Name 10/26/24 1315          Strength Comprehensive (MMT)    Comment, General Manual Muscle Testing (MMT) Assessment BLE MMT grossly 3/5  -               User Key  (r) = Recorded By, (t) = Taken By, (c) = Cosigned By      Initials Name Provider Type     Arash Hong, PT DPT Physical Therapist                   Goals/Plan       Row Name 10/26/24 1321          Bed Mobility Goal 1 (PT)    Activity/Assistive Device (Bed Mobility Goal 1, PT) bed mobility activities, all  -     Benzie Level/Cues Needed (Bed Mobility Goal 1, PT) contact guard required  -     Time Frame (Bed Mobility Goal 1, PT) 1 week  -       Row Name 10/26/24 1321          Transfer Goal 1 (PT)    Activity/Assistive Device (Transfer Goal 1, PT) transfers, all;walker, rolling  -     Benzie Level/Cues Needed (Transfer Goal 1, PT) contact guard required  -     Time Frame (Transfer Goal 1, PT) 1 week  -       Row Name 10/26/24 1321          Gait Training Goal 1 (PT)    Activity/Assistive Device (Gait Training Goal 1, PT) gait (walking locomotion);walker, rolling  -     Benzie Level (Gait Training Goal 1, PT) contact guard required  -      Distance (Gait Training Goal 1, PT) 75  -LC     Time Frame (Gait Training Goal 1, PT) 1 week  -               User Key  (r) = Recorded By, (t) = Taken By, (c) = Cosigned By      Initials Name Provider Type    Arsah Miller, PT DPT Physical Therapist                   Clinical Impression       Row Name 10/26/24 1315          Pain    Pretreatment Pain Rating 0/10 - no pain  -LC     Posttreatment Pain Rating 0/10 - no pain  -LC       Row Name 10/26/24 1315          Plan of Care Review    Plan of Care Reviewed With patient  -     Outcome Evaluation PT EVAL completed. Pt is AO x 4. Pt reported he required assistance at home from adult son prior to admission. His spouse is in a wheelchair and will be unable to assist him upon returning home. Pt transferred supine to sit with min x 1 assist. Pt transferred sit to stand with mod x 1 assist and RW. Pt ambulated 10 ft with mod x 1 assist and RW. pt transferred sit to supine with min x 1. Pt requires skilled therapy due to decreased bed mobility, decreased transfers, decreased ambulation. Recommend DC home with  or SNF  -       Row Name 10/26/24 1315          Therapy Assessment/Plan (PT)    Patient/Family Therapy Goals Statement (PT) home with assist  -     Rehab Potential (PT) fair  -     Criteria for Skilled Interventions Met (PT) yes;skilled treatment is necessary  -     Therapy Frequency (PT) 5 times/wk  -     Predicted Duration of Therapy Intervention (PT) 1 week  -       Row Name 10/26/24 1315          Positioning and Restraints    Pre-Treatment Position in bed  -     Post Treatment Position bed  -     In Bed notified nsg;supine;call light within reach;encouraged to call for assist;exit alarm on;side rails up x2  -               User Key  (r) = Recorded By, (t) = Taken By, (c) = Cosigned By      Initials Name Provider Type    Arash Miller, PT DPT Physical Therapist                   Outcome Measures       Row Name 10/26/24 1321 10/26/24  0906       How much help from another person do you currently need...    Turning from your back to your side while in flat bed without using bedrails? 3  - 3  -ES    Moving from lying on back to sitting on the side of a flat bed without bedrails? 3  - 3  -ES    Moving to and from a bed to a chair (including a wheelchair)? 3  - 3  -ES    Standing up from a chair using your arms (e.g., wheelchair, bedside chair)? 2  - 2  -ES    Climbing 3-5 steps with a railing? 2  - 2  -ES    To walk in hospital room? 2  - 2  -ES    AM-PAC 6 Clicks Score (PT) 15  - 15  -ES    Highest Level of Mobility Goal 4 --> Transfer to chair/commode  - 4 --> Transfer to chair/commode  -ES      Row Name 10/26/24 1321          Functional Assessment    Outcome Measure Options AM-PAC 6 Clicks Basic Mobility (PT)  -               User Key  (r) = Recorded By, (t) = Taken By, (c) = Cosigned By      Initials Name Provider Type     Arash Hong, PT DPT Physical Therapist    Vira Meza RN Registered Nurse                                 Physical Therapy Education       Title: PT OT SLP Therapies (Done)       Topic: Physical Therapy (Done)       Point: Mobility training (Done)       Learning Progress Summary            Patient Acceptance, E,D, DU,VU by  at 10/26/2024 1322                      Point: Home exercise program (Done)       Learning Progress Summary            Patient Acceptance, E,D, DU,VU by  at 10/26/2024 1322                      Point: Body mechanics (Done)       Learning Progress Summary            Patient Acceptance, E,D, DU,VU by  at 10/26/2024 1322                      Point: Precautions (Done)       Learning Progress Summary            Patient Acceptance, E,D, DU,VU by  at 10/26/2024 1322                                      User Key       Initials Effective Dates Name Provider Type [facility] 07/11/23 -  Arash Hong, PT DPT Physical Therapist PT                  PT Recommendation  and Plan     Outcome Evaluation: PT EVAL completed. Pt is AO x 4. Pt reported he required assistance at home from adult son prior to admission. His spouse is in a wheelchair and will be unable to assist him upon returning home. Pt transferred supine to sit with min x 1 assist. Pt transferred sit to stand with mod x 1 assist and RW. Pt ambulated 10 ft with mod x 1 assist and RW. pt transferred sit to supine with min x 1. Pt requires skilled therapy due to decreased bed mobility, decreased transfers, decreased ambulation. Recommend DC home with HH or SNF     Time Calculation:         PT Charges       Row Name 10/26/24 1322             Time Calculation    Start Time 1245  -LC      Stop Time 1325  -LC      Time Calculation (min) 40 min  -LC      PT Received On 10/26/24  -      PT - Next Appointment 10/28/24  -      PT Goal Re-Cert Due Date 11/02/24  -         Time Calculation- PT    Total Timed Code Minutes- PT 40 minute(s)  -LC         Timed Charges    64544 - PT Therapeutic Activity Minutes 25  -LC         Total Minutes    Timed Charges Total Minutes 25  -LC       Total Minutes 25  -LC                User Key  (r) = Recorded By, (t) = Taken By, (c) = Cosigned By      Initials Name Provider Type    LC Arash Hong, PT DPT Physical Therapist                  Therapy Charges for Today       Code Description Service Date Service Provider Modifiers Qty    [id number]  PT THERAPEUTIC ACT EA 15 MIN 10/26/2024 Arash Hong, PT DPT GP 2    [id number] HC PT EVAL MOD COMPLEXITY 1 10/26/2024 Arash Hong, PT DPT GP 1            PT G-Codes  Outcome Measure Options: AM-PAC 6 Clicks Basic Mobility (PT)  AM-PAC 6 Clicks Score (PT): 15  PT Discharge Summary  Anticipated Discharge Disposition (PT): home with home health, skilled nursing facility    Arash Hong PT DPT  10/26/2024

## 2024-10-26 NOTE — PROGRESS NOTES
[facility]  Cardiology Progress Note    Patient Identification:  Name: Flo Manzo  Age: 69 y.o.  Sex: male  :  1955  MRN: [id number]                 Follow Up / Chief Complaint: Follow-up for elevated troponin and dyspnea    Interval History: Resting  in bed.  No chest pain and improving with  shortness of breath.         Objective:    Past Medical History:  Past Medical History:   Diagnosis Date    Arthritis     Atrial fibrillation with RVR 2019    Colon polyps 2018    Hepatic flexure: tubular adenoma, only low-grade dysplasia; splenic flexure: tubular adenoma, only low-grade dysplasia; sigmoid colon: tubular adenoma, multiple fragments only low-grade dysplasia    Depression     Heart murmur     Hemorrhagic stroke 2019    Hypertension     New onset atrial fibrillation (CMS/HCC) - RVR 2019    GÓMEZ (obstructive sleep apnea) 2019    Home sleep study with moderate severity GÓMEZ, AHI 20 events per hour.  Sleep-related hypoxia with low O2 saturation 84% for 14 minutes.    Peripheral neuropathy     Sleep apnea     previously diagnosed with sleep apnea, had T&A done and it has since resolved     Stroke     Uncontrolled hypertension     Ventral hernia      Past Surgical History:  Past Surgical History:   Procedure Laterality Date    APPENDECTOMY N/A     BACK SURGERY      BLADDER STIMULATOR IMPLANT L LOWER BACK    CARDIAC CATHETERIZATION N/A 2004    Cath left ventriculography, coronary angiography and left heart catheterization, Normal results-Dr. Fierro     CARDIAC CATHETERIZATION N/A 2019    Procedure: Left Heart Cath;  Surgeon: Eren Bruce MD;  Location:  [facility] CATH INVASIVE LOCATION;  Service: Cardiology    CARDIAC CATHETERIZATION N/A 2019    Procedure: Left ventriculography;  Surgeon: Eren Bruce MD;  Location:  [facility] CATH INVASIVE LOCATION;  Service: Cardiology    CARDIAC CATHETERIZATION N/A 2019    Procedure:  Coronary angiography;  Surgeon: Eren Bruce MD;  Location: [facility];  Service: Cardiology    CARDIAC ELECTROPHYSIOLOGY PROCEDURE N/A 3/4/2022    Procedure: Pacemaker SC new BIOTRONIK;  Surgeon: Eren Bruce MD;  Location: [facility] C[facility];  Service: Cardiology;  Laterality: N/A;    CARPAL TUNNEL RELEASE Right 2013    CARPAL TUNNEL RELEASE Left     COLONOSCOPY N/A 1/4/2018    Procedure: COLONOSCOPY with cold polypectomy and hot snare polypectomy;  Surgeon: Ten Solitario MD;  Location: [facility] END[facility];  Service:     COLONOSCOPY N/A 4/25/2024    Procedure: COLONOSCOPY INTO CECUM;  Surgeon: Ten Solitario MD;  Location: [facility] ENDOSCOPY;  Service: General;  Laterality: N/A;  PRE: PERSONAL H/O COLON POLYP  /  POST: POOR PREP OTHERWISE NORMAL    EYE LENS IMPLANT SECONDARY Bilateral 2007, 2008    KNEE CARTILAGE SURGERY Right 1979    TONSILLECTOMY AND ADENOIDECTOMY Bilateral 2005    TOTAL KNEE ARTHROPLASTY Left 03/14/2016    Left total knee arthroplasty with Amberly component, size 11 femur, G tibial baseplate with a 10 polyethylene insert and a 38 patellar button-Dr. Pablo Hairston    TOTAL KNEE ARTHROPLASTY Right 03/02/2015    Right total knee arthroplasty with Amberly component size G femur, 11 tibial base plate with an 11 polyethylene insert and a 38 patellar button-Dr. Pablo Hairston    UVULOPALATOPHARYNGOPLASTY N/A 2005    part of soft palate, and uvula    VENTRAL HERNIA REPAIR N/A 1/23/2018    Procedure: VENTRAL HERNIA REPAIR LAPAROSCOPIC WITH DAVINCI ROBOT AND MESH;  Surgeon: Ten Solitario MD;  Location: [facility] MAIN OR;  Service:         Social History:   Social History     Tobacco Use    Smoking status: Never     Passive exposure: Past    Smokeless tobacco: Never   Substance Use Topics    Alcohol use: Not Currently     Comment: social, maybe a drink a month, not since stroke      Family History:  Family History   Problem Relation Age of Onset     Hypertension Mother     Kidney failure Mother     Coronary artery disease Father     Lung cancer Father         positive smoker    Heart attack Father     Breast cancer Sister 60    Prostate cancer Brother     Colon polyps Brother     Kidney nephrosis Brother     Malig Hyperthermia Neg Hx           Allergies:  Allergies   Allergen Reactions    Poison Ivy Extract Hives, Itching and Rash     ITCHY BLISTERS     Scheduled Meds:  aspirin, 81 mg, Daily  atorvastatin, 40 mg, Nightly  busPIRone, 10 mg, BID  citalopram, 20 mg, Daily  furosemide, 20 mg, Daily  gabapentin, 100 mg, QAM  gabapentin, 300 mg, Nightly  guaiFENesin, 1,200 mg, BID  lisinopril, 20 mg, Daily  metoprolol tartrate, 25 mg, Q12H  pantoprazole, 40 mg, Q AM  potassium chloride, 20 mEq, Daily  sodium chloride, 10 mL, Q12H  tamsulosin, 0.4 mg, Nightly            INTAKE AND OUTPUT:    Intake/Output Summary (Last 24 hours) at 10/26/2024 1129  Last data filed at 10/26/2024 0909  Gross per 24 hour   Intake 480 ml   Output 300 ml   Net 180 ml       Review of Systems:   GI: No nausea or vomiting  Cardiac: No chest pain  Pulmonary: improvement with shortness of breath    Constitutional:  Temp:  [97.7 °F (36.5 °C)-98.1 °F (36.7 °C)] 97.9 °F (36.6 °C)  Heart Rate:  [81-96] 96  Resp:  [18-20] 20  BP: (101-135)/(70-96) 114/88       Physical Exam:  General:  Appears in no acute distress, resting in bed  Eyes: eom normal no conjunctival drainage  HEENT:  No JVD. Thyroid not visibly enlarged. No mucosal pallor or cyanosis  Respiratory: Respirations regular and unlabored at rest. BBS with good air entry in all fields. No crackles, rubs or wheezes auscultated  Cardiovascular: S1S2 irregularly irregular rhythm. No murmur, rub or gallop. No carotid bruits. DP/PT pulses     . No pretibial pitting edema  Gastrointestinal: Abdomen soft, flat, non tender. Bowel sounds present. No hepatosplenomegaly. No ascites  Skin:   Skin warm and dry to touch. No rashes    Neuro: AAO x3 CN  II-XII grossly intact  Psych: Mood and affect normal, pleasant and cooperative      Cardiographics                    Echocardiogram:   7/2024  Interpretation Summary          Left ventricular ejection fraction appears to be 51 - 55%.    Left ventricular diastolic function was indeterminate.    The left atrial cavity is mild to moderately dilated.    Mild aortic valve stenosis is present.    Estimated right ventricular systolic pressure from tricuspid regurgitation is normal (<35 mmHg).     Stress test 2021  Interpretation Summary  Findings consistent with a normal ECG stress test.  Left ventricular ejection fraction is normal. (Calculated EF = 54%).  Myocardial perfusion imaging indicates a normal myocardial perfusion study with no evidence of ischemia.  Impressions are consistent with a low risk study.     Cath 2019  Coronary Angiography      Left Main:  The left main originates in the left coronary cusp.  It bifurcates and gives rise to the LAD and circumflex system.  Normal      Left Anterior Descending:  Normal with minimum irregularity including the diagonal and  branches     Left Circumflex:  Nondominant and normal     Right Coronary Artery:  The right coronary artery originates in the right coronary cusp.  Nondominant and normal     Left Ventriculography:       Estimated Ejection Fraction:         50 %   Wall motion abnormalities:  None   Mitral Regurgitation:  None      Impression:      Normal coronary arteries  Normal LV gram     Recommendations:      Medical management            I sincerely appreciate the opportunity to participate in your patient's care. Please feel free to contact me anytime if I can be of assistance in this or any other way.     Eren Bruce MD  2/1/2019  9:20 AM  Imaging  CTA chest   IMPRESSION:     1. The study is negative for pulmonary embolism. No acute intrathoracic  abnormality.  2. Stable cardiomegaly with mild to moderate calcified coronary  artery  disease.     This report was finalized on 10/24/2024 10:24 PM by Charles Torres MD on  Workstation: [id number]     Lab Review   Results from last 7 days   Lab Units 10/25/24  0824 10/25/24  0642 10/24/24  2141   HSTROP T ng/L 32* 32* 34*         Results from last 7 days   Lab Units 10/25/24  0642   SODIUM mmol/L 140   POTASSIUM mmol/L 3.5   BUN mg/dL 17   CREATININE mg/dL 0.93   CALCIUM mg/dL 9.5     @[email or web address]@  Results from last 7 days   Lab Units 10/25/24  0642 10/24/24  1906 10/24/24  1056   WBC 10*3/mm3 7.76 9.42 6.4   HEMOGLOBIN g/dL 13.6 14.0 13.8   HEMATOCRIT % 44.4 43.3 44.3   PLATELETS 10*3/mm3 216 213 215     Results from last 7 days   Lab Units 10/24/24  1906   INR  1.18*   Stress test 10/25/2024  Interpretation Summary         Diaphragmatic attenuation artifact is present.    Myocardial perfusion imaging indicates a normal myocardial perfusion study with no evidence of ischemia. Impressions are consistent with a low risk study.    Abnormal LV wall motion consistent with mild hypokineses and global hypokinesis.    Left ventricular ejection fraction is mildly reduced (Calculated EF = 45%).    Compared to the prior study from 12/20/2021 the current study reveals no changes.        Intake/Output Summary (Last 24 hours) at 10/26/2024 1135  Last data filed at 10/26/2024 0909  Gross per 24 hour   Intake 480 ml   Output 300 ml   Net 180 ml       Assessment/plan:  -Dyspnea : CTA negative for pulmonary embolism.  EF mildly reduced, 45%.  -Elevated troponin: Stress test was a negative study.  -Chronic atrial fibrillation: Not historically anticoagulated due to cerebral amyloid angiopathy.  He will be seen by the watchman team and evaluated.  Rate controlled.  -Hypertension: Blood pressure stable.  -Chronic diastolic CHF: EF 45% as seen on stress test.  Negative stress test and echo pending.  Remains on 1 L oxygen nasal cannula.  Directed medical therapy as able.  -History of CVA  -GÓMEZ    -Decrease  "lisinopril, add spironolactone and stop potassium.   -Wean oxygen  -Further recommendations once echo has been completed and read.  Discussed with nurse    )10/26/2024  ASHANTI Recinos/Transcription:   \"Dictated utilizing Dragon dictation\".     "

## 2024-10-26 NOTE — PROGRESS NOTES
Name: Flo Manzo ADMIT: 10/24/2024   : 1955  PCP: Karen Jiménez ASHANTI    MRN: [id number] LOS: 2 days   AGE/SEX: 69 y.o. male  ROOM: [facility]     Subjective   Subjective   Patient is seen at bedside, no new complaints.       Objective   Objective   Vital Signs  Temp:  [97.7 °F (36.5 °C)-98.1 °F (36.7 °C)] 97.7 °F (36.5 °C)  Heart Rate:  [87-96] 96  Resp:  [18-20] 20  BP: ()/(65-96) 94/65  SpO2:  [94 %-98 %] 98 %  on  Flow (L/min) (Oxygen Therapy):  [2] 2;   Device (Oxygen Therapy): nasal cannula  Body mass index is 32 kg/m².  Physical Exam  General, awake and alert.  Head and ENT, normocephalic and atraumatic.  Lungs, symmetric expansion, equal air entry bilaterally.  Heart, regular rate and rhythm.  Abdomen, soft and nontender.  Extremities, no clubbing or cyanosis.  Neuro, no focal deficits.  Skin: Warm and no rash.  Psych, normal mood and affect.  Musculoskeletal, joint examination is grossly normal.      Results Review     I reviewed the patient's new clinical results.  Results from last 7 days   Lab Units 10/25/24  0642 10/24/24  1906 10/24/24  1056   WBC 10*3/mm3 7.76 9.42 6.4   HEMOGLOBIN g/dL 13.6 14.0 13.8   PLATELETS 10*3/mm3 216 213 215     Results from last 7 days   Lab Units 10/25/24  0642 10/24/24  1906 10/24/24  1056   SODIUM mmol/L 140 145 142   POTASSIUM mmol/L 3.5 4.1 4.4   CHLORIDE mmol/L 103 109* 107*   CO2 mmol/L 30.7* 26.0 24   BUN mg/dL 17 21 23   CREATININE mg/dL 0.93 0.75* 0.82   GLUCOSE mg/dL 88 83 97   EGFR mL/min/1.73 89.4 98.3  --      Results from last 7 days   Lab Units 10/24/24  1906 10/24/24  1056   ALBUMIN g/dL 4.1 4.1   BILIRUBIN mg/dL 0.8 0.7   ALK PHOS U/L 119* 118   AST (SGOT) U/L 10 15   ALT (SGPT) U/L 15 14     Results from last 7 days   Lab Units 10/25/24  0642 10/24/24  1906 10/24/24  1056   CALCIUM mg/dL 9.5 10.0 10.0   ALBUMIN g/dL  --  4.1 4.1       Glucose   Date/Time Value Ref Range Status   10/25/2024 1116 88 70 - 130 mg/dL Final       CT Angiogram  Chest Pulmonary Embolism    Result Date: 10/24/2024   1. The study is negative for pulmonary embolism. No acute intrathoracic abnormality. 2. Stable cardiomegaly with mild to moderate calcified coronary artery disease.  This report was finalized on 10/24/2024 10:24 PM by Charles Torres MD on Workstation: [id number]       I have personally reviewed all medications:  Scheduled Medications  aspirin, 81 mg, Oral, Daily  atorvastatin, 40 mg, Oral, Nightly  busPIRone, 10 mg, Oral, BID  citalopram, 20 mg, Oral, Daily  furosemide, 20 mg, Oral, Daily  gabapentin, 100 mg, Oral, QAM  gabapentin, 300 mg, Oral, Nightly  guaiFENesin, 1,200 mg, Oral, BID  [START ON 10/27/2024] lisinopril, 10 mg, Oral, Daily  metoprolol tartrate, 25 mg, Oral, Q12H  pantoprazole, 40 mg, Oral, Q AM  sodium chloride, 10 mL, Intravenous, Q12H  [START ON 10/27/2024] spironolactone, 12.5 mg, Oral, Daily  tamsulosin, 0.4 mg, Oral, Nightly    Infusions   Diet  Diet: Cardiac; Healthy Heart (2-3 Na+); Texture: Soft to Chew (NDD 3); Soft to Chew: Chopped Meat; Fluid Consistency: Thin (IDDSI 0)    I have personally reviewed:  [x]  Laboratory   [x]  Microbiology   [x]  Radiology   [x]  EKG/Telemetry  [x]  Cardiology/Vascular   []  Pathology    []  Records       Assessment/Plan     Active Hospital Problems    Diagnosis  POA    **Elevated troponin [R79.89]  Yes    History of CVA (cerebrovascular accident) [Z86.73]  Not Applicable    Chronic anticoagulation [Z79.01]  Not Applicable    Chronic atrial fibrillation [I48.20]  Yes    GÓMEZ on auto CPAP [G47.33]  Yes    Diastolic CHF, chronic [I50.32]  Yes    Essential hypertension [I10]  Yes    Obesity (BMI 30-39.9) [E66.9]  Yes      Resolved Hospital Problems   No resolved problems to display.       69 y.o. male admitted with Elevated troponin.    Assessment and plan  Dyspnea  Elevated Troponin  -Cardiology consultation has been requested, follow management recommendations.     Chronic Atrial Fibrillation  -Rate has  been controlled. Continue metoprolol  -His anticoagulant has been on hold for a Watchman's procedure     Hypertension  Chronic Diastolic CHF  -He has been hypertensive. Continue home dose of lasix and lisinopril  -Daily weights  -Strict I&O     History of CVA  -Continue aspirin and statin     GÓMEZ  -He may use his home CPAP if here  -Supplemental oxygen as needed to keep sats greater than or equal to 92%     BPH  -Continue Flomax     GERD  -He takes Omeprazole at home, will start Protonix daily     Anxiety  Depression  -Continue buspirone and citalopram     Obesity  -Complicating all of the above     VTE Prophylaxis - SCDs.  Code Status - Full code.      Valdemar Ferro MD  [facility]  10/26/24  17:51 EDT

## 2024-10-26 NOTE — PLAN OF CARE
Goal Outcome Evaluation:  Plan of Care Reviewed With: patient        Progress: improving  Outcome Evaluation: Pt A&Ox4, on 2 l NC overnight, no pain, no SOA, rested well tonight, no other complaints, plan of care continues.

## 2024-10-26 NOTE — PLAN OF CARE
Goal Outcome Evaluation:  Plan of Care Reviewed With: patient           Outcome Evaluation: PT EVAL completed. Pt is AO x 4. Pt reported he required assistance at home from adult son prior to admission. His spouse is in a wheelchair and will be unable to assist him upon returning home. Pt transferred supine to sit with min x 1 assist. Pt transferred sit to stand with mod x 1 assist and RW. Pt ambulated 10 ft with mod x 1 assist and RW. pt transferred sit to supine with min x 1. Pt requires skilled therapy due to decreased bed mobility, decreased transfers, decreased ambulation. Recommend DC home with  or SNF    Anticipated Discharge Disposition (PT): home with home health, skilled nursing facility

## 2024-10-27 LAB
ALBUMIN SERPL-MCNC: 3.3 G/DL (ref 3.5–5.2)
ALBUMIN/GLOB SERPL: 1 G/DL
ALP SERPL-CCNC: 109 U/L (ref 39–117)
ALT SERPL W P-5'-P-CCNC: 11 U/L (ref 1–41)
ANION GAP SERPL CALCULATED.3IONS-SCNC: 8 MMOL/L (ref 5–15)
AST SERPL-CCNC: 9 U/L (ref 1–40)
BASOPHILS # BLD AUTO: 0.03 10*3/MM3 (ref 0–0.2)
BASOPHILS NFR BLD AUTO: 0.4 % (ref 0–1.5)
BILIRUB SERPL-MCNC: 0.7 MG/DL (ref 0–1.2)
BUN SERPL-MCNC: 28 MG/DL (ref 8–23)
BUN/CREAT SERPL: 36.4 (ref 7–25)
CALCIUM SPEC-SCNC: 9.1 MG/DL (ref 8.6–10.5)
CHLORIDE SERPL-SCNC: 103 MMOL/L (ref 98–107)
CO2 SERPL-SCNC: 27 MMOL/L (ref 22–29)
CREAT SERPL-MCNC: 0.77 MG/DL (ref 0.76–1.27)
DEPRECATED RDW RBC AUTO: 41.2 FL (ref 37–54)
EGFRCR SERPLBLD CKD-EPI 2021: 97.5 ML/MIN/1.73
EOSINOPHIL # BLD AUTO: 0.29 10*3/MM3 (ref 0–0.4)
EOSINOPHIL NFR BLD AUTO: 4.3 % (ref 0.3–6.2)
ERYTHROCYTE [DISTWIDTH] IN BLOOD BY AUTOMATED COUNT: 12.6 % (ref 12.3–15.4)
GLOBULIN UR ELPH-MCNC: 3.2 GM/DL
GLUCOSE SERPL-MCNC: 92 MG/DL (ref 65–99)
HCT VFR BLD AUTO: 41.7 % (ref 37.5–51)
HGB BLD-MCNC: 13 G/DL (ref 13–17.7)
IMM GRANULOCYTES # BLD AUTO: 0.02 10*3/MM3 (ref 0–0.05)
IMM GRANULOCYTES NFR BLD AUTO: 0.3 % (ref 0–0.5)
LYMPHOCYTES # BLD AUTO: 1.26 10*3/MM3 (ref 0.7–3.1)
LYMPHOCYTES NFR BLD AUTO: 18.5 % (ref 19.6–45.3)
MCH RBC QN AUTO: 28 PG (ref 26.6–33)
MCHC RBC AUTO-ENTMCNC: 31.2 G/DL (ref 31.5–35.7)
MCV RBC AUTO: 89.7 FL (ref 79–97)
MONOCYTES # BLD AUTO: 0.64 10*3/MM3 (ref 0.1–0.9)
MONOCYTES NFR BLD AUTO: 9.4 % (ref 5–12)
NEUTROPHILS NFR BLD AUTO: 4.56 10*3/MM3 (ref 1.7–7)
NEUTROPHILS NFR BLD AUTO: 67.1 % (ref 42.7–76)
NRBC BLD AUTO-RTO: 0 /100 WBC (ref 0–0.2)
PLATELET # BLD AUTO: 208 10*3/MM3 (ref 140–450)
PMV BLD AUTO: 10.3 FL (ref 6–12)
POTASSIUM SERPL-SCNC: 4.3 MMOL/L (ref 3.5–5.2)
PROT SERPL-MCNC: 6.5 G/DL (ref 6–8.5)
RBC # BLD AUTO: 4.65 10*6/MM3 (ref 4.14–5.8)
SODIUM SERPL-SCNC: 138 MMOL/L (ref 136–145)
WBC NRBC COR # BLD AUTO: 6.8 10*3/MM3 (ref 3.4–10.8)

## 2024-10-27 PROCEDURE — 85025 COMPLETE CBC W/AUTO DIFF WBC: CPT | Performed by: INTERNAL MEDICINE

## 2024-10-27 PROCEDURE — 80053 COMPREHEN METABOLIC PANEL: CPT | Performed by: INTERNAL MEDICINE

## 2024-10-27 PROCEDURE — 99232 SBSQ HOSP IP/OBS MODERATE 35: CPT | Performed by: NURSE PRACTITIONER

## 2024-10-27 RX ORDER — LISINOPRIL 5 MG/1
5 TABLET ORAL DAILY
Status: DISCONTINUED | OUTPATIENT
Start: 2024-10-28 | End: 2024-10-28 | Stop reason: HOSPADM

## 2024-10-27 RX ADMIN — BUSPIRONE HYDROCHLORIDE 10 MG: 10 TABLET ORAL at 20:23

## 2024-10-27 RX ADMIN — BUSPIRONE HYDROCHLORIDE 10 MG: 10 TABLET ORAL at 08:56

## 2024-10-27 RX ADMIN — GABAPENTIN 100 MG: 100 CAPSULE ORAL at 06:03

## 2024-10-27 RX ADMIN — CITALOPRAM 20 MG: 20 TABLET, FILM COATED ORAL at 08:56

## 2024-10-27 RX ADMIN — METOPROLOL TARTRATE 25 MG: 25 TABLET, FILM COATED ORAL at 20:23

## 2024-10-27 RX ADMIN — Medication 10 ML: at 08:59

## 2024-10-27 RX ADMIN — GABAPENTIN 300 MG: 300 CAPSULE ORAL at 20:23

## 2024-10-27 RX ADMIN — ATORVASTATIN CALCIUM 40 MG: 20 TABLET, FILM COATED ORAL at 20:23

## 2024-10-27 RX ADMIN — METOPROLOL TARTRATE 25 MG: 25 TABLET, FILM COATED ORAL at 12:36

## 2024-10-27 RX ADMIN — TAMSULOSIN HYDROCHLORIDE 0.4 MG: 0.4 CAPSULE ORAL at 20:23

## 2024-10-27 RX ADMIN — GUAIFENESIN 1200 MG: 600 TABLET, EXTENDED RELEASE ORAL at 08:56

## 2024-10-27 RX ADMIN — Medication 12.5 MG: at 08:57

## 2024-10-27 RX ADMIN — GUAIFENESIN 1200 MG: 600 TABLET, EXTENDED RELEASE ORAL at 20:23

## 2024-10-27 RX ADMIN — Medication 10 ML: at 20:23

## 2024-10-27 RX ADMIN — FUROSEMIDE 20 MG: 20 TABLET ORAL at 08:57

## 2024-10-27 RX ADMIN — ASPIRIN 81 MG: 81 TABLET, CHEWABLE ORAL at 08:57

## 2024-10-27 RX ADMIN — PANTOPRAZOLE SODIUM 40 MG: 40 TABLET, DELAYED RELEASE ORAL at 06:03

## 2024-10-27 NOTE — PROGRESS NOTES
Name: Flo Manzo ADMIT: 10/24/2024   : 1955  PCP: Karen Jiménez ASHANTI    MRN: [id number] LOS: 3 days   AGE/SEX: 69 y.o. male  ROOM: [facility]     Subjective   Subjective   Patient is seen at bedside, no new complaints.       Objective   Objective   Vital Signs  Temp:  [97.7 °F (36.5 °C)-98.4 °F (36.9 °C)] 97.7 °F (36.5 °C)  Heart Rate:  [81-82] 82  Resp:  [20] 20  BP: ()/(70-84) 105/79  SpO2:  [99 %] 99 %  on  Flow (L/min) (Oxygen Therapy):  [2] 2;   Device (Oxygen Therapy): room air  Body mass index is 32.42 kg/m².  Physical Exam  General, awake and alert.  Head and ENT, normocephalic and atraumatic.  Lungs, symmetric expansion, equal air entry bilaterally.  Heart, regular rate and rhythm.  Abdomen, soft and nontender.  Extremities, no clubbing or cyanosis.  Neuro, no focal deficits.  Skin: Warm and no rash.  Psych, normal mood and affect.  Musculoskeletal, joint examination is grossly normal.    Copied text material from yesterday's note has been reviewed for appropriate changes and remains accurate as of 10/27/24.      Results Review     I reviewed the patient's new clinical results.  Results from last 7 days   Lab Units 10/27/24  0737 10/25/24  0642 10/24/24  1906 10/24/24  1056   WBC 10*3/mm3 6.80 7.76 9.42 6.4   HEMOGLOBIN g/dL 13.0 13.6 14.0 13.8   PLATELETS 10*3/mm3 208 216 213 215     Results from last 7 days   Lab Units 10/27/24  0737 10/25/24  0642 10/24/24  1906 10/24/24  1056   SODIUM mmol/L 138 140 145 142   POTASSIUM mmol/L 4.3 3.5 4.1 4.4   CHLORIDE mmol/L 103 103 109* 107*   CO2 mmol/L 27.0 30.7* 26.0 24   BUN mg/dL 28* 17 21 23   CREATININE mg/dL 0.77 0.93 0.75* 0.82   GLUCOSE mg/dL 92 88 83 97   EGFR mL/min/1.73 97.5 89.4 98.3  --      Results from last 7 days   Lab Units 10/27/24  0737 10/24/24  1906 10/24/24  1056   ALBUMIN g/dL 3.3* 4.1 4.1   BILIRUBIN mg/dL 0.7 0.8 0.7   ALK PHOS U/L 109 119* 118   AST (SGOT) U/L 9 10 15   ALT (SGPT) U/L 11 15 14     Results from last 7  days   Lab Units 10/27/24  0737 10/25/24  0642 10/24/24  1906 10/24/24  1056   CALCIUM mg/dL 9.1 9.5 10.0 10.0   ALBUMIN g/dL 3.3*  --  4.1 4.1       Glucose   Date/Time Value Ref Range Status   10/25/2024 1116 88 70 - 130 mg/dL Final       No radiology results for the last day    I have personally reviewed all medications:  Scheduled Medications  aspirin, 81 mg, Oral, Daily  atorvastatin, 40 mg, Oral, Nightly  busPIRone, 10 mg, Oral, BID  citalopram, 20 mg, Oral, Daily  furosemide, 20 mg, Oral, Daily  gabapentin, 100 mg, Oral, QAM  gabapentin, 300 mg, Oral, Nightly  guaiFENesin, 1,200 mg, Oral, BID  [START ON 10/28/2024] lisinopril, 5 mg, Oral, Daily  metoprolol tartrate, 25 mg, Oral, Q12H  pantoprazole, 40 mg, Oral, Q AM  sodium chloride, 10 mL, Intravenous, Q12H  tamsulosin, 0.4 mg, Oral, Nightly    Infusions   Diet  Diet: Cardiac; Healthy Heart (2-3 Na+); Texture: Soft to Chew (NDD 3); Soft to Chew: Chopped Meat; Fluid Consistency: Thin (IDDSI 0)    I have personally reviewed:  [x]  Laboratory   [x]  Microbiology   [x]  Radiology   [x]  EKG/Telemetry  [x]  Cardiology/Vascular   []  Pathology    []  Records       Assessment/Plan     Active Hospital Problems    Diagnosis  POA    **Elevated troponin [R79.89]  Yes    History of CVA (cerebrovascular accident) [Z86.73]  Not Applicable    Chronic anticoagulation [Z79.01]  Not Applicable    Chronic atrial fibrillation [I48.20]  Yes    GÓMEZ on auto CPAP [G47.33]  Yes    Diastolic CHF, chronic [I50.32]  Yes    Essential hypertension [I10]  Yes    Obesity (BMI 30-39.9) [E66.9]  Yes      Resolved Hospital Problems   No resolved problems to display.       69 y.o. male admitted with Elevated troponin.    Assessment and plan  Dyspnea  Elevated Troponin  -Cardiology consultation has been requested, follow management recommendations.     Chronic Atrial Fibrillation  -Rate has been controlled. Continue metoprolol  -His anticoagulant has been on hold for a Watchman's procedure      Hypertension  Chronic Diastolic CHF  -He has been hypertensive. Continue home dose of lasix and lisinopril  -Daily weights  -Strict I&O     History of CVA  -Continue aspirin and statin     GÓMEZ  -He may use his home CPAP if here  -Supplemental oxygen as needed to keep sats greater than or equal to 92%     BPH  -Continue Flomax     GERD  -He takes Omeprazole at home, will start Protonix daily     Anxiety  Depression  -Continue buspirone and citalopram     Obesity  -Complicating all of the above     VTE Prophylaxis - SCDs.  Code Status - Full code.   Assessment and plan  Dyspnea  Elevated Troponin  -Cardiology consultation has been requested, follow management recommendations.     Chronic Atrial Fibrillation  -Rate has been controlled. Continue metoprolol  -His anticoagulant has been on hold for a Watchman's procedure     Hypertension  Chronic Diastolic CHF  -He has been hypertensive. Continue home dose of lasix and lisinopril  -Daily weights  -Strict I&O     History of CVA  -Continue aspirin and statin     GÓMEZ  -He may use his home CPAP if here  -Supplemental oxygen as needed to keep sats greater than or equal to 92%     BPH  -Continue Flomax     GERD  -He takes Omeprazole at home, will start Protonix daily     Anxiety  Depression  -Continue buspirone and citalopram     Obesity  -Complicating all of the above     VTE Prophylaxis - SCDs.  Code Status - Full code.         Valdemar Ferro MD  [facility]  10/27/24  16:45 EDT

## 2024-10-27 NOTE — PROGRESS NOTES
[facility]  Cardiology Progress Note    Patient Identification:  Name: Flo Manzo  Age: 69 y.o.  Sex: male  :  1955  MRN: [id number]                 Follow Up / Chief Complaint: Follow-up for elevated troponin and dyspnea    Interval History: Resting  in bed.  No chest pain and improvement with  shortness of breath.         Objective:    Past Medical History:  Past Medical History:   Diagnosis Date    Arthritis     Atrial fibrillation with RVR 2019    Colon polyps 2018    Hepatic flexure: tubular adenoma, only low-grade dysplasia; splenic flexure: tubular adenoma, only low-grade dysplasia; sigmoid colon: tubular adenoma, multiple fragments only low-grade dysplasia    Depression     Heart murmur     Hemorrhagic stroke 2019    Hypertension     New onset atrial fibrillation (CMS/HCC) - RVR 2019    GÓMEZ (obstructive sleep apnea) 2019    Home sleep study with moderate severity GÓMEZ, AHI 20 events per hour.  Sleep-related hypoxia with low O2 saturation 84% for 14 minutes.    Peripheral neuropathy     Sleep apnea     previously diagnosed with sleep apnea, had T&A done and it has since resolved     Stroke     Uncontrolled hypertension     Ventral hernia      Past Surgical History:  Past Surgical History:   Procedure Laterality Date    APPENDECTOMY N/A     BACK SURGERY      BLADDER STIMULATOR IMPLANT L LOWER BACK    CARDIAC CATHETERIZATION N/A 2004    Cath left ventriculography, coronary angiography and left heart catheterization, Normal results-Dr. Fierro     CARDIAC CATHETERIZATION N/A 2019    Procedure: Left Heart Cath;  Surgeon: Eren Bruce MD;  Location:  [facility] CATH INVASIVE LOCATION;  Service: Cardiology    CARDIAC CATHETERIZATION N/A 2019    Procedure: Left ventriculography;  Surgeon: Eren Bruce MD;  Location:  [facility] CATH INVASIVE LOCATION;  Service: Cardiology    CARDIAC CATHETERIZATION N/A 2019    Procedure:  Coronary angiography;  Surgeon: Eren Bruce MD;  Location: [facility] CATH [facility] LOC[facility];  Service: Cardiology    CARDIAC ELECTROPHYSIOLOGY PROCEDURE N/A 3/4/2022    Procedure: Pacemaker SC new BIOTRONIK;  Surgeon: Eren Bruce MD;  Location: [facility] CATH [facility]ASIVE LOC[facility];  Service: Cardiology;  Laterality: N/A;    CARPAL TUNNEL RELEASE Right 2013    CARPAL TUNNEL RELEASE Left     COLONOSCOPY N/A 1/4/2018    Procedure: COLONOSCOPY with cold polypectomy and hot snare polypectomy;  Surgeon: Ten Solitario MD;  Location: [facility] ENDOSCOPY;  Service:     COLONOSCOPY N/A 4/25/2024    Procedure: COLONOSCOPY INTO CECUM;  Surgeon: Ten Solitario MD;  Location: [facility] ENDOSCOPY;  Service: General;  Laterality: N/A;  PRE: PERSONAL H/O COLON POLYP  /  POST: POOR PREP OTHERWISE NORMAL    EYE LENS IMPLANT SECONDARY Bilateral 2007, 2008    KNEE CARTILAGE SURGERY Right 1979    TONSILLECTOMY AND ADENOIDECTOMY Bilateral 2005    TOTAL KNEE ARTHROPLASTY Left 03/14/2016    Left total knee arthroplasty with Amberly component, size 11 femur, G tibial baseplate with a 10 polyethylene insert and a 38 patellar button-Dr. Pablo Hairston    TOTAL KNEE ARTHROPLASTY Right 03/02/2015    Right total knee arthroplasty with Amberly component size G femur, 11 tibial base plate with an 11 polyethylene insert and a 38 patellar button-Dr. Pablo Hairston    UVULOPALATOPHARYNGOPLASTY N/A 2005    part of soft palate, and uvula    VENTRAL HERNIA REPAIR N/A 1/23/2018    Procedure: VENTRAL HERNIA REPAIR LAPAROSCOPIC WITH DAVINCI ROBOT AND MESH;  Surgeon: Ten Solitario MD;  Location: [facility] MAIN OR;  Service:         Social History:   Social History     Tobacco Use    Smoking status: Never     Passive exposure: Past    Smokeless tobacco: Never   Substance Use Topics    Alcohol use: Not Currently     Comment: social, maybe a drink a month, not since stroke      Family History:  Family History   Problem Relation Age of Onset     Hypertension Mother     Kidney failure Mother     Coronary artery disease Father     Lung cancer Father         positive smoker    Heart attack Father     Breast cancer Sister 60    Prostate cancer Brother     Colon polyps Brother     Kidney nephrosis Brother     Malig Hyperthermia Neg Hx           Allergies:  Allergies   Allergen Reactions    Poison Ivy Extract Hives, Itching and Rash     ITCHY BLISTERS     Scheduled Meds:  aspirin, 81 mg, Daily  atorvastatin, 40 mg, Nightly  busPIRone, 10 mg, BID  citalopram, 20 mg, Daily  furosemide, 20 mg, Daily  gabapentin, 100 mg, QAM  gabapentin, 300 mg, Nightly  guaiFENesin, 1,200 mg, BID  lisinopril, 10 mg, Daily  metoprolol tartrate, 25 mg, Q12H  pantoprazole, 40 mg, Q AM  sodium chloride, 10 mL, Q12H  spironolactone, 12.5 mg, Daily  tamsulosin, 0.4 mg, Nightly            INTAKE AND OUTPUT:    Intake/Output Summary (Last 24 hours) at 10/27/2024 1114  Last data filed at 10/27/2024 0906  Gross per 24 hour   Intake 960 ml   Output --   Net 960 ml       Review of Systems:   GI: No nausea or vomiting  Cardiac: No chest pain  Pulmonary: improvement with shortness of breath    Constitutional:  Temp:  [97.7 °F (36.5 °C)-98.4 °F (36.9 °C)] 98.3 °F (36.8 °C)  Heart Rate:  [81] 81  Resp:  [20] 20  BP: ()/(65-83) 93/70       Physical Exam:  General:  Appears in no acute distress, resting in bed  Eyes: eom normal no conjunctival drainage  HEENT:  No JVD. Thyroid not visibly enlarged. No mucosal pallor or cyanosis  Respiratory: Respirations regular and unlabored at rest. BBS with good air entry in all fields. No crackles, rubs or wheezes auscultated  Cardiovascular: S1S2 irregularly irregular rhythm. No murmur, rub or gallop. No carotid bruits. DP/PT pulses     . No pretibial pitting edema  Gastrointestinal: Abdomen soft, flat, non tender. Bowel sounds present. No hepatosplenomegaly. No ascites  Skin:   Skin warm and dry to touch. No rashes    Neuro: AAO x3 CN II-XII grossly  intact  Psych: Mood and affect normal, pleasant and cooperative      Cardiographics                    Echocardiogram:   7/2024  Interpretation Summary          Left ventricular ejection fraction appears to be 51 - 55%.    Left ventricular diastolic function was indeterminate.    The left atrial cavity is mild to moderately dilated.    Mild aortic valve stenosis is present.    Estimated right ventricular systolic pressure from tricuspid regurgitation is normal (<35 mmHg).     Stress test 2021  Interpretation Summary  Findings consistent with a normal ECG stress test.  Left ventricular ejection fraction is normal. (Calculated EF = 54%).  Myocardial perfusion imaging indicates a normal myocardial perfusion study with no evidence of ischemia.  Impressions are consistent with a low risk study.     Cath 2019  Coronary Angiography      Left Main:  The left main originates in the left coronary cusp.  It bifurcates and gives rise to the LAD and circumflex system.  Normal      Left Anterior Descending:  Normal with minimum irregularity including the diagonal and  branches     Left Circumflex:  Nondominant and normal     Right Coronary Artery:  The right coronary artery originates in the right coronary cusp.  Nondominant and normal     Left Ventriculography:       Estimated Ejection Fraction:         50 %   Wall motion abnormalities:  None   Mitral Regurgitation:  None      Impression:      Normal coronary arteries  Normal LV gram     Recommendations:      Medical management            I sincerely appreciate the opportunity to participate in your patient's care. Please feel free to contact me anytime if I can be of assistance in this or any other way.     Eren Bruce MD  2/1/2019  9:20 AM  Imaging  CTA chest   IMPRESSION:     1. The study is negative for pulmonary embolism. No acute intrathoracic  abnormality.  2. Stable cardiomegaly with mild to moderate calcified coronary artery  disease.     This report  was finalized on 10/24/2024 10:24 PM by Charles Torres MD on  Workstation: [id number]     Lab Review   Results from last 7 days   Lab Units 10/25/24  0824 10/25/24  0642 10/24/24  2141   HSTROP T ng/L 32* 32* 34*         Results from last 7 days   Lab Units 10/27/24  0737   SODIUM mmol/L 138   POTASSIUM mmol/L 4.3   BUN mg/dL 28*   CREATININE mg/dL 0.77   CALCIUM mg/dL 9.1     @LABRCNTIPbnp@  Results from last 7 days   Lab Units 10/27/24  0737 10/25/24  0642 10/24/24  1906   WBC 10*3/mm3 6.80 7.76 9.42   HEMOGLOBIN g/dL 13.0 13.6 14.0   HEMATOCRIT % 41.7 44.4 43.3   PLATELETS 10*3/mm3 208 216 213     Results from last 7 days   Lab Units 10/24/24  1906   INR  1.18*   Stress test 10/25/2024  Interpretation Summary         Diaphragmatic attenuation artifact is present.    Myocardial perfusion imaging indicates a normal myocardial perfusion study with no evidence of ischemia. Impressions are consistent with a low risk study.    Abnormal LV wall motion consistent with mild hypokineses and global hypokinesis.    Left ventricular ejection fraction is mildly reduced (Calculated EF = 45%).    Compared to the prior study from 12/20/2021 the current study reveals no changes.    echo  Interpretation Summary         Left ventricular systolic function is normal. Estimated left ventricular EF = 50%    Left ventricular wall thickness is consistent with borderline concentric hypertrophy.    Left ventricular diastolic function was indeterminate.    Moderate aortic valve stenosis is present. Aortic valve area is 1 cm2.    Peak velocity of the flow distal to the aortic valve is 310.1 cm/s. Aortic valve maximum pressure gradient is 39 mmHg. Aortic valve mean pressure gradient is 25 mmHg. Aortic valve dimensionless index is 0.3 .    Estimated right ventricular systolic pressure from tricuspid regurgitation is normal (<35 mmHg). Calculated right ventricular systolic pressure from tricuspid regurgitation is 27 mmHg.    Mild dilation of  "the ascending aorta is present 3.9 cm.    Mild aortic valve regurgitation is present.      Intake/Output Summary (Last 24 hours) at 10/27/2024 1114  Last data filed at 10/27/2024 0906  Gross per 24 hour   Intake 960 ml   Output --   Net 960 ml       Assessment/plan:  -Dyspnea : CTA negative for pulmonary embolism.  EF mildly reduced, 45%.  -Elevated troponin: Stress test was a negative study.  -Chronic atrial fibrillation: Not historically anticoagulated due to cerebral amyloid angiopathy.  He will be seen by the watchman team and evaluated.  Rate controlled.  -Hypertension: Blood pressure low normal this morning.    Lisinopril and beta-blockade not given.  -Chronic diastolic CHF: EF 45% as seen on stress test.  Negative stress test and echo pending.  Remains on 1 L oxygen nasal cannula.  Directed medical therapy as able.  -History of CVA  -GÓMEZ  -Aortic stenosis with RIKY 1 cm 2  -Mild dilation of ascending aorta is present 3.9 cm. monitor    -I will decrease lisinopril, stop spironolactone due to low blood pressures/dizziness.     -Wean oxygen  -Echo revealed EF 50%, see full report as above.    Discussed with nurse    )10/27/2024  ASHANTI Recinos Dragon/Transcription:   \"Dictated utilizing Dragon dictation\".     "

## 2024-10-27 NOTE — PLAN OF CARE
Goal Outcome Evaluation:  Plan of Care Reviewed With: patient        Progress: improving  Outcome Evaluation: Pt A&Ox4, on 2 L nc overnight, no pain, SOA with exertion, Dc plan to return back to rehab, no other complaints, VSS

## 2024-10-28 ENCOUNTER — REFERRAL TRIAGE (OUTPATIENT)
Age: 69
End: 2024-10-28
Payer: MEDICARE

## 2024-10-28 ENCOUNTER — READMISSION MANAGEMENT (OUTPATIENT)
Dept: CALL CENTER | Facility: HOSPITAL | Age: 69
End: 2024-10-28
Payer: MEDICARE

## 2024-10-28 VITALS
HEIGHT: 70 IN | DIASTOLIC BLOOD PRESSURE: 89 MMHG | TEMPERATURE: 97.7 F | BODY MASS INDEX: 32.2 KG/M2 | WEIGHT: 224.9 LBS | SYSTOLIC BLOOD PRESSURE: 121 MMHG | HEART RATE: 73 BPM | OXYGEN SATURATION: 92 % | RESPIRATION RATE: 18 BRPM

## 2024-10-28 LAB
ALBUMIN SERPL-MCNC: 3.6 G/DL (ref 3.5–5.2)
ALBUMIN/GLOB SERPL: 1.2 G/DL
ALP SERPL-CCNC: 107 U/L (ref 39–117)
ALT SERPL W P-5'-P-CCNC: 9 U/L (ref 1–41)
ANION GAP SERPL CALCULATED.3IONS-SCNC: 6.7 MMOL/L (ref 5–15)
AST SERPL-CCNC: 8 U/L (ref 1–40)
BASOPHILS # BLD AUTO: 0.03 10*3/MM3 (ref 0–0.2)
BASOPHILS NFR BLD AUTO: 0.4 % (ref 0–1.5)
BILIRUB SERPL-MCNC: 0.7 MG/DL (ref 0–1.2)
BUN SERPL-MCNC: 21 MG/DL (ref 8–23)
BUN/CREAT SERPL: 33.9 (ref 7–25)
CALCIUM SPEC-SCNC: 8.9 MG/DL (ref 8.6–10.5)
CHLORIDE SERPL-SCNC: 102 MMOL/L (ref 98–107)
CO2 SERPL-SCNC: 27.3 MMOL/L (ref 22–29)
CREAT SERPL-MCNC: 0.62 MG/DL (ref 0.76–1.27)
DEPRECATED RDW RBC AUTO: 40.7 FL (ref 37–54)
EGFRCR SERPLBLD CKD-EPI 2021: 104.1 ML/MIN/1.73
EOSINOPHIL # BLD AUTO: 0.32 10*3/MM3 (ref 0–0.4)
EOSINOPHIL NFR BLD AUTO: 4.2 % (ref 0.3–6.2)
ERYTHROCYTE [DISTWIDTH] IN BLOOD BY AUTOMATED COUNT: 12.6 % (ref 12.3–15.4)
GLOBULIN UR ELPH-MCNC: 3 GM/DL
GLUCOSE SERPL-MCNC: 101 MG/DL (ref 65–99)
HCT VFR BLD AUTO: 40.5 % (ref 37.5–51)
HGB BLD-MCNC: 12.7 G/DL (ref 13–17.7)
IMM GRANULOCYTES # BLD AUTO: 0.02 10*3/MM3 (ref 0–0.05)
IMM GRANULOCYTES NFR BLD AUTO: 0.3 % (ref 0–0.5)
LYMPHOCYTES # BLD AUTO: 1.37 10*3/MM3 (ref 0.7–3.1)
LYMPHOCYTES NFR BLD AUTO: 18.2 % (ref 19.6–45.3)
MCH RBC QN AUTO: 27.7 PG (ref 26.6–33)
MCHC RBC AUTO-ENTMCNC: 31.4 G/DL (ref 31.5–35.7)
MCV RBC AUTO: 88.4 FL (ref 79–97)
MONOCYTES # BLD AUTO: 0.77 10*3/MM3 (ref 0.1–0.9)
MONOCYTES NFR BLD AUTO: 10.2 % (ref 5–12)
NEUTROPHILS NFR BLD AUTO: 5.03 10*3/MM3 (ref 1.7–7)
NEUTROPHILS NFR BLD AUTO: 66.7 % (ref 42.7–76)
NRBC BLD AUTO-RTO: 0 /100 WBC (ref 0–0.2)
PLATELET # BLD AUTO: 207 10*3/MM3 (ref 140–450)
PMV BLD AUTO: 10.5 FL (ref 6–12)
POTASSIUM SERPL-SCNC: 3.9 MMOL/L (ref 3.5–5.2)
PROT SERPL-MCNC: 6.6 G/DL (ref 6–8.5)
RBC # BLD AUTO: 4.58 10*6/MM3 (ref 4.14–5.8)
SODIUM SERPL-SCNC: 136 MMOL/L (ref 136–145)
WBC NRBC COR # BLD AUTO: 7.54 10*3/MM3 (ref 3.4–10.8)

## 2024-10-28 PROCEDURE — 99232 SBSQ HOSP IP/OBS MODERATE 35: CPT

## 2024-10-28 PROCEDURE — 80053 COMPREHEN METABOLIC PANEL: CPT | Performed by: INTERNAL MEDICINE

## 2024-10-28 PROCEDURE — 85025 COMPLETE CBC W/AUTO DIFF WBC: CPT | Performed by: INTERNAL MEDICINE

## 2024-10-28 RX ORDER — LISINOPRIL 5 MG/1
5 TABLET ORAL DAILY
Qty: 30 TABLET | Refills: 0 | Status: SHIPPED | OUTPATIENT
Start: 2024-10-29 | End: 2024-11-28

## 2024-10-28 RX ADMIN — GUAIFENESIN 1200 MG: 600 TABLET, EXTENDED RELEASE ORAL at 09:50

## 2024-10-28 RX ADMIN — BUSPIRONE HYDROCHLORIDE 10 MG: 10 TABLET ORAL at 09:50

## 2024-10-28 RX ADMIN — Medication 10 ML: at 09:50

## 2024-10-28 RX ADMIN — FUROSEMIDE 20 MG: 20 TABLET ORAL at 09:50

## 2024-10-28 RX ADMIN — LISINOPRIL 5 MG: 5 TABLET ORAL at 09:50

## 2024-10-28 RX ADMIN — METOPROLOL TARTRATE 25 MG: 25 TABLET, FILM COATED ORAL at 09:50

## 2024-10-28 RX ADMIN — PANTOPRAZOLE SODIUM 40 MG: 40 TABLET, DELAYED RELEASE ORAL at 06:06

## 2024-10-28 RX ADMIN — ASPIRIN 81 MG: 81 TABLET, CHEWABLE ORAL at 09:50

## 2024-10-28 RX ADMIN — GABAPENTIN 100 MG: 100 CAPSULE ORAL at 06:06

## 2024-10-28 RX ADMIN — CITALOPRAM 20 MG: 20 TABLET, FILM COATED ORAL at 09:50

## 2024-10-28 NOTE — PLAN OF CARE
Problem: Adult Inpatient Plan of Care  Goal: Plan of Care Review  Outcome: Progressing   Goal Outcome Evaluation:  VSS. ALEJANDRA. Ana Paula/afib. A&Ox4. Pt sat up in chair for couple hours this am. Pt rested throughout the night with no other issues or complaints.

## 2024-10-28 NOTE — PROGRESS NOTES
Case Management Discharge Note      Final Note: DC'd to skilled bed at [facility] via [facility]    Provided Post Acute Provider List?: Yes  Post Acute Provider List: Nursing Home  Provided Post Acute Provider Quality & Resource List?: Yes  Post Acute Provider Quality and Resource List: Nursing Home  Delivered To: Patient, Support Person  Support Person: Bhargavi  Method of Delivery: In person, Telephone    Selected Continued Care - Discharged on 10/28/2024 Admission date: 10/24/2024 - Discharge disposition: Home or Self Care      Destination Coordination complete.      Service Provider Services Address Phone Fax Patient Preferred    [facility] [address] [address] [phone] --                          Selected Continued Care - Episodes Includes continued care and service providers with selected services from the active episodes listed below      Ambulatory Social Work Case Management Episode start date: 10/28/2024 (Paused)   There are no active outsourced providers for this episode.             High Risk Care Management Episode start date: 8/30/2024   There are no active outsourced providers for this episode.                 Selected Continued Care - Prior Encounters Includes continued care and service providers with selected services from prior encounters from 7/26/2024 to 10/28/2024      Discharged on 8/29/2024 Admission date: 8/26/2024 - Discharge disposition: Skilled Nursing Facility (DC - External)      Destination       Service Provider Services Address Phone Fax Patient Preferred    [facility] [address] [facility] [address] [phone] --              Home Medical Care       Service Provider Services Address Phone Fax Patient Preferred    [facility] [address] [address] [phone] --                       Discharged on 8/9/2024 Admission date: 8/5/2024 - Discharge disposition: Rehab Facility or Unit (DC - External)      Destination       Service Provider Services Address Phone Fax Patient Preferred    [facility] [id number] [place] [address] [phone] --                          Transportation Services  Ambulance: [facility]    Final Discharge Disposition Code: 03 - skilled nursing facility (SNF)

## 2024-10-28 NOTE — PROGRESS NOTES
[facility]  Cardiology Progress Note    Patient Identification:  Name: Flo Manzo  Age: 69 y.o.  Sex: male  :  1955  MRN: [id number]                 Follow Up / Chief Complaint: Follow-up for elevated troponin and dyspnea    Interval History: Resting in bed, no chest pain or shortness of breath. On room air.        Objective:    Past Medical History:  Past Medical History:   Diagnosis Date    Arthritis     Atrial fibrillation with RVR 2019    Colon polyps 2018    Hepatic flexure: tubular adenoma, only low-grade dysplasia; splenic flexure: tubular adenoma, only low-grade dysplasia; sigmoid colon: tubular adenoma, multiple fragments only low-grade dysplasia    Depression     Heart murmur     Hemorrhagic stroke 2019    Hypertension     New onset atrial fibrillation (CMS/HCC) - RVR 2019    GÓMEZ (obstructive sleep apnea) 2019    Home sleep study with moderate severity GÓMEZ, AHI 20 events per hour.  Sleep-related hypoxia with low O2 saturation 84% for 14 minutes.    Peripheral neuropathy     Sleep apnea     previously diagnosed with sleep apnea, had T&A done and it has since resolved     Stroke     Uncontrolled hypertension     Ventral hernia      Past Surgical History:  Past Surgical History:   Procedure Laterality Date    APPENDECTOMY N/A     BACK SURGERY      BLADDER STIMULATOR IMPLANT L LOWER BACK    CARDIAC CATHETERIZATION N/A 2004    Cath left ventriculography, coronary angiography and left heart catheterization, Normal results-Dr. Fierro     CARDIAC CATHETERIZATION N/A 2019    Procedure: Left Heart Cath;  Surgeon: Eren Bruce MD;  Location:  [facility] CATH INVASIVE LOCATION;  Service: Cardiology    CARDIAC CATHETERIZATION N/A 2019    Procedure: Left ventriculography;  Surgeon: Eren Bruce MD;  Location:  [facility] CATH INVASIVE LOCATION;  Service: Cardiology    CARDIAC CATHETERIZATION N/A 2019    Procedure: Coronary  angiography;  Surgeon: Eren Bruce MD;  [facility];  Service: Cardiology    CARDIAC ELECTROPHYSIOLOGY PROCEDURE N/A 3/4/2022    Procedure: Pacemaker SC new BIOTRONIK;  Surgeon: Eren Bruce MD;  [facility];  Service: Cardiology;  Laterality: N/A;    CARPAL TUNNEL RELEASE Right 2013    CARPAL TUNNEL RELEASE Left     COLONOSCOPY N/A 1/4/2018    Procedure: COLONOSCOPY with cold polypectomy and hot snare polypectomy;  Surgeon: Ten Solitario MD;  [facility];  Service:     COLONOSCOPY N/A 4/25/2024    Procedure: COLONOSCOPY INTO CECUM;  Surgeon: Ten Solitario MD;  [facility];  Service: General;  Laterality: N/A;  PRE: PERSONAL H/O COLON POLYP  /  POST: POOR PREP OTHERWISE NORMAL    EYE LENS IMPLANT SECONDARY Bilateral 2007, 2008    KNEE CARTILAGE SURGERY Right 1979    TONSILLECTOMY AND ADENOIDECTOMY Bilateral 2005    TOTAL KNEE ARTHROPLASTY Left 03/14/2016    Left total knee arthroplasty with Amberly component, size 11 femur, G tibial baseplate with a 10 polyethylene insert and a 38 patellar button-Dr. Pablo Hairston    TOTAL KNEE ARTHROPLASTY Right 03/02/2015    Right total knee arthroplasty with Amberly component size G femur, 11 tibial base plate with an 11 polyethylene insert and a 38 patellar button-Dr. Pablo Hairston    UVULOPALATOPHARYNGOPLASTY N/A 2005    part of soft palate, and uvula    VENTRAL HERNIA REPAIR N/A 1/23/2018    Procedure: VENTRAL HERNIA REPAIR LAPAROSCOPIC WITH DAVINCI ROBOT AND MESH;  Surgeon: Ten Solitario MD;  Location: [facility] MAIN OR;  Service:         Social History:   Social History     Tobacco Use    Smoking status: Never     Passive exposure: Past    Smokeless tobacco: Never   Substance Use Topics    Alcohol use: Not Currently     Comment: social, maybe a drink a month, not since stroke      Family History:  Family History   Problem Relation Age of Onset    Hypertension  Mother     Kidney failure Mother     Coronary artery disease Father     Lung cancer Father         positive smoker    Heart attack Father     Breast cancer Sister 60    Prostate cancer Brother     Colon polyps Brother     Kidney nephrosis Brother     Malig Hyperthermia Neg Hx           Allergies:  Allergies   Allergen Reactions    Poison Ivy Extract Hives, Itching and Rash     ITCHY BLISTERS     Scheduled Meds:  aspirin, 81 mg, Daily  atorvastatin, 40 mg, Nightly  busPIRone, 10 mg, BID  citalopram, 20 mg, Daily  furosemide, 20 mg, Daily  gabapentin, 100 mg, QAM  gabapentin, 300 mg, Nightly  guaiFENesin, 1,200 mg, BID  lisinopril, 5 mg, Daily  metoprolol tartrate, 25 mg, Q12H  pantoprazole, 40 mg, Q AM  sodium chloride, 10 mL, Q12H  tamsulosin, 0.4 mg, Nightly            INTAKE AND OUTPUT:    Intake/Output Summary (Last 24 hours) at 10/28/2024 0959  Last data filed at 10/28/2024 0908  Gross per 24 hour   Intake 920 ml   Output --   Net 920 ml       Review of Systems:   GI: no n/v or abd pain  Cardiac: no chest pain or palpitations  Pulmonary: no shortness of breath or cough      Constitutional:  Temp:  [97 °F (36.1 °C)-97.7 °F (36.5 °C)] 97.7 °F (36.5 °C)  Heart Rate:  [71-96] 82  Resp:  [16-20] 16  BP: (105-128)/(79-86) 121/84    Physical Exam:  General:  Alert, cooperative, appears in no acute distress  Respiratory: Clear to auscultation.  Normal respiratory effort and rate.  Cardiovascular: S1S2 Regular rate and rhythm. No murmur, rub or gallop.   Gastrointestinal: soft, non tender. Bowel sounds present.   Extremities: ROCKWELL x4. No pretibial pitting edema. Adequate musculoskeletal strength.   Neuro: AAO x3 CN II-XII grossly intact        Cardiographics     Echocardiogram:     Left ventricular systolic function is normal. Estimated left ventricular EF = 50%    Left ventricular wall thickness is consistent with borderline concentric hypertrophy.    Left ventricular diastolic function was indeterminate.    Moderate  aortic valve stenosis is present. Aortic valve area is 1 cm2.    Peak velocity of the flow distal to the aortic valve is 310.1 cm/s. Aortic valve maximum pressure gradient is 39 mmHg. Aortic valve mean pressure gradient is 25 mmHg. Aortic valve dimensionless index is 0.3 .    Estimated right ventricular systolic pressure from tricuspid regurgitation is normal (<35 mmHg). Calculated right ventricular systolic pressure from tricuspid regurgitation is 27 mmHg.    Mild dilation of the ascending aorta is present 3.9 cm.    Mild aortic valve regurgitation is present.    Stress test 10/25/2024    Diaphragmatic attenuation artifact is present.    Myocardial perfusion imaging indicates a normal myocardial perfusion study with no evidence of ischemia. Impressions are consistent with a low risk study.    Abnormal LV wall motion consistent with mild hypokineses and global hypokinesis.    Left ventricular ejection fraction is mildly reduced (Calculated EF = 45%).    Compared to the prior study from 12/20/2021 the current study reveals no changes.  Imaging  CTA chest   IMPRESSION:     1. The study is negative for pulmonary embolism. No acute intrathoracic  abnormality.  2. Stable cardiomegaly with mild to moderate calcified coronary artery  disease.     This report was finalized on 10/24/2024 10:24 PM by Charles Torres MD on  Workstation: [id number]     Lab Review   Results from last 7 days   Lab Units 10/25/24  0824 10/25/24  0642 10/24/24  2141   HSTROP T ng/L 32* 32* 34*         Results from last 7 days   Lab Units 10/28/24  0500   SODIUM mmol/L 136   POTASSIUM mmol/L 3.9   BUN mg/dL 21   CREATININE mg/dL 0.62*   CALCIUM mg/dL 8.9     @LABRCNTIPbnp@  Results from last 7 days   Lab Units 10/28/24  0501 10/27/24  0737 10/25/24  0642   WBC 10*3/mm3 7.54 6.80 7.76   HEMOGLOBIN g/dL 12.7* 13.0 13.6   HEMATOCRIT % 40.5 41.7 44.4   PLATELETS 10*3/mm3 207 208 216     Results from last 7 days   Lab Units 10/24/24  1906   INR  1.18*  "        Assessment/plan:  Dyspnea : CTA negative for pulmonary embolism.    Elevated troponin: no chest pain, stress test negative  Chronic atrial fibrillation: Not historically anticoagulated due to cerebral amyloid angiopathy.  Rate controlled. Watchman team following peripherally and aware of patient, further workup as outpatient to be set up per the team.   Hypertension: BP stable  Chronic diastolic CHF: Echo EF 50%, shortness of breath improving, continue lasix po. Consider sglt as outpatient  History of CVA  GÓMEZ  Moderate Aortic stenosis : moderate per echo mean gradient 25, peak 39.   Mild dilation of ascending aorta is present 3.9 cm. monitor      BP and HR stable  No chest pain. Shortness of breath improved.   Consider sglt as outpatient  Follow up on Nov 12 at 11am      )10/28/2024  ASHANTI Trinidad/Transcription:   \"Dictated utilizing Dragon dictation\".     "

## 2024-10-28 NOTE — NURSING NOTE
Attempted to place call to [facility] to pass report to receiving nurse of this patient. Was placed on hold and transferred 3 times before being hung up on. Will attempt again prior to EMS arrival.

## 2024-10-28 NOTE — PROGRESS NOTES
"Nutrition Services    Patient Name:  Flo Manzo  YOB: 1955  MRN: [id number]  Admit Date:  10/24/2024    Assessment Date:  10/28/24    NUTRITION SCREENING      Reason for Encounter MST score 2+, \"Unsure\" unintentional weight loss   Diagnosis/Problem Elevated troponin   History of CVA   Chronic anticoagulation   Chronic atrial fibrillation   GÓMEZ on auto CPAP   Diastolic CHF, chronic   Essential hypertension        PO Diet Diet: Cardiac; Healthy Heart (2-3 Na+); Texture: Soft to Chew (NDD 3); Soft to Chew: Chopped Meat; Fluid Consistency: Thin (IDDSI 0)   Supplements N/a   PO Intake % % per EMR       Medications MAR reviewed by RD   Labs  Listed below, reviewed   Physical Findings Alert, oriented, room air, tooth/teeth missing, 1+ (trace) edema   GI Function Last BM 10/27   Skin Status Pale, bruised       Height  Weight  BMI  Weight Trend     Height: 177.8 cm (70\")  Weight: 102 kg (224 lb 14.4 oz) (10/28/24 0443)  Body mass index is 32.27 kg/m².  Loss, Amount/Timeframe: 2.5% x 2 months (not significant)       Nutrition Problem (PES) Nutrition appropriate for condition at this time as evidence by po intakes.       Intervention/Plan Encourage good po intakes. Noted, possible dc today back to rehab.    RD to follow up per protocol.     Results from last 7 days   Lab Units 10/28/24  0500 10/27/24  0737 10/25/24  0642 10/24/24  1906   SODIUM mmol/L 136 138 140 145   POTASSIUM mmol/L 3.9 4.3 3.5 4.1   CHLORIDE mmol/L 102 103 103 109*   CO2 mmol/L 27.3 27.0 30.7* 26.0   BUN mg/dL 21 28* 17 21   CREATININE mg/dL 0.62* 0.77 0.93 0.75*   CALCIUM mg/dL 8.9 9.1 9.5 10.0   BILIRUBIN mg/dL 0.7 0.7  --  0.8   ALK PHOS U/L 107 109  --  119*   ALT (SGPT) U/L 9 11  --  15   AST (SGOT) U/L 8 9  --  10   GLUCOSE mg/dL 101* 92 88 83     Results from last 7 days   Lab Units 10/28/24  0501   HEMOGLOBIN g/dL 12.7*   HEMATOCRIT % 40.5     Lab Results   Component Value Date    HGBA1C 5.70 (H) 08/06/2024 "       Electronically signed by:  Yusra Duenas  10/28/24 11:09 EDT

## 2024-10-28 NOTE — DISCHARGE SUMMARY
Date of Discharge:  10/28/2024    PCP: Karen Jiménez APRN    Discharge Diagnosis:   Active Hospital Problems    Diagnosis  POA    **Elevated troponin [R79.89]  Yes    History of CVA (cerebrovascular accident) [Z86.73]  Not Applicable    Chronic anticoagulation [Z79.01]  Not Applicable    Chronic atrial fibrillation [I48.20]  Yes    GÓMEZ on auto CPAP [G47.33]  Yes    Diastolic CHF, chronic [I50.32]  Yes    Essential hypertension [I10]  Yes    Obesity (BMI 30-39.9) [E66.9]  Yes      Resolved Hospital Problems   No resolved problems to display.          Consults       Date and Time Order Name Status Description    10/24/2024 11:01 PM Inpatient Cardiology Consult Completed     10/24/2024  9:49 PM Cardiology (on-call MD unless specified)      10/24/2024  9:49 PM [facility] (on-call MD unless specified) Details            Hospital Course  69 y.o. male with past medical history significant for CVA, hypertension, obstructive sleep apnea, presented to the hospital with dyspnea and elevated troponin, patient was seen by cardiology service.  Medical management was initiated, his medications were readjusted, patient had hypotension, lisinopril dose was decreased.  Patient has continued to improve during this hospital stay, CTA was negative for pulmonary embolism.  Stress test study was negative.  Patient has been cleared by cardiology for discharge.  Patient will follow-up with PCP and cardiology on outpatient basis.  I have seen and examined patient at bedside, total time spent on discharge management is more than 30 minutes.  Plan of care was discussed with the patient and he has verbalized understanding.        Temp:  [97 °F (36.1 °C)-97.7 °F (36.5 °C)] 97.7 °F (36.5 °C)  Heart Rate:  [71-96] 82  Resp:  [16-20] 16  BP: (105-128)/(79-86) 121/84  Body mass index is 32.27 kg/m².    Physical Exam  General, awake and alert.  Head and ENT, normocephalic and atraumatic.  Lungs, symmetric expansion, equal air entry bilaterally.  Heart,  regular rate and rhythm.  Abdomen, soft and nontender.  Extremities, no clubbing or cyanosis.  Neuro, no focal deficits.  Skin: Warm and no rash.  Psych, normal mood and affect.  Musculoskeletal, joint examination is grossly normal.      Disposition: Home or Self Care       Discharge Medications        Changes to Medications        Instructions Start Date   lisinopril 5 MG tablet  Commonly known as: JUNI KWONGSTRIL  What changed:   medication strength  how much to take   5 mg, Oral, Daily   Start Date: October 29, 2024            Continue These Medications        Instructions Start Date   acetaminophen 325 MG tablet  Commonly known as: TYLENOL   325 mg, Oral, Every 4 Hours PRN, 2 tablets Q4 hours PRN for mild pain.      albuterol sulfate  (90 Base) MCG/ACT inhaler  Commonly known as: PROVENTIL HFA;VENTOLIN HFA;PROAIR HFA   2 puffs, Inhalation, Every 4 Hours PRN      aspirin 81 MG chewable tablet   81 mg, Oral, Daily      atorvastatin 40 MG tablet  Commonly known as: LIPITOR   40 mg, Nightly      busPIRone 10 MG tablet  Commonly known as: BUSPAR   10 mg, Oral, 2 Times Daily      citalopram 20 MG tablet  Commonly known as: CeleXA   20 mg, Daily      Fluzone High-Dose 0.5 ML suspension prefilled syringe injection  Generic drug: influenza vac split high-dose   0.5 mL, Intramuscular      furosemide 20 MG tablet  Commonly known as: LASIX   20 mg, Oral, Daily      gabapentin 300 MG capsule  Commonly known as: NEURONTIN   300 mg, Oral, Nightly      gabapentin 100 MG capsule  Commonly known as: NEURONTIN   100 mg, Oral, Every Morning      guaiFENesin 600 MG 12 hr tablet  Commonly known as: MUCINEX   1,200 mg, 2 Times Daily      metoprolol tartrate 25 MG tablet  Commonly known as: LOPRESSOR   TAKE 1 & 1/2 TABLETS BY MOUTH TWICE DAILY      omeprazole 20 MG capsule  Commonly known as: priLOSEC   20 mg, Daily      potassium chloride ER 20 MEQ tablet controlled-release ER tablet  Commonly known as: K-TAB   20 mEq, Daily       tamsulosin 0.4 MG capsule 24 hr capsule  Commonly known as: FLOMAX   0.4 mg, Oral, Nightly                Additional Instructions for the Follow-ups that You Need to Schedule       Discharge Follow-up with PCP   As directed       Currently Documented PCP:    Karen Jiménez APRN    PCP Phone Number:    [phone]     Follow Up Details: Follow-up with PCP and cardiology in 7 to 10 days.               Follow-up Information       Karen Jiménez APRN .    Specialties: Family Medicine, Nurse Practitioner  Why: Follow-up with PCP and cardiology in 7 to 10 days.  Contact information:  [address] [place] [address]  [phone]                            Future Appointments   Date Time Provider Department Center   11/15/2024  1:30 PM Karen Jiménez APRN [facility]  [facility]   12/19/2024 12:45 PM Eren Bruce MD [facility]   1/8/2025  9:30 AM [facility] SPT [facility] DEVICE CHECK [facility]   1/24/2025 11:45 AM Temo Wheat MD [facility] None   1/31/2025  8:30 AM Karen Jiménez APRN [facility]   5/20/2025  2:30 PM Shante Carr PA [facility]   7/16/2025 12:15 PM [facility] SPT POPLAR DEVICE CHECK [facility]   7/16/2025 12:30 PM [facility]SP ECHO CART BH [facility]   7/16/2025  1:15 PM Eren Bruce MD [facility]        Valdemar Ferro MD  [facility]  10/28/24    Discharge time spent greater than 30 minutes.

## 2024-10-28 NOTE — OUTREACH NOTE
Prep Survey      Flowsheet Row Responses   Hinduism facility patient discharged from? [facility]   Is LACE score < 7 ? No   Eligibility Not Eligible   What are the reasons patient is not eligible? [facility]  [DC'd to skilled bed at [facility]]   Does the patient have one of the following disease processes/diagnoses(primary or secondary)? Other   Prep survey completed? Yes            Hellen KUO - Registered Nurse

## 2024-10-28 NOTE — PROGRESS NOTES
Continued Stay Note  [facility]     Patient Name: Flo Manzo  MRN: [id number]  Today's Date: 10/28/2024    Admit Date: 10/24/2024    Plan: [facility] via [facility]   Discharge Plan       Row Name 10/28/24 1146       Plan    Plan [facility] via [facility]    Patient/Family in Agreement with Plan yes    Plan Comments Spoke with patient at bedside.  He is aware [facility].  He states to speak with his wife.  Spoke with wife Bhargavi by phone and also with step-son.  They would like patiernt to go to [facility].  Spoke with [facility] and skilled bed available.  [facility] scheudled for 3:00 p.m. Packet to YAMILET Hancock RN                    Expected Discharge Date and Time       Expected Discharge Date Expected Discharge Time    Oct 28, 2024               Becky S. [place], RN

## 2024-10-29 ENCOUNTER — PATIENT OUTREACH (OUTPATIENT)
Age: 69
End: 2024-10-29
Payer: MEDICARE

## 2024-10-29 NOTE — OUTREACH NOTE
Care Coordination    MSW scheduled to outreach to patient after referral received from primary care provider's office. Patient discharged to [facility] for skilled nursing services after discharge from inpatient hospitalization. MSW to outreach to patient after discharge from SNF regarding community resource needs.     Eli ADEN -   Ambulatory Case Management    10/29/2024, 14:13 EDT

## 2024-11-04 ENCOUNTER — TELEPHONE (OUTPATIENT)
Dept: RADIATION ONCOLOGY | Facility: HOSPITAL | Age: 69
End: 2024-11-04
Payer: MEDICARE

## 2024-11-04 LAB — DRUGS UR: NORMAL

## 2024-11-04 NOTE — TELEPHONE ENCOUNTER
Call placed to Mr. Manzo resident. Spoke with patients wife that Mr. Manzo needed to get his PSA drawn before his upcoming appointment with Dr. Wheat. Mrs Manzo she asked that I speak with Jamie their son about the things for Mr. Manzo. Explained that his dad needed to get his PSA drawn the order has been put in at the hospital also provided the [facility] to [facility] to [facility].

## 2024-11-06 ENCOUNTER — TELEPHONE (OUTPATIENT)
Dept: INTERNAL MEDICINE | Age: 69
End: 2024-11-06
Payer: MEDICARE

## 2024-11-06 NOTE — TELEPHONE ENCOUNTER
Called and spoke with wife and advise her to have him call us once he gets out of rehab to set up his appt also make sure their giving his medication while he's in rehab.

## 2024-11-11 ENCOUNTER — TELEPHONE (OUTPATIENT)
Dept: INTERNAL MEDICINE | Age: 69
End: 2024-11-11
Payer: MEDICARE

## 2024-11-11 NOTE — TELEPHONE ENCOUNTER
Called the Patient and spoke with the spouse Bhargavi and she said their son will bring him on that day to his appt. I advise her for them to get here 15 min earlier then their appt.

## 2024-11-12 ENCOUNTER — OFFICE VISIT (OUTPATIENT)
Dept: CARDIOLOGY | Facility: CLINIC | Age: 69
End: 2024-11-12
Payer: MEDICARE

## 2024-11-12 VITALS
HEIGHT: 72 IN | SYSTOLIC BLOOD PRESSURE: 138 MMHG | HEART RATE: 68 BPM | WEIGHT: 234 LBS | BODY MASS INDEX: 31.69 KG/M2 | DIASTOLIC BLOOD PRESSURE: 98 MMHG

## 2024-11-12 DIAGNOSIS — I10 ESSENTIAL HYPERTENSION: Primary | ICD-10-CM

## 2024-11-12 DIAGNOSIS — I35.0 MODERATE AORTIC VALVE STENOSIS: ICD-10-CM

## 2024-11-12 DIAGNOSIS — I50.32 DIASTOLIC CHF, CHRONIC: ICD-10-CM

## 2024-11-12 DIAGNOSIS — I48.20 CHRONIC ATRIAL FIBRILLATION: ICD-10-CM

## 2024-11-12 DIAGNOSIS — Z09 HOSPITAL DISCHARGE FOLLOW-UP: ICD-10-CM

## 2024-11-12 DIAGNOSIS — Z95.0 PACEMAKER: ICD-10-CM

## 2024-11-12 RX ORDER — FUROSEMIDE 20 MG/1
20 TABLET ORAL DAILY
COMMUNITY

## 2024-11-12 NOTE — PROGRESS NOTES
Subjective:        Flo Manzo is a 69 y.o. male who here for follow up    Chief Complaint   Patient presents with    Hospital Follow Up Visit    Atrial Fibrillation       HPI    This is a 69-year-old male who is known with this provider with chronic atrial fibrillation, hypertension, moderate AV stenosis, chronic diastolic congestive heart failure, SSS s/p pacemaker, GÓMEZ.  He follows up in office today from hospitalization.  He was admitted 10/24/2024 through 10/28/2024 for dyspnea.  His workup revealed an elevated troponin.  He had a CTA chest that was negative for pulmonary embolism.  He underwent a stress test that was normal myocardial perfusion study no evidence of ischemia.  Abnormal LV wall motion consistent with hypokinesis.  Echo 10/26/2024 EF 50%, borderline LVH.  Indeterminate LV diastolic dysfunction.  Moderate AV stenosis RIKY 1.  Max pressure gradient 39, mean 25.  EF 45%.    Reviewed and still relevant: Cardiac cath in 2019 showed normal coronaries.     The following portions of the patient's history were reviewed and updated as appropriate: allergies, current medications, past family history, past medical history, past social history, past surgical history and problem list.    Past Medical History:   Diagnosis Date    Arthritis     Atrial fibrillation with RVR 01/24/2019    Colon polyps 01/04/2018    Hepatic flexure: tubular adenoma, only low-grade dysplasia; splenic flexure: tubular adenoma, only low-grade dysplasia; sigmoid colon: tubular adenoma, multiple fragments only low-grade dysplasia    Depression     Heart murmur     Hemorrhagic stroke 01/29/2019    Hypertension     New onset atrial fibrillation (CMS/HCC) - RVR 01/24/2019    GÓMEZ (obstructive sleep apnea) 06/06/2019    Home sleep study with moderate severity GÓMEZ, AHI 20 events per hour.  Sleep-related hypoxia with low O2 saturation 84% for 14 minutes.    Peripheral neuropathy     Sleep apnea     previously diagnosed with sleep apnea,  had T&A done and it has since resolved     Stroke     Uncontrolled hypertension     Ventral hernia          reports that he has never smoked. He has been exposed to tobacco smoke. He has never used smokeless tobacco. He reports that he does not currently use alcohol. He reports that he does not use drugs.     Family History   Problem Relation Age of Onset    Hypertension Mother     Kidney failure Mother     Coronary artery disease Father     Lung cancer Father         positive smoker    Heart attack Father     Breast cancer Sister 60    Prostate cancer Brother     Colon polyps Brother     Kidney nephrosis Brother     Malig Hyperthermia Neg Hx        ROS     Review of Systems  Constitutional: No wt loss, fever, fatigue  Gastrointestinal: No nausea, abdominal pain  Behavioral/Psych: No insomnia or anxiety  Cardiovascular no chest pain or shortness of breath      Objective:           Vitals and nursing note reviewed.   Constitutional:       Appearance: Well-developed.   HENT:      Head: Normocephalic.      Right Ear: External ear normal.      Left Ear: External ear normal.   Neck:      Vascular: No JVD.   Pulmonary:      Effort: Pulmonary effort is normal. No respiratory distress.      Breath sounds: Normal breath sounds. No stridor. No rales.   Cardiovascular:      Normal rate. Irregularly irregular rhythm.      No gallop.    Pulses:     Intact distal pulses.   Edema:     Peripheral edema absent.   Abdominal:      General: Bowel sounds are normal. There is no distension.      Palpations: Abdomen is soft.      Tenderness: There is no abdominal tenderness. There is no guarding.   Musculoskeletal: Normal range of motion.         General: No tenderness.      Cervical back: Normal range of motion. Skin:     General: Skin is warm.   Neurological:      Mental Status: Alert and oriented to person, place, and time.      Deep Tendon Reflexes: Reflexes are normal and symmetric.   Psychiatric:         Judgment: Judgment normal.            ECG 12 Lead    Date/Time: 2024 11:19 AM  Performed by: Elaine Kaba APRN    Authorized by: Elaine Kaba APRN  Comparison: compared with previous ECG from 10/24/2024  Similar to previous ECG  Rhythm: atrial fibrillation and paced  Rate: normal  BPM: 69  Pacin% capture  Clinical impression: abnormal EKG        Interpretation Summary         Left ventricular systolic function is normal. Estimated left ventricular EF = 50%    Left ventricular wall thickness is consistent with borderline concentric hypertrophy.    Left ventricular diastolic function was indeterminate.    Moderate aortic valve stenosis is present. Aortic valve area is 1 cm2.    Peak velocity of the flow distal to the aortic valve is 310.1 cm/s. Aortic valve maximum pressure gradient is 39 mmHg. Aortic valve mean pressure gradient is 25 mmHg. Aortic valve dimensionless index is 0.3 .    Estimated right ventricular systolic pressure from tricuspid regurgitation is normal (<35 mmHg). Calculated right ventricular systolic pressure from tricuspid regurgitation is 27 mmHg.    Mild dilation of the ascending aorta is present 3.9 cm.    Mild aortic valve regurgitation is present.    Interpretation Summary         Diaphragmatic attenuation artifact is present.    Myocardial perfusion imaging indicates a normal myocardial perfusion study with no evidence of ischemia. Impressions are consistent with a low risk study.    Abnormal LV wall motion consistent with mild hypokineses and global hypokinesis.    Left ventricular ejection fraction is mildly reduced (Calculated EF = 45%).    Compared to the prior study from 2021 the current study reveals no changes.  Conclusion       Successful right and left coronary angiogram and LV gram  Normal coronary arteries  And normal LV gram     2019     Flo Manzo  1955  63 y.o.  male  [id number]        Current Outpatient Medications:     acetaminophen (TYLENOL) 325 MG  tablet, Take 1 tablet by mouth Every 4 (Four) Hours As Needed for Mild Pain. 2 tablets Q4 hours PRN for mild pain., Disp: , Rfl:     albuterol sulfate  (90 Base) MCG/ACT inhaler, Inhale 2 puffs Every 4 (Four) Hours As Needed for Wheezing or Shortness of Air. Indications: shortness of breath, Disp: 18 g, Rfl: 3    aspirin 81 MG chewable tablet, Chew 1 tablet Daily., Disp: , Rfl:     atorvastatin (LIPITOR) 40 MG tablet, Take 1 tablet by mouth Every Night. Indications: Cerebrovascular Accident or Stroke, Disp: , Rfl:     busPIRone (BUSPAR) 10 MG tablet, TAKE 1 TABLET BY MOUTH TWICE DAILY, Disp: 60 tablet, Rfl: 5    citalopram (CeleXA) 20 MG tablet, Take 1 tablet by mouth Daily. 30mg, oral, Once a day, give 1.5 tablets by mouth daily., Disp: , Rfl:     furosemide (LASIX) 20 MG tablet, Take 1 tablet by mouth Daily for 180 days., Disp: 90 tablet, Rfl: 1    gabapentin (NEURONTIN) 100 MG capsule, Take 1 capsule by mouth Every Morning., Disp: 5 capsule, Rfl: 0    gabapentin (NEURONTIN) 300 MG capsule, Take 1 capsule by mouth Every Night., Disp: 5 capsule, Rfl: 0    guaiFENesin (MUCINEX) 600 MG 12 hr tablet, Take 2 tablets by mouth 2 (Two) Times a Day., Disp: , Rfl:     influenza vac split high-dose (Fluzone High-Dose) 0.5 ML suspension prefilled syringe injection, Inject 0.5 mL into the appropriate muscle as directed by prescriber. Indications: na, Disp: 0.5 mL, Rfl: 0    lisinopril (PRINIVIL,ZESTRIL) 5 MG tablet, Take 1 tablet by mouth Daily for 30 days. Indications: Left Systolic Heart Failure, Disp: 30 tablet, Rfl: 0    metoprolol tartrate (LOPRESSOR) 25 MG tablet, TAKE 1 & 1/2 TABLETS BY MOUTH TWICE DAILY, Disp: 90 tablet, Rfl: 1    omeprazole (priLOSEC) 20 MG capsule, Take 1 capsule by mouth Daily., Disp: , Rfl:     potassium chloride ER (K-TAB) 20 MEQ tablet controlled-release ER tablet, Take 1 tablet by mouth Daily., Disp: , Rfl:     tamsulosin (FLOMAX) 0.4 MG capsule 24 hr capsule, TAKE 1 CAPSULE BY MOUTH  NIGHTLY, Disp: 30 capsule, Rfl: 5     Assessment:        Patient Active Problem List   Diagnosis    Umbilical hernia without obstruction and without gangrene    Essential hypertension    Arthritis of left knee    Depression    Obesity (BMI 30-39.9)    Atrial fibrillation with RVR    Substernal thyroid goiter    CHF (congestive heart failure)    Diastolic CHF, chronic    Hemorrhagic stroke    GÓMEZ on auto CPAP    Hypersomnia due to medical condition    Sleep-related hypoxia    Chronic atrial fibrillation    Vertigo    Aortic stenosis, mild    Sinus pause    Pacemaker    Chronic anticoagulation    Generalized weakness    Prediabetes    Pure hypercholesterolemia    Pre-operative cardiovascular examination    Peripheral neuropathy    History of colon polyps    Dizziness    Fever    History of CVA (cerebrovascular accident)    Right leg weakness    Frequent falls    Paroxysmal atrial fibrillation    Elevated troponin               Plan:   1.  Hospital discharge follow-up for shortness of breath: Stress test was a normal myocardial perfusion study.  Echo EF 50%, borderline LVH, indeterminate LV diastolic dysfunction.    Stress testing carries 85% specificity/sensitivity/cardiac workup has been explained, and does not rule out coronary artery disease or future events, continue to emphasize on risk reductions for coronary artery disease.  Explained if symptoms continue please go to ER, and further w/p will be required.    2.  Hypertension: BP today in office 138/98, continue lisinopril, metoprolol.     Importance of controlling hypertension and blood pressure  checkup on the regular basis has been explained. Hypertension as a silent killer has been discussed. Risk reduction of the weight and regular exercises to control the hypertension has been explained.    3.  Chronic atrial fibrillation: Controlled.  He is not anticoagulated due to cerebral amyloid angiopathy.  Plan for Watchman, he is following with structural heart  team.    4.  Pacemaker: Normal functioning device    5.  Chronic diastolic congestive heart failure: Euvolemic on exam.  Continue Lasix. No shortness of breath    6.  Moderate AV stenosis: Echo still with moderate AV stenosis, RIKY 1.  Mean pressure gradient of 25, max 39.  Continue medical management.    7.  GÓMEZ: Compliant with CPAP.         No diagnosis found.    There are no diagnoses linked to this encounter.    COUNSELING:  gerald Rodriguez was given to patient for the following topics: diagnostic results, risk factor reductions, impressions, risks and benefits of treatment options and importance of treatment compliance .       SMOKING COUNSELING: denies    Follow up in 6 months    Sincerely,   ASHANTI Trinidad  [facility]  11/12/24  10:58 EST    EMR Dragon/Transcription disclaimer:   Much of this encounter note is an electronic transcription/translation of spoken language to printed text. The electronic translation of spoken language may permit erroneous, or at times, nonsensical words or phrases to be inadvertently transcribed; Although I have reviewed the note for such errors, some may still exist.

## 2024-11-14 ENCOUNTER — PATIENT OUTREACH (OUTPATIENT)
Age: 69
End: 2024-11-14
Payer: MEDICARE

## 2024-11-14 NOTE — OUTREACH NOTE
Social Work Assessment  Questions/Answers      Flowsheet Row Most Recent Value   Referral Source physician, outpatient staff, outpatient clinic   Reason for Consult community resources, other (see comments)  [in home assistance]   Preferred Language English   Advance Care Planning Reviewed no concerns identified   People in Home spouse, child(roland), adult   Current Living Arrangements home   Potentially Unsafe Housing Conditions none   In the past 12 months has the electric, gas, oil, or water company threatened to shut off services in your home? No   Primary Care Provided by self, child(roland)   Provides Primary Care For no one, unable/limited ability to care for self   Source of Income unable to assess   Application for Public Assistance pending public assistance pending number   Meal Preparation assistive person   Housekeeping assistive person   Laundry assistive person   Shopping assistive person   If for any reason you need help with day-to-day activities such as bathing, preparing meals, shopping, managing finances, etc., do you get the help you need? I could use a little more help   Do you want help finding or keeping work or a job? I do not need or want help   Do you want help with school or training? For example, starting or completing job training or getting a high school diploma, GED or equivalent No   Transportation Anticipated family or friend will provide   Outpatient/Agency/Support Group Needs homecare agency          SDOH updated and reviewed with the patient during this program:  --     Disabilities: At Risk (10/25/2024)    Disabilities     Concentrating, Remembering, or Making Decisions Difficulty: no     Doing Errands Independently Difficulty: yes      --     Employment: Not At Risk (11/14/2024)    Employment     Do you want help finding or keeping work or a job?: I do not need or want help      Financial Resource Strain: Medium Risk (11/14/2024)    Overall Financial Resource Strain (CARDIA)      Difficulty of Paying Living Expenses: Somewhat hard      --     Food Insecurity: No Food Insecurity (11/14/2024)    Hunger Vital Sign     Worried About Running Out of Food in the Last Year: Never true     Ran Out of Food in the Last Year: Never true      --     Health Literacy: Not At Risk (11/14/2024)    Education     Help with school or training?: No     Preferred Language: English      --     Housing Stability: Low Risk  (11/14/2024)    Housing Stability Vital Sign     Unable to Pay for Housing in the Last Year: No     Number of Times Moved in the Last Year: 1     Homeless in the Last Year: No      --     Transportation Needs: No Transportation Needs (11/14/2024)    PRAPARE - Transportation     Lack of Transportation (Medical): No     Lack of Transportation (Non-Medical): No      --     Utilities: Not At Risk (11/14/2024)    [facility] Utilities     Threatened with loss of utilities: No      Continuing Care   [facility]OP[facility]    [id number] [address] [facility], [place] [id number]    Phone: [phone]    Request Status: Pending - No Request Sent    Services: Food Delivery, Social Care    Resource for: Food Insecurity     Patient Outreach    MSW outreach to patient as requested per primary care provider referral for assistance with community resource needs. MSW reached patient's wife who states that he is still in rehab at [facility]. MSW and patient's wife briefly discussed the need for in home service assistance at home and reviewed previously provided resources by home health . MSW notified patient's wife that KIPDA packet should be sent to their home via mail and to be looking out for this information in their mail. Patient's wife states that she will look for KIPDA information and would like MSW to outreach to patient directly to discuss KIPDA services further. Patient's wife provided patient's contact information to reach patient directly.    Patient  Outreach    MSW outreach to patient directly as requested by his wife at [phone] and reached patient's voicemail.  MSW left voicemail and call back number.    Eli ADEN -   Ambulatory Case Management    11/14/2024, 14:19 EST

## 2024-11-15 ENCOUNTER — OFFICE VISIT (OUTPATIENT)
Dept: INTERNAL MEDICINE | Age: 69
End: 2024-11-15
Payer: MEDICARE

## 2024-11-15 VITALS
DIASTOLIC BLOOD PRESSURE: 78 MMHG | OXYGEN SATURATION: 98 % | HEART RATE: 82 BPM | WEIGHT: 235 LBS | SYSTOLIC BLOOD PRESSURE: 122 MMHG | HEIGHT: 72 IN | TEMPERATURE: 97.8 F | BODY MASS INDEX: 31.83 KG/M2 | RESPIRATION RATE: 16 BRPM

## 2024-11-15 DIAGNOSIS — I48.0 PAROXYSMAL ATRIAL FIBRILLATION: Primary | ICD-10-CM

## 2024-11-15 DIAGNOSIS — Z79.899 HIGH RISK MEDICATION USE: ICD-10-CM

## 2024-11-15 DIAGNOSIS — R82.5 POSITIVE URINE DRUG SCREEN: ICD-10-CM

## 2024-11-15 NOTE — PROGRESS NOTES
"    I N T E R N A L  M E D I C I N E  Karen Jessy, APRN       ENCOUNTER DATE:  11/15/2024    Flo Manzo / 69 y.o. / male        CC:   (Transitional Care Follow Up Visit)  [facility]-10/24/2024-10/28/204        Within 48 business hours after discharge our office contacted him via telephone to coordinate his care and needs.      I reviewed and discussed the details of that call along with the discharge summary, hospital problems, inpatient lab results, inpatient diagnostic studies, and consultation reports with the patient.         3/6/2022     2:39 PM   Date of TCM Phone Call   [facility]   Date of Admission 3/4/2022   Date of Discharge 3/6/2022   Discharge Disposition Home or Self Care       Risk for Readmission (LACE) No data recorded          VITALS    Visit Vitals  /78   Pulse 82   Temp 97.8 °F (36.6 °C)   Resp 16   Ht 182.9 cm (72.01\")   Wt 107 kg (235 lb)   SpO2 98%   BMI 31.86 kg/m²       BP Readings from Last 3 Encounters:   11/15/24 122/78   11/12/24 138/98   10/28/24 121/89     Wt Readings from Last 3 Encounters:   11/15/24 107 kg (235 lb)   11/12/24 106 kg (234 lb)   10/28/24 102 kg (224 lb 14.4 oz)      Body mass index is 31.86 kg/m².    HPI:     Date of admission/discharge: As noted above in CC  Hospital: [facility]   Principle Dx: Elevated troponin  Secondary Dx: History of CVA, chronic anticoagulation, Afib, GÓMEZ on CPAP, diastolic CHF, HTN, obesity  History prior to hospitalization: Presented to ER via EMS for acute dyspnea and elevated troponin.    Evaluation/Treatment: Cardiology consulted.  Given hypotension, lisinopril dosage was decresaed to 5 mg daily.  CTA negative for pulmonary embolism.  Stress test negative.  Cardiology cleared patient, and recommended outpatient follow up.    Course: Currently in [facility].  Saw outpatient cardiology, ASHANTI Haywood on November 12, 2024.  Plan for Watchman, and follow up with structural heart team.  Next " appointment with cardiology office is on July 16, 2025 with Dr. Bruce.  BP is reportedly being checked at facility, patient believes it was normal.  BP is well controlled at today's visit.  Denies any chest pain, progressive dyspnea, worsening lower extremity edema.  Has not been wearing compression socks.  Ambulating with use of walker at SNF, working with PT.      Patient's wife in conversation with  due to concerns of patient needing assistance with ADLs when he returns home.         Patient Care Team:  Karen Jiménez APRN as PCP - General (Family Medicine)  Eren Bruce MD as Consulting Physician (Cardiology)  Temo Wheat MD as Consulting Physician (Radiation Oncology)  Bryant Rader MD as Consulting Physician (Urology)  Cindi Breen DNP, APRN as Nurse Practitioner (Nurse Practitioner)  Gretel Galaviz RN as Ambulatory  ([facility][note 3])  Eli Bruno MSW as  (Amb Case Mgmt) ([facility][note 3])  ____________________________________________________________________    ASSESSMENT & PLAN:    1. Paroxysmal atrial fibrillation    2. High risk medication use    3. Positive urine drug screen      Orders Placed This Encounter   Procedures    Drug Screen 11 w/Conf, Serum       Summary/Discussion:  Hemodynamically stable.  He will ensure close follow up with cardiology office as scheduled.  Encouraged to wear compression socks for ongoing lower extremity edema, stable.    Discussed with patient that his October 2024 UDS showed Benzoylecgonine, which is a metabolite of cocaine.  He denies any past or current use of cocaine.  He is prescribed gabapentin for peripheral neuropathy.  We discussed that I am unable to prescribe gabapentin or any other controlled substances if he has positive drug screen.  He is agreeable to repeat at today's visit.          Patient will receive COVID-19 vaccination through pharmacy today.    Return for Next  scheduled follow up.    ____________________________________________________________________    REVIEW OF SYSTEMS    Review of Systems   Constitutional:  Positive for fatigue. Negative for chills, fever and unexpected weight change.   Respiratory:  Positive for shortness of breath (Chronic, non worsening). Negative for cough and chest tightness.    Cardiovascular:  Negative for chest pain, palpitations and leg swelling.   Neurological:  Positive for weakness (Generalized). Negative for dizziness, light-headedness and headaches.   Psychiatric/Behavioral:  The patient is not nervous/anxious.          PHYSICAL EXAMINATION    Physical Exam  Vitals reviewed.   Constitutional:       General: He is not in acute distress.     Appearance: Normal appearance. He is not ill-appearing, toxic-appearing or diaphoretic.   HENT:      Head: Normocephalic and atraumatic.   Cardiovascular:      Rate and Rhythm: Normal rate and regular rhythm.      Heart sounds: Murmur heard.   Pulmonary:      Effort: Pulmonary effort is normal.      Breath sounds: Normal breath sounds.   Musculoskeletal:      Right lower leg: Edema present.      Left lower leg: Edema present.   Neurological:      Mental Status: He is alert and oriented to person, place, and time. Mental status is at baseline.   Psychiatric:         Mood and Affect: Mood normal.         Behavior: Behavior normal.         Thought Content: Thought content normal.         Judgment: Judgment normal.           REVIEWED DATA:    Labs:   Lab Results   Component Value Date     10/28/2024    K 3.9 10/28/2024    CALCIUM 8.9 10/28/2024    AST 8 10/28/2024    ALT 9 10/28/2024    BUN 21 10/28/2024    CREATININE 0.62 (L) 10/28/2024    CREATININE 0.77 10/27/2024    CREATININE 0.93 10/25/2024    EGFRIFNONA 85 08/27/2021    EGFRIFAFRI 103 08/27/2021       Lab Results   Component Value Date    WBC 7.54 10/28/2024    HGB 12.7 (L) 10/28/2024    HGB 13.0 10/27/2024    HGB 13.6 10/25/2024      10/28/2024       Lab Results   Component Value Date    GLUCOSEU Negative 08/26/2024    BLOODU Negative 08/26/2024    NITRITEU Negative 08/26/2024    LEUKOCYTESUR Negative 08/26/2024       Imaging:   Adult Transthoracic Echo Complete W/ Cont if Necessary Per Protocol    Result Date: 10/26/2024  Narrative:   Left ventricular systolic function is normal. Estimated left ventricular EF = 50%   Left ventricular wall thickness is consistent with borderline concentric hypertrophy.   Left ventricular diastolic function was indeterminate.   Moderate aortic valve stenosis is present. Aortic valve area is 1 cm2.   Peak velocity of the flow distal to the aortic valve is 310.1 cm/s. Aortic valve maximum pressure gradient is 39 mmHg. Aortic valve mean pressure gradient is 25 mmHg. Aortic valve dimensionless index is 0.3 .   Estimated right ventricular systolic pressure from tricuspid regurgitation is normal (<35 mmHg). Calculated right ventricular systolic pressure from tricuspid regurgitation is 27 mmHg.   Mild dilation of the ascending aorta is present 3.9 cm.   Mild aortic valve regurgitation is present.     Stress Test With Myocardial Perfusion One Day    Result Date: 10/25/2024  Narrative:   Diaphragmatic attenuation artifact is present.   Myocardial perfusion imaging indicates a normal myocardial perfusion study with no evidence of ischemia. Impressions are consistent with a low risk study.   Abnormal LV wall motion consistent with mild hypokineses and global hypokinesis.   Left ventricular ejection fraction is mildly reduced (Calculated EF = 45%).   Compared to the prior study from 12/20/2021 the current study reveals no changes.     XR Chest 2 View    Result Date: 10/24/2024  Narrative: XR CHEST 2 VW-  DATE OF EXAM: 10/24/2024 11:57 AM  INDICATION: Dyspnea with exertion.  COMPARISON: Radiographs 8/11/2024, 8/5/2024, and 7/29/2022. CT 10/24/2024.  TECHNIQUE: PA and lateral views of the chest were obtained.  FINDINGS: Stable  left chest wall cardiac pacer device. Elevation of the right hemidiaphragm with ill-defined bilateral perihilar and bibasilar subsegmental atelectasis and/or scarring. No dense lung consolidation. No pneumothorax. Unchanged cardiac and mediastinal contours. Calcified remnants of prior granulomatous disease in the left hilum. Multilevel thoracic spondylosis. Partially evaluated chronic degenerative changes in both shoulders. No acute osseous abnormality is identified.      Impression: Elevation of the right hemidiaphragm with ill-defined bilateral perihilar and bibasilar subsegmental atelectasis and/or scarring.  This report was finalized on 10/24/2024 10:29 PM by Charles Torres MD on Workstation: [id number]      CT Angiogram Chest Pulmonary Embolism    Result Date: 10/24/2024  Narrative: CT ANGIOGRAM CHEST PULMONARY EMBOLISM-  DATE OF EXAM: 10/24/2024 9:19 PM  INDICATION: hypoxia, tachypnea, dyspnea, immobility.  COMPARISON: Chest radiographs 10/24/2024, 8/11/2024, and 8/5/2024. CT chest 3/21/2024, 3/17/2023, and 6/22/2022. Coronary CT 8/28/2024.  TECHNIQUE: Multiple contiguous axial images were acquired through the chest following the intravenous administration of 85 mL of Isovue-370. Reformatted coronal, sagittal, and 3D reconstruction images were also reviewed. Radiation dose reduction techniques were utilized, including automated exposure control and exposure modulation based on body size.  FINDINGS: No evidence of a pulmonary arterial filling defect to suggest pulmonary embolism. Mild enlargement of the main pulmonary arteries could reflect pulmonary Terryl hypertension. Stable cardiomegaly with mild to moderate calcified coronary artery disease. No pericardial effusion. Aortic valve calcifications. Calcified remnants of prior granulomatous disease in the mediastinum and left hilum. No pathologically enlarged intrathoracic lymph nodes are identified. Similar-appearing partially imaged heterogeneous  multinodular thyroid goiter extending substernal. Tiny hiatal hernia.  Low lung volumes with bilateral dependent atelectasis and multifocal bibasilar subsegmental atelectasis and/or scarring. Multiple likely benign sub-6 mm pulmonary nodules in both lungs. No dense lung consolidation. The central airways are widely patent. No pneumothorax or pleural effusion.  Limited noncontrast CT imaging of the upper abdomen demonstrates a similar-appearing incompletely evaluated 1.5 cm low-density lesion in the right lobe of the liver, statistically representing a hepatic cyst or hemangioma. Fatty atrophy of the pancreas.  Similar-appearing multilevel thoracic spondylosis and flowing multilevel mid and lower thoracic anterior osteophyte formation. Partially imaged chronic degenerative changes in both shoulders. No acute osseous abnormality or concerning osseous lesion.      Impression:  1. The study is negative for pulmonary embolism. No acute intrathoracic abnormality. 2. Stable cardiomegaly with mild to moderate calcified coronary artery disease.  This report was finalized on 10/24/2024 10:24 PM by Charles Torres MD on Workstation: [id number]        Medical Tests:        Summary of old records / correspondence / consultant report:   DC summary re: issues addressed on HPI    Request outside records:         MEDICATIONS   Current Outpatient Medications   Medication Sig Dispense Refill    acetaminophen (TYLENOL) 325 MG tablet Take 1 tablet by mouth Every 4 (Four) Hours As Needed for Mild Pain. 2 tablets Q4 hours PRN for mild pain.      albuterol sulfate  (90 Base) MCG/ACT inhaler Inhale 2 puffs Every 4 (Four) Hours As Needed for Wheezing or Shortness of Air. Indications: shortness of breath 18 g 3    aspirin 81 MG chewable tablet Chew 1 tablet Daily.      atorvastatin (LIPITOR) 40 MG tablet Take 1 tablet by mouth Every Night. Indications: Cerebrovascular Accident or Stroke      busPIRone (BUSPAR) 10 MG tablet TAKE 1  TABLET BY MOUTH TWICE DAILY 60 tablet 5    citalopram (CeleXA) 20 MG tablet Take 1 tablet by mouth Daily. 30mg, oral, Once a day, give 1.5 tablets by mouth daily.      furosemide (LASIX) 20 MG tablet Take 1 tablet by mouth Daily.      gabapentin (NEURONTIN) 100 MG capsule Take 1 capsule by mouth Every Morning. 5 capsule 0    gabapentin (NEURONTIN) 300 MG capsule Take 1 capsule by mouth Every Night. 5 capsule 0    guaiFENesin (MUCINEX) 600 MG 12 hr tablet Take 2 tablets by mouth 2 (Two) Times a Day.      influenza vac split high-dose (Fluzone High-Dose) 0.5 ML suspension prefilled syringe injection Inject 0.5 mL into the appropriate muscle as directed by prescriber. Indications: na 0.5 mL 0    lisinopril (PRINIVIL,ZESTRIL) 5 MG tablet Take 1 tablet by mouth Daily for 30 days. Indications: Left Systolic Heart Failure 30 tablet 0    metoprolol tartrate (LOPRESSOR) 25 MG tablet TAKE 1 & 1/2 TABLETS BY MOUTH TWICE DAILY 90 tablet 1    omeprazole (priLOSEC) 20 MG capsule Take 1 capsule by mouth Daily.      potassium chloride ER (K-TAB) 20 MEQ tablet controlled-release ER tablet Take 1 tablet by mouth Daily.      tamsulosin (FLOMAX) 0.4 MG capsule 24 hr capsule TAKE 1 CAPSULE BY MOUTH NIGHTLY 30 capsule 5     No current facility-administered medications for this visit.       Current outpatient and discharge medications have been reconciled for the patient.  Reviewed by: ASHANTI El         @[facility]@

## 2024-11-18 ENCOUNTER — PATIENT OUTREACH (OUTPATIENT)
Dept: CASE MANAGEMENT | Facility: OTHER | Age: 69
End: 2024-11-18
Payer: MEDICARE

## 2024-11-18 NOTE — OUTREACH NOTE
AMBULATORY CASE MANAGEMENT NOTE    Names and Relationships of Patient/Support Persons:  -     Care Coordination    Call placed to [facility]. Left message requesting update on discharge plans. Follow up scheduled in 1 week.     Education Documentation  No documentation found.        Gretel FRAGA  Ambulatory Case Management    11/18/2024, 11:39 EST

## 2024-11-22 ENCOUNTER — TELEPHONE (OUTPATIENT)
Dept: INTERNAL MEDICINE | Age: 69
End: 2024-11-22
Payer: MEDICARE

## 2024-11-22 ENCOUNTER — PATIENT OUTREACH (OUTPATIENT)
Age: 69
End: 2024-11-22
Payer: MEDICARE

## 2024-11-22 NOTE — OUTREACH NOTE
Patient Outreach    MSW attempted outreach to patient on two attempts as requested by patient's wife and MSW has been unable to reach patient.     Patient Outreach    MSW outreach to patient's wife Bhargavi to let her know that MSW has not been able to reach patient for further discussion about in home services. Patient's wife states that they need assistance with grocery shopping, laundry, light housekeeping. Patient's wife states that she is currently in the hospital at this time and would like a new referral and call from [facility] once she is discharged home. MSW will place new referral via [facility].     Care Coordination    [facility]     Eli ADEN -   Ambulatory Case Management    11/22/2024, 13:51 EST

## 2024-11-22 NOTE — TELEPHONE ENCOUNTER
Called Patient and spoke with son Jim and he stated that he didn't know when his dad was getting out of rehab because he didn't fill like he was getting the proper care he needed to be there. I advised the son that he could try getting him moved out of the fac and he stated that everyone was full right now. I was calling to get Danyel his lab results.

## 2024-11-26 ENCOUNTER — PATIENT OUTREACH (OUTPATIENT)
Dept: CASE MANAGEMENT | Facility: OTHER | Age: 69
End: 2024-11-26
Payer: MEDICARE

## 2024-11-26 NOTE — OUTREACH NOTE
AMBULATORY CASE MANAGEMENT NOTE    Names and Relationships of Patient/Support Persons: Contact: [facility]; Relationship: Other -     Care Coordination    Spoke with [facility]. Patient still present. No discharge plans available. Follow up scheduled next week.     Education Documentation  No documentation found.        Gretel FRAGA  Ambulatory Case Management    11/26/2024, 15:20 EST

## 2024-12-10 ENCOUNTER — PATIENT OUTREACH (OUTPATIENT)
Dept: CASE MANAGEMENT | Facility: OTHER | Age: 69
End: 2024-12-10
Payer: MEDICARE

## 2024-12-10 NOTE — OUTREACH NOTE
AMBULATORY CASE MANAGEMENT NOTE    Names and Relationships of Patient/Support Persons:  -     Care Coordination    Left message with [facility] with request for discharge plan update. Follow up review scheduled in 1 week.     Education Documentation  No documentation found.        Gretel FRAGA  Ambulatory Case Management    12/10/2024, 16:18 EST

## 2024-12-23 ENCOUNTER — PATIENT OUTREACH (OUTPATIENT)
Dept: CASE MANAGEMENT | Facility: OTHER | Age: 69
End: 2024-12-23
Payer: MEDICARE

## 2024-12-23 NOTE — OUTREACH NOTE
AMBULATORY CASE MANAGEMENT NOTE    Names and Relationships of Patient/Support Persons: Contact: [facility]; Relationship: Other -     Care Coordination    [facility] states they have no one by that name.     SNF Follow-up    Questions/Answers      Flowsheet Row Responses   Acute Facility Discharged From [facility]   Acute Discharge Date 10/28/24   Name of the Skilled Nursing Facility? [facility]   Purpose of SNF Admission PT, OT, SN   Who is the insurance provider or payor of patient stay? Medicare   Progression of Patient? discharged   Where was the patient discharged to? Home   Home Health Agency at discharge? No   DME Needed at Discharge? No   Are there any medication concerns at Discharge No   Does the patient have a follow-up appointment? No  [Wife states patient needs home health therapy. ACM to communicate to PCP.]        Wife requests home health. PCP sent request. Follow up scheduled this week.     Education Documentation  No documentation found.        Gretel FRAGA  Ambulatory Case Management    12/23/2024, 16:12 EST

## 2024-12-24 ENCOUNTER — HOSPITAL ENCOUNTER (EMERGENCY)
Facility: HOSPITAL | Age: 69
Discharge: HOME | End: 2024-12-25
Attending: STUDENT IN AN ORGANIZED HEALTH CARE EDUCATION/TRAINING PROGRAM
Payer: MEDICARE

## 2024-12-24 DIAGNOSIS — R60.0 EXTREMITY EDEMA: ICD-10-CM

## 2024-12-24 DIAGNOSIS — R26.2 DIFFICULTY IN WALKING: Primary | ICD-10-CM

## 2024-12-24 DIAGNOSIS — R06.00 DYSPNEA, UNSPECIFIED TYPE: ICD-10-CM

## 2024-12-24 PROCEDURE — 93005 ELECTROCARDIOGRAM TRACING: CPT | Performed by: STUDENT IN AN ORGANIZED HEALTH CARE EDUCATION/TRAINING PROGRAM

## 2024-12-24 PROCEDURE — 99284 EMERGENCY DEPT VISIT MOD MDM: CPT

## 2024-12-25 ENCOUNTER — APPOINTMENT (OUTPATIENT)
Dept: GENERAL RADIOLOGY | Facility: HOSPITAL | Age: 69
End: 2024-12-25
Payer: MEDICARE

## 2024-12-25 VITALS
OXYGEN SATURATION: 91 % | WEIGHT: 233 LBS | BODY MASS INDEX: 33.36 KG/M2 | DIASTOLIC BLOOD PRESSURE: 107 MMHG | HEART RATE: 86 BPM | RESPIRATION RATE: 16 BRPM | HEIGHT: 70 IN | SYSTOLIC BLOOD PRESSURE: 151 MMHG | TEMPERATURE: 97.6 F

## 2024-12-25 LAB
ALBUMIN SERPL-MCNC: 3.5 G/DL (ref 3.5–5.2)
ALBUMIN/GLOB SERPL: 1.3 G/DL
ALP SERPL-CCNC: 104 U/L (ref 39–117)
ALT SERPL W P-5'-P-CCNC: 10 U/L (ref 1–41)
ANION GAP SERPL CALCULATED.3IONS-SCNC: 8.3 MMOL/L (ref 5–15)
AST SERPL-CCNC: 12 U/L (ref 1–40)
BASOPHILS # BLD AUTO: 0.03 10*3/MM3 (ref 0–0.2)
BASOPHILS NFR BLD AUTO: 0.4 % (ref 0–1.5)
BILIRUB SERPL-MCNC: 0.5 MG/DL (ref 0–1.2)
BUN SERPL-MCNC: 17 MG/DL (ref 8–23)
BUN/CREAT SERPL: 24.3 (ref 7–25)
CALCIUM SPEC-SCNC: 8.9 MG/DL (ref 8.6–10.5)
CHLORIDE SERPL-SCNC: 107 MMOL/L (ref 98–107)
CO2 SERPL-SCNC: 25.7 MMOL/L (ref 22–29)
CREAT SERPL-MCNC: 0.7 MG/DL (ref 0.76–1.27)
DEPRECATED RDW RBC AUTO: 42.6 FL (ref 37–54)
EGFRCR SERPLBLD CKD-EPI 2021: 100.4 ML/MIN/1.73
EOSINOPHIL # BLD AUTO: 0.3 10*3/MM3 (ref 0–0.4)
EOSINOPHIL NFR BLD AUTO: 4 % (ref 0.3–6.2)
ERYTHROCYTE [DISTWIDTH] IN BLOOD BY AUTOMATED COUNT: 13.1 % (ref 12.3–15.4)
GLOBULIN UR ELPH-MCNC: 2.8 GM/DL
GLUCOSE SERPL-MCNC: 89 MG/DL (ref 65–99)
HCT VFR BLD AUTO: 44.3 % (ref 37.5–51)
HGB BLD-MCNC: 13.7 G/DL (ref 13–17.7)
IMM GRANULOCYTES # BLD AUTO: 0.01 10*3/MM3 (ref 0–0.05)
IMM GRANULOCYTES NFR BLD AUTO: 0.1 % (ref 0–0.5)
LYMPHOCYTES # BLD AUTO: 1.48 10*3/MM3 (ref 0.7–3.1)
LYMPHOCYTES NFR BLD AUTO: 19.9 % (ref 19.6–45.3)
MCH RBC QN AUTO: 27.1 PG (ref 26.6–33)
MCHC RBC AUTO-ENTMCNC: 30.9 G/DL (ref 31.5–35.7)
MCV RBC AUTO: 87.7 FL (ref 79–97)
MONOCYTES # BLD AUTO: 0.6 10*3/MM3 (ref 0.1–0.9)
MONOCYTES NFR BLD AUTO: 8.1 % (ref 5–12)
NEUTROPHILS NFR BLD AUTO: 5.02 10*3/MM3 (ref 1.7–7)
NEUTROPHILS NFR BLD AUTO: 67.5 % (ref 42.7–76)
NRBC BLD AUTO-RTO: 0 /100 WBC (ref 0–0.2)
NT-PROBNP SERPL-MCNC: 1433 PG/ML (ref 0–900)
PLATELET # BLD AUTO: 189 10*3/MM3 (ref 140–450)
PMV BLD AUTO: 10.3 FL (ref 6–12)
POTASSIUM SERPL-SCNC: 3.6 MMOL/L (ref 3.5–5.2)
PROT SERPL-MCNC: 6.3 G/DL (ref 6–8.5)
QT INTERVAL: 310 MS
QTC INTERVAL: 351 MS
RBC # BLD AUTO: 5.05 10*6/MM3 (ref 4.14–5.8)
SODIUM SERPL-SCNC: 141 MMOL/L (ref 136–145)
TROPONIN T SERPL HS-MCNC: 14 NG/L
WBC NRBC COR # BLD AUTO: 7.44 10*3/MM3 (ref 3.4–10.8)

## 2024-12-25 PROCEDURE — 84484 ASSAY OF TROPONIN QUANT: CPT | Performed by: STUDENT IN AN ORGANIZED HEALTH CARE EDUCATION/TRAINING PROGRAM

## 2024-12-25 PROCEDURE — 71045 X-RAY EXAM CHEST 1 VIEW: CPT

## 2024-12-25 PROCEDURE — 83880 ASSAY OF NATRIURETIC PEPTIDE: CPT | Performed by: STUDENT IN AN ORGANIZED HEALTH CARE EDUCATION/TRAINING PROGRAM

## 2024-12-25 PROCEDURE — 85025 COMPLETE CBC W/AUTO DIFF WBC: CPT | Performed by: STUDENT IN AN ORGANIZED HEALTH CARE EDUCATION/TRAINING PROGRAM

## 2024-12-25 PROCEDURE — 36415 COLL VENOUS BLD VENIPUNCTURE: CPT

## 2024-12-25 PROCEDURE — 80053 COMPREHEN METABOLIC PANEL: CPT | Performed by: STUDENT IN AN ORGANIZED HEALTH CARE EDUCATION/TRAINING PROGRAM

## 2024-12-25 NOTE — ED NOTES
Pt arrived via EMS from home. Pt has been having problems with mobility and his gait when walking x2-3 days and today has gotten the worse. The pt denies any pain.

## 2024-12-25 NOTE — ED PROVIDER NOTES
EMERGENCY DEPARTMENT ENCOUNTER  Room Number:  23/23  PCP: Karen Jiménez APRN  Independent Historians: Patient and EMS      HPI:  Chief Complaint: had concerns including Dizziness.     A complete HPI/ROS/PMH/PSH/SH/FH are unobtainable due to: None    Chronic or social conditions impacting patient care (Social Determinants of Health): None      Context: Flo Manzo is a 69 y.o. male with a medical history of A-fib, CHF, stroke, right-sided weakness, who presents to the ED c/o acute medical evaluation.  Patient states that he is in his normal state of health and has no complaints at this time.  He is speaking in 4-5 word dyspnea which she states this is baseline.  He states his son was worried because he was sitting sideways in his recliner like he always does.    EMS states that son was concerned because patient was having more difficulty ambulating with a walker than normal, was falling towards the left side.      Review of prior external notes (non-ED) -and- Review of prior external test results outside of this encounter:  I reviewed discharge summary 10/28/2024.  Chronic A-fib, diastolic heart failure, chronic, elevated troponin.  He presented with dyspnea, elevated troponin.  Improved during the hospital stay.    Prescription drug monitoring program review:         PAST MEDICAL HISTORY  Active Ambulatory Problems     Diagnosis Date Noted    Umbilical hernia without obstruction and without gangrene 12/11/2017    Essential hypertension 01/05/2018    Arthritis of left knee 03/14/2016    Depression 01/05/2018    Obesity (BMI 30-39.9) 03/14/2016    Atrial fibrillation with RVR 01/24/2019    Substernal thyroid goiter 01/24/2019    CHF (congestive heart failure) 01/24/2019    Diastolic CHF, chronic 02/01/2019    Hemorrhagic stroke 02/28/2019    GÓMEZ on auto CPAP 09/08/2019    Hypersomnia due to medical condition 09/08/2019    Sleep-related hypoxia 10/26/2019    Chronic atrial fibrillation 03/19/2020    Vertigo  06/19/2020    Aortic stenosis, mild 12/23/2021    Sinus pause 03/06/2022    Pacemaker 04/25/2022    Chronic anticoagulation 06/24/2022    Generalized weakness 07/29/2022    Prediabetes 02/03/2023    Pure hypercholesterolemia 05/08/2023    Pre-operative cardiovascular examination 01/30/2024    Peripheral neuropathy 02/08/2024    History of colon polyps 03/14/2024    Dizziness 08/05/2024    Fever 08/06/2024    History of CVA (cerebrovascular accident) 08/29/2024    Right leg weakness 08/29/2024    Frequent falls 08/29/2024    Paroxysmal atrial fibrillation 08/29/2024    Elevated troponin 10/24/2024     Resolved Ambulatory Problems     Diagnosis Date Noted    Dyspnea 01/24/2019    Angina at rest 01/24/2019    Intraparenchymal hemorrhage of brain 02/01/2019    SSS (sick sinus syndrome) 01/06/2022    S/P ICD (internal cardiac defibrillator) procedure 03/06/2022     Past Medical History:   Diagnosis Date    Arthritis     Colon polyps 01/04/2018    Heart murmur     Hypertension     New onset atrial fibrillation (CMS/HCC) - RVR 01/24/2019    GÓMEZ (obstructive sleep apnea) 06/06/2019    Sleep apnea     Stroke     Uncontrolled hypertension     Ventral hernia          PAST SURGICAL HISTORY  Past Surgical History:   Procedure Laterality Date    APPENDECTOMY N/A 1972    BACK SURGERY      BLADDER STIMULATOR IMPLANT L LOWER BACK    CARDIAC CATHETERIZATION N/A 07/20/2004    Cath left ventriculography, coronary angiography and left heart catheterization, Normal results-Dr. Fierro     CARDIAC CATHETERIZATION N/A 1/29/2019    Procedure: Left Heart Cath;  Surgeon: Eren Bruce MD;  Location: [facility] CATH INVASIVE LOCATION;  Service: Cardiology    CARDIAC CATHETERIZATION N/A 1/29/2019    Procedure: Left ventriculography;  Surgeon: Eren Bruce MD;  Location: [facility] CATH INVASIVE LOCATION;  Service: Cardiology    CARDIAC CATHETERIZATION N/A 1/29/2019    Procedure: Coronary angiography;  Surgeon: Eren Bruce  MD;  Location:  [facility] CATH INVASIVE LOCATION;  Service: Cardiology    CARDIAC ELECTROPHYSIOLOGY PROCEDURE N/A 3/4/2022    Procedure: Pacemaker SC new BIOTRONIK;  Surgeon: Eren Bruce MD;  Location: [facility] CATH [facility]VASIVE LOC[facility];  Service: Cardiology;  Laterality: N/A;    CARPAL TUNNEL RELEASE Right 2013    CARPAL TUNNEL RELEASE Left     COLONOSCOPY N/A 1/4/2018    Procedure: COLONOSCOPY with cold polypectomy and hot snare polypectomy;  Surgeon: Ten Solitario MD;  [facility] ENDOS[facility];  Service:     COLONOSCOPY N/A 4/25/2024    Procedure: COLONOSCOPY INTO CECUM;  Surgeon: Ten Solitario MD;  [facility] END[facility];  Service: General;  Laterality: N/A;  PRE: PERSONAL H/O COLON POLYP  /  POST: POOR PREP OTHERWISE NORMAL    EYE LENS IMPLANT SECONDARY Bilateral 2007, 2008    KNEE CARTILAGE SURGERY Right 1979    TONSILLECTOMY AND ADENOIDECTOMY Bilateral 2005    TOTAL KNEE ARTHROPLASTY Left 03/14/2016    Left total knee arthroplasty with Amberly component, size 11 femur, G tibial baseplate with a 10 polyethylene insert and a 38 patellar button-Dr. Pablo aHirston    TOTAL KNEE ARTHROPLASTY Right 03/02/2015    Right total knee arthroplasty with Amberly component size G femur, 11 tibial base plate with an 11 polyethylene insert and a 38 patellar button-Dr. Pablo Hairston    UVULOPALATOPHARYNGOPLASTY N/A 2005    part of soft palate, and uvula    VENTRAL HERNIA REPAIR N/A 1/23/2018    Procedure: VENTRAL HERNIA REPAIR LAPAROSCOPIC WITH DAVINCI ROBOT AND MESH;  Surgeon: Ten Solitario MD;  Location: [facility] MAIN OR;  Service:          FAMILY HISTORY  Family History   Problem Relation Age of Onset    Hypertension Mother     Kidney failure Mother     Coronary artery disease Father     Lung cancer Father         positive smoker    Heart attack Father     Breast cancer Sister 60    Prostate cancer Brother     Colon polyps Brother     Kidney nephrosis Brother     Malig Hyperthermia Neg Hx          SOCIAL  HISTORY  Social History     Socioeconomic History    Marital status:    Tobacco Use    Smoking status: Never     Passive exposure: Past    Smokeless tobacco: Never   Vaping Use    Vaping status: Never Used   Substance and Sexual Activity    Alcohol use: Not Currently     Comment: social, maybe a drink a month, not since stroke    Drug use: No     Comment: previously smoked marijuana     Sexual activity: Defer         ALLERGIES  Poison ivy extract      REVIEW OF SYSTEMS  Review of Systems  Included in HPI  All systems reviewed and negative except for those discussed in HPI.      PHYSICAL EXAM    I have reviewed the triage vital signs and nursing notes.    ED Triage Vitals [12/24/24 2231]   Temp Heart Rate Resp BP SpO2   97.6 °F (36.4 °C) 85 16 (!) 187/118 96 %      Temp src Heart Rate Source Patient Position BP Location FiO2 (%)   Tympanic -- -- -- --       Physical Exam  GENERAL: alert, no acute distress  SKIN: Warm, dry  HENT: Normocephalic, atraumatic  EYES: no scleral icterus  CV: regular rate,  paced rhythm with intermittent narrow complexes  RESPIRATORY: normal effort, lungs clear, speaking in 4-5 word dyspnea  ABDOMEN: soft, nontender, nondistended  MUSCULOSKELETAL: no deformity, 2+ pretibial pitting edema  NEURO: alert, moves all extremities, follows commands  No weakness numbness or drift in bilateral upper and lower extremities          LAB RESULTS  Recent Results (from the past 24 hours)   Comprehensive Metabolic Panel    Collection Time: 12/25/24 12:04 AM    Specimen: Blood   Result Value Ref Range    Glucose 89 65 - 99 mg/dL    BUN 17 8 - 23 mg/dL    Creatinine 0.70 (L) 0.76 - 1.27 mg/dL    Sodium 141 136 - 145 mmol/L    Potassium 3.6 3.5 - 5.2 mmol/L    Chloride 107 98 - 107 mmol/L    CO2 25.7 22.0 - 29.0 mmol/L    Calcium 8.9 8.6 - 10.5 mg/dL    Total Protein 6.3 6.0 - 8.5 g/dL    Albumin 3.5 3.5 - 5.2 g/dL    ALT (SGPT) 10 1 - 41 U/L    AST (SGOT) 12 1 - 40 U/L    Alkaline Phosphatase 104 39 -  117 U/L    Total Bilirubin 0.5 0.0 - 1.2 mg/dL    Globulin 2.8 gm/dL    A/G Ratio 1.3 g/dL    BUN/Creatinine Ratio 24.3 7.0 - 25.0    Anion Gap 8.3 5.0 - 15.0 mmol/L    eGFR 100.4 >60.0 mL/min/1.73   BNP    Collection Time: 12/25/24 12:04 AM    Specimen: Blood   Result Value Ref Range    proBNP 1,433.0 (H) 0.0 - 900.0 pg/mL   High Sensitivity Troponin T    Collection Time: 12/25/24 12:04 AM    Specimen: Blood   Result Value Ref Range    HS Troponin T 14 <22 ng/L   CBC Auto Differential    Collection Time: 12/25/24 12:04 AM    Specimen: Blood   Result Value Ref Range    WBC 7.44 3.40 - 10.80 10*3/mm3    RBC 5.05 4.14 - 5.80 10*6/mm3    Hemoglobin 13.7 13.0 - 17.7 g/dL    Hematocrit 44.3 37.5 - 51.0 %    MCV 87.7 79.0 - 97.0 fL    MCH 27.1 26.6 - 33.0 pg    MCHC 30.9 (L) 31.5 - 35.7 g/dL    RDW 13.1 12.3 - 15.4 %    RDW-SD 42.6 37.0 - 54.0 fl    MPV 10.3 6.0 - 12.0 fL    Platelets 189 140 - 450 10*3/mm3    Neutrophil % 67.5 42.7 - 76.0 %    Lymphocyte % 19.9 19.6 - 45.3 %    Monocyte % 8.1 5.0 - 12.0 %    Eosinophil % 4.0 0.3 - 6.2 %    Basophil % 0.4 0.0 - 1.5 %    Immature Grans % 0.1 0.0 - 0.5 %    Neutrophils, Absolute 5.02 1.70 - 7.00 10*3/mm3    Lymphocytes, Absolute 1.48 0.70 - 3.10 10*3/mm3    Monocytes, Absolute 0.60 0.10 - 0.90 10*3/mm3    Eosinophils, Absolute 0.30 0.00 - 0.40 10*3/mm3    Basophils, Absolute 0.03 0.00 - 0.20 10*3/mm3    Immature Grans, Absolute 0.01 0.00 - 0.05 10*3/mm3    nRBC 0.0 0.0 - 0.2 /100 WBC   ECG 12 Lead Other; CHF    Collection Time: 12/25/24 12:10 AM   Result Value Ref Range    QT Interval 310 ms    QTC Interval 351 ms         RADIOLOGY  XR Chest 1 View    Result Date: 12/25/2024  SINGLE VIEW OF THE CHEST  HISTORY: Dyspnea  COMPARISON: October 24, 2024  FINDINGS: There is cardiomegaly. There may be some vascular congestion. The patient has diminished lung volumes. Elevation of the right hemidiaphragm is again noted. No pneumothorax or large effusion is seen. Left sided pacemaker  is noted.       1. Cardiomegaly with suspected vascular congestion. 2. Overall diminished lung volumes.  This report was finalized on 12/25/2024 12:26 AM by Dr. Beverly Cat M.D on Workstation: [id number]         MEDICATIONS GIVEN IN ER  Medications - No data to display      ORDERS PLACED DURING THIS VISIT:  Orders Placed This Encounter   Procedures    XR Chest 1 View    Comprehensive Metabolic Panel    BNP    High Sensitivity Troponin T    CBC Auto Differential    High Sensitivity Troponin T 1Hr    ECG 12 Lead Other; CHF    CBC & Differential         OUTPATIENT MEDICATION MANAGEMENT:  No current Epic-ordered facility-administered medications on file.     Current Outpatient Medications Ordered in Epic   Medication Sig Dispense Refill    acetaminophen (TYLENOL) 325 MG tablet Take 1 tablet by mouth Every 4 (Four) Hours As Needed for Mild Pain. 2 tablets Q4 hours PRN for mild pain.      albuterol sulfate  (90 Base) MCG/ACT inhaler Inhale 2 puffs Every 4 (Four) Hours As Needed for Wheezing or Shortness of Air. Indications: shortness of breath 18 g 3    aspirin 81 MG chewable tablet Chew 1 tablet Daily.      atorvastatin (LIPITOR) 40 MG tablet Take 1 tablet by mouth Every Night. Indications: Cerebrovascular Accident or Stroke      busPIRone (BUSPAR) 10 MG tablet TAKE 1 TABLET BY MOUTH TWICE DAILY 60 tablet 5    citalopram (CeleXA) 20 MG tablet Take 1 tablet by mouth Daily. 30mg, oral, Once a day, give 1.5 tablets by mouth daily.      furosemide (LASIX) 20 MG tablet Take 1 tablet by mouth Daily.      gabapentin (NEURONTIN) 100 MG capsule Take 1 capsule by mouth Every Morning. 5 capsule 0    gabapentin (NEURONTIN) 300 MG capsule Take 1 capsule by mouth Every Night. 5 capsule 0    guaiFENesin (MUCINEX) 600 MG 12 hr tablet Take 2 tablets by mouth 2 (Two) Times a Day.      influenza vac split high-dose (Fluzone High-Dose) 0.5 ML suspension prefilled syringe injection Inject 0.5 mL into the appropriate muscle as  directed by prescriber. Indications: na 0.5 mL 0    lisinopril (PRINIVIL,ZESTRIL) 5 MG tablet Take 1 tablet by mouth Daily for 30 days. Indications: Left Systolic Heart Failure 30 tablet 0    metoprolol tartrate (LOPRESSOR) 25 MG tablet TAKE 1 & 1/2 TABLETS BY MOUTH TWICE DAILY 90 tablet 1    omeprazole (priLOSEC) 20 MG capsule Take 1 capsule by mouth Daily.      potassium chloride ER (K-TAB) 20 MEQ tablet controlled-release ER tablet Take 1 tablet by mouth Daily.      tamsulosin (FLOMAX) 0.4 MG capsule 24 hr capsule TAKE 1 CAPSULE BY MOUTH NIGHTLY 30 capsule 5         PROCEDURES  Procedures            PROGRESS, DATA ANALYSIS, CONSULTS, AND MEDICAL DECISION MAKING  All labs have been independently interpreted by me.  All radiology studies have been reviewed by me. All EKG's have been independently viewed and interpreted by me.  Discussion below represents my analysis of pertinent findings related to patient's condition, differential diagnosis, treatment plan and final disposition.    Differential diagnosis includes but is not limited to heart failure, pulmonary edema, pleural effusion, STEMI, NSTEMI, anemia, electrolyte abnormality, metabolic abnormality, stroke.    Clinical Scores:                                 Patient has no focal deficits concerning for stroke at this time.  Will not code stroke at this moment.      ED Course as of 12/25/24 0102   Tue Dec 24, 2024   2300 Patient presents to the ED via EMS for evaluation.  Patient has no complaints at this time.  He states his son was worried because he was leaning in his recliner, but he states that is how he always sits in the recliner.    EMS states that son was worried because patient was having more difficulty ambulating today than normal, was leaning to the left.    On exam patient moves all 4 extremities well, no drift, no weakness numbness tingling.  He does have pitting edema of his extremities 2+ and O2 sat in the mid 90s with 4-5 word dyspnea.    Will  obtain basic labs, evaluate for heart failure.  No focal deficits concerning for stroke at this time.  Patient reports he has mild right-sided deficits from 4 prior strokes. [DN]   Wed Dec 25, 2024   0035 ECG 12 Lead Other; CHF  EKG reviewed, ventricular paced complexes with intermittent narrow complexes, rate 77, no STEMI, interpreted by self  I compared EKG to prior on 10/24/2024.  A-fib, rate 88, inferior Q waves, no significant T ST changes, no STEMI, interpreted by self    Narrow complexes today with unchanged morphology from prior [DN]   0035 XR Chest 1 View  I viewed x-ray images, cardiomegaly, no focal infiltrate, interpreted by self  I reviewed radiologist rotation of x-ray images [DN]   0039 Creatinine(!): 0.70 [DN]   0039 HS Troponin T: 14 [DN]   0039 proBNP(!): 1,433.0  Stable from 2 months ago [DN]   0043 I discussed results with patient.  He has no complaints at this time.  Requesting discharge.  Discussed plan for ambulation trial.  Patient states he uses a walker with assistance at home at baseline. [DN]   0059 Patient ambulated with walker with minimal assistance.  Standing upright.  He is agreeable with plan for discharge home [DN]      ED Course User Index  [DN] José Miguel Blanton MD             AS OF 01:02 EST VITALS:    BP - (!) 161/109  HR - 81  TEMP - 97.6 °F (36.4 °C) (Tympanic)  O2 SATS - 96%    COMPLEXITY OF CARE  Admission was considered but after careful review of the patient's presentation, physical examination, diagnostic results, and response to treatment the patient may be safely discharged with outpatient follow-up.      DIAGNOSIS  Final diagnoses:   Difficulty in walking   Extremity edema   Dyspnea, unspecified type         DISPOSITION  ED Disposition       ED Disposition   Discharge    Condition   Stable    Comment   --                Please note that portions of this document were completed with a voice recognition program.    Note Disclaimer: At [facility], we believe that  sharing information builds trust and better relationships. You are receiving this note because you recently visited [facility]. It is possible you will see health information before a provider has talked with you about it. This kind of information can be easy to misunderstand. To help you fully understand what it means for your health, we urge you to discuss this note with your provider.         José Miguel Blanton MD  12/24/24 1413       José Miguel Blanton MD  12/25/24 0109

## 2024-12-30 ENCOUNTER — TELEPHONE (OUTPATIENT)
Dept: CARDIOLOGY | Facility: HOSPITAL | Age: 69
End: 2024-12-30
Payer: MEDICARE

## 2024-12-30 NOTE — TELEPHONE ENCOUNTER
I called & spoke with Danyel as a follow up on his status and proceeding with Watchman evaluation. He is home from rehab and he states he is doing somewhat better. He is interested in getting an appt with Dr Ewing and discussing further. I encouraged him to have his wife and/or his son with him for the appt. He currently does not have any questions. Educational material was provided to pt back in August & I am resending that info to his home. Appt with Dr Ewing has been requested. RTRN

## 2025-01-01 ENCOUNTER — APPOINTMENT (OUTPATIENT)
Dept: GENERAL RADIOLOGY | Facility: HOSPITAL | Age: 70
DRG: 064 | End: 2025-01-01
Payer: MEDICARE

## 2025-01-01 ENCOUNTER — HOSPITAL ENCOUNTER (INPATIENT)
Facility: HOSPITAL | Age: 70
LOS: 4 days | DRG: 064 | End: 2025-01-11
Attending: EMERGENCY MEDICINE | Admitting: INTERNAL MEDICINE
Payer: MEDICARE

## 2025-01-01 ENCOUNTER — APPOINTMENT (OUTPATIENT)
Dept: CT IMAGING | Facility: HOSPITAL | Age: 70
DRG: 064 | End: 2025-01-01
Payer: MEDICARE

## 2025-01-01 ENCOUNTER — PATIENT OUTREACH (OUTPATIENT)
Dept: CASE MANAGEMENT | Facility: OTHER | Age: 70
End: 2025-01-01
Payer: MEDICARE

## 2025-01-01 ENCOUNTER — HOSPITAL ENCOUNTER (INPATIENT)
Facility: HOSPITAL | Age: 70
LOS: 2 days | End: 2025-01-13
Attending: STUDENT IN AN ORGANIZED HEALTH CARE EDUCATION/TRAINING PROGRAM | Admitting: STUDENT IN AN ORGANIZED HEALTH CARE EDUCATION/TRAINING PROGRAM
Payer: COMMERCIAL

## 2025-01-01 VITALS
TEMPERATURE: 98.2 F | SYSTOLIC BLOOD PRESSURE: 121 MMHG | OXYGEN SATURATION: 95 % | HEART RATE: 86 BPM | WEIGHT: 221.78 LBS | BODY MASS INDEX: 31.75 KG/M2 | RESPIRATION RATE: 18 BRPM | HEIGHT: 70 IN | DIASTOLIC BLOOD PRESSURE: 83 MMHG

## 2025-01-01 VITALS — TEMPERATURE: 100.1 F | SYSTOLIC BLOOD PRESSURE: 110 MMHG | DIASTOLIC BLOOD PRESSURE: 74 MMHG | OXYGEN SATURATION: 54 %

## 2025-01-01 DIAGNOSIS — I63.9 ACUTE CVA (CEREBROVASCULAR ACCIDENT): Primary | ICD-10-CM

## 2025-01-01 DIAGNOSIS — I48.11 LONGSTANDING PERSISTENT ATRIAL FIBRILLATION: ICD-10-CM

## 2025-01-01 LAB
ABO GROUP BLD: NORMAL
ALBUMIN SERPL-MCNC: 3.5 G/DL (ref 3.5–5.2)
ALBUMIN SERPL-MCNC: 3.6 G/DL (ref 3.5–5.2)
ALBUMIN/GLOB SERPL: 1.1 G/DL
ALBUMIN/GLOB SERPL: 1.1 G/DL
ALP SERPL-CCNC: 114 U/L (ref 39–117)
ALP SERPL-CCNC: 119 U/L (ref 39–117)
ALT SERPL W P-5'-P-CCNC: 12 U/L (ref 1–41)
ALT SERPL W P-5'-P-CCNC: 12 U/L (ref 1–41)
ANION GAP SERPL CALCULATED.3IONS-SCNC: 12 MMOL/L (ref 5–15)
ANION GAP SERPL CALCULATED.3IONS-SCNC: 7 MMOL/L (ref 5–15)
ANION GAP SERPL CALCULATED.3IONS-SCNC: 7.9 MMOL/L (ref 5–15)
ANION GAP SERPL CALCULATED.3IONS-SCNC: 9 MMOL/L (ref 5–15)
APTT PPP: 26.2 SECONDS (ref 22.7–35.4)
ARTERIAL PATENCY WRIST A: POSITIVE
AST SERPL-CCNC: 13 U/L (ref 1–40)
AST SERPL-CCNC: 15 U/L (ref 1–40)
ATMOSPHERIC PRESS: 756.3 MMHG
ATMOSPHERIC PRESS: 761.1 MMHG
BASE EXCESS BLDA CALC-SCNC: 2.3 MMOL/L (ref 0–2)
BASE EXCESS BLDV CALC-SCNC: 0.6 MMOL/L (ref -2–2)
BASOPHILS # BLD AUTO: 0.03 10*3/MM3 (ref 0–0.2)
BASOPHILS NFR BLD AUTO: 0.4 % (ref 0–1.5)
BDY SITE: ABNORMAL
BILIRUB SERPL-MCNC: 0.6 MG/DL (ref 0–1.2)
BILIRUB SERPL-MCNC: 0.7 MG/DL (ref 0–1.2)
BLD GP AB SCN SERPL QL: NEGATIVE
BUN SERPL-MCNC: 11 MG/DL (ref 8–23)
BUN SERPL-MCNC: 12 MG/DL (ref 8–23)
BUN SERPL-MCNC: 14 MG/DL (ref 8–23)
BUN SERPL-MCNC: 15 MG/DL (ref 8–23)
BUN/CREAT SERPL: 14.7 (ref 7–25)
BUN/CREAT SERPL: 17.9 (ref 7–25)
BUN/CREAT SERPL: 19.2 (ref 7–25)
BUN/CREAT SERPL: 23 (ref 7–25)
CALCIUM SPEC-SCNC: 9.3 MG/DL (ref 8.6–10.5)
CALCIUM SPEC-SCNC: 9.4 MG/DL (ref 8.6–10.5)
CHLORIDE SERPL-SCNC: 104 MMOL/L (ref 98–107)
CHLORIDE SERPL-SCNC: 105 MMOL/L (ref 98–107)
CHLORIDE SERPL-SCNC: 106 MMOL/L (ref 98–107)
CHLORIDE SERPL-SCNC: 107 MMOL/L (ref 98–107)
CHOLEST SERPL-MCNC: 122 MG/DL (ref 0–200)
CO2 BLDA-SCNC: 27 MMOL/L (ref 23–27)
CO2 BLDA-SCNC: 30.3 MMOL/L (ref 23–27)
CO2 SERPL-SCNC: 24 MMOL/L (ref 22–29)
CO2 SERPL-SCNC: 24.1 MMOL/L (ref 22–29)
CO2 SERPL-SCNC: 25 MMOL/L (ref 22–29)
CO2 SERPL-SCNC: 26 MMOL/L (ref 22–29)
CREAT SERPL-MCNC: 0.61 MG/DL (ref 0.76–1.27)
CREAT SERPL-MCNC: 0.67 MG/DL (ref 0.76–1.27)
CREAT SERPL-MCNC: 0.75 MG/DL (ref 0.76–1.27)
CREAT SERPL-MCNC: 0.78 MG/DL (ref 0.76–1.27)
DEPRECATED RDW RBC AUTO: 38.8 FL (ref 37–54)
DEPRECATED RDW RBC AUTO: 39.2 FL (ref 37–54)
DEPRECATED RDW RBC AUTO: 39.6 FL (ref 37–54)
DEPRECATED RDW RBC AUTO: 40 FL (ref 37–54)
DEVICE COMMENT: ABNORMAL
DEVICE COMMENT: NORMAL
EGFRCR SERPLBLD CKD-EPI 2021: 101.7 ML/MIN/1.73
EGFRCR SERPLBLD CKD-EPI 2021: 104.6 ML/MIN/1.73
EGFRCR SERPLBLD CKD-EPI 2021: 97.1 ML/MIN/1.73
EGFRCR SERPLBLD CKD-EPI 2021: 98.3 ML/MIN/1.73
EOSINOPHIL # BLD AUTO: 0.22 10*3/MM3 (ref 0–0.4)
EOSINOPHIL NFR BLD AUTO: 2.8 % (ref 0.3–6.2)
ERYTHROCYTE [DISTWIDTH] IN BLOOD BY AUTOMATED COUNT: 12.8 % (ref 12.3–15.4)
ERYTHROCYTE [DISTWIDTH] IN BLOOD BY AUTOMATED COUNT: 13 % (ref 12.3–15.4)
ERYTHROCYTE [DISTWIDTH] IN BLOOD BY AUTOMATED COUNT: 13 % (ref 12.3–15.4)
ERYTHROCYTE [DISTWIDTH] IN BLOOD BY AUTOMATED COUNT: 13.1 % (ref 12.3–15.4)
GAS FLOW AIRWAY: 2 LPM
GEN 5 1HR TROPONIN T REFLEX: 16 NG/L
GLOBULIN UR ELPH-MCNC: 3.2 GM/DL
GLOBULIN UR ELPH-MCNC: 3.4 GM/DL
GLUCOSE BLDC GLUCOMTR-MCNC: 100 MG/DL (ref 70–130)
GLUCOSE BLDC GLUCOMTR-MCNC: 78 MG/DL (ref 70–130)
GLUCOSE BLDC GLUCOMTR-MCNC: 80 MG/DL (ref 70–130)
GLUCOSE BLDC GLUCOMTR-MCNC: 85 MG/DL (ref 70–130)
GLUCOSE BLDC GLUCOMTR-MCNC: 89 MG/DL (ref 70–130)
GLUCOSE BLDC GLUCOMTR-MCNC: 93 MG/DL (ref 70–130)
GLUCOSE BLDC GLUCOMTR-MCNC: 93 MG/DL (ref 70–130)
GLUCOSE BLDC GLUCOMTR-MCNC: 94 MG/DL (ref 70–130)
GLUCOSE BLDC GLUCOMTR-MCNC: 95 MG/DL (ref 70–130)
GLUCOSE BLDC GLUCOMTR-MCNC: 97 MG/DL (ref 70–130)
GLUCOSE BLDC GLUCOMTR-MCNC: 97 MG/DL (ref 70–130)
GLUCOSE BLDC GLUCOMTR-MCNC: 98 MG/DL (ref 70–130)
GLUCOSE SERPL-MCNC: 81 MG/DL (ref 65–99)
GLUCOSE SERPL-MCNC: 84 MG/DL (ref 65–99)
GLUCOSE SERPL-MCNC: 88 MG/DL (ref 65–99)
GLUCOSE SERPL-MCNC: 91 MG/DL (ref 65–99)
HBA1C MFR BLD: 5.8 % (ref 4.8–5.6)
HCO3 BLDA-SCNC: 28.7 MMOL/L (ref 22–28)
HCO3 BLDV-SCNC: 25.7 MMOL/L (ref 22–28)
HCT VFR BLD AUTO: 42.2 % (ref 37.5–51)
HCT VFR BLD AUTO: 42.4 % (ref 37.5–51)
HCT VFR BLD AUTO: 43.4 % (ref 37.5–51)
HCT VFR BLD AUTO: 44.2 % (ref 37.5–51)
HDLC SERPL-MCNC: 47 MG/DL (ref 40–60)
HEMODILUTION: NO
HGB BLD-MCNC: 13.7 G/DL (ref 13–17.7)
HGB BLD-MCNC: 13.7 G/DL (ref 13–17.7)
HGB BLD-MCNC: 14.4 G/DL (ref 13–17.7)
HGB BLD-MCNC: 14.5 G/DL (ref 13–17.7)
HOLD SPECIMEN: NORMAL
HOLD SPECIMEN: NORMAL
IMM GRANULOCYTES # BLD AUTO: 0.01 10*3/MM3 (ref 0–0.05)
IMM GRANULOCYTES NFR BLD AUTO: 0.1 % (ref 0–0.5)
INHALED O2 CONCENTRATION: 21 %
INR PPP: 1.08 (ref 0.9–1.1)
LDLC SERPL CALC-MCNC: 64 MG/DL (ref 0–100)
LDLC/HDLC SERPL: 1.39 {RATIO}
LYMPHOCYTES # BLD AUTO: 1.56 10*3/MM3 (ref 0.7–3.1)
LYMPHOCYTES NFR BLD AUTO: 19.6 % (ref 19.6–45.3)
MAGNESIUM SERPL-MCNC: 2.1 MG/DL (ref 1.6–2.4)
MAGNESIUM SERPL-MCNC: 2.2 MG/DL (ref 1.6–2.4)
MCH RBC QN AUTO: 27.5 PG (ref 26.6–33)
MCH RBC QN AUTO: 27.7 PG (ref 26.6–33)
MCHC RBC AUTO-ENTMCNC: 32.3 G/DL (ref 31.5–35.7)
MCHC RBC AUTO-ENTMCNC: 32.5 G/DL (ref 31.5–35.7)
MCHC RBC AUTO-ENTMCNC: 32.8 G/DL (ref 31.5–35.7)
MCHC RBC AUTO-ENTMCNC: 33.2 G/DL (ref 31.5–35.7)
MCV RBC AUTO: 83.6 FL (ref 79–97)
MCV RBC AUTO: 84.4 FL (ref 79–97)
MCV RBC AUTO: 85 FL (ref 79–97)
MCV RBC AUTO: 85.3 FL (ref 79–97)
MODALITY: ABNORMAL
MODALITY: NORMAL
MONOCYTES # BLD AUTO: 0.56 10*3/MM3 (ref 0.1–0.9)
MONOCYTES NFR BLD AUTO: 7.1 % (ref 5–12)
NEUTROPHILS NFR BLD AUTO: 5.56 10*3/MM3 (ref 1.7–7)
NEUTROPHILS NFR BLD AUTO: 70 % (ref 42.7–76)
NRBC BLD AUTO-RTO: 0 /100 WBC (ref 0–0.2)
NT-PROBNP SERPL-MCNC: 2452 PG/ML (ref 0–900)
PCO2 BLDA: 50.8 MM HG (ref 35–45)
PCO2 BLDV: 42.1 MM HG (ref 41–51)
PH BLDA: 7.36 PH UNITS (ref 7.35–7.45)
PH BLDV: 7.39 PH UNITS (ref 7.31–7.41)
PLATELET # BLD AUTO: 217 10*3/MM3 (ref 140–450)
PLATELET # BLD AUTO: 218 10*3/MM3 (ref 140–450)
PLATELET # BLD AUTO: 228 10*3/MM3 (ref 140–450)
PLATELET # BLD AUTO: 240 10*3/MM3 (ref 140–450)
PMV BLD AUTO: 10.4 FL (ref 6–12)
PMV BLD AUTO: 9.7 FL (ref 6–12)
PMV BLD AUTO: 9.8 FL (ref 6–12)
PMV BLD AUTO: 9.9 FL (ref 6–12)
PO2 BLDA: 80.1 MM HG (ref 80–100)
PO2 BLDV: 40.4 MM HG (ref 35–45)
POTASSIUM SERPL-SCNC: 3.7 MMOL/L (ref 3.5–5.2)
POTASSIUM SERPL-SCNC: 4 MMOL/L (ref 3.5–5.2)
POTASSIUM SERPL-SCNC: 4 MMOL/L (ref 3.5–5.2)
POTASSIUM SERPL-SCNC: 4.2 MMOL/L (ref 3.5–5.2)
PROT SERPL-MCNC: 6.7 G/DL (ref 6–8.5)
PROT SERPL-MCNC: 7 G/DL (ref 6–8.5)
PROTHROMBIN TIME: 14.2 SECONDS (ref 11.7–14.2)
QT INTERVAL: 323 MS
QT INTERVAL: 329 MS
QTC INTERVAL: 444 MS
QTC INTERVAL: 470 MS
RBC # BLD AUTO: 4.95 10*6/MM3 (ref 4.14–5.8)
RBC # BLD AUTO: 4.99 10*6/MM3 (ref 4.14–5.8)
RBC # BLD AUTO: 5.19 10*6/MM3 (ref 4.14–5.8)
RBC # BLD AUTO: 5.24 10*6/MM3 (ref 4.14–5.8)
RH BLD: POSITIVE
SAO2 % BLDCOA: 95 % (ref 92–98.5)
SAO2 % BLDCOV: 75 % (ref 45–75)
SET MECH RESP RATE: 36
SODIUM SERPL-SCNC: 138 MMOL/L (ref 136–145)
SODIUM SERPL-SCNC: 138 MMOL/L (ref 136–145)
SODIUM SERPL-SCNC: 139 MMOL/L (ref 136–145)
SODIUM SERPL-SCNC: 142 MMOL/L (ref 136–145)
T&S EXPIRATION DATE: NORMAL
T3FREE SERPL-MCNC: 3.04 PG/ML (ref 2–4.4)
T4 FREE SERPL-MCNC: 1.63 NG/DL (ref 0.92–1.68)
TOTAL RATE: 22 BREATHS/MINUTE
TRIGL SERPL-MCNC: 49 MG/DL (ref 0–150)
TROPONIN T NUMERIC DELTA: 1 NG/L
TROPONIN T SERPL HS-MCNC: 15 NG/L
TSH SERPL DL<=0.05 MIU/L-ACNC: <0.005 UIU/ML (ref 0.27–4.2)
VLDLC SERPL-MCNC: 11 MG/DL (ref 5–40)
WBC NRBC COR # BLD AUTO: 10.79 10*3/MM3 (ref 3.4–10.8)
WBC NRBC COR # BLD AUTO: 7.94 10*3/MM3 (ref 3.4–10.8)
WBC NRBC COR # BLD AUTO: 8.96 10*3/MM3 (ref 3.4–10.8)
WBC NRBC COR # BLD AUTO: 9.38 10*3/MM3 (ref 3.4–10.8)
WHOLE BLOOD HOLD COAG: NORMAL
WHOLE BLOOD HOLD SPECIMEN: NORMAL

## 2025-01-01 PROCEDURE — 80061 LIPID PANEL: CPT | Performed by: INTERNAL MEDICINE

## 2025-01-01 PROCEDURE — 82803 BLOOD GASES ANY COMBINATION: CPT

## 2025-01-01 PROCEDURE — 85027 COMPLETE CBC AUTOMATED: CPT | Performed by: STUDENT IN AN ORGANIZED HEALTH CARE EDUCATION/TRAINING PROGRAM

## 2025-01-01 PROCEDURE — 83735 ASSAY OF MAGNESIUM: CPT

## 2025-01-01 PROCEDURE — 83735 ASSAY OF MAGNESIUM: CPT | Performed by: NURSE PRACTITIONER

## 2025-01-01 PROCEDURE — 85027 COMPLETE CBC AUTOMATED: CPT | Performed by: INTERNAL MEDICINE

## 2025-01-01 PROCEDURE — 25510000001 IOPAMIDOL PER 1 ML: Performed by: EMERGENCY MEDICINE

## 2025-01-01 PROCEDURE — 80053 COMPREHEN METABOLIC PANEL: CPT | Performed by: INTERNAL MEDICINE

## 2025-01-01 PROCEDURE — 94799 UNLISTED PULMONARY SVC/PX: CPT

## 2025-01-01 PROCEDURE — 94664 DEMO&/EVAL PT USE INHALER: CPT

## 2025-01-01 PROCEDURE — 99232 SBSQ HOSP IP/OBS MODERATE 35: CPT | Performed by: NURSE PRACTITIONER

## 2025-01-01 PROCEDURE — 25010000002 MORPHINE PER 10 MG: Performed by: STUDENT IN AN ORGANIZED HEALTH CARE EDUCATION/TRAINING PROGRAM

## 2025-01-01 PROCEDURE — 25010000002 LORAZEPAM PER 2 MG: Performed by: STUDENT IN AN ORGANIZED HEALTH CARE EDUCATION/TRAINING PROGRAM

## 2025-01-01 PROCEDURE — 71045 X-RAY EXAM CHEST 1 VIEW: CPT

## 2025-01-01 PROCEDURE — 82948 REAGENT STRIP/BLOOD GLUCOSE: CPT

## 2025-01-01 PROCEDURE — 84484 ASSAY OF TROPONIN QUANT: CPT

## 2025-01-01 PROCEDURE — 80048 BASIC METABOLIC PNL TOTAL CA: CPT | Performed by: STUDENT IN AN ORGANIZED HEALTH CARE EDUCATION/TRAINING PROGRAM

## 2025-01-01 PROCEDURE — 25010000002 LABETALOL 5 MG/ML SOLUTION: Performed by: STUDENT IN AN ORGANIZED HEALTH CARE EDUCATION/TRAINING PROGRAM

## 2025-01-01 PROCEDURE — 99233 SBSQ HOSP IP/OBS HIGH 50: CPT | Performed by: NURSE PRACTITIONER

## 2025-01-01 PROCEDURE — 93010 ELECTROCARDIOGRAM REPORT: CPT | Performed by: INTERNAL MEDICINE

## 2025-01-01 PROCEDURE — 25010000002 FUROSEMIDE PER 20 MG: Performed by: STUDENT IN AN ORGANIZED HEALTH CARE EDUCATION/TRAINING PROGRAM

## 2025-01-01 PROCEDURE — 94640 AIRWAY INHALATION TREATMENT: CPT

## 2025-01-01 PROCEDURE — 99222 1ST HOSP IP/OBS MODERATE 55: CPT

## 2025-01-01 PROCEDURE — 83880 ASSAY OF NATRIURETIC PEPTIDE: CPT | Performed by: STUDENT IN AN ORGANIZED HEALTH CARE EDUCATION/TRAINING PROGRAM

## 2025-01-01 PROCEDURE — 84443 ASSAY THYROID STIM HORMONE: CPT | Performed by: INTERNAL MEDICINE

## 2025-01-01 PROCEDURE — 80053 COMPREHEN METABOLIC PANEL: CPT | Performed by: EMERGENCY MEDICINE

## 2025-01-01 PROCEDURE — 25810000003 SODIUM CHLORIDE 0.9 % SOLUTION: Performed by: EMERGENCY MEDICINE

## 2025-01-01 PROCEDURE — 70498 CT ANGIOGRAPHY NECK: CPT

## 2025-01-01 PROCEDURE — 93005 ELECTROCARDIOGRAM TRACING: CPT

## 2025-01-01 PROCEDURE — 36600 WITHDRAWAL OF ARTERIAL BLOOD: CPT

## 2025-01-01 PROCEDURE — 0042T HC CT CEREBRAL PERFUSION W/WO CONTRAST: CPT

## 2025-01-01 PROCEDURE — 25010000002 GLYCOPYRROLATE 0.2 MG/ML SOLUTION: Performed by: STUDENT IN AN ORGANIZED HEALTH CARE EDUCATION/TRAINING PROGRAM

## 2025-01-01 PROCEDURE — 70496 CT ANGIOGRAPHY HEAD: CPT

## 2025-01-01 PROCEDURE — 99222 1ST HOSP IP/OBS MODERATE 55: CPT | Performed by: INTERNAL MEDICINE

## 2025-01-01 PROCEDURE — 94760 N-INVAS EAR/PLS OXIMETRY 1: CPT

## 2025-01-01 PROCEDURE — 25010000002 HYDROMORPHONE 1 MG/ML SOLUTION: Performed by: STUDENT IN AN ORGANIZED HEALTH CARE EDUCATION/TRAINING PROGRAM

## 2025-01-01 PROCEDURE — 25810000003 SODIUM CHLORIDE 0.9 % SOLUTION: Performed by: INTERNAL MEDICINE

## 2025-01-01 PROCEDURE — 86850 RBC ANTIBODY SCREEN: CPT | Performed by: EMERGENCY MEDICINE

## 2025-01-01 PROCEDURE — 94761 N-INVAS EAR/PLS OXIMETRY MLT: CPT

## 2025-01-01 PROCEDURE — 86901 BLOOD TYPING SEROLOGIC RH(D): CPT | Performed by: EMERGENCY MEDICINE

## 2025-01-01 PROCEDURE — 86900 BLOOD TYPING SEROLOGIC ABO: CPT | Performed by: EMERGENCY MEDICINE

## 2025-01-01 PROCEDURE — 92610 EVALUATE SWALLOWING FUNCTION: CPT

## 2025-01-01 PROCEDURE — 85730 THROMBOPLASTIN TIME PARTIAL: CPT | Performed by: EMERGENCY MEDICINE

## 2025-01-01 PROCEDURE — 84439 ASSAY OF FREE THYROXINE: CPT | Performed by: STUDENT IN AN ORGANIZED HEALTH CARE EDUCATION/TRAINING PROGRAM

## 2025-01-01 PROCEDURE — 83036 HEMOGLOBIN GLYCOSYLATED A1C: CPT | Performed by: INTERNAL MEDICINE

## 2025-01-01 PROCEDURE — 93005 ELECTROCARDIOGRAM TRACING: CPT | Performed by: EMERGENCY MEDICINE

## 2025-01-01 PROCEDURE — 85025 COMPLETE CBC W/AUTO DIFF WBC: CPT | Performed by: EMERGENCY MEDICINE

## 2025-01-01 PROCEDURE — 84481 FREE ASSAY (FT-3): CPT | Performed by: STUDENT IN AN ORGANIZED HEALTH CARE EDUCATION/TRAINING PROGRAM

## 2025-01-01 PROCEDURE — 85610 PROTHROMBIN TIME: CPT | Performed by: EMERGENCY MEDICINE

## 2025-01-01 PROCEDURE — 36415 COLL VENOUS BLD VENIPUNCTURE: CPT | Performed by: INTERNAL MEDICINE

## 2025-01-01 PROCEDURE — 99291 CRITICAL CARE FIRST HOUR: CPT

## 2025-01-01 RX ORDER — TAMSULOSIN HYDROCHLORIDE 0.4 MG/1
0.4 CAPSULE ORAL NIGHTLY
Status: DISCONTINUED | OUTPATIENT
Start: 2025-01-01 | End: 2025-01-01

## 2025-01-01 RX ORDER — LORAZEPAM 2 MG/ML
2 CONCENTRATE ORAL
Status: CANCELLED | OUTPATIENT
Start: 2025-01-01 | End: 2025-01-15

## 2025-01-01 RX ORDER — GLYCOPYRROLATE 0.2 MG/ML
0.4 INJECTION INTRAMUSCULAR; INTRAVENOUS 4 TIMES DAILY PRN
Status: DISCONTINUED | OUTPATIENT
Start: 2025-01-01 | End: 2025-01-01 | Stop reason: HOSPADM

## 2025-01-01 RX ORDER — DIPHENOXYLATE HYDROCHLORIDE AND ATROPINE SULFATE 2.5; .025 MG/1; MG/1
1 TABLET ORAL
Status: DISCONTINUED | OUTPATIENT
Start: 2025-01-01 | End: 2025-01-01

## 2025-01-01 RX ORDER — LORAZEPAM 2 MG/ML
2 CONCENTRATE ORAL
Status: DISCONTINUED | OUTPATIENT
Start: 2025-01-01 | End: 2025-01-01 | Stop reason: HOSPADM

## 2025-01-01 RX ORDER — FUROSEMIDE 20 MG/1
20 TABLET ORAL DAILY
Status: DISCONTINUED | OUTPATIENT
Start: 2025-01-01 | End: 2025-01-01

## 2025-01-01 RX ORDER — BISACODYL 5 MG/1
5 TABLET, DELAYED RELEASE ORAL DAILY PRN
Status: DISCONTINUED | OUTPATIENT
Start: 2025-01-01 | End: 2025-01-01 | Stop reason: HOSPADM

## 2025-01-01 RX ORDER — LORAZEPAM 2 MG/ML
1 INJECTION INTRAMUSCULAR
Status: DISCONTINUED | OUTPATIENT
Start: 2025-01-01 | End: 2025-01-01 | Stop reason: HOSPADM

## 2025-01-01 RX ORDER — LORAZEPAM 2 MG/ML
2 INJECTION INTRAMUSCULAR
Status: DISCONTINUED | OUTPATIENT
Start: 2025-01-01 | End: 2025-01-01 | Stop reason: HOSPADM

## 2025-01-01 RX ORDER — ACETAMINOPHEN 650 MG/1
650 SUPPOSITORY RECTAL EVERY 4 HOURS PRN
Status: DISCONTINUED | OUTPATIENT
Start: 2025-01-01 | End: 2025-01-01 | Stop reason: HOSPADM

## 2025-01-01 RX ORDER — LORAZEPAM 2 MG/ML
0.5 INJECTION INTRAMUSCULAR
Status: CANCELLED | OUTPATIENT
Start: 2025-01-01 | End: 2025-01-15

## 2025-01-01 RX ORDER — LORAZEPAM 2 MG/ML
0.5 CONCENTRATE ORAL
Status: CANCELLED | OUTPATIENT
Start: 2025-01-01 | End: 2025-01-15

## 2025-01-01 RX ORDER — LORAZEPAM 2 MG/ML
1 INJECTION INTRAMUSCULAR
Status: CANCELLED | OUTPATIENT
Start: 2025-01-01 | End: 2025-01-15

## 2025-01-01 RX ORDER — DIPHENOXYLATE HYDROCHLORIDE AND ATROPINE SULFATE 2.5; .025 MG/1; MG/1
1 TABLET ORAL
Status: DISCONTINUED | OUTPATIENT
Start: 2025-01-01 | End: 2025-01-01 | Stop reason: HOSPADM

## 2025-01-01 RX ORDER — POLYETHYLENE GLYCOL 3350 17 G/17G
17 POWDER, FOR SOLUTION ORAL DAILY PRN
Status: DISCONTINUED | OUTPATIENT
Start: 2025-01-01 | End: 2025-01-01

## 2025-01-01 RX ORDER — BISACODYL 10 MG
10 SUPPOSITORY, RECTAL RECTAL DAILY PRN
Status: DISCONTINUED | OUTPATIENT
Start: 2025-01-01 | End: 2025-01-01 | Stop reason: HOSPADM

## 2025-01-01 RX ORDER — MORPHINE SULFATE 20 MG/ML
5 SOLUTION ORAL
Status: CANCELLED | OUTPATIENT
Start: 2025-01-01 | End: 2025-01-17

## 2025-01-01 RX ORDER — SODIUM CHLORIDE 0.9 % (FLUSH) 0.9 %
10 SYRINGE (ML) INJECTION AS NEEDED
Status: CANCELLED | OUTPATIENT
Start: 2025-01-01

## 2025-01-01 RX ORDER — LORAZEPAM 2 MG/ML
0.5 INJECTION INTRAMUSCULAR
Status: DISCONTINUED | OUTPATIENT
Start: 2025-01-01 | End: 2025-01-01 | Stop reason: HOSPADM

## 2025-01-01 RX ORDER — MORPHINE SULFATE 2 MG/ML
4 INJECTION, SOLUTION INTRAMUSCULAR; INTRAVENOUS
Status: DISCONTINUED | OUTPATIENT
Start: 2025-01-01 | End: 2025-01-01 | Stop reason: HOSPADM

## 2025-01-01 RX ORDER — MORPHINE SULFATE 2 MG/ML
6 INJECTION, SOLUTION INTRAMUSCULAR; INTRAVENOUS
Status: DISCONTINUED | OUTPATIENT
Start: 2025-01-01 | End: 2025-01-01 | Stop reason: HOSPADM

## 2025-01-01 RX ORDER — DIPHENOXYLATE HYDROCHLORIDE AND ATROPINE SULFATE 2.5; .025 MG/1; MG/1
1 TABLET ORAL
Status: CANCELLED | OUTPATIENT
Start: 2025-01-01

## 2025-01-01 RX ORDER — HYDROMORPHONE HYDROCHLORIDE 1 MG/ML
0.5 INJECTION, SOLUTION INTRAMUSCULAR; INTRAVENOUS; SUBCUTANEOUS
Status: CANCELLED | OUTPATIENT
Start: 2025-01-01 | End: 2025-01-17

## 2025-01-01 RX ORDER — ACETAMINOPHEN 650 MG/1
650 SUPPOSITORY RECTAL EVERY 4 HOURS PRN
Status: CANCELLED | OUTPATIENT
Start: 2025-01-01

## 2025-01-01 RX ORDER — MORPHINE SULFATE 20 MG/ML
5 SOLUTION ORAL
Status: DISCONTINUED | OUTPATIENT
Start: 2025-01-01 | End: 2025-01-01 | Stop reason: HOSPADM

## 2025-01-01 RX ORDER — IOPAMIDOL 755 MG/ML
150 INJECTION, SOLUTION INTRAVASCULAR
Status: COMPLETED | OUTPATIENT
Start: 2025-01-01 | End: 2025-01-01

## 2025-01-01 RX ORDER — MORPHINE SULFATE 10 MG/ML
6 INJECTION INTRAMUSCULAR; INTRAVENOUS; SUBCUTANEOUS
Status: CANCELLED | OUTPATIENT
Start: 2025-01-01 | End: 2025-01-17

## 2025-01-01 RX ORDER — CITALOPRAM HYDROBROMIDE 20 MG/1
20 TABLET ORAL DAILY
Status: DISCONTINUED | OUTPATIENT
Start: 2025-01-01 | End: 2025-01-01

## 2025-01-01 RX ORDER — GABAPENTIN 100 MG/1
100 CAPSULE ORAL EVERY MORNING
Status: DISCONTINUED | OUTPATIENT
Start: 2025-01-01 | End: 2025-01-01

## 2025-01-01 RX ORDER — AMOXICILLIN 250 MG
2 CAPSULE ORAL 2 TIMES DAILY PRN
Status: DISCONTINUED | OUTPATIENT
Start: 2025-01-01 | End: 2025-01-01 | Stop reason: HOSPADM

## 2025-01-01 RX ORDER — ASPIRIN 300 MG/1
300 SUPPOSITORY RECTAL DAILY
Status: DISCONTINUED | OUTPATIENT
Start: 2025-01-01 | End: 2025-01-01

## 2025-01-01 RX ORDER — ACETAMINOPHEN 325 MG/1
650 TABLET ORAL EVERY 4 HOURS PRN
Status: CANCELLED | OUTPATIENT
Start: 2025-01-01

## 2025-01-01 RX ORDER — SODIUM CHLORIDE 9 MG/ML
75 INJECTION, SOLUTION INTRAVENOUS CONTINUOUS
Status: DISCONTINUED | OUTPATIENT
Start: 2025-01-01 | End: 2025-01-01

## 2025-01-01 RX ORDER — ACETAMINOPHEN 325 MG/1
650 TABLET ORAL EVERY 4 HOURS PRN
Status: DISCONTINUED | OUTPATIENT
Start: 2025-01-01 | End: 2025-01-01 | Stop reason: HOSPADM

## 2025-01-01 RX ORDER — METOPROLOL TARTRATE 25 MG/1
25 TABLET, FILM COATED ORAL EVERY 12 HOURS SCHEDULED
Status: DISCONTINUED | OUTPATIENT
Start: 2025-01-01 | End: 2025-01-01

## 2025-01-01 RX ORDER — ASPIRIN 300 MG/1
300 SUPPOSITORY RECTAL ONCE
Status: COMPLETED | OUTPATIENT
Start: 2025-01-01 | End: 2025-01-01

## 2025-01-01 RX ORDER — ALBUTEROL SULFATE 0.83 MG/ML
2.5 SOLUTION RESPIRATORY (INHALATION) EVERY 6 HOURS PRN
Status: DISCONTINUED | OUTPATIENT
Start: 2025-01-01 | End: 2025-01-01

## 2025-01-01 RX ORDER — MORPHINE SULFATE 10 MG/ML
6 INJECTION INTRAMUSCULAR; INTRAVENOUS; SUBCUTANEOUS
Status: DISCONTINUED | OUTPATIENT
Start: 2025-01-01 | End: 2025-01-01 | Stop reason: HOSPADM

## 2025-01-01 RX ORDER — ACETAMINOPHEN 325 MG/1
650 TABLET ORAL EVERY 4 HOURS PRN
Status: DISCONTINUED | OUTPATIENT
Start: 2025-01-01 | End: 2025-01-01

## 2025-01-01 RX ORDER — LORAZEPAM 2 MG/ML
1 CONCENTRATE ORAL
Status: DISCONTINUED | OUTPATIENT
Start: 2025-01-01 | End: 2025-01-01 | Stop reason: HOSPADM

## 2025-01-01 RX ORDER — ASPIRIN 325 MG
325 TABLET ORAL DAILY
Status: DISCONTINUED | OUTPATIENT
Start: 2025-01-01 | End: 2025-01-01

## 2025-01-01 RX ORDER — LORAZEPAM 2 MG/ML
0.5 CONCENTRATE ORAL
Status: DISCONTINUED | OUTPATIENT
Start: 2025-01-01 | End: 2025-01-01 | Stop reason: HOSPADM

## 2025-01-01 RX ORDER — MORPHINE SULFATE 4 MG/ML
4 INJECTION, SOLUTION INTRAMUSCULAR; INTRAVENOUS
Status: DISCONTINUED | OUTPATIENT
Start: 2025-01-01 | End: 2025-01-01 | Stop reason: HOSPADM

## 2025-01-01 RX ORDER — ATORVASTATIN CALCIUM 80 MG/1
80 TABLET, FILM COATED ORAL NIGHTLY
Status: DISCONTINUED | OUTPATIENT
Start: 2025-01-01 | End: 2025-01-01

## 2025-01-01 RX ORDER — ASPIRIN 81 MG/1
81 TABLET, CHEWABLE ORAL DAILY
Status: DISCONTINUED | OUTPATIENT
Start: 2025-01-01 | End: 2025-01-01

## 2025-01-01 RX ORDER — SODIUM CHLORIDE 0.9 % (FLUSH) 0.9 %
10 SYRINGE (ML) INJECTION AS NEEDED
Status: DISCONTINUED | OUTPATIENT
Start: 2025-01-01 | End: 2025-01-01 | Stop reason: HOSPADM

## 2025-01-01 RX ORDER — ATORVASTATIN CALCIUM 20 MG/1
40 TABLET, FILM COATED ORAL NIGHTLY
Status: DISCONTINUED | OUTPATIENT
Start: 2025-01-01 | End: 2025-01-01

## 2025-01-01 RX ORDER — MORPHINE SULFATE 2 MG/ML
2 INJECTION, SOLUTION INTRAMUSCULAR; INTRAVENOUS
Status: DISCONTINUED | OUTPATIENT
Start: 2025-01-01 | End: 2025-01-01 | Stop reason: HOSPADM

## 2025-01-01 RX ORDER — ONDANSETRON 4 MG/1
4 TABLET, ORALLY DISINTEGRATING ORAL EVERY 6 HOURS PRN
Status: DISCONTINUED | OUTPATIENT
Start: 2025-01-01 | End: 2025-01-01

## 2025-01-01 RX ORDER — ACETAMINOPHEN 160 MG/5ML
650 SOLUTION ORAL EVERY 4 HOURS PRN
Status: DISCONTINUED | OUTPATIENT
Start: 2025-01-01 | End: 2025-01-01

## 2025-01-01 RX ORDER — LISINOPRIL 10 MG/1
5 TABLET ORAL
Status: DISCONTINUED | OUTPATIENT
Start: 2025-01-01 | End: 2025-01-01

## 2025-01-01 RX ORDER — AMOXICILLIN 250 MG
2 CAPSULE ORAL 2 TIMES DAILY PRN
Status: DISCONTINUED | OUTPATIENT
Start: 2025-01-01 | End: 2025-01-01

## 2025-01-01 RX ORDER — BUSPIRONE HYDROCHLORIDE 10 MG/1
10 TABLET ORAL 2 TIMES DAILY
Status: DISCONTINUED | OUTPATIENT
Start: 2025-01-01 | End: 2025-01-01 | Stop reason: HOSPADM

## 2025-01-01 RX ORDER — LORAZEPAM 2 MG/ML
0.5 INJECTION INTRAMUSCULAR EVERY 4 HOURS PRN
Status: DISCONTINUED | OUTPATIENT
Start: 2025-01-01 | End: 2025-01-01

## 2025-01-01 RX ORDER — FUROSEMIDE 10 MG/ML
40 INJECTION INTRAMUSCULAR; INTRAVENOUS DAILY
Status: DISCONTINUED | OUTPATIENT
Start: 2025-01-01 | End: 2025-01-01

## 2025-01-01 RX ORDER — ACETAMINOPHEN 325 MG/1
650 TABLET ORAL EVERY 4 HOURS PRN
Status: DISCONTINUED | OUTPATIENT
Start: 2025-01-01 | End: 2025-01-01 | Stop reason: SDUPTHER

## 2025-01-01 RX ORDER — BISACODYL 5 MG/1
5 TABLET, DELAYED RELEASE ORAL DAILY PRN
Status: CANCELLED | OUTPATIENT
Start: 2025-01-01

## 2025-01-01 RX ORDER — HYDROMORPHONE HYDROCHLORIDE 1 MG/ML
0.5 INJECTION, SOLUTION INTRAMUSCULAR; INTRAVENOUS; SUBCUTANEOUS
Status: DISCONTINUED | OUTPATIENT
Start: 2025-01-01 | End: 2025-01-01 | Stop reason: HOSPADM

## 2025-01-01 RX ORDER — ACETAMINOPHEN 160 MG/5ML
650 SOLUTION ORAL EVERY 4 HOURS PRN
Status: DISCONTINUED | OUTPATIENT
Start: 2025-01-01 | End: 2025-01-01 | Stop reason: SDUPTHER

## 2025-01-01 RX ORDER — LORAZEPAM 2 MG/ML
1 CONCENTRATE ORAL
Status: CANCELLED | OUTPATIENT
Start: 2025-01-01 | End: 2025-01-15

## 2025-01-01 RX ORDER — POLYETHYLENE GLYCOL 3350 17 G/17G
17 POWDER, FOR SOLUTION ORAL DAILY PRN
Status: DISCONTINUED | OUTPATIENT
Start: 2025-01-01 | End: 2025-01-01 | Stop reason: HOSPADM

## 2025-01-01 RX ORDER — ACETAMINOPHEN 650 MG/1
650 SUPPOSITORY RECTAL EVERY 4 HOURS PRN
Status: DISCONTINUED | OUTPATIENT
Start: 2025-01-01 | End: 2025-01-01 | Stop reason: SDUPTHER

## 2025-01-01 RX ORDER — AMOXICILLIN 250 MG
2 CAPSULE ORAL 2 TIMES DAILY PRN
Status: CANCELLED | OUTPATIENT
Start: 2025-01-01

## 2025-01-01 RX ORDER — LABETALOL HYDROCHLORIDE 5 MG/ML
20 INJECTION, SOLUTION INTRAVENOUS
Status: DISCONTINUED | OUTPATIENT
Start: 2025-01-01 | End: 2025-01-01

## 2025-01-01 RX ORDER — MORPHINE SULFATE 4 MG/ML
4 INJECTION, SOLUTION INTRAMUSCULAR; INTRAVENOUS
Status: CANCELLED | OUTPATIENT
Start: 2025-01-01 | End: 2025-01-17

## 2025-01-01 RX ORDER — LORAZEPAM 2 MG/ML
2 INJECTION INTRAMUSCULAR
Status: CANCELLED | OUTPATIENT
Start: 2025-01-01 | End: 2025-01-15

## 2025-01-01 RX ORDER — ACETAMINOPHEN 160 MG/5ML
650 SOLUTION ORAL EVERY 4 HOURS PRN
Status: DISCONTINUED | OUTPATIENT
Start: 2025-01-01 | End: 2025-01-01 | Stop reason: HOSPADM

## 2025-01-01 RX ORDER — MORPHINE SULFATE 2 MG/ML
2 INJECTION, SOLUTION INTRAMUSCULAR; INTRAVENOUS
Status: CANCELLED | OUTPATIENT
Start: 2025-01-01 | End: 2025-01-17

## 2025-01-01 RX ORDER — ACETAMINOPHEN 650 MG/1
650 SUPPOSITORY RECTAL EVERY 4 HOURS PRN
Status: DISCONTINUED | OUTPATIENT
Start: 2025-01-01 | End: 2025-01-01

## 2025-01-01 RX ORDER — IPRATROPIUM BROMIDE AND ALBUTEROL SULFATE 2.5; .5 MG/3ML; MG/3ML
3 SOLUTION RESPIRATORY (INHALATION)
Status: DISCONTINUED | OUTPATIENT
Start: 2025-01-01 | End: 2025-01-01

## 2025-01-01 RX ORDER — BISACODYL 5 MG/1
5 TABLET, DELAYED RELEASE ORAL DAILY PRN
Status: DISCONTINUED | OUTPATIENT
Start: 2025-01-01 | End: 2025-01-01

## 2025-01-01 RX ORDER — BISACODYL 10 MG
10 SUPPOSITORY, RECTAL RECTAL DAILY PRN
Status: CANCELLED | OUTPATIENT
Start: 2025-01-01

## 2025-01-01 RX ORDER — GABAPENTIN 300 MG/1
300 CAPSULE ORAL NIGHTLY
Status: DISCONTINUED | OUTPATIENT
Start: 2025-01-01 | End: 2025-01-01

## 2025-01-01 RX ORDER — POLYETHYLENE GLYCOL 3350 17 G/17G
17 POWDER, FOR SOLUTION ORAL DAILY PRN
Status: CANCELLED | OUTPATIENT
Start: 2025-01-01

## 2025-01-01 RX ORDER — ACETAMINOPHEN 160 MG/5ML
650 SOLUTION ORAL EVERY 4 HOURS PRN
Status: CANCELLED | OUTPATIENT
Start: 2025-01-01

## 2025-01-01 RX ORDER — PANTOPRAZOLE SODIUM 40 MG/1
40 TABLET, DELAYED RELEASE ORAL
Status: DISCONTINUED | OUTPATIENT
Start: 2025-01-01 | End: 2025-01-01

## 2025-01-01 RX ORDER — BISACODYL 10 MG
10 SUPPOSITORY, RECTAL RECTAL DAILY PRN
Status: DISCONTINUED | OUTPATIENT
Start: 2025-01-01 | End: 2025-01-01

## 2025-01-01 RX ORDER — ONDANSETRON 2 MG/ML
4 INJECTION INTRAMUSCULAR; INTRAVENOUS EVERY 6 HOURS PRN
Status: DISCONTINUED | OUTPATIENT
Start: 2025-01-01 | End: 2025-01-01 | Stop reason: SDUPTHER

## 2025-01-01 RX ORDER — ONDANSETRON 2 MG/ML
4 INJECTION INTRAMUSCULAR; INTRAVENOUS EVERY 6 HOURS PRN
Status: DISCONTINUED | OUTPATIENT
Start: 2025-01-01 | End: 2025-01-01

## 2025-01-01 RX ADMIN — IPRATROPIUM BROMIDE AND ALBUTEROL SULFATE 3 ML: 2.5; .5 SOLUTION RESPIRATORY (INHALATION) at 09:10

## 2025-01-01 RX ADMIN — LORAZEPAM 0.5 MG: 2 INJECTION INTRAMUSCULAR; INTRAVENOUS at 03:35

## 2025-01-01 RX ADMIN — METOPROLOL TARTRATE 5 MG: 1 INJECTION, SOLUTION INTRAVENOUS at 16:52

## 2025-01-01 RX ADMIN — IPRATROPIUM BROMIDE AND ALBUTEROL SULFATE 3 ML: 2.5; .5 SOLUTION RESPIRATORY (INHALATION) at 12:37

## 2025-01-01 RX ADMIN — METOPROLOL TARTRATE 5 MG: 1 INJECTION, SOLUTION INTRAVENOUS at 23:12

## 2025-01-01 RX ADMIN — IPRATROPIUM BROMIDE AND ALBUTEROL SULFATE 3 ML: 2.5; .5 SOLUTION RESPIRATORY (INHALATION) at 21:28

## 2025-01-01 RX ADMIN — LORAZEPAM 1 MG: 2 INJECTION INTRAMUSCULAR; INTRAVENOUS at 09:03

## 2025-01-01 RX ADMIN — LABETALOL HYDROCHLORIDE 20 MG: 5 INJECTION, SOLUTION INTRAVENOUS at 17:50

## 2025-01-01 RX ADMIN — LORAZEPAM 2 MG: 2 INJECTION INTRAMUSCULAR; INTRAVENOUS at 20:22

## 2025-01-01 RX ADMIN — LABETALOL HYDROCHLORIDE 20 MG: 5 INJECTION, SOLUTION INTRAVENOUS at 07:50

## 2025-01-01 RX ADMIN — LORAZEPAM 0.5 MG: 2 INJECTION INTRAMUSCULAR; INTRAVENOUS at 22:42

## 2025-01-01 RX ADMIN — LORAZEPAM 2 MG: 2 INJECTION INTRAMUSCULAR; INTRAVENOUS at 00:52

## 2025-01-01 RX ADMIN — LORAZEPAM 0.5 MG: 2 INJECTION INTRAMUSCULAR; INTRAVENOUS at 05:14

## 2025-01-01 RX ADMIN — MORPHINE SULFATE 2 MG: 2 INJECTION, SOLUTION INTRAMUSCULAR; INTRAVENOUS at 15:54

## 2025-01-01 RX ADMIN — MORPHINE SULFATE 4 MG: 4 INJECTION, SOLUTION INTRAMUSCULAR; INTRAVENOUS at 16:13

## 2025-01-01 RX ADMIN — IPRATROPIUM BROMIDE AND ALBUTEROL SULFATE 3 ML: 2.5; .5 SOLUTION RESPIRATORY (INHALATION) at 15:30

## 2025-01-01 RX ADMIN — LORAZEPAM 2 MG: 2 INJECTION INTRAMUSCULAR; INTRAVENOUS at 16:13

## 2025-01-01 RX ADMIN — MORPHINE SULFATE 4 MG: 4 INJECTION, SOLUTION INTRAMUSCULAR; INTRAVENOUS at 20:23

## 2025-01-01 RX ADMIN — GLYCOPYRROLATE 0.4 MG: 0.2 INJECTION INTRAMUSCULAR; INTRAVENOUS at 16:13

## 2025-01-01 RX ADMIN — LORAZEPAM 1 MG: 2 INJECTION INTRAMUSCULAR; INTRAVENOUS at 05:10

## 2025-01-01 RX ADMIN — HYDROMORPHONE HYDROCHLORIDE 1 MG: 1 INJECTION, SOLUTION INTRAMUSCULAR; INTRAVENOUS; SUBCUTANEOUS at 00:52

## 2025-01-01 RX ADMIN — LORAZEPAM 1 MG: 2 INJECTION INTRAMUSCULAR; INTRAVENOUS at 12:38

## 2025-01-01 RX ADMIN — MORPHINE SULFATE 2 MG: 2 INJECTION, SOLUTION INTRAMUSCULAR; INTRAVENOUS at 18:42

## 2025-01-01 RX ADMIN — GLYCOPYRROLATE 0.4 MG: 0.2 INJECTION INTRAMUSCULAR; INTRAVENOUS at 22:51

## 2025-01-01 RX ADMIN — MORPHINE SULFATE 4 MG: 4 INJECTION, SOLUTION INTRAMUSCULAR; INTRAVENOUS at 01:12

## 2025-01-01 RX ADMIN — LORAZEPAM 0.5 MG: 2 INJECTION INTRAMUSCULAR; INTRAVENOUS at 18:42

## 2025-01-01 RX ADMIN — METOPROLOL TARTRATE 5 MG: 1 INJECTION, SOLUTION INTRAVENOUS at 00:00

## 2025-01-01 RX ADMIN — MORPHINE SULFATE 4 MG: 4 INJECTION, SOLUTION INTRAMUSCULAR; INTRAVENOUS at 21:08

## 2025-01-01 RX ADMIN — MORPHINE SULFATE 2 MG: 2 INJECTION, SOLUTION INTRAMUSCULAR; INTRAVENOUS at 09:03

## 2025-01-01 RX ADMIN — MORPHINE SULFATE 4 MG: 4 INJECTION, SOLUTION INTRAMUSCULAR; INTRAVENOUS at 05:10

## 2025-01-01 RX ADMIN — FUROSEMIDE 40 MG: 10 INJECTION, SOLUTION INTRAMUSCULAR; INTRAVENOUS at 12:33

## 2025-01-01 RX ADMIN — LORAZEPAM 1 MG: 2 INJECTION INTRAMUSCULAR; INTRAVENOUS at 08:38

## 2025-01-01 RX ADMIN — LORAZEPAM 1 MG: 2 INJECTION INTRAMUSCULAR; INTRAVENOUS at 10:28

## 2025-01-01 RX ADMIN — LORAZEPAM 1 MG: 2 INJECTION INTRAMUSCULAR; INTRAVENOUS at 01:12

## 2025-01-01 RX ADMIN — IOPAMIDOL 150 ML: 755 INJECTION, SOLUTION INTRAVENOUS at 14:59

## 2025-01-01 RX ADMIN — ASPIRIN 300 MG: 300 SUPPOSITORY RECTAL at 15:49

## 2025-01-01 RX ADMIN — LORAZEPAM 2 MG: 2 INJECTION INTRAMUSCULAR; INTRAVENOUS at 12:27

## 2025-01-01 RX ADMIN — MORPHINE SULFATE 4 MG: 4 INJECTION, SOLUTION INTRAMUSCULAR; INTRAVENOUS at 12:27

## 2025-01-01 RX ADMIN — MORPHINE SULFATE 4 MG: 4 INJECTION, SOLUTION INTRAMUSCULAR; INTRAVENOUS at 08:38

## 2025-01-01 RX ADMIN — LORAZEPAM 0.5 MG: 2 INJECTION INTRAMUSCULAR; INTRAVENOUS at 16:51

## 2025-01-01 RX ADMIN — MORPHINE SULFATE 2 MG: 2 INJECTION, SOLUTION INTRAMUSCULAR; INTRAVENOUS at 10:28

## 2025-01-01 RX ADMIN — MORPHINE SULFATE 4 MG: 4 INJECTION, SOLUTION INTRAMUSCULAR; INTRAVENOUS at 16:50

## 2025-01-01 RX ADMIN — MORPHINE SULFATE 2 MG: 2 INJECTION, SOLUTION INTRAMUSCULAR; INTRAVENOUS at 05:14

## 2025-01-01 RX ADMIN — GLYCOPYRROLATE 0.4 MG: 0.2 INJECTION INTRAMUSCULAR; INTRAVENOUS at 08:37

## 2025-01-01 RX ADMIN — SODIUM CHLORIDE 500 ML: 9 INJECTION, SOLUTION INTRAVENOUS at 15:05

## 2025-01-01 RX ADMIN — IPRATROPIUM BROMIDE AND ALBUTEROL SULFATE 3 ML: 2.5; .5 SOLUTION RESPIRATORY (INHALATION) at 07:03

## 2025-01-01 RX ADMIN — IPRATROPIUM BROMIDE AND ALBUTEROL SULFATE 3 ML: 2.5; .5 SOLUTION RESPIRATORY (INHALATION) at 10:52

## 2025-01-01 RX ADMIN — ASPIRIN 300 MG: 300 SUPPOSITORY RECTAL at 09:05

## 2025-01-01 RX ADMIN — FUROSEMIDE 40 MG: 10 INJECTION, SOLUTION INTRAMUSCULAR; INTRAVENOUS at 09:16

## 2025-01-01 RX ADMIN — SODIUM CHLORIDE 75 ML/HR: 9 INJECTION, SOLUTION INTRAVENOUS at 02:29

## 2025-01-01 RX ADMIN — LORAZEPAM 1 MG: 2 INJECTION INTRAMUSCULAR; INTRAVENOUS at 16:50

## 2025-01-01 RX ADMIN — METOPROLOL TARTRATE 5 MG: 1 INJECTION, SOLUTION INTRAVENOUS at 19:47

## 2025-01-01 RX ADMIN — SODIUM CHLORIDE 75 ML/HR: 9 INJECTION, SOLUTION INTRAVENOUS at 20:19

## 2025-01-01 RX ADMIN — LORAZEPAM 1 MG: 2 INJECTION INTRAMUSCULAR; INTRAVENOUS at 21:08

## 2025-01-01 RX ADMIN — LORAZEPAM 0.5 MG: 2 INJECTION INTRAMUSCULAR; INTRAVENOUS at 09:05

## 2025-01-01 RX ADMIN — MORPHINE SULFATE 4 MG: 4 INJECTION, SOLUTION INTRAMUSCULAR; INTRAVENOUS at 12:38

## 2025-01-03 ENCOUNTER — PATIENT OUTREACH (OUTPATIENT)
Dept: CASE MANAGEMENT | Facility: OTHER | Age: 70
End: 2025-01-03
Payer: MEDICARE

## 2025-01-03 NOTE — OUTREACH NOTE
"AMBULATORY CASE MANAGEMENT NOTE    Names and Relationships of Patient/Support Persons: Contact: Flo Manzo \"Danyel\"; Relationship: Self -     Patient Outreach    Spoke with patient's wife. They have not heard from PCP or home health. Appreciative of the follow up.     Care Coordination    Sent communication to Abraham MILLARD requesting an update on the home health request. Follow up scheduled next week.     Education Documentation  No documentation found.        Gretel FRAGA  Ambulatory Case Management    1/3/2025, 17:24 EST  "

## 2025-01-07 PROBLEM — I63.9 ACUTE CVA (CEREBROVASCULAR ACCIDENT): Status: ACTIVE | Noted: 2025-01-01

## 2025-01-07 PROBLEM — I63.9 STROKE: Status: ACTIVE | Noted: 2025-01-01

## 2025-01-07 NOTE — ED TRIAGE NOTES
"Pt arrives via EMS from home for stroke alert: Seen \"normal\" at 2000 last night by son who routinely comes to visit and care for patient, visited today and observe gross changes to patient since last seen. AxO currently; normally AOx4. R Pupil 3, Left was 5mm & non reactive per EMS. Difficaulty following directions with EMS, some expressive aphasia, moderate dysarthria, and only gaze to the left.   "

## 2025-01-07 NOTE — ED PROVIDER NOTES
EMERGENCY DEPARTMENT ENCOUNTER    Room Number:  26/26  Date of encounter:  1/7/2025  PCP: Karen Jiménez APRN  Historian: EMS  Relevant information and history provided by sources other than the patient will be included below and in the ED Course.  Review of pertinent past medical records may also be included in record below and ED Course.    HPI:  Chief Complaint: Altered mental status  A complete HPI/ROS/PMH/PSH/SH/FH are unobtainable due to: Patient cannot provide history  Context: Flo Manzo is a 69 y.o. male who presents to the ED c/o according to EMS last time this patient was seen normal was at 8 or 9 PM last night.  His son saw him at that time.  Went to check on him today and he was not communicating and decreased responsiveness.  With EMS he was slightly hypertensive his glucose was 111.  According to EMS he is normally alert and oriented x 4.  He has had a history of strokes and TIAs in the past he has a history of sleep apnea.  History of pacemaker and AICD history of hypertension.  EMS is uncertain if he ambulates at baseline.  He is aware that there was a walker and wheelchair present at the patient's house.  Patient will communicate but is confused        Previous Episodes: Unknown  Current Symptoms: See above    MEDICAL HISTORY REVIEWED  It looks in looking at old records I can see that he was discharged 10/28/2024.  This is a gentleman that has a history of CVA history of atrial fibrillation history obstructive sleep apnea on CPAP history of congestive heart failure hypertension and morbid obesity.  It appears if he is chronically anticoagulated  Last admission he did have a CT angiogram of his chest which was negative for PE he had a stress test which was unremarkable as well cardiology saw the patient.    PAST MEDICAL HISTORY  Active Ambulatory Problems     Diagnosis Date Noted    Umbilical hernia without obstruction and without gangrene 12/11/2017    Essential hypertension 01/05/2018     Arthritis of left knee 03/14/2016    Depression 01/05/2018    Obesity (BMI 30-39.9) 03/14/2016    Atrial fibrillation with RVR 01/24/2019    Substernal thyroid goiter 01/24/2019    CHF (congestive heart failure) 01/24/2019    Diastolic CHF, chronic 02/01/2019    Hemorrhagic stroke 02/28/2019    GÓMEZ on auto CPAP 09/08/2019    Hypersomnia due to medical condition 09/08/2019    Sleep-related hypoxia 10/26/2019    Chronic atrial fibrillation 03/19/2020    Vertigo 06/19/2020    Aortic stenosis, mild 12/23/2021    Sinus pause 03/06/2022    Pacemaker 04/25/2022    Chronic anticoagulation 06/24/2022    Generalized weakness 07/29/2022    Prediabetes 02/03/2023    Pure hypercholesterolemia 05/08/2023    Pre-operative cardiovascular examination 01/30/2024    Peripheral neuropathy 02/08/2024    History of colon polyps 03/14/2024    Dizziness 08/05/2024    Fever 08/06/2024    History of CVA (cerebrovascular accident) 08/29/2024    Right leg weakness 08/29/2024    Frequent falls 08/29/2024    Paroxysmal atrial fibrillation 08/29/2024    Elevated troponin 10/24/2024     Resolved Ambulatory Problems     Diagnosis Date Noted    Dyspnea 01/24/2019    Angina at rest 01/24/2019    Intraparenchymal hemorrhage of brain 02/01/2019    SSS (sick sinus syndrome) 01/06/2022    S/P ICD (internal cardiac defibrillator) procedure 03/06/2022     Past Medical History:   Diagnosis Date    Arthritis     Colon polyps 01/04/2018    Heart murmur     Hypertension     New onset atrial fibrillation (CMS/HCC) - RVR 01/24/2019    GÓMEZ (obstructive sleep apnea) 06/06/2019    Sleep apnea     Stroke     Uncontrolled hypertension     Ventral hernia          PAST SURGICAL HISTORY  Past Surgical History:   Procedure Laterality Date    APPENDECTOMY N/A 1972    BACK SURGERY      BLADDER STIMULATOR IMPLANT L LOWER BACK    CARDIAC CATHETERIZATION N/A 07/20/2004    Cath left ventriculography, coronary angiography and left heart catheterization, Normal results-  Vadim     CARDIAC CATHETERIZATION N/A 1/29/2019    Procedure: Left Heart Cath;  Surgeon: Eren Bruce MD;  Location:  [facility]AS[facility];  Service: Cardiology    CARDIAC CATHETERIZATION N/A 1/29/2019    Procedure: Left ventriculography;  Surgeon: Eren Bruce MD;  Location:  [facility]VAS[facility];  Service: Cardiology    CARDIAC CATHETERIZATION N/A 1/29/2019    Procedure: Coronary angiography;  Surgeon: Eren Bruce MD;  Location:  [facility]VAS[facility];  Service: Cardiology    CARDIAC ELECTROPHYSIOLOGY PROCEDURE N/A 3/4/2022    Procedure: Pacemaker SC new BIOTRONIK;  Surgeon: Eren Bruce MD;  Location:  [facility]ATH [facility]VAS[facility];  Service: Cardiology;  Laterality: N/A;    CARPAL TUNNEL RELEASE Right 2013    CARPAL TUNNEL RELEASE Left     COLONOSCOPY N/A 1/4/2018    Procedure: COLONOSCOPY with cold polypectomy and hot snare polypectomy;  Surgeon: Ten Solitario MD;  Location: [facility] ENDOSCOPY;  Service:     COLONOSCOPY N/A 4/25/2024    Procedure: COLONOSCOPY INTO CECUM;  Surgeon: Ten Solitario MD;  Location: [facility] ENDOSCOPY;  Service: General;  Laterality: N/A;  PRE: PERSONAL H/O COLON POLYP  /  POST: POOR PREP OTHERWISE NORMAL    EYE LENS IMPLANT SECONDARY Bilateral 2007, 2008    KNEE CARTILAGE SURGERY Right 1979    TONSILLECTOMY AND ADENOIDECTOMY Bilateral 2005    TOTAL KNEE ARTHROPLASTY Left 03/14/2016    Left total knee arthroplasty with Amberly component, size 11 femur, G tibial baseplate with a 10 polyethylene insert and a 38 patellar button-Dr. Pablo Hairston    TOTAL KNEE ARTHROPLASTY Right 03/02/2015    Right total knee arthroplasty with Amberly component size G femur, 11 tibial base plate with an 11 polyethylene insert and a 38 patellar button-Dr. Pablo Hairston    UVULOPALATOPHARYNGOPLASTY N/A 2005    part of soft palate, and uvula    VENTRAL HERNIA REPAIR N/A 1/23/2018    Procedure: VENTRAL HERNIA REPAIR LAPAROSCOPIC WITH MARK  ROBOT AND MESH;  Surgeon: Ten Solitario MD;  Location: [facility] OR;  Service:          FAMILY HISTORY  Family History   Problem Relation Age of Onset    Hypertension Mother     Kidney failure Mother     Coronary artery disease Father     Lung cancer Father         positive smoker    Heart attack Father     Breast cancer Sister 60    Prostate cancer Brother     Colon polyps Brother     Kidney nephrosis Brother     Malig Hyperthermia Neg Hx          SOCIAL HISTORY  Social History     Socioeconomic History    Marital status:    Tobacco Use    Smoking status: Never     Passive exposure: Past    Smokeless tobacco: Never   Vaping Use    Vaping status: Never Used   Substance and Sexual Activity    Alcohol use: Not Currently     Comment: social, maybe a drink a month, not since stroke    Drug use: No     Comment: previously smoked marijuana     Sexual activity: Defer         ALLERGIES  Poison ivy extract        REVIEW OF SYSTEMS  Review of Systems     All systems reviewed and negative except for those discussed in HPI.       PHYSICAL EXAM    I have reviewed the triage vital signs and nursing notes.    ED Triage Vitals   Temp Pulse Resp BP SpO2   -- -- -- -- --      Temp src Heart Rate Source Patient Position BP Location FiO2 (%)   -- -- -- -- --       GENERAL: Elderly male that looks poorly kept and older than his stated age.  Vital signs on my initial evaluation he is hypertensive.  Glucose is not low.  He is afebrile.  Heart rate is normal with atrial fibrillation on the monitor  HENT: nares patent  Head/neck/ face are symmetric without gross deformity, signs of trauma, or swelling  EYES: no scleral icterus, no conjunctival pallor.  Pupils equal round reactive to light.  He does not want to look right.  He will look left.  Denies any visual impairment.  NECK: Supple, no meningismus  CV: Normal rate.  Irregular regular rhythm.  RESPIRATORY: normal effort and no respiratory distress.  Clear to auscultation  bilaterally  ABDOMEN: Patient is morbidly obese.  No tenderness with palpation.  MUSCULOSKELETAL: 2+ edema to bilateral lower extremities.  As intact distal pulses.  No coolness or cyanosis.  NEURO: Gentleman has marked expressive aphasia.  He will follow commands.  He will move all extremities when asked and close and open his eyes.  His NIH stroke scale is 8.  SKIN: warm, dry    Vital signs and nursing notes reviewed.        LAB RESULTS  Recent Results (from the past 24 hours)   POC Glucose Once    Collection Time: 01/07/25  2:29 PM    Specimen: Blood   Result Value Ref Range    Glucose 97 70 - 130 mg/dL   Comprehensive Metabolic Panel    Collection Time: 01/07/25  2:34 PM    Specimen: Blood   Result Value Ref Range    Glucose 91 65 - 99 mg/dL    BUN 15 8 - 23 mg/dL    Creatinine 0.78 0.76 - 1.27 mg/dL    Sodium 138 136 - 145 mmol/L    Potassium 4.0 3.5 - 5.2 mmol/L    Chloride 106 98 - 107 mmol/L    CO2 25.0 22.0 - 29.0 mmol/L    Calcium 9.3 8.6 - 10.5 mg/dL    Total Protein 7.0 6.0 - 8.5 g/dL    Albumin 3.6 3.5 - 5.2 g/dL    ALT (SGPT) 12 1 - 41 U/L    AST (SGOT) 13 1 - 40 U/L    Alkaline Phosphatase 119 (H) 39 - 117 U/L    Total Bilirubin 0.6 0.0 - 1.2 mg/dL    Globulin 3.4 gm/dL    A/G Ratio 1.1 g/dL    BUN/Creatinine Ratio 19.2 7.0 - 25.0    Anion Gap 7.0 5.0 - 15.0 mmol/L    eGFR 97.1 >60.0 mL/min/1.73   Protime-INR    Collection Time: 01/07/25  2:34 PM    Specimen: Blood   Result Value Ref Range    Protime 14.2 11.7 - 14.2 Seconds    INR 1.08 0.90 - 1.10   aPTT    Collection Time: 01/07/25  2:34 PM    Specimen: Blood   Result Value Ref Range    PTT 26.2 22.7 - 35.4 seconds   Type & Screen    Collection Time: 01/07/25  2:34 PM    Specimen: Blood   Result Value Ref Range    ABO Type B     RH type Positive     Antibody Screen Negative     T&S Expiration Date 1/10/2025 11:59:59 PM    Green Top (Gel)    Collection Time: 01/07/25  2:34 PM   Result Value Ref Range    Extra Tube Hold for add-ons.    Lavender Top     Collection Time: 01/07/25  2:34 PM   Result Value Ref Range    Extra Tube hold for add-on    Gold Top - SST    Collection Time: 01/07/25  2:34 PM   Result Value Ref Range    Extra Tube Hold for add-ons.    Light Blue Top    Collection Time: 01/07/25  2:34 PM   Result Value Ref Range    Extra Tube Hold for add-ons.    CBC Auto Differential    Collection Time: 01/07/25  2:34 PM    Specimen: Blood   Result Value Ref Range    WBC 7.94 3.40 - 10.80 10*3/mm3    RBC 5.24 4.14 - 5.80 10*6/mm3    Hemoglobin 14.5 13.0 - 17.7 g/dL    Hematocrit 44.2 37.5 - 51.0 %    MCV 84.4 79.0 - 97.0 fL    MCH 27.7 26.6 - 33.0 pg    MCHC 32.8 31.5 - 35.7 g/dL    RDW 13.0 12.3 - 15.4 %    RDW-SD 39.6 37.0 - 54.0 fl    MPV 9.9 6.0 - 12.0 fL    Platelets 217 140 - 450 10*3/mm3    Neutrophil % 70.0 42.7 - 76.0 %    Lymphocyte % 19.6 19.6 - 45.3 %    Monocyte % 7.1 5.0 - 12.0 %    Eosinophil % 2.8 0.3 - 6.2 %    Basophil % 0.4 0.0 - 1.5 %    Immature Grans % 0.1 0.0 - 0.5 %    Neutrophils, Absolute 5.56 1.70 - 7.00 10*3/mm3    Lymphocytes, Absolute 1.56 0.70 - 3.10 10*3/mm3    Monocytes, Absolute 0.56 0.10 - 0.90 10*3/mm3    Eosinophils, Absolute 0.22 0.00 - 0.40 10*3/mm3    Basophils, Absolute 0.03 0.00 - 0.20 10*3/mm3    Immature Grans, Absolute 0.01 0.00 - 0.05 10*3/mm3    nRBC 0.0 0.0 - 0.2 /100 WBC   ECG 12 Lead Stroke Evaluation    Collection Time: 01/07/25  3:04 PM   Result Value Ref Range    QT Interval 329 ms    QTC Interval 444 ms   Blood Gas, Venous -    Collection Time: 01/07/25  4:07 PM    Specimen: Venous Blood   Result Value Ref Range    pH, Venous 7.394 7.310 - 7.410 pH Units    pCO2, Venous 42.1 41.0 - 51.0 mm Hg    pO2, Venous 40.4 35.0 - 45.0 mm Hg    HCO3, Venous 25.7 22.0 - 28.0 mmol/L    Base Excess, Venous 0.6 -2.0 - 2.0 mmol/L    O2 Saturation, Venous 75.0 45.0 - 75.0 %    CO2 Content 27.0 23 - 27 mmol/L    Barometric Pressure for Blood Gas 761.1000 mmHg    Modality Room Air     FIO2 21 %    Rate 22 Breaths/minute     Device Comment drawn by [id number] at [id number]        Ordered the above labs and independently reviewed the results.        RADIOLOGY  CT Angiogram Head w AI Analysis of LVO, CT Angiogram Neck, CT CEREBRAL PERFUSION WITH & WITHOUT CONTRAST    Result Date: 1/7/2025  EMERGENCY CONTRAST-ENHANCED CT ANGIOGRAM OF THE HEAD AND NECK AND CT PERFUSION STUDY OF THE BRAIN 01/07/2025  CLINICAL HISTORY: This is a 69-year-old male patient who has acute onset confusion, dysarthria and gaze deviation.  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   HEAD CT WITHOUT CONTRAST TECHNIQUE: Spiral CT images were obtained from the base of the skull to the vertex without intravenous contrast. The images were reformatted and are submitted in 3 mm thick axial CT sections with brain algorithm, 2 mm thick sagittal and coronal reconstructions were performed and submitted in brain algorithm.  This is correlated to a prior MRI of the brain from [facility] on 08/06/2024 and prior CT angiogram of the head and neck on 08/07/2024 and a head CT on 08/26/2024. This is also correlated to a remote prior MRI of the brain on 01/30/2019 and head CT on 01/31/2019.  FINDINGS: There are prominent patchy and confluent areas of low density extending from the periventricular into the subcortical white matter of the cerebral hemispheres consistent with moderate small vessel disease. There is a focal area of encephalomalacia involving the posterior superior medial left frontal lobe that involves the posterior left cingulate gyrus compatible with an old infarct in the left LANETTE territory. This chronic infarct measures about 3.4 x 1.5 x 1.9 cm. This patient has multiple punctate areas of encephalomalacia in the thalami. 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 The patient had a prior MRI of the brain on 08/06/2024 demonstrating numerous tiny foci of hemosiderin deposition in the right and left thalami, posterolateral putamen, left side of the  ching and the dentate nuclei of the cerebellum suggesting old hypertensive microhemorrhages, and scattered punctate areas of hemosiderin deposition in the frontal, parietal, temporal and occipital lobe parenchyma suggesting old microhemorrhages from amyloid. There was also hemosiderin deposition at the site of the chronic infarct in the left LANETTE territory, and these areas of hemosiderin deposition cannot be assessed on a noncontrasted CT. There is a hyperdense M3 MCA branch within the anterior left parietal sulcus and posterior to this hyperdense MCA branch is a wedge-shaped area of low density through the gray-white matter of the posterior inferior left parietal lobe extending inferiorly into the superior lateral left occipital lobe compatible with an acute left parietal occipital infarct with the area of acute infarction measuring up to 4.7 x 3.7 x 4 cm in size. There is no hemorrhagic transformation. There is diffuse cerebral atrophy.  The lateral and third ventricles are minimally prominent in size, felt to be due to central volume loss or atrophy. I see no midline shift.  No extra-axial fluid collections are identified and there is no evidence of acute intracranial hemorrhage.      There is a hyperdense anterior left parietal M3 MCA branch suggesting acute thrombus or embolus in this branch, and posterior to which is a wedge-shaped area of subtle acute infarction extending from the posterior inferior left parietal lobe into the superior lateral left occipital lobe, and the acute left parietal occipital infarct measures 4.7 x 3.7 x 4 cm in size. There is no hemorrhagic transformation. The patient also has a chronic infarct in the posterior superior medial left frontal lobe in the left LANETTE territory that measures 3.4 x 1.5 x 1.9 cm in size.  There was hemosiderin deposition at the site of this chronic infarct on prior MRI of the brain on 08/06/2024 from old petechial hemorrhage into this chronic left LANETTE territory  infarct. 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 There is diffuse cerebral atrophy.  The remainder of the head CT is normal. The results were communicated to Fred Leslie MD, from stroke neurology while the patient was still on our scanner on 01/07/2025 at 2:45 p.m. and he requested a contrast-enhanced CT angiogram of the head and neck and CT perfusion study of the brain.  CONTRAST-ENHANCED CT ANGIOGRAM OF THE HEAD AND NECK AND CT PERFUSION STUDY OF THE BRAIN TECHNIQUE: Spiral CT images were obtained from the mid cerebellum up through the cerebral hemispheres during the arterial phase of contrast for a 10 cm vertical slab of brain imaging, and images were reformatted and analyzed with Rapid software analysis for a CT perfusion study of the brain, and subsequently spiral CT angiographic images were obtained from the top of the aortic arch up through the great vessels of the head and neck during the arterial phase of contrast and images were reformatted and submitted in 1 mm thick axial, sagittal and coronal CT sections with soft tissue algorithm and 3D reconstructions were performed to complete the CT angiogram of the head and neck.  FINDINGS: CT PERFUSION STUDY OF THE BRAIN:  The CT perfusion study of the brain starts at the mid cerebellum and extends for a 10 cm vertical slab of brain imaging through the majority of the cerebral hemispheres and excludes the inferior-half of the cerebellum and medulla, which are not assessed on this exam. Within the 10 cm vertical slab of  brain imaging, there are areas of reduced cerebral blood flow that are patchy nodular in nature involving the posterior inferior left parietal lobe extending into the superior lateral left occipital lobe compatible with small areas of acute completed infarction in the left parietal occipital parenchyma in the left MCA territory.  There is an oblong focus of reduced cerebral blood flow to less than 30% of normal involving the superior medial left frontal parietal junction suggesting an acute infarct in the distal left LANETTE territory. There are areas of delayed time to maximal enhancement that involve the posterior occipital lobes bilaterally that could be artifact.  There is delayed time to maximal enhancement involving the superior medial left frontal parietal region that may be a localized area of hypoperfused brain in the area of suggested acute infarction in the distal left LANETTE territory, and there is delayed time to maximal enhancement of greater than 6 seconds extending from the posterior inferior left parietal lobe into the lateral left occipital lobe.  This area is felt to be hypoperfused brain within the left parietal occipital region and the area of hypoperfused brain appears larger than the area of acute infarction although the acute infarct in this area is bigger on the noncontrasted head CT than suggested on the CT perfusion study.  CT ANGIOGRAM OF THE NECK: The nasopharynx, oropharynx, the hypopharynx, true cords and subglottic airway are normal in appearance.  There is an enlarged thyroid gland with thyroid nodules suggesting a multinodular goiter. There is some substernal extension of the enlarged left lobe of the thyroid. The lung apices are clear. The parotid, , parapharyngeal, and submandibular spaces are symmetric and are normal in appearance. Cervical spondylosis is present with prominent disc space narrowing, degenerative endplate changes at C2-3 and C3-4, and posterior spurs and  protruding disc materia up to moderate to severely narrow the canal at C2-3, and posterior spurs severely narrow the canal at C3-4. There is disc space narrowing.  There are degenerative endplate changes at C5-6 and C6-7, and posterior spurs at least mild up to mild to moderately narrow the canal at C6-7.  There is multilevel foraminal narrowing in the cervical spine with mild-to-moderate right bony foraminal narrowing at C2-3, moderate left and severe right bony foraminal narrowing at C3-4, and mild-to-moderate right and moderate left foraminal narrowing at C6-7. There is common origin of the brachiocephalic artery and left common carotid artery off the aortic arch constituting a bovine configuration of the aortic arch which is a normal anatomic variation. The left subclavian artery origin is normal in appearance. No stenosis is seen in the left subclavian artery. The left vertebral artery origin is normal in appearance. No stenosis is seen in the cervical segment of the left vertebral artery. The left common carotid origin is normal in appearance. No stenosis is seen in the left common carotid artery and its bifurcation into the left internal and external carotid arteries is normal in appearance, and no stenosis is seen in the cervical segment of the left internal carotid artery using the NASCET criteria. The brachiocephalic artery origin is normal in appearance.  No stenosis is seen in the brachiocephalic artery and its bifurcation into the right subclavian and common carotid artery is normal in appearance, and no stenosis is seen in the right subclavian artery.  The right vertebral artery origin is less than optimally assessed, but is most probably within normal limits.  No stenosis is seen in the cervical segment of the right vertebral artery. The right common carotid origin is normal in appearance.  No stenosis is seen in the right common carotid artery and its bifurcation into the right internal and external  carotid arteries is normal in appearance and no stenosis is seen in the cervical segment of the right internal carotid artery using the NASCET criteria.  CT ANGIOGRAM OF THE HEAD: The intracranial segments of the vertebral arteries are widely patent to the vertebrobasilar junction without stenosis.  The basilar artery and the basilar tip are normal in appearance. The posterior cerebral and superior cerebellar arteries are within normal limits. The upper cervical, petrous, cavernous and supracavernous segments of the internal carotid arteries are within normal limits.  There is an atretic A1 segment of the right anterior cerebral artery which is a normal anatomic variation.  The dominant left A1 segment is widely patent.  The anterior communicating artery origin is normal in appearance. The A2 segments of the anterior cerebral arteries are within normal limits. The M1 segments of the middle cerebral arteries and middle cerebral artery bifurcations are within normal limits. There is abrupt occlusion with filling defect in a parietal M3 or M4 branch of the left middle cerebral artery by an embolus or thrombus. This leads to the area of acute infarction in the left parietal occipital region. The M1 segment of the right middle cerebral artery and the right middle cerebral artery bifurcation are normal in appearance.  The right M2 segments are normal in appearance.  IMPRESSION: 1. The CT of the head without contrast demonstrates a hyperdense anterior left parietal M3 or M4 MCA branch from an acute embolus or thrombus. Posterior to this,. is a wedge-shaped area of acute infarction in the left parietal occipital parenchyma that measures 4.7 x 3.7 x 4 cm in size, and there is no hemorrhagic transformation. 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 There is moderate small vessel disease in the cerebral  white matter and diffuse cerebral atrophy. 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 The results of the noncontrast head CT were communicated to Dr. Leslie from stroke neurology by telephone on 01/07/2025 at 2:45 p.m., and he requested a contrast-enhanced CT angiogram of the head and neck and CT perfusion study of the brain.  2. The CT perfusion study of the brain consists of a 10 cm vertical slab of brain imaging from the mid cerebellum up through the cerebral hemispheres and excludes the inferior-half of the cerebellum and medulla which are not assessed. The patient was moving during the CTP which slightly limits evaluation. Within the 10 cm vertical slab of brain imaging, there are areas of reduced cerebral blood flow to less than 30% of normal indicating patchy nodular areas of acute infarction in the left parietal occipital parenchyma and this is in the distribution of the parietal occipital branches of the left MCA territory, and there is an area involving the superior medial left frontal parietal junction that is in the distal left LANETTE territory and the combined dimensions measures 22 cc of brain parenchyma that are acutely infarcted, although I believe the left parietal occipital acute infarct is larger than the CT perfusion study suggests,  as on the noncontrast head CT the acute infarct in the left parietal occipital parenchyma measures up to 4.7 x 3.7 x 4 cm. Furthermore, there are bilateral symmetric areas of delayed time to maximal enhancement of greater than 6 seconds in the posterior occipital lobes that I think is probably artifact rather than hypoperfused brain.  There is an area in the superior medial left frontal parietal junction that may be hypoperfused brain in the distal left LANETTE territory that matches the cerebral blood flow deficit. There is an area in the left parietal occipital parenchyma that demonstrates delayed time to maximal enhancement of greater than 6 seconds indicating chinyere hypoperfused brain that appears larger than the infarcted brain on the cerebral blood flow images, but appears to course on very well to the area of acute infarction on the head CT without contrast. I believe that the Rapid analysis suggests that the hypoperfused brain measures up to 111 cc of brain parenchyma and I think this is an overestimation as I think the areas in the posterior occipital lobe is artifact and it suggest that the mismatch ratio is 6, and I believe this is an overestimation.  3. Cervical spondylosis is present with disc space narrowing and degenerative endplate changes at C2-3 and C3-4 and C6-7.  Posterior spurs and protruding or herniated disc material moderately narrow the canal at C2-3 and posterior spurs severely narrow the canal and flatten the cord at C3-4, and posterior spurs mildly narrow the canal at C6-7, and there is foraminal narrowing in the cervical spine as described above.  4. The CT angiographic evaluation of the great vessels of the neck is normal with no stenosis seen in the vertebral arteries and no stenosis seen in the common carotid arteries or the cervical segments of the internal carotid arteries using the NASCET criteria. The CT angiographic evaluation of the head demonstrates abrupt embolic occlusion or  thrombosis of a parietal branch that is likely either a distal M3 or M4 branch of the left middle cerebral artery leading to the area of acute infarction in the left parietal occipital parenchyma. The remainder of the CT angiogram of the head and neck is normal.  The results of the final dictated study were communicated to Dr. Fred Leslie by telephone on 01/07/2025 at 3:25 PM.      XR Chest 1 View    Result Date: 1/7/2025  XR CHEST 1 VW-  HISTORY: Male who is 69 years-old, acute stroke protocol  TECHNIQUE: Frontal view of the chest  COMPARISON: 12/25/2024  FINDINGS: The heart is enlarged. Left-sided generator device and cardiac lead are noted. Aorta appears ectatic. Pulmonary vasculature is mildly congested. No focal pulmonary consolidation, pleural effusion, or pneumothorax. Right hemidiaphragm remains elevated. No acute osseous process.      As described.  This report was finalized on 1/7/2025 3:45 PM by Dr. Jax Jerry M.D on Workstation: [employer]       I ordered the above noted radiological studies. Reviewed by me and discussed with radiologist.  See dictation for official radiology interpretation.      PROCEDURES    Critical Care    Performed by: Sonu Cedillo MD  Authorized by: Sonu Cedillo MD    Critical care provider statement:     Critical care time (minutes): 40.    Critical care time was exclusive of:  Separately billable procedures and treating other patients    Critical care was necessary to treat or prevent imminent or life-threatening deterioration of the following conditions:  CNS failure or compromise (Acute CVA)    Critical care was time spent personally by me on the following activities:  Blood draw for specimens, development of treatment plan with patient or surrogate, discussions with consultants, evaluation of patient's response to treatment, examination of patient, obtaining history from patient or surrogate, review of old charts, re-evaluation of patient's condition, pulse  oximetry, ordering and review of radiographic studies, ordering and review of laboratory studies and ordering and performing treatments and interventions    I assumed direction of critical care for this patient from another provider in my specialty: no      Care discussed with: admitting provider          MEDICATIONS GIVEN IN ER    Medications   sodium chloride 0.9 % flush 10 mL (has no administration in time range)   sodium chloride 0.9 % infusion (has no administration in time range)   acetaminophen (TYLENOL) tablet 650 mg (has no administration in time range)     Or   acetaminophen (TYLENOL) suppository 650 mg (has no administration in time range)   aspirin tablet 325 mg (has no administration in time range)     Or   aspirin suppository 300 mg (has no administration in time range)   atorvastatin (LIPITOR) tablet 80 mg (has no administration in time range)   ondansetron (ZOFRAN) injection 4 mg (has no administration in time range)   bisacodyl (DULCOLAX) suppository 10 mg (has no administration in time range)   sodium chloride 0.9 % bolus 500 mL (500 mL Intravenous New Bag 1/7/25 1505)   iopamidol (ISOVUE-370) 76 % injection 150 mL (150 mL Intravenous Given 1/7/25 1459)   aspirin suppository 300 mg (300 mg Rectal Given 1/7/25 1549)         All labs have been independently reviewed by me.  All radiology studies have been reviewed by me and I discussed with radiologist dictating the report when indicated below.  All EKG's independently viewed and interpreted by me.  Discussion below represents my analysis of pertinent findings related to patient's condition, differential diagnosis, treatment plan and final disposition.        PROGRESS, DATA ANALYSIS, CONSULTS, AND MEDICAL DECISION MAKING    No family or friend present during my initial evaluation.  I believe this gentleman very likely has had an acute stroke.  This is changed from his baseline per EMS.  Last seen normal was 8 or 9 PM last night.  This gentleman is  not a thrombolytic candidate.  We [facility] activate a team DAlex cohen communicate with the stroke neurologist.  No family or friend present at this time.        ED Course as of 01/07/25 1647   Tue Jan 07, 2025   1440 I did communicate with the stroke neurologist Dr. Leslie.  Informed of the patient's presenting symptoms and my evaluation.  He agrees with doing the CT angiogram CT perfusion.  He will see and evaluate.  He is not a thrombolytic candidate.  Last seen normal was 8 or 9 PM last night. [MM]   1440 Check with clinical pharmacist.  There is mention that he has A-fib and chronically anticoagulated.  He has not had Eliquis filled since March 2024. [MM]   1440 Patient's stroke scale was done immediately upon me entering room and evaluating the patient.  It occurred within 2 to 3 minutes of this patient arrival to the emergency department [MM]   1520 My own and interpretation of the EKG that was done at 3:04 PM reveals a rate of 109 it is atrial fibrillation with some PVCs I see Q waves in inferior leads I see some nonspecific ST and T wave changes I do not see any definitive acute injury pattern I see some widening of the QRS complex  Overall this looks somewhat similar to previous EKG on 12/25/2024. [MM]   1531 The patient has an acute CVA in the left side.  I spoke with neurology Dr. Leslie who came down and evaluated.  There is no acute hemorrhage.  This patient is not a thrombolytic candidate he is not a retrieval candidate.  Will give him an aspirin.  Do permissive hypertension.  And admitted to a monitored bed upstairs. [MM]   1533 's neurologic status has shown no significant change.  I think he might be speaking a little better and understanding a little bit more.  Informed him about the results of the test and the need for admission. [MM]   1533 No family or friend present here in the emergency department. [MM]   1630 I did discuss the case with Dr. Garvey who is on for A.  She agrees to admit the  patient to the hospital.  All questions answered [MM]      ED Course User Index  [MM] Sonu Cedillo MD       AS OF 16:47 EST VITALS:    BP - (!) 162/107  HR - 94  TEMP - 98.5 °F (36.9 °C) (Tympanic)  02 SATS - 94%    SOCIAL DETERMINANTS OF HEALTH THAT IMPACT OR LIMIT CARE (For example..Homelessness,safe discharge, inability to obtain care, follow up, or prescriptions):      DIAGNOSIS  Final diagnoses:   Acute CVA (cerebrovascular accident)   Longstanding persistent atrial fibrillation         DISPOSITION  I have reviewed the test results with my patient and explained the current treatment plan.  I answered all of the patient's questions.  The patient will be admitted to monitor bed at this time.  The patient is not hypotensive and is tolerating their current disease condition well enough for a monitored bed at this time.  The patient's current condition does not require intensive care treatment at this time.            DICTATED UTILIZING DRAGON DICTATION    Note Disclaimer: At [facility], we believe that sharing information builds trust and better relationships. You are receiving this note because you recently visited [facility]. It is possible you will see health information before a provider has talked with you about it. This kind of information can be easy to misunderstand. To help you fully understand what it means for your health, we urge you to discuss this note with your provider.       Sonu Cedillo MD  01/07/25 3420

## 2025-01-07 NOTE — ED NOTES
"Nursing report ED to floor  Flo Manzo  69 y.o.  male    HPI :  HPI  Stated Reason for Visit: Stroke-like symptoms; unknown LNW. Seen \"normal\" at 2000 last night. AxO currently; normally AOx4. R Pupil 3, Left was 5mm & non reachtive. Some left gaze, and unable to follow directions with EMS.  History Obtained From: EMS    Chief Complaint  Chief Complaint   Patient presents with    Cerebrovascular Accident       Admitting doctor:   Cami Garvey MD    Admitting diagnosis:   The primary encounter diagnosis was Acute CVA (cerebrovascular accident). A diagnosis of Longstanding persistent atrial fibrillation was also pertinent to this visit.    Code status:   Current Code Status       Date Active Code Status Order ID Comments User Context       Prior            Allergies:   Poison ivy extract    Isolation:   No active isolations    Intake and Output  No intake or output data in the 24 hours ending 01/07/25 1644    Weight:       01/07/25  1435   Weight: 104 kg (229 lb 9.6 oz)       Most recent vitals:   Vitals:    01/07/25 1441 01/07/25 1507 01/07/25 1520 01/07/25 1601   BP:       Pulse:   94    Resp: 18   18   Temp:  98.5 °F (36.9 °C)     TempSrc:  Tympanic     SpO2:   94%    Weight:       Height:           Active LDAs/IV Access:   Lines, Drains & Airways       Active LDAs       Name Placement date Placement time Site Days    Peripheral IV 01/07/25 1428 Right Antecubital 01/07/25  1428  Antecubital  less than 1    Peripheral IV 01/07/25 1435 Left Antecubital 01/07/25  1435  Antecubital  less than 1    External Urinary Catheter 10/25/24  0200  --  74                    Labs (abnormal labs have a star):   Labs Reviewed   COMPREHENSIVE METABOLIC PANEL - Abnormal; Notable for the following components:       Result Value    Alkaline Phosphatase 119 (*)     All other components within normal limits    Narrative:     GFR Categories in Chronic Kidney Disease (CKD)      GFR Category          GFR (mL/min/1.73)    " Interpretation  G1                     90 or greater         Normal or high (1)  G2                      60-89                Mild decrease (1)  G3a                   45-59                Mild to moderate decrease  G3b                   30-44                Moderate to severe decrease  G4                    15-29                Severe decrease  G5                    14 or less           Kidney failure          (1)In the absence of evidence of kidney disease, neither GFR category G1 or G2 fulfill the criteria for CKD.    eGFR calculation 2021 CKD-EPI creatinine equation, which does not include race as a factor   PROTIME-INR - Normal   APTT - Normal   CBC WITH AUTO DIFFERENTIAL - Normal   POCT GLUCOSE FINGERSTICK - Normal   RAINBOW DRAW    Narrative:     The following orders were created for panel order [facility] Draw.  Procedure                               Abnormality         Status                     ---------                               -----------         ------                     Green Top (Gel)[[id number]]                                  Final result               Lavender Top[[id number]]                                     Final result               Gold Top - SST[[id number]]                                   Final result               Light Blue Top[[id number]]                                   Final result                 Please view results for these tests on the individual orders.   BLOOD GAS, VENOUS   BLOOD GAS, VENOUS   POCT GLUCOSE FINGERSTICK   POCT GLUCOSE FINGERSTICK   POCT GLUCOSE FINGERSTICK   POCT GLUCOSE FINGERSTICK   POCT GLUCOSE FINGERSTICK   TYPE AND SCREEN   CBC AND DIFFERENTIAL    Narrative:     The following orders were created for panel order CBC & Differential.  Procedure                               Abnormality         Status                     ---------                               -----------         ------                     CBC Auto Differential[[id number]]        Normal               Final result                 Please view results for these tests on the individual orders.   GREEN TOP   LAVENDER TOP   GOLD TOP - SST   LIGHT BLUE TOP       EKG:   ECG 12 Lead Stroke Evaluation   Preliminary Result   HEART IOXA=950  bpm   RR Cxxosaac=166  ms   GA Interval=  ms   P Horizontal Axis=  deg   P Front Axis=  deg   QRSD Yawgmkrq=333  ms   QT Pqzmmhuj=871  ms   WDmY=214  ms   QRS Axis=-43  deg   T Wave Axis=0  deg   - ABNORMAL ECG -   Afib/flut and V-paced complexes   No further rhythm analysis attempted due to paced rhythm   Inferior infarct, old   Anteroseptal infarct, old   Date and Time of Study:2025-01-07 15:04:01          Meds given in ED:   Medications   sodium chloride 0.9 % flush 10 mL (has no administration in time range)   sodium chloride 0.9 % infusion (has no administration in time range)   acetaminophen (TYLENOL) tablet 650 mg (has no administration in time range)     Or   acetaminophen (TYLENOL) suppository 650 mg (has no administration in time range)   aspirin tablet 325 mg (has no administration in time range)     Or   aspirin suppository 300 mg (has no administration in time range)   atorvastatin (LIPITOR) tablet 80 mg (has no administration in time range)   ondansetron (ZOFRAN) injection 4 mg (has no administration in time range)   bisacodyl (DULCOLAX) suppository 10 mg (has no administration in time range)   sodium chloride 0.9 % bolus 500 mL (500 mL Intravenous New Bag 1/7/25 1505)   iopamidol (ISOVUE-370) 76 % injection 150 mL (150 mL Intravenous Given 1/7/25 1459)   aspirin suppository 300 mg (300 mg Rectal Given 1/7/25 1549)       Imaging results:  CT Angiogram Head w AI Analysis of LVO    Result Date: 1/7/2025  There is a hyperdense anterior left parietal M3 MCA branch suggesting acute thrombus or embolus in this branch, and posterior to which is a wedge-shaped area of subtle acute infarction extending from the posterior inferior left parietal lobe into the superior lateral left  occipital lobe, and the acute left parietal occipital infarct measures 4.7 x 3.7 x 4 cm in size. There is no hemorrhagic transformation. The patient also has a chronic infarct in the posterior superior medial left frontal lobe in the left LANETTE territory that measures 3.4 x 1.5 x 1.9 cm in size.  There was hemosiderin deposition at the site of this chronic infarct on prior MRI of the brain on 08/06/2024 from old petechial hemorrhage into this chronic left LANETTE territory infarct. 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 There is diffuse cerebral atrophy.  The remainder of the head CT is normal. The results were communicated to Fred Leslie MD, from stroke neurology while the patient was still on our scanner on 01/07/2025 at 2:45 p.m. and he requested a contrast-enhanced CT angiogram of the head and neck and CT perfusion study of the brain.  CONTRAST-ENHANCED CT ANGIOGRAM OF THE HEAD AND NECK AND CT PERFUSION STUDY OF THE BRAIN TECHNIQUE: Spiral CT images were obtained from the mid cerebellum up through the cerebral hemispheres during the arterial phase of contrast for a 10 cm vertical slab of brain imaging, and images were reformatted and analyzed with Rapid software analysis for a CT perfusion study of the brain, and subsequently spiral CT angiographic images were obtained from the top of the aortic arch up through the great vessels of the head and neck during the arterial phase of contrast and images were reformatted and submitted in 1 mm thick axial, sagittal and coronal CT  sections with soft tissue algorithm and 3D reconstructions were performed to complete the CT angiogram of the head and neck.  FINDINGS: CT PERFUSION STUDY OF THE BRAIN:  The CT perfusion study of the brain starts at the mid cerebellum and extends for a 10 cm vertical slab of brain imaging through the majority of the cerebral hemispheres and excludes the inferior-half of the cerebellum and medulla, which are not assessed on this exam. Within the 10 cm vertical slab of brain imaging, there are areas of reduced cerebral blood flow that are patchy nodular in nature involving the posterior inferior left parietal lobe extending into the superior lateral left occipital lobe compatible with small areas of acute completed infarction in the left parietal occipital parenchyma in the left MCA territory.  There is an oblong focus of reduced cerebral blood flow to less than 30% of normal involving the superior medial left frontal parietal junction suggesting an acute infarct in the distal left LANETTE territory. There are areas of delayed time to maximal enhancement that involve the posterior occipital lobes bilaterally that could be artifact.  There is delayed time to maximal enhancement involving the superior medial left frontal parietal region that may be a localized area of hypoperfused brain in the area of suggested acute infarction in the distal left LANETTE territory, and there is delayed time to maximal enhancement of greater than 6 seconds extending from the posterior inferior left parietal lobe into the lateral left occipital lobe.  This area is felt to be hypoperfused brain within the left parietal occipital region and the area of hypoperfused brain appears larger than the area of acute infarction although the acute infarct in this area is bigger on the noncontrasted head CT than suggested on the CT perfusion study.  CT ANGIOGRAM OF THE NECK: The nasopharynx, oropharynx, the hypopharynx, true cords and subglottic airway are  normal in appearance.  There is an enlarged thyroid gland with thyroid nodules suggesting a multinodular goiter. There is some substernal extension of the enlarged left lobe of the thyroid. The lung apices are clear. The parotid, , parapharyngeal, and submandibular spaces are symmetric and are normal in appearance. Cervical spondylosis is present with prominent disc space narrowing, degenerative endplate changes at C2-3 and C3-4, and posterior spurs and protruding disc materia up to moderate to severely narrow the canal at C2-3, and posterior spurs severely narrow the canal at C3-4. There is disc space narrowing.  There are degenerative endplate changes at C5-6 and C6-7, and posterior spurs at least mild up to mild to moderately narrow the canal at C6-7.  There is multilevel foraminal narrowing in the cervical spine with mild-to-moderate right bony foraminal narrowing at C2-3, moderate left and severe right bony foraminal narrowing at C3-4, and mild-to-moderate right and moderate left foraminal narrowing at C6-7. There is common origin of the brachiocephalic artery and left common carotid artery off the aortic arch constituting a bovine configuration of the aortic arch which is a normal anatomic variation. The left subclavian artery origin is normal in appearance. No stenosis is seen in the left subclavian artery. The left vertebral artery origin is normal in appearance. No stenosis is seen in the cervical segment of the left vertebral artery. The left common carotid origin is normal in appearance. No stenosis is seen in the left common carotid artery and its bifurcation into the left internal and external carotid arteries is normal in appearance, and no stenosis is seen in the cervical segment of the left internal carotid artery using the NASCET criteria. The brachiocephalic artery origin is normal in appearance.  No stenosis is seen in the brachiocephalic artery and its bifurcation into the right  subclavian and common carotid artery is normal in appearance, and no stenosis is seen in the right subclavian artery.  The right vertebral artery origin is less than optimally assessed, but is most probably within normal limits.  No stenosis is seen in the cervical segment of the right vertebral artery. The right common carotid origin is normal in appearance.  No stenosis is seen in the right common carotid artery and its bifurcation into the right internal and external carotid arteries is normal in appearance and no stenosis is seen in the cervical segment of the right internal carotid artery using the NASCET criteria.  CT ANGIOGRAM OF THE HEAD: The intracranial segments of the vertebral arteries are widely patent to the vertebrobasilar junction without stenosis.  The basilar artery and the basilar tip are normal in appearance. The posterior cerebral and superior cerebellar arteries are within normal limits. The upper cervical, petrous, cavernous and supracavernous segments of the internal carotid arteries are within normal limits.  There is an atretic A1 segment of the right anterior cerebral artery which is a normal anatomic variation.  The dominant left A1 segment is widely patent.  The anterior communicating artery origin is normal in appearance. The A2 segments of the anterior cerebral arteries are within normal limits. The M1 segments of the middle cerebral arteries and middle cerebral artery bifurcations are within normal limits. There is abrupt occlusion with filling defect in a parietal M3 or M4 branch of the left middle cerebral artery by an embolus or thrombus. This leads to the area of acute infarction in the left parietal occipital region. The M1 segment of the right middle cerebral artery and the right middle cerebral artery bifurcation are normal in appearance.  The right M2 segments are normal in appearance.  IMPRESSION: 1. The CT of the head without contrast demonstrates a hyperdense anterior left  parietal M3 or M4 MCA branch from an acute embolus or thrombus. Posterior to this,. is a wedge-shaped area of acute infarction in the left parietal occipital parenchyma that measures 4.7 x 3.7 x 4 cm in size, and there is no hemorrhagic transformation. 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 There is moderate small vessel disease in the cerebral white matter and diffuse cerebral atrophy. 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 The results of the noncontrast head CT were communicated to Dr. Leslie from stroke neurology by telephone on 01/07/2025 at 2:45 p.m., and he requested a contrast-enhanced CT angiogram of the head and neck and CT perfusion study of the brain.  2. The CT perfusion study of the brain consists of a 10 cm vertical slab of brain imaging from the mid cerebellum up through the cerebral hemispheres and excludes the inferior-half of the cerebellum and medulla which are not assessed. The patient was moving during the CTP which slightly limits evaluation. Within the 10 cm vertical slab of brain imaging, there are areas of reduced cerebral blood flow to less than 30% of normal indicating patchy nodular areas of  acute infarction in the left parietal occipital parenchyma and this is in the distribution of the parietal occipital branches of the left MCA territory, and there is an area involving the superior medial left frontal parietal junction that is in the distal left LANETTE territory and the combined dimensions measures 22 cc of brain parenchyma that are acutely infarcted, although I believe the left parietal occipital acute infarct is larger than the CT perfusion study suggests, as on the noncontrast head CT the acute infarct in the left parietal occipital parenchyma measures up to 4.7 x 3.7 x 4 cm. Furthermore, there are bilateral symmetric areas of delayed time to maximal enhancement of greater than 6 seconds in the posterior occipital lobes that I think is probably artifact rather than hypoperfused brain.  There is an area in the superior medial left frontal parietal junction that may be hypoperfused brain in the distal left LANETTE territory that matches the cerebral blood flow deficit. There is an area in the left parietal occipital parenchyma that demonstrates delayed time to maximal enhancement of greater than 6 seconds indicating chinyere hypoperfused brain that appears larger than the infarcted brain on the cerebral blood flow images, but appears to course on very well to the area of acute infarction on the head CT without contrast. I believe that the Rapid analysis suggests that the hypoperfused brain measures up to 111 cc of brain parenchyma and I think this is an overestimation as I think the areas in the posterior occipital lobe is artifact and it suggest that the mismatch ratio is 6, and I believe this is an overestimation.  3. Cervical spondylosis is present with disc space narrowing and degenerative endplate changes at C2-3 and C3-4 and C6-7.  Posterior spurs and protruding or herniated disc material moderately narrow the canal at C2-3 and posterior spurs severely narrow the canal and flatten the cord at C3-4, and  posterior spurs mildly narrow the canal at C6-7, and there is foraminal narrowing in the cervical spine as described above.  4. The CT angiographic evaluation of the great vessels of the neck is normal with no stenosis seen in the vertebral arteries and no stenosis seen in the common carotid arteries or the cervical segments of the internal carotid arteries using the NASCET criteria. The CT angiographic evaluation of the head demonstrates abrupt embolic occlusion or thrombosis of a parietal branch that is likely either a distal M3 or M4 branch of the left middle cerebral artery leading to the area of acute infarction in the left parietal occipital parenchyma. The remainder of the CT angiogram of the head and neck is normal.  The results of the final dictated study were communicated to Dr. Fred Leslie by telephone on 01/07/2025 at 3:25 PM.      CT Angiogram Neck    Result Date: 1/7/2025  There is a hyperdense anterior left parietal M3 MCA branch suggesting acute thrombus or embolus in this branch, and posterior to which is a wedge-shaped area of subtle acute infarction extending from the posterior inferior left parietal lobe into the superior lateral left occipital lobe, and the acute left parietal occipital infarct measures 4.7 x 3.7 x 4 cm in size. There is no hemorrhagic transformation. The patient also has a chronic infarct in the posterior superior medial left frontal lobe in the left LANETTE territory that measures 3.4 x 1.5 x 1.9 cm in size.  There was hemosiderin deposition at the site of this chronic infarct on prior MRI of the brain on 08/06/2024 from old petechial hemorrhage into this chronic left LANETTE territory infarct. 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 There is diffuse cerebral atrophy.  The remainder of the head CT is normal. The results were communicated to Fred Leslie MD, from stroke neurology while the patient was still on our scanner on 01/07/2025 at 2:45 p.m. and he requested a contrast-enhanced CT angiogram of the head and neck and CT perfusion study of the brain.  CONTRAST-ENHANCED CT ANGIOGRAM OF THE HEAD AND NECK AND CT PERFUSION STUDY OF THE BRAIN TECHNIQUE: Spiral CT images were obtained from the mid cerebellum up through the cerebral hemispheres during the arterial phase of contrast for a 10 cm vertical slab of brain imaging, and images were reformatted and analyzed with Rapid software analysis for a CT perfusion study of the brain, and subsequently spiral CT angiographic images were obtained from the top of the aortic arch up through the great vessels of the head and neck during the arterial phase of contrast and images were reformatted and submitted in 1 mm thick axial, sagittal and coronal CT sections with soft tissue algorithm and 3D reconstructions were performed to complete the CT angiogram of the head and neck.  FINDINGS: CT PERFUSION STUDY OF THE BRAIN:  The CT perfusion study of the brain starts at the mid cerebellum and extends for a 10 cm vertical slab of brain imaging through the majority of the cerebral hemispheres and excludes the inferior-half of the cerebellum and medulla, which are not assessed on this exam. Within the 10 cm vertical slab of brain imaging, there are areas of reduced cerebral blood flow that are patchy nodular in nature involving the posterior inferior left parietal lobe extending into the superior lateral left occipital lobe compatible with small areas of acute completed infarction in the left parietal occipital  parenchyma in the left MCA territory.  There is an oblong focus of reduced cerebral blood flow to less than 30% of normal involving the superior medial left frontal parietal junction suggesting an acute infarct in the distal left LANETTE territory. There are areas of delayed time to maximal enhancement that involve the posterior occipital lobes bilaterally that could be artifact.  There is delayed time to maximal enhancement involving the superior medial left frontal parietal region that may be a localized area of hypoperfused brain in the area of suggested acute infarction in the distal left LANETTE territory, and there is delayed time to maximal enhancement of greater than 6 seconds extending from the posterior inferior left parietal lobe into the lateral left occipital lobe.  This area is felt to be hypoperfused brain within the left parietal occipital region and the area of hypoperfused brain appears larger than the area of acute infarction although the acute infarct in this area is bigger on the noncontrasted head CT than suggested on the CT perfusion study.  CT ANGIOGRAM OF THE NECK: The nasopharynx, oropharynx, the hypopharynx, true cords and subglottic airway are normal in appearance.  There is an enlarged thyroid gland with thyroid nodules suggesting a multinodular goiter. There is some substernal extension of the enlarged left lobe of the thyroid. The lung apices are clear. The parotid, , parapharyngeal, and submandibular spaces are symmetric and are normal in appearance. Cervical spondylosis is present with prominent disc space narrowing, degenerative endplate changes at C2-3 and C3-4, and posterior spurs and protruding disc materia up to moderate to severely narrow the canal at C2-3, and posterior spurs severely narrow the canal at C3-4. There is disc space narrowing.  There are degenerative endplate changes at C5-6 and C6-7, and posterior spurs at least mild up to mild to moderately narrow the canal  at C6-7.  There is multilevel foraminal narrowing in the cervical spine with mild-to-moderate right bony foraminal narrowing at C2-3, moderate left and severe right bony foraminal narrowing at C3-4, and mild-to-moderate right and moderate left foraminal narrowing at C6-7. There is common origin of the brachiocephalic artery and left common carotid artery off the aortic arch constituting a bovine configuration of the aortic arch which is a normal anatomic variation. The left subclavian artery origin is normal in appearance. No stenosis is seen in the left subclavian artery. The left vertebral artery origin is normal in appearance. No stenosis is seen in the cervical segment of the left vertebral artery. The left common carotid origin is normal in appearance. No stenosis is seen in the left common carotid artery and its bifurcation into the left internal and external carotid arteries is normal in appearance, and no stenosis is seen in the cervical segment of the left internal carotid artery using the NASCET criteria. The brachiocephalic artery origin is normal in appearance.  No stenosis is seen in the brachiocephalic artery and its bifurcation into the right subclavian and common carotid artery is normal in appearance, and no stenosis is seen in the right subclavian artery.  The right vertebral artery origin is less than optimally assessed, but is most probably within normal limits.  No stenosis is seen in the cervical segment of the right vertebral artery. The right common carotid origin is normal in appearance.  No stenosis is seen in the right common carotid artery and its bifurcation into the right internal and external carotid arteries is normal in appearance and no stenosis is seen in the cervical segment of the right internal carotid artery using the NASCET criteria.  CT ANGIOGRAM OF THE HEAD: The intracranial segments of the vertebral arteries are widely patent to the vertebrobasilar junction without  stenosis.  The basilar artery and the basilar tip are normal in appearance. The posterior cerebral and superior cerebellar arteries are within normal limits. The upper cervical, petrous, cavernous and supracavernous segments of the internal carotid arteries are within normal limits.  There is an atretic A1 segment of the right anterior cerebral artery which is a normal anatomic variation.  The dominant left A1 segment is widely patent.  The anterior communicating artery origin is normal in appearance. The A2 segments of the anterior cerebral arteries are within normal limits. The M1 segments of the middle cerebral arteries and middle cerebral artery bifurcations are within normal limits. There is abrupt occlusion with filling defect in a parietal M3 or M4 branch of the left middle cerebral artery by an embolus or thrombus. This leads to the area of acute infarction in the left parietal occipital region. The M1 segment of the right middle cerebral artery and the right middle cerebral artery bifurcation are normal in appearance.  The right M2 segments are normal in appearance.  IMPRESSION: 1. The CT of the head without contrast demonstrates a hyperdense anterior left parietal M3 or M4 MCA branch from an acute embolus or thrombus. Posterior to this,. is a wedge-shaped area of acute infarction in the left parietal occipital parenchyma that measures 4.7 x 3.7 x 4 cm in size, and there is no hemorrhagic transformation. 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 There is moderate small vessel disease in the cerebral white matter and diffuse cerebral atrophy. 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 The results of the noncontrast head CT were communicated to Dr. Leslie from stroke neurology by telephone on 01/07/2025 at 2:45 p.m., and he requested a contrast-enhanced CT angiogram of the head and neck and CT perfusion study of the brain.  2. The CT perfusion study of the brain consists of a 10 cm vertical slab of brain imaging from the mid cerebellum up through the cerebral hemispheres and excludes the inferior-half of the cerebellum and medulla which are not assessed. The patient was moving during the CTP which slightly limits evaluation. Within the 10 cm vertical slab of brain imaging, there are areas of reduced cerebral blood flow to less than 30% of normal indicating patchy nodular areas of acute infarction in the left parietal occipital parenchyma and this is in the distribution of the parietal occipital branches of the left MCA territory, and there is an area involving the superior medial left frontal parietal junction that is in the distal left LANETTE territory and the combined dimensions measures 22 cc of brain parenchyma that are acutely infarcted, although I believe the left parietal occipital acute infarct is larger than the CT perfusion study suggests, as on the noncontrast head CT the acute infarct in the left parietal occipital parenchyma measures up to 4.7 x 3.7 x 4 cm. Furthermore, there are bilateral symmetric areas of delayed time to maximal enhancement of greater than 6 seconds in the posterior occipital lobes that I think is  probably artifact rather than hypoperfused brain.  There is an area in the superior medial left frontal parietal junction that may be hypoperfused brain in the distal left LANETTE territory that matches the cerebral blood flow deficit. There is an area in the left parietal occipital parenchyma that demonstrates delayed time to maximal enhancement of greater than 6 seconds indicating chinyere hypoperfused brain that appears larger than the infarcted brain on the cerebral blood flow images, but appears to course on very well to the area of acute infarction on the head CT without contrast. I believe that the Rapid analysis suggests that the hypoperfused brain measures up to 111 cc of brain parenchyma and I think this is an overestimation as I think the areas in the posterior occipital lobe is artifact and it suggest that the mismatch ratio is 6, and I believe this is an overestimation.  3. Cervical spondylosis is present with disc space narrowing and degenerative endplate changes at C2-3 and C3-4 and C6-7.  Posterior spurs and protruding or herniated disc material moderately narrow the canal at C2-3 and posterior spurs severely narrow the canal and flatten the cord at C3-4, and posterior spurs mildly narrow the canal at C6-7, and there is foraminal narrowing in the cervical spine as described above.  4. The CT angiographic evaluation of the great vessels of the neck is normal with no stenosis seen in the vertebral arteries and no stenosis seen in the common carotid arteries or the cervical segments of the internal carotid arteries using the NASCET criteria. The CT angiographic evaluation of the head demonstrates abrupt embolic occlusion or thrombosis of a parietal branch that is likely either a distal M3 or M4 branch of the left middle cerebral artery leading to the area of acute infarction in the left parietal occipital parenchyma. The remainder of the CT angiogram of the head and neck is normal.  The results of the final  dictated study were communicated to Dr. Fred Leslie by telephone on 01/07/2025 at 3:25 PM.      CT CEREBRAL PERFUSION WITH & WITHOUT CONTRAST    Result Date: 1/7/2025  There is a hyperdense anterior left parietal M3 MCA branch suggesting acute thrombus or embolus in this branch, and posterior to which is a wedge-shaped area of subtle acute infarction extending from the posterior inferior left parietal lobe into the superior lateral left occipital lobe, and the acute left parietal occipital infarct measures 4.7 x 3.7 x 4 cm in size. There is no hemorrhagic transformation. The patient also has a chronic infarct in the posterior superior medial left frontal lobe in the left LANETTE territory that measures 3.4 x 1.5 x 1.9 cm in size.  There was hemosiderin deposition at the site of this chronic infarct on prior MRI of the brain on 08/06/2024 from old petechial hemorrhage into this chronic left LANETTE territory infarct. 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 There is diffuse cerebral atrophy.  The remainder of the head CT is normal. The results were communicated to Fred Leslie MD, from stroke neurology while the patient was still on our scanner on 01/07/2025 at 2:45 p.m. and he requested a contrast-enhanced CT angiogram of the head and neck and CT perfusion study of the brain.  CONTRAST-ENHANCED CT ANGIOGRAM OF THE HEAD AND NECK AND CT PERFUSION STUDY OF THE BRAIN TECHNIQUE: Spiral CT images were obtained from the mid cerebellum up through the cerebral hemispheres during the  arterial phase of contrast for a 10 cm vertical slab of brain imaging, and images were reformatted and analyzed with Rapid software analysis for a CT perfusion study of the brain, and subsequently spiral CT angiographic images were obtained from the top of the aortic arch up through the great vessels of the head and neck during the arterial phase of contrast and images were reformatted and submitted in 1 mm thick axial, sagittal and coronal CT sections with soft tissue algorithm and 3D reconstructions were performed to complete the CT angiogram of the head and neck.  FINDINGS: CT PERFUSION STUDY OF THE BRAIN:  The CT perfusion study of the brain starts at the mid cerebellum and extends for a 10 cm vertical slab of brain imaging through the majority of the cerebral hemispheres and excludes the inferior-half of the cerebellum and medulla, which are not assessed on this exam. Within the 10 cm vertical slab of brain imaging, there are areas of reduced cerebral blood flow that are patchy nodular in nature involving the posterior inferior left parietal lobe extending into the superior lateral left occipital lobe compatible with small areas of acute completed infarction in the left parietal occipital parenchyma in the left MCA territory.  There is an oblong focus of reduced cerebral blood flow to less than 30% of normal involving the superior medial left frontal parietal junction suggesting an acute infarct in the distal left LANETTE territory. There are areas of delayed time to maximal enhancement that involve the posterior occipital lobes bilaterally that could be artifact.  There is delayed time to maximal enhancement involving the superior medial left frontal parietal region that may be a localized area of hypoperfused brain in the area of suggested acute infarction in the distal left LANETTE territory, and there is delayed time to maximal enhancement of greater than 6 seconds extending from the posterior inferior left  parietal lobe into the lateral left occipital lobe.  This area is felt to be hypoperfused brain within the left parietal occipital region and the area of hypoperfused brain appears larger than the area of acute infarction although the acute infarct in this area is bigger on the noncontrasted head CT than suggested on the CT perfusion study.  CT ANGIOGRAM OF THE NECK: The nasopharynx, oropharynx, the hypopharynx, true cords and subglottic airway are normal in appearance.  There is an enlarged thyroid gland with thyroid nodules suggesting a multinodular goiter. There is some substernal extension of the enlarged left lobe of the thyroid. The lung apices are clear. The parotid, , parapharyngeal, and submandibular spaces are symmetric and are normal in appearance. Cervical spondylosis is present with prominent disc space narrowing, degenerative endplate changes at C2-3 and C3-4, and posterior spurs and protruding disc materia up to moderate to severely narrow the canal at C2-3, and posterior spurs severely narrow the canal at C3-4. There is disc space narrowing.  There are degenerative endplate changes at C5-6 and C6-7, and posterior spurs at least mild up to mild to moderately narrow the canal at C6-7.  There is multilevel foraminal narrowing in the cervical spine with mild-to-moderate right bony foraminal narrowing at C2-3, moderate left and severe right bony foraminal narrowing at C3-4, and mild-to-moderate right and moderate left foraminal narrowing at C6-7. There is common origin of the brachiocephalic artery and left common carotid artery off the aortic arch constituting a bovine configuration of the aortic arch which is a normal anatomic variation. The left subclavian artery origin is normal in appearance. No stenosis is seen in the left subclavian artery. The left vertebral artery origin is normal in appearance. No stenosis is seen in the cervical segment of the left vertebral artery. The left common  carotid origin is normal in appearance. No stenosis is seen in the left common carotid artery and its bifurcation into the left internal and external carotid arteries is normal in appearance, and no stenosis is seen in the cervical segment of the left internal carotid artery using the NASCET criteria. The brachiocephalic artery origin is normal in appearance.  No stenosis is seen in the brachiocephalic artery and its bifurcation into the right subclavian and common carotid artery is normal in appearance, and no stenosis is seen in the right subclavian artery.  The right vertebral artery origin is less than optimally assessed, but is most probably within normal limits.  No stenosis is seen in the cervical segment of the right vertebral artery. The right common carotid origin is normal in appearance.  No stenosis is seen in the right common carotid artery and its bifurcation into the right internal and external carotid arteries is normal in appearance and no stenosis is seen in the cervical segment of the right internal carotid artery using the NASCET criteria.  CT ANGIOGRAM OF THE HEAD: The intracranial segments of the vertebral arteries are widely patent to the vertebrobasilar junction without stenosis.  The basilar artery and the basilar tip are normal in appearance. The posterior cerebral and superior cerebellar arteries are within normal limits. The upper cervical, petrous, cavernous and supracavernous segments of the internal carotid arteries are within normal limits.  There is an atretic A1 segment of the right anterior cerebral artery which is a normal anatomic variation.  The dominant left A1 segment is widely patent.  The anterior communicating artery origin is normal in appearance. The A2 segments of the anterior cerebral arteries are within normal limits. The M1 segments of the middle cerebral arteries and middle cerebral artery bifurcations are within normal limits. There is abrupt occlusion with filling  defect in a parietal M3 or M4 branch of the left middle cerebral artery by an embolus or thrombus. This leads to the area of acute infarction in the left parietal occipital region. The M1 segment of the right middle cerebral artery and the right middle cerebral artery bifurcation are normal in appearance.  The right M2 segments are normal in appearance.  IMPRESSION: 1. The CT of the head without contrast demonstrates a hyperdense anterior left parietal M3 or M4 MCA branch from an acute embolus or thrombus. Posterior to this,. is a wedge-shaped area of acute infarction in the left parietal occipital parenchyma that measures 4.7 x 3.7 x 4 cm in size, and there is no hemorrhagic transformation. 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 There is moderate small vessel disease in the cerebral white matter and diffuse cerebral atrophy. 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 The results of the noncontrast head CT were communicated to Dr. Leslie from stroke neurology by telephone on 01/07/2025 at 2:45 p.m., and he requested a contrast-enhanced CT angiogram of the head and neck and CT perfusion study of the brain.  2. The CT perfusion  study of the brain consists of a 10 cm vertical slab of brain imaging from the mid cerebellum up through the cerebral hemispheres and excludes the inferior-half of the cerebellum and medulla which are not assessed. The patient was moving during the CTP which slightly limits evaluation. Within the 10 cm vertical slab of brain imaging, there are areas of reduced cerebral blood flow to less than 30% of normal indicating patchy nodular areas of acute infarction in the left parietal occipital parenchyma and this is in the distribution of the parietal occipital branches of the left MCA territory, and there is an area involving the superior medial left frontal parietal junction that is in the distal left LANETTE territory and the combined dimensions measures 22 cc of brain parenchyma that are acutely infarcted, although I believe the left parietal occipital acute infarct is larger than the CT perfusion study suggests, as on the noncontrast head CT the acute infarct in the left parietal occipital parenchyma measures up to 4.7 x 3.7 x 4 cm. Furthermore, there are bilateral symmetric areas of delayed time to maximal enhancement of greater than 6 seconds in the posterior occipital lobes that I think is probably artifact rather than hypoperfused brain.  There is an area in the superior medial left frontal parietal junction that may be hypoperfused brain in the distal left LANETTE territory that matches the cerebral blood flow deficit. There is an area in the left parietal occipital parenchyma that demonstrates delayed time to maximal enhancement of greater than 6 seconds indicating chinyere hypoperfused brain that appears larger than the infarcted brain on the cerebral blood flow images, but appears to course on very well to the area of acute infarction on the head CT without contrast. I believe that the Rapid analysis suggests that the hypoperfused brain measures up to 111 cc of brain parenchyma and I think this is an overestimation as I  think the areas in the posterior occipital lobe is artifact and it suggest that the mismatch ratio is 6, and I believe this is an overestimation.  3. Cervical spondylosis is present with disc space narrowing and degenerative endplate changes at C2-3 and C3-4 and C6-7.  Posterior spurs and protruding or herniated disc material moderately narrow the canal at C2-3 and posterior spurs severely narrow the canal and flatten the cord at C3-4, and posterior spurs mildly narrow the canal at C6-7, and there is foraminal narrowing in the cervical spine as described above.  4. The CT angiographic evaluation of the great vessels of the neck is normal with no stenosis seen in the vertebral arteries and no stenosis seen in the common carotid arteries or the cervical segments of the internal carotid arteries using the NASCET criteria. The CT angiographic evaluation of the head demonstrates abrupt embolic occlusion or thrombosis of a parietal branch that is likely either a distal M3 or M4 branch of the left middle cerebral artery leading to the area of acute infarction in the left parietal occipital parenchyma. The remainder of the CT angiogram of the head and neck is normal.  The results of the final dictated study were communicated to Dr. Fred Leslie by telephone on 01/07/2025 at 3:25 PM.      XR Chest 1 View    Result Date: 1/7/2025  As described.  This report was finalized on 1/7/2025 3:45 PM by Dr. Jax Jerry M.D on Workstation: [id number]       Ambulatory status:   - assist Xs 2    Social issues:   Social History     Socioeconomic History    Marital status:    Tobacco Use    Smoking status: Never     Passive exposure: Past    Smokeless tobacco: Never   Vaping Use    Vaping status: Never Used   Substance and Sexual Activity    Alcohol use: Not Currently     Comment: social, maybe a drink a month, not since stroke    Drug use: No     Comment: previously smoked marijuana     Sexual activity: Defer       Peripheral  Neurovascular  Peripheral Neurovascular (Adult)  Peripheral Neurovascular WDL: .WDL except  Additional Documentation: Edema (Group)  Edema  Edema: dependent  Dependent Edema: 2+ (Mild)    Neuro Cognitive  Neuro Cognitive (Adult)  Cognitive/Neuro/Behavioral WDL: .WDL except, level of consciousness, orientation, speech  Level of Consciousness: Alert  Orientation: disoriented to, place, time, situation  Speech: garbled  Last Known Well Date: 01/06/25  Last Known Well Time: 2000  Pupils  Pupil PERRLA: yes  Mian Coma Scale  Best Eye Response: 3-->(E3) to speech  Best Motor Response: 6-->(M6) obeys commands  Best Verbal Response: 4-->(V4) confused  Mian Coma Scale Score: 13  NIH Stroke Scale  Interval: baseline  1a. Level of Consciousness: 0-->Alert, keenly responsive  1b. LOC Questions: 2-->Answers neither question correctly  1c. LOC Commands: 0-->Performs both tasks correctly  2. Best Gaze: 1-->Partial gaze palsy, gaze is abnormal in one or both eyes, but forced deviation or total gaze paresis is not present  3. Visual: 0-->No visual loss  4. Facial Palsy: 0-->Normal symmetrical movements  5a. Motor Arm, Left: 0-->No drift, limb holds 90 (or 45) degrees for full 10 secs  5b. Motor Arm, Right: 1-->Drift, limb holds 90 (or 45) degrees, but drifts down before full 10 secs, does not hit bed or other support  6a. Motor Leg, Left: 0-->No drift, leg holds 30 degree position for full 5 secs  6b. Motor Leg, Right: 0-->No drift, leg holds 30 degree position for full 5 secs  7. Limb Ataxia: 0-->Absent  8. Sensory: 0-->Normal, no sensory loss  9. Best Language: 2-->Severe aphasia, all communication is through fragmentary expression, great need for inference, questioning, and guessing by the listener. Range of information that can be exchanged is limited, listener carries burden of. . . (see row details)  10. Dysarthria: 0-->Normal  11. Extinction and Inattention (formerly Neglect): 2-->Profound marni-inattention/extinction  more than 1 modality  Total (NIH Stroke Scale): 8    Learning  Learning Assessment  Learning Readiness and Ability: cognitive limitation noted  Education Provided  Person Taught: patient  Teaching Method: verbal instruction  Teaching Focus: symptom/problem overview, diagnostic test, medical device/equipment use, medication administration  Education Outcome Evaluation: acceptance expressed    Respiratory  Respiratory WDL  Respiratory WDL: .WDL except, cough  Cough Frequency: frequent    Abdominal Pain       Pain Assessments  Pain (Adult)  (0-10) Pain Rating: Rest: 0  (0-10) Pain Rating: Activity: 0    NIH Stroke Scale  NIH Stroke Scale  Interval: baseline  1a. Level of Consciousness: 0-->Alert, keenly responsive  1b. LOC Questions: 2-->Answers neither question correctly  1c. LOC Commands: 0-->Performs both tasks correctly  2. Best Gaze: 1-->Partial gaze palsy, gaze is abnormal in one or both eyes, but forced deviation or total gaze paresis is not present  3. Visual: 0-->No visual loss  4. Facial Palsy: 0-->Normal symmetrical movements  5a. Motor Arm, Left: 0-->No drift, limb holds 90 (or 45) degrees for full 10 secs  5b. Motor Arm, Right: 1-->Drift, limb holds 90 (or 45) degrees, but drifts down before full 10 secs, does not hit bed or other support  6a. Motor Leg, Left: 0-->No drift, leg holds 30 degree position for full 5 secs  6b. Motor Leg, Right: 0-->No drift, leg holds 30 degree position for full 5 secs  7. Limb Ataxia: 0-->Absent  8. Sensory: 0-->Normal, no sensory loss  9. Best Language: 2-->Severe aphasia, all communication is through fragmentary expression, great need for inference, questioning, and guessing by the listener. Range of information that can be exchanged is limited, listener carries burden of. . . (see row details)  10. Dysarthria: 0-->Normal  11. Extinction and Inattention (formerly Neglect): 2-->Profound marni-inattention/extinction more than 1 modality  Total (NIH Stroke Scale): 8    Palak  MARCO ANTONIO Leblanc  01/07/25 16:44 EST

## 2025-01-07 NOTE — PLAN OF CARE
Problem: Adult Inpatient Plan of Care  Goal: Absence of Hospital-Acquired Illness or Injury  Outcome: Progressing     Problem: Adult Inpatient Plan of Care  Goal: Optimal Comfort and Wellbeing  Outcome: Progressing   Goal Outcome Evaluation: patient just arrived to floor from ED. Experiencing aphasia and unable to adequately follow commands at this this time. Dysphagia screen failed and will have speech evaluate. Patient unable to answer admission questions and patient's wife is not answering at this time. Will notify night shift RN. Patient is in bed with call light within reach. Bed alarm on.

## 2025-01-07 NOTE — CONSULTS
"Neurology Consult Note    Consult Date: 1/7/2025    Referring MD: No ref. provider found    Reason for Consult I have been asked to see the patient in neurological consultation to render advice and opinion regarding concern for stroke.    Flo Manzo is a 69 y.o. male with history of atrial fibrillation with RVR, permanent pacemaker, hemorrhagic stroke, hypertension, obstructive sleep apnea, peripheral neuropathy, uncontrolled hypertension presents to the ED with left gaze deviation and confusion.  Patient last known well last night around 8 PM when his son saw him.  Someone to check on him today and he was not communicating and decreased responsiveness so he called EMS.  When EMS arrived he was slightly hypertensive but his glucose was 111.  According to EMS \"he is normally alert and oriented x 4.  He has a history of strokes and TIAs in the past.  EMS concerned if he ambulance to baseline but are aware that he has a walker or wheelchair present in his house.  Dr. Cedillo talked with pharmacy and it looks like he has not had his Eliquis refilled since March for his atrial fibrillation.  Blood pressure on arrival 162/107 mmHg and pulse 93 bpm.  Blood glucose 97.    Patient has seen neurology in July 2022 for chronic exacerbation of prior ICH as he had transient left-sided weakness and facial droop.  He refused MRI at the time due to claustrophobia.  He admitted taking Eliquis at the time for atrial fibrillation.  He was also seen in August 2024 for acute balance problems new facial droop and several weeks of swelling difficulties at home.  MRI concerning for cerebral amyloid angiopathy with significant progression so anticoagulation was stopped at this point and a tiny acute left frontal stroke.  He was discharged home on aspirin and statin.  He was recommended to consider potential Watchman device.  Last saw cardiology November 2024 and are still planning for Watchman device but is still not been completed " yet.    Past Medical/Surgical Hx:  Past Medical History:   Diagnosis Date    Arthritis     Atrial fibrillation with RVR 01/24/2019    Colon polyps 01/04/2018    Hepatic flexure: tubular adenoma, only low-grade dysplasia; splenic flexure: tubular adenoma, only low-grade dysplasia; sigmoid colon: tubular adenoma, multiple fragments only low-grade dysplasia    Depression     Heart murmur     Hemorrhagic stroke 01/29/2019    Hypertension     New onset atrial fibrillation (CMS/HCC) - RVR 01/24/2019    GÓMEZ (obstructive sleep apnea) 06/06/2019    Home sleep study with moderate severity GÓMEZ, AHI 20 events per hour.  Sleep-related hypoxia with low O2 saturation 84% for 14 minutes.    Peripheral neuropathy     Sleep apnea     previously diagnosed with sleep apnea, had T&A done and it has since resolved     Stroke     Uncontrolled hypertension     Ventral hernia      Past Surgical History:   Procedure Laterality Date    APPENDECTOMY N/A 1972    BACK SURGERY      BLADDER STIMULATOR IMPLANT L LOWER BACK    CARDIAC CATHETERIZATION N/A 07/20/2004    Cath left ventriculography, coronary angiography and left heart catheterization, Normal results-Dr. Fierro     CARDIAC CATHETERIZATION N/A 1/29/2019    Procedure: Left Heart Cath;  Surgeon: Eren Bruce MD;  Location: [facility] CATH INVASIVE LOCATION;  Service: Cardiology    CARDIAC CATHETERIZATION N/A 1/29/2019    Procedure: Left ventriculography;  Surgeon: Eren Bruce MD;  Location:  [facility] CATH INVASIVE LOCATION;  Service: Cardiology    CARDIAC CATHETERIZATION N/A 1/29/2019    Procedure: Coronary angiography;  Surgeon: Eren Bruce MD;  Location:  [facility] CATH INVASIVE LOCATION;  Service: Cardiology    CARDIAC ELECTROPHYSIOLOGY PROCEDURE N/A 3/4/2022    Procedure: Pacemaker SC new BIOTRONIK;  Surgeon: Eren Bruce MD;  Location:  [facility] CATH INVASIVE LOCATION;  Service: Cardiology;  Laterality: N/A;    CARPAL TUNNEL RELEASE Right 2013    CARPAL  TUNNEL RELEASE Left     COLONOSCOPY N/A 1/4/2018    Procedure: COLONOSCOPY with cold polypectomy and hot snare polypectomy;  Surgeon: Ten Solitario MD;  Location:  [facility] ENDOSCOPY;  Service:     COLONOSCOPY N/A 4/25/2024    Procedure: COLONOSCOPY INTO CECUM;  Surgeon: Ten Solitario MD;  Location:  [facility] ENDOSCOPY;  Service: General;  Laterality: N/A;  PRE: PERSONAL H/O COLON POLYP  /  POST: POOR PREP OTHERWISE NORMAL    EYE LENS IMPLANT SECONDARY Bilateral 2007, 2008    KNEE CARTILAGE SURGERY Right 1979    TONSILLECTOMY AND ADENOIDECTOMY Bilateral 2005    TOTAL KNEE ARTHROPLASTY Left 03/14/2016    Left total knee arthroplasty with Amberly component, size 11 femur, G tibial baseplate with a 10 polyethylene insert and a 38 patellar button-Dr. Pablo Hairston    TOTAL KNEE ARTHROPLASTY Right 03/02/2015    Right total knee arthroplasty with Amberly component size G femur, 11 tibial base plate with an 11 polyethylene insert and a 38 patellar button-Dr. Pablo Hairston    UVULOPALATOPHARYNGOPLASTY N/A 2005    part of soft palate, and uvula    VENTRAL HERNIA REPAIR N/A 1/23/2018    Procedure: VENTRAL HERNIA REPAIR LAPAROSCOPIC WITH DAVINCI ROBOT AND MESH;  Surgeon: Ten Solitario MD;  Location: [facility] MAIN OR;  Service:        Medications On Admission  (Not in a hospital admission)      Allergies:  Allergies   Allergen Reactions    Poison Ivy Extract Hives, Itching and Rash     ITCHY BLISTERS       Social Hx:  Social History     Socioeconomic History    Marital status:    Tobacco Use    Smoking status: Never     Passive exposure: Past    Smokeless tobacco: Never   Vaping Use    Vaping status: Never Used   Substance and Sexual Activity    Alcohol use: Not Currently     Comment: social, maybe a drink a month, not since stroke    Drug use: No     Comment: previously smoked marijuana     Sexual activity: Defer       Family Hx:  Family History   Problem Relation Age of Onset    Hypertension Mother     Kidney  "failure Mother     Coronary artery disease Father     Lung cancer Father         positive smoker    Heart attack Father     Breast cancer Sister 60    Prostate cancer Brother     Colon polyps Brother     Kidney nephrosis Brother     Malig Hyperthermia Neg Hx          Exam    BP (!) 162/107   Pulse 106   Temp 98.5 °F (36.9 °C) (Tympanic)   Resp 18   Ht 177.8 cm (70\")   Wt 104 kg (229 lb 9.6 oz)   SpO2 94%   BMI 32.94 kg/m²   gen: NAD, vitals reviewed  MS: oriented to self only, impaired fluency, repetition and comprehension, right hemineglect to sound, touch and vision  CN: not blinking to threat in right visual fields, PERRL, left gaze preference, no facial droop, mild dysarthria  Motor: 5/5 throughout upper and lower extremities, normal tone  Sensation: not withdrawal to pain in right upper and lower extremity as much as left. Requires much deeper and prolonged nail bed stimulation  Coordination: MICHAEL    DATA:    Lab Results   Component Value Date    GLUCOSE 91 01/07/2025    CALCIUM 9.3 01/07/2025     01/07/2025    K 4.0 01/07/2025    CO2 25.0 01/07/2025     01/07/2025    BUN 15 01/07/2025    CREATININE 0.78 01/07/2025    EGFRIFAFRI 103 08/27/2021    EGFRIFNONA 85 08/27/2021    BCR 19.2 01/07/2025    ANIONGAP 7.0 01/07/2025     Lab Results   Component Value Date    WBC 7.94 01/07/2025    HGB 14.5 01/07/2025    HCT 44.2 01/07/2025    MCV 84.4 01/07/2025     01/07/2025     Lab Results   Component Value Date    LDL 44 08/06/2024    LDL 77 03/13/2024    LDL 52 11/13/2023     Lab Results   Component Value Date    HGBA1C 5.70 (H) 08/06/2024     Lab Results   Component Value Date    INR 1.08 01/07/2025    INR 1.18 (H) 10/24/2024    INR 1.15 (H) 08/05/2024    PROTIME 14.2 01/07/2025    PROTIME 15.2 (H) 10/24/2024    PROTIME 14.9 (H) 08/05/2024       Lab review:   Glucose 119    Imaging review:   Chest x-ray:  FINDINGS: The heart is enlarged. Left-sided generator device and cardiac  lead are noted. " Aorta appears ectatic. Pulmonary vasculature is mildly  congested. No focal pulmonary consolidation, pleural effusion, or  pneumothorax. Right hemidiaphragm remains elevated. No acute osseous  process.    CT PERFUSION STUDY OF THE BRAIN:  The CT perfusion study of the brain  starts at the mid cerebellum and extends for a 10 cm vertical slab of  brain imaging through the majority of the cerebral hemispheres and  excludes the inferior-half of the cerebellum and medulla, which are not  assessed on this exam. Within the 10 cm vertical slab of brain imaging,  there are areas of reduced cerebral blood flow that are patchy nodular  in nature involving the posterior inferior left parietal lobe extending  into the superior lateral left occipital lobe compatible with small  areas of acute completed infarction in the left parietal occipital  parenchyma in the left MCA territory.  There is an oblong focus of  reduced cerebral blood flow to less than 30% of normal involving the  superior medial left frontal parietal junction suggesting an acute  infarct in the distal left LANETTE territory. There are areas of delayed  time to maximal enhancement that involve the posterior occipital lobes  bilaterally that could be artifact.  There is delayed time to maximal  enhancement involving the superior medial left frontal parietal region  that may be a localized area of hypoperfused brain in the area of  suggested acute infarction in the distal left LANETTE territory, and there  is delayed time to maximal enhancement of greater than 6 seconds  extending from the posterior inferior left parietal lobe into the  lateral left occipital lobe.  This area is felt to be hypoperfused brain  within the left parietal occipital region and the area of hypoperfused  brain appears larger than the area of acute infarction although the  acute infarct in this area is bigger on the noncontrasted head CT than  suggested on the CT perfusion study.     CT ANGIOGRAM OF  THE NECK: The nasopharynx, oropharynx, the hypopharynx,  true cords and subglottic airway are normal in appearance.  There is an  enlarged thyroid gland with thyroid nodules suggesting a multinodular  goiter. There is some substernal extension of the enlarged left lobe of  the thyroid. The lung apices are clear. The parotid, ,  parapharyngeal, and submandibular spaces are symmetric and are normal in  appearance. Cervical spondylosis is present with prominent disc space  narrowing, degenerative endplate changes at C2-3 and C3-4, and posterior  spurs and protruding disc materia up to moderate to severely narrow the  canal at C2-3, and posterior spurs severely narrow the canal at C3-4.   There is disc space narrowing.  There are degenerative endplate changes  at C5-6 and C6-7, and posterior spurs at least mild up to mild to  moderately narrow the canal at C6-7.  There is multilevel foraminal  narrowing in the cervical spine with mild-to-moderate right bony  foraminal narrowing at C2-3, moderate left and severe right bony  foraminal narrowing at C3-4, and mild-to-moderate right and moderate  left foraminal narrowing at C6-7. There is common origin of the  brachiocephalic artery and left common carotid artery off the aortic  arch constituting a bovine configuration of the aortic arch which is a  normal anatomic variation. The left subclavian artery origin is normal  in appearance. No stenosis is seen in the left subclavian artery. The  left vertebral artery origin is normal in appearance. No stenosis is  seen in the cervical segment of the left vertebral artery. The left  common carotid origin is normal in appearance. No stenosis is seen in  the left common carotid artery and its bifurcation into the left  internal and external carotid arteries is normal in appearance, and no  stenosis is seen in the cervical segment of the left internal carotid  artery using the NASCET criteria. The brachiocephalic artery  origin is  normal in appearance.  No stenosis is seen in the brachiocephalic artery  and its bifurcation into the right subclavian and common carotid artery  is normal in appearance, and no stenosis is seen in the right subclavian  artery.  The right vertebral artery origin is less than optimally  assessed, but is most probably within normal limits.  No stenosis is  seen in the cervical segment of the right vertebral artery. The right  common carotid origin is normal in appearance.  No stenosis is seen in  the right common carotid artery and its bifurcation into the right  internal and external carotid arteries is normal in appearance and no  stenosis is seen in the cervical segment of the right internal carotid  artery using the NASCET criteria.      CT ANGIOGRAM OF THE HEAD: The intracranial segments of the vertebral  arteries are widely patent to the vertebrobasilar junction without  stenosis.  The basilar artery and the basilar tip are normal in  appearance. The posterior cerebral and superior cerebellar arteries are  within normal limits. The upper cervical, petrous, cavernous and  supracavernous segments of the internal carotid arteries are within  normal limits.  There is an atretic A1 segment of the right anterior  cerebral artery which is a normal anatomic variation.  The dominant left  A1 segment is widely patent.  The anterior communicating artery origin  is normal in appearance. The A2 segments of the anterior cerebral  arteries are within normal limits. The M1 segments of the middle  cerebral arteries and middle cerebral artery bifurcations are within  normal limits. There is abrupt occlusion with filling defect in a  parietal M3 or M4 branch of the left middle cerebral artery by an  embolus or thrombus. This leads to the area of acute infarction in the  left parietal occipital region. The M1 segment of the right middle  cerebral artery and the right middle cerebral artery bifurcation are  normal in  appearance.  The right M2 segments are normal in appearance.     IMPRESSION:  1. The CT of the head without contrast demonstrates a hyperdense  anterior left parietal M3 or M4 MCA branch from an acute embolus or  thrombus. Posterior to this,. is a wedge-shaped area of acute infarction  in the left parietal occipital parenchyma that measures 4.7 x 3.7 x 4 cm  in size, and there is no hemorrhagic transformation. The patient also  has a chronic infarct involving the posterior superior medial left  frontal lobe in the left LANETTE territory that measures 3.4 x 1.5 x 1.9 cm  in size. There is moderate small vessel disease in the cerebral white  matter and diffuse cerebral atrophy. Patient did have a prior MRI of the  brain on 08/06/2024 that demonstrated hemosiderin staining of the  chronic infarct in the left LANETTE territory and multiple punctate areas of  hemosiderin staining in the thalami and putamen bilaterally as well as  involving the dentate nuclei of the cerebellum, the sequela of old tiny  hypertensive microhemorrhages and patient also has multiple punctate  foci of hemosiderin staining in the cerebral hemispheres involving the  frontal temporal parietal occipital parenchyma from old microhemorrhages  secondary to amyloid and these punctate areas of hemosiderin deposition  are unable to be appreciated on the current head CT, but are clearly  present on prior MRI of the brain on 08/06/2024. The results of the  noncontrast head CT were communicated to Dr. Leslie from stroke  neurology by telephone on 01/07/2025 at 2:45 p.m., and he requested a  contrast-enhanced CT angiogram of the head and neck and CT perfusion  study of the brain.     2. The CT perfusion study of the brain consists of a 10 cm vertical slab  of brain imaging from the mid cerebellum up through the cerebral  hemispheres and excludes the inferior-half of the cerebellum and medulla  which are not assessed. The patient was moving during the CTP  which  slightly limits evaluation. Within the 10 cm vertical slab of brain  imaging, there are areas of reduced cerebral blood flow to less than 30%  of normal indicating patchy nodular areas of acute infarction in the  left parietal occipital parenchyma and this is in the distribution of  the parietal occipital branches of the left MCA territory, and there is  an area involving the superior medial left frontal parietal junction  that is in the distal left LANETTE territory and the combined dimensions  measures 22 cc of brain parenchyma that are acutely infarcted, although  I believe the left parietal occipital acute infarct is larger than the  CT perfusion study suggests, as on the noncontrast head CT the acute  infarct in the left parietal occipital parenchyma measures up to 4.7 x  3.7 x 4 cm. Furthermore, there are bilateral symmetric areas of delayed  time to maximal enhancement of greater than 6 seconds in the posterior  occipital lobes that I think is probably artifact rather than  hypoperfused brain.  There is an area in the superior medial left  frontal parietal junction that may be hypoperfused brain in the distal  left LANETTE territory that matches the cerebral blood flow deficit. There  is an area in the left parietal occipital parenchyma that demonstrates  delayed time to maximal enhancement of greater than 6 seconds indicating  chinyere hypoperfused brain that appears larger than the infarcted brain on  the cerebral blood flow images, but appears to course on very well to  the area of acute infarction on the head CT without contrast. I believe  that the Rapid analysis suggests that the hypoperfused brain measures up  to 111 cc of brain parenchyma and I think this is an overestimation as I  think the areas in the posterior occipital lobe is artifact and it  suggest that the mismatch ratio is 6, and I believe this is an  overestimation.     3. Cervical spondylosis is present with disc space narrowing  and  degenerative endplate changes at C2-3 and C3-4 and C6-7.  Posterior  spurs and protruding or herniated disc material moderately narrow the  canal at C2-3 and posterior spurs severely narrow the canal and flatten  the cord at C3-4, and posterior spurs mildly narrow the canal at C6-7,  and there is foraminal narrowing in the cervical spine as described  above.     4. The CT angiographic evaluation of the great vessels of the neck is  normal with no stenosis seen in the vertebral arteries and no stenosis  seen in the common carotid arteries or the cervical segments of the  internal carotid arteries using the NASCET criteria. The CT angiographic  evaluation of the head demonstrates abrupt embolic occlusion or  thrombosis of a parietal branch that is likely either a distal M3 or M4  branch of the left middle cerebral artery leading to the area of acute  infarction in the left parietal occipital parenchyma. The remainder of  the CT angiogram of the head and neck is normal.     Diagnoses:  Acute ischemic left parietal occipital infarct in the distal M3-M4 territory, etiology likely embolic  Atrial fibrillation RVR  Cerebral amyloid angiopathy  Obstructive sleep apnea  CHF  Permanent pacemaker and AICD    Comment: Patient head CT shows completed acute left parietal occipital infarct in the M3 M4 territory with a hyperdense M3/M4 sign on CT.  There is also a chronic infarct appreciated in the left LANETTE territory and chronic left cerebellar hemorrhage.  CTA demonstrates embolic occlusion of the distal M3/M4.  CTP affected by significant movement and findings likely secondary to artifact.  Patient discontinued off Eliquis in March or has not been refilled since.  He saw neurology in August 2024 who discontinued him on Eliquis at this point secondary to severe cerebral amyloid angiopathy and recommended potential Watchman device which is still in the works per cardiology.  Etiology of the stroke likely cardioembolic without  anticoagulation for management of atrial fibrillation due to risk of bleed.  Will plan to start patient on aspirin and statin in the meantime.  MRI brain to reassess cerebral amyloid angiopathy.  May reconsider starting anticoagulation after 7 days depending on results of MRI.    NIHSS 13  Pre-stroke mRS: At least 3-4 with wheelchair at home but unable to reach family to further assess    PLAN:   MRI brain without contrast  -Pt was not a candidate for tnk given due to being outside of time window.  -Admit to 5th floor  -Give aspirin now; if cannot swallow give NV  -Statin  -Maintain permissive HTN for 24-48hrs, do not treat unless BP > 220/120 if no contraindication  -Perform bedside swallow test  -Telemetry to monitor for arrhythmia  -VTE prophylaxis  -Neuro checks, if change in exam call neuro oncall  -PT/OT when appropriate   Hemoglobin A1c, lipid panel    Recommendations discussed with Dr. Leslie is in agreement plan.

## 2025-01-07 NOTE — ED NOTES
"Stroke-like symptoms; unknown LNW. Seen \"normal\" at 2000 last night. AxO currently; normally AOx4. R Pupil 3, Left was 5mm & non reachtive. Some left gaze, and unable to follow directions with EMS.   "

## 2025-01-07 NOTE — Clinical Note
Level of Care: Telemetry [5]   Diagnosis: Stroke [[id number]]   Admitting Physician: MARCI ART [[id number]   Attending Physician: MARCI ART [[id number]]   Certification: I certify that inpatient services are medically necessary for this patient for a duration of greater than two midnights. See H&P and MD Progress Notes for additional information about the patient's course of treatment.

## 2025-01-08 NOTE — CONSULTS
[facility]  Cardiology Consult Note    Patient Identification:  Name: Flo Manzo  Age: 69 y.o.  Sex: male  :  1955  MRN: [id number]             Requesting Physician: Follow up for afib/EKG changes    Reason for Consultation / Chief Complaint: Amanda BURRELL    History of Present Illness:   This is a 69-year-old male who is known with our service with chronic atrial fibrillation, hypertension, moderate AV stenosis, chronic diastolic congestive heart failure, SSS s/p pacemaker, GÓMEZ history of CVA, cerebral amyloid angiopathy.  He presented to [facility] ER for altered mental status.  Workup in ER revealed acute CVA on the left side, was deemed not a thrombolytic or retrieval candidate.  He was treated with aspirin and admitted for further management.    He was taken off anticoagulation by neurologist in 2024 due to cerebral amyloid angiopathy.  Following with structural heart team for evaluation for watchman placement.  He has an appointment scheduled actually on .    -Echo 10/26/2024 EF 50%, borderline LVH, indeterminate LV diastolic function.  Moderate AV stenosis.  Mild dilation of the ascending aorta   -stress test 10/25/2024 myocardial perfusion image indicates a normal study with no evidence of ischemia.  Abnormal LV wall motion consistent with hypokinesis.    -Cardiac catheterization  normal coronary arteries normal LV gram.    Comorbid cardiac risk factors:     Past Medical History:  Past Medical History:   Diagnosis Date    Arthritis     Atrial fibrillation with RVR 2019    Colon polyps 2018    Hepatic flexure: tubular adenoma, only low-grade dysplasia; splenic flexure: tubular adenoma, only low-grade dysplasia; sigmoid colon: tubular adenoma, multiple fragments only low-grade dysplasia    Depression     Heart murmur     Hemorrhagic stroke 2019    Hypertension     New onset atrial fibrillation (CMS/HCC) - RVR 2019     GÓMEZ (obstructive sleep apnea) 06/06/2019    Home sleep study with moderate severity GÓMEZ, AHI 20 events per hour.  Sleep-related hypoxia with low O2 saturation 84% for 14 minutes.    Peripheral neuropathy     Sleep apnea     previously diagnosed with sleep apnea, had T&A done and it has since resolved     Stroke     Uncontrolled hypertension     Ventral hernia      Past Surgical History:  Past Surgical History:   Procedure Laterality Date    APPENDECTOMY N/A 1972    BACK SURGERY      BLADDER STIMULATOR IMPLANT L LOWER BACK    CARDIAC CATHETERIZATION N/A 07/20/2004    Cath left ventriculography, coronary angiography and left heart catheterization, Normal results-Dr. Fierro     CARDIAC CATHETERIZATION N/A 1/29/2019    Procedure: Left Heart Cath;  Surgeon: Eren Bruce MD;  Location: [facility] CATH INVASIVE LOCATION;  Service: Cardiology    CARDIAC CATHETERIZATION N/A 1/29/2019    Procedure: Left ventriculography;  Surgeon: Eren Bruce MD;  Location: [facility] CATH INVASIVE LOCATION;  Service: Cardiology    CARDIAC CATHETERIZATION N/A 1/29/2019    Procedure: Coronary angiography;  Surgeon: Eren Bruce MD;  Location: [facility] CATH INVASIVE LOCATION;  Service: Cardiology    CARDIAC ELECTROPHYSIOLOGY PROCEDURE N/A 3/4/2022    Procedure: Pacemaker SC new BIOTRONIK;  Surgeon: Eren Bruce MD;  Location: [facility] CATH INVASIVE LOCATION;  Service: Cardiology;  Laterality: N/A;    CARPAL TUNNEL RELEASE Right 2013    CARPAL TUNNEL RELEASE Left     COLONOSCOPY N/A 1/4/2018    Procedure: COLONOSCOPY with cold polypectomy and hot snare polypectomy;  Surgeon: Ten Solitario MD;  Location: [facility] ENDOSCOPY;  Service:     COLONOSCOPY N/A 4/25/2024    Procedure: COLONOSCOPY INTO CECUM;  Surgeon: Ten Solitario MD;  Location: [facility] ENDOSCOPY;  Service: General;  Laterality: N/A;  PRE: PERSONAL H/O COLON POLYP  /  POST: POOR PREP OTHERWISE NORMAL    EYE LENS IMPLANT SECONDARY Bilateral 2007,  2008    KNEE CARTILAGE SURGERY Right 1979    TONSILLECTOMY AND ADENOIDECTOMY Bilateral 2005    TOTAL KNEE ARTHROPLASTY Left 03/14/2016    Left total knee arthroplasty with Amberly component, size 11 femur, G tibial baseplate with a 10 polyethylene insert and a 38 patellar button-Dr. Pablo Hairston    TOTAL KNEE ARTHROPLASTY Right 03/02/2015    Right total knee arthroplasty with Amberly component size G femur, 11 tibial base plate with an 11 polyethylene insert and a 38 patellar button-Dr. Pablo Hairston    UVULOPALATOPHARYNGOPLASTY N/A 2005    part of soft palate, and uvula    VENTRAL HERNIA REPAIR N/A 1/23/2018    Procedure: VENTRAL HERNIA REPAIR LAPAROSCOPIC WITH DAVINCI ROBOT AND MESH;  Surgeon: Ten Solitario MD;  Location: [facility] OR;  Service:       Allergies:  Allergies   Allergen Reactions    Poison Ivy Extract Hives, Itching and Rash     ITCHY BLISTERS     Home Meds:  Medications Prior to Admission   Medication Sig Dispense Refill Last Dose/Taking    acetaminophen (TYLENOL) 325 MG tablet Take 1 tablet by mouth Every 4 (Four) Hours As Needed for Mild Pain. 2 tablets Q4 hours PRN for mild pain.       albuterol sulfate  (90 Base) MCG/ACT inhaler Inhale 2 puffs Every 4 (Four) Hours As Needed for Wheezing or Shortness of Air. Indications: shortness of breath 18 g 3     aspirin 81 MG chewable tablet Chew 1 tablet Daily.       atorvastatin (LIPITOR) 40 MG tablet Take 1 tablet by mouth Every Night. Indications: Cerebrovascular Accident or Stroke       busPIRone (BUSPAR) 10 MG tablet TAKE 1 TABLET BY MOUTH TWICE DAILY 60 tablet 5     citalopram (CeleXA) 20 MG tablet Take 1 tablet by mouth Daily. 30mg, oral, Once a day, give 1.5 tablets by mouth daily.       furosemide (LASIX) 20 MG tablet Take 1 tablet by mouth Daily.       gabapentin (NEURONTIN) 100 MG capsule Take 1 capsule by mouth Every Morning. 5 capsule 0     gabapentin (NEURONTIN) 300 MG capsule Take 1 capsule by mouth Every Night. 5 capsule 0      "guaiFENesin (MUCINEX) 600 MG 12 hr tablet Take 2 tablets by mouth 2 (Two) Times a Day.       influenza vac split high-dose (Fluzone High-Dose) 0.5 ML suspension prefilled syringe injection Inject 0.5 mL into the appropriate muscle as directed by prescriber. Indications: na 0.5 mL 0     lisinopril (PRINIVIL,ZESTRIL) 5 MG tablet Take 1 tablet by mouth Daily for 30 days. Indications: Left Systolic Heart Failure 30 tablet 0     metoprolol tartrate (LOPRESSOR) 25 MG tablet TAKE 1 & 1/2 TABLETS BY MOUTH TWICE DAILY 90 tablet 1     omeprazole (priLOSEC) 20 MG capsule Take 1 capsule by mouth Daily.       potassium chloride ER (K-TAB) 20 MEQ tablet controlled-release ER tablet Take 1 tablet by mouth Daily.       tamsulosin (FLOMAX) 0.4 MG capsule 24 hr capsule TAKE 1 CAPSULE BY MOUTH NIGHTLY 30 capsule 5      Current Meds:   [unfilled]  Social History:   Social History     Tobacco Use    Smoking status: Never     Passive exposure: Past    Smokeless tobacco: Never   Substance Use Topics    Alcohol use: Not Currently     Comment: social, maybe a drink a month, not since stroke      Family History:  Family History   Problem Relation Age of Onset    Hypertension Mother     Kidney failure Mother     Coronary artery disease Father     Lung cancer Father         positive smoker    Heart attack Father     Breast cancer Sister 60    Prostate cancer Brother     Colon polyps Brother     Kidney nephrosis Brother     Malig Hyperthermia Neg Hx         Review of Systems  Constitutional: No wt loss, fever   Gastrointestinal: No nausea , abdominal pain  Behavioral/Psych: No insomnia or anxiety   Cardiovascular ----positive for weakness. All other systems reviewed and are negative      BP (!) 138/109 (BP Location: Left arm, Patient Position: Lying) Comment: nurse notified  Pulse 99   Temp 99 °F (37.2 °C) (Axillary)   Resp 20   Ht 177.8 cm (70\")   Wt 101 kg (221 lb 12.5 oz)   SpO2 93%   BMI 31.82 kg/m²   General appearance: No acute " changes   Neck: Trachea midline; NECK, supple, no thyromegaly or lymphadenopathy   Lungs: Normal size and shape, normal breath sounds, equal distribution of air, no rales and rhonchi   CV: S1-S2 regular, no murmurs, no rub, no gallop   Abdomen: Soft, nontender; no masses , no abnormal abdominal sounds   Extremities: No deformity , normal color , no peripheral edema   Skin: Normal temperature, turgor and texture; no rash, ulcers                  Cardiographics  ECG:     Telemetry:    Echocardiogram:     Imaging  Chest X-ray:     Lab Review   Results from last 7 days   Lab Units 01/08/25  0130 01/08/25  0017   HSTROP T ng/L 16 15     Results from last 7 days   Lab Units 01/08/25  0017   MAGNESIUM mg/dL 2.1     Results from last 7 days   Lab Units 01/08/25  0130   SODIUM mmol/L 138   POTASSIUM mmol/L 4.0   BUN mg/dL 11   CREATININE mg/dL 0.75*   CALCIUM mg/dL 9.3     @[id number]@  Results from last 7 days   Lab Units 01/08/25  0130 01/07/25  1434   WBC 10*3/mm3 10.79 7.94   HEMOGLOBIN g/dL 14.4 14.5   HEMATOCRIT % 43.4 44.2   PLATELETS 10*3/mm3 240 217     Results from last 7 days   Lab Units 01/07/25  1434   INR  1.08   APTT seconds 26.2         Assessment:  Recurrent strokes  Chronic atrial fibrillation  Hypertension  Moderate aortic stenosis  Cerebral amyloid angiopathy  Pacemaker  Recommendations / Plan:   69-year-old with pacemaker and cerebral amyloid angiopathy with a very high risk for the bleeding has been off of the blood thinners, will have the structural team worked for Watchman preferably sooner than later    Clinically no angina pectoris    Will discuss with neurology and may add Norvasc for the blood pressure    Labs/tests ordered for am      Eren Bruce MD  1/8/2025, 08:32 EST      EMR Dragon/Transcription:   Dictated utilizing Dragon dictation

## 2025-01-08 NOTE — SIGNIFICANT NOTE
01/08/25 1153   OTHER   Discipline physical therapist   Rehab Time/Intention   Session Not Performed   (per RN, pt not appropriate for therapy evaluation. Ativan given this morning as well. Hold today, f/u next service date for PT cole.)   Recommendation   PT - Next Appointment 01/09/25

## 2025-01-08 NOTE — H&P
HISTORY AND PHYSICAL   [facility]        Date of Admission: 2025  Patient Identification:  Name: Flo Manzo  Age: 69 y.o.  Sex: male  :  1955  MRN: [id number]                     Primary Care Physician: Karen Jiménez APRN    Chief Complaint:  69 year old gentleman presented to the emergency room with altered mental status; he was last seen by his son around 9pm; he was not able to answer questions or follow directions when ems picked him up; his son checks on him regularly and called when he noted the changes from his baseline; he also had gaze which deviated to the left;   eliquis for several months because it was discontinued due to progression of his amyloid angiopathy; he is not able to answer questions so the history was obtained from the chart and ed provider    History of Present Illness:   As above    Past Medical History:  Past Medical History:   Diagnosis Date    Arthritis     Atrial fibrillation with RVR 2019    Colon polyps 2018    Hepatic flexure: tubular adenoma, only low-grade dysplasia; splenic flexure: tubular adenoma, only low-grade dysplasia; sigmoid colon: tubular adenoma, multiple fragments only low-grade dysplasia    Depression     Heart murmur     Hemorrhagic stroke 2019    Hypertension     New onset atrial fibrillation (CMS/HCC) - RVR 2019    GÓMEZ (obstructive sleep apnea) 2019    Home sleep study with moderate severity GÓMEZ, AHI 20 events per hour.  Sleep-related hypoxia with low O2 saturation 84% for 14 minutes.    Peripheral neuropathy     Sleep apnea     previously diagnosed with sleep apnea, had T&A done and it has since resolved     Stroke     Uncontrolled hypertension     Ventral hernia      Past Surgical History:  Past Surgical History:   Procedure Laterality Date    APPENDECTOMY N/A     BACK SURGERY      BLADDER STIMULATOR IMPLANT L LOWER BACK    CARDIAC CATHETERIZATION N/A 2004    Cath left ventriculography,  coronary angiography and left heart catheterization, Normal results-Dr. Fierro     CARDIAC CATHETERIZATION N/A 1/29/2019    Procedure: Left Heart Cath;  Surgeon: Eren Bruce MD;  Location: [facility] CATH INVASIVE LOCATION;  Service: Cardiology    CARDIAC CATHETERIZATION N/A 1/29/2019    Procedure: Left ventriculography;  Surgeon: Eren Bruce MD;  Location: [facility] CATH INVASIVE LOCATION;  Service: Cardiology    CARDIAC CATHETERIZATION N/A 1/29/2019    Procedure: Coronary angiography;  Surgeon: Eren Bruce MD;  Location: [facility] CATH INVASIVE LOCATION;  Service: Cardiology    CARDIAC ELECTROPHYSIOLOGY PROCEDURE N/A 3/4/2022    Procedure: Pacemaker SC new BIOTRONIK;  Surgeon: Eren Bruce MD;  Location: [facility] CATH INVASIVE LOCATION;  Service: Cardiology;  Laterality: N/A;    CARPAL TUNNEL RELEASE Right 2013    CARPAL TUNNEL RELEASE Left     COLONOSCOPY N/A 1/4/2018    Procedure: COLONOSCOPY with cold polypectomy and hot snare polypectomy;  Surgeon: Ten Solitario MD;  Location: [facility] ENDOSCOPY;  Service:     COLONOSCOPY N/A 4/25/2024    Procedure: COLONOSCOPY INTO CECUM;  Surgeon: Ten Solitario MD;  Location: [facility] ENDOSCOPY;  Service: General;  Laterality: N/A;  PRE: PERSONAL H/O COLON POLYP  /  POST: POOR PREP OTHERWISE NORMAL    EYE LENS IMPLANT SECONDARY Bilateral 2007, 2008    KNEE CARTILAGE SURGERY Right 1979    TONSILLECTOMY AND ADENOIDECTOMY Bilateral 2005    TOTAL KNEE ARTHROPLASTY Left 03/14/2016    Left total knee arthroplasty with Amberly component, size 11 femur, G tibial baseplate with a 10 polyethylene insert and a 38 patellar button-Dr. Pablo Hairston    TOTAL KNEE ARTHROPLASTY Right 03/02/2015    Right total knee arthroplasty with Amberly component size G femur, 11 tibial base plate with an 11 polyethylene insert and a 38 patellar button-Dr. Pablo Hairston    UVULOPALATOPHARYNGOPLASTY N/A 2005    part of soft palate, and uvula    VENTRAL HERNIA REPAIR N/A  1/23/2018    Procedure: VENTRAL HERNIA REPAIR LAPAROSCOPIC WITH DAVINCI ROBOT AND MESH;  Surgeon: Ten Solitario MD;  Location: [facility] OR;  Service:       Home Meds:  Medications Prior to Admission   Medication Sig Dispense Refill Last Dose/Taking    acetaminophen (TYLENOL) 325 MG tablet Take 1 tablet by mouth Every 4 (Four) Hours As Needed for Mild Pain. 2 tablets Q4 hours PRN for mild pain.       albuterol sulfate  (90 Base) MCG/ACT inhaler Inhale 2 puffs Every 4 (Four) Hours As Needed for Wheezing or Shortness of Air. Indications: shortness of breath 18 g 3     aspirin 81 MG chewable tablet Chew 1 tablet Daily.       atorvastatin (LIPITOR) 40 MG tablet Take 1 tablet by mouth Every Night. Indications: Cerebrovascular Accident or Stroke       busPIRone (BUSPAR) 10 MG tablet TAKE 1 TABLET BY MOUTH TWICE DAILY 60 tablet 5     citalopram (CeleXA) 20 MG tablet Take 1 tablet by mouth Daily. 30mg, oral, Once a day, give 1.5 tablets by mouth daily.       furosemide (LASIX) 20 MG tablet Take 1 tablet by mouth Daily.       gabapentin (NEURONTIN) 100 MG capsule Take 1 capsule by mouth Every Morning. 5 capsule 0     gabapentin (NEURONTIN) 300 MG capsule Take 1 capsule by mouth Every Night. 5 capsule 0     guaiFENesin (MUCINEX) 600 MG 12 hr tablet Take 2 tablets by mouth 2 (Two) Times a Day.       influenza vac split high-dose (Fluzone High-Dose) 0.5 ML suspension prefilled syringe injection Inject 0.5 mL into the appropriate muscle as directed by prescriber. Indications: na 0.5 mL 0     lisinopril (PRINIVIL,ZESTRIL) 5 MG tablet Take 1 tablet by mouth Daily for 30 days. Indications: Left Systolic Heart Failure 30 tablet 0     metoprolol tartrate (LOPRESSOR) 25 MG tablet TAKE 1 & 1/2 TABLETS BY MOUTH TWICE DAILY 90 tablet 1     omeprazole (priLOSEC) 20 MG capsule Take 1 capsule by mouth Daily.       potassium chloride ER (K-TAB) 20 MEQ tablet controlled-release ER tablet Take 1 tablet by mouth Daily.        tamsulosin (FLOMAX) 0.4 MG capsule 24 hr capsule TAKE 1 CAPSULE BY MOUTH NIGHTLY 30 capsule 5        Allergies:  Allergies   Allergen Reactions    Poison Ivy Extract Hives, Itching and Rash     ITCHY BLISTERS     Immunizations:  Immunization History   Administered Date(s) Administered    COVID-19 (PFIZER) 12YRS+ (COMIRNATY) 11/13/2023, 11/15/2024    COVID-19 (PFIZER) BIVALENT 12+YRS 05/08/2023    COVID-19 (PFIZER) Purple Cap Monovalent 04/01/2021, 04/29/2021    Covid-19 (Pfizer) Gray Cap Monovalent 04/28/2022    Fluad Quad 65+ 09/18/2020    Fluzone (or Fluarix & Flulaval for VFC) >6mos 12/20/2019    Fluzone High-Dose 65+YRS 10/24/2024    Fluzone High-Dose 65+yrs 11/01/2022, 10/03/2023    Influenza, Unspecified 01/25/2019    PPD Test 02/10/2016, 02/12/2018    Pneumococcal Conjugate 20-Valent (PCV20) 02/03/2023    Pneumococcal Polysaccharide (PPSV23) 08/27/2021    Shingrix 02/24/2021, 06/08/2021    Td (TDVAX) 03/10/1997    Tdap 03/27/2023    flucelvax quad pfs =>4 YRS 01/25/2019     Social History:   Social History     Social History Narrative    Not on file     Social History     Socioeconomic History    Marital status:    Tobacco Use    Smoking status: Never     Passive exposure: Past    Smokeless tobacco: Never   Vaping Use    Vaping status: Never Used   Substance and Sexual Activity    Alcohol use: Not Currently     Comment: social, maybe a drink a month, not since stroke    Drug use: No     Comment: previously smoked marijuana     Sexual activity: Defer       Family History:  Family History   Problem Relation Age of Onset    Hypertension Mother     Kidney failure Mother     Coronary artery disease Father     Lung cancer Father         positive smoker    Heart attack Father     Breast cancer Sister 60    Prostate cancer Brother     Colon polyps Brother     Kidney nephrosis Brother     Malig Hyperthermia Neg Hx         Review of Systems  Not obtainable from the patient    subjective:  T Max 24 hrs: Temp  "(24hrs), Av °F (36.7 °C), Min:97.5 °F (36.4 °C), Max:98.5 °F (36.9 °C)    Vitals Ranges:   Temp:  [97.5 °F (36.4 °C)-98.5 °F (36.9 °C)] 97.5 °F (36.4 °C)  Heart Rate:  [] 95  Resp:  [18-20] 18  BP: (157-179)/() 157/98      Exam:  /98 (BP Location: Left arm, Patient Position: Lying)   Pulse 95   Temp 97.5 °F (36.4 °C) (Oral)   Resp 18   Ht 177.8 cm (70\")   Wt 104 kg (229 lb 9.6 oz)   SpO2 94%   BMI 32.94 kg/m²     General Appearance:    drowsy cooperative, no distress, appears stated age   Head:    Normocephalic, without obvious abnormality, atraumatic   Eyes:    PERRL, conjunctivae/corneas clear, EOM's intact, both eyes   Ears:    Normal external ear canals, both ears   Nose:   Nares normal, septum midline, mucosa normal, no drainage    or sinus tenderness   Throat:   Lips, mucosa, and tongue normal   Neck:   Supple, symmetrical, trachea midline, no adenopathy;     thyroid:  no enlargement/tenderness/nodules; no carotid    bruit or JVD   Back:     Symmetric, no curvature, ROM normal, no CVA tenderness   Lungs:     Decreased breath sounds bilaterally, respirations unlabored   Chest Wall:    No tenderness or deformity    Heart:    Regular rate and rhythm, S1 and S2 normal, no murmur, rub   or gallop   Abdomen:     Soft, nontender, bowel sounds active all four quadrants,     no masses, no hepatomegaly, no splenomegaly   Extremities:   Extremities normal, atraumatic, no cyanosis or edema     .    Data Review:  Labs in chart were reviewed.  WBC   Date Value Ref Range Status   2025 7.94 3.40 - 10.80 10*3/mm3 Final     Hemoglobin   Date Value Ref Range Status   2025 14.5 13.0 - 17.7 g/dL Final     Hematocrit   Date Value Ref Range Status   2025 44.2 37.5 - 51.0 % Final     Platelets   Date Value Ref Range Status   2025 217 140 - 450 10*3/mm3 Final     Sodium   Date Value Ref Range Status   2025 138 136 - 145 mmol/L Final     Potassium   Date Value Ref Range Status "   01/07/2025 4.0 3.5 - 5.2 mmol/L Final     Comment:     Slight hemolysis detected by analyzer. Result may be falsely elevated.     Chloride   Date Value Ref Range Status   01/07/2025 106 98 - 107 mmol/L Final     CO2   Date Value Ref Range Status   01/07/2025 25.0 22.0 - 29.0 mmol/L Final     BUN   Date Value Ref Range Status   01/07/2025 15 8 - 23 mg/dL Final     Creatinine   Date Value Ref Range Status   01/07/2025 0.78 0.76 - 1.27 mg/dL Final     Glucose   Date Value Ref Range Status   01/07/2025 91 65 - 99 mg/dL Final     Calcium   Date Value Ref Range Status   01/07/2025 9.3 8.6 - 10.5 mg/dL Final     AST (SGOT)   Date Value Ref Range Status   01/07/2025 13 1 - 40 U/L Final     ALT (SGPT)   Date Value Ref Range Status   01/07/2025 12 1 - 41 U/L Final     Alkaline Phosphatase   Date Value Ref Range Status   01/07/2025 119 (H) 39 - 117 U/L Final                Imaging Results (All)       Procedure Component Value Units Date/Time    CT Angiogram Head w AI Analysis of LVO [[id number]] Collected: 01/07/25 1559     Updated: 01/07/25 1559    Narrative:      EMERGENCY CONTRAST-ENHANCED CT ANGIOGRAM OF THE HEAD AND NECK AND CT  PERFUSION STUDY OF THE BRAIN 01/07/2025     CLINICAL HISTORY: This is a 69-year-old male patient who has acute onset  confusion, dysarthria and gaze deviation.  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       HEAD CT WITHOUT CONTRAST TECHNIQUE: Spiral CT images were obtained from  the base of the skull to the vertex without intravenous contrast. The  images were reformatted and are submitted in 3 mm thick axial CT  sections with brain algorithm, 2 mm thick sagittal and coronal  reconstructions were performed and submitted in brain algorithm.     This is correlated to a prior MRI of the brain from [facility] on 08/06/2024 and prior CT angiogram of the head and neck on  08/07/2024 and a head CT on 08/26/2024. This is also correlated to a  remote prior MRI of the brain on 01/30/2019 and head CT on  01/31/2019.     FINDINGS: There are prominent patchy and confluent areas of low density  extending from the periventricular into the subcortical white matter of  the cerebral hemispheres consistent with moderate small vessel disease.  There is a focal area of encephalomalacia involving the posterior  superior medial left frontal lobe that involves the posterior left  cingulate gyrus compatible with an old infarct in the left LANETTE  territory. This chronic infarct measures about 3.4 x 1.5 x 1.9 cm. This  patient has multiple punctate areas of encephalomalacia in the thalami.  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 There was also hemosiderin  deposition at the site of the chronic infarct in the left LANETTE territory,  and these areas of hemosiderin deposition cannot be assessed on a  noncontrasted CT. There is a hyperdense M3 MCA branch within the  anterior left parietal sulcus and posterior to this hyperdense MCA  branch is a wedge-shaped area of low density through the gray-white  matter of the posterior inferior left parietal lobe extending inferiorly  into the superior lateral left occipital lobe compatible with an acute  left parietal occipital infarct with the area of acute infarction  measuring up to 4.7 x 3.7 x 4 cm in size. There is no hemorrhagic  transformation. There is diffuse cerebral atrophy.  The lateral and  third ventricles are minimally prominent in size, felt to be due to  central volume loss or atrophy. I see no  midline shift.  No extra-axial  fluid collections are identified and there is no evidence of acute  intracranial hemorrhage.       Impression:      There is a hyperdense anterior left parietal M3 MCA branch  suggesting acute thrombus or embolus in this branch, and posterior to  which is a wedge-shaped area of subtle acute infarction extending from  the posterior inferior left parietal lobe into the superior lateral left  occipital lobe, and the acute left parietal occipital infarct measures  4.7 x 3.7 x 4 cm in size. There is no hemorrhagic transformation. The  patient also has a chronic infarct in the posterior superior medial left  frontal lobe in the left LANETTE territory that measures 3.4 x 1.5 x 1.9 cm  in size.  There was hemosiderin deposition at the site of this chronic  infarct on prior MRI of the brain on 08/06/2024 from old petechial  hemorrhage into this chronic left LANETTE territory infarct. 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 There is  diffuse cerebral atrophy.  The remainder of the head CT is normal. The  results were communicated to Fred Leslie MD, from stroke neurology  while the patient was still on our scanner on 01/07/2025 at 2:45 p.m.  and he requested a contrast-enhanced CT angiogram of the head and neck  and CT perfusion study of the brain.     CONTRAST-ENHANCED CT ANGIOGRAM OF THE HEAD AND NECK AND CT PERFUSION  STUDY OF THE BRAIN TECHNIQUE: Spiral CT images were obtained from the  mid cerebellum up through the cerebral hemispheres during the  arterial  phase of contrast for a 10 cm vertical slab of brain imaging, and images  were reformatted and analyzed with Rapid software analysis for a CT  perfusion study of the brain, and subsequently spiral CT angiographic  images were obtained from the top of the aortic arch up through the  great vessels of the head and neck during the arterial phase of contrast  and images were reformatted and submitted in 1 mm thick axial, sagittal  and coronal CT sections with soft tissue algorithm and 3D  reconstructions were performed to complete the CT angiogram of the head  and neck.     FINDINGS:  CT PERFUSION STUDY OF THE BRAIN:  The CT perfusion study of the brain  starts at the mid cerebellum and extends for a 10 cm vertical slab of  brain imaging through the majority of the cerebral hemispheres and  excludes the inferior-half of the cerebellum and medulla, which are not  assessed on this exam. Within the 10 cm vertical slab of brain imaging,  there are areas of reduced cerebral blood flow that are patchy nodular  in nature involving the posterior inferior left parietal lobe extending  into the superior lateral left occipital lobe compatible with small  areas of acute completed infarction in the left parietal occipital  parenchyma in the left MCA territory.  There is an oblong focus of  reduced cerebral blood flow to less than 30% of normal involving the  superior medial left frontal parietal junction suggesting an acute  infarct in the distal left LANETTE territory. There are areas of delayed  time to maximal enhancement that involve the posterior occipital lobes  bilaterally that could be artifact.  There is delayed time to maximal  enhancement involving the superior medial left frontal parietal region  that may be a localized area of hypoperfused brain in the area of  suggested acute infarction in the distal left LANETTE territory, and there  is delayed time to maximal enhancement of greater than 6 seconds  extending from the  posterior inferior left parietal lobe into the  lateral left occipital lobe.  This area is felt to be hypoperfused brain  within the left parietal occipital region and the area of hypoperfused  brain appears larger than the area of acute infarction although the  acute infarct in this area is bigger on the noncontrasted head CT than  suggested on the CT perfusion study.     CT ANGIOGRAM OF THE NECK: The nasopharynx, oropharynx, the hypopharynx,  true cords and subglottic airway are normal in appearance.  There is an  enlarged thyroid gland with thyroid nodules suggesting a multinodular  goiter. There is some substernal extension of the enlarged left lobe of  the thyroid. The lung apices are clear. The parotid, ,  parapharyngeal, and submandibular spaces are symmetric and are normal in  appearance. Cervical spondylosis is present with prominent disc space  narrowing, degenerative endplate changes at C2-3 and C3-4, and posterior  spurs and protruding disc materia up to moderate to severely narrow the  canal at C2-3, and posterior spurs severely narrow the canal at C3-4.   There is disc space narrowing.  There are degenerative endplate changes  at C5-6 and C6-7, and posterior spurs at least mild up to mild to  moderately narrow the canal at C6-7.  There is multilevel foraminal  narrowing in the cervical spine with mild-to-moderate right bony  foraminal narrowing at C2-3, moderate left and severe right bony  foraminal narrowing at C3-4, and mild-to-moderate right and moderate  left foraminal narrowing at C6-7. There is common origin of the  brachiocephalic artery and left common carotid artery off the aortic  arch constituting a bovine configuration of the aortic arch which is a  normal anatomic variation. The left subclavian artery origin is normal  in appearance. No stenosis is seen in the left subclavian artery. The  left vertebral artery origin is normal in appearance. No stenosis is  seen in the cervical  segment of the left vertebral artery. The left  common carotid origin is normal in appearance. No stenosis is seen in  the left common carotid artery and its bifurcation into the left  internal and external carotid arteries is normal in appearance, and no  stenosis is seen in the cervical segment of the left internal carotid  artery using the NASCET criteria. The brachiocephalic artery origin is  normal in appearance.  No stenosis is seen in the brachiocephalic artery  and its bifurcation into the right subclavian and common carotid artery  is normal in appearance, and no stenosis is seen in the right subclavian  artery.  The right vertebral artery origin is less than optimally  assessed, but is most probably within normal limits.  No stenosis is  seen in the cervical segment of the right vertebral artery. The right  common carotid origin is normal in appearance.  No stenosis is seen in  the right common carotid artery and its bifurcation into the right  internal and external carotid arteries is normal in appearance and no  stenosis is seen in the cervical segment of the right internal carotid  artery using the NASCET criteria.      CT ANGIOGRAM OF THE HEAD: The intracranial segments of the vertebral  arteries are widely patent to the vertebrobasilar junction without  stenosis.  The basilar artery and the basilar tip are normal in  appearance. The posterior cerebral and superior cerebellar arteries are  within normal limits. The upper cervical, petrous, cavernous and  supracavernous segments of the internal carotid arteries are within  normal limits.  There is an atretic A1 segment of the right anterior  cerebral artery which is a normal anatomic variation.  The dominant left  A1 segment is widely patent.  The anterior communicating artery origin  is normal in appearance. The A2 segments of the anterior cerebral  arteries are within normal limits. The M1 segments of the middle  cerebral arteries and middle cerebral  artery bifurcations are within  normal limits. There is abrupt occlusion with filling defect in a  parietal M3 or M4 branch of the left middle cerebral artery by an  embolus or thrombus. This leads to the area of acute infarction in the  left parietal occipital region. The M1 segment of the right middle  cerebral artery and the right middle cerebral artery bifurcation are  normal in appearance.  The right M2 segments are normal in appearance.     IMPRESSION:  1. The CT of the head without contrast demonstrates a hyperdense  anterior left parietal M3 or M4 MCA branch from an acute embolus or  thrombus. Posterior to this,. is a wedge-shaped area of acute infarction  in the left parietal occipital parenchyma that measures 4.7 x 3.7 x 4 cm  in size, and there is no hemorrhagic transformation. The patient also  has a chronic infarct involving the posterior superior medial left  frontal lobe in the left LANETTE territory that measures 3.4 x 1.5 x 1.9 cm  in size. There is moderate small vessel disease in the cerebral white  matter and diffuse cerebral atrophy. Patient did have a prior MRI of the  brain on 08/06/2024 that demonstrated hemosiderin staining of the  chronic infarct in the left LANETTE territory and multiple punctate areas of  hemosiderin staining in the thalami and putamen bilaterally as well as  involving the dentate nuclei of the cerebellum, the sequela of old tiny  hypertensive microhemorrhages and patient also has multiple punctate  foci of hemosiderin staining in the cerebral hemispheres involving the  frontal temporal parietal occipital parenchyma from old microhemorrhages  secondary to amyloid and these punctate areas of hemosiderin deposition  are unable to be appreciated on the current head CT, but are clearly  present on prior MRI of the brain on 08/06/2024. The results of the  noncontrast head CT were communicated to Dr. Leslie from stroke  neurology by telephone on 01/07/2025 at 2:45 p.m., and he  requested a  contrast-enhanced CT angiogram of the head and neck and CT perfusion  study of the brain.     2. The CT perfusion study of the brain consists of a 10 cm vertical slab  of brain imaging from the mid cerebellum up through the cerebral  hemispheres and excludes the inferior-half of the cerebellum and medulla  which are not assessed. The patient was moving during the CTP which  slightly limits evaluation. Within the 10 cm vertical slab of brain  imaging, there are areas of reduced cerebral blood flow to less than 30%  of normal indicating patchy nodular areas of acute infarction in the  left parietal occipital parenchyma and this is in the distribution of  the parietal occipital branches of the left MCA territory, and there is  an area involving the superior medial left frontal parietal junction  that is in the distal left LANETTE territory and the combined dimensions  measures 22 cc of brain parenchyma that are acutely infarcted, although  I believe the left parietal occipital acute infarct is larger than the  CT perfusion study suggests, as on the noncontrast head CT the acute  infarct in the left parietal occipital parenchyma measures up to 4.7 x  3.7 x 4 cm. Furthermore, there are bilateral symmetric areas of delayed  time to maximal enhancement of greater than 6 seconds in the posterior  occipital lobes that I think is probably artifact rather than  hypoperfused brain.  There is an area in the superior medial left  frontal parietal junction that may be hypoperfused brain in the distal  left LANETTE territory that matches the cerebral blood flow deficit. There  is an area in the left parietal occipital parenchyma that demonstrates  delayed time to maximal enhancement of greater than 6 seconds indicating  chinyere hypoperfused brain that appears larger than the infarcted brain on  the cerebral blood flow images, but appears to course on very well to  the area of acute infarction on the head CT without contrast. I  believe  that the Rapid analysis suggests that the hypoperfused brain measures up  to 111 cc of brain parenchyma and I think this is an overestimation as I  think the areas in the posterior occipital lobe is artifact and it  suggest that the mismatch ratio is 6, and I believe this is an  overestimation.     3. Cervical spondylosis is present with disc space narrowing and  degenerative endplate changes at C2-3 and C3-4 and C6-7.  Posterior  spurs and protruding or herniated disc material moderately narrow the  canal at C2-3 and posterior spurs severely narrow the canal and flatten  the cord at C3-4, and posterior spurs mildly narrow the canal at C6-7,  and there is foraminal narrowing in the cervical spine as described  above.     4. The CT angiographic evaluation of the great vessels of the neck is  normal with no stenosis seen in the vertebral arteries and no stenosis  seen in the common carotid arteries or the cervical segments of the  internal carotid arteries using the NASCET criteria. The CT angiographic  evaluation of the head demonstrates abrupt embolic occlusion or  thrombosis of a parietal branch that is likely either a distal M3 or M4  branch of the left middle cerebral artery leading to the area of acute  infarction in the left parietal occipital parenchyma. The remainder of  the CT angiogram of the head and neck is normal.  The results of the  final dictated study were communicated to Dr. Fred Leslie by telephone  on 01/07/2025 at 3:25 PM.        CT Angiogram Neck [[id number]] Collected: 01/07/25 1559     Updated: 01/07/25 1559    Narrative:      EMERGENCY CONTRAST-ENHANCED CT ANGIOGRAM OF THE HEAD AND NECK AND CT  PERFUSION STUDY OF THE BRAIN 01/07/2025     CLINICAL HISTORY: This is a 69-year-old male patient who has acute onset  confusion, dysarthria and gaze deviation.  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       HEAD CT WITHOUT CONTRAST TECHNIQUE: Spiral CT images were obtained from  the base of the skull to the vertex  without intravenous contrast. The  images were reformatted and are submitted in 3 mm thick axial CT  sections with brain algorithm, 2 mm thick sagittal and coronal  reconstructions were performed and submitted in brain algorithm.     This is correlated to a prior MRI of the brain from [facility] on 08/06/2024 and prior CT angiogram of the head and neck on  08/07/2024 and a head CT on 08/26/2024. This is also correlated to a  remote prior MRI of the brain on 01/30/2019 and head CT on 01/31/2019.     FINDINGS: There are prominent patchy and confluent areas of low density  extending from the periventricular into the subcortical white matter of  the cerebral hemispheres consistent with moderate small vessel disease.  There is a focal area of encephalomalacia involving the posterior  superior medial left frontal lobe that involves the posterior left  cingulate gyrus compatible with an old infarct in the left LANETTE  territory. This chronic infarct measures about 3.4 x 1.5 x 1.9 cm. This  patient has multiple punctate areas of encephalomalacia in the thalami.  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 There was also hemosiderin  deposition at the site of the chronic infarct in the left LANETTE territory,  and these areas of hemosiderin deposition cannot be assessed on a  noncontrasted CT. There is a hyperdense M3 MCA branch within the  anterior left parietal sulcus and posterior to this hyperdense MCA  branch is a wedge-shaped  area of low density through the gray-white  matter of the posterior inferior left parietal lobe extending inferiorly  into the superior lateral left occipital lobe compatible with an acute  left parietal occipital infarct with the area of acute infarction  measuring up to 4.7 x 3.7 x 4 cm in size. There is no hemorrhagic  transformation. There is diffuse cerebral atrophy.  The lateral and  third ventricles are minimally prominent in size, felt to be due to  central volume loss or atrophy. I see no midline shift.  No extra-axial  fluid collections are identified and there is no evidence of acute  intracranial hemorrhage.       Impression:      There is a hyperdense anterior left parietal M3 MCA branch  suggesting acute thrombus or embolus in this branch, and posterior to  which is a wedge-shaped area of subtle acute infarction extending from  the posterior inferior left parietal lobe into the superior lateral left  occipital lobe, and the acute left parietal occipital infarct measures  4.7 x 3.7 x 4 cm in size. There is no hemorrhagic transformation. The  patient also has a chronic infarct in the posterior superior medial left  frontal lobe in the left LANETTE territory that measures 3.4 x 1.5 x 1.9 cm  in size.  There was hemosiderin deposition at the site of this chronic  infarct on prior MRI of the brain on 08/06/2024 from old petechial  hemorrhage into this chronic left LANETTE territory infarct. 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 There is  diffuse cerebral atrophy.   The remainder of the head CT is normal. The  results were communicated to Fred Leslie MD, from stroke neurology  while the patient was still on our scanner on 01/07/2025 at 2:45 p.m.  and he requested a contrast-enhanced CT angiogram of the head and neck  and CT perfusion study of the brain.     CONTRAST-ENHANCED CT ANGIOGRAM OF THE HEAD AND NECK AND CT PERFUSION  STUDY OF THE BRAIN TECHNIQUE: Spiral CT images were obtained from the  mid cerebellum up through the cerebral hemispheres during the arterial  phase of contrast for a 10 cm vertical slab of brain imaging, and images  were reformatted and analyzed with Rapid software analysis for a CT  perfusion study of the brain, and subsequently spiral CT angiographic  images were obtained from the top of the aortic arch up through the  great vessels of the head and neck during the arterial phase of contrast  and images were reformatted and submitted in 1 mm thick axial, sagittal  and coronal CT sections with soft tissue algorithm and 3D  reconstructions were performed to complete the CT angiogram of the head  and neck.     FINDINGS:  CT PERFUSION STUDY OF THE BRAIN:  The CT perfusion study of the brain  starts at the mid cerebellum and extends for a 10 cm vertical slab of  brain imaging through the majority of the cerebral hemispheres and  excludes the inferior-half of the cerebellum and medulla, which are not  assessed on this exam. Within the 10 cm vertical slab of brain imaging,  there are areas of reduced cerebral blood flow that are patchy nodular  in nature involving the posterior inferior left parietal lobe extending  into the superior lateral left occipital lobe compatible with small  areas of acute completed infarction in the left parietal occipital  parenchyma in the left MCA territory.  There is an oblong focus of  reduced cerebral blood flow to less than 30% of normal involving the  superior medial left frontal parietal junction suggesting an  acute  infarct in the distal left LANETTE territory. There are areas of delayed  time to maximal enhancement that involve the posterior occipital lobes  bilaterally that could be artifact.  There is delayed time to maximal  enhancement involving the superior medial left frontal parietal region  that may be a localized area of hypoperfused brain in the area of  suggested acute infarction in the distal left LANETTE territory, and there  is delayed time to maximal enhancement of greater than 6 seconds  extending from the posterior inferior left parietal lobe into the  lateral left occipital lobe.  This area is felt to be hypoperfused brain  within the left parietal occipital region and the area of hypoperfused  brain appears larger than the area of acute infarction although the  acute infarct in this area is bigger on the noncontrasted head CT than  suggested on the CT perfusion study.     CT ANGIOGRAM OF THE NECK: The nasopharynx, oropharynx, the hypopharynx,  true cords and subglottic airway are normal in appearance.  There is an  enlarged thyroid gland with thyroid nodules suggesting a multinodular  goiter. There is some substernal extension of the enlarged left lobe of  the thyroid. The lung apices are clear. The parotid, ,  parapharyngeal, and submandibular spaces are symmetric and are normal in  appearance. Cervical spondylosis is present with prominent disc space  narrowing, degenerative endplate changes at C2-3 and C3-4, and posterior  spurs and protruding disc materia up to moderate to severely narrow the  canal at C2-3, and posterior spurs severely narrow the canal at C3-4.   There is disc space narrowing.  There are degenerative endplate changes  at C5-6 and C6-7, and posterior spurs at least mild up to mild to  moderately narrow the canal at C6-7.  There is multilevel foraminal  narrowing in the cervical spine with mild-to-moderate right bony  foraminal narrowing at C2-3, moderate left and severe right  bony  foraminal narrowing at C3-4, and mild-to-moderate right and moderate  left foraminal narrowing at C6-7. There is common origin of the  brachiocephalic artery and left common carotid artery off the aortic  arch constituting a bovine configuration of the aortic arch which is a  normal anatomic variation. The left subclavian artery origin is normal  in appearance. No stenosis is seen in the left subclavian artery. The  left vertebral artery origin is normal in appearance. No stenosis is  seen in the cervical segment of the left vertebral artery. The left  common carotid origin is normal in appearance. No stenosis is seen in  the left common carotid artery and its bifurcation into the left  internal and external carotid arteries is normal in appearance, and no  stenosis is seen in the cervical segment of the left internal carotid  artery using the NASCET criteria. The brachiocephalic artery origin is  normal in appearance.  No stenosis is seen in the brachiocephalic artery  and its bifurcation into the right subclavian and common carotid artery  is normal in appearance, and no stenosis is seen in the right subclavian  artery.  The right vertebral artery origin is less than optimally  assessed, but is most probably within normal limits.  No stenosis is  seen in the cervical segment of the right vertebral artery. The right  common carotid origin is normal in appearance.  No stenosis is seen in  the right common carotid artery and its bifurcation into the right  internal and external carotid arteries is normal in appearance and no  stenosis is seen in the cervical segment of the right internal carotid  artery using the NASCET criteria.      CT ANGIOGRAM OF THE HEAD: The intracranial segments of the vertebral  arteries are widely patent to the vertebrobasilar junction without  stenosis.  The basilar artery and the basilar tip are normal in  appearance. The posterior cerebral and superior cerebellar arteries  are  within normal limits. The upper cervical, petrous, cavernous and  supracavernous segments of the internal carotid arteries are within  normal limits.  There is an atretic A1 segment of the right anterior  cerebral artery which is a normal anatomic variation.  The dominant left  A1 segment is widely patent.  The anterior communicating artery origin  is normal in appearance. The A2 segments of the anterior cerebral  arteries are within normal limits. The M1 segments of the middle  cerebral arteries and middle cerebral artery bifurcations are within  normal limits. There is abrupt occlusion with filling defect in a  parietal M3 or M4 branch of the left middle cerebral artery by an  embolus or thrombus. This leads to the area of acute infarction in the  left parietal occipital region. The M1 segment of the right middle  cerebral artery and the right middle cerebral artery bifurcation are  normal in appearance.  The right M2 segments are normal in appearance.     IMPRESSION:  1. The CT of the head without contrast demonstrates a hyperdense  anterior left parietal M3 or M4 MCA branch from an acute embolus or  thrombus. Posterior to this,. is a wedge-shaped area of acute infarction  in the left parietal occipital parenchyma that measures 4.7 x 3.7 x 4 cm  in size, and there is no hemorrhagic transformation. The patient also  has a chronic infarct involving the posterior superior medial left  frontal lobe in the left LANETTE territory that measures 3.4 x 1.5 x 1.9 cm  in size. There is moderate small vessel disease in the cerebral white  matter and diffuse cerebral atrophy. Patient did have a prior MRI of the  brain on 08/06/2024 that demonstrated hemosiderin staining of the  chronic infarct in the left LANETTE territory and multiple punctate areas of  hemosiderin staining in the thalami and putamen bilaterally as well as  involving the dentate nuclei of the cerebellum, the sequela of old tiny  hypertensive microhemorrhages  and patient also has multiple punctate  foci of hemosiderin staining in the cerebral hemispheres involving the  frontal temporal parietal occipital parenchyma from old microhemorrhages  secondary to amyloid and these punctate areas of hemosiderin deposition  are unable to be appreciated on the current head CT, but are clearly  present on prior MRI of the brain on 08/06/2024. The results of the  noncontrast head CT were communicated to Dr. Leslie from stroke  neurology by telephone on 01/07/2025 at 2:45 p.m., and he requested a  contrast-enhanced CT angiogram of the head and neck and CT perfusion  study of the brain.     2. The CT perfusion study of the brain consists of a 10 cm vertical slab  of brain imaging from the mid cerebellum up through the cerebral  hemispheres and excludes the inferior-half of the cerebellum and medulla  which are not assessed. The patient was moving during the CTP which  slightly limits evaluation. Within the 10 cm vertical slab of brain  imaging, there are areas of reduced cerebral blood flow to less than 30%  of normal indicating patchy nodular areas of acute infarction in the  left parietal occipital parenchyma and this is in the distribution of  the parietal occipital branches of the left MCA territory, and there is  an area involving the superior medial left frontal parietal junction  that is in the distal left LANETTE territory and the combined dimensions  measures 22 cc of brain parenchyma that are acutely infarcted, although  I believe the left parietal occipital acute infarct is larger than the  CT perfusion study suggests, as on the noncontrast head CT the acute  infarct in the left parietal occipital parenchyma measures up to 4.7 x  3.7 x 4 cm. Furthermore, there are bilateral symmetric areas of delayed  time to maximal enhancement of greater than 6 seconds in the posterior  occipital lobes that I think is probably artifact rather than  hypoperfused brain.  There is an area in the  superior medial left  frontal parietal junction that may be hypoperfused brain in the distal  left LANETTE territory that matches the cerebral blood flow deficit. There  is an area in the left parietal occipital parenchyma that demonstrates  delayed time to maximal enhancement of greater than 6 seconds indicating  chinyere hypoperfused brain that appears larger than the infarcted brain on  the cerebral blood flow images, but appears to course on very well to  the area of acute infarction on the head CT without contrast. I believe  that the Rapid analysis suggests that the hypoperfused brain measures up  to 111 cc of brain parenchyma and I think this is an overestimation as I  think the areas in the posterior occipital lobe is artifact and it  suggest that the mismatch ratio is 6, and I believe this is an  overestimation.     3. Cervical spondylosis is present with disc space narrowing and  degenerative endplate changes at C2-3 and C3-4 and C6-7.  Posterior  spurs and protruding or herniated disc material moderately narrow the  canal at C2-3 and posterior spurs severely narrow the canal and flatten  the cord at C3-4, and posterior spurs mildly narrow the canal at C6-7,  and there is foraminal narrowing in the cervical spine as described  above.     4. The CT angiographic evaluation of the great vessels of the neck is  normal with no stenosis seen in the vertebral arteries and no stenosis  seen in the common carotid arteries or the cervical segments of the  internal carotid arteries using the NASCET criteria. The CT angiographic  evaluation of the head demonstrates abrupt embolic occlusion or  thrombosis of a parietal branch that is likely either a distal M3 or M4  branch of the left middle cerebral artery leading to the area of acute  infarction in the left parietal occipital parenchyma. The remainder of  the CT angiogram of the head and neck is normal.  The results of the  final dictated study were communicated to Dr. Ibarra  Mariama by telephone  on 01/07/2025 at 3:25 PM.        CT CEREBRAL PERFUSION WITH & WITHOUT CONTRAST [[id number]] Collected: 01/07/25 1559     Updated: 01/07/25 1559    Narrative:      EMERGENCY CONTRAST-ENHANCED CT ANGIOGRAM OF THE HEAD AND NECK AND CT  PERFUSION STUDY OF THE BRAIN 01/07/2025     CLINICAL HISTORY: This is a 69-year-old male patient who has acute onset  confusion, dysarthria and gaze deviation.  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       HEAD CT WITHOUT CONTRAST TECHNIQUE: Spiral CT images were obtained from  the base of the skull to the vertex without intravenous contrast. The  images were reformatted and are submitted in 3 mm thick axial CT  sections with brain algorithm, 2 mm thick sagittal and coronal  reconstructions were performed and submitted in brain algorithm.     This is correlated to a prior MRI of the brain from [facility] on 08/06/2024 and prior CT angiogram of the head and neck on  08/07/2024 and a head CT on 08/26/2024. This is also correlated to a  remote prior MRI of the brain on 01/30/2019 and head CT on 01/31/2019.     FINDINGS: There are prominent patchy and confluent areas of low density  extending from the periventricular into the subcortical white matter of  the cerebral hemispheres consistent with moderate small vessel disease.  There is a focal area of encephalomalacia involving the posterior  superior medial left frontal lobe that involves the posterior left  cingulate gyrus compatible with an old infarct in the left LANETTE  territory. This chronic infarct measures about 3.4 x 1.5 x 1.9 cm. This  patient has multiple punctate areas of encephalomalacia in the thalami.  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 There was also hemosiderin  deposition at the site of the chronic infarct in the left LANETTE territory,  and these areas of hemosiderin deposition cannot be assessed on a  noncontrasted CT. There is a hyperdense M3 MCA branch within the  anterior left parietal sulcus and posterior to this hyperdense MCA  branch is a wedge-shaped area of low density through the gray-white  matter of the posterior inferior left parietal lobe extending inferiorly  into the superior lateral left occipital lobe compatible with an acute  left parietal occipital infarct with the area of acute infarction  measuring up to 4.7 x 3.7 x 4 cm in size. There is no hemorrhagic  transformation. There is diffuse cerebral atrophy.  The lateral and  third ventricles are minimally prominent in size, felt to be due to  central volume loss or atrophy. I see no midline shift.  No extra-axial  fluid collections are identified and there is no evidence of acute  intracranial hemorrhage.       Impression:      There is a hyperdense anterior left parietal M3 MCA branch  suggesting acute thrombus or embolus in this branch, and posterior to  which is a wedge-shaped area of subtle acute infarction extending from  the posterior inferior left parietal lobe into the superior lateral left  occipital lobe, and the acute left parietal occipital infarct measures  4.7 x 3.7 x 4 cm in size. There is no hemorrhagic transformation. The  patient also has a chronic infarct in the posterior superior medial left  frontal lobe in the left LANETTE territory that measures 3.4 x 1.5 x 1.9 cm  in size.  There was hemosiderin deposition at the site of this chronic  infarct on prior MRI of the brain on 08/06/2024 from old petechial  hemorrhage into this chronic left LANETTE  territory infarct. 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 There is  diffuse cerebral atrophy.  The remainder of the head CT is normal. The  results were communicated to Fred Leslie MD, from stroke neurology  while the patient was still on our scanner on 01/07/2025 at 2:45 p.m.  and he requested a contrast-enhanced CT angiogram of the head and neck  and CT perfusion study of the brain.     CONTRAST-ENHANCED CT ANGIOGRAM OF THE HEAD AND NECK AND CT PERFUSION  STUDY OF THE BRAIN TECHNIQUE: Spiral CT images were obtained from the  mid cerebellum up through the cerebral hemispheres during the arterial  phase of contrast for a 10 cm vertical slab of brain imaging, and images  were reformatted and analyzed with Rapid software analysis for a CT  perfusion study of the brain, and subsequently spiral CT angiographic  images were obtained from the top of the aortic arch up through the  great vessels of the head and neck during the arterial phase of contrast  and images were reformatted and submitted in 1 mm thick axial, sagittal  and coronal CT sections with soft tissue algorithm and 3D  reconstructions were performed to complete the CT angiogram of the head  and neck.     FINDINGS:  CT PERFUSION STUDY OF THE BRAIN:  The CT perfusion study of the brain  starts at the mid cerebellum and extends for a 10 cm vertical slab of  brain imaging through the majority of the cerebral hemispheres and  excludes the inferior-half of the cerebellum and medulla, which are not  assessed on  this exam. Within the 10 cm vertical slab of brain imaging,  there are areas of reduced cerebral blood flow that are patchy nodular  in nature involving the posterior inferior left parietal lobe extending  into the superior lateral left occipital lobe compatible with small  areas of acute completed infarction in the left parietal occipital  parenchyma in the left MCA territory.  There is an oblong focus of  reduced cerebral blood flow to less than 30% of normal involving the  superior medial left frontal parietal junction suggesting an acute  infarct in the distal left LANETTE territory. There are areas of delayed  time to maximal enhancement that involve the posterior occipital lobes  bilaterally that could be artifact.  There is delayed time to maximal  enhancement involving the superior medial left frontal parietal region  that may be a localized area of hypoperfused brain in the area of  suggested acute infarction in the distal left LANETTE territory, and there  is delayed time to maximal enhancement of greater than 6 seconds  extending from the posterior inferior left parietal lobe into the  lateral left occipital lobe.  This area is felt to be hypoperfused brain  within the left parietal occipital region and the area of hypoperfused  brain appears larger than the area of acute infarction although the  acute infarct in this area is bigger on the noncontrasted head CT than  suggested on the CT perfusion study.     CT ANGIOGRAM OF THE NECK: The nasopharynx, oropharynx, the hypopharynx,  true cords and subglottic airway are normal in appearance.  There is an  enlarged thyroid gland with thyroid nodules suggesting a multinodular  goiter. There is some substernal extension of the enlarged left lobe of  the thyroid. The lung apices are clear. The parotid, ,  parapharyngeal, and submandibular spaces are symmetric and are normal in  appearance. Cervical spondylosis is present with prominent disc space  narrowing,  degenerative endplate changes at C2-3 and C3-4, and posterior  spurs and protruding disc materia up to moderate to severely narrow the  canal at C2-3, and posterior spurs severely narrow the canal at C3-4.   There is disc space narrowing.  There are degenerative endplate changes  at C5-6 and C6-7, and posterior spurs at least mild up to mild to  moderately narrow the canal at C6-7.  There is multilevel foraminal  narrowing in the cervical spine with mild-to-moderate right bony  foraminal narrowing at C2-3, moderate left and severe right bony  foraminal narrowing at C3-4, and mild-to-moderate right and moderate  left foraminal narrowing at C6-7. There is common origin of the  brachiocephalic artery and left common carotid artery off the aortic  arch constituting a bovine configuration of the aortic arch which is a  normal anatomic variation. The left subclavian artery origin is normal  in appearance. No stenosis is seen in the left subclavian artery. The  left vertebral artery origin is normal in appearance. No stenosis is  seen in the cervical segment of the left vertebral artery. The left  common carotid origin is normal in appearance. No stenosis is seen in  the left common carotid artery and its bifurcation into the left  internal and external carotid arteries is normal in appearance, and no  stenosis is seen in the cervical segment of the left internal carotid  artery using the NASCET criteria. The brachiocephalic artery origin is  normal in appearance.  No stenosis is seen in the brachiocephalic artery  and its bifurcation into the right subclavian and common carotid artery  is normal in appearance, and no stenosis is seen in the right subclavian  artery.  The right vertebral artery origin is less than optimally  assessed, but is most probably within normal limits.  No stenosis is  seen in the cervical segment of the right vertebral artery. The right  common carotid origin is normal in appearance.  No stenosis  is seen in  the right common carotid artery and its bifurcation into the right  internal and external carotid arteries is normal in appearance and no  stenosis is seen in the cervical segment of the right internal carotid  artery using the NASCET criteria.      CT ANGIOGRAM OF THE HEAD: The intracranial segments of the vertebral  arteries are widely patent to the vertebrobasilar junction without  stenosis.  The basilar artery and the basilar tip are normal in  appearance. The posterior cerebral and superior cerebellar arteries are  within normal limits. The upper cervical, petrous, cavernous and  supracavernous segments of the internal carotid arteries are within  normal limits.  There is an atretic A1 segment of the right anterior  cerebral artery which is a normal anatomic variation.  The dominant left  A1 segment is widely patent.  The anterior communicating artery origin  is normal in appearance. The A2 segments of the anterior cerebral  arteries are within normal limits. The M1 segments of the middle  cerebral arteries and middle cerebral artery bifurcations are within  normal limits. There is abrupt occlusion with filling defect in a  parietal M3 or M4 branch of the left middle cerebral artery by an  embolus or thrombus. This leads to the area of acute infarction in the  left parietal occipital region. The M1 segment of the right middle  cerebral artery and the right middle cerebral artery bifurcation are  normal in appearance.  The right M2 segments are normal in appearance.     IMPRESSION:  1. The CT of the head without contrast demonstrates a hyperdense  anterior left parietal M3 or M4 MCA branch from an acute embolus or  thrombus. Posterior to this,. is a wedge-shaped area of acute infarction  in the left parietal occipital parenchyma that measures 4.7 x 3.7 x 4 cm  in size, and there is no hemorrhagic transformation. The patient also  has a chronic infarct involving the posterior superior medial  left  frontal lobe in the left LANETTE territory that measures 3.4 x 1.5 x 1.9 cm  in size. There is moderate small vessel disease in the cerebral white  matter and diffuse cerebral atrophy. Patient did have a prior MRI of the  brain on 08/06/2024 that demonstrated hemosiderin staining of the  chronic infarct in the left LANETTE territory and multiple punctate areas of  hemosiderin staining in the thalami and putamen bilaterally as well as  involving the dentate nuclei of the cerebellum, the sequela of old tiny  hypertensive microhemorrhages and patient also has multiple punctate  foci of hemosiderin staining in the cerebral hemispheres involving the  frontal temporal parietal occipital parenchyma from old microhemorrhages  secondary to amyloid and these punctate areas of hemosiderin deposition  are unable to be appreciated on the current head CT, but are clearly  present on prior MRI of the brain on 08/06/2024. The results of the  noncontrast head CT were communicated to Dr. Leslie from stroke  neurology by telephone on 01/07/2025 at 2:45 p.m., and he requested a  contrast-enhanced CT angiogram of the head and neck and CT perfusion  study of the brain.     2. The CT perfusion study of the brain consists of a 10 cm vertical slab  of brain imaging from the mid cerebellum up through the cerebral  hemispheres and excludes the inferior-half of the cerebellum and medulla  which are not assessed. The patient was moving during the CTP which  slightly limits evaluation. Within the 10 cm vertical slab of brain  imaging, there are areas of reduced cerebral blood flow to less than 30%  of normal indicating patchy nodular areas of acute infarction in the  left parietal occipital parenchyma and this is in the distribution of  the parietal occipital branches of the left MCA territory, and there is  an area involving the superior medial left frontal parietal junction  that is in the distal left LANETTE territory and the combined  dimensions  measures 22 cc of brain parenchyma that are acutely infarcted, although  I believe the left parietal occipital acute infarct is larger than the  CT perfusion study suggests, as on the noncontrast head CT the acute  infarct in the left parietal occipital parenchyma measures up to 4.7 x  3.7 x 4 cm. Furthermore, there are bilateral symmetric areas of delayed  time to maximal enhancement of greater than 6 seconds in the posterior  occipital lobes that I think is probably artifact rather than  hypoperfused brain.  There is an area in the superior medial left  frontal parietal junction that may be hypoperfused brain in the distal  left LANETTE territory that matches the cerebral blood flow deficit. There  is an area in the left parietal occipital parenchyma that demonstrates  delayed time to maximal enhancement of greater than 6 seconds indicating  chinyere hypoperfused brain that appears larger than the infarcted brain on  the cerebral blood flow images, but appears to course on very well to  the area of acute infarction on the head CT without contrast. I believe  that the Rapid analysis suggests that the hypoperfused brain measures up  to 111 cc of brain parenchyma and I think this is an overestimation as I  think the areas in the posterior occipital lobe is artifact and it  suggest that the mismatch ratio is 6, and I believe this is an  overestimation.     3. Cervical spondylosis is present with disc space narrowing and  degenerative endplate changes at C2-3 and C3-4 and C6-7.  Posterior  spurs and protruding or herniated disc material moderately narrow the  canal at C2-3 and posterior spurs severely narrow the canal and flatten  the cord at C3-4, and posterior spurs mildly narrow the canal at C6-7,  and there is foraminal narrowing in the cervical spine as described  above.     4. The CT angiographic evaluation of the great vessels of the neck is  normal with no stenosis seen in the vertebral arteries and no  stenosis  seen in the common carotid arteries or the cervical segments of the  internal carotid arteries using the NASCET criteria. The CT angiographic  evaluation of the head demonstrates abrupt embolic occlusion or  thrombosis of a parietal branch that is likely either a distal M3 or M4  branch of the left middle cerebral artery leading to the area of acute  infarction in the left parietal occipital parenchyma. The remainder of  the CT angiogram of the head and neck is normal.  The results of the  final dictated study were communicated to Dr. Fred Leslie by telephone  on 01/07/2025 at 3:25 PM.        XR Chest 1 View [[id number]] Collected: 01/07/25 1542     Updated: 01/07/25 1548    Narrative:      XR CHEST 1 VW-     HISTORY: Male who is 69 years-old, acute stroke protocol     TECHNIQUE: Frontal view of the chest     COMPARISON: 12/25/2024     FINDINGS: The heart is enlarged. Left-sided generator device and cardiac  lead are noted. Aorta appears ectatic. Pulmonary vasculature is mildly  congested. No focal pulmonary consolidation, pleural effusion, or  pneumothorax. Right hemidiaphragm remains elevated. No acute osseous  process.       Impression:      As described.     This report was finalized on 1/7/2025 3:45 PM by Dr. Jax Jerry M.D on Workstation: [id number]                 Assessment:  Active Hospital Problems    Diagnosis  POA    **Stroke [I63.9]  Yes    Acute CVA (cerebrovascular accident) [I63.9]  Yes      Resolved Hospital Problems   No resolved problems to display.   A fib  Sleep apnea  Hypertension  Obesity  Amyloid angiopathy    Plan:  The patient has not acp documents on file  He is not able to answer questions regarding code status or health care surrogate  Stroke workup  Appreciate input from neurology  Monitor on telemetry  Dw patient, neurology and ed provider    Cami Garvey MD  1/7/2025  21:56 EST

## 2025-01-08 NOTE — PROGRESS NOTES
Name: Flo Manzo ADMIT: 2025   : 1955  PCP: Karen Jiménez APRN    MRN: [id number] LOS: 1 days   AGE/SEX: 69 y.o. male  ROOM: [facility]     Subjective   Subjective   2025  Patient seen and examined at bedside he is alert and follows simple commands but not oriented to person, place, or self.  Discussed with wife she denies alcohol use but states he has been altered in a similar condition to previous strokes.       Objective   Objective   Vital Signs  Temp:  [97.5 °F (36.4 °C)-98.6 °F (37 °C)] 97.7 °F (36.5 °C)  Heart Rate:  [] 81  Resp:  [17-20] 20  BP: (149-179)/() 158/112  SpO2:  [93 %-94 %] 93 %  on   ;   Device (Oxygen Therapy): room air  Body mass index is 31.82 kg/m².  Physical Exam  Constitutional:       General: He is not in acute distress.     Comments: Alert but not oriented to person place or self  Agitated   HENT:      Head: Normocephalic and atraumatic.      Nose: Nose normal. No congestion.      Mouth/Throat:      Pharynx: Oropharynx is clear. No oropharyngeal exudate.   Eyes:      General: No scleral icterus.  Cardiovascular:      Rate and Rhythm: Rhythm irregular.      Heart sounds: No murmur heard.     No friction rub. No gallop.   Pulmonary:      Effort: No respiratory distress.      Breath sounds: No wheezing or rales.   Abdominal:      General: There is no distension.      Tenderness: There is no abdominal tenderness. There is no guarding.   Musculoskeletal:         General: No swelling, deformity or signs of injury.      Cervical back: Normal range of motion. No rigidity.   Skin:     Coloration: Skin is not jaundiced.      Findings: No bruising or lesion.   Neurological:      Mental Status: He is disoriented.      Motor: Weakness present.       Results Review     I reviewed the patient's new clinical results.  Results from last 7 days   Lab Units 25  0130 25  1434   WBC 10*3/mm3 10.79 7.94   HEMOGLOBIN g/dL 14.4 14.5   PLATELETS 10*3/mm3 240 217      Results from last 7 days   Lab Units 01/08/25  0130 01/07/25  1434   SODIUM mmol/L 138 138   POTASSIUM mmol/L 4.0 4.0   CHLORIDE mmol/L 105 106   CO2 mmol/L 24.0 25.0   BUN mg/dL 11 15   CREATININE mg/dL 0.75* 0.78   GLUCOSE mg/dL 88 91   EGFR mL/min/1.73 98.3 97.1     Results from last 7 days   Lab Units 01/08/25  0130 01/07/25  1434   ALBUMIN g/dL 3.5 3.6   BILIRUBIN mg/dL 0.7 0.6   ALK PHOS U/L 114 119*   AST (SGOT) U/L 15 13   ALT (SGPT) U/L 12 12     Results from last 7 days   Lab Units 01/08/25  0130 01/08/25  0017 01/07/25  1434   CALCIUM mg/dL 9.3  --  9.3   ALBUMIN g/dL 3.5  --  3.6   MAGNESIUM mg/dL  --  2.1  --        Hemoglobin A1C   Date/Time Value Ref Range Status   01/08/2025 0130 5.80 (H) 4.80 - 5.60 % Final     Glucose   Date/Time Value Ref Range Status   01/08/2025 0804 97 70 - 130 mg/dL Final   01/08/2025 0617 93 70 - 130 mg/dL Final   01/08/2025 0121 94 70 - 130 mg/dL Final   01/07/2025 1801 85 70 - 130 mg/dL Final   01/07/2025 1429 97 70 - 130 mg/dL Final       CT Angiogram Head w AI Analysis of LVO    Result Date: 1/8/2025  There is a hyperdense anterior left parietal M3 MCA branch suggesting acute thrombus or embolus in this branch, and posterior to which is a wedge-shaped area of subtle acute infarction extending from the posterior inferior left parietal lobe into the superior lateral left occipital lobe, and the acute left parietal occipital infarct measures 4.7 x 3.7 x 4 cm in size. There is no hemorrhagic transformation. The patient also has a chronic infarct in the posterior superior medial left frontal lobe in the left LANETTE territory that measures 3.4 x 1.5 x 1.9 cm in size.  There was hemosiderin deposition at the site of this chronic infarct on prior MRI of the brain on 08/06/2024 from old petechial hemorrhage into this chronic left LANETTE territory infarct. 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 There is diffuse cerebral atrophy.  The remainder of the head CT is normal. The results were communicated to Fred Leslie MD, from stroke neurology while the patient was still on our scanner on 01/07/2025 at 2:45 p.m. and he requested a contrast-enhanced CT angiogram of the head and neck and CT perfusion study of the brain.  CONTRAST-ENHANCED CT ANGIOGRAM OF THE HEAD AND NECK AND CT PERFUSION STUDY OF THE BRAIN TECHNIQUE: Spiral CT images were obtained from the mid cerebellum up through the cerebral hemispheres during the arterial phase of contrast for a 10 cm vertical slab of brain imaging, and images were reformatted and analyzed with Rapid software analysis for a CT perfusion study of the brain, and subsequently spiral CT angiographic images were obtained from the top of the aortic arch up through the great vessels of the head and neck during the arterial phase of contrast and images were reformatted and submitted in 1 mm thick axial, sagittal and coronal CT sections with soft tissue algorithm and 3D reconstructions were performed to complete the CT angiogram of the head and neck.  FINDINGS: CT PERFUSION STUDY OF THE BRAIN:  The CT perfusion study of the brain starts at the mid cerebellum and extends for a 10 cm vertical slab of brain imaging through the majority of the cerebral hemispheres and excludes the inferior-half of the cerebellum and medulla, which are not assessed on this exam. Within the 10 cm vertical slab of brain imaging, there are areas of reduced cerebral blood flow that are patchy nodular in nature involving the posterior inferior left  parietal lobe extending into the superior lateral left occipital lobe compatible with small areas of acute completed infarction in the left parietal occipital parenchyma in the left MCA territory.  There is an oblong focus of reduced cerebral blood flow to less than 30% of normal involving the superior medial left frontal parietal junction suggesting an acute infarct in the distal left LANETTE territory. There are areas of delayed time to maximal enhancement that involve the posterior occipital lobes bilaterally that could be artifact.  There is delayed time to maximal enhancement involving the superior medial left frontal parietal region that may be a localized area of hypoperfused brain in the area of suggested acute infarction in the distal left LANETTE territory, and there is delayed time to maximal enhancement of greater than 6 seconds extending from the posterior inferior left parietal lobe into the lateral left occipital lobe.  This area is felt to be hypoperfused brain within the left parietal occipital region and the area of hypoperfused brain appears larger than the area of acute infarction although the acute infarct in this area is bigger on the noncontrasted head CT than suggested on the CT perfusion study.  CT ANGIOGRAM OF THE NECK: The nasopharynx, oropharynx, the hypopharynx, true cords and subglottic airway are normal in appearance.  There is an enlarged thyroid gland with thyroid nodules suggesting a multinodular goiter. There is some substernal extension of the enlarged left lobe of the thyroid. The lung apices are clear. The parotid, , parapharyngeal, and submandibular spaces are symmetric and are normal in appearance. Cervical spondylosis is present with prominent disc space narrowing, degenerative endplate changes at C2-3 and C3-4, and posterior spurs and protruding disc materia up to moderate to severely narrow the canal at C2-3, and posterior spurs severely narrow the canal at C3-4. There is  disc space narrowing.  There are degenerative endplate changes at C5-6 and C6-7, and posterior spurs at least mild up to mild to moderately narrow the canal at C6-7.  There is multilevel foraminal narrowing in the cervical spine with mild-to-moderate right bony foraminal narrowing at C2-3, moderate left and severe right bony foraminal narrowing at C3-4, and mild-to-moderate right and moderate left foraminal narrowing at C6-7. There is common origin of the brachiocephalic artery and left common carotid artery off the aortic arch constituting a bovine configuration of the aortic arch which is a normal anatomic variation. The left subclavian artery origin is normal in appearance. No stenosis is seen in the left subclavian artery. The left vertebral artery origin is normal in appearance. No stenosis is seen in the cervical segment of the left vertebral artery. The left common carotid origin is normal in appearance. No stenosis is seen in the left common carotid artery and its bifurcation into the left internal and external carotid arteries is normal in appearance, and no stenosis is seen in the cervical segment of the left internal carotid artery using the NASCET criteria. The brachiocephalic artery origin is normal in appearance.  No stenosis is seen in the brachiocephalic artery and its bifurcation into the right subclavian and common carotid artery is normal in appearance, and no stenosis is seen in the right subclavian artery.  The right vertebral artery origin is less than optimally assessed, but is most probably within normal limits.  No stenosis is seen in the cervical segment of the right vertebral artery. The right common carotid origin is normal in appearance.  No stenosis is seen in the right common carotid artery and its bifurcation into the right internal and external carotid arteries is normal in appearance and no stenosis is seen in the cervical segment of the right internal carotid artery using the  NASCET criteria.  CT ANGIOGRAM OF THE HEAD: The intracranial segments of the vertebral arteries are widely patent to the vertebrobasilar junction without stenosis.  The basilar artery and the basilar tip are normal in appearance. The posterior cerebral and superior cerebellar arteries are within normal limits. The upper cervical, petrous, cavernous and supracavernous segments of the internal carotid arteries are within normal limits.  There is an atretic A1 segment of the right anterior cerebral artery which is a normal anatomic variation.  The dominant left A1 segment is widely patent.  The anterior communicating artery origin is normal in appearance. The A2 segments of the anterior cerebral arteries are within normal limits. The M1 segments of the middle cerebral arteries and middle cerebral artery bifurcations are within normal limits. There is abrupt occlusion with filling defect in a parietal M3 or M4 branch of the left middle cerebral artery by an embolus or thrombus. This leads to the area of acute infarction in the left parietal occipital region. The M1 segment of the right middle cerebral artery and the right middle cerebral artery bifurcation are normal in appearance.  The right M2 segments are normal in appearance.  IMPRESSION: 1. The CT of the head without contrast demonstrates a hyperdense anterior left parietal M3 or M4 MCA branch from an acute embolus or thrombus. Posterior to this is a wedge-shaped area of acute infarction in the left parietal occipital parenchyma in the distribution of this left parietal occipital MCA branch that measures 4.7 x 3.7 x 4 cm in size, and there is no hemorrhagic transformation. 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 There is moderate small vessel disease in the cerebral white matter and diffuse cerebral atrophy. 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 The results of the noncontrast head CT were communicated to Dr. Leslie from stroke neurology by telephone on 01/07/2025 at 2:45 p.m., and he requested a contrast-enhanced CT angiogram of the head and neck and CT perfusion study of the brain.  2. The CT perfusion study of the brain consists of a 10 cm vertical slab of brain imaging from the mid cerebellum up through the cerebral hemispheres and excludes the inferior-half of the cerebellum and medulla which are not assessed. The patient was moving during the CTP which slightly limits evaluation. Within the 10 cm vertical slab of brain imaging, there are areas of reduced cerebral blood flow to less than 30% of normal indicating patchy and nodular areas of acute infarction in the left parietal occipital parenchyma and this is in the distribution of the parietal occipital branches of the left MCA territory, and there is an area involving the superior medial left frontal parietal junction that is in the distal left LANETTE territory and the combined dimensions measures 22 cc of brain parenchyma that are acutely infarcted, although I believe the left parietal occipital acute infarct is larger than the CT perfusion study suggests, as on the noncontrast head CT the  acute infarct in the left parietal occipital parenchyma measures up to 4.7 x 3.7 x 4 cm. Furthermore, there are bilateral symmetric areas of delayed time to maximal enhancement of greater than 6 seconds in the posterior occipital lobes that I think is probably artifact rather than hypoperfused brain.  There is an area in the superior medial left frontal parietal junction that may be hypoperfused brain in the distal left LANETTE territory that matches the cerebral blood flow deficit. There is an area in the left parietal occipital parenchyma that demonstrates delayed time to maximal enhancement of greater than 6 seconds indicating chinyere hypoperfused brain that appears larger than the infarcted brain on the cerebral blood flow images, but appears to correspond very well to the area of acute infarction on the head CT without contrast. I believe that the Rapid analysis suggests that the hypoperfused brain measures up to 111 cc of brain parenchyma and I think this is an overestimation as I think the areas in the posterior occipital lobe is artifact and the rapid analysis suggest that the mismatch ratio is 6, and I believe this is an overestimation.  3. Cervical spondylosis is present with disc space narrowing and degenerative endplate changes at C2-3 and C3-4 and C6-7.  Posterior spurs and protruding or herniated disc material moderately narrow the canal at C2-3 and posterior spurs severely narrow the canal and flatten the cord at C3-4, and posterior spurs mildly narrow the canal at C6-7, and there is foraminal narrowing in the cervical spine as described above.  4. The CT angiographic evaluation of the great vessels of the neck is normal with no stenosis seen in the vertebral arteries and no stenosis seen in the common carotid arteries or the cervical segments of the internal carotid arteries using the NASCET criteria. The CT angiographic evaluation of the head demonstrates abrupt embolic occlusion or thrombosis of a  parietal branch that is likely either a distal M3 or M4 branch of the left middle cerebral artery leading to the area of acute infarction in the left parietal occipital parenchyma. The remainder of the CT angiogram of the head and neck is normal.  The results of the final dictated study were communicated to Dr. Fred Leslie by telephone on 01/07/2025 at 3:25 PM.  This report was finalized on 1/8/2025 6:25 AM by Dr. Flo Swift M.D on Workstation: [id number]      CT Angiogram Neck    Result Date: 1/8/2025  There is a hyperdense anterior left parietal M3 MCA branch suggesting acute thrombus or embolus in this branch, and posterior to which is a wedge-shaped area of subtle acute infarction extending from the posterior inferior left parietal lobe into the superior lateral left occipital lobe, and the acute left parietal occipital infarct measures 4.7 x 3.7 x 4 cm in size. There is no hemorrhagic transformation. The patient also has a chronic infarct in the posterior superior medial left frontal lobe in the left LANETTE territory that measures 3.4 x 1.5 x 1.9 cm in size.  There was hemosiderin deposition at the site of this chronic infarct on prior MRI of the brain on 08/06/2024 from old petechial hemorrhage into this chronic left LANETTE territory infarct. 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 There is diffuse cerebral atrophy.  The remainder of the head CT is normal. The results were communicated to Fred Leslie MD, from stroke neurology while the patient was still on our  scanner on 01/07/2025 at 2:45 p.m. and he requested a contrast-enhanced CT angiogram of the head and neck and CT perfusion study of the brain.  CONTRAST-ENHANCED CT ANGIOGRAM OF THE HEAD AND NECK AND CT PERFUSION STUDY OF THE BRAIN TECHNIQUE: Spiral CT images were obtained from the mid cerebellum up through the cerebral hemispheres during the arterial phase of contrast for a 10 cm vertical slab of brain imaging, and images were reformatted and analyzed with Rapid software analysis for a CT perfusion study of the brain, and subsequently spiral CT angiographic images were obtained from the top of the aortic arch up through the great vessels of the head and neck during the arterial phase of contrast and images were reformatted and submitted in 1 mm thick axial, sagittal and coronal CT sections with soft tissue algorithm and 3D reconstructions were performed to complete the CT angiogram of the head and neck.  FINDINGS: CT PERFUSION STUDY OF THE BRAIN:  The CT perfusion study of the brain starts at the mid cerebellum and extends for a 10 cm vertical slab of brain imaging through the majority of the cerebral hemispheres and excludes the inferior-half of the cerebellum and medulla, which are not assessed on this exam. Within the 10 cm vertical slab of brain imaging, there are areas of reduced cerebral blood flow that are patchy nodular in nature involving the posterior inferior left parietal lobe extending into the superior lateral left occipital lobe compatible with small areas of acute completed infarction in the left parietal occipital parenchyma in the left MCA territory.  There is an oblong focus of reduced cerebral blood flow to less than 30% of normal involving the superior medial left frontal parietal junction suggesting an acute infarct in the distal left LANETTE territory. There are areas of delayed time to maximal enhancement that involve the posterior occipital lobes bilaterally that could be artifact.  There is  delayed time to maximal enhancement involving the superior medial left frontal parietal region that may be a localized area of hypoperfused brain in the area of suggested acute infarction in the distal left LANETTE territory, and there is delayed time to maximal enhancement of greater than 6 seconds extending from the posterior inferior left parietal lobe into the lateral left occipital lobe.  This area is felt to be hypoperfused brain within the left parietal occipital region and the area of hypoperfused brain appears larger than the area of acute infarction although the acute infarct in this area is bigger on the noncontrasted head CT than suggested on the CT perfusion study.  CT ANGIOGRAM OF THE NECK: The nasopharynx, oropharynx, the hypopharynx, true cords and subglottic airway are normal in appearance.  There is an enlarged thyroid gland with thyroid nodules suggesting a multinodular goiter. There is some substernal extension of the enlarged left lobe of the thyroid. The lung apices are clear. The parotid, , parapharyngeal, and submandibular spaces are symmetric and are normal in appearance. Cervical spondylosis is present with prominent disc space narrowing, degenerative endplate changes at C2-3 and C3-4, and posterior spurs and protruding disc materia up to moderate to severely narrow the canal at C2-3, and posterior spurs severely narrow the canal at C3-4. There is disc space narrowing.  There are degenerative endplate changes at C5-6 and C6-7, and posterior spurs at least mild up to mild to moderately narrow the canal at C6-7.  There is multilevel foraminal narrowing in the cervical spine with mild-to-moderate right bony foraminal narrowing at C2-3, moderate left and severe right bony foraminal narrowing at C3-4, and mild-to-moderate right and moderate left foraminal narrowing at C6-7. There is common origin of the brachiocephalic artery and left common carotid artery off the aortic arch constituting  a bovine configuration of the aortic arch which is a normal anatomic variation. The left subclavian artery origin is normal in appearance. No stenosis is seen in the left subclavian artery. The left vertebral artery origin is normal in appearance. No stenosis is seen in the cervical segment of the left vertebral artery. The left common carotid origin is normal in appearance. No stenosis is seen in the left common carotid artery and its bifurcation into the left internal and external carotid arteries is normal in appearance, and no stenosis is seen in the cervical segment of the left internal carotid artery using the NASCET criteria. The brachiocephalic artery origin is normal in appearance.  No stenosis is seen in the brachiocephalic artery and its bifurcation into the right subclavian and common carotid artery is normal in appearance, and no stenosis is seen in the right subclavian artery.  The right vertebral artery origin is less than optimally assessed, but is most probably within normal limits.  No stenosis is seen in the cervical segment of the right vertebral artery. The right common carotid origin is normal in appearance.  No stenosis is seen in the right common carotid artery and its bifurcation into the right internal and external carotid arteries is normal in appearance and no stenosis is seen in the cervical segment of the right internal carotid artery using the NASCET criteria.  CT ANGIOGRAM OF THE HEAD: The intracranial segments of the vertebral arteries are widely patent to the vertebrobasilar junction without stenosis.  The basilar artery and the basilar tip are normal in appearance. The posterior cerebral and superior cerebellar arteries are within normal limits. The upper cervical, petrous, cavernous and supracavernous segments of the internal carotid arteries are within normal limits.  There is an atretic A1 segment of the right anterior cerebral artery which is a normal anatomic variation.  The  dominant left A1 segment is widely patent.  The anterior communicating artery origin is normal in appearance. The A2 segments of the anterior cerebral arteries are within normal limits. The M1 segments of the middle cerebral arteries and middle cerebral artery bifurcations are within normal limits. There is abrupt occlusion with filling defect in a parietal M3 or M4 branch of the left middle cerebral artery by an embolus or thrombus. This leads to the area of acute infarction in the left parietal occipital region. The M1 segment of the right middle cerebral artery and the right middle cerebral artery bifurcation are normal in appearance.  The right M2 segments are normal in appearance.  IMPRESSION: 1. The CT of the head without contrast demonstrates a hyperdense anterior left parietal M3 or M4 MCA branch from an acute embolus or thrombus. Posterior to this is a wedge-shaped area of acute infarction in the left parietal occipital parenchyma in the distribution of this left parietal occipital MCA branch that measures 4.7 x 3.7 x 4 cm in size, and there is no hemorrhagic transformation. 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 There is moderate small vessel disease in the cerebral white matter and diffuse cerebral atrophy. 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 The results of the noncontrast head CT were communicated to Dr. Leslie from stroke neurology by telephone on 01/07/2025 at 2:45 p.m., and he requested a contrast-enhanced CT angiogram of the head and neck and CT perfusion study of the brain.  2. The CT perfusion study of the brain consists of a 10 cm vertical slab of brain imaging from the mid cerebellum up through the cerebral hemispheres and excludes the inferior-half of the cerebellum and medulla which are not assessed. The patient was moving during the CTP which slightly limits evaluation. Within the 10 cm vertical slab of brain imaging, there are areas of reduced cerebral blood flow to less than 30% of normal indicating patchy and nodular areas of acute infarction in the left parietal occipital parenchyma and this is in the distribution of the parietal occipital branches of the left MCA territory, and there is an area involving the superior medial left frontal parietal junction that is in the distal left LANETTE territory and the combined dimensions measures 22 cc of brain parenchyma that are acutely infarcted, although I believe the left parietal occipital acute infarct is larger than the CT perfusion study suggests, as on the noncontrast head CT the acute infarct in the left parietal occipital parenchyma measures up to 4.7 x 3.7 x 4 cm. Furthermore, there are bilateral symmetric areas of delayed time to maximal enhancement of greater than 6 seconds in the posterior occipital lobes that I think is probably artifact rather than hypoperfused brain.  There is an area in the superior medial left frontal parietal junction that may be hypoperfused brain in the distal left LANETTE territory that matches the cerebral blood flow deficit. There is an area in the left parietal occipital parenchyma that  demonstrates delayed time to maximal enhancement of greater than 6 seconds indicating chinyere hypoperfused brain that appears larger than the infarcted brain on the cerebral blood flow images, but appears to correspond very well to the area of acute infarction on the head CT without contrast. I believe that the Rapid analysis suggests that the hypoperfused brain measures up to 111 cc of brain parenchyma and I think this is an overestimation as I think the areas in the posterior occipital lobe is artifact and the rapid analysis suggest that the mismatch ratio is 6, and I believe this is an overestimation.  3. Cervical spondylosis is present with disc space narrowing and degenerative endplate changes at C2-3 and C3-4 and C6-7.  Posterior spurs and protruding or herniated disc material moderately narrow the canal at C2-3 and posterior spurs severely narrow the canal and flatten the cord at C3-4, and posterior spurs mildly narrow the canal at C6-7, and there is foraminal narrowing in the cervical spine as described above.  4. The CT angiographic evaluation of the great vessels of the neck is normal with no stenosis seen in the vertebral arteries and no stenosis seen in the common carotid arteries or the cervical segments of the internal carotid arteries using the NASCET criteria. The CT angiographic evaluation of the head demonstrates abrupt embolic occlusion or thrombosis of a parietal branch that is likely either a distal M3 or M4 branch of the left middle cerebral artery leading to the area of acute infarction in the left parietal occipital parenchyma. The remainder of the CT angiogram of the head and neck is normal.  The results of the final dictated study were communicated to Dr. Fred Leslie by telephone on 01/07/2025 at 3:25 PM.  This report was finalized on 1/8/2025 6:25 AM by Dr. Flo Swift M.D on Workstation: [id number]      CT CEREBRAL PERFUSION WITH & WITHOUT CONTRAST    Result Date: 1/8/2025  There is a  hyperdense anterior left parietal M3 MCA branch suggesting acute thrombus or embolus in this branch, and posterior to which is a wedge-shaped area of subtle acute infarction extending from the posterior inferior left parietal lobe into the superior lateral left occipital lobe, and the acute left parietal occipital infarct measures 4.7 x 3.7 x 4 cm in size. There is no hemorrhagic transformation. The patient also has a chronic infarct in the posterior superior medial left frontal lobe in the left LANETTE territory that measures 3.4 x 1.5 x 1.9 cm in size.  There was hemosiderin deposition at the site of this chronic infarct on prior MRI of the brain on 08/06/2024 from old petechial hemorrhage into this chronic left LANETTE territory infarct. 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 There is diffuse cerebral atrophy.  The remainder of the head CT is normal. The results were communicated to Fred Leslie MD, from stroke neurology while the patient was still on our scanner on 01/07/2025 at 2:45 p.m. and he requested a contrast-enhanced CT angiogram of the head and neck and CT perfusion study of the brain.  CONTRAST-ENHANCED CT ANGIOGRAM OF THE HEAD AND NECK AND CT PERFUSION STUDY OF THE BRAIN TECHNIQUE: Spiral CT images were obtained from the mid cerebellum up through the cerebral hemispheres during the arterial phase of contrast for a 10 cm vertical slab of brain imaging, and images were reformatted and analyzed with Rapid software analysis for a CT perfusion study of the brain, and  subsequently spiral CT angiographic images were obtained from the top of the aortic arch up through the great vessels of the head and neck during the arterial phase of contrast and images were reformatted and submitted in 1 mm thick axial, sagittal and coronal CT sections with soft tissue algorithm and 3D reconstructions were performed to complete the CT angiogram of the head and neck.  FINDINGS: CT PERFUSION STUDY OF THE BRAIN:  The CT perfusion study of the brain starts at the mid cerebellum and extends for a 10 cm vertical slab of brain imaging through the majority of the cerebral hemispheres and excludes the inferior-half of the cerebellum and medulla, which are not assessed on this exam. Within the 10 cm vertical slab of brain imaging, there are areas of reduced cerebral blood flow that are patchy nodular in nature involving the posterior inferior left parietal lobe extending into the superior lateral left occipital lobe compatible with small areas of acute completed infarction in the left parietal occipital parenchyma in the left MCA territory.  There is an oblong focus of reduced cerebral blood flow to less than 30% of normal involving the superior medial left frontal parietal junction suggesting an acute infarct in the distal left LANETTE territory. There are areas of delayed time to maximal enhancement that involve the posterior occipital lobes bilaterally that could be artifact.  There is delayed time to maximal enhancement involving the superior medial left frontal parietal region that may be a localized area of hypoperfused brain in the area of suggested acute infarction in the distal left LANETTE territory, and there is delayed time to maximal enhancement of greater than 6 seconds extending from the posterior inferior left parietal lobe into the lateral left occipital lobe.  This area is felt to be hypoperfused brain within the left parietal occipital region and the area of hypoperfused brain appears larger  than the area of acute infarction although the acute infarct in this area is bigger on the noncontrasted head CT than suggested on the CT perfusion study.  CT ANGIOGRAM OF THE NECK: The nasopharynx, oropharynx, the hypopharynx, true cords and subglottic airway are normal in appearance.  There is an enlarged thyroid gland with thyroid nodules suggesting a multinodular goiter. There is some substernal extension of the enlarged left lobe of the thyroid. The lung apices are clear. The parotid, , parapharyngeal, and submandibular spaces are symmetric and are normal in appearance. Cervical spondylosis is present with prominent disc space narrowing, degenerative endplate changes at C2-3 and C3-4, and posterior spurs and protruding disc materia up to moderate to severely narrow the canal at C2-3, and posterior spurs severely narrow the canal at C3-4. There is disc space narrowing.  There are degenerative endplate changes at C5-6 and C6-7, and posterior spurs at least mild up to mild to moderately narrow the canal at C6-7.  There is multilevel foraminal narrowing in the cervical spine with mild-to-moderate right bony foraminal narrowing at C2-3, moderate left and severe right bony foraminal narrowing at C3-4, and mild-to-moderate right and moderate left foraminal narrowing at C6-7. There is common origin of the brachiocephalic artery and left common carotid artery off the aortic arch constituting a bovine configuration of the aortic arch which is a normal anatomic variation. The left subclavian artery origin is normal in appearance. No stenosis is seen in the left subclavian artery. The left vertebral artery origin is normal in appearance. No stenosis is seen in the cervical segment of the left vertebral artery. The left common carotid origin is normal in appearance. No stenosis is seen in the left common carotid artery and its bifurcation into the left internal and external carotid arteries is normal in  appearance, and no stenosis is seen in the cervical segment of the left internal carotid artery using the NASCET criteria. The brachiocephalic artery origin is normal in appearance.  No stenosis is seen in the brachiocephalic artery and its bifurcation into the right subclavian and common carotid artery is normal in appearance, and no stenosis is seen in the right subclavian artery.  The right vertebral artery origin is less than optimally assessed, but is most probably within normal limits.  No stenosis is seen in the cervical segment of the right vertebral artery. The right common carotid origin is normal in appearance.  No stenosis is seen in the right common carotid artery and its bifurcation into the right internal and external carotid arteries is normal in appearance and no stenosis is seen in the cervical segment of the right internal carotid artery using the NASCET criteria.  CT ANGIOGRAM OF THE HEAD: The intracranial segments of the vertebral arteries are widely patent to the vertebrobasilar junction without stenosis.  The basilar artery and the basilar tip are normal in appearance. The posterior cerebral and superior cerebellar arteries are within normal limits. The upper cervical, petrous, cavernous and supracavernous segments of the internal carotid arteries are within normal limits.  There is an atretic A1 segment of the right anterior cerebral artery which is a normal anatomic variation.  The dominant left A1 segment is widely patent.  The anterior communicating artery origin is normal in appearance. The A2 segments of the anterior cerebral arteries are within normal limits. The M1 segments of the middle cerebral arteries and middle cerebral artery bifurcations are within normal limits. There is abrupt occlusion with filling defect in a parietal M3 or M4 branch of the left middle cerebral artery by an embolus or thrombus. This leads to the area of acute infarction in the left parietal occipital region.  The M1 segment of the right middle cerebral artery and the right middle cerebral artery bifurcation are normal in appearance.  The right M2 segments are normal in appearance.  IMPRESSION: 1. The CT of the head without contrast demonstrates a hyperdense anterior left parietal M3 or M4 MCA branch from an acute embolus or thrombus. Posterior to this is a wedge-shaped area of acute infarction in the left parietal occipital parenchyma in the distribution of this left parietal occipital MCA branch that measures 4.7 x 3.7 x 4 cm in size, and there is no hemorrhagic transformation. 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 There is moderate small vessel disease in the cerebral white matter and diffuse cerebral atrophy. 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 The results of the noncontrast head CT were communicated to Dr. Leslie from stroke neurology by telephone on 01/07/2025 at 2:45 p.m., and he requested a contrast-enhanced CT angiogram of the head and neck and CT perfusion study of the brain.  2. The CT perfusion study of the brain consists of a 10 cm vertical slab of brain imaging from the mid  cerebellum up through the cerebral hemispheres and excludes the inferior-half of the cerebellum and medulla which are not assessed. The patient was moving during the CTP which slightly limits evaluation. Within the 10 cm vertical slab of brain imaging, there are areas of reduced cerebral blood flow to less than 30% of normal indicating patchy and nodular areas of acute infarction in the left parietal occipital parenchyma and this is in the distribution of the parietal occipital branches of the left MCA territory, and there is an area involving the superior medial left frontal parietal junction that is in the distal left LANETTE territory and the combined dimensions measures 22 cc of brain parenchyma that are acutely infarcted, although I believe the left parietal occipital acute infarct is larger than the CT perfusion study suggests, as on the noncontrast head CT the acute infarct in the left parietal occipital parenchyma measures up to 4.7 x 3.7 x 4 cm. Furthermore, there are bilateral symmetric areas of delayed time to maximal enhancement of greater than 6 seconds in the posterior occipital lobes that I think is probably artifact rather than hypoperfused brain.  There is an area in the superior medial left frontal parietal junction that may be hypoperfused brain in the distal left LANETTE territory that matches the cerebral blood flow deficit. There is an area in the left parietal occipital parenchyma that demonstrates delayed time to maximal enhancement of greater than 6 seconds indicating chinyere hypoperfused brain that appears larger than the infarcted brain on the cerebral blood flow images, but appears to correspond very well to the area of acute infarction on the head CT without contrast. I believe that the Rapid analysis suggests that the hypoperfused brain measures up to 111 cc of brain parenchyma and I think this is an overestimation as I think the areas in the posterior occipital lobe is artifact and the rapid  analysis suggest that the mismatch ratio is 6, and I believe this is an overestimation.  3. Cervical spondylosis is present with disc space narrowing and degenerative endplate changes at C2-3 and C3-4 and C6-7.  Posterior spurs and protruding or herniated disc material moderately narrow the canal at C2-3 and posterior spurs severely narrow the canal and flatten the cord at C3-4, and posterior spurs mildly narrow the canal at C6-7, and there is foraminal narrowing in the cervical spine as described above.  4. The CT angiographic evaluation of the great vessels of the neck is normal with no stenosis seen in the vertebral arteries and no stenosis seen in the common carotid arteries or the cervical segments of the internal carotid arteries using the NASCET criteria. The CT angiographic evaluation of the head demonstrates abrupt embolic occlusion or thrombosis of a parietal branch that is likely either a distal M3 or M4 branch of the left middle cerebral artery leading to the area of acute infarction in the left parietal occipital parenchyma. The remainder of the CT angiogram of the head and neck is normal.  The results of the final dictated study were communicated to Dr. Fred Leslie by telephone on 01/07/2025 at 3:25 PM.  This report was finalized on 1/8/2025 6:25 AM by Dr. Flo Swift M.D on Workstation: [id number]      XR Chest 1 View    Result Date: 1/7/2025  As described.  This report was finalized on 1/7/2025 3:45 PM by Dr. Jax Jrery M.D on Workstation: [id number]       I have personally reviewed all medications:  Scheduled Medications  aspirin, 325 mg, Oral, Daily   Or  aspirin, 300 mg, Rectal, Daily  atorvastatin, 40 mg, Oral, Nightly  busPIRone, 10 mg, Oral, BID  citalopram, 20 mg, Oral, Daily  furosemide, 20 mg, Oral, Daily  gabapentin, 100 mg, Oral, QAM  gabapentin, 300 mg, Oral, Nightly  metoprolol tartrate, 25 mg, Oral, Q12H  pantoprazole, 40 mg, Oral, Q AM  tamsulosin, 0.4 mg, Oral,  Nightly    Infusions  sodium chloride, 75 mL/hr, Last Rate: 75 mL/hr (01/07/25 2019)    Diet  NPO Diet NPO Type: Strict NPO    I have personally reviewed:  [x]  Laboratory   [x]  Microbiology   [x]  Radiology   [x]  EKG/Telemetry  [x]  Cardiology/Vascular   []  Pathology    []  Records       Assessment/Plan     Active Hospital Problems    Diagnosis  POA    **Stroke [I63.9]  Yes    Acute CVA (cerebrovascular accident) [I63.9]  Yes      Resolved Hospital Problems   No resolved problems to display.       69 y.o. male admitted with Stroke.    #Acute CVA    -Last well-known around 8 PM on 1/6    -CTA head demonstrates embolic occlusion or thrombosis of a parietal branch that is likely either a distal M3 or M4 branch of the left MCA leading to the area of acute infarction in the left parieto-occipital parenchyma    -CTA of the neck is normal with no stenosis seen in the vertebral arteries and no stenosis seen in the common carotid arteries    -CT head shows hyperdense anterior left parietal M3 or M4 MCA branch from an acute embolus or thrombus    -PT/OT/SLP    -MRI brain    -Aspirin    -Statin    -Echo from 10/26/2024 reviewed    -Neurology consulted    -Appears to be embolic stroke secondary to A-fib with RVR, due to history of hemorrhagic stroke will hold off therapeutic anticoagulation until evaluated by neurology and cardiology    #A-fib with RVR    -EKG shows A-fib with RVR and slight ST depressions in V4 through V6    -Status post pacemaker    -Resume home Lopressor 25 mg p.o. every 12 hours    -Lopressor 5 mg IV every 6 hours as needed HR greater than 100 bpm    -Echo from 10/26/2024 reviewed, shows EF of 50% with moderate AAS    -HS troponin 15 > 16, delta 1, doubt ACS and suspect secondary to A-fib    -proBNP 2452    -Due to history of hemorrhagic stroke we will hold off on therapeutic AC until evaluated by  cardiology and neurology    -Cardiology consulted  #Acute metabolic encephalopathy    -Patient currently  alert but not oriented to person place or time; agitation present and has been interfering with care and nursing care    -Case personally discussed with wife who states he is normally AAOx3, she denies alcohol use, but states this has presented in a similar manner with previous strokes    -Trial Ativan 0.5 mg IV every 4 hours as needed anxiety/agitation    -Will consider sitter, restraints, or increased medication.  Discussed with wife and she is in agreement with plan    #Aortic stenosis    -Echo from 10/26/2024 shows moderate aortic valve stenosis    -Resume home Lasix    #BPH    -Flomax    #Anxiety    -Resume home BuSpar, Celexa if able to tolerate p.o.    #GERD    -PPI    #Hypertension    -Allow 24 hours of permissive hypertension    #Hyperlipidemia    -Statin        SCDs for DVT prophylaxis.  Full code.  Discussed with patient and spouse.  Anticipate discharge home with HH vs SNU facility in 2-3 days.  Expected discharge date/ time has not been documented.      Whit Drake DO  [facility]  01/08/25  08:29 EST

## 2025-01-08 NOTE — THERAPY EVALUATION
Acute Care - Speech Language Pathology   Swallow Initial Evaluation [facility]     Patient Name: Flo Manzo  : 1955  MRN: [id number]  Today's Date: 2025               Admit Date: 2025    Visit Dx:     ICD-10-CM ICD-9-CM   1. Acute CVA (cerebrovascular accident)  I63.9 434.91   2. Longstanding persistent atrial fibrillation  I48.11 427.31     Patient Active Problem List   Diagnosis    Umbilical hernia without obstruction and without gangrene    Essential hypertension    Arthritis of left knee    Depression    Obesity (BMI 30-39.9)    Atrial fibrillation with RVR    Substernal thyroid goiter    CHF (congestive heart failure)    Diastolic CHF, chronic    Hemorrhagic stroke    GÓMEZ on auto CPAP    Hypersomnia due to medical condition    Sleep-related hypoxia    Chronic atrial fibrillation    Vertigo    Aortic stenosis, mild    Sinus pause    Pacemaker    Chronic anticoagulation    Generalized weakness    Prediabetes    Pure hypercholesterolemia    Pre-operative cardiovascular examination    Peripheral neuropathy    History of colon polyps    Dizziness    Fever    History of CVA (cerebrovascular accident)    Right leg weakness    Frequent falls    Paroxysmal atrial fibrillation    Elevated troponin    Stroke    Acute CVA (cerebrovascular accident)     Past Medical History:   Diagnosis Date    Arthritis     Atrial fibrillation with RVR 2019    Colon polyps 2018    Hepatic flexure: tubular adenoma, only low-grade dysplasia; splenic flexure: tubular adenoma, only low-grade dysplasia; sigmoid colon: tubular adenoma, multiple fragments only low-grade dysplasia    Depression     Heart murmur     Hemorrhagic stroke 2019    Hypertension     New onset atrial fibrillation (CMS/HCC) - RVR 2019    GÓMEZ (obstructive sleep apnea) 2019    Home sleep study with moderate severity GÓMEZ, AHI 20 events per hour.  Sleep-related hypoxia with low O2 saturation 84% for 14 minutes.     Peripheral neuropathy     Sleep apnea     previously diagnosed with sleep apnea, had T&A done and it has since resolved     Stroke     Uncontrolled hypertension     Ventral hernia      Past Surgical History:   Procedure Laterality Date    APPENDECTOMY N/A 1972    BACK SURGERY      BLADDER STIMULATOR IMPLANT L LOWER BACK    CARDIAC CATHETERIZATION N/A 07/20/2004    Cath left ventriculography, coronary angiography and left heart catheterization, Normal results-Dr. Fierro     CARDIAC CATHETERIZATION N/A 1/29/2019    Procedure: Left Heart Cath;  Surgeon: Eren Bruce MD;  Location: [facility] CATH INVASIVE LOCATION;  Service: Cardiology    CARDIAC CATHETERIZATION N/A 1/29/2019    Procedure: Left ventriculography;  Surgeon: Eren Bruce MD;  Location: [facility] CATH INVASIVE LOCATION;  Service: Cardiology    CARDIAC CATHETERIZATION N/A 1/29/2019    Procedure: Coronary angiography;  Surgeon: Eren Bruce MD;  Location: [facility] CATH INVASIVE LOCATION;  Service: Cardiology    CARDIAC ELECTROPHYSIOLOGY PROCEDURE N/A 3/4/2022    Procedure: Pacemaker SC new BIOTRONIK;  Surgeon: Eren Bruce MD;  Location: [facility] CATH INVASIVE LOCATION;  Service: Cardiology;  Laterality: N/A;    CARPAL TUNNEL RELEASE Right 2013    CARPAL TUNNEL RELEASE Left     COLONOSCOPY N/A 1/4/2018    Procedure: COLONOSCOPY with cold polypectomy and hot snare polypectomy;  Surgeon: Ten Solitario MD;  Location: [facility] ENDOSCOPY;  Service:     COLONOSCOPY N/A 4/25/2024    Procedure: COLONOSCOPY INTO CECUM;  Surgeon: Ten Solitario MD;  Location: [facility] ENDOSCOPY;  Service: General;  Laterality: N/A;  PRE: PERSONAL H/O COLON POLYP  /  POST: POOR PREP OTHERWISE NORMAL    EYE LENS IMPLANT SECONDARY Bilateral 2007, 2008    KNEE CARTILAGE SURGERY Right 1979    TONSILLECTOMY AND ADENOIDECTOMY Bilateral 2005    TOTAL KNEE ARTHROPLASTY Left 03/14/2016    Left total knee arthroplasty with Amberly component, size 11 femur, G  tibial baseplate with a 10 polyethylene insert and a 38 patellar button-Dr. Pablo Hairston    TOTAL KNEE ARTHROPLASTY Right 2015    Right total knee arthroplasty with Amberly component size G femur, 11 tibial base plate with an 11 polyethylene insert and a 38 patellar button-Dr. Pablo Hairston    UVULOPALATOPHARYNGOPLASTY N/A     part of soft palate, and uvula    VENTRAL HERNIA REPAIR N/A 2018    Procedure: VENTRAL HERNIA REPAIR LAPAROSCOPIC WITH DAVINCI ROBOT AND MESH;  Surgeon: Ten Solitario MD;  Location: [facility] OR;  Service:        SLP Recommendation and Plan  SLP Swallowing Diagnosis: R/O pharyngeal dysphagia (25 1000)  SLP Diet Recommendation: NPO, ice chips between meals after oral care, with supervision (25 1000)     SLP Rec. for Method of Medication Administration: meds whole, meds crushed, with puree, as tolerated (25 1000)     Monitor for Signs of Aspiration: yes, notify SLP if any concerns (25 1000)  Recommended Diagnostics: [place]ess via [place] [place] (25 1000)           Therapy Frequency (Swallow): PRN (25 1000)  Predicted Duration Therapy Intervention (Days): until discharge (25 1000)  Oral Care Recommendations: Oral Care BID/PRN (25 1000)                                        Outcome Evaluation: Patient seen for clinical swallow assessment. No focal weakness in oral mech exam, patient able to imitate oral motor movements, but unable to follow single step directions. Perseverating on his . No other spontaneous speech elicited. Able to sustain vowel post swallow, but could not follow commands to cough/throat clear. Question confusion vs aphasia. Suspected mistiming intermittent with thin, nectar, and honey d/t impulsivity with PO. Wet voice following thins, which patient never cleared. Therefore, unable to rule out aspiration with additional trials. SLP recs NPO, VFSS next date. Can present meds in puree.      SWALLOW EVALUATION (Last  72 Hours)       SLP Adult Swallow Evaluation       Row Name 25 1000                   Rehab Evaluation    Document Type evaluation  -        Patient Effort fair  -           General Information    Patient Profile Reviewed yes  -        Pertinent History Of Current Problem Hx Stroke. Acute L parietal-occipital CVA  -        Current Method of Nutrition NPO  -        Precautions/Limitations, Vision difficult to assess  -        Precautions/Limitations, Hearing difficult to assess  -        Prior Level of Function-Swallowing other (see comments)  BSE 10/25/24 SOFT/CHOPPED/THIN  -           Pain    Pre/Posttreatment Pain Comment No signs of distress, only stated   -           Oral Motor Structure and Function    Dentition Assessment natural, present and adequate;missing teeth  -           Oral Musculature and Cranial Nerve Assessment    Oral Motor General Assessment WFL  -           Clinical Swallow Eval    Clinical Swallow Evaluation Summary Patient seen for clinical swallow assessment. No focal weakness in oral mech exam, patient able to imitate oral motor movements, but unable to follow single step directions. Perseverating on his . No other spontaneous speech elicited. Able to sustain vowel post swallow, but could not follow commands to cough/throat clear. Question confusion vs aphasia. Suspected mistiming intermittently with thin, nectar, and honey d/t impulsivity with PO. Wet voice following thins, which patient never cleared. Therefore, unable to rule out aspiration with additional trials. SLP recs NPO, VFSS next date. Can present meds in puree.  -           SLP Evaluation Clinical Impression    SLP Swallowing Diagnosis R/O pharyngeal dysphagia  -           Recommendations    Therapy Frequency (Swallow) PRN  -        Predicted Duration Therapy Intervention (Days) until discharge  -        SLP Diet Recommendation NPO;ice chips between meals after oral care, with supervision   -        Recommended Diagnostics reassess via V[facility]SS ([facility] [place])  -        Oral Care Recommendations Oral Care BID/PRN  -        SLP Rec. for Method of Medication Administration meds whole;meds crushed;with puree;as tolerated  -        Monitor for Signs of Aspiration yes;notify SLP if any concerns  -                  User Key  (r) = Recorded By, (t) = Taken By, (c) = Cosigned By      Initials Name Effective Dates     Padmini Bangura SLP 01/05/24 -                     EDUCATION  The patient has been educated in the following areas:   Dysphagia (Swallowing Impairment).                Time Calculation:    Time Calculation- SLP       Row Name 01/08/25 1058             Time Calculation- [facility]    SLP Start Time 0815  -      SLP Received On 01/08/25  -                User Key  (r) = Recorded By, (t) = Taken By, (c) = Cosigned By      Initials Name Provider Type     Padmini Bangura SLP Speech and Language Pathologist                    Therapy Charges for Today       Code Description Service Date Service Provider Modifiers Qty    [id number] HC ST EVAL ORAL PHARYNG SWALLOW 4 1/8/2025 Padmini Bangura SLP GN 1                 NICOLAS Serrano  1/8/2025

## 2025-01-08 NOTE — PROGRESS NOTES
DOS: 2025  NAME: Flo Manzo   : 1955  PCP: Karen Jiménez APRN    Chief Complaint   Patient presents with    Cerebrovascular Accident      Stroke    Subjective: Pt seen in follow up, however the problem is new to me.  Patient lying in bed asleep.  Will open eyes briefly to the calling of his name and states random numbers and then would fall back asleep.  No family at bedside.    Objective:  Vital signs:      Vitals:    25 0610 25 0700 25 0803 25 1204   BP:   (!) 158/112 (!) 138/109  Comment: nurse notified   BP Location:   Left arm Left arm   Patient Position:   Lying Lying   Pulse:  86 81 99   Resp:   20 20   Temp:   97.7 °F (36.5 °C) 99 °F (37.2 °C)   TempSrc:   Oral Axillary   SpO2:   93% 93%   Weight: 101 kg (221 lb 12.5 oz)      Height:           Current Facility-Administered Medications:     acetaminophen (TYLENOL) tablet 650 mg, 650 mg, Oral, Q4H PRN **OR** acetaminophen (TYLENOL) suppository 650 mg, 650 mg, Rectal, Q4H PRN, Cami Garvey MD    albuterol (PROVENTIL) nebulizer solution 0.083% 2.5 mg/3mL, 2.5 mg, Nebulization, Q6H PRN, Cami Garvey MD    aspirin tablet 325 mg, 325 mg, Oral, Daily **OR** aspirin suppository 300 mg, 300 mg, Rectal, Daily, Cami Garvey MD, 300 mg at 25 0905    atorvastatin (LIPITOR) tablet 40 mg, 40 mg, Oral, Nightly, Gaetano Angel MD    sennosides-docusate (PERICOLACE) 8.6-50 MG per tablet 2 tablet, 2 tablet, Oral, BID PRN **AND** polyethylene glycol (MIRALAX) packet 17 g, 17 g, Oral, Daily PRN **AND** bisacodyl (DULCOLAX) EC tablet 5 mg, 5 mg, Oral, Daily PRN **AND** bisacodyl (DULCOLAX) suppository 10 mg, 10 mg, Rectal, Daily PRN, Cami Garvey MD    bisacodyl (DULCOLAX) suppository 10 mg, 10 mg, Rectal, Daily PRN, Cami Garvey MD    busPIRone (BUSPAR) tablet 10 mg, 10 mg, Oral, BID, Cami Garvey MD    citalopram (CeleXA) tablet 20 mg, 20 mg, Oral, Daily,  Cami Garvey MD    furosemide (LASIX) tablet 20 mg, 20 mg, Oral, Daily, Cami Garvey MD    gabapentin (NEURONTIN) capsule 100 mg, 100 mg, Oral, QAM, Cami Garvey MD    gabapentin (NEURONTIN) capsule 300 mg, 300 mg, Oral, Nightly, Cami Garvey MD    LORazepam (ATIVAN) injection 0.5 mg, 0.5 mg, Intravenous, Q4H PRN, Whit Drake DO, 0.5 mg at 01/08/25 0905    metoprolol tartrate (LOPRESSOR) injection 5 mg, 5 mg, Intravenous, Q6H PRN, Whit Drake DO    metoprolol tartrate (LOPRESSOR) tablet 25 mg, 25 mg, Oral, Q12H, Cami Garvey MD    ondansetron ODT (ZOFRAN-ODT) disintegrating tablet 4 mg, 4 mg, Oral, Q6H PRN **OR** ondansetron (ZOFRAN) injection 4 mg, 4 mg, Intravenous, Q6H PRN, Cami Garvey MD    pantoprazole (PROTONIX) EC tablet 40 mg, 40 mg, Oral, Q AM, Cami Garvey MD    sodium chloride 0.9 % flush 10 mL, 10 mL, Intravenous, PRN, Sonu Cedillo MD    sodium chloride 0.9 % infusion, 75 mL/hr, Intravenous, Continuous, Cami Garvey MD, Last Rate: 75 mL/hr at 01/07/25 2019, 75 mL/hr at 01/07/25 2019    tamsulosin (FLOMAX) 24 hr capsule 0.4 mg, 0.4 mg, Oral, Nightly, Cami Garvey MD    PRN meds    acetaminophen **OR** acetaminophen    albuterol    senna-docusate sodium **AND** polyethylene glycol **AND** bisacodyl **AND** bisacodyl    bisacodyl    LORazepam    metoprolol tartrate    ondansetron ODT **OR** ondansetron    sodium chloride    No current facility-administered medications on file prior to encounter.     Current Outpatient Medications on File Prior to Encounter   Medication Sig    acetaminophen (TYLENOL) 325 MG tablet Take 1 tablet by mouth Every 4 (Four) Hours As Needed for Mild Pain. 2 tablets Q4 hours PRN for mild pain.    albuterol sulfate  (90 Base) MCG/ACT inhaler Inhale 2 puffs Every 4 (Four) Hours As Needed for Wheezing or Shortness of Air. Indications: shortness of breath    aspirin 81 MG  "chewable tablet Chew 1 tablet Daily.    atorvastatin (LIPITOR) 40 MG tablet Take 1 tablet by mouth Every Night. Indications: Cerebrovascular Accident or Stroke    busPIRone (BUSPAR) 10 MG tablet TAKE 1 TABLET BY MOUTH TWICE DAILY    citalopram (CeleXA) 20 MG tablet Take 1 tablet by mouth Daily. 30mg, oral, Once a day, give 1.5 tablets by mouth daily.    furosemide (LASIX) 20 MG tablet Take 1 tablet by mouth Daily.    gabapentin (NEURONTIN) 100 MG capsule Take 1 capsule by mouth Every Morning.    gabapentin (NEURONTIN) 300 MG capsule Take 1 capsule by mouth Every Night.    guaiFENesin (MUCINEX) 600 MG 12 hr tablet Take 2 tablets by mouth 2 (Two) Times a Day.    influenza vac split high-dose (Fluzone High-Dose) 0.5 ML suspension prefilled syringe injection Inject 0.5 mL into the appropriate muscle as directed by prescriber. Indications: na    lisinopril (PRINIVIL,ZESTRIL) 5 MG tablet Take 1 tablet by mouth Daily for 30 days. Indications: Left Systolic Heart Failure    metoprolol tartrate (LOPRESSOR) 25 MG tablet TAKE 1 & 1/2 TABLETS BY MOUTH TWICE DAILY    omeprazole (priLOSEC) 20 MG capsule Take 1 capsule by mouth Daily.    potassium chloride ER (K-TAB) 20 MEQ tablet controlled-release ER tablet Take 1 tablet by mouth Daily.    tamsulosin (FLOMAX) 0.4 MG capsule 24 hr capsule TAKE 1 CAPSULE BY MOUTH NIGHTLY    [DISCONTINUED] DULoxetine (CYMBALTA) 30 MG capsule TAKE 1 CAPSULE BY MOUTH EVERY NIGHT    [DISCONTINUED] metoprolol succinate XL (TOPROL-XL) 25 MG 24 hr tablet Take 1 tablet by mouth Daily.    [DISCONTINUED] QUEtiapine (SEROquel) 25 MG tablet Take 1 tablet by mouth Every Night. Take 30 min before bed       General appearance: Elderly male, NAD, poor dentition  HEENT: Normocephalic, atraumatic    Neurological:   MS: did not answer orientation questions, stated \"2,3,66,2\" repeatedly  CN: did not track me or make sustained eye contact, no obvious facial droop, tongue appeared midline   Motor: Moving both upper " extremity spontaneously, moves left leg spontaneously, no movement of the right  Sensory: light touch sensation intact in all 4 ext.  Coordination: MICHAEL  Gait and station: Deferred  Rapid alternating movements: MICHAEL    Laboratory results:  Lab Results   Component Value Date    TSH <0.005 (L) 01/08/2025     Lab Results   Component Value Date    HGBA1C 5.80 (H) 01/08/2025     Lab Results   Component Value Date    [id number] 426 08/18/2023     Lab Results   Component Value Date    CHOL 122 01/08/2025    CHOL 107 08/06/2024    CHOL 175 01/25/2019    CHLPL 148 03/13/2024    CHLPL 117 11/13/2023    CHLPL 171 05/08/2023     Lab Results   Component Value Date    TRIG 49 01/08/2025    TRIG 38 08/06/2024    TRIG 82 03/13/2024     Lab Results   Component Value Date    HDL 47 01/08/2025    HDL 53 08/06/2024    HDL 55 03/13/2024     Lab Results   Component Value Date    LDL 64 01/08/2025    LDL 44 08/06/2024    LDL 77 03/13/2024     Lab Results   Component Value Date    WBC 10.79 01/08/2025    HGB 14.4 01/08/2025    HCT 43.4 01/08/2025    MCV 83.6 01/08/2025     01/08/2025     Lab Results   Component Value Date    GLUCOSE 88 01/08/2025    BUN 11 01/08/2025    CREATININE 0.75 (L) 01/08/2025    EGFRIFNONA 85 08/27/2021    EGFRIFAFRI 103 08/27/2021    BCR 14.7 01/08/2025    K 4.0 01/08/2025    CO2 24.0 01/08/2025    CALCIUM 9.3 01/08/2025    PROTENTOTREF 7.1 10/24/2024    ALBUMIN 3.5 01/08/2025    LABIL2 1.5 07/15/2024    AST 15 01/08/2025    ALT 12 01/08/2025     Lab Results   Component Value Date    PTT 26.2 01/07/2025     Lab Results   Component Value Date    INR 1.08 01/07/2025    INR 1.18 (H) 10/24/2024    INR 1.15 (H) 08/05/2024    PROTIME 14.2 01/07/2025    PROTIME 15.2 (H) 10/24/2024    PROTIME 14.9 (H) 08/05/2024     Brief Urine Lab Results  (Last result in the past 365 days)        Color   Clarity   Blood   Leuk Est   Nitrite   Protein   CREAT   Urine HCG        08/26/24 2225 Yellow   Clear   Negative   Negative    Negative   Negative                   Review and interpretation of imaging:  CT Angiogram Head w AI Analysis of LVO    Result Date: 1/8/2025  There is a hyperdense anterior left parietal M3 MCA branch suggesting acute thrombus or embolus in this branch, and posterior to which is a wedge-shaped area of subtle acute infarction extending from the posterior inferior left parietal lobe into the superior lateral left occipital lobe, and the acute left parietal occipital infarct measures 4.7 x 3.7 x 4 cm in size. There is no hemorrhagic transformation. The patient also has a chronic infarct in the posterior superior medial left frontal lobe in the left LANETTE territory that measures 3.4 x 1.5 x 1.9 cm in size.  There was hemosiderin deposition at the site of this chronic infarct on prior MRI of the brain on 08/06/2024 from old petechial hemorrhage into this chronic left LANETTE territory infarct. 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 There is diffuse cerebral atrophy.  The remainder of the head CT is normal. The results were communicated to Fred Leslie MD, from stroke neurology while the patient was still on our scanner on 01/07/2025 at 2:45 p.m. and he requested a contrast-enhanced CT angiogram of the head and neck and CT perfusion study of the brain.  CONTRAST-ENHANCED CT ANGIOGRAM OF THE HEAD AND NECK AND CT PERFUSION STUDY OF THE BRAIN TECHNIQUE: Spiral CT images were obtained from the mid cerebellum up through the cerebral hemispheres during the arterial phase of contrast for a  10 cm vertical slab of brain imaging, and images were reformatted and analyzed with Rapid software analysis for a CT perfusion study of the brain, and subsequently spiral CT angiographic images were obtained from the top of the aortic arch up through the great vessels of the head and neck during the arterial phase of contrast and images were reformatted and submitted in 1 mm thick axial, sagittal and coronal CT sections with soft tissue algorithm and 3D reconstructions were performed to complete the CT angiogram of the head and neck.  FINDINGS: CT PERFUSION STUDY OF THE BRAIN:  The CT perfusion study of the brain starts at the mid cerebellum and extends for a 10 cm vertical slab of brain imaging through the majority of the cerebral hemispheres and excludes the inferior-half of the cerebellum and medulla, which are not assessed on this exam. Within the 10 cm vertical slab of brain imaging, there are areas of reduced cerebral blood flow that are patchy nodular in nature involving the posterior inferior left parietal lobe extending into the superior lateral left occipital lobe compatible with small areas of acute completed infarction in the left parietal occipital parenchyma in the left MCA territory.  There is an oblong focus of reduced cerebral blood flow to less than 30% of normal involving the superior medial left frontal parietal junction suggesting an acute infarct in the distal left LANETTE territory. There are areas of delayed time to maximal enhancement that involve the posterior occipital lobes bilaterally that could be artifact.  There is delayed time to maximal enhancement involving the superior medial left frontal parietal region that may be a localized area of hypoperfused brain in the area of suggested acute infarction in the distal left LANETTE territory, and there is delayed time to maximal enhancement of greater than 6 seconds extending from the posterior inferior left parietal lobe into the lateral left  occipital lobe.  This area is felt to be hypoperfused brain within the left parietal occipital region and the area of hypoperfused brain appears larger than the area of acute infarction although the acute infarct in this area is bigger on the noncontrasted head CT than suggested on the CT perfusion study.  CT ANGIOGRAM OF THE NECK: The nasopharynx, oropharynx, the hypopharynx, true cords and subglottic airway are normal in appearance.  There is an enlarged thyroid gland with thyroid nodules suggesting a multinodular goiter. There is some substernal extension of the enlarged left lobe of the thyroid. The lung apices are clear. The parotid, , parapharyngeal, and submandibular spaces are symmetric and are normal in appearance. Cervical spondylosis is present with prominent disc space narrowing, degenerative endplate changes at C2-3 and C3-4, and posterior spurs and protruding disc materia up to moderate to severely narrow the canal at C2-3, and posterior spurs severely narrow the canal at C3-4. There is disc space narrowing.  There are degenerative endplate changes at C5-6 and C6-7, and posterior spurs at least mild up to mild to moderately narrow the canal at C6-7.  There is multilevel foraminal narrowing in the cervical spine with mild-to-moderate right bony foraminal narrowing at C2-3, moderate left and severe right bony foraminal narrowing at C3-4, and mild-to-moderate right and moderate left foraminal narrowing at C6-7. There is common origin of the brachiocephalic artery and left common carotid artery off the aortic arch constituting a bovine configuration of the aortic arch which is a normal anatomic variation. The left subclavian artery origin is normal in appearance. No stenosis is seen in the left subclavian artery. The left vertebral artery origin is normal in appearance. No stenosis is seen in the cervical segment of the left vertebral artery. The left common carotid origin is normal in  appearance. No stenosis is seen in the left common carotid artery and its bifurcation into the left internal and external carotid arteries is normal in appearance, and no stenosis is seen in the cervical segment of the left internal carotid artery using the NASCET criteria. The brachiocephalic artery origin is normal in appearance.  No stenosis is seen in the brachiocephalic artery and its bifurcation into the right subclavian and common carotid artery is normal in appearance, and no stenosis is seen in the right subclavian artery.  The right vertebral artery origin is less than optimally assessed, but is most probably within normal limits.  No stenosis is seen in the cervical segment of the right vertebral artery. The right common carotid origin is normal in appearance.  No stenosis is seen in the right common carotid artery and its bifurcation into the right internal and external carotid arteries is normal in appearance and no stenosis is seen in the cervical segment of the right internal carotid artery using the NASCET criteria.  CT ANGIOGRAM OF THE HEAD: The intracranial segments of the vertebral arteries are widely patent to the vertebrobasilar junction without stenosis.  The basilar artery and the basilar tip are normal in appearance. The posterior cerebral and superior cerebellar arteries are within normal limits. The upper cervical, petrous, cavernous and supracavernous segments of the internal carotid arteries are within normal limits.  There is an atretic A1 segment of the right anterior cerebral artery which is a normal anatomic variation.  The dominant left A1 segment is widely patent.  The anterior communicating artery origin is normal in appearance. The A2 segments of the anterior cerebral arteries are within normal limits. The M1 segments of the middle cerebral arteries and middle cerebral artery bifurcations are within normal limits. There is abrupt occlusion with filling defect in a parietal M3 or  M4 branch of the left middle cerebral artery by an embolus or thrombus. This leads to the area of acute infarction in the left parietal occipital region. The M1 segment of the right middle cerebral artery and the right middle cerebral artery bifurcation are normal in appearance.  The right M2 segments are normal in appearance.  IMPRESSION: 1. The CT of the head without contrast demonstrates a hyperdense anterior left parietal M3 or M4 MCA branch from an acute embolus or thrombus. Posterior to this is a wedge-shaped area of acute infarction in the left parietal occipital parenchyma in the distribution of this left parietal occipital MCA branch that measures 4.7 x 3.7 x 4 cm in size, and there is no hemorrhagic transformation. 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 There is moderate small vessel disease in the cerebral white matter and diffuse cerebral atrophy. 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 The results of the noncontrast head CT were communicated to Dr. Leslie from stroke neurology by telephone on 01/07/2025 at 2:45 p.m., and he requested a contrast-enhanced CT angiogram of the  head and neck and CT perfusion study of the brain.  2. The CT perfusion study of the brain consists of a 10 cm vertical slab of brain imaging from the mid cerebellum up through the cerebral hemispheres and excludes the inferior-half of the cerebellum and medulla which are not assessed. The patient was moving during the CTP which slightly limits evaluation. Within the 10 cm vertical slab of brain imaging, there are areas of reduced cerebral blood flow to less than 30% of normal indicating patchy and nodular areas of acute infarction in the left parietal occipital parenchyma and this is in the distribution of the parietal occipital branches of the left MCA territory, and there is an area involving the superior medial left frontal parietal junction that is in the distal left LANETTE territory and the combined dimensions measures 22 cc of brain parenchyma that are acutely infarcted, although I believe the left parietal occipital acute infarct is larger than the CT perfusion study suggests, as on the noncontrast head CT the acute infarct in the left parietal occipital parenchyma measures up to 4.7 x 3.7 x 4 cm. Furthermore, there are bilateral symmetric areas of delayed time to maximal enhancement of greater than 6 seconds in the posterior occipital lobes that I think is probably artifact rather than hypoperfused brain.  There is an area in the superior medial left frontal parietal junction that may be hypoperfused brain in the distal left LANETTE territory that matches the cerebral blood flow deficit. There is an area in the left parietal occipital parenchyma that demonstrates delayed time to maximal enhancement of greater than 6 seconds indicating chinyere hypoperfused brain that appears larger than the infarcted brain on the cerebral blood flow images, but appears to correspond very well to the area of acute infarction on the head CT without contrast. I believe that the Rapid analysis suggests that the hypoperfused brain  measures up to 111 cc of brain parenchyma and I think this is an overestimation as I think the areas in the posterior occipital lobe is artifact and the rapid analysis suggest that the mismatch ratio is 6, and I believe this is an overestimation.  3. Cervical spondylosis is present with disc space narrowing and degenerative endplate changes at C2-3 and C3-4 and C6-7.  Posterior spurs and protruding or herniated disc material moderately narrow the canal at C2-3 and posterior spurs severely narrow the canal and flatten the cord at C3-4, and posterior spurs mildly narrow the canal at C6-7, and there is foraminal narrowing in the cervical spine as described above.  4. The CT angiographic evaluation of the great vessels of the neck is normal with no stenosis seen in the vertebral arteries and no stenosis seen in the common carotid arteries or the cervical segments of the internal carotid arteries using the NASCET criteria. The CT angiographic evaluation of the head demonstrates abrupt embolic occlusion or thrombosis of a parietal branch that is likely either a distal M3 or M4 branch of the left middle cerebral artery leading to the area of acute infarction in the left parietal occipital parenchyma. The remainder of the CT angiogram of the head and neck is normal.  The results of the final dictated study were communicated to Dr. Fred Leslie by telephone on 01/07/2025 at 3:25 PM.  This report was finalized on 1/8/2025 6:25 AM by Dr. Flo Swift M.D on Workstation: [id number]      CT Angiogram Neck    Result Date: 1/8/2025  There is a hyperdense anterior left parietal M3 MCA branch suggesting acute thrombus or embolus in this branch, and posterior to which is a wedge-shaped area of subtle acute infarction extending from the posterior inferior left parietal lobe into the superior lateral left occipital lobe, and the acute left parietal occipital infarct measures 4.7 x 3.7 x 4 cm in size. There is no hemorrhagic  transformation. The patient also has a chronic infarct in the posterior superior medial left frontal lobe in the left LANETTE territory that measures 3.4 x 1.5 x 1.9 cm in size.  There was hemosiderin deposition at the site of this chronic infarct on prior MRI of the brain on 08/06/2024 from old petechial hemorrhage into this chronic left LANETTE territory infarct. 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 There is diffuse cerebral atrophy.  The remainder of the head CT is normal. The results were communicated to Fred Leslie MD, from stroke neurology while the patient was still on our scanner on 01/07/2025 at 2:45 p.m. and he requested a contrast-enhanced CT angiogram of the head and neck and CT perfusion study of the brain.  CONTRAST-ENHANCED CT ANGIOGRAM OF THE HEAD AND NECK AND CT PERFUSION STUDY OF THE BRAIN TECHNIQUE: Spiral CT images were obtained from the mid cerebellum up through the cerebral hemispheres during the arterial phase of contrast for a 10 cm vertical slab of brain imaging, and images were reformatted and analyzed with Rapid software analysis for a CT perfusion study of the brain, and subsequently spiral CT angiographic images were obtained from the top of the aortic arch up through the great vessels of the head and neck during the arterial phase of contrast and images were reformatted and submitted in 1 mm thick axial, sagittal and coronal CT sections with soft tissue algorithm and 3D reconstructions were performed to complete the CT angiogram of the head and  neck.  FINDINGS: CT PERFUSION STUDY OF THE BRAIN:  The CT perfusion study of the brain starts at the mid cerebellum and extends for a 10 cm vertical slab of brain imaging through the majority of the cerebral hemispheres and excludes the inferior-half of the cerebellum and medulla, which are not assessed on this exam. Within the 10 cm vertical slab of brain imaging, there are areas of reduced cerebral blood flow that are patchy nodular in nature involving the posterior inferior left parietal lobe extending into the superior lateral left occipital lobe compatible with small areas of acute completed infarction in the left parietal occipital parenchyma in the left MCA territory.  There is an oblong focus of reduced cerebral blood flow to less than 30% of normal involving the superior medial left frontal parietal junction suggesting an acute infarct in the distal left LANETTE territory. There are areas of delayed time to maximal enhancement that involve the posterior occipital lobes bilaterally that could be artifact.  There is delayed time to maximal enhancement involving the superior medial left frontal parietal region that may be a localized area of hypoperfused brain in the area of suggested acute infarction in the distal left LANETTE territory, and there is delayed time to maximal enhancement of greater than 6 seconds extending from the posterior inferior left parietal lobe into the lateral left occipital lobe.  This area is felt to be hypoperfused brain within the left parietal occipital region and the area of hypoperfused brain appears larger than the area of acute infarction although the acute infarct in this area is bigger on the noncontrasted head CT than suggested on the CT perfusion study.  CT ANGIOGRAM OF THE NECK: The nasopharynx, oropharynx, the hypopharynx, true cords and subglottic airway are normal in appearance.  There is an enlarged thyroid gland with thyroid nodules suggesting a multinodular goiter. There is  some substernal extension of the enlarged left lobe of the thyroid. The lung apices are clear. The parotid, , parapharyngeal, and submandibular spaces are symmetric and are normal in appearance. Cervical spondylosis is present with prominent disc space narrowing, degenerative endplate changes at C2-3 and C3-4, and posterior spurs and protruding disc materia up to moderate to severely narrow the canal at C2-3, and posterior spurs severely narrow the canal at C3-4. There is disc space narrowing.  There are degenerative endplate changes at C5-6 and C6-7, and posterior spurs at least mild up to mild to moderately narrow the canal at C6-7.  There is multilevel foraminal narrowing in the cervical spine with mild-to-moderate right bony foraminal narrowing at C2-3, moderate left and severe right bony foraminal narrowing at C3-4, and mild-to-moderate right and moderate left foraminal narrowing at C6-7. There is common origin of the brachiocephalic artery and left common carotid artery off the aortic arch constituting a bovine configuration of the aortic arch which is a normal anatomic variation. The left subclavian artery origin is normal in appearance. No stenosis is seen in the left subclavian artery. The left vertebral artery origin is normal in appearance. No stenosis is seen in the cervical segment of the left vertebral artery. The left common carotid origin is normal in appearance. No stenosis is seen in the left common carotid artery and its bifurcation into the left internal and external carotid arteries is normal in appearance, and no stenosis is seen in the cervical segment of the left internal carotid artery using the NASCET criteria. The brachiocephalic artery origin is normal in appearance.  No stenosis is seen in the brachiocephalic artery and its bifurcation into the right subclavian and common carotid artery is normal in appearance, and no stenosis is seen in the right subclavian artery.  The right  vertebral artery origin is less than optimally assessed, but is most probably within normal limits.  No stenosis is seen in the cervical segment of the right vertebral artery. The right common carotid origin is normal in appearance.  No stenosis is seen in the right common carotid artery and its bifurcation into the right internal and external carotid arteries is normal in appearance and no stenosis is seen in the cervical segment of the right internal carotid artery using the NASCET criteria.  CT ANGIOGRAM OF THE HEAD: The intracranial segments of the vertebral arteries are widely patent to the vertebrobasilar junction without stenosis.  The basilar artery and the basilar tip are normal in appearance. The posterior cerebral and superior cerebellar arteries are within normal limits. The upper cervical, petrous, cavernous and supracavernous segments of the internal carotid arteries are within normal limits.  There is an atretic A1 segment of the right anterior cerebral artery which is a normal anatomic variation.  The dominant left A1 segment is widely patent.  The anterior communicating artery origin is normal in appearance. The A2 segments of the anterior cerebral arteries are within normal limits. The M1 segments of the middle cerebral arteries and middle cerebral artery bifurcations are within normal limits. There is abrupt occlusion with filling defect in a parietal M3 or M4 branch of the left middle cerebral artery by an embolus or thrombus. This leads to the area of acute infarction in the left parietal occipital region. The M1 segment of the right middle cerebral artery and the right middle cerebral artery bifurcation are normal in appearance.  The right M2 segments are normal in appearance.  IMPRESSION: 1. The CT of the head without contrast demonstrates a hyperdense anterior left parietal M3 or M4 MCA branch from an acute embolus or thrombus. Posterior to this is a wedge-shaped area of acute infarction in  the left parietal occipital parenchyma in the distribution of this left parietal occipital MCA branch that measures 4.7 x 3.7 x 4 cm in size, and there is no hemorrhagic transformation. 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 There is moderate small vessel disease in the cerebral white matter and diffuse cerebral atrophy. 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 The results of the noncontrast head CT were communicated to Dr. Leslie from stroke neurology by telephone on 01/07/2025 at 2:45 p.m., and he requested a contrast-enhanced CT angiogram of the head and neck and CT perfusion study of the brain.  2. The CT perfusion study of the brain consists of a 10 cm vertical slab of brain imaging from the mid cerebellum up through the cerebral hemispheres and excludes the inferior-half of the cerebellum and medulla which are not assessed. The patient was moving during the CTP which slightly limits evaluation. Within the 10 cm vertical slab of brain imaging, there are areas of reduced cerebral blood flow to less than 30% of normal indicating patchy and nodular areas of acute infarction in the left  parietal occipital parenchyma and this is in the distribution of the parietal occipital branches of the left MCA territory, and there is an area involving the superior medial left frontal parietal junction that is in the distal left LANETTE territory and the combined dimensions measures 22 cc of brain parenchyma that are acutely infarcted, although I believe the left parietal occipital acute infarct is larger than the CT perfusion study suggests, as on the noncontrast head CT the acute infarct in the left parietal occipital parenchyma measures up to 4.7 x 3.7 x 4 cm. Furthermore, there are bilateral symmetric areas of delayed time to maximal enhancement of greater than 6 seconds in the posterior occipital lobes that I think is probably artifact rather than hypoperfused brain.  There is an area in the superior medial left frontal parietal junction that may be hypoperfused brain in the distal left LANETTE territory that matches the cerebral blood flow deficit. There is an area in the left parietal occipital parenchyma that demonstrates delayed time to maximal enhancement of greater than 6 seconds indicating chinyere hypoperfused brain that appears larger than the infarcted brain on the cerebral blood flow images, but appears to correspond very well to the area of acute infarction on the head CT without contrast. I believe that the Rapid analysis suggests that the hypoperfused brain measures up to 111 cc of brain parenchyma and I think this is an overestimation as I think the areas in the posterior occipital lobe is artifact and the rapid analysis suggest that the mismatch ratio is 6, and I believe this is an overestimation.  3. Cervical spondylosis is present with disc space narrowing and degenerative endplate changes at C2-3 and C3-4 and C6-7.  Posterior spurs and protruding or herniated disc material moderately narrow the canal at C2-3 and posterior spurs severely narrow the canal and flatten the cord at C3-4, and posterior  spurs mildly narrow the canal at C6-7, and there is foraminal narrowing in the cervical spine as described above.  4. The CT angiographic evaluation of the great vessels of the neck is normal with no stenosis seen in the vertebral arteries and no stenosis seen in the common carotid arteries or the cervical segments of the internal carotid arteries using the NASCET criteria. The CT angiographic evaluation of the head demonstrates abrupt embolic occlusion or thrombosis of a parietal branch that is likely either a distal M3 or M4 branch of the left middle cerebral artery leading to the area of acute infarction in the left parietal occipital parenchyma. The remainder of the CT angiogram of the head and neck is normal.  The results of the final dictated study were communicated to Dr. Fred Leslie by telephone on 01/07/2025 at 3:25 PM.  This report was finalized on 1/8/2025 6:25 AM by Dr. Flo Swift M.D on Workstation: [id number]      CT CEREBRAL PERFUSION WITH & WITHOUT CONTRAST    Result Date: 1/8/2025  There is a hyperdense anterior left parietal M3 MCA branch suggesting acute thrombus or embolus in this branch, and posterior to which is a wedge-shaped area of subtle acute infarction extending from the posterior inferior left parietal lobe into the superior lateral left occipital lobe, and the acute left parietal occipital infarct measures 4.7 x 3.7 x 4 cm in size. There is no hemorrhagic transformation. The patient also has a chronic infarct in the posterior superior medial left frontal lobe in the left LANETTE territory that measures 3.4 x 1.5 x 1.9 cm in size.  There was hemosiderin deposition at the site of this chronic infarct on prior MRI of the brain on 08/06/2024 from old petechial hemorrhage into this chronic left LANETTE territory infarct. 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 There is diffuse cerebral atrophy.  The remainder of the head CT is normal. The results were communicated to Fred Leslie MD, from stroke neurology while the patient was still on our scanner on 01/07/2025 at 2:45 p.m. and he requested a contrast-enhanced CT angiogram of the head and neck and CT perfusion study of the brain.  CONTRAST-ENHANCED CT ANGIOGRAM OF THE HEAD AND NECK AND CT PERFUSION STUDY OF THE BRAIN TECHNIQUE: Spiral CT images were obtained from the mid cerebellum up through the cerebral hemispheres during the arterial phase of contrast for a 10 cm vertical slab of brain imaging, and images were reformatted and analyzed with Rapid software analysis for a CT perfusion study of the brain, and subsequently spiral CT angiographic images were obtained from the top of the aortic arch up through the great vessels of the head and neck during the arterial phase of contrast and images were reformatted and submitted in 1 mm thick axial, sagittal and coronal CT sections with soft tissue algorithm and 3D reconstructions were performed to complete the CT angiogram of the head and neck.  FINDINGS: CT PERFUSION STUDY OF THE BRAIN:  The CT perfusion study of the brain starts at the mid cerebellum and extends for a 10 cm vertical slab of brain imaging through the majority of the cerebral hemispheres and excludes the inferior-half of the cerebellum and medulla, which are not assessed on this exam. Within the 10 cm vertical slab of brain imaging, there are areas of reduced cerebral blood flow that are patchy nodular in nature involving the posterior inferior left parietal lobe extending into the superior  lateral left occipital lobe compatible with small areas of acute completed infarction in the left parietal occipital parenchyma in the left MCA territory.  There is an oblong focus of reduced cerebral blood flow to less than 30% of normal involving the superior medial left frontal parietal junction suggesting an acute infarct in the distal left LANETTE territory. There are areas of delayed time to maximal enhancement that involve the posterior occipital lobes bilaterally that could be artifact.  There is delayed time to maximal enhancement involving the superior medial left frontal parietal region that may be a localized area of hypoperfused brain in the area of suggested acute infarction in the distal left LANETTE territory, and there is delayed time to maximal enhancement of greater than 6 seconds extending from the posterior inferior left parietal lobe into the lateral left occipital lobe.  This area is felt to be hypoperfused brain within the left parietal occipital region and the area of hypoperfused brain appears larger than the area of acute infarction although the acute infarct in this area is bigger on the noncontrasted head CT than suggested on the CT perfusion study.  CT ANGIOGRAM OF THE NECK: The nasopharynx, oropharynx, the hypopharynx, true cords and subglottic airway are normal in appearance.  There is an enlarged thyroid gland with thyroid nodules suggesting a multinodular goiter. There is some substernal extension of the enlarged left lobe of the thyroid. The lung apices are clear. The parotid, , parapharyngeal, and submandibular spaces are symmetric and are normal in appearance. Cervical spondylosis is present with prominent disc space narrowing, degenerative endplate changes at C2-3 and C3-4, and posterior spurs and protruding disc materia up to moderate to severely narrow the canal at C2-3, and posterior spurs severely narrow the canal at C3-4. There is disc space narrowing.  There are  degenerative endplate changes at C5-6 and C6-7, and posterior spurs at least mild up to mild to moderately narrow the canal at C6-7.  There is multilevel foraminal narrowing in the cervical spine with mild-to-moderate right bony foraminal narrowing at C2-3, moderate left and severe right bony foraminal narrowing at C3-4, and mild-to-moderate right and moderate left foraminal narrowing at C6-7. There is common origin of the brachiocephalic artery and left common carotid artery off the aortic arch constituting a bovine configuration of the aortic arch which is a normal anatomic variation. The left subclavian artery origin is normal in appearance. No stenosis is seen in the left subclavian artery. The left vertebral artery origin is normal in appearance. No stenosis is seen in the cervical segment of the left vertebral artery. The left common carotid origin is normal in appearance. No stenosis is seen in the left common carotid artery and its bifurcation into the left internal and external carotid arteries is normal in appearance, and no stenosis is seen in the cervical segment of the left internal carotid artery using the NASCET criteria. The brachiocephalic artery origin is normal in appearance.  No stenosis is seen in the brachiocephalic artery and its bifurcation into the right subclavian and common carotid artery is normal in appearance, and no stenosis is seen in the right subclavian artery.  The right vertebral artery origin is less than optimally assessed, but is most probably within normal limits.  No stenosis is seen in the cervical segment of the right vertebral artery. The right common carotid origin is normal in appearance.  No stenosis is seen in the right common carotid artery and its bifurcation into the right internal and external carotid arteries is normal in appearance and no stenosis is seen in the cervical segment of the right internal carotid artery using the NASCET criteria.  CT ANGIOGRAM OF THE  HEAD: The intracranial segments of the vertebral arteries are widely patent to the vertebrobasilar junction without stenosis.  The basilar artery and the basilar tip are normal in appearance. The posterior cerebral and superior cerebellar arteries are within normal limits. The upper cervical, petrous, cavernous and supracavernous segments of the internal carotid arteries are within normal limits.  There is an atretic A1 segment of the right anterior cerebral artery which is a normal anatomic variation.  The dominant left A1 segment is widely patent.  The anterior communicating artery origin is normal in appearance. The A2 segments of the anterior cerebral arteries are within normal limits. The M1 segments of the middle cerebral arteries and middle cerebral artery bifurcations are within normal limits. There is abrupt occlusion with filling defect in a parietal M3 or M4 branch of the left middle cerebral artery by an embolus or thrombus. This leads to the area of acute infarction in the left parietal occipital region. The M1 segment of the right middle cerebral artery and the right middle cerebral artery bifurcation are normal in appearance.  The right M2 segments are normal in appearance.  IMPRESSION: 1. The CT of the head without contrast demonstrates a hyperdense anterior left parietal M3 or M4 MCA branch from an acute embolus or thrombus. Posterior to this is a wedge-shaped area of acute infarction in the left parietal occipital parenchyma in the distribution of this left parietal occipital MCA branch that measures 4.7 x 3.7 x 4 cm in size, and there is no hemorrhagic transformation. 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 There is moderate small vessel disease in the cerebral white matter and diffuse cerebral atrophy. 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 The results of the noncontrast head CT were communicated to Dr. Leslie from stroke neurology by telephone on 01/07/2025 at 2:45 p.m., and he requested a contrast-enhanced CT angiogram of the head and neck and CT perfusion study of the brain.  2. The CT perfusion study of the brain consists of a 10 cm vertical slab of brain imaging from the mid cerebellum up through the cerebral hemispheres and excludes the inferior-half of the cerebellum and medulla which are not assessed. The patient was moving during the CTP which slightly limits evaluation. Within the 10 cm vertical slab of brain imaging, there are areas of reduced cerebral blood flow to less than 30% of normal indicating patchy and nodular areas of acute infarction in the left parietal occipital parenchyma and this is in the distribution of the parietal occipital branches of the left MCA territory, and there is an area involving the superior medial left frontal parietal junction that is in the distal left LANETTE territory and the combined dimensions measures 22 cc of brain parenchyma that are acutely infarcted, although I believe the left parietal occipital acute infarct is larger than the CT perfusion study suggests, as on the noncontrast head CT the acute infarct in the left  parietal occipital parenchyma measures up to 4.7 x 3.7 x 4 cm. Furthermore, there are bilateral symmetric areas of delayed time to maximal enhancement of greater than 6 seconds in the posterior occipital lobes that I think is probably artifact rather than hypoperfused brain.  There is an area in the superior medial left frontal parietal junction that may be hypoperfused brain in the distal left LANETTE territory that matches the cerebral blood flow deficit. There is an area in the left parietal occipital parenchyma that demonstrates delayed time to maximal enhancement of greater than 6 seconds indicating chinyere hypoperfused brain that appears larger than the infarcted brain on the cerebral blood flow images, but appears to correspond very well to the area of acute infarction on the head CT without contrast. I believe that the Rapid analysis suggests that the hypoperfused brain measures up to 111 cc of brain parenchyma and I think this is an overestimation as I think the areas in the posterior occipital lobe is artifact and the rapid analysis suggest that the mismatch ratio is 6, and I believe this is an overestimation.  3. Cervical spondylosis is present with disc space narrowing and degenerative endplate changes at C2-3 and C3-4 and C6-7.  Posterior spurs and protruding or herniated disc material moderately narrow the canal at C2-3 and posterior spurs severely narrow the canal and flatten the cord at C3-4, and posterior spurs mildly narrow the canal at C6-7, and there is foraminal narrowing in the cervical spine as described above.  4. The CT angiographic evaluation of the great vessels of the neck is normal with no stenosis seen in the vertebral arteries and no stenosis seen in the common carotid arteries or the cervical segments of the internal carotid arteries using the NASCET criteria. The CT angiographic evaluation of the head demonstrates abrupt embolic occlusion or thrombosis of a parietal branch that is likely  either a distal M3 or M4 branch of the left middle cerebral artery leading to the area of acute infarction in the left parietal occipital parenchyma. The remainder of the CT angiogram of the head and neck is normal.  The results of the final dictated study were communicated to Dr. Fred Leslie by telephone on 01/07/2025 at 3:25 PM.  This report was finalized on 1/8/2025 6:25 AM by Dr. Flo Swift M.D on Workstation: [id number]      XR Chest 1 View    Result Date: 1/7/2025  As described.  This report was finalized on 1/7/2025 3:45 PM by Dr. Jax Jerry M.D on Workstation: [id number]     Results for orders placed during the hospital encounter of 10/24/24    Adult Transthoracic Echo Complete W/ Cont if Necessary Per Protocol    Interpretation Summary    Left ventricular systolic function is normal. Estimated left ventricular EF = 50%    Left ventricular wall thickness is consistent with borderline concentric hypertrophy.    Left ventricular diastolic function was indeterminate.    Moderate aortic valve stenosis is present. Aortic valve area is 1 cm2.    Peak velocity of the flow distal to the aortic valve is 310.1 cm/s. Aortic valve maximum pressure gradient is 39 mmHg. Aortic valve mean pressure gradient is 25 mmHg. Aortic valve dimensionless index is 0.3 .    Estimated right ventricular systolic pressure from tricuspid regurgitation is normal (<35 mmHg). Calculated right ventricular systolic pressure from tricuspid regurgitation is 27 mmHg.    Mild dilation of the ascending aorta is present 3.9 cm.    Mild aortic valve regurgitation is present.      Impression/Assessment:  This is a 69-year-old male with past medical history of atrial fibrillation not on anticoagulation secondary to prior diagnosis of advanced cerebral amyloid angiopathy, pacemaker placement, obstructive sleep apnea status post T&A procedure reportedly now resolved, stroke, hypertension, peripheral neuropathy who presented to the hospital on  01/7/2025 with complaints of decreased responsiveness and strokelike symptoms.    BP on arrival 162/107 and reached as high as 179/118.  He underwent a CT/CTA/CTP that revealed a new hypodensity within the left parietal lobe and hyperdense left M3 branch suggestive of an acute thrombus or embolus.     Diagnosis:  Acute left parietal infarct with left M3 branch hyperdense sign, embolic  Paroxysmal atrial fibrillation  Hypertensive urgency  Hyperthyroidism  Subcortical and cortical cerebral microhemorrhages, likely on the basis of both hypertensive hemorrhages and cerebral amyloid angiopathy  Obstructive sleep apnea  Prediabetes  Permanent pacemaker placement    Additional work up to date:  Labs: Free T4 1.63, free T33.04    Plan:  - Likely will be unable to get MRI due to patient having bladder stimulator without location of remote to turn off, and in addition pacemaker presence (unsure if this is compatible or not). Discussed with Dr. Angel will go ahead and cancel MRI. I reviewed his last MRI in August, he has several areas of chronic microhemorrhages with subcortical and cortical involvement which is likely on the basis of both hypertensive hemorrhages and cerebral amyloid angiopathy.  He is at a high risk for cerebral hemorrhage with long-term use of anticoagulation.  Cardiology has evaluated in the past for potential watchman and has now again discussed this inpatient.  Will discuss with Dr. Angel about his suspected tolerance of being able to take A/C prior to and shortly after the Watchman procedure.  For now we can continue him on aspirin.  - Continue Lipitor 40mg, LDL 64  - Defer to primary to address hyperthyroidism  Neurochecks per stroke protocol  Okay to slowly normalize BP  Stroke Education  SIMON/SCDs  PT/OT/ST  Therapies as written. CCP for discharge planning. Call RRT for any acute neurological changes.   We will continue to follow and advise.    Case discussed with patient and Dr. Angel, and  he agrees with plan above.     Carolina Michele, APRN

## 2025-01-08 NOTE — SIGNIFICANT NOTE
Per MRI tech, patient has a [employer] pacemaker and bladder stimulator. They need the remote to the bladder scanner to be able to turn off prior to MRI. This RN spoke to patient's wife via phone and she states she doesn't know where the remote is. Patient is extremely confused and unable to follow commands and patient needs to be able to verbalize that everything is okay when in MRI. At this time, patient is not a candidate for MRI. Will notify MD.

## 2025-01-08 NOTE — NURSING NOTE
Consult called to Dr. Lozada regarding afib with rate fluctuating from 1teens to 150's and EKG changes per 12 lead.  Dr. Lozada agreed with one time metoprolol and stat troponin already ordered per [facility], no new orders obtained at this time.

## 2025-01-08 NOTE — PLAN OF CARE
Goal Outcome Evaluation:  Plan of Care Reviewed With: patient           Outcome Evaluation: Patient seen for clinical swallow assessment. No focal weakness in oral mech exam, patient able to imitate oral motor movements, but unable to follow single step directions. Perseverating on his . No other spontaneous speech elicited. Able to sustain vowel post swallow, but could not follow commands to cough/throat clear. Question confusion vs aphasia. Suspected mistiming intermittently with thin, nectar, and honey d/t impulsivity with PO. Wet voice following thins, which patient never cleared. Therefore, unable to rule out aspiration with additional trials. SLP recs NPO, VFSS next date. Can present meds in puree.

## 2025-01-08 NOTE — NURSING NOTE
Call placed to [facility] regarding pt continues to be in afib with rate 1 teens to 150's.  Orders per chart.

## 2025-01-08 NOTE — SIGNIFICANT NOTE
01/08/25 1133   OTHER   Discipline occupational therapist   Rehab Time/Intention   Session Not Performed other (see comments)  (per RN, pt not appropriate for therapy evaluation. Ativan given this morning as well. Hold today, f/u next service date for OT cole.)   Recommendation   OT - Next Appointment 01/09/25

## 2025-01-08 NOTE — PLAN OF CARE
Goal Outcome Evaluation:  Plan of Care Reviewed With: patient        Progress: no change        Pt admitted for stroke, confused, alert to self only, follows commands but needs constant reminding to not pull at wires or try to climb out of bed.  Will attempt to call wife again to obtain mri screening checklist.

## 2025-01-09 NOTE — PROGRESS NOTES
Name: Flo Manzo ADMIT: 2025   : 1955  PCP: Karen Jiménez ASHANTI    MRN: [id number] LOS: 2 days   AGE/SEX: 69 y.o. male  ROOM: 961     Subjective   Subjective   2025  Patient seen and examined at bedside he is alert and follows simple commands but not oriented to person, place, or self.  Discussed with wife she denies alcohol use but states he has been altered in a similar condition to previous strokes.    2025  Patient was found on the floor overnight, suspected fall.  He is lethargic this morning on 2L NC with sitter at bedside.  Has been receiving Ativan due to agitation.         Objective   Objective   Vital Signs  Temp:  [97.9 °F (36.6 °C)-99 °F (37.2 °C)] 98.4 °F (36.9 °C)  Heart Rate:  [] 88  Resp:  [20-24] 20  BP: (138-163)/() 163/107  SpO2:  [90 %-96 %] 91 %  on  Flow (L/min) (Oxygen Therapy):  [2] 2;   Device (Oxygen Therapy): nasal cannula  Body mass index is 31.82 kg/m².  Physical Exam  Constitutional:       General: He is not in acute distress.     Comments: Lethargic, not oriented to person place or self  Agitated   HENT:      Head: Normocephalic and atraumatic.      Nose: Nose normal. No congestion.      Mouth/Throat:      Pharynx: Oropharynx is clear. No oropharyngeal exudate.   Eyes:      General: No scleral icterus.  Cardiovascular:      Rate and Rhythm: Rhythm irregular.      Heart sounds: No murmur heard.     No friction rub. No gallop.   Pulmonary:      Effort: No respiratory distress.      Breath sounds: No wheezing or rales.   Abdominal:      General: There is no distension.      Tenderness: There is no abdominal tenderness. There is no guarding.   Musculoskeletal:         General: No swelling, deformity or signs of injury.      Cervical back: Normal range of motion. No rigidity.   Skin:     Coloration: Skin is not jaundiced.      Findings: No bruising or lesion.   Neurological:      Mental Status: He is disoriented.      Motor: Weakness present.        Results Review     I reviewed the patient's new clinical results.  Results from last 7 days   Lab Units 01/09/25  0518 01/08/25  0130 01/07/25  1434   WBC 10*3/mm3 9.38 10.79 7.94   HEMOGLOBIN g/dL 13.7 14.4 14.5   PLATELETS 10*3/mm3 228 240 217     Results from last 7 days   Lab Units 01/09/25  0518 01/08/25  0130 01/07/25  1434   SODIUM mmol/L 139 138 138   POTASSIUM mmol/L 4.2 4.0 4.0   CHLORIDE mmol/L 107 105 106   CO2 mmol/L 24.1 24.0 25.0   BUN mg/dL 12 11 15   CREATININE mg/dL 0.67* 0.75* 0.78   GLUCOSE mg/dL 84 88 91   EGFR mL/min/1.73 101.7 98.3 97.1     Results from last 7 days   Lab Units 01/08/25  0130 01/07/25  1434   ALBUMIN g/dL 3.5 3.6   BILIRUBIN mg/dL 0.7 0.6   ALK PHOS U/L 114 119*   AST (SGOT) U/L 15 13   ALT (SGPT) U/L 12 12     Results from last 7 days   Lab Units 01/09/25  0518 01/08/25  0130 01/08/25  0017 01/07/25  1434   CALCIUM mg/dL 9.3 9.3  --  9.3   ALBUMIN g/dL  --  3.5  --  3.6   MAGNESIUM mg/dL  --   --  2.1  --        Hemoglobin A1C   Date/Time Value Ref Range Status   01/08/2025 0130 5.80 (H) 4.80 - 5.60 % Final     Glucose   Date/Time Value Ref Range Status   01/09/2025 0709 95 70 - 130 mg/dL Final   01/08/2025 2043 89 70 - 130 mg/dL Final   01/08/2025 1738 93 70 - 130 mg/dL Final   01/08/2025 1200 100 70 - 130 mg/dL Final   01/08/2025 0804 97 70 - 130 mg/dL Final   01/08/2025 0617 93 70 - 130 mg/dL Final   01/08/2025 0121 94 70 - 130 mg/dL Final       CT Angiogram Head w AI Analysis of LVO    Result Date: 1/8/2025  There is a hyperdense anterior left parietal M3 MCA branch suggesting acute thrombus or embolus in this branch, and posterior to which is a wedge-shaped area of subtle acute infarction extending from the posterior inferior left parietal lobe into the superior lateral left occipital lobe, and the acute left parietal occipital infarct measures 4.7 x 3.7 x 4 cm in size. There is no hemorrhagic transformation. The patient also has a chronic infarct in the posterior  superior medial left frontal lobe in the left LANETTE territory that measures 3.4 x 1.5 x 1.9 cm in size.  There was hemosiderin deposition at the site of this chronic infarct on prior MRI of the brain on 08/06/2024 from old petechial hemorrhage into this chronic left LANETTE territory infarct. 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 There is diffuse cerebral atrophy.  The remainder of the head CT is normal. The results were communicated to Fred Leslie MD, from stroke neurology while the patient was still on our scanner on 01/07/2025 at 2:45 p.m. and he requested a contrast-enhanced CT angiogram of the head and neck and CT perfusion study of the brain.  CONTRAST-ENHANCED CT ANGIOGRAM OF THE HEAD AND NECK AND CT PERFUSION STUDY OF THE BRAIN TECHNIQUE: Spiral CT images were obtained from the mid cerebellum up through the cerebral hemispheres during the arterial phase of contrast for a 10 cm vertical slab of brain imaging, and images were reformatted and analyzed with Rapid software analysis for a CT perfusion study of the brain, and subsequently spiral CT angiographic images were obtained from the top of the aortic arch up through the great vessels of the head and neck during the arterial phase of contrast and images were reformatted and submitted in 1 mm thick axial, sagittal and coronal CT sections with soft tissue algorithm and 3D reconstructions were performed to complete the CT angiogram of the head and neck.  FINDINGS: CT PERFUSION STUDY OF THE BRAIN:  The CT perfusion study  of the brain starts at the mid cerebellum and extends for a 10 cm vertical slab of brain imaging through the majority of the cerebral hemispheres and excludes the inferior-half of the cerebellum and medulla, which are not assessed on this exam. Within the 10 cm vertical slab of brain imaging, there are areas of reduced cerebral blood flow that are patchy nodular in nature involving the posterior inferior left parietal lobe extending into the superior lateral left occipital lobe compatible with small areas of acute completed infarction in the left parietal occipital parenchyma in the left MCA territory.  There is an oblong focus of reduced cerebral blood flow to less than 30% of normal involving the superior medial left frontal parietal junction suggesting an acute infarct in the distal left LANETTE territory. There are areas of delayed time to maximal enhancement that involve the posterior occipital lobes bilaterally that could be artifact.  There is delayed time to maximal enhancement involving the superior medial left frontal parietal region that may be a localized area of hypoperfused brain in the area of suggested acute infarction in the distal left LANETTE territory, and there is delayed time to maximal enhancement of greater than 6 seconds extending from the posterior inferior left parietal lobe into the lateral left occipital lobe.  This area is felt to be hypoperfused brain within the left parietal occipital region and the area of hypoperfused brain appears larger than the area of acute infarction although the acute infarct in this area is bigger on the noncontrasted head CT than suggested on the CT perfusion study.  CT ANGIOGRAM OF THE NECK: The nasopharynx, oropharynx, the hypopharynx, true cords and subglottic airway are normal in appearance.  There is an enlarged thyroid gland with thyroid nodules suggesting a multinodular goiter. There is some substernal extension of the enlarged left lobe of the thyroid. The  lung apices are clear. The parotid, , parapharyngeal, and submandibular spaces are symmetric and are normal in appearance. Cervical spondylosis is present with prominent disc space narrowing, degenerative endplate changes at C2-3 and C3-4, and posterior spurs and protruding disc materia up to moderate to severely narrow the canal at C2-3, and posterior spurs severely narrow the canal at C3-4. There is disc space narrowing.  There are degenerative endplate changes at C5-6 and C6-7, and posterior spurs at least mild up to mild to moderately narrow the canal at C6-7.  There is multilevel foraminal narrowing in the cervical spine with mild-to-moderate right bony foraminal narrowing at C2-3, moderate left and severe right bony foraminal narrowing at C3-4, and mild-to-moderate right and moderate left foraminal narrowing at C6-7. There is common origin of the brachiocephalic artery and left common carotid artery off the aortic arch constituting a bovine configuration of the aortic arch which is a normal anatomic variation. The left subclavian artery origin is normal in appearance. No stenosis is seen in the left subclavian artery. The left vertebral artery origin is normal in appearance. No stenosis is seen in the cervical segment of the left vertebral artery. The left common carotid origin is normal in appearance. No stenosis is seen in the left common carotid artery and its bifurcation into the left internal and external carotid arteries is normal in appearance, and no stenosis is seen in the cervical segment of the left internal carotid artery using the NASCET criteria. The brachiocephalic artery origin is normal in appearance.  No stenosis is seen in the brachiocephalic artery and its bifurcation into the right subclavian and common carotid artery is normal in appearance, and no stenosis is seen in the right subclavian artery.  The right vertebral artery origin is less than optimally assessed, but is most  probably within normal limits.  No stenosis is seen in the cervical segment of the right vertebral artery. The right common carotid origin is normal in appearance.  No stenosis is seen in the right common carotid artery and its bifurcation into the right internal and external carotid arteries is normal in appearance and no stenosis is seen in the cervical segment of the right internal carotid artery using the NASCET criteria.  CT ANGIOGRAM OF THE HEAD: The intracranial segments of the vertebral arteries are widely patent to the vertebrobasilar junction without stenosis.  The basilar artery and the basilar tip are normal in appearance. The posterior cerebral and superior cerebellar arteries are within normal limits. The upper cervical, petrous, cavernous and supracavernous segments of the internal carotid arteries are within normal limits.  There is an atretic A1 segment of the right anterior cerebral artery which is a normal anatomic variation.  The dominant left A1 segment is widely patent.  The anterior communicating artery origin is normal in appearance. The A2 segments of the anterior cerebral arteries are within normal limits. The M1 segments of the middle cerebral arteries and middle cerebral artery bifurcations are within normal limits. There is abrupt occlusion with filling defect in a parietal M3 or M4 branch of the left middle cerebral artery by an embolus or thrombus. This leads to the area of acute infarction in the left parietal occipital region. The M1 segment of the right middle cerebral artery and the right middle cerebral artery bifurcation are normal in appearance.  The right M2 segments are normal in appearance.  IMPRESSION: 1. The CT of the head without contrast demonstrates a hyperdense anterior left parietal M3 or M4 MCA branch from an acute embolus or thrombus. Posterior to this is a wedge-shaped area of acute infarction in the left parietal occipital parenchyma in the distribution of this  left parietal occipital MCA branch that measures 4.7 x 3.7 x 4 cm in size, and there is no hemorrhagic transformation. 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 There is moderate small vessel disease in the cerebral white matter and diffuse cerebral atrophy. 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 The results of the noncontrast head CT were communicated to Dr. Leslie from stroke neurology by telephone on 01/07/2025 at 2:45 p.m., and he requested a contrast-enhanced CT angiogram of the head and neck and CT perfusion study of the brain.  2. The CT perfusion study of the brain consists of a 10 cm vertical slab of brain imaging from the mid cerebellum up through the cerebral hemispheres and excludes the inferior-half of the cerebellum and medulla which are not assessed. The patient was moving during the CTP which slightly limits evaluation. Within the 10 cm vertical slab of brain imaging, there are areas of reduced cerebral blood flow to less than 30% of normal indicating patchy and nodular areas of acute infarction in the left parietal occipital parenchyma and this is in the distribution of  the parietal occipital branches of the left MCA territory, and there is an area involving the superior medial left frontal parietal junction that is in the distal left LANETTE territory and the combined dimensions measures 22 cc of brain parenchyma that are acutely infarcted, although I believe the left parietal occipital acute infarct is larger than the CT perfusion study suggests, as on the noncontrast head CT the acute infarct in the left parietal occipital parenchyma measures up to 4.7 x 3.7 x 4 cm. Furthermore, there are bilateral symmetric areas of delayed time to maximal enhancement of greater than 6 seconds in the posterior occipital lobes that I think is probably artifact rather than hypoperfused brain.  There is an area in the superior medial left frontal parietal junction that may be hypoperfused brain in the distal left LANETTE territory that matches the cerebral blood flow deficit. There is an area in the left parietal occipital parenchyma that demonstrates delayed time to maximal enhancement of greater than 6 seconds indicating chinyere hypoperfused brain that appears larger than the infarcted brain on the cerebral blood flow images, but appears to correspond very well to the area of acute infarction on the head CT without contrast. I believe that the Rapid analysis suggests that the hypoperfused brain measures up to 111 cc of brain parenchyma and I think this is an overestimation as I think the areas in the posterior occipital lobe is artifact and the rapid analysis suggest that the mismatch ratio is 6, and I believe this is an overestimation.  3. Cervical spondylosis is present with disc space narrowing and degenerative endplate changes at C2-3 and C3-4 and C6-7.  Posterior spurs and protruding or herniated disc material moderately narrow the canal at C2-3 and posterior spurs severely narrow the canal and flatten the cord at C3-4, and posterior spurs mildly narrow the canal at C6-7, and there is foraminal  narrowing in the cervical spine as described above.  4. The CT angiographic evaluation of the great vessels of the neck is normal with no stenosis seen in the vertebral arteries and no stenosis seen in the common carotid arteries or the cervical segments of the internal carotid arteries using the NASCET criteria. The CT angiographic evaluation of the head demonstrates abrupt embolic occlusion or thrombosis of a parietal branch that is likely either a distal M3 or M4 branch of the left middle cerebral artery leading to the area of acute infarction in the left parietal occipital parenchyma. The remainder of the CT angiogram of the head and neck is normal.  The results of the final dictated study were communicated to Dr. Fred Leslie by telephone on 01/07/2025 at 3:25 PM.  This report was finalized on 1/8/2025 6:25 AM by Dr. Flo Swift M.D on Workstation: [id number]      CT Angiogram Neck    Result Date: 1/8/2025  There is a hyperdense anterior left parietal M3 MCA branch suggesting acute thrombus or embolus in this branch, and posterior to which is a wedge-shaped area of subtle acute infarction extending from the posterior inferior left parietal lobe into the superior lateral left occipital lobe, and the acute left parietal occipital infarct measures 4.7 x 3.7 x 4 cm in size. There is no hemorrhagic transformation. The patient also has a chronic infarct in the posterior superior medial left frontal lobe in the left LANETTE territory that measures 3.4 x 1.5 x 1.9 cm in size.  There was hemosiderin deposition at the site of this chronic infarct on prior MRI of the brain on 08/06/2024 from old petechial hemorrhage into this chronic left LANETTE territory infarct. 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 There is diffuse cerebral atrophy.  The remainder of the head CT is normal. The results were communicated to Fred Leslie MD, from stroke neurology while the patient was still on our scanner on 01/07/2025 at 2:45 p.m. and he requested a contrast-enhanced CT angiogram of the head and neck and CT perfusion study of the brain.  CONTRAST-ENHANCED CT ANGIOGRAM OF THE HEAD AND NECK AND CT PERFUSION STUDY OF THE BRAIN TECHNIQUE: Spiral CT images were obtained from the mid cerebellum up through the cerebral hemispheres during the arterial phase of contrast for a 10 cm vertical slab of brain imaging, and images were reformatted and analyzed with Rapid software analysis for a CT perfusion study of the brain, and subsequently spiral CT angiographic images were obtained from the top of the aortic arch up through the great vessels of the head and neck during the arterial phase of contrast and images were reformatted and submitted in 1 mm thick axial, sagittal and coronal CT sections with soft tissue algorithm and 3D reconstructions were performed to complete the CT angiogram of the head and neck.  FINDINGS: CT PERFUSION STUDY OF THE BRAIN:  The CT perfusion study of the brain starts at the mid cerebellum and extends for a 10 cm vertical slab of brain imaging through the majority of the cerebral hemispheres and excludes the inferior-half of the cerebellum and medulla, which are not assessed on this exam. Within the 10 cm vertical slab of brain imaging, there are areas of reduced cerebral blood flow that are patchy nodular in nature involving the posterior inferior left parietal lobe extending into the superior lateral left occipital lobe compatible with small areas of acute completed infarction in the  left parietal occipital parenchyma in the left MCA territory.  There is an oblong focus of reduced cerebral blood flow to less than 30% of normal involving the superior medial left frontal parietal junction suggesting an acute infarct in the distal left LANETTE territory. There are areas of delayed time to maximal enhancement that involve the posterior occipital lobes bilaterally that could be artifact.  There is delayed time to maximal enhancement involving the superior medial left frontal parietal region that may be a localized area of hypoperfused brain in the area of suggested acute infarction in the distal left LANETTE territory, and there is delayed time to maximal enhancement of greater than 6 seconds extending from the posterior inferior left parietal lobe into the lateral left occipital lobe.  This area is felt to be hypoperfused brain within the left parietal occipital region and the area of hypoperfused brain appears larger than the area of acute infarction although the acute infarct in this area is bigger on the noncontrasted head CT than suggested on the CT perfusion study.  CT ANGIOGRAM OF THE NECK: The nasopharynx, oropharynx, the hypopharynx, true cords and subglottic airway are normal in appearance.  There is an enlarged thyroid gland with thyroid nodules suggesting a multinodular goiter. There is some substernal extension of the enlarged left lobe of the thyroid. The lung apices are clear. The parotid, , parapharyngeal, and submandibular spaces are symmetric and are normal in appearance. Cervical spondylosis is present with prominent disc space narrowing, degenerative endplate changes at C2-3 and C3-4, and posterior spurs and protruding disc materia up to moderate to severely narrow the canal at C2-3, and posterior spurs severely narrow the canal at C3-4. There is disc space narrowing.  There are degenerative endplate changes at C5-6 and C6-7, and posterior spurs at least mild up to mild to  moderately narrow the canal at C6-7.  There is multilevel foraminal narrowing in the cervical spine with mild-to-moderate right bony foraminal narrowing at C2-3, moderate left and severe right bony foraminal narrowing at C3-4, and mild-to-moderate right and moderate left foraminal narrowing at C6-7. There is common origin of the brachiocephalic artery and left common carotid artery off the aortic arch constituting a bovine configuration of the aortic arch which is a normal anatomic variation. The left subclavian artery origin is normal in appearance. No stenosis is seen in the left subclavian artery. The left vertebral artery origin is normal in appearance. No stenosis is seen in the cervical segment of the left vertebral artery. The left common carotid origin is normal in appearance. No stenosis is seen in the left common carotid artery and its bifurcation into the left internal and external carotid arteries is normal in appearance, and no stenosis is seen in the cervical segment of the left internal carotid artery using the NASCET criteria. The brachiocephalic artery origin is normal in appearance.  No stenosis is seen in the brachiocephalic artery and its bifurcation into the right subclavian and common carotid artery is normal in appearance, and no stenosis is seen in the right subclavian artery.  The right vertebral artery origin is less than optimally assessed, but is most probably within normal limits.  No stenosis is seen in the cervical segment of the right vertebral artery. The right common carotid origin is normal in appearance.  No stenosis is seen in the right common carotid artery and its bifurcation into the right internal and external carotid arteries is normal in appearance and no stenosis is seen in the cervical segment of the right internal carotid artery using the NASCET criteria.  CT ANGIOGRAM OF THE HEAD: The intracranial segments of the vertebral arteries are widely patent to the  vertebrobasilar junction without stenosis.  The basilar artery and the basilar tip are normal in appearance. The posterior cerebral and superior cerebellar arteries are within normal limits. The upper cervical, petrous, cavernous and supracavernous segments of the internal carotid arteries are within normal limits.  There is an atretic A1 segment of the right anterior cerebral artery which is a normal anatomic variation.  The dominant left A1 segment is widely patent.  The anterior communicating artery origin is normal in appearance. The A2 segments of the anterior cerebral arteries are within normal limits. The M1 segments of the middle cerebral arteries and middle cerebral artery bifurcations are within normal limits. There is abrupt occlusion with filling defect in a parietal M3 or M4 branch of the left middle cerebral artery by an embolus or thrombus. This leads to the area of acute infarction in the left parietal occipital region. The M1 segment of the right middle cerebral artery and the right middle cerebral artery bifurcation are normal in appearance.  The right M2 segments are normal in appearance.  IMPRESSION: 1. The CT of the head without contrast demonstrates a hyperdense anterior left parietal M3 or M4 MCA branch from an acute embolus or thrombus. Posterior to this is a wedge-shaped area of acute infarction in the left parietal occipital parenchyma in the distribution of this left parietal occipital MCA branch that measures 4.7 x 3.7 x 4 cm in size, and there is no hemorrhagic transformation. 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 There is moderate small vessel disease in the cerebral white matter and diffuse cerebral atrophy. 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 The results of the noncontrast head CT were communicated to Dr. Leslie from stroke neurology by telephone on 01/07/2025 at 2:45 p.m., and he requested a contrast-enhanced CT angiogram of the head and neck and CT perfusion study of the brain.  2. The CT perfusion study of the brain consists of a 10 cm vertical slab of brain imaging from the mid cerebellum up through the cerebral hemispheres and excludes the inferior-half of the cerebellum and medulla which are not assessed. The patient was moving during the CTP which slightly limits evaluation. Within the 10 cm vertical slab of brain imaging, there are areas of reduced cerebral blood flow to less than 30% of normal indicating patchy and nodular areas of acute infarction in the left parietal occipital parenchyma and this is in the distribution of the parietal occipital branches of the left MCA territory, and there is an area involving the superior medial left frontal parietal junction that is in the distal left LANETTE territory and the combined dimensions measures 22 cc of brain parenchyma that are acutely infarcted, although I believe the left parietal occipital acute infarct is larger than the CT perfusion study suggests, as on the noncontrast head CT the acute infarct in the left parietal occipital parenchyma measures up to 4.7 x 3.7 x 4 cm. Furthermore, there are  bilateral symmetric areas of delayed time to maximal enhancement of greater than 6 seconds in the posterior occipital lobes that I think is probably artifact rather than hypoperfused brain.  There is an area in the superior medial left frontal parietal junction that may be hypoperfused brain in the distal left LANETTE territory that matches the cerebral blood flow deficit. There is an area in the left parietal occipital parenchyma that demonstrates delayed time to maximal enhancement of greater than 6 seconds indicating chinyere hypoperfused brain that appears larger than the infarcted brain on the cerebral blood flow images, but appears to correspond very well to the area of acute infarction on the head CT without contrast. I believe that the Rapid analysis suggests that the hypoperfused brain measures up to 111 cc of brain parenchyma and I think this is an overestimation as I think the areas in the posterior occipital lobe is artifact and the rapid analysis suggest that the mismatch ratio is 6, and I believe this is an overestimation.  3. Cervical spondylosis is present with disc space narrowing and degenerative endplate changes at C2-3 and C3-4 and C6-7.  Posterior spurs and protruding or herniated disc material moderately narrow the canal at C2-3 and posterior spurs severely narrow the canal and flatten the cord at C3-4, and posterior spurs mildly narrow the canal at C6-7, and there is foraminal narrowing in the cervical spine as described above.  4. The CT angiographic evaluation of the great vessels of the neck is normal with no stenosis seen in the vertebral arteries and no stenosis seen in the common carotid arteries or the cervical segments of the internal carotid arteries using the NASCET criteria. The CT angiographic evaluation of the head demonstrates abrupt embolic occlusion or thrombosis of a parietal branch that is likely either a distal M3 or M4 branch of the left middle cerebral artery leading to the area  of acute infarction in the left parietal occipital parenchyma. The remainder of the CT angiogram of the head and neck is normal.  The results of the final dictated study were communicated to Dr. Fred Leslie by telephone on 01/07/2025 at 3:25 PM.  This report was finalized on 1/8/2025 6:25 AM by Dr. Flo Swift M.D on Workstation: [id number]      CT CEREBRAL PERFUSION WITH & WITHOUT CONTRAST    Result Date: 1/8/2025  There is a hyperdense anterior left parietal M3 MCA branch suggesting acute thrombus or embolus in this branch, and posterior to which is a wedge-shaped area of subtle acute infarction extending from the posterior inferior left parietal lobe into the superior lateral left occipital lobe, and the acute left parietal occipital infarct measures 4.7 x 3.7 x 4 cm in size. There is no hemorrhagic transformation. The patient also has a chronic infarct in the posterior superior medial left frontal lobe in the left LANETTE territory that measures 3.4 x 1.5 x 1.9 cm in size.  There was hemosiderin deposition at the site of this chronic infarct on prior MRI of the brain on 08/06/2024 from old petechial hemorrhage into this chronic left LANETTE territory infarct. 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 There is diffuse cerebral atrophy.  The remainder of the head CT is normal. The results were communicated to Fred Leslie MD, from stroke neurology while the patient was still on our scanner on 01/07/2025 at 2:45 p.m. and he requested a contrast-enhanced CT angiogram of the head  and neck and CT perfusion study of the brain.  CONTRAST-ENHANCED CT ANGIOGRAM OF THE HEAD AND NECK AND CT PERFUSION STUDY OF THE BRAIN TECHNIQUE: Spiral CT images were obtained from the mid cerebellum up through the cerebral hemispheres during the arterial phase of contrast for a 10 cm vertical slab of brain imaging, and images were reformatted and analyzed with Rapid software analysis for a CT perfusion study of the brain, and subsequently spiral CT angiographic images were obtained from the top of the aortic arch up through the great vessels of the head and neck during the arterial phase of contrast and images were reformatted and submitted in 1 mm thick axial, sagittal and coronal CT sections with soft tissue algorithm and 3D reconstructions were performed to complete the CT angiogram of the head and neck.  FINDINGS: CT PERFUSION STUDY OF THE BRAIN:  The CT perfusion study of the brain starts at the mid cerebellum and extends for a 10 cm vertical slab of brain imaging through the majority of the cerebral hemispheres and excludes the inferior-half of the cerebellum and medulla, which are not assessed on this exam. Within the 10 cm vertical slab of brain imaging, there are areas of reduced cerebral blood flow that are patchy nodular in nature involving the posterior inferior left parietal lobe extending into the superior lateral left occipital lobe compatible with small areas of acute completed infarction in the left parietal occipital parenchyma in the left MCA territory.  There is an oblong focus of reduced cerebral blood flow to less than 30% of normal involving the superior medial left frontal parietal junction suggesting an acute infarct in the distal left LANETTE territory. There are areas of delayed time to maximal enhancement that involve the posterior occipital lobes bilaterally that could be artifact.  There is delayed time to maximal enhancement involving the superior medial left frontal parietal region that  may be a localized area of hypoperfused brain in the area of suggested acute infarction in the distal left LANETTE territory, and there is delayed time to maximal enhancement of greater than 6 seconds extending from the posterior inferior left parietal lobe into the lateral left occipital lobe.  This area is felt to be hypoperfused brain within the left parietal occipital region and the area of hypoperfused brain appears larger than the area of acute infarction although the acute infarct in this area is bigger on the noncontrasted head CT than suggested on the CT perfusion study.  CT ANGIOGRAM OF THE NECK: The nasopharynx, oropharynx, the hypopharynx, true cords and subglottic airway are normal in appearance.  There is an enlarged thyroid gland with thyroid nodules suggesting a multinodular goiter. There is some substernal extension of the enlarged left lobe of the thyroid. The lung apices are clear. The parotid, , parapharyngeal, and submandibular spaces are symmetric and are normal in appearance. Cervical spondylosis is present with prominent disc space narrowing, degenerative endplate changes at C2-3 and C3-4, and posterior spurs and protruding disc materia up to moderate to severely narrow the canal at C2-3, and posterior spurs severely narrow the canal at C3-4. There is disc space narrowing.  There are degenerative endplate changes at C5-6 and C6-7, and posterior spurs at least mild up to mild to moderately narrow the canal at C6-7.  There is multilevel foraminal narrowing in the cervical spine with mild-to-moderate right bony foraminal narrowing at C2-3, moderate left and severe right bony foraminal narrowing at C3-4, and mild-to-moderate right and moderate left foraminal narrowing at C6-7. There is common origin of the brachiocephalic artery and left common carotid artery off the aortic arch constituting a bovine configuration of the aortic arch which is a normal anatomic variation. The left subclavian  artery origin is normal in appearance. No stenosis is seen in the left subclavian artery. The left vertebral artery origin is normal in appearance. No stenosis is seen in the cervical segment of the left vertebral artery. The left common carotid origin is normal in appearance. No stenosis is seen in the left common carotid artery and its bifurcation into the left internal and external carotid arteries is normal in appearance, and no stenosis is seen in the cervical segment of the left internal carotid artery using the NASCET criteria. The brachiocephalic artery origin is normal in appearance.  No stenosis is seen in the brachiocephalic artery and its bifurcation into the right subclavian and common carotid artery is normal in appearance, and no stenosis is seen in the right subclavian artery.  The right vertebral artery origin is less than optimally assessed, but is most probably within normal limits.  No stenosis is seen in the cervical segment of the right vertebral artery. The right common carotid origin is normal in appearance.  No stenosis is seen in the right common carotid artery and its bifurcation into the right internal and external carotid arteries is normal in appearance and no stenosis is seen in the cervical segment of the right internal carotid artery using the NASCET criteria.  CT ANGIOGRAM OF THE HEAD: The intracranial segments of the vertebral arteries are widely patent to the vertebrobasilar junction without stenosis.  The basilar artery and the basilar tip are normal in appearance. The posterior cerebral and superior cerebellar arteries are within normal limits. The upper cervical, petrous, cavernous and supracavernous segments of the internal carotid arteries are within normal limits.  There is an atretic A1 segment of the right anterior cerebral artery which is a normal anatomic variation.  The dominant left A1 segment is widely patent.  The anterior communicating artery origin is normal in  appearance. The A2 segments of the anterior cerebral arteries are within normal limits. The M1 segments of the middle cerebral arteries and middle cerebral artery bifurcations are within normal limits. There is abrupt occlusion with filling defect in a parietal M3 or M4 branch of the left middle cerebral artery by an embolus or thrombus. This leads to the area of acute infarction in the left parietal occipital region. The M1 segment of the right middle cerebral artery and the right middle cerebral artery bifurcation are normal in appearance.  The right M2 segments are normal in appearance.  IMPRESSION: 1. The CT of the head without contrast demonstrates a hyperdense anterior left parietal M3 or M4 MCA branch from an acute embolus or thrombus. Posterior to this is a wedge-shaped area of acute infarction in the left parietal occipital parenchyma in the distribution of this left parietal occipital MCA branch that measures 4.7 x 3.7 x 4 cm in size, and there is no hemorrhagic transformation. 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 There is moderate small vessel disease in the cerebral white matter and diffuse cerebral atrophy. 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 The results of the noncontrast head CT were communicated to Dr. Leslie from stroke neurology by telephone on 01/07/2025 at 2:45 p.m., and he requested a contrast-enhanced CT angiogram of the head and neck and CT perfusion study of the brain.  2. The CT perfusion study of the brain consists of a 10 cm vertical slab of brain imaging from the mid cerebellum up through the cerebral hemispheres and excludes the inferior-half of the cerebellum and medulla which are not assessed. The patient was moving during the CTP which slightly limits evaluation. Within the 10 cm vertical slab of brain imaging, there are areas of reduced cerebral blood flow to less than 30% of normal indicating patchy and nodular areas of acute infarction in the left parietal occipital parenchyma and this is in the distribution of the parietal occipital branches of the left MCA territory, and there is an area involving the superior medial left frontal parietal junction that is in the distal left LANETTE territory and the combined dimensions measures 22 cc of brain parenchyma that are acutely infarcted, although I believe the left parietal occipital acute infarct is larger than the CT perfusion study suggests, as on the noncontrast head CT the acute infarct in the left parietal occipital parenchyma measures up to 4.7 x 3.7 x 4 cm. Furthermore, there are bilateral symmetric areas of delayed time to maximal enhancement of greater than 6 seconds in the posterior occipital lobes that I think is probably artifact rather than hypoperfused brain.  There is an area in the superior medial left frontal parietal junction that may be hypoperfused brain in the distal left LANETTE territory that matches the cerebral blood flow deficit. There is an area in the left parietal occipital parenchyma that demonstrates delayed time to maximal enhancement of greater than 6 seconds indicating chinyere  hypoperfused brain that appears larger than the infarcted brain on the cerebral blood flow images, but appears to correspond very well to the area of acute infarction on the head CT without contrast. I believe that the Rapid analysis suggests that the hypoperfused brain measures up to 111 cc of brain parenchyma and I think this is an overestimation as I think the areas in the posterior occipital lobe is artifact and the rapid analysis suggest that the mismatch ratio is 6, and I believe this is an overestimation.  3. Cervical spondylosis is present with disc space narrowing and degenerative endplate changes at C2-3 and C3-4 and C6-7.  Posterior spurs and protruding or herniated disc material moderately narrow the canal at C2-3 and posterior spurs severely narrow the canal and flatten the cord at C3-4, and posterior spurs mildly narrow the canal at C6-7, and there is foraminal narrowing in the cervical spine as described above.  4. The CT angiographic evaluation of the great vessels of the neck is normal with no stenosis seen in the vertebral arteries and no stenosis seen in the common carotid arteries or the cervical segments of the internal carotid arteries using the NASCET criteria. The CT angiographic evaluation of the head demonstrates abrupt embolic occlusion or thrombosis of a parietal branch that is likely either a distal M3 or M4 branch of the left middle cerebral artery leading to the area of acute infarction in the left parietal occipital parenchyma. The remainder of the CT angiogram of the head and neck is normal.  The results of the final dictated study were communicated to Dr. Fred Leslie by telephone on 01/07/2025 at 3:25 PM.  This report was finalized on 1/8/2025 6:25 AM by Dr. Flo Swift M.D on Workstation: [id number]      XR Chest 1 View    Result Date: 1/7/2025  As described.  This report was finalized on 1/7/2025 3:45 PM by Dr. Jax Jerry M.D on Workstation: [id number]       I have  personally reviewed all medications:  Scheduled Medications  aspirin, 81 mg, Oral, Daily  atorvastatin, 40 mg, Oral, Nightly  busPIRone, 10 mg, Oral, BID  citalopram, 20 mg, Oral, Daily  furosemide, 20 mg, Oral, Daily  gabapentin, 100 mg, Oral, QAM  gabapentin, 300 mg, Oral, Nightly  metoprolol tartrate, 25 mg, Oral, Q12H  pantoprazole, 40 mg, Oral, Q AM  tamsulosin, 0.4 mg, Oral, Nightly    Infusions  sodium chloride, 75 mL/hr, Last Rate: 75 mL/hr (01/09/25 0229)    Diet  NPO Diet NPO Type: Strict NPO    I have personally reviewed:  [x]  Laboratory   [x]  Microbiology   [x]  Radiology   [x]  EKG/Telemetry  [x]  Cardiology/Vascular   []  Pathology    []  Records       Assessment/Plan     Active Hospital Problems    Diagnosis  POA    **Stroke [I63.9]  Yes    Acute CVA (cerebrovascular accident) [I63.9]  Yes      Resolved Hospital Problems   No resolved problems to display.       69 y.o. male admitted with Stroke.    #Acute CVA    -Last well-known around 8 PM on 1/6    -CTA head demonstrates embolic occlusion or thrombosis of a parietal branch that is likely either a distal M3 or M4 branch of the left MCA leading to the area of acute infarction in the left parieto-occipital parenchyma    -CTA of the neck is normal with no stenosis seen in the vertebral arteries and no stenosis seen in the common carotid arteries    -CT head shows hyperdense anterior left parietal M3 or M4 MCA branch from an acute embolus or thrombus    -PT/OT/SLP    -MRI brain    -Aspirin    -Statin    -Echo from 10/26/2024 reviewed    -Neurology consulted    -Appears to be embolic stroke secondary to A-fib with RVR, due to history of hemorrhagic stroke will hold off therapeutic anticoagulation until evaluated by neurology and cardiology.    #A-fib with RVR    -EKG shows A-fib with RVR and slight ST depressions in V4 through V6    -Status post pacemaker    -Resume home Lopressor 25 mg p.o. every 12 hours    -Lopressor 5 mg IV every 6 hours as needed  HR greater than 100 bpm    -Echo from 10/26/2024 reviewed, shows EF of 50% with moderate AAS    -HS troponin 15 > 16, delta 1, doubt ACS and suspect secondary to A-fib    -proBNP 2452    -Due to history of hemorrhagic stroke we will hold off on therapeutic AC until evaluated by  cardiology and neurology    -Cardiology consulted    #Acute metabolic encephalopathy    -Patient currently alert but not oriented to person place or time; agitation present and has been interfering with care and nursing care    -Case personally discussed with wife who states he is normally AAOx3, she denies alcohol use, but states this has presented in a similar manner with previous strokes    -d/c ativan due to lethargy    -sitter at bedside    - was found down by nursing, suspected fall, sitter at bedside, may need restraints    #Hypoxia    - check ABG and CXR as patient wheezing; will also add melvin    - SLP concerned for aspiration , recommends NPO and VFSS    - cease Ativan     - currently on 2L NC, wean as tolerated    #Aortic stenosis    -Echo from 10/26/2024 shows moderate aortic valve stenosis    -Resume home Lasix    #BPH    -Flomax    #Anxiety    -Resume home BuSpar, Celexa if able to tolerate p.o.    #GERD    -PPI    #Hypertension    -Allow 24 hours of permissive hypertension    #Hyperlipidemia    -Statin        SCDs for DVT prophylaxis.  Full code.  Discussed with patient and spouse.  Anticipate discharge home with HH vs SNU facility in 2-3 days.  Expected Discharge Date: 1/13/2025; Expected Discharge Time:       DO [facility]  01/09/25  08:33 EST

## 2025-01-09 NOTE — SIGNIFICANT NOTE
During shift change around 1930, patient's bed alarm was alarming, RN sprinted to patient's room and patient was found down on the floor next to his bed. Patient is extremely aphasic so it is difficult to determine what he was attempting to do at time of the fall. He is also very impulsive. Patient was assisted back to bed by three RN's. Vitals obtained. No injuries noted. Neurologist notified and no new orders placed. All fall precautions were in place at time of the fall. Safe report entered as well as post fall assessment. Patient back in bed with call light within reach. Bed alarm on. Sitter order now ordered by MD and sitter will be here at 2300. Night shift nurse given report by this RN.

## 2025-01-09 NOTE — PROGRESS NOTES
"DOS: 2025  NAME: Flo Manzo   : 1955  PCP: Karen Jiménez APRN  Chief Complaint   Patient presents with    Cerebrovascular Accident     Stroke    Subjective: Patient found on floor overnight, fall suspected.  He received Ativan overnight due to agitation and is lethargic this morning.  Sitter at bedside; no family at bedside. Pt seen in follow up today, however the problem is new to the examiner.      Objective:  Vital signs: BP (!) 152/106 (BP Location: Left arm, Patient Position: Lying)   Pulse 86   Temp 98.4 °F (36.9 °C) (Oral)   Resp (!) 32   Ht 177.8 cm (70\")   Wt 101 kg (221 lb 12.5 oz)   SpO2 95%   BMI 31.82 kg/m²       GEN: Elderly male, lethargic/sleeping, vital signs reviewed  HEENT: Normocephalic, atraumatic   COR: RRR  Resp: Even and unlabored, clear to auscultation bilaterally  Extremities: No edema    Neurological:   MS: Lethargic, arouses briefly, utters a few spontaneous words to noxious stimuli, quickly falls back to sleep.  Not following any commands.  CN: Gaze midline, no gaze deviation, PERRL, no obvious facial droop  Motor: Withdraws in all extremities  Sensory: Withdrawal/grimaces to noxious stimuli in all extremities    Scheduled Meds:aspirin, 81 mg, Oral, Daily  atorvastatin, 40 mg, Oral, Nightly  busPIRone, 10 mg, Oral, BID  citalopram, 20 mg, Oral, Daily  furosemide, 40 mg, Intravenous, Daily  gabapentin, 100 mg, Oral, QAM  gabapentin, 300 mg, Oral, Nightly  ipratropium-albuterol, 3 mL, Nebulization, 4x Daily - RT  [START ON 1/10/2025] lisinopril, 5 mg, Oral, Q24H  metoprolol tartrate, 25 mg, Oral, Q12H  pantoprazole, 40 mg, Oral, Q AM  tamsulosin, 0.4 mg, Oral, Nightly      Continuous Infusions:   PRN Meds:.  acetaminophen **OR** acetaminophen    senna-docusate sodium **AND** polyethylene glycol **AND** bisacodyl **AND** bisacodyl    bisacodyl    labetalol    metoprolol tartrate    ondansetron ODT **OR** ondansetron    sodium chloride    Laboratory results:  Lab " Results   Component Value Date    GLUCOSE 84 01/09/2025    CALCIUM 9.3 01/09/2025     01/09/2025    K 4.2 01/09/2025    CO2 24.1 01/09/2025     01/09/2025    BUN 12 01/09/2025    CREATININE 0.67 (L) 01/09/2025    EGFRIFAFRI 103 08/27/2021    EGFRIFNONA 85 08/27/2021    BCR 17.9 01/09/2025    ANIONGAP 7.9 01/09/2025     Lab Results   Component Value Date    WBC 9.38 01/09/2025    HGB 13.7 01/09/2025    HCT 42.2 01/09/2025    MCV 85.3 01/09/2025     01/09/2025     Lab Results   Component Value Date    CHOL 122 01/08/2025    CHOL 107 08/06/2024    CHOL 175 01/25/2019     Lab Results   Component Value Date    HDL 47 01/08/2025    HDL 53 08/06/2024    HDL 55 03/13/2024     Lab Results   Component Value Date    LDL 64 01/08/2025    LDL 44 08/06/2024    LDL 77 03/13/2024     Lab Results   Component Value Date    TRIG 49 01/08/2025    TRIG 38 08/06/2024    TRIG 82 03/13/2024         Lab 01/08/25  0130   HEMOGLOBIN A1C 5.80*   Labs: ABG today with pCO2 of 50.8. TSH less than 0.005, free T41.63, free T33.04, hemoglobin A1c 5.8%, LDL 64  Review and interpretation of imaging:  XR Chest 1 View    Result Date: 1/9/2025  XR CHEST 1 VW-1/9/2025  HISTORY: Shortness of breath. Wheezing.  Heart size is mildly enlarged with there is elevation of the right hemidiaphragm. There is some patchy atelectasis or pneumonia in the right lung base. There is bilateral vascular congestion. Cardiac pacemaker is seen in good position. Prominent soft tissue is seen in the medial aspect of the right apex probably ectatic vascular structures.      1. Mild right basilar atelectasis or pneumonia. 2. Bilateral vascular congestion. 3. Cardiomegaly.   This report was finalized on 1/9/2025 10:12 AM by Dr. Damon Taveras M.D on Workstation: [id number]      CT Angiogram Head w AI Analysis of LVO    Result Date: 1/8/2025  EMERGENCY CONTRAST-ENHANCED CT ANGIOGRAM OF THE HEAD AND NECK AND CT PERFUSION STUDY OF THE BRAIN 01/07/2025   CLINICAL HISTORY: This is a 69-year-old male patient who has acute onset confusion, dysarthria and gaze deviation.  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   HEAD CT WITHOUT CONTRAST TECHNIQUE: Spiral CT images were obtained from the base of the skull to the vertex without intravenous contrast. The images were reformatted and are submitted in 3 mm thick axial CT sections with brain algorithm, 2 mm thick sagittal and coronal reconstructions were performed and submitted in brain algorithm.  This is correlated to a prior MRI of the brain from [facility] on 08/06/2024 and prior CT angiogram of the head and neck on 08/07/2024 and a head CT on 08/26/2024. This is also correlated to a remote prior MRI of the brain on 01/30/2019 and head CT on 01/31/2019.  FINDINGS: There are prominent patchy and confluent areas of low density extending from the periventricular into the subcortical white matter of the cerebral hemispheres consistent with moderate small vessel disease. There is a focal area of encephalomalacia involving the posterior superior medial left frontal lobe that involves the posterior left cingulate gyrus compatible with an old infarct in the left LANETTE territory. This chronic infarct measures about 3.4 x 1.5 x 1.9 cm. This patient has multiple punctate areas of encephalomalacia in the thalami. 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 There was also hemosiderin deposition at the site of the chronic infarct in the left LANETTE territory, and these  areas of hemosiderin deposition cannot be assessed on a noncontrasted CT. There is a hyperdense M3 MCA branch within the anterior left parietal sulcus and posterior to this hyperdense MCA branch is a wedge-shaped area of low density through the gray-white matter of the posterior inferior left parietal lobe extending inferiorly into the superior lateral left occipital lobe compatible with an acute left parietal occipital infarct with the area of acute infarction measuring up to 4.7 x 3.7 x 4 cm in size. There is no hemorrhagic transformation. There is diffuse cerebral atrophy.  The lateral and third ventricles are minimally prominent in size, felt to be due to central volume loss or atrophy. I see no midline shift.  No extra-axial fluid collections are identified and there is no evidence of acute intracranial hemorrhage.      There is a hyperdense anterior left parietal M3 MCA branch suggesting acute thrombus or embolus in this branch, and posterior to which is a wedge-shaped area of subtle acute infarction extending from the posterior inferior left parietal lobe into the superior lateral left occipital lobe, and the acute left parietal occipital infarct measures 4.7 x 3.7 x 4 cm in size. There is no hemorrhagic transformation. The patient also has a chronic infarct in the posterior superior medial left frontal lobe in the left LANETTE territory that measures 3.4 x 1.5 x 1.9 cm in size.  There was hemosiderin deposition at the site of this chronic infarct on prior MRI of the brain on 08/06/2024 from old petechial hemorrhage into this chronic left LANETTE territory infarct. 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 There is diffuse cerebral atrophy.  The remainder of the head CT is normal. The results were communicated to Fred Leslie MD, from stroke neurology while the patient was still on our scanner on 01/07/2025 at 2:45 p.m. and he requested a contrast-enhanced CT angiogram of the head and neck and CT perfusion study of the brain.  CONTRAST-ENHANCED CT ANGIOGRAM OF THE HEAD AND NECK AND CT PERFUSION STUDY OF THE BRAIN TECHNIQUE: Spiral CT images were obtained from the mid cerebellum up through the cerebral hemispheres during the arterial phase of contrast for a 10 cm vertical slab of brain imaging, and images were reformatted and analyzed with Rapid software analysis for a CT perfusion study of the brain, and subsequently spiral CT angiographic images were obtained from the top of the aortic arch up through the great vessels of the head and neck during the arterial phase of contrast and images were reformatted and submitted in 1 mm thick axial, sagittal and coronal CT sections with soft tissue algorithm and 3D reconstructions were performed to complete the CT angiogram of the head and neck.  FINDINGS: CT PERFUSION STUDY OF THE BRAIN:  The CT perfusion study of the brain starts at the mid cerebellum and extends for a 10 cm vertical slab of brain imaging through the majority of the cerebral hemispheres and excludes the inferior-half of the cerebellum and medulla, which are not assessed on this exam. Within the 10 cm vertical slab of brain imaging, there are areas of reduced cerebral blood flow that are patchy nodular in nature involving the posterior inferior left parietal lobe extending into the superior lateral left occipital lobe compatible with small areas of acute completed infarction in the left parietal occipital parenchyma in the left MCA territory.  There is an oblong focus of  reduced cerebral blood flow to less than 30% of normal involving the superior medial left frontal parietal junction suggesting an acute infarct in the distal left LANETTE territory. There are areas of delayed time to maximal enhancement that involve the posterior occipital lobes bilaterally that could be artifact.  There is delayed time to maximal enhancement involving the superior medial left frontal parietal region that may be a localized area of hypoperfused brain in the area of suggested acute infarction in the distal left LANETTE territory, and there is delayed time to maximal enhancement of greater than 6 seconds extending from the posterior inferior left parietal lobe into the lateral left occipital lobe.  This area is felt to be hypoperfused brain within the left parietal occipital region and the area of hypoperfused brain appears larger than the area of acute infarction although the acute infarct in this area is bigger on the noncontrasted head CT than suggested on the CT perfusion study.  CT ANGIOGRAM OF THE NECK: The nasopharynx, oropharynx, the hypopharynx, true cords and subglottic airway are normal in appearance.  There is an enlarged thyroid gland with thyroid nodules suggesting a multinodular goiter. There is some substernal extension of the enlarged left lobe of the thyroid. The lung apices are clear. The parotid, , parapharyngeal, and submandibular spaces are symmetric and are normal in appearance. Cervical spondylosis is present with prominent disc space narrowing, degenerative endplate changes at C2-3 and C3-4, and posterior spurs and protruding disc materia up to moderate to severely narrow the canal at C2-3, and posterior spurs severely narrow the canal at C3-4. There is disc space narrowing.  There are degenerative endplate changes at C5-6 and C6-7, and posterior spurs at least mild up to mild to moderately narrow the canal at C6-7.  There is multilevel foraminal narrowing in the cervical  spine with mild-to-moderate right bony foraminal narrowing at C2-3, moderate left and severe right bony foraminal narrowing at C3-4, and mild-to-moderate right and moderate left foraminal narrowing at C6-7. There is common origin of the brachiocephalic artery and left common carotid artery off the aortic arch constituting a bovine configuration of the aortic arch which is a normal anatomic variation. The left subclavian artery origin is normal in appearance. No stenosis is seen in the left subclavian artery. The left vertebral artery origin is normal in appearance. No stenosis is seen in the cervical segment of the left vertebral artery. The left common carotid origin is normal in appearance. No stenosis is seen in the left common carotid artery and its bifurcation into the left internal and external carotid arteries is normal in appearance, and no stenosis is seen in the cervical segment of the left internal carotid artery using the NASCET criteria. The brachiocephalic artery origin is normal in appearance.  No stenosis is seen in the brachiocephalic artery and its bifurcation into the right subclavian and common carotid artery is normal in appearance, and no stenosis is seen in the right subclavian artery.  The right vertebral artery origin is less than optimally assessed, but is most probably within normal limits.  No stenosis is seen in the cervical segment of the right vertebral artery. The right common carotid origin is normal in appearance.  No stenosis is seen in the right common carotid artery and its bifurcation into the right internal and external carotid arteries is normal in appearance and no stenosis is seen in the cervical segment of the right internal carotid artery using the NASCET criteria.  CT ANGIOGRAM OF THE HEAD: The intracranial segments of the vertebral arteries are widely patent to the vertebrobasilar junction without stenosis.  The basilar artery and the basilar tip are normal in  appearance. The posterior cerebral and superior cerebellar arteries are within normal limits. The upper cervical, petrous, cavernous and supracavernous segments of the internal carotid arteries are within normal limits.  There is an atretic A1 segment of the right anterior cerebral artery which is a normal anatomic variation.  The dominant left A1 segment is widely patent.  The anterior communicating artery origin is normal in appearance. The A2 segments of the anterior cerebral arteries are within normal limits. The M1 segments of the middle cerebral arteries and middle cerebral artery bifurcations are within normal limits. There is abrupt occlusion with filling defect in a parietal M3 or M4 branch of the left middle cerebral artery by an embolus or thrombus. This leads to the area of acute infarction in the left parietal occipital region. The M1 segment of the right middle cerebral artery and the right middle cerebral artery bifurcation are normal in appearance.  The right M2 segments are normal in appearance.  IMPRESSION: 1. The CT of the head without contrast demonstrates a hyperdense anterior left parietal M3 or M4 MCA branch from an acute embolus or thrombus. Posterior to this is a wedge-shaped area of acute infarction in the left parietal occipital parenchyma in the distribution of this left parietal occipital MCA branch that measures 4.7 x 3.7 x 4 cm in size, and there is no hemorrhagic transformation. 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 There is moderate small vessel disease in the cerebral white matter and diffuse cerebral atrophy. 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 The results of the noncontrast head CT were communicated to Dr. Leslie from stroke neurology by telephone on 01/07/2025 at 2:45 p.m., and he requested a contrast-enhanced CT angiogram of the head and neck and CT perfusion study of the brain.  2. The CT perfusion study of the brain consists of a 10 cm vertical slab of brain imaging from the mid cerebellum up through the cerebral hemispheres and excludes the inferior-half of the cerebellum and medulla which are not assessed. The patient was moving during the CTP which slightly limits evaluation. Within the 10 cm vertical slab of brain imaging, there are areas of reduced cerebral blood flow to less than 30% of normal indicating patchy and nodular areas of acute infarction in the left parietal occipital parenchyma and this is in the distribution of the parietal occipital branches of the left MCA territory, and there is an area involving the superior medial left frontal parietal junction that is in the distal left LANETTE territory and the combined dimensions measures 22 cc of brain parenchyma that are acutely infarcted, although I believe the left parietal occipital acute infarct is larger than the CT perfusion study suggests, as on the noncontrast head CT the acute infarct in the left parietal occipital parenchyma measures up to 4.7 x 3.7 x 4 cm. Furthermore, there are bilateral symmetric areas of delayed time to maximal enhancement of greater than 6 seconds in the  posterior occipital lobes that I think is probably artifact rather than hypoperfused brain.  There is an area in the superior medial left frontal parietal junction that may be hypoperfused brain in the distal left LANETTE territory that matches the cerebral blood flow deficit. There is an area in the left parietal occipital parenchyma that demonstrates delayed time to maximal enhancement of greater than 6 seconds indicating chinyere hypoperfused brain that appears larger than the infarcted brain on the cerebral blood flow images, but appears to correspond very well to the area of acute infarction on the head CT without contrast. I believe that the Rapid analysis suggests that the hypoperfused brain measures up to 111 cc of brain parenchyma and I think this is an overestimation as I think the areas in the posterior occipital lobe is artifact and the rapid analysis suggest that the mismatch ratio is 6, and I believe this is an overestimation.  3. Cervical spondylosis is present with disc space narrowing and degenerative endplate changes at C2-3 and C3-4 and C6-7.  Posterior spurs and protruding or herniated disc material moderately narrow the canal at C2-3 and posterior spurs severely narrow the canal and flatten the cord at C3-4, and posterior spurs mildly narrow the canal at C6-7, and there is foraminal narrowing in the cervical spine as described above.  4. The CT angiographic evaluation of the great vessels of the neck is normal with no stenosis seen in the vertebral arteries and no stenosis seen in the common carotid arteries or the cervical segments of the internal carotid arteries using the NASCET criteria. The CT angiographic evaluation of the head demonstrates abrupt embolic occlusion or thrombosis of a parietal branch that is likely either a distal M3 or M4 branch of the left middle cerebral artery leading to the area of acute infarction in the left parietal occipital parenchyma. The remainder of the CT angiogram  of the head and neck is normal.  The results of the final dictated study were communicated to Dr. Fred Leslie by telephone on 01/07/2025 at 3:25 PM.  This report was finalized on 1/8/2025 6:25 AM by Dr. Flo Swift M.D on Workstation: [id number]      CT Angiogram Neck    Result Date: 1/8/2025  EMERGENCY CONTRAST-ENHANCED CT ANGIOGRAM OF THE HEAD AND NECK AND CT PERFUSION STUDY OF THE BRAIN 01/07/2025  CLINICAL HISTORY: This is a 69-year-old male patient who has acute onset confusion, dysarthria and gaze deviation.  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   HEAD CT WITHOUT CONTRAST TECHNIQUE: Spiral CT images were obtained from the base of the skull to the vertex without intravenous contrast. The images were reformatted and are submitted in 3 mm thick axial CT sections with brain algorithm, 2 mm thick sagittal and coronal reconstructions were performed and submitted in brain algorithm.  This is correlated to a prior MRI of the brain from [facility] on 08/06/2024 and prior CT angiogram of the head and neck on 08/07/2024 and a head CT on 08/26/2024. This is also correlated to a remote prior MRI of the brain on 01/30/2019 and head CT on 01/31/2019.  FINDINGS: There are prominent patchy and confluent areas of low density extending from the periventricular into the subcortical white matter of the cerebral hemispheres consistent with moderate small vessel disease. There is a focal area of encephalomalacia involving the posterior superior medial left frontal lobe that involves the posterior left cingulate gyrus compatible with an old infarct in the left LANETTE territory. This chronic infarct measures about 3.4 x 1.5 x 1.9 cm. This patient has multiple punctate areas of encephalomalacia in the thalami. 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 There was also hemosiderin deposition at the site of the chronic infarct in the left LANETTE territory, and these areas of hemosiderin deposition cannot be assessed on a noncontrasted CT. There is a hyperdense M3 MCA branch within the anterior left parietal sulcus and posterior to this hyperdense MCA branch is a wedge-shaped area of low density through the gray-white matter of the posterior inferior left parietal lobe extending inferiorly into the superior lateral left occipital lobe compatible with an acute left parietal occipital infarct with the area of acute infarction measuring up to 4.7 x 3.7 x 4 cm in size. There is no hemorrhagic transformation. There is diffuse cerebral atrophy.  The lateral and third ventricles are minimally prominent in size, felt to be due to central volume loss or atrophy. I see no midline shift.  No extra-axial fluid collections are identified and there is no evidence of acute intracranial hemorrhage.      There is a hyperdense anterior left parietal M3 MCA branch suggesting acute thrombus or embolus in this branch, and posterior to which is a wedge-shaped area of subtle acute infarction extending from the posterior inferior left parietal lobe into the superior lateral left occipital lobe, and the acute left parietal occipital infarct measures 4.7 x 3.7 x 4 cm in size. There is no hemorrhagic transformation. The patient also has a chronic infarct in the posterior superior medial left frontal lobe in the left LANETTE territory that measures 3.4 x 1.5 x 1.9 cm in size.  There was hemosiderin deposition at the site of this chronic infarct on prior MRI of the brain on 08/06/2024 from old petechial  hemorrhage into this chronic left LANETTE territory infarct. 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 There is diffuse cerebral atrophy.  The remainder of the head CT is normal. The results were communicated to Fred Leslie MD, from stroke neurology while the patient was still on our scanner on 01/07/2025 at 2:45 p.m. and he requested a contrast-enhanced CT angiogram of the head and neck and CT perfusion study of the brain.  CONTRAST-ENHANCED CT ANGIOGRAM OF THE HEAD AND NECK AND CT PERFUSION STUDY OF THE BRAIN TECHNIQUE: Spiral CT images were obtained from the mid cerebellum up through the cerebral hemispheres during the arterial phase of contrast for a 10 cm vertical slab of brain imaging, and images were reformatted and analyzed with Rapid software analysis for a CT perfusion study of the brain, and subsequently spiral CT angiographic images were obtained from the top of the aortic arch up through the great vessels of the head and neck during the arterial phase of contrast and images were reformatted and submitted in 1 mm thick axial, sagittal and coronal CT sections with soft tissue algorithm and 3D reconstructions were performed to complete the CT angiogram of the head and neck.  FINDINGS: CT PERFUSION STUDY OF THE BRAIN:  The CT perfusion study of the brain starts at the mid cerebellum and extends for a 10 cm vertical slab of brain imaging through the majority of the cerebral hemispheres and excludes the inferior-half of the cerebellum and medulla, which are not assessed  on this exam. Within the 10 cm vertical slab of brain imaging, there are areas of reduced cerebral blood flow that are patchy nodular in nature involving the posterior inferior left parietal lobe extending into the superior lateral left occipital lobe compatible with small areas of acute completed infarction in the left parietal occipital parenchyma in the left MCA territory.  There is an oblong focus of reduced cerebral blood flow to less than 30% of normal involving the superior medial left frontal parietal junction suggesting an acute infarct in the distal left LANETTE territory. There are areas of delayed time to maximal enhancement that involve the posterior occipital lobes bilaterally that could be artifact.  There is delayed time to maximal enhancement involving the superior medial left frontal parietal region that may be a localized area of hypoperfused brain in the area of suggested acute infarction in the distal left LANETTE territory, and there is delayed time to maximal enhancement of greater than 6 seconds extending from the posterior inferior left parietal lobe into the lateral left occipital lobe.  This area is felt to be hypoperfused brain within the left parietal occipital region and the area of hypoperfused brain appears larger than the area of acute infarction although the acute infarct in this area is bigger on the noncontrasted head CT than suggested on the CT perfusion study.  CT ANGIOGRAM OF THE NECK: The nasopharynx, oropharynx, the hypopharynx, true cords and subglottic airway are normal in appearance.  There is an enlarged thyroid gland with thyroid nodules suggesting a multinodular goiter. There is some substernal extension of the enlarged left lobe of the thyroid. The lung apices are clear. The parotid, , parapharyngeal, and submandibular spaces are symmetric and are normal in appearance. Cervical spondylosis is present with prominent disc space narrowing, degenerative endplate changes  at C2-3 and C3-4, and posterior spurs and protruding disc materia up to moderate to severely narrow the canal at C2-3, and posterior spurs severely narrow the canal at C3-4. There is disc space narrowing.  There are degenerative endplate changes at C5-6 and C6-7, and posterior spurs at least mild up to mild to moderately narrow the canal at C6-7.  There is multilevel foraminal narrowing in the cervical spine with mild-to-moderate right bony foraminal narrowing at C2-3, moderate left and severe right bony foraminal narrowing at C3-4, and mild-to-moderate right and moderate left foraminal narrowing at C6-7. There is common origin of the brachiocephalic artery and left common carotid artery off the aortic arch constituting a bovine configuration of the aortic arch which is a normal anatomic variation. The left subclavian artery origin is normal in appearance. No stenosis is seen in the left subclavian artery. The left vertebral artery origin is normal in appearance. No stenosis is seen in the cervical segment of the left vertebral artery. The left common carotid origin is normal in appearance. No stenosis is seen in the left common carotid artery and its bifurcation into the left internal and external carotid arteries is normal in appearance, and no stenosis is seen in the cervical segment of the left internal carotid artery using the NASCET criteria. The brachiocephalic artery origin is normal in appearance.  No stenosis is seen in the brachiocephalic artery and its bifurcation into the right subclavian and common carotid artery is normal in appearance, and no stenosis is seen in the right subclavian artery.  The right vertebral artery origin is less than optimally assessed, but is most probably within normal limits.  No stenosis is seen in the cervical segment of the right vertebral artery. The right common carotid origin is normal in appearance.  No stenosis is seen in the right common carotid artery and its  bifurcation into the right internal and external carotid arteries is normal in appearance and no stenosis is seen in the cervical segment of the right internal carotid artery using the NASCET criteria.  CT ANGIOGRAM OF THE HEAD: The intracranial segments of the vertebral arteries are widely patent to the vertebrobasilar junction without stenosis.  The basilar artery and the basilar tip are normal in appearance. The posterior cerebral and superior cerebellar arteries are within normal limits. The upper cervical, petrous, cavernous and supracavernous segments of the internal carotid arteries are within normal limits.  There is an atretic A1 segment of the right anterior cerebral artery which is a normal anatomic variation.  The dominant left A1 segment is widely patent.  The anterior communicating artery origin is normal in appearance. The A2 segments of the anterior cerebral arteries are within normal limits. The M1 segments of the middle cerebral arteries and middle cerebral artery bifurcations are within normal limits. There is abrupt occlusion with filling defect in a parietal M3 or M4 branch of the left middle cerebral artery by an embolus or thrombus. This leads to the area of acute infarction in the left parietal occipital region. The M1 segment of the right middle cerebral artery and the right middle cerebral artery bifurcation are normal in appearance.  The right M2 segments are normal in appearance.  IMPRESSION: 1. The CT of the head without contrast demonstrates a hyperdense anterior left parietal M3 or M4 MCA branch from an acute embolus or thrombus. Posterior to this is a wedge-shaped area of acute infarction in the left parietal occipital parenchyma in the distribution of this left parietal occipital MCA branch that measures 4.7 x 3.7 x 4 cm in size, and there is no hemorrhagic transformation. 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 There is moderate small vessel disease in the cerebral white matter and diffuse cerebral atrophy. 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 The results of the noncontrast head CT were communicated to Dr. Leslie from stroke neurology by telephone on 01/07/2025 at 2:45 p.m., and he requested a contrast-enhanced CT angiogram of the head and neck and CT perfusion study of the brain.  2. The CT perfusion study of the brain consists of a 10 cm vertical slab of brain imaging from the mid cerebellum up through the cerebral hemispheres and excludes the inferior-half of the cerebellum and medulla which are not assessed. The patient was moving during the CTP which slightly limits evaluation. Within the 10 cm vertical slab of brain imaging, there are areas of reduced cerebral blood flow to less than 30% of normal indicating patchy and nodular areas of acute infarction in the left parietal occipital parenchyma and this is in the distribution of the parietal occipital branches of the left MCA territory, and there is an area involving the superior medial left frontal parietal junction that is in the distal left LANETTE territory and the combined dimensions measures 22 cc of brain  parenchyma that are acutely infarcted, although I believe the left parietal occipital acute infarct is larger than the CT perfusion study suggests, as on the noncontrast head CT the acute infarct in the left parietal occipital parenchyma measures up to 4.7 x 3.7 x 4 cm. Furthermore, there are bilateral symmetric areas of delayed time to maximal enhancement of greater than 6 seconds in the posterior occipital lobes that I think is probably artifact rather than hypoperfused brain.  There is an area in the superior medial left frontal parietal junction that may be hypoperfused brain in the distal left LANETTE territory that matches the cerebral blood flow deficit. There is an area in the left parietal occipital parenchyma that demonstrates delayed time to maximal enhancement of greater than 6 seconds indicating chinyere hypoperfused brain that appears larger than the infarcted brain on the cerebral blood flow images, but appears to correspond very well to the area of acute infarction on the head CT without contrast. I believe that the Rapid analysis suggests that the hypoperfused brain measures up to 111 cc of brain parenchyma and I think this is an overestimation as I think the areas in the posterior occipital lobe is artifact and the rapid analysis suggest that the mismatch ratio is 6, and I believe this is an overestimation.  3. Cervical spondylosis is present with disc space narrowing and degenerative endplate changes at C2-3 and C3-4 and C6-7.  Posterior spurs and protruding or herniated disc material moderately narrow the canal at C2-3 and posterior spurs severely narrow the canal and flatten the cord at C3-4, and posterior spurs mildly narrow the canal at C6-7, and there is foraminal narrowing in the cervical spine as described above.  4. The CT angiographic evaluation of the great vessels of the neck is normal with no stenosis seen in the vertebral arteries and no stenosis seen in the common carotid arteries or the  cervical segments of the internal carotid arteries using the NASCET criteria. The CT angiographic evaluation of the head demonstrates abrupt embolic occlusion or thrombosis of a parietal branch that is likely either a distal M3 or M4 branch of the left middle cerebral artery leading to the area of acute infarction in the left parietal occipital parenchyma. The remainder of the CT angiogram of the head and neck is normal.  The results of the final dictated study were communicated to Dr. Fred Leslie by telephone on 01/07/2025 at 3:25 PM.  This report was finalized on 1/8/2025 6:25 AM by Dr. Flo Swift M.D on Workstation: [id number]      CT CEREBRAL PERFUSION WITH & WITHOUT CONTRAST    Result Date: 1/8/2025  EMERGENCY CONTRAST-ENHANCED CT ANGIOGRAM OF THE HEAD AND NECK AND CT PERFUSION STUDY OF THE BRAIN 01/07/2025  CLINICAL HISTORY: This is a 69-year-old male patient who has acute onset confusion, dysarthria and gaze deviation.  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   HEAD CT WITHOUT CONTRAST TECHNIQUE: Spiral CT images were obtained from the base of the skull to the vertex without intravenous contrast. The images were reformatted and are submitted in 3 mm thick axial CT sections with brain algorithm, 2 mm thick sagittal and coronal reconstructions were performed and submitted in brain algorithm.  This is correlated to a prior MRI of the brain from [facility] on 08/06/2024 and prior CT angiogram of the head and neck on 08/07/2024 and a head CT on 08/26/2024. This is also correlated to a remote prior MRI of the brain on 01/30/2019 and head CT on 01/31/2019.  FINDINGS: There are prominent patchy and confluent areas of low density extending from the periventricular into the subcortical white matter of the cerebral hemispheres consistent with moderate small vessel disease. There is a focal area of encephalomalacia involving the posterior superior medial left frontal lobe that involves the posterior left cingulate gyrus  compatible with an old infarct in the left LANETTE territory. This chronic infarct measures about 3.4 x 1.5 x 1.9 cm. This patient has multiple punctate areas of encephalomalacia in the thalami. 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 There was also hemosiderin deposition at the site of the chronic infarct in the left LANETTE territory, and these areas of hemosiderin deposition cannot be assessed on a noncontrasted CT. There is a hyperdense M3 MCA branch within the anterior left parietal sulcus and posterior to this hyperdense MCA branch is a wedge-shaped area of low density through the gray-white matter of the posterior inferior left parietal lobe extending inferiorly into the superior lateral left occipital lobe compatible with an acute left parietal occipital infarct with the area of acute infarction measuring up to 4.7 x 3.7 x 4 cm in size. There is no hemorrhagic transformation. There is diffuse cerebral atrophy.  The lateral and third ventricles are minimally prominent in size, felt to be due to central volume loss or atrophy. I see no midline shift.  No extra-axial fluid collections are identified and there is no evidence of acute intracranial hemorrhage.      There is a hyperdense anterior left parietal M3 MCA branch suggesting acute thrombus or embolus in this branch, and posterior to which is a wedge-shaped area of subtle acute infarction extending from the posterior inferior left parietal lobe into the superior lateral left  occipital lobe, and the acute left parietal occipital infarct measures 4.7 x 3.7 x 4 cm in size. There is no hemorrhagic transformation. The patient also has a chronic infarct in the posterior superior medial left frontal lobe in the left LANETTE territory that measures 3.4 x 1.5 x 1.9 cm in size.  There was hemosiderin deposition at the site of this chronic infarct on prior MRI of the brain on 08/06/2024 from old petechial hemorrhage into this chronic left LANETTE territory infarct. 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 There is diffuse cerebral atrophy.  The remainder of the head CT is normal. The results were communicated to Fred Leslie MD, from stroke neurology while the patient was still on our scanner on 01/07/2025 at 2:45 p.m. and he requested a contrast-enhanced CT angiogram of the head and neck and CT perfusion study of the brain.  CONTRAST-ENHANCED CT ANGIOGRAM OF THE HEAD AND NECK AND CT PERFUSION STUDY OF THE BRAIN TECHNIQUE: Spiral CT images were obtained from the mid cerebellum up through the cerebral hemispheres during the arterial phase of contrast for a 10 cm vertical slab of brain imaging, and images were reformatted and analyzed with Rapid software analysis for a CT perfusion study of the brain, and subsequently spiral CT angiographic images were obtained from the top of the aortic arch up through the great vessels of the head and neck during the arterial phase of contrast and images were reformatted and submitted in 1 mm thick axial, sagittal and coronal CT  sections with soft tissue algorithm and 3D reconstructions were performed to complete the CT angiogram of the head and neck.  FINDINGS: CT PERFUSION STUDY OF THE BRAIN:  The CT perfusion study of the brain starts at the mid cerebellum and extends for a 10 cm vertical slab of brain imaging through the majority of the cerebral hemispheres and excludes the inferior-half of the cerebellum and medulla, which are not assessed on this exam. Within the 10 cm vertical slab of brain imaging, there are areas of reduced cerebral blood flow that are patchy nodular in nature involving the posterior inferior left parietal lobe extending into the superior lateral left occipital lobe compatible with small areas of acute completed infarction in the left parietal occipital parenchyma in the left MCA territory.  There is an oblong focus of reduced cerebral blood flow to less than 30% of normal involving the superior medial left frontal parietal junction suggesting an acute infarct in the distal left LANETTE territory. There are areas of delayed time to maximal enhancement that involve the posterior occipital lobes bilaterally that could be artifact.  There is delayed time to maximal enhancement involving the superior medial left frontal parietal region that may be a localized area of hypoperfused brain in the area of suggested acute infarction in the distal left LANETTE territory, and there is delayed time to maximal enhancement of greater than 6 seconds extending from the posterior inferior left parietal lobe into the lateral left occipital lobe.  This area is felt to be hypoperfused brain within the left parietal occipital region and the area of hypoperfused brain appears larger than the area of acute infarction although the acute infarct in this area is bigger on the noncontrasted head CT than suggested on the CT perfusion study.  CT ANGIOGRAM OF THE NECK: The nasopharynx, oropharynx, the hypopharynx, true cords and subglottic airway are  normal in appearance.  There is an enlarged thyroid gland with thyroid nodules suggesting a multinodular goiter. There is some substernal extension of the enlarged left lobe of the thyroid. The lung apices are clear. The parotid, , parapharyngeal, and submandibular spaces are symmetric and are normal in appearance. Cervical spondylosis is present with prominent disc space narrowing, degenerative endplate changes at C2-3 and C3-4, and posterior spurs and protruding disc materia up to moderate to severely narrow the canal at C2-3, and posterior spurs severely narrow the canal at C3-4. There is disc space narrowing.  There are degenerative endplate changes at C5-6 and C6-7, and posterior spurs at least mild up to mild to moderately narrow the canal at C6-7.  There is multilevel foraminal narrowing in the cervical spine with mild-to-moderate right bony foraminal narrowing at C2-3, moderate left and severe right bony foraminal narrowing at C3-4, and mild-to-moderate right and moderate left foraminal narrowing at C6-7. There is common origin of the brachiocephalic artery and left common carotid artery off the aortic arch constituting a bovine configuration of the aortic arch which is a normal anatomic variation. The left subclavian artery origin is normal in appearance. No stenosis is seen in the left subclavian artery. The left vertebral artery origin is normal in appearance. No stenosis is seen in the cervical segment of the left vertebral artery. The left common carotid origin is normal in appearance. No stenosis is seen in the left common carotid artery and its bifurcation into the left internal and external carotid arteries is normal in appearance, and no stenosis is seen in the cervical segment of the left internal carotid artery using the NASCET criteria. The brachiocephalic artery origin is normal in appearance.  No stenosis is seen in the brachiocephalic artery and its bifurcation into the right  subclavian and common carotid artery is normal in appearance, and no stenosis is seen in the right subclavian artery.  The right vertebral artery origin is less than optimally assessed, but is most probably within normal limits.  No stenosis is seen in the cervical segment of the right vertebral artery. The right common carotid origin is normal in appearance.  No stenosis is seen in the right common carotid artery and its bifurcation into the right internal and external carotid arteries is normal in appearance and no stenosis is seen in the cervical segment of the right internal carotid artery using the NASCET criteria.  CT ANGIOGRAM OF THE HEAD: The intracranial segments of the vertebral arteries are widely patent to the vertebrobasilar junction without stenosis.  The basilar artery and the basilar tip are normal in appearance. The posterior cerebral and superior cerebellar arteries are within normal limits. The upper cervical, petrous, cavernous and supracavernous segments of the internal carotid arteries are within normal limits.  There is an atretic A1 segment of the right anterior cerebral artery which is a normal anatomic variation.  The dominant left A1 segment is widely patent.  The anterior communicating artery origin is normal in appearance. The A2 segments of the anterior cerebral arteries are within normal limits. The M1 segments of the middle cerebral arteries and middle cerebral artery bifurcations are within normal limits. There is abrupt occlusion with filling defect in a parietal M3 or M4 branch of the left middle cerebral artery by an embolus or thrombus. This leads to the area of acute infarction in the left parietal occipital region. The M1 segment of the right middle cerebral artery and the right middle cerebral artery bifurcation are normal in appearance.  The right M2 segments are normal in appearance.  IMPRESSION: 1. The CT of the head without contrast demonstrates a hyperdense anterior left  parietal M3 or M4 MCA branch from an acute embolus or thrombus. Posterior to this is a wedge-shaped area of acute infarction in the left parietal occipital parenchyma in the distribution of this left parietal occipital MCA branch that measures 4.7 x 3.7 x 4 cm in size, and there is no hemorrhagic transformation. 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 There is moderate small vessel disease in the cerebral white matter and diffuse cerebral atrophy. 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 The results of the noncontrast head CT were communicated to Dr. Leslie from stroke neurology by telephone on 01/07/2025 at 2:45 p.m., and he requested a contrast-enhanced CT angiogram of the head and neck and CT perfusion study of the brain.  2. The CT perfusion study of the brain consists of a 10 cm vertical slab of brain imaging from the mid cerebellum up through the cerebral hemispheres and excludes the inferior-half of the cerebellum and medulla which are not assessed. The patient was moving during the CTP which slightly limits evaluation. Within the 10 cm vertical slab of brain imaging, there are  areas of reduced cerebral blood flow to less than 30% of normal indicating patchy and nodular areas of acute infarction in the left parietal occipital parenchyma and this is in the distribution of the parietal occipital branches of the left MCA territory, and there is an area involving the superior medial left frontal parietal junction that is in the distal left LANETTE territory and the combined dimensions measures 22 cc of brain parenchyma that are acutely infarcted, although I believe the left parietal occipital acute infarct is larger than the CT perfusion study suggests, as on the noncontrast head CT the acute infarct in the left parietal occipital parenchyma measures up to 4.7 x 3.7 x 4 cm. Furthermore, there are bilateral symmetric areas of delayed time to maximal enhancement of greater than 6 seconds in the posterior occipital lobes that I think is probably artifact rather than hypoperfused brain.  There is an area in the superior medial left frontal parietal junction that may be hypoperfused brain in the distal left LANETTE territory that matches the cerebral blood flow deficit. There is an area in the left parietal occipital parenchyma that demonstrates delayed time to maximal enhancement of greater than 6 seconds indicating chinyere hypoperfused brain that appears larger than the infarcted brain on the cerebral blood flow images, but appears to correspond very well to the area of acute infarction on the head CT without contrast. I believe that the Rapid analysis suggests that the hypoperfused brain measures up to 111 cc of brain parenchyma and I think this is an overestimation as I think the areas in the posterior occipital lobe is artifact and the rapid analysis suggest that the mismatch ratio is 6, and I believe this is an overestimation.  3. Cervical spondylosis is present with disc space narrowing and degenerative endplate changes at C2-3 and C3-4 and C6-7.  Posterior spurs and protruding or herniated disc  material moderately narrow the canal at C2-3 and posterior spurs severely narrow the canal and flatten the cord at C3-4, and posterior spurs mildly narrow the canal at C6-7, and there is foraminal narrowing in the cervical spine as described above.  4. The CT angiographic evaluation of the great vessels of the neck is normal with no stenosis seen in the vertebral arteries and no stenosis seen in the common carotid arteries or the cervical segments of the internal carotid arteries using the NASCET criteria. The CT angiographic evaluation of the head demonstrates abrupt embolic occlusion or thrombosis of a parietal branch that is likely either a distal M3 or M4 branch of the left middle cerebral artery leading to the area of acute infarction in the left parietal occipital parenchyma. The remainder of the CT angiogram of the head and neck is normal.  The results of the final dictated study were communicated to Dr. Fred Leslie by telephone on 01/07/2025 at 3:25 PM.  This report was finalized on 1/8/2025 6:25 AM by Dr. Flo Swift M.D on Workstation: [id number]      XR Chest 1 View    Result Date: 1/7/2025  XR CHEST 1 VW-  HISTORY: Male who is 69 years-old, acute stroke protocol  TECHNIQUE: Frontal view of the chest  COMPARISON: 12/25/2024  FINDINGS: The heart is enlarged. Left-sided generator device and cardiac lead are noted. Aorta appears ectatic. Pulmonary vasculature is mildly congested. No focal pulmonary consolidation, pleural effusion, or pneumothorax. Right hemidiaphragm remains elevated. No acute osseous process.      As described.  This report was finalized on 1/7/2025 3:45 PM by Dr. Jax Jerry M.D on Workstation: [id number]       Impression:  69-year-old male with past medical history of hypertension, GÓMEZ, peripheral neuropathy, atrial fibrillation not on anticoagulation prior to diagnosis of advanced cerebral amyloid angiopathy, and SSS status post PPM, who presented 1/7/2025 with decreased  responsiveness and strokelike symptoms.  BP initially 162/107 it was high as 179/118.  CT/CTA/CTP showed new hypodensity in the left parietal lobe as well as hyperdense left M3 branch suggesting acute thrombus or embolus.  Previous MRI Brain 8/2024 reviewed by Dr. Angel and he does have several areas of chronic microhemorrhages which are felt secondary to both hypertensive emergency and cerebral amyloid angiopathy.  No reported alcohol use per wife.    Patient lethargic today following Ativan administration for agitation.    Diagnoses:  Acute left parietal infarct with left M3 branch hyperdense sign, embolic  PAF, not anticoagulated  Acute toxic/metabolic encephalopathy  Hypertensive urgency  Low TSH, normal free T/f free T4  Subcortical and cortical cerebral microhemorrhages secondary to hypertensive hemorrhages and cerebral amyloid angiopathy  GÓMEZ  SSS status post PPM    Plan:  Dr. Angel reviewed previous MRI and despite chronic hypertensive microhemorrhages and cerebral amyloid angiopathy feels patient would be a candidate for short-term anticoagulation and Watchman device versus possible long-term anticoagulation if not a candidate for watchman.   Primary discussed with wife today per note.  Cardiology has been consulted for evaluation of watchman.  For now patient is on aspirin 81 mg daily and Lipitor 40 mg daily.  May need further goals of care discussion with patient's wife. Hold AC for 1 week due to size of stroke.   Recommend delirium precautions.  D/W Dr Angel today.

## 2025-01-09 NOTE — PLAN OF CARE
Goal Outcome Evaluation:              Outcome Evaluation: Pt scheduled for VFSS this am.  Pt unable to be aroused.  Rescheduled for 1315; however, pt remained somnolent.  RN reported receiving Ativan early this am.  Will reschedule VFSS for tomorrow as able.

## 2025-01-09 NOTE — PLAN OF CARE
Problem: Adult Inpatient Plan of Care  Goal: Absence of Hospital-Acquired Illness or Injury  Intervention: Identify and Manage Fall Risk  Recent Flowsheet Documentation  Taken 1/9/2025 1817 by Ashley Arambula RN  Safety Promotion/Fall Prevention:   safety round/check completed   nonskid shoes/slippers when out of bed   fall prevention program maintained  Taken 1/9/2025 1600 by Ashley Arambula RN  Safety Promotion/Fall Prevention:   safety round/check completed   nonskid shoes/slippers when out of bed   fall prevention program maintained  Taken 1/9/2025 1400 by Ashley Arambula RN  Safety Promotion/Fall Prevention:   safety round/check completed   nonskid shoes/slippers when out of bed   fall prevention program maintained  Taken 1/9/2025 1200 by Ashley Arambula RN  Safety Promotion/Fall Prevention:   safety round/check completed   nonskid shoes/slippers when out of bed   fall prevention program maintained  Taken 1/9/2025 1000 by Ashley Arambula RN  Safety Promotion/Fall Prevention:   safety round/check completed   nonskid shoes/slippers when out of bed   fall prevention program maintained  Taken 1/9/2025 0830 by Ashley Arambula RN  Safety Promotion/Fall Prevention:   safety round/check completed   nonskid shoes/slippers when out of bed   fall prevention program maintained     Problem: Adult Inpatient Plan of Care  Goal: Absence of Hospital-Acquired Illness or Injury  Intervention: Prevent Skin Injury  Recent Flowsheet Documentation  Taken 1/9/2025 1817 by Ashley Arambula RN  Body Position: supine  Taken 1/9/2025 1600 by Ashley Arambula RN  Body Position: supine  Taken 1/9/2025 1400 by Ashley Arambula RN  Body Position: weight shifting  Taken 1/9/2025 1200 by Ashley Arambula RN  Body Position:   turned   right  Taken 1/9/2025 1000 by Ashley Arambula RN  Body Position: weight shifting  Taken 1/9/2025 0830 by Ashley Arambula RN  Body Position:   turned   right  Skin Protection:   incontinence  pads utilized   transparent dressing maintained   skin sealant/moisture barrier applied   Goal Outcome Evaluation: patient extremely lethargic throughout most of this shift. MD's aware. HR went up and sustained in the 130s and 140s. PRN Labetalol given per MAR. HR is starting to trend down. Afib. Sitter at bedside due to impulsiveness. Patient is in bed with call light within reach. Bed alarm on.

## 2025-01-09 NOTE — CONSULTS
[facility] Consult Note       Encounter Date:25  Patient:Flo Manzo  :1955  MRN:[id number]    Date of Admission: 2025  Date of Encounter Visit: 25  Encounter Provider: Michael Ewing MD  Referring Provider: Cami Garvey MD  Place of Service: [facility]  Patient Care Team:  Karen Jiménez APRN as PCP - General (Family Medicine)  Eren Bruce MD as Consulting Physician (Cardiology)  Temo Wheat MD as Consulting Physician (Radiation Oncology)  Bryant Rader MD as Consulting Physician (Urology)  Cindi Breen DNP, APRN as Nurse Practitioner (Nurse Practitioner)  Gretel Galaviz RN as Ambulatory  ([facility])  Eli Bruno MSW as  (Amb Case Mgmt) ([facility])      Consulted for: Watchman Evaluation    Chief Complaint: Altered mental status      History of Presenting Illness:      Mr. Manzo is a 69 y.o. gentleman who follows with Dr. Bruce with past medical history notable for sick sinus syndrome status post Biotronik pacemaker, obstructive sleep apnea, atrial fibrillation on chronic anticoagulation, hypertension, and mixed hyperlipidemia who presents to the hospital with altered mental status.  I was requested to see him for evaluation for watchman.  I actually saw him back in August for this issue we actually got his CTA while he was in the hospital during that time I did discuss the procedure with the patient and he seemed interested in it at that time.  Unfortunately since that time he has had multiple admissions due to generalized deconditioning weakness and general failure to thrive.  We had plans to consider proceeding forward with watchman and actually got a CTA while he is here in the hospital just to try and minimize testing that would need to be done as an outpatient.  Neurology thought that he could be put on short-term  anticoagulation to get him through a watchman but felt that continuing long-term anticoagulation for him would be challenging.  He had just had a recent bleed back in August for which she was taken off of anticoagulation.  Unfortunately due to social situation we have not really been able to get him into the office to discuss watchman any further with him and his family.  He unfortunately came to the hospital this week with recurrent stroke after being found to be altered.          Review of Systems:  Review of Systems   Unable to perform ROS: Mental status change       Medications:    Current Facility-Administered Medications:     acetaminophen (TYLENOL) tablet 650 mg, 650 mg, Oral, Q4H PRN **OR** acetaminophen (TYLENOL) suppository 650 mg, 650 mg, Rectal, Q4H PRN, Cami Garvey MD    aspirin chewable tablet 81 mg, 81 mg, Oral, Daily, Carolina Michele, ASHANTI    atorvastatin (LIPITOR) tablet 40 mg, 40 mg, Oral, Nightly, Gaetano Angel MD    sennosides-docusate (PERICOLACE) 8.6-50 MG per tablet 2 tablet, 2 tablet, Oral, BID PRN **AND** polyethylene glycol (MIRALAX) packet 17 g, 17 g, Oral, Daily PRN **AND** bisacodyl (DULCOLAX) EC tablet 5 mg, 5 mg, Oral, Daily PRN **AND** bisacodyl (DULCOLAX) suppository 10 mg, 10 mg, Rectal, Daily PRN, Cami Garvey MD    bisacodyl (DULCOLAX) suppository 10 mg, 10 mg, Rectal, Daily PRN, Cami Garvey MD    busPIRone (BUSPAR) tablet 10 mg, 10 mg, Oral, BID, Cami Garvey MD    citalopram (CeleXA) tablet 20 mg, 20 mg, Oral, Daily, Cami Garvey MD    furosemide (LASIX) tablet 20 mg, 20 mg, Oral, Daily, Cami Garvey MD    gabapentin (NEURONTIN) capsule 100 mg, 100 mg, Oral, QAM, Cami Garvey MD    gabapentin (NEURONTIN) capsule 300 mg, 300 mg, Oral, Nightly, Cami Garvey MD    ipratropium-albuterol (DUO-NEB) nebulizer solution 3 mL, 3 mL, Nebulization, 4x Daily - RT, Whit Drake DO, 3 mL at  01/09/25 0910    labetalol (NORMODYNE,TRANDATE) injection 20 mg, 20 mg, Intravenous, Q2H PRN, Whit Drake DO    metoprolol tartrate (LOPRESSOR) injection 5 mg, 5 mg, Intravenous, Q6H PRN, Whit Drake DO, 5 mg at 01/08/25 2312    metoprolol tartrate (LOPRESSOR) tablet 25 mg, 25 mg, Oral, Q12H, Cami Garvey MD    ondansetron ODT (ZOFRAN-ODT) disintegrating tablet 4 mg, 4 mg, Oral, Q6H PRN **OR** ondansetron (ZOFRAN) injection 4 mg, 4 mg, Intravenous, Q6H PRN, Cami Garvey MD    pantoprazole (PROTONIX) EC tablet 40 mg, 40 mg, Oral, Q AM, Cami Garvey MD    sodium chloride 0.9 % flush 10 mL, 10 mL, Intravenous, PRN, Sonu Cedillo MD    sodium chloride 0.9 % infusion, 75 mL/hr, Intravenous, Continuous, Cami Garvey MD, Last Rate: 75 mL/hr at 01/09/25 0229, 75 mL/hr at 01/09/25 0229    tamsulosin (FLOMAX) 24 hr capsule 0.4 mg, 0.4 mg, Oral, Nightly, Cami Garvey MD    Allergies   Allergen Reactions    Poison Ivy Extract Hives, Itching and Rash     ITCHY BLISTERS       Past Medical History:   Diagnosis Date    Arthritis     Atrial fibrillation with RVR 01/24/2019    Colon polyps 01/04/2018    Hepatic flexure: tubular adenoma, only low-grade dysplasia; splenic flexure: tubular adenoma, only low-grade dysplasia; sigmoid colon: tubular adenoma, multiple fragments only low-grade dysplasia    Depression     Heart murmur     Hemorrhagic stroke 01/29/2019    Hypertension     New onset atrial fibrillation (CMS/HCC) - RVR 01/24/2019    GÓMEZ (obstructive sleep apnea) 06/06/2019    Home sleep study with moderate severity GÓMEZ, AHI 20 events per hour.  Sleep-related hypoxia with low O2 saturation 84% for 14 minutes.    Peripheral neuropathy     Sleep apnea     previously diagnosed with sleep apnea, had T&A done and it has since resolved     Stroke     Uncontrolled hypertension     Ventral hernia        Past Surgical History:   Procedure Laterality Date    APPENDECTOMY N/A  1972    BACK SURGERY      BLADDER STIMULATOR IMPLANT L LOWER BACK    CARDIAC CATHETERIZATION N/A 07/20/2004    Cath left ventriculography, coronary angiography and left heart catheterization, Normal results-Dr. Fierro     CARDIAC CATHETERIZATION N/A 1/29/2019    Procedure: Left Heart Cath;  Surgeon: Eren Bruce MD;  Location: [facility] CATH INVASIVE LOCATION;  Service: Cardiology    CARDIAC CATHETERIZATION N/A 1/29/2019    Procedure: Left ventriculography;  Surgeon: Eren Bruce MD;  [facility];  Service: Cardiology    CARDIAC CATHETERIZATION N/A 1/29/2019    Procedure: Coronary angiography;  Surgeon: Eren Bruce MD;  [facility];  Service: Cardiology    CARDIAC ELECTROPHYSIOLOGY PROCEDURE N/A 3/4/2022    Procedure: Pacemaker SC new BIOTRONIK;  Surgeon: Eren Bruce MD;  Location: [facility] CATH INVASIVE LOCATION;  Service: Cardiology;  Laterality: N/A;    CARPAL TUNNEL RELEASE Right 2013    CARPAL TUNNEL RELEASE Left     COLONOSCOPY N/A 1/4/2018    Procedure: COLONOSCOPY with cold polypectomy and hot snare polypectomy;  Surgeon: Ten Solitario MD;  Location: [facility] ENDOSCOPY;  Service:     COLONOSCOPY N/A 4/25/2024    Procedure: COLONOSCOPY INTO CECUM;  Surgeon: Ten Solitario MD;  Location: [facility] ENDOSCOPY;  Service: General;  Laterality: N/A;  PRE: PERSONAL H/O COLON POLYP  /  POST: POOR PREP OTHERWISE NORMAL    EYE LENS IMPLANT SECONDARY Bilateral 2007, 2008    KNEE CARTILAGE SURGERY Right 1979    TONSILLECTOMY AND ADENOIDECTOMY Bilateral 2005    TOTAL KNEE ARTHROPLASTY Left 03/14/2016    Left total knee arthroplasty with Amberly component, size 11 femur, G tibial baseplate with a 10 polyethylene insert and a 38 patellar button-Dr. Pablo Hairston    TOTAL KNEE ARTHROPLASTY Right 03/02/2015    Right total knee arthroplasty with Amberly component size G femur, 11 tibial base plate with an 11 polyethylene insert and  a 38 patellar button-Dr. Pablo Hairston    UVULOPALATOPHARYNGOPLASTY N/A 2005    part of soft palate, and uvula    VENTRAL HERNIA REPAIR N/A 1/23/2018    Procedure: VENTRAL HERNIA REPAIR LAPAROSCOPIC WITH DAVINCI ROBOT AND MESH;  Surgeon: Ten Solitario MD;  Location: [facility];  Service:        Social History     Socioeconomic History    Marital status:    Tobacco Use    Smoking status: Never     Passive exposure: Past    Smokeless tobacco: Never   Vaping Use    Vaping status: Never Used   Substance and Sexual Activity    Alcohol use: Not Currently     Comment: social, maybe a drink a month, not since stroke    Drug use: No     Comment: previously smoked marijuana     Sexual activity: Defer       Family History   Problem Relation Age of Onset    Hypertension Mother     Kidney failure Mother     Coronary artery disease Father     Lung cancer Father         positive smoker    Heart attack Father     Breast cancer Sister 60    Prostate cancer Brother     Colon polyps Brother     Kidney nephrosis Brother     Malig Hyperthermia Neg Hx        The following portions of the patient's history were reviewed and updated as appropriate: allergies, current medications, past family history, past medical history, past social history, past surgical history and problem list.         Objective:      Temp:  [97.9 °F (36.6 °C)-99 °F (37.2 °C)] 98.4 °F (36.9 °C)  Heart Rate:  [] 97  Resp:  [20-36] 36  BP: (138-163)/() 163/107     Intake/Output Summary (Last 24 hours) at 1/9/2025 0917  Last data filed at 1/9/2025 0500  Gross per 24 hour   Intake 1169 ml   Output --   Net 1169 ml     Body mass index is 31.82 kg/m².      01/07/25  1435 01/08/25  0610   Weight: 104 kg (229 lb 9.6 oz) 101 kg (221 lb 12.5 oz)           Physical Exam:  Constitutional: Well appearing, well developed, no acute distress   HENT: Oropharynx clear and membrane moist  Eyes: Normal conjunctiva, no sclera icterus.  Neck: Supple, no carotid  bruit bilaterally.  Cardiovascular: Irregularly irregular rate and rhythm, Early peaking systolic murmur over the right upper sternal border, Trace bilateral lower extremity edema.  Pulmonary: Normal respiratory effort, normal lung sounds, no wheezing.  Abdominal: Soft, nontender, no hepatosplenomegaly, liver is non-pulsatile.  Neurological: Alert and orient x 3.   Skin: Warm, dry, no ecchymosis, no rash.  Psych: Appropriate mood and affect. Normal judgment and insight.         Lab Review:   Results from last 7 days   Lab Units 01/09/25  0518 01/08/25  0130 01/07/25  1434   SODIUM mmol/L 139 138 138   POTASSIUM mmol/L 4.2 4.0 4.0   CHLORIDE mmol/L 107 105 106   CO2 mmol/L 24.1 24.0 25.0   BUN mg/dL 12 11 15   CREATININE mg/dL 0.67* 0.75* 0.78   GLUCOSE mg/dL 84 88 91   CALCIUM mg/dL 9.3 9.3 9.3   AST (SGOT) U/L  --  15 13   ALT (SGPT) U/L  --  12 12     Results from last 7 days   Lab Units 01/08/25  0130 01/08/25  0017   HSTROP T ng/L 16 15     Results from last 7 days   Lab Units 01/09/25  0518 01/08/25  0130 01/07/25  1434   WBC 10*3/mm3 9.38 10.79 7.94   HEMOGLOBIN g/dL 13.7 14.4 14.5   HEMATOCRIT % 42.2 43.4 44.2   PLATELETS 10*3/mm3 228 240 217     Results from last 7 days   Lab Units 01/07/25  1434   INR  1.08   APTT seconds 26.2     Results from last 7 days   Lab Units 01/08/25  0017   MAGNESIUM mg/dL 2.1     Results from last 7 days   Lab Units 01/08/25  0130   CHOLESTEROL mg/dL 122   TRIGLYCERIDES mg/dL 49   HDL CHOL mg/dL 47     The ASCVD Risk score (Pamela DK, et al., 2019) failed to calculate for the following reasons:    Risk score cannot be calculated because patient has a medical history suggesting prior/existing ASCVD     Results from last 7 days   Lab Units 01/08/25  0130   PROBNP pg/mL 2,452.0*     Results from last 7 days   Lab Units 01/08/25  0130   TSH uIU/mL <0.005*     Echocardiogram 10/26-20 24 images reviewed by myself:  Left ventricular systolic function is normal. Estimated left  ventricular EF = 50%  Left ventricular wall thickness is consistent with borderline concentric hypertrophy.  Left ventricular diastolic function was indeterminate.  Moderate aortic valve stenosis is present. Aortic valve area is 1 cm2.  Peak velocity of the flow distal to the aortic valve is 310.1 cm/s. Aortic valve maximum pressure gradient is 39 mmHg. Aortic valve mean pressure gradient is 25 mmHg. Aortic valve dimensionless index is 0.3 .  Estimated right ventricular systolic pressure from tricuspid regurgitation is normal (<35 mmHg). Calculated right ventricular systolic pressure from tricuspid regurgitation is 27 mmHg.  Mild dilation of the ascending aorta is present 3.9 cm.  Mild aortic valve regurgitation is present.    Echocardiogram 7/16/2024  Left ventricular ejection fraction appears to be 51 - 55%.  Left ventricular diastolic function was indeterminate.  The left atrial cavity is mild to moderately dilated.  Mild aortic valve stenosis is present.  Estimated right ventricular systolic pressure from tricuspid regurgitation is normal (<35 mmHg).          Assessment:           Stroke    Acute CVA (cerebrovascular accident)         Plan:       Mr. Manzo is a 69 y.o. gentleman who follows with Dr. Bruce with past medical history notable for sick sinus syndrome status post Biotronik pacemaker, obstructive sleep apnea, atrial fibrillation on chronic anticoagulation, hypertension, and mixed hyperlipidemia who presents to the hospital with altered mental status.  I was requested to see him for evaluation for watchman.  I actually saw him back in August for this issue we actually got his CTA while he was in the hospital during that time I did discuss the procedure with the patient and he seemed interested in it at that time.  Unfortunately since that time he has had multiple admissions due to generalized deconditioning weakness and general failure to thrive.  We had plans to consider proceeding forward  with watchman and actually got a CTA while he is here in the hospital just to try and minimize testing that would need to be done as an outpatient.  Neurology thought that he could be put on short-term anticoagulation to get him through a watchman but felt that continuing long-term anticoagulation for him would be challenging.  He had just had a recent bleed back in August for which she was taken off of anticoagulation.  Unfortunately due to social situation we have not really been able to get him into the office to discuss watchman any further with him and his family.  He unfortunately came to the hospital this week with recurrent stroke after being found to be altered.  The biggest challenge with considering watchman with him is his overall clinical status and suitability to undergo procedures.  He has been very debilitated since his head bleed in August and has been in the hospital multiple times.  Our team's been working hard to try and get him into the office so that we can have good discussion regarding risk and benefits of the procedure.  I will try and reach out and talk with family either later today or hopefully sometime tomorrow to discuss.  We could consider potentially doing a watchman on him later after recovery from his recent obviously would need to be able to swallow and participate in his care.        Persistent atrial fibrillation:  Patient has nonvalvular atrial fibrillation based on echocardiogram from 7/24 from 10/2024  Previously on Eliquis 5 mg twice daily  [id number] score 5  Has bled score 4  Shared decision with neurology and primary cardiologist  CTA shows reasonable anatomy for appendage closure  Think the biggest question regarding proceeding forward with watchman is his overall fitness for proceeding forward with the procedure.  Right now he is fairly altered not in good clinical situation.  Again would be better for him to recover further from this.  Might be reasonable to get him  back on a blood thinner we can potentially think about doing a watchman on him next month but again he discuss further with family and make sure that he has some reasonable recovery from his recent infarct.     Hemorrhagic bleeding on brain due to angiopathy:  Neurology thinks he would not be a good long-term candidate for anticoagulation but could be considered for short-term anticoagulation and thought watchman might be a better fit for.           Thank you for allowing me to participate in the care of Flo Manzo. Feel free to contact me directly with any further questions or concerns.    Michael Ewing MD  [facility]  01/09/25  09:17 EST

## 2025-01-09 NOTE — SIGNIFICANT NOTE
01/09/25 1035   OTHER   Discipline physical therapist   Rehab Time/Intention   Session Not Performed other (see comments)  (discussed with nursing, pt is lethargic, unable to stay awake, not able to participate, will attempt to follow up this afternoon)

## 2025-01-09 NOTE — PROGRESS NOTES
[facility]  Cardiology Progress Note    Patient Identification:  Name: Flo Manzo  Age: 69 y.o.  Sex: male  :  1955  MRN: [id number]                 Follow Up / Chief Complaint: Follow-up for A-fib/EKG changes    Interval History: Patient problem is new to this provider.  He had a fall at shift change yesterday.  Sitter at bedside.  He mumbles when I speak to him.  UTO.           Objective:    Past Medical History:  Past Medical History:   Diagnosis Date    Arthritis     Atrial fibrillation with RVR 2019    Colon polyps 2018    Hepatic flexure: tubular adenoma, only low-grade dysplasia; splenic flexure: tubular adenoma, only low-grade dysplasia; sigmoid colon: tubular adenoma, multiple fragments only low-grade dysplasia    Depression     Heart murmur     Hemorrhagic stroke 2019    Hypertension     New onset atrial fibrillation (CMS/HCC) - RVR 2019    GÓMEZ (obstructive sleep apnea) 2019    Home sleep study with moderate severity GÓMEZ, AHI 20 events per hour.  Sleep-related hypoxia with low O2 saturation 84% for 14 minutes.    Peripheral neuropathy     Sleep apnea     previously diagnosed with sleep apnea, had T&A done and it has since resolved     Stroke     Uncontrolled hypertension     Ventral hernia      Past Surgical History:  Past Surgical History:   Procedure Laterality Date    APPENDECTOMY N/A     BACK SURGERY      BLADDER STIMULATOR IMPLANT L LOWER BACK    CARDIAC CATHETERIZATION N/A 2004    Cath left ventriculography, coronary angiography and left heart catheterization, Normal results-Dr. Fierro     CARDIAC CATHETERIZATION N/A 2019    Procedure: Left Heart Cath;  Surgeon: Eren Bruce MD;  Location: [facility] CATH INVASIVE LOCATION;  Service: Cardiology    CARDIAC CATHETERIZATION N/A 2019    Procedure: Left ventriculography;  Surgeon: Eren Bruce MD;  Location: [facility] CATH INVASIVE LOCATION;  Service: Cardiology     CARDIAC CATHETERIZATION N/A 1/29/2019    Procedure: Coronary angiography;  Surgeon: Eren Bruce MD;  Location: [facility] CATH INVASIVE LOCATION;  Service: Cardiology    CARDIAC ELECTROPHYSIOLOGY PROCEDURE N/A 3/4/2022    Procedure: Pacemaker SC new BIOTRONIK;  Surgeon: Eren Bruce MD;  Location: [facility] CATH INVASIVE LOCATION;  Service: Cardiology;  Laterality: N/A;    CARPAL TUNNEL RELEASE Right 2013    CARPAL TUNNEL RELEASE Left     COLONOSCOPY N/A 1/4/2018    Procedure: COLONOSCOPY with cold polypectomy and hot snare polypectomy;  Surgeon: Ten Solitario MD;  Location: [facility] ENDOSCOPY;  Service:     COLONOSCOPY N/A 4/25/2024    Procedure: COLONOSCOPY INTO CECUM;  Surgeon: Ten Solitario MD;  Location: [facility] ENDOSCOPY;  Service: General;  Laterality: N/A;  PRE: PERSONAL H/O COLON POLYP  /  POST: POOR PREP OTHERWISE NORMAL    EYE LENS IMPLANT SECONDARY Bilateral 2007, 2008    KNEE CARTILAGE SURGERY Right 1979    TONSILLECTOMY AND ADENOIDECTOMY Bilateral 2005    TOTAL KNEE ARTHROPLASTY Left 03/14/2016    Left total knee arthroplasty with Amberly component, size 11 femur, G tibial baseplate with a 10 polyethylene insert and a 38 patellar button-Dr. Pablo Hairston    TOTAL KNEE ARTHROPLASTY Right 03/02/2015    Right total knee arthroplasty with Amberly component size G femur, 11 tibial base plate with an 11 polyethylene insert and a 38 patellar button-Dr. Pablo Hairston    UVULOPALATOPHARYNGOPLASTY N/A 2005    part of soft palate, and uvula    VENTRAL HERNIA REPAIR N/A 1/23/2018    Procedure: VENTRAL HERNIA REPAIR LAPAROSCOPIC WITH DAVINCI ROBOT AND MESH;  Surgeon: Ten Solitario MD;  Location: [facility] MAIN OR;  Service:         Social History:   Social History     Tobacco Use    Smoking status: Never     Passive exposure: Past    Smokeless tobacco: Never   Substance Use Topics    Alcohol use: Not Currently     Comment: social, maybe a drink a month, not since stroke      Family  History:  Family History   Problem Relation Age of Onset    Hypertension Mother     Kidney failure Mother     Coronary artery disease Father     Lung cancer Father         positive smoker    Heart attack Father     Breast cancer Sister 60    Prostate cancer Brother     Colon polyps Brother     Kidney nephrosis Brother     Malig Hyperthermia Neg Hx           Allergies:  Allergies   Allergen Reactions    Poison Ivy Extract Hives, Itching and Rash     ITCHY BLISTERS     Scheduled Meds:  aspirin, 81 mg, Daily  atorvastatin, 40 mg, Nightly  busPIRone, 10 mg, BID  citalopram, 20 mg, Daily  furosemide, 20 mg, Daily  gabapentin, 100 mg, QAM  gabapentin, 300 mg, Nightly  ipratropium-albuterol, 3 mL, 4x Daily - RT  metoprolol tartrate, 25 mg, Q12H  pantoprazole, 40 mg, Q AM  tamsulosin, 0.4 mg, Nightly            INTAKE AND OUTPUT:    Intake/Output Summary (Last 24 hours) at 1/9/2025 0937  Last data filed at 1/9/2025 0500  Gross per 24 hour   Intake 1169 ml   Output --   Net 1169 ml       Review of Systems: UTO  GI:  Cardiac:  Pulmonary:    Constitutional:  Temp:  [97.9 °F (36.6 °C)-99 °F (37.2 °C)] 98.4 °F (36.9 °C)  Heart Rate:  [] 97  Resp:  [20-36] 36  BP: (138-163)/() 163/107      Physical Exam:  General:  appears in no acute distress, chronically ill-appearing.  Respiratory: Clear to auscultation.  Normal respiratory effort and rate.  Cardiovascular: S1S2 irregularly irregular rhythm. No murmur, rub or gallop.   Gastrointestinal: soft, non tender. Bowel sounds present.   Extremities: UTO. No pretibial pitting edema.   Neuro: UTO        Cardiographics      ECG:     Echocardiogram:   10/26/24      Interpretation Summary         Left ventricular systolic function is normal. Estimated left ventricular EF = 50%    Left ventricular wall thickness is consistent with borderline concentric hypertrophy.    Left ventricular diastolic function was indeterminate.    Moderate aortic valve stenosis is present. Aortic valve  area is 1 cm2.    Peak velocity of the flow distal to the aortic valve is 310.1 cm/s. Aortic valve maximum pressure gradient is 39 mmHg. Aortic valve mean pressure gradient is 25 mmHg. Aortic valve dimensionless index is 0.3 .    Estimated right ventricular systolic pressure from tricuspid regurgitation is normal (<35 mmHg). Calculated right ventricular systolic pressure from tricuspid regurgitation is 27 mmHg.    Mild dilation of the ascending aorta is present 3.9 cm.    Mild aortic valve regurgitation is present.  Lab Review   Results from last 7 days   Lab Units 01/08/25  0130 01/08/25  0017   HSTROP T ng/L 16 15     Results from last 7 days   Lab Units 01/08/25  0017   MAGNESIUM mg/dL 2.1     Results from last 7 days   Lab Units 01/09/25  0518   SODIUM mmol/L 139   POTASSIUM mmol/L 4.2   BUN mg/dL 12   CREATININE mg/dL 0.67*   CALCIUM mg/dL 9.3     @[email or web address]@  Results from last 7 days   Lab Units 01/09/25  0518 01/08/25  0130 01/07/25  1434   WBC 10*3/mm3 9.38 10.79 7.94   HEMOGLOBIN g/dL 13.7 14.4 14.5   HEMATOCRIT % 42.2 43.4 44.2   PLATELETS 10*3/mm3 228 240 217     Results from last 7 days   Lab Units 01/07/25  1434   INR  1.08   APTT seconds 26.2        CHADS-VASc Risk Assessment               5 Total Score    1 CHF    1 Hypertension    2 PRIOR STROKE/TIA/THROMBO    1 Age 65-74    Criteria that do not apply:    Age >/= 75    DM    Vascular Disease    Sex: Female              Assessment:  1.  Recurrent strokes  2.  Chronic atrial Fib, currently on asa  3.  Hypertension  4.  Moderate aortic stenosis  5.  Cerebral amyloid angiopathy  6.  Pacemaker  7. Acute metabolic encephalopathy: sitter at bedside  8. Hypoxia    Plan:  -Fall last night, defer to attending.  -Atrial fib on aspirin as he has not been a good candidate for anticoagulation. -Neurology considering anticoagulation shortly prior and after Watchman procedure possibly, but he may not be a good candidate..  -Dr. Ewing followed and potentially  "will possibly consider doing watchman next month depending on discussion with family and recovery from prior infarct.  -Neuro ok with slowly normalizing bp. I will restart home dose lisinopril 5 mg tomorrow.         )1/9/2025  ASHANTI Recinos/Transcription:   \"Dictated utilizing Dragon dictation\".     "

## 2025-01-10 NOTE — SIGNIFICANT NOTE
01/10/25 0955   OTHER   Discipline occupational therapist   Rehab Time/Intention   Session Not Performed other (see comments)  (pt remains not approp for therapy, too lethargic to actively participate in evaluation, possible palliative consult noted by RN. Hold again today and f/u 1/12 to assess medical status.)   Recommendation   OT - Next Appointment 01/12/25

## 2025-01-10 NOTE — PROGRESS NOTES
Name: Flo Manzo ADMIT: 2025   : 1955  PCP: Karen Jiménez APRN    MRN: [id number] LOS: 3 days   AGE/SEX: 69 y.o. male  ROOM: [facility]     Subjective   Subjective   2025  Patient seen and examined at bedside he is alert and follows simple commands but not oriented to person, place, or self.  Discussed with wife she denies alcohol use but states he has been altered in a similar condition to previous strokes.    2025  Patient was found on the floor overnight, suspected fall.  He is lethargic this morning on 2L NC with sitter at bedside.  Has been receiving Ativan due to agitation.      1/10/2025  Patient agitated overnight.  This morning he barely responds to sternal rub, will only say his date of birth.  Discussed with neurology and poor prognosis noted.  Discussed with wife Bhargavi and she elected to make patient comfort care with hospice consult.  Orders placed       Objective   Objective   Vital Signs  Temp:  [97.8 °F (36.6 °C)-98.6 °F (37 °C)] 98.6 °F (37 °C)  Heart Rate:  [] 94  Resp:  [22-36] 28  BP: (134-173)/() 134/97  SpO2:  [95 %-97 %] 96 %  on  Flow (L/min) (Oxygen Therapy):  [2] 2;   Device (Oxygen Therapy): nasal cannula  Body mass index is 31.82 kg/m².  Physical Exam  Constitutional:       General: He is not in acute distress.     Comments: Lethargic, not oriented to person place or self  Agitated   HENT:      Head: Normocephalic and atraumatic.      Nose: Nose normal. No congestion.      Mouth/Throat:      Pharynx: Oropharynx is clear. No oropharyngeal exudate.   Eyes:      General: No scleral icterus.  Cardiovascular:      Rate and Rhythm: Rhythm irregular.      Heart sounds: No murmur heard.     No friction rub. No gallop.   Pulmonary:      Effort: No respiratory distress.      Breath sounds: No wheezing or rales.   Abdominal:      General: There is no distension.      Tenderness: There is no abdominal tenderness. There is no guarding.   Musculoskeletal:          General: No swelling, deformity or signs of injury.      Cervical back: Normal range of motion. No rigidity.   Skin:     Coloration: Skin is not jaundiced.      Findings: No bruising or lesion.   Neurological:      Mental Status: He is disoriented.      Motor: Weakness present.       Results Review     I reviewed the patient's new clinical results.  Results from last 7 days   Lab Units 01/10/25  0502 01/09/25 0518 01/08/25 0130 01/07/25  1434   WBC 10*3/mm3 8.96 9.38 10.79 7.94   HEMOGLOBIN g/dL 13.7 13.7 14.4 14.5   PLATELETS 10*3/mm3 218 228 240 217     Results from last 7 days   Lab Units 01/10/25  0502 01/09/25 0518 01/08/25 0130 01/07/25  1434   SODIUM mmol/L 142 139 138 138   POTASSIUM mmol/L 3.7 4.2 4.0 4.0   CHLORIDE mmol/L 104 107 105 106   CO2 mmol/L 26.0 24.1 24.0 25.0   BUN mg/dL 14 12 11 15   CREATININE mg/dL 0.61* 0.67* 0.75* 0.78   GLUCOSE mg/dL 81 84 88 91   EGFR mL/min/1.73 104.6 101.7 98.3 97.1     Results from last 7 days   Lab Units 01/08/25  0130 01/07/25  1434   ALBUMIN g/dL 3.5 3.6   BILIRUBIN mg/dL 0.7 0.6   ALK PHOS U/L 114 119*   AST (SGOT) U/L 15 13   ALT (SGPT) U/L 12 12     Results from last 7 days   Lab Units 01/10/25  0502 01/09/25  0518 01/08/25 0130 01/08/25  0017 01/07/25  1434   CALCIUM mg/dL 9.4 9.3 9.3  --  9.3   ALBUMIN g/dL  --   --  3.5  --  3.6   MAGNESIUM mg/dL 2.2  --   --  2.1  --        Hemoglobin A1C   Date/Time Value Ref Range Status   01/08/2025 0130 5.80 (H) 4.80 - 5.60 % Final     Glucose   Date/Time Value Ref Range Status   01/10/2025 0744 80 70 - 130 mg/dL Final   01/09/2025 1611 98 70 - 130 mg/dL Final   01/09/2025 1123 78 70 - 130 mg/dL Final   01/09/2025 0709 95 70 - 130 mg/dL Final   01/08/2025 2043 89 70 - 130 mg/dL Final   01/08/2025 1738 93 70 - 130 mg/dL Final   01/08/2025 1200 100 70 - 130 mg/dL Final       XR Chest 1 View    Result Date: 1/9/2025  1. Mild right basilar atelectasis or pneumonia. 2. Bilateral vascular congestion. 3. Cardiomegaly.    This report was finalized on 1/9/2025 10:12 AM by Dr. Damon Taveras M.D on Workstation: [id number]       I have personally reviewed all medications:  Scheduled Medications  busPIRone, 10 mg, Oral, BID    Infusions     Diet  NPO Diet NPO Type: Strict NPO    I have personally reviewed:  [x]  Laboratory   [x]  Microbiology   [x]  Radiology   [x]  EKG/Telemetry  [x]  Cardiology/Vascular   []  Pathology    []  Records       Assessment/Plan     Active Hospital Problems    Diagnosis  POA    **Stroke [I63.9]  Yes    Acute CVA (cerebrovascular accident) [I63.9]  Yes      Resolved Hospital Problems   No resolved problems to display.       69 y.o. male admitted with Stroke.    #Acute CVA  #A-fib with RVR  #Acute metabolic encephalopathy  #Hypoxia  #Aortic stenosis  #BPH  #Anxiety  #GERD  #Hypertension  #Hyperlipidemia    Discussed with neurology and poor prognosis noted.  Case was discussed with wife Bhargavi and due to poor outlook with poor quality of life she elected to make patient comfort care with hospice consult.  None comfort care order stopped and comfort care orders placed.  Hospice consulted.  Transferred to [facility]        DO [facility]  01/10/25  11:23 EST

## 2025-01-10 NOTE — PROGRESS NOTES
"DOS: 1/10/2025  NAME: Fol Manzo   : 1955  PCP: Karen Jiménez APRN  Chief Complaint   Patient presents with    Cerebrovascular Accident     Stroke    Subjective: Patient seen on [address] with sitter at bedside.  No family at bedside sitter reports wife calling in regularly.  He continues to be minimally responsive.  Does not appear he received any sedating medications overnight per MAR.    Objective:  Vital signs: /99 (BP Location: Right arm, Patient Position: Lying)   Pulse 92   Temp 98.7 °F (37.1 °C) (Axillary)   Resp 20   Ht 177.8 cm (70\")   Wt 101 kg (221 lb 12.5 oz)   SpO2 96%   BMI 31.82 kg/m²       GEN: Elderly male, lethargic/sleeping, vital signs reviewed  HEENT: Normocephalic, atraumatic   COR: RRR  Resp: Even and unlabored, clear to auscultation bilaterally  Extremities: No edema     Neurological:   MS: Lethargic, arouses briefly, utters a few spontaneous words to noxious stimuli, quickly falls back to sleep.  Not following any commands.  CN: Gaze midline, no gaze deviation, PERRL, no obvious facial droop  Motor: Withdraws in all extremities, moves left upper extremity more than right  Sensory: Withdrawal/grimaces to noxious stimuli in all extremities  Patient reexamined, changes noted.      Laboratory results:  Lab Results   Component Value Date    GLUCOSE 81 01/10/2025    CALCIUM 9.4 01/10/2025     01/10/2025    K 3.7 01/10/2025    CO2 26.0 01/10/2025     01/10/2025    BUN 14 01/10/2025    CREATININE 0.61 (L) 01/10/2025    EGFRIFAFRI 103 2021    EGFRIFNONA 85 2021    BCR 23.0 01/10/2025    ANIONGAP 12.0 01/10/2025     Lab Results   Component Value Date    WBC 8.96 01/10/2025    HGB 13.7 01/10/2025    HCT 42.4 01/10/2025    MCV 85.0 01/10/2025     01/10/2025     Lab Results   Component Value Date    CHOL 122 2025    CHOL 107 2024    CHOL 175 2019     Lab Results   Component Value Date    HDL 47 2025    HDL 53 2024    " HDL 55 03/13/2024     Lab Results   Component Value Date    LDL 64 01/08/2025    LDL 44 08/06/2024    LDL 77 03/13/2024     Lab Results   Component Value Date    TRIG 49 01/08/2025    TRIG 38 08/06/2024    TRIG 82 03/13/2024         Lab 01/08/25  0130   HEMOGLOBIN A1C 5.80*      Review and interpretation of imaging:  XR Chest 1 View    Result Date: 1/9/2025  XR CHEST 1 VW-1/9/2025  HISTORY: Shortness of breath. Wheezing.  Heart size is mildly enlarged with there is elevation of the right hemidiaphragm. There is some patchy atelectasis or pneumonia in the right lung base. There is bilateral vascular congestion. Cardiac pacemaker is seen in good position. Prominent soft tissue is seen in the medial aspect of the right apex probably ectatic vascular structures.      Impression: 1. Mild right basilar atelectasis or pneumonia. 2. Bilateral vascular congestion. 3. Cardiomegaly.   This report was finalized on 1/9/2025 10:12 AM by Dr. Damon Taveras M.D on Workstation: [id number]       Impression:  69-year-old male with past medical history of hypertension, GÓMEZ, peripheral neuropathy, atrial fibrillation not on anticoagulation prior to diagnosis of advanced cerebral amyloid angiopathy, and SSS status post PPM, who presented 1/7/2025 with decreased responsiveness and strokelike symptoms.  BP initially 162/107 it was high as 179/118.  CT/CTA/CTP showed new hypodensity in the left parietal lobe as well as hyperdense left M3 branch suggesting acute thrombus or embolus.  Previous MRI Brain 8/2024 reviewed by Dr. Angel and he does have several areas of chronic microhemorrhages which are felt secondary to both hypertensive emergency and cerebral amyloid angiopathy.  No reported alcohol use per wife.  He has had significant alteration of mental status since admission.    Diagnoses:  Acute left parietal infarct with left M3 branch hyperdense sign, embolic  PAF, not anticoagulated  Acute toxic/metabolic  encephalopathy  Hypertensive urgency  Low TSH, normal free T/f free T4  Subcortical and cortical cerebral microhemorrhages secondary to hypertensive hemorrhages and cerebral amyloid angiopathy  GÓMEZ  SSS status post PPM    Plan:  Per review of chart and discussion with nurse primary team has had goals of care discussion with patient's wife.  He is at risk for further ischemic strokes due to A-fib but would also be at risk of cerebral hemorrhage with anticoagulation for underlying A-fib.  Plans noted for transfer to [facility] for comfort care.  Neurology will sign off but are available for any further questions or concerns.  Patient discussed with Dr. Angel today.    The above impression statement reviewed and changes noted.

## 2025-01-10 NOTE — PROGRESS NOTES
Patient transferred to the palliative care unit following goals of care and treatment preferences discussion with Dr. Drake.  Patient and/or family state goal of care to be comfort and treatment preferences to be geared toward symptom management.  The palliative care team will follow for further support and discussion as needed and interdisciplinary team will continue to address spiritual and cultural concerns as indicated.

## 2025-01-10 NOTE — SIGNIFICANT NOTE
Discussion held with wife ( Bhargavi) over phone and discussed poor prognosis along with nonverbal signs of distress.  Bhargavi mention that she would not want patient to suffer needlessly or not have quality of life.  Bhargavi decided to make patient comfort care with hospice consult.  CODE STATUS will be changed to comfort care.  None comfort care meds will be stopped.  Patient will be transferred to [facility] and hospice consulted..    Electronically signed by Whit Drake DO, 01/10/25, 11:19 AM EST.

## 2025-01-10 NOTE — PLAN OF CARE
Goal Outcome Evaluation:  Plan of Care Reviewed With: patient, spouse        Progress: declining  Outcome Evaluation: Pt transferred to [facility] at 1344 for palliative care. Called spouse Bhargavi to update on transfer. Pt minimally responsive. 2mg morphine given for pain (grimace) and labored breathing. Pt tolerates side-lying positioning better than supine.       1842--pt moaning and agitated, 2mg morphine and 0.5mg ativan given.

## 2025-01-10 NOTE — PROGRESS NOTES
[facility]  Cardiology Progress Note    Patient Identification:  Name: Flo Manzo  Age: 69 y.o.  Sex: male  :  1955  MRN: [id number]                 Follow Up / Chief Complaint: Follow-up for A-fib/EKG changes    Interval History: Resting in bed.  Sitter at bedside.  Remains confused.           Objective:    Past Medical History:  Past Medical History:   Diagnosis Date    Arthritis     Atrial fibrillation with RVR 2019    Colon polyps 2018    Hepatic flexure: tubular adenoma, only low-grade dysplasia; splenic flexure: tubular adenoma, only low-grade dysplasia; sigmoid colon: tubular adenoma, multiple fragments only low-grade dysplasia    Depression     Heart murmur     Hemorrhagic stroke 2019    Hypertension     New onset atrial fibrillation (CMS/HCC) - RVR 2019    GÓMEZ (obstructive sleep apnea) 2019    Home sleep study with moderate severity GÓMEZ, AHI 20 events per hour.  Sleep-related hypoxia with low O2 saturation 84% for 14 minutes.    Peripheral neuropathy     Sleep apnea     previously diagnosed with sleep apnea, had T&A done and it has since resolved     Stroke     Uncontrolled hypertension     Ventral hernia      Past Surgical History:  Past Surgical History:   Procedure Laterality Date    APPENDECTOMY N/A     BACK SURGERY      BLADDER STIMULATOR IMPLANT L LOWER BACK    CARDIAC CATHETERIZATION N/A 2004    Cath left ventriculography, coronary angiography and left heart catheterization, Normal results-Dr. Fierro     CARDIAC CATHETERIZATION N/A 2019    Procedure: Left Heart Cath;  Surgeon: Eren Bruce MD;  Location:  [facility] CATH INVASIVE LOCATION;  Service: Cardiology    CARDIAC CATHETERIZATION N/A 2019    Procedure: Left ventriculography;  Surgeon: Eren Bruce MD;  Location:  [facility] CATH INVASIVE LOCATION;  Service: Cardiology    CARDIAC CATHETERIZATION N/A 2019    Procedure: Coronary angiography;  Surgeon:  Eren Bruce MD;  Location:  [facility] CATH INVASIVE LOCATION;  Service: Cardiology    CARDIAC ELECTROPHYSIOLOGY PROCEDURE N/A 3/4/2022    Procedure: Pacemaker SC new BIOTRONIK;  Surgeon: Eren Bruce MD;  Location: [facility] CATH INVASIVE LOC[facility];  Service: Cardiology;  Laterality: N/A;    CARPAL TUNNEL RELEASE Right 2013    CARPAL TUNNEL RELEASE Left     COLONOSCOPY N/A 1/4/2018    Procedure: COLONOSCOPY with cold polypectomy and hot snare polypectomy;  Surgeon: Ten Solitario MD;  [facility] ENDOS[facility];  Service:     COLONOSCOPY N/A 4/25/2024    Procedure: COLONOSCOPY INTO CECUM;  Surgeon: Ten Solitario MD;  [facility];  Service: General;  Laterality: N/A;  PRE: PERSONAL H/O COLON POLYP  /  POST: POOR PREP OTHERWISE NORMAL    EYE LENS IMPLANT SECONDARY Bilateral 2007, 2008    KNEE CARTILAGE SURGERY Right 1979    TONSILLECTOMY AND ADENOIDECTOMY Bilateral 2005    TOTAL KNEE ARTHROPLASTY Left 03/14/2016    Left total knee arthroplasty with Amberly component, size 11 femur, G tibial baseplate with a 10 polyethylene insert and a 38 patellar button-Dr. Pablo Hairston    TOTAL KNEE ARTHROPLASTY Right 03/02/2015    Right total knee arthroplasty with Amberly component size G femur, 11 tibial base plate with an 11 polyethylene insert and a 38 patellar button-Dr. Pablo Hairston    UVULOPALATOPHARYNGOPLASTY N/A 2005    part of soft palate, and uvula    VENTRAL HERNIA REPAIR N/A 1/23/2018    Procedure: VENTRAL HERNIA REPAIR LAPAROSCOPIC WITH DAVINCI ROBOT AND MESH;  Surgeon: Ten Solitario MD;  Location: [facility] MAIN OR;  Service:         Social History:   Social History     Tobacco Use    Smoking status: Never     Passive exposure: Past    Smokeless tobacco: Never   Substance Use Topics    Alcohol use: Not Currently     Comment: social, maybe a drink a month, not since stroke      Family History:  Family History   Problem Relation Age of Onset    Hypertension Mother     Kidney failure  Mother     Coronary artery disease Father     Lung cancer Father         positive smoker    Heart attack Father     Breast cancer Sister 60    Prostate cancer Brother     Colon polyps Brother     Kidney nephrosis Brother     Malig Hyperthermia Neg Hx           Allergies:  Allergies   Allergen Reactions    Poison Ivy Extract Hives, Itching and Rash     ITCHY BLISTERS     Scheduled Meds:  busPIRone, 10 mg, BID            INTAKE AND OUTPUT:    Intake/Output Summary (Last 24 hours) at 1/10/2025 1154  Last data filed at 1/10/2025 0759  Gross per 24 hour   Intake 0 ml   Output --   Net 0 ml       Review of Systems: UTO  GI:  Cardiac:  Pulmonary:    Constitutional:  Temp:  [97.8 °F (36.6 °C)-98.6 °F (37 °C)] 98.6 °F (37 °C)  Heart Rate:  [] 94  Resp:  [22-36] 28  BP: (134-173)/() 134/97      Physical Exam:  General:  Appears in no acute distress, resting in bed  Eyes: eom normal no conjunctival drainage  HEENT:  No JVD. Thyroid not visibly enlarged. No mucosal pallor or cyanosis  Respiratory: Respirations regular and unlabored at rest. BBS with good air entry in all fields. No crackles, rubs or wheezes auscultated  Cardiovascular: S1S2 irregularly irregular rhythm. No murmur, rub or gallop. No carotid bruits. DP/PT pulses     . No pretibial pitting edema  Gastrointestinal: Abdomen soft, flat, non tender. Bowel sounds present. No hepatosplenomegaly. No ascites  Skin:   Skin warm and dry to touch. No rashes    Neuro: UTO  Psych: UTO        Cardiographics      ECG:     Echocardiogram:   10/26/24      Interpretation Summary         Left ventricular systolic function is normal. Estimated left ventricular EF = 50%    Left ventricular wall thickness is consistent with borderline concentric hypertrophy.    Left ventricular diastolic function was indeterminate.    Moderate aortic valve stenosis is present. Aortic valve area is 1 cm2.    Peak velocity of the flow distal to the aortic valve is 310.1 cm/s. Aortic valve  maximum pressure gradient is 39 mmHg. Aortic valve mean pressure gradient is 25 mmHg. Aortic valve dimensionless index is 0.3 .    Estimated right ventricular systolic pressure from tricuspid regurgitation is normal (<35 mmHg). Calculated right ventricular systolic pressure from tricuspid regurgitation is 27 mmHg.    Mild dilation of the ascending aorta is present 3.9 cm.    Mild aortic valve regurgitation is present.  Lab Review   Results from last 7 days   Lab Units 01/08/25  0130 01/08/25  0017   HSTROP T ng/L 16 15     Results from last 7 days   Lab Units 01/10/25  0502   MAGNESIUM mg/dL 2.2     Results from last 7 days   Lab Units 01/10/25  0502   SODIUM mmol/L 142   POTASSIUM mmol/L 3.7   BUN mg/dL 14   CREATININE mg/dL 0.61*   CALCIUM mg/dL 9.4     @[id number]@  Results from last 7 days   Lab Units 01/10/25  0502 01/09/25  0518 01/08/25  0130   WBC 10*3/mm3 8.96 9.38 10.79   HEMOGLOBIN g/dL 13.7 13.7 14.4   HEMATOCRIT % 42.4 42.2 43.4   PLATELETS 10*3/mm3 218 228 240     Results from last 7 days   Lab Units 01/07/25  1434   INR  1.08   APTT seconds 26.2        CHADS-VASc Risk Assessment               5 Total Score    1 CHF    1 Hypertension    2 PRIOR STROKE/TIA/THROMBO    1 Age 65-74    Criteria that do not apply:    Age >/= 75    DM    Vascular Disease    Sex: Female              Assessment:  1.  Recurrent strokes  2.  Chronic atrial Fib, currently on asa  3.  Hypertension  4.  Moderate aortic stenosis  5.  Cerebral amyloid angiopathy  6.  Pacemaker  7. Acute metabolic encephalopathy: sitter at bedside  8. Hypoxia    Plan:  -A-fib: On aspirin.  Not a good candidate for long-term anticoagulation.  Neurology is okay with him starting anticoagulation 1 week from now due to size of recent stroke, but not for long term.  -Heart structural team following for possible watchman's device.  Potentially could consider doing watchman's next month depending on discussion with family and recovery per prior  "infarct.  -Blood pressure stable.    )1/10/2025  ASHANTI Recinos/Transcription:   \"Dictated utilizing Dragon dictation\".     "

## 2025-01-10 NOTE — SIGNIFICANT NOTE
01/10/25 1007   OTHER   Discipline physical therapist   Rehab Time/Intention   Session Not Performed other (see comments)  (Attempted PT evaluation this AM, however RN reporting that pt. continues to remain too lethargic for participation. Palliative consulted at this time. Will follow up Monday with [facility] update.)   Recommendation   PT - Next Appointment 01/13/25

## 2025-01-10 NOTE — PLAN OF CARE
Goal Outcome Evaluation:  Plan of Care Reviewed With: patient           Outcome Evaluation: Patient not appropriate for [facility], discussed with RN. Recommend cortrak placement at this time.

## 2025-01-11 NOTE — CONSULTS
[facility] Scattered Bed Admission: 01/11/2025  [facility] ID: [id number]  Diagnosis: Stroke  Symptom management: Pain, Agitation, Anxiety/restlessness    Met with Bhargavi at bedside. Assessed patient. Explanation of services provided with focus on [facility]. Answered all questions, discussed medications, symptom management needs, and goals of care. Spoke with Shauna Pino MD (Hospice) and she provided verbal certification for inpatient hospice services. [facility] serves elected by Bhargavi (Spouse). [facility] consent forms completed and signed via electronic signature. [facility] information provided and encouraged to reach out with any needs.     Benefit change completed and left in Ana Bellamy RN (CC) office. Original left in HIM basket. [facility] daily RN visits will being tomorrow 1/12/2025.    Please call with any questions, concerns, and change in patient status. Thank you for allowing us the opportunity to participate in the care of this patient/family.     Whit Drake, DO agreeable to place SB orders today. Updated MARCO ANTONIO Carrillo.     Wagner Le RN, BSN, CHPN  Referral and Admission Coordinator  [facility]   [phone]

## 2025-01-11 NOTE — DISCHARGE SUMMARY
Patient Name: Flo Manzo  : 1955  MRN: [id number]    Date of Admission: 2025  Date of Discharge:  2025  Primary Care Physician: Karen Jiménez APRN      Chief Complaint:   Cerebrovascular Accident      Discharge Diagnoses     Active Hospital Problems    Diagnosis  POA   • **Stroke [I63.9]  Yes   • Acute CVA (cerebrovascular accident) [I63.9]  Yes      Resolved Hospital Problems   No resolved problems to display.        Hospital Course     Flo is a 69-year-old male with past medical history of hypertension, GÓMEZ, neuropathy, substance abuse, hemorrhagic stroke, permanent pacemaker, A-fib with RVR who presented to [facility] on 2025 with altered mental status.  Patient was admitted and stroke workup was performed with a CTA head/neck and CT perfusion.  CT showed acute left parietal occipital infarct in the M3/M4 territory in the setting of chronic infarct in the CTA demonstrated embolic occlusion of the distal M3-M4.  Patient was admitted with neurological workup.  Neurology saw patient and recommended to continue stroke workup with MRI, cover with aspirin, maintain permissive hypertension.  Due to A-fib with RVR, cardiology was consulted, rate controlled with IV Lopressor.  Patient's mental status was AO x 0-1 without evidence of recovery and was very lethargic due to cva.  He was unable to tolerate p.o. intake or participate in care.  Case was discussed with neurology and it was noted that patient has low chance of recovery and his poor prognosis and high risk for anticoagulation.  Case was discussed with wife and he was watched another day without improvement.  Due to poor prognosis patient was made comfort care with hospice consult.  He required IV meds for control of agitation and nonverbal signs of distress.  Patient discharged from [facility] and readmitted as inpatient  hospice on 2025    #Acute left parietal infarct with left M3 branch hyperdense sign,  embolic  #A-fib with RVR, not anticoagulated  #Acute metabolic encephalopathy  #Cerebral amyloid angiopathy  #Aortic stenosis  #BPH  #Anxiety  #GERD  #Hypertension  #Hyperlipidemia  #History of substance abuse      Day of Discharge     Subjective:  Patient to be discharged and readmitted as inpatient hospice  Physical Exam:  Temp:  [98.2 °F (36.8 °C)-98.7 °F (37.1 °C)] 98.2 °F (36.8 °C)  Heart Rate:  [] 86  Resp:  [18-28] 18  BP: (121-163)/() 121/83  Body mass index is 31.82 kg/m².  Physical Exam  Constitutional:       General: He is not in acute distress.     Comments: Remain Lethargic, not oriented to person place or self  Agitated   HENT:      Head: Normocephalic and atraumatic.      Nose: Nose normal. No congestion.      Mouth/Throat:      Pharynx: Oropharynx is clear. No oropharyngeal exudate.   Eyes:      General: No scleral icterus.  Cardiovascular:      Rate and Rhythm: Rhythm irregular.      Heart sounds: No murmur heard.     No friction rub. No gallop.   Pulmonary:      Effort: No respiratory distress.      Breath sounds: No wheezing or rales.   Abdominal:      General: There is no distension.      Tenderness: There is no abdominal tenderness. There is no guarding.   Musculoskeletal:         General: No swelling, deformity or signs of injury.      Cervical back: Normal range of motion. No rigidity.   Skin:     Coloration: Skin is not jaundiced.      Findings: No bruising or lesion.   Neurological:      Mental Status: He is disoriented.      Motor: Weakness present.   Consultants     Consult Orders (all) (From admission, onward)       Start     Ordered    01/10/25 1123  Inpatient Hospice Consult  Once        Specialty:  Hospice and Palliative Medicine  Provider:  (Not yet assigned)    01/10/25 1123    01/08/25 1414  Inpatient Structural Heart Consult  Once        Comments: Watchman   Provider:  (Not yet assigned)    01/08/25 1413    01/07/25 2836  Inpatient Cardiology Consult  Once         Specialty:  Cardiology  Provider:  Vicky Rossi MD    01/07/25 2336    01/07/25 1615  Notify Stroke Coordinator  Once        Provider:  (Not yet assigned)    01/07/25 1619    01/07/25 1615  Consult to Case Management   Once        Provider:  (Not yet assigned)    01/07/25 1619    01/07/25 1615  Inpatient Diabetes Educator Consult  Once,   Status:  Canceled        Provider:  (Not yet assigned)    01/07/25 1619    01/07/25 1615  Inpatient Neurology Consult Stroke  Once        Specialty:  Neurology  Provider:  (Not yet assigned)    01/07/25 1619    01/07/25 1533  [facility] (on-call MD unless specified) Details  Once        Specialty:  Hospitalist  Provider:  (Not yet assigned)    01/07/25 1532    01/07/25 1435  Inpatient Neurology Consult Stroke  Once        Specialty:  Neurology  Provider:  Gaetano Angel MD    01/07/25 1434    01/07/25 1435  Inpatient Neurology Consult Stroke  Once        Specialty:  Neurology  Provider:  (Not yet assigned)    01/07/25 1434    Signed and Held  [facility] (on-call MD unless specified) Details  Once        Specialty:  Hospitalist  Provider:  (Not yet assigned)    Signed and Held    Signed and Held  Notify Stroke Coordinator  Once        Provider:  (Not yet assigned)    Signed and Held    Signed and Held  Consult to Case Management   Once        Provider:  (Not yet assigned)    Signed and Held                  Procedures     * Surgery not found *    Imaging Results (All)       Procedure Component Value Units Date/Time    XR Chest 1 View [[id number]] Collected: 01/09/25 1012     Updated: 01/09/25 1016    Narrative:      XR CHEST 1 VW-1/9/2025     HISTORY: Shortness of breath. Wheezing.     Heart size is mildly enlarged with there is elevation of the right  hemidiaphragm. There is some patchy atelectasis or pneumonia in the  right lung base. There is bilateral vascular congestion. Cardiac  pacemaker is seen in good position. Prominent soft tissue is seen in  the  medial aspect of the right apex probably ectatic vascular structures.       Impression:      1. Mild right basilar atelectasis or pneumonia.  2. Bilateral vascular congestion.  3. Cardiomegaly.        This report was finalized on 1/9/2025 10:12 AM by Dr. Damon Taveras M.D on Workstation: [id number]       CT Angiogram Head w AI Analysis of LVO [[id number]] Collected: 01/07/25 1559     Updated: 01/08/25 0628    Narrative:      EMERGENCY CONTRAST-ENHANCED CT ANGIOGRAM OF THE HEAD AND NECK AND CT  PERFUSION STUDY OF THE BRAIN 01/07/2025     CLINICAL HISTORY: This is a 69-year-old male patient who has acute onset  confusion, dysarthria and gaze deviation.  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       HEAD CT WITHOUT CONTRAST TECHNIQUE: Spiral CT images were obtained from  the base of the skull to the vertex without intravenous contrast. The  images were reformatted and are submitted in 3 mm thick axial CT  sections with brain algorithm, 2 mm thick sagittal and coronal  reconstructions were performed and submitted in brain algorithm.     This is correlated to a prior MRI of the brain from [facility] on 08/06/2024 and prior CT angiogram of the head and neck on  08/07/2024 and a head CT on 08/26/2024. This is also correlated to a  remote prior MRI of the brain on 01/30/2019 and head CT on 01/31/2019.     FINDINGS: There are prominent patchy and confluent areas of low density  extending from the periventricular into the subcortical white matter of  the cerebral hemispheres consistent with moderate small vessel disease.  There is a focal area of encephalomalacia involving the posterior  superior medial left frontal lobe that involves the posterior left  cingulate gyrus compatible with an old infarct in the left LANETTE  territory. This chronic infarct measures about 3.4 x 1.5 x 1.9 cm. This  patient has multiple punctate areas of encephalomalacia in the thalami.  These may be old lacunar infarcts or the sequela of old  hypertensive  microhemorrhages. 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 There was also hemosiderin  deposition at the site of the chronic infarct in the left LANETTE territory,  and these areas of hemosiderin deposition cannot be assessed on a  noncontrasted CT. There is a hyperdense M3 MCA branch within the  anterior left parietal sulcus and posterior to this hyperdense MCA  branch is a wedge-shaped area of low density through the gray-white  matter of the posterior inferior left parietal lobe extending inferiorly  into the superior lateral left occipital lobe compatible with an acute  left parietal occipital infarct with the area of acute infarction  measuring up to 4.7 x 3.7 x 4 cm in size. There is no hemorrhagic  transformation. There is diffuse cerebral atrophy.  The lateral and  third ventricles are minimally prominent in size, felt to be due to  central volume loss or atrophy. I see no midline shift.  No extra-axial  fluid collections are identified and there is no evidence of acute  intracranial hemorrhage.       Impression:      There is a hyperdense anterior left parietal M3 MCA branch  suggesting acute thrombus or embolus in this branch, and posterior to  which is a wedge-shaped area of subtle acute infarction extending from  the posterior inferior left parietal lobe into the superior lateral left  occipital lobe, and the acute left parietal occipital infarct measures  4.7 x 3.7 x 4 cm in size. There is no hemorrhagic transformation. The  patient also has a chronic infarct in the posterior superior medial  left  frontal lobe in the left LANETTE territory that measures 3.4 x 1.5 x 1.9 cm  in size.  There was hemosiderin deposition at the site of this chronic  infarct on prior MRI of the brain on 08/06/2024 from old petechial  hemorrhage into this chronic left LANETTE territory infarct. 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 There is  diffuse cerebral atrophy.  The remainder of the head CT is normal. The  results were communicated to Fred Leslie MD, from stroke neurology  while the patient was still on our scanner on 01/07/2025 at 2:45 p.m.  and he requested a contrast-enhanced CT angiogram of the head and neck  and CT perfusion study of the brain.     CONTRAST-ENHANCED CT ANGIOGRAM OF THE HEAD AND NECK AND CT PERFUSION  STUDY OF THE BRAIN TECHNIQUE: Spiral CT images were obtained from the  mid cerebellum up through the cerebral hemispheres during the arterial  phase of contrast for a 10 cm vertical slab of brain imaging, and images  were reformatted and analyzed with Rapid software analysis for a CT  perfusion study of the brain, and subsequently spiral CT angiographic  images were obtained from the top of the aortic arch up through the  great vessels of the head and neck during the arterial phase of contrast  and images were reformatted and submitted in 1 mm thick axial, sagittal  and coronal CT sections with soft tissue algorithm and 3D  reconstructions were performed to complete the CT angiogram of the head  and neck.     FINDINGS:  CT PERFUSION STUDY OF THE BRAIN:  The  CT perfusion study of the brain  starts at the mid cerebellum and extends for a 10 cm vertical slab of  brain imaging through the majority of the cerebral hemispheres and  excludes the inferior-half of the cerebellum and medulla, which are not  assessed on this exam. Within the 10 cm vertical slab of brain imaging,  there are areas of reduced cerebral blood flow that are patchy nodular  in nature involving the posterior inferior left parietal lobe extending  into the superior lateral left occipital lobe compatible with small  areas of acute completed infarction in the left parietal occipital  parenchyma in the left MCA territory.  There is an oblong focus of  reduced cerebral blood flow to less than 30% of normal involving the  superior medial left frontal parietal junction suggesting an acute  infarct in the distal left LANETTE territory. There are areas of delayed  time to maximal enhancement that involve the posterior occipital lobes  bilaterally that could be artifact.  There is delayed time to maximal  enhancement involving the superior medial left frontal parietal region  that may be a localized area of hypoperfused brain in the area of  suggested acute infarction in the distal left LANETTE territory, and there  is delayed time to maximal enhancement of greater than 6 seconds  extending from the posterior inferior left parietal lobe into the  lateral left occipital lobe.  This area is felt to be hypoperfused brain  within the left parietal occipital region and the area of hypoperfused  brain appears larger than the area of acute infarction although the  acute infarct in this area is bigger on the noncontrasted head CT than  suggested on the CT perfusion study.     CT ANGIOGRAM OF THE NECK: The nasopharynx, oropharynx, the hypopharynx,  true cords and subglottic airway are normal in appearance.  There is an  enlarged thyroid gland with thyroid nodules suggesting a multinodular  goiter. There is some substernal  extension of the enlarged left lobe of  the thyroid. The lung apices are clear. The parotid, ,  parapharyngeal, and submandibular spaces are symmetric and are normal in  appearance. Cervical spondylosis is present with prominent disc space  narrowing, degenerative endplate changes at C2-3 and C3-4, and posterior  spurs and protruding disc materia up to moderate to severely narrow the  canal at C2-3, and posterior spurs severely narrow the canal at C3-4.   There is disc space narrowing.  There are degenerative endplate changes  at C5-6 and C6-7, and posterior spurs at least mild up to mild to  moderately narrow the canal at C6-7.  There is multilevel foraminal  narrowing in the cervical spine with mild-to-moderate right bony  foraminal narrowing at C2-3, moderate left and severe right bony  foraminal narrowing at C3-4, and mild-to-moderate right and moderate  left foraminal narrowing at C6-7. There is common origin of the  brachiocephalic artery and left common carotid artery off the aortic  arch constituting a bovine configuration of the aortic arch which is a  normal anatomic variation. The left subclavian artery origin is normal  in appearance. No stenosis is seen in the left subclavian artery. The  left vertebral artery origin is normal in appearance. No stenosis is  seen in the cervical segment of the left vertebral artery. The left  common carotid origin is normal in appearance. No stenosis is seen in  the left common carotid artery and its bifurcation into the left  internal and external carotid arteries is normal in appearance, and no  stenosis is seen in the cervical segment of the left internal carotid  artery using the NASCET criteria. The brachiocephalic artery origin is  normal in appearance.  No stenosis is seen in the brachiocephalic artery  and its bifurcation into the right subclavian and common carotid artery  is normal in appearance, and no stenosis is seen in the right  subclavian  artery.  The right vertebral artery origin is less than optimally  assessed, but is most probably within normal limits.  No stenosis is  seen in the cervical segment of the right vertebral artery. The right  common carotid origin is normal in appearance.  No stenosis is seen in  the right common carotid artery and its bifurcation into the right  internal and external carotid arteries is normal in appearance and no  stenosis is seen in the cervical segment of the right internal carotid  artery using the NASCET criteria.      CT ANGIOGRAM OF THE HEAD: The intracranial segments of the vertebral  arteries are widely patent to the vertebrobasilar junction without  stenosis.  The basilar artery and the basilar tip are normal in  appearance. The posterior cerebral and superior cerebellar arteries are  within normal limits. The upper cervical, petrous, cavernous and  supracavernous segments of the internal carotid arteries are within  normal limits.  There is an atretic A1 segment of the right anterior  cerebral artery which is a normal anatomic variation.  The dominant left  A1 segment is widely patent.  The anterior communicating artery origin  is normal in appearance. The A2 segments of the anterior cerebral  arteries are within normal limits. The M1 segments of the middle  cerebral arteries and middle cerebral artery bifurcations are within  normal limits. There is abrupt occlusion with filling defect in a  parietal M3 or M4 branch of the left middle cerebral artery by an  embolus or thrombus. This leads to the area of acute infarction in the  left parietal occipital region. The M1 segment of the right middle  cerebral artery and the right middle cerebral artery bifurcation are  normal in appearance.  The right M2 segments are normal in appearance.     IMPRESSION:  1. The CT of the head without contrast demonstrates a hyperdense  anterior left parietal M3 or M4 MCA branch from an acute embolus or  thrombus.  Posterior to this is a wedge-shaped area of acute infarction  in the left parietal occipital parenchyma in the distribution of this  left parietal occipital MCA branch that measures 4.7 x 3.7 x 4 cm in  size, and there is no hemorrhagic transformation. The patient also has a  chronic infarct involving the posterior superior medial left frontal  lobe in the left LANETTE territory that measures 3.4 x 1.5 x 1.9 cm in size.  There is moderate small vessel disease in the cerebral white matter and  diffuse cerebral atrophy. 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 The results of the noncontrast head CT were communicated  to Dr. Leslie from stroke neurology by telephone on 01/07/2025 at 2:45  p.m., and he requested a contrast-enhanced CT angiogram of the head and  neck and CT perfusion study of the brain.     2. The CT perfusion study of the brain consists of a 10 cm vertical slab  of brain imaging from the mid cerebellum up through the cerebral  hemispheres and excludes the inferior-half of the cerebellum and medulla  which are not assessed. The patient was moving during the CTP which  slightly limits evaluation. Within the 10 cm vertical slab of brain  imaging, there are areas of reduced cerebral blood flow  to less than 30%  of normal indicating patchy and nodular areas of acute infarction in the  left parietal occipital parenchyma and this is in the distribution of  the parietal occipital branches of the left MCA territory, and there is  an area involving the superior medial left frontal parietal junction  that is in the distal left LANETTE territory and the combined dimensions  measures 22 cc of brain parenchyma that are acutely infarcted, although  I believe the left parietal occipital acute infarct is larger than the  CT perfusion study suggests, as on the noncontrast head CT the acute  infarct in the left parietal occipital parenchyma measures up to 4.7 x  3.7 x 4 cm. Furthermore, there are bilateral symmetric areas of delayed  time to maximal enhancement of greater than 6 seconds in the posterior  occipital lobes that I think is probably artifact rather than  hypoperfused brain.  There is an area in the superior medial left  frontal parietal junction that may be hypoperfused brain in the distal  left LANETTE territory that matches the cerebral blood flow deficit. There  is an area in the left parietal occipital parenchyma that demonstrates  delayed time to maximal enhancement of greater than 6 seconds indicating  chinyere hypoperfused brain that appears larger than the infarcted brain on  the cerebral blood flow images, but appears to correspond very well to  the area of acute infarction on the head CT without contrast. I believe  that the Rapid analysis suggests that the hypoperfused brain measures up  to 111 cc of brain parenchyma and I think this is an overestimation as I  think the areas in the posterior occipital lobe is artifact and the  rapid analysis suggest that the mismatch ratio is 6, and I believe this  is an overestimation.     3. Cervical spondylosis is present with disc space narrowing and  degenerative endplate changes at C2-3 and C3-4 and C6-7.  Posterior  spurs and protruding or herniated disc material  moderately narrow the  canal at C2-3 and posterior spurs severely narrow the canal and flatten  the cord at C3-4, and posterior spurs mildly narrow the canal at C6-7,  and there is foraminal narrowing in the cervical spine as described  above.     4. The CT angiographic evaluation of the great vessels of the neck is  normal with no stenosis seen in the vertebral arteries and no stenosis  seen in the common carotid arteries or the cervical segments of the  internal carotid arteries using the NASCET criteria. The CT angiographic  evaluation of the head demonstrates abrupt embolic occlusion or  thrombosis of a parietal branch that is likely either a distal M3 or M4  branch of the left middle cerebral artery leading to the area of acute  infarction in the left parietal occipital parenchyma. The remainder of  the CT angiogram of the head and neck is normal.  The results of the  final dictated study were communicated to Dr. Fred Leslie by telephone  on 01/07/2025 at 3:25 PM.      This report was finalized on 1/8/2025 6:25 AM by Dr. Flo Swift M.D on  Workstation: [id number]       CT Angiogram Neck [[id number]] Collected: 01/07/25 1559     Updated: 01/08/25 0628    Narrative:      EMERGENCY CONTRAST-ENHANCED CT ANGIOGRAM OF THE HEAD AND NECK AND CT  PERFUSION STUDY OF THE BRAIN 01/07/2025     CLINICAL HISTORY: This is a 69-year-old male patient who has acute onset  confusion, dysarthria and gaze deviation.  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       HEAD CT WITHOUT CONTRAST TECHNIQUE: Spiral CT images were obtained from  the base of the skull to the vertex without intravenous contrast. The  images were reformatted and are submitted in 3 mm thick axial CT  sections with brain algorithm, 2 mm thick sagittal and coronal  reconstructions were performed and submitted in brain algorithm.     This is correlated to a prior MRI of the brain from [facility] on 08/06/2024 and prior CT angiogram of the head and neck on  08/07/2024  and a head CT on 08/26/2024. This is also correlated to a  remote prior MRI of the brain on 01/30/2019 and head CT on 01/31/2019.     FINDINGS: There are prominent patchy and confluent areas of low density  extending from the periventricular into the subcortical white matter of  the cerebral hemispheres consistent with moderate small vessel disease.  There is a focal area of encephalomalacia involving the posterior  superior medial left frontal lobe that involves the posterior left  cingulate gyrus compatible with an old infarct in the left LANETTE  territory. This chronic infarct measures about 3.4 x 1.5 x 1.9 cm. This  patient has multiple punctate areas of encephalomalacia in the thalami.  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 There was also hemosiderin  deposition at the site of the chronic infarct in the left LANETTE territory,  and these areas of hemosiderin deposition cannot be assessed on a  noncontrasted CT. There is a hyperdense M3 MCA branch within the  anterior left parietal sulcus and posterior to this hyperdense MCA  branch is a wedge-shaped area of low density through the gray-white  matter of the posterior inferior left parietal lobe extending inferiorly  into the superior lateral left occipital lobe compatible with an acute  left parietal occipital infarct with the area of acute infarction  measuring up to 4.7 x 3.7 x 4 cm in size. There is no hemorrhagic  transformation. There is diffuse cerebral atrophy.  The  lateral and  third ventricles are minimally prominent in size, felt to be due to  central volume loss or atrophy. I see no midline shift.  No extra-axial  fluid collections are identified and there is no evidence of acute  intracranial hemorrhage.       Impression:      There is a hyperdense anterior left parietal M3 MCA branch  suggesting acute thrombus or embolus in this branch, and posterior to  which is a wedge-shaped area of subtle acute infarction extending from  the posterior inferior left parietal lobe into the superior lateral left  occipital lobe, and the acute left parietal occipital infarct measures  4.7 x 3.7 x 4 cm in size. There is no hemorrhagic transformation. The  patient also has a chronic infarct in the posterior superior medial left  frontal lobe in the left LANETTE territory that measures 3.4 x 1.5 x 1.9 cm  in size.  There was hemosiderin deposition at the site of this chronic  infarct on prior MRI of the brain on 08/06/2024 from old petechial  hemorrhage into this chronic left LANETTE territory infarct. 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 There is  diffuse cerebral atrophy.  The remainder of the head CT is normal. The  results were communicated to Fred Leslie MD, from stroke neurology  while the patient was still on our scanner on 01/07/2025 at 2:45 p.m.  and he requested a contrast-enhanced CT angiogram of the head and neck  and CT perfusion study of the brain.     CONTRAST-ENHANCED CT ANGIOGRAM OF THE HEAD AND NECK AND CT PERFUSION  STUDY OF  THE BRAIN TECHNIQUE: Spiral CT images were obtained from the  mid cerebellum up through the cerebral hemispheres during the arterial  phase of contrast for a 10 cm vertical slab of brain imaging, and images  were reformatted and analyzed with Rapid software analysis for a CT  perfusion study of the brain, and subsequently spiral CT angiographic  images were obtained from the top of the aortic arch up through the  great vessels of the head and neck during the arterial phase of contrast  and images were reformatted and submitted in 1 mm thick axial, sagittal  and coronal CT sections with soft tissue algorithm and 3D  reconstructions were performed to complete the CT angiogram of the head  and neck.     FINDINGS:  CT PERFUSION STUDY OF THE BRAIN:  The CT perfusion study of the brain  starts at the mid cerebellum and extends for a 10 cm vertical slab of  brain imaging through the majority of the cerebral hemispheres and  excludes the inferior-half of the cerebellum and medulla, which are not  assessed on this exam. Within the 10 cm vertical slab of brain imaging,  there are areas of reduced cerebral blood flow that are patchy nodular  in nature involving the posterior inferior left parietal lobe extending  into the superior lateral left occipital lobe compatible with small  areas of acute completed infarction in the left parietal occipital  parenchyma in the left MCA territory.  There is an oblong focus of  reduced cerebral blood flow to less than 30% of normal involving the  superior medial left frontal parietal junction suggesting an acute  infarct in the distal left LANETTE territory. There are areas of delayed  time to maximal enhancement that involve the posterior occipital lobes  bilaterally that could be artifact.  There is delayed time to maximal  enhancement involving the superior medial left frontal parietal region  that may be a localized area of hypoperfused brain in the area of  suggested acute infarction in  the distal left LANETTE territory, and there  is delayed time to maximal enhancement of greater than 6 seconds  extending from the posterior inferior left parietal lobe into the  lateral left occipital lobe.  This area is felt to be hypoperfused brain  within the left parietal occipital region and the area of hypoperfused  brain appears larger than the area of acute infarction although the  acute infarct in this area is bigger on the noncontrasted head CT than  suggested on the CT perfusion study.     CT ANGIOGRAM OF THE NECK: The nasopharynx, oropharynx, the hypopharynx,  true cords and subglottic airway are normal in appearance.  There is an  enlarged thyroid gland with thyroid nodules suggesting a multinodular  goiter. There is some substernal extension of the enlarged left lobe of  the thyroid. The lung apices are clear. The parotid, ,  parapharyngeal, and submandibular spaces are symmetric and are normal in  appearance. Cervical spondylosis is present with prominent disc space  narrowing, degenerative endplate changes at C2-3 and C3-4, and posterior  spurs and protruding disc materia up to moderate to severely narrow the  canal at C2-3, and posterior spurs severely narrow the canal at C3-4.   There is disc space narrowing.  There are degenerative endplate changes  at C5-6 and C6-7, and posterior spurs at least mild up to mild to  moderately narrow the canal at C6-7.  There is multilevel foraminal  narrowing in the cervical spine with mild-to-moderate right bony  foraminal narrowing at C2-3, moderate left and severe right bony  foraminal narrowing at C3-4, and mild-to-moderate right and moderate  left foraminal narrowing at C6-7. There is common origin of the  brachiocephalic artery and left common carotid artery off the aortic  arch constituting a bovine configuration of the aortic arch which is a  normal anatomic variation. The left subclavian artery origin is normal  in appearance. No stenosis is seen  in the left subclavian artery. The  left vertebral artery origin is normal in appearance. No stenosis is  seen in the cervical segment of the left vertebral artery. The left  common carotid origin is normal in appearance. No stenosis is seen in  the left common carotid artery and its bifurcation into the left  internal and external carotid arteries is normal in appearance, and no  stenosis is seen in the cervical segment of the left internal carotid  artery using the NASCET criteria. The brachiocephalic artery origin is  normal in appearance.  No stenosis is seen in the brachiocephalic artery  and its bifurcation into the right subclavian and common carotid artery  is normal in appearance, and no stenosis is seen in the right subclavian  artery.  The right vertebral artery origin is less than optimally  assessed, but is most probably within normal limits.  No stenosis is  seen in the cervical segment of the right vertebral artery. The right  common carotid origin is normal in appearance.  No stenosis is seen in  the right common carotid artery and its bifurcation into the right  internal and external carotid arteries is normal in appearance and no  stenosis is seen in the cervical segment of the right internal carotid  artery using the NASCET criteria.      CT ANGIOGRAM OF THE HEAD: The intracranial segments of the vertebral  arteries are widely patent to the vertebrobasilar junction without  stenosis.  The basilar artery and the basilar tip are normal in  appearance. The posterior cerebral and superior cerebellar arteries are  within normal limits. The upper cervical, petrous, cavernous and  supracavernous segments of the internal carotid arteries are within  normal limits.  There is an atretic A1 segment of the right anterior  cerebral artery which is a normal anatomic variation.  The dominant left  A1 segment is widely patent.  The anterior communicating artery origin  is normal in appearance. The A2 segments of  the anterior cerebral  arteries are within normal limits. The M1 segments of the middle  cerebral arteries and middle cerebral artery bifurcations are within  normal limits. There is abrupt occlusion with filling defect in a  parietal M3 or M4 branch of the left middle cerebral artery by an  embolus or thrombus. This leads to the area of acute infarction in the  left parietal occipital region. The M1 segment of the right middle  cerebral artery and the right middle cerebral artery bifurcation are  normal in appearance.  The right M2 segments are normal in appearance.     IMPRESSION:  1. The CT of the head without contrast demonstrates a hyperdense  anterior left parietal M3 or M4 MCA branch from an acute embolus or  thrombus. Posterior to this is a wedge-shaped area of acute infarction  in the left parietal occipital parenchyma in the distribution of this  left parietal occipital MCA branch that measures 4.7 x 3.7 x 4 cm in  size, and there is no hemorrhagic transformation. The patient also has a  chronic infarct involving the posterior superior medial left frontal  lobe in the left LANETTE territory that measures 3.4 x 1.5 x 1.9 cm in size.  There is moderate small vessel disease in the cerebral white matter and  diffuse cerebral atrophy. 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 The results of the noncontrast head CT were communicated  to Dr. Leslie from stroke neurology by telephone on 01/07/2025 at 2:45  p.m., and he requested a contrast-enhanced CT angiogram of the head and  neck and CT perfusion study of the brain.     2. The CT perfusion study of the brain consists of a 10 cm vertical slab  of brain imaging from the mid cerebellum up through the cerebral  hemispheres and excludes the inferior-half of the cerebellum and medulla  which are not assessed. The patient was moving during the CTP which  slightly limits evaluation. Within the 10 cm vertical slab of brain  imaging, there are areas of reduced cerebral blood flow to less than 30%  of normal indicating patchy and nodular areas of acute infarction in the  left parietal occipital parenchyma and this is in the distribution of  the parietal occipital branches of the left MCA territory, and there is  an area involving the superior medial left frontal parietal junction  that is in the distal left LANETTE territory and the combined dimensions  measures 22 cc of brain parenchyma that are acutely infarcted, although  I believe the left parietal occipital acute infarct is larger than the  CT perfusion study suggests, as on the noncontrast head CT the acute  infarct in the left parietal occipital parenchyma measures up to 4.7 x  3.7 x 4 cm. Furthermore, there are bilateral symmetric areas of delayed  time to maximal enhancement of greater than 6 seconds in the posterior  occipital lobes that I think is probably artifact rather than  hypoperfused brain.  There is an area in the superior medial left  frontal parietal junction that may be hypoperfused brain in the distal  left LANETTE territory that matches the cerebral blood flow deficit. There  is an area in the left parietal occipital parenchyma that demonstrates  delayed time to maximal enhancement of greater than 6  seconds indicating  chinyere hypoperfused brain that appears larger than the infarcted brain on  the cerebral blood flow images, but appears to correspond very well to  the area of acute infarction on the head CT without contrast. I believe  that the Rapid analysis suggests that the hypoperfused brain measures up  to 111 cc of brain parenchyma and I think this is an overestimation as I  think the areas in the posterior occipital lobe is artifact and the  rapid analysis suggest that the mismatch ratio is 6, and I believe this  is an overestimation.     3. Cervical spondylosis is present with disc space narrowing and  degenerative endplate changes at C2-3 and C3-4 and C6-7.  Posterior  spurs and protruding or herniated disc material moderately narrow the  canal at C2-3 and posterior spurs severely narrow the canal and flatten  the cord at C3-4, and posterior spurs mildly narrow the canal at C6-7,  and there is foraminal narrowing in the cervical spine as described  above.     4. The CT angiographic evaluation of the great vessels of the neck is  normal with no stenosis seen in the vertebral arteries and no stenosis  seen in the common carotid arteries or the cervical segments of the  internal carotid arteries using the NASCET criteria. The CT angiographic  evaluation of the head demonstrates abrupt embolic occlusion or  thrombosis of a parietal branch that is likely either a distal M3 or M4  branch of the left middle cerebral artery leading to the area of acute  infarction in the left parietal occipital parenchyma. The remainder of  the CT angiogram of the head and neck is normal.  The results of the  final dictated study were communicated to Dr. Fred Leslie by telephone  on 01/07/2025 at 3:25 PM.      This report was finalized on 1/8/2025 6:25 AM by Dr. Flo Swift M.D on  Workstation: [id number]       CT CEREBRAL PERFUSION WITH & WITHOUT CONTRAST [[id number]] Collected: 01/07/25 1559     Updated: 01/08/25 0628     Narrative:      EMERGENCY CONTRAST-ENHANCED CT ANGIOGRAM OF THE HEAD AND NECK AND CT  PERFUSION STUDY OF THE BRAIN 01/07/2025     CLINICAL HISTORY: This is a 69-year-old male patient who has acute onset  confusion, dysarthria and gaze deviation.  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       HEAD CT WITHOUT CONTRAST TECHNIQUE: Spiral CT images were obtained from  the base of the skull to the vertex without intravenous contrast. The  images were reformatted and are submitted in 3 mm thick axial CT  sections with brain algorithm, 2 mm thick sagittal and coronal  reconstructions were performed and submitted in brain algorithm.     This is correlated to a prior MRI of the brain from [facility] on 08/06/2024 and prior CT angiogram of the head and neck on  08/07/2024 and a head CT on 08/26/2024. This is also correlated to a  remote prior MRI of the brain on 01/30/2019 and head CT on 01/31/2019.     FINDINGS: There are prominent patchy and confluent areas of low density  extending from the periventricular into the subcortical white matter of  the cerebral hemispheres consistent with moderate small vessel disease.  There is a focal area of encephalomalacia involving the posterior  superior medial left frontal lobe that involves the posterior left  cingulate gyrus compatible with an old infarct in the left LANETTE  territory. This chronic infarct measures about 3.4 x 1.5 x 1.9 cm. This  patient has multiple punctate areas of encephalomalacia in the thalami.  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 There was also hemosiderin  deposition at the site of the chronic infarct in the left LANETTE territory,  and these areas of hemosiderin deposition cannot be assessed on a  noncontrasted CT. There is a hyperdense M3 MCA branch within the  anterior left parietal sulcus and posterior to this hyperdense MCA  branch is a wedge-shaped area of low density through the gray-white  matter of the posterior inferior left parietal lobe extending inferiorly  into the superior lateral left occipital lobe compatible with an acute  left parietal occipital infarct with the area of acute infarction  measuring up to 4.7 x 3.7 x 4 cm in size. There is no hemorrhagic  transformation. There is diffuse cerebral atrophy.  The lateral and  third ventricles are minimally prominent in size, felt to be due to  central volume loss or atrophy. I see no midline shift.  No extra-axial  fluid collections are identified and there is no evidence of acute  intracranial hemorrhage.       Impression:      There is a hyperdense anterior left parietal M3 MCA branch  suggesting acute thrombus or embolus in this branch, and posterior to  which is a wedge-shaped area of subtle acute infarction extending from  the posterior inferior left parietal lobe into the superior lateral left  occipital lobe, and the acute left parietal occipital infarct measures  4.7 x 3.7 x 4 cm in size. There is no hemorrhagic transformation. The  patient also has a chronic infarct in the posterior superior medial left  frontal lobe in the left LANETTE territory that measures 3.4 x 1.5 x 1.9 cm  in size.  There was hemosiderin deposition at the site of this chronic  infarct on prior MRI of the brain on 08/06/2024 from old petechial  hemorrhage into this chronic left LANETTE territory infarct. 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 There is  diffuse cerebral atrophy.  The remainder of the head CT is normal. The  results were communicated to Fred Leslie MD, from stroke neurology  while the patient was still on our scanner on 01/07/2025 at 2:45 p.m.  and he requested a contrast-enhanced CT angiogram of the head and neck  and CT perfusion study of the brain.     CONTRAST-ENHANCED CT ANGIOGRAM OF THE HEAD AND NECK AND CT PERFUSION  STUDY OF THE BRAIN TECHNIQUE: Spiral CT images were obtained from the  mid cerebellum up through the cerebral hemispheres during the arterial  phase of contrast for a 10 cm vertical slab of brain imaging, and images  were reformatted and analyzed with Rapid software analysis for a CT  perfusion study of the brain, and subsequently spiral CT angiographic  images were obtained from the top of the aortic arch up through the  great vessels of the head and neck during the arterial phase of contrast  and images were reformatted and submitted in 1 mm thick axial, sagittal  and coronal CT sections with soft tissue algorithm and 3D  reconstructions were performed to complete the CT angiogram of the head  and neck.     FINDINGS:  CT PERFUSION STUDY OF THE BRAIN:  The CT perfusion study of the brain  starts at the mid cerebellum and extends for a 10 cm vertical slab of  brain imaging through the majority of the cerebral hemispheres and  excludes the inferior-half of the cerebellum and medulla, which are not  assessed on this exam. Within the 10 cm vertical slab of brain imaging,  there are areas of reduced cerebral blood flow that are patchy nodular  in nature involving  the posterior inferior left parietal lobe extending  into the superior lateral left occipital lobe compatible with small  areas of acute completed infarction in the left parietal occipital  parenchyma in the left MCA territory.  There is an oblong focus of  reduced cerebral blood flow to less than 30% of normal involving the  superior medial left frontal parietal junction suggesting an acute  infarct in the distal left LANETTE territory. There are areas of delayed  time to maximal enhancement that involve the posterior occipital lobes  bilaterally that could be artifact.  There is delayed time to maximal  enhancement involving the superior medial left frontal parietal region  that may be a localized area of hypoperfused brain in the area of  suggested acute infarction in the distal left LANETTE territory, and there  is delayed time to maximal enhancement of greater than 6 seconds  extending from the posterior inferior left parietal lobe into the  lateral left occipital lobe.  This area is felt to be hypoperfused brain  within the left parietal occipital region and the area of hypoperfused  brain appears larger than the area of acute infarction although the  acute infarct in this area is bigger on the noncontrasted head CT than  suggested on the CT perfusion study.     CT ANGIOGRAM OF THE NECK: The nasopharynx, oropharynx, the hypopharynx,  true cords and subglottic airway are normal in appearance.  There is an  enlarged thyroid gland with thyroid nodules suggesting a multinodular  goiter. There is some substernal extension of the enlarged left lobe of  the thyroid. The lung apices are clear. The parotid, ,  parapharyngeal, and submandibular spaces are symmetric and are normal in  appearance. Cervical spondylosis is present with prominent disc space  narrowing, degenerative endplate changes at C2-3 and C3-4, and posterior  spurs and protruding disc materia up to moderate to severely narrow the  canal at C2-3, and  posterior spurs severely narrow the canal at C3-4.   There is disc space narrowing.  There are degenerative endplate changes  at C5-6 and C6-7, and posterior spurs at least mild up to mild to  moderately narrow the canal at C6-7.  There is multilevel foraminal  narrowing in the cervical spine with mild-to-moderate right bony  foraminal narrowing at C2-3, moderate left and severe right bony  foraminal narrowing at C3-4, and mild-to-moderate right and moderate  left foraminal narrowing at C6-7. There is common origin of the  brachiocephalic artery and left common carotid artery off the aortic  arch constituting a bovine configuration of the aortic arch which is a  normal anatomic variation. The left subclavian artery origin is normal  in appearance. No stenosis is seen in the left subclavian artery. The  left vertebral artery origin is normal in appearance. No stenosis is  seen in the cervical segment of the left vertebral artery. The left  common carotid origin is normal in appearance. No stenosis is seen in  the left common carotid artery and its bifurcation into the left  internal and external carotid arteries is normal in appearance, and no  stenosis is seen in the cervical segment of the left internal carotid  artery using the NASCET criteria. The brachiocephalic artery origin is  normal in appearance.  No stenosis is seen in the brachiocephalic artery  and its bifurcation into the right subclavian and common carotid artery  is normal in appearance, and no stenosis is seen in the right subclavian  artery.  The right vertebral artery origin is less than optimally  assessed, but is most probably within normal limits.  No stenosis is  seen in the cervical segment of the right vertebral artery. The right  common carotid origin is normal in appearance.  No stenosis is seen in  the right common carotid artery and its bifurcation into the right  internal and external carotid arteries is normal in appearance and  no  stenosis is seen in the cervical segment of the right internal carotid  artery using the NASCET criteria.      CT ANGIOGRAM OF THE HEAD: The intracranial segments of the vertebral  arteries are widely patent to the vertebrobasilar junction without  stenosis.  The basilar artery and the basilar tip are normal in  appearance. The posterior cerebral and superior cerebellar arteries are  within normal limits. The upper cervical, petrous, cavernous and  supracavernous segments of the internal carotid arteries are within  normal limits.  There is an atretic A1 segment of the right anterior  cerebral artery which is a normal anatomic variation.  The dominant left  A1 segment is widely patent.  The anterior communicating artery origin  is normal in appearance. The A2 segments of the anterior cerebral  arteries are within normal limits. The M1 segments of the middle  cerebral arteries and middle cerebral artery bifurcations are within  normal limits. There is abrupt occlusion with filling defect in a  parietal M3 or M4 branch of the left middle cerebral artery by an  embolus or thrombus. This leads to the area of acute infarction in the  left parietal occipital region. The M1 segment of the right middle  cerebral artery and the right middle cerebral artery bifurcation are  normal in appearance.  The right M2 segments are normal in appearance.     IMPRESSION:  1. The CT of the head without contrast demonstrates a hyperdense  anterior left parietal M3 or M4 MCA branch from an acute embolus or  thrombus. Posterior to this is a wedge-shaped area of acute infarction  in the left parietal occipital parenchyma in the distribution of this  left parietal occipital MCA branch that measures 4.7 x 3.7 x 4 cm in  size, and there is no hemorrhagic transformation. The patient also has a  chronic infarct involving the posterior superior medial left frontal  lobe in the left LANETTE territory that measures 3.4 x 1.5 x 1.9 cm in size.  There  is moderate small vessel disease in the cerebral white matter and  diffuse cerebral atrophy. 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 The results of the noncontrast head CT were communicated  to Dr. Leslie from stroke neurology by telephone on 01/07/2025 at 2:45  p.m., and he requested a contrast-enhanced CT angiogram of the head and  neck and CT perfusion study of the brain.     2. The CT perfusion study of the brain consists of a 10 cm vertical slab  of brain imaging from the mid cerebellum up through the cerebral  hemispheres and excludes the inferior-half of the cerebellum and medulla  which are not assessed. The patient was moving during the CTP which  slightly limits evaluation. Within the 10 cm vertical slab of brain  imaging, there are areas of reduced cerebral blood flow to less than 30%  of normal indicating patchy and nodular areas of acute infarction in the  left parietal occipital parenchyma and this is in the distribution of  the parietal occipital branches of the left MCA territory, and there is  an area involving the superior medial left frontal parietal junction  that is in the distal left LANETTE territory and the combined dimensions  measures 22 cc of brain parenchyma that are acutely  infarcted, although  I believe the left parietal occipital acute infarct is larger than the  CT perfusion study suggests, as on the noncontrast head CT the acute  infarct in the left parietal occipital parenchyma measures up to 4.7 x  3.7 x 4 cm. Furthermore, there are bilateral symmetric areas of delayed  time to maximal enhancement of greater than 6 seconds in the posterior  occipital lobes that I think is probably artifact rather than  hypoperfused brain.  There is an area in the superior medial left  frontal parietal junction that may be hypoperfused brain in the distal  left LANETTE territory that matches the cerebral blood flow deficit. There  is an area in the left parietal occipital parenchyma that demonstrates  delayed time to maximal enhancement of greater than 6 seconds indicating  chinyere hypoperfused brain that appears larger than the infarcted brain on  the cerebral blood flow images, but appears to correspond very well to  the area of acute infarction on the head CT without contrast. I believe  that the Rapid analysis suggests that the hypoperfused brain measures up  to 111 cc of brain parenchyma and I think this is an overestimation as I  think the areas in the posterior occipital lobe is artifact and the  rapid analysis suggest that the mismatch ratio is 6, and I believe this  is an overestimation.     3. Cervical spondylosis is present with disc space narrowing and  degenerative endplate changes at C2-3 and C3-4 and C6-7.  Posterior  spurs and protruding or herniated disc material moderately narrow the  canal at C2-3 and posterior spurs severely narrow the canal and flatten  the cord at C3-4, and posterior spurs mildly narrow the canal at C6-7,  and there is foraminal narrowing in the cervical spine as described  above.     4. The CT angiographic evaluation of the great vessels of the neck is  normal with no stenosis seen in the vertebral arteries and no stenosis  seen in the common carotid arteries or  the cervical segments of the  internal carotid arteries using the NASCET criteria. The CT angiographic  evaluation of the head demonstrates abrupt embolic occlusion or  thrombosis of a parietal branch that is likely either a distal M3 or M4  branch of the left middle cerebral artery leading to the area of acute  infarction in the left parietal occipital parenchyma. The remainder of  the CT angiogram of the head and neck is normal.  The results of the  final dictated study were communicated to Dr. Fred Leslie by telephone  on 01/07/2025 at 3:25 PM.      This report was finalized on 1/8/2025 6:25 AM by Dr. Flo Swift M.D on  Workstation: [id number]       XR Chest 1 View [[id number]] Collected: 01/07/25 1542     Updated: 01/07/25 1548    Narrative:      XR CHEST 1 VW-     HISTORY: Male who is 69 years-old, acute stroke protocol     TECHNIQUE: Frontal view of the chest     COMPARISON: 12/25/2024     FINDINGS: The heart is enlarged. Left-sided generator device and cardiac  lead are noted. Aorta appears ectatic. Pulmonary vasculature is mildly  congested. No focal pulmonary consolidation, pleural effusion, or  pneumothorax. Right hemidiaphragm remains elevated. No acute osseous  process.       Impression:      As described.     This report was finalized on 1/7/2025 3:45 PM by Dr. Jax Jerry M.D on Workstation: [id number]             Results for orders placed during the hospital encounter of 08/05/24    Duplex Carotid Ultrasound CAR    Interpretation Summary  •  Right internal carotid artery demonstrates a less than 50% stenosis.  •  Left internal carotid artery demonstrates normal flow without evidence of hemodynamically significant stenosis.    Results for orders placed during the hospital encounter of 10/24/24    Adult Transthoracic Echo Complete W/ Cont if Necessary Per Protocol    Interpretation Summary  •  Left ventricular systolic function is normal. Estimated left ventricular EF = 50%  •  Left ventricular  wall thickness is consistent with borderline concentric hypertrophy.  •  Left ventricular diastolic function was indeterminate.  •  Moderate aortic valve stenosis is present. Aortic valve area is 1 cm2.  •  Peak velocity of the flow distal to the aortic valve is 310.1 cm/s. Aortic valve maximum pressure gradient is 39 mmHg. Aortic valve mean pressure gradient is 25 mmHg. Aortic valve dimensionless index is 0.3 .  •  Estimated right ventricular systolic pressure from tricuspid regurgitation is normal (<35 mmHg). Calculated right ventricular systolic pressure from tricuspid regurgitation is 27 mmHg.  •  Mild dilation of the ascending aorta is present 3.9 cm.  •  Mild aortic valve regurgitation is present.    Pertinent Labs     Results from last 7 days   Lab Units 01/10/25  0502 01/09/25 0518 01/08/25 0130 01/07/25  1434   WBC 10*3/mm3 8.96 9.38 10.79 7.94   HEMOGLOBIN g/dL 13.7 13.7 14.4 14.5   PLATELETS 10*3/mm3 218 228 240 217     Results from last 7 days   Lab Units 01/10/25  0502 01/09/25 0518 01/08/25 0130 01/07/25  1434   SODIUM mmol/L 142 139 138 138   POTASSIUM mmol/L 3.7 4.2 4.0 4.0   CHLORIDE mmol/L 104 107 105 106   CO2 mmol/L 26.0 24.1 24.0 25.0   BUN mg/dL 14 12 11 15   CREATININE mg/dL 0.61* 0.67* 0.75* 0.78   GLUCOSE mg/dL 81 84 88 91   EGFR mL/min/1.73 104.6 101.7 98.3 97.1     Results from last 7 days   Lab Units 01/08/25 0130 01/07/25  1434   ALBUMIN g/dL 3.5 3.6   BILIRUBIN mg/dL 0.7 0.6   ALK PHOS U/L 114 119*   AST (SGOT) U/L 15 13   ALT (SGPT) U/L 12 12     Results from last 7 days   Lab Units 01/10/25  0502 01/09/25 0518 01/08/25 0130 01/08/25  0017 01/07/25  1434   CALCIUM mg/dL 9.4 9.3 9.3  --  9.3   ALBUMIN g/dL  --   --  3.5  --  3.6   MAGNESIUM mg/dL 2.2  --   --  2.1  --        Results from last 7 days   Lab Units 01/08/25  0130 01/08/25  0017   HSTROP T ng/L 16 15   PROBNP pg/mL 2,452.0*  --        Results from last 7 days   Lab Units 01/08/25 0130   CHOLESTEROL mg/dL 122    TRIGLYCERIDES mg/dL 49   HDL CHOL mg/dL 47   LDL CHOL mg/dL 64             Test Results Pending at Discharge     Pending Results       Procedure [Order ID] Specimen - Date/Time    FL Video Swallow Single Contrast [[id number]]               Discharge Details        Discharge Medications        ASK your doctor about these medications        Instructions Start Date   acetaminophen 325 MG tablet  Commonly known as: TYLENOL   325 mg, Every 4 Hours PRN      albuterol sulfate  (90 Base) MCG/ACT inhaler  Commonly known as: PROVENTIL HFA;VENTOLIN HFA;PROAIR HFA   2 puffs, Inhalation, Every 4 Hours PRN      aspirin 81 MG chewable tablet   81 mg, Oral, Daily      atorvastatin 40 MG tablet  Commonly known as: LIPITOR   40 mg, Nightly      busPIRone 10 MG tablet  Commonly known as: BUSPAR   10 mg, Oral, 2 Times Daily      citalopram 20 MG tablet  Commonly known as: CeleXA   20 mg, Daily      Fluzone High-Dose 0.5 ML suspension prefilled syringe injection  Generic drug: influenza vac split high-dose   0.5 mL, Intramuscular      furosemide 20 MG tablet  Commonly known as: LASIX   20 mg, Daily      gabapentin 300 MG capsule  Commonly known as: NEURONTIN   300 mg, Oral, Nightly      gabapentin 100 MG capsule  Commonly known as: NEURONTIN   100 mg, Oral, Every Morning      guaiFENesin 600 MG 12 hr tablet  Commonly known as: MUCINEX   1,200 mg, 2 Times Daily      lisinopril 5 MG tablet  Commonly known as: PRINIVIL,ZESTRIL   5 mg, Oral, Daily      metoprolol tartrate 25 MG tablet  Commonly known as: LOPRESSOR   TAKE 1 & 1/2 TABLETS BY MOUTH TWICE DAILY      omeprazole 20 MG capsule  Commonly known as: priLOSEC   20 mg, Daily      potassium chloride ER 20 MEQ tablet controlled-release ER tablet  Commonly known as: K-TAB   20 mEq, Daily      tamsulosin 0.4 MG capsule 24 hr capsule  Commonly known as: FLOMAX   0.4 mg, Oral, Nightly               Allergies   Allergen Reactions   • Poison Ivy Extract Hives, Itching and Rash      ITCHY BLISTERS       Discharge Disposition:  Hospice/Medical Facility ([facility])      Discharge Diet:  Diet Order   Procedures   • NPO Diet NPO Type: Strict NPO       Discharge Activity:       CODE STATUS:    Code Status and Medical Interventions: No CPR (Do Not Attempt to Resuscitate); Comfort Measures   Ordered at: 01/10/25 1119     Level Of Support Discussed With:    Health Care Surrogate     Code Status (Patient has no pulse and is not breathing):    No CPR (Do Not Attempt to Resuscitate)     Medical Interventions (Patient has pulse or is breathing):    Comfort Measures       Future Appointments   Date Time Provider Department Center   1/13/2025  1:00 PM Michael Ewing MD [facility] CD [id number] None   1/24/2025 11:45 AM Temo Wheat MD [facility] RO [facility] None   1/27/2025 12:30 PM  [facility] PULM LAB ROOM  [facility] PFT [facility]   1/27/2025  1:30 PM [facility] CARD ECHO CART 1  [facility] CARDI [facility]   1/31/2025  8:30 AM Karen Jiménez APRN [facility]  [facility]   5/20/2025  2:30 PM Shante Carr PA [facility] N [facility]   7/16/2025 12:15 PM [facility] SPT POPLAR DEVICE CHECK [facility]   7/16/2025 12:30 PM [facility] ECHO CART  [facility] KPL [facility]   7/16/2025  1:15 PM Eren Bruce MD [facility]      Follow-up Information       Karen Jiménez APRN .    Specialties: Family Medicine, Nurse Practitioner  Contact information:  [facility] [place] [address]  [phone]                             Time Spent on Discharge: 35 minutes      DO [facility]  01/11/25  10:50 EST

## 2025-01-11 NOTE — NURSING NOTE
Pt rested fairly well over night. Two doses of 0.5mg ativan and 2mg morphine given, at beginning and end of the shift. Discussed patient condition with wife when she called to check on pt. Pt voided overnight. No current needs- plan of kindness ongoing.

## 2025-01-11 NOTE — PLAN OF CARE
Goal Outcome Evaluation:      Patient has a PPS of 10%.  Responsive to voice/pain/movement.  Terminal restlessness present.  Dyspnea present when awake despite oxygen at 2 lpm via nasal canula.  As needed IV medications given this am:  Morphine 2 mg x 2 doses and Ativan 1 mg x 2 doses.

## 2025-01-11 NOTE — H&P
Patient Name:  Flo Manzo  YOB: 1955  MRN:  [id number]  Admit Date:  1/11/2025  Patient Care Team:  Karen Jiménez APRN as PCP - General (Family Medicine)  Eren Bruce MD as Consulting Physician (Cardiology)  Temo Wheat MD as Consulting Physician (Radiation Oncology)  Bryant Rader MD as Consulting Physician (Urology)  Cindi Breen DNP, APRN as Nurse Practitioner (Nurse Practitioner)  Gretel Galaviz RN as Ambulatory  ([facility])  Eli Bruno MSW as  ([facility]) ([facility])      Subjective   History Present Illness     CC: Altered mental status      History of Present Illness    Flo is a 69-year-old male with past medical history of hypertension, GÓMEZ, neuropathy, substance abuse, hemorrhagic stroke, permanent pacemaker, A-fib with RVR who presented to [facility] on 1/7/2025 with altered mental status.  Patient was admitted and stroke workup was performed with a CTA head/neck and CT perfusion.  CT showed acute left parietal occipital infarct in the M3/M4 territory in the setting of chronic infarct in the CTA demonstrated embolic occlusion of the distal M3-M4.  Patient was admitted with neurological workup.  Neurology saw patient and recommended to continue stroke workup with MRI, cover with aspirin, maintain permissive hypertension.  Due to A-fib with RVR, cardiology was consulted, rate controlled with IV Lopressor.  Patient's mental status was AO x 0-1 without evidence of recovery and was very lethargic due to cva.  He was unable to tolerate p.o. intake or participate in care.  Case was discussed with neurology and it was noted that patient has low chance of recovery and his poor prognosis and high risk for anticoagulation.  Case was discussed with wife and he was watched another day without improvement.  Due to poor prognosis patient was made comfort care with hospice consult.  He required IV  meds for control of agitation and nonverbal signs of distress.  Hospice met with family and patient qualified for inpatient hospice which family was agreeable with .  Patient discharged from [facility] and readmitted as inpatient  hospice on 1/11/2025     Review of Systems   Unable to obtain due to altered mental status    Personal History     Past Medical History:   Diagnosis Date   • Arthritis    • Atrial fibrillation with RVR 01/24/2019   • Colon polyps 01/04/2018    Hepatic flexure: tubular adenoma, only low-grade dysplasia; splenic flexure: tubular adenoma, only low-grade dysplasia; sigmoid colon: tubular adenoma, multiple fragments only low-grade dysplasia   • Depression    • Heart murmur    • Hemorrhagic stroke 01/29/2019   • Hypertension    • New onset atrial fibrillation (CMS/HCC) - RVR 01/24/2019   • GÓMEZ (obstructive sleep apnea) 06/06/2019    Home sleep study with moderate severity GÓMEZ, AHI 20 events per hour.  Sleep-related hypoxia with low O2 saturation 84% for 14 minutes.   • Peripheral neuropathy    • Sleep apnea     previously diagnosed with sleep apnea, had T&A done and it has since resolved    • Stroke    • Uncontrolled hypertension    • Ventral hernia      Past Surgical History:   Procedure Laterality Date   • APPENDECTOMY N/A 1972   • BACK SURGERY      BLADDER STIMULATOR IMPLANT L LOWER BACK   • CARDIAC CATHETERIZATION N/A 07/20/2004    Cath left ventriculography, coronary angiography and left heart catheterization, Normal results-Dr. Vadim Mancuso   • CARDIAC CATHETERIZATION N/A 1/29/2019    Procedure: Left Heart Cath;  Surgeon: Eren Bruce MD;  Location:  [facility] CATH INVASIVE LOCATION;  Service: Cardiology   • CARDIAC CATHETERIZATION N/A 1/29/2019    Procedure: Left ventriculography;  Surgeon: Eren Bruce MD;  Location:  [facility] CATH INVASIVE LOCATION;  Service: Cardiology   • CARDIAC CATHETERIZATION N/A 1/29/2019    Procedure: Coronary angiography;  Surgeon:  Eren Bruce MD;  Location: [facility] CATH INVASIVE LOCATION;  Service: Cardiology   • CARDIAC ELECTROPHYSIOLOGY PROCEDURE N/A 3/4/2022    Procedure: Pacemaker SC new BIOTRONIK;  Surgeon: Eren Bruce MD;  Location: [facility] CATH INVASIVE LOCATION;  Service: Cardiology;  Laterality: N/A;   • CARPAL TUNNEL RELEASE Right 2013   • CARPAL TUNNEL RELEASE Left    • COLONOSCOPY N/A 1/4/2018    Procedure: COLONOSCOPY with cold polypectomy and hot snare polypectomy;  Surgeon: Ten Solitario MD;  Location: [facility] ENDOSCOPY;  Service:    • COLONOSCOPY N/A 4/25/2024    Procedure: COLONOSCOPY INTO CECUM;  Surgeon: Ten Solitario MD;  Location: [facility] ENDOSCOPY;  Service: General;  Laterality: N/A;  PRE: PERSONAL H/O COLON POLYP  /  POST: POOR PREP OTHERWISE NORMAL   • EYE LENS IMPLANT SECONDARY Bilateral 2007, 2008   • KNEE CARTILAGE SURGERY Right 1979   • TONSILLECTOMY AND ADENOIDECTOMY Bilateral 2005   • TOTAL KNEE ARTHROPLASTY Left 03/14/2016    Left total knee arthroplasty with Amberly component, size 11 femur, G tibial baseplate with a 10 polyethylene insert and a 38 patellar button-Dr. Pablo Hairston   • TOTAL KNEE ARTHROPLASTY Right 03/02/2015    Right total knee arthroplasty with Amberly component size G femur, 11 tibial base plate with an 11 polyethylene insert and a 38 patellar button-Dr. Pablo Hairston   • UVULOPALATOPHARYNGOPLASTY N/A 2005    part of soft palate, and uvula   • VENTRAL HERNIA REPAIR N/A 1/23/2018    Procedure: VENTRAL HERNIA REPAIR LAPAROSCOPIC WITH DAVINCI ROBOT AND MESH;  Surgeon: Ten Solitario MD;  Location: [facility] MAIN OR;  Service:      Family History   Problem Relation Age of Onset   • Hypertension Mother    • Kidney failure Mother    • Coronary artery disease Father    • Lung cancer Father         positive smoker   • Heart attack Father    • Breast cancer Sister 60   • Prostate cancer Brother    • Colon polyps Brother    • Kidney nephrosis Brother    • Malig Hyperthermia  Neg Hx      Social History     Tobacco Use   • Smoking status: Never     Passive exposure: Past   • Smokeless tobacco: Never   Vaping Use   • Vaping status: Never Used   Substance Use Topics   • Alcohol use: Not Currently     Comment: social, maybe a drink a month, not since stroke   • Drug use: No     Comment: previously smoked marijuana      Current Facility-Administered Medications on File Prior to Encounter   Medication Dose Route Frequency Provider Last Rate Last Admin   • [DISCONTINUED] acetaminophen (TYLENOL) 160 MG/5ML oral solution 650 mg  650 mg Oral Q4H PRN Whit Drake, DO       • [DISCONTINUED] acetaminophen (TYLENOL) suppository 650 mg  650 mg Rectal Q4H PRN Cami Garvey MD       • [DISCONTINUED] acetaminophen (TYLENOL) suppository 650 mg  650 mg Rectal Q4H PRN Whit Drake, DO       • [DISCONTINUED] acetaminophen (TYLENOL) tablet 650 mg  650 mg Oral Q4H PRN Cami Garvey MD       • [DISCONTINUED] acetaminophen (TYLENOL) tablet 650 mg  650 mg Oral Q4H PRN Whit Drake, DO       • [DISCONTINUED] bisacodyl (DULCOLAX) EC tablet 5 mg  5 mg Oral Daily PRN Cami Garvey MD       • [DISCONTINUED] bisacodyl (DULCOLAX) suppository 10 mg  10 mg Rectal Daily PRN Cami Garvey MD       • [DISCONTINUED] bisacodyl (DULCOLAX) suppository 10 mg  10 mg Rectal Daily PRN Cami Garvey MD       • [DISCONTINUED] busPIRone (BUSPAR) tablet 10 mg  10 mg Oral BID Cami Garvey MD       • [DISCONTINUED] diphenoxylate-atropine (LOMOTIL) 2.5-0.025 MG per tablet 1 tablet  1 tablet Oral Q2H PRN Whit Drake, DO       • [DISCONTINUED] HYDROmorphone (DILAUDID) injection 0.5 mg  0.5 mg Intravenous Q1H PRN Whit Drake, DO       • [DISCONTINUED] HYDROmorphone (DILAUDID) injection 1 mg  1 mg Intravenous Q1H PRN Whit Drake, DO       • [DISCONTINUED] HYDROmorphone (DILAUDID) injection 1 mg  1 mg Intravenous Q1H PRN Whit Drake, DO       • [DISCONTINUED]  LORazepam (ATIVAN) 2 MG/ML concentrated solution 0.5 mg  0.5 mg Sublingual Q1H PRN Whit Drake, DO       • [DISCONTINUED] LORazepam (ATIVAN) 2 MG/ML concentrated solution 1 mg  1 mg Sublingual Q1H PRN Whit Drake, DO       • [DISCONTINUED] LORazepam (ATIVAN) 2 MG/ML concentrated solution 2 mg  2 mg Sublingual Q1H PRN Whit Drake, DO       • [DISCONTINUED] LORazepam (ATIVAN) injection 0.5 mg  0.5 mg Intravenous Q1H PRN Whit Drake, DO   0.5 mg at 01/11/25 0514   • [DISCONTINUED] LORazepam (ATIVAN) injection 0.5 mg  0.5 mg Subcutaneous Q1H PRN Whit Drake, DO       • [DISCONTINUED] LORazepam (ATIVAN) injection 1 mg  1 mg Intravenous Q1H PRN Whit Drake,    1 mg at 01/11/25 1028   • [DISCONTINUED] LORazepam (ATIVAN) injection 1 mg  1 mg Subcutaneous Q1H PRN Whit Drake, DO       • [DISCONTINUED] LORazepam (ATIVAN) injection 2 mg  2 mg Intravenous Q1H PRN Whit Drake, DO       • [DISCONTINUED] LORazepam (ATIVAN) injection 2 mg  2 mg Subcutaneous Q1H PRN Whit Drake, DO       • [DISCONTINUED] morphine concentrated solution 5 mg  5 mg Sublingual Q1H PRN Whit Drake,        • [DISCONTINUED] morphine concentrated solution 5 mg  5 mg Sublingual Q1H PRN Whit Drake,        • [DISCONTINUED] morphine concentrated solution 5 mg  5 mg Sublingual Q1H PRN Whit Drake, DO       • [DISCONTINUED] morphine injection 2 mg  2 mg Intravenous Q1H PRN Whit Drake,    2 mg at 01/11/25 1028   • [DISCONTINUED] morphine injection 4 mg  4 mg Intravenous Q1H PRN Whit Drake, DO       • [DISCONTINUED] Morphine sulfate (PF) injection 6 mg  6 mg Intravenous Q1H PRN Whit Drake,        • [DISCONTINUED] polyethylene glycol (MIRALAX) packet 17 g  17 g Oral Daily PRN Cami Garvey MD       • [DISCONTINUED] Polyvinyl Alcohol-Povidone PF (ARTIFICIAL TEARS) 1.4-0.6 % ophthalmic solution 1 drop  1 drop Both Eyes Q30 Min PRN Whit Drake,        • [DISCONTINUED]  sennosides-docusate (PERICOLACE) 8.6-50 MG per tablet 2 tablet  2 tablet Oral BID PRN Cami Garvey MD       • [DISCONTINUED] sodium chloride 0.9 % flush 10 mL  10 mL Intravenous PRN Sonu Cedillo MD         Current Outpatient Medications on File Prior to Encounter   Medication Sig Dispense Refill   • acetaminophen (TYLENOL) 325 MG tablet Take 1 tablet by mouth Every 4 (Four) Hours As Needed for Mild Pain. 2 tablets Q4 hours PRN for mild pain.     • albuterol sulfate  (90 Base) MCG/ACT inhaler Inhale 2 puffs Every 4 (Four) Hours As Needed for Wheezing or Shortness of Air. Indications: shortness of breath 18 g 3   • aspirin 81 MG chewable tablet Chew 1 tablet Daily.     • atorvastatin (LIPITOR) 40 MG tablet Take 1 tablet by mouth Every Night. Indications: Cerebrovascular Accident or Stroke     • busPIRone (BUSPAR) 10 MG tablet TAKE 1 TABLET BY MOUTH TWICE DAILY 60 tablet 5   • citalopram (CeleXA) 20 MG tablet Take 1 tablet by mouth Daily. 30mg, oral, Once a day, give 1.5 tablets by mouth daily.     • furosemide (LASIX) 20 MG tablet Take 1 tablet by mouth Daily.     • gabapentin (NEURONTIN) 100 MG capsule Take 1 capsule by mouth Every Morning. 5 capsule 0   • gabapentin (NEURONTIN) 300 MG capsule Take 1 capsule by mouth Every Night. 5 capsule 0   • guaiFENesin (MUCINEX) 600 MG 12 hr tablet Take 2 tablets by mouth 2 (Two) Times a Day.     • influenza vac split high-dose (Fluzone High-Dose) 0.5 ML suspension prefilled syringe injection Inject 0.5 mL into the appropriate muscle as directed by prescriber. Indications: na 0.5 mL 0   • lisinopril (PRINIVIL,ZESTRIL) 5 MG tablet Take 1 tablet by mouth Daily for 30 days. Indications: Left Systolic Heart Failure 30 tablet 0   • metoprolol tartrate (LOPRESSOR) 25 MG tablet TAKE 1 & 1/2 TABLETS BY MOUTH TWICE DAILY 90 tablet 1   • omeprazole (priLOSEC) 20 MG capsule Take 1 capsule by mouth Daily.     • potassium chloride ER (K-TAB) 20 MEQ tablet  controlled-release ER tablet Take 1 tablet by mouth Daily.     • tamsulosin (FLOMAX) 0.4 MG capsule 24 hr capsule TAKE 1 CAPSULE BY MOUTH NIGHTLY 30 capsule 5   • [DISCONTINUED] DULoxetine (CYMBALTA) 30 MG capsule TAKE 1 CAPSULE BY MOUTH EVERY NIGHT 90 capsule 1   • [DISCONTINUED] metoprolol succinate XL (TOPROL-XL) 25 MG 24 hr tablet Take 1 tablet by mouth Daily. 30 tablet 0   • [DISCONTINUED] QUEtiapine (SEROquel) 25 MG tablet Take 1 tablet by mouth Every Night. Take 30 min before bed 30 tablet 0     Allergies   Allergen Reactions   • Poison Ivy Extract Hives, Itching and Rash     ITCHY BLISTERS       Objective    Objective     Vital Signs  Temp:  [98.2 °F (36.8 °C)-98.7 °F (37.1 °C)] 98.2 °F (36.8 °C)  Heart Rate:  [] 86  Resp:  [18-28] 18  BP: (121-163)/() 121/83  SpO2:  [93 %-96 %] 95 %  on  Flow (L/min) (Oxygen Therapy):  [2] 2;   Device (Oxygen Therapy): nasal cannula  There is no height or weight on file to calculate BMI.    Physical Exam  Constitutional:       General: He is not in acute distress.     Comments: Remain Lethargic, not oriented to person place or self  Agitated   HENT:      Head: Normocephalic and atraumatic.      Nose: Nose normal. No congestion.      Mouth/Throat:      Pharynx: Oropharynx is clear. No oropharyngeal exudate.   Eyes:      General: No scleral icterus.  Cardiovascular:      Rate and Rhythm: Rhythm irregular.      Heart sounds: No murmur heard.     No friction rub. No gallop.   Pulmonary:      Effort: No respiratory distress.      Breath sounds: No wheezing or rales.   Abdominal:      General: There is no distension.      Tenderness: There is no abdominal tenderness. There is no guarding.   Musculoskeletal:         General: No swelling, deformity or signs of injury.      Cervical back: Normal range of motion. No rigidity.   Skin:     Coloration: Skin is not jaundiced.      Findings: No bruising or lesion.   Neurological:      Mental Status: He is disoriented.       Motor: Weakness present.   Results Review:  I reviewed the patient's new clinical results.  I reviewed the patient's new imaging results and agree with the interpretation.  I reviewed the patient's other test results and agree with the interpretation  I personally viewed and interpreted the patient's EKG/Telemetry data  Discussed with ED provider.    Lab Results (last 24 hours)       ** No results found for the last 24 hours. **            Imaging Results (Last 24 Hours)       ** No results found for the last 24 hours. **            Results for orders placed during the hospital encounter of 10/24/24    Adult Transthoracic Echo Complete W/ Cont if Necessary Per Protocol    Interpretation Summary  •  Left ventricular systolic function is normal. Estimated left ventricular EF = 50%  •  Left ventricular wall thickness is consistent with borderline concentric hypertrophy.  •  Left ventricular diastolic function was indeterminate.  •  Moderate aortic valve stenosis is present. Aortic valve area is 1 cm2.  •  Peak velocity of the flow distal to the aortic valve is 310.1 cm/s. Aortic valve maximum pressure gradient is 39 mmHg. Aortic valve mean pressure gradient is 25 mmHg. Aortic valve dimensionless index is 0.3 .  •  Estimated right ventricular systolic pressure from tricuspid regurgitation is normal (<35 mmHg). Calculated right ventricular systolic pressure from tricuspid regurgitation is 27 mmHg.  •  Mild dilation of the ascending aorta is present 3.9 cm.  •  Mild aortic valve regurgitation is present.      No orders to display        Assessment/Plan     Active Hospital Problems    Diagnosis  POA   • **Stroke [I63.9]  Yes      Resolved Hospital Problems   No resolved problems to display.     #End-of-life care  #Acute left parietal infarct with left M3 branch hyperdense sign, embolic  #A-fib with RVR, not anticoagulated  #Acute metabolic encephalopathy  #Cerebral amyloid angiopathy  #Aortic  stenosis  #BPH  #Anxiety  #GERD  #Hypertension  #Hyperlipidemia  #History of substance abuse   -Due to CVA, and poor prognosis patient has been made comfort care and according with family wishes    -Patient is requiring IV morphine and Ativan to control symptoms, continue comfort care, hospice to see    -Comfort care order set placed    -Patient is on 4 park    -Hospice consulted and patient qualified for inpatient hospice, patient discharged and readmitted to inpatient hospice      VTE Prophylaxis -  comfort care .  Code Status -comfort care       Whit Drake DO  [facility]  01/11/25  11:36 EST

## 2025-01-11 NOTE — PROGRESS NOTES
Name: Flo Manzo ADMIT: 2025   : 1955  PCP: Karen Jiménez APRN    MRN: [id number] LOS: 4 days   AGE/SEX: 69 y.o. male  ROOM: [facility]     Subjective   Subjective   2025  Patient seen and examined at bedside he is alert and follows simple commands but not oriented to person, place, or self.  Discussed with wife she denies alcohol use but states he has been altered in a similar condition to previous strokes.    2025  Patient was found on the floor overnight, suspected fall.  He is lethargic this morning on 2L NC with sitter at bedside.  Has been receiving Ativan due to agitation.      1/10/2025  Patient agitated overnight.  This morning he barely responds to sternal rub, will only say his date of birth.  Discussed with neurology and poor prognosis noted.  Discussed with wife Bhargavi and she elected to make patient comfort care with hospice consult.  Orders placed    2025  Patient seen and examined at bedside.  Comfort care orders in place.  Received Ativan and morphine overnight.  He is agitated with sternal rub, and can only respond with his birthdate.       Objective   Objective   Vital Signs  Temp:  [98.2 °F (36.8 °C)-98.7 °F (37.1 °C)] 98.2 °F (36.8 °C)  Heart Rate:  [] 86  Resp:  [18-28] 18  BP: (121-163)/() 121/83  SpO2:  [93 %-96 %] 95 %  on  Flow (L/min) (Oxygen Therapy):  [2] 2;   Device (Oxygen Therapy): nasal cannula  Body mass index is 31.82 kg/m².  Physical Exam  Constitutional:       General: He is not in acute distress.     Comments: Remain Lethargic, not oriented to person place or self  Agitated   HENT:      Head: Normocephalic and atraumatic.      Nose: Nose normal. No congestion.      Mouth/Throat:      Pharynx: Oropharynx is clear. No oropharyngeal exudate.   Eyes:      General: No scleral icterus.  Cardiovascular:      Rate and Rhythm: Rhythm irregular.      Heart sounds: No murmur heard.     No friction rub. No gallop.   Pulmonary:      Effort: No  respiratory distress.      Breath sounds: No wheezing or rales.   Abdominal:      General: There is no distension.      Tenderness: There is no abdominal tenderness. There is no guarding.   Musculoskeletal:         General: No swelling, deformity or signs of injury.      Cervical back: Normal range of motion. No rigidity.   Skin:     Coloration: Skin is not jaundiced.      Findings: No bruising or lesion.   Neurological:      Mental Status: He is disoriented.      Motor: Weakness present.       Results Review     I reviewed the patient's new clinical results.  Results from last 7 days   Lab Units 01/10/25  0502 01/09/25 0518 01/08/25 0130 01/07/25  1434   WBC 10*3/mm3 8.96 9.38 10.79 7.94   HEMOGLOBIN g/dL 13.7 13.7 14.4 14.5   PLATELETS 10*3/mm3 218 228 240 217     Results from last 7 days   Lab Units 01/10/25  0502 01/09/25  0518 01/08/25  0130 01/07/25  1434   SODIUM mmol/L 142 139 138 138   POTASSIUM mmol/L 3.7 4.2 4.0 4.0   CHLORIDE mmol/L 104 107 105 106   CO2 mmol/L 26.0 24.1 24.0 25.0   BUN mg/dL 14 12 11 15   CREATININE mg/dL 0.61* 0.67* 0.75* 0.78   GLUCOSE mg/dL 81 84 88 91   EGFR mL/min/1.73 104.6 101.7 98.3 97.1     Results from last 7 days   Lab Units 01/08/25  0130 01/07/25  1434   ALBUMIN g/dL 3.5 3.6   BILIRUBIN mg/dL 0.7 0.6   ALK PHOS U/L 114 119*   AST (SGOT) U/L 15 13   ALT (SGPT) U/L 12 12     Results from last 7 days   Lab Units 01/10/25  0502 01/09/25  0518 01/08/25  0130 01/08/25  0017 01/07/25  1434   CALCIUM mg/dL 9.4 9.3 9.3  --  9.3   ALBUMIN g/dL  --   --  3.5  --  3.6   MAGNESIUM mg/dL 2.2  --   --  2.1  --        Glucose   Date/Time Value Ref Range Status   01/10/2025 0744 80 70 - 130 mg/dL Final   01/09/2025 1611 98 70 - 130 mg/dL Final   01/09/2025 1123 78 70 - 130 mg/dL Final   01/09/2025 0709 95 70 - 130 mg/dL Final   01/08/2025 2043 89 70 - 130 mg/dL Final   01/08/2025 1738 93 70 - 130 mg/dL Final   01/08/2025 1200 100 70 - 130 mg/dL Final       XR Chest 1 View    Result Date:  1/9/2025  1. Mild right basilar atelectasis or pneumonia. 2. Bilateral vascular congestion. 3. Cardiomegaly.   This report was finalized on 1/9/2025 10:12 AM by Dr. Damon Taveras M.D on Workstation: [id number]       I have personally reviewed all medications:  Scheduled Medications  busPIRone, 10 mg, Oral, BID    Infusions     Diet  NPO Diet NPO Type: Strict NPO    I have personally reviewed:  [x]  Laboratory   [x]  Microbiology   [x]  Radiology   [x]  EKG/Telemetry  [x]  Cardiology/Vascular   []  Pathology    []  Records       Assessment/Plan     Active Hospital Problems    Diagnosis  POA    **Stroke [I63.9]  Yes    Acute CVA (cerebrovascular accident) [I63.9]  Yes      Resolved Hospital Problems   No resolved problems to display.       69 y.o. male admitted with Stroke.    #Acute left parietal infarct with left M3 branch hyperdense sign, embolic  #A-fib with RVR, not anticoagulated  #Acute metabolic encephalopathy  #Cerebral amyloid angiopathy  #Aortic stenosis  #BPH  #Anxiety  #GERD  #Hypertension  #Hyperlipidemia  #History of substance abuse      -Due to CVA, and poor prognosis patient has been made comfort care and according with family wishes    -Patient is requiring IV morphine and Ativan to control symptoms, continue comfort care, hospice to see    -Comfort care order set placed    -Patient is on 4 park    -Hospice consulted      DO [facility]  01/11/25  08:20 EST

## 2025-01-12 PROBLEM — Z51.5 ADMISSION FOR END OF LIFE CARE: Status: ACTIVE | Noted: 2024-01-01

## 2025-01-12 NOTE — PROGRESS NOTES
Name: Flo Manzo ADMIT: 2025   : 1955  PCP: Karen Jiménez APRN    MRN: [id number] LOS: 1 days   AGE/SEX: 69 y.o. male  ROOM: [facility]     Subjective   Subjective   2025  Patient on 2 L nasal cannula, pain controlled and does not appear to be in distress.       Objective   Objective   Vital Signs  Temp:  [97.3 °F (36.3 °C)-98.9 °F (37.2 °C)] 98.9 °F (37.2 °C)  Heart Rate:  [91-92] 91  Resp:  [14-16] 14  BP: (110-114)/(71-74) 110/74  SpO2:  [89 %-93 %] 89 %  on  Flow (L/min) (Oxygen Therapy):  [2] 2;   Device (Oxygen Therapy): nasal cannula  There is no height or weight on file to calculate BMI.  Physical Exam  Constitutional:       Appearance: He is obese. He is ill-appearing.      Comments: Not responsive   HENT:      Head: Normocephalic and atraumatic.      Nose: Nose normal.      Mouth/Throat:      Pharynx: Oropharynx is clear.   Eyes:      General: No scleral icterus.  Cardiovascular:      Rate and Rhythm: Normal rate.      Heart sounds: No murmur heard.     No friction rub. No gallop.   Pulmonary:      Breath sounds: Rhonchi present.      Comments: On 2 L nasal cannula  Abdominal:      General: There is no distension.      Tenderness: There is no abdominal tenderness. There is no guarding.   Musculoskeletal:         General: No swelling or deformity.      Cervical back: No rigidity.   Skin:     Coloration: Skin is not jaundiced.      Findings: No bruising or lesion.   Neurological:      Mental Status: He is disoriented.       Results Review     I reviewed the patient's new clinical results.  Results from last 7 days   Lab Units 01/10/25  0502 25  0518 25  0130 25  1434   WBC 10*3/mm3 8.96 9.38 10.79 7.94   HEMOGLOBIN g/dL 13.7 13.7 14.4 14.5   PLATELETS 10*3/mm3 218 228 240 217     Results from last 7 days   Lab Units 01/10/25  0502 25  0518 25  0130 25  1434   SODIUM mmol/L 142 139 138 138   POTASSIUM mmol/L 3.7 4.2 4.0 4.0   CHLORIDE mmol/L 104 107  105 106   CO2 mmol/L 26.0 24.1 24.0 25.0   BUN mg/dL 14 12 11 15   CREATININE mg/dL 0.61* 0.67* 0.75* 0.78   GLUCOSE mg/dL 81 84 88 91   EGFR mL/min/1.73 104.6 101.7 98.3 97.1     Results from last 7 days   Lab Units 01/08/25  0130 01/07/25  1434   ALBUMIN g/dL 3.5 3.6   BILIRUBIN mg/dL 0.7 0.6   ALK PHOS U/L 114 119*   AST (SGOT) U/L 15 13   ALT (SGPT) U/L 12 12     Results from last 7 days   Lab Units 01/10/25  0502 01/09/25  0518 01/08/25  0130 01/08/25  0017 01/07/25  1434   CALCIUM mg/dL 9.4 9.3 9.3  --  9.3   ALBUMIN g/dL  --   --  3.5  --  3.6   MAGNESIUM mg/dL 2.2  --   --  2.1  --        Glucose   Date/Time Value Ref Range Status   01/10/2025 0744 80 70 - 130 mg/dL Final   01/09/2025 1611 98 70 - 130 mg/dL Final       No radiology results for the last day    I have personally reviewed all medications:  Scheduled Medications   Infusions   Diet  NPO Diet NPO Type: Strict NPO    I have personally reviewed:  [x]  Laboratory   [x]  Microbiology   [x]  Radiology   [x]  EKG/Telemetry  [x]  Cardiology/Vascular   []  Pathology    []  Records       Assessment/Plan     Active Hospital Problems    Diagnosis  POA    **Stroke [I63.9]  Yes      Resolved Hospital Problems   No resolved problems to display.       69 y.o. male admitted with Stroke.    #End-of-life care  #Acute left parietal infarct with left M3 branch hyperdense sign, embolic  #A-fib with RVR, not anticoagulated  #Acute metabolic encephalopathy  #Cerebral amyloid angiopathy  #Aortic stenosis  #BPH  #Anxiety  #GERD  #Hypertension  #Hyperlipidemia  #History of substance abuse     -Due to CVA, and poor prognosis patient has been made comfort care and according with family wishes    -Patient is requiring IV morphine and Ativan to control symptoms, continue comfort care    -Comfort care order set placed    -Patient is on 4 park    -Hospice consulted and patient qualified for inpatient hospice, patient discharged and readmitted to inpatient hospice    -Symptoms  continue to be controlled patient is without distress         Care  for DVT prophylaxis.  Comfort care.  Discussed with nursing staff.  Anticipate discharge  GIP hospice    Expected discharge date/ time has not been documented.      Whit Drake DO  [facility]  01/12/25  11:36 EST

## 2025-01-12 NOTE — PLAN OF CARE
Problem: Adult Inpatient Plan of Care  Goal: Plan of Care Review  Outcome: Met  Flowsheets (Taken 1/12/2025 3648)  Progress: declining  Outcome Evaluation: premedicated prior to turns with 1 mg of ativan ( increased to 2 mg). morphine 4 mg tolerating well. pt is also recieving robinul q6 hours for congestion.incontience and oral care done. family at bedside so of the day. pt suctions with some success. will continue to keep comfortable  Plan of Care Reviewed With: patient     Problem: Adult Inpatient Plan of Care  Goal: Patient-Specific Goal (Individualized)  Outcome: Met  Flowsheets (Taken 1/12/2025 7086)  Patient/Family-Specific Goals (Include Timeframe): to keep comfortable  Individualized Care Needs: Premedicate prior to turns. oral care

## 2025-01-12 NOTE — PLAN OF CARE
Goal Outcome Evaluation:      Patient has a PPS of 10%.  Responsive to pain, repositioning.  Respirations labored intermittently, oxygen at 2 lpm via nasal canula.  Premedications given every 4 hours:  Morphine 2 mg x 2 doses then increased to 4 mg x 2 doses, Ativan 1 mg x 4 doses.  He is in recovery position for pain and secretion relief.  Supportive family at the bedside.

## 2025-01-12 NOTE — PLAN OF CARE
Problem: Adult Inpatient Plan of Care  Goal: Plan of Care Review  Outcome: Progressing  Flowsheets (Taken 1/12/2025 0758)  Progress: declining  Outcome Evaluation: PPS 10%. Patient is premedicated prior to turns with Ativan 1 mg and Morphine 4 mg IV. Incontinence and oral care done. Bed alarm on. No family at bedside. Continue with plan of care.  Plan of Care Reviewed With: spouse   Goal Outcome Evaluation:  Plan of Care Reviewed With: spouse        Progress: declining  Outcome Evaluation: PPS 10%. Patient is premedicated prior to turns with Ativan 1 mg and Morphine 4 mg IV. Incontinence and oral care done. Bed alarm on. No family at bedside. Continue with plan of care.

## 2025-01-13 NOTE — PROGRESS NOTES
Case Management Discharge Note      Final Note: admitted to a [facility] scattered bed on 1/11/25. MARTIN Bellamy RN, CCP.         Selected Continued Care - Discharged on 1/11/2025 Admission date: 1/7/2025 - Discharge disposition: Hospice/Medical Facility ([facility])      Destination Coordination complete.      Service Provider Services Address Phone Fax Patient Preferred    [facility] [address] [facility] [id number] [phone] --              Durable Medical Equipment    No services have been selected for the patient.                Dialysis/Infusion    No services have been selected for the patient.                Home Medical Care    No services have been selected for the patient.                Therapy    No services have been selected for the patient.                Community Resources    No services have been selected for the patient.                Community & DME    No services have been selected for the patient.                    Selected Continued Care - Episodes Includes continued care and service providers with selected services from the active episodes listed below      Ambulatory Social Work Case Management Episode start date: 10/28/2024   There are no active outsourced providers for this episode.             High Risk Care Management Episode start date: 8/30/2024 (Paused)   There are no active outsourced providers for this episode.                 Selected Continued Care - Prior Encounters Includes continued care and service providers with selected services from prior encounters from 10/9/2024 to 1/11/2025      Discharged on 10/28/2024 Admission date: 10/24/2024 - Discharge disposition: Skilled Nursing Facility (DC - External)      Destination       Service Provider Services Address Phone Fax Patient Preferred    [facility] [address] [facility] [address] [address] [phone] --                                Final Discharge Disposition Code: 51 - hospice medical facility

## 2025-01-13 NOTE — NURSING NOTE
Pt is a 69 yr old male admitted to the ED with AMS and a left deviant gaze-diagnosed with stroke-pt has a hx of TIAs. Despite interventions pt never returned to baseline and family chose to pursue comfort measures. Primary diagnosis is cerebral infarction-hx of afib, HTN and CHF. [facility] is managing pain. At time of assessment pt is lying in the recovery position-does not respond to voice or gentle tactile stimuli-still responds to pain with turns. Lungs with scattered rhonchi-BS hypoactive and all pulses palpable-trace of generalized edema. Over the past 24 hours pt has received robinul 0.4mgs x2, lorazepam 1mgs x5, lorazepam 2mgs x2 and morphine 4mgs x7. VS as follows BP UTO, T100.1ax, P102, RR30 and sats 54% on 2L. Family at bedside-discussed s/s of decline and answered all questions-offered support and encouraged to call [facility] with needs or concerns. Pt is GIP appropriate-requiring parenteral meds to manage pain and close monitoring by skilled nursing. Should he stabilize he would require LTC placement. [facility] will evaluate with every visit.

## 2025-01-13 NOTE — PROGRESS NOTES
Discharge Planning Assessment  [facility]     Patient Name: Flo Manzo  MRN: [id number]  Today's Date: 1/13/2025    Admit Date: 1/7/2025    Plan: [facility] scattered bed on 1/11/25. MARTIN Bellamy RN, CCP.   Discharge Needs Assessment    No documentation.                  Discharge Plan       Row Name 01/13/25 1623       Plan    Plan [facility] scattered bed on 1/11/25. MARTIN Bellamy RN, CCP.    Plan Comments The patient transferred to [facility] from [facility] on 1/10/25. The patient was palliative. [facility] to evaluate. MARTIN Bellamy RN, CCP    Final Discharge Disposition Code 51 - hospice medical facility    Final Note admitted to a [facility] scattered bed on 1/11/25. MARTIN Bellamy RN, CCP.                  Continued Care and Services - Discharged on 1/11/2025 Admission date: 1/7/2025 - Discharge disposition: Hospice/Medical Facility ([facility])      Destination Coordination complete.      Service Provider Request Status Services Address Phone Fax Patient Preferred    [facility]  Selected Inpatient Hospice [address] [place] [id number] [phone] --                  Selected Continued Care - Episodes Includes continued care and service providers with selected services from the active episodes listed below      Ambulatory Social Work Case Management Episode start date: 10/28/2024   There are no active outsourced providers for this episode.             High Risk Care Management Episode start date: 8/30/2024 (Paused)   There are no active outsourced providers for this episode.                 Selected Continued Care - Prior Encounters Includes continued care and service providers with selected services from prior encounters from 10/9/2024 to 1/11/2025      Discharged on 10/28/2024 Admission date: 10/24/2024 - Discharge disposition: Skilled Nursing Facility (DC - External)      Destination       Service Provider Services Address Phone Fax Patient Preferred    [facility] [address] [facility] [id number] [phone] --                          Expected Discharge Date and Time       Expected Discharge Date Expected Discharge Time    Jan 11, 2025            Demographic Summary    No documentation.                  Functional Status    No documentation.                  Psychosocial    No documentation.                  Abuse/Neglect    No documentation.                  Legal    No documentation.                  Substance Abuse    No documentation.                  Patient Forms    No documentation.                     Ana Bellamy RN

## 2025-01-13 NOTE — PROGRESS NOTES
Case Management Discharge Note      Final Note: The patient  on 25 @ 02:34. MARTIN Bellamy RN, CCP.         Selected Continued Care - Discharged on 2025 Admission date: 2025 - Discharge disposition:       Destination Coordination complete.      Service Provider Services Address Phone Fax Patient Preferred    [facility] [address], [place] [id number] [phone] --              Durable Medical Equipment    No services have been selected for the patient.                Dialysis/Infusion    No services have been selected for the patient.                Home Medical Care    No services have been selected for the patient.                Therapy    No services have been selected for the patient.                Community Resources    No services have been selected for the patient.                Community & DME    No services have been selected for the patient.                    Selected Continued Care - Episodes Includes continued care and service providers with selected services from the active episodes listed below      Ambulatory Social Work Case Management Episode start date: 10/28/2024   There are no active outsourced providers for this episode.             High Risk Care Management Episode start date: 2024 (Paused)   There are no active outsourced providers for this episode.                 Selected Continued Care - Prior Encounters Includes continued care and service providers with selected services from prior encounters from 10/13/2024 to 2025      Discharged on 2025 Admission date: 2025 - Discharge disposition: Hospice/Medical Facility ([facility])      Destination       Service Provider Services Address Phone Fax Patient Preferred    [facility] [address], [facility] [address] [phone] --                      Discharged on 10/28/2024  Admission date: 10/24/2024 - Discharge disposition: Skilled Nursing Facility (DC - External)      Destination       Service Provider Services Address Phone Fax Patient Preferred    [facility] [address] [facility] [address] [phone] --                               Final Discharge Disposition Code: 20 -

## 2025-01-16 NOTE — DISCHARGE SUMMARY
Patient Name: Flo Manzo  : 1955  MRN: [id number]    Date of Admission: 2025  Date of Discharge:  2025  Primary Care Physician: Karen Jiménez APRN      Chief Complaint:   No chief complaint on file.      Discharge Diagnoses     Active Hospital Problems    Diagnosis  POA    **Admission for end of life care [Z51.5]  Not Applicable    Stroke [I63.9]  Yes      Resolved Hospital Problems   No resolved problems to display.        Hospital Course     Flo is a 69-year-old male with past medical history of hypertension, GÓMEZ, neuropathy, substance abuse, hemorrhagic stroke, permanent pacemaker, A-fib with RVR who presented to [facility] on 2025 with altered mental status.  Patient was admitted and stroke workup was performed with a CTA head/neck and CT perfusion.  CT showed acute left parietal occipital infarct in the M3/M4 territory in the setting of chronic infarct in the CTA demonstrated embolic occlusion of the distal M3-M4.  Patient was admitted with neurological workup.  Neurology saw patient and recommended to continue stroke workup with MRI, cover with aspirin, maintain permissive hypertension.  Due to A-fib with RVR, cardiology was consulted, rate controlled with IV Lopressor.  Patient's mental status was AO x 0-1 without evidence of recovery and was very lethargic due to cva.  He was unable to tolerate p.o. intake or participate in care.  Case was discussed with neurology and it was noted that patient has low chance of recovery and his poor prognosis and high risk for anticoagulation.  Case was discussed with wife and he was watched another day without improvement.  Due to poor prognosis patient was made comfort care with hospice consult.  He required IV meds for control of agitation and nonverbal signs of distress.  Hospice met with family and patient qualified for inpatient hospice which family was agreeable with .  Patient discharged from [facility] and  readmitted as inpatient  hospice on 2025.  Comfort care orders were placed and patient's pain was controlled as well as any signs of nonverbal distress.  Patient  on 2025 at 02:35.        Day of Discharge     Subjective:  Patient  on 2025 at 02:35    Physical Exam:     There is no height or weight on file to calculate BMI.  Physical Exam  Patient     Consultants     Consult Orders (all) (From admission, onward)       Start     Ordered    25 1133  [facility] (on-call MD unless specified) Details  Once,   Status:  Canceled        Specialty:  Hospitalist  Provider:  Whit Drake,     25 1132    25 1133  Notify Stroke Coordinator  Once,   Status:  Canceled        Provider:  (Not yet assigned)    25 1132    25 1133  Consult to Case Management   Once,   Status:  Canceled        Provider:  (Not yet assigned)    25 1132                  Procedures     * Surgery not found *    Imaging Results (All)       None          Results for orders placed during the hospital encounter of 24    Duplex Carotid Ultrasound CAR    Interpretation Summary    Right internal carotid artery demonstrates a less than 50% stenosis.    Left internal carotid artery demonstrates normal flow without evidence of hemodynamically significant stenosis.    Results for orders placed during the hospital encounter of 10/24/24    Adult Transthoracic Echo Complete W/ Cont if Necessary Per Protocol    Interpretation Summary    Left ventricular systolic function is normal. Estimated left ventricular EF = 50%    Left ventricular wall thickness is consistent with borderline concentric hypertrophy.    Left ventricular diastolic function was indeterminate.    Moderate aortic valve stenosis is present. Aortic valve area is 1 cm2.    Peak velocity of the flow distal to the aortic valve is 310.1 cm/s. Aortic valve maximum pressure gradient is 39 mmHg. Aortic valve mean pressure  "gradient is 25 mmHg. Aortic valve dimensionless index is 0.3 .    Estimated right ventricular systolic pressure from tricuspid regurgitation is normal (<35 mmHg). Calculated right ventricular systolic pressure from tricuspid regurgitation is 27 mmHg.    Mild dilation of the ascending aorta is present 3.9 cm.    Mild aortic valve regurgitation is present.    Pertinent Labs     Results from last 7 days   Lab Units 01/10/25  0502   WBC 10*3/mm3 8.96   HEMOGLOBIN g/dL 13.7   PLATELETS 10*3/mm3 218     Results from last 7 days   Lab Units 01/10/25  0502   SODIUM mmol/L 142   POTASSIUM mmol/L 3.7   CHLORIDE mmol/L 104   CO2 mmol/L 26.0   BUN mg/dL 14   CREATININE mg/dL 0.61*   GLUCOSE mg/dL 81   EGFR mL/min/1.73 104.6       Results from last 7 days   Lab Units 01/10/25  0502   CALCIUM mg/dL 9.4   MAGNESIUM mg/dL 2.2               Invalid input(s): \"LDLCALC\"          Test Results Pending at Discharge     Pending Results       None              Discharge Details        Discharge Medications        ASK your doctor about these medications        Instructions Start Date   acetaminophen 325 MG tablet  Commonly known as: TYLENOL   325 mg, Every 4 Hours PRN      atorvastatin 40 MG tablet  Commonly known as: LIPITOR   40 mg, Nightly      citalopram 20 MG tablet  Commonly known as: CeleXA   20 mg, Daily      furosemide 20 MG tablet  Commonly known as: LASIX   20 mg, Daily      guaiFENesin 600 MG 12 hr tablet  Commonly known as: MUCINEX   1,200 mg, 2 Times Daily      lisinopril 5 MG tablet  Commonly known as: PRINIVIL,ZESTRIL   5 mg, Oral, Daily      omeprazole 20 MG capsule  Commonly known as: priLOSEC   20 mg, Daily      potassium chloride ER 20 MEQ tablet controlled-release ER tablet  Commonly known as: K-TAB   20 mEq, Daily               Allergies   Allergen Reactions    Poison Ivy Extract Hives, Itching and Rash     ITCHY BLISTERS       Discharge Disposition:        Discharge Diet:  No active diet order      Discharge " Activity:       CODE STATUS:    Code Status and Medical Interventions: No CPR (Do Not Attempt to Resuscitate); Comfort Measures   Ordered at: 01/11/25 1132     Level Of Support Discussed With:    Health Care Surrogate     Code Status (Patient has no pulse and is not breathing):    No CPR (Do Not Attempt to Resuscitate)     Medical Interventions (Patient has pulse or is breathing):    Comfort Measures       No future appointments.   Contact information for follow-up providers       Karen Jiménez APRN .    Specialties: Family Medicine, Nurse Practitioner  Contact information:  [address] [address] [phone]                       Contact information for after-discharge care       Destination       [facility] .    Service: Inpatient Hospice  Contact information:  [address]  [facility] [address] [phone]                                   Time Spent on Discharge: 10 minutes      Whit Drake DO  [facility]  01/16/25  15:53 EST

## (undated) DEVICE — ADAPT CLN BIOGUARD AIR/H2O DISP

## (undated) DEVICE — DEV SUT GRSPR CLOSUR 15CM 14G

## (undated) DEVICE — TUBING, SUCTION, 1/4" X 10', STRAIGHT: Brand: MEDLINE

## (undated) DEVICE — CATH VENT MIV RADL PIG ST TIP 5F 110CM

## (undated) DEVICE — 3M™ STERI-STRIP™ REINFORCED ADHESIVE SKIN CLOSURES, R1547, 1/2 IN X 4 IN (12 MM X 100 MM), 6 STRIPS/ENVELOPE: Brand: 3M™ STERI-STRIP™

## (undated) DEVICE — TIP COVER ACCESSORY

## (undated) DEVICE — SUT PROLN 2/0 CT2 30IN 8411H

## (undated) DEVICE — CANN O2 ETCO2 FITS ALL CONN CO2 SMPL A/ 7IN DISP LF

## (undated) DEVICE — RADIFOCUS GLIDEWIRE: Brand: GLIDEWIRE

## (undated) DEVICE — ENDOPATH XCEL BLADELESS TROCARS WITH STABILITY SLEEVES: Brand: ENDOPATH XCEL

## (undated) DEVICE — SNAR POLYP SENSATION STDOVL 27 240 BX40

## (undated) DEVICE — GW EMR FIX EXCHG J STD .035 3MM 260CM

## (undated) DEVICE — GLV SURG BIOGEL LTX PF 8 1/2

## (undated) DEVICE — CANN NASL CO2 TRULINK W/O2 A/

## (undated) DEVICE — OBT BLADLES DAVINCI/S LNG 8MM

## (undated) DEVICE — GLV SURG BIOGEL LTX PF 6 1/2

## (undated) DEVICE — CATH DIAG IMPULSE FL3.5 5F 100CM

## (undated) DEVICE — SOL ANTISTICK CAUTRY ELECTROLUBE LF

## (undated) DEVICE — THE TORRENT IRRIGATION SCOPE CONNECTOR IS USED WITH THE TORRENT IRRIGATION TUBING TO PROVIDE IRRIGATION FLUIDS SUCH AS STERILE WATER DURING GASTROINTESTINAL ENDOSCOPIC PROCEDURES WHEN USED IN CONJUNCTION WITH AN IRRIGATION PUMP (OR ELECTROSURGICAL UNIT).: Brand: TORRENT

## (undated) DEVICE — APPL CHLORAPREP W/TINT 26ML ORNG

## (undated) DEVICE — Device

## (undated) DEVICE — OBT BLADLES ENDOWRIST DAVINCI/S 8MM

## (undated) DEVICE — VISUALIZATION SYSTEM: Brand: CLEARIFY

## (undated) DEVICE — CATH DIAG IMPULSE FR4 5F 100CM

## (undated) DEVICE — KT ORCA ORCAPOD DISP STRL

## (undated) DEVICE — PAD GRND REM POLYHESIVE A/ DISP

## (undated) DEVICE — KT MANIFLD CARDIAC

## (undated) DEVICE — LOU LAP CHOLE: Brand: MEDLINE INDUSTRIES, INC.

## (undated) DEVICE — 3M™ STERI-STRIP™ COMPOUND BENZOIN TINCTURE 40 BAGS/CARTON 4 CARTONS/CASE C1544: Brand: 3M™ STERI-STRIP™

## (undated) DEVICE — BNDR ABD PREMIUM/UNIV 10IN 27TO48IN

## (undated) DEVICE — SENSR O2 OXIMAX FNGR A/ 18IN NONSTR

## (undated) DEVICE — SINGLE-USE BIOPSY FORCEPS: Brand: RADIAL JAW 4

## (undated) DEVICE — MARKR SKIN W/RULR AND LBL

## (undated) DEVICE — Device: Brand: DEFENDO AIR/WATER/SUCTION AND BIOPSY VALVE

## (undated) DEVICE — TBG 02 CRUSH RESIST LF CLR 7FT

## (undated) DEVICE — PK CATH CARD 40

## (undated) DEVICE — TOTAL TRAY, 16FR 10ML SIL FOLEY, URN: Brand: MEDLINE

## (undated) DEVICE — 1842 FOAM BLOCK NEEDLE COUNTER: Brand: DEVON

## (undated) DEVICE — DRAPE,REIN 53X77,STERILE: Brand: MEDLINE

## (undated) DEVICE — PENCL E/S HNDSWTCH SMOKEEVAC HOLSTR 10FT

## (undated) DEVICE — GLIDESHEATH SLENDER STAINLESS STEEL KIT: Brand: GLIDESHEATH SLENDER

## (undated) DEVICE — INTRO SHEATH ART/FEM ENGAGE .035 6F12CM

## (undated) DEVICE — CATH DIAG IMPULSE FL5 5F 100CM

## (undated) DEVICE — INTRO SHEATH PRELUDE SNAP .038 6F 13CM W/SDPRT

## (undated) DEVICE — THE SINGLE USE ETRAP – POLYP TRAP IS USED FOR SUCTION RETRIEVAL OF ENDOSCOPICALLY REMOVED POLYPS.: Brand: ETRAP

## (undated) DEVICE — ARM SLING II: Brand: DEROYAL

## (undated) DEVICE — SOL NACL 0.9PCT 1000ML

## (undated) DEVICE — SWABSTCK BENZOIN TINCTURE

## (undated) DEVICE — LOU PACE DEFIB: Brand: MEDLINE INDUSTRIES, INC.